# Patient Record
Sex: MALE | Race: BLACK OR AFRICAN AMERICAN | Employment: OTHER | URBAN - METROPOLITAN AREA
[De-identification: names, ages, dates, MRNs, and addresses within clinical notes are randomized per-mention and may not be internally consistent; named-entity substitution may affect disease eponyms.]

---

## 2017-02-13 ENCOUNTER — HOSPITAL ENCOUNTER (OUTPATIENT)
Age: 57
Setting detail: SPECIMEN
Discharge: HOME | End: 2017-02-13
Payer: MEDICAID

## 2017-02-13 LAB
ABSOLUTE EOS #: 0.1 K/UL (ref 0–0.4)
ABSOLUTE LYMPH #: 1.9 K/UL (ref 1–4.8)
ABSOLUTE MONO #: 0.4 K/UL (ref 0.1–1.2)
ALBUMIN SERPL-MCNC: 4.4 G/DL (ref 3.5–5.2)
ALBUMIN/GLOBULIN RATIO: 2 (ref 1–2.5)
ALP BLD-CCNC: 114 U/L (ref 40–129)
ALT SERPL-CCNC: 16 U/L (ref 5–41)
ANION GAP SERPL CALCULATED.3IONS-SCNC: 16 MMOL/L (ref 9–17)
AST SERPL-CCNC: 18 U/L
BASOPHILS # BLD: 1 % (ref 0–2)
BASOPHILS ABSOLUTE: 0 K/UL (ref 0–0.2)
BILIRUB SERPL-MCNC: 0.25 MG/DL (ref 0.3–1.2)
BILIRUBIN DIRECT: 0.09 MG/DL
BILIRUBIN, INDIRECT: 0.16 MG/DL (ref 0–1)
BUN BLDV-MCNC: 15 MG/DL (ref 6–20)
BUN/CREAT BLD: ABNORMAL (ref 9–20)
CALCIUM SERPL-MCNC: 9.4 MG/DL (ref 8.6–10.4)
CHLORIDE BLD-SCNC: 101 MMOL/L (ref 98–107)
CHOLESTEROL/HDL RATIO: 2.5
CHOLESTEROL: 179 MG/DL
CO2: 24 MMOL/L (ref 20–31)
CREAT SERPL-MCNC: 1.16 MG/DL (ref 0.7–1.2)
DIFFERENTIAL TYPE: ABNORMAL
EOSINOPHILS RELATIVE PERCENT: 3 % (ref 1–4)
GFR AFRICAN AMERICAN: >60 ML/MIN
GFR NON-AFRICAN AMERICAN: >60 ML/MIN
GFR SERPL CREATININE-BSD FRML MDRD: ABNORMAL ML/MIN/{1.73_M2}
GFR SERPL CREATININE-BSD FRML MDRD: ABNORMAL ML/MIN/{1.73_M2}
GLOBULIN: ABNORMAL G/DL (ref 1.5–3.8)
GLUCOSE BLD-MCNC: 137 MG/DL (ref 70–99)
HCT VFR BLD CALC: 38.6 % (ref 41–53)
HDLC SERPL-MCNC: 73 MG/DL
HEMOGLOBIN: 12.6 G/DL (ref 13.5–17.5)
LDL CHOLESTEROL: 93 MG/DL (ref 0–130)
LYMPHOCYTES # BLD: 37 % (ref 24–44)
MCH RBC QN AUTO: 28.6 PG (ref 26–34)
MCHC RBC AUTO-ENTMCNC: 32.8 G/DL (ref 31–37)
MCV RBC AUTO: 87.3 FL (ref 80–100)
MONOCYTES # BLD: 7 % (ref 2–11)
PDW BLD-RTO: 14.1 % (ref 12.5–15.4)
PLATELET # BLD: 268 K/UL (ref 140–450)
PLATELET ESTIMATE: ABNORMAL
PMV BLD AUTO: 8.5 FL (ref 6–12)
POTASSIUM SERPL-SCNC: 4.1 MMOL/L (ref 3.7–5.3)
PROSTATE SPECIFIC ANTIGEN: 0.48 UG/L
RBC # BLD: 4.42 M/UL (ref 4.5–5.9)
RBC # BLD: ABNORMAL 10*6/UL
SEG NEUTROPHILS: 52 % (ref 36–66)
SEGMENTED NEUTROPHILS ABSOLUTE COUNT: 2.6 K/UL (ref 1.8–7.7)
SODIUM BLD-SCNC: 141 MMOL/L (ref 135–144)
THYROXINE, FREE: 0.91 NG/DL (ref 0.93–1.7)
TOTAL PROTEIN: 6.6 G/DL (ref 6.4–8.3)
TRIGL SERPL-MCNC: 67 MG/DL
TSH SERPL DL<=0.05 MIU/L-ACNC: 0.81 MIU/L (ref 0.3–5)
VITAMIN D 25-HYDROXY: 17.9 NG/ML (ref 30–100)
VLDLC SERPL CALC-MCNC: NORMAL MG/DL (ref 1–30)
WBC # BLD: 5 K/UL (ref 3.5–11)
WBC # BLD: ABNORMAL 10*3/UL

## 2017-02-14 LAB
CREATININE URINE: 466.6 MG/DL (ref 39–259)
ESTIMATED AVERAGE GLUCOSE: 94 MG/DL
HBA1C MFR BLD: 4.9 % (ref 4–6)
MICROALBUMIN/CREAT 24H UR: 23 MG/L
MICROALBUMIN/CREAT UR-RTO: 5 MCG/MG CREAT

## 2017-02-15 LAB
CULTURE: NORMAL
CULTURE: NORMAL
Lab: NORMAL
SPECIMEN DESCRIPTION: NORMAL
STATUS: NORMAL

## 2017-02-16 LAB
HAV IGM SER IA-ACNC: NONREACTIVE
HEPATITIS B CORE IGM ANTIBODY: NONREACTIVE
HEPATITIS B SURFACE ANTIGEN: NONREACTIVE
HEPATITIS C ANTIBODY: NONREACTIVE

## 2017-06-14 ENCOUNTER — HOSPITAL ENCOUNTER (OUTPATIENT)
Age: 57
Discharge: HOME | End: 2017-06-14
Payer: MEDICAID

## 2017-06-14 ENCOUNTER — HOSPITAL ENCOUNTER (OUTPATIENT)
Dept: GENERAL RADIOLOGY | Age: 57
Discharge: HOME | End: 2017-06-14
Payer: MEDICAID

## 2017-06-14 DIAGNOSIS — R05.9 COUGH: ICD-10-CM

## 2017-06-14 LAB
FERRITIN: 149 UG/L (ref 30–400)
TRANSFERRIN: 228 MG/DL (ref 200–360)

## 2017-06-14 PROCEDURE — 82728 ASSAY OF FERRITIN: CPT

## 2017-06-14 PROCEDURE — 71020 XR CHEST STANDARD TWO VW: CPT

## 2017-06-14 PROCEDURE — 84466 ASSAY OF TRANSFERRIN: CPT

## 2017-06-14 PROCEDURE — 36415 COLL VENOUS BLD VENIPUNCTURE: CPT

## 2017-08-24 ENCOUNTER — HOSPITAL ENCOUNTER (EMERGENCY)
Age: 57
Discharge: PSYCHIATRIC OR SUBSTANCE USE FACILITY | End: 2017-08-25
Attending: EMERGENCY MEDICINE
Payer: MEDICAID

## 2017-08-24 DIAGNOSIS — F20.3 UNDIFFERENTIATED SCHIZOPHRENIA (HCC): Primary | ICD-10-CM

## 2017-08-24 PROCEDURE — 99285 EMERGENCY DEPT VISIT HI MDM: CPT

## 2017-08-25 ENCOUNTER — HOSPITAL ENCOUNTER (INPATIENT)
Age: 57
LOS: 11 days | Discharge: HOME | DRG: 750 | End: 2017-09-05
Attending: PSYCHIATRY & NEUROLOGY | Admitting: PSYCHIATRY & NEUROLOGY
Payer: MEDICAID

## 2017-08-25 VITALS
OXYGEN SATURATION: 99 % | RESPIRATION RATE: 17 BRPM | DIASTOLIC BLOOD PRESSURE: 79 MMHG | TEMPERATURE: 98.4 F | HEART RATE: 81 BPM | SYSTOLIC BLOOD PRESSURE: 124 MMHG

## 2017-08-25 PROBLEM — F25.0 SCHIZOAFFECTIVE DISORDER, BIPOLAR TYPE (HCC): Status: ACTIVE | Noted: 2017-08-25

## 2017-08-25 LAB
GLUCOSE BLD-MCNC: 112 MG/DL (ref 75–110)
GLUCOSE BLD-MCNC: 90 MG/DL (ref 75–110)

## 2017-08-25 PROCEDURE — 1240000000 HC EMOTIONAL WELLNESS R&B

## 2017-08-25 PROCEDURE — 82947 ASSAY GLUCOSE BLOOD QUANT: CPT

## 2017-08-25 PROCEDURE — 6370000000 HC RX 637 (ALT 250 FOR IP): Performed by: PSYCHIATRY & NEUROLOGY

## 2017-08-25 RX ORDER — TRAZODONE HYDROCHLORIDE 100 MG/1
100 TABLET ORAL NIGHTLY
Status: DISCONTINUED | OUTPATIENT
Start: 2017-08-25 | End: 2017-08-29

## 2017-08-25 RX ORDER — ACETAMINOPHEN 325 MG/1
650 TABLET ORAL EVERY 4 HOURS PRN
Status: DISCONTINUED | OUTPATIENT
Start: 2017-08-25 | End: 2017-09-05 | Stop reason: HOSPADM

## 2017-08-25 RX ORDER — BENZTROPINE MESYLATE 1 MG/ML
2 INJECTION INTRAMUSCULAR; INTRAVENOUS DAILY PRN
Status: DISCONTINUED | OUTPATIENT
Start: 2017-08-25 | End: 2017-09-05 | Stop reason: HOSPADM

## 2017-08-25 RX ORDER — QUETIAPINE FUMARATE 200 MG/1
200 TABLET, FILM COATED ORAL EVERY MORNING
Status: DISCONTINUED | OUTPATIENT
Start: 2017-08-25 | End: 2017-09-05 | Stop reason: HOSPADM

## 2017-08-25 RX ORDER — HYDROXYZINE HYDROCHLORIDE 25 MG/1
25 TABLET, FILM COATED ORAL 3 TIMES DAILY PRN
Status: DISCONTINUED | OUTPATIENT
Start: 2017-08-25 | End: 2017-09-05 | Stop reason: HOSPADM

## 2017-08-25 RX ORDER — QUETIAPINE FUMARATE 200 MG/1
400 TABLET, FILM COATED ORAL NIGHTLY
Status: DISCONTINUED | OUTPATIENT
Start: 2017-08-25 | End: 2017-08-29

## 2017-08-25 RX ADMIN — QUETIAPINE FUMARATE 400 MG: 200 TABLET ORAL at 21:44

## 2017-08-25 RX ADMIN — METFORMIN HYDROCHLORIDE 1000 MG: 1000 TABLET, FILM COATED ORAL at 17:28

## 2017-08-25 RX ADMIN — QUETIAPINE FUMARATE 200 MG: 200 TABLET ORAL at 12:01

## 2017-08-25 RX ADMIN — TRAZODONE HYDROCHLORIDE 100 MG: 100 TABLET ORAL at 21:44

## 2017-08-25 RX ADMIN — ACETAMINOPHEN 650 MG: 325 TABLET ORAL at 21:44

## 2017-08-25 RX ADMIN — METFORMIN HYDROCHLORIDE 1000 MG: 1000 TABLET, FILM COATED ORAL at 12:01

## 2017-08-25 ASSESSMENT — ENCOUNTER SYMPTOMS
RHINORRHEA: 0
SHORTNESS OF BREATH: 0
COUGH: 0
BACK PAIN: 0
VOMITING: 0
ABDOMINAL PAIN: 0
EYE REDNESS: 0
EYE ITCHING: 0
CONSTIPATION: 0
NAUSEA: 0
DIARRHEA: 0

## 2017-08-25 ASSESSMENT — SLEEP AND FATIGUE QUESTIONNAIRES
RESTFUL SLEEP: NO
DIFFICULTY STAYING ASLEEP: YES
DO YOU USE A SLEEP AID: NO
AVERAGE NUMBER OF SLEEP HOURS: 4
SLEEP PATTERN: RESTLESSNESS;INSOMNIA;DISTURBED/INTERRUPTED SLEEP
DIFFICULTY FALLING ASLEEP: YES
DO YOU HAVE DIFFICULTY SLEEPING: YES
DIFFICULTY ARISING: NO

## 2017-08-25 ASSESSMENT — PATIENT HEALTH QUESTIONNAIRE - PHQ9: SUM OF ALL RESPONSES TO PHQ QUESTIONS 1-9: 25

## 2017-08-25 ASSESSMENT — LIFESTYLE VARIABLES: HISTORY_ALCOHOL_USE: YES

## 2017-08-25 ASSESSMENT — PAIN SCALES - GENERAL
PAINLEVEL_OUTOF10: 5
PAINLEVEL_OUTOF10: 3

## 2017-08-26 LAB — GLUCOSE BLD-MCNC: 79 MG/DL (ref 75–110)

## 2017-08-26 PROCEDURE — 6370000000 HC RX 637 (ALT 250 FOR IP): Performed by: PSYCHIATRY & NEUROLOGY

## 2017-08-26 PROCEDURE — 82947 ASSAY GLUCOSE BLOOD QUANT: CPT

## 2017-08-26 PROCEDURE — 1240000000 HC EMOTIONAL WELLNESS R&B

## 2017-08-26 RX ADMIN — ACETAMINOPHEN 650 MG: 325 TABLET ORAL at 21:03

## 2017-08-26 RX ADMIN — METFORMIN HYDROCHLORIDE 1000 MG: 1000 TABLET, FILM COATED ORAL at 08:51

## 2017-08-26 RX ADMIN — HYDROXYZINE HYDROCHLORIDE 25 MG: 25 TABLET, FILM COATED ORAL at 21:03

## 2017-08-26 RX ADMIN — METFORMIN HYDROCHLORIDE 1000 MG: 1000 TABLET, FILM COATED ORAL at 17:30

## 2017-08-26 RX ADMIN — QUETIAPINE FUMARATE 200 MG: 200 TABLET ORAL at 08:50

## 2017-08-26 RX ADMIN — TRAZODONE HYDROCHLORIDE 100 MG: 100 TABLET ORAL at 21:02

## 2017-08-26 RX ADMIN — QUETIAPINE FUMARATE 400 MG: 200 TABLET ORAL at 21:02

## 2017-08-26 ASSESSMENT — PAIN SCALES - GENERAL
PAINLEVEL_OUTOF10: 5
PAINLEVEL_OUTOF10: 0

## 2017-08-26 ASSESSMENT — LIFESTYLE VARIABLES: HISTORY_ALCOHOL_USE: NO

## 2017-08-27 PROCEDURE — 6370000000 HC RX 637 (ALT 250 FOR IP): Performed by: PSYCHIATRY & NEUROLOGY

## 2017-08-27 PROCEDURE — 1240000000 HC EMOTIONAL WELLNESS R&B

## 2017-08-27 RX ADMIN — QUETIAPINE FUMARATE 400 MG: 200 TABLET ORAL at 21:54

## 2017-08-27 RX ADMIN — QUETIAPINE FUMARATE 200 MG: 200 TABLET ORAL at 08:43

## 2017-08-27 RX ADMIN — METFORMIN HYDROCHLORIDE 1000 MG: 1000 TABLET, FILM COATED ORAL at 17:06

## 2017-08-27 RX ADMIN — HYDROXYZINE HYDROCHLORIDE 25 MG: 25 TABLET, FILM COATED ORAL at 08:43

## 2017-08-27 RX ADMIN — ACETAMINOPHEN 650 MG: 325 TABLET ORAL at 21:54

## 2017-08-27 RX ADMIN — HYDROXYZINE HYDROCHLORIDE 25 MG: 25 TABLET, FILM COATED ORAL at 21:54

## 2017-08-27 RX ADMIN — METFORMIN HYDROCHLORIDE 1000 MG: 1000 TABLET, FILM COATED ORAL at 08:43

## 2017-08-27 RX ADMIN — TRAZODONE HYDROCHLORIDE 100 MG: 100 TABLET ORAL at 21:54

## 2017-08-27 ASSESSMENT — PAIN SCALES - GENERAL
PAINLEVEL_OUTOF10: 0
PAINLEVEL_OUTOF10: 5

## 2017-08-28 LAB
GLUCOSE BLD-MCNC: 87 MG/DL (ref 75–110)
GLUCOSE BLD-MCNC: 96 MG/DL (ref 75–110)

## 2017-08-28 PROCEDURE — 82947 ASSAY GLUCOSE BLOOD QUANT: CPT

## 2017-08-28 PROCEDURE — 6370000000 HC RX 637 (ALT 250 FOR IP): Performed by: PSYCHIATRY & NEUROLOGY

## 2017-08-28 PROCEDURE — 1240000000 HC EMOTIONAL WELLNESS R&B

## 2017-08-28 RX ADMIN — TRAZODONE HYDROCHLORIDE 100 MG: 100 TABLET ORAL at 21:18

## 2017-08-28 RX ADMIN — QUETIAPINE FUMARATE 400 MG: 200 TABLET ORAL at 21:18

## 2017-08-28 RX ADMIN — METFORMIN HYDROCHLORIDE 1000 MG: 1000 TABLET, FILM COATED ORAL at 17:02

## 2017-08-28 RX ADMIN — QUETIAPINE FUMARATE 200 MG: 200 TABLET ORAL at 09:15

## 2017-08-28 RX ADMIN — HYDROXYZINE HYDROCHLORIDE 25 MG: 25 TABLET, FILM COATED ORAL at 21:18

## 2017-08-28 RX ADMIN — METFORMIN HYDROCHLORIDE 1000 MG: 1000 TABLET, FILM COATED ORAL at 09:15

## 2017-08-28 ASSESSMENT — PAIN DESCRIPTION - LOCATION: LOCATION: TOE (COMMENT WHICH ONE)

## 2017-08-28 ASSESSMENT — PAIN SCALES - GENERAL: PAINLEVEL_OUTOF10: 10

## 2017-08-29 PROCEDURE — 6370000000 HC RX 637 (ALT 250 FOR IP): Performed by: PSYCHIATRY & NEUROLOGY

## 2017-08-29 PROCEDURE — 1240000000 HC EMOTIONAL WELLNESS R&B

## 2017-08-29 RX ORDER — QUETIAPINE FUMARATE 100 MG/1
100 TABLET, FILM COATED ORAL NIGHTLY
Status: DISCONTINUED | OUTPATIENT
Start: 2017-08-29 | End: 2017-08-29

## 2017-08-29 RX ORDER — TRAZODONE HYDROCHLORIDE 100 MG/1
100 TABLET ORAL NIGHTLY
Status: DISCONTINUED | OUTPATIENT
Start: 2017-08-29 | End: 2017-08-31

## 2017-08-29 RX ADMIN — METFORMIN HYDROCHLORIDE 1000 MG: 1000 TABLET, FILM COATED ORAL at 09:33

## 2017-08-29 RX ADMIN — HYDROXYZINE HYDROCHLORIDE 25 MG: 25 TABLET, FILM COATED ORAL at 20:57

## 2017-08-29 RX ADMIN — TRAZODONE HYDROCHLORIDE 100 MG: 100 TABLET ORAL at 20:57

## 2017-08-29 RX ADMIN — QUETIAPINE FUMARATE 200 MG: 200 TABLET ORAL at 09:32

## 2017-08-29 RX ADMIN — ACETAMINOPHEN 650 MG: 325 TABLET ORAL at 09:32

## 2017-08-29 RX ADMIN — QUETIAPINE FUMARATE 500 MG: 200 TABLET ORAL at 20:57

## 2017-08-29 RX ADMIN — ACETAMINOPHEN 650 MG: 325 TABLET ORAL at 20:57

## 2017-08-29 RX ADMIN — HYDROXYZINE HYDROCHLORIDE 25 MG: 25 TABLET, FILM COATED ORAL at 09:33

## 2017-08-29 ASSESSMENT — PAIN SCALES - GENERAL
PAINLEVEL_OUTOF10: 7
PAINLEVEL_OUTOF10: 6
PAINLEVEL_OUTOF10: 2
PAINLEVEL_OUTOF10: 5

## 2017-08-30 PROCEDURE — 1240000000 HC EMOTIONAL WELLNESS R&B

## 2017-08-30 PROCEDURE — 6370000000 HC RX 637 (ALT 250 FOR IP): Performed by: PSYCHIATRY & NEUROLOGY

## 2017-08-30 RX ADMIN — METFORMIN HYDROCHLORIDE 1000 MG: 1000 TABLET, FILM COATED ORAL at 17:07

## 2017-08-30 RX ADMIN — QUETIAPINE FUMARATE 200 MG: 200 TABLET ORAL at 09:32

## 2017-08-30 RX ADMIN — QUETIAPINE FUMARATE 500 MG: 200 TABLET ORAL at 21:35

## 2017-08-30 RX ADMIN — HYDROXYZINE HYDROCHLORIDE 25 MG: 25 TABLET, FILM COATED ORAL at 21:35

## 2017-08-30 RX ADMIN — TRAZODONE HYDROCHLORIDE 100 MG: 100 TABLET ORAL at 21:35

## 2017-08-30 RX ADMIN — METFORMIN HYDROCHLORIDE 1000 MG: 1000 TABLET, FILM COATED ORAL at 09:32

## 2017-08-30 ASSESSMENT — PAIN SCALES - GENERAL: PAINLEVEL_OUTOF10: 5

## 2017-08-30 ASSESSMENT — PAIN DESCRIPTION - LOCATION: LOCATION: FOOT

## 2017-08-31 PROCEDURE — 6370000000 HC RX 637 (ALT 250 FOR IP): Performed by: PSYCHIATRY & NEUROLOGY

## 2017-08-31 PROCEDURE — 1240000000 HC EMOTIONAL WELLNESS R&B

## 2017-08-31 RX ORDER — QUETIAPINE FUMARATE 300 MG/1
600 TABLET, FILM COATED ORAL NIGHTLY
Status: DISCONTINUED | OUTPATIENT
Start: 2017-08-31 | End: 2017-09-05 | Stop reason: HOSPADM

## 2017-08-31 RX ADMIN — QUETIAPINE FUMARATE 200 MG: 200 TABLET ORAL at 09:33

## 2017-08-31 RX ADMIN — METFORMIN HYDROCHLORIDE 1000 MG: 1000 TABLET, FILM COATED ORAL at 16:56

## 2017-08-31 RX ADMIN — QUETIAPINE FUMARATE 600 MG: 300 TABLET, FILM COATED ORAL at 21:26

## 2017-08-31 RX ADMIN — METFORMIN HYDROCHLORIDE 1000 MG: 1000 TABLET, FILM COATED ORAL at 09:32

## 2017-08-31 ASSESSMENT — PAIN SCALES - GENERAL: PAINLEVEL_OUTOF10: 5

## 2017-09-01 PROCEDURE — 6370000000 HC RX 637 (ALT 250 FOR IP): Performed by: PSYCHIATRY & NEUROLOGY

## 2017-09-01 PROCEDURE — 1240000000 HC EMOTIONAL WELLNESS R&B

## 2017-09-01 RX ADMIN — QUETIAPINE FUMARATE 600 MG: 300 TABLET, FILM COATED ORAL at 21:07

## 2017-09-01 RX ADMIN — QUETIAPINE FUMARATE 200 MG: 200 TABLET ORAL at 08:25

## 2017-09-01 RX ADMIN — METFORMIN HYDROCHLORIDE 1000 MG: 1000 TABLET, FILM COATED ORAL at 08:25

## 2017-09-01 RX ADMIN — METFORMIN HYDROCHLORIDE 1000 MG: 1000 TABLET, FILM COATED ORAL at 17:02

## 2017-09-02 PROCEDURE — 6370000000 HC RX 637 (ALT 250 FOR IP): Performed by: PSYCHIATRY & NEUROLOGY

## 2017-09-02 PROCEDURE — 1240000000 HC EMOTIONAL WELLNESS R&B

## 2017-09-02 RX ADMIN — QUETIAPINE FUMARATE 600 MG: 300 TABLET, FILM COATED ORAL at 21:13

## 2017-09-02 RX ADMIN — HYDROXYZINE HYDROCHLORIDE 25 MG: 25 TABLET, FILM COATED ORAL at 21:13

## 2017-09-02 RX ADMIN — METFORMIN HYDROCHLORIDE 1000 MG: 1000 TABLET, FILM COATED ORAL at 18:05

## 2017-09-03 PROCEDURE — 6370000000 HC RX 637 (ALT 250 FOR IP): Performed by: PSYCHIATRY & NEUROLOGY

## 2017-09-03 PROCEDURE — 1240000000 HC EMOTIONAL WELLNESS R&B

## 2017-09-03 RX ORDER — TRAZODONE HYDROCHLORIDE 100 MG/1
100 TABLET ORAL NIGHTLY PRN
Status: DISCONTINUED | OUTPATIENT
Start: 2017-09-03 | End: 2017-09-05 | Stop reason: HOSPADM

## 2017-09-03 RX ADMIN — METFORMIN HYDROCHLORIDE 1000 MG: 1000 TABLET, FILM COATED ORAL at 17:07

## 2017-09-03 RX ADMIN — QUETIAPINE FUMARATE 200 MG: 200 TABLET ORAL at 08:51

## 2017-09-03 RX ADMIN — TRAZODONE HYDROCHLORIDE 100 MG: 100 TABLET ORAL at 22:14

## 2017-09-03 RX ADMIN — METFORMIN HYDROCHLORIDE 1000 MG: 1000 TABLET, FILM COATED ORAL at 08:51

## 2017-09-03 RX ADMIN — QUETIAPINE FUMARATE 600 MG: 300 TABLET, FILM COATED ORAL at 22:14

## 2017-09-04 PROCEDURE — 1240000000 HC EMOTIONAL WELLNESS R&B

## 2017-09-04 PROCEDURE — 6370000000 HC RX 637 (ALT 250 FOR IP): Performed by: PSYCHIATRY & NEUROLOGY

## 2017-09-04 RX ADMIN — TRAZODONE HYDROCHLORIDE 100 MG: 100 TABLET ORAL at 21:19

## 2017-09-04 RX ADMIN — QUETIAPINE FUMARATE 200 MG: 200 TABLET ORAL at 10:10

## 2017-09-04 RX ADMIN — METFORMIN HYDROCHLORIDE 1000 MG: 1000 TABLET, FILM COATED ORAL at 16:09

## 2017-09-04 RX ADMIN — QUETIAPINE FUMARATE 600 MG: 300 TABLET, FILM COATED ORAL at 21:19

## 2017-09-04 RX ADMIN — HYDROXYZINE HYDROCHLORIDE 25 MG: 25 TABLET, FILM COATED ORAL at 10:10

## 2017-09-04 RX ADMIN — METFORMIN HYDROCHLORIDE 1000 MG: 1000 TABLET, FILM COATED ORAL at 10:10

## 2017-09-04 RX ADMIN — HYDROXYZINE HYDROCHLORIDE 25 MG: 25 TABLET, FILM COATED ORAL at 21:19

## 2017-09-05 VITALS
DIASTOLIC BLOOD PRESSURE: 51 MMHG | HEART RATE: 91 BPM | RESPIRATION RATE: 14 BRPM | TEMPERATURE: 98.5 F | BODY MASS INDEX: 20.39 KG/M2 | WEIGHT: 164 LBS | OXYGEN SATURATION: 81 % | HEIGHT: 75 IN | SYSTOLIC BLOOD PRESSURE: 93 MMHG

## 2017-09-05 PROCEDURE — 6370000000 HC RX 637 (ALT 250 FOR IP): Performed by: PSYCHIATRY & NEUROLOGY

## 2017-09-05 PROCEDURE — 5130000000 HC BRIDGE APPOINTMENT: Performed by: PSYCHIATRY & NEUROLOGY

## 2017-09-05 RX ORDER — TRAZODONE HYDROCHLORIDE 100 MG/1
100 TABLET ORAL NIGHTLY PRN
Qty: 30 TABLET | Refills: 0 | Status: ON HOLD | OUTPATIENT
Start: 2017-09-05 | End: 2017-10-02

## 2017-09-05 RX ORDER — QUETIAPINE FUMARATE 300 MG/1
600 TABLET, FILM COATED ORAL NIGHTLY
Qty: 60 TABLET | Refills: 0 | Status: ON HOLD | OUTPATIENT
Start: 2017-09-05 | End: 2017-10-02

## 2017-09-05 RX ORDER — QUETIAPINE FUMARATE 200 MG/1
200 TABLET, FILM COATED ORAL EVERY MORNING
Qty: 60 TABLET | Refills: 0 | Status: ON HOLD | OUTPATIENT
Start: 2017-09-05 | End: 2017-10-02

## 2017-09-05 RX ADMIN — METFORMIN HYDROCHLORIDE 1000 MG: 1000 TABLET, FILM COATED ORAL at 08:02

## 2017-09-05 RX ADMIN — QUETIAPINE FUMARATE 200 MG: 200 TABLET ORAL at 08:02

## 2017-09-08 ENCOUNTER — HOSPITAL ENCOUNTER (EMERGENCY)
Age: 57
Discharge: HOME | End: 2017-09-08
Attending: EMERGENCY MEDICINE
Payer: MEDICAID

## 2017-09-08 ENCOUNTER — APPOINTMENT (OUTPATIENT)
Dept: GENERAL RADIOLOGY | Age: 57
End: 2017-09-08
Payer: MEDICAID

## 2017-09-08 VITALS
OXYGEN SATURATION: 100 % | SYSTOLIC BLOOD PRESSURE: 119 MMHG | WEIGHT: 164 LBS | RESPIRATION RATE: 20 BRPM | BODY MASS INDEX: 20.5 KG/M2 | TEMPERATURE: 98.1 F | DIASTOLIC BLOOD PRESSURE: 75 MMHG | HEART RATE: 100 BPM

## 2017-09-08 DIAGNOSIS — K59.00 CONSTIPATION, UNSPECIFIED CONSTIPATION TYPE: Primary | ICD-10-CM

## 2017-09-08 DIAGNOSIS — J18.9 COMMUNITY ACQUIRED PNEUMONIA: ICD-10-CM

## 2017-09-08 LAB
ABSOLUTE EOS #: 0.07 K/UL (ref 0–0.4)
ABSOLUTE LYMPH #: 1.84 K/UL (ref 1–4.8)
ABSOLUTE MONO #: 0.54 K/UL (ref 0.1–0.8)
ALBUMIN SERPL-MCNC: 3.8 G/DL (ref 3.5–5.2)
ALBUMIN/GLOBULIN RATIO: 1.3 (ref 1–2.5)
ALP BLD-CCNC: 101 U/L (ref 40–129)
ALT SERPL-CCNC: 23 U/L (ref 5–41)
ANION GAP SERPL CALCULATED.3IONS-SCNC: 10 MMOL/L (ref 9–17)
AST SERPL-CCNC: 26 U/L
BASOPHILS # BLD: 0 %
BASOPHILS ABSOLUTE: 0 K/UL (ref 0–0.2)
BILIRUB SERPL-MCNC: 0.28 MG/DL (ref 0.3–1.2)
BUN BLDV-MCNC: 13 MG/DL (ref 6–20)
BUN/CREAT BLD: ABNORMAL (ref 9–20)
CALCIUM SERPL-MCNC: 9.3 MG/DL (ref 8.6–10.4)
CHLORIDE BLD-SCNC: 106 MMOL/L (ref 98–107)
CO2: 26 MMOL/L (ref 20–31)
CREAT SERPL-MCNC: 0.75 MG/DL (ref 0.7–1.2)
DIFFERENTIAL TYPE: ABNORMAL
EOSINOPHILS RELATIVE PERCENT: 1 %
GFR AFRICAN AMERICAN: >60 ML/MIN
GFR NON-AFRICAN AMERICAN: >60 ML/MIN
GFR SERPL CREATININE-BSD FRML MDRD: ABNORMAL ML/MIN/{1.73_M2}
GFR SERPL CREATININE-BSD FRML MDRD: ABNORMAL ML/MIN/{1.73_M2}
GLUCOSE BLD-MCNC: 127 MG/DL (ref 70–99)
HCT VFR BLD CALC: 31.2 % (ref 41–53)
HEMOGLOBIN: 10.4 G/DL (ref 13.5–17.5)
LACTIC ACID, WHOLE BLOOD: 1.9 MMOL/L (ref 0.7–2.1)
LIPASE: 30 U/L (ref 13–60)
LYMPHOCYTES # BLD: 27 %
MCH RBC QN AUTO: 28.6 PG (ref 26–34)
MCHC RBC AUTO-ENTMCNC: 33.2 G/DL (ref 31–37)
MCV RBC AUTO: 86.2 FL (ref 80–100)
MONOCYTES # BLD: 8 %
MORPHOLOGY: NORMAL
PDW BLD-RTO: 15.3 % (ref 12.5–15.4)
PLATELET # BLD: 276 K/UL (ref 140–450)
PLATELET ESTIMATE: ABNORMAL
PMV BLD AUTO: 8.3 FL (ref 6–12)
POTASSIUM SERPL-SCNC: 4.5 MMOL/L (ref 3.7–5.3)
RBC # BLD: 3.62 M/UL (ref 4.5–5.9)
RBC # BLD: ABNORMAL 10*6/UL
SEG NEUTROPHILS: 64 %
SEGMENTED NEUTROPHILS ABSOLUTE COUNT: 4.35 K/UL (ref 1.8–7.7)
SODIUM BLD-SCNC: 142 MMOL/L (ref 135–144)
TOTAL PROTEIN: 6.7 G/DL (ref 6.4–8.3)
WBC # BLD: 6.8 K/UL (ref 3.5–11)
WBC # BLD: ABNORMAL 10*3/UL

## 2017-09-08 PROCEDURE — 83605 ASSAY OF LACTIC ACID: CPT

## 2017-09-08 PROCEDURE — 74022 RADEX COMPL AQT ABD SERIES: CPT

## 2017-09-08 PROCEDURE — 99285 EMERGENCY DEPT VISIT HI MDM: CPT

## 2017-09-08 PROCEDURE — 83690 ASSAY OF LIPASE: CPT

## 2017-09-08 PROCEDURE — 80053 COMPREHEN METABOLIC PANEL: CPT

## 2017-09-08 PROCEDURE — 6370000000 HC RX 637 (ALT 250 FOR IP): Performed by: EMERGENCY MEDICINE

## 2017-09-08 PROCEDURE — 85025 COMPLETE CBC W/AUTO DIFF WBC: CPT

## 2017-09-08 RX ORDER — ACETAMINOPHEN 325 MG/1
650 TABLET ORAL EVERY 6 HOURS PRN
Qty: 30 TABLET | Refills: 0 | Status: ON HOLD | OUTPATIENT
Start: 2017-09-08 | End: 2017-10-02 | Stop reason: HOSPADM

## 2017-09-08 RX ORDER — MAGNESIUM HYDROXIDE/ALUMINUM HYDROXICE/SIMETHICONE 120; 1200; 1200 MG/30ML; MG/30ML; MG/30ML
30 SUSPENSION ORAL
Status: COMPLETED | OUTPATIENT
Start: 2017-09-08 | End: 2017-09-08

## 2017-09-08 RX ORDER — POLYETHYLENE GLYCOL 3350 17 G/17G
17 POWDER, FOR SOLUTION ORAL DAILY
Qty: 578 G | Refills: 0 | Status: ON HOLD | OUTPATIENT
Start: 2017-09-08 | End: 2017-10-02 | Stop reason: HOSPADM

## 2017-09-08 RX ORDER — AZITHROMYCIN 250 MG/1
TABLET, FILM COATED ORAL
Qty: 1 PACKET | Refills: 0 | Status: SHIPPED | OUTPATIENT
Start: 2017-09-08 | End: 2017-09-18

## 2017-09-08 RX ORDER — ACETAMINOPHEN 325 MG/1
650 TABLET ORAL ONCE
Status: DISCONTINUED | OUTPATIENT
Start: 2017-09-08 | End: 2017-09-08 | Stop reason: HOSPADM

## 2017-09-08 RX ORDER — POLYETHYLENE GLYCOL 3350 17 G/17G
17 POWDER, FOR SOLUTION ORAL ONCE
Status: DISCONTINUED | OUTPATIENT
Start: 2017-09-08 | End: 2017-09-08 | Stop reason: HOSPADM

## 2017-09-08 RX ADMIN — ALUMINUM HYDROXIDE, MAGNESIUM HYDROXIDE, AND SIMETHICONE 30 ML: 200; 200; 20 SUSPENSION ORAL at 15:17

## 2017-09-08 ASSESSMENT — PAIN DESCRIPTION - ORIENTATION: ORIENTATION: RIGHT;UPPER

## 2017-09-08 ASSESSMENT — PAIN DESCRIPTION - PAIN TYPE: TYPE: ACUTE PAIN

## 2017-09-08 ASSESSMENT — PAIN SCALES - GENERAL: PAINLEVEL_OUTOF10: 10

## 2017-09-08 ASSESSMENT — PAIN DESCRIPTION - LOCATION: LOCATION: ABDOMEN

## 2017-09-09 ASSESSMENT — ENCOUNTER SYMPTOMS
VOMITING: 0
SHORTNESS OF BREATH: 0
NAUSEA: 0
COUGH: 0
CONSTIPATION: 0
CHEST TIGHTNESS: 0
BLOOD IN STOOL: 0
DIARRHEA: 0
BACK PAIN: 0
ABDOMINAL PAIN: 1

## 2017-09-22 ENCOUNTER — HOSPITAL ENCOUNTER (INPATIENT)
Age: 57
LOS: 10 days | Discharge: HOME | DRG: 885 | End: 2017-10-02
Attending: PSYCHIATRY & NEUROLOGY | Admitting: PSYCHIATRY & NEUROLOGY
Payer: MEDICAID

## 2017-09-22 PROCEDURE — 6370000000 HC RX 637 (ALT 250 FOR IP): Performed by: PSYCHIATRY & NEUROLOGY

## 2017-09-22 PROCEDURE — 1240000000 HC EMOTIONAL WELLNESS R&B

## 2017-09-22 RX ORDER — TRAZODONE HYDROCHLORIDE 100 MG/1
100 TABLET ORAL NIGHTLY PRN
Status: DISCONTINUED | OUTPATIENT
Start: 2017-09-22 | End: 2017-10-02 | Stop reason: HOSPADM

## 2017-09-22 RX ORDER — ACETAMINOPHEN 325 MG/1
325 TABLET ORAL EVERY 8 HOURS PRN
Status: DISCONTINUED | OUTPATIENT
Start: 2017-09-22 | End: 2017-10-02 | Stop reason: HOSPADM

## 2017-09-22 RX ORDER — LORAZEPAM 2 MG/ML
2 INJECTION INTRAMUSCULAR 2 TIMES DAILY PRN
Status: DISCONTINUED | OUTPATIENT
Start: 2017-09-22 | End: 2017-10-02 | Stop reason: HOSPADM

## 2017-09-22 RX ORDER — QUETIAPINE FUMARATE 300 MG/1
600 TABLET, FILM COATED ORAL NIGHTLY
Status: DISCONTINUED | OUTPATIENT
Start: 2017-09-22 | End: 2017-10-02 | Stop reason: HOSPADM

## 2017-09-22 RX ORDER — QUETIAPINE FUMARATE 200 MG/1
200 TABLET, FILM COATED ORAL EVERY MORNING
Status: DISCONTINUED | OUTPATIENT
Start: 2017-09-23 | End: 2017-09-22

## 2017-09-22 RX ORDER — HYDROXYZINE HYDROCHLORIDE 25 MG/1
25 TABLET, FILM COATED ORAL 3 TIMES DAILY PRN
Status: DISCONTINUED | OUTPATIENT
Start: 2017-09-22 | End: 2017-10-02 | Stop reason: HOSPADM

## 2017-09-22 RX ORDER — LORAZEPAM 2 MG/ML
2 INJECTION INTRAMUSCULAR EVERY 6 HOURS PRN
Status: DISCONTINUED | OUTPATIENT
Start: 2017-09-22 | End: 2017-09-22

## 2017-09-22 RX ORDER — TRAZODONE HYDROCHLORIDE 100 MG/1
100 TABLET ORAL NIGHTLY PRN
Status: DISCONTINUED | OUTPATIENT
Start: 2017-09-23 | End: 2017-09-22

## 2017-09-22 RX ORDER — SERTRALINE HYDROCHLORIDE 100 MG/1
100 TABLET, FILM COATED ORAL DAILY
Status: DISCONTINUED | OUTPATIENT
Start: 2017-09-23 | End: 2017-09-29

## 2017-09-22 RX ORDER — ZIPRASIDONE MESYLATE 20 MG/ML
20 INJECTION, POWDER, LYOPHILIZED, FOR SOLUTION INTRAMUSCULAR 2 TIMES DAILY PRN
Status: DISCONTINUED | OUTPATIENT
Start: 2017-09-22 | End: 2017-10-02 | Stop reason: HOSPADM

## 2017-09-22 RX ADMIN — QUETIAPINE FUMARATE 600 MG: 300 TABLET, FILM COATED ORAL at 23:00

## 2017-09-22 RX ADMIN — TRAZODONE HYDROCHLORIDE 100 MG: 100 TABLET ORAL at 23:00

## 2017-09-22 ASSESSMENT — SLEEP AND FATIGUE QUESTIONNAIRES
DIFFICULTY STAYING ASLEEP: YES
DO YOU USE A SLEEP AID: YES
DIFFICULTY FALLING ASLEEP: YES
AVERAGE NUMBER OF SLEEP HOURS: 6
RESTFUL SLEEP: NO
DIFFICULTY ARISING: NO
SLEEP PATTERN: DIFFICULTY FALLING ASLEEP;DIFFICULTY ARISING;RESTLESSNESS
DO YOU HAVE DIFFICULTY SLEEPING: YES

## 2017-09-22 ASSESSMENT — PATIENT HEALTH QUESTIONNAIRE - PHQ9: SUM OF ALL RESPONSES TO PHQ QUESTIONS 1-9: 20

## 2017-09-22 ASSESSMENT — LIFESTYLE VARIABLES: HISTORY_ALCOHOL_USE: NO

## 2017-09-22 NOTE — IP AVS SNAPSHOT
Your primary diagnosis was:  Schizoaffective Disorder, Depressive Type (Hcc)      Visit Information     Date & Time Provider Department Dept. Phone    9/22/2017 Antoinette Gibson MD [facility] JOSE GUADALUPE [phone]       Follow-up Appointments    Below is a list of your follow-up and future appointments. This may not be a complete list as you may have made appointments directly with providers that we are not aware of or your providers may have made some for you. Please call your providers to confirm appointments. It is important to keep your appointments. Please bring your current insurance card, photo ID, co-pay, and all medication bottles to your appointment. If self-pay, payment is expected at the time of service. Follow-up Information     Follow up with Jacinta Valnecia MD .    Specialty:  Internal Medicine    Contact information:    [address] [facility]  [phone]          Follow up with [facility]. Go on 10/11/2017. Why:  Appointment at 3:00pm fpr MED CONTINUATION    Contact information:    [address] [place], [place] [address]Vicki [facility]. [address]  P: [phone]  F: [phone] [facility] Once You Return Home    This section includes instructions you will need to follow once you leave the hospital.  Your care team will discuss these with you, so you and those caring for you know how to best care for your health needs at home. This section may also include educational information about certain health topics that may be of help to you. Discharge Instructions       Information:  Medications:   Medication summary provided   I understand that I should take only the medications on my list.     -why and when I need to take each medicine.     -which side effects to watch for.     -that I should carry my medication information at all times in case of     Emergency situations. I will take all of my medicines to follow up appointments. nicotine polacrilex 2 MG gum   Commonly known as:  NICORETTE       polyethylene glycol powder   Commonly known as:  GLYCOLAX            Where to Get Your Medications      You can get these medications from any pharmacy     Bring a paper prescription for each of these medications     metFORMIN 1000 MG tablet    QUEtiapine 200 MG tablet    QUEtiapine 300 MG tablet    sertraline 50 MG tablet    traZODone 100 MG tablet               Allergies as of 10/2/2017        Reactions    Navane [Thiothixene]       Immunizations as of 10/2/2017     No immunizations on file. MyChart Signup     [place] allows you to send messages to your doctor, view your test results, renew your prescriptions, schedule appointments, view visit notes, and more. How Do I Sign Up? 1. In your Internet browser, go to [email or web address]  2. Click on the Sign Up Now link in the Sign In box. You will see the New Member Sign Up page. 3. Enter your [place] Access Code exactly as it appears below. You will not need to use this code after youve completed the sign-up process. If you do not sign up before the expiration date, you must request a new code. [place] Access Code: [email or web address]  Expires: 11/4/2017 11:17 AM    4. Enter your Social Security Number ([id number]) and Date of Birth (mm/dd/yyyy) as indicated and click Submit. You will be taken to the next sign-up page. 5. Create a [place] ID. This will be your [place] login ID and cannot be changed, so think of one that is secure and easy to remember. 6. Create a [place] password. You can change your password at any time. 7. Enter your Password Reset Question and Answer. This can be used at a later time if you forget your password. 8. Enter your e-mail address. You will receive e-mail notification when new information is available in [address] 9. Click Sign Up. You can now view your medical record.      Additional Information If you have questions, please contact the physician practice where you receive care. Remember, [employer] is NOT to be used for urgent needs. For medical emergencies, dial 911. For questions regarding your [employer] account call [phone]. If you have a clinical question, please call your doctor's office. View your information online  ? Review your current list of  medications, immunization, and allergies. ? Review your future test results online . ? Review your discharge instructions provided by your caregivers at discharge    Certain functionality such as prescription refills, scheduling appointments or sending messages to your provider are not activated if your provider does not use [employer] in his/her office    For questions regarding your [employer] account call [phone]. If you have a clinical question, please call your doctor's office.

## 2017-09-23 PROBLEM — F25.1 SCHIZOAFFECTIVE DISORDER, DEPRESSIVE TYPE (HCC): Status: ACTIVE | Noted: 2017-09-23

## 2017-09-23 PROBLEM — F25.1 SCHIZOAFFECTIVE DISORDER, DEPRESSIVE TYPE (HCC): Chronic | Status: ACTIVE | Noted: 2017-09-23

## 2017-09-23 LAB
ANION GAP SERPL CALCULATED.3IONS-SCNC: 9 MMOL/L (ref 9–17)
BUN BLDV-MCNC: 11 MG/DL (ref 6–20)
BUN/CREAT BLD: ABNORMAL (ref 9–20)
CALCIUM SERPL-MCNC: 9 MG/DL (ref 8.6–10.4)
CHLORIDE BLD-SCNC: 112 MMOL/L (ref 98–107)
CO2: 26 MMOL/L (ref 20–31)
CREAT SERPL-MCNC: 1 MG/DL (ref 0.7–1.2)
GFR AFRICAN AMERICAN: >60 ML/MIN
GFR NON-AFRICAN AMERICAN: >60 ML/MIN
GFR SERPL CREATININE-BSD FRML MDRD: ABNORMAL ML/MIN/{1.73_M2}
GFR SERPL CREATININE-BSD FRML MDRD: ABNORMAL ML/MIN/{1.73_M2}
GLUCOSE BLD-MCNC: 160 MG/DL (ref 70–99)
GLUCOSE BLD-MCNC: 88 MG/DL (ref 75–110)
POTASSIUM SERPL-SCNC: 3.8 MMOL/L (ref 3.7–5.3)
SODIUM BLD-SCNC: 147 MMOL/L (ref 135–144)

## 2017-09-23 PROCEDURE — 6370000000 HC RX 637 (ALT 250 FOR IP): Performed by: PSYCHIATRY & NEUROLOGY

## 2017-09-23 PROCEDURE — 1240000000 HC EMOTIONAL WELLNESS R&B

## 2017-09-23 PROCEDURE — 80048 BASIC METABOLIC PNL TOTAL CA: CPT

## 2017-09-23 PROCEDURE — 36415 COLL VENOUS BLD VENIPUNCTURE: CPT

## 2017-09-23 PROCEDURE — 82947 ASSAY GLUCOSE BLOOD QUANT: CPT

## 2017-09-23 PROCEDURE — 93005 ELECTROCARDIOGRAM TRACING: CPT

## 2017-09-23 RX ORDER — NICOTINE 21 MG/24HR
1 PATCH, TRANSDERMAL 24 HOURS TRANSDERMAL DAILY
Status: DISCONTINUED | OUTPATIENT
Start: 2017-09-23 | End: 2017-10-02 | Stop reason: HOSPADM

## 2017-09-23 RX ADMIN — TRAZODONE HYDROCHLORIDE 100 MG: 100 TABLET ORAL at 21:26

## 2017-09-23 RX ADMIN — QUETIAPINE FUMARATE 600 MG: 300 TABLET, FILM COATED ORAL at 21:26

## 2017-09-23 RX ADMIN — SERTRALINE 100 MG: 100 TABLET, FILM COATED ORAL at 08:24

## 2017-09-23 ASSESSMENT — LIFESTYLE VARIABLES: HISTORY_ALCOHOL_USE: NO

## 2017-09-23 ASSESSMENT — SLEEP AND FATIGUE QUESTIONNAIRES
DIFFICULTY ARISING: YES
AVERAGE NUMBER OF SLEEP HOURS: 5
DO YOU USE A SLEEP AID: YES
DO YOU HAVE DIFFICULTY SLEEPING: YES
DIFFICULTY FALLING ASLEEP: YES
DIFFICULTY STAYING ASLEEP: YES
SLEEP PATTERN: DIFFICULTY FALLING ASLEEP;DIFFICULTY ARISING;DISTURBED/INTERRUPTED SLEEP;RESTLESSNESS
RESTFUL SLEEP: NO

## 2017-09-23 ASSESSMENT — PATIENT HEALTH QUESTIONNAIRE - PHQ9: SUM OF ALL RESPONSES TO PHQ QUESTIONS 1-9: 16

## 2017-09-23 NOTE — PLAN OF CARE
Problem: Altered Mood, Psychotic Behavior  Goal: LTG-Able to verbalize decrease in frequency and intensity of hallucinations  Outcome: Ongoing  Pt did not participate in Goal Setting and Community Meeting at 0900 despite staff encouragement.

## 2017-09-23 NOTE — BH NOTE
[address] [facility]                [address] [facility], [address]                            PSYCHIATRIC EVALUATION    PATIENT NAME: Tate Bruno                      :             1960  MED REC NO:   [id number]                               ROOM:            0101  ACCOUNT NO:   [de-identified]                            ADMISSION DATE:  2017  PROVIDER:     Neo Galarza    HISTORY OF PRESENT ILLNESS AND REASON FOR CURRENT ADMISSION:  The  patient is a 62-year-old male who is feeling depressed and sad, having  suicidal thoughts, experiencing auditory hallucinations telling him to  kill himself and die. With this, he is admitted to [address] [place] [address] Rd:  History of schizoaffective disorder. Denies any current use of drugs or alcohol. MEDICAL AND SURGICAL HISTORY:  He has a history of type 2 diabetes  mellitus. ALLERGIES:  He is allergic to NAVANE. PERSONAL, FAMILY, AND SOCIAL HISTORY:  The patient is currently living  by himself. He does not have any support system. He denies any  current drug and alcohol use. He gets social security money. He goes  to [facility]. He denies any physical, sexual,  or emotional abuse in him. He denies any homicidal thoughts or plans. MENTAL STATUS EXAM:  The patient is cooperative. He has adequate eye  contact. He has adequate rapport. His speech is within normal  limits. His mood is subjectively sad. Objectively, thought process  within normal limits. Thought contents predominantly of depression,  auditory hallucinations, suicidal thoughts, plans to walk into the  traffic and die. He denies any homicidal thoughts or plans. He is of  average intelligence. He is oriented to time, place, and person. His  memory to recent, remote, and immediate events are within normal  limits. He has adequate attention and concentration.   His insight and  judgment are impaired. His abstraction is concrete. DIAGNOSES:  1.  Schizoaffective disorder. 2.  GERD. PLAN OF TREATMENT:  Admit him. Start him on medications. Estimated  length of stay 10 days.         Brad Andrade    D: 09/23/2017 9:14:49       T: 09/23/2017 12:13:23     [email or web address]  Job#: [id number]     Doc#: [id number]

## 2017-09-23 NOTE — CARE COORDINATION
BHI Biopsychosocial Assessment     Current Level of Psychosocial Functioning      Independent   Dependent  X  Minimal Assist         Psychosocial High Risk Factors (check all that apply)     Unable to obtain meds   Chronic illness/pain    Substance abuse X  Lack of Family Support X  Financial stress X  Isolation X  Inadequate Community Resources  Suicide attempt(s)   Not taking medications X  Victim of crime   Developmental Delay  Unable to manage personal needs    Age 72 or older   Homeless  No transportation   Readmission within 30 days X  Unemployment  Traumatic Event     Psychiatric Advanced Directives: none      Family to Involve in Treatment:  none     Sexual Orientation:  PHOEBE     Patient Strengths: SSI income, medicaid, own apartment, linked with [facility]     Patient Barriers: lack of insight, lack of coping skills, non-compliant with medications, substance use/ abuse, lack of family support.      CMHC/mental health history: Linked with [facility] for mental health treatment     Plan of Care   medication management, group/individual therapies, family meetings, psycho -education, treatment team meetings to assist with stabilization, referral to community resources.      Initial Discharge Plan:  Link back to [facility] and taxi back to own apartment on [address] [facility] Summary : The patient is a 61year old male  male admitted to the [facility] for SI and reporting that he is experiencing auditory hallucinations of voices telling him to kill himself. Patient has hx of crack cocaine use and not being compliant with [facility] and medications and recently was discharged from the [facility] on 9/5/17.

## 2017-09-23 NOTE — BH NOTE
Patient was admitted through the Rescue with complaints of increasing depression and suicidal ideation. Detailed note dictated    Mental Status Examination:    Appearance: grooming:alert, cooperative, severe distress  Motor Activity:psychomotor activity: slow  Speech:rate/volume:delayed, increased latency of response, normal pitch and normal volume  Attitude: cooperative with interview  Affect: labile  Mood: dysphoric  Thought Processes: linear goal directed  Thought Content: ideas of helplessness and hopelessness  Suicidal Ideation: He has suicidal thoughts and wants to walk into the traffic and die.   Homicidal Ideation: patient denies  Perceptions: Hearing voices telling him to kill himself and die  Insight/Judgment: impaired  Cognition: Alert, oriented to person, place, time, and situation; immediate recall 3/3 and delayed recall 3/3; concentration mildly impaired; it is evident during interview that patient's fund of knowledge is average; no language deficit noted  Axis I Schizoaffective Disorder depressed  Axis II None  Axis III T2D  Axis IV Severe  Axis V 30  Tx plan: Safe therapeutic milieu  Medications as listed   Current Facility-Administered Medications   Medication Dose Route Frequency Provider Last Rate Last Dose    nicotine (NICODERM CQ) 21 MG/24HR 1 patch  1 patch Transdermal Daily Saeid Valentino MD   1 patch at 09/23/17 0824    acetaminophen (TYLENOL) tablet 325 mg  325 mg Oral Q8H PRN Saeid Valentino MD        hydrOXYzine (ATARAX) tablet 25 mg  25 mg Oral TID PRN Saeid Valentino MD        magnesium hydroxide (MILK OF MAGNESIA) 400 MG/5ML suspension 30 mL  30 mL Oral Daily PRN Saeid Valentino MD        metFORMIN (GLUCOPHAGE) tablet 1,000 mg  1,000 mg Oral BID  Kellen Galarza MD        QUEtiapine (SEROQUEL) tablet 600 mg  600 mg Oral Nightly Kellen Galarza MD   600 mg at 09/22/17 2300    sertraline (ZOLOFT) tablet 100 mg  100 mg Oral Daily Mukesh Galarza MD   100 mg at 09/23/17 0824    traZODone (DESYREL) tablet 100 mg  100 mg Oral Nightly PRN Abdoulaye Galarza MD   100 mg at 09/22/17 2300    LORazepam (ATIVAN) injection 2 mg  2 mg Intramuscular BID PRN Sarkis Tay MD        And    ziprasidone (GEODON) injection 20 mg  20 mg Intramuscular BID PRN Sarkis Tay MD         [unfilled]    H&P  Labs  Encourage to attend groups  Supportive therapy  ELOS: 5-7 days      Electronically signed by Sarkis Tay MD on 9/23/2017 at 9:11 AM

## 2017-09-23 NOTE — PLAN OF CARE
Problem: Risk of Harm  Goal: LTG-Absence of harm  Outcome: Ongoing  [address]  Initial Interdisciplinary Treatment Plan NOTE     Original treatment plan Date & Time: 9/23/17 0738     Admission Type:  Admission Type: Voluntary     Reason for admission:   Reason for Admission:  (Voluntary from Rescue w voices telling him to hurt himself)     Estimated Length of Stay:  5-7days  Estimated Discharge Date:  9/30/17 to be determined by physician     PATIENT STRENGTHS:  Patient Strengths:Strengths: Communication, Connection to output provider, No significant Physical Illness  Patient Strengths and Limitations:Limitations: General negative or hopeless attitude about future/recovery, Multiple barriers to leisure interests  Addictive Behavior: Addictive Behavior  In the past 3 months, have you felt or has someone told you that you have a problem with:  : None  Do you have a history of Chemical Use?: No  Do you have a history of Alcohol Use?: No  Do you have a history of Street Drug Abuse?: Yes  Histroy of Prescripton Drug Abuse?: No  Medical Problems:  Past Medical History:   Diagnosis Date    [facility]      Depression      GERD (gastroesophageal reflux disease)      Hallucinations      Headache      Hepatitis      Schizophrenia, schizo-affective ([facility]      Substance abuse      Type II or unspecified type diabetes mellitus without mention of complication, not stated as uncontrolled      Urinary incontinence       Status EXAM:Status and Exam  Normal: No  Facial Expression: Worried, Sad, Flat, Avoids Gaze  Affect: Blunt  Level of Consciousness: Alert  Mood:Normal: No  Mood: Depressed, Anxious  Motor Activity:Normal: No  Motor Activity: Decreased  Interview Behavior: Cooperative  Preception: [place] to Person, [place] to Time, [place] to Place, [place] to Situation  Attention:Normal: No  Attention: Unable to Concentrate  Thought Processes: Blocking, Circumstantial  Thought Content:Normal:

## 2017-09-23 NOTE — PLAN OF CARE
Problem: Altered Mood, Psychotic Behavior  Goal: LTG-Able to verbalize decrease in frequency and intensity of hallucinations  Outcome: Ongoing  Pt did not participate in Therapeutic Cognitive Skills Activity at 1330 despite staff encouragement.

## 2017-09-23 NOTE — BH NOTE
`[facility] [place]  Admission Note     Admission Type:   Admission Type: Voluntary    Reason for admission:  Reason for Admission:  (Voluntary from Rescue w voices telling him to hurt himself) pt uses crack cocaine he states as often as he can    PATIENT STRENGTHS:  Strengths: Communication, Connection to output provider, No significant Physical Illness    Patient Strengths and Limitations:  Limitations: General negative or hopeless attitude about future/recovery, Multiple barriers to leisure interests    Addictive Behavior:   Addictive Behavior  In the past 3 months, have you felt or has someone told you that you have a problem with:  : None  Do you have a history of Chemical Use?: No  Do you have a history of Alcohol Use?: No  Do you have a history of Street Drug Abuse?: Yes  Histroy of Prescripton Drug Abuse?: No    Medical Problems:   Past Medical History:   Diagnosis Date    B[facility] disorder ([facility]     Depression     GERD (gastroesophageal reflux disease)     Hallucinations     Headache     Hepatitis     Sch[facility]ffect[facility]     Substance abuse     Type II or unspecified type diabetes mellitus without mention of complication, not stated as uncontrolled     Urinary incontinence        Status EXAM:  Status and Exam  Normal: No  Facial Expression: Worried, Sad, Flat, Avoids Gaze  Affect: Blunt  Level of Consciousness: Alert  Mood:Normal: No  Mood: Depressed, Anxious  Motor Activity:Normal: No  Motor Activity: Decreased  Interview Behavior: Cooperative  Preception: [place] to Person, [place] to Time, [place] to Place, [place] to Situation  Attention:Normal: No  Attention: Unable to Concentrate  Thought Processes: Blocking, Circumstantial  Thought Content:Normal: No  Thought Content: Poverty of Content, Preoccupations  Hallucinations:  Auditory (Comment), Command(Comment)  Delusions: No  Memory:Normal: No  Memory: Poor Recent  Insight and Judgment: No  Insight and Judgment: Poor Judgment, Poor Insight  Present Suicidal Ideation: No  Present Homicidal Ideation: No    Tobacco Screening:  Practical Counseling, on admission, corby X, if applicable and completed (first 3 are required if patient doesn't refuse):            ( )  Recognizing danger situations (included triggers and roadblocks)                    ( )  Coping skills (new ways to manage stress, exercise, relaxation techniques, changing routine, distraction)                                                           ( )  Basic information about quitting (benefits of quitting, techniques in how to quit, available resources  ( ) Referral for counseling faxed to [facility]                                           ( x) Patient refused counseling  ( ) Patient has not smoked in the last 30 days      Pt admitted with followings belongings:  Dentures: None  Vision - Corrective Lenses: None  Hearing Aid: None  Jewelry: Ring, Necklace (4 rings , see belonging sheet)  Body Piercings Removed: No  Clothing: Footwear, Pants, Shirt, Undergarments (Comment) (belt, blue shoes 2 pair socks, black jeans 2 shirts shorts w/ strings, hat)  Were All Patient Medications Collected?: Yes (bottle trazadone and bottle seroquel)  Other Valuables: Jarvis Pierce sent home withnone. Valuables placed in safe in security envelope, number: [id number]. Patient's home medications were cont. Patient oriented to surroundings and program expectations and copy of patient rights given. Received admission packet:  .   Consents reviewed, signed   Patient verbalize understanding:  Patient education on precautions:                 Ash Thakur RN

## 2017-09-24 LAB — GLUCOSE BLD-MCNC: 85 MG/DL (ref 75–110)

## 2017-09-24 PROCEDURE — 1240000000 HC EMOTIONAL WELLNESS R&B

## 2017-09-24 PROCEDURE — 82947 ASSAY GLUCOSE BLOOD QUANT: CPT

## 2017-09-24 PROCEDURE — 6370000000 HC RX 637 (ALT 250 FOR IP): Performed by: PSYCHIATRY & NEUROLOGY

## 2017-09-24 RX ADMIN — QUETIAPINE FUMARATE 600 MG: 300 TABLET, FILM COATED ORAL at 21:15

## 2017-09-24 RX ADMIN — SERTRALINE 100 MG: 100 TABLET, FILM COATED ORAL at 08:29

## 2017-09-24 RX ADMIN — TRAZODONE HYDROCHLORIDE 100 MG: 100 TABLET ORAL at 21:15

## 2017-09-24 RX ADMIN — METFORMIN HYDROCHLORIDE 1000 MG: 500 TABLET, FILM COATED ORAL at 08:29

## 2017-09-24 NOTE — PLAN OF CARE
Problem: Risk of Harm  Goal: LTG-Absence of harm  Outcome: Ongoing  [address] [place]  Day 3 Interdisciplinary Treatment Plan NOTE     Review Date & Time: 9/24/17 0937     Admission Type:   Admission Type: Voluntary     Reason for admission:  Reason for Admission:  (Voluntary from Rescue w voices telling him to hurt himself)  Estimated Length of Stay:  5-7 days  Estimated Discharge Date Update:  9/29/17 to be determined by physician     PATIENT STRENGTHS:  Patient Strengths:Strengths: Communication, Connection to output provider, No significant Physical Illness  Patient Strengths and Limitations:Limitations: External locus of control  Addictive Behavior:Addictive Behavior  In the past 3 months, have you felt or has someone told you that you have a problem with:  : None  Do you have a history of Chemical Use?: No  Do you have a history of Alcohol Use?: No  Do you have a history of Street Drug Abuse?: Yes  Histroy of Prescripton Drug Abuse?: No  Medical Problems:  Past Medical History:   Diagnosis Date    Bipolar disorder ([facility]      Depression      GERD (gastroesophageal reflux disease)      Hallucinations      Headache      Hepatitis      Schizophrenia, schizo-affective ([facility]      Substance abuse      Type II or unspecified type diabetes mellitus without mention of complication, not stated as uncontrolled      Urinary incontinence          Risk:  Fall RiskTotal: 55  Dusty Scale Dusty Scale Score: 22  BVC Total: 0  Change in scores:  No Changes to plan of Care:  No     Status EXAM:   Status and Exam  Normal: No  Facial Expression: Avoids Gaze, Flat  Affect: Constricted  Level of Consciousness: Alert  Mood:Normal: No  Mood: Depressed, Anxious  Motor Activity:Normal: No  Motor Activity: Decreased  Interview Behavior: Evasive  Preception: [place] to Person, [place] to Time, [place] to Place, [place] to Situation  Attention:Normal: No  Attention: Unable to Concentrate  Thought Processes: Blocking  Thought Content:Normal: No  Thought Content: Poverty of Content  Hallucinations: Auditory (Comment)  Delusions: No  Memory:Normal: No  Memory: Poor Recent  Insight and Judgment: No  Insight and Judgment: Poor Judgment, Poor Insight, Unmotivated  Present Suicidal Ideation: No  Present Homicidal Ideation: No     Daily Assessment Last Entry:   Daily Sleep (WDL): Within Defined Limits         Patient Currently in Pain: No  Daily Nutrition (WDL): Within Defined Limits  Barriers to Nutrition: None  Level of Assistance: Independent/Self     Patient Monitoring:  Frequency of Checks: 4 times per hour, close     Psychiatric Symptoms:   Depression Symptoms  Depression Symptoms: Loss of interest, Impaired concentration  Anxiety Symptoms  Anxiety Symptoms: Generalized  [place] Symptoms  [place] Symptoms: No problems reported or observed. Psychosis Symptoms  Delusion Type: No problems reported or observed. Suicide Risk CSSR-S:  1) Within the past month, have you wished you were dead or wished you could go to sleep and not wake up? : YES  2) Within the past month, have you actually had any thoughts of killing yourself? : YES  3) Within the past month, have you been thinking about how you might kill yourself? : YES  5) Within the past month, have you started to work out or worked out the details of how to kill yourself?  Do you intend to carry out this plan? : NO  6)  Have you ever done anything, started to do anything, or prepared to do anything to end your life?: NO  Change in Result:  no Change in Plan of care:  no        EDUCATION:   Learner Progress Toward Treatment Goals:   Reviewed results and recommendations of this team, Reviewed group plan and strategies, Reviewed signs, symptoms and risk of self harm and violent behavior, Reviewed goals and plan of care     Method:  small group, individual verbal education     Outcome:   Verbalized by patient but needs reinforcement to obtain goals     PATIENT GOALS:  Short

## 2017-09-24 NOTE — PLAN OF CARE
Problem: Altered Mood, Psychotic Behavior  Intervention: Self-harm assessment-behavioral health  Pt has fleeting SI and voices that continue to tell him to harm himself. Contracts for safety on uinit and agrees to seek out staff if feeling become overwhelming. Has issues with using crack cocaine and would like to get inpatient treatment with [facility].

## 2017-09-24 NOTE — PROGRESS NOTES
Nutrition Assessment    Type and Reason for Visit: Positive Nutrition Screen (difficulty chewing or swallowing, unplanned weight loss)    Nutrition Recommendations: Continue General diet and start Ensure High Protein BID. Malnutrition Assessment:  · Malnutrition Status: Mild Malnutrition  · Context: Acute illness or injury  · Findings of the 6 clinical characteristics of malnutrition (Minimum of 2 out of 6 clinical characteristics is required to make the diagnosis of moderate or severe Protein Calorie Malnutrition based on AND/ASPEN Guidelines):  1. Energy Intake-51% to 75%, greater than or equal to 1 month    2. Weight Loss-2% loss or greater, in 1 month  3. Fat Loss-Unable to assess,    4. Muscle Loss-Unable to assess,    5. Fluid Accumulation-No significant fluid accumulation, Extremities  6.  Strength-Not measured    Nutrition Diagnosis:   · Problem: Inadequate oral intake  · Etiology: related to Psychological cause/life stress (substance abuse)     Signs and symptoms:  as evidenced by      Nutrition Assessment:  · Subjective Assessment: Patient is eating better since admission. Weight records indicate a weight loss of six pounds in the last four weeks. · Current Nutrition Therapies:  · Oral Diet Orders: General   · Oral Diet intake: %  · Anthropometric Measures:  · Ht: 6' 3\" (190.5 cm)   · Current Body Wt: 158 lb (71.7 kg)  · Usual Body Wt: 164 lb (74.4 kg)  · % Weight Change: 3.8%,  4 weeks  · Ideal Body Wt: 196 lb (88.9 kg), % Ideal Body 81%  · BMI Classification: BMI 18.5 - 24.9 Normal Weight  · Comparative Standards (Estimated Nutrition Needs):  · Estimated Daily Total Kcal: 3531-9062  · Estimated Daily Protein (g): 72-86    Estimated Intake vs Estimated Needs: Intake Improving    Nutrition Risk Level:  Moderate    Nutrition Interventions:   Continue current diet, Start ONS  Continued Inpatient Monitoring    Nutrition Evaluation:   · Evaluation: Goals set   · Goals: PO intakes greater than 75% at meals    · Monitoring: Meal Intake, Supplement Intake, Weight, Pertinent Labs, Diet Tolerance    See Adult Nutrition Doc Flowsheet for more detail.      Walton Delia CHAVIS R.D, L.D,  Clinical Dietitian  Pager # [phone]

## 2017-09-24 NOTE — PLAN OF CARE
Problem: Risk of Harm  Goal: STG-Absence of Self Harm  Outcome: Ongoing  Pt denies thoughts of self harm and is agreeable to seeking out should thoughts of self harm arise. Safe environment maintained. Q15 minute checks for safety cont per unit policy. Will cont to monitor for safety and provides support and reassurance as needed.

## 2017-09-24 NOTE — PLAN OF CARE
Problem: Altered Mood, Psychotic Behavior  Goal: STG-Able to demonstrate trust by eating, participating in treatment and following staffs directions  Outcome: Ongoing  Pt did not participate in Therapeutic Leisure Skills Group at 1330 despite staff encouragement.

## 2017-09-25 LAB — GLUCOSE BLD-MCNC: 144 MG/DL (ref 75–110)

## 2017-09-25 PROCEDURE — 82947 ASSAY GLUCOSE BLOOD QUANT: CPT

## 2017-09-25 PROCEDURE — 6370000000 HC RX 637 (ALT 250 FOR IP): Performed by: PSYCHIATRY & NEUROLOGY

## 2017-09-25 PROCEDURE — 1240000000 HC EMOTIONAL WELLNESS R&B

## 2017-09-25 RX ADMIN — TRAZODONE HYDROCHLORIDE 100 MG: 100 TABLET ORAL at 22:22

## 2017-09-25 RX ADMIN — QUETIAPINE FUMARATE 600 MG: 300 TABLET, FILM COATED ORAL at 22:22

## 2017-09-25 RX ADMIN — METFORMIN HYDROCHLORIDE 1000 MG: 500 TABLET, FILM COATED ORAL at 17:30

## 2017-09-25 ASSESSMENT — PAIN DESCRIPTION - LOCATION: LOCATION: FOOT

## 2017-09-25 ASSESSMENT — PAIN SCALES - GENERAL: PAINLEVEL_OUTOF10: 10

## 2017-09-25 NOTE — PLAN OF CARE
Problem: Risk of Harm  Goal: LTG-Absence of harm  Outcome: Ongoing  Pt denies SI/HI at this time. Pt hears voices but does not elaborate. Evasive during 1:1 times. Aloof of peer. Isolative to room most of evening. Came out for needs. Pt is med compliant. Pt eating appropriately. Pt sleeping appropriately. Encouraged ADLs. q15 minute safety checks maintained.

## 2017-09-25 NOTE — PLAN OF CARE
Problem: Altered Mood, Psychotic Behavior  Goal: STG-Able to demonstrate trust by eating, participating in treatment and following staffs directions  Outcome: Ongoing  Pt did not participate in Therapeutic Leisure Skills Group at 1000 despite staff encouragement.

## 2017-09-25 NOTE — PLAN OF CARE
Problem: Altered Mood, Psychotic Behavior  Goal: LTG-Able to verbalize decrease in frequency and intensity of hallucinations  Outcome: Ongoing  Pt denies SI/HI at this time. +AH; denies VH. Patient encouraged to seek staff when needed. q15 minute safety checks maintained.

## 2017-09-25 NOTE — PLAN OF CARE
Problem: Risk of Harm  Goal: STG-Absence of Self Harm  Outcome: Ongoing  Pt did not participate in Morals + Ethics / Personal Opinions / Therapeutic Activity group at 1430 despite staff encouragement.

## 2017-09-26 LAB — GLUCOSE BLD-MCNC: 82 MG/DL (ref 75–110)

## 2017-09-26 PROCEDURE — 82947 ASSAY GLUCOSE BLOOD QUANT: CPT

## 2017-09-26 PROCEDURE — 1240000000 HC EMOTIONAL WELLNESS R&B

## 2017-09-26 PROCEDURE — 6370000000 HC RX 637 (ALT 250 FOR IP): Performed by: PSYCHIATRY & NEUROLOGY

## 2017-09-26 RX ADMIN — METFORMIN HYDROCHLORIDE 1000 MG: 500 TABLET, FILM COATED ORAL at 17:39

## 2017-09-26 RX ADMIN — QUETIAPINE FUMARATE 600 MG: 300 TABLET, FILM COATED ORAL at 21:22

## 2017-09-26 RX ADMIN — TRAZODONE HYDROCHLORIDE 100 MG: 100 TABLET ORAL at 21:22

## 2017-09-26 NOTE — H&P
HISTORY and [address]       NAME:  Cristina Eaton  MRN: [id number]   YOB: 1960   Date: 9/26/2017   Age: 61 y.o. Gender: male     COMPLAINT AND PRESENT HISTORY:    Suicidal    According to psych eval,  The patient is a 77-year-old male who is feeling depressed and sad, having  suicidal thoughts, experiencing auditory hallucinations telling him to kill himself and die. With this, he is admitted to [facility].    Upon H&P, Pt did not talk at all. He allowed a limited exam. According to above notes he was feeling suicidal and having auditory hallucinations.     DIAGNOSTIC RESULTS       PAST MEDICAL HISTORY     Past Medical History:   Diagnosis Date    Bipolar disorder ([address]     Depression     GERD (gastroesophageal reflux disease)     Hallucinations     Headache     Hepatitis     Schizophrenia, schizo-affective (HCC)     Substance abuse     Type II or unspecified type diabetes mellitus without mention of complication, not stated as uncontrolled     Urinary incontinence        Pt denies any history of hypertension, stroke, heart disease, COPD, Asthma,  HLD, Cancer, Seizures,Thyroid disease, Kidney Disease,  TB.    SURGICAL HISTORY       Past Surgical History:   Procedure Laterality Date    DENTAL SURGERY      all teeth pulled       FAMILY HISTORY       Family History   Problem Relation Age of Onset    Diabetes Mother     Heart Disease Mother        SOCIAL HISTORY       Social History     Social History    Marital status: Single     Spouse name: N/A    Number of children: N/A    Years of education: N/A     Occupational History     N/A     Social History Main Topics    Smoking status: Current Every Day Smoker     Packs/day: 1.00     Types: Cigarettes    Smokeless tobacco: Never Used    Alcohol use Yes      Comment: occassional drinker    Drug use: Yes     Special: Cocaine    Sexual activity: Not Asked     Other Topics Concern    None     Social History Narrative           REVIEW OF SYSTEMS      Allergies   Allergen Reactions    Navane [Thiothixene]        No current facility-administered medications on file prior to encounter. Current Outpatient Prescriptions on File Prior to Encounter   Medication Sig Dispense Refill    traZODone (DESYREL) 100 MG tablet Take 1 tablet by mouth nightly as needed for Sleep 30 tablet 0    QUEtiapine (SEROQUEL) 300 MG tablet Take 2 tablets by mouth nightly 60 tablet 0    metFORMIN (GLUCOPHAGE) 1000 MG tablet Take 1 tablet by mouth 2 times daily (with meals) 60 tablet 0    acetaminophen (TYLENOL) 325 MG tablet Take 2 tablets by mouth every 6 hours as needed for Pain 30 tablet 0    polyethylene glycol (GLYCOLAX) powder Take 17 g by mouth daily 578 g 0    QUEtiapine (SEROQUEL) 200 MG tablet Take 1 tablet by mouth every morning 60 tablet 0    nicotine polacrilex (NICORETTE) 2 MG gum Take 1 each by mouth as needed for Smoking cessation 110 each 0                      Unable to do ROS, pt is selectively nonverbal    GENERAL PHYSICAL EXAM:     Vitals: /65  Pulse 55  Temp 98.2 °F (36.8 °C) (Oral)   Resp 14  Ht 6' 3\" (1.905 m)  Wt 158 lb (71.7 kg) Comment: 158  SpO2 96%  BMI 19.75 kg/m2 Body mass index is 19.75 kg/(m^2). Pt was examined with a nurse present in the room. GENERAL APPEARANCE:  Sarah Trujillo is 61 y.o.,  male, not obese, nourished, conscious, alert. Does not appear to be distress or pain at this time. SKIN:  Warm, dry, no cyanosis or jaundice. HEAD:  Normocephalic, atraumatic, no swelling or tenderness. EYES:  Unable to view. Pt kept eyes close , refused to open    EARS:  No discharge    NOSE:  No rhinorrhea, epistaxis or septal deformity. THROAT:  Unable to view    NECK:  Unable to examine     CHEST:  Symmetrical and equal on expansion. HEART:  Regular rate and rhythm. S1 > S2, No audible murmurs or gallops.      LUNGS:  Equal on expansion, normal breath

## 2017-09-26 NOTE — PLAN OF CARE
Problem: Altered Mood, Psychotic Behavior  Goal: STG-Able to demonstrate trust by eating, participating in treatment and following staffs directions  Outcome: Ongoing  Pt did not participate in Therapeutic Cognitive Skills Group at 1330 despite staff encouragement.

## 2017-09-26 NOTE — BH NOTE
The patient has restricted mood and affect. Patient has delusions of persecution and delusions of reference. Patient is experiencing auditory hallucinations that are giving him running commentary. The patient has decreased psychomotor activity. The patient has poor eye contact. The patient has poor at rapport. The patient has poor insight. His judgment is impaired. He has suicidal thoughts and plans to overdose and die. Plan: To continue current management.

## 2017-09-26 NOTE — BH NOTE
Pt did not participate in smoking cessation at 1600 due to resting in room despite encouragement from staff to attend.

## 2017-09-26 NOTE — PLAN OF CARE
Problem: Risk of Harm  Goal: LTG-Absence of harm  Outcome: Ongoing  Pt declined to attend psychotherapy at 11:00am despite encouragement from staff.

## 2017-09-27 LAB — GLUCOSE BLD-MCNC: 101 MG/DL (ref 75–110)

## 2017-09-27 PROCEDURE — 82947 ASSAY GLUCOSE BLOOD QUANT: CPT

## 2017-09-27 PROCEDURE — 1240000000 HC EMOTIONAL WELLNESS R&B

## 2017-09-27 PROCEDURE — 6370000000 HC RX 637 (ALT 250 FOR IP): Performed by: PSYCHIATRY & NEUROLOGY

## 2017-09-27 RX ADMIN — HYDROXYZINE HYDROCHLORIDE 25 MG: 25 TABLET, FILM COATED ORAL at 22:07

## 2017-09-27 RX ADMIN — QUETIAPINE FUMARATE 600 MG: 300 TABLET, FILM COATED ORAL at 22:07

## 2017-09-27 RX ADMIN — METFORMIN HYDROCHLORIDE 1000 MG: 500 TABLET, FILM COATED ORAL at 17:24

## 2017-09-27 RX ADMIN — SERTRALINE 100 MG: 100 TABLET, FILM COATED ORAL at 08:37

## 2017-09-27 RX ADMIN — METFORMIN HYDROCHLORIDE 1000 MG: 500 TABLET, FILM COATED ORAL at 08:37

## 2017-09-27 RX ADMIN — TRAZODONE HYDROCHLORIDE 100 MG: 100 TABLET ORAL at 22:07

## 2017-09-27 NOTE — PLAN OF CARE
Problem: Altered Mood, Psychotic Behavior  Goal: STG-Able to demonstrate trust by eating, participating in treatment and following staffs directions  Outcome: Ongoing  Pt did not attend 0845 community meeting/ goals group despite staff invitation to attend.

## 2017-09-27 NOTE — PLAN OF CARE
Problem: Altered Mood, Psychotic Behavior  Goal: LTG-Able to verbalize decrease in frequency and intensity of hallucinations  Outcome: Ongoing  Pt did not participate in Humor / Leisure Skills and Awareness / Social Skills group at 1000 despite staff encouragement.

## 2017-09-27 NOTE — BH NOTE
The patient complains of lack of energy and motivation. He has restricted mood and affect. He has poor eye contact. School judgment. He has poor insight. He complains of auditory hallucinations telling him about different things. Some of the voices are also telling him to kill himself and die. He has anhedonia and apathy and affective blunting. He has a volition and poor concentration and attention. Plan: To continue current management. Supportive therapy was provided.

## 2017-09-27 NOTE — PLAN OF CARE
Problem: Altered Mood, Psychotic Behavior  Goal: LTG-Able to verbalize decrease in frequency and intensity of hallucinations  Outcome: Ongoing  Patient is isolating to room and self out for short periods at door. Patient denies SI but remains firm on wanting to hurt some jennifer but not expressing whom. Patient is free from self harm this shift. Patient did come out for evening group.

## 2017-09-27 NOTE — PROGRESS NOTES
Pharmacy Med Education Group Note    Date: 9/27/2017  Start Time: 4:20  End Time: 5:00    Number Participants in Group:  15    Goal:  Patient will demonstrate an understanding of the medications intended purpose and possible adverse effects  Topic: [place] for Pharmacy Med Ed Group    Discipline Responsible:     OT  AT  [facility].  RT     X Other       Participation Level:     None  Minimal      X Active Listener    X Interactive    Monopolizing         Participation Quality:    X Appropriate  Inappropriate     X       Attentive        Intrusive          Sharing        Resistant          Supportive        Lethargic       Affective:     X Congruent  Incongruent  Blunted  Flat    Constricted  Anxious  Elated  Angry    Labile  Depressed  Other         Cognitive:    X Alert  Oriented PPTP     Concentration   X G  F  P   Attention Span   X G  F  P   Short-Term Memory   X G  F  P   Long-Term Memory  G  F  P   ProblemSolving/  Decision Making  G  F  P   Ability to Process  Information   X G  F  P      Contributing Factors             Delusional             Hallucinating             Flight of Ideas             Other:       Modes of Intervention:    X Education   X Support  Exploration    Clarifying  Problem Solving  Confrontation    Socialization  Limit Setting  Reality Testing    Activity  Movement  Media    Other:            Response to Learning:    X Able to verbalize current knowledge/experience    Able to verbalize/acknowledge new learning    Able to retain information    Capable of insight    Able to change behavior    Progressing to goal    Other:        Comments:

## 2017-09-28 PROCEDURE — 1240000000 HC EMOTIONAL WELLNESS R&B

## 2017-09-28 PROCEDURE — 6370000000 HC RX 637 (ALT 250 FOR IP): Performed by: PSYCHIATRY & NEUROLOGY

## 2017-09-28 RX ADMIN — HYDROXYZINE HYDROCHLORIDE 25 MG: 25 TABLET, FILM COATED ORAL at 21:01

## 2017-09-28 RX ADMIN — SERTRALINE 100 MG: 100 TABLET, FILM COATED ORAL at 08:25

## 2017-09-28 RX ADMIN — QUETIAPINE FUMARATE 600 MG: 300 TABLET, FILM COATED ORAL at 21:01

## 2017-09-28 RX ADMIN — METFORMIN HYDROCHLORIDE 1000 MG: 500 TABLET, FILM COATED ORAL at 08:25

## 2017-09-28 RX ADMIN — TRAZODONE HYDROCHLORIDE 100 MG: 100 TABLET ORAL at 21:01

## 2017-09-28 NOTE — BH NOTE
Patient is feeling overwhelmed and sad. He said that a lot of things in his life bother him. He has suicidal thoughts and plans to walk into the traffic and die. He complains of agitation and anxiety. He feels depressed and sad. He complains of lack of energy and motivation. He said that it is difficult for him to focus and concentrate. He feels restless. He feels useless, worthless and hopeless. He is oriented to time p[lace and person. His memory to recent remote and immediate events are within normal limits. He has suicidal thoughts and plans to overdose and die. Plan: to continue current management.

## 2017-09-28 NOTE — BH NOTE
Wellness Group Note    Date: 9/28/17  Start Time: 1600  End Time: 1630    Number Participants in Group:  17    Goal:  Patient will demonstrate increased interpersonal interaction   Topic:     Discipline Responsible:   OT  AT   X Nsg.  RT  Other       Participation Level:     None X Minimal    Active Listener  Interactive    Monopolizing         Participation Quality:  X Appropriate  Inappropriate   X       Attentive        Intrusive   X       Sharing        Resistant   X       Supportive        Lethargic       Affective:   X Congruent  Incongruent  Blunted  Flat    Constricted  Anxious  Elated  Angry    Labile  Depressed  Other         Cognitive:  X Alert  Oriented PPTP     Concentration X G  F  P   Attention Span X G  F  P   Short-Term Memory  G  F  P   Long-Term Memory  G  F  P   ProblemSolving/  Decision Making  G  F  P   Ability to Process  Information  G X F  P      Contributing Factors             Delusional             Hallucinating             Flight of Ideas             Other:       Modes of Intervention:  X Education X Support  Exploration    Clarifying  Problem Solving  Confrontation   X Socialization  Limit Setting  Reality Testing   X Activity  Movement  Media    Other:            Response to Learning:  X Able to verbalize current knowledge/experience   X Able to verbalize/acknowledge new learning    Able to retain information    Capable of insight    Able to change behavior    Progressing to goal    Other:

## 2017-09-28 NOTE — PLAN OF CARE
Problem: Risk of Harm  Goal: LTG-Absence of harm  Outcome: Ongoing  No self harm this shift. Goal: STG-Absence of Self Harm  Outcome: Ongoing  No self harm this shift. Problem: Altered Mood, Psychotic Behavior  Goal: STG-Able to demonstrate trust by eating, participating in treatment and following staffs directions  Outcome: Ongoing  Pt takes snack with no problem. Goal: LTG-Able to verbalize decrease in frequency and intensity of hallucinations  Outcome: Ongoing  Pt relates he has AH telling him to \"harm someone\" relates that he will not act upon this and that he will seek out staff PRN.

## 2017-09-28 NOTE — PLAN OF CARE
Problem: Altered Mood, Psychotic Behavior  Goal: STG-Able to demonstrate trust by eating, participating in treatment and following staffs directions  Outcome: Ongoing  Pt did not participate in Goal Setting/Community Meeting/Stretching at [address] [place] [address] despite staff encouragement.

## 2017-09-28 NOTE — PLAN OF CARE
Date of Service: 09/15/2017    CHIEF COMPLAINT:  Left-sided arm, neck and shoulder pain.    HISTORY OF PRESENT ILLNESS:  This is a pleasant 59-year-old gentleman who returns for followup today.  Basically he had an EMG performed by Dr. Ramsey in the meantime and had physical therapy.  The EMG showed some motor unit waveform changes in the left upper extremity of the C5-C6 muscle groups with increased duration left deltoid, the left biceps and increased amplitude.  No active motor units were noted with the left upper extremity on 08/08/2017.  Apparently he had in 2016, I cannot find the actual nerve study, but he had motor and sensory studies that did not show carpal tunnel syndrome.  The patient has had MRI of the cervical spine.  He has done physical therapy.  He basically has pain in the left side of his neck, aching in nature, hurts worse when he turns ipsilateral, left side of his neck, occupying just below the occiput to the left upper trapezius, cervical paraspinal muscle junction area on the left side, aching in nature, hurts worse when looking to the left side more so than any other neck motions.  He also has pain also that also goes down the left shoulder, left whole arm, forearm, dorsal forearm and arm and that pain in the arm and forearm can be quite intense at times, to the point where he wants to drop something it is so intense.  He does have some numbness in the left dorsal forearm and primarily left hand from time to time, nothing substantial, in terms of the numbness.  No weakness.  No fevers, no chills.  He had an MRI of her cervical spine.  He has had physical therapy.  He has seen the neurosurgeon, Dr. Ponce to discuss the option of cervical epidural injections versus surgical options.  The patient also has some similar symptoms of numbness in the right hand as well, but most of his pain is now in the left arm.  He denies any fevers, chills, any loss of bowel or bladder control.  He was recently  Problem: Altered Mood, Psychotic Behavior  Goal: LTG-Able to verbalize decrease in frequency and intensity of hallucinations  Outcome: Ongoing  Pt did not participate in Positive Thinking / Team Work / Therapeutic Activity group at [facility] [phone] despite staff encouragement. started Aleve, as well and has not responded.  He has been on Adderall currently for medications.  He is on Wellbutrin, Biotin, Gabapentin recently has been restarted which he is taking at 100 mg per week, then 2 capsules twice a day for a week, 100 mg, Bactroban, Zofran, Tizanidine half to 1 tablet every 8 hours as needed as well as Trazodone.    ALLERGIES:  Aspirin.    PHYSICAL EXAMINATION:  VITAL SIGNS:  Blood pressure is 126/80, temperature 97.2 degrees Fahrenheit.  Weight 78 kilograms, height 5 feet 11 inches.  VAS pain score 3-4/10.  GENERAL:  No apparent distress, alert and oriented x3, affect normal, respiration nonlabored.  He has normal muscle bulk and tone in the upper extremities.  CARDIOVASCULAR:  No edema noted in the upper extremities.  DERMATOLOGIC:  No erythema, ecchymosis, nodules, rashes or lesions, neck and shoulder region.  MUSCULOSKELETAL:  On inspection, no atrophy or fasciculations on the upper extremities including the thenar and hypothenar eminence in all major muscle groups.  PALPATION:  Nontender over cervical spinous processes.  Cervical facets are exquisitely tender but especially the mid to lower facet joints around the right I would say 3-4, 4-5, 5-6 facet joints, closer to 4-5, 5-6 facet joints on the left side than anywhere else.  On the right it is not as tender.  Upper trapezius, cervical paraspinal muscles on the left side only mildly tender compared to the right.  Glenohumeral joint, biceps tendon, mildly tender on the left side, nontender on the right.  Cervical spine range of motion:  Axial rotation neck to left side recreates ipsilateral left-sided neck pain.  Axial rotation neck to right side recreates left-sided neck pain.  Passive lateral bending neck to the left side recreates left neck pain.  Axial rotation of the neck to right side does not really recreate a lot of left-sided neck pain.  Passive lateral bending right side is not that painful.  Shoulder abduction,  forward flexion is mildly painful left shoulder.  Dotson is mildly positive on the left side recreating left upper shoulder pain.  Spurlings test refers pain down the left arm and forearm and recreates his pain precisely that he experiences down the arm and forearm.  MANUAL MUSCLE TESTING:  Demonstrates 5/5 strength that is bilaterally symmetrical in upper extremities, of note this includes APB and also includes the FPL, FDM, ADM muscle groups and FDIM, intrinsic hand muscle groups were all tested today showing normal strength, that should be emphasized.  In addition, the upper extremities all showed normal strength.  Sensation is intact to light and sharp touch in upper extremities except the dorsal left hand has some decreased sensation to sharp touch, otherwise intact to light and sharp touch in the upper extremities.  Reflexes +2 and symmetrical over the biceps, triceps and brachioradialis.  Agee's is negative on the left and right side.  GAIT:  Tandem gait, no ataxia.  Romberg no loss of balance.    RADIOGRAPHS:  MRI cervical spine was personally reviewed by myself, which basically shows he has a disk osteophyte complex at the C5-C6 and C6-C7 levels with neural foraminal stenosis on the left and right side, right and left side is noted at both levels and there is some central canal narrowing, mild in nature with reduced CSF reserve at the C6-C7 level and slightly reduced CSF reserve at the C5-C6 segment, however, the C7-T1 signal has no evidence of any reduced CSF reserve whatsoever.  Radiologist report was also reviewed as well, that identified the central canal at 8 mm at C6-C7 level and 9 mm at C5-C6 with disk osteophyte complexes noted, neural foraminal stenosis at those levels.  That was from 06/29/2017.    IMPRESSION:  1.  Left cervical radiculopathy.  2.  Left cervical facet syndrome on the left side, most likely C5-C6 and C4-C5, possibly C3-C4 and he also has left shoulder impingement syndrome, but  that is only a mild degree of his pain, in addition to myofascial pain syndrome.  These entities are all combining to cause his pain, the most dominant force in his pain I think in the left side of his neck is facet syndrome and I think he has a cervical radiculopathy that is causing the majority of his arm pain.  Those are the 2 entities that I think are the most dominant the source of his pain at this time.    PLAN:  Risks, benefits and treatment options discussed including medial branch blocks and RFA procedure as well cervical epidural steroid injection.  In addition, other procedures, injections, therapy, time and medications, surgery and therapy were all discussed.  Risks include but not limited in regards to cervical epidural injection, infection, bleeding, nerve injury, rare risk of paralysis.  Steroid associated side effects, temporary or permanent, increase in pain.  The patient elects to proceed with cervical epidural steroid injection.  The ideal procedure would be C7-T1 midline intralaminar procedure, would be ideal at that segment.  If his neck pain does not respond to that medial branch block, then preparation for an RFA procedure, I think would be most appropriate at that time.  Follow up on injection date and go from there.      Dictated By: Bud Cunningham MD  Signing Provider: MD TANIA Hayes/SOLE ([id number])  DD: 09/15/2017 09:24:47 TD: 09/15/2017 11:05:54    Copy Sent To:     cc: Rolando Ponce MD

## 2017-09-29 PROCEDURE — 1240000000 HC EMOTIONAL WELLNESS R&B

## 2017-09-29 PROCEDURE — 6370000000 HC RX 637 (ALT 250 FOR IP): Performed by: PSYCHIATRY & NEUROLOGY

## 2017-09-29 RX ADMIN — METFORMIN HYDROCHLORIDE 1000 MG: 500 TABLET, FILM COATED ORAL at 17:10

## 2017-09-29 RX ADMIN — QUETIAPINE FUMARATE 600 MG: 300 TABLET, FILM COATED ORAL at 21:00

## 2017-09-29 RX ADMIN — TRAZODONE HYDROCHLORIDE 100 MG: 100 TABLET ORAL at 20:59

## 2017-09-29 NOTE — PLAN OF CARE
Problem: Risk of Harm  Goal: LTG-Absence of harm  Outcome: Ongoing  No self harm this shift. Goal: STG-Absence of Self Harm  Outcome: Ongoing  No self harm this shift. Problem: Altered Mood, Psychotic Behavior  Goal: STG-Able to demonstrate trust by eating, participating in treatment and following staffs directions  Outcome: Ongoing  Pt takes snack well. Goal: LTG-Able to verbalize decrease in frequency and intensity of hallucinations  Outcome: Ongoing  Pt relates AH are \"mumbling\" and \"getting better\".

## 2017-09-29 NOTE — BH NOTE
Patient out for breakfast refused morning bs check and morning meds. Multiple attempts made to encourage patient to take his meds with no success.

## 2017-09-29 NOTE — PLAN OF CARE
Problem: Altered Mood, Psychotic Behavior  Goal: LTG-Able to verbalize decrease in frequency and intensity of hallucinations  Outcome: Ongoing  Pt did not participate in Pt did not participate in Goal Setting / [employer] / [employer] group at [employer] despite staff encouragement.

## 2017-09-29 NOTE — PLAN OF CARE
Problem: Altered Mood, Psychotic Behavior  Goal: STG-Able to demonstrate trust by eating, participating in treatment and following staffs directions  Outcome: Ongoing  Psychoeducation Group Note     Date: 9/29/17              Start Time: 1330                    End Time: 7828     Number Participants in Group:  8     Goal:  Patient will demonstrate increased interpersonal interaction.    Topic:  Cognitive Skills Therapeutic Activity     Discipline Responsible:    OT   AT   [facility]. X RT   Other         Participation Level:                  None   Minimal   X Active Listener X Interactive     Monopolizing             Participation Quality:  X Appropriate   Inappropriate   X       Attentive         Intrusive   X       Sharing         Resistant   X       Supportive         Lethargic         Affective:         X Congruent   Incongruent   Blunted   Flat     Constricted   Anxious   Elated   Angry     Labile   Depressed   Other   Bright         Cognitive:  X Alert X Oriented PPTP       Concentration X G   F   P   Attention Span X G   F   P   Short-Term Memory X G   F   P   Long-Term Memory X G   F   P   ProblemSolving/  Decision Making X G   F   P   Ability to Process  Information X G   F   P         Contributing Factors              Delusional              Hallucinating              Flight of Ideas              Other:         Modes of Intervention:  X Education X Support X Exploration   X Clarifying X Problem Solving   Confrontation   X Socialization X Limit Setting   Reality Testing   X Activity   Movement   Media     Other:                  Response to Learning:  X Able to verbalize current knowledge/experience   X Able to verbalize/acknowledge new learning   X Able to retain information   X Capable of insight     Able to change behavior   X Progressing to goal     Other:          Comments:

## 2017-09-29 NOTE — PLAN OF CARE
Problem: Risk of Harm  Goal: LTG-Absence of harm  Outcome: Ongoing  Patient isolative to room. Denies SI/HI at this time. Refuses to go to groups. Morning meds and BS checks refused. Pt relays that he has auditory hallucinations. 15 minute safety checks continue per staff. Goal: STG-Absence of Self Harm  Outcome: Ongoing    Problem: Altered Mood, Psychotic Behavior  Goal: STG-Able to demonstrate trust by eating, participating in treatment and following staffs directions  Outcome: Ongoing  Isolative to room.   Goal: LTG-Able to verbalize decrease in frequency and intensity of hallucinations  Outcome: Ongoing    Problem: Nutrition  Goal: Optimal nutrition therapy  Outcome: Ongoing

## 2017-09-29 NOTE — PLAN OF CARE
Problem: Altered Mood, Psychotic Behavior  Goal: STG-Able to demonstrate trust by eating, participating in treatment and following staffs directions  Outcome: Ongoing  Pt did not participate in Social Skills / Leisure Skills and Awareness / Task Orientation group at [id number] [phone] despite staff encouragement.

## 2017-09-30 PROCEDURE — 1240000000 HC EMOTIONAL WELLNESS R&B

## 2017-09-30 PROCEDURE — 6370000000 HC RX 637 (ALT 250 FOR IP): Performed by: PSYCHIATRY & NEUROLOGY

## 2017-09-30 RX ADMIN — METFORMIN HYDROCHLORIDE 1000 MG: 500 TABLET, FILM COATED ORAL at 17:19

## 2017-09-30 RX ADMIN — SERTRALINE 150 MG: 100 TABLET, FILM COATED ORAL at 08:29

## 2017-09-30 RX ADMIN — METFORMIN HYDROCHLORIDE 1000 MG: 500 TABLET, FILM COATED ORAL at 08:29

## 2017-09-30 NOTE — PLAN OF CARE
Problem: Altered Mood, Psychotic Behavior  Goal: LTG-Able to verbalize decrease in frequency and intensity of hallucinations  Outcome: Ongoing  Pt did not participate in Creative Expression Therapeutic Activity at 1330 despite staff encouragement.

## 2017-09-30 NOTE — PLAN OF CARE
Problem: Risk of Harm  Goal: LTG-Absence of harm  Outcome: Ongoing  Psycho Education Group Note     Date: 9/30/17              Start Time: 10:00am              End Time: 10:45am     Number Participants in Group:  8     Goal:  Patient will demonstrate increased interpersonal interaction   Topic: [place] Psycho Education Group     Discipline Responsible:    OT   AT X SW   Nsg.   RT   Other         Participation Level:                  None   Minimal   x Active Listener x Interactive     Monopolizing             Participation Quality:  x Appropriate   Inappropriate   x       Attentive         Intrusive   x       Sharing         Resistant   x       Supportive         Lethargic         Affective:         x Congruent   Incongruent   Blunted   Flat     Constricted   Anxious   Elated   Angry     Labile   Depressed   Other             Cognitive:  x Alert x Oriented PPTP       Concentration   G x F   P   Attention Span   G x F   P   Short-Term Memory   G x F   P   Long-Term Memory   G x F   P   ProblemSolving/  Decision Making   G x F   P   Ability to Process  Information   G x F   P         Contributing Factors              Delusional              Hallucinating              Flight of Ideas              Other:         Modes of Intervention:  x Education x Support   Exploration     Clarifying x Problem Solving   Confrontation   x Socialization x Limit Setting   Reality Testing     Activity   Movement   Media     Other:                  Response to Learning:  x Able to verbalize current knowledge/experience   x Able to verbalize/acknowledge new learning     Able to retain information   x Capable of insight     Able to change behavior     Progressing to goal     Other:          Comments:

## 2017-10-01 PROCEDURE — 6370000000 HC RX 637 (ALT 250 FOR IP): Performed by: PSYCHIATRY & NEUROLOGY

## 2017-10-01 PROCEDURE — 1240000000 HC EMOTIONAL WELLNESS R&B

## 2017-10-01 RX ORDER — LOPERAMIDE HYDROCHLORIDE 2 MG/1
2 CAPSULE ORAL 4 TIMES DAILY PRN
Status: DISCONTINUED | OUTPATIENT
Start: 2017-10-01 | End: 2017-10-02 | Stop reason: HOSPADM

## 2017-10-01 RX ADMIN — LOPERAMIDE HYDROCHLORIDE 2 MG: 2 CAPSULE ORAL at 14:21

## 2017-10-01 RX ADMIN — TRAZODONE HYDROCHLORIDE 100 MG: 100 TABLET ORAL at 21:48

## 2017-10-01 RX ADMIN — METFORMIN HYDROCHLORIDE 1000 MG: 500 TABLET, FILM COATED ORAL at 17:35

## 2017-10-01 RX ADMIN — METFORMIN HYDROCHLORIDE 1000 MG: 500 TABLET, FILM COATED ORAL at 08:51

## 2017-10-01 RX ADMIN — SERTRALINE 150 MG: 100 TABLET, FILM COATED ORAL at 08:51

## 2017-10-01 RX ADMIN — QUETIAPINE FUMARATE 600 MG: 300 TABLET, FILM COATED ORAL at 21:46

## 2017-10-01 NOTE — PLAN OF CARE
Problem: Risk of Harm  Goal: LTG-Absence of harm  Outcome: Ongoing  [id number] [facility] Interdisciplinary Treatment Plan Note      Review Date & Time: 10/1/2017 1104     Admission Type:   Admission Type: Voluntary     Reason for admission:  Reason for Admission:  (Voluntary from [facility] w voices telling him to hurt himself)     Estimated Length of Stay :  5-7 days  Estimated Discharge Date Update: to be determined by physician     PATIENT STRENGTHS:  Patient Strengths:Strengths: Communication, Connection to output provider, No significant Physical Illness  Patient Strengths and Limitations:Limitations: External locus of control  Addictive Behavior:Addictive Behavior  In the past 3 months, have you felt or has someone told you that you have a problem with:  : None  Do you have a history of Chemical Use?: No  Do you have a history of Alcohol Use?: No  Do you have a history of Street Drug Abuse?: Yes  Histroy of Prescripton Drug Abuse?: No  Medical Problems:   Past Medical History:   Diagnosis Date    Bipolar disorder ([facility]      Depression      GERD (gastroesophageal reflux disease)      Hallucinations      Headache      Hepatitis      Schizophrenia, schizo-affective ([facility]      Substance abuse      Tobacco abuse      Type II or unspecified type diabetes mellitus without mention of complication, not stated as uncontrolled      Urinary incontinence          Risk:  Fall RiskTotal: 67  Dusty Scale Dusty Scale Score: 22  BVC Total: 0     Change in scores no.  Changes to plan of Care no     Status EXAM:   Status and Exam  Normal: No  Facial Expression: Avoids Gaze, Flat  Affect: Blunt, Constricted  Level of Consciousness: Lethargic  Mood:Normal: No  Mood: Depressed, Helpless, Sad, Anhedonia, Empty  Motor Activity:Normal: No  Motor Activity: Decreased  Interview Behavior: Evasive, Cooperative  Preception: [place] to Person, [place] to Place, [place] to Time, [place] to Situation  Attention:Normal:

## 2017-10-01 NOTE — BH NOTE
Date: 10/1/17 Start Time: 1445  End Time: 1515    Number Participants in Group:  18    Goal:  Patient will demonstrate increased interpersonal interaction   Topic: Safety Drill    Discipline Responsible:   OT  AT   x Nsg.  RT  Other       Participation Level:     None  Minimal    Active Listener  Interactive    Monopolizing         Participation Quality:  x Appropriate  Inappropriate          Attentive        Intrusive          Sharing        Resistant          Supportive        Lethargic       Affective:   x Congruent  Incongruent  Blunted  Flat    Constricted  Anxious  Elated  Angry    Labile  Depressed  Other         Cognitive:  x Alert x Oriented PPTP     Concentration x G  F  P   Attention Span x G  F  P   Short-Term Memory  G  F  P   Long-Term Memory  G  F  P   ProblemSolving/  Decision Making  G  F  P   Ability to Process  Information  G  F  P      Contributing Factors             Delusional             Hallucinating             Flight of Ideas             Other:       Modes of Intervention:  x Education x Support x Exploration    Clarifying x Problem Solving  Confrontation    Socialization  Limit Setting  Reality Testing    Activity  Movement  Media    Other:            Response to Learning:  x Able to verbalize current knowledge/experience    Able to verbalize/acknowledge new learning    Able to retain information    Capable of insight    Able to change behavior    Progressing to goal    Other:        Comments:

## 2017-10-01 NOTE — PLAN OF CARE
Problem: Altered Mood, Psychotic Behavior  Goal: LTG-Able to verbalize decrease in frequency and intensity of hallucinations  Outcome: Ongoing  Pt denies any hallucinations today, but remains preoccupied with much thought blocking.

## 2017-10-01 NOTE — PLAN OF CARE
Problem: Altered Mood, Psychotic Behavior  Goal: STG-Able to demonstrate trust by eating, participating in treatment and following staffs directions  Outcome: Ongoing  Pt needs much encouragement and prompting to eat and drink, but has improved is intake today. Takes a shower after loose stool. Otherwise, remains isolative. Takes meds as ordered.

## 2017-10-02 VITALS
HEART RATE: 60 BPM | OXYGEN SATURATION: 97 % | WEIGHT: 158 LBS | RESPIRATION RATE: 14 BRPM | DIASTOLIC BLOOD PRESSURE: 60 MMHG | SYSTOLIC BLOOD PRESSURE: 100 MMHG | HEIGHT: 75 IN | BODY MASS INDEX: 19.65 KG/M2 | TEMPERATURE: 98.5 F

## 2017-10-02 PROCEDURE — 5130000000 HC BRIDGE APPOINTMENT

## 2017-10-02 PROCEDURE — 6370000000 HC RX 637 (ALT 250 FOR IP): Performed by: PSYCHIATRY & NEUROLOGY

## 2017-10-02 RX ORDER — QUETIAPINE FUMARATE 200 MG/1
200 TABLET, FILM COATED ORAL EVERY MORNING
Qty: 30 TABLET | Refills: 0 | Status: ON HOLD | OUTPATIENT
Start: 2017-10-02 | End: 2018-02-12 | Stop reason: ALTCHOICE

## 2017-10-02 RX ORDER — TRAZODONE HYDROCHLORIDE 100 MG/1
100 TABLET ORAL NIGHTLY PRN
Qty: 30 TABLET | Refills: 0 | Status: ON HOLD | OUTPATIENT
Start: 2017-10-02 | End: 2018-02-15

## 2017-10-02 RX ORDER — QUETIAPINE FUMARATE 300 MG/1
600 TABLET, FILM COATED ORAL NIGHTLY
Qty: 60 TABLET | Refills: 0 | Status: ON HOLD | OUTPATIENT
Start: 2017-10-02 | End: 2018-02-12 | Stop reason: ALTCHOICE

## 2017-10-02 RX ADMIN — METFORMIN HYDROCHLORIDE 1000 MG: 500 TABLET, FILM COATED ORAL at 08:40

## 2017-10-02 RX ADMIN — SERTRALINE 150 MG: 100 TABLET, FILM COATED ORAL at 08:40

## 2017-10-02 NOTE — PROGRESS NOTES
[facility]  Discharge Note    Pt discharged with followings belongings:   Dentures: None  Vision - Corrective Lenses: None  Hearing Aid: None  Jewelry: Ring, Necklace (4 rings , see belonging sheet)  Body Piercings Removed: No  Clothing: Footwear, Pants, Shirt, Undergarments (Comment) (belt, blue shoes 2 pair socks, black jeans 2 shirts shorts w/ strings, hat)  Were All Patient Medications Collected?: Yes (bottle trazadone and bottle seroquel)  Other Valuables: Leyda Goodrich sent home with pt. Valuables returned to patient. Patient left department with Departure Mode: By self, In cab via Mobility at Departure: Ambulatory, discharged to Discharged to: Private Residence. Patient education on aftercare instructions: yes  Information faxed to [facility] by writer Patient verbalize understanding of AVS:  yes.     Status EXAM upon discharge:  Status and Exam  Normal: Yes  Facial Expression: Flat  Affect: Appropriate  Level of Consciousness: Alert  Mood:Normal: Yes  Mood: Depressed, Anhedonia, Helpless  Motor Activity:Normal: Yes  Motor Activity: Decreased  Interview Behavior: Cooperative  Preception: [place] to Person, [place] to Time, [place] to Place, [place] to Situation  Attention:Normal: Yes  Attention: Distractible  Thought Processes: Blocking  Thought Content:Normal: Yes  Thought Content: Poverty of Content  Hallucinations: None  Delusions: No  Memory:Normal: No  Memory: Confabulation, Poor Recent  Insight and Judgment: No  Insight and Judgment: Poor Insight  Present Suicidal Ideation: No  Present Homicidal Ideation: No    Jason Hunt RN

## 2017-10-02 NOTE — PLAN OF CARE
Problem: Altered Mood, Psychotic Behavior  Goal: STG-Able to demonstrate trust by eating, participating in treatment and following staffs directions  Outcome: Ongoing  Pt did not participate in Goal Setting and [employer] at [employer] despite staff encouragement.

## 2017-10-02 NOTE — DISCHARGE SUMMARY
[address], [address]                              DISCHARGE SUMMARY    PATIENT NAME: Wendy Tobar                      :             1960  MED REC NO:   [id number]                               ROOM:            0104  ACCOUNT NO:   [de-identified]                            ADMISSION DATE:  2017  PROVIDER:     Isael Naik                    DISCHARGE DATE:    HISTORY OF PRESENTING ILLNESS AND REASON FOR CURRENT ADMISSION:  The  patient is a 11-year-old male who is feeling depressed and sad, having  suicidal thoughts, feel that his life is waste and he should die. He  is experiencing auditory hallucination telling him to kill himself. PAST PSYCHIATRIC HISTORY:  History of schizoaffective disorder and  cocaine use disorder. MEDICAL AND SURGICAL HISTORY:  He has a history of hepatitis,  currently stable. He also has a history of diabetes mellitus type 2. ALLERGIES:  He is allergic to NAVANE. COURSE DURING THE HOSPITAL STAY:  After getting admitted, he was  started on metformin 1000 mg p.o. b.i.d., Seroquel 600 mg p.o. daily,  Zoloft 150 mg daily. With this, he stabilized and he is being  discharged home. MENTAL STATUS EXAM:  At the time of discharge, the patient is  cooperative. He has adequate psychomotor activity. He has adequate  rapport. His speech is within normal limits. His mood is  subjectively okay, objectively appears to be euthymic. He has  appropriate affect. Thought process and contents are within normal  limits. He denies any hallucinations or delusion. He denies any  suicidal or homicidal thoughts or plans. He is of average  intelligence. He is oriented to time, place, and person. His memory  to recent, remote, and immediate events are within normal limits. He  has adequate attention and concentration. His insight and judgement  are fair.   His abstraction is concrete. DIAGNOSES:  Schizoaffective disorder, type 2 diabetes mellitus, and  cocaine use disorder. TREATMENT AND PLAN:  The patient is discharged. He will follow with  [facility] and his PCP.         Jameel Smyth    D: 10/02/2017 6:50:34       T: 10/02/2017 7:57:09     [email or web address]  Job#: [id number]     Doc#: [id number]

## 2017-10-02 NOTE — DISCHARGE SUMMARY
Psychiatry: Discharge Note  Patient is stable. He  denies any hallucinations or delusions. He denies any suicidal or homicidal thoughts or plans. He agreed to follow up with [facility]. He verbalizes a plan to f/u with outpatient care and keep himself safe  Discharge diagnosis:  Axis I: Schizoaffective disorder  Axis II: None  Axis III :T2D  Axis IV : Lack of support  Axis V 45  Discharge Medications:   Current Discharge Medication List           Details   sertraline (ZOLOFT) 50 MG tablet Take 3 tablets by mouth daily  Qty: 90 tablet, Refills: 0              Details   traZODone (DESYREL) 100 MG tablet Take 1 tablet by mouth nightly as needed for Sleep  Qty: 30 tablet, Refills: 0      metFORMIN (GLUCOPHAGE) 1000 MG tablet Take 1 tablet by mouth 2 times daily (with meals)  Qty: 60 tablet, Refills: 0      !! QUEtiapine (SEROQUEL) 200 MG tablet Take 1 tablet by mouth every morning  Qty: 30 tablet, Refills: 0      !! QUEtiapine (SEROQUEL) 300 MG tablet Take 2 tablets by mouth nightly  Qty: 60 tablet, Refills: 0       !! - Potential duplicate medications found. Please discuss with provider.         Electronically signed by Caroline Darby MD on 10/2/2017 at 9:02 AM

## 2017-10-03 LAB
EKG ATRIAL RATE: 74 BPM
EKG P AXIS: 73 DEGREES
EKG P-R INTERVAL: 156 MS
EKG Q-T INTERVAL: 396 MS
EKG QRS DURATION: 86 MS
EKG QTC CALCULATION (BAZETT): 439 MS
EKG R AXIS: 85 DEGREES
EKG T AXIS: 82 DEGREES
EKG VENTRICULAR RATE: 74 BPM

## 2017-11-18 ENCOUNTER — HOSPITAL ENCOUNTER (EMERGENCY)
Age: 57
Discharge: HOME | End: 2017-11-18
Attending: EMERGENCY MEDICINE
Payer: MEDICAID

## 2017-11-18 VITALS
OXYGEN SATURATION: 100 % | BODY MASS INDEX: 20 KG/M2 | SYSTOLIC BLOOD PRESSURE: 141 MMHG | DIASTOLIC BLOOD PRESSURE: 79 MMHG | WEIGHT: 160 LBS | RESPIRATION RATE: 20 BRPM | TEMPERATURE: 96.6 F | HEART RATE: 78 BPM

## 2017-11-18 DIAGNOSIS — S60.419A ABRASION OF FINGER, INITIAL ENCOUNTER: Primary | ICD-10-CM

## 2017-11-18 PROCEDURE — 6360000002 HC RX W HCPCS: Performed by: EMERGENCY MEDICINE

## 2017-11-18 PROCEDURE — 90471 IMMUNIZATION ADMIN: CPT | Performed by: EMERGENCY MEDICINE

## 2017-11-18 PROCEDURE — 99284 EMERGENCY DEPT VISIT MOD MDM: CPT

## 2017-11-18 PROCEDURE — 90715 TDAP VACCINE 7 YRS/> IM: CPT | Performed by: EMERGENCY MEDICINE

## 2017-11-18 RX ORDER — BACITRACIN ZINC AND POLYMYXIN B SULFATE 500; 1000 [USP'U]/G; [USP'U]/G
OINTMENT TOPICAL
Qty: 28.35 G | Refills: 0 | Status: SHIPPED | OUTPATIENT
Start: 2017-11-18 | End: 2017-11-25

## 2017-11-18 RX ADMIN — TETANUS TOXOID, REDUCED DIPHTHERIA TOXOID AND ACELLULAR PERTUSSIS VACCINE, ADSORBED 0.5 ML: 5; 2.5; 8; 8; 2.5 SUSPENSION INTRAMUSCULAR at 22:29

## 2017-11-19 ASSESSMENT — ENCOUNTER SYMPTOMS: COLOR CHANGE: 1

## 2017-11-19 NOTE — ED PROVIDER NOTES
[facility]     Emergency Department     Faculty Attestation    I performed a history and physical examination of the patient and discussed management with the resident. I have reviewed and agree with the residents findings including all diagnostic interpretations, and treatment plans as written. Any areas of disagreement are noted on the chart. I was personally present for the key portions of any procedures. I have documented in the chart those procedures where I was not present during the key portions. I have reviewed the emergency nurses triage note. I agree with the chief complaint, past medical history, past surgical history, allergies, medications, social and family history as documented unless otherwise noted below. Documentation of the HPI, Physical Exam and Medical Decision Making performed by susanibnav is based on my personal performance of the HPI, PE and MDM. For Physician Assistant/ Nurse Practitioner cases/documentation I have personally evaluated this patient and have completed at least one if not all key elements of the E/M (history, physical exam, and MDM). Additional findings are as noted. Primary Care Physician: Robyn Diallo MD    History: This is a 61 y.o. male who presents to the Emergency Department with complaint of Ring removal. The patient presented emergency department requesting to have a Ring removed from the hand. Been on for an extended period of time. Physical:   weight is 160 lb (72.6 kg). His oral temperature is 96.6 °F (35.9 °C). His blood pressure is 141/79 (abnormal) and his pulse is 78. His respiration is 20 and oxygen saturation is 100%. Right hand shows a ring on the finger that appears to be cutting into the skin.     Impression: Ring removal      Plan: Ring removal      Pre-hypertension/Hypertension: The patient has been informed that they may have pre-hypertension or Hypertension based on a blood pressure reading in the emergency department. I recommend that the patient call the primary care provider listed on their discharge instructions or a physician of their choice this week to arrange follow up for further evaluation of possible pre-hypertension or Hypertension. Chuy Perkins MD, [facility]  Attending Emergency Medicine Physician         Charline Mccloud MD  11/18/17 0495

## 2017-11-19 NOTE — ED NOTES
INITIAL ASSESSMENT:  Arrives ambulatory. Awake/Alert, Oriented x3. GCS=15    Handgrasp, push/pull equal.    INITIAL PRESENTATION:    Immediate distal refill, on right little finger. But has two rings that have become stuck, and his finger is swollen, and it appears the skin under them is beginning to erode.       Codie Tejada RN  11/18/17 3730

## 2017-11-19 NOTE — ED PROVIDER NOTES
[facility]  ED  Emergency Department Encounter  Emergency Medicine Resident     Pt Name: Parviz Alvarado  MRN: [id number]  [place] 1960  Date of evaluation: 11/18/17  PCP:  Ramon Hampton MD    [address] [place] [address]       Chief Complaint   Patient presents with    Ring Removal       HISTORY OF PRESENT ILLNESS  (Location/Symptom, Timing/Onset, Context/Setting, Quality, Duration, Modifying Factors, Severity.)      Parviz Alvarado is a 61 y.o. male who presents with Pain to the right pinky finger. He states that he has had some swelling. He has 2 rings on his finger that he would like to have removed. He's been wearing the rings for 3 months. He noticed swelling week ago. Patient denies any trauma. Denies any numbness, tingling, weakness. No history of hypertension. Denies any other complaints or concerns today    PAST MEDICAL / SURGICAL / SOCIAL / FAMILY HISTORY      has a past medical history of Bipolar disorder ([address] Depression; GERD (gastroesophageal reflux disease); Hallucinations; Headache(784.0); Hepatitis; Schizophrenia, schizo-affective ([address] Substance abuse; Tobacco abuse; Type II or unspecified type diabetes mellitus without mention of complication, not stated as uncontrolled; and Urinary incontinence. has a past surgical history that includes Dental surgery.     Social History     Social History    Marital status: Single     Spouse name: N/A    Number of children: N/A    Years of education: N/A     Occupational History     N/A     Social History Main Topics    Smoking status: Current Every Day Smoker     Packs/day: 1.00     Types: Cigarettes    Smokeless tobacco: Never Used    Alcohol use Yes      Comment: occassional drinker    Drug use:      Types: Cocaine    Sexual activity: Not on file     Other Topics Concern    Not on file     Social History Narrative    No narrative on file       Family History   Problem Relation Age of Onset    Diabetes Mother    Courtney Ashley Heart Disease Mother        Allergies:  Navane [thiothixene]    Home Medications:  Prior to Admission medications    Medication Sig Start Date End Date Taking? Authorizing Provider   bacitracin-polymyxin b (POLYSPORIN) [id number] UNIT/GM ointment Apply topically 2 times daily to right pinky finger for 7-10 days. Please keep it covered 11/18/17 11/25/17 Yes Lalita M Gabrielade, DO   sertraline (ZOLOFT) 50 MG tablet Take 3 tablets by mouth daily 10/2/17   Trixie Scheuermann, MD   traZODone (DESYREL) 100 MG tablet Take 1 tablet by mouth nightly as needed for Sleep 10/2/17   Trixie Scheuermann, MD   metFORMIN (GLUCOPHAGE) 1000 MG tablet Take 1 tablet by mouth 2 times daily (with meals) 10/2/17   Trixie Scheuermann, MD   QUEtiapine (SEROQUEL) 200 MG tablet Take 1 tablet by mouth every morning 10/2/17   Trixie Scheuermann, MD   QUEtiapine (SEROQUEL) 300 MG tablet Take 2 tablets by mouth nightly 10/2/17   Trixie Scheuermann, MD       REVIEW OF SYSTEMS    (2-9 systems for level 4, 10 or more for level 5)      Review of Systems   Musculoskeletal: Positive for joint swelling. Skin: Positive for color change and wound. Neurological: Negative for weakness and numbness. PHYSICAL EXAM   (up to 7 for level 4, 8 or more for level 5)      INITIAL VITALS:   BP (!) 141/79   Pulse 78   Temp 96.6 °F (35.9 °C) (Oral)   Resp 20   Wt 160 lb (72.6 kg)   SpO2 100%   BMI 20.00 kg/m²     Physical Exam   Constitutional: Vital signs are normal. He appears well-developed and well-nourished. He is active and cooperative. Non-toxic appearance. He does not have a sickly appearance. He does not appear ill. No distress. HENT:   Head: Normocephalic and atraumatic. Not macrocephalic and not microcephalic. Head is without raccoon's eyes. Neck: No tracheal deviation present. Pulmonary/Chest: No tachypnea and no bradypnea. Musculoskeletal:   No tenderness to the right hand. Normal radial and ulnar pulses.   Normal sensation to all dermatomes. Normal finger flexion, extension and mild swelling of the right pinky finger. There are 2 rings proximal to the PIP with swelling of the digit proximal to where the rings are located. No  Erythema   Neurological: He is alert. Skin: Skin is warm. No rash noted. He is not diaphoretic. No pallor. DIFFERENTIAL  DIAGNOSIS     PLAN (LABS / IMAGING / EKG):  No orders of the defined types were placed in this encounter. MEDICATIONS ORDERED:  Orders Placed This Encounter   Medications    Tetanus-Diphth-Acell Pertussis (BOOSTRIX) injection 0.5 mL    bacitracin-polymyxin b (POLYSPORIN) [id number] UNIT/GM ointment     Sig: Apply topically 2 times daily to right pinky finger for 7-10 days. Please keep it covered     Dispense:  28.35 g     Refill:  0           DIAGNOSTIC RESULTS / EMERGENCY DEPARTMENT COURSE / MDM     LABS:  No results found for this visit on 11/18/17. IMPRESSION: pt here to have his rings removed. On exam, patient does have 2 rings that are very tight around his right pinky finger with mild swelling distal to where the rings are located. Patient told that the rings will need to be cut off, and patient is agreeable to this. no evidence of cellulitis or flexor tenosynovitis. Since there appears to be a circumferential abrasion from the rings, we will update tetanus. Plan to irrigate and apply bacitracin. Since Patient denies any injuries and is neurovascularly intact, Does not need any x-rays today. Also asymptomatic from his hypertension    EMERGENCY DEPARTMENT COURSE:  Rings were removed uneventfully. Patient does have a moderate circumferential abrasion where the rings were located that does not reach the bone. abrasion irrigated and bacitracin applied here. He is told to continue bacitracin for the next 7-10 days and avoid any further rings until it heels properly and swelling resolves.  Pt agreeable    PROCEDURES:  None    CONSULTS:  None    CRITICAL CARE:  None    FINAL IMPRESSION      1. Abrasion of finger, initial encounter          DISPOSITION / PLAN     DISPOSITION Decision to Discharge    Home    PATIENT REFERRED TO:  [facility]  [address] [address]  [phone]  Go to   If symptoms worsen      DISCHARGE MEDICATIONS:  Discharge Medication List as of 11/18/2017 10:34 PM      START taking these medications    Details   bacitracin-polymyxin b (POLYSPORIN) [id number] UNIT/GM ointment Apply topically 2 times daily to right pinky finger for 7-10 days.  Please keep it covered, Disp-28.35 g, R-0, Print             [facility] Rebecca Dorsey DO  Emergency Medicine Resident    (Please note that portions of this note were completed with a voice recognition program.  Efforts were made to edit the dictations but occasionally words are mis-transcribed.)       [address] Jern[address] DO  Resident  11/19/17 1958

## 2018-02-08 ENCOUNTER — HOSPITAL ENCOUNTER (OUTPATIENT)
Age: 58
Setting detail: OBSERVATION
Discharge: PSYCHIATRIC OR SUBSTANCE USE FACILITY | End: 2018-02-12
Attending: EMERGENCY MEDICINE | Admitting: EMERGENCY MEDICINE
Payer: MEDICAID

## 2018-02-08 DIAGNOSIS — R45.851 SUICIDAL IDEATION: Primary | ICD-10-CM

## 2018-02-08 DIAGNOSIS — K61.2 ABSCESS OF ANAL OR RECTAL REGION: ICD-10-CM

## 2018-02-08 LAB
ABSOLUTE EOS #: 0.11 K/UL (ref 0–0.44)
ABSOLUTE IMMATURE GRANULOCYTE: <0.03 K/UL (ref 0–0.3)
ABSOLUTE LYMPH #: 1.88 K/UL (ref 1.1–3.7)
ABSOLUTE MONO #: 0.42 K/UL (ref 0.1–1.2)
BASOPHILS # BLD: 0 % (ref 0–2)
BASOPHILS ABSOLUTE: 0.03 K/UL (ref 0–0.2)
DIFFERENTIAL TYPE: ABNORMAL
EOSINOPHILS RELATIVE PERCENT: 2 % (ref 1–4)
HCT VFR BLD CALC: 37.5 % (ref 40.7–50.3)
HEMOGLOBIN: 11.5 G/DL (ref 13–17)
IMMATURE GRANULOCYTES: 0 %
LYMPHOCYTES # BLD: 25 % (ref 24–43)
MCH RBC QN AUTO: 27.3 PG (ref 25.2–33.5)
MCHC RBC AUTO-ENTMCNC: 30.7 G/DL (ref 28.4–34.8)
MCV RBC AUTO: 89.1 FL (ref 82.6–102.9)
MONOCYTES # BLD: 6 % (ref 3–12)
NRBC AUTOMATED: 0 PER 100 WBC
PDW BLD-RTO: 13.3 % (ref 11.8–14.4)
PLATELET # BLD: 268 K/UL (ref 138–453)
PLATELET ESTIMATE: ABNORMAL
PMV BLD AUTO: 9.3 FL (ref 8.1–13.5)
RBC # BLD: 4.21 M/UL (ref 4.21–5.77)
RBC # BLD: ABNORMAL 10*6/UL
SEG NEUTROPHILS: 67 % (ref 36–65)
SEGMENTED NEUTROPHILS ABSOLUTE COUNT: 4.98 K/UL (ref 1.5–8.1)
WBC # BLD: 7.4 K/UL (ref 3.5–11.3)
WBC # BLD: ABNORMAL 10*3/UL

## 2018-02-08 PROCEDURE — G0480 DRUG TEST DEF 1-7 CLASSES: HCPCS

## 2018-02-08 PROCEDURE — 85025 COMPLETE CBC W/AUTO DIFF WBC: CPT

## 2018-02-08 PROCEDURE — 80307 DRUG TEST PRSMV CHEM ANLYZR: CPT

## 2018-02-08 PROCEDURE — 10061 I&D ABSCESS COMP/MULTIPLE: CPT

## 2018-02-08 PROCEDURE — 80076 HEPATIC FUNCTION PANEL: CPT

## 2018-02-08 PROCEDURE — 80048 BASIC METABOLIC PNL TOTAL CA: CPT

## 2018-02-08 PROCEDURE — 99285 EMERGENCY DEPT VISIT HI MDM: CPT

## 2018-02-08 ASSESSMENT — PAIN DESCRIPTION - PAIN TYPE: TYPE: ACUTE PAIN

## 2018-02-08 ASSESSMENT — ENCOUNTER SYMPTOMS
TROUBLE SWALLOWING: 0
COUGH: 0
NAUSEA: 0
WHEEZING: 0
PHOTOPHOBIA: 0
SINUS PRESSURE: 0
COLOR CHANGE: 0
CONSTIPATION: 0
VOICE CHANGE: 0
ABDOMINAL PAIN: 0
RECTAL PAIN: 1
DIARRHEA: 0
CHEST TIGHTNESS: 0
VOMITING: 0
CHOKING: 0
ABDOMINAL DISTENTION: 0
BLOOD IN STOOL: 0
SINUS PAIN: 0
SORE THROAT: 0
SHORTNESS OF BREATH: 0

## 2018-02-08 ASSESSMENT — PAIN DESCRIPTION - ORIENTATION: ORIENTATION: RIGHT;LEFT

## 2018-02-08 ASSESSMENT — PAIN DESCRIPTION - LOCATION: LOCATION: BUTTOCKS

## 2018-02-08 ASSESSMENT — PAIN SCALES - GENERAL: PAINLEVEL_OUTOF10: 8

## 2018-02-08 ASSESSMENT — PAIN DESCRIPTION - DESCRIPTORS: DESCRIPTORS: ACHING

## 2018-02-09 ENCOUNTER — APPOINTMENT (OUTPATIENT)
Dept: GENERAL RADIOLOGY | Age: 58
End: 2018-02-09
Payer: MEDICAID

## 2018-02-09 ENCOUNTER — APPOINTMENT (OUTPATIENT)
Dept: CT IMAGING | Age: 58
End: 2018-02-09
Payer: MEDICAID

## 2018-02-09 PROBLEM — K61.0 PERIANAL ABSCESS: Status: ACTIVE | Noted: 2018-02-09

## 2018-02-09 PROBLEM — K61.1 PERI-RECTAL ABSCESS: Status: ACTIVE | Noted: 2018-02-09

## 2018-02-09 LAB
-: ABNORMAL
ACETAMINOPHEN LEVEL: <10 UG/ML (ref 10–30)
ALBUMIN SERPL-MCNC: 3.2 G/DL (ref 3.5–5.2)
ALBUMIN SERPL-MCNC: 4 G/DL (ref 3.5–5.2)
ALBUMIN/GLOBULIN RATIO: 1.2 (ref 1–2.5)
ALBUMIN/GLOBULIN RATIO: 1.3 (ref 1–2.5)
ALP BLD-CCNC: 111 U/L (ref 40–129)
ALP BLD-CCNC: 96 U/L (ref 40–129)
ALT SERPL-CCNC: 6 U/L (ref 5–41)
ALT SERPL-CCNC: 8 U/L (ref 5–41)
AMORPHOUS: ABNORMAL
AMPHETAMINE SCREEN URINE: NEGATIVE
ANION GAP SERPL CALCULATED.3IONS-SCNC: 10 MMOL/L (ref 9–17)
ANION GAP SERPL CALCULATED.3IONS-SCNC: 9 MMOL/L (ref 9–17)
AST SERPL-CCNC: 13 U/L
AST SERPL-CCNC: 15 U/L
BACTERIA: ABNORMAL
BARBITURATE SCREEN URINE: NEGATIVE
BENZODIAZEPINE SCREEN, URINE: POSITIVE
BILIRUB SERPL-MCNC: 0.27 MG/DL (ref 0.3–1.2)
BILIRUB SERPL-MCNC: 0.3 MG/DL (ref 0.3–1.2)
BILIRUBIN DIRECT: 0.08 MG/DL
BILIRUBIN URINE: NEGATIVE
BILIRUBIN, INDIRECT: 0.19 MG/DL (ref 0–1)
BUN BLDV-MCNC: 11 MG/DL (ref 6–20)
BUN BLDV-MCNC: 13 MG/DL (ref 6–20)
BUN/CREAT BLD: ABNORMAL (ref 9–20)
BUN/CREAT BLD: ABNORMAL (ref 9–20)
BUPRENORPHINE URINE: ABNORMAL
CALCIUM SERPL-MCNC: 8.5 MG/DL (ref 8.6–10.4)
CALCIUM SERPL-MCNC: 9.1 MG/DL (ref 8.6–10.4)
CANNABINOID SCREEN URINE: NEGATIVE
CASTS UA: ABNORMAL /LPF (ref 0–8)
CHLORIDE BLD-SCNC: 102 MMOL/L (ref 98–107)
CHLORIDE BLD-SCNC: 105 MMOL/L (ref 98–107)
CO2: 26 MMOL/L (ref 20–31)
CO2: 29 MMOL/L (ref 20–31)
COCAINE METABOLITE, URINE: POSITIVE
COLOR: YELLOW
CREAT SERPL-MCNC: 0.76 MG/DL (ref 0.7–1.2)
CREAT SERPL-MCNC: 0.88 MG/DL (ref 0.7–1.2)
CRYSTALS, UA: ABNORMAL /HPF
EPITHELIAL CELLS UA: ABNORMAL /HPF (ref 0–5)
ETHANOL PERCENT: <0.01 %
ETHANOL: <10 MG/DL
GFR AFRICAN AMERICAN: >60 ML/MIN
GFR AFRICAN AMERICAN: >60 ML/MIN
GFR NON-AFRICAN AMERICAN: >60 ML/MIN
GFR NON-AFRICAN AMERICAN: >60 ML/MIN
GFR SERPL CREATININE-BSD FRML MDRD: ABNORMAL ML/MIN/{1.73_M2}
GLOBULIN: ABNORMAL G/DL (ref 1.5–3.8)
GLUCOSE BLD-MCNC: 102 MG/DL (ref 70–99)
GLUCOSE BLD-MCNC: 116 MG/DL (ref 75–110)
GLUCOSE BLD-MCNC: 141 MG/DL (ref 70–99)
GLUCOSE URINE: NEGATIVE
HCT VFR BLD CALC: 33.7 % (ref 40.7–50.3)
HEMOGLOBIN: 10.6 G/DL (ref 13–17)
INR BLD: 1
KETONES, URINE: NEGATIVE
LACTIC ACID, WHOLE BLOOD: 1.4 MMOL/L (ref 0.7–2.1)
LEUKOCYTE ESTERASE, URINE: NEGATIVE
MCH RBC QN AUTO: 28 PG (ref 25.2–33.5)
MCHC RBC AUTO-ENTMCNC: 31.5 G/DL (ref 28.4–34.8)
MCV RBC AUTO: 88.9 FL (ref 82.6–102.9)
MDMA URINE: ABNORMAL
METHADONE SCREEN, URINE: NEGATIVE
METHAMPHETAMINE, URINE: ABNORMAL
MUCUS: ABNORMAL
NITRITE, URINE: NEGATIVE
NRBC AUTOMATED: 0 PER 100 WBC
OPIATES, URINE: NEGATIVE
OTHER OBSERVATIONS UA: ABNORMAL
OXYCODONE SCREEN URINE: NEGATIVE
PARTIAL THROMBOPLASTIN TIME: 23.1 SEC (ref 21.3–31.3)
PDW BLD-RTO: 13.2 % (ref 11.8–14.4)
PH UA: 6 (ref 5–8)
PHENCYCLIDINE, URINE: NEGATIVE
PLATELET # BLD: 236 K/UL (ref 138–453)
PMV BLD AUTO: 9.5 FL (ref 8.1–13.5)
POTASSIUM SERPL-SCNC: 4 MMOL/L (ref 3.7–5.3)
POTASSIUM SERPL-SCNC: 4.6 MMOL/L (ref 3.7–5.3)
PROPOXYPHENE, URINE: ABNORMAL
PROTEIN UA: NEGATIVE
PROTHROMBIN TIME: 10.9 SEC (ref 9.4–12.6)
RBC # BLD: 3.79 M/UL (ref 4.21–5.77)
RBC UA: ABNORMAL /HPF (ref 0–4)
RENAL EPITHELIAL, UA: ABNORMAL /HPF
SALICYLATE LEVEL: 1 MG/DL (ref 3–10)
SODIUM BLD-SCNC: 140 MMOL/L (ref 135–144)
SODIUM BLD-SCNC: 141 MMOL/L (ref 135–144)
SPECIFIC GRAVITY UA: 1.04 (ref 1–1.03)
TEST INFORMATION: ABNORMAL
TOTAL PROTEIN: 5.8 G/DL (ref 6.4–8.3)
TOTAL PROTEIN: 7.1 G/DL (ref 6.4–8.3)
TOXIC TRICYCLIC SC,BLOOD: NEGATIVE
TRICHOMONAS: ABNORMAL
TRICYCLIC ANTIDEPRESSANTS, UR: ABNORMAL
TURBIDITY: CLEAR
URINE HGB: NEGATIVE
UROBILINOGEN, URINE: NORMAL
WBC # BLD: 4.6 K/UL (ref 3.5–11.3)
WBC UA: ABNORMAL /HPF (ref 0–5)
YEAST: ABNORMAL

## 2018-02-09 PROCEDURE — 87040 BLOOD CULTURE FOR BACTERIA: CPT

## 2018-02-09 PROCEDURE — 2500000003 HC RX 250 WO HCPCS: Performed by: STUDENT IN AN ORGANIZED HEALTH CARE EDUCATION/TRAINING PROGRAM

## 2018-02-09 PROCEDURE — 96368 THER/DIAG CONCURRENT INF: CPT

## 2018-02-09 PROCEDURE — 6360000002 HC RX W HCPCS: Performed by: STUDENT IN AN ORGANIZED HEALTH CARE EDUCATION/TRAINING PROGRAM

## 2018-02-09 PROCEDURE — 81001 URINALYSIS AUTO W/SCOPE: CPT

## 2018-02-09 PROCEDURE — G0378 HOSPITAL OBSERVATION PER HR: HCPCS

## 2018-02-09 PROCEDURE — 96366 THER/PROPH/DIAG IV INF ADDON: CPT

## 2018-02-09 PROCEDURE — 85610 PROTHROMBIN TIME: CPT

## 2018-02-09 PROCEDURE — 74177 CT ABD & PELVIS W/CONTRAST: CPT

## 2018-02-09 PROCEDURE — 82947 ASSAY GLUCOSE BLOOD QUANT: CPT

## 2018-02-09 PROCEDURE — 96376 TX/PRO/DX INJ SAME DRUG ADON: CPT

## 2018-02-09 PROCEDURE — 71046 X-RAY EXAM CHEST 2 VIEWS: CPT

## 2018-02-09 PROCEDURE — 96375 TX/PRO/DX INJ NEW DRUG ADDON: CPT

## 2018-02-09 PROCEDURE — 85730 THROMBOPLASTIN TIME PARTIAL: CPT

## 2018-02-09 PROCEDURE — 6370000000 HC RX 637 (ALT 250 FOR IP): Performed by: EMERGENCY MEDICINE

## 2018-02-09 PROCEDURE — 80053 COMPREHEN METABOLIC PANEL: CPT

## 2018-02-09 PROCEDURE — 80307 DRUG TEST PRSMV CHEM ANLYZR: CPT

## 2018-02-09 PROCEDURE — 6360000004 HC RX CONTRAST MEDICATION: Performed by: STUDENT IN AN ORGANIZED HEALTH CARE EDUCATION/TRAINING PROGRAM

## 2018-02-09 PROCEDURE — 96365 THER/PROPH/DIAG IV INF INIT: CPT

## 2018-02-09 PROCEDURE — 83605 ASSAY OF LACTIC ACID: CPT

## 2018-02-09 PROCEDURE — 2580000003 HC RX 258: Performed by: EMERGENCY MEDICINE

## 2018-02-09 PROCEDURE — 6360000002 HC RX W HCPCS: Performed by: EMERGENCY MEDICINE

## 2018-02-09 PROCEDURE — 87086 URINE CULTURE/COLONY COUNT: CPT

## 2018-02-09 PROCEDURE — 6370000000 HC RX 637 (ALT 250 FOR IP): Performed by: STUDENT IN AN ORGANIZED HEALTH CARE EDUCATION/TRAINING PROGRAM

## 2018-02-09 PROCEDURE — 85027 COMPLETE CBC AUTOMATED: CPT

## 2018-02-09 RX ORDER — CIPROFLOXACIN 2 MG/ML
400 INJECTION, SOLUTION INTRAVENOUS ONCE
Status: COMPLETED | OUTPATIENT
Start: 2018-02-09 | End: 2018-02-09

## 2018-02-09 RX ORDER — LIDOCAINE HYDROCHLORIDE 10 MG/ML
INJECTION, SOLUTION INFILTRATION; PERINEURAL
Status: DISPENSED
Start: 2018-02-09 | End: 2018-02-09

## 2018-02-09 RX ORDER — TRAZODONE HYDROCHLORIDE 100 MG/1
100 TABLET ORAL NIGHTLY PRN
Status: DISCONTINUED | OUTPATIENT
Start: 2018-02-09 | End: 2018-02-12 | Stop reason: HOSPADM

## 2018-02-09 RX ORDER — 0.9 % SODIUM CHLORIDE 0.9 %
1000 INTRAVENOUS SOLUTION INTRAVENOUS ONCE
Status: COMPLETED | OUTPATIENT
Start: 2018-02-09 | End: 2018-02-09

## 2018-02-09 RX ORDER — OXYCODONE HYDROCHLORIDE AND ACETAMINOPHEN 5; 325 MG/1; MG/1
1 TABLET ORAL ONCE
Status: COMPLETED | OUTPATIENT
Start: 2018-02-09 | End: 2018-02-09

## 2018-02-09 RX ORDER — QUETIAPINE FUMARATE 300 MG/1
600 TABLET, FILM COATED ORAL NIGHTLY
Status: DISCONTINUED | OUTPATIENT
Start: 2018-02-09 | End: 2018-02-10

## 2018-02-09 RX ORDER — SODIUM CHLORIDE 0.9 % (FLUSH) 0.9 %
10 SYRINGE (ML) INJECTION EVERY 12 HOURS SCHEDULED
Status: DISCONTINUED | OUTPATIENT
Start: 2018-02-09 | End: 2018-02-12 | Stop reason: HOSPADM

## 2018-02-09 RX ORDER — FENTANYL CITRATE 50 UG/ML
100 INJECTION, SOLUTION INTRAMUSCULAR; INTRAVENOUS ONCE
Status: COMPLETED | OUTPATIENT
Start: 2018-02-09 | End: 2018-02-09

## 2018-02-09 RX ORDER — QUETIAPINE FUMARATE 200 MG/1
200 TABLET, FILM COATED ORAL EVERY MORNING
Status: DISCONTINUED | OUTPATIENT
Start: 2018-02-10 | End: 2018-02-10

## 2018-02-09 RX ORDER — ACETAMINOPHEN 325 MG/1
650 TABLET ORAL ONCE
Status: COMPLETED | OUTPATIENT
Start: 2018-02-09 | End: 2018-02-09

## 2018-02-09 RX ORDER — MIDAZOLAM HYDROCHLORIDE 1 MG/ML
2 INJECTION INTRAMUSCULAR; INTRAVENOUS ONCE
Status: COMPLETED | OUTPATIENT
Start: 2018-02-09 | End: 2018-02-09

## 2018-02-09 RX ORDER — ACETAMINOPHEN 325 MG/1
650 TABLET ORAL EVERY 4 HOURS PRN
Status: DISCONTINUED | OUTPATIENT
Start: 2018-02-09 | End: 2018-02-12 | Stop reason: HOSPADM

## 2018-02-09 RX ORDER — SODIUM CHLORIDE 0.9 % (FLUSH) 0.9 %
10 SYRINGE (ML) INJECTION PRN
Status: DISCONTINUED | OUTPATIENT
Start: 2018-02-09 | End: 2018-02-12 | Stop reason: HOSPADM

## 2018-02-09 RX ORDER — LIDOCAINE HYDROCHLORIDE 10 MG/ML
20 INJECTION, SOLUTION INFILTRATION; PERINEURAL ONCE
Status: COMPLETED | OUTPATIENT
Start: 2018-02-09 | End: 2018-02-09

## 2018-02-09 RX ADMIN — OXYCODONE HYDROCHLORIDE AND ACETAMINOPHEN 1 TABLET: 5; 325 TABLET ORAL at 22:35

## 2018-02-09 RX ADMIN — QUETIAPINE FUMARATE 600 MG: 300 TABLET ORAL at 22:53

## 2018-02-09 RX ADMIN — METRONIDAZOLE 500 MG: 500 INJECTION, SOLUTION INTRAVENOUS at 03:30

## 2018-02-09 RX ADMIN — CIPROFLOXACIN 400 MG: 2 INJECTION, SOLUTION INTRAVENOUS at 21:20

## 2018-02-09 RX ADMIN — LIDOCAINE HYDROCHLORIDE 20 ML: 10 INJECTION, SOLUTION INFILTRATION; PERINEURAL at 04:36

## 2018-02-09 RX ADMIN — ACETAMINOPHEN 650 MG: 325 TABLET ORAL at 16:44

## 2018-02-09 RX ADMIN — FENTANYL CITRATE 100 MCG: 50 INJECTION INTRAMUSCULAR; INTRAVENOUS at 04:16

## 2018-02-09 RX ADMIN — FENTANYL CITRATE 100 MCG: 50 INJECTION, SOLUTION INTRAMUSCULAR; INTRAVENOUS at 05:30

## 2018-02-09 RX ADMIN — Medication 10 ML: at 22:54

## 2018-02-09 RX ADMIN — MIDAZOLAM HYDROCHLORIDE 2 MG: 1 INJECTION, SOLUTION INTRAMUSCULAR; INTRAVENOUS at 04:16

## 2018-02-09 RX ADMIN — TRAZODONE HYDROCHLORIDE 100 MG: 100 TABLET ORAL at 22:53

## 2018-02-09 RX ADMIN — IOPAMIDOL 75 ML: 755 INJECTION, SOLUTION INTRAVENOUS at 02:10

## 2018-02-09 RX ADMIN — SODIUM CHLORIDE 1000 ML: 9 INJECTION, SOLUTION INTRAVENOUS at 19:44

## 2018-02-09 RX ADMIN — CIPROFLOXACIN 400 MG: 2 INJECTION, SOLUTION INTRAVENOUS at 03:47

## 2018-02-09 ASSESSMENT — PAIN SCALES - GENERAL
PAINLEVEL_OUTOF10: 6
PAINLEVEL_OUTOF10: 10
PAINLEVEL_OUTOF10: 10
PAINLEVEL_OUTOF10: 8
PAINLEVEL_OUTOF10: 8
PAINLEVEL_OUTOF10: 7
PAINLEVEL_OUTOF10: 3

## 2018-02-09 NOTE — ED PROVIDER NOTES
more for level 5)      /80   Pulse 108   Temp 96.8 °F (36 °C)   Resp 16   SpO2 98%     Physical Exam   Constitutional: He is oriented to person, place, and time. He appears well-developed and well-nourished. No distress. HENT:   Head: Normocephalic and atraumatic. Right Ear: External ear normal.   Left Ear: External ear normal.   Eyes: EOM are normal. Pupils are equal, round, and reactive to light. Neck: Normal range of motion. No JVD present. No tracheal deviation present. Cardiovascular: Normal rate, normal heart sounds and intact distal pulses. Exam reveals no gallop and no friction rub. No murmur heard. Pulmonary/Chest: Effort normal and breath sounds normal. No respiratory distress. He has no wheezes. Abdominal: Soft. He exhibits no distension. There is no tenderness. Musculoskeletal: Normal range of motion. He exhibits no deformity. Neurological: He is alert and oriented to person, place, and time. Skin: Skin is warm and dry. He is not diaphoretic.      Plan     DIAGNOSTIC ORDERS:  Orders Placed This Encounter   Procedures    Suicide precautions     MEDICATION ORDERS:  Orders Placed This Encounter   Medications    iopamidol (ISOVUE-370) 76 % injection 75 mL    ciprofloxacin (CIPRO) IVPB 400 mg    metronidazole (FLAGYL) 500 mg in NaCl 100 mL IVPB premix    lidocaine 1 % injection 20 mL    fentaNYL (SUBLIMAZE) injection 100 mcg    midazolam (VERSED) injection 2 mg    lidocaine 1 % injection     DEVON HIGUERA: cabinet override    fentaNYL (SUBLIMAZE) injection 100 mcg    acetaminophen (TYLENOL) tablet 650 mg    DISCONTD: metFORMIN (GLUCOPHAGE) tablet 1,000 mg    DISCONTD: QUEtiapine (SEROQUEL) tablet 200 mg    DISCONTD: QUEtiapine (SEROQUEL) tablet 600 mg    DISCONTD: sertraline (ZOLOFT) tablet 150 mg    DISCONTD: traZODone (DESYREL) tablet 100 mg    DISCONTD: sodium chloride flush 0.9 % injection 10 mL    DISCONTD: sodium chloride flush 0.9 % injection 10 mL    1. 88 1.10 - 3.70 k/uL    Absolute Mono # 0.42 0.10 - 1.20 k/uL    Absolute Eos # 0.11 0.00 - 0.44 k/uL    Basophils # 0.03 0.00 - 0.20 k/uL    Absolute Immature Granulocyte <0.03 0.00 - 0.30 k/uL    WBC Morphology NOT REPORTED     RBC Morphology NOT REPORTED     Platelet Estimate NOT REPORTED    Basic Metabolic Panel   Result Value Ref Range    Glucose 102 (H) 70 - 99 mg/dL    BUN 11 6 - 20 mg/dL    CREATININE 0.88 0.70 - 1.20 mg/dL    Bun/Cre Ratio NOT REPORTED 9 - 20    Calcium 9.1 8.6 - 10.4 mg/dL    Sodium 141 135 - 144 mmol/L    Potassium 4.0 3.7 - 5.3 mmol/L    Chloride 102 98 - 107 mmol/L    CO2 29 20 - 31 mmol/L    Anion Gap 10 9 - 17 mmol/L    GFR Non-African American >60 >60 mL/min    GFR African American >60 >60 mL/min    GFR Comment          GFR Staging NOT REPORTED    TOX SCR, BLD, ED   Result Value Ref Range    Ethanol <10 <10 mg/dL    Ethanol percent <9.539 %    Salicylate Lvl 1 (L) 3 - 10 mg/dL    Acetaminophen Level <10 (L) 10 - 30 ug/mL    Toxic Tricyclic Sc,Blood NEGATIVE NEG       RADIOLOGY:  Ct Abdomen Pelvis W Iv Contrast Additional Contrast? Radiologist Recommendation    Result Date: 2/9/2018  EXAMINATION: CT OF THE ABDOMEN AND PELVIS WITH CONTRAST 2/9/2018 2:24 am TECHNIQUE: CT of the abdomen and pelvis was performed with the administration of intravenous contrast. Multiplanar reformatted images are provided for review. Dose modulation, iterative reconstruction, and/or weight based adjustment of the mA/kV was utilized to reduce the radiation dose to as low as reasonably achievable. COMPARISON: CT abdomen and pelvis dated August 7, 2014 HISTORY: ORDERING SYSTEM PROVIDED HISTORY: concern for anal/rectal mass vs abscess TECHNOLOGIST PROVIDED HISTORY: Additional Contrast?->Radiologist Recommendation FINDINGS: Lower Chest: Minimal subsegmental atelectatic changes in the dependent portion of the lung bases. Otherwise the visualized lungs demonstrate no acute abnormality.   Limited views of the heart and mediastinum demonstrate no acute abnormality. Organs: Ill-defined hypoattenuation segment 4 of the liver likely represents focal fat infiltration. The liver otherwise demonstrates no acute abnormality. No intrahepatic bile duct dilatation. No suspicious focal liver lesions. Contracted gallbladder without definite acute abnormality by CT. Bilateral adrenal glands unremarkable. The spleen demonstrates no acute abnormality. Pancreas demonstrates no acute abnormality. The bilateral renal cysts are slightly larger, measuring up to 3.7 cm on the right and 3.0 cm on the left. Bilateral kidneys otherwise enhance uniformly and symmetrically without hydronephrosis. No definite nephrolithiasis. GI/Bowel: No definite acute gastric abnormality. No acute small bowel abnormality. No evidence of small bowel obstruction. No evidence of acute appendicitis. Nonspecific anal rectal wall thickening. There is left perineal inflammation with a small 1.8 x 1.4 cm fluid collection at the posterior and left aspect of the perianal region, concerning for abscess. Underlying mass cannot be excluded on this examination. Moderate stool burden seen throughout the colon. There is otherwise no other acute colonic abnormality. Pelvis: Prostatomegaly. Nonspecific distended bladder. Peritoneum/Retroperitoneum: No significant free fluid. No significant lymphadenopathy. Moderate atherosclerotic disease of the aorta and iliac vessels. No free air. Probable 8 mm lymph node posterior to the left adrenal gland slightly more prominent from the prior study but likely reactive. Small nonspecific naomi hepatis lymph nodes. Bones/Soft Tissues: Moderate degenerative changes of the visualized lower thoracic spine and lumbar spine. 1. Nonspecific anal rectal wall thickening with an adjacent small 1.8 x 1.4 cm fluid collection in the perineal region suggestive of abscess. Underlying mass cannot be excluded.  2. Mild hepatic steatosis. 3. Prostatomegaly. Mild nonspecific bladder wall thickening. Medical decision making  (MDM) / ED Course     Suicide percussions were maintained. On physical exam patient complained of the pain around the anus. Physical exam revealed a area of induration with some fluctuance. The superficial tissue did not appear typical and as result a CT scan of the abdomen and pelvis was obtained with special focus on the anal rectal area. CT scan read suggested an abscessed with question of a potential mass. Gen. surgery was consultation and performed a bedside I&D which the patient reportedly tolerated well. Social work evaluated the patient and has determined that he requires inpatient hospitalization. Unfortunately the patient's insurance is not taken by [address] [place] [address] [email or web address] and placement inquiring is ongoing. IMPRESSION:   Suicidal ideation  Abscess of the rectal anal area    CONSULTS:  IP CONSULT TO GENERAL SURGERY    Procedures     Abscess I&D per general surgery    Final impression      1. Suicidal ideation    2.  Abscess of anal or rectal region         Disposition with plan     Disposition: IP Psychiatric placement is pending - pt signed out to Dr. Franca Franco MD  Emergency Medicine Resident    (Please note that portions of this note were completed with a voice recognition program.  Efforts were made to edit the dictations but occasionally words are mis-transcribed.)

## 2018-02-09 NOTE — ED NOTES
Report received from [facility]. Pt resting in bed, eyes closed. NAD at this time. Even non labored RR.         Tashia Wright RN  02/09/18 1090

## 2018-02-09 NOTE — ED NOTES
Kanwal received call from [place] from [facility], attempt is being made to get patient into [facility]'s. SW awaiting confirmation.

## 2018-02-09 NOTE — ED TRIAGE NOTES
Other  Facial Hair:  [x] Yes  [] No  If yes, please describe:bead  Identifying Marks (i.e. Visible tattoos, scars, etc... ):     NURSING CARE PLAN    Nursing Diagnosis: Risk of Self Directed Harm  [] Actual  [x] Potential  Date Started: 2/8/18      Etiological Factors: (related to)  [x] Expressed or implied suicidal ideation/behavior  [] Depression  [] Suicide attempt      [] Low self-esteem  [] Hallucinations      [] Feeling of Hopelessness  [] Substance abuse or withdrawal    [] Dysfunctional family  [] Major traumatic event, eg., divorce, etc   [] Excessive stress/anxiety    2/8/18    Expected Outcomes    Patient will:   [x] Patient will remain safe for the duration of their stay   [x] Patient's environment will be safe, eg. Free of potential suicide weapons   [] Verbalize Recovery from suicidal episode and improvement in self-worth   [x] Discuss feeling that precipitated suicide attempt/thoughts/behavior   [] Will describe available resources for crisis prevention and management   [] Will verbalize positive coping skills     Nursing Intervention   [x] Assessment and Observations hourly   [x] Suicide Precautions implemented with patient, should be 1:1 observation   [x] Document observation [id number] and RN assessment hourly   [] Consult physician for:    [] Psychiatric consult    [] Pharmacological therapy    [] Other:    [x] Patient search completed by security   [x] Initiated appropriate safety protocols by removing from the patient's environment anything that could be used to inflict self injury, eg. Order safe tray, snap gown, etc   [x] Maintain open, warm, caring, non-judgmental attitude/manner towards patient   [] Discuss advantages and disadvantages of existing coping methods/skills   [x] Assist and educate patient with identifying present strengths and coping skills   [x] Keep patient informed regarding plan of care and provide clear concise explanations.   Provide the patient/family education information as

## 2018-02-09 NOTE — ED NOTES
YOGESH phone Burl Schaumann in pt psych bed phone @ [phone] spoke with Samuel Mayen   Reviewed case, she requested  fax to [phone]  Notes from YOGESH, RN and MD also labs and HP   Copies of the above faxed to Jazz Mendez to wait on review of notes and [facility]  02/09/18 4445

## 2018-02-09 NOTE — ED NOTES
Received pink slip from Dr. Dawit Hackett for admission to [address]      Yesi Lin RN  02/09/18 1025

## 2018-02-09 NOTE — ED PROVIDER NOTES
and/or weight based adjustment of the mA/kV was utilized to reduce the radiation dose to as low as reasonably achievable. COMPARISON: CT abdomen and pelvis dated August 7, 2014 HISTORY: ORDERING SYSTEM PROVIDED HISTORY: concern for anal/rectal mass vs abscess TECHNOLOGIST PROVIDED HISTORY: Additional Contrast?->Radiologist Recommendation FINDINGS: Lower Chest: Minimal subsegmental atelectatic changes in the dependent portion of the lung bases. Otherwise the visualized lungs demonstrate no acute abnormality. Limited views of the heart and mediastinum demonstrate no acute abnormality. Organs: Ill-defined hypoattenuation segment 4 of the liver likely represents focal fat infiltration. The liver otherwise demonstrates no acute abnormality. No intrahepatic bile duct dilatation. No suspicious focal liver lesions. Contracted gallbladder without definite acute abnormality by CT. Bilateral adrenal glands unremarkable. The spleen demonstrates no acute abnormality. Pancreas demonstrates no acute abnormality. The bilateral renal cysts are slightly larger, measuring up to 3.7 cm on the right and 3.0 cm on the left. Bilateral kidneys otherwise enhance uniformly and symmetrically without hydronephrosis. No definite nephrolithiasis. GI/Bowel: No definite acute gastric abnormality. No acute small bowel abnormality. No evidence of small bowel obstruction. No evidence of acute appendicitis. Nonspecific anal rectal wall thickening. There is left perineal inflammation with a small 1.8 x 1.4 cm fluid collection at the posterior and left aspect of the perianal region, concerning for abscess. Underlying mass cannot be excluded on this examination. Moderate stool burden seen throughout the colon. There is otherwise no other acute colonic abnormality. Pelvis: Prostatomegaly. Nonspecific distended bladder. Peritoneum/Retroperitoneum: No significant free fluid. No significant lymphadenopathy.   Moderate atherosclerotic Resident  [facility]          Vivi Morelos MD  Resident  02/10/18 6837

## 2018-02-09 NOTE — ED PROVIDER NOTES
no rebound and no guarding. Genitourinary:   Genitourinary Comments: Penis and testicles appear to be normal at this time as far as genitalia is concerned however perianal area appears to have a 2 cm x 2 cm mass that appears not to be fluctuant at this time just posterior to the anus. Does appear to encroach subtly on the anus however. Psychiatric:   Positive suicidal ideation without a plan     Assessment/Plan   We'll obtain baseline laboratories given his diabetes as well as baseline toxicologic screens given his suicidal ideation. Also CBC and CT of the abdomen/pelvis to include the perirectal area given physical exam findings concerning for possible early abscess versus mass. Critical Care  None        (Please note that portions of this note were completed with a voice recognition program. Efforts were made to edit the dictations but occasionally words are mis-transcribed.  Whenever words are used in this note in any gender, they shall be construed as though they were used in the gender appropriate to the circumstances; and whenever words are used in this note in the singular or plural form, they shall be construed as though they were used in the form appropriate to the circumstances.)    Trellis Angelucci, MD Augustina Reams  Attending Emergency Medicine Physician             Cornel Franco MD  02/08/18 [address] [facility] [address], MD  02/09/18 0105

## 2018-02-09 NOTE — ED NOTES
Called lab, they are going to add on the Liver Profile to BMP from last night.      Robert Jessica RN  02/09/18 8748

## 2018-02-10 LAB
CULTURE: NO GROWTH
CULTURE: NORMAL
Lab: NORMAL
SPECIMEN DESCRIPTION: NORMAL
STATUS: NORMAL

## 2018-02-10 PROCEDURE — 6370000000 HC RX 637 (ALT 250 FOR IP): Performed by: EMERGENCY MEDICINE

## 2018-02-10 PROCEDURE — 2580000003 HC RX 258: Performed by: EMERGENCY MEDICINE

## 2018-02-10 PROCEDURE — 2500000003 HC RX 250 WO HCPCS: Performed by: EMERGENCY MEDICINE

## 2018-02-10 PROCEDURE — 6370000000 HC RX 637 (ALT 250 FOR IP): Performed by: PSYCHIATRY & NEUROLOGY

## 2018-02-10 PROCEDURE — 96375 TX/PRO/DX INJ NEW DRUG ADDON: CPT

## 2018-02-10 PROCEDURE — G0378 HOSPITAL OBSERVATION PER HR: HCPCS

## 2018-02-10 PROCEDURE — 6360000002 HC RX W HCPCS: Performed by: STUDENT IN AN ORGANIZED HEALTH CARE EDUCATION/TRAINING PROGRAM

## 2018-02-10 PROCEDURE — 96367 TX/PROPH/DG ADDL SEQ IV INF: CPT

## 2018-02-10 RX ORDER — FENTANYL CITRATE 50 UG/ML
50 INJECTION, SOLUTION INTRAMUSCULAR; INTRAVENOUS ONCE
Status: COMPLETED | OUTPATIENT
Start: 2018-02-10 | End: 2018-02-10

## 2018-02-10 RX ORDER — QUETIAPINE FUMARATE 200 MG/1
400 TABLET, FILM COATED ORAL NIGHTLY
Status: DISCONTINUED | OUTPATIENT
Start: 2018-02-10 | End: 2018-02-12 | Stop reason: HOSPADM

## 2018-02-10 RX ORDER — MIDAZOLAM HYDROCHLORIDE 1 MG/ML
2 INJECTION INTRAMUSCULAR; INTRAVENOUS ONCE
Status: COMPLETED | OUTPATIENT
Start: 2018-02-10 | End: 2018-02-10

## 2018-02-10 RX ADMIN — Medication 10 ML: at 09:40

## 2018-02-10 RX ADMIN — FENTANYL CITRATE 50 MCG: 50 INJECTION INTRAMUSCULAR; INTRAVENOUS at 04:30

## 2018-02-10 RX ADMIN — QUETIAPINE FUMARATE 400 MG: 200 TABLET ORAL at 21:27

## 2018-02-10 RX ADMIN — Medication 10 ML: at 21:27

## 2018-02-10 RX ADMIN — MIDAZOLAM HYDROCHLORIDE 2 MG: 1 INJECTION, SOLUTION INTRAMUSCULAR; INTRAVENOUS at 06:08

## 2018-02-10 RX ADMIN — METRONIDAZOLE 500 MG: 500 INJECTION, SOLUTION INTRAVENOUS at 01:06

## 2018-02-10 RX ADMIN — ACETAMINOPHEN 650 MG: 325 TABLET ORAL at 15:09

## 2018-02-10 RX ADMIN — SERTRALINE 150 MG: 50 TABLET, FILM COATED ORAL at 09:34

## 2018-02-10 ASSESSMENT — PAIN DESCRIPTION - ONSET: ONSET: ON-GOING

## 2018-02-10 ASSESSMENT — PAIN DESCRIPTION - PAIN TYPE: TYPE: ACUTE PAIN

## 2018-02-10 ASSESSMENT — PAIN SCALES - GENERAL
PAINLEVEL_OUTOF10: 7
PAINLEVEL_OUTOF10: 0

## 2018-02-10 ASSESSMENT — PAIN DESCRIPTION - PROGRESSION: CLINICAL_PROGRESSION: NOT CHANGED

## 2018-02-10 ASSESSMENT — PAIN DESCRIPTION - DESCRIPTORS: DESCRIPTORS: ACHING;SORE

## 2018-02-10 ASSESSMENT — PAIN DESCRIPTION - LOCATION: LOCATION: RECTUM

## 2018-02-10 ASSESSMENT — PAIN DESCRIPTION - FREQUENCY: FREQUENCY: INTERMITTENT

## 2018-02-10 NOTE — FLOWSHEET NOTE
Patient resting in bed,appears to sleep at frequent intervals without distress. Remains on suicide Precautions. Patient reports he still feels like hurting himself \"I Would take myself out\".

## 2018-02-10 NOTE — CARE COORDINATION
Case Management Initial Discharge Plan  Farzaneh Magallon,         Readmission Risk              Readmission Risk:        15.75       Age 72 or Greater:  0    Admitted from SNF or Requires Paid or Family Care:  2    Currently has CHF,COPD,ARF,CRI,or is on dialysis:  0    Takes more than 5 Prescription Medications:  0    Takes Digoxin,Insulin,Anticoagulants,Narcotics or ASA/Plavix:  [address] in Past 12 Months:  10    On Disability:  0    Patient Considers own Health:  3.75            Met with:patient to discuss discharge plans. Information verified: address, contacts, phone number, , insurance Yes  PCP: Ciera Ware MD  Date of last visit: \"over a year ago\", is under the care of [facility] for Psych medications and treatment    Insurance Provider: Val Carpenter    Discharge Planning  Current Residence:  Private Residence  Living Arrangements:  Alone   Home has 1 stories/0 stairs to climb  Support Systems:     Current Services PTA:    Supplier: NONE  Patient able to perform ADL's:Independent  DME used to aid ambulation prior to admission: none/during admission:TBD    Potential Assistance Needed:  [facility] Care    Pharmacy: [facility] on [address] Medications:     Does patient want to participate in local refill/ meds to beds program?       Patient agreeable to home care: No  [place] of choice provided:  n/a      Type of Home Care Services:     Patient expects to be discharged to:       Prior SNF/Rehab Placement and Facility: No  Agreeable to SNF/Rehab: No  [place] of choice provided: n/a   Evaluation: n/a    Expected Discharge date: Follow Up Appointment: Best Day/ Time:      Transportation provider: [address] [place] [address]  Transportation arrangements needed for discharge: Yes    Discharge Plan: Patient is under suicide precautions with telesitter and bedside guard.  Awaiting medical clearance

## 2018-02-10 NOTE — ED NOTES
Spoke with Dr. Kathy Horton. Notified her that pt's temp has not changed, and that pt is now diaphoretic. Pt continues to only reports pain to rectal area where abscess was I and D'd. Dr. Kathy Horton states that she will go and reassess patient.      Alvarado Herrera RN  02/09/18 [id number]

## 2018-02-10 NOTE — H&P
[facility]  CDU / OBSERVATION eNCOUnter  Resident Note     Pt Name: Jarett Thomas  MRN: [id number]  [place] 1960  Date of evaluation: 2/10/18  Patient's PCP is :  Huam Mccall MD    CHIEF COMPLAINT       Chief Complaint   Patient presents with    Buttocks Pain    Rectal Pain         HISTORY OF PRESENT ILLNESS    Jarett Thomas is a 61 y.o. male who presents perianal abscess and suicidal ideation, the patient was seen by general surgery resident team, initially recommendation for discharge and continued wound care with plans to discharge the patient to follow hospital however while patient was waiting in the ER for transfer was reported that the patient had a low-grade temp. Labs repeated no signs of sepsis was seen surgery was reconsulted and patient was admitted to observation unit for further surgical consult and IV antibiotics. Patient will most likely be discharged his afternoon with possible course of oral antibiotics. Patient will be discharged to inpatient psych unit as patient continues to have suicidal ideation. Patient will also be evaluated by wound care.      Location/Symptom: perianal abscess and suicidal ideation  Timing/Onset: unclear specific timeline of  Provocation: Unclear  Quality: Some sharp pain  Radiation: None  Severity: Moderate  Timing/Duration: Unclear how long this has been going on    Modifying Factors:  Patient's poor hygiene and unwillingness to comply with wound care most likely exacerbated symptoms    REVIEW OF SYSTEMS       General ROS - No fevers, No malaise   Ophthalmic ROS - No discharge, No changes in vision  ENT ROS -  No sore throat, No rhinorrhea,   Respiratory ROS - no shortness of breath, no cough, no  wheezing  Cardiovascular ROS - No chest pain, no dyspnea on exertion  Gastrointestinal ROS - No abdominal pain, no nausea or vomiting, no change in bowel habits, no black or bloody stools  Genito-Urinary ROS - No dysuria, trouble fibrotic appearing changes right lung base. Extensive anterior longitudinal ligament ossification and degenerative changes thoracic spine, suggesting DISH. No acute cardiopulmonary disease. Ct Abdomen Pelvis W Iv Contrast Additional Contrast? Radiologist Recommendation    Result Date: 2/10/2018  EXAMINATION: CT OF THE ABDOMEN AND PELVIS WITH CONTRAST 2/9/2018 2:24 am TECHNIQUE: CT of the abdomen and pelvis was performed with the administration of intravenous contrast. Multiplanar reformatted images are provided for review. Dose modulation, iterative reconstruction, and/or weight based adjustment of the mA/kV was utilized to reduce the radiation dose to as low as reasonably achievable. COMPARISON: CT abdomen and pelvis dated August 7, 2014 HISTORY: ORDERING SYSTEM PROVIDED HISTORY: concern for anal/rectal mass vs abscess TECHNOLOGIST PROVIDED HISTORY: Additional Contrast?->Radiologist Recommendation FINDINGS: Lower Chest: Minimal subsegmental atelectatic changes in the dependent portion of the lung bases. Otherwise the visualized lungs demonstrate no acute abnormality. Limited views of the heart and mediastinum demonstrate no acute abnormality. Organs: Ill-defined hypoattenuation segment 4 of the liver likely represents focal fat infiltration. The liver otherwise demonstrates no acute abnormality. No intrahepatic bile duct dilatation. No suspicious focal liver lesions. Contracted gallbladder without definite acute abnormality by CT. Bilateral adrenal glands unremarkable. The spleen demonstrates no acute abnormality. Pancreas demonstrates no acute abnormality. The bilateral renal cysts are slightly larger, measuring up to 3.7 cm on the right and 3.0 cm on the left. Bilateral kidneys otherwise enhance uniformly and symmetrically without hydronephrosis. No definite nephrolithiasis. GI/Bowel: No definite acute gastric abnormality. No acute small bowel abnormality.   No evidence of small bowel obstruction. No evidence of acute appendicitis. Nonspecific anal rectal wall thickening. There is left perineal inflammation with a small 1.8 x 1.4 cm fluid collection at the posterior and left aspect of the perianal region, concerning for abscess. Underlying mass cannot be excluded on this examination. Moderate stool burden seen throughout the colon. There is otherwise no other acute colonic abnormality. Pelvis: Prostatomegaly. Nonspecific distended bladder. Peritoneum/Retroperitoneum: No significant free fluid. No significant lymphadenopathy. Moderate atherosclerotic disease of the aorta and iliac vessels. No free air. Probable 8 mm lymph node posterior to the left adrenal gland slightly more prominent from the prior study but likely reactive. Small nonspecific naomi hepatis lymph nodes. Bones/Soft Tissues: Moderate degenerative changes of the visualized lower thoracic spine and lumbar spine. 1. Nonspecific anorectal wall thickening with an adjacent small 1.8 x 1.4 cm fluid collection in the perineal region suggestive of abscess. Underlying mass cannot be excluded. 2. Mild hepatic steatosis. 3. Prostatomegaly. Mild nonspecific bladder wall thickening. LABS:  I have reviewed and interpreted all available lab results.   Labs Reviewed   CBC WITH AUTO DIFFERENTIAL - Abnormal; Notable for the following:        Result Value    Hemoglobin 11.5 (*)     Hematocrit 37.5 (*)     Seg Neutrophils 67 (*)     All other components within normal limits   BASIC METABOLIC PANEL - Abnormal; Notable for the following:     Glucose 102 (*)     All other components within normal limits   TOX SCR, BLD, ED - Abnormal; Notable for the following:     Salicylate Lvl 1 (*)     Acetaminophen Level <10 (*)     All other components within normal limits   URINE DRUG SCREEN - Abnormal; Notable for the following:     Benzodiazepine Screen, Urine POSITIVE (*)     Cocaine Metabolite, Urine POSITIVE (*)     All other components

## 2018-02-11 LAB
BUN BLDV-MCNC: 13 MG/DL (ref 6–20)
CREAT SERPL-MCNC: 0.81 MG/DL (ref 0.7–1.2)
GFR AFRICAN AMERICAN: >60 ML/MIN
GFR NON-AFRICAN AMERICAN: >60 ML/MIN
GFR SERPL CREATININE-BSD FRML MDRD: NORMAL ML/MIN/{1.73_M2}
GFR SERPL CREATININE-BSD FRML MDRD: NORMAL ML/MIN/{1.73_M2}

## 2018-02-11 PROCEDURE — 84520 ASSAY OF UREA NITROGEN: CPT

## 2018-02-11 PROCEDURE — 6360000002 HC RX W HCPCS: Performed by: STUDENT IN AN ORGANIZED HEALTH CARE EDUCATION/TRAINING PROGRAM

## 2018-02-11 PROCEDURE — 6370000000 HC RX 637 (ALT 250 FOR IP): Performed by: EMERGENCY MEDICINE

## 2018-02-11 PROCEDURE — 96376 TX/PRO/DX INJ SAME DRUG ADON: CPT

## 2018-02-11 PROCEDURE — 2580000003 HC RX 258: Performed by: EMERGENCY MEDICINE

## 2018-02-11 PROCEDURE — 6370000000 HC RX 637 (ALT 250 FOR IP): Performed by: PSYCHIATRY & NEUROLOGY

## 2018-02-11 PROCEDURE — G0378 HOSPITAL OBSERVATION PER HR: HCPCS

## 2018-02-11 PROCEDURE — 36415 COLL VENOUS BLD VENIPUNCTURE: CPT

## 2018-02-11 PROCEDURE — 82565 ASSAY OF CREATININE: CPT

## 2018-02-11 RX ORDER — FENTANYL CITRATE 50 UG/ML
50 INJECTION, SOLUTION INTRAMUSCULAR; INTRAVENOUS DAILY
Status: DISCONTINUED | OUTPATIENT
Start: 2018-02-12 | End: 2018-02-12 | Stop reason: HOSPADM

## 2018-02-11 RX ORDER — FENTANYL CITRATE 50 UG/ML
50 INJECTION, SOLUTION INTRAMUSCULAR; INTRAVENOUS ONCE
Status: COMPLETED | OUTPATIENT
Start: 2018-02-11 | End: 2018-02-11

## 2018-02-11 RX ORDER — MIDAZOLAM HYDROCHLORIDE 1 MG/ML
2 INJECTION INTRAMUSCULAR; INTRAVENOUS ONCE
Status: DISCONTINUED | OUTPATIENT
Start: 2018-02-11 | End: 2018-02-12 | Stop reason: HOSPADM

## 2018-02-11 RX ADMIN — QUETIAPINE FUMARATE 400 MG: 200 TABLET ORAL at 21:27

## 2018-02-11 RX ADMIN — Medication 10 ML: at 21:27

## 2018-02-11 RX ADMIN — SERTRALINE 150 MG: 50 TABLET, FILM COATED ORAL at 10:12

## 2018-02-11 RX ADMIN — FENTANYL CITRATE 50 MCG: 50 INJECTION INTRAMUSCULAR; INTRAVENOUS at 12:29

## 2018-02-11 RX ADMIN — Medication 10 ML: at 12:31

## 2018-02-11 RX ADMIN — Medication 10 ML: at 10:17

## 2018-02-11 RX ADMIN — TRAZODONE HYDROCHLORIDE 100 MG: 100 TABLET ORAL at 21:27

## 2018-02-11 ASSESSMENT — PAIN DESCRIPTION - DESCRIPTORS: DESCRIPTORS: DISCOMFORT;OTHER (COMMENT)

## 2018-02-11 ASSESSMENT — PAIN SCALES - GENERAL
PAINLEVEL_OUTOF10: 10
PAINLEVEL_OUTOF10: 0

## 2018-02-11 ASSESSMENT — PAIN DESCRIPTION - LOCATION: LOCATION: RECTUM

## 2018-02-11 ASSESSMENT — PAIN DESCRIPTION - PAIN TYPE: TYPE: ACUTE PAIN

## 2018-02-11 ASSESSMENT — PAIN DESCRIPTION - FREQUENCY: FREQUENCY: INTERMITTENT

## 2018-02-11 ASSESSMENT — PAIN DESCRIPTION - ONSET: ONSET: SUDDEN

## 2018-02-11 NOTE — PLAN OF CARE
Problem: Suicide risk  Goal: Provide patient with safe environment  Provide patient with safe environment   Outcome: Met This Shift  Pt calm and cooperative overnight. Following commands, expressing needs appropriately. Guard remains at bedside. Will continue to monitor.

## 2018-02-12 ENCOUNTER — HOSPITAL ENCOUNTER (INPATIENT)
Age: 58
LOS: 4 days | Discharge: HOME | DRG: 750 | End: 2018-02-16
Attending: PSYCHIATRY & NEUROLOGY | Admitting: PSYCHIATRY & NEUROLOGY
Payer: MEDICAID

## 2018-02-12 VITALS
WEIGHT: 162.3 LBS | OXYGEN SATURATION: 99 % | RESPIRATION RATE: 18 BRPM | HEIGHT: 75 IN | DIASTOLIC BLOOD PRESSURE: 58 MMHG | TEMPERATURE: 98.2 F | SYSTOLIC BLOOD PRESSURE: 101 MMHG | BODY MASS INDEX: 20.18 KG/M2 | HEART RATE: 61 BPM

## 2018-02-12 PROBLEM — F25.9 SCHIZOAFFECTIVE DISORDER (HCC): Status: ACTIVE | Noted: 2018-02-12

## 2018-02-12 PROCEDURE — 6360000002 HC RX W HCPCS: Performed by: EMERGENCY MEDICINE

## 2018-02-12 PROCEDURE — 6370000000 HC RX 637 (ALT 250 FOR IP): Performed by: EMERGENCY MEDICINE

## 2018-02-12 PROCEDURE — 1240000000 HC EMOTIONAL WELLNESS R&B

## 2018-02-12 PROCEDURE — 96376 TX/PRO/DX INJ SAME DRUG ADON: CPT

## 2018-02-12 PROCEDURE — G0378 HOSPITAL OBSERVATION PER HR: HCPCS

## 2018-02-12 PROCEDURE — 6370000000 HC RX 637 (ALT 250 FOR IP): Performed by: PSYCHIATRY & NEUROLOGY

## 2018-02-12 RX ORDER — BENZTROPINE MESYLATE 1 MG/ML
2 INJECTION INTRAMUSCULAR; INTRAVENOUS DAILY PRN
Status: DISCONTINUED | OUTPATIENT
Start: 2018-02-12 | End: 2018-02-16 | Stop reason: HOSPADM

## 2018-02-12 RX ORDER — LORAZEPAM 1 MG/1
1 TABLET ORAL 2 TIMES DAILY PRN
Status: DISCONTINUED | OUTPATIENT
Start: 2018-02-12 | End: 2018-02-14

## 2018-02-12 RX ORDER — HALOPERIDOL 5 MG/ML
5 INJECTION INTRAMUSCULAR 3 TIMES DAILY PRN
Status: ACTIVE | OUTPATIENT
Start: 2018-02-12 | End: 2018-02-15

## 2018-02-12 RX ORDER — MAGNESIUM HYDROXIDE/ALUMINUM HYDROXICE/SIMETHICONE 120; 1200; 1200 MG/30ML; MG/30ML; MG/30ML
30 SUSPENSION ORAL 2 TIMES DAILY PRN
Status: DISCONTINUED | OUTPATIENT
Start: 2018-02-12 | End: 2018-02-16 | Stop reason: HOSPADM

## 2018-02-12 RX ORDER — QUETIAPINE FUMARATE 300 MG/1
300 TABLET, FILM COATED ORAL NIGHTLY
Status: DISCONTINUED | OUTPATIENT
Start: 2018-02-12 | End: 2018-02-16 | Stop reason: HOSPADM

## 2018-02-12 RX ORDER — TRAZODONE HYDROCHLORIDE 50 MG/1
50 TABLET ORAL NIGHTLY PRN
Status: DISCONTINUED | OUTPATIENT
Start: 2018-02-12 | End: 2018-02-14

## 2018-02-12 RX ORDER — QUETIAPINE FUMARATE 100 MG/1
100 TABLET, FILM COATED ORAL DAILY
Status: ON HOLD | COMMUNITY
End: 2018-02-15 | Stop reason: HOSPADM

## 2018-02-12 RX ORDER — SERTRALINE HYDROCHLORIDE 100 MG/1
100 TABLET, FILM COATED ORAL DAILY
Status: DISCONTINUED | OUTPATIENT
Start: 2018-02-12 | End: 2018-02-16 | Stop reason: HOSPADM

## 2018-02-12 RX ORDER — ACETAMINOPHEN 325 MG/1
650 TABLET ORAL EVERY 6 HOURS PRN
Status: DISCONTINUED | OUTPATIENT
Start: 2018-02-12 | End: 2018-02-16 | Stop reason: HOSPADM

## 2018-02-12 RX ORDER — QUETIAPINE FUMARATE 200 MG/1
400 TABLET, FILM COATED ORAL NIGHTLY
Status: ON HOLD | COMMUNITY
End: 2018-02-15 | Stop reason: HOSPADM

## 2018-02-12 RX ADMIN — SERTRALINE 150 MG: 50 TABLET, FILM COATED ORAL at 10:52

## 2018-02-12 RX ADMIN — TRAZODONE HYDROCHLORIDE 50 MG: 50 TABLET ORAL at 22:13

## 2018-02-12 RX ADMIN — FENTANYL CITRATE 50 MCG: 50 INJECTION INTRAMUSCULAR; INTRAVENOUS at 10:51

## 2018-02-12 RX ADMIN — QUETIAPINE FUMARATE 300 MG: 300 TABLET ORAL at 22:13

## 2018-02-12 ASSESSMENT — SLEEP AND FATIGUE QUESTIONNAIRES
RESTFUL SLEEP: NO
DIFFICULTY ARISING: NO
DO YOU HAVE DIFFICULTY SLEEPING: YES
AVERAGE NUMBER OF SLEEP HOURS: 5
DO YOU USE A SLEEP AID: YES
DIFFICULTY STAYING ASLEEP: YES
DIFFICULTY FALLING ASLEEP: YES
SLEEP PATTERN: DIFFICULTY FALLING ASLEEP;DISTURBED/INTERRUPTED SLEEP

## 2018-02-12 ASSESSMENT — PAIN SCALES - GENERAL
PAINLEVEL_OUTOF10: 0
PAINLEVEL_OUTOF10: 8

## 2018-02-12 ASSESSMENT — PATIENT HEALTH QUESTIONNAIRE - PHQ9: SUM OF ALL RESPONSES TO PHQ QUESTIONS 1-9: 11

## 2018-02-12 ASSESSMENT — LIFESTYLE VARIABLES: HISTORY_ALCOHOL_USE: NO

## 2018-02-12 NOTE — PROGRESS NOTES
OBS/CDU   RESIDENT NOTE      Patients PCP is:  Esteban Mckeon MD        SUBJECTIVE      No complaints at this time, patient not answering my questions. Awaiting placement. No acute events overnight. Has been able to tolerate a full diet without nausea or vomiting. The patient is urinating on his own and is passing flatus. Denies fever, chills, nausea, vomiting, chest pain, shortness of breath, abdominal pain, focal weakness, numbness, tingling, urinary/bowel symptoms, vision changes, visual hallucinations, or headache. PHYSICAL EXAM      General: NAD, AO X 3  Heent: EMOI, PERRL  Neck: SUPPLE, NO JVD  Cardiovascular: RRR, S1S2  Pulmonary: CTAB, NO SOB  Abdomen: SOFT, NTTP, ND, +BS  Extremities: +2/4 PULSES DISTAL, NO SWELLING  Neuro:  NO NUMBNESS OR TINGLING  Psych:  MENTATION AT BASELINE, NO SUICIDAL IDEATION. PERTINENT TEST /EXAMS      I have reviewed all available laboratory results. MEDICATIONS CURRENT       No current facility-administered medications for this encounter. nicotine polacrilex (NICORETTE) gum 2 mg Q1H PRN   QUEtiapine (SEROQUEL) tablet 300 mg Nightly   sertraline (ZOLOFT) tablet 100 mg Daily   acetaminophen (TYLENOL) tablet 650 mg Q6H PRN   LORazepam (ATIVAN) tablet 1 mg BID PRN   haloperidol lactate (HALDOL) injection 5 mg TID PRN   traZODone (DESYREL) tablet 50 mg Nightly PRN   benztropine mesylate (COGENTIN) injection 2 mg Daily PRN   magnesium hydroxide (MILK OF MAGNESIA) 400 MG/5ML suspension 10 mL Nightly PRN   aluminum & magnesium hydroxide-simethicone (MAALOX) 200-200-20 MG/5ML suspension 30 mL BID PRN       All medication charted and reviewed. CONSULTS      IP CONSULT TO GENERAL SURGERY  IP CONSULT TO GENERAL SURGERY    ASSESSMENT      jK Bansal is a 61 y.o. male who presents with      1. Acute on chronic suicidal ideation with no plan  2.  Acute on chronic perianal abscess no signs of sepsis laboratory studies were unremarkable, no fevers
PROGRESS NOTE          PATIENT NAME: Ac Jama 89 RECORD NO. [id number]  DATE: 2/10/2018  SURGEON: Dr. Eddie Salvador: Piter Godinez MD    HD: # 0    ASSESSMENT    Patient Active Problem List   Diagnosis    Hematuria    Suicidal ideation    Schizoaffective disorder, bipolar type ([address]    Schizoaffective disorder, depressive type ([address]    Perianal abscess    Carla-rectal abscess       MEDICAL DECISION MAKING AND PLAN    1. Daily perianal abscess repacking until f/u with [facility]   2. Wound management consult  3. No further abx needed, not likely septic from abscess  4. Will follow from distance, please contact with further concern or questions   5. Awaiting behavioral health placement when medically cleared. SUBJECTIVE    Kaylin Mimi is s/p I+D of perianal abscess. Admitted to observation overnight for temperatures 38.1 x2. Pt seen in ED due to suicidal ideation. Laboratory workup unremarkable, blood cultures pending. Received ciproflagyl overnight. Afebrile, still having occasional chills. No new complaints this morning. OBJECTIVE  VITALS: Temp: Temp: 98.2 °F (36.8 °C)Temp  Av.6 °F (37.6 °C)  Min: 98.2 °F (36.8 °C)  Max: 100.6 °F (43.7 °C) BP Systolic (87XPB), RNL:300 , Min:112 , EJ   Diastolic (97AOG), BYE:17, Min:65, Max:76   Pulse Pulse  Av  Min: 75  Max: 84 Resp Resp  Av.4  Min: 14  Max: 18 Pulse ox SpO2  Av.8 %  Min: 96 %  Max: 99 %  GENERAL: alert, no distress  NEURO: positive findings: none  HEENT: No positive findings  : wound dressing intact and dry. Not evaluated, recently repacked by Dr. Attila Salgado. Per report, no further purulent material draining from ID site.    LUNGS: clear to ausculation, without wheezes, rales or rhonci  HEART: normal rate and regular rhythm  ABDOMEN: soft, non-tender, non-distended, bowel sounds present in all 4 quadrants and no guarding or peritoneal signs present  EXTERMITY: no
Patient's belongings are locked up in the infusion room, locker #5.
imaging     DISPO: pending consults and clinical improvement       --  Analy Parnell  Emergency Medicine Resident Physician     This dictation was generated by voice recognition computer software. Although all attempts are made to edit the dictation for accuracy, there may be errors in the transcription that are not intended.

## 2018-02-12 NOTE — BH NOTE
`[facility]  Admission Note     Admission Type:   Admission Type: Involuntary    Reason for admission:  Reason for Admission: Pt feeling suicidal, auditory hallucinations, mood swings and racing thoughts. PATIENT STRENGTHS:  Strengths: Connection to output provider, No significant Physical Illness    Patient Strengths and Limitations:  Limitations: Inappropriate/potentially harmful leisure interests, Difficulty problem solving/relies on others to help solve problems    Addictive Behavior:   Addictive Behavior  In the past 3 months, have you felt or has someone told you that you have a problem with:  : None  Do you have a history of Chemical Use?: No  Do you have a history of Alcohol Use?: No  Do you have a history of Street Drug Abuse?: Yes  Histroy of Prescripton Drug Abuse?: No    Medical Problems:   Past Medical History:   Diagnosis Date    Bipolar disorder ([facility]     Depression     GERD (gastroesophageal reflux disease)     Hallucinations     Headache(784.0)     Hepatitis     Schizophrenia, schizo-affective ([facility]     Substance abuse     Tobacco abuse     Type II or unspecified type diabetes mellitus without mention of complication, not stated as uncontrolled     Urinary incontinence        Status EXAM:  Status and Exam  Normal: No  Facial Expression: Flat  Affect: Blunt  Level of Consciousness: Alert  Mood:Normal: No  Mood: Depressed, Anxious, Helpless, Sad  Motor Activity:Normal: No  Motor Activity: Decreased  Interview Behavior: Cooperative  Preception: [place] to Person, [place] to Time, [place] to Place, [place] to Situation  Attention:Normal: No  Attention: Distractible  Thought Processes: Blocking  Thought Content:Normal: No  Thought Content: Preoccupations  Hallucinations:  Auditory (Comment)  Delusions: No  Memory:Normal: No  Memory: Poor Recent  Insight and Judgment: No  Insight and Judgment: Poor Judgment, Poor Insight  Present Suicidal Ideation: Yes  Present Homicidal

## 2018-02-12 NOTE — DISCHARGE SUMMARY
admitted to observation unit for further surgical consult and IV antibiotics. Patient will most likely be discharged his afternoon with possible course of oral antibiotics. Patient will be discharged to inpatient psych unit as patient continues to have suicidal ideation. Patient will also be evaluated by wound care. Patient deemed stable for placement at [facility] psychiatry unit. Labs and imaging were followed daily. At time of discharge, Wang Hutchinson was tolerating a PO intake well, passing flatus, urinating adequately, ambulating and had adequate analgesia on oral pain medications. He is medically stable to be discharged. Clinical course has improved. I feel the patient can be safely discharged to home with outpatient follow up. Instructions have been given for the patient to return to the ED for any worsening of the symptoms, including but not limited to increased pain, shortness of breath, weakness, or any deterioration of their current condition. Disposition: Other facility    Condition: Good      Patient stable and ready for discharge home. I have discussed plan of care with patient and they are in understanding. They were instructed to read discharge paperwork. All of their questions and concerns were addressed. Patient states that they understand the plan and agree with the plan .     Time Spent: 10        Vinie Fabry, MD  Emergency Medicine Resident Physician

## 2018-02-12 NOTE — CARE COORDINATION
BHI Biopsychosocial Assessment    Current Level of Psychosocial Functioning     Independent   Dependent  X  Minimal Assist     Comments:  Writer attempts to meet with twice and PT refuses assessment. Below please note information from ED assessment. Writer will continue to meet with PT regarding discharge planning. Psychosocial High Risk Factors (check all that apply)    Unable to obtain meds   Chronic illness/pain    Substance abuse X  Lack of Family Support   Financial stress   Isolation X  Inadequate Community Resources   Suicide attempt(s) X  Not taking medications   Victim of crime   Developmental Delay  Unable to manage personal needs  X  Age 72 or older   Homeless  No transportation   Readmission within 30 days  Unemployment  Traumatic Event    Psychiatric Advanced Directives:  None reported    Family to Involve in Treatment: No    Sexual Orientation:  PHOEBE    Patient Strengths: SSI; C Medicaid; safe housing; some contact with mother    Patient Barriers: HX of crack cocaine use; lacks insight; poor judgment    Opiate Education Provided:  Not provided    CMHC/mental health history: [facility] on [facility], Dr. Nilo Parsons and  is [facility] Pyel    Plan of Care   medication management, group/individual therapies, psycho -education, treatment team meetings to assist with stabilization    Initial Discharge Plan:  PT able to return to house shared with roommates at [address] [place] [address], [address] [place] [address], [address] [place] [address]; Follow-up appointments to be confirmed at [facility]. PT states not interested with inpatient treatment for long history of crack cocaine abuse    Clinical Summary:  Patient is a 62-year-old male with long history of psychiatric disease including schizophrenia and bipolar disease who is suicidal at this time without a plan but also complaining of the last 4 days of pain around the rectal area a state he did \"slipped off a couch\" and then this began hurting.   Patient denies any nausea/vomiting/abdominal

## 2018-02-13 PROCEDURE — 6370000000 HC RX 637 (ALT 250 FOR IP): Performed by: PSYCHIATRY & NEUROLOGY

## 2018-02-13 PROCEDURE — 99253 IP/OBS CNSLTJ NEW/EST LOW 45: CPT | Performed by: INTERNAL MEDICINE

## 2018-02-13 PROCEDURE — 1240000000 HC EMOTIONAL WELLNESS R&B

## 2018-02-13 RX ADMIN — TRAZODONE HYDROCHLORIDE 50 MG: 50 TABLET ORAL at 21:02

## 2018-02-13 RX ADMIN — SERTRALINE HYDROCHLORIDE 100 MG: 100 TABLET ORAL at 08:45

## 2018-02-13 RX ADMIN — QUETIAPINE FUMARATE 300 MG: 300 TABLET ORAL at 21:02

## 2018-02-13 RX ADMIN — ACETAMINOPHEN 650 MG: 325 TABLET, FILM COATED ORAL at 21:01

## 2018-02-13 ASSESSMENT — PAIN SCALES - GENERAL
PAINLEVEL_OUTOF10: 3
PAINLEVEL_OUTOF10: 0

## 2018-02-13 NOTE — BH NOTE
Pt did not participate in Cognitive Skills Group at 1430 due to pt was resting in room et declined to attend group when encouraged.

## 2018-02-13 NOTE — CONSULTS
[facility]                                                     Consult Note                                                                   Date:   2/13/2018  Patient name:  Arsenio Ly  Date of admission:  2/12/2018  1:07 PM  MRN:   [id number]  YOB: 1960     Chief Complaint:      Patient seen at the request of Malka Trejo MD   For medical management of               MEDICAL COMORBIDITY AND RELATED MEDICATION MANAGEMENT . Reason for Consult:  Lesion pain buttock area . HISTORY OF PRESENT ILLNESS:   The patient is a 61 y.o.  male . Today ;     patient obtunded . Refused exam           .                                                                                                              Admitting history ; The patient is a 61 y.o. male who presents with Schizoaffective disorder ([facility] [F25.9]  Schizoaffective disorder ([address] [F25.9]     PAST MEDICAL HISTORY    has a past medical history of Bipolar disorder ([facility] Depression; GERD (gastroesophageal reflux disease); Hallucinations; Headache(784.0); Hepatitis; Schizophrenia, schizo-affective ([address] Substance abuse; Tobacco abuse; Type II or unspecified type diabetes mellitus without mention of complication, not stated as uncontrolled; and Urinary incontinence. ALLERGIES   Navane [thiothixene]     REVIEW OF SYSTEMS     Review of Systems   Unable to perform ROS: Mental acuity     Positive findings ----  All other negative . PHYSICAL EXAM     /67   Pulse 68   Temp 98.7 °F (37.1 °C) (Oral)   Resp 15   Ht 6' 3\" (1.905 m)   Wt 163 lb (73.9 kg)   BMI 20.37 kg/m²   Body mass index is 20.37 kg/m². Physical Exam   Vitals reviewed. refused exam . Obtunded  Cognition .      DIAGNOSTICS       URINE ANALYSIS: No results found for: LABURIN     CBC:  Lab Results   Component Value Date    WBC 4.6 02/09/2018

## 2018-02-14 PROCEDURE — 1240000000 HC EMOTIONAL WELLNESS R&B

## 2018-02-14 PROCEDURE — 6370000000 HC RX 637 (ALT 250 FOR IP): Performed by: INTERNAL MEDICINE

## 2018-02-14 PROCEDURE — 6370000000 HC RX 637 (ALT 250 FOR IP): Performed by: PSYCHIATRY & NEUROLOGY

## 2018-02-14 RX ORDER — CLINDAMYCIN HYDROCHLORIDE 150 MG/1
300 CAPSULE ORAL EVERY 8 HOURS SCHEDULED
Status: DISCONTINUED | OUTPATIENT
Start: 2018-02-14 | End: 2018-02-16 | Stop reason: HOSPADM

## 2018-02-14 RX ORDER — BUPROPION HYDROCHLORIDE 75 MG/1
75 TABLET ORAL 2 TIMES DAILY
Status: DISCONTINUED | OUTPATIENT
Start: 2018-02-14 | End: 2018-02-16 | Stop reason: HOSPADM

## 2018-02-14 RX ADMIN — CLINDAMYCIN HYDROCHLORIDE 300 MG: 150 CAPSULE ORAL at 14:34

## 2018-02-14 RX ADMIN — SERTRALINE HYDROCHLORIDE 100 MG: 100 TABLET ORAL at 08:43

## 2018-02-14 RX ADMIN — QUETIAPINE FUMARATE 300 MG: 300 TABLET ORAL at 20:46

## 2018-02-14 RX ADMIN — CLINDAMYCIN HYDROCHLORIDE 300 MG: 150 CAPSULE ORAL at 20:46

## 2018-02-14 RX ADMIN — BUPROPION HYDROCHLORIDE 75 MG: 75 TABLET, FILM COATED ORAL at 20:46

## 2018-02-14 ASSESSMENT — PAIN DESCRIPTION - LOCATION: LOCATION: BUTTOCKS

## 2018-02-14 ASSESSMENT — PAIN SCALES - GENERAL: PAINLEVEL_OUTOF10: 8

## 2018-02-14 NOTE — PROGRESS NOTES
Psychiatry Progress  Note     CHIEF  COMPLAINT     [facility] ([facility]        SUBJECT SWETHA AND OBJECT SWETHA:    Wang Hutchinson is presenting with persistant depressed moods, auditory hallucinations and limited participation in groups   Moderate to severe mood swings. with auditory hallucinations and thoughts are circumstantial. The symptoms still   present are marked in severity    . ASSESSMENT AND PLAN    Symptoms of illness and medications  discussed with pt and support provided. Progress of treatment discussed with staff     Encouraged to participate in activies and groups. His   Schizoaffective disorder ([facility]  is gradually improving.        clindamycin  300 mg Oral 3 times per day    QUEtiapine  300 mg Oral Nightly    sertraline  100 mg Oral Daily       nicotine polacrilex, acetaminophen, haloperidol lactate, benztropine mesylate, magnesium hydroxide, aluminum & magnesium hydroxide-simethicone       Jacqueline Williamson MD  Psychiatry

## 2018-02-15 LAB
CULTURE: NORMAL
Lab: NORMAL
Lab: NORMAL
SPECIMEN DESCRIPTION: NORMAL
SPECIMEN DESCRIPTION: NORMAL
STATUS: NORMAL
STATUS: NORMAL

## 2018-02-15 PROCEDURE — 6370000000 HC RX 637 (ALT 250 FOR IP): Performed by: INTERNAL MEDICINE

## 2018-02-15 PROCEDURE — 1240000000 HC EMOTIONAL WELLNESS R&B

## 2018-02-15 PROCEDURE — 6370000000 HC RX 637 (ALT 250 FOR IP): Performed by: PSYCHIATRY & NEUROLOGY

## 2018-02-15 RX ORDER — TRAZODONE HYDROCHLORIDE 100 MG/1
100 TABLET ORAL NIGHTLY PRN
Qty: 15 TABLET | Refills: 0 | Status: ON HOLD | OUTPATIENT
Start: 2018-02-15 | End: 2018-05-21 | Stop reason: HOSPADM

## 2018-02-15 RX ORDER — SERTRALINE HYDROCHLORIDE 100 MG/1
100 TABLET, FILM COATED ORAL DAILY
Qty: 30 TABLET | Refills: 0 | Status: ON HOLD | OUTPATIENT
Start: 2018-02-16 | End: 2018-05-21 | Stop reason: HOSPADM

## 2018-02-15 RX ORDER — QUETIAPINE FUMARATE 300 MG/1
300 TABLET, FILM COATED ORAL NIGHTLY
Qty: 14 TABLET | Refills: 0 | Status: ON HOLD | OUTPATIENT
Start: 2018-02-15 | End: 2018-05-21 | Stop reason: HOSPADM

## 2018-02-15 RX ORDER — CLINDAMYCIN HYDROCHLORIDE 300 MG/1
300 CAPSULE ORAL EVERY 8 HOURS SCHEDULED
Qty: 15 CAPSULE | Refills: 0 | Status: SHIPPED | OUTPATIENT
Start: 2018-02-15 | End: 2018-02-20

## 2018-02-15 RX ORDER — BUPROPION HYDROCHLORIDE 75 MG/1
75 TABLET ORAL 2 TIMES DAILY
Qty: 28 TABLET | Refills: 3 | Status: ON HOLD | OUTPATIENT
Start: 2018-02-15 | End: 2018-05-21 | Stop reason: HOSPADM

## 2018-02-15 RX ADMIN — CLINDAMYCIN HYDROCHLORIDE 300 MG: 150 CAPSULE ORAL at 06:50

## 2018-02-15 RX ADMIN — BUPROPION HYDROCHLORIDE 75 MG: 75 TABLET, FILM COATED ORAL at 08:44

## 2018-02-15 RX ADMIN — SERTRALINE HYDROCHLORIDE 100 MG: 100 TABLET ORAL at 08:44

## 2018-02-16 VITALS
BODY MASS INDEX: 20.27 KG/M2 | SYSTOLIC BLOOD PRESSURE: 91 MMHG | DIASTOLIC BLOOD PRESSURE: 52 MMHG | HEART RATE: 67 BPM | HEIGHT: 75 IN | RESPIRATION RATE: 14 BRPM | WEIGHT: 163 LBS | TEMPERATURE: 97.9 F

## 2018-02-16 PROCEDURE — 6370000000 HC RX 637 (ALT 250 FOR IP): Performed by: PSYCHIATRY & NEUROLOGY

## 2018-02-16 PROCEDURE — 5130000000 HC BRIDGE APPOINTMENT

## 2018-02-16 PROCEDURE — 99231 SBSQ HOSP IP/OBS SF/LOW 25: CPT | Performed by: INTERNAL MEDICINE

## 2018-02-16 RX ADMIN — BUPROPION HYDROCHLORIDE 75 MG: 75 TABLET, FILM COATED ORAL at 08:41

## 2018-02-16 RX ADMIN — SERTRALINE HYDROCHLORIDE 100 MG: 100 TABLET ORAL at 08:41

## 2018-02-16 NOTE — BH NOTE
Patient given tobacco quitline number [phone] at this time. With nurse observation patient called number for information and follow up.  Continue to reinforce the dangers of long term tobacco use and why tobacco cessation is important to patient.

## 2018-02-16 NOTE — PLAN OF CARE
Problem: Depressive Behavior With or Without Suicide Precautions:  Goal: Ability to disclose and discuss suicidal ideas will improve  Ability to disclose and discuss suicidal ideas will improve   Outcome: Ongoing  Pt denies SI
Problem: Depressive Behavior With or Without Suicide Precautions:  Goal: Able to verbalize and/or display a decrease in depressive symptoms  Able to verbalize and/or display a decrease in depressive symptoms   Outcome: Ongoing  Patient is alert and oriented, states he still feels depressed at times, states he hears voices but tries to tell them to stop, isolative to room for long periods of time, encouraged to seek out staff with any questions or concerns. Problem: Pain:  Goal: Pain level will decrease  Pain level will decrease   Outcome: Ongoing  Patient denies pain.
Problem: Depressive Behavior With or Without Suicide Precautions:  Goal: Able to verbalize and/or display a decrease in depressive symptoms  Able to verbalize and/or display a decrease in depressive symptoms   Outcome: Ongoing  Pt reports an improvement in mood. Pt is out and more active in the day area and is compliant with medications. Pt has flat affect and is cooperative on unit and attends select unit programming. Safety maintained. Goal: Ability to disclose and discuss suicidal ideas will improve  Ability to disclose and discuss suicidal ideas will improve   Outcome: Ongoing  Pt denies thoughts of self harm and is agreeable to seeking out assistance from staff should thoughts of self harm arise. Safe environment maintained. Q15 minute checks for safety continued per unit policy. Will continue to monitor for safety and provide support and reassurance as needed.
Decreased  Interview Behavior: Cooperative, Uncooperative/Withdrawn  Preception: [place] to Time, [place] to Place, [place] to Person, [place] to Situation  Attention:Normal: No  Attention: Distractible  Thought Processes: Blocking  Thought Content:Normal: No  Thought Content: Preoccupations  Hallucinations: None  Delusions: No  Memory:Normal:  (PHOEBE)  Memory:  (PHOEBE)  Insight and Judgment: No  Insight and Judgment: Poor Judgment, Poor Insight, Unmotivated, Unrealistic  Present Suicidal Ideation: No  Present Homicidal Ideation: No    EDUCATION:   Learner Progress Toward Treatment Goals: reviewed group plans and strategies for care    Method:group therapy, medication compliance, individualized assessments and care planning    Outcome: needs reinforcement    PATIENT GOALS: to be discussed with patient within 72 hours    PLAN/TREATMENT RECOMMENDATIONS:     continue group therapy , medications compliance, goal setting, individualized assessments and care, continue to monitor pt on unit      SHORT-TERM GOALS:   Time frame for Short-Term Goals: 5-7 days    LONG-TERM GOALS:  Time frame for Long-Term Goals: 6 months  Members Present in Team Meeting: See Signature Sheet    MARJ Lin
for Short-Term Goals: 5-7 days    LONG-TERM GOALS UPDATE:   Time frame for Long-Term Goals: 6 months  Members Present in Team Meeting: See Signature Sheet    [facility], [id number] MSELIO ,Jose F Matamoros RN  Goal: Able to verbalize and/or display a decrease in depressive symptoms  Able to verbalize and/or display a decrease in depressive symptoms   Outcome: Ongoing    Goal: Ability to disclose and discuss suicidal ideas will improve  Ability to disclose and discuss suicidal ideas will improve   Outcome: Ongoing      Problem: Pain:  Goal: Pain level will decrease  Pain level will decrease   Outcome: Ongoing    Goal: Control of acute pain  Control of acute pain   Outcome: Ongoing    Goal: Control of chronic pain  Control of chronic pain   Outcome: Ongoing

## 2018-02-16 NOTE — BH NOTE
[facility]  Discharge Note    Pt discharged with followings belongings:   Dentures: None  Vision - Corrective Lenses: None  Hearing Aid: None  Jewelry: Necklace (1 necklace)  Body Piercings Removed: N/A  Clothing: Footwear, Jacket / coat, Pants, Shirt, Undergarments (Comment)  Were All Patient Medications Collected?: Not Applicable  Other Valuables: Money (Comment), Herlene Amas (0.30)   Valuables sent home with patient. Valuables retrieved from safe, and returned to patient. Patient left department with Departure Mode: By self via Mobility at Departure: Ambulatory, discharged to Discharged to: Other (Comment). Patient education on aftercare instructions: yes  Information faxed to [facility] by Inder Tom RN Patient verbalize understanding of AVS:  yes. Status EXAM upon discharge:  Status and Exam  Normal: No  Facial Expression: Flat  Affect: Blunt  Level of Consciousness: Alert  Mood:Normal: No  Mood: Empty  Motor Activity:Normal: No  Motor Activity: Decreased  Interview Behavior: Cooperative  Preception: [place] to Person, [place] to Time, [place] to Place, [place] to Situation  Attention:Normal: Yes  Attention: Distractible  Thought Processes: Circumstantial  Thought Content:Normal: No  Thought Content: Poverty of Content  Hallucinations:  Auditory (Comment)  Delusions: No  Memory:Normal: Yes  Memory:  (WDL)  Insight and Judgment: No  Insight and Judgment: Poor Judgment, Poor Insight, Unmotivated  Present Suicidal Ideation: No  Present Homicidal Ideation: No    Amandeep Enriquez RN

## 2018-02-16 NOTE — BH NOTE
Pt did not participate in Skills group at 1000am due to pt was resting in room et declined to attend group when encouraged.

## 2018-05-17 ENCOUNTER — HOSPITAL ENCOUNTER (INPATIENT)
Age: 58
LOS: 4 days | Discharge: HOME | DRG: 750 | End: 2018-05-22
Attending: EMERGENCY MEDICINE | Admitting: PSYCHIATRY & NEUROLOGY
Payer: MEDICAID

## 2018-05-17 DIAGNOSIS — R45.851 SUICIDAL IDEATION: ICD-10-CM

## 2018-05-17 DIAGNOSIS — F20.9 SCHIZOPHRENIA, UNSPECIFIED TYPE (HCC): Primary | ICD-10-CM

## 2018-05-17 LAB
ABSOLUTE EOS #: 0.24 K/UL (ref 0–0.4)
ABSOLUTE IMMATURE GRANULOCYTE: ABNORMAL K/UL (ref 0–0.3)
ABSOLUTE LYMPH #: 2.3 K/UL (ref 1–4.8)
ABSOLUTE MONO #: 0.14 K/UL (ref 0.1–1.3)
ALBUMIN SERPL-MCNC: 3.7 G/DL (ref 3.5–5.2)
ALBUMIN/GLOBULIN RATIO: ABNORMAL (ref 1–2.5)
ALP BLD-CCNC: 130 U/L (ref 40–129)
ALT SERPL-CCNC: 11 U/L (ref 5–41)
AMPHETAMINE SCREEN URINE: NEGATIVE
ANION GAP SERPL CALCULATED.3IONS-SCNC: 8 MMOL/L (ref 9–17)
AST SERPL-CCNC: 19 U/L
BARBITURATE SCREEN URINE: NEGATIVE
BASOPHILS # BLD: 0 % (ref 0–2)
BASOPHILS ABSOLUTE: 0 K/UL (ref 0–0.2)
BENZODIAZEPINE SCREEN, URINE: NEGATIVE
BILIRUB SERPL-MCNC: 0.25 MG/DL (ref 0.3–1.2)
BILIRUBIN URINE: NEGATIVE
BUN BLDV-MCNC: 11 MG/DL (ref 6–20)
BUN/CREAT BLD: ABNORMAL (ref 9–20)
BUPRENORPHINE URINE: ABNORMAL
CALCIUM SERPL-MCNC: 8.8 MG/DL (ref 8.6–10.4)
CANNABINOID SCREEN URINE: NEGATIVE
CHLORIDE BLD-SCNC: 109 MMOL/L (ref 98–107)
CO2: 26 MMOL/L (ref 20–31)
COCAINE METABOLITE, URINE: POSITIVE
COLOR: YELLOW
COMMENT UA: NORMAL
CREAT SERPL-MCNC: 0.89 MG/DL (ref 0.7–1.2)
DIFFERENTIAL TYPE: ABNORMAL
EOSINOPHILS RELATIVE PERCENT: 5 % (ref 0–4)
ETHANOL PERCENT: <0.01 %
ETHANOL: <10 MG/DL
GFR AFRICAN AMERICAN: >60 ML/MIN
GFR NON-AFRICAN AMERICAN: >60 ML/MIN
GFR SERPL CREATININE-BSD FRML MDRD: ABNORMAL ML/MIN/{1.73_M2}
GFR SERPL CREATININE-BSD FRML MDRD: ABNORMAL ML/MIN/{1.73_M2}
GLUCOSE BLD-MCNC: 112 MG/DL (ref 70–99)
GLUCOSE URINE: NEGATIVE
HCT VFR BLD CALC: 34.4 % (ref 41–53)
HEMOGLOBIN: 11.2 G/DL (ref 13.5–17.5)
IMMATURE GRANULOCYTES: ABNORMAL %
KETONES, URINE: NEGATIVE
LEUKOCYTE ESTERASE, URINE: NEGATIVE
LYMPHOCYTES # BLD: 49 % (ref 24–44)
MCH RBC QN AUTO: 28.6 PG (ref 26–34)
MCHC RBC AUTO-ENTMCNC: 32.6 G/DL (ref 31–37)
MCV RBC AUTO: 87.9 FL (ref 80–100)
MDMA URINE: ABNORMAL
METHADONE SCREEN, URINE: NEGATIVE
METHAMPHETAMINE, URINE: ABNORMAL
MONOCYTES # BLD: 3 % (ref 1–7)
MORPHOLOGY: NORMAL
NITRITE, URINE: NEGATIVE
NRBC AUTOMATED: ABNORMAL PER 100 WBC
OPIATES, URINE: NEGATIVE
OXYCODONE SCREEN URINE: NEGATIVE
PDW BLD-RTO: 15 % (ref 11.5–14.9)
PH UA: 5.5 (ref 5–8)
PHENCYCLIDINE, URINE: NEGATIVE
PLATELET # BLD: 245 K/UL (ref 150–450)
PLATELET ESTIMATE: ABNORMAL
PMV BLD AUTO: 7.9 FL (ref 6–12)
POTASSIUM SERPL-SCNC: 4.2 MMOL/L (ref 3.7–5.3)
PROPOXYPHENE, URINE: ABNORMAL
PROTEIN UA: NEGATIVE
RBC # BLD: 3.92 M/UL (ref 4.5–5.9)
RBC # BLD: ABNORMAL 10*6/UL
SEG NEUTROPHILS: 43 % (ref 36–66)
SEGMENTED NEUTROPHILS ABSOLUTE COUNT: 2.02 K/UL (ref 1.3–9.1)
SODIUM BLD-SCNC: 143 MMOL/L (ref 135–144)
SPECIFIC GRAVITY UA: 1.02 (ref 1–1.03)
TEST INFORMATION: ABNORMAL
TOTAL PROTEIN: 6.1 G/DL (ref 6.4–8.3)
TRICYCLIC ANTIDEPRESSANTS, UR: ABNORMAL
TURBIDITY: CLEAR
URINE HGB: NEGATIVE
UROBILINOGEN, URINE: NORMAL
WBC # BLD: 4.7 K/UL (ref 3.5–11)
WBC # BLD: ABNORMAL 10*3/UL

## 2018-05-17 PROCEDURE — 36415 COLL VENOUS BLD VENIPUNCTURE: CPT

## 2018-05-17 PROCEDURE — 85025 COMPLETE CBC W/AUTO DIFF WBC: CPT

## 2018-05-17 PROCEDURE — 99285 EMERGENCY DEPT VISIT HI MDM: CPT

## 2018-05-17 PROCEDURE — 80053 COMPREHEN METABOLIC PANEL: CPT

## 2018-05-17 PROCEDURE — 81003 URINALYSIS AUTO W/O SCOPE: CPT

## 2018-05-17 PROCEDURE — 80307 DRUG TEST PRSMV CHEM ANLYZR: CPT

## 2018-05-17 PROCEDURE — 6370000000 HC RX 637 (ALT 250 FOR IP): Performed by: EMERGENCY MEDICINE

## 2018-05-17 PROCEDURE — G0480 DRUG TEST DEF 1-7 CLASSES: HCPCS

## 2018-05-17 RX ORDER — QUETIAPINE FUMARATE 300 MG/1
300 TABLET, FILM COATED ORAL ONCE
Status: COMPLETED | OUTPATIENT
Start: 2018-05-17 | End: 2018-05-17

## 2018-05-17 RX ORDER — TRAZODONE HYDROCHLORIDE 50 MG/1
100 TABLET ORAL ONCE
Status: COMPLETED | OUTPATIENT
Start: 2018-05-17 | End: 2018-05-17

## 2018-05-17 RX ADMIN — QUETIAPINE FUMARATE 300 MG: 300 TABLET ORAL at 21:50

## 2018-05-17 RX ADMIN — TRAZODONE HYDROCHLORIDE 100 MG: 50 TABLET ORAL at 21:50

## 2018-05-17 ASSESSMENT — ENCOUNTER SYMPTOMS
VOMITING: 0
EYE DISCHARGE: 0
DIARRHEA: 0
EYE PAIN: 0
BACK PAIN: 0
COUGH: 0
ABDOMINAL PAIN: 0
EYE REDNESS: 0
SHORTNESS OF BREATH: 0
RHINORRHEA: 0

## 2018-05-18 PROCEDURE — 1240000000 HC EMOTIONAL WELLNESS R&B

## 2018-05-18 PROCEDURE — 6370000000 HC RX 637 (ALT 250 FOR IP): Performed by: PSYCHIATRY & NEUROLOGY

## 2018-05-18 PROCEDURE — 6370000000 HC RX 637 (ALT 250 FOR IP): Performed by: NURSE PRACTITIONER

## 2018-05-18 PROCEDURE — 6360000002 HC RX W HCPCS: Performed by: PSYCHIATRY & NEUROLOGY

## 2018-05-18 RX ORDER — MAGNESIUM HYDROXIDE/ALUMINUM HYDROXICE/SIMETHICONE 120; 1200; 1200 MG/30ML; MG/30ML; MG/30ML
30 SUSPENSION ORAL EVERY 6 HOURS PRN
Status: DISCONTINUED | OUTPATIENT
Start: 2018-05-18 | End: 2018-05-22 | Stop reason: HOSPADM

## 2018-05-18 RX ORDER — NICOTINE 21 MG/24HR
1 PATCH, TRANSDERMAL 24 HOURS TRANSDERMAL DAILY
Status: DISCONTINUED | OUTPATIENT
Start: 2018-05-18 | End: 2018-05-18

## 2018-05-18 RX ORDER — BENZTROPINE MESYLATE 1 MG/ML
2 INJECTION INTRAMUSCULAR; INTRAVENOUS 2 TIMES DAILY PRN
Status: DISCONTINUED | OUTPATIENT
Start: 2018-05-18 | End: 2018-05-22 | Stop reason: HOSPADM

## 2018-05-18 RX ORDER — TRAZODONE HYDROCHLORIDE 50 MG/1
50 TABLET ORAL NIGHTLY PRN
Status: DISCONTINUED | OUTPATIENT
Start: 2018-05-18 | End: 2018-05-18

## 2018-05-18 RX ORDER — TRAZODONE HYDROCHLORIDE 100 MG/1
100 TABLET ORAL NIGHTLY PRN
Status: DISCONTINUED | OUTPATIENT
Start: 2018-05-18 | End: 2018-05-22 | Stop reason: HOSPADM

## 2018-05-18 RX ORDER — ACETAMINOPHEN 325 MG/1
650 TABLET ORAL EVERY 4 HOURS PRN
Status: DISCONTINUED | OUTPATIENT
Start: 2018-05-18 | End: 2018-05-22 | Stop reason: HOSPADM

## 2018-05-18 RX ORDER — SERTRALINE HYDROCHLORIDE 100 MG/1
100 TABLET, FILM COATED ORAL DAILY
Status: DISCONTINUED | OUTPATIENT
Start: 2018-05-18 | End: 2018-05-22 | Stop reason: HOSPADM

## 2018-05-18 RX ORDER — QUETIAPINE FUMARATE 300 MG/1
300 TABLET, FILM COATED ORAL NIGHTLY
Status: DISCONTINUED | OUTPATIENT
Start: 2018-05-18 | End: 2018-05-22 | Stop reason: HOSPADM

## 2018-05-18 RX ORDER — HYDROXYZINE HYDROCHLORIDE 25 MG/1
50 TABLET, FILM COATED ORAL 3 TIMES DAILY PRN
Status: DISCONTINUED | OUTPATIENT
Start: 2018-05-18 | End: 2018-05-22 | Stop reason: HOSPADM

## 2018-05-18 RX ADMIN — QUETIAPINE FUMARATE 300 MG: 300 TABLET ORAL at 20:38

## 2018-05-18 RX ADMIN — PALIPERIDONE PALMITATE 234 MG: 234 INJECTION INTRAMUSCULAR at 18:18

## 2018-05-18 RX ADMIN — TRAZODONE HYDROCHLORIDE 100 MG: 100 TABLET ORAL at 20:38

## 2018-05-18 RX ADMIN — SERTRALINE HYDROCHLORIDE 100 MG: 100 TABLET ORAL at 15:44

## 2018-05-18 RX ADMIN — HYDROXYZINE HYDROCHLORIDE 50 MG: 25 TABLET, FILM COATED ORAL at 15:44

## 2018-05-18 ASSESSMENT — SLEEP AND FATIGUE QUESTIONNAIRES
DIFFICULTY ARISING: YES
RESTFUL SLEEP: NO
SLEEP PATTERN: RESTLESSNESS;DISTURBED/INTERRUPTED SLEEP;INSOMNIA
DIFFICULTY STAYING ASLEEP: YES
DO YOU USE A SLEEP AID: YES
DO YOU HAVE DIFFICULTY SLEEPING: YES
DIFFICULTY FALLING ASLEEP: YES
AVERAGE NUMBER OF SLEEP HOURS: 4

## 2018-05-18 ASSESSMENT — PAIN SCALES - GENERAL: PAINLEVEL_OUTOF10: 0

## 2018-05-18 ASSESSMENT — LIFESTYLE VARIABLES: HISTORY_ALCOHOL_USE: NO

## 2018-05-18 ASSESSMENT — PATIENT HEALTH QUESTIONNAIRE - PHQ9: SUM OF ALL RESPONSES TO PHQ QUESTIONS 1-9: 27

## 2018-05-19 PROCEDURE — 6370000000 HC RX 637 (ALT 250 FOR IP): Performed by: NURSE PRACTITIONER

## 2018-05-19 PROCEDURE — 90792 PSYCH DIAG EVAL W/MED SRVCS: CPT | Performed by: NURSE PRACTITIONER

## 2018-05-19 PROCEDURE — 1240000000 HC EMOTIONAL WELLNESS R&B

## 2018-05-19 PROCEDURE — 6370000000 HC RX 637 (ALT 250 FOR IP): Performed by: PSYCHIATRY & NEUROLOGY

## 2018-05-19 RX ADMIN — QUETIAPINE FUMARATE 300 MG: 300 TABLET ORAL at 20:50

## 2018-05-19 RX ADMIN — TRAZODONE HYDROCHLORIDE 100 MG: 100 TABLET ORAL at 20:50

## 2018-05-19 RX ADMIN — SERTRALINE HYDROCHLORIDE 100 MG: 100 TABLET ORAL at 12:36

## 2018-05-19 ASSESSMENT — SLEEP AND FATIGUE QUESTIONNAIRES
DIFFICULTY STAYING ASLEEP: YES
DIFFICULTY FALLING ASLEEP: YES
DIFFICULTY ARISING: NO
DO YOU HAVE DIFFICULTY SLEEPING: YES
SLEEP PATTERN: DIFFICULTY FALLING ASLEEP;RESTLESSNESS;DISTURBED/INTERRUPTED SLEEP
AVERAGE NUMBER OF SLEEP HOURS: 8
DO YOU USE A SLEEP AID: YES
RESTFUL SLEEP: NO

## 2018-05-19 ASSESSMENT — LIFESTYLE VARIABLES: HISTORY_ALCOHOL_USE: YES

## 2018-05-20 PROCEDURE — 6370000000 HC RX 637 (ALT 250 FOR IP): Performed by: NURSE PRACTITIONER

## 2018-05-20 PROCEDURE — 1240000000 HC EMOTIONAL WELLNESS R&B

## 2018-05-20 PROCEDURE — 6370000000 HC RX 637 (ALT 250 FOR IP): Performed by: PSYCHIATRY & NEUROLOGY

## 2018-05-20 RX ADMIN — SERTRALINE HYDROCHLORIDE 100 MG: 100 TABLET ORAL at 14:46

## 2018-05-20 RX ADMIN — QUETIAPINE FUMARATE 300 MG: 300 TABLET ORAL at 21:17

## 2018-05-20 RX ADMIN — TRAZODONE HYDROCHLORIDE 100 MG: 100 TABLET ORAL at 21:17

## 2018-05-21 PROCEDURE — 6370000000 HC RX 637 (ALT 250 FOR IP): Performed by: NURSE PRACTITIONER

## 2018-05-21 PROCEDURE — 1240000000 HC EMOTIONAL WELLNESS R&B

## 2018-05-21 PROCEDURE — 6370000000 HC RX 637 (ALT 250 FOR IP): Performed by: PSYCHIATRY & NEUROLOGY

## 2018-05-21 PROCEDURE — 99239 HOSP IP/OBS DSCHRG MGMT >30: CPT | Performed by: REGISTERED NURSE

## 2018-05-21 RX ORDER — SERTRALINE HYDROCHLORIDE 100 MG/1
100 TABLET, FILM COATED ORAL DAILY
Qty: 14 TABLET | Refills: 0 | Status: SHIPPED | OUTPATIENT
Start: 2018-05-22 | End: 2020-01-14

## 2018-05-21 RX ORDER — TRAZODONE HYDROCHLORIDE 100 MG/1
100 TABLET ORAL NIGHTLY PRN
Qty: 14 TABLET | Refills: 0 | Status: SHIPPED | OUTPATIENT
Start: 2018-05-21 | End: 2020-01-14

## 2018-05-21 RX ORDER — QUETIAPINE FUMARATE 300 MG/1
300 TABLET, FILM COATED ORAL NIGHTLY
Qty: 14 TABLET | Refills: 0 | Status: ON HOLD | OUTPATIENT
Start: 2018-05-21 | End: 2020-02-12 | Stop reason: HOSPADM

## 2018-05-21 RX ADMIN — TRAZODONE HYDROCHLORIDE 100 MG: 100 TABLET ORAL at 21:46

## 2018-05-21 RX ADMIN — SERTRALINE HYDROCHLORIDE 100 MG: 100 TABLET ORAL at 09:57

## 2018-05-21 RX ADMIN — QUETIAPINE FUMARATE 300 MG: 300 TABLET ORAL at 21:46

## 2018-05-22 VITALS
TEMPERATURE: 98.1 F | HEART RATE: 93 BPM | OXYGEN SATURATION: 99 % | HEIGHT: 75 IN | WEIGHT: 160.4 LBS | RESPIRATION RATE: 14 BRPM | SYSTOLIC BLOOD PRESSURE: 113 MMHG | BODY MASS INDEX: 19.94 KG/M2 | DIASTOLIC BLOOD PRESSURE: 61 MMHG

## 2018-05-22 PROCEDURE — 5130000000 HC BRIDGE APPOINTMENT

## 2018-07-16 ENCOUNTER — HOSPITAL ENCOUNTER (EMERGENCY)
Age: 58
Discharge: PSYCHIATRIC OR SUBSTANCE USE FACILITY | End: 2018-07-17
Attending: EMERGENCY MEDICINE
Payer: MEDICAID

## 2018-07-16 DIAGNOSIS — R45.851 SUICIDAL IDEATION: Primary | ICD-10-CM

## 2018-07-16 LAB
-: NORMAL
ABSOLUTE EOS #: 0.06 K/UL (ref 0–0.44)
ABSOLUTE IMMATURE GRANULOCYTE: <0.03 K/UL (ref 0–0.3)
ABSOLUTE LYMPH #: 1.73 K/UL (ref 1.1–3.7)
ABSOLUTE MONO #: 0.27 K/UL (ref 0.1–1.2)
ACETAMINOPHEN LEVEL: <5 UG/ML (ref 10–30)
ALBUMIN SERPL-MCNC: 3.9 G/DL (ref 3.5–5.2)
ALBUMIN/GLOBULIN RATIO: 1.3 (ref 1–2.5)
ALP BLD-CCNC: 102 U/L (ref 40–129)
ALT SERPL-CCNC: 10 U/L (ref 5–41)
AMORPHOUS: NORMAL
AMPHETAMINE SCREEN URINE: NEGATIVE
ANION GAP SERPL CALCULATED.3IONS-SCNC: 11 MMOL/L (ref 9–17)
AST SERPL-CCNC: 21 U/L
BACTERIA: NORMAL
BARBITURATE SCREEN URINE: NEGATIVE
BASOPHILS # BLD: 1 % (ref 0–2)
BASOPHILS ABSOLUTE: 0.03 K/UL (ref 0–0.2)
BENZODIAZEPINE SCREEN, URINE: NEGATIVE
BILIRUB SERPL-MCNC: 0.45 MG/DL (ref 0.3–1.2)
BILIRUBIN URINE: NEGATIVE
BUN BLDV-MCNC: 11 MG/DL (ref 6–20)
BUN/CREAT BLD: ABNORMAL (ref 9–20)
BUPRENORPHINE URINE: ABNORMAL
CALCIUM SERPL-MCNC: 8.8 MG/DL (ref 8.6–10.4)
CANNABINOID SCREEN URINE: NEGATIVE
CASTS UA: NORMAL /LPF (ref 0–8)
CHLORIDE BLD-SCNC: 107 MMOL/L (ref 98–107)
CO2: 25 MMOL/L (ref 20–31)
COCAINE METABOLITE, URINE: POSITIVE
COLOR: YELLOW
CREAT SERPL-MCNC: 1.02 MG/DL (ref 0.7–1.2)
CRYSTALS, UA: NORMAL /HPF
DIFFERENTIAL TYPE: ABNORMAL
EOSINOPHILS RELATIVE PERCENT: 2 % (ref 1–4)
EPITHELIAL CELLS UA: NORMAL /HPF (ref 0–5)
ETHANOL PERCENT: <0.01 %
ETHANOL: <10 MG/DL
GFR AFRICAN AMERICAN: >60 ML/MIN
GFR NON-AFRICAN AMERICAN: >60 ML/MIN
GFR SERPL CREATININE-BSD FRML MDRD: ABNORMAL ML/MIN/{1.73_M2}
GFR SERPL CREATININE-BSD FRML MDRD: ABNORMAL ML/MIN/{1.73_M2}
GLUCOSE BLD-MCNC: 116 MG/DL (ref 70–99)
GLUCOSE URINE: NEGATIVE
HCT VFR BLD CALC: 38.3 % (ref 40.7–50.3)
HEMOGLOBIN: 12.1 G/DL (ref 13–17)
IMMATURE GRANULOCYTES: 0 %
KETONES, URINE: NEGATIVE
LEUKOCYTE ESTERASE, URINE: NEGATIVE
LYMPHOCYTES # BLD: 52 % (ref 24–43)
MCH RBC QN AUTO: 28.1 PG (ref 25.2–33.5)
MCHC RBC AUTO-ENTMCNC: 31.6 G/DL (ref 28.4–34.8)
MCV RBC AUTO: 88.9 FL (ref 82.6–102.9)
MDMA URINE: ABNORMAL
METHADONE SCREEN, URINE: NEGATIVE
METHAMPHETAMINE, URINE: ABNORMAL
MONOCYTES # BLD: 8 % (ref 3–12)
MUCUS: NORMAL
NITRITE, URINE: NEGATIVE
NRBC AUTOMATED: 0 PER 100 WBC
OPIATES, URINE: NEGATIVE
OTHER OBSERVATIONS UA: NORMAL
OXYCODONE SCREEN URINE: NEGATIVE
PDW BLD-RTO: 13.5 % (ref 11.8–14.4)
PH UA: 5.5 (ref 5–8)
PHENCYCLIDINE, URINE: NEGATIVE
PLATELET # BLD: 244 K/UL (ref 138–453)
PLATELET ESTIMATE: ABNORMAL
PMV BLD AUTO: 9.5 FL (ref 8.1–13.5)
POTASSIUM SERPL-SCNC: 3.8 MMOL/L (ref 3.7–5.3)
PROPOXYPHENE, URINE: ABNORMAL
PROTEIN UA: NEGATIVE
RBC # BLD: 4.31 M/UL (ref 4.21–5.77)
RBC # BLD: ABNORMAL 10*6/UL
RBC UA: NORMAL /HPF (ref 0–4)
RENAL EPITHELIAL, UA: NORMAL /HPF
SALICYLATE LEVEL: <1 MG/DL (ref 3–10)
SEG NEUTROPHILS: 37 % (ref 36–65)
SEGMENTED NEUTROPHILS ABSOLUTE COUNT: 1.24 K/UL (ref 1.5–8.1)
SODIUM BLD-SCNC: 143 MMOL/L (ref 135–144)
SPECIFIC GRAVITY UA: 1.02 (ref 1–1.03)
TEST INFORMATION: ABNORMAL
TOTAL PROTEIN: 7 G/DL (ref 6.4–8.3)
TOXIC TRICYCLIC SC,BLOOD: NEGATIVE
TRICHOMONAS: NORMAL
TRICYCLIC ANTIDEPRESSANTS, UR: ABNORMAL
TURBIDITY: CLEAR
URINE HGB: NEGATIVE
UROBILINOGEN, URINE: NORMAL
WBC # BLD: 3.3 K/UL (ref 3.5–11.3)
WBC # BLD: ABNORMAL 10*3/UL
WBC UA: NORMAL /HPF (ref 0–5)
YEAST: NORMAL

## 2018-07-16 PROCEDURE — 80307 DRUG TEST PRSMV CHEM ANLYZR: CPT

## 2018-07-16 PROCEDURE — 85025 COMPLETE CBC W/AUTO DIFF WBC: CPT

## 2018-07-16 PROCEDURE — 99285 EMERGENCY DEPT VISIT HI MDM: CPT

## 2018-07-16 PROCEDURE — G0480 DRUG TEST DEF 1-7 CLASSES: HCPCS

## 2018-07-16 PROCEDURE — 81001 URINALYSIS AUTO W/SCOPE: CPT

## 2018-07-16 PROCEDURE — 80053 COMPREHEN METABOLIC PANEL: CPT

## 2018-07-16 ASSESSMENT — ENCOUNTER SYMPTOMS
ABDOMINAL PAIN: 0
BACK PAIN: 1
VOMITING: 0
SHORTNESS OF BREATH: 0
SORE THROAT: 0

## 2018-07-16 NOTE — ED NOTES
and belongings search:     Persons present during search:   Results of search and disposition:       Searchers Name: Security      These items or items similar should be removed from the room:   [x] Chairs   [x] Telephone   [x] Trash cans and liners   [x] Plastic utensils (order Patient Safety tray)   [x] Empty or remove Sharps containers   [x] All personal clothing/belongings removed   [x] All unnecessary lead wires, electrical cords, draw cords, etc.   [x] Flowers and plants   [x] Double check for lighters, matches, razors, any glass items etc that can be used as weapons. Person completing Checklist: [facility] INFORMATION     Y  N     [x] [] Has the patient been informed that they are on a watch and what that means? [x] [] Can the patient get out of Bed without nursing assistance? [x] [] Can the patient use the restroom without nursing assistance? [] [x] Can the patient walk the halls to [place] their legs? \"   [] [x] Does the patient have metal utensils? [x] [] Have the patient's belongings been placed out of control of the patient? [x] [] Have the patient and his/her belongings been checked for contraband? [x] [] Is the patient under any visitor restrictions? If Yes, explain:Suicidal    [] [x] Is the patient under an alias? [facility] Name:   Authorized visitors (no more than two are to be on the list)   Name/Relationship:   Name/Relationship:    Name of Staff member that you  Received this information from?: Mela Mena RN     General Description:    Gerda Thompson BH31/[id number] male 60 y.o. Admission weight: 173 lb (78.5 kg) Height: 6' 3\" (190.5 cm)  Race: []  [x] Black  []   []   [] [place] [] Other  Facial Hair:  [] Yes  [] No  If yes, please describe: Identifying Marks (i.e. Visible tattoos, scars, etc... ):     NURSING CARE PLAN    Nursing Diagnosis: Risk of Self Directed Harm  [] Actual  [] Potential  Date Started: 7/16/18      Etiological Factors: (related to)  [x] Expressed or implied suicidal ideation/behavior  [x] Depression  [] Suicide attempt      [x] Low self-esteem  [x] Hallucinations      [x] Feeling of Hopelessness  [] Substance abuse or withdrawal    [] Dysfunctional family  [] Major traumatic event, eg., divorce, etc   [] Excessive stress/anxiety    7/16/18    Expected Outcomes    Patient will:   [x] Patient will remain safe for the duration of their stay   [x] Patient's environment will be safe, eg. Free of potential suicide weapons   [] Verbalize Recovery from suicidal episode and improvement in self-worth   [x] Discuss feeling that precipitated suicide attempt/thoughts/behavior   [] Will describe available resources for crisis prevention and management   [] Will verbalize positive coping skills     Nursing Intervention   [x] Assessment and Observations hourly   [x] Suicide Precautions implemented with patient, should be 1:1 observation   [x] Document observation [id number] and RN assessment hourly   [] Consult physician for:    [] Psychiatric consult    [] Pharmacological therapy    [] Other:    [x] Patient search completed by security   [x] Initiated appropriate safety protocols by removing from the patient's environment anything that could be used to inflict self injury, eg. Order safe tray, snap gown, etc   [x] Maintain open, warm, caring, non-judgmental attitude/manner towards patient   [] Discuss advantages and disadvantages of existing coping methods/skills   [x] Assist and educate patient with identifying present strengths and coping skills   [x] Keep patient informed regarding plan of care and provide clear concise explanations. Provide the patient/family education information as well as telephone numbers and other information about crisis centers, hot lines, and counselors.     Discharge Planning:   [] Referral  [] Groups [] Health agencies  [] Other:          Jorge Rodriguez RN  07/16/18 4268

## 2018-07-16 NOTE — ED NOTES
Pt resting in bed with eyes closed with even non labored breaths. Pt shows no signs of distress. Will continue to monitor pt.         Sam Aguirre RN  07/16/18 [id number]

## 2018-07-16 NOTE — ED NOTES
Pt sleeping in stretcher, pt arouse and writer informed patient that we need a urine for placement.       Bob Banda RN  07/16/18 2326

## 2018-07-16 NOTE — ED PROVIDER NOTES
[address]     Emergency Department     Faculty Attestation    I performed a history and physical examination of the patient and discussed management with the resident. I have reviewed and agree with the residents findings including all diagnostic interpretations, and treatment plans as written. Any areas of disagreement are noted on the chart. I was personally present for the key portions of any procedures. I have documented in the chart those procedures where I was not present during the key portions. I have reviewed the emergency nurses triage note. I agree with the chief complaint, past medical history, past surgical history, allergies, medications, social and family history as documented unless otherwise noted below. Documentation of the HPI, Physical Exam and Medical Decision Making performed by scribnav is based on my personal performance of the HPI, PE and MDM. For Physician Assistant/ Nurse Practitioner cases/documentation I have personally evaluated this patient and have completed at least one if not all key elements of the E/M (history, physical exam, and MDM). Additional findings are as noted. 59 yo M pt hearing voices telling him to \"shoot self\" no injury no vomit no fever no pain, pe: gcs 15, steady even gait, eomi, neck supple no pronator drift, pt cooperative, no pressured speech,     --to behavioral      Pre-hypertension/Hypertension: The patient has been informed that they may have pre-hypertension or Hypertension based on a blood pressure reading in the emergency department. I recommend that the patient call the primary care provider listed on their discharge instructions or a physician of their choice this week to arrange follow up for further evaluation of possible pre-hypertension or Hypertension.       EKG Interpretation    Interpreted by me      CRITICAL CARE: There was a high probability of clinically significant/life threatening

## 2018-07-16 NOTE — ED PROVIDER NOTES
[facility] ED  Emergency Department  Emergency Medicine Resident Sign-out     Care of Jade Bernard was assumed from Dr. Frida Moreno and is being seen for Suicidal (pt.does not have a plan, says he has been hearing voices )  . The patient's initial evaluation and plan have been discussed with the prior provider who initially evaluated the patient. EMERGENCY DEPARTMENT COURSE / MEDICAL DECISION MAKING:       MEDICATIONS GIVEN:  No orders of the defined types were placed in this encounter. LABS / RADIOLOGY:     Labs Reviewed   CBC WITH AUTO DIFFERENTIAL - Abnormal; Notable for the following:        Result Value    WBC 3.3 (*)     Hemoglobin 12.1 (*)     Hematocrit 38.3 (*)     Lymphocytes 52 (*)     Segs Absolute 1.24 (*)     All other components within normal limits   COMPREHENSIVE METABOLIC PANEL - Abnormal; Notable for the following:     Glucose 116 (*)     All other components within normal limits   URINE DRUG SCREEN - Abnormal; Notable for the following:     Cocaine Metabolite, Urine POSITIVE (*)     All other components within normal limits   TOX SCR, BLD, ED - Abnormal; Notable for the following:     Salicylate Lvl <1 (*)     Acetaminophen Level <5 (*)     All other components within normal limits   URINALYSIS WITH MICROSCOPIC       No results found. RECENT VITALS:     Temp: 95.4 °F (35.2 °C),  Pulse: 100, Resp: 16, BP: 122/74, SpO2: 98 %    This patient is a 60 y.o. Male with SI hearing voices to shoot himself. Has never acted on it. Medically cleared for transfer. Awaiting social work reocmmendations. Patient accepted to [facility]. Awaiting transfer    OUTSTANDING TASKS / RECOMMENDATIONS:    1. Transfer [facility]     FINAL IMPRESSION:     1. Suicidal ideation        DISPOSITION:         DISPOSITION:  []  Discharge   [x]  Transfer - [facility]   []  Admission -     []  Against Medical Advice   []  Eloped   FOLLOW-UP: No follow-up provider specified.    DISCHARGE MEDICATIONS: New Prescriptions

## 2018-07-16 NOTE — ED PROVIDER NOTES
status: Current Every Day Smoker     Packs/day: 1.00     Types: Cigarettes    Smokeless tobacco: Never Used    Alcohol use Yes      Comment: occassional drinker    Drug use: Yes     Types: Cocaine    Sexual activity: Not on file     Other Topics Concern    Not on file     Social History Narrative    No narrative on file       Family History   Problem Relation Age of Onset    Diabetes Mother     Heart Disease Mother        Allergies:  Navane [thiothixene]    Home Medications:  Prior to Admission medications    Medication Sig Start Date End Date Taking? Authorizing Provider   sertraline (ZOLOFT) 100 MG tablet Take 1 tablet by mouth daily 5/22/18   MARY Pope - ELOY   traZODone (DESYREL) 100 MG tablet Take 1 tablet by mouth nightly as needed for Sleep 5/21/18   MRAY Pope - CNS   QUEtiapine (SEROQUEL) 300 MG tablet Take 1 tablet by mouth nightly 5/21/18   Claudia GARCIA Lanjenny Prima APRN - CNS   paliperidone palmitate (INVEGA SUSTENNA) 234 MG/1.5ML SUSP IM injection Inject 234 mg into the muscle every 30 days     Historical Provider, MD       REVIEW OF SYSTEMS    (2-9 systems for level 4, 10 or more for level 5)      Review of Systems   Constitutional: Negative for chills and fever. HENT: Negative for sore throat. Eyes: Negative for visual disturbance. Respiratory: Negative for shortness of breath. Cardiovascular: Negative for chest pain. Gastrointestinal: Negative for abdominal pain and vomiting. Genitourinary: Negative for dysuria. Musculoskeletal: Positive for back pain. Skin: Negative for rash. Neurological: Negative for headaches. Psychiatric/Behavioral: Negative for confusion. PHYSICAL EXAM   (up to 7 for level 4, 8 or more for level 5)      INITIAL VITALS:   /74   Pulse 100   Temp 95.4 °F (35.2 °C) (Oral)   Resp 16   Ht 6' 3\" (1.905 m)   Wt 173 lb (78.5 kg)   SpO2 98%   BMI 21.62 kg/m²     Physical Exam   Constitutional: He is oriented to person, place, and time.  He MCH 28.1 25.2 - 33.5 pg    MCHC 31.6 28.4 - 34.8 g/dL    RDW 13.5 11.8 - 14.4 %    Platelets 764 821 - 216 k/uL    MPV 9.5 8.1 - 13.5 fL    NRBC Automated 0.0 0.0 per 100 WBC    Differential Type NOT REPORTED     Seg Neutrophils 37 36 - 65 %    Lymphocytes 52 (H) 24 - 43 %    Monocytes 8 3 - 12 %    Eosinophils % 2 1 - 4 %    Basophils 1 0 - 2 %    Immature Granulocytes 0 0 %    Segs Absolute 1.24 (L) 1.50 - 8.10 k/uL    Absolute Lymph # 1.73 1.10 - 3.70 k/uL    Absolute Mono # 0.27 0.10 - 1.20 k/uL    Absolute Eos # 0.06 0.00 - 0.44 k/uL    Basophils # 0.03 0.00 - 0.20 k/uL    Absolute Immature Granulocyte <0.03 0.00 - 0.30 k/uL    WBC Morphology NOT REPORTED     RBC Morphology NOT REPORTED     Platelet Estimate NOT REPORTED    Comprehensive Metabolic Panel   Result Value Ref Range    Glucose 116 (H) 70 - 99 mg/dL    BUN 11 6 - 20 mg/dL    CREATININE 1.02 0.70 - 1.20 mg/dL    Bun/Cre Ratio NOT REPORTED 9 - 20    Calcium 8.8 8.6 - 10.4 mg/dL    Sodium 143 135 - 144 mmol/L    Potassium 3.8 3.7 - 5.3 mmol/L    Chloride 107 98 - 107 mmol/L    CO2 25 20 - 31 mmol/L    Anion Gap 11 9 - 17 mmol/L    Alkaline Phosphatase 102 40 - 129 U/L    ALT 10 5 - 41 U/L    AST 21 <40 U/L    Total Bilirubin 0.45 0.3 - 1.2 mg/dL    Total Protein 7.0 6.4 - 8.3 g/dL    Alb 3.9 3.5 - 5.2 g/dL    Albumin/Globulin Ratio 1.3 1.0 - 2.5    GFR Non-African American >60 >60 mL/min    GFR African American >60 >60 mL/min    GFR Comment          GFR Staging NOT REPORTED    Urine Drug Screen   Result Value Ref Range    Amphetamine Screen, Ur NEGATIVE NEG    Barbiturate Screen, Ur NEGATIVE NEG    Benzodiazepine Screen, Urine NEGATIVE NEG    Cocaine Metabolite, Urine POSITIVE (A) NEG    Methadone Screen, Urine NEGATIVE NEG    Opiates, Urine NEGATIVE NEG    Phencyclidine, Urine NEGATIVE NEG    Propoxyphene, Urine NOT REPORTED NEG    Cannabinoid Scrn, Ur NEGATIVE NEG    Oxycodone Screen, Ur NEGATIVE NEG    Methamphetamine, Urine NOT REPORTED NEG

## 2018-07-17 VITALS
WEIGHT: 173 LBS | SYSTOLIC BLOOD PRESSURE: 122 MMHG | OXYGEN SATURATION: 98 % | DIASTOLIC BLOOD PRESSURE: 74 MMHG | BODY MASS INDEX: 21.51 KG/M2 | HEIGHT: 75 IN | RESPIRATION RATE: 16 BRPM | HEART RATE: 100 BPM | TEMPERATURE: 95.4 F

## 2018-07-17 NOTE — ED NOTES
SW placed a call to assess center to discuss, pending transportation of [place].   [place] has been accepted into the   [facility] for psych'  [address] [place] [address]     Access to discuss with [facility] due to length on time in ED after he has been accepted into in pt psych,   Luis Moe reported they need insurance to authorize transport      CHANEL Ansari  07/17/18 2898

## 2018-07-17 NOTE — ED NOTES
YOGESH placed a call to access, was transferred to [address] [place] [address] Joce Nascimento is waiting on authorization from insurance to transport to in pt psych      Manav Michel, CHANEL  07/16/18 6907

## 2018-07-17 NOTE — ED NOTES
Pt resting in bed with eyes closed, resp even and non labored. Pt waiting on admit bed at [facility].       Maggy Aparicio RN  07/17/18 8372

## 2018-07-17 NOTE — ED NOTES
Report called to RN at [facility] for Psych in [place], [address] [facility], [address] [facility]  07/17/18 1012

## 2018-07-17 NOTE — ED NOTES
Pt sitting up in chair watching TV. Pt denies any needs at this time. Pt waiting on transfer to [facility] unit.       Ken Kussmaul, RN  07/16/18 1737

## 2018-07-17 NOTE — ED PROVIDER NOTES
[facility]  Faculty Handoff       Handoff taken on the following patient    Pt Name: Leeanne Goldman  PCP:  Leopold Sandy, MD      [address]       Chief Complaint   Patient presents with    Suicidal     pt.does not have a plan, says he has been hearing voices          CURRENT MEDICATIONS     Previous Medications  Previous Medications    PALIPERIDONE PALMITATE (INVEGA SUSTENNA) 234 MG/1.5ML SUSP IM INJECTION    Inject 234 mg into the muscle every 30 days     QUETIAPINE (SEROQUEL) 300 MG TABLET    Take 1 tablet by mouth nightly    SERTRALINE (ZOLOFT) 100 MG TABLET    Take 1 tablet by mouth daily    TRAZODONE (DESYREL) 100 MG TABLET    Take 1 tablet by mouth nightly as needed for Sleep       Encounter Medications  No orders of the defined types were placed in this encounter. ALLERGIES     is allergic to navane [thiothixene].       RECENT VITALS:   Temp: 95.4 °F (35.2 °C),  Pulse: 100, Resp: 16, BP: 122/74      LABS:  Labs Reviewed   CBC WITH AUTO DIFFERENTIAL - Abnormal; Notable for the following:        Result Value    WBC 3.3 (*)     Hemoglobin 12.1 (*)     Hematocrit 38.3 (*)     Lymphocytes 52 (*)     Segs Absolute 1.24 (*)     All other components within normal limits   COMPREHENSIVE METABOLIC PANEL - Abnormal; Notable for the following:     Glucose 116 (*)     All other components within normal limits   URINE DRUG SCREEN - Abnormal; Notable for the following:     Cocaine Metabolite, Urine POSITIVE (*)     All other components within normal limits   TOX SCR, BLD, ED - Abnormal; Notable for the following:     Salicylate Lvl <1 (*)     Acetaminophen Level <5 (*)     All other components within normal limits   URINALYSIS WITH MICROSCOPIC           PLAN/ TASKS OUTSTANDING     Handoff patient  Verbal report taken  No outstanding tasks    Awaiiting disposition        (Please note that portions of this note were completed with a voice recognition program.

## 2018-07-17 NOTE — ED PROVIDER NOTES
[facility]  Emergency Department  Emergency Medicine Resident Sign-out     Care of Dharmesh Grace was assumed from Dr. João Fernandez and is being seen for Suicidal (pt.does not have a plan, says he has been hearing voices )  . The patient's initial evaluation and plan have been discussed with the prior provider who initially evaluated the patient. EMERGENCY DEPARTMENT COURSE / MEDICAL DECISION MAKING:       MEDICATIONS GIVEN:  No orders of the defined types were placed in this encounter. LABS / RADIOLOGY:     Labs Reviewed   CBC WITH AUTO DIFFERENTIAL - Abnormal; Notable for the following:        Result Value    WBC 3.3 (*)     Hemoglobin 12.1 (*)     Hematocrit 38.3 (*)     Lymphocytes 52 (*)     Segs Absolute 1.24 (*)     All other components within normal limits   COMPREHENSIVE METABOLIC PANEL - Abnormal; Notable for the following:     Glucose 116 (*)     All other components within normal limits   URINE DRUG SCREEN - Abnormal; Notable for the following:     Cocaine Metabolite, Urine POSITIVE (*)     All other components within normal limits   TOX SCR, BLD, ED - Abnormal; Notable for the following:     Salicylate Lvl <1 (*)     Acetaminophen Level <5 (*)     All other components within normal limits   URINALYSIS WITH MICROSCOPIC       No results found. RECENT VITALS:     Temp: 95.4 °F (35.2 °C),  Pulse: 100, Resp: 16, BP: 122/74, SpO2: 98 %    This patient is a 60 y.o. Male with Suicidal ideations and auditory hallucinations. He is awaiting transfer to [facility]. OUTSTANDING TASKS / RECOMMENDATIONS:    1. Awaiting transfer to [facility]     FINAL IMPRESSION:     1. Suicidal ideation        DISPOSITION:         DISPOSITION:  []  Discharge   [x]  Transfer - To [facility]    []  Admission -     []  Against Medical Advice   []  Eloped   FOLLOW-UP: No follow-up provider specified.    DISCHARGE MEDICATIONS: Discharge Medication List as of 7/17/2018 10:28 AM              Feliberto Espinoza MD  Emergency

## 2018-07-17 NOTE — PROGRESS NOTES
SW received a call back that patient is set up with Gr[facility] Gomez for transfer to [place] at 10/10:30am.  Packet ready and forms completed. RN to call in report. Patient updated. Gian Cerna.  Demetrio Alvarez

## 2018-11-21 ENCOUNTER — HOSPITAL ENCOUNTER (EMERGENCY)
Age: 58
Discharge: PSYCHIATRIC OR SUBSTANCE USE FACILITY | End: 2018-11-22
Attending: EMERGENCY MEDICINE
Payer: MEDICAID

## 2018-11-21 VITALS
DIASTOLIC BLOOD PRESSURE: 55 MMHG | TEMPERATURE: 96.8 F | BODY MASS INDEX: 23.43 KG/M2 | SYSTOLIC BLOOD PRESSURE: 102 MMHG | WEIGHT: 173 LBS | HEIGHT: 72 IN | HEART RATE: 62 BPM | RESPIRATION RATE: 14 BRPM | OXYGEN SATURATION: 99 %

## 2018-11-21 DIAGNOSIS — R45.851 SUICIDAL IDEATION: Primary | ICD-10-CM

## 2018-11-21 LAB
ABSOLUTE EOS #: 0.18 K/UL (ref 0–0.4)
ABSOLUTE IMMATURE GRANULOCYTE: 0 K/UL (ref 0–0.3)
ABSOLUTE LYMPH #: 2.19 K/UL (ref 1–4.8)
ABSOLUTE MONO #: 0.11 K/UL (ref 0.1–0.8)
ACETAMINOPHEN LEVEL: <5 UG/ML (ref 10–30)
AMPHETAMINE SCREEN URINE: NEGATIVE
ANION GAP SERPL CALCULATED.3IONS-SCNC: 6 MMOL/L (ref 9–17)
BARBITURATE SCREEN URINE: NEGATIVE
BASOPHILS # BLD: 1 % (ref 0–2)
BASOPHILS ABSOLUTE: 0.04 K/UL (ref 0–0.2)
BENZODIAZEPINE SCREEN, URINE: NEGATIVE
BILIRUBIN URINE: NEGATIVE
BUN BLDV-MCNC: 12 MG/DL (ref 6–20)
BUN/CREAT BLD: ABNORMAL (ref 9–20)
BUPRENORPHINE URINE: ABNORMAL
CALCIUM SERPL-MCNC: 8.2 MG/DL (ref 8.6–10.4)
CANNABINOID SCREEN URINE: NEGATIVE
CHLORIDE BLD-SCNC: 112 MMOL/L (ref 98–107)
CO2: 28 MMOL/L (ref 20–31)
COCAINE METABOLITE, URINE: POSITIVE
COLOR: YELLOW
COMMENT UA: ABNORMAL
CREAT SERPL-MCNC: 0.98 MG/DL (ref 0.7–1.2)
DIFFERENTIAL TYPE: ABNORMAL
EOSINOPHILS RELATIVE PERCENT: 5 % (ref 1–4)
ETHANOL PERCENT: 0.02 %
ETHANOL: 24 MG/DL
GFR AFRICAN AMERICAN: >60 ML/MIN
GFR NON-AFRICAN AMERICAN: >60 ML/MIN
GFR SERPL CREATININE-BSD FRML MDRD: ABNORMAL ML/MIN/{1.73_M2}
GFR SERPL CREATININE-BSD FRML MDRD: ABNORMAL ML/MIN/{1.73_M2}
GLUCOSE BLD-MCNC: 98 MG/DL (ref 70–99)
GLUCOSE URINE: NEGATIVE
HCT VFR BLD CALC: 35.6 % (ref 40.7–50.3)
HEMOGLOBIN: 11.1 G/DL (ref 13–17)
IMMATURE GRANULOCYTES: 0 %
KETONES, URINE: ABNORMAL
LEUKOCYTE ESTERASE, URINE: NEGATIVE
LYMPHOCYTES # BLD: 61 % (ref 24–44)
MCH RBC QN AUTO: 28.6 PG (ref 25.2–33.5)
MCHC RBC AUTO-ENTMCNC: 31.2 G/DL (ref 28.4–34.8)
MCV RBC AUTO: 91.8 FL (ref 82.6–102.9)
MDMA URINE: ABNORMAL
METHADONE SCREEN, URINE: NEGATIVE
METHAMPHETAMINE, URINE: ABNORMAL
MONOCYTES # BLD: 3 % (ref 1–7)
MORPHOLOGY: NORMAL
NITRITE, URINE: NEGATIVE
NRBC AUTOMATED: 0 PER 100 WBC
OPIATES, URINE: NEGATIVE
OXYCODONE SCREEN URINE: NEGATIVE
PDW BLD-RTO: 12.7 % (ref 11.8–14.4)
PH UA: 6 (ref 5–8)
PHENCYCLIDINE, URINE: NEGATIVE
PLATELET # BLD: 217 K/UL (ref 138–453)
PLATELET ESTIMATE: ABNORMAL
PMV BLD AUTO: 9.4 FL (ref 8.1–13.5)
POTASSIUM SERPL-SCNC: 4.6 MMOL/L (ref 3.7–5.3)
PROPOXYPHENE, URINE: ABNORMAL
PROTEIN UA: NEGATIVE
RBC # BLD: 3.88 M/UL (ref 4.21–5.77)
RBC # BLD: ABNORMAL 10*6/UL
SALICYLATE LEVEL: <1 MG/DL (ref 3–10)
SEG NEUTROPHILS: 30 % (ref 36–66)
SEGMENTED NEUTROPHILS ABSOLUTE COUNT: 1.08 K/UL (ref 1.8–7.7)
SODIUM BLD-SCNC: 146 MMOL/L (ref 135–144)
SPECIFIC GRAVITY UA: 1.03 (ref 1–1.03)
TEST INFORMATION: ABNORMAL
TOXIC TRICYCLIC SC,BLOOD: NEGATIVE
TRICYCLIC ANTIDEPRESSANTS, UR: ABNORMAL
TURBIDITY: CLEAR
URINE HGB: NEGATIVE
UROBILINOGEN, URINE: NORMAL
WBC # BLD: 3.6 K/UL (ref 3.5–11.3)
WBC # BLD: ABNORMAL 10*3/UL

## 2018-11-21 PROCEDURE — 81003 URINALYSIS AUTO W/O SCOPE: CPT

## 2018-11-21 PROCEDURE — 80307 DRUG TEST PRSMV CHEM ANLYZR: CPT

## 2018-11-21 PROCEDURE — 99285 EMERGENCY DEPT VISIT HI MDM: CPT

## 2018-11-21 PROCEDURE — 85025 COMPLETE CBC W/AUTO DIFF WBC: CPT

## 2018-11-21 PROCEDURE — G0480 DRUG TEST DEF 1-7 CLASSES: HCPCS

## 2018-11-21 PROCEDURE — 80048 BASIC METABOLIC PNL TOTAL CA: CPT

## 2018-11-21 ASSESSMENT — ENCOUNTER SYMPTOMS
CHEST TIGHTNESS: 0
ABDOMINAL PAIN: 0
SHORTNESS OF BREATH: 0

## 2018-11-21 NOTE — ED PROVIDER NOTES
[address] [facility]  ED  Emergency Department Encounter  EmergencyMedicine Resident     Pt Tremaine Grover  MRN: [id number]  [place] 1960  Date of evaluation: 11/21/18  PCP:  Giovany Guerrero MD    CHIEF COMPLAINT       Chief Complaint   Patient presents with    Suicidal     x2 days        HISTORY OF PRESENT ILLNESS  (Location/Symptom, Timing/Onset, Context/Setting, Quality, Duration, Modifying Factors, Severity.)      Gloria Goodrich is a 60 y.o. male who presents with Suicidal ideation with plan to stab himself. Patient states he does have a knife available at home. States she's been off his medications including Seroquel and trazodone. He also note admits to using crack cocaine yesterday. Denies any complaints of a green chest pain or shortness of breath. Denies recent fevers, chills, nausea or vomiting. Patient does have a history of schizophrenia, schizoaffective disorder. As a recent alcohol or other drug use besides crack cocaine. PAST MEDICAL / SURGICAL / SOCIAL / FAMILY HISTORY      has a past medical history of Bipolar disorder ([address] Depression; GERD (gastroesophageal reflux disease); Hallucinations; Headache(784.0); Hepatitis; Schizophrenia, schizo-affective ([address] Substance abuse ([facility] [address] Tobacco abuse; Type II or unspecified type diabetes mellitus without mention of complication, not stated as uncontrolled; and Urinary incontinence. has a past surgical history that includes Dental surgery and Abscess Drainage (N/A, 02/11/2018).     Social History     Social History    Marital status: Single     Spouse name: N/A    Number of children: 0    Years of education: 10     Occupational History     N/A     Social History Main Topics    Smoking status: Current Every Day Smoker     Packs/day: 1.00     Types: Cigarettes    Smokeless tobacco: Never Used    Alcohol use Yes      Comment: occassional drinker    Drug use: Yes     Types: Cocaine    Sexual activity: Not on file Other Topics Concern    Not on file     Social History Narrative    No narrative on file       Family History   Problem Relation Age of Onset    Diabetes Mother     Heart Disease Mother        Allergies:  Navane [thiothixene]    Home Medications:  Prior to Admission medications    Medication Sig Start Date End Date Taking? Authorizing Provider   traZODone (DESYREL) 100 MG tablet Take 1 tablet by mouth nightly as needed for Sleep 5/21/18  Yes MARY Mosley   QUEtiapine (SEROQUEL) 300 MG tablet Take 1 tablet by mouth nightly 5/21/18  Yes MARY Mosley   paliperidone palmitate (INVEGA SUSTENNA) 234 MG/1.5ML SUSP IM injection Inject 234 mg into the muscle every 30 days    Yes Historical Provider, MD   sertraline (ZOLOFT) 100 MG tablet Take 1 tablet by mouth daily 5/22/18   MARY Evans       REVIEW OF SYSTEMS    (2-9 systems for level 4, 10 or more for level 5)      Review of Systems   Constitutional: Negative for activity change, appetite change and chills. HENT: Negative for congestion. Respiratory: Negative for chest tightness and shortness of breath. Cardiovascular: Negative for chest pain. Gastrointestinal: Negative for abdominal pain. Skin: Negative for pallor. Neurological: Negative for dizziness, syncope and headaches. Psychiatric/Behavioral: Positive for suicidal ideas. Negative for agitation and self-injury. PHYSICAL EXAM   (up to 7 for level 4, 8 or more for level 5)      INITIAL VITALS:   BP 99/68   Pulse 100   Temp 96.8 °F (36 °C) (Oral)   Resp 14   Ht 6' (1.829 m)   Wt 173 lb (78.5 kg)   SpO2 100%   BMI 23.46 kg/m²     Physical Exam   Constitutional: He is oriented to person, place, and time. He appears well-developed and well-nourished. HENT:   Head: Normocephalic and atraumatic. Eyes: Pupils are equal, round, and reactive to light. Cardiovascular: Normal rate.     Pulmonary/Chest: Effort normal and breath sounds normal. No respiratory distress. Abdominal: Soft. Musculoskeletal: Normal range of motion. Neurological: He is alert and oriented to person, place, and time. No cranial nerve deficit. Skin: Skin is warm and dry. Capillary refill takes less than 2 seconds. He is not diaphoretic. Psychiatric: His affect is blunt. He is slowed. He expresses suicidal ideation. He expresses suicidal plans. He expresses no homicidal plans. Nursing note and vitals reviewed. DIFFERENTIAL  DIAGNOSIS     PLAN (LABS / IMAGING / EKG):  Orders Placed This Encounter   Procedures    Suicide precautions       MEDICATIONS ORDERED:  No orders of the defined types were placed in this encounter. DDX: SI    DIAGNOSTIC RESULTS / EMERGENCY DEPARTMENT COURSE / MDM     LABS:  No results found for this visit on 11/21/18. IMPRESSION: Suicidal ideation with plan to stab himself. Recent drug use. No complaints today. Patient is Medically clear and stable for transfer. We'll have  evaluate for transfer to [facility]. RADIOLOGY:  None    EKG  None    All EKG's are interpreted by the Emergency Department Physician who either signs or Co-signs this chart in the absence of a cardiologist.    EMERGENCY DEPARTMENT COURSE:    PROCEDURES:  None    CONSULTS:  None    CRITICAL CARE:  None    FINAL IMPRESSION      1. Suicidal ideation          DISPOSITION / PLAN     DISPOSITION Decision To Transfer 11/21/2018 03:24:41 PM      PATIENT REFERRED TO:  No follow-up provider specified. DISCHARGE MEDICATIONS:  New Prescriptions    No medications on file       Stoney Rivas DO  Emergency Medicine Resident    (Please note that portions of thisnote were completed with a voice recognition program.  Efforts were made to edit the dictations but occasionally words are mis-transcribed. )        Stoney Rivas DO  Resident  11/22/18 8255

## 2018-11-22 NOTE — ED NOTES
Pt sleeping in bed, no needs at this time. Will continue to monitor.       Russ Hernandez RN  11/21/18 2034
Routine consult cancelled due to patient being admitted to [facility].        FRANKLIN Stevens, LOREW
Routine consult scheduled for 11/22/18 at 9:15am.       Chris Session, MSW, LSW
[x] Flowers and plants   [x] Double check for lighters, matches, razors, any glass items etc that can be used as weapons. Person completing Checklist: Demi Yanez       GENERAL INFORMATION     Y  N     [x] [] Has the patient been informed that they are on a watch and what that means? [x] [] Can the patient get out of Bed without nursing assistance? [x] [] Can the patient use the restroom without nursing assistance? [x] [] Can the patient walk the halls to [place] their legs? \"   [] [x] Does the patient have metal utensils? [x] [] Have the patient's belongings been placed out of control of the patient? [x] [] Have the patient and his/her belongings been checked for contraband? [x] [] Is the patient under any visitor restrictions? If Yes, explain:   [] [x] Is the patient under an alias? Alias Name:   Authorized visitors (no more than two are to be on the list)   Name/Relationship:   Name/Relationship:    Name of Staff member that you  Received this information from?: security     General Description:    Yvan Hernandez BH31/BH31C male 60 y.o. Admission weight: 173 lb (78.5 kg) Height: 6' (182.9 cm)  Race: []  [x] Black  []   []   [] [place] [] Other  Facial Hair:  [] Yes  [x] No  If yes, please describe:   Identifying Marks (i.e. Visible tattoos, scars, etc...):      Demi Yanez RN  11/21/18 4022 Daniella Salcedo RN  11/21/18 9688

## 2018-12-25 ENCOUNTER — HOSPITAL ENCOUNTER (EMERGENCY)
Age: 58
Discharge: HOME | End: 2018-12-25
Attending: EMERGENCY MEDICINE
Payer: MEDICAID

## 2018-12-25 ENCOUNTER — APPOINTMENT (OUTPATIENT)
Dept: GENERAL RADIOLOGY | Age: 58
End: 2018-12-25
Payer: MEDICAID

## 2018-12-25 VITALS
WEIGHT: 170 LBS | HEART RATE: 90 BPM | DIASTOLIC BLOOD PRESSURE: 65 MMHG | OXYGEN SATURATION: 96 % | TEMPERATURE: 97 F | RESPIRATION RATE: 16 BRPM | BODY MASS INDEX: 23.06 KG/M2 | SYSTOLIC BLOOD PRESSURE: 110 MMHG

## 2018-12-25 DIAGNOSIS — R07.81 RIB PAIN ON RIGHT SIDE: Primary | ICD-10-CM

## 2018-12-25 PROCEDURE — 6370000000 HC RX 637 (ALT 250 FOR IP): Performed by: EMERGENCY MEDICINE

## 2018-12-25 PROCEDURE — 6360000002 HC RX W HCPCS: Performed by: EMERGENCY MEDICINE

## 2018-12-25 PROCEDURE — 96372 THER/PROPH/DIAG INJ SC/IM: CPT

## 2018-12-25 PROCEDURE — 71046 X-RAY EXAM CHEST 2 VIEWS: CPT

## 2018-12-25 PROCEDURE — 99284 EMERGENCY DEPT VISIT MOD MDM: CPT

## 2018-12-25 RX ORDER — IBUPROFEN 800 MG/1
800 TABLET ORAL EVERY 8 HOURS PRN
Qty: 30 TABLET | Refills: 0 | Status: SHIPPED | OUTPATIENT
Start: 2018-12-25 | End: 2020-01-14

## 2018-12-25 RX ORDER — KETOROLAC TROMETHAMINE 30 MG/ML
30 INJECTION, SOLUTION INTRAMUSCULAR; INTRAVENOUS ONCE
Status: COMPLETED | OUTPATIENT
Start: 2018-12-25 | End: 2018-12-25

## 2018-12-25 RX ORDER — LIDOCAINE 50 MG/G
1 PATCH TOPICAL DAILY
Qty: 7 PATCH | Refills: 0 | Status: SHIPPED | OUTPATIENT
Start: 2018-12-25 | End: 2020-01-14

## 2018-12-25 RX ORDER — LIDOCAINE 4 G/G
1 PATCH TOPICAL DAILY
Status: DISCONTINUED | OUTPATIENT
Start: 2018-12-25 | End: 2018-12-25 | Stop reason: HOSPADM

## 2018-12-25 RX ADMIN — KETOROLAC TROMETHAMINE 30 MG: 30 INJECTION, SOLUTION INTRAMUSCULAR at 09:18

## 2018-12-25 ASSESSMENT — PAIN SCALES - GENERAL
PAINLEVEL_OUTOF10: 10
PAINLEVEL_OUTOF10: 6
PAINLEVEL_OUTOF10: 10

## 2018-12-25 ASSESSMENT — PAIN DESCRIPTION - LOCATION: LOCATION: FLANK

## 2018-12-25 ASSESSMENT — PAIN DESCRIPTION - FREQUENCY: FREQUENCY: CONTINUOUS

## 2018-12-25 ASSESSMENT — PAIN DESCRIPTION - ONSET: ONSET: GRADUAL

## 2018-12-25 ASSESSMENT — PAIN DESCRIPTION - PAIN TYPE: TYPE: ACUTE PAIN

## 2018-12-25 ASSESSMENT — PAIN DESCRIPTION - ORIENTATION: ORIENTATION: RIGHT

## 2018-12-25 ASSESSMENT — PAIN DESCRIPTION - PROGRESSION: CLINICAL_PROGRESSION: GRADUALLY WORSENING

## 2018-12-25 ASSESSMENT — PAIN DESCRIPTION - DESCRIPTORS: DESCRIPTORS: ACHING;SHARP

## 2018-12-25 NOTE — ED PROVIDER NOTES
100 MG tablet Take 1 tablet by mouth nightly as needed for Sleep Yes MARY Mosley   QUEtiapine (SEROQUEL) 300 MG tablet Take 1 tablet by mouth nightly Yes MARY Mosley - CNS   paliperidone palmitate (INVEGA SUSTENNA) 234 MG/1.5ML SUSP IM injection Inject 234 mg into the muscle every 30 days   Historical Provider, MD     Please note that medications prescribed at discharge will auto-populate into this medication list when note is refreshed. Please look at prescription date andprescriber to clarify. Family History:family history includes Diabetes in his mother; Heart Disease in his mother. Social History: He reports that he has been smoking Cigarettes. He has been smoking about 1.00 pack per day. He has never used smokeless tobacco. He reports that he drinks alcohol. He reports that he uses drugs, including Cocaine. He has no sexual activity history on file. REVIEW OF SYSTEMS    (2-9 systems for level 4, 10 or more for level 5)      Review of Systems   Constitutional: Negative for chills and fever. HENT: Negative for congestion, rhinorrhea, sinus pain and sinus pressure. Respiratory: Negative for cough, shortness of breath and wheezing. Cardiovascular: Negative for palpitations and leg swelling. Gastrointestinal: Negative for abdominal pain, constipation, diarrhea, nausea and vomiting. Genitourinary: Negative for difficulty urinating, dysuria and hematuria. Musculoskeletal: Positive for myalgias. Negative for arthralgias and back pain. Positive for right sided rib pain    Skin: Negative for color change, pallor and rash. Neurological: Negative for dizziness, weakness, light-headedness and headaches. Hematological: Negative for adenopathy. Does not bruise/bleed easily.        PHYSICAL EXAM   (up to 7 for level 4, 8 or more for level 5)      Initial Vitals   ED Triage Vitals [12/25/18 0837]   BP Temp Temp Source Pulse Resp SpO2 Height Weight   110/65 97 °F (36.1 °C) Oral 90 16 96 % -- 170 lb (77.1 kg)       Physical Exam   Constitutional: He is oriented to person, place, and time. He appears well-developed and well-nourished. No distress. HENT:   Head: Normocephalic and atraumatic. Eyes: Pupils are equal, round, and reactive to light. EOM are normal.   Neck: Neck supple. Cardiovascular: Normal rate, regular rhythm and normal heart sounds. Exam reveals no friction rub. No murmur heard. Pulmonary/Chest: Effort normal. No respiratory distress. He has no wheezes. He has no rales. He exhibits tenderness (over the right lateral lower 3-4 ribs with no associated deformity, external trauma, and no crepitus). Abdominal: Soft. He exhibits no distension. There is no tenderness. There is no guarding. No CVA tenderness   Musculoskeletal: Normal range of motion. He exhibits no edema or deformity. Pain with rotation of the chest wall   Neurological: He is alert and oriented to person, place, and time. No sensory deficit. He exhibits normal muscle tone. Skin: Skin is warm and dry. Capillary refill takes less than 2 seconds. He is not diaphoretic. Nursing note and vitals reviewed. DIFFERENTIAL DIAGNOSIS/IMPRESSION     DDX: muscle strain/spasm, rib fracture     Impression: 60 y.o. male who presents with right sided chest wall pain. Patient denies any trauma or shortness of breath. Tenderness over the lower ribs on the right. Signs of external trauma. Good breath sounds throughout. No clinical signs of pneumothorax. No signs of trauma. There is no abdominal pain or CVA tenderness. No urinary symptoms. Believe that this is musculoskeletal.  Will give lidoderm patch and toradol. CXR to look for rib fractures. Plan for d/c after cxr. DIAGNOSTIC RESULTS     EKG: All EKG's are interpreted by the Emergency Department Physician who either signs or Co-signs this chart in the absence of a cardiologist.    Not clinically indicated at this time.       LABS: Tamara Martinez reviewed by me and abnormal results are displayed above     Labs Reviewed - No data to display    RADIOLOGY: All plain film, CT, MRI, and formal ultrasound images (except ED bedside ultrasound) are read by the radiologist, see reports below, unless otherwise noted in ED Course, MDM or here. Xr Chest Standard (2 Vw)    Result Date: 12/25/2018  EXAMINATION: TWO VIEWS OF THE CHEST 12/25/2018 10:07 am COMPARISON: None. HISTORY: ORDERING SYSTEM PROVIDED HISTORY: Right rib pain FINDINGS: The lungs are without acute focal process. No effusion or pneumothorax. The cardiomediastinal silhouette is normal.  Multilevel hypertrophic bony spurring of the mid to lower thoracic spine. Stable negative chest.        BEDSIDE ULTRASOUND:  Not clinically indicated at this time. ED COURSE      ED Medication Orders     Start Ordered     Status Ordering Provider    12/25/18 0915 12/25/18 0913  ketorolac (TORADOL) injection 30 mg  ONCE      Last MAR action:  Given - by Jaspal Cables on 12/25/18 at 0918 Claudell Sheehbrianne DENIES          EMERGENCYDEPARTMENT COURSE:    CXR negative for any acute rib fractures. Will dispo with motrin and lidoderm. Instructed to call pcp for follow-up. ED return precautions given. PROCEDURES:  None     CONSULTS:  None    CRITICAL CARE:  0 min, please also see attending note    FINAL IMPRESSION      1.  Rib pain on right side         DISPOSITION / PLAN     DISPOSITION Decision To Discharge 12/25/2018 10:24:23 AM      PATIENT REFERRED TO:  Awilda Vu MD  [address] [facility] [facility]  [phone]    Schedule an appointment as soon as possible for a visit in 1 week      [facility] ED  [address] [address]  [phone]  Go to   As needed, If symptoms worsen      DISCHARGE MEDICATIONS:  Discharge Medication List as of 12/25/2018 10:30 AM      START taking these medications    Details   ibuprofen (ADVIL;MOTRIN) 800 MG tablet Take 1 tablet by mouth every 8 hours as needed for Pain, Disp-30 tablet, R-0Print      lidocaine (LIDODERM) 5 % Place 1 patch onto the skin daily 12 hours on, 12 hours off., Disp-7 patch, R-0Print             Ginny Salgado MD  Emergency Medicine Resident    (Please note that portions of this note were completed with a voice recognition program.  Efforts were made to edit the dictations but occasionallywords are mis-transcribed.)        Ginny Salgado MD  Resident  12/26/18 4114

## 2018-12-26 ASSESSMENT — ENCOUNTER SYMPTOMS
CONSTIPATION: 0
SHORTNESS OF BREATH: 0
COLOR CHANGE: 0
NAUSEA: 0
BACK PAIN: 0
SINUS PRESSURE: 0
DIARRHEA: 0
VOMITING: 0
SINUS PAIN: 0
COUGH: 0
ABDOMINAL PAIN: 0
WHEEZING: 0
RHINORRHEA: 0

## 2019-03-19 ENCOUNTER — HOSPITAL ENCOUNTER (EMERGENCY)
Age: 59
Discharge: PSYCHIATRIC OR SUBSTANCE USE FACILITY | End: 2019-03-21
Attending: EMERGENCY MEDICINE
Payer: MEDICAID

## 2019-03-19 DIAGNOSIS — R45.851 SUICIDAL IDEATION: Primary | ICD-10-CM

## 2019-03-19 LAB
ABSOLUTE EOS #: 0.05 K/UL (ref 0–0.44)
ABSOLUTE IMMATURE GRANULOCYTE: <0.03 K/UL (ref 0–0.3)
ABSOLUTE LYMPH #: 1.7 K/UL (ref 1.1–3.7)
ABSOLUTE MONO #: 0.29 K/UL (ref 0.1–1.2)
ACETAMINOPHEN LEVEL: <5 UG/ML (ref 10–30)
ALBUMIN SERPL-MCNC: 3.6 G/DL (ref 3.5–5.2)
ALBUMIN/GLOBULIN RATIO: 1.6 (ref 1–2.5)
ALP BLD-CCNC: 84 U/L (ref 40–129)
ALT SERPL-CCNC: 8 U/L (ref 5–41)
AMPHETAMINE SCREEN URINE: NEGATIVE
ANION GAP SERPL CALCULATED.3IONS-SCNC: 9 MMOL/L (ref 9–17)
AST SERPL-CCNC: 14 U/L
BARBITURATE SCREEN URINE: NEGATIVE
BASOPHILS # BLD: 1 % (ref 0–2)
BASOPHILS ABSOLUTE: 0.03 K/UL (ref 0–0.2)
BENZODIAZEPINE SCREEN, URINE: NEGATIVE
BILIRUB SERPL-MCNC: 0.43 MG/DL (ref 0.3–1.2)
BILIRUBIN URINE: NEGATIVE
BUN BLDV-MCNC: 12 MG/DL (ref 6–20)
BUN/CREAT BLD: ABNORMAL (ref 9–20)
BUPRENORPHINE URINE: ABNORMAL
CALCIUM SERPL-MCNC: 8.4 MG/DL (ref 8.6–10.4)
CANNABINOID SCREEN URINE: POSITIVE
CHLORIDE BLD-SCNC: 108 MMOL/L (ref 98–107)
CO2: 25 MMOL/L (ref 20–31)
COCAINE METABOLITE, URINE: POSITIVE
COLOR: YELLOW
COMMENT UA: ABNORMAL
CREAT SERPL-MCNC: 0.94 MG/DL (ref 0.7–1.2)
DIFFERENTIAL TYPE: ABNORMAL
EOSINOPHILS RELATIVE PERCENT: 1 % (ref 1–4)
ETHANOL PERCENT: <0.01 %
ETHANOL: <10 MG/DL
GFR AFRICAN AMERICAN: >60 ML/MIN
GFR NON-AFRICAN AMERICAN: >60 ML/MIN
GFR SERPL CREATININE-BSD FRML MDRD: ABNORMAL ML/MIN/{1.73_M2}
GFR SERPL CREATININE-BSD FRML MDRD: ABNORMAL ML/MIN/{1.73_M2}
GLUCOSE BLD-MCNC: 111 MG/DL (ref 70–99)
GLUCOSE URINE: NEGATIVE
HCT VFR BLD CALC: 33.8 % (ref 40.7–50.3)
HEMOGLOBIN: 10.6 G/DL (ref 13–17)
IMMATURE GRANULOCYTES: 0 %
KETONES, URINE: ABNORMAL
LEUKOCYTE ESTERASE, URINE: NEGATIVE
LYMPHOCYTES # BLD: 42 % (ref 24–43)
MCH RBC QN AUTO: 28.5 PG (ref 25.2–33.5)
MCHC RBC AUTO-ENTMCNC: 31.4 G/DL (ref 28.4–34.8)
MCV RBC AUTO: 90.9 FL (ref 82.6–102.9)
MDMA URINE: ABNORMAL
METHADONE SCREEN, URINE: NEGATIVE
METHAMPHETAMINE, URINE: ABNORMAL
MONOCYTES # BLD: 7 % (ref 3–12)
NITRITE, URINE: NEGATIVE
NRBC AUTOMATED: 0 PER 100 WBC
OPIATES, URINE: NEGATIVE
OXYCODONE SCREEN URINE: NEGATIVE
PDW BLD-RTO: 13.8 % (ref 11.8–14.4)
PH UA: 6 (ref 5–8)
PHENCYCLIDINE, URINE: NEGATIVE
PLATELET # BLD: 238 K/UL (ref 138–453)
PLATELET ESTIMATE: ABNORMAL
PMV BLD AUTO: 9.7 FL (ref 8.1–13.5)
POTASSIUM SERPL-SCNC: 4 MMOL/L (ref 3.7–5.3)
PROPOXYPHENE, URINE: ABNORMAL
PROTEIN UA: NEGATIVE
RBC # BLD: 3.72 M/UL (ref 4.21–5.77)
RBC # BLD: ABNORMAL 10*6/UL
SALICYLATE LEVEL: <1 MG/DL (ref 3–10)
SEG NEUTROPHILS: 49 % (ref 36–65)
SEGMENTED NEUTROPHILS ABSOLUTE COUNT: 1.98 K/UL (ref 1.5–8.1)
SODIUM BLD-SCNC: 142 MMOL/L (ref 135–144)
SPECIFIC GRAVITY UA: 1.02 (ref 1–1.03)
TEST INFORMATION: ABNORMAL
TOTAL PROTEIN: 5.8 G/DL (ref 6.4–8.3)
TOXIC TRICYCLIC SC,BLOOD: NEGATIVE
TRICYCLIC ANTIDEPRESSANTS, UR: ABNORMAL
TURBIDITY: CLEAR
URINE HGB: NEGATIVE
UROBILINOGEN, URINE: NORMAL
WBC # BLD: 4.1 K/UL (ref 3.5–11.3)
WBC # BLD: ABNORMAL 10*3/UL

## 2019-03-19 PROCEDURE — 81003 URINALYSIS AUTO W/O SCOPE: CPT

## 2019-03-19 PROCEDURE — 80307 DRUG TEST PRSMV CHEM ANLYZR: CPT

## 2019-03-19 PROCEDURE — G0480 DRUG TEST DEF 1-7 CLASSES: HCPCS

## 2019-03-19 PROCEDURE — 85025 COMPLETE CBC W/AUTO DIFF WBC: CPT

## 2019-03-19 PROCEDURE — 80053 COMPREHEN METABOLIC PANEL: CPT

## 2019-03-19 PROCEDURE — 93005 ELECTROCARDIOGRAM TRACING: CPT

## 2019-03-19 PROCEDURE — 99285 EMERGENCY DEPT VISIT HI MDM: CPT

## 2019-03-19 PROCEDURE — 82550 ASSAY OF CK (CPK): CPT

## 2019-03-19 ASSESSMENT — ENCOUNTER SYMPTOMS
COUGH: 1
ABDOMINAL PAIN: 0
SHORTNESS OF BREATH: 0
BACK PAIN: 0
SORE THROAT: 0

## 2019-03-20 VITALS
HEART RATE: 57 BPM | TEMPERATURE: 97.5 F | SYSTOLIC BLOOD PRESSURE: 128 MMHG | OXYGEN SATURATION: 99 % | RESPIRATION RATE: 16 BRPM | DIASTOLIC BLOOD PRESSURE: 71 MMHG

## 2019-03-20 LAB — TOTAL CK: 123 U/L (ref 39–308)

## 2019-03-20 PROCEDURE — 90792 PSYCH DIAG EVAL W/MED SRVCS: CPT | Performed by: NURSE PRACTITIONER

## 2019-03-21 LAB
EKG ATRIAL RATE: 67 BPM
EKG P AXIS: 75 DEGREES
EKG P-R INTERVAL: 152 MS
EKG Q-T INTERVAL: 420 MS
EKG QRS DURATION: 86 MS
EKG QTC CALCULATION (BAZETT): 443 MS
EKG R AXIS: 87 DEGREES
EKG T AXIS: 83 DEGREES
EKG VENTRICULAR RATE: 67 BPM

## 2019-03-22 LAB — TSH SERPL DL<=0.05 MIU/L-ACNC: 1.9 UIU/ML (ref 0.44–3.86)

## 2019-07-29 ENCOUNTER — HOSPITAL ENCOUNTER (EMERGENCY)
Age: 59
Discharge: HOME | End: 2019-07-30
Attending: EMERGENCY MEDICINE
Payer: MEDICAID

## 2019-07-29 VITALS
DIASTOLIC BLOOD PRESSURE: 61 MMHG | OXYGEN SATURATION: 100 % | SYSTOLIC BLOOD PRESSURE: 92 MMHG | RESPIRATION RATE: 18 BRPM | HEART RATE: 102 BPM | TEMPERATURE: 98.4 F

## 2019-07-29 DIAGNOSIS — R44.0 CONTINUOUS AUDITORY HALLUCINATIONS: ICD-10-CM

## 2019-07-29 DIAGNOSIS — R45.851 SUICIDAL IDEATION: Primary | ICD-10-CM

## 2019-07-29 PROCEDURE — 99284 EMERGENCY DEPT VISIT MOD MDM: CPT

## 2019-07-30 NOTE — ED NOTES
Pt to the ED with complaints of hallucinations and SI. Pt states that the hallucinations are telling him that he is going to get killed today. He is paranoid and states that because of the voices he wants to kill himself. He has a plan to shoot himself but doesn't have a gun at home. He states that a friend has a gun that he has access to. Pt uses crack cocaine and last used yesterday.  He appears in no distress at this time     Clifford Torre RN  07/29/19 4970

## 2019-07-30 NOTE — ED PROVIDER NOTES
no evidence of toxidrome). The patient reports suicidal ideation and presents a risk to himself. The patient  will require extensive psychiatric intervention, most probably as an inpatient. Social work has been consulted to complete a more comprehensive psychiatric evaluation and to begin the psychiatry transfer process. The [facility] is being [facility]. He has been discharged with [facility] police and taken immediately to [facility]. Procedures     None    Final impression      1. Suicidal ideation    2.  Continuous auditory hallucinations         Disposition with plan     Disposition: Transfer to Daniel Villareal MD  Emergency Medicine Resident    (Please note that portions of this note were completed with a voice recognition program.  Efforts were made to edit the dictations but occasionally words are mis-transcribed.)         Shaun Moon MD  Resident  07/30/19 7952

## 2019-07-30 NOTE — ED NOTES
YOGESH met with Marcos Izaguirre who presented in ED with C/O SI and hearing voices  He reports he has been hearing voices with the voices telling him to kill himself by shooting himself   States he does not have assess to a weapon or gun   He is linked to out pt [address] and states he has been compliant with med's and tx.    He has a hx of substance abuse, and has smoked crack today   He states he has secure housing and a safe place to go  He is [facility] to go to [facility]   SW to discuss with ED staff       CHANEL Truong  07/29/19 3057

## 2019-07-30 NOTE — ED NOTES
[] Sosa    [] [facility]    [x]  [facility] ASSESSMENT      Y  N     [x] [] In the past two weeks have you had thoughts of hurting yourself in any way? [x] [] In the past two weeks have you had thoughts that you would be better off dead? [] [x] Have you made a suicide attempt in the past two months? [x] [] Do you have a plan for hurting yourself or suicide? [x] [] Presence of hallucinations/voices related to hurting himself or herself or someone else. SUICIDE/SECURITY WATCH PRECAUTION CHECKLIST     Orders    [x]  Suicide/Security Watch Precautions initiated as checked below:   7/29/19 9:53 PM BH31/BH31C    [x] Notified physician:  No att. providers found  7/29/19 9:53 PM    [x] Orders obtained as appropriate:     [x] 1:1 Observer     [] Psych Consult     [] Psych Consult    Name:  Date:  Time:    [x] 1:1 Observer, Notified by:  Kate England Nurse Supervisor    [x] Remove all personal clothes from room and place in snap/paper gown/pants. Slipper only    [x] Remove all personal belongings from room and secured away from patient. Documentation    [x] Initiate Suicide/Security Watch Precaution Flow Sheet    [x] Initiate individualized Care Plan/Problem    [x] Document why precautions initiated on flow sheet (Initiate Nursing Care Plan/Problem)    [x] 1:1 Observer in place; instructions provided. Suicide precautions require observer be within arms length. [x] Nurse-Observer Communication Hand-off initiated by RN, reviewed with Observer. Subsequently used as Hand Off between Observers. [x] Initiate every 15 minute observations per observer as delegated by the RN.     [x] Initiate RN assessment and documentation    Environmental Scan  Search Criteria and Process: OPTIONAL, see Search Policy    [x] Reason for search:    [x] Nursing in presence of second person to search patient    [x] Patient notified of reason for body assessment and

## 2020-01-14 ENCOUNTER — HOSPITAL ENCOUNTER (INPATIENT)
Age: 60
LOS: 10 days | Discharge: HOME | DRG: 750 | End: 2020-01-24
Attending: EMERGENCY MEDICINE | Admitting: PSYCHIATRY & NEUROLOGY
Payer: MEDICAID

## 2020-01-14 LAB
ABSOLUTE EOS #: 0 K/UL (ref 0–0.4)
ABSOLUTE IMMATURE GRANULOCYTE: ABNORMAL K/UL (ref 0–0.3)
ABSOLUTE LYMPH #: 2.1 K/UL (ref 1–4.8)
ABSOLUTE MONO #: 0.5 K/UL (ref 0.1–1.3)
ACETAMINOPHEN LEVEL: <5 UG/ML (ref 10–30)
ALBUMIN SERPL-MCNC: 4.2 G/DL (ref 3.5–5.2)
ALBUMIN/GLOBULIN RATIO: NORMAL (ref 1–2.5)
ALP BLD-CCNC: 87 U/L (ref 40–129)
ALT SERPL-CCNC: 14 U/L (ref 5–41)
ANION GAP SERPL CALCULATED.3IONS-SCNC: 13 MMOL/L (ref 9–17)
AST SERPL-CCNC: 21 U/L
BASOPHILS # BLD: 1 % (ref 0–2)
BASOPHILS ABSOLUTE: 0 K/UL (ref 0–0.2)
BILIRUB SERPL-MCNC: 0.48 MG/DL (ref 0.3–1.2)
BUN BLDV-MCNC: 21 MG/DL (ref 8–23)
BUN/CREAT BLD: NORMAL (ref 9–20)
CALCIUM SERPL-MCNC: 9.3 MG/DL (ref 8.6–10.4)
CHLORIDE BLD-SCNC: 102 MMOL/L (ref 98–107)
CO2: 25 MMOL/L (ref 20–31)
CREAT SERPL-MCNC: 0.91 MG/DL (ref 0.7–1.2)
DIFFERENTIAL TYPE: ABNORMAL
EOSINOPHILS RELATIVE PERCENT: 1 % (ref 0–4)
ETHANOL PERCENT: <0.01 %
ETHANOL: <10 MG/DL
GFR AFRICAN AMERICAN: >60 ML/MIN
GFR NON-AFRICAN AMERICAN: >60 ML/MIN
GFR SERPL CREATININE-BSD FRML MDRD: NORMAL ML/MIN/{1.73_M2}
GFR SERPL CREATININE-BSD FRML MDRD: NORMAL ML/MIN/{1.73_M2}
GLUCOSE BLD-MCNC: 77 MG/DL (ref 70–99)
HCT VFR BLD CALC: 37.3 % (ref 41–53)
HEMOGLOBIN: 12 G/DL (ref 13.5–17.5)
IMMATURE GRANULOCYTES: ABNORMAL %
LYMPHOCYTES # BLD: 34 % (ref 24–44)
MCH RBC QN AUTO: 28.8 PG (ref 26–34)
MCHC RBC AUTO-ENTMCNC: 32.2 G/DL (ref 31–37)
MCV RBC AUTO: 89.6 FL (ref 80–100)
MONOCYTES # BLD: 8 % (ref 1–7)
NRBC AUTOMATED: ABNORMAL PER 100 WBC
PDW BLD-RTO: 15.1 % (ref 11.5–14.9)
PLATELET # BLD: 269 K/UL (ref 150–450)
PLATELET ESTIMATE: ABNORMAL
PMV BLD AUTO: 7.3 FL (ref 6–12)
POTASSIUM SERPL-SCNC: 4.2 MMOL/L (ref 3.7–5.3)
RBC # BLD: 4.16 M/UL (ref 4.5–5.9)
RBC # BLD: ABNORMAL 10*6/UL
SALICYLATE LEVEL: 1 MG/DL (ref 3–10)
SEG NEUTROPHILS: 56 % (ref 36–66)
SEGMENTED NEUTROPHILS ABSOLUTE COUNT: 3.5 K/UL (ref 1.3–9.1)
SODIUM BLD-SCNC: 140 MMOL/L (ref 135–144)
TOTAL PROTEIN: 7.3 G/DL (ref 6.4–8.3)
WBC # BLD: 6.1 K/UL (ref 3.5–11)
WBC # BLD: ABNORMAL 10*3/UL

## 2020-01-14 PROCEDURE — G0480 DRUG TEST DEF 1-7 CLASSES: HCPCS

## 2020-01-14 PROCEDURE — 6370000000 HC RX 637 (ALT 250 FOR IP): Performed by: PSYCHIATRY & NEUROLOGY

## 2020-01-14 PROCEDURE — 80307 DRUG TEST PRSMV CHEM ANLYZR: CPT

## 2020-01-14 PROCEDURE — 85025 COMPLETE CBC W/AUTO DIFF WBC: CPT

## 2020-01-14 PROCEDURE — 99285 EMERGENCY DEPT VISIT HI MDM: CPT

## 2020-01-14 PROCEDURE — 1240000000 HC EMOTIONAL WELLNESS R&B

## 2020-01-14 PROCEDURE — 80053 COMPREHEN METABOLIC PANEL: CPT

## 2020-01-14 PROCEDURE — 36415 COLL VENOUS BLD VENIPUNCTURE: CPT

## 2020-01-14 RX ORDER — QUETIAPINE FUMARATE 300 MG/1
300 TABLET, FILM COATED ORAL NIGHTLY
Status: DISCONTINUED | OUTPATIENT
Start: 2020-01-14 | End: 2020-01-24 | Stop reason: HOSPADM

## 2020-01-14 RX ORDER — BENZTROPINE MESYLATE 1 MG/ML
2 INJECTION INTRAMUSCULAR; INTRAVENOUS DAILY PRN
Status: DISCONTINUED | OUTPATIENT
Start: 2020-01-14 | End: 2020-01-24 | Stop reason: HOSPADM

## 2020-01-14 RX ORDER — TRAZODONE HYDROCHLORIDE 50 MG/1
50 TABLET ORAL NIGHTLY PRN
Status: DISCONTINUED | OUTPATIENT
Start: 2020-01-14 | End: 2020-01-24 | Stop reason: HOSPADM

## 2020-01-14 RX ORDER — MAGNESIUM HYDROXIDE/ALUMINUM HYDROXICE/SIMETHICONE 120; 1200; 1200 MG/30ML; MG/30ML; MG/30ML
30 SUSPENSION ORAL 3 TIMES DAILY PRN
Status: DISCONTINUED | OUTPATIENT
Start: 2020-01-14 | End: 2020-01-24 | Stop reason: HOSPADM

## 2020-01-14 RX ORDER — OXCARBAZEPINE 300 MG/1
300 TABLET, FILM COATED ORAL 2 TIMES DAILY
Status: DISCONTINUED | OUTPATIENT
Start: 2020-01-14 | End: 2020-01-24 | Stop reason: HOSPADM

## 2020-01-14 RX ORDER — HYDROXYZINE HYDROCHLORIDE 25 MG/1
25 TABLET, FILM COATED ORAL 3 TIMES DAILY PRN
Status: DISCONTINUED | OUTPATIENT
Start: 2020-01-14 | End: 2020-01-24 | Stop reason: HOSPADM

## 2020-01-14 RX ORDER — OXCARBAZEPINE 300 MG/1
300 TABLET, FILM COATED ORAL 2 TIMES DAILY
Status: ON HOLD | COMMUNITY
End: 2020-04-28

## 2020-01-14 RX ORDER — ACETAMINOPHEN 325 MG/1
650 TABLET ORAL EVERY 4 HOURS PRN
Status: DISCONTINUED | OUTPATIENT
Start: 2020-01-14 | End: 2020-01-24 | Stop reason: HOSPADM

## 2020-01-14 RX ORDER — CITALOPRAM 10 MG/1
30 TABLET ORAL DAILY
Status: ON HOLD | COMMUNITY
End: 2020-02-12 | Stop reason: HOSPADM

## 2020-01-14 RX ADMIN — QUETIAPINE FUMARATE 300 MG: 300 TABLET ORAL at 22:14

## 2020-01-14 RX ADMIN — OXCARBAZEPINE 300 MG: 300 TABLET, FILM COATED ORAL at 22:14

## 2020-01-14 RX ADMIN — TRAZODONE HYDROCHLORIDE 50 MG: 50 TABLET ORAL at 22:14

## 2020-01-14 RX ADMIN — LURASIDONE HYDROCHLORIDE 160 MG: 80 TABLET, FILM COATED ORAL at 22:14

## 2020-01-14 ASSESSMENT — PAIN DESCRIPTION - FREQUENCY: FREQUENCY: CONTINUOUS

## 2020-01-14 ASSESSMENT — SLEEP AND FATIGUE QUESTIONNAIRES
DO YOU USE A SLEEP AID: NO
AVERAGE NUMBER OF SLEEP HOURS: 3
DO YOU HAVE DIFFICULTY SLEEPING: NO

## 2020-01-14 ASSESSMENT — PAIN DESCRIPTION - ORIENTATION
ORIENTATION: LEFT
ORIENTATION: LEFT

## 2020-01-14 ASSESSMENT — PAIN DESCRIPTION - DESCRIPTORS
DESCRIPTORS: ACHING
DESCRIPTORS: ACHING

## 2020-01-14 ASSESSMENT — PAIN DESCRIPTION - LOCATION
LOCATION: LEG
LOCATION: FOOT

## 2020-01-14 ASSESSMENT — PAIN SCALES - GENERAL
PAINLEVEL_OUTOF10: 10
PAINLEVEL_OUTOF10: 3

## 2020-01-14 ASSESSMENT — PAIN DESCRIPTION - PAIN TYPE
TYPE: ACUTE PAIN
TYPE: ACUTE PAIN

## 2020-01-14 ASSESSMENT — LIFESTYLE VARIABLES: HISTORY_ALCOHOL_USE: YES

## 2020-01-14 ASSESSMENT — PATIENT HEALTH QUESTIONNAIRE - PHQ9: SUM OF ALL RESPONSES TO PHQ QUESTIONS 1-9: 18

## 2020-01-14 NOTE — ED PROVIDER NOTES
depression, suicidal thoughts, specific suicidal plan. previous suicide attempt. The patient does not appear intoxicated. The patient is showing no signs of any acute active toxidrome. The patient denies any overdose attempt or  medical complaints at this time. We'll screen for any acetaminophen or salicylate ingestion, we'll check baseline renal function, liver function, a drug screen, cbc and reassess. Hospital Course:   Laboratory studies unremarkable - pt medically clear for psychiatric evaluation. DIAGNOSTIC RESULTS     LABS: All lab results were reviewed by myself, and all abnormals are listed below.   Labs Reviewed   CBC WITH AUTO DIFFERENTIAL - Abnormal; Notable for the following components:       Result Value    RBC 4.16 (*)     Hemoglobin 12.0 (*)     Hematocrit 37.3 (*)     RDW 15.1 (*)     Monocytes 8 (*)     All other components within normal limits   ACETAMINOPHEN LEVEL - Abnormal; Notable for the following components:    Acetaminophen Level <5 (*)     All other components within normal limits   SALICYLATE LEVEL - Abnormal; Notable for the following components:    Salicylate Lvl 1 (*)     All other components within normal limits   COMPREHENSIVE METABOLIC PANEL   ETHANOL   URINE DRUG SCREEN   DRUG SCREEN TRICYCLIC URINE   URINE DRUG SCREEN       EMERGENCY DEPARTMENT COURSE:   Vitals:    Vitals:    01/14/20 1213 01/14/20 1557 01/15/20 1920   BP: 123/70 114/76 (!) 107/51   Pulse: 74 58 78   Resp: 14 14 14   Temp: 97.5 °F (36.4 °C) 97.6 °F (36.4 °C) 97.8 °F (36.6 °C)   TempSrc: Oral Oral Oral   SpO2: 100% 96% 99%   Weight: 170 lb (77.1 kg) 152 lb (68.9 kg)    Height: 6' 3\" (1.905 m) 6' 3\" (1.905 m)        The patient was given the following medications while in the emergency department:  Orders Placed This Encounter   Medications    citalopram (CELEXA) tablet 30 mg    DISCONTD: lurasidone (LATUDA) tablet 160 mg    QUEtiapine (SEROQUEL) tablet 300 mg    OXcarbazepine (TRILEPTAL) tablet 300 mg    acetaminophen (TYLENOL) tablet 650 mg    aluminum & magnesium hydroxide-simethicone (MAALOX) 200-200-20 MG/5ML suspension 30 mL    benztropine mesylate (COGENTIN) injection 2 mg    traZODone (DESYREL) tablet 50 mg    magnesium hydroxide (MILK OF MAGNESIA) 400 MG/5ML suspension 30 mL    hydrOXYzine (ATARAX) tablet 25 mg    lurasidone (LATUDA) tablet 160 mg     -------------------------  CRITICAL CARE:   CONSULTS: IP CONSULT TO HISTORY AND PHYSICAL  PROCEDURES: Procedures     FINAL IMPRESSION      1.  Depression with suicidal ideation          DISPOSITION/PLAN   DISPOSITION Admitted 01/14/2020 02:50:55 PM      PATIENT REFERRED TO:  Sue [address]  [phone]            DISCHARGE MEDICATIONS:  Current Discharge Medication List            Daren Wilkes MD  Attending Emergency Physician                      Daren Wilkes MD  01/15/20 2332

## 2020-01-14 NOTE — BH NOTE
`[facility]  Admission Note     Admission Type:   Admission Type: Involuntary    Reason for admission:  Reason for Admission: positive feelings of self harm,  with plan to yes shhot self with friends Gun, or OD on \"crack cocaine\". recently homeless,  not taking medications per patient.   patient reports feelings of depression  for \"the past few days\"    PATIENT STRENGTHS:  Strengths: Connection to output provider- [facility]    Patient Strengths and Limitations:  Limitations: Tendency to isolate self, General negative or hopeless attitude about future/recovery, Inappropriate/potentially harmful leisure interests, Difficult relationships / poor social skills    Addictive Behavior:   Addictive Behavior  In the past 3 months, have you felt or has someone told you that you have a problem with:  : None  Do you have a history of Chemical Use?: Yes  Do you have a history of Alcohol Use?: Yes  Do you have a history of Street Drug Abuse?: Yes  Histroy of Prescripton Drug Abuse?: No    Medical Problems:   Past Medical History:   Diagnosis Date    [facility]     Depression     GERD (gastroesophageal reflux disease)     Hallucinations     Headache(784.0)     Hepatitis     [facility], [facility]     Substance abuse ([facility]     Tobacco abuse     Type II or unspecified type diabetes mellitus without mention of complication, not stated as uncontrolled     Urinary incontinence        Status EXAM:  Status and Exam  Normal: No  Facial Expression: Sad, Avoids Gaze, Expressionless, Flat  Affect: Appropriate  Level of Consciousness: Alert  Mood:Normal: No  Mood: Depressed, Anxious, Empty, Sad  Motor Activity:Normal: No  Motor Activity: Decreased  Interview Behavior: Cooperative  Preception: [place] to Person, [place] to Time, [place] to Place, [place] to Situation  Attention:Normal: No  Thought Processes: Blocking  Thought Content:Normal: No  Thought Content: Poverty of Content, Preoccupations  Hallucinations: Auditory (Comment)(command to hurt self)  Delusions: No  Memory:Normal: No  Memory: Confabulation  Insight and Judgment: No  Insight and Judgment: Poor Judgment, Poor Insight  Present Suicidal Ideation: Yes(patient \"contracts while inpatient\")  Present Homicidal Ideation: No    Tobacco Screening:  Practical Counseling, on admission, corby X, if applicable and completed (first 3 are required if patient doesn't refuse):            ( )  Recognizing danger situations (included triggers and roadblocks)                    ( )  Coping skills (new ways to manage stress, exercise, relaxation techniques, changing routine, distraction)                                                           ( )  Basic information about quitting (benefits of quitting, techniques in how to quit, available resources  ( ) Referral for counseling faxed to [facility]                         (x ) Patient refused counseling  ( ) Patient has not smoked in the last 30 days    Metabolic Screening:    Lab Results   Component Value Date    LABA1C 4.8 03/22/2019       Lab Results   Component Value Date    CHOL 179 02/13/2017    CHOL 127 05/09/2015    CHOL 168 08/20/2014    CHOL 158 12/31/2013    CHOL 178 08/15/2013    CHOL 166 09/17/2012    CHOL 210 (H) 02/03/2012     Lab Results   Component Value Date    TRIG 67 02/13/2017    TRIG 37 05/09/2015    TRIG 72 08/20/2014    TRIG 52 12/31/2013    TRIG 70 08/15/2013    TRIG 117 09/17/2012    TRIG 94 02/03/2012     Lab Results   Component Value Date    HDL 73 02/13/2017    HDL 57 05/09/2015    HDL 50 08/20/2014    HDL 43 12/31/2013    HDL 42 08/15/2013    HDL 38 (L) 09/17/2012    HDL 55 02/03/2012     No components found for: LDLCAL  No results found for: LABVLDL      Body mass index is 19 kg/m².     BP Readings from Last 2 Encounters:   01/14/20 114/76   07/29/19 92/61           Pt admitted with followings belongings:  Dentures: None  Vision - Corrective Lenses:

## 2020-01-14 NOTE — BH NOTE
Physician aware of suicidal ideation and patient josette for safety. Patient suicide precautions discontinued due to patient willing to seek out staff if feelings of self harm were to continue. Every 15 minute checks and random spontaneous checks/roundings to continue for patient safety. Patient currently in [facility],  [address] [facility] with peers.

## 2020-01-14 NOTE — BH NOTE
Patient had a broken crack pipe in his belongings, security was contacted by staff to come on the unit and pick the item up. Patient was informed.

## 2020-01-15 PROCEDURE — 6370000000 HC RX 637 (ALT 250 FOR IP): Performed by: PSYCHIATRY & NEUROLOGY

## 2020-01-15 PROCEDURE — 1240000000 HC EMOTIONAL WELLNESS R&B

## 2020-01-15 RX ADMIN — LURASIDONE HYDROCHLORIDE 160 MG: 80 TABLET, FILM COATED ORAL at 17:40

## 2020-01-15 ASSESSMENT — LIFESTYLE VARIABLES: HISTORY_ALCOHOL_USE: NO

## 2020-01-15 ASSESSMENT — ENCOUNTER SYMPTOMS
SHORTNESS OF BREATH: 0
BACK PAIN: 0

## 2020-01-15 NOTE — GROUP NOTE
Group Therapy Note    Date: 1/15/2020    Group Start Time: 1430  Group End Time: 2046  Group Topic: Recreational    [facility]    [employer], CTRS    Pt did not attend RT skills group d/t resting in room despite staff invitation to attend. 1:1 talk time offered as alternative to group session, pt declined.

## 2020-01-15 NOTE — H&P
HISTORY and [facility]       NAME:  Bimal Pagan  MRN: [id number]   YOB: 1960   Date: 1/15/2020   Age: 60 y.o. Gender: male     COMPLAINT AND PRESENT HISTORY:      Bimal Pagan is 60 y.o.,  male, admitted because of Schizoaffective Disorder. Patient admitted on a pink slip by [facility] for suicidal ideation, plan to shoot himself. During H&P patient is lethargic, laying in bed, does not wish to speak with examiner. Much of history taken from chart review. On initial presentation to ED, patient was experiencing auditory hallucinations. Patient does not respond to writer when writer questioned patient regarding hallucinations, suicidal or homicidal ideation. PHOEBE insight due to limited interaction with patient. Pt has been sleeping majority of shift, did not get up initially for breakfast per staff. PHOEBE concentration or memory. Per chart notes, pt has been renting a room for 500 per month. Reportedly, pt was using alcohol, crack and heroin daily. Per ED notes, he has not been on his medications. See psychiatric admission note for further psychiatric history.      DIAGNOSTIC RESULTS   Labs:  CBC:   Recent Labs     01/14/20  1335   WBC 6.1   HGB 12.0*        BMP:    Recent Labs     01/14/20  1335      K 4.2      CO2 25   BUN 21   CREATININE 0.91   GLUCOSE 77     Hepatic:   Recent Labs     01/14/20  1335   AST 21   ALT 14   BILITOT 0.48   ALKPHOS 80        PAST MEDICAL HISTORY     Past Medical History:   Diagnosis Date    [facility] ([facility]     Depression     GERD (gastroesophageal reflux disease)     Hallucinations     Headache(784.0)     Hepatitis     Schizophrenia, schizo-affective (HCC)     Substance abuse ([facility]     Tobacco abuse     Type II or unspecified type diabetes mellitus without mention of complication, not stated as uncontrolled     Urinary incontinence        SURGICAL HISTORY       Past Surgical History: Procedure Laterality Date    ABSCESS DRAINAGE N/A 02/11/2018    Carla anal abcess    DENTAL SURGERY      all teeth pulled       FAMILY HISTORY       Family History   Problem Relation Age of Onset    Diabetes Mother     Heart Disease Mother        SOCIAL HISTORY       Social History     Socioeconomic History    Marital status: Single     Spouse name: None    Number of children: 0    Years of education: 8    Highest education level: None   Occupational History     Employer: N/A   Social Needs    Financial resource strain: None    Food insecurity:     Worry: None     Inability: None    Transportation needs:     Medical: None     Non-medical: None   Tobacco Use    Smoking status: Current Every Day Smoker     Packs/day: 1.00     Types: Cigarettes    Smokeless tobacco: Never Used   Substance and Sexual Activity    Alcohol use: Yes     Comment: occassional drinker    Drug use: Yes     Types: Cocaine    Sexual activity: None   Lifestyle    Physical activity:     Days per week: None     Minutes per session: None    Stress: None   Relationships    Social connections:     Talks on phone: None     Gets together: None     Attends Sabianism service: None     Active member of club or organization: None     Attends meetings of clubs or organizations: None     Relationship status: None    Intimate partner violence:     Fear of current or ex partner: None     Emotionally abused: None     Physically abused: None     Forced sexual activity: None   Other Topics Concern    None   Social History Narrative    None           REVIEW OF SYSTEMS      Allergies   Allergen Reactions    Navane [Thiothixene]        No current facility-administered medications on file prior to encounter.       Current Outpatient Medications on File Prior to Encounter   Medication Sig Dispense Refill    citalopram (CELEXA) 10 MG tablet Take 30 mg by mouth daily      lurasidone (LATUDA) 80 MG TABS tablet Take 160 mg by mouth nightly      OXcarbazepine (TRILEPTAL) 300 MG tablet Take 300 mg by mouth 2 times daily      QUEtiapine (SEROQUEL) 300 MG tablet Take 1 tablet by mouth nightly 14 tablet 0        Review of Systems   Unable to perform ROS: Psychiatric disorder   Pt is lethargic, allows limited physical exam. Does not respond much to examiner during H&P. GENERAL PHYSICAL EXAM:     Vitals: /76   Pulse 58   Temp 97.6 °F (36.4 °C) (Oral)   Resp 14   Ht 6' 3\" (1.905 m)   Wt 152 lb (68.9 kg)   SpO2 96%   BMI 19.00 kg/m²  Body mass index is 19 kg/m². Pt was examined with a nurse present in the room. GENERAL APPEARANCE:  Bimal Pagan is 60 y.o.,  male, thin, nourished, conscious, alert. Does not appear to be distress or pain at this time. SKIN:  Warm, dry. HEAD:  Normocephalic. EYES:  PHOEBE pt not cooperative. EARS:  No discharge, no marked hearing loss. NOSE:  No external deformity. THROAT:  PHOEBE pt not cooperative. NECK:  Trachea central.  CHEST:  Symmetrical and equal on expansion. HEART:  Bradycardic rate, regular rhythm. S1 > S2, No audible murmurs or gallops. LUNGS:  Equal on expansion, normal breath sounds. ABDOMEN:  PHOEBE pt not cooperative. LYMPHATICS:   PHOEBE pt not cooperative. LOCOMOTOR, BACK AND SPINE:  PHOEBE pt not cooperative. EXTREMITIES:  Symmetrical, no pedal edema. Moves all extremities. NEUROLOGIC:  The patient is conscious, alert, oriented to self, drowsy. Gait and balance not observed. PHOEBE fully, pt not cooperative. PROVISIONAL DIAGNOSES:      Active Problems:    Schizoaffective disorder ([facility]  Resolved Problems:    * No resolved hospital problems.  *      Castro Lombardi, APRN - CNP on 1/15/2020 at 6:47 AM

## 2020-01-15 NOTE — CARE COORDINATION
BHI Biopsychosocial Assessment    Current Level of Psychosocial Functioning     Independent: xx  Dependent:    Minimal Assist:         Psychosocial High Risk Factors (check all that apply)    Unable to obtain meds:    Chronic illness/pain:     Substance abuse: xx  Lack of Family Support:    Financial stress:  xx  Isolation: xx  Inadequate Community Resources:    Suicide attempt(s):   Not taking medications:     Victim of crime:    Developmental Delay:    Unable to manage personal needs:    Age 72 or older:    Homeless:    No transportation:    Readmission within 30 days:    Unemployment:    Traumatic Event:        Psychiatric Advanced Directives: None reported    Family to Involve in Treatment: None reported    Sexual Orientation: [facility]    Patient Strengths: Patient is linked with [facility]    Patient Barriers: Substance use    Opiate Education: Patient denies opiate use    CMHC/mental health history: Patient is linked with [facility]    Plan of Care   medication management, group/individual therapies, family meetings, psycho -education, treatment team meetings to assist with stabilization    Initial Discharge Plan: Patient to stabilize with medication, return to [facility] for outpatient treatment      Clinical Summary:      Pt is a 60year old  male who presented to the ED with reported suicidal ideation including thoughts to go to his friends house and get a gun to shoot himself. Patient is not willing to participate in assessment. ED notes indicate patient is linked with [facility], rents a room but reports this is not a good environment and has caused relapse in crack use. Social work will continue to engage patient to coordinate needed services and resources.

## 2020-01-15 NOTE — BH NOTE
CHIEF COMPLAINT    Chief Complaint   Patient presents with   • Sore Throat       HPI    Patient with 4-5 day history of sore throat. He notes he is able to swallow but it hurts. Mild occasional cough. No documented fevers chills or ear pain. His daughter has \"the flu\". He denies any vomiting diarrhea or rashes or significant headache chest pain shortness of breath.    Allergies  ALLERGIES:   Allergen Reactions   • Sulfa Antibiotics RASH       Current Medications    No current facility-administered medications for this encounter.      Current Outpatient Medications   Medication Sig Dispense Refill   • Cariprazine HCl (VRAYLAR) 1.5 MG capsule Take 1.5 mg by mouth daily.     • lamoTRIgine (LAMICTAL) 100 MG tablet Take 100 mg by mouth daily.     • lisdexamfetamine (VYVANSE) 30 MG capsule Take 30 mg by mouth every morning.     • acetaminophen (TYLENOL) 500 MG tablet Take 500 mg by mouth every 6 hours as needed for Pain.     • ibuprofen (MOTRIN) 800 MG tablet Take 800 mg by mouth every 6 hours as needed for Pain.           Past Medical History    History reviewed. No pertinent past medical history.    Surgical History    Past Surgical History:   Procedure Laterality Date   • Shoulder arthroscopy Left    • Shoulder arthroscopy w/ bankhart procedure Right 08/29/2018   • Shoulder surgery     • Wrist surgery Left 2014    nail injury       Social History    Social History     Tobacco Use   • Smoking status: Former Smoker     Packs/day: 0.50     Years: 5.00     Pack years: 2.50     Types: Cigarettes   • Smokeless tobacco: Former User     Types: Chew   Substance Use Topics   • Alcohol use: Yes     Alcohol/week: 0.0 - 3.0 oz   • Drug use: No       Family History    Family History   Problem Relation Age of Onset   • Arthritis Mother    • Asthma Mother    • Depression Mother    • Kidney disease Mother    • Depression Father    • Asthma Sister    • Arthritis Maternal Grandfather    • Asthma Maternal Grandfather    • Depression  patient refused to attend  wellness group at 1600 after encouragement from staff.   1:1 talk time provided as alternative to group session Maternal Grandfather    • Diabetes Maternal Grandfather    • Heart disease Maternal Grandfather    • Hypertension Maternal Grandfather    • High cholesterol Maternal Grandfather    • Kidney disease Maternal Grandfather        REVIEW OF SYSTEMS    ALL 13 SYSTEMS REVIEWED AND NEGATIVE OR NONCONTRIBUTORY UNLESS OTHERWISE NOTED IN HPI      PHYSICAL EXAM     ED Triage Vitals [05/06/19 1935]   ED Triage Vitals Group      Temp 98.8 °F (37.1 °C)      Pulse 76      Resp 16      /78      SpO2 98 %      EtCO2 mmHg       Height 6' 1\" (1.854 m)      Weight 209 lb 14.1 oz (95.2 kg)      Weight Scale Used ED Stated       Gen:   AAOx3 in NAD  Head:  Normocephalic, without abnormal findings  HEENT: Posterior pharyngeal erythema with mild swelling limited and q. day. No abscess.  Normal TMs.   PERRL, no other facial abnormalities noted   Resp: No respiratory distress and CTAB  Ext/Skin:  No clubbing, cyanosis, or significant edema.  Equal UE/LE pulses. No joint swelling or erythema.  Well perfused, no significant rashes. No jaundice  Neuro: No focal Neuro deficits with equal UE/LE strength and sensation.  Lymph:No significant Lymphadenopathy  Psych:Alert and interactive with normal affect and interaction.        Procedures    MDM    Labs  Results for orders placed or performed during the hospital encounter of 05/06/19   STREP GROUP A SCREEN RAPID WITH REFLEX TO CULTURE   Result Value    RAPID STREP GROUP A NEGATIVE       Radiology  No orders to display         Medications - No data to display      Diagnosis:  ED Diagnosis        Final diagnosis    Viral pharyngitis                     Summary of your Discharge Medications      You have not been prescribed any medications.           Follow Up:  [facility]  [facility] [facility] [id number] [phone]    As needed     Patient was instructed to return to the ED immediately if symptoms worsen or any new unusual symptoms arise.     Thomas Lott,  MD     Recheck on patient. Discussed with patient ED findings and plan for discharge. Patient was given ED warnings, discharge instructions, and follow up information to go home with. Patient understands and agrees with plan for discharge. Any questions have been answered.      Closure:  The patient understands that this is a provisional diagnosis. Provisional diagnosis can and do change. The diagnosis that you are discharged with today is based on the symptoms with which you presented today. If any new symptoms occur or worsen, you should seek immediate attention for re-evaluation.  Any symptoms that persist or fail to completely resolve require further evaluation by your other healthcare provider(s).           Thomas Lott MD  05/06/19 1958

## 2020-01-15 NOTE — GROUP NOTE
Group Therapy Note    Date: 1/15/2020    Group Start Time: 0845  Group End Time: 0900  Group Topic: Community Meeting    JESUS Mccray, CTRS    Pt did not attend [employer] d/t resting in room despite staff invitation to attend. 1:1 talk time offered as alternative to group session, pt declined.

## 2020-01-15 NOTE — PLAN OF CARE
[facility]  Initial Interdisciplinary Treatment Plan NO      Original treatment plan Date & Time: 1/15/20 0930    Admission Type:  Admission Type: Involuntary    Reason for admission:   Reason for Admission: positive feelings of self harm,  with plan to yes shhot self with friends Gun, or OD on \"crack cocaine\". recently homeless,  not taking medications per patient.   patient reports feelings of depression  for \"the past few days\"    Estimated Length of Stay:  5-7days  Estimated Discharge Date: to be determined by physician    PATIENT STRENGTHS:  Patient Strengths:Strengths: Connection to output provider  Patient Strengths and Limitations:Limitations: Tendency to isolate self, General negative or hopeless attitude about future/recovery, Inappropriate/potentially harmful leisure interests, Difficult relationships / poor social skills  Addictive Behavior: Addictive Behavior  In the past 3 months, have you felt or has someone told you that you have a problem with:  : None  Do you have a history of Chemical Use?: Yes  Do you have a history of Alcohol Use?: Yes  Do you have a history of Street Drug Abuse?: Yes  Histroy of Prescripton Drug Abuse?: No  Medical Problems:  Past Medical History:   Diagnosis Date    Bipolar disorder ([facility]     Depression     GERD (gastroesophageal reflux disease)     Hallucinations     Headache(784.0)     Hepatitis     Schizophrenia, schizo-affective ([facility]     Substance abuse ([facility]     Tobacco abuse     Type II or unspecified type diabetes mellitus without mention of complication, not stated as uncontrolled     Urinary incontinence      Status EXAM:Status and Exam  Normal: No  Facial Expression: Flat, Expressionless  Affect: Blunt  Level of Consciousness: Alert  Mood:Normal: No  Mood: Depressed, Anxious  Motor Activity:Normal: No  Motor Activity: Decreased  Interview Behavior: Cooperative  Preception: [place] to Person, [place] to Time, [place] to Place, [place] to Situation  Attention:Normal: Yes  Thought Processes: Blocking  Thought Content:Normal: No  Thought Content: Preoccupations  Hallucinations:  Auditory (Comment), Command(Comment)(To hurt self)  Delusions: No  Memory:Normal: No  Memory: Poor Recent, Poor Remote  Insight and Judgment: No  Insight and Judgment: Poor Judgment, Poor Insight, Unmotivated  Present Suicidal Ideation: Yes  Present Homicidal Ideation: No    EDUCATION:   Learner Progress Toward Treatment Goals: reviewed group plans and strategies for care    Method:group therapy, medication compliance, individualized assessments and care planning    Outcome: needs reinforcement    PATIENT GOALS: to be discussed with patient within 72 hours    PLAN/TREATMENT RECOMMENDATIONS:     continue group therapy , medications compliance, goal setting, individualized assessments and care, continue to monitor pt on unit      SHORT-TERM GOALS:   Time frame for Short-Term Goals: 5-7 days    LONG-TERM GOALS:  Time frame for Long-Term Goals: 6 months  Members Present in Team Meeting: See Signature Sheet    [place], [place]

## 2020-01-15 NOTE — BH NOTE
Refusals: patient isolative to room and bed. Lying with blankets over his head. Refuses to acknowledge nurse. Refused vitals, physical assessment, and morning medications despite encouragement from staff. Will continue to encourage compliance with treatment.  Stewart Boyer RN

## 2020-01-15 NOTE — BH NOTE
PSYCHIATRIC EVALUATION    No contraindications for seclusion  No contraindications for restraint      CHIEF COMPLAINT:    Schizoaffective disorder ([facility]  Rena Billings is a 60 y.o. male who presents with Schizoaffective disorder [facility]  The patient has been transferred there from the emergency department where he presented there with suicidal ideation with a plan to shoot himself. It was reported that he went to his friend's house to get a gun to shoot himself. The patient has been medically cleared and transferred to the psychiatric unit for further evaluation and management. HISTORY OF PRESENT ILLNESS:     Patient presented as a 50-year-old -American man who has a long history of schizoaffective disorder and cocaine dependence. The patient has a long history of noncompliance with his medication. The patient is at length with [facility]. The patient was very uncooperative during the evaluation and he did not answer most of the questions. However, he exhibited depressed mood, flat affect and very guarded. He covered his face with the blanket when the writer approached him and refused to answer further questions. We will reapproach the patient again for further evaluation.     PAST PSYCHIATRIC HISTORY:     Schizoaffective disorder and cocaine dependence  He is a client of [facility]    MEDICAL HISTORY:     Past Medical History:   Diagnosis Date    Bipolar disorder ([address]     Depression     GERD (gastroesophageal reflux disease)     Hallucinations     Headache(784.0)     Hepatitis     Schizophrenia, schizo-affective ([address]     Substance abuse ([facility]     Tobacco abuse     Type II or unspecified type diabetes mellitus without mention of complication, not stated as uncontrolled     Urinary incontinence       has a past medical history of Bipolar disorder ([address] Depression, GERD (gastroesophageal reflux disease), Hallucinations, Headache(784.0),

## 2020-01-15 NOTE — GROUP NOTE
Group Therapy Note    Date: 1/15/2020    Group Start Time: 1100  Group End Time: 8367  Group Topic: Recovery    [facility]    FRANKLIN Heath LSW        Group Therapy Note    Attendees: 7/10    patient refused to attend Recovery group at 1100 after encouragement from staff.           Signature:  FRANKLIN Heath LSW

## 2020-01-15 NOTE — GROUP NOTE
Group Therapy Note    Date: 1/15/2020    Group Start Time: 1330  Group End Time: 1400  Group Topic: Cognitive Skills    JESUS Gordono, CTRS    Pt did not attend RT skills group d/t resting in room despite staff invitation to attend. 1:1 talk time offered as alternative to group session, pt declined.

## 2020-01-16 PROCEDURE — 1240000000 HC EMOTIONAL WELLNESS R&B

## 2020-01-16 PROCEDURE — 6370000000 HC RX 637 (ALT 250 FOR IP): Performed by: PSYCHIATRY & NEUROLOGY

## 2020-01-16 RX ADMIN — OXCARBAZEPINE 300 MG: 300 TABLET, FILM COATED ORAL at 20:43

## 2020-01-16 RX ADMIN — TRAZODONE HYDROCHLORIDE 50 MG: 50 TABLET ORAL at 20:43

## 2020-01-16 RX ADMIN — CITALOPRAM HYDROBROMIDE 30 MG: 20 TABLET ORAL at 09:55

## 2020-01-16 RX ADMIN — OXCARBAZEPINE 300 MG: 300 TABLET, FILM COATED ORAL at 09:55

## 2020-01-16 RX ADMIN — HYDROXYZINE HYDROCHLORIDE 25 MG: 25 TABLET, FILM COATED ORAL at 20:43

## 2020-01-16 RX ADMIN — LURASIDONE HYDROCHLORIDE 160 MG: 80 TABLET, FILM COATED ORAL at 17:36

## 2020-01-16 RX ADMIN — QUETIAPINE FUMARATE 300 MG: 300 TABLET ORAL at 20:43

## 2020-01-16 NOTE — BH NOTE
Group Therapy Note     Date: 1/16/2020  Group Start Time: 1600    Group Topic: AA dual recovery    [facility]     Patient refused to attend AA dual recovery group at this time after encouragement from staff. 1:1 talk time offered but refused.

## 2020-01-16 NOTE — PLAN OF CARE
Problem: Depressive Behavior With or Without Suicide Precautions:  Goal: Able to verbalize and/or display a decrease in depressive symptoms  Description  Able to verbalize and/or display a decrease in depressive symptoms  1/15/2020 2054 by Nate Amin  Outcome: Ongoing  Note:   Patient admits to suicidal to get a friend gun and kill self due to housing situation. Patient reported feeling safe while here. Patient denies homicidal ideations at this time. Patient also admits to auditory hallucinations. Patient reported having depression and anxiety. Patient  calm, cooperative at this time. Patient has been out in dayroom watching tv with peers. 15 minutes safety checks maintained. Problem: Depressive Behavior With or Without Suicide Precautions:  Goal: Ability to disclose and discuss suicidal ideas will improve  Description  Ability to disclose and discuss suicidal ideas will improve  1/15/2020 2054 by Nate Amin  Outcome: Ongoing  Note:   Patient admits to suicidal to get a friend gun and kill self due to housing situation. Patient reported feeling safe while here. Patient denies homicidal ideations at this time. Patient also admits to auditory hallucinations. Patient reported having depression and anxiety. Patient  calm, cooperative at this time. Patient has been out in dayroom watching tv with peers. 15 minutes safety checks maintained.

## 2020-01-16 NOTE — PLAN OF CARE
Problem: Depressive Behavior With or Without Suicide Precautions:  Goal: Able to verbalize and/or display a decrease in depressive symptoms  Description  Able to verbalize and/or display a decrease in depressive symptoms  Outcome: Ongoing       Patient is denying any suicidal ideations and homicidal ideations. Patient is excessively paranoid and expresses worry about his living and financial situations. Patient was advised to speak to a . Patient otherwise denies any depression, admits to anxiety. Patient is provided with support and encouragement to attend groups and seek out staff if negative thoughts of harm arise. Q15 safety checks continue.

## 2020-01-16 NOTE — GROUP NOTE
Group Therapy Note    Date: 1/16/2020    Group Start Time: 1000  Group End Time: 9025  Group Topic: Group Therapy    JESUS Alas          Group Topic: Client will demonstrate increased interpersonal interaction and a clear understanding on the importance of discharge and safety planning. Group members will be able to complete their safety plan as well as determine a safe plan at time of discharge. JESUS Thurston. MSW, LSW      Notes:  Patient refused to attend psychotherapy group after encouragement from staff. 1:1 talk time offered but refused.     Discipline Responsible: Licensed Social Worker

## 2020-01-16 NOTE — GROUP NOTE
Group Therapy Note    Date: 1/16/2020    Group Start Time: 0900  Group End Time: 1144  Group Topic: Community Meeting    JESUS Beebesie Boston, GADIELS    Pt did not attend 0900 community meeting / goal setting group d/t resting in room despite staff invitation to attend. 1:1 talk time offered as alternative to group session, pt declined.             Signature:  Reema Vivar

## 2020-01-16 NOTE — PROGRESS NOTES
PROGRESS NOTE  Chart has been reviewed and the patient was interviewed at the bedside. The patient exhibited severe symptoms of depression, guarded and experiencing auditory hallucinations. The patient reported that his symptoms started getting worse when he became homeless and started using IV cocaine. The patient was tearful during the evaluation. He reported that he was not taking his medications. The patient denied side effects of medications. The safety plan has been discussed with the patient. We will continue monitoring for symptoms of depression, psychosis and to adjust his medications as needed. MENTAL STATUS EXAMINATION:    BP (!) 91/39   Pulse 54   Temp 98 °F (36.7 °C) (Oral)   Resp 16   Ht 6' 3\" (1.905 m)   Wt 152 lb (68.9 kg)   SpO2 99%   BMI 19.00 kg/m²     MOOD-is dysphoric   Affect-is depressed  Patient feels helpless hopeless and worthless  Psychomotor activity : decreased. APPEARANCE:  Personal hygiene is poor and patient is neglecting his appearance. Patient is somewhat unkempt  PSYCHOSIS: Ported auditory denied visual hallucinations. Denies paranoid delusions. ORIENTATION:  Oriented to time place and person. Recent and remote memory is grossly intact. Intelligence appears to be average  CONCENTRATION:  poor. SPEECH : goal directed , but slow . DIAGNOSIS:    Principal Problem:    Schizoaffective disorder ([facility] [address]  Resolved Problems:    * No resolved hospital problems.  *      LAB:    Recent Results (from the past 72 hour(s))   CBC with DIFF    Collection Time: 01/14/20  1:35 PM   Result Value Ref Range    WBC 6.1 3.5 - 11.0 k/uL    RBC 4.16 (L) 4.5 - 5.9 m/uL    Hemoglobin 12.0 (L) 13.5 - 17.5 g/dL    Hematocrit 37.3 (L) 41 - 53 %    MCV 89.6 80 - 100 fL    MCH 28.8 26 - mg Oral Daily    QUEtiapine  300 mg Oral Nightly    OXcarbazepine  300 mg Oral BID     acetaminophen, aluminum & magnesium hydroxide-simethicone, benztropine mesylate, traZODone, magnesium hydroxide, hydrOXYzine    Chart was reviewed the patient has been reviewed  In the same medications as prescribed above  Patient was discussed with the  and the treatment team.   Provided Supportive and insight-oriented psychotherapy psychotherapy  Recommended involvement in Unit milieu  Provided empathic listening, validation and support  Patient acknowledged and mutually decided to continue with current treatment plan  Discharge planning was discussed with the patient. Katina Doan MD        This note was created with the assistance of a speech-recognition program.  Although the intention is to generate a document that actually reflects the content of the visit, no guarantees can be provided that every mistake has been identified and corrected by editing.

## 2020-01-17 PROCEDURE — 1240000000 HC EMOTIONAL WELLNESS R&B

## 2020-01-17 PROCEDURE — 6370000000 HC RX 637 (ALT 250 FOR IP): Performed by: PSYCHIATRY & NEUROLOGY

## 2020-01-17 RX ADMIN — OXCARBAZEPINE 300 MG: 300 TABLET, FILM COATED ORAL at 22:03

## 2020-01-17 RX ADMIN — QUETIAPINE FUMARATE 300 MG: 300 TABLET ORAL at 22:02

## 2020-01-17 RX ADMIN — LURASIDONE HYDROCHLORIDE 160 MG: 80 TABLET, FILM COATED ORAL at 16:29

## 2020-01-17 NOTE — PLAN OF CARE
Problem: Depressive Behavior With or Without Suicide Precautions:  Goal: Able to verbalize and/or display a decrease in depressive symptoms  Description  Able to verbalize and/or display a decrease in depressive symptoms  Outcome: Ongoing  Note:   Patient still has suicidal thoughts and homicidal ideation towards his roommates. Doesn't experience any visual hallucinations but hears voices telling him to hurt his roommates. States he is still very depressed and anxious. Spends the day in his room laying in bed. Doesn't socialize with peers or attend groups. Support and encouragement to socialize was given. Q 15 minute safety checks.

## 2020-01-17 NOTE — GROUP NOTE
Group Therapy Note    Date: 1/17/2020    Group Start Time: 0900  Group End Time: 4541  Group Topic: Community Meeting    JESUS Wilson, GADIELS    Pt did not attend 0900 goal setting group d/t resting in room despite staff invitation to attend. 1:1 talk time offered as alternative to group session, pt declined.                 Signature:  Can Stevens

## 2020-01-17 NOTE — GROUP NOTE
Group Therapy Note    Date: 1/17/2020    Group Start Time: 1600  Group End Time: 1630  Group Topic: Relaxation    [facility]    Elba Calero RN        Group Therapy Note    Attendees: 3/16    patient refused to attend Wellness group at 1600 after encouragement from staff.  1:1 talk time offered but refused            Signature:  Elba Calero RN

## 2020-01-17 NOTE — GROUP NOTE
Group Therapy Note    Date: 1/17/2020    Group Start Time: 1430  Group End Time: 1129  Group Topic: Cognitive Skills    [facility]    Alton Veliz    Patient refused to attend leisure/ cognitive skills group at 1430 after encouragement from staff. 1:1 talk time provided as alternative to group session.       Signature:  Alton Veliz

## 2020-01-18 PROCEDURE — 1240000000 HC EMOTIONAL WELLNESS R&B

## 2020-01-18 PROCEDURE — 6370000000 HC RX 637 (ALT 250 FOR IP): Performed by: PSYCHIATRY & NEUROLOGY

## 2020-01-18 RX ORDER — BUPROPION HYDROCHLORIDE 100 MG/1
100 TABLET, EXTENDED RELEASE ORAL 2 TIMES DAILY
Status: DISCONTINUED | OUTPATIENT
Start: 2020-01-18 | End: 2020-01-20

## 2020-01-18 RX ADMIN — BUPROPION HYDROCHLORIDE 100 MG: 100 TABLET, FILM COATED, EXTENDED RELEASE ORAL at 21:23

## 2020-01-18 RX ADMIN — LURASIDONE HYDROCHLORIDE 160 MG: 80 TABLET, FILM COATED ORAL at 17:59

## 2020-01-18 RX ADMIN — OXCARBAZEPINE 300 MG: 300 TABLET, FILM COATED ORAL at 09:08

## 2020-01-18 RX ADMIN — OXCARBAZEPINE 300 MG: 300 TABLET, FILM COATED ORAL at 21:22

## 2020-01-18 RX ADMIN — CITALOPRAM HYDROBROMIDE 30 MG: 20 TABLET ORAL at 09:08

## 2020-01-18 RX ADMIN — TRAZODONE HYDROCHLORIDE 50 MG: 50 TABLET ORAL at 21:22

## 2020-01-18 RX ADMIN — QUETIAPINE FUMARATE 300 MG: 300 TABLET ORAL at 21:22

## 2020-01-18 ASSESSMENT — PAIN SCALES - GENERAL: PAINLEVEL_OUTOF10: 10

## 2020-01-18 NOTE — GROUP NOTE
Group Therapy Note    Date: 1/18/2020    Group Start Time: 1605  Group End Time: 1353  Group Topic: Cognitive Skills    [facility]    Dhaval Steinberg RN        Group Therapy Note    Attendees: 9         Patient's Goal:  Refused to state a goal    Notes:      Status After Intervention:  Unchanged    Participation Level: Minimal    Participation Quality: Resistant      Speech:  hesitant      Thought Process/Content: Logical      Affective Functioning: Flat      Mood: depressed      Level of consciousness:  Alert and Oriented x4      Response to Learning: Resistant      Endings: None Reported    Modes of Intervention: Education and Support      Discipline Responsible: Registered Nurse      Signature:  Dhaval Steinberg RN

## 2020-01-18 NOTE — GROUP NOTE
Group Therapy Note    Date: 1/18/2020    Group Start Time: 1000  Group End Time: 1508  Group Topic: Psychoeducation    JESUS Rojo         Group Topic: Client will demonstrate increased interpersonal interaction and a clear understanding on the importance of discharge and safety planning. Group members will be able to complete their safety plan as well as determine a safe plan at time of discharge. JESUS Goldberg. MSW, LSW      Notes:  Patient refused to attend psychotherapy group after encouragement from staff. 1:1 talk time offered but refused.     Discipline Responsible: Licensed Social Worker

## 2020-01-18 NOTE — PLAN OF CARE
Problem: Depressive Behavior With or Without Suicide Precautions:  Goal: Able to verbalize and/or display a decrease in depressive symptoms  Description  Able to verbalize and/or display a decrease in depressive symptoms  1/18/2020 1105 by Nadege Torres RN  Outcome: Ongoing     Problem: Altered Mood, Deterioration in Function:  Goal: Ability to perform activities of daily living will improve  Description  Ability to perform activities of daily living will improve  Outcome: Ongoing    Pt reports having suicidal and homicidal thoughts. Pt reports having a plan to borrow his friends gun and intent on acting on it. Pt also reports having intent on harming his ex roommates. Pt reports feeling depressed and anxious. Pt reports having auditory hallucinations. Pt reports having adequate appetite and fair sleep. Pt is cooperative with staff and compliant with medical treatment. Pt is isolative to room for most of day. Pt contracts for safety. Will continue to monitor for changes in condition.

## 2020-01-18 NOTE — PROGRESS NOTES
PROGRESS NOTE  Chart has been reviewed and the patient was interviewed at the [facility] while he was eating an ice cream and wearing a hospital gown. The patient was tearful reported that the voices are telling him to shoot his head and he will go to [facility]. The patient is still exhibiting command hallucinations. He is also reported symptoms of depression and anxiety. The patient was compliant with his medications. There was no behavioral problem reported by the nursing staff. The safety plan has been discussed with the patient. The patient educated about improving his coping skills and able to resist the negative thoughts and the voices. He sounds understanding the concept. We will continue monitoring for symptoms of depression, psychosis and to adjust his medications as needed. MENTAL STATUS EXAMINATION:    BP (!) 107/55   Pulse 74   Temp 98.7 °F (37.1 °C) (Oral)   Resp 16   Ht 6' 3\" (1.905 m)   Wt 152 lb (68.9 kg)   SpO2 99%   BMI 19.00 kg/m²     MOOD-is tearful and dysphoric   Affect-is depressed  Patient feels helpless hopeless and worthless  Psychomotor activity : decreased. APPEARANCE:  Personal hygiene is poor and patient is neglecting his appearance. Patient is somewhat unkempt  PSYCHOSIS: Reported command auditory denied visual hallucinations. Denies paranoid delusions. ORIENTATION:  Oriented to time place and person. Recent and remote memory is grossly intact. Intelligence appears to be average  CONCENTRATION:  poor. SPEECH : goal directed , but slow . DIAGNOSIS:    Principal Problem:    Schizoaffective disorder ([facility] [address]  Resolved Problems:    * No resolved hospital problems.  *      LAB:    No results found for this or any previous visit (from the past 72

## 2020-01-18 NOTE — PLAN OF CARE
Problem: Depressive Behavior With or Without Suicide Precautions:  Goal: Able to verbalize and/or display a decrease in depressive symptoms  Description  Able to verbalize and/or display a decrease in depressive symptoms  1/17/2020 2210 by Higinio Brandt LPN  Outcome: Ongoing     Problem: Depressive Behavior With or Without Suicide Precautions:  Goal: Ability to disclose and discuss suicidal ideas will improve  Description  Ability to disclose and discuss suicidal ideas will improve  Outcome: Ongoing   Patient verbalized having suicidal ideas at this time. Patient verbalized having mild depressive symptoms at this time. Patient has been in room at this time. Patient is free of self harm at this time. Patient agrees to seek out staff if thoughts to harm self become overwhelming. Staff will provide support and reassurance as needed. Safety checks maintained every 15 minutes.

## 2020-01-18 NOTE — GROUP NOTE
Group Therapy Note    Date: 1/18/2020    Group Start Time: 6643  Group End Time: 0154  Group Topic: Cognitive Skills    JESUS Ely, CTRS        Group Therapy Note    Attendees: 5/16         Pt did not participate in Cognitive Skills Group at [phone] when encouraged by RT due to resting in room. Pt was offered talk time as an alternative to group but declined.          Discipline Responsible: Psychoeducational Specialist        Signature:  Jared Romeo

## 2020-01-19 PROCEDURE — 6370000000 HC RX 637 (ALT 250 FOR IP): Performed by: PSYCHIATRY & NEUROLOGY

## 2020-01-19 PROCEDURE — 1240000000 HC EMOTIONAL WELLNESS R&B

## 2020-01-19 RX ADMIN — BUPROPION HYDROCHLORIDE 100 MG: 100 TABLET, FILM COATED, EXTENDED RELEASE ORAL at 20:59

## 2020-01-19 RX ADMIN — QUETIAPINE FUMARATE 300 MG: 300 TABLET ORAL at 20:57

## 2020-01-19 RX ADMIN — LURASIDONE HYDROCHLORIDE 160 MG: 80 TABLET, FILM COATED ORAL at 18:38

## 2020-01-19 RX ADMIN — HYDROXYZINE HYDROCHLORIDE 25 MG: 25 TABLET, FILM COATED ORAL at 20:58

## 2020-01-19 RX ADMIN — OXCARBAZEPINE 300 MG: 300 TABLET, FILM COATED ORAL at 20:57

## 2020-01-19 RX ADMIN — TRAZODONE HYDROCHLORIDE 50 MG: 50 TABLET ORAL at 20:58

## 2020-01-19 ASSESSMENT — PAIN DESCRIPTION - LOCATION: LOCATION: LEG

## 2020-01-19 ASSESSMENT — PAIN DESCRIPTION - ORIENTATION: ORIENTATION: LEFT

## 2020-01-19 ASSESSMENT — PAIN SCALES - GENERAL: PAINLEVEL_OUTOF10: 7

## 2020-01-19 NOTE — GROUP NOTE
Group Therapy Note    Date: 1/19/2020    Group Start Time: 1330  Group End Time: 1430  Group Topic: Cognitive Skills    JESUS Sarkar, CTRS        Group Therapy Note    Attendees: 6/18       Pt did not participate in Cognitive Skills Group at 1330 when encouraged by RT due to resting in room. Pt was offered talk time as an alternative to group but declined.         Discipline Responsible: Psychoeducational Specialist        Signature:  Yasmani Ocampo

## 2020-01-20 PROCEDURE — 6370000000 HC RX 637 (ALT 250 FOR IP): Performed by: PSYCHIATRY & NEUROLOGY

## 2020-01-20 PROCEDURE — 1240000000 HC EMOTIONAL WELLNESS R&B

## 2020-01-20 RX ORDER — BUPROPION HYDROCHLORIDE 150 MG/1
150 TABLET, EXTENDED RELEASE ORAL 2 TIMES DAILY
Status: DISCONTINUED | OUTPATIENT
Start: 2020-01-20 | End: 2020-01-24 | Stop reason: HOSPADM

## 2020-01-20 RX ADMIN — LURASIDONE HYDROCHLORIDE 160 MG: 80 TABLET, FILM COATED ORAL at 17:43

## 2020-01-20 RX ADMIN — OXCARBAZEPINE 300 MG: 300 TABLET, FILM COATED ORAL at 21:12

## 2020-01-20 RX ADMIN — CITALOPRAM HYDROBROMIDE 30 MG: 20 TABLET ORAL at 09:36

## 2020-01-20 RX ADMIN — HYDROXYZINE HYDROCHLORIDE 25 MG: 25 TABLET, FILM COATED ORAL at 17:43

## 2020-01-20 RX ADMIN — BUPROPION HYDROCHLORIDE 100 MG: 100 TABLET, FILM COATED, EXTENDED RELEASE ORAL at 09:36

## 2020-01-20 RX ADMIN — OXCARBAZEPINE 300 MG: 300 TABLET, FILM COATED ORAL at 09:36

## 2020-01-20 RX ADMIN — TRAZODONE HYDROCHLORIDE 50 MG: 50 TABLET ORAL at 21:12

## 2020-01-20 RX ADMIN — QUETIAPINE FUMARATE 300 MG: 300 TABLET ORAL at 21:12

## 2020-01-20 RX ADMIN — BUPROPION HYDROCHLORIDE 150 MG: 150 TABLET, EXTENDED RELEASE ORAL at 21:12

## 2020-01-20 NOTE — GROUP NOTE
Group Therapy Note    Date: 1/20/2020    Group Start Time: 2030  Group End Time: 2100  Group Topic: Wrap-Up    [facility]    Rebekah Sutton        Group Therapy Note    Attendees: 13/18         Patient's Goal:  Wrap up Group     Notes:  Short and long term goals    Status After Intervention:  Improved    Participation Level:  Active Listener and Interactive    Participation Quality: Appropriate, Attentive and Sharing      Speech:  normal      Thought Process/Content: Logical      Affective Functioning: Congruent      Mood: anxious, depressed and fearful      Level of consciousness:  Alert, Oriented x4 and Attentive      Response to Learning: Able to verbalize current knowledge/experience, Able to verbalize/acknowledge new learning, Able to retain information and Progressing to goal      Endings: Suicidality    Modes of Intervention: Education, Support, Socialization and Problem-solving      Discipline Responsible: [facility] [place] [address], [employer]      Signature:  Rebekah Sutton

## 2020-01-20 NOTE — GROUP NOTE
Group Therapy Note    Date: 1/20/2020    Group Start Time: 1100  Group End Time: 7820  Group Topic: Cognitive Skills    JESUS Andrade, GADIELS    Pt did not attend 1100 skills group d/t resting in room despite staff invitation to attend. 1:1 talk time offered as alternative to group session, pt declined.               Signature:  Charisse Currie

## 2020-01-20 NOTE — GROUP NOTE
Group Therapy Note    Date: 1/20/2020    Group Start Time: 2130  Group End Time: 2200  Group Topic: Relaxation    [facility]    Atul Bautista        Group Therapy Note    Attendees: 13/18         Patient's Goal:  Relaxation group     Notes:  Life stressors and positive coping     Status After Intervention:  Improved    Participation Level:  Active Listener and Interactive    Participation Quality: Appropriate, Attentive and Sharing      Speech:  normal      Thought Process/Content: Logical      Affective Functioning: Congruent      Mood: within normal limits      Level of consciousness:  Alert and Oriented x4      Response to Learning: Able to verbalize current knowledge/experience, Able to verbalize/acknowledge new learning and Progressing to goal      Endings: None Reported    Modes of Intervention: Education and Support      Discipline Responsible: [address], [place] [employer]      Signature:  Atul Bautista

## 2020-01-20 NOTE — GROUP NOTE
Group Therapy Note    Date: 1/20/2020    Group Start Time: 1430  Group End Time: 4244  Group Topic: Psychoeducation    JESUS Morales    Patient refused to attend coping skills/ mental health awareness group at 1430 after encouragement from staff. 1:1 talk time provided as alternative to group session.      Signature:  Lucy Morales

## 2020-01-20 NOTE — PLAN OF CARE
Problem: Depressive Behavior With or Without Suicide Precautions:  Goal: Ability to disclose and discuss suicidal ideas will improve  Description  Ability to disclose and discuss suicidal ideas will improve  Outcome: Ongoing  Note:   Patient is alert and oriented x4. Patient communicates well with staff. Patient admits to suicidal ideations with auditory command voices telling him to kill himself. Patient contracts for safety. Patient is isolative to room but comes out for meals with encouragement. Patient states he still hears the voices. Patient was encouraged to attend groups and to shower due to poor hygiene. Patient was calm and cooperative with staff and medication complaint. Staff will continue to do 15 minute checks per facility protocol. Problem: Altered Mood, Deterioration in Function:  Goal: Maintenance of adequate nutrition will improve  Description  Maintenance of adequate nutrition will improve  Outcome: Ongoing  Note:   Patient is alert and oriented x4. Patient admits to suicidal ideations and auditory command voices telling him to kill himself. Patient contracts for safety and has no plan. Patient admits to anxiety at times. Patient is calm and cooperative but isolative to room. Patient comes out of room for meals with encouragement. Patient appears weak and moves slowly when walking. Staff will continue to do 15 minute safety checks per facility protocol.

## 2020-01-20 NOTE — PROGRESS NOTES
PROGRESS NOTE  The chart has been reviewed and the patient was interviewed at the bedside. The patient continues to report severe symptoms of depression and was tearful during the evaluation. The patient spends most of the time in his room and did not engage in his treatment plan or attending therapy groups in the unit. The patient is still reporting overwhelming thoughts of harming himself and voices commands in nature and asking him to harm himself. The safety plan has been discussed with the patient and advised to approach to the nurses station once having overwhelming suicidal ideations and he understand the concept. The patient has been compliant with his medications. He denies side effects of medications. We will continue monitoring for symptoms of depression, psychosis and to adjust his medications as needed. MENTAL STATUS EXAMINATION:    BP (!) 96/56   Pulse 63   Temp 98.1 °F (36.7 °C) (Oral)   Resp 14   Ht 6' 3\" (1.905 m)   Wt 152 lb (68.9 kg)   SpO2 99%   BMI 19.00 kg/m²     MOOD-is dysphoric   Affect-is depressed  Patient feels helpless hopeless and worthless  Psychomotor activity : decreased. APPEARANCE:  Personal hygiene is poor and patient is neglecting his appearance. Patient is somewhat unkempt  PSYCHOSIS: He reported both auditory and visual hallucinations. Denies paranoid delusions. ORIENTATION:  Oriented to time place and person. Recent and remote memory is grossly intact. Intelligence appears to be average  CONCENTRATION:  poor. SPEECH : goal directed , but slow . DIAGNOSIS:    Principal Problem:    Schizoaffective disorder ([address]  Resolved Problems:    * No resolved hospital problems.  *      LAB:    No results found for this or any previous visit (from the past 72 hour(s)). TREATMENT PLAN:     buPROPion  150 mg Oral BID    lurasidone  160 mg Oral Dinner    citalopram  30 mg Oral Daily    QUEtiapine  300 mg Oral Nightly    OXcarbazepine  300 mg Oral BID     acetaminophen, aluminum & magnesium hydroxide-simethicone, benztropine mesylate, traZODone, magnesium hydroxide, hydrOXYzine    Chart was reviewed the patient has been interviewed  Sinew same medications as prescribed above  Patient was discussed with the  and the treatment team.   Provided Supportive and insight-oriented psychotherapy psychotherapy  Recommended involvement in Unit milieu  Provided empathic listening, validation and support  Patient acknowledged and mutually decided to continue with current treatment plan  Discharge planning was discussed with the patient. Abran Nuñez MD        This note was created with the assistance of a speech-recognition program.  Although the intention is to generate a document that actually reflects the content of the visit, no guarantees can be provided that every mistake has been identified and corrected by editing.

## 2020-01-20 NOTE — GROUP NOTE
Group Therapy Note    Date: 1/20/2020    Group Start Time: 0900  Group End Time: 0920  Group Topic: Community Meeting    JESUS Kelly    Patient refused to attend community meeting/ goals group at 0900 after encouragement from staff. 1:1 talk time provided by staff.      Signature:  Laina Kelly

## 2020-01-20 NOTE — PLAN OF CARE
Problem: Depressive Behavior With or Without Suicide Precautions:  Goal: Able to verbalize and/or display a decrease in depressive symptoms  Description  Able to verbalize and/or display a decrease in depressive symptoms  1/19/2020 2144 by Roger Vickers RN  Outcome: Ongoing  Note:   Patient states suicidal ideations with no plan, contracts, states auditory hallucinations commanding to harm himself. Denies any thoughts to harm others, has been mostly isolative to his room. Did attend night group, took night medication. States he will come to staff if suicidal ideations increase or worsen. States he is eating well and sleeping well.

## 2020-01-21 PROCEDURE — 1240000000 HC EMOTIONAL WELLNESS R&B

## 2020-01-21 PROCEDURE — 6370000000 HC RX 637 (ALT 250 FOR IP): Performed by: PSYCHIATRY & NEUROLOGY

## 2020-01-21 RX ADMIN — LURASIDONE HYDROCHLORIDE 160 MG: 80 TABLET, FILM COATED ORAL at 17:55

## 2020-01-21 RX ADMIN — TRAZODONE HYDROCHLORIDE 50 MG: 50 TABLET ORAL at 21:01

## 2020-01-21 RX ADMIN — BUPROPION HYDROCHLORIDE 150 MG: 150 TABLET, EXTENDED RELEASE ORAL at 21:01

## 2020-01-21 RX ADMIN — QUETIAPINE FUMARATE 300 MG: 300 TABLET ORAL at 21:01

## 2020-01-21 RX ADMIN — HYDROXYZINE HYDROCHLORIDE 25 MG: 25 TABLET, FILM COATED ORAL at 21:01

## 2020-01-21 RX ADMIN — OXCARBAZEPINE 300 MG: 300 TABLET, FILM COATED ORAL at 21:01

## 2020-01-21 RX ADMIN — BUPROPION HYDROCHLORIDE 150 MG: 150 TABLET, EXTENDED RELEASE ORAL at 09:48

## 2020-01-21 RX ADMIN — CITALOPRAM HYDROBROMIDE 30 MG: 20 TABLET ORAL at 09:48

## 2020-01-21 RX ADMIN — OXCARBAZEPINE 300 MG: 300 TABLET, FILM COATED ORAL at 09:48

## 2020-01-21 NOTE — GROUP NOTE
Group Therapy Note    Date: 1/21/2020    Group Start Time: 1430  Group End Time: 3800  Group Topic: Recovery    [facility]    FRANKLIN Park LSW        Group Therapy Note    Attendees: 5/17    patient refused to attend Recovery group at 1430 after encouragement from staff.          Signature:  FRANKLIN Park LSW

## 2020-01-21 NOTE — GROUP NOTE
Group Therapy Note    Date: 1/21/2020    Group Start Time: 1330  Group End Time: 0528  Group Topic: Cognitive Skills    JESUS Hernandez, GADIELS    Patient did not attend Cognitive Skills Group after encouragement from staff. 1:1 talk time offered but patient refused.      Signature:  Miri Lozoya

## 2020-01-21 NOTE — GROUP NOTE
Group Therapy Note    Date: 1/21/2020    Group Start Time: 1100  Group End Time: 1130  Group Topic: Cognitive Skills    JESUS Mcdermott, CTRS    Pt did not attend 1100 skills group d/t resting in room despite staff invitation to attend. 1:1 talk time offered as alternative to group session, pt declined.             Signature:  Adele Dutta

## 2020-01-21 NOTE — PLAN OF CARE
Problem: Depressive Behavior With or Without Suicide Precautions:  Goal: Able to verbalize and/or display a decrease in depressive symptoms  Description  Able to verbalize and/or display a decrease in depressive symptoms  Outcome: Ongoing  Note:   Patient states suicidal ideations with no plan, contracts for safety. Denies any thoughts to harm others, states auditory hallucinations that are always there commanding him to harm himself. States he is eating well but hasn't been sleeping well. Watched tv most of the night.

## 2020-01-21 NOTE — GROUP NOTE
Group Therapy Note    Date: 1/20/2020    Group Start Time: 2045  Group End Time: 2100  Group Topic: Relaxation    [facility]    David Winters LPN        Group Therapy Note    Attendees: 9/18         Patient's Goal:  Progressing towards goal    Notes:  participated    Status After Intervention:  Improved    Participation Level:  Active Listener and Interactive    Participation Quality: Appropriate, Attentive and Supportive      Speech:  normal      Thought Process/Content: Logical      Affective Functioning: Flat      Mood: depressed      Level of consciousness:  Alert and Oriented x4      Response to Learning: Able to verbalize current knowledge/experience and Able to verbalize/acknowledge new learning      Endings: None Reported    Modes of Intervention: Support and Socialization      Discipline Responsible: Licensed Practical Nurse      Signature:  David Winters LPN

## 2020-01-22 PROCEDURE — 6370000000 HC RX 637 (ALT 250 FOR IP): Performed by: PSYCHIATRY & NEUROLOGY

## 2020-01-22 PROCEDURE — 1240000000 HC EMOTIONAL WELLNESS R&B

## 2020-01-22 RX ADMIN — TRAZODONE HYDROCHLORIDE 50 MG: 50 TABLET ORAL at 21:10

## 2020-01-22 RX ADMIN — BUPROPION HYDROCHLORIDE 150 MG: 150 TABLET, EXTENDED RELEASE ORAL at 11:43

## 2020-01-22 RX ADMIN — OXCARBAZEPINE 300 MG: 300 TABLET, FILM COATED ORAL at 21:10

## 2020-01-22 RX ADMIN — OXCARBAZEPINE 300 MG: 300 TABLET, FILM COATED ORAL at 11:42

## 2020-01-22 RX ADMIN — ACETAMINOPHEN 650 MG: 325 TABLET, FILM COATED ORAL at 00:29

## 2020-01-22 RX ADMIN — BUPROPION HYDROCHLORIDE 150 MG: 150 TABLET, EXTENDED RELEASE ORAL at 21:10

## 2020-01-22 RX ADMIN — LURASIDONE HYDROCHLORIDE 160 MG: 80 TABLET, FILM COATED ORAL at 17:01

## 2020-01-22 RX ADMIN — CITALOPRAM HYDROBROMIDE 30 MG: 20 TABLET ORAL at 11:42

## 2020-01-22 RX ADMIN — QUETIAPINE FUMARATE 300 MG: 300 TABLET ORAL at 21:10

## 2020-01-22 ASSESSMENT — PAIN DESCRIPTION - PAIN TYPE: TYPE: CHRONIC PAIN

## 2020-01-22 ASSESSMENT — PAIN - FUNCTIONAL ASSESSMENT: PAIN_FUNCTIONAL_ASSESSMENT: 0-10

## 2020-01-22 ASSESSMENT — PAIN SCALES - GENERAL: PAINLEVEL_OUTOF10: 5

## 2020-01-22 NOTE — GROUP NOTE
Group Therapy Note    Date: 1/22/2020    Group Start Time: 1100  Group End Time: 5440  Group Topic: Cognitive Skills    JESUS Quiñones, CTRS      Pt did not attend 1100 skills group d/t resting in room despite staff invitation to attend. 1:1 talk time offered as alternative to group session, pt declined.               Signature:  Master Kurtz

## 2020-01-22 NOTE — PROGRESS NOTES
Pharmacy Med Education Group Note    Date: 1/22/20  Start Time: 1430  End Time: 6446    Number Participants in Group:  11    Goal:  Patient will demonstrate an understanding of the medications intended purpose and possible adverse effects  Topic: [place] for Pharmacy Med Ed Group    Discipline Responsible:     OT  AT  [facility].  RT     X Other       Participation Level:     None  Minimal      X Active Listener    X Interactive    Monopolizing         Participation Quality:    X Appropriate  Inappropriate     X       Attentive        Intrusive          Sharing        Resistant          Supportive        Lethargic       Affective:     X Congruent  Incongruent  Blunted  Flat    Constricted  Anxious  Elated  Angry    Labile  Depressed  Other         Cognitive:    X Alert  Oriented PPTP     Concentration   X G  F  P   Attention Span   X G  F  P   Short-Term Memory   X G  F  P   Long-Term Memory  G  F  P   ProblemSolving/  Decision Making  G  F  P   Ability to Process  Information   X G  F  P      Contributing Factors             Delusional             Hallucinating             Flight of Ideas             Other:       Modes of Intervention:    X Education   X Support  Exploration    Clarifying  Problem Solving  Confrontation    Socialization  Limit Setting  Reality Testing    Activity  Movement  Media    Other:            Response to Learning:    X Able to verbalize current knowledge/experience    Able to verbalize/acknowledge new learning    Able to retain information    Capable of insight    Able to change behavior    Progressing to goal    Other:        Comments:     Deysi Jarrett PharmD, BCPS  1/22/2020 3:18 PM

## 2020-01-22 NOTE — PLAN OF CARE
Time, Dille to Place, Dille to Situation  Attention:Normal: No  Attention: Distractible  Thought Processes: Flt.of Ideas  Thought Content:Normal: No  Thought Content: Preoccupations  Hallucinations: Auditory (Comment)  Delusions: No  Memory:Normal: No  Memory: Poor Recent, Poor Remote  Insight and Judgment: No  Insight and Judgment: Poor Judgment, Poor Insight, Unmotivated  Present Suicidal Ideation: No  Present Homicidal Ideation: No    Daily Assessment Last Entry:   Daily Sleep (WDL): Within Defined Limits         Patient Currently in Pain: Denies  Daily Nutrition (WDL): Within Defined Limits    Patient Monitoring:  Frequency of Checks: 4 times per hour, close    Psychiatric Symptoms:   Depression Symptoms  Depression Symptoms: Isolative, Loss of interest  Anxiety Symptoms  Anxiety Symptoms: Generalized  [place] Symptoms  [place] Symptoms: No problems reported or observed. Psychosis Symptoms  Delusion Type: No problems reported or observed. Suicide Risk CSSR-S:  1) Within the past month, have you wished you were dead or wished you could go to sleep and not wake up? : Yes  2) Have you actually had any thoughts of killing yourself? : Yes  3) Have you been thinking about how you might kill yourself? : Yes  5) Have you started to work out or worked out the details of how to kill yourself?  Do you intend to carry out this plan? : Yes  6) Have you ever done anything, started to do anything, or prepared to do anything to end your life?: Yes  Change in Result no  Change in Plan of care no    EDUCATION:   Learner Progress Toward Treatment Goals: Reviewed results and recommendations of this team    Method: Group    Outcome: No evidence of Learning    PATIENT GOALS: pt refuses to attend tx planning to develop goals / attend tx planning     PLAN/TREATMENT RECOMMENDATIONS UPDATE:   COPING SKILLS CONTINUE WITH GROUP THERAPIES POSITIVE INTERACTIONS, GOAL SETTING    SHORT-TERM GOALS UPDATE:  Time frame for Short-Term Goals:

## 2020-01-22 NOTE — GROUP NOTE
Group Therapy Note    Date: 1/22/2020    Group Start Time: 0900  Group End Time: 2796  Group Topic: Community Meeting    [address] [facility]    Tanner Porras, [address]        Group Therapy Note    Attendees: 9/18         Pt did not participate in Community Meeting/Goals Group at 900am when encouraged by RT due to resting in room. Pt was offered talk time as an alternative to group but declined.         Discipline Responsible: Psychoeducational Specialist        Signature:  Tonia Hall

## 2020-01-22 NOTE — PROGRESS NOTES
PROGRESS NOTE  The chart has been reviewed and the patient was interviewed at the bedside. The patient continues to be depressed, seclusive to bed, isolating himself with poor interaction with peers and staff. The patient did not attend any of the therapy group. He still reporting having auditory hallucinations command in nature telling him to harm himself. The patient was able to guarantee for safety at the hospital.  However, he is not feeling safe to be discharged yet. The patient denied side effects of his medications. There was no behavioral problem reported by the nursing staff and the patient did not need emergency medications. We will continue monitoring for symptoms of psychosis, depression and to adjust his medications as needed. MENTAL STATUS EXAMINATION:    BP (!) 90/58   Pulse 73   Temp 98.3 °F (36.8 °C) (Oral)   Resp 14   Ht 6' 3\" (1.905 m)   Wt 152 lb (68.9 kg)   SpO2 99%   BMI 19.00 kg/m²     MOOD-is dysphoric   Affect-is depressed  Patient feels helpless hopeless and worthless  Psychomotor activity : decreased. APPEARANCE:  Personal hygiene is poor and patient is neglecting his appearance. Patient is somewhat unkempt  PSYCHOSIS: He reported auditory but denied visual hallucinations. He exhibited paranoid delusions. ORIENTATION:  Oriented to time place and person. Recent and remote memory is grossly intact. Intelligence appears to be average  CONCENTRATION:  poor. SPEECH : goal directed , but slow . DIAGNOSIS:    Principal Problem:    Schizoaffective disorder ([facility] [address]  Resolved Problems:    * No resolved hospital problems. *      LAB:    No results found for this or any previous visit (from the past 72 hour(s)).         TREATMENT PLAN:     buPROPion  150 mg Oral BID   

## 2020-01-23 PROCEDURE — 6370000000 HC RX 637 (ALT 250 FOR IP): Performed by: PSYCHIATRY & NEUROLOGY

## 2020-01-23 PROCEDURE — 1240000000 HC EMOTIONAL WELLNESS R&B

## 2020-01-23 RX ADMIN — BUPROPION HYDROCHLORIDE 150 MG: 150 TABLET, EXTENDED RELEASE ORAL at 12:02

## 2020-01-23 RX ADMIN — OXCARBAZEPINE 300 MG: 300 TABLET, FILM COATED ORAL at 12:02

## 2020-01-23 RX ADMIN — TRAZODONE HYDROCHLORIDE 50 MG: 50 TABLET ORAL at 22:03

## 2020-01-23 RX ADMIN — QUETIAPINE FUMARATE 300 MG: 300 TABLET ORAL at 22:03

## 2020-01-23 RX ADMIN — ACETAMINOPHEN 650 MG: 325 TABLET, FILM COATED ORAL at 20:09

## 2020-01-23 RX ADMIN — CITALOPRAM HYDROBROMIDE 30 MG: 20 TABLET ORAL at 12:01

## 2020-01-23 RX ADMIN — BUPROPION HYDROCHLORIDE 150 MG: 150 TABLET, EXTENDED RELEASE ORAL at 22:03

## 2020-01-23 RX ADMIN — OXCARBAZEPINE 300 MG: 300 TABLET, FILM COATED ORAL at 22:03

## 2020-01-23 ASSESSMENT — PAIN - FUNCTIONAL ASSESSMENT
PAIN_FUNCTIONAL_ASSESSMENT: 0-10
PAIN_FUNCTIONAL_ASSESSMENT: 0-10

## 2020-01-23 ASSESSMENT — PAIN SCALES - GENERAL
PAINLEVEL_OUTOF10: 8
PAINLEVEL_OUTOF10: 8

## 2020-01-23 NOTE — PLAN OF CARE
Problem: Pain:  Goal: Pain level will decrease  Description  Pain level will decrease  1/23/2020 0116 by Ernesto Nicolas RN  Outcome: Ongoing  Note:   Patient denies experiencing pain at this time. Problem: Depressive Behavior With or Without Suicide Precautions:  Goal: Ability to disclose and discuss suicidal ideas will improve  Description  Ability to disclose and discuss suicidal ideas will improve  1/23/2020 0116 by Ernesto Nicolas RN  Outcome: Ongoing  Note:   Patient denies experiencing suicidal and homicidal ideations at this time. Patient says he is experiencing auditory hallucinations commanding him to harm himself. Patient says he is trying to ignore these voices. Patient says he is experiencing feelings of depression and is denying feelings of anxiety. Patient is isolative to his room for long periods. Patient will come out to day area for short periods and watch television but does not interact with others. Patient said is going to be discharged soon but he does not know where he is going. Patient says he is hoping he does not have to go to the [facility]. Sleep and appetite are good. Patient is encouraged to shower by staff but declines. No self-harm has been noted. Safety checks remain in place every 15 minutes and as needed.

## 2020-01-23 NOTE — PROGRESS NOTES
PROGRESS NOTE  The chart has been reviewed and the patient was interviewed at the bedside. The patient continues to be seclusive to self and poor interaction with others. He is isolated and not engaging in his treatment plan. He is still reporting symptoms of depression. He did not attend therapy groups in the unit. Patient still reporting auditory hallucinations but denied visual hallucinations. He reported the voices telling him to harm himself. However, he stated that he was able to resist these thoughts. He stated the voices are getting less and the intensity and frequency. There was no behavioral problem reported by the nursing staff. The patient was able to guarantee for safety at the hospital.  The patient is still symptomatic and not ready for discharge. He denies side effects of his medications. We will continue monitoring for symptoms of psychosis, depression and to adjust his medications as needed. MENTAL STATUS EXAMINATION:    BP (!) 114/58   Pulse 74   Temp 98.1 °F (36.7 °C) (Oral)   Resp 14   Ht 6' 3\" (1.905 m)   Wt 152 lb (68.9 kg)   SpO2 99%   BMI 19.00 kg/m²     MOOD-is dysphoric   Affect-is depressed  Patient feels helpless hopeless and worthless  Psychomotor activity : decreased. APPEARANCE:  Personal hygiene is poor and patient is neglecting his appearance. Patient is somewhat unkempt  PSYCHOSIS: He reported auditory but denied visual hallucinations. He exhibited paranoid delusions. ORIENTATION:  Oriented to time place and person. Recent and remote memory is grossly intact. Intelligence appears to be average  CONCENTRATION:  poor. SPEECH : goal directed , but slow .   DIAGNOSIS:    Principal Problem:    Schizoaffective disorder ([facility] [address]  Resolved Problems:    * No

## 2020-01-23 NOTE — GROUP NOTE
Group Therapy Note    Date: 1/23/2020    Group Start Time: 1330  Group End Time: 6418  Group Topic: Cognitive Skills    CZ DAVID Capellan, CTRS    Pt did not attend 1330 relapse prevention  group d/t resting in room despite staff invitation to attend. 1:1 talk time offered as alternative to group session, pt declined.               Signature:  Yayo Corrigan

## 2020-01-23 NOTE — GROUP NOTE
Group Therapy Note    Date: 1/23/2020    Group Start Time: 1430  Group End Time: 4028  Group Topic: Cognitive Skills    JESUS Robles, CTRS        Group Therapy Note    Attendees: 9/16         Pt did not participate in Cognitive Skills Group at 1430 when encouraged by RT due to resting in room. Pt was offered talk time as an alternative to group but declined.          Discipline Responsible: Psychoeducational Specialist        Signature:  Irene Singh

## 2020-01-24 VITALS
OXYGEN SATURATION: 99 % | TEMPERATURE: 98.2 F | SYSTOLIC BLOOD PRESSURE: 82 MMHG | HEART RATE: 74 BPM | RESPIRATION RATE: 14 BRPM | DIASTOLIC BLOOD PRESSURE: 48 MMHG | BODY MASS INDEX: 18.9 KG/M2 | HEIGHT: 75 IN | WEIGHT: 152 LBS

## 2020-01-24 PROCEDURE — 6370000000 HC RX 637 (ALT 250 FOR IP): Performed by: PSYCHIATRY & NEUROLOGY

## 2020-01-24 RX ORDER — BUPROPION HYDROCHLORIDE 150 MG/1
150 TABLET, EXTENDED RELEASE ORAL 2 TIMES DAILY
Qty: 60 TABLET | Refills: 3 | Status: ON HOLD | OUTPATIENT
Start: 2020-01-24 | End: 2020-02-12 | Stop reason: HOSPADM

## 2020-01-24 RX ADMIN — CITALOPRAM HYDROBROMIDE 30 MG: 20 TABLET ORAL at 09:44

## 2020-01-24 RX ADMIN — BUPROPION HYDROCHLORIDE 150 MG: 150 TABLET, EXTENDED RELEASE ORAL at 09:44

## 2020-01-24 RX ADMIN — OXCARBAZEPINE 300 MG: 300 TABLET, FILM COATED ORAL at 09:44

## 2020-01-24 NOTE — GROUP NOTE
Group Therapy Note    Date: 1/24/2020    Group Start Time: 1430  Group End Time: 1684  Group Topic: Cognitive Skills    [facility]    Mayte Sena CTRS        Group Therapy Note    Attendees: 10/16         Patient's Goal:  To increase social interaction and to practice problem solving with rapid fire questions to improve thought process     Notes: Pt participated partially in group task . Pt was able to practice problem solving with rapid fire questions to improve thought process. Pt was supportive of peers and persistent in trying to push through thought blocking,at times, to answer questions        Status After Intervention:  Improved     Participation Level:  Active Listener and Interactive     Participation Quality: Appropriate, Attentive, Sharing and Supportive        Speech:  normal        Thought Process/Content: Logical  Linear        Affective Functioning: Congruent, bright, social, danced to music on radio        Mood: euthymic        Level of consciousness:  Alert, Oriented x4 and Attentive        Response to Learning: Able to verbalize current knowledge/experience, Able to verbalize/acknowledge new learning, Able to retain information and Progressing to goal        Endings: None Reported     Modes of Intervention: Education, Support, Socialization, Exploration, Clarifying and Problem-solving        Discipline Responsible: Psychoeducational Specialist        Signature:  GADIEL IversonS

## 2020-01-24 NOTE — PLAN OF CARE
Problem: Pain:  Goal: Pain level will decrease  Description  Pain level will decrease  Outcome: Ongoing  Note:   Patient has complaints of pain in his left hip. Patient requested tylenol for the pain. Problem: Depressive Behavior With or Without Suicide Precautions:  Goal: Able to verbalize and/or display a decrease in depressive symptoms  Description  Able to verbalize and/or display a decrease in depressive symptoms  Outcome: Ongoing  Note:   Patient denies feelings of depression and anxiety at this time. Patient says he is worried and scared of being discharged on Friday. Patient said he is scared to leave because he is still experiencing auditory hallucinations. When asked what he is hearing patient said he is trying to ignore the voices and didn't go into further detail. Patient said the girl he is going to stay with always has two men in her house and the girl and men use dope. Patient said these people are accusing him of stealing drugs and money. Patient said he does not want to use drugs again so he does not what to go to this his. Patient said he plans on going to  for help to find housing when he is discharged. Patient denies experiencing suicidal and homicidal ideations at this time. He is cooperative with medications and assessments.

## 2020-01-24 NOTE — BH NOTE
patient refused to attend wrap up and relaxation group at 2030 after encouragement from staff.   1:1 talk time provided as alternative to group session

## 2020-01-24 NOTE — GROUP NOTE
Group Therapy Note    Date: 1/24/2020    Group Start Time: 1000  Group End Time: 5670  Group Topic: Group Therapy    JESUS Montero    Group Topic: Client will demonstrate increased interpersonal interaction and a clear understanding on the importance of discharge and safety planning. Group members will be able to complete their safety plan as well as determine a safe plan at time of discharge. Notes:  Patient refused to attend psychotherapy group after encouragement from staff. 1:1 talk time offered but refused.     Discipline Responsible: Licensed Social Worker

## 2020-01-25 NOTE — BH NOTE
49 08/20/2014    HDL 43 12/31/2013    HDL 42 08/15/2013    HDL 38 (L) 09/17/2012    HDL 55 02/03/2012     No components found for: LDLCAL  No results found for: Josselyn Andrews RN

## 2020-01-27 ENCOUNTER — HOSPITAL ENCOUNTER (EMERGENCY)
Age: 60
Discharge: HOME | End: 2020-01-27
Attending: EMERGENCY MEDICINE
Payer: MEDICAID

## 2020-01-27 ENCOUNTER — APPOINTMENT (OUTPATIENT)
Dept: GENERAL RADIOLOGY | Age: 60
End: 2020-01-27
Payer: MEDICAID

## 2020-01-27 VITALS
RESPIRATION RATE: 18 BRPM | SYSTOLIC BLOOD PRESSURE: 132 MMHG | TEMPERATURE: 97.2 F | OXYGEN SATURATION: 98 % | HEART RATE: 76 BPM | DIASTOLIC BLOOD PRESSURE: 77 MMHG

## 2020-01-27 PROCEDURE — 73502 X-RAY EXAM HIP UNI 2-3 VIEWS: CPT

## 2020-01-27 PROCEDURE — 6370000000 HC RX 637 (ALT 250 FOR IP): Performed by: PHYSICIAN ASSISTANT

## 2020-01-27 PROCEDURE — 99284 EMERGENCY DEPT VISIT MOD MDM: CPT

## 2020-01-27 RX ORDER — IBUPROFEN 800 MG/1
800 TABLET ORAL ONCE
Status: COMPLETED | OUTPATIENT
Start: 2020-01-27 | End: 2020-01-27

## 2020-01-27 RX ORDER — ACETAMINOPHEN 325 MG/1
650 TABLET ORAL EVERY 6 HOURS PRN
Qty: 30 TABLET | Refills: 0 | Status: SHIPPED | OUTPATIENT
Start: 2020-01-27 | End: 2020-03-22

## 2020-01-27 RX ORDER — IBUPROFEN 800 MG/1
800 TABLET ORAL EVERY 8 HOURS PRN
Qty: 20 TABLET | Refills: 0 | Status: ON HOLD | OUTPATIENT
Start: 2020-01-27 | End: 2020-02-12 | Stop reason: HOSPADM

## 2020-01-27 RX ADMIN — IBUPROFEN 800 MG: 800 TABLET, FILM COATED ORAL at 14:11

## 2020-01-27 ASSESSMENT — ENCOUNTER SYMPTOMS
COUGH: 0
EYE PAIN: 0
SHORTNESS OF BREATH: 0
EYE DISCHARGE: 0
VOMITING: 0
EYE ITCHING: 0
WHEEZING: 0
NAUSEA: 0
BACK PAIN: 0
SORE THROAT: 0
RHINORRHEA: 0

## 2020-01-27 ASSESSMENT — PAIN DESCRIPTION - PAIN TYPE: TYPE: ACUTE PAIN

## 2020-01-27 ASSESSMENT — PAIN DESCRIPTION - FREQUENCY: FREQUENCY: CONTINUOUS

## 2020-01-27 ASSESSMENT — PAIN DESCRIPTION - LOCATION: LOCATION: HIP

## 2020-01-27 ASSESSMENT — PAIN DESCRIPTION - PROGRESSION: CLINICAL_PROGRESSION: NOT CHANGED

## 2020-01-27 ASSESSMENT — PAIN DESCRIPTION - DESCRIPTORS: DESCRIPTORS: ACHING;CONSTANT

## 2020-01-27 ASSESSMENT — PAIN DESCRIPTION - ORIENTATION: ORIENTATION: LEFT

## 2020-01-27 ASSESSMENT — PAIN SCALES - GENERAL: PAINLEVEL_OUTOF10: 10

## 2020-01-27 NOTE — ED NOTES
Pt reports to the ED via triage with c/o leg pain. Pt states x1 month he has had Lt hp pain that goes down to his leg when he walks. Pt states it is also when he sits but not as bad. Pt is A&Ox4, R even and non labored.      Bethanie Graham RN  01/27/20 3321

## 2020-01-27 NOTE — ED PROVIDER NOTES
[facility]  ED  Emergency Department Encounter  Advanced Practice Provider     Pt Name: Joel Kaye  MRN: [id number]  Shingflu 1960  Date of evaluation: 1/27/20  PCP:  Hortencia Jarrell MD    [address] [place] [address]       Chief Complaint   Patient presents with    Leg Pain     Pt staes Lt leg pain that startes at his hip and goes down his leg       HISTORY OF PRESENT ILLNESS  (Location/Symptom, Timing/Onset,Context/Setting, Quality, Duration, Modifying Factors, Severity.)      Joel Kaye is a 60 y.o. male who presents with pain. Patient states that the pain started about 1 month ago. He denies any fall or injury. He does report that the pain is worse when he ambulates more. He states that when he was inpatient at [facility] in the psychiatric unit he was taking Tylenol but states that it was not helping. He denies any back pain. No illegal drug use including heroin or cocaine. He has never had pain like this in the past.  He denies any fevers. PAST MEDICAL /SURGICAL / SOCIAL / FAMILY HISTORY      has a past medical history of Bipolar disorder ([address] Depression, GERD (gastroesophageal reflux disease), Hallucinations, Headache(784.0), Hepatitis, Schizophrenia, schizo-affective ([address] Substance abuse ([address] Tobacco abuse, Type II or unspecified type diabetes mellitus without mention of complication, not stated as uncontrolled, and Urinary incontinence. has a past surgical history that includes Dental surgery and Abscess Drainage (N/A, 02/11/2018).     Social History     Socioeconomic History    Marital status: Single     Spouse name: Not on file    Number of children: 0    Years of education: 8    Highest education level: Not on file   Occupational History     Employer: N/A   Social Needs    Financial resource strain: Not on file    Food insecurity:     Worry: Not on file     Inability: Not on file    Transportation needs:     Medical: Not on file     Non-medical: Not on file   Tobacco Use    Smoking status: Current Every Day Smoker     Packs/day: 1.00     Types: Cigarettes    Smokeless tobacco: Never Used   Substance and Sexual Activity    Alcohol use: Yes     Comment: occassional drinker    Drug use: Yes     Types: Cocaine    Sexual activity: Not on file   Lifestyle    Physical activity:     Days per week: Not on file     Minutes per session: Not on file    Stress: Not on file   Relationships    Social connections:     Talks on phone: Not on file     Gets together: Not on file     Attends Orthodoxy service: Not on file     Active member of club or organization: Not on file     Attends meetings of clubs or organizations: Not on file     Relationship status: Not on file    Intimate partner violence:     Fear of current or ex partner: Not on file     Emotionally abused: Not on file     Physically abused: Not on file     Forced sexual activity: Not on file   Other Topics Concern    Not on file   Social History Narrative    Not on file       Family History   Problem Relation Age of Onset    Diabetes Mother     Heart Disease Mother        Allergies:  Navane [thiothixene]    Home Medications:  Prior to Admission medications    Medication Sig Start Date End Date Taking?  Authorizing Provider   ibuprofen (ADVIL;MOTRIN) 800 MG tablet Take 1 tablet by mouth every 8 hours as needed for Pain 1/27/20  Yes Daria Sánchez PA-C   acetaminophen (TYLENOL) 325 MG tablet Take 2 tablets by mouth every 6 hours as needed for Pain 1/27/20  Yes Daria Sánchez PA-C   buPROPion [facility] SR) 150 MG extended release tablet Take 1 tablet by mouth 2 times daily 1/24/20   Meri Villela MD   lurasidone (LATUDA) 80 MG TABS tablet Take 2 tablets by mouth Daily with supper 1/24/20   Meri Villela MD   citalopram (CELEXA) 10 MG tablet Take 30 mg by mouth daily    Historical Provider, MD   OXcarbazepine (TRILEPTAL) 300 MG tablet Take 300 mg by mouth 2 times daily    Historical Provider, MD QUEtiapine (SEROQUEL) 300 MG tablet Take 1 tablet by mouth nightly 5/21/18   MARY Gallegos       patient's medication list has been reviewed as entered by the nursing staff. REVIEW OF SYSTEMS    (2-9 systems for level 4, 10 or more for level 5)      Review of Systems   Constitutional: Negative for chills and fever. HENT: Negative for ear pain, rhinorrhea and sore throat. Eyes: Negative for pain, discharge and itching. Respiratory: Negative for cough, shortness of breath and wheezing. Cardiovascular: Negative for chest pain and palpitations. Gastrointestinal: Negative for nausea and vomiting. Genitourinary: Negative for difficulty urinating, dysuria and hematuria. Musculoskeletal: Positive for arthralgias and myalgias. Negative for back pain. Skin: Negative for rash and wound. Neurological: Negative for dizziness and headaches. Psychiatric/Behavioral: Negative for dysphoric mood. PHYSICAL EXAM  (up to 7 for level 4, 8 or more for level 5)      INITIAL VITALS:  oral temperature is 97.2 °F (36.2 °C). His blood pressure is 132/77 and his pulse is 76. His respiration is 18 and oxygen saturation is 98%. Physical Exam  Constitutional:       Appearance: He is well-developed. He is not diaphoretic. HENT:      Head: Normocephalic and atraumatic. Right Ear: External ear normal.      Left Ear: External ear normal.   Eyes:      General: No scleral icterus. Right eye: No discharge. Left eye: No discharge. Neck:      Musculoskeletal: Normal range of motion. Trachea: No tracheal deviation. Cardiovascular:      Rate and Rhythm: Normal rate and regular rhythm. Heart sounds: Normal heart sounds. No murmur. No gallop. Pulmonary:      Effort: Pulmonary effort is normal. No respiratory distress. Breath sounds: Normal breath sounds. No stridor. Abdominal:      Tenderness: There is no abdominal tenderness. There is no guarding or rebound. Musculoskeletal: Normal range of motion. Comments: Does have mild pain over the greater trochanter however there is no area of swelling, erythema. Patient has a negative straight leg raise however does appear in quite a bit of pain when I flex him passively at the hip. He can move his knee without difficulty. He does have additional pain with external rotation of the hip. He ambulates in a short shuffling gait. There is no crepitance noted. Skin:     General: Skin is warm and dry. Coloration: Skin is not pale. Findings: No rash (on exposed surfaces). Neurological:      Mental Status: He is alert and oriented to person, place, and time. Coordination: Coordination normal.   Psychiatric:         Behavior: Behavior normal.           PLAN (LABS / IMAGING / EKG):  Orders Placed This Encounter   Procedures    XR HIP 2-3 VW W PELVIS LEFT       MEDICATIONS ORDERED:  Orders Placed This Encounter   Medications    ibuprofen (ADVIL;MOTRIN) tablet 800 mg    ibuprofen (ADVIL;MOTRIN) 800 MG tablet     Sig: Take 1 tablet by mouth every 8 hours as needed for Pain     Dispense:  20 tablet     Refill:  0    acetaminophen (TYLENOL) 325 MG tablet     Sig: Take 2 tablets by mouth every 6 hours as needed for Pain     Dispense:  30 tablet     Refill:  0       Controlled Substances Monitoring:      DIAGNOSTIC RESULTS / EMERGENCY DEPARTMENT COURSE / MDM     Study shows some mild arthritic changes with no avascular necrotic type changes. He does have pain over the greater trochanter but symptoms do not appear consistent with a bursitis. Will have patient follow-up with orthopedics. He has been applying BenGay to the area.     RADIOLOGY:   I directly visualized (with the attending physician) the following  imagesand reviewed the radiologist interpretations:  Xr Hip 2-3 Vw W Pelvis Left    Result Date: 1/27/2020  EXAMINATION: ONE XRAY VIEW OF THE PELVIS AND TWO XRAY VIEWS LEFT HIP 1/27/2020 2:18 pm COMPARISON: None. HISTORY: ORDERING SYSTEM PROVIDED HISTORY: pain to the left hip, no injury TECHNOLOGIST PROVIDED HISTORY: pain to the left hip, no injury Reason for Exam: left hip pain [id number]. no injury FINDINGS: No fracture or malalignment identified. Mild joint space narrowing in the hips. No discrete soft tissue abnormality identified. No acute osseous abnormality. Mild joint space narrowing in the hips. XR HIP 2-3 VW W PELVIS LEFT   Final Result   No acute osseous abnormality. Mild joint space narrowing in the hips. LABS:  No results found for this visit on 01/27/20. CONSULTS:  None    PROCEDURES:  None    FINAL IMPRESSION      1.  Left hip pain          DISPOSITION / PLAN     DISPOSITION Decision To Discharge    PATIENT REFERRED TO:  Sridhar Narvaez DO  [address] [place] [address]  [phone]    Schedule an appointment as soon as possible for a visit   THIS IS THE ORTHOPEDIC PHYSICIAN      DISCHARGE MEDICATIONS:  Discharge Medication List as of 1/27/2020  2:46 PM      START taking these medications    Details   ibuprofen (ADVIL;MOTRIN) 800 MG tablet Take 1 tablet by mouth every 8 hours as needed for Pain, Disp-20 tablet, R-0Print      acetaminophen (TYLENOL) 325 MG tablet Take 2 tablets by mouth every 6 hours as needed for Pain, Disp-30 tablet, R-0Print             Apolinar Perry PA-C   Emergency Medicine Physician Assistant    (Please note that portions of this note were completed with a voice recognition program.  Efforts were made to edit thedictations but occasionally words are mis-transcribed.)        Apolinar Perry PA-C  01/27/20 8224

## 2020-01-28 NOTE — DISCHARGE SUMMARY
DISCHARGE      SUMMARY                                                               DEMOGRAPHICS          Jorge Bear is a 60 y.o.   male     DATE OF ADMISSION: 1/14/2020  DATE OF DISCHARGE: 01/27/20  DISCHARGE DIAGNOSES:   Principal Problem:    Schizoaffective disorder ([address]  Resolved Problems:    * No resolved hospital problems. *  Jorge Bear is a 60 y.o. male who presents with Schizoaffective disorder [facility]  The patient has been transferred there from the emergency department where he presented there with suicidal ideation with a plan to shoot himself. It was reported that he went to his friend's house to get a gun to shoot himself. The patient has been medically cleared and transferred to the psychiatric unit for further evaluation and management. HOSPITAL COURSE    Patient presented as a 59-year-old -American man who has a long history of schizoaffective disorder and cocaine dependence. The patient has a long history of noncompliance with his medication. The patient is at length with [facility]. The patient was very uncooperative during the evaluation and he did not answer most of the questions. However, he exhibited depressed mood, flat affect and very guarded. He covered his face with the blanket when the writer approached him and refused to answer further questions. We will reapproach the patient again for further evaluation. For detailed history, mental status examination at time of admission, diagnoses and treatment plan, please see the dictated psychiatric evaluation at the time of admission  After admission, patient was seen in individual supportive psychotherapy, was encouraged to participate in unit milieu. Case was also discussed with the staff. The patient exhibited severe symptoms of depression, guarded and experiencing auditory hallucinations.   The patient reported times daily  Notes to patient:  depression        CONTINUE taking these medications    citalopram 10 MG tablet  Commonly known as:  CELEXA  Notes to patient:  depression     lurasidone 80 MG Tabs tablet  Commonly known as:  Latuda  Take 2 tablets by mouth Daily with supper  Notes to patient:  depression     OXcarbazepine 300 MG tablet  Commonly known as:  TRILEPTAL  Notes to patient:  Mood stabilizer     QUEtiapine 300 MG tablet  Commonly known as:  SEROQUEL  Take 1 tablet by mouth nightly  Notes to patient:  1/24           Where to Get Your Medications      These medications were sent to [address] [place] [address]    Phone:  [phone]   · buPROPion 150 MG extended release tablet  · lurasidone 80 MG Tabs tablet           Meri Villela MD

## 2020-01-29 ENCOUNTER — HOSPITAL ENCOUNTER (EMERGENCY)
Age: 60
Discharge: ANOTHER HOSPITAL | End: 2020-01-29
Attending: EMERGENCY MEDICINE
Payer: MEDICAID

## 2020-01-29 ENCOUNTER — HOSPITAL ENCOUNTER (INPATIENT)
Age: 60
LOS: 14 days | Discharge: HOME | DRG: 750 | End: 2020-02-12
Attending: PSYCHIATRY & NEUROLOGY | Admitting: PSYCHIATRY & NEUROLOGY
Payer: MEDICAID

## 2020-01-29 ENCOUNTER — APPOINTMENT (OUTPATIENT)
Dept: GENERAL RADIOLOGY | Age: 60
End: 2020-01-29
Payer: MEDICAID

## 2020-01-29 VITALS
WEIGHT: 153 LBS | HEIGHT: 75 IN | HEART RATE: 80 BPM | BODY MASS INDEX: 19.02 KG/M2 | RESPIRATION RATE: 18 BRPM | TEMPERATURE: 98.1 F | DIASTOLIC BLOOD PRESSURE: 79 MMHG | OXYGEN SATURATION: 100 % | SYSTOLIC BLOOD PRESSURE: 132 MMHG

## 2020-01-29 PROCEDURE — 1240000000 HC EMOTIONAL WELLNESS R&B

## 2020-01-29 PROCEDURE — 6370000000 HC RX 637 (ALT 250 FOR IP): Performed by: PSYCHIATRY & NEUROLOGY

## 2020-01-29 PROCEDURE — 73502 X-RAY EXAM HIP UNI 2-3 VIEWS: CPT

## 2020-01-29 PROCEDURE — 99285 EMERGENCY DEPT VISIT HI MDM: CPT

## 2020-01-29 RX ORDER — TRAZODONE HYDROCHLORIDE 50 MG/1
50 TABLET ORAL NIGHTLY PRN
Status: DISCONTINUED | OUTPATIENT
Start: 2020-01-29 | End: 2020-02-12 | Stop reason: HOSPADM

## 2020-01-29 RX ORDER — OXCARBAZEPINE 300 MG/1
300 TABLET, FILM COATED ORAL 2 TIMES DAILY
Status: DISCONTINUED | OUTPATIENT
Start: 2020-01-29 | End: 2020-02-12 | Stop reason: HOSPADM

## 2020-01-29 RX ORDER — BUPROPION HYDROCHLORIDE 150 MG/1
150 TABLET, EXTENDED RELEASE ORAL 2 TIMES DAILY
Status: DISCONTINUED | OUTPATIENT
Start: 2020-01-29 | End: 2020-02-03

## 2020-01-29 RX ORDER — QUETIAPINE FUMARATE 300 MG/1
300 TABLET, FILM COATED ORAL 2 TIMES DAILY
Status: DISCONTINUED | OUTPATIENT
Start: 2020-01-29 | End: 2020-01-29

## 2020-01-29 RX ORDER — ACETAMINOPHEN 325 MG/1
650 TABLET ORAL EVERY 4 HOURS PRN
Status: DISCONTINUED | OUTPATIENT
Start: 2020-01-29 | End: 2020-02-12 | Stop reason: HOSPADM

## 2020-01-29 RX ORDER — MAGNESIUM HYDROXIDE/ALUMINUM HYDROXICE/SIMETHICONE 120; 1200; 1200 MG/30ML; MG/30ML; MG/30ML
30 SUSPENSION ORAL 3 TIMES DAILY PRN
Status: DISCONTINUED | OUTPATIENT
Start: 2020-01-29 | End: 2020-02-12 | Stop reason: HOSPADM

## 2020-01-29 RX ORDER — HYDROXYZINE HYDROCHLORIDE 25 MG/1
25 TABLET, FILM COATED ORAL 3 TIMES DAILY PRN
Status: DISCONTINUED | OUTPATIENT
Start: 2020-01-29 | End: 2020-02-12 | Stop reason: HOSPADM

## 2020-01-29 RX ORDER — IBUPROFEN 800 MG/1
800 TABLET ORAL 3 TIMES DAILY PRN
Status: DISCONTINUED | OUTPATIENT
Start: 2020-01-29 | End: 2020-02-12 | Stop reason: HOSPADM

## 2020-01-29 RX ORDER — BENZTROPINE MESYLATE 1 MG/ML
2 INJECTION INTRAMUSCULAR; INTRAVENOUS DAILY PRN
Status: DISCONTINUED | OUTPATIENT
Start: 2020-01-29 | End: 2020-02-12 | Stop reason: HOSPADM

## 2020-01-29 RX ORDER — QUETIAPINE FUMARATE 300 MG/1
300 TABLET, FILM COATED ORAL NIGHTLY
Status: DISCONTINUED | OUTPATIENT
Start: 2020-01-29 | End: 2020-02-07

## 2020-01-29 RX ADMIN — BUPROPION HYDROCHLORIDE 150 MG: 150 TABLET, EXTENDED RELEASE ORAL at 22:03

## 2020-01-29 RX ADMIN — QUETIAPINE FUMARATE 300 MG: 300 TABLET ORAL at 22:03

## 2020-01-29 RX ADMIN — HYDROXYZINE HYDROCHLORIDE 25 MG: 25 TABLET, FILM COATED ORAL at 22:03

## 2020-01-29 RX ADMIN — TRAZODONE HYDROCHLORIDE 50 MG: 50 TABLET ORAL at 22:03

## 2020-01-29 RX ADMIN — OXCARBAZEPINE 300 MG: 300 TABLET, FILM COATED ORAL at 22:03

## 2020-01-29 RX ADMIN — OXCARBAZEPINE 300 MG: 300 TABLET, FILM COATED ORAL at 13:26

## 2020-01-29 RX ADMIN — ACETAMINOPHEN 650 MG: 325 TABLET, FILM COATED ORAL at 13:25

## 2020-01-29 RX ADMIN — BUPROPION HYDROCHLORIDE 150 MG: 150 TABLET, EXTENDED RELEASE ORAL at 13:26

## 2020-01-29 RX ADMIN — LURASIDONE HYDROCHLORIDE 160 MG: 80 TABLET, FILM COATED ORAL at 22:03

## 2020-01-29 RX ADMIN — CITALOPRAM HYDROBROMIDE 30 MG: 20 TABLET ORAL at 13:25

## 2020-01-29 ASSESSMENT — PAIN DESCRIPTION - ORIENTATION
ORIENTATION: LEFT
ORIENTATION: LEFT

## 2020-01-29 ASSESSMENT — SLEEP AND FATIGUE QUESTIONNAIRES
AVERAGE NUMBER OF SLEEP HOURS: 3
SLEEP PATTERN: DISTURBED/INTERRUPTED SLEEP
DO YOU HAVE DIFFICULTY SLEEPING: YES
DIFFICULTY STAYING ASLEEP: YES
DIFFICULTY FALLING ASLEEP: YES
DIFFICULTY ARISING: NO
RESTFUL SLEEP: NO
DO YOU USE A SLEEP AID: YES

## 2020-01-29 ASSESSMENT — PAIN - FUNCTIONAL ASSESSMENT: PAIN_FUNCTIONAL_ASSESSMENT: ACTIVITIES ARE NOT PREVENTED

## 2020-01-29 ASSESSMENT — PATIENT HEALTH QUESTIONNAIRE - PHQ9: SUM OF ALL RESPONSES TO PHQ QUESTIONS 1-9: 18

## 2020-01-29 ASSESSMENT — PAIN DESCRIPTION - FREQUENCY: FREQUENCY: CONTINUOUS

## 2020-01-29 ASSESSMENT — ENCOUNTER SYMPTOMS
CHEST TIGHTNESS: 0
ABDOMINAL PAIN: 0

## 2020-01-29 ASSESSMENT — PAIN SCALES - GENERAL
PAINLEVEL_OUTOF10: 10
PAINLEVEL_OUTOF10: 10
PAINLEVEL_OUTOF10: 9

## 2020-01-29 ASSESSMENT — PAIN DESCRIPTION - PAIN TYPE
TYPE: CHRONIC PAIN
TYPE: CHRONIC PAIN

## 2020-01-29 ASSESSMENT — PAIN DESCRIPTION - PROGRESSION: CLINICAL_PROGRESSION: NOT CHANGED

## 2020-01-29 ASSESSMENT — PAIN DESCRIPTION - LOCATION
LOCATION: LEG
LOCATION: LEG

## 2020-01-29 ASSESSMENT — PAIN DESCRIPTION - DESCRIPTORS: DESCRIPTORS: ACHING;CONSTANT

## 2020-01-29 ASSESSMENT — LIFESTYLE VARIABLES: HISTORY_ALCOHOL_USE: NO

## 2020-01-29 NOTE — ED NOTES
Pt to ED with complaints of left leg pain x1 month  Pt denies any previous injury or trauma  Pt states that he walked several miles to get to ED  Pt has limited ROM and full sensation    Pt states that he is suicidal.   States that he is hearing voices telling him to shoot himself  Pt states that he has attempted suicide 8 times in the past  Pt states that he has access to a friends gun  Pt states that he is a paranoid schizophrenic and is compliant with his medications   Pt states that he smoked crack cocaine at 1700 yesterday     Andrea Li RN  01/29/20 3132

## 2020-01-29 NOTE — BH NOTE
`[facility]  Admission Note     Admission Type:   Admission Type: Voluntary    Reason for admission:  Reason for Admission: command auditory hallucinations to shoot self. not compliant with medications    PATIENT STRENGTHS:  Strengths: Connection to output provider    Patient Strengths and Limitations:  Limitations: Difficulty problem solving/relies on others to help solve problems    Addictive Behavior:   Addictive Behavior  In the past 3 months, have you felt or has someone told you that you have a problem with:  : None  Do you have a history of Chemical Use?: No  Do you have a history of Alcohol Use?: No  Do you have a history of Street Drug Abuse?: Yes  Histroy of Prescripton Drug Abuse?: No    Medical Problems:   Past Medical History:   Diagnosis Date    B[facility] disorder ([facility]     Depression     GERD (gastroesophageal reflux disease)     Hallucinations     Headache(784.0)     Hepatitis     Schizophrenia, schizo-affective ([facility]     [facility]     Tobacco abuse     Type II or unspecified type diabetes mellitus without mention of complication, not stated as uncontrolled     Urinary incontinence        Status EXAM:  Status and Exam  Normal: No  Facial Expression: Flat, Sad  Affect: Appropriate  Level of Consciousness: Alert  Mood:Normal: No  Mood: Depressed, Helpless  Motor Activity:Normal: No  Motor Activity: Tics, Repetitive Acts  Interview Behavior: Cooperative  Preception: [place] to Person, [place] to Time, [place] to Place, [place] to Situation  Attention:Normal: No  Attention: Distractible  Thought Processes: Circumstantial  Thought Content:Normal: No  Thought Content: Preoccupations  Hallucinations:  Auditory (Comment), Command(Comment)  Delusions: No  Memory:Normal: No  Memory: Poor Recent  Insight and Judgment: No  Insight and Judgment: Poor Judgment  Present Suicidal Ideation: No  Present Homicidal Ideation: No    Tobacco Screening:  Practical Counseling, on admission, corby X, if applicable and completed (first 3 are required if patient doesn't refuse):            ( )  Recognizing danger situations (included triggers and roadblocks)                    ( )  Coping skills (new ways to manage stress, exercise, relaxation techniques, changing routine, distraction)                                                           ( )  Basic information about quitting (benefits of quitting, techniques in how to quit, available resources  ( ) Referral for counseling faxed to [facility]                                           ( ) Patient refused counseling  ( ) Patient has not smoked in the last 30 days    Metabolic Screening:    Lab Results   Component Value Date    LABA1C 4.8 03/22/2019       No results for input(s): CHOL, TRIG, HDL, LDLCALC, LABVLDL in the last 72 hours. Body mass index is 19.37 kg/m². BP Readings from Last 2 Encounters:   01/29/20 134/70   01/29/20 132/79           Pt admitted with followings belongings:  Dentures: None  Vision - Corrective Lenses: None  Hearing Aid: None  Jewelry: None  Body Piercings Removed: N/A     Valuables placed in safe in security envelope, number:  [id number]. Patient's home medications were verified. Patient oriented to surroundings and program expectations and copy of patient rights given. Received admission packet:  yes. Consents reviewed, signed yes. Refused nothing. Patient verbalize understanding:  yes.     Patient education on precautions: yes                   Karen Dominique RN

## 2020-01-29 NOTE — ED PROVIDER NOTES
[facility]     Emergency Department     Faculty Attestation    I performed a history and physical examination of the patient and discussed management with the resident. I have reviewed and agree with the residents findings including all diagnostic interpretations, and treatment plans as written. Any areas of disagreement are noted on the chart. I was personally present for the key portions of any procedures. I have documented in the chart those procedures where I was not present during the key portions. I have reviewed the emergency nurses triage note. I agree with the chief complaint, past medical history, past surgical history, allergies, medications, social and family history as documented unless otherwise noted below. Documentation of the HPI, Physical Exam and Medical Decision Making performed by [email or web address] is based on my personal performance of the HPI, PE and MDM. For Physician Assistant/ Nurse Practitioner cases/documentation I have personally evaluated this patient and have completed at least one if not all key elements of the E/M (history, physical exam, and MDM). Additional findings are as noted. 59 yo M L hip pain, pt states he slipped injurying L hip, no head neck chest or abdomen pain, no vomit,   Pt complain of suicidal ideation, no plan , no homicidal ideation,   pe gcs 15, vss cooperative, no cervical tenderness, crepitus or deformity, abdomen non tender, no distension no distension, L hip tender to palpation, no deformity, skin intact,   No acute psychosis,     Hip xr >> djd, will transfer to [facility],     Pre-hypertension/Hypertension: The patient has been informed that they may have pre-hypertension or Hypertension based on a blood pressure reading in the emergency department.  I recommend that the patient call the primary care provider listed on their discharge instructions or a physician of their choice this week to arrange follow up for

## 2020-01-29 NOTE — ED NOTES
Pt assigned to [facility] Unit, bed 113. [facility]MT contacted for transportation; ETA 0900. Pt demonstrated verbal understanding of the admission process and signed all necessary admission paperwork.        Lily M[facility]illaP[facility]  01/29/20 1724

## 2020-01-29 NOTE — ED NOTES
[] Sosa    [] [facility]    [x]  [facility] ASSESSMENT      Y  N     [x] [] In the past two weeks have you had thoughts of hurting yourself in any way? [x] [] In the past two weeks have you had thoughts that you would be better off dead? [] [x] Have you made a suicide attempt in the past two months? [x] [] Do you have a plan for hurting yourself or suicide? [x] [] Presence of hallucinations/voices related to hurting himself or herself or someone else. SUICIDE/SECURITY WATCH PRECAUTION CHECKLIST     Orders    [x]  Suicide/Security Watch Precautions initiated as checked below:   1/29/20 5:50 AM BH31/BH31A    [x] Notified physician:  Aiden Alfaro DO  1/29/20 5:50 AM    [x] Orders obtained as appropriate:     [x] 1:1 Observer     [] Psych Consult     [] Psych Consult    Name:  Date:  Time:    [x] 1:1 Observer, Notified by:  Judy Moore Nurse Supervisor    [x] Remove all personal clothes from room and place in snap/paper gown/pants. Slipper only    [x] Remove all personal belongings from room and secured away from patient. Documentation    [x] Initiate Suicide/Security Watch Precaution Flow Sheet    [x] Initiate individualized Care Plan/Problem    [x] Document why precautions initiated on flow sheet (Initiate Nursing Care Plan/Problem)    [x] 1:1 Observer in place; instructions provided. Suicide precautions require observer be within arms length. [x] Nurse-Observer Communication Hand-off initiated by RN, reviewed with Observer. Subsequently used as Hand Off between Observers. [x] Initiate every 15 minute observations per observer as delegated by the RN.     [x] Initiate RN assessment and documentation    Environmental Scan  Search Criteria and Process: OPTIONAL, see Search Policy    [] Reason for search:    [] Nursing in presence of second person to search patient    [] Patient notified of reason for body assessment and belongings ideation/behavior  [] Depression  [x] Suicide attempt      [] Low self-esteem  [x] Hallucinations      [] Feeling of Hopelessness  [x] Substance abuse or withdrawal    [] Dysfunctional family  [] Major traumatic event, eg., divorce, etc   [] Excessive stress/anxiety    1/29/20    Expected Outcomes    Patient will:   [x] Patient will remain safe for the duration of their stay   [x] Patient's environment will be safe, eg. Free of potential suicide weapons   [] Verbalize Recovery from suicidal episode and improvement in self-worth   [x] Discuss feeling that precipitated suicide attempt/thoughts/behavior   [] Will describe available resources for crisis prevention and management   [] Will verbalize positive coping skills     Nursing Intervention   [x] Assessment and Observations hourly   [x] Suicide Precautions implemented with patient, should be 1:1 observation   [x] Document observation [id number] and RN assessment hourly   [] Consult physician for:    [] Psychiatric consult    [] Pharmacological therapy    [] Other:    [x] Patient search completed by security   [x] Initiated appropriate safety protocols by removing from the patient's environment anything that could be used to inflict self injury, eg. Order safe tray, snap gown, etc   [x] Maintain open, warm, caring, non-judgmental attitude/manner towards patient   [] Discuss advantages and disadvantages of existing coping methods/skills   [x] Assist and educate patient with identifying present strengths and coping skills   [x] Keep patient informed regarding plan of care and provide clear concise explanations. Provide the patient/family education information as well as telephone numbers and other information about crisis centers, hot lines, and counselors.     Discharge Planning:   [] Referral  [] Groups [] Health agencies  [] Other:          Ilana Mendez RN  01/29/20 4683

## 2020-01-29 NOTE — BH NOTE
organizations: Not on file     Relationship status: Not on file    Intimate partner violence:     Fear of current or ex partner: Not on file     Emotionally abused: Not on file     Physically abused: Not on file     Forced sexual activity: Not on file   Other Topics Concern    Not on file   Social History Narrative    Not on file     Social History     Socioeconomic History    Marital status: Single     Spouse name: Not on file    Number of children: 0    Years of education: 8    Highest education level: Not on file   Occupational History     Employer: N/A   Social Needs    Financial resource strain: Not on file    Food insecurity:     Worry: Not on file     Inability: Not on file    Transportation needs:     Medical: Not on file     Non-medical: Not on file   Tobacco Use    Smoking status: Current Every Day Smoker     Packs/day: 1.00     Types: Cigarettes    Smokeless tobacco: Never Used   Substance and Sexual Activity    Alcohol use: Yes     Comment: occassional drinker    Drug use: Yes     Types: Cocaine    Sexual activity: Not on file   Lifestyle    Physical activity:     Days per week: Not on file     Minutes per session: Not on file    Stress: Not on file   Relationships    Social connections:     Talks on phone: Not on file     Gets together: Not on file     Attends Nondenominational service: Not on file     Active member of club or organization: Not on file     Attends meetings of clubs or organizations: Not on file     Relationship status: Not on file    Intimate partner violence:     Fear of current or ex partner: Not on file     Emotionally abused: Not on file     Physically abused: Not on file     Forced sexual activity: Not on file   Other Topics Concern    Not on file   Social History Narrative    Not on file       /70   Pulse 89   Temp 97.6 °F (36.4 °C) (Oral)   Resp 14   Ht 6' 3\" (1.905 m)   Wt 155 lb (70.3 kg)   BMI 19.37 kg/m²   MENTAL STATUS EXAMINATION:     MOOD-is dysphoric   Affect-is depressed  Patient feels helpless hopeless and worthless  Psychomotor activity : decreased. APPEARANCE:  Personal hygiene is poor and patient is neglecting his appearance. Patient is somewhat unkempt  PSYCHOSIS:  Denies auditory or visual hallucinations. Denies paranoid delusions. ORIENTATION:  Oriented to time place and person. Recent and remote memory is grossly intact. Intelligence appears to be average  CONCENTRATION:  poor. SPEECH : goal directed , but slow . Length of stay : 5-7 days      Strengths:  The patient signed voluntarily and agreed to take medications  The patient has [facility] mental health service        Weaknesses:  Patient has strong history of noncompliance with his medication  Patient has history of cocaine dependence  The patient is homeless        Diagnosis: Principal Problem:    Schizoaffective disorder, depressive type ([facility] [address]  Active Problems:    Cocaine abuse ([facility] [address]    Schizoaffective disorder ([facility] [address]  Resolved Problems:    * No resolved hospital problems.  *         Treatment plan:      buPROPion  150 mg Oral BID    citalopram  30 mg Oral Daily    OXcarbazepine  300 mg Oral BID    lurasidone  160 mg Oral Dinner    QUEtiapine  300 mg Oral Nightly     acetaminophen, aluminum & magnesium hydroxide-simethicone, benztropine mesylate, magnesium hydroxide, traZODone, nicotine polacrilex, hydrOXYzine, ibuprofen    Chart was reviewed patient has been reviewed briefly  Continue same medications as prescribed above Latuda 160 mg p.o. daily, Celexa 30 mg p.o. daily, Seroquel 300 mg at the bedtime and Trileptal 300 mg p.o. twice daily to be titrated as tolerated  Patient was discussed with the  and the treatment team.   Provided Supportive and insight-oriented psychotherapy  Provided empathic listening, validation and support  Provided support & education of purpose, risks vs. benefits and side effects of treatment with psychotropics  Provided support and education about risk of not receiving treatment  Patient acknowledged and mutually decided to continue with current treatment plan  Provided education about stress management, exercise and healthy diet  Provided support and encouragement to contact office sooner if needed - verbally acknowledged  Provided support and encouragement to continue working with other practitioners  Provided support and encouragement to consider (continue) working with counselor,   Recommended follow up with [facility] mental [facility] or private practice psychiatry upon discharge  Jeff Lund MD  Board Certified Psychiatrist  1/29/2020 12:39 PM

## 2020-01-29 NOTE — CARE COORDINATION
BHI Biopsychosocial Assessment    Current Level of Psychosocial Functioning     Independent XX  Dependent    Minimal Assist     Comments:    Psychosocial High Risk Factors (check all that apply)    Unable to obtain meds   Chronic illness/pain    Substance abuse XX (per ED Note)  Lack of Family Support   Financial stress   Isolation   Inadequate Community Resources  Suicide attempt(s) XX (per ED Note)  Not taking medications   Victim of crime   Developmental Delay  Unable to manage personal needs    Age 71 or older   Homeless  No transportation   Readmission within 30 days  Unemployment  Traumatic Event    Comments:   Psychiatric Advanced Directives: PHOEBE    Family to Involve in Treatment: PHOEBE    Sexual Orientation:  PHOEBE    Patient Strengths: PHOEBE    Patient Barriers: PHOEBE      Opiate Education Provided:  PHOEBE - education to be provided if identified by pt. CMHC/mental health history: [facility] (per ED Note)    Plan of Care   medication management, group/individual therapies, family meetings, psycho -education, treatment team meetings to assist with stabilization    Initial Discharge Plan:  Explore as symptoms decrease and patient is able to engage. Clinical Summary: On this date, writer attempted to meet with pt. Pt was observed in bed, pt states he is agreeable to speaking with social work at this time; however, pt unable to stay awake or answer questions. Pt remained in bed with eyes closed. SW will continue engagement attempts as pt is able to participate with treatment providers. Pt is a 61 yo male who presented to the [facility] via [place] [address] ED with suicidal ideation. ED reports crack cocaine use on 1/28/20, hearing voices commanding him to shoot himself, and past history of schizophrenia. Pt reports to ED that he has access to a friends gun. Per ED report, pt states he is medication compliant and is linked with [facility]. ED reports past history of 8 suicide attempts and poly substance abuse.  Pt reports

## 2020-01-29 NOTE — ED NOTES
Pt resting in bed with NAD noted. Awaiting transport to [facility]. Pt has even and unlabored respirations noted.       Heydi Reyes RN  01/29/20 0244

## 2020-01-29 NOTE — PLAN OF CARE
Judgment: No  Insight and Judgment: Poor Judgment  Present Suicidal Ideation: No  Present Homicidal Ideation: No    EDUCATION:   Learner Progress Toward Treatment Goals:  Reviewed group plans and strategies for care    Method:  Group therapy, medication compliance, individualized assessments and care planning    Outcome:  Needs reinforcement    PATIENT GOALS:  Pt did not identify, to be discussed with patient within 72 hours.     PLAN/TREATMENT RECOMMENDATIONS:   Group therapy, medications compliance, goal setting, individualized assessments and care, continue to monitor pt on unit      SHORT-TERM GOALS:   Time frame for Short-Term Goals:  5-7 days    LONG-TERM GOALS:  Time frame for Long-Term Goals:  6 months    Members Present in Team Meeting:     Vandana Cabrera

## 2020-01-29 NOTE — GROUP NOTE
Group Therapy Note    Date: 1/29/2020    Group Start Time: 1330  Group End Time: 4535  Group Topic: Cognitive Skills    JESUS SHI    Job Sleek, CTRS    Patient did not attend Cognitive Skills Group after encouragement from staff. 1:1 talk time offered but patient refused.      Signature:  Sue Rod

## 2020-01-29 NOTE — ED NOTES
YOGESH met with Vikram Piña who presented in ED with C/O hearing voices with voices telling him to shot himself   He reports a past hx of SI attempt   He is linked to [facility] and reports he has been compliant with med's   He has a hx of poly sub abuse with most recent use of crack.    He reports he is homeless and stays with his friend   He reports he is linked to [facility] with next apt on 1/29/2020 @ 245  He is agreeable to sign into the [facility] voluntary      Bernadette Burks  01/29/20 0360

## 2020-01-29 NOTE — BH NOTE
Spoke with Dr. Lorenzo Fu regarding suicide precautions for this patient. At this time, patient does not need 1:1 monitoring so suicide precautions were discontinued per Dr. Lorenzo Fu.

## 2020-01-29 NOTE — ED NOTES
YOGESH faxed voluntary to [facility] also faxed medical form to CHANEL Ann  01/29/20 0703

## 2020-01-29 NOTE — ED PROVIDER NOTES
[facility] night[facility] 5/21/18   Christy Pyle, APRN - CNS       REVIEW OF SYSTEMS    (2-9 systems for level 4, 10 or more for level 5)      Review of Systems   Constitutional: Negative for unexpected weight change. HENT: Negative for congestion. Eyes: Negative for visual disturbance. Respiratory: Negative for chest tightness. Cardiovascular: Negative for chest pain. Gastrointestinal: Negative for abdominal pain. Endocrine: Negative for polyuria. Genitourinary: Negative for dysuria. Musculoskeletal: Positive for arthralgias and gait problem. Negative for myalgias. Allergic/Immunologic: Negative for immunocompromised state. Neurological: Negative for dizziness and weakness. Hematological: Does not bruise/bleed easily. Psychiatric/Behavioral: Positive for hallucinations and suicidal ideas. Negative for agitation. PHYSICAL EXAM   (up to 7 for level 4, 8 or more for level 5)      INITIAL VITALS:   /79   Pulse 80   Temp 98.1 °F (36.7 °C) (Oral)   Resp 18   Ht 6' 3\" (1.905 m)   Wt 153 lb (69.4 kg)   SpO2 100%   BMI 19.12 kg/m²     Physical Exam  Constitutional:       Appearance: He is normal weight. HENT:      Head: Normocephalic and atraumatic. Nose: Nose normal.      Mouth/Throat:      Mouth: Mucous membranes are moist.      Pharynx: Oropharynx is clear. Eyes:      Extraocular Movements: Extraocular movements intact. Conjunctiva/sclera: Conjunctivae normal.      Pupils: Pupils are equal, round, and reactive to light. Neck:      Musculoskeletal: Normal range of motion and neck supple. No muscular tenderness. Cardiovascular:      Rate and Rhythm: Normal rate and regular rhythm. Pulses: Normal pulses. Heart sounds: Normal heart sounds. No murmur. Pulmonary:      Effort: Pulmonary effort is normal.      Breath sounds: Normal breath sounds. No wheezing. Abdominal:      General: Abdomen is flat. Bowel sounds are normal.      Palpations: Abdomen is soft. Tenderness: There is no abdominal tenderness. Musculoskeletal: Normal range of motion. General: Tenderness present. No swelling. Right lower leg: No edema. Left lower leg: No edema. Skin:     General: Skin is warm and dry. Capillary Refill: Capillary refill takes less than 2 seconds. Findings: No bruising. Neurological:      General: No focal deficit present. Mental Status: He is alert and oriented to person, place, and time. Psychiatric:      Comments: Suicidal thoughts without a specific plan. Hears voices. DIFFERENTIAL  DIAGNOSIS     PLAN (LABS / IMAGING / EKG):  Orders Placed This Encounter   Procedures    XR HIP LEFT (2-3 VIEWS)    Suicide precautions       MEDICATIONS ORDERED:  No orders of the defined types were placed in this encounter. DIAGNOSTIC RESULTS / EMERGENCY DEPARTMENT COURSE / MDM     LABS:  No results found for this visit on 01/29/20. RADIOLOGY:  None    EKG  None    All EKG's are interpreted by the Emergency Department Physician who either signs or Co-signs this chart in the absence of a cardiologist.    EMERGENCY DEPARTMENT COURSE:  Patient is calm and compliant with the medical interview and physical exam.  He is hemodynamically stable. X-ray results are unremarkable  Patient spoke to social work, we will arrange for [facility] inpatient admission due to suicidality, reported to  that he has access to firearms. PROCEDURES:  None    CONSULTS:  None    CRITICAL CARE:  None    FINAL IMPRESSION      1. Suicidal ideation    2.  Left leg pain          DISPOSITION / PLAN     DISPOSITION Decision To Transfer 01/29/2020 07:02:17 AM      PATIENT REFERRED TO:  Cynthia Yepez MD  [address] [facility],[address] [facility]  [phone]    Schedule an appointment as soon as possible for a visit   As needed      DISCHARGE MEDICATIONS:  New Prescriptions    No medications on file       Annie Jurado MD  Emergency Medicine

## 2020-01-30 PROCEDURE — 1240000000 HC EMOTIONAL WELLNESS R&B

## 2020-01-30 PROCEDURE — 6370000000 HC RX 637 (ALT 250 FOR IP): Performed by: PSYCHIATRY & NEUROLOGY

## 2020-01-30 RX ADMIN — OXCARBAZEPINE 300 MG: 300 TABLET, FILM COATED ORAL at 09:16

## 2020-01-30 RX ADMIN — QUETIAPINE FUMARATE 300 MG: 300 TABLET ORAL at 21:59

## 2020-01-30 RX ADMIN — CITALOPRAM HYDROBROMIDE 30 MG: 20 TABLET ORAL at 09:16

## 2020-01-30 RX ADMIN — BUPROPION HYDROCHLORIDE 150 MG: 150 TABLET, EXTENDED RELEASE ORAL at 09:16

## 2020-01-30 RX ADMIN — HYDROXYZINE HYDROCHLORIDE 25 MG: 25 TABLET, FILM COATED ORAL at 21:59

## 2020-01-30 RX ADMIN — LURASIDONE HYDROCHLORIDE 160 MG: 80 TABLET, FILM COATED ORAL at 17:54

## 2020-01-30 RX ADMIN — OXCARBAZEPINE 300 MG: 300 TABLET, FILM COATED ORAL at 21:59

## 2020-01-30 RX ADMIN — BUPROPION HYDROCHLORIDE 150 MG: 150 TABLET, EXTENDED RELEASE ORAL at 21:59

## 2020-01-30 RX ADMIN — TRAZODONE HYDROCHLORIDE 50 MG: 50 TABLET ORAL at 21:59

## 2020-01-30 NOTE — H&P
H&P unable to be completed. Pt did acknowledge that writer was in the room but was unwilling to answer any questions or take the blanket off of his head. Pt was resting comfortably in bed. Breathing unlabored and even.   Yamile Media, PA on 1/30/2020 at 1:12 PM

## 2020-01-30 NOTE — PROGRESS NOTES
[facility])  Active Problems:    Cocaine abuse ([facility] [address]    Schizoaffective disorder ([facility] [address]  Resolved Problems:    * No resolved hospital problems. *      LAB:    No results found for this or any previous visit (from the past 72 hour(s)). TREATMENT PLAN:     buPROPion  150 mg Oral BID    citalopram  30 mg Oral Daily    OXcarbazepine  300 mg Oral BID    lurasidone  160 mg Oral Dinner    QUEtiapine  300 mg Oral Nightly     acetaminophen, aluminum & magnesium hydroxide-simethicone, benztropine mesylate, magnesium hydroxide, traZODone, nicotine polacrilex, hydrOXYzine, ibuprofen    Chart was reviewed patient has been interviewed  Sinew same medications as prescribed above  Patient was discussed with the  and the treatment team.   Provided Supportive and insight-oriented psychotherapy psychotherapy  Recommended involvement in Unit milieu  Provided empathic listening, validation and support  Patient acknowledged and mutually decided to continue with current treatment plan  Discharge planning was discussed with the patient. Marta Roberts MD        This note was created with the assistance of a speech-recognition program.  Although the intention is to generate a document that actually reflects the content of the visit, no guarantees can be provided that every mistake has been identified and corrected by editing.

## 2020-01-30 NOTE — PLAN OF CARE
Problem: Altered Mood, Depressive Behavior:  Goal: Able to verbalize and/or display a decrease in depressive symptoms  Description  Able to verbalize and/or display a decrease in depressive symptoms  1/29/2020 2219 by Irasema Pandya RN  Outcome: Ongoing   Pt is seclusive to his room aloof of staff and peers but is pleasant and cooperative when approached. He eats well at snack and does accept all medication. Problem: Altered Mood, Depressive Behavior:  Goal: Ability to disclose and discuss suicidal ideas will improve  Description  Ability to disclose and discuss suicidal ideas will improve  1/29/2020 2219 by Irasema Pandya RN  Outcome: Ongoing   Pt denies thoughts of harming themself and verbally agrees to remain safe while on the unit.  No self harming behaviors are noted this shift    Problem: Pain:  Goal: Pain level will decrease  Description  Pain level will decrease  1/29/2020 2219 by Irasema Pandya RN  Outcome: Ongoing   Pt is satisfied with pain management

## 2020-01-30 NOTE — GROUP NOTE
Group Therapy Note    Date: 1/30/2020    Group Start Time: 1330  Group End Time: 8927  Group Topic: Psychoeducation    JESUS Beatty, CTRS    Patient refused to attend Recreational Therapy Group at 1330 after encouragement from staff. 1:1 talk time offered.     Signature:  Yue Smith

## 2020-01-30 NOTE — GROUP NOTE
Group Therapy Note    Date: 1/30/2020    Group Start Time: 1044  Group End Time: 7687  Group Topic: [facility], CTRS    Patient did not attend [employer] after encouragement from staff. 1:1 talk time offered but patient refused.      Signature:  Kennon Claude

## 2020-01-31 PROCEDURE — 1240000000 HC EMOTIONAL WELLNESS R&B

## 2020-01-31 PROCEDURE — 6370000000 HC RX 637 (ALT 250 FOR IP): Performed by: PSYCHIATRY & NEUROLOGY

## 2020-01-31 RX ADMIN — BUPROPION HYDROCHLORIDE 150 MG: 150 TABLET, EXTENDED RELEASE ORAL at 22:21

## 2020-01-31 RX ADMIN — OXCARBAZEPINE 300 MG: 300 TABLET, FILM COATED ORAL at 09:11

## 2020-01-31 RX ADMIN — HYDROXYZINE HYDROCHLORIDE 25 MG: 25 TABLET, FILM COATED ORAL at 22:21

## 2020-01-31 RX ADMIN — QUETIAPINE FUMARATE 300 MG: 300 TABLET ORAL at 22:22

## 2020-01-31 RX ADMIN — TRAZODONE HYDROCHLORIDE 50 MG: 50 TABLET ORAL at 22:22

## 2020-01-31 RX ADMIN — LURASIDONE HYDROCHLORIDE 160 MG: 80 TABLET, FILM COATED ORAL at 17:57

## 2020-01-31 RX ADMIN — CITALOPRAM HYDROBROMIDE 30 MG: 20 TABLET ORAL at 09:11

## 2020-01-31 RX ADMIN — BUPROPION HYDROCHLORIDE 150 MG: 150 TABLET, EXTENDED RELEASE ORAL at 09:12

## 2020-01-31 RX ADMIN — OXCARBAZEPINE 300 MG: 300 TABLET, FILM COATED ORAL at 22:21

## 2020-01-31 NOTE — GROUP NOTE
Group Therapy Note    Date: 1/31/2020    Group Start Time: 0900  Group End Time: 0920  Group Topic: Community Meeting    [address] [place] [address] A    Pilar Rashid    Patient declined to attend community meeting group at 0900 despite encouragement from staff, 1:1 session was offered as alternative.      Signature:  Pilar Rashid

## 2020-01-31 NOTE — PLAN OF CARE
Problem: Altered Mood, Depressive Behavior:  Goal: Able to verbalize and/or display a decrease in depressive symptoms  Description  Able to verbalize and/or display a decrease in depressive symptoms  1/31/2020 1103 by Danielito Wynne RN  Outcome: Ongoing     Problem: Altered Mood, Depressive Behavior:  Goal: Ability to disclose and discuss suicidal ideas will improve  Description  Ability to disclose and discuss suicidal ideas will improve  1/31/2020 1103 by Danielito Wynne RN  Outcome: Ongoing    Pt reports having depression and anxiety. Pt reports having suicidal thoughts with a plan to use a gun. Pt reports having access to a gun. Pt contracts for safety and denies having homicidal thoughts. Pt reports having hallucinations with the voices telling him he's going to die and he should kill himself. Pt has no intent on acting on those voices. Pt reports having positive support outside of the unit with his family. Pt reports poor sleep and adequate diet. Pt is isolative to room for most of day. Pt is compliant with medical treatment. Will continue to monitor for changes in condition.

## 2020-01-31 NOTE — GROUP NOTE
Group Therapy Note    Date: 1/31/2020    Group Start Time: 1430  Group End Time: 4191  Group Topic: Cognitive Skills    JESUS Hector, CTRS    Patient did not attend Cognitive Skills Group after encouragement from staff. 1:1 talk time offered but patient refused.      Signature:  Jose Manuel Persaud

## 2020-02-01 ENCOUNTER — APPOINTMENT (OUTPATIENT)
Dept: GENERAL RADIOLOGY | Age: 60
DRG: 750 | End: 2020-02-01
Attending: PSYCHIATRY & NEUROLOGY
Payer: MEDICAID

## 2020-02-01 ENCOUNTER — APPOINTMENT (OUTPATIENT)
Dept: CT IMAGING | Age: 60
DRG: 750 | End: 2020-02-01
Attending: PSYCHIATRY & NEUROLOGY
Payer: MEDICAID

## 2020-02-01 PROCEDURE — 72131 CT LUMBAR SPINE W/O DYE: CPT

## 2020-02-01 PROCEDURE — 6370000000 HC RX 637 (ALT 250 FOR IP): Performed by: PSYCHIATRY & NEUROLOGY

## 2020-02-01 PROCEDURE — 1240000000 HC EMOTIONAL WELLNESS R&B

## 2020-02-01 PROCEDURE — 73521 X-RAY EXAM HIPS BI 2 VIEWS: CPT

## 2020-02-01 RX ORDER — CITALOPRAM 40 MG/1
40 TABLET ORAL DAILY
Status: DISCONTINUED | OUTPATIENT
Start: 2020-02-02 | End: 2020-02-12 | Stop reason: HOSPADM

## 2020-02-01 RX ADMIN — OXCARBAZEPINE 300 MG: 300 TABLET, FILM COATED ORAL at 08:20

## 2020-02-01 RX ADMIN — IBUPROFEN 800 MG: 800 TABLET, FILM COATED ORAL at 17:26

## 2020-02-01 RX ADMIN — TRAZODONE HYDROCHLORIDE 50 MG: 50 TABLET ORAL at 20:44

## 2020-02-01 RX ADMIN — LURASIDONE HYDROCHLORIDE 160 MG: 80 TABLET, FILM COATED ORAL at 17:26

## 2020-02-01 RX ADMIN — CITALOPRAM HYDROBROMIDE 30 MG: 20 TABLET ORAL at 08:20

## 2020-02-01 RX ADMIN — BUPROPION HYDROCHLORIDE 150 MG: 150 TABLET, EXTENDED RELEASE ORAL at 08:20

## 2020-02-01 RX ADMIN — BUPROPION HYDROCHLORIDE 150 MG: 150 TABLET, EXTENDED RELEASE ORAL at 20:44

## 2020-02-01 RX ADMIN — ACETAMINOPHEN 650 MG: 325 TABLET, FILM COATED ORAL at 13:46

## 2020-02-01 RX ADMIN — ACETAMINOPHEN 650 MG: 325 TABLET, FILM COATED ORAL at 08:20

## 2020-02-01 RX ADMIN — OXCARBAZEPINE 300 MG: 300 TABLET, FILM COATED ORAL at 20:44

## 2020-02-01 RX ADMIN — QUETIAPINE FUMARATE 300 MG: 300 TABLET ORAL at 20:44

## 2020-02-01 RX ADMIN — IBUPROFEN 800 MG: 800 TABLET, FILM COATED ORAL at 08:20

## 2020-02-01 RX ADMIN — HYDROXYZINE HYDROCHLORIDE 25 MG: 25 TABLET, FILM COATED ORAL at 20:44

## 2020-02-01 ASSESSMENT — PAIN DESCRIPTION - PROGRESSION
CLINICAL_PROGRESSION: NOT CHANGED
CLINICAL_PROGRESSION: NOT CHANGED

## 2020-02-01 ASSESSMENT — PAIN DESCRIPTION - PAIN TYPE
TYPE: CHRONIC PAIN
TYPE: CHRONIC PAIN

## 2020-02-01 ASSESSMENT — PAIN DESCRIPTION - ORIENTATION
ORIENTATION: LEFT
ORIENTATION: LEFT

## 2020-02-01 ASSESSMENT — PAIN SCALES - GENERAL
PAINLEVEL_OUTOF10: 10

## 2020-02-01 ASSESSMENT — PAIN DESCRIPTION - LOCATION
LOCATION: ANKLE;HIP;LEG
LOCATION: ANKLE;HIP;LEG

## 2020-02-01 NOTE — BH NOTE
OPEN REC:    Patient did not participate in open rec group despite encouragement from staff. Patient wanted to rest in bed.

## 2020-02-01 NOTE — PROGRESS NOTES
PROGRESS NOTE  The chart has been reviewed and the patient was interviewed at the bedside. The case has been discussed with the  and the nursing staff. The patient is still complaining of left hip pain radiated to whole leg up to the ankle joint. The patient reported that the patient is so severe. Internal medicine consult has been ordered. The patient still reporting auditory hallucinations with commands in nature asking him to harm himself. He denied visual hallucinations. He is still exhibiting depressed mood, isolating himself and flat affect. There was no behavioral problem reported by the nursing staff. The patient spent most of the time in his room and not socializing with other peers. There was no side effects of medication reported by the patient. We will continue monitoring for symptoms of psychosis, depression and suicidal behavior. We will adjust his medications as needed. MENTAL STATUS EXAMINATION:    /62   Pulse 72   Temp 97.8 °F (36.6 °C) (Oral)   Resp 14   Ht 6' 3\" (1.905 m)   Wt 155 lb (70.3 kg)   BMI 19.37 kg/m²     MOOD-is dysphoric   Affect-is depressed  Patient feels helpless hopeless and worthless  Psychomotor activity : decreased. APPEARANCE:  Personal hygiene is poor and patient is neglecting his appearance. Patient is somewhat unkempt  PSYCHOSIS:  Denies auditory or visual hallucinations. Denies paranoid delusions. ORIENTATION:  Oriented to time place and person. Recent and remote memory is grossly intact. Intelligence appears to be average  CONCENTRATION:  poor. SPEECH : goal directed , but slow .     DIAGNOSIS:    Principal Problem:    Schizoaffective disorder, depressive type ([facility] [address]  Active Problems:    Cocaine abuse ([facility] [address]    Schizoaffective disorder [facility])  Resolved Problems:    * No resolved hospital problems. *      LAB:    No results found for this or any previous visit (from the past 72 hour(s)). TREATMENT PLAN:     buPROPion  150 mg Oral BID    citalopram  30 mg Oral Daily    OXcarbazepine  300 mg Oral BID    lurasidone  160 mg Oral Dinner    QUEtiapine  300 mg Oral Nightly     acetaminophen, aluminum & magnesium hydroxide-simethicone, benztropine mesylate, magnesium hydroxide, traZODone, nicotine polacrilex, hydrOXYzine, ibuprofen    Chart was reviewed and the patient has been interviewed  Increase Celexa 40 mg p.o. daily and continue the rest of the medication as prescribed above  Patient was discussed with the  and the treatment team.   Provided Supportive and insight-oriented psychotherapy psychotherapy  Recommended involvement in Unit milieu  Provided empathic listening, validation and support  Patient acknowledged and mutually decided to continue with current treatment plan  Discharge planning was discussed with the patient. Omer Vee MD        This note was created with the assistance of a speech-recognition program.  Although the intention is to generate a document that actually reflects the content of the visit, no guarantees can be provided that every mistake has been identified and corrected by editing.

## 2020-02-01 NOTE — GROUP NOTE
Group Therapy Note  Date: 2/1/2020  Group Topic: Psychoeducation-Therapeutic Never Have I Ever & Discussion  Refusals:   Unit: [facility]  Notes: Melissa Curiel refused to attend group despite staff invitation and encouragement. 1:1 talk time offered in place of group.   Discipline Responsible: Psychoeducational Specialist  Signature: Audra Pineda

## 2020-02-01 NOTE — PLAN OF CARE
Problem: Altered Mood, Depressive Behavior:  Goal: Able to verbalize and/or display a decrease in depressive symptoms  Description  Able to verbalize and/or display a decrease in depressive symptoms  1/31/2020 2244 by Chante Martinez RN  Outcome: Ongoing     Problem: Altered Mood, Depressive Behavior:  Goal: Ability to disclose and discuss suicidal ideas will improve  Description  Ability to disclose and discuss suicidal ideas will improve  1/31/2020 2244 by Chante Martinez RN  Outcome: Ongoing     Problem: Suicide risk  Goal: Provide patient with safe environment  Description  Provide patient with safe environment  Outcome: Ongoing   Patient endorses suicidal ideations but denies having a plan or intent. Patient verbalizes he will remain safe on unit. Patient encouraged to notify staff if symptoms worsen or patient can no longer be safe. Patient endorses auditory hallucinations telling him he's worthless and should die. Patient reports increased depression. Patient is isolative to room throughout shift. Patient is compliant with his medications. Patient remains safe on unit. Patient safety maintained q15 minute checks.

## 2020-02-01 NOTE — BH NOTE
patient refused to attend wellness group at 1600 after encouragement from staff.  1:1 talk time offered as alternative to group session

## 2020-02-01 NOTE — PROGRESS NOTES
PROGRESS NOTE  The chart has been reviewed and the patient was interviewed at the bedside. The patient is still reporting psychosis \"I have been hearing voices telling me to harm myself on the others\". The writer discussed with the patient safety plan and advised to approach the nursing staff once the thoughts and the voices are overwhelming. The patient sounds understanding the concept. The patient met with Juli Mensah the  of the act team from [facility]. The patient is still reporting symptoms of depression, anhedonia, isolating himself and poor motivation. The patient denied visual hallucinations. He has been compliant with his medications. The patient was not attending any of the therapy groups and spending most of the time in his room. We will continue monitoring for symptoms of depression, psychosis and to adjust his medications as needed. MENTAL STATUS EXAMINATION:    /66   Pulse 77   Temp 97.7 °F (36.5 °C) (Oral)   Resp 14   Ht 6' 3\" (1.905 m)   Wt 155 lb (70.3 kg)   BMI 19.37 kg/m²     MOOD-is dysphoric   Affect-is depressed  Patient feels helpless hopeless and worthless  Psychomotor activity : decreased. APPEARANCE:  Personal hygiene is poor and patient is neglecting his appearance. Patient is somewhat unkempt  PSYCHOSIS:  Denies auditory or visual hallucinations. Denies paranoid delusions. ORIENTATION:  Oriented to time place and person. Recent and remote memory is grossly intact. Intelligence appears to be average  CONCENTRATION:  poor. SPEECH : goal directed , but slow .     DIAGNOSIS:    Principal Problem:    Schizoaffective disorder, depressive type ([address]  Active Problems:    Cocaine abuse ([address]    Schizoaffective disorder ([address]  Resolved Problems:    * No resolved hospital problems. *      LAB:    No results found for this or any previous visit (from the past 72 hour(s)). TREATMENT PLAN:     buPROPion  150 mg Oral BID    citalopram  30 mg Oral Daily    OXcarbazepine  300 mg Oral BID    lurasidone  160 mg Oral Dinner    QUEtiapine  300 mg Oral Nightly     acetaminophen, aluminum & magnesium hydroxide-simethicone, benztropine mesylate, magnesium hydroxide, traZODone, nicotine polacrilex, hydrOXYzine, ibuprofen    Chart was reviewed patient has been interviewed  Sinew same medications as prescribed above  Patient was discussed with the  and the treatment team.   Provided Supportive and insight-oriented psychotherapy psychotherapy  Recommended involvement in Unit milieu  Provided empathic listening, validation and support  Patient acknowledged and mutually decided to continue with current treatment plan  Discharge planning was discussed with the patient. Chang Cardoza MD        This note was created with the assistance of a speech-recognition program.  Although the intention is to generate a document that actually reflects the content of the visit, no guarantees can be provided that every mistake has been identified and corrected by editing.

## 2020-02-01 NOTE — BH NOTE
PRN note:    Patient rates left ankle pain a 10/10. Patient offered tylenol and motrin. Will continue to monitor.

## 2020-02-01 NOTE — BH NOTE
Spoke with Dr. Nery Macedo regarding internal med consult. Dr. Nery Macedo stated he will see patient tomorrow.

## 2020-02-02 ENCOUNTER — APPOINTMENT (OUTPATIENT)
Dept: GENERAL RADIOLOGY | Age: 60
DRG: 750 | End: 2020-02-02
Attending: PSYCHIATRY & NEUROLOGY
Payer: MEDICAID

## 2020-02-02 LAB
HEPATITIS C ANTIBODY: NONREACTIVE
HIV AG/AB: NONREACTIVE
SEDIMENTATION RATE, ERYTHROCYTE: 10 MM (ref 0–15)

## 2020-02-02 PROCEDURE — 36415 COLL VENOUS BLD VENIPUNCTURE: CPT

## 2020-02-02 PROCEDURE — 1240000000 HC EMOTIONAL WELLNESS R&B

## 2020-02-02 PROCEDURE — 87389 HIV-1 AG W/HIV-1&-2 AB AG IA: CPT

## 2020-02-02 PROCEDURE — 73610 X-RAY EXAM OF ANKLE: CPT

## 2020-02-02 PROCEDURE — 6370000000 HC RX 637 (ALT 250 FOR IP): Performed by: INTERNAL MEDICINE

## 2020-02-02 PROCEDURE — 6370000000 HC RX 637 (ALT 250 FOR IP): Performed by: PSYCHIATRY & NEUROLOGY

## 2020-02-02 PROCEDURE — 85651 RBC SED RATE NONAUTOMATED: CPT

## 2020-02-02 PROCEDURE — 86803 HEPATITIS C AB TEST: CPT

## 2020-02-02 PROCEDURE — 99222 1ST HOSP IP/OBS MODERATE 55: CPT | Performed by: INTERNAL MEDICINE

## 2020-02-02 RX ORDER — PREDNISONE 20 MG/1
20 TABLET ORAL 2 TIMES DAILY
Status: COMPLETED | OUTPATIENT
Start: 2020-02-02 | End: 2020-02-06

## 2020-02-02 RX ADMIN — TRAZODONE HYDROCHLORIDE 50 MG: 50 TABLET ORAL at 20:56

## 2020-02-02 RX ADMIN — IBUPROFEN 800 MG: 800 TABLET, FILM COATED ORAL at 21:25

## 2020-02-02 RX ADMIN — PREDNISONE 20 MG: 20 TABLET ORAL at 20:55

## 2020-02-02 RX ADMIN — QUETIAPINE FUMARATE 300 MG: 300 TABLET ORAL at 20:56

## 2020-02-02 RX ADMIN — BUPROPION HYDROCHLORIDE 150 MG: 150 TABLET, EXTENDED RELEASE ORAL at 20:55

## 2020-02-02 RX ADMIN — BUPROPION HYDROCHLORIDE 150 MG: 150 TABLET, EXTENDED RELEASE ORAL at 08:02

## 2020-02-02 RX ADMIN — ACETAMINOPHEN 650 MG: 325 TABLET, FILM COATED ORAL at 08:01

## 2020-02-02 RX ADMIN — OXCARBAZEPINE 300 MG: 300 TABLET, FILM COATED ORAL at 20:55

## 2020-02-02 RX ADMIN — HYDROXYZINE HYDROCHLORIDE 25 MG: 25 TABLET, FILM COATED ORAL at 20:56

## 2020-02-02 RX ADMIN — PREDNISONE 20 MG: 20 TABLET ORAL at 15:57

## 2020-02-02 RX ADMIN — OXCARBAZEPINE 300 MG: 300 TABLET, FILM COATED ORAL at 08:02

## 2020-02-02 RX ADMIN — CITALOPRAM HYDROBROMIDE 40 MG: 40 TABLET ORAL at 08:02

## 2020-02-02 RX ADMIN — LURASIDONE HYDROCHLORIDE 160 MG: 80 TABLET, FILM COATED ORAL at 17:40

## 2020-02-02 ASSESSMENT — PAIN SCALES - GENERAL
PAINLEVEL_OUTOF10: 3
PAINLEVEL_OUTOF10: 7
PAINLEVEL_OUTOF10: 3
PAINLEVEL_OUTOF10: 1

## 2020-02-02 ASSESSMENT — PAIN DESCRIPTION - LOCATION: LOCATION: HIP

## 2020-02-02 ASSESSMENT — PAIN DESCRIPTION - PAIN TYPE: TYPE: ACUTE PAIN

## 2020-02-02 NOTE — PLAN OF CARE
Problem: Altered Mood, Depressive Behavior:  Goal: Able to verbalize and/or display a decrease in depressive symptoms  Description  Able to verbalize and/or display a decrease in depressive symptoms  2/2/2020 1002 by Geri Dc LPN  Outcome: Ongoing     Problem: Suicide risk  Goal: Provide patient with safe environment  Description  Provide patient with safe environment  Outcome: Ongoing   Patient states he is homicidal towards the \"dope man\". Patient contracts for safety. Patient states he has no intent on acting on these homicidal ideations. Patient admits to depression due to \"dope man\" saying he owes him forty dollars but pt stated he doesn't. Patient admits to auditory hallucinations, hearing \"I'm going to kill your ass\" repeatedly. Patient encouraged to perform ADLs and grooming. Patient reports eating and sleep well. Patient isolative to room, out for meals only. Patient encouraged to attend unit programming and socialize with peers on milieu. Patient cooperative and pleasant upon approach. Patient safety maintained and 15 minute checks continued.

## 2020-02-02 NOTE — GROUP NOTE
Group Therapy Note    Date: 2/2/2020    Group Start Time: 2030  Group End Time: 2104  Group Topic: Wrap-Up/ relaxation     JESUS Posadas RN        Group Therapy Note    Attendees: 9             Signature:  Deborah Urbina RN

## 2020-02-02 NOTE — PLAN OF CARE
Problem: Altered Mood, Depressive Behavior:  Goal: Able to verbalize and/or display a decrease in depressive symptoms  Description  Able to verbalize and/or display a decrease in depressive symptoms  2/2/2020 0004 by John Doe RN  Outcome: Ongoing   Pt denies depressive symptoms at this time. Problem: Altered Mood, Depressive Behavior:  Goal: Ability to disclose and discuss suicidal ideas will improve  Description  Ability to disclose and discuss suicidal ideas will improve  2/2/2020 0004 by John Doe RN  Outcome: Ongoing   Pt expresses that he has fleeting thoughts of suicide, denies current thoughts. Pt denies plan. Problem: Pain:  Goal: Pain level will decrease  Description  Pain level will decrease  2/2/2020 0004 by John Doe RN  Outcome: Ongoing   Pt denies pain at this time.

## 2020-02-02 NOTE — CONSULTS
[facility]    CONSULTATION / HISTORY AND PHYSICAL EXAMINATION            Date:   2/2/2020  Patient name:  Ash Garcia  Date of admission:  1/29/2020  9:48 AM  MRN:   [id number]  Account:  [de-identified]  YOB: 1960  PCP:    Karen Oviedo MD  Room:   [facility]  Code Status:    Full Code    Physician Requesting Consult: Eryn Sorenson MD    Reason for Consult: Left hip and left ankle pain    Chief Complaint:     No chief complaint on file. Left hip and left ankle pain    History Obtained From:     Patient medical record nursing staff    History of Present Illness:     78-year-old -American gentleman who is a poor historian complains of a few week history of left hip and left ankle pain complains of for limping pain is worse with walking better with rest and pain meds denies any swelling any redness no falls or new injuries no history of gout    Past Medical History:     Past Medical History:   Diagnosis Date    Bipolar disorder [facility]     Depression     GERD (gastroesophageal reflux disease)     Hallucinations     Headache(784.0)     Hepatitis     Schizophrenia, schizo-affective ([facility]     Substance abuse ([facility]     Tobacco abuse     Type II or unspecified type diabetes mellitus without mention of complication, not stated as uncontrolled     Urinary incontinence         Past Surgical History:     Past Surgical History:   Procedure Laterality Date    ABSCESS DRAINAGE N/A 02/11/2018    Carla anal abcess    DENTAL SURGERY      all teeth pulled        Medications Prior to Admission:     Prior to Admission medications    Medication Sig Start Date End Date Taking?  Authorizing Provider   ibuprofen (ADVIL;MOTRIN) 800 MG tablet Take 1 tablet by mouth every 8 hours as needed for Pain 1/27/20   Jaimee Mullen PA-C   acetaminophen (TYLENOL) 325 MG tablet Take 2 tablets by mouth every 6 hours as needed for Pain 1/27/20   Jaimee Mullen PA-C   buPROPion (WELLBUTRIN SR) 150 MG extended release tablet Take 1 tablet by mouth 2 times daily 1/24/20   Stevan Mishra MD   lurasidone (LATUDA) 80 MG TABS tablet Take 2 tablets by mouth Daily with supper 1/24/20   Stevan Mishra MD   citalopram (CELEXA) 10 MG tablet Take 30 mg by mouth daily    Historical Provider, MD   OXcarbazepine (TRILEPTAL) 300 MG tablet Take 300 mg by mouth 2 times daily    Historical Provider, MD   QUEtiapine (SEROQUEL) 300 MG tablet Take 1 tablet by mouth nightly 5/21/18   MARY Castellanos - CNS        Allergies:     Navane [thiothixene]    Social History:     Tobacco:    reports that he has been smoking cigarettes. He has been smoking about 1.00 pack per day. He has never used smokeless tobacco.  Alcohol:      reports current alcohol use. Drug Use:  reports current drug use. Drug: Cocaine. Family History:     Family History   Problem Relation Age of Onset    Diabetes Mother     Heart Disease Mother        Review of Systems:     Positive and Negative as described in HPI. CONSTITUTIONAL:  negative for fevers, chills, sweats, fatigue, weight loss  HEENT:  negative for vision, hearing changes, runny nose, throat pain  RESPIRATORY:  negative for shortness of breath, cough, congestion, wheezing. CARDIOVASCULAR:  negative for chest pain, palpitations.   GASTROINTESTINAL:  negative for nausea, vomiting, diarrhea, constipation, change in bowel habits, abdominal pain   GENITOURINARY:  negative for difficulty of urination, burning with urination, frequency   INTEGUMENT:  negative for rash, skin lesions, easy bruising   HEMATOLOGIC/LYMPHATIC:  negative for swelling/edema   ALLERGIC/IMMUNOLOGIC:  negative for urticaria , itching  ENDOCRINE:  negative increase in drinking, increase in urination, hot or cold intolerance  MUSCULOSKELETAL:  negative joint pains, muscle aches, swelling of joints  Hip pain and left ankle pain denies any swelling any redness  NEUROLOGICAL:  negative for headaches, disorder ([facility] [F25.9] 02/12/2018    Schizoaffective disorder, depressive type ([facility] [F25.1] 09/23/2017       Plan:     1. Left hip pain x-ray reviewed mild degenerative changes tenderness in the left greater trochanter possible trochanteric bursitis will do a short course of prednisone  2. Moderate to severe tenderness of the left ankle no swelling no redness will do an x-ray possible tendinitis will check uric acid levels for gout  Moderate to severe protein calorie malnutrition BMI is 19.4 rule  out HIV check for hep C and sed rate    Consultations:   IP CONSULT TO HISTORY AND PHYSICAL  IP CONSULT TO INTERNAL MEDICINE      Arnulfo Woodwrad MD  2/2/2020  1:51 PM    Copy sent to Dr. Diane Betts MD    Please note that this chart was generated using voice recognition Dragon dictation software. Although every effort was made to ensure the accuracy of this automated transcription, some errors in transcription may have occurred.

## 2020-02-03 PROCEDURE — 6370000000 HC RX 637 (ALT 250 FOR IP): Performed by: INTERNAL MEDICINE

## 2020-02-03 PROCEDURE — 6370000000 HC RX 637 (ALT 250 FOR IP): Performed by: PSYCHIATRY & NEUROLOGY

## 2020-02-03 PROCEDURE — 1240000000 HC EMOTIONAL WELLNESS R&B

## 2020-02-03 PROCEDURE — 99231 SBSQ HOSP IP/OBS SF/LOW 25: CPT | Performed by: INTERNAL MEDICINE

## 2020-02-03 RX ORDER — BUPROPION HYDROCHLORIDE 100 MG/1
200 TABLET, EXTENDED RELEASE ORAL 2 TIMES DAILY
Status: DISCONTINUED | OUTPATIENT
Start: 2020-02-03 | End: 2020-02-12 | Stop reason: HOSPADM

## 2020-02-03 RX ADMIN — TRAZODONE HYDROCHLORIDE 50 MG: 50 TABLET ORAL at 22:55

## 2020-02-03 RX ADMIN — PREDNISONE 20 MG: 20 TABLET ORAL at 08:32

## 2020-02-03 RX ADMIN — OXCARBAZEPINE 300 MG: 300 TABLET, FILM COATED ORAL at 22:55

## 2020-02-03 RX ADMIN — BUPROPION HYDROCHLORIDE 150 MG: 150 TABLET, EXTENDED RELEASE ORAL at 08:32

## 2020-02-03 RX ADMIN — CITALOPRAM HYDROBROMIDE 40 MG: 40 TABLET ORAL at 08:32

## 2020-02-03 RX ADMIN — BUPROPION HYDROCHLORIDE 200 MG: 100 TABLET, FILM COATED, EXTENDED RELEASE ORAL at 22:55

## 2020-02-03 RX ADMIN — QUETIAPINE FUMARATE 300 MG: 300 TABLET ORAL at 22:55

## 2020-02-03 RX ADMIN — HYDROXYZINE HYDROCHLORIDE 25 MG: 25 TABLET, FILM COATED ORAL at 22:55

## 2020-02-03 RX ADMIN — IBUPROFEN 800 MG: 800 TABLET, FILM COATED ORAL at 12:32

## 2020-02-03 RX ADMIN — LURASIDONE HYDROCHLORIDE 160 MG: 80 TABLET, FILM COATED ORAL at 17:52

## 2020-02-03 RX ADMIN — OXCARBAZEPINE 300 MG: 300 TABLET, FILM COATED ORAL at 08:32

## 2020-02-03 RX ADMIN — PREDNISONE 20 MG: 20 TABLET ORAL at 22:55

## 2020-02-03 ASSESSMENT — PAIN SCALES - GENERAL
PAINLEVEL_OUTOF10: 7
PAINLEVEL_OUTOF10: 8
PAINLEVEL_OUTOF10: 4

## 2020-02-03 ASSESSMENT — PAIN DESCRIPTION - LOCATION: LOCATION: LEG

## 2020-02-03 NOTE — PLAN OF CARE
Problem: Altered Mood, Depressive Behavior:  Goal: Able to verbalize and/or display a decrease in depressive symptoms  Description  Able to verbalize and/or display a decrease in depressive symptoms  2/3/2020 0152 by Jitendra Freire  Note:   Patient denies suicidal ideation. Patient states that he hears voices telling him to kill himself. Patient does not have a plan. Patient presents with flat and withdrawn affect. Patient verbalized the ability to seek staff if there are changes in his mental state. Isolative to room but did join peers for snack and group. States that his sleep and meals are adequate. Will continue to monitor. Problem: Suicide risk  Goal: Provide patient with safe environment  Description  Provide patient with safe environment  2/3/2020 0152 by Jitendra Freire  Note:   Patient remained free of harm or injury per 15 minute safety rounds. Environment kept safe.

## 2020-02-03 NOTE — PROGRESS NOTES
PROGRESS NOTE  The chart has been reviewed and the patient was interviewed at the bedside. The patient is still exhibiting symptoms of severe depression and was tearful during the evaluation. He reported symptoms of anhedonia, hopeless, helpless and worthless. The patient spends most of the time in his room and has poor interaction with other peers. The patient is still reporting hearing voices commands in nature and asked him to harm self. He reported he was able to resist the thoughts. The safety plan has been discussed with the patient and advised to contact the nursing staff if the voices are overwhelming. The patient sounds understanding the concept. The patient denied side effects of his medications. The patient agreed to adjust his medications. We will continue monitoring for symptoms of psychosis, depression and to adjust his medications as needed. MENTAL STATUS EXAMINATION:    /65   Pulse 71   Temp 97.4 °F (36.3 °C) (Oral)   Resp 14   Ht 6' 3\" (1.905 m)   Wt 155 lb (70.3 kg)   BMI 19.37 kg/m²     MOOD-is dysphoric   Affect-is depressed  Patient feels helpless hopeless and worthless  Psychomotor activity : decreased. APPEARANCE:  Personal hygiene is poor and patient is neglecting his appearance. Patient is somewhat unkempt  PSYCHOSIS:   He reported but denied auditory but denied and the patient has been interviewed visual hallucinations. Denies paranoid delusions. ORIENTATION:  Oriented to time place and person. Recent and remote memory is grossly intact. Intelligence appears to be average  CONCENTRATION:  poor. SPEECH : goal directed , but slow .    DIAGNOSIS:    Principal Problem:    Schizoaffective disorder, depressive type ([facility]  Active Problems:    Cocaine abuse ([facility] Schizoaffective disorder ([facility]  Resolved Problems:    * No resolved hospital problems. *      LAB:    Recent Results (from the past 72 hour(s))   HIV Screen    Collection Time: 02/02/20  2:10 PM   Result Value Ref Range    HIV Ag/Ab NONREACTIVE NONREACTIVE   Hep C Ab    Collection Time: 02/02/20  2:10 PM   Result Value Ref Range    Hepatitis C Ab NONREACTIVE NONREACTIVE   SEDIMENTATION RATE    Collection Time: 02/02/20  2:10 PM   Result Value Ref Range    Sed Rate 10 0 - 15 mm           TREATMENT PLAN:     predniSONE  20 mg Oral BID    citalopram  40 mg Oral Daily    buPROPion  150 mg Oral BID    OXcarbazepine  300 mg Oral BID    lurasidone  160 mg Oral Dinner    QUEtiapine  300 mg Oral Nightly     acetaminophen, aluminum & magnesium hydroxide-simethicone, benztropine mesylate, magnesium hydroxide, traZODone, nicotine polacrilex, hydrOXYzine, ibuprofen    Chart was reviewed and the patient has been interviewed he agreed to increase Wellbutrin 200 mg p.o. twice daily  For symptoms of depression  Patient was discussed with the  and the treatment team.   Provided Supportive and insight-oriented psychotherapy psychotherapy  Recommended involvement in Unit milieu  Provided empathic listening, validation and support  Patient acknowledged and mutually decided to continue with current treatment plan  Discharge planning was discussed with the patient. Omer Vee MD        This note was created with the assistance of a speech-recognition program.  Although the intention is to generate a document that actually reflects the content of the visit, no guarantees can be provided that every mistake has been identified and corrected by editing.

## 2020-02-03 NOTE — GROUP NOTE
Group Therapy Note    Date: 2/3/2020    Group Start Time: 1430  Group End Time: 0914  Group Topic: Recovery    JESUS SHI    FRANKLIN Case, CHANEL        Group Therapy Note    Attendees: 8/17    Patient's Goal:  Increase understanding of addiction and the recovery process. Notes:  Pt is making progress AEB participating in group discussion about setting recovery goals and how to stay on track. Status After Intervention:  Improved    Participation Level:  Active Listener and Interactive    Participation Quality: Appropriate, Attentive and Supportive      Speech:  normal      Thought Process/Content: Logical      Affective Functioning: Congruent      Mood: euthymic      Level of consciousness:  Alert, Oriented x4 and Attentive      Response to Learning: Progressing to goal      Endings: None Reported    Modes of Intervention: Education, Support, Socialization, Exploration, Clarifying and Problem-solving      Discipline Responsible: /Counselor      Signature:  FRANKLIN Case, CHANEL, [address] [place] [id number]

## 2020-02-03 NOTE — GROUP NOTE
Group Therapy Note    Date: 2/3/2020    Group Start Time: 1100  Group End Time: 3467  Group Topic: Recreational    STCZ DAVID Dai, CTRS    Patient did not attend Recreation Therapy Group after encouragement from staff. 1:1 talk time offered but patient refused.      Signature:  Vandana Cabrera

## 2020-02-03 NOTE — GROUP NOTE
Group Therapy Note    Date: 2/3/2020    Group Start Time: 1400 due to code violet at 1330  Group End Time: 1420  Group Topic: Cognitive Skills    JESUS [facility] [place] [facility] [place]    Patient did not attend Cognitive Skills Group after encouragement from staff. 1:1 talk time offered but patient refused.      Signature:  Víctor Fry

## 2020-02-04 PROCEDURE — 6370000000 HC RX 637 (ALT 250 FOR IP): Performed by: INTERNAL MEDICINE

## 2020-02-04 PROCEDURE — 1240000000 HC EMOTIONAL WELLNESS R&B

## 2020-02-04 PROCEDURE — 6370000000 HC RX 637 (ALT 250 FOR IP): Performed by: PSYCHIATRY & NEUROLOGY

## 2020-02-04 PROCEDURE — 99232 SBSQ HOSP IP/OBS MODERATE 35: CPT | Performed by: INTERNAL MEDICINE

## 2020-02-04 RX ORDER — LIDOCAINE 4 G/G
1 PATCH TOPICAL DAILY
Status: DISCONTINUED | OUTPATIENT
Start: 2020-02-04 | End: 2020-02-12 | Stop reason: HOSPADM

## 2020-02-04 RX ADMIN — LURASIDONE HYDROCHLORIDE 160 MG: 80 TABLET, FILM COATED ORAL at 17:22

## 2020-02-04 RX ADMIN — HYDROXYZINE HYDROCHLORIDE 25 MG: 25 TABLET, FILM COATED ORAL at 22:29

## 2020-02-04 RX ADMIN — PREDNISONE 20 MG: 20 TABLET ORAL at 08:30

## 2020-02-04 RX ADMIN — OXCARBAZEPINE 300 MG: 300 TABLET, FILM COATED ORAL at 22:29

## 2020-02-04 RX ADMIN — BUPROPION HYDROCHLORIDE 200 MG: 100 TABLET, FILM COATED, EXTENDED RELEASE ORAL at 22:28

## 2020-02-04 RX ADMIN — OXCARBAZEPINE 300 MG: 300 TABLET, FILM COATED ORAL at 08:30

## 2020-02-04 RX ADMIN — PREDNISONE 20 MG: 20 TABLET ORAL at 22:29

## 2020-02-04 RX ADMIN — TRAZODONE HYDROCHLORIDE 50 MG: 50 TABLET ORAL at 22:29

## 2020-02-04 RX ADMIN — CITALOPRAM HYDROBROMIDE 40 MG: 40 TABLET ORAL at 08:30

## 2020-02-04 RX ADMIN — BUPROPION HYDROCHLORIDE 200 MG: 100 TABLET, FILM COATED, EXTENDED RELEASE ORAL at 08:30

## 2020-02-04 RX ADMIN — QUETIAPINE FUMARATE 300 MG: 300 TABLET ORAL at 22:29

## 2020-02-04 RX ADMIN — HYDROXYZINE HYDROCHLORIDE 25 MG: 25 TABLET, FILM COATED ORAL at 11:33

## 2020-02-04 RX ADMIN — ACETAMINOPHEN 650 MG: 325 TABLET, FILM COATED ORAL at 08:30

## 2020-02-04 ASSESSMENT — PAIN SCALES - GENERAL
PAINLEVEL_OUTOF10: 8

## 2020-02-04 ASSESSMENT — PAIN DESCRIPTION - ORIENTATION: ORIENTATION: LEFT

## 2020-02-04 ASSESSMENT — PAIN DESCRIPTION - LOCATION: LOCATION: BACK

## 2020-02-04 NOTE — GROUP NOTE
Group Therapy Note    Date: 2/4/2020    Group Start Time: 4098  Group End Time: 0915  Group Topic: [address], CTRS    Patient did not attend Cognitive Skills Group after encouragement from staff. 1:1 talk time offered but patient refused.      Signature:  Kishan Hanna

## 2020-02-04 NOTE — PROGRESS NOTES
dizziness, lightheadedness, numbness, pain, tingling extremities      Physical Exam:     /63   Pulse 86   Temp 98.2 °F (36.8 °C) (Oral)   Resp 14   Ht 6' 3\" (1.905 m)   Wt 155 lb (70.3 kg)   BMI 19.37 kg/m²   Temp (24hrs), Av.8 °F (36.6 °C), Min:97.4 °F (36.3 °C), Max:98.2 °F (36.8 °C)    No results for input(s): POCGLU in the last 72 hours. No intake or output data in the 24 hours ending 20    General Appearance:  alert, well appearing, and in no acute distress  Mental status: oriented to person, place, and time with normal affect  Head:  normocephalic, atraumatic. Eye: no icterus, redness, pupils equal and reactive, extraocular eye movements intact, conjunctiva clear  Ear: normal external ear, no discharge, hearing intact  Nose:  no drainage noted  Mouth: mucous membranes moist  Neck: supple, no carotid bruits, thyroid not palpable  Lungs: Bilateral equal air entry, clear to ausculation, no wheezing, rales or rhonchi, normal effort  Cardiovascular: normal rate, regular rhythm, no murmur, gallop, rub. Abdomen: Soft, nontender, nondistended, normal bowel sounds, no hepatomegaly or splenomegaly  Neurologic: There are no new focal motor or sensory deficits, normal muscle tone and bulk, no abnormal sensation, normal speech, cranial nerves II through XII grossly intact  Skin: No gross lesions, rashes, bruising or bleeding on exposed skin area  Extremities:  peripheral pulses palpable, no pedal edema or calf pain with palpation  Underneath the left greater trochanter and the left ankle is tender but no swelling and no redness    Investigations:      Laboratory Testing:  No results found for this or any previous visit (from the past 24 hour(s)).     Imaging/Diagonstics:      Assessment :      Primary Problem  Schizoaffective disorder, depressive type [facility])    Active Hospital Problems    Diagnosis Date Noted    Cocaine abuse ([facility] [F14.10] 2014     Priority: Medium    Schizoaffective

## 2020-02-04 NOTE — PROGRESS NOTES
OLU   buPROPion (WELLBUTRIN SR) 150 MG extended release tablet Take 1 tablet by mouth 2 times daily 1/24/20   Slime Zamarripa, MD   lurasidone (LATUDA) 80 MG TABS tablet Take 2 tablets by mouth Daily with supper 1/24/20   Slime Zamarripa MD   citalopram (CELEXA) 10 MG tablet Take 30 mg by mouth daily    Historical Provider, MD   OXcarbazepine (TRILEPTAL) 300 MG tablet Take 300 mg by mouth 2 times daily    Historical Provider, MD   QUEtiapine (SEROQUEL) 300 MG tablet Take 1 tablet by mouth nightly 5/21/18   MARY Iglesias - CNS        Allergies:     Navane [thiothixene]    Social History:     Tobacco:    reports that he has been smoking cigarettes. He has been smoking about 1.00 pack per day. He has never used smokeless tobacco.  Alcohol:      reports current alcohol use. Drug Use:  reports current drug use. Drug: Cocaine. Family History:     Family History   Problem Relation Age of Onset    Diabetes Mother     Heart Disease Mother        Review of Systems:     Positive and Negative as described in HPI. CONSTITUTIONAL:  negative for fevers, chills, sweats, fatigue, weight loss  HEENT:  negative for vision, hearing changes, runny nose, throat pain  RESPIRATORY:  negative for shortness of breath, cough, congestion, wheezing. CARDIOVASCULAR:  negative for chest pain, palpitations.   GASTROINTESTINAL:  negative for nausea, vomiting, diarrhea, constipation, change in bowel habits, abdominal pain   GENITOURINARY:  negative for difficulty of urination, burning with urination, frequency   INTEGUMENT:  negative for rash, skin lesions, easy bruising   HEMATOLOGIC/LYMPHATIC:  negative for swelling/edema   ALLERGIC/IMMUNOLOGIC:  negative for urticaria , itching  ENDOCRINE:  negative increase in drinking, increase in urination, hot or cold intolerance  MUSCULOSKELETAL:  negative joint pains, muscle aches, swelling of joints  Hip pain and left ankle pain denies any swelling any redness  NEUROLOGICAL:  negative for headaches, dizziness, lightheadedness, numbness, pain, tingling extremities      Physical Exam:     BP (!) 107/55   Pulse 73   Temp 97.8 °F (36.6 °C) (Oral)   Resp 14   Ht 6' 3\" (1.905 m)   Wt 155 lb (70.3 kg)   BMI 19.37 kg/m²   Temp (24hrs), Av °F (36.7 °C), Min:97.8 °F (36.6 °C), Max:98.2 °F (36.8 °C)    No results for input(s): POCGLU in the last 72 hours. No intake or output data in the 24 hours ending 20 1457    General Appearance:  alert, well appearing, and in no acute distress  Mental status: oriented to person, place, and time with normal affect  Head:  normocephalic, atraumatic. Eye: no icterus, redness, pupils equal and reactive, extraocular eye movements intact, conjunctiva clear  Ear: normal external ear, no discharge, hearing intact  Nose:  no drainage noted  Mouth: mucous membranes moist  Neck: supple, no carotid bruits, thyroid not palpable  Lungs: Bilateral equal air entry, clear to ausculation, no wheezing, rales or rhonchi, normal effort  Cardiovascular: normal rate, regular rhythm, no murmur, gallop, rub. Abdomen: Soft, nontender, nondistended, normal bowel sounds, no hepatomegaly or splenomegaly  Neurologic: There are no new focal motor or sensory deficits, normal muscle tone and bulk, no abnormal sensation, normal speech, cranial nerves II through XII grossly intact  Skin: No gross lesions, rashes, bruising or bleeding on exposed skin area  Extremities:  peripheral pulses palpable, no pedal edema or calf pain with palpation  Underneath the left greater trochanter and the left ankle is tender but no swelling and no redness    Investigations:      Laboratory Testing:  No results found for this or any previous visit (from the past 24 hour(s)).     Imaging/Diagonstics:      Assessment :      Primary Problem  Schizoaffective disorder, depressive type [facility])    Active Hospital Problems    Diagnosis Date Noted    Cocaine abuse ([facility] [address] [F14.10] 2014     Priority: Medium    Schizoaffective disorder ([facility] [F25.9] 02/12/2018    Schizoaffective disorder, depressive type ([facility] [F25.1] 09/23/2017       Plan:     1. Left hip pain x-ray reviewed mild degenerative changes tenderness in the left greater trochanter possible trochanteric bursitis will do a short course of prednisone  2. Moderate to severe tenderness of the left ankle no swelling no redness will do an x-ray possible tendinitis will check uric acid levels for gout  Moderate to severe protein calorie malnutrition BMI is 19.4 rule  out HIV check for hep C and sed rate    2/3  Patient pain in left hip has not much improved  Started on steroids recently  May need to do MRI of hip as outpatient if symptoms do not improve   Consult physical therapy. 2/4  Patient pain is slight better in left Hip   Did not work with PT   Lidocaine patch at local site   Will monitor     Consultations:   Sha Moreno MD  2/4/2020  2:57 PM    Copy sent to Dr. Huma Huber MD    Please note that this chart was generated using voice recognition Dragon dictation software. Although every effort was made to ensure the accuracy of this automated transcription, some errors in transcription may have occurred.

## 2020-02-05 PROCEDURE — 97116 GAIT TRAINING THERAPY: CPT

## 2020-02-05 PROCEDURE — 99231 SBSQ HOSP IP/OBS SF/LOW 25: CPT | Performed by: INTERNAL MEDICINE

## 2020-02-05 PROCEDURE — 97162 PT EVAL MOD COMPLEX 30 MIN: CPT

## 2020-02-05 PROCEDURE — 6370000000 HC RX 637 (ALT 250 FOR IP): Performed by: INTERNAL MEDICINE

## 2020-02-05 PROCEDURE — 1240000000 HC EMOTIONAL WELLNESS R&B

## 2020-02-05 PROCEDURE — 6370000000 HC RX 637 (ALT 250 FOR IP): Performed by: PSYCHIATRY & NEUROLOGY

## 2020-02-05 RX ADMIN — PREDNISONE 20 MG: 20 TABLET ORAL at 08:53

## 2020-02-05 RX ADMIN — BUPROPION HYDROCHLORIDE 200 MG: 100 TABLET, FILM COATED, EXTENDED RELEASE ORAL at 08:53

## 2020-02-05 RX ADMIN — OXCARBAZEPINE 300 MG: 300 TABLET, FILM COATED ORAL at 08:53

## 2020-02-05 RX ADMIN — OXCARBAZEPINE 300 MG: 300 TABLET, FILM COATED ORAL at 21:21

## 2020-02-05 RX ADMIN — LURASIDONE HYDROCHLORIDE 160 MG: 80 TABLET, FILM COATED ORAL at 18:14

## 2020-02-05 RX ADMIN — IBUPROFEN 800 MG: 800 TABLET, FILM COATED ORAL at 08:55

## 2020-02-05 RX ADMIN — CITALOPRAM HYDROBROMIDE 40 MG: 40 TABLET ORAL at 08:53

## 2020-02-05 RX ADMIN — QUETIAPINE FUMARATE 300 MG: 300 TABLET ORAL at 21:21

## 2020-02-05 RX ADMIN — BUPROPION HYDROCHLORIDE 200 MG: 100 TABLET, FILM COATED, EXTENDED RELEASE ORAL at 21:20

## 2020-02-05 RX ADMIN — PREDNISONE 20 MG: 20 TABLET ORAL at 21:21

## 2020-02-05 RX ADMIN — TRAZODONE HYDROCHLORIDE 50 MG: 50 TABLET ORAL at 21:21

## 2020-02-05 ASSESSMENT — PAIN SCALES - GENERAL
PAINLEVEL_OUTOF10: 8
PAINLEVEL_OUTOF10: 6
PAINLEVEL_OUTOF10: 8

## 2020-02-05 ASSESSMENT — PAIN DESCRIPTION - PAIN TYPE
TYPE: CHRONIC PAIN
TYPE: CHRONIC PAIN

## 2020-02-05 ASSESSMENT — PAIN DESCRIPTION - ONSET: ONSET: ON-GOING

## 2020-02-05 ASSESSMENT — PAIN DESCRIPTION - ORIENTATION
ORIENTATION: LEFT
ORIENTATION: LEFT

## 2020-02-05 ASSESSMENT — PAIN DESCRIPTION - FREQUENCY: FREQUENCY: INTERMITTENT

## 2020-02-05 ASSESSMENT — PAIN DESCRIPTION - DESCRIPTORS: DESCRIPTORS: SHARP

## 2020-02-05 ASSESSMENT — PAIN DESCRIPTION - LOCATION
LOCATION: LEG
LOCATION: LEG

## 2020-02-05 ASSESSMENT — PAIN DESCRIPTION - PROGRESSION: CLINICAL_PROGRESSION: RAPIDLY WORSENING

## 2020-02-05 NOTE — PROGRESS NOTES
Physical Therapy    Facility/Department: [facility]  Initial Assessment    NAME: Wing Garza  : 1960  MRN: [id number]    Date of Service: 2020    Discharge Recommendations:  Continue to assess pending progress   PT Equipment Recommendations  Equipment Needed: Yes(TBD ? w walker)  Mobility Devices: Fred Dynes: Rolling    Assessment   Body structures, Functions, Activity limitations: Decreased functional mobility ; Increased pain;Decreased balance  Assessment: continue per POC to maxmize potential for safe D/C  Treatment Diagnosis: limited mobility   Specific instructions for Next Treatment: 2020 gait w/ w walker [de-identified]' w/ supervision, gait w/o AD 30' w/ supervision w/ antalgic gait pattern; instruct in HEP for left LE strengthening  Prognosis: Good  Decision Making: Medium Complexity  History: admitted to [facility] due to schizoaffective disorder  Exam: ROM, MMT, balance and mobility assessments  Clinical Presentation: gait w/ w walker [de-identified]' w/ supervision, gait w/o AD 30' w/ supervision w/ antalgic gait pattern; instruct in HEP for left LE strengthening  PT Education: Goals;PT Role;Plan of Care;Gait Training  Barriers to Learning: none  REQUIRES PT FOLLOW UP: Yes  Activity Tolerance  Activity Tolerance: Patient limited by pain       Patient Diagnosis(es): There were no encounter diagnoses. has a past medical history of Bipolar disorder ([address] Depression, GERD (gastroesophageal reflux disease), Hallucinations, Headache(784.0), Hepatitis, Schizophrenia, schizo-affective ([address] Substance abuse ([address] Tobacco abuse, Type II or unspecified type diabetes mellitus without mention of complication, not stated as uncontrolled, and Urinary incontinence. has a past surgical history that includes Dental surgery and Abscess Drainage (N/A, 2018).     Restrictions  Restrictions/Precautions  Restrictions/Precautions: Fall Risk(hx polysubstance abuse, schizophrenia, bipolar, hepatitis)  Required Braces or Orthoses?: No  Vision/Hearing  Vision: Within Functional Limits  Hearing: Within functional limits     Subjective  General  Patient assessed for rehabilitation services?: Yes  Additional Pertinent Hx: hx polysubstance abuse, schizophrenia, bipolar, hepatitis  Response To Previous Treatment: Not applicable  Family / Caregiver Present: No  Referring Practitioner: Dr. Dallas Swanson  Referral Date : 02/04/20  Diagnosis: schizoaffective disorder  Follows Commands: Within Functional Limits  Other (Comment): OK per nursing staff to proceed w/ PT evaluation. CT  lumbar on 2-1-2020 shows: 1. No acute osseous abnormality of the lumbar spine. 2. Right L4 and bilateral L5 neural foraminal narrowing secondary to endplate spurring. No significant bony spinal canal stenosis. 3. osteopenia. General Comment  Comments: X-ray of left hip and pelvis 1- shows:  No acute osseous abnormality. Mild joint space narrowing in the hips. Per chart, pt told ER doctor that his pain has been present for 1 month and denies any injury or fall. Left ankle x-ray 2-2-2020 shows: No acute osseous abnormality of the left ankle. Subjective  Subjective: pt reports to this therapist  that he fell while walking in a store approximately 1 week prior to admission. Pt reports that he fell forward landing on his stomach. Pt unsure why he fell and does not remember how his legs were positioned after the fall but denies a twisting injury. Pt reports that he was taken to the ER at [facility] where x-rays were negative. He ststes he was told he has arthritis. Pt states that he has been hearing voices.   Pain Screening  Patient Currently in Pain: Yes  Pain Assessment  Pain Assessment: 0-10  Pain Level: 8  Patient's Stated Pain Goal: No pain  Pain Type: Chronic pain  Pain Location: Leg  Pain Orientation: Left  Pain Radiating Towards: points to left greater trochanter area and reports that the pain radiates into the lateral thigh, knee,  calf and ankle, also pin points Briana; Shuffles;Decreased step length;Decreased step height; Increased JOSE MANUEL  Distance: 30' w/o AD w/ antalgic gait pattern  Comments: pt shuffles his feet and externally rotates left LE due to 8/10 pain  Ambulation 2  Surface - 2: level tile  Device 2: Rolling Walker  Assistance 2: Supervision  Distance: [de-identified]' w/ w walker  Comments: pt able to ambulate w/ better pattern/ alignment as he states that the w walker allows him to  support himself when weight bearing on left LE  Stairs/Curb  Stairs?: No     Balance  Sitting - Static: Good  Sitting - Dynamic: Good  Standing - Static: Good;-(w/o AD)  Standing - Dynamic: Fair;+(w/o AD)        Plan   Plan  Times per week: 2 X/ week / 2 weeks  Times per day: (2 X/ week / 2 weeks)  Specific instructions for Next Treatment: 2-5-2020 gait w/ w walker [de-identified]' w/ supervision, gait w/o AD 30' w/ supervision w/ antalgic gait pattern; instruct in [facility] for left LE strengthening  Current Treatment Recommendations: Strengthening, Gait Training, Patient/Caregiver Education & Training, Functional Mobility Training, Balance Training, Home Exercise Program, Transfer Training, Safety Education & Training  Safety Devices  Type of devices: Left in bed, Nurse notified(nurse approved w walker being left in patient's room for use)    G-Code       OutComes Score                                                  AM-PAC Score     AM-PAC Inpatient Mobility without Stair Climbing Raw Score : 13 (02/05/20 1402)  AM-PAC Inpatient without Stair Climbing T-Scale Score : 38.96 (02/05/20 1402)  Mobility Inpatient CMS 0-100% Score: 58.44 (02/05/20 1402)  Mobility Inpatient without Stair CMS G-Code Modifier : CK (02/05/20 1402)       Goals  Short term goals  Time Frame for Short term goals: 2X/ week / 2 weeks  Short term goal 1: pt to demonstrate improved gait pattern using w walker  Short term goal 2: pt to demonstrate MODIFIED independent gait using w walker 200'   Short term goal 3: pt to demonstrate good ambulatory balance using w walker  Short term goal 4: pt to demonstrate good technique for LE strengthening exercises  Patient Goals   Patient goals : walk better, \"Normal\"       Therapy Time   Individual Concurrent Group Co-treatment   Time In 1402         Time Out 8490         Minutes 35         Timed Code Treatment Minutes: 70 [facility], PT

## 2020-02-05 NOTE — CARE COORDINATION
1:1    Pt approached writer and stated he was experiencing increased AH at this time. Pt became tearful, states the voices are commanding him to \"get a gun and shoot myself\". 1:1 talk time provided. Writer and pt identified social supports and coping skills to decrease AH. He states distraction and sleep help him manage symptoms/avoid symptoms. He states he continues to feel stress d/t homelessness. Writer and pt contacted  [facility]  Jonh Burdick [phone]) for coordination, left voicemail. Pt states he will continue to attend 10 am group with writer. He was encouraged to attend additional groups provided but declined, states \"I am not feeling well, so I am going back to bed\".

## 2020-02-05 NOTE — PLAN OF CARE
Problem: Altered Mood, Depressive Behavior:  Goal: Able to verbalize and/or display a decrease in depressive symptoms  Description  Able to verbalize and/or display a decrease in depressive symptoms  2/4/2020 2216 by vE Barth RN  Outcome: Ongoing     Problem: Altered Mood, Depressive Behavior:  Goal: Ability to disclose and discuss suicidal ideas will improve  Description  Ability to disclose and discuss suicidal ideas will improve  2/4/2020 2216 by Ev Barth RN  Outcome: Ongoing  Note:   Pt denies thoughts of self harm and is agreeable to seeking out should thoughts of self harm arise. Safe environment maintained. Q15 minute checks for safety cont per unit policy. Will cont to monitor for safety and provides support and reassurance as needed. Pt stayed in his room all evening. Polite and appropriate.

## 2020-02-05 NOTE — PROGRESS NOTES
past 72 hour(s))   HIV Screen    Collection Time: 02/02/20  2:10 PM   Result Value Ref Range    HIV Ag/Ab NONREACTIVE NONREACTIVE   Hep C Ab    Collection Time: 02/02/20  2:10 PM   Result Value Ref Range    Hepatitis C Ab NONREACTIVE NONREACTIVE   SEDIMENTATION RATE    Collection Time: 02/02/20  2:10 PM   Result Value Ref Range    Sed Rate 10 0 - 15 mm           TREATMENT PLAN:     lidocaine  1 patch Transdermal Daily    buPROPion  200 mg Oral BID    predniSONE  20 mg Oral BID    citalopram  40 mg Oral Daily    OXcarbazepine  300 mg Oral BID    lurasidone  160 mg Oral Dinner    QUEtiapine  300 mg Oral Nightly     acetaminophen, aluminum & magnesium hydroxide-simethicone, benztropine mesylate, magnesium hydroxide, traZODone, nicotine polacrilex, hydrOXYzine, ibuprofen    Chart was reviewed and the patient has been interviewed at the bedside  Continue same medications as prescribed above  Patient was discussed with the  and the treatment team.   Provided Supportive and insight-oriented psychotherapy psychotherapy  Recommended involvement in Unit milieu  Provided empathic listening, validation and support  Patient acknowledged and mutually decided to continue with current treatment plan  Discharge planning was discussed with the patient. Hao Haney MD        This note was created with the assistance of a speech-recognition program.  Although the intention is to generate a document that actually reflects the content of the visit, no guarantees can be provided that every mistake has been identified and corrected by editing.

## 2020-02-05 NOTE — PLAN OF CARE
Situation  Attention:Normal: No  Attention: Distractible, Unable to Concentrate  Thought Processes: Flt.of Ideas  Thought Content:Normal: No  Thought Content: Preoccupations  Hallucinations: Auditory (Comment)  Delusions: No  Memory:Normal: No  Memory: Poor Recent  Insight and Judgment: No  Insight and Judgment: Poor Judgment, Poor Insight  Present Suicidal Ideation: No  Present Homicidal Ideation: No    Daily Assessment Last Entry:   Daily Sleep (WDL): Within Defined Limits         Patient Currently in Pain: Denies  Daily Nutrition (WDL): Within Defined Limits  Appetite Change: Normal for patient  Barriers to Nutrition: Other (Comment)(Patient has no teeth and no dentures)  Level of Assistance: Independent/Self    Patient Monitoring:  Frequency of Checks: 4 times per hour, close    Psychiatric Symptoms:   Depression Symptoms  Depression Symptoms: Isolative, Impaired concentration  Anxiety Symptoms  Anxiety Symptoms: Generalized  Teetee Symptoms  [place]etee Symptoms: Poor judgment     Psychosis Symptoms  Delusion Type: No problems reported or observed. Suicide Risk CSSR-S:  1) Within the past month, have you wished you were dead or wished you could go to sleep and not wake up? : Yes  2) Have you actually had any thoughts of killing yourself? : Yes  3) Have you been thinking about how you might kill yourself? : Yes  5) Have you started to work out or worked out the details of how to kill yourself? Do you intend to carry out this plan? : Yes  6) Have you ever done anything, started to do anything, or prepared to do anything to end your life?: Yes  Change in result:  no  Change in plan of care:  no    EDUCATION:   Learner Progress Toward Treatment Goals:   Reviewed results and recommendations of this team, reviewed group plan and strategies, reviewed signs, symptoms and risk of self harm and violent behavior, reviewed goals and plan of care. Method:  individual education, small group, verbal education.     Outcome: Pt verbalized understanding, but needs reinforcement to obtain goals. PATIENT GOALS:  Short term:  Stabilize medications, find temporary housing  Long term:  Find permanent housing, stability     PLAN/TREATMENT RECOMMENDATIONS UPDATE:   Continue with group therapies, education of coping skills   Continue to monitor patient on unit. Medications provided/medication compliance by patient. Continue for plans to obtain long term goals after discharge.     SHORT-TERM GOALS UPDATE:  Time frame for Short-Term Goals:  8-14days     LONG-TERM GOALS UPDATE:  Time frame for Long-Term Goals:  6 months    Members Present in Team Meeting:     Lissett Steele

## 2020-02-05 NOTE — GROUP NOTE
Group Therapy Note    Date: 2/5/2020    Group Start Time: 1100  Group End Time: 4949  Group Topic: Recreational    STCZ DAVID Gay, CTRS    Patient did not attend Recreation Therapy Group. Pt was meeting with  during group.       Signature:  Jim Chávez

## 2020-02-06 LAB
AMPHETAMINE SCREEN URINE: NEGATIVE
BARBITURATE SCREEN URINE: NEGATIVE
BENZODIAZEPINE SCREEN, URINE: NEGATIVE
BUPRENORPHINE URINE: NORMAL
CANNABINOID SCREEN URINE: NEGATIVE
COCAINE METABOLITE, URINE: NEGATIVE
MDMA URINE: NORMAL
METHADONE SCREEN, URINE: NEGATIVE
METHAMPHETAMINE, URINE: NORMAL
OPIATES, URINE: NEGATIVE
OXYCODONE SCREEN URINE: NEGATIVE
PHENCYCLIDINE, URINE: NEGATIVE
PROPOXYPHENE, URINE: NORMAL
TEST INFORMATION: NORMAL
TRICYCLIC ANTIDEPRESSANTS, UR: NORMAL

## 2020-02-06 PROCEDURE — 1240000000 HC EMOTIONAL WELLNESS R&B

## 2020-02-06 PROCEDURE — 97116 GAIT TRAINING THERAPY: CPT

## 2020-02-06 PROCEDURE — 6370000000 HC RX 637 (ALT 250 FOR IP): Performed by: PSYCHIATRY & NEUROLOGY

## 2020-02-06 PROCEDURE — 6370000000 HC RX 637 (ALT 250 FOR IP): Performed by: INTERNAL MEDICINE

## 2020-02-06 PROCEDURE — 80307 DRUG TEST PRSMV CHEM ANLYZR: CPT

## 2020-02-06 RX ADMIN — ALUMINUM HYDROXIDE, MAGNESIUM HYDROXIDE, AND SIMETHICONE 30 ML: 200; 200; 20 SUSPENSION ORAL at 19:23

## 2020-02-06 RX ADMIN — CITALOPRAM HYDROBROMIDE 40 MG: 40 TABLET ORAL at 08:31

## 2020-02-06 RX ADMIN — PREDNISONE 20 MG: 20 TABLET ORAL at 08:31

## 2020-02-06 RX ADMIN — OXCARBAZEPINE 300 MG: 300 TABLET, FILM COATED ORAL at 20:25

## 2020-02-06 RX ADMIN — BUPROPION HYDROCHLORIDE 200 MG: 100 TABLET, FILM COATED, EXTENDED RELEASE ORAL at 08:31

## 2020-02-06 RX ADMIN — OXCARBAZEPINE 300 MG: 300 TABLET, FILM COATED ORAL at 08:31

## 2020-02-06 RX ADMIN — HYDROXYZINE HYDROCHLORIDE 25 MG: 25 TABLET, FILM COATED ORAL at 20:25

## 2020-02-06 RX ADMIN — PREDNISONE 20 MG: 20 TABLET ORAL at 20:26

## 2020-02-06 RX ADMIN — LURASIDONE HYDROCHLORIDE 160 MG: 80 TABLET, FILM COATED ORAL at 17:50

## 2020-02-06 RX ADMIN — TRAZODONE HYDROCHLORIDE 50 MG: 50 TABLET ORAL at 20:25

## 2020-02-06 RX ADMIN — BUPROPION HYDROCHLORIDE 200 MG: 100 TABLET, FILM COATED, EXTENDED RELEASE ORAL at 20:26

## 2020-02-06 RX ADMIN — QUETIAPINE FUMARATE 300 MG: 300 TABLET ORAL at 20:25

## 2020-02-06 ASSESSMENT — PAIN DESCRIPTION - PAIN TYPE: TYPE: CHRONIC PAIN

## 2020-02-06 ASSESSMENT — PAIN DESCRIPTION - ORIENTATION: ORIENTATION: LEFT

## 2020-02-06 ASSESSMENT — PAIN DESCRIPTION - LOCATION: LOCATION: HIP;LEG

## 2020-02-06 ASSESSMENT — PAIN SCALES - GENERAL: PAINLEVEL_OUTOF10: 6

## 2020-02-06 NOTE — CARE COORDINATION
1:1    Pt observed in bed on this date, cooperative and anxious AEB statements. Pt informed writer \"the voices are not good today, they are so mean\". Pt endorsed continued command AH but denied plan or intent, states he stays in bed to block out voices. Writer discussed current PT and physical health needs, pt reports PT is helping while here but he does not want ECF placement. Writer discussed AOD use with pt. He states he has resources to contact as he does plan to stop or decrease use, but again denied placement. Pt states, \"I want to get my own place before I go somewhere for the drugs\". Pt thanks  for talk time and support, states he will attend future group with writer. ZUÑIGA to continue coordination with CM for [facility] placement and housing supports.

## 2020-02-06 NOTE — PROGRESS NOTES
PROGRESS NOTE  Chart has been reviewed and the patient was interviewed at the bedside. The patient reported no changes in his mental status. He still reporting suicidal ideation \"the voices are telling me to kill myself\". The patient was able to guarantee for safety at the hospital.  Safety plan has been discussed with the patient and advised to approach to the treatment team once the thoughts are bad and is unable to control them. The patient denied side effects of medications. The patient reported somewhat improvement in his hip pain since he started using the walker. The patient still isolating himself with poor interaction with other peers. Continue monitoring for symptoms of depression, psychosis and to adjust his medications as needed. MENTAL STATUS EXAMINATION:    /66   Pulse 79   Temp 98 °F (36.7 °C) (Oral)   Resp 14   Ht 6' 3\" (1.905 m)   Wt 155 lb (70.3 kg)   BMI 19.37 kg/m²     MOOD-is dysphoric   Affect-is depressed  Patient feels helpless hopeless and worthless  Psychomotor activity : decreased. APPEARANCE:  Personal hygiene is poor and patient is neglecting his appearance. Patient is somewhat unkempt  PSYCHOSIS: He reported auditory denied visual hallucinations. Denies paranoid delusions. ORIENTATION:  Oriented to time place and person. Recent and remote memory is grossly intact. Intelligence appears to be average  CONCENTRATION:  poor. SPEECH : goal directed , but slow . DIAGNOSIS:    Principal Problem:    Schizoaffective disorder, depressive type ([address]  Active Problems:    Cocaine abuse ([facility] [facility]    Schizoaffective disorder ([address]  Resolved Problems:    * No resolved hospital problems.  *      LAB:    No results found for this or any previous visit (from the past 72

## 2020-02-06 NOTE — PLAN OF CARE
Problem: Altered Mood, Depressive Behavior:  Goal: Able to verbalize and/or display a decrease in depressive symptoms  Description  Able to verbalize and/or display a decrease in depressive symptoms  2/5/2020 2331 by Monica Caldwell  Note:   Patient denies suicidal thoughts. Patient states he is hearing voices that are telling him to kill himself. He does not have a plan and verbalizes not following through. Patient was calm and cooperative during 1:1 talk time and states that he has improved since admission. Patient agrees to seek staff if any changes in thought occur. Stated appetite and sleep are adequate. Patient reports left sided leg pain and is satisfied with current interventions.

## 2020-02-06 NOTE — PROGRESS NOTES
[facility]    Physical Therapy Progress Note    Date: 20  Patient Name: Rena iBllings       Room: 0113/0113-01  MRN: [id number]   Account: [de-identified]   : 1960  (60 y.o.)   Gender: male     Discharge Recommendations   Continue to assess pending progress  Equipment Needed: Yes  Mobility Devices: Jayden Meline: Rolling          Restrictions/Precautions: Fall Risk(hx polysubstance abuse, schizophrenia, bipolar, hep)   Past Medical History:  has a past medical history of Bipolar disorder ([address] Depression, GERD (gastroesophageal reflux disease), Hallucinations, Headache(784.0), Hepatitis, Schizophrenia, schizo-affective ([address] Substance abuse ([facility] Tobacco abuse, Type II or unspecified type diabetes mellitus without mention of complication, not stated as uncontrolled, and Urinary incontinence. Past Surgical History:   has a past surgical history that includes Dental surgery and Abscess Drainage (N/A, 2018). Additional Pertinent Hx: hx polysubstance abuse, schizophrenia, bipolar, hepatitis    Overall Orientation Status: Within Normal Limits(oriented x4)  Restrictions/Precautions  Restrictions/Precautions: Fall Risk(hx polysubstance abuse, schizophrenia, bipolar, hep)  Required Braces or Orthoses?: No    Subjective: Pt reports hip and leg is feeling better now that pt has been given a pain patch  Comments: Pt is asleep in bed upon arrival. Pt is pleasant and agreeable to PT. Vital Signs  Patient Currently in Pain: Yes  Pain Assessment: 0-10  Pain Level: 6  Pain Type: Chronic pain  Pain Location: Hip;Leg  Pain Orientation: Left  Non-Pharmaceutical Pain Intervention(s): Ambulation/Increased Activity; Distraction;Repositioned  Response to Pain Intervention: Patient Satisfied                Bed Mobility:   Bed Mobility  Rolling: Independent  Supine to Sit: Independent  Sit to Supine: Independent  Scooting: Independent  Bed mobility  Scooting: Independent    Transfers:  Sit to

## 2020-02-06 NOTE — GROUP NOTE
Group Therapy Note    Date: 2/6/2020    Group Start Time: 1330  Group End Time: 5080  Group Topic: Cognitive Skills    JESUS Juarez, CTRS    Patient was not able to attend Cognitive Skills Group due to meeting with physical therapy.      Signature:  Jeannine Reyna

## 2020-02-06 NOTE — GROUP NOTE
Group Therapy Note    Date: 2/6/2020    Group Start Time: 5763  Group End Time: 0836  Group Topic: [address]        Group Therapy Note    Attendees: 9/23         Patient's Goal:  To set daily goals     Status After Intervention:  Improved    Participation Level:  Active Listener and Interactive    Participation Quality: Appropriate and Attentive      Speech:  normal      Thought Process/Content: Logical      Affective Functioning: Congruent      Mood: euphoric      Level of consciousness:  Alert and Oriented x4      Response to Learning: Able to verbalize current knowledge/experience and Able to verbalize/acknowledge new learning      Endings: None Reported    Modes of Intervention: Socialization and Exploration      Discipline Responsible: Psychoeducational Specialist      Signature:  Akash Bhandari

## 2020-02-06 NOTE — GROUP NOTE
Group Therapy Note    Date: 2/6/2020    Group Start Time: 4291  Group End Time: 3527  Group Topic: Healthy Living/Wellness    JESUS Farrar RN        Group Therapy Note    Attendees: 12/19         Patient's Goal:  To recognize coping skills    Notes:      Status After Intervention:  Improved    Participation Level:  Active Listener and Interactive    Participation Quality: Appropriate, Attentive and Sharing      Speech:  normal      Thought Process/Content: Logical  Linear      Affective Functioning: Congruent      Mood: euthymic      Level of consciousness:  Alert and Oriented x4      Response to Learning: Able to verbalize current knowledge/experience and Capable of insight      Endings: None Reported    Modes of Intervention: Socialization and Exploration      Discipline Responsible: Registered Nurse      Signature:  Stanislav Farrar RN

## 2020-02-06 NOTE — PROGRESS NOTES
[facility]    CONSULTATION / HISTORY AND PHYSICAL EXAMINATION            Date:   2/5/2020  Patient name:  Nunu Colon  Date of admission:  1/29/2020  9:48 AM  MRN:   [id number]  Account:  [de-identified]  YOB: 1960  PCP:    Huma Huber MD  Room:   [facility]0113-01  Code Status:    Full Code    Physician Requesting Consult: Hao Haney MD    Reason for Consult: Left hip and left ankle pain    Chief Complaint:     No chief complaint on file. Left hip and left ankle pain    History Obtained From:     Patient medical record nursing staff    History of Present Illness:     50-year-old -American gentleman who is a poor historian complains of a few week history of left hip and left ankle pain complains of for limping pain is worse with walking better with rest and pain meds denies any swelling any redness no falls or new injuries no history of gout    Past Medical History:     Past Medical History:   Diagnosis Date    [facility] disorder [facility]     Depression     GERD (gastroesophageal reflux disease)     Hallucinations     Headache(784.0)     Hepatitis     Schizophrenia, schizo-affective ([facility]     Substance abuse ([facility]     Tobacco abuse     Type II or unspecified type diabetes mellitus without mention of complication, not stated as uncontrolled     Urinary incontinence         Past Surgical History:     Past Surgical History:   Procedure Laterality Date    ABSCESS DRAINAGE N/A 02/11/2018    Carla anal abcess    DENTAL SURGERY      all teeth pulled        Medications Prior to Admission:     Prior to Admission medications    Medication Sig Start Date End Date Taking?  Authorizing Provider   ibuprofen (ADVIL;MOTRIN) 800 MG tablet Take 1 tablet by mouth every 8 hours as needed for Pain 1/27/20   Bobby Rudd PA-C   acetaminophen (TYLENOL) 325 MG tablet Take 2 tablets by mouth every 6 hours as needed for Pain 1/27/20   Bobby Rudd

## 2020-02-06 NOTE — GROUP NOTE
Group Therapy Note    Date: 2/6/2020    Group Start Time: 1000  Group End Time: 9700  Group Topic: Psychotherapy    JESUS SHI    FRANKLIN Sexton LSW        Group Therapy Note    Attendees: 6/12       Patient's Goal: Interpersonal relationships and communication    Notes:  Mental Health vs. Mental Illness discussion; stigma; coping skills; identified cues of distress; participated in group discussions, attentive    Status After Intervention:  Improved    Participation Level: Interactive    Participation Quality: Appropriate, Attentive, Sharing and Supportive      Speech:  normal      Thought Process/Content: Logical      Affective Functioning: Congruent      Mood: euthymic      Level of consciousness:  Alert and Oriented x4      Response to Learning: Able to verbalize current knowledge/experience      Endings: None reported     Modes of Intervention: Support      Discipline Responsible: /Counselor      Signature:   FRANKLIN Sexton LSW

## 2020-02-06 NOTE — FLOWSHEET NOTE
*Patient participated in the [facility] and stayed after to talk about some of his issues       02/06/20 1537   Encounter Summary   Services provided to: Patient   Referral/Consult From: Jannette   Continue Visiting   (2/6/20)   Complexity of Encounter Moderate   Length of Encounter 30 minutes   Spiritual Assessment Completed Yes   Spiritual/Confucianist   Type Spiritual support   Assessment Calm; Approachable   Intervention Active listening   Outcome Receptive

## 2020-02-06 NOTE — GROUP NOTE
Group Therapy Note    Date: 2/6/2020    Group Start Time: 1100  Group End Time: 7675  Group Topic: Healthy Living/Wellness    [address], CTRS        Group Therapy Note    Attendees: 5/12         Patient's Goal:   Develop communication skills, coping skills      Status After Intervention:  Improved    Participation Level:  Active Listener and Interactive    Participation Quality: Appropriate and Attentive      Speech:  normal      Thought Process/Content: Logical      Affective Functioning: Congruent      Mood: euphoric      Level of consciousness:  Alert, Oriented x4 and Attentive      Response to Learning: Able to verbalize current knowledge/experience and Able to verbalize/acknowledge new learning      Endings: None Reported    Modes of Intervention: Education, Support and Socialization      Discipline Responsible: Psychoeducational Specialist      Signature:  Danny Morales

## 2020-02-07 PROCEDURE — 6370000000 HC RX 637 (ALT 250 FOR IP): Performed by: PSYCHIATRY & NEUROLOGY

## 2020-02-07 PROCEDURE — 6370000000 HC RX 637 (ALT 250 FOR IP): Performed by: INTERNAL MEDICINE

## 2020-02-07 PROCEDURE — 1240000000 HC EMOTIONAL WELLNESS R&B

## 2020-02-07 RX ORDER — QUETIAPINE FUMARATE 200 MG/1
400 TABLET, FILM COATED ORAL NIGHTLY
Status: DISCONTINUED | OUTPATIENT
Start: 2020-02-07 | End: 2020-02-12 | Stop reason: HOSPADM

## 2020-02-07 RX ADMIN — OXCARBAZEPINE 300 MG: 300 TABLET, FILM COATED ORAL at 22:23

## 2020-02-07 RX ADMIN — BUPROPION HYDROCHLORIDE 200 MG: 100 TABLET, FILM COATED, EXTENDED RELEASE ORAL at 22:23

## 2020-02-07 RX ADMIN — BUPROPION HYDROCHLORIDE 200 MG: 100 TABLET, FILM COATED, EXTENDED RELEASE ORAL at 09:03

## 2020-02-07 RX ADMIN — QUETIAPINE FUMARATE 400 MG: 200 TABLET ORAL at 22:23

## 2020-02-07 RX ADMIN — TRAZODONE HYDROCHLORIDE 50 MG: 50 TABLET ORAL at 22:24

## 2020-02-07 RX ADMIN — CITALOPRAM HYDROBROMIDE 40 MG: 40 TABLET ORAL at 09:03

## 2020-02-07 RX ADMIN — LURASIDONE HYDROCHLORIDE 160 MG: 80 TABLET, FILM COATED ORAL at 18:14

## 2020-02-07 RX ADMIN — OXCARBAZEPINE 300 MG: 300 TABLET, FILM COATED ORAL at 09:03

## 2020-02-07 NOTE — GROUP NOTE
Group Therapy Note  Date: 2/7/2020  Group Start Time: 1430  Group End Time: 6450  Group Topic: Cognitive Skills/ Relaxation Group-Name That Tune  Attendees: 9/17  Unit: [facility]  Goal: To improve cognitive functioning and increase relaxation by the end group through participating in the group activity. Notes: Radha Colon participated in the group activity.    Status After Intervention: Improved  Participation Level: Interactive, Active Listener   Participation Quality: Appropriate, Attentive, Sharing, Supportive  Speech: Normal  Thought Process/Content: Logical  Affective Functioning: Congruent  Mood: Euthymic  Level of consciousness: Alert, Oriented x4 and Attentive  Response to Learning: Able to verbalize current knowledge/experience, Able to verbalize/acknowledge new learning and Able to retain information  Endings: None Reported  Modes of Intervention: Education, Support, Socialization, Exploration, and Activity  Discipline Responsible: Psychoeducational Specialist  Signature: Dion Carrillo [place]

## 2020-02-07 NOTE — GROUP NOTE
Group Therapy Note    Date: 2/7/2020    Group Start Time: 1000  Group End Time: 3018  Group Topic: Psychotherapy    V[address] Chelsie Armendariz, MSW, LSW        Group Therapy Note    Attendees: 5/11    Pt declined to attend psychotherapy at 1000 am despite encouragement. Pt offered 1:1 and refused.

## 2020-02-08 PROCEDURE — 6370000000 HC RX 637 (ALT 250 FOR IP): Performed by: INTERNAL MEDICINE

## 2020-02-08 PROCEDURE — 6370000000 HC RX 637 (ALT 250 FOR IP): Performed by: PSYCHIATRY & NEUROLOGY

## 2020-02-08 PROCEDURE — 1240000000 HC EMOTIONAL WELLNESS R&B

## 2020-02-08 RX ADMIN — ACETAMINOPHEN 650 MG: 325 TABLET, FILM COATED ORAL at 08:39

## 2020-02-08 RX ADMIN — TRAZODONE HYDROCHLORIDE 50 MG: 50 TABLET ORAL at 21:22

## 2020-02-08 RX ADMIN — LURASIDONE HYDROCHLORIDE 160 MG: 80 TABLET, FILM COATED ORAL at 18:03

## 2020-02-08 RX ADMIN — BUPROPION HYDROCHLORIDE 200 MG: 100 TABLET, FILM COATED, EXTENDED RELEASE ORAL at 08:39

## 2020-02-08 RX ADMIN — OXCARBAZEPINE 300 MG: 300 TABLET, FILM COATED ORAL at 08:39

## 2020-02-08 RX ADMIN — CITALOPRAM HYDROBROMIDE 40 MG: 40 TABLET ORAL at 08:39

## 2020-02-08 RX ADMIN — OXCARBAZEPINE 300 MG: 300 TABLET, FILM COATED ORAL at 21:21

## 2020-02-08 RX ADMIN — QUETIAPINE FUMARATE 400 MG: 200 TABLET ORAL at 21:22

## 2020-02-08 RX ADMIN — BUPROPION HYDROCHLORIDE 200 MG: 100 TABLET, FILM COATED, EXTENDED RELEASE ORAL at 21:21

## 2020-02-08 RX ADMIN — MAGNESIUM HYDROXIDE 30 ML: 400 SUSPENSION ORAL at 02:20

## 2020-02-08 ASSESSMENT — PAIN SCALES - GENERAL
PAINLEVEL_OUTOF10: 3
PAINLEVEL_OUTOF10: 0

## 2020-02-08 NOTE — PLAN OF CARE
Pt is cooperative during assessment. Pt denies depression, anxiety, and suicidal and/or homicidal ideation. Pt endorses auditory hallucinations of singing, stating that they kept him awake the previous night and that they are really bothering him. Pt is isolative to room, refusing to come out for group. Pt is med compliant with the nurse. Pt remains safe on unit. Every 15 min checks for safety continue.

## 2020-02-08 NOTE — PLAN OF CARE
Problem: Altered Mood, Depressive Behavior:  Goal: Able to verbalize and/or display a decrease in depressive symptoms  Description  Able to verbalize and/or display a decrease in depressive symptoms  2/8/2020 1036 by Yoav Gaytan LPN  Outcome: Ongoing   Patient admits to depression due to auditory hallucinations. Patient states voices are telling him to \"kill yourself\". Patient admits to suicidal ideations, contracts to safety. Patient isolative to room, out for meals. Patient encouraged to attend unit programming. Patient cooperative and pleasant upon approach. Patient safety maintained and 15 minute checks continued. Problem: Pain:  Goal: Pain level will decrease  Description  Pain level will decrease  Outcome: Ongoing   Patient utilizing PRN pain medication when in pain.

## 2020-02-08 NOTE — PROGRESS NOTES
resolved hospital problems. *      LAB:    Recent Results (from the past 72 hour(s))   Drug Scr, Abuse, Ur    Collection Time: 02/06/20  2:53 PM   Result Value Ref Range    Amphetamine Screen, Ur NEGATIVE NEGATIVE    Barbiturate Screen, Ur NEGATIVE NEGATIVE    Benzodiazepine Screen, Urine NEGATIVE NEGATIVE    Cocaine Metabolite, Urine NEGATIVE NEGATIVE    Methadone Screen, Urine NEGATIVE NEGATIVE    Opiates, Urine NEGATIVE NEGATIVE    Phencyclidine, Urine NEGATIVE NEGATIVE    Propoxyphene, Urine NOT REPORTED NEGATIVE    Cannabinoid Scrn, Ur NEGATIVE NEGATIVE    Oxycodone Screen, Ur NEGATIVE NEGATIVE    Methamphetamine, Urine NOT REPORTED NEGATIVE    Tricyclic Antidepressants, Urine NOT REPORTED NEGATIVE    MDMA, Urine NOT REPORTED NEGATIVE    Buprenorphine Urine NOT REPORTED NEGATIVE    Test Information       Assay provides medical screening only. The absence of expected drug(s) and/or metabolite(s) may indicate diluted or adulterated urine, limitations of testing or timing of collection. TREATMENT PLAN:     lidocaine  1 patch Transdermal Daily    buPROPion  200 mg Oral BID    citalopram  40 mg Oral Daily    OXcarbazepine  300 mg Oral BID    lurasidone  160 mg Oral Dinner    QUEtiapine  300 mg Oral Nightly     acetaminophen, aluminum & magnesium hydroxide-simethicone, benztropine mesylate, magnesium hydroxide, traZODone, nicotine polacrilex, hydrOXYzine, ibuprofen    Chart was reviewed and the patient has been interviewed  He agreed to increase Seroquel to 400 mg p.o. at bedtime for sleep and psychosis.   Continue the rest of his medications as prescribed above  Patient was discussed with the  and the treatment team.   Provided Supportive and insight-oriented psychotherapy psychotherapy  Recommended involvement in Unit milieu  Provided empathic listening, validation and support  Patient acknowledged and mutually decided to continue with current treatment plan  Discharge planning was

## 2020-02-08 NOTE — PROGRESS NOTES
Barbiturate Screen, Ur NEGATIVE NEGATIVE    Benzodiazepine Screen, Urine NEGATIVE NEGATIVE    Cocaine Metabolite, Urine NEGATIVE NEGATIVE    Methadone Screen, Urine NEGATIVE NEGATIVE    Opiates, Urine NEGATIVE NEGATIVE    Phencyclidine, Urine NEGATIVE NEGATIVE    Propoxyphene, Urine NOT REPORTED NEGATIVE    Cannabinoid Scrn, Ur NEGATIVE NEGATIVE    Oxycodone Screen, Ur NEGATIVE NEGATIVE    Methamphetamine, Urine NOT REPORTED NEGATIVE    Tricyclic Antidepressants, Urine NOT REPORTED NEGATIVE    MDMA, Urine NOT REPORTED NEGATIVE    Buprenorphine Urine NOT REPORTED NEGATIVE    Test Information       Assay provides medical screening only. The absence of expected drug(s) and/or metabolite(s) may indicate diluted or adulterated urine, limitations of testing or timing of collection. TREATMENT PLAN:     QUEtiapine  400 mg Oral Nightly    lidocaine  1 patch Transdermal Daily    buPROPion  200 mg Oral BID    citalopram  40 mg Oral Daily    OXcarbazepine  300 mg Oral BID    lurasidone  160 mg Oral Dinner     acetaminophen, aluminum & magnesium hydroxide-simethicone, benztropine mesylate, magnesium hydroxide, traZODone, nicotine polacrilex, hydrOXYzine, ibuprofen    Chart was reviewed patient has been interviewed  The same medications as prescribed above  Patient was discussed with the  and the treatment team.   Provided Supportive and insight-oriented psychotherapy psychotherapy  Recommended involvement in Unit milieu  Provided empathic listening, validation and support  Patient acknowledged and mutually decided to continue with current treatment plan  Discharge planning was discussed with the patient.      Stephy Roque MD        This note was created with the assistance of a speech-recognition program.  Although the intention is to generate a document that actually reflects the content of the visit, no guarantees can be provided that every mistake has been identified and corrected by editing.

## 2020-02-08 NOTE — GROUP NOTE
Group Therapy Note    Date: 2/8/2020    Group Start Time: 1130  Group End Time: 200  Group Topic: Healthy Living/Wellness    JESUS Miranda RN        Group Therapy Note    Attendees: 7/15     Patient refused to attend wellness group at 1130 after encouragement from staff. 1:1 talk time offereed as alternative to group session but patient declined.      Signature:  Osvaldo Miranda RN

## 2020-02-08 NOTE — GROUP NOTE
Group Therapy Note    Date: 2/8/2020    Group Start Time: 1330  Group End Time: 3132  Group Topic: Cognitive Skills    JESUS Powell, [facility]    Patient did not attend Cognitive Skills Group after encouragement from staff. 1:1 talk time offered but patient refused.      Signature:  Venkat Yeboah

## 2020-02-09 PROCEDURE — 6370000000 HC RX 637 (ALT 250 FOR IP): Performed by: INTERNAL MEDICINE

## 2020-02-09 PROCEDURE — 6370000000 HC RX 637 (ALT 250 FOR IP): Performed by: PSYCHIATRY & NEUROLOGY

## 2020-02-09 PROCEDURE — 1240000000 HC EMOTIONAL WELLNESS R&B

## 2020-02-09 RX ADMIN — TRAZODONE HYDROCHLORIDE 50 MG: 50 TABLET ORAL at 21:37

## 2020-02-09 RX ADMIN — LURASIDONE HYDROCHLORIDE 160 MG: 80 TABLET, FILM COATED ORAL at 18:45

## 2020-02-09 RX ADMIN — BUPROPION HYDROCHLORIDE 200 MG: 100 TABLET, FILM COATED, EXTENDED RELEASE ORAL at 08:53

## 2020-02-09 RX ADMIN — QUETIAPINE FUMARATE 400 MG: 200 TABLET ORAL at 21:37

## 2020-02-09 RX ADMIN — BUPROPION HYDROCHLORIDE 200 MG: 100 TABLET, FILM COATED, EXTENDED RELEASE ORAL at 21:37

## 2020-02-09 RX ADMIN — CITALOPRAM HYDROBROMIDE 40 MG: 40 TABLET ORAL at 08:52

## 2020-02-09 RX ADMIN — OXCARBAZEPINE 300 MG: 300 TABLET, FILM COATED ORAL at 21:37

## 2020-02-09 RX ADMIN — OXCARBAZEPINE 300 MG: 300 TABLET, FILM COATED ORAL at 08:52

## 2020-02-09 ASSESSMENT — PAIN DESCRIPTION - LOCATION: LOCATION: HIP;LEG;KNEE

## 2020-02-09 ASSESSMENT — PAIN DESCRIPTION - ORIENTATION: ORIENTATION: LEFT

## 2020-02-09 ASSESSMENT — PAIN DESCRIPTION - PAIN TYPE: TYPE: CHRONIC PAIN

## 2020-02-09 ASSESSMENT — PAIN SCALES - GENERAL: PAINLEVEL_OUTOF10: 5

## 2020-02-10 PROCEDURE — 6370000000 HC RX 637 (ALT 250 FOR IP): Performed by: PSYCHIATRY & NEUROLOGY

## 2020-02-10 PROCEDURE — 6370000000 HC RX 637 (ALT 250 FOR IP): Performed by: INTERNAL MEDICINE

## 2020-02-10 PROCEDURE — 1240000000 HC EMOTIONAL WELLNESS R&B

## 2020-02-10 RX ADMIN — BUPROPION HYDROCHLORIDE 200 MG: 100 TABLET, FILM COATED, EXTENDED RELEASE ORAL at 09:33

## 2020-02-10 RX ADMIN — QUETIAPINE FUMARATE 400 MG: 200 TABLET ORAL at 22:45

## 2020-02-10 RX ADMIN — OXCARBAZEPINE 300 MG: 300 TABLET, FILM COATED ORAL at 22:45

## 2020-02-10 RX ADMIN — CITALOPRAM HYDROBROMIDE 40 MG: 40 TABLET ORAL at 09:34

## 2020-02-10 RX ADMIN — OXCARBAZEPINE 300 MG: 300 TABLET, FILM COATED ORAL at 09:33

## 2020-02-10 RX ADMIN — BUPROPION HYDROCHLORIDE 200 MG: 100 TABLET, FILM COATED, EXTENDED RELEASE ORAL at 22:45

## 2020-02-10 RX ADMIN — LURASIDONE HYDROCHLORIDE 160 MG: 80 TABLET, FILM COATED ORAL at 17:40

## 2020-02-10 RX ADMIN — MAGNESIUM HYDROXIDE 30 ML: 400 SUSPENSION ORAL at 12:58

## 2020-02-10 RX ADMIN — TRAZODONE HYDROCHLORIDE 50 MG: 50 TABLET ORAL at 22:45

## 2020-02-10 RX ADMIN — ALUMINUM HYDROXIDE, MAGNESIUM HYDROXIDE, AND SIMETHICONE 30 ML: 200; 200; 20 SUSPENSION ORAL at 12:59

## 2020-02-10 ASSESSMENT — PAIN DESCRIPTION - PAIN TYPE: TYPE: CHRONIC PAIN

## 2020-02-10 ASSESSMENT — PAIN DESCRIPTION - LOCATION: LOCATION: LEG

## 2020-02-10 ASSESSMENT — PAIN SCALES - GENERAL: PAINLEVEL_OUTOF10: 6

## 2020-02-10 ASSESSMENT — PAIN DESCRIPTION - ORIENTATION: ORIENTATION: RIGHT;LEFT

## 2020-02-10 NOTE — CARE COORDINATION
AOD Treatment & D/C planning:      Patient presents alert, oriented x4, and cooperative. Patient reports he is not interested in pursing inpatient treatment or recovery housing at this time. Patient reports, \"I am working with Paris Upton to get into a group home\". This writer offered patient support and encouragement to continue outpatient dual treatment. Unit SW confirmed patient's [facility] , Gisele Arizmendi, is in the process of submitting patient's RSS application for group home placement.

## 2020-02-10 NOTE — GROUP NOTE
Group Therapy Note    Date: 2/10/2020    Group Start Time: 1630  Group End Time: 0578  Group Topic: Group Documentation    JESUS SHI    Deloras Severe        Group Therapy Note    Attendees: 9/20         Patient's Goal:  Attend and participate in wellness group. Notes:  7 steps to developing greater emotional awareness and helping you effectively manage your feelings. Status After Intervention:  Improved    Participation Level:  Active Listener and Interactive    Participation Quality: Appropriate, Attentive, Sharing and Supportive      Speech:  normal      Thought Process/Content: Logical      Affective Functioning: Congruent      Mood: anxious      Level of consciousness:  Alert, Oriented x4 and Attentive      Response to Learning: Able to verbalize current knowledge/experience, Able to verbalize/acknowledge new learning, Able to retain information and Capable of insight      Endings: None Reported    Modes of Intervention: Education, Support, Socialization, Exploration and Problem-solving      Discipline Responsible: [address], [employer]      Signature:  Deloras Severe

## 2020-02-10 NOTE — PROGRESS NOTES
PROGRESS NOTE  The patient has been reviewed and the patient was interviewed at the bedside. The patient is still exhibiting symptoms of depression, auditory hallucinations with commands in nature asking him to kill himself. The patient was tearful during the evaluation. He was unable to guarantee for safety outside the hospital.  Safety plan has been discussed with the patient. He agreed to approach the nursing staff once the thoughts are overwhelming and cannot control them. He denied side effects of his medication. The patient still isolating himself and did not attend any of the therapy groups. The patient has been encouraged to attend therapy groups. He sounds understanding the concept. Continue monitoring for symptoms of depression, psychosis and to adjust his medications as needed. MENTAL STATUS EXAMINATION:    /73   Pulse 63   Temp 97.5 °F (36.4 °C) (Oral)   Resp 14   Ht 6' 3\" (1.905 m)   Wt 155 lb (70.3 kg)   BMI 19.37 kg/m²     MOOD-is dysphoric   Affect-is depressed  Patient feels helpless hopeless and worthless  Psychomotor activity : decreased. APPEARANCE:  Personal hygiene is poor and patient is neglecting his appearance. Patient is somewhat unkempt  PSYCHOSIS:  He reported auditory but denied visual hallucinations. Denies paranoid delusions. ORIENTATION:  Oriented to time place and person. Recent and remote memory is grossly intact. Intelligence appears to be average  CONCENTRATION:  poor. SPEECH : goal directed , but slow . DIAGNOSIS:    Principal Problem:    Schizoaffective disorder, depressive type ([address]  Active Problems:    Cocaine abuse ([address]    Schizoaffective disorder ([address]  Resolved Problems:    * No resolved hospital problems.  *      LAB:    No results

## 2020-02-10 NOTE — GROUP NOTE
Group Therapy Note    Date: 2/10/2020    Group Start Time: 5515  Group End Time: 0915  Group Topic: Community Meeting    JESUS GABBYNATHALIE JOSE GUADALUPE    CARMEN Khan    Group Therapy Note    Attendees: 13    Patient's Goal:  Identify a daily goal, review daily schedule and expectations, increase social interaction. Notes:  Pt developed daily goal to talk to the doctor. Status After Intervention:  Improved    Participation Level:  Active Listener and Interactive    Participation Quality: Appropriate, Attentive, Sharing and Supportive    Speech:  normal    Thought Process/Content: Logical    Affective Functioning: Congruent    Mood: euthymic    Level of consciousness:  Alert, Oriented x4 and Attentive    Response to Learning: Able to verbalize current knowledge/experience, Able to verbalize/acknowledge new learning, Able to retain information, Capable of insight and Progressing to goal    Endings: None Reported    Modes of Intervention: Education, Socialization, Exploration, Clarifying, Problem-solving, Activity, Limit-setting and Reality-testing    Discipline Responsible: Psychoeducational Specialist    Signature:  CARMEN Arnold

## 2020-02-10 NOTE — GROUP NOTE
Group Therapy Note    Date: 2/10/2020    Group Start Time: 1000  Group End Time: 9221  Group Topic: Psychotherapy    CHANEL Francisco        Group Therapy Note    Attendees: 6/11         Patient's Goal:  To focus on the here and now with mental health stability. Verbalize acceptance of situations they cannot control. Notes: Pt stated he is still having voices to harm himself but they are not as loud. Status After Intervention:  Unchanged    Participation Level:  Active Listener and Interactive    Participation Quality: Appropriate, Attentive, Sharing and Supportive      Speech:  normal      Thought Process/Content: Linear      Affective Functioning: Congruent      Mood: euthymic      Level of consciousness:  Alert, Oriented x4 and Attentive      Response to Learning: Progressing to goal      Endings: None Reported    Modes of Intervention: Education, Support, Socialization and Exploration      Discipline Responsible: /Counselor      Signature:  CHANEL Arriaza

## 2020-02-10 NOTE — GROUP NOTE
Group Therapy Note    Date: 2/10/2020    Group Start Time: 1330  Group End Time: 1430  Group Topic: Cognitive Skills    [facility]    Thania Desir, [facility]    Group Therapy Note    Attendees: 9    Patient's Goal:  Participate in activity that involves the use of cognitive skills, risk/reward decision making, ability to focus/concentrate on task, increase interpersonal interaction. Notes:  Pt attended group and participated in cognitive skills activity. Pt was able to evaluate risk/reward and make appropriate decisions related to activity. Pt was able to focus/concentrate on task with minimal prompting. Pt interacted with staff/peers and was supportive of peers in group. Status After Intervention:  Improved    Participation Level:  Active Listener and Interactive    Participation Quality: Appropriate, Attentive, Sharing and Supportive    Speech:  normal    Thought Process/Content: Logical    Affective Functioning: Congruent    Mood: elevated and euthymic    Level of consciousness:  Alert, Oriented x4 and Attentive    Response to Learning: Able to verbalize current knowledge/experience, Able to verbalize/acknowledge new learning, Able to retain information, Capable of insight and Progressing to goal    Endings: None Reported    Modes of Intervention: Education, Socialization, Exploration, Problem-solving, Activity, Limit-setting and Reality-testing    Discipline Responsible: Psychoeducational Specialist    Signature:  Thania Desir, [address]

## 2020-02-11 PROCEDURE — 6370000000 HC RX 637 (ALT 250 FOR IP): Performed by: INTERNAL MEDICINE

## 2020-02-11 PROCEDURE — 1240000000 HC EMOTIONAL WELLNESS R&B

## 2020-02-11 PROCEDURE — 6370000000 HC RX 637 (ALT 250 FOR IP): Performed by: PSYCHIATRY & NEUROLOGY

## 2020-02-11 RX ADMIN — BUPROPION HYDROCHLORIDE 200 MG: 100 TABLET, FILM COATED, EXTENDED RELEASE ORAL at 23:27

## 2020-02-11 RX ADMIN — QUETIAPINE FUMARATE 400 MG: 200 TABLET ORAL at 23:27

## 2020-02-11 RX ADMIN — OXCARBAZEPINE 300 MG: 300 TABLET, FILM COATED ORAL at 23:27

## 2020-02-11 RX ADMIN — MAGNESIUM HYDROXIDE 30 ML: 400 SUSPENSION ORAL at 23:29

## 2020-02-11 RX ADMIN — MAGNESIUM HYDROXIDE 30 ML: 400 SUSPENSION ORAL at 09:45

## 2020-02-11 RX ADMIN — BUPROPION HYDROCHLORIDE 200 MG: 100 TABLET, FILM COATED, EXTENDED RELEASE ORAL at 08:32

## 2020-02-11 RX ADMIN — CITALOPRAM HYDROBROMIDE 40 MG: 40 TABLET ORAL at 08:32

## 2020-02-11 RX ADMIN — OXCARBAZEPINE 300 MG: 300 TABLET, FILM COATED ORAL at 08:32

## 2020-02-11 RX ADMIN — LURASIDONE HYDROCHLORIDE 160 MG: 80 TABLET, FILM COATED ORAL at 18:21

## 2020-02-11 RX ADMIN — TRAZODONE HYDROCHLORIDE 50 MG: 50 TABLET ORAL at 23:26

## 2020-02-11 RX ADMIN — HYDROXYZINE HYDROCHLORIDE 25 MG: 25 TABLET, FILM COATED ORAL at 23:26

## 2020-02-11 ASSESSMENT — PAIN DESCRIPTION - LOCATION
LOCATION: LEG
LOCATION: HIP

## 2020-02-11 ASSESSMENT — PAIN SCALES - GENERAL
PAINLEVEL_OUTOF10: 7
PAINLEVEL_OUTOF10: 5

## 2020-02-11 ASSESSMENT — PAIN DESCRIPTION - ORIENTATION: ORIENTATION: LEFT

## 2020-02-11 ASSESSMENT — PAIN DESCRIPTION - PAIN TYPE: TYPE: ACUTE PAIN

## 2020-02-11 NOTE — GROUP NOTE
Group Therapy Note    Date: 2/11/2020    Group Start Time: 1600  Group End Time: 2384  Group Topic: Cognitive Skills    JESUS Gonzalez        Group Therapy Note    Attendees: 9/23           Patient's Goal:  To attend and participate    Notes:  Cognitive Distortions    Status After Intervention:  Improved    Participation Level:  Active Listener and Interactive    Participation Quality: Appropriate, Attentive, Sharing and Supportive      Speech:  normal      Thought Process/Content: Logical      Affective Functioning: Congruent      Mood: anxious      Level of consciousness:  Alert, Oriented x4 and Attentive      Response to Learning: Able to verbalize current knowledge/experience and Able to verbalize/acknowledge new learning      Endings: None Reported    Modes of Intervention: Education, Support, Socialization, Exploration and Problem-solving      Discipline Responsible: Maged Durán

## 2020-02-11 NOTE — GROUP NOTE
Group Therapy Note    Date: 2/11/2020    Group Start Time: 1000  Group End Time: 1050  Group Topic: Psychotherapy    [facility]    [address], LSW        Group Therapy Note    Attendees: 4/11      Pt denied 1:1. Despite staff encouragement, pt denied 10:00am psychotherapy group.             Signature:  [address], LSW

## 2020-02-11 NOTE — BH NOTE
patient refused to attend wrap up/ relaxation group at 2030 after encouragement from staff.   1:1 talk time provided as alternative to group session

## 2020-02-11 NOTE — PLAN OF CARE
Problem: Altered Mood, Depressive Behavior:  Goal: Ability to disclose and discuss suicidal ideas will improve  Description  Ability to disclose and discuss suicidal ideas will improve  2/11/2020 1034 by Awilda Ponce LPN  Outcome: Ongoing   Patient denies thoughts of self harm. Patient remains free from self harm. Support and encouragement provided.

## 2020-02-11 NOTE — GROUP NOTE
Group Therapy Note    Date: 2/11/2020    Group Start Time: 7759  Group End Time: 0044  Group Topic: Community Meeting    [address] [place] [address]    Patient refused to attend Community Meeting Group at 9112 after encouragement from staff. 1:1 talk time offered.     Signature:  Bakari Ayers

## 2020-02-11 NOTE — PLAN OF CARE
Problem: Altered Mood, Depressive Behavior:  Goal: Ability to disclose and discuss suicidal ideas will improve  Description  Ability to disclose and discuss suicidal ideas will improve  Outcome: Ongoing     Problem: Altered Mood, Psychotic Behavior:  Goal: Able to verbalize decrease in frequency and intensity of hallucinations  Description  Able to verbalize decrease in frequency and intensity of hallucinations  Outcome: Ongoing  Note:   Pt denies thought of self harm at this time. Pt states that the thoughts come and go due to the voices he hears. Pt states that his command hallucinations have not improved. Pt states that the TV being on helps with them sometimes. Pt stayed in room most of evening. Pt comes to nurse for needs. Took medications without issue.

## 2020-02-12 VITALS
BODY MASS INDEX: 19.27 KG/M2 | TEMPERATURE: 97.2 F | WEIGHT: 155 LBS | DIASTOLIC BLOOD PRESSURE: 58 MMHG | HEART RATE: 68 BPM | SYSTOLIC BLOOD PRESSURE: 91 MMHG | RESPIRATION RATE: 14 BRPM | HEIGHT: 75 IN

## 2020-02-12 PROBLEM — F25.9 SCHIZOAFFECTIVE DISORDER (HCC): Status: RESOLVED | Noted: 2018-02-12 | Resolved: 2020-02-12

## 2020-02-12 PROCEDURE — 5130000000 HC BRIDGE APPOINTMENT

## 2020-02-12 PROCEDURE — 6370000000 HC RX 637 (ALT 250 FOR IP): Performed by: PSYCHIATRY & NEUROLOGY

## 2020-02-12 PROCEDURE — 6370000000 HC RX 637 (ALT 250 FOR IP): Performed by: INTERNAL MEDICINE

## 2020-02-12 RX ORDER — CITALOPRAM 40 MG/1
40 TABLET ORAL DAILY
Qty: 30 TABLET | Refills: 3 | Status: ON HOLD | OUTPATIENT
Start: 2020-02-13 | End: 2020-04-28

## 2020-02-12 RX ORDER — IBUPROFEN 800 MG/1
800 TABLET ORAL 3 TIMES DAILY PRN
Qty: 120 TABLET | Refills: 3 | Status: SHIPPED | OUTPATIENT
Start: 2020-02-12 | End: 2020-03-22

## 2020-02-12 RX ORDER — TRAZODONE HYDROCHLORIDE 50 MG/1
50 TABLET ORAL NIGHTLY PRN
Qty: 30 TABLET | Refills: 0 | Status: ON HOLD | OUTPATIENT
Start: 2020-02-12 | End: 2020-06-10 | Stop reason: HOSPADM

## 2020-02-12 RX ORDER — QUETIAPINE FUMARATE 400 MG/1
400 TABLET, FILM COATED ORAL NIGHTLY
Qty: 60 TABLET | Refills: 3 | Status: ON HOLD | OUTPATIENT
Start: 2020-02-12 | End: 2020-05-02 | Stop reason: HOSPADM

## 2020-02-12 RX ORDER — BUPROPION HYDROCHLORIDE 200 MG/1
200 TABLET, EXTENDED RELEASE ORAL 2 TIMES DAILY
Qty: 60 TABLET | Refills: 3 | Status: ON HOLD | OUTPATIENT
Start: 2020-02-12 | End: 2020-04-28

## 2020-02-12 RX ORDER — HYDROXYZINE HYDROCHLORIDE 25 MG/1
25 TABLET, FILM COATED ORAL 3 TIMES DAILY PRN
Qty: 30 TABLET | Refills: 0 | Status: SHIPPED | OUTPATIENT
Start: 2020-02-12 | End: 2020-02-22

## 2020-02-12 RX ORDER — LIDOCAINE 4 G/G
1 PATCH TOPICAL DAILY
Qty: 10 PATCH | Refills: 0 | Status: SHIPPED | OUTPATIENT
Start: 2020-02-13 | End: 2020-02-23

## 2020-02-12 RX ADMIN — OXCARBAZEPINE 300 MG: 300 TABLET, FILM COATED ORAL at 08:46

## 2020-02-12 RX ADMIN — BUPROPION HYDROCHLORIDE 200 MG: 100 TABLET, FILM COATED, EXTENDED RELEASE ORAL at 08:46

## 2020-02-12 RX ADMIN — LURASIDONE HYDROCHLORIDE 160 MG: 80 TABLET, FILM COATED ORAL at 17:33

## 2020-02-12 RX ADMIN — CITALOPRAM HYDROBROMIDE 40 MG: 40 TABLET ORAL at 08:47

## 2020-02-12 RX ADMIN — MAGNESIUM HYDROXIDE 30 ML: 400 SUSPENSION ORAL at 08:46

## 2020-02-12 NOTE — GROUP NOTE
Group Therapy Note    Date: 2/12/2020    Group Start Time: 1440  Group End Time: 1603  Group Topic: Psychoeducation    JESUS DAVID JOSE GUADALUPE Otto        Group Therapy Note    Attendees: 9/20         Patient's Goal:  To increase interpersonal interaction     Notes:      Status After Intervention:  Improved    Participation Level:  Active Listener and Interactive    Participation Quality: Appropriate, Attentive and Sharing      Speech:  normal      Thought Process/Content: Logical      Affective Functioning: Congruent      Mood: euthymic      Level of consciousness:  Alert, Oriented x4 and Attentive      Response to Learning: Able to verbalize current knowledge/experience, Able to verbalize/acknowledge new learning, Able to retain information and Progressing to goal      Endings: None Reported    Modes of Intervention: Education, Support, Socialization, Exploration, Clarifying, Problem-solving and Activity      Discipline Responsible: Psychoeducational Specialist      Signature:  Antonieta Otto

## 2020-02-12 NOTE — PLAN OF CARE
[id number] [facility] Interdisciplinary Treatment Plan Note     Review Date & Time: 2/12/2020 0946    Admission Type:   Admission Type: Voluntary    Reason for admission:  Reason for Admission: command auditory hallucinations to shoot self. not compliant with medications    Estimated Length of Stay :  5-7 days  Estimated Discharge Date Update: to be determined by physician    PATIENT STRENGTHS:  Patient Strengths:Strengths: (PHOEBE)  Patient Strengths and Limitations:Limitations: Inappropriate/potentially harmful leisure interests, Difficulty problem solving/relies on others to help solve problems, Multiple barriers to leisure interests, Tendency to isolate self, External locus of control  Addictive Behavior:Addictive Behavior  In the past 3 months, have you felt or has someone told you that you have a problem with:  : (PHOEBE)  Do you have a history of Chemical Use?: (PHOEBE)  Do you have a history of Alcohol Use?: (PHOEBE)  Do you have a history of Street Drug Abuse?: (PHOEBE)  Histroy of Prescripton Drug Abuse?: (PHOEBE)  Medical Problems:   Past Medical History:   Diagnosis Date    Bipolar disorder ([facility] [address]     Depression     GERD (gastroesophageal reflux disease)     Hallucinations     Headache(784.0)     Hepatitis     Schizophrenia, schizo-affective ([facility] [address]     Sub[facility]     Tobacco abuse     Type II or unspecified type diabetes mellitus without mention of complication, not stated as uncontrolled     Urinary incontinence        Risk:  Fall RiskTotal: 64  Dusty Scale Dusty Scale Score: 22  BVC Total: 0    Change in scores no.  Changes to plan of Care no    Status EXAM:   Status and Exam  Normal: No  Facial Expression: Flat  Affect: Blunt  Level of Consciousness: Alert  Mood:Normal: Yes  Mood: Helpless  Motor Activity:Normal: Yes  Motor Activity: Decreased  Interview Behavior: Cooperative  Preception: Counce to Person, Counce to Time, Counce to Place, Counce to Situation  Attention:Normal: No  Attention:

## 2020-02-12 NOTE — PROGRESS NOTES
PROGRESS NOTE  The chart has been reviewed and the patient was interviewed at the bedside. The patient reported somewhat improvement in his symptoms. The case has been discussed with the nursing staff and the . The treatment team reported that the patient was agitated earlier and reported that the voices are so loud and telling him to harm himself. Safety plan has been discussed with the patient. The patient denies side effects of his medications. The patient denied visual hallucinations. The patient did not attend therapy groups this morning. We will continue monitoring for symptoms of psychosis, depression and suicidal behavior. We will adjust his medications as needed. The discharge plan as patient is homeless. MENTAL STATUS EXAMINATION:    BP 94/62   Pulse 81   Temp 97.6 °F (36.4 °C) (Oral)   Resp 14   Ht 6' 3\" (1.905 m)   Wt 155 lb (70.3 kg)   BMI 19.37 kg/m²     MOOD-is dysphoric   Affect-is depressed  Patient feels helpless hopeless and worthless  Psychomotor activity : decreased. APPEARANCE:  Personal hygiene is poor and patient is neglecting his appearance. Patient is somewhat unkempt  PSYCHOSIS: He reported auditory but denied visual hallucinations. Denies paranoid delusions. ORIENTATION:  Oriented to time place and person. Recent and remote memory is grossly intact. Intelligence appears to be average  CONCENTRATION:  poor. SPEECH : goal directed , but slow . DIAGNOSIS:    Principal Problem:    Schizoaffective disorder, depressive type ([address]  Active Problems:    Cocaine abuse ([address]    Schizoaffective disorder ([address]  Resolved Problems:    * No resolved hospital problems.  *      LAB:    No results found for this or any previous visit (from the past 72 hour(s)). TREATMENT PLAN:     QUEtiapine  400 mg Oral Nightly    lidocaine  1 patch Transdermal Daily    buPROPion  200 mg Oral BID    citalopram  40 mg Oral Daily    OXcarbazepine  300 mg Oral BID    lurasidone  160 mg Oral Dinner     magnesium hydroxide, acetaminophen, aluminum & magnesium hydroxide-simethicone, benztropine mesylate, traZODone, nicotine polacrilex, hydrOXYzine, ibuprofen    Chart was reviewed and the patient has been interviewed  Continue the above medications as prescribed  Patient was discussed with the  and the treatment team.   Provided Supportive and insight-oriented psychotherapy psychotherapy  Recommended involvement in Unit milieu  Provided empathic listening, validation and support  Patient acknowledged and mutually decided to continue with current treatment plan  Discharge planning was discussed with the patient. Demetris Wheeler MD        This note was created with the assistance of a speech-recognition program.  Although the intention is to generate a document that actually reflects the content of the visit, no guarantees can be provided that every mistake has been identified and corrected by editing.

## 2020-02-12 NOTE — PROGRESS NOTES
CLINICAL PHARMACY NOTE: MEDS TO [address] Select Patient?: No  Total # of Prescriptions Filled: 7   The following medications were delivered to the patient:  · Latuda  · Ibuprofen  · Citalopram  · Trazodoen  · Hydroxyzine  · Quetiapine  · Bupropion  ·   Total # of Interventions Completed: 0  Time Spent (min): 30    Additional Documentation:

## 2020-02-12 NOTE — PLAN OF CARE
Problem: Altered Mood, Depressive Behavior:  Goal: Ability to disclose and discuss suicidal ideas will improve  Description  Ability to disclose and discuss suicidal ideas will improve  2/12/2020 0016 by Alvarado Yoon LPN  Outcome: Ongoing     Problem: Altered Mood, Depressive Behavior:  Goal: Able to verbalize and/or display a decrease in depressive symptoms  Description  Able to verbalize and/or display a decrease in depressive symptoms  Outcome: Ongoing   Denies suicidal thoughts and remains free from harm at this time. Reports continued depression is out to day room and social with selsect peers.

## 2020-02-12 NOTE — BH NOTE
Patient given tobacco quitline number 98194251225 at this time, refusing to call at this time, states \" I just dont want to quit now\"- patient given information as to the dangers of long term tobacco use. Continue to reinforce the importance of tobacco cessation.

## 2020-02-12 NOTE — PROGRESS NOTES
Pharmacy Med Education Group Note    Date: 2/12/20  Start Time: 36  End Time: 0666    Number Participants in Group:  9    Goal:  Patient will demonstrate an understanding of the medications intended purpose and possible adverse effects  Topic: [place] for Pharmacy Med Ed Group    Discipline Responsible:     OT  AT  [facility].  RT     X Other       Participation Level:     None  Minimal      X Active Listener    X Interactive    Monopolizing         Participation Quality:    X Appropriate  Inappropriate     X       Attentive        Intrusive          Sharing        Resistant          Supportive        Lethargic       Affective:     X Congruent  Incongruent  Blunted  Flat    Constricted  Anxious  Elated  Angry    Labile  Depressed  Other         Cognitive:    X Alert  Oriented PPTP     Concentration   X G  F  P   Attention Span   X G  F  P   Short-Term Memory   X G  F  P   Long-Term Memory  G  F  P   ProblemSolving/  Decision Making  G  F  P   Ability to Process  Information   X G  F  P      Contributing Factors             Delusional             Hallucinating             Flight of Ideas             Other:       Modes of Intervention:    X Education   X Support  Exploration    Clarifying  Problem Solving  Confrontation    Socialization  Limit Setting  Reality Testing    Activity  Movement  Media    Other:            Response to Learning:    X Able to verbalize current knowledge/experience    Able to verbalize/acknowledge new learning    Able to retain information    Capable of insight    Able to change behavior    Progressing to goal    Other:        Comments:     Jenelle Rodriguez PharmD, BCPS  2/12/2020 5:17 PM

## 2020-02-15 NOTE — DISCHARGE SUMMARY
DISCHARGE      SUMMARY                                                               DEMOGRAPHICS          Patience Fuentes is a 60 y.o.   male     DATE OF ADMISSION: 1/29/2020  DATE OF DISCHARGE: 02/15/20  DISCHARGE DIAGNOSES:   Principal Problem:    Schizoaffective disorder, depressive type ([facility] [address]  Active Problems:    Cocaine abuse ([facility] [address]  Resolved Problems:    Schizoaffective disorder ([facility] [address]  Patience Fuentes is a 60 y.o. male who presents with Schizoaffective disorder ([facility] [address]  The patient has been transferred there from the emergency department where he presented there with suicidal ideation with a plan to shoot himself. It was reported that he went to his friend's house to get a gun to shoot himself. The patient has been medically cleared and transferred to the psychiatric unit for further evaluation and management. HOSPITAL COURSE    Patient presented as a 71-year-old -American man who has a long history of schizoaffective disorder and cocaine dependence. The patient has a long history of noncompliance with his medication. The patient is at length with [facility] behavioral health care. The patient was very uncooperative during the evaluation and he did not answer most of the questions. However, he exhibited depressed mood, flat affect and very guarded. He covered his face with the blanket when the writer approached him and refused to answer further questions. We will reapproach the patient again for further evaluation. For detailed history, mental status examination at time of admission, diagnoses and treatment plan, please see the dictated psychiatric evaluation at the time of admission  After admission, patient was seen in individual supportive psychotherapy, was encouraged to participate in unit milieu. Case was also discussed with the staff.     Chart has been reviewed and the patient was interviewed at the [facility] nursing staff. The patient was able to guarantee for safety at the hospital.  The patient is still symptomatic and not ready for discharge. He denies side effects of his medications. We will continue monitoring for symptoms of psychosis, depression and to adjust his medications as needed. The patient is still exhibiting symptoms of depression, auditory hallucinations with commands in nature asking him to kill himself.  The patient was tearful during the evaluation. [facility] was unable to guarantee for safety outside the hospital. [facility] plan has been discussed with the patient. [facility] agreed to approach the nursing staff once the thoughts are overwhelming and cannot control them. [facility] denied side effects of his medication.  The patient still isolating himself and did not attend any of the therapy groups.  The patient has been encouraged to attend therapy groups.  He sounds understanding the concept.  Continue monitoring for symptoms of depression, psychosis and to adjust his medications as needed. During the later part of hospitalization mood and affect started improving. Patient was feeling more hopeful. No side effects from medication reported. CONDITION ON DISCHARGE    Blood pressure (!) 91/58, pulse 68, temperature 97.2 °F (36.2 °C), temperature source Oral, resp. rate 14, height 6' 3\" (1.905 m), weight 155 lb (70.3 kg). At theTime discharge:  Patient  had mild anxiety. Overall mood and affect has improved. Patient denied any Suicidal or homicidal thoughts  Patient denied Hallucinations or delusions. Patient was oriented to time place and person    No side effects from medication reported    EXAM:    BP (!) 91/58   Pulse 68   Temp 97.2 °F (36.2 °C) (Oral)   Resp 14   Ht 6' 3\" (1.905 m)   Wt 155 lb (70.3 kg)   BMI 19.37 kg/m²   Patient did not have any physical complaint at the time of discharge    No results found for this or any previous visit (from the past 72 hour(s)).       DISCHARGE INSTRUCTIONS:  Disposition: Discharge to :  HOME/SELF CARE  Condition on discharge:  GOOD  Regular diet  Activities as tolerated      DISCHARGE MEDS:     Medication List      START taking these medications    hydrOXYzine 25 MG tablet  Commonly known as:  ATARAX  Take 1 tablet by mouth 3 times daily as needed for Itching  Notes to patient:  itching     lidocaine 4 % external patch  Place 1 patch onto the skin daily for 10 days  Notes to patient:  pain     traZODone 50 MG tablet  Commonly known as:  DESYREL  Take 1 tablet by mouth nightly as needed for Sleep  Notes to patient:  Sleep aid        CHANGE how you take these medications    buPROPion 200 MG extended release tablet  Commonly known as:  WELLBUTRIN SR  Take 1 tablet by mouth 2 times daily  What changed:    · medication strength  · how much to take  Notes to patient:  Mood stabilizer     citalopram 40 MG tablet  Commonly known as:  CELEXA  Take 1 tablet by mouth daily  What changed:    · medication strength  · how much to take  Notes to patient:  Mood stabilizer      ibuprofen 800 MG tablet  Commonly known as:  ADVIL;MOTRIN  Take 1 tablet by mouth 3 times daily as needed for Pain  What changed:  when to take this  Notes to patient:  Pain     QUEtiapine 400 MG tablet  Commonly known as:  SEROQUEL  Take 1 tablet by mouth nightly  What changed:    · medication strength  · how much to take  Notes to patient:  Mood stabilizer        CONTINUE taking these medications    acetaminophen 325 MG tablet  Commonly known as:  Tylenol  Take 2 tablets by mouth every 6 hours as needed for Pain  Notes to patient:  Pain      lurasidone 80 MG Tabs tablet  Commonly known as:  LATUDA  Take 2 tablets by mouth Daily with supper  Notes to patient:  Mood stabilizer      OXcarbazepine 300 MG tablet  Commonly known as:  TRILEPTAL  Notes to patient:  Anticonvulsant            Where to Get Your Medications      These medications were sent to [id number] [facility], [address] [facility] [address] [facility]  [address] [facility] [address]    Phone:  [phone]   · buPROPion 200 MG extended release tablet  · citalopram 40 MG tablet  · hydrOXYzine 25 MG tablet  · ibuprofen 800 MG tablet  · lidocaine 4 % external patch  · lurasidone 80 MG Tabs tablet  · QUEtiapine 400 MG tablet  · traZODone 50 MG tablet           Meri Villela MD

## 2020-03-20 ENCOUNTER — APPOINTMENT (OUTPATIENT)
Dept: GENERAL RADIOLOGY | Age: 60
End: 2020-03-20
Payer: MEDICAID

## 2020-03-20 ENCOUNTER — HOSPITAL ENCOUNTER (EMERGENCY)
Age: 60
Discharge: HOME | End: 2020-03-20
Attending: EMERGENCY MEDICINE
Payer: MEDICAID

## 2020-03-20 VITALS
HEART RATE: 90 BPM | TEMPERATURE: 98.4 F | BODY MASS INDEX: 19.89 KG/M2 | DIASTOLIC BLOOD PRESSURE: 58 MMHG | WEIGHT: 160 LBS | SYSTOLIC BLOOD PRESSURE: 103 MMHG | HEIGHT: 75 IN | OXYGEN SATURATION: 96 % | RESPIRATION RATE: 20 BRPM

## 2020-03-20 LAB
ABSOLUTE EOS #: 0.06 K/UL (ref 0–0.44)
ABSOLUTE IMMATURE GRANULOCYTE: 0.03 K/UL (ref 0–0.3)
ABSOLUTE LYMPH #: 1.77 K/UL (ref 1.1–3.7)
ABSOLUTE MONO #: 0.49 K/UL (ref 0.1–1.2)
ALBUMIN SERPL-MCNC: 3.5 G/DL (ref 3.5–5.2)
ALBUMIN/GLOBULIN RATIO: 1.3 (ref 1–2.5)
ALP BLD-CCNC: 87 U/L (ref 40–129)
ALT SERPL-CCNC: 19 U/L (ref 5–41)
ANION GAP SERPL CALCULATED.3IONS-SCNC: 11 MMOL/L (ref 9–17)
AST SERPL-CCNC: 21 U/L
BASOPHILS # BLD: 0 % (ref 0–2)
BASOPHILS ABSOLUTE: <0.03 K/UL (ref 0–0.2)
BILIRUB SERPL-MCNC: 0.38 MG/DL (ref 0.3–1.2)
BUN BLDV-MCNC: 14 MG/DL (ref 8–23)
BUN/CREAT BLD: ABNORMAL (ref 9–20)
CALCIUM SERPL-MCNC: 8.8 MG/DL (ref 8.6–10.4)
CHLORIDE BLD-SCNC: 105 MMOL/L (ref 98–107)
CO2: 22 MMOL/L (ref 20–31)
CREAT SERPL-MCNC: 0.77 MG/DL (ref 0.7–1.2)
DIFFERENTIAL TYPE: ABNORMAL
DIRECT EXAM: NORMAL
EOSINOPHILS RELATIVE PERCENT: 1 % (ref 1–4)
GFR AFRICAN AMERICAN: >60 ML/MIN
GFR NON-AFRICAN AMERICAN: >60 ML/MIN
GFR SERPL CREATININE-BSD FRML MDRD: ABNORMAL ML/MIN/{1.73_M2}
GFR SERPL CREATININE-BSD FRML MDRD: ABNORMAL ML/MIN/{1.73_M2}
GLUCOSE BLD-MCNC: 166 MG/DL (ref 70–99)
HCT VFR BLD CALC: 37.8 % (ref 40.7–50.3)
HEMOGLOBIN: 11.7 G/DL (ref 13–17)
IMMATURE GRANULOCYTES: 1 %
LYMPHOCYTES # BLD: 29 % (ref 24–43)
Lab: NORMAL
MCH RBC QN AUTO: 29.5 PG (ref 25.2–33.5)
MCHC RBC AUTO-ENTMCNC: 31 G/DL (ref 28.4–34.8)
MCV RBC AUTO: 95.2 FL (ref 82.6–102.9)
MONOCYTES # BLD: 8 % (ref 3–12)
NRBC AUTOMATED: 0 PER 100 WBC
PDW BLD-RTO: 13.9 % (ref 11.8–14.4)
PLATELET # BLD: 247 K/UL (ref 138–453)
PLATELET ESTIMATE: ABNORMAL
PMV BLD AUTO: 10.1 FL (ref 8.1–13.5)
POTASSIUM SERPL-SCNC: 3.7 MMOL/L (ref 3.7–5.3)
RBC # BLD: 3.97 M/UL (ref 4.21–5.77)
RBC # BLD: ABNORMAL 10*6/UL
SEG NEUTROPHILS: 61 % (ref 36–65)
SEGMENTED NEUTROPHILS ABSOLUTE COUNT: 3.7 K/UL (ref 1.5–8.1)
SODIUM BLD-SCNC: 138 MMOL/L (ref 135–144)
SPECIMEN DESCRIPTION: NORMAL
TOTAL PROTEIN: 6.3 G/DL (ref 6.4–8.3)
WBC # BLD: 6.1 K/UL (ref 3.5–11.3)
WBC # BLD: ABNORMAL 10*3/UL

## 2020-03-20 PROCEDURE — 6370000000 HC RX 637 (ALT 250 FOR IP): Performed by: STUDENT IN AN ORGANIZED HEALTH CARE EDUCATION/TRAINING PROGRAM

## 2020-03-20 PROCEDURE — 6360000002 HC RX W HCPCS: Performed by: STUDENT IN AN ORGANIZED HEALTH CARE EDUCATION/TRAINING PROGRAM

## 2020-03-20 PROCEDURE — 85025 COMPLETE CBC W/AUTO DIFF WBC: CPT

## 2020-03-20 PROCEDURE — 93005 ELECTROCARDIOGRAM TRACING: CPT | Performed by: STUDENT IN AN ORGANIZED HEALTH CARE EDUCATION/TRAINING PROGRAM

## 2020-03-20 PROCEDURE — 71045 X-RAY EXAM CHEST 1 VIEW: CPT

## 2020-03-20 PROCEDURE — 87804 INFLUENZA ASSAY W/OPTIC: CPT

## 2020-03-20 PROCEDURE — 99284 EMERGENCY DEPT VISIT MOD MDM: CPT

## 2020-03-20 PROCEDURE — 80053 COMPREHEN METABOLIC PANEL: CPT

## 2020-03-20 PROCEDURE — 2580000003 HC RX 258: Performed by: STUDENT IN AN ORGANIZED HEALTH CARE EDUCATION/TRAINING PROGRAM

## 2020-03-20 PROCEDURE — 96374 THER/PROPH/DIAG INJ IV PUSH: CPT

## 2020-03-20 RX ORDER — ACETAMINOPHEN 500 MG
1000 TABLET ORAL ONCE
Status: COMPLETED | OUTPATIENT
Start: 2020-03-20 | End: 2020-03-20

## 2020-03-20 RX ORDER — ONDANSETRON 4 MG/1
4 TABLET, ORALLY DISINTEGRATING ORAL EVERY 8 HOURS PRN
Qty: 8 TABLET | Refills: 0 | Status: ON HOLD | OUTPATIENT
Start: 2020-03-20 | End: 2020-04-28

## 2020-03-20 RX ORDER — DICYCLOMINE HYDROCHLORIDE 10 MG/1
10 CAPSULE ORAL EVERY 6 HOURS PRN
Qty: 10 CAPSULE | Refills: 0 | Status: ON HOLD | OUTPATIENT
Start: 2020-03-20 | End: 2020-04-28

## 2020-03-20 RX ORDER — ONDANSETRON 2 MG/ML
4 INJECTION INTRAMUSCULAR; INTRAVENOUS ONCE
Status: COMPLETED | OUTPATIENT
Start: 2020-03-20 | End: 2020-03-20

## 2020-03-20 RX ORDER — ACETAMINOPHEN 500 MG
1000 TABLET ORAL EVERY 6 HOURS PRN
Qty: 30 TABLET | Refills: 0 | Status: SHIPPED | OUTPATIENT
Start: 2020-03-20 | End: 2020-03-22

## 2020-03-20 RX ORDER — 0.9 % SODIUM CHLORIDE 0.9 %
1000 INTRAVENOUS SOLUTION INTRAVENOUS ONCE
Status: COMPLETED | OUTPATIENT
Start: 2020-03-20 | End: 2020-03-20

## 2020-03-20 RX ORDER — BENZONATATE 100 MG/1
100 CAPSULE ORAL 3 TIMES DAILY PRN
Qty: 15 CAPSULE | Refills: 0 | Status: SHIPPED | OUTPATIENT
Start: 2020-03-20 | End: 2020-03-27

## 2020-03-20 RX ORDER — DICYCLOMINE HYDROCHLORIDE 10 MG/1
10 CAPSULE ORAL ONCE
Status: COMPLETED | OUTPATIENT
Start: 2020-03-20 | End: 2020-03-20

## 2020-03-20 RX ADMIN — SODIUM CHLORIDE 1000 ML: 9 INJECTION, SOLUTION INTRAVENOUS at 17:43

## 2020-03-20 RX ADMIN — ONDANSETRON 4 MG: 2 INJECTION INTRAMUSCULAR; INTRAVENOUS at 17:43

## 2020-03-20 RX ADMIN — ACETAMINOPHEN 1000 MG: 500 TABLET ORAL at 17:42

## 2020-03-20 RX ADMIN — DICYCLOMINE HYDROCHLORIDE 10 MG: 10 CAPSULE ORAL at 17:43

## 2020-03-20 ASSESSMENT — PAIN DESCRIPTION - ONSET: ONSET: ON-GOING

## 2020-03-20 ASSESSMENT — ENCOUNTER SYMPTOMS
SHORTNESS OF BREATH: 1
SORE THROAT: 0
ABDOMINAL PAIN: 1
COUGH: 1
DIARRHEA: 1
NAUSEA: 1
VOMITING: 1

## 2020-03-20 ASSESSMENT — PAIN DESCRIPTION - FREQUENCY: FREQUENCY: CONTINUOUS

## 2020-03-20 ASSESSMENT — PAIN DESCRIPTION - LOCATION: LOCATION: ABDOMEN

## 2020-03-20 ASSESSMENT — PAIN DESCRIPTION - DESCRIPTORS: DESCRIPTORS: ACHING;CRAMPING

## 2020-03-20 ASSESSMENT — PAIN DESCRIPTION - PROGRESSION: CLINICAL_PROGRESSION: GRADUALLY WORSENING

## 2020-03-20 ASSESSMENT — PAIN DESCRIPTION - ORIENTATION: ORIENTATION: LOWER;MID

## 2020-03-20 ASSESSMENT — PAIN SCALES - GENERAL
PAINLEVEL_OUTOF10: 10
PAINLEVEL_OUTOF10: 10

## 2020-03-20 ASSESSMENT — PAIN DESCRIPTION - PAIN TYPE: TYPE: ACUTE PAIN

## 2020-03-20 NOTE — ED PROVIDER NOTES
stated as uncontrolled, and Urinary incontinence. has a past surgical history that includes Dental surgery and Abscess Drainage (N/A, 02/11/2018). Social History     Socioeconomic History    Marital status: Single     Spouse name: Not on file    Number of children: 0    Years of education: 8    Highest education level: Not on file   Occupational History     Employer: N/A   Social Needs    Financial resource strain: Not on file    Food insecurity     Worry: Not on file     Inability: Not on file   [employer] needs     Medical: Not on file     Non-medical: Not on file   Tobacco Use    Smoking status: Current Every Day Smoker     Packs/day: 1.00     Types: Cigarettes    Smokeless tobacco: Never Used   Substance and Sexual Activity    Alcohol use: Yes     Comment: occassional drinker    Drug use: Yes     Types: Cocaine    Sexual activity: Not on file   Lifestyle    Physical activity     Days per week: Not on file     Minutes per session: Not on file    Stress: Not on file   Relationships    Social connections     Talks on phone: Not on file     Gets together: Not on file     Attends Protestant service: Not on file     Active member of club or organization: Not on file     Attends meetings of clubs or organizations: Not on file     Relationship status: Not on file    Intimate partner violence     Fear of current or ex partner: Not on file     Emotionally abused: Not on file     Physically abused: Not on file     Forced sexual activity: Not on file   Other Topics Concern    Not on file   Social History Narrative    Not on file     Family History   Problem Relation Age of Onset    Diabetes Mother     Heart Disease Mother      Portions of the past medical history, past surgical history, social history, and family history were discussed and reviewed with thepatient/family and is included in HPI if pertinent.     ALLERGIES / IMMUNIZATIONS / HOME MEDICATIONS     Allergies: Brodie [thiothixene]    IMMUNIZATIONS    Immunization History   Administered Date(s) Administered    Tdap (Boostrix, Adacel) 11/18/2017     Home Medications:  Prior to Admission medications    Medication Sig Start Date End Date Taking? Authorizing Provider   acetaminophen (TYLENOL) 500 MG tablet Take 2 tablets by mouth every 6 hours as needed for Pain 3/20/20  Yes Josef Gupta DO   dicyclomine (BENTYL) 10 MG capsule Take 1 capsule by mouth every 6 hours as needed (cramps) 3/20/20  Yes Josef Gupta DO   ondansetron (ZOFRAN ODT) 4 MG disintegrating tablet Take 1 tablet by mouth every 8 hours as needed for Nausea 3/20/20  Yes Josef Gupta DO   benzonatate (TESSALON PERLES) 100 MG capsule Take 1 capsule by mouth 3 times daily as needed for Cough 3/20/20 3/27/20 Yes Josef Gupta DO   ibuprofen (ADVIL;MOTRIN) 800 MG tablet Take 1 tablet by mouth 3 times daily as needed for Pain 2/12/20 3/13/20  Jeff Lund MD   buPROPion (WELLBUTRIN SR) 200 MG extended release tablet Take 1 tablet by mouth 2 times daily 2/12/20   Jeff Lund MD   citalopram (CELEXA) 40 MG tablet Take 1 tablet by mouth daily 2/13/20   Jeff Lund MD   traZODone (DESYREL) 50 MG tablet Take 1 tablet by mouth nightly as needed for Sleep 2/12/20 2/22/20  Jeff Lund MD   lurasidone (LATUDA) 80 MG TABS tablet Take 2 tablets by mouth Daily with supper 2/12/20   Jeff Lund MD   QUEtiapine (SEROQUEL) 400 MG tablet Take 1 tablet by mouth nightly 2/12/20   Jeff Lund MD   acetaminophen (TYLENOL) 325 MG tablet Take 2 tablets by mouth every 6 hours as needed for Pain 1/27/20   Julio Forbes PA-C   OXcarbazepine (TRILEPTAL) 300 MG tablet Take 300 mg by mouth 2 times daily    Historical Provider, MD       PHYSICAL EXAM   (up to 7 for level 4, 8 or more for level 5)      INITIAL VITALS:    height is 6' 3\" (1.905 m) and weight is 160 lb (72.6 kg). His oral temperature is 98.4 °F (36.9 °C). His blood pressure is 103/58 (abnormal) and his pulse is 90. His respiration is 20 and oxygen saturation is 96%. Physical Exam  Vitals signs reviewed. Constitutional:       General: He is not in acute distress. Appearance: Normal appearance. He is well-developed. HENT:      Head: Normocephalic and atraumatic. Nose: No congestion. Mouth/Throat:      Mouth: Mucous membranes are moist.      Pharynx: Oropharynx is clear. No oropharyngeal exudate or posterior oropharyngeal erythema. Eyes:      Pupils: Pupils are equal, round, and reactive to light. Neck:      Musculoskeletal: Normal range of motion and neck supple. Cardiovascular:      Rate and Rhythm: Normal rate and regular rhythm. Heart sounds: Normal heart sounds. No murmur. No friction rub. No gallop. Pulmonary:      Effort: Pulmonary effort is normal.      Breath sounds: Normal breath sounds. Abdominal:      General: There is no distension. Palpations: Abdomen is soft. Tenderness: There is no abdominal tenderness. There is no guarding or rebound. Comments: Hyperactive bowel sounds   Musculoskeletal: Normal range of motion. General: No deformity. Skin:     General: Skin is warm and dry. Findings: No rash. Neurological:      Mental Status: He is alert and oriented to person, place, and time. Psychiatric:      Comments: Flat affect       Vitals:    Vitals:    03/20/20 1715   BP: (!) 103/58   Pulse: 90   Resp: 20   Temp: 98.4 °F (36.9 °C)   TempSrc: Oral   SpO2: 96%   Weight: 160 lb (72.6 kg)   Height: 6' 3\" (1.905 m)     DIFFERENTIAL  DIAGNOSIS     PLAN (LABS / Sageinga AlcazarAguilar / EKG):  Orders Placed This Encounter   Procedures    RAPID INFLUENZA A/B ANTIGENS    XR CHEST PORTABLE    CBC WITH AUTO DIFFERENTIAL    Comprehensive Metabolic Panel    EKG 12 Lead     Plan of care is reviewed and discussed with the family/ patient when able. Family/ Patient consents to treatment and plan if able to do so.     MEDICATIONS ORDERED:  Orders Placed This Encounter Medications    0.9 % sodium chloride bolus    acetaminophen (TYLENOL) tablet 1,000 mg    ondansetron (ZOFRAN) injection 4 mg    dicyclomine (BENTYL) capsule 10 mg    acetaminophen (TYLENOL) 500 MG tablet     Sig: Take 2 tablets by mouth every 6 hours as needed for Pain     Dispense:  30 tablet     Refill:  0    dicyclomine (BENTYL) 10 MG capsule     Sig: Take 1 capsule by mouth every 6 hours as needed (cramps)     Dispense:  10 capsule     Refill:  0    ondansetron (ZOFRAN ODT) 4 MG disintegrating tablet     Sig: Take 1 tablet by mouth every 8 hours as needed for Nausea     Dispense:  8 tablet     Refill:  0    benzonatate (TESSALON PERLES) 100 MG capsule     Sig: Take 1 capsule by mouth 3 times daily as needed for Cough     Dispense:  15 capsule     Refill:  0     DIAGNOSTIC RESULTS     LABS:  Results for orders placed or performed during the hospital encounter of 03/20/20   RAPID INFLUENZA A/B ANTIGENS   Result Value Ref Range    Specimen Description . NASOPHARYNGEAL SWAB     Special Requests NOT REPORTED     Direct Exam       NEGATIVE for Influenza A + B antigens. PCR testing to confirm this result is available upon request.  Specimen will be saved in the laboratory for 7 days. Please call [phone] if PCR testing is indicated.    CBC WITH AUTO DIFFERENTIAL   Result Value Ref Range    WBC 6.1 3.5 - 11.3 k/uL    RBC 3.97 (L) 4.21 - 5.77 m/uL    Hemoglobin 11.7 (L) 13.0 - 17.0 g/dL    Hematocrit 37.8 (L) 40.7 - 50.3 %    MCV 95.2 82.6 - 102.9 fL    MCH 29.5 25.2 - 33.5 pg    MCHC 31.0 28.4 - 34.8 g/dL    RDW 13.9 11.8 - 14.4 %    Platelets 477 973 - 739 k/uL    MPV 10.1 8.1 - 13.5 fL    NRBC Automated 0.0 0.0 per 100 WBC    Differential Type NOT REPORTED     Seg Neutrophils 61 36 - 65 %    Lymphocytes 29 24 - 43 %    Monocytes 8 3 - 12 %    Eosinophils % 1 1 - 4 %    Basophils 0 0 - 2 %    Immature Granulocytes 1 (H) 0 %    Segs Absolute 3.70 1.50 - 8.10 k/uL    Absolute Lymph # 1.77 1.10 - 3.70 k/uL    Absolute Mono # 0.49 0.10 - 1.20 k/uL    Absolute Eos # 0.06 0.00 - 0.44 k/uL    Basophils Absolute <0.03 0.00 - 0.20 k/uL    Absolute Immature Granulocyte 0.03 0.00 - 0.30 k/uL    WBC Morphology NOT REPORTED     RBC Morphology NOT REPORTED     Platelet Estimate NOT REPORTED    Comprehensive Metabolic Panel   Result Value Ref Range    Glucose 166 (H) 70 - 99 mg/dL    BUN 14 8 - 23 mg/dL    CREATININE 0.77 0.70 - 1.20 mg/dL    Bun/Cre Ratio NOT REPORTED 9 - 20    Calcium 8.8 8.6 - 10.4 mg/dL    Sodium 138 135 - 144 mmol/L    Potassium 3.7 3.7 - 5.3 mmol/L    Chloride 105 98 - 107 mmol/L    CO2 22 20 - 31 mmol/L    Anion Gap 11 9 - 17 mmol/L    Alkaline Phosphatase 87 40 - 129 U/L    ALT 19 5 - 41 U/L    AST 21 <40 U/L    Total Bilirubin 0.38 0.3 - 1.2 mg/dL    Total Protein 6.3 (L) 6.4 - 8.3 g/dL    Alb 3.5 3.5 - 5.2 g/dL    Albumin/Globulin Ratio 1.3 1.0 - 2.5    GFR Non-African American >60 >60 mL/min    GFR African American >60 >60 mL/min    GFR Comment          GFR Staging NOT REPORTED    EKG 12 Lead   Result Value Ref Range    Ventricular Rate 87 BPM    Atrial Rate 87 BPM    P-R Interval 146 ms    QRS Duration 84 ms    Q-T Interval 384 ms    QTc Calculation (Bazett) 462 ms    P Axis 76 degrees    R Axis 86 degrees    T Axis 78 degrees     Labs Reviewed   CBC WITH AUTO DIFFERENTIAL - Abnormal; Notable for the following components:       Result Value    RBC 3.97 (*)     Hemoglobin 11.7 (*)     Hematocrit 37.8 (*)     Immature Granulocytes 1 (*)     All other components within normal limits   COMPREHENSIVE METABOLIC PANEL - Abnormal; Notable for the following components:    Glucose 166 (*)     Total Protein 6.3 (*)     All other components within normal limits   RAPID INFLUENZA A/B ANTIGENS     RADIOLOGY:  Xr Chest Portable    Result Date: 3/20/2020  EXAMINATION: ONE XRAY VIEW OF THE CHEST 3/20/2020 6:01 pm COMPARISON: 12/25/2018.  HISTORY: ORDERING SYSTEM PROVIDED HISTORY: cough,

## 2020-03-20 NOTE — ED PROVIDER NOTES
[facility] [address] [facility]     Emergency Department     Faculty Attestation    I performed a history and physical examination of the patient and discussed management with the resident. I reviewed the residents note and agree with the documented findings and plan of care. Any areas of disagreement are noted on the chart. I was personally present for the key portions of any procedures. I have documented in the chart those procedures where I was not present during the key portions. I have reviewed the emergency nurses triage note. I agree with the chief complaint, past medical history, past surgical history, allergies, medications, social and family history as documented unless otherwise noted below. For Physician Assistant/ Nurse Practitioner cases/documentation I have personally evaluated this patient and have completed at least one if not all key elements of the E/M (history, physical exam, and MDM). Additional findings are as noted. Patient here initially complaining of flulike complaints vomiting diarrhea URI complaints, no fevers. However he since is stated that he does not have that that he is just hungry and homeless and looking for food. Story is been inconsistent between nurse resident and me. Regardless patient is well-appearing on exam sitting up watching TV asking me for food throughout the interview. However given initial complaints, will check a flu, labs, x-ray, reevaluate    EKG interpretation: Sinus from 87. Normal intervals normal axis no acute ST or T changes.   Normal EKG      Critical Care     none    Yamilka White MD, Nick Jameson  Attending Emergency  Physician             Yamilka White MD  03/20/20 1105

## 2020-03-20 NOTE — ED NOTES
Bed: 22  Expected date:   Expected time:   Means of arrival:   Comments:  [facility] [address] [facility], [facility]  03/20/20 0454

## 2020-03-20 NOTE — ED NOTES
Pt. To ER room 22 via stretcher with TFD; pt ambulates with steady gait to cot  Pt. Presents with N/V/D and abdominal pain x2 days along with a productive cough, pt reports he isn't sure what his sputum looks like  Pt. Is a poor historian, states he was at [address] before coming to the hospital today but he doesn't reside there; states he does live with other people and has been around ill people  Pt. Denies fevers, chills, chest pain, shortness of breath and sore throat  Pt. Arrives A/Ox4, RR even and unlabored, NAD  Pt.  Placed on full cardiac monitor  IV access established, labs drawn, EKG obtained  Call light given  Awaiting further orders  Will continue to monitor and reassess     Antoine Still RN  03/20/20 6288

## 2020-03-22 ENCOUNTER — HOSPITAL ENCOUNTER (EMERGENCY)
Age: 60
Discharge: HOME | End: 2020-03-22
Attending: EMERGENCY MEDICINE
Payer: MEDICAID

## 2020-03-22 VITALS
HEIGHT: 75 IN | TEMPERATURE: 98.8 F | HEART RATE: 86 BPM | WEIGHT: 160 LBS | RESPIRATION RATE: 19 BRPM | OXYGEN SATURATION: 99 % | BODY MASS INDEX: 19.89 KG/M2

## 2020-03-22 LAB
ABSOLUTE EOS #: 0 K/UL (ref 0–0.4)
ABSOLUTE IMMATURE GRANULOCYTE: 0 K/UL (ref 0–0.3)
ABSOLUTE LYMPH #: 2.63 K/UL (ref 1–4.8)
ABSOLUTE MONO #: 0.05 K/UL (ref 0.1–0.8)
ALBUMIN SERPL-MCNC: 3.6 G/DL (ref 3.5–5.2)
ALBUMIN/GLOBULIN RATIO: 1.4 (ref 1–2.5)
ALP BLD-CCNC: 79 U/L (ref 40–129)
ALT SERPL-CCNC: 14 U/L (ref 5–41)
AMPHETAMINE SCREEN URINE: NEGATIVE
ANION GAP SERPL CALCULATED.3IONS-SCNC: 11 MMOL/L (ref 9–17)
AST SERPL-CCNC: 16 U/L
BARBITURATE SCREEN URINE: NEGATIVE
BASOPHILS # BLD: 1 % (ref 0–2)
BASOPHILS ABSOLUTE: 0.05 K/UL (ref 0–0.2)
BENZODIAZEPINE SCREEN, URINE: NEGATIVE
BILIRUB SERPL-MCNC: 0.19 MG/DL (ref 0.3–1.2)
BILIRUBIN URINE: NEGATIVE
BUN BLDV-MCNC: 12 MG/DL (ref 8–23)
BUN/CREAT BLD: ABNORMAL (ref 9–20)
BUPRENORPHINE URINE: ABNORMAL
CALCIUM SERPL-MCNC: 8.5 MG/DL (ref 8.6–10.4)
CANNABINOID SCREEN URINE: NEGATIVE
CHLORIDE BLD-SCNC: 103 MMOL/L (ref 98–107)
CO2: 25 MMOL/L (ref 20–31)
COCAINE METABOLITE, URINE: POSITIVE
COLOR: ABNORMAL
COMMENT UA: ABNORMAL
CREAT SERPL-MCNC: 1.04 MG/DL (ref 0.7–1.2)
DIFFERENTIAL TYPE: ABNORMAL
EKG ATRIAL RATE: 87 BPM
EKG P AXIS: 76 DEGREES
EKG P-R INTERVAL: 146 MS
EKG Q-T INTERVAL: 384 MS
EKG QRS DURATION: 84 MS
EKG QTC CALCULATION (BAZETT): 462 MS
EKG R AXIS: 86 DEGREES
EKG T AXIS: 78 DEGREES
EKG VENTRICULAR RATE: 87 BPM
EOSINOPHILS RELATIVE PERCENT: 0 % (ref 1–4)
GFR AFRICAN AMERICAN: >60 ML/MIN
GFR NON-AFRICAN AMERICAN: >60 ML/MIN
GFR SERPL CREATININE-BSD FRML MDRD: ABNORMAL ML/MIN/{1.73_M2}
GFR SERPL CREATININE-BSD FRML MDRD: ABNORMAL ML/MIN/{1.73_M2}
GLUCOSE BLD-MCNC: 119 MG/DL (ref 70–99)
GLUCOSE URINE: NEGATIVE
HCT VFR BLD CALC: 32.5 % (ref 40.7–50.3)
HEMOGLOBIN: 10 G/DL (ref 13–17)
IMMATURE GRANULOCYTES: 0 %
KETONES, URINE: ABNORMAL
LEUKOCYTE ESTERASE, URINE: NEGATIVE
LIPASE: 19 U/L (ref 13–60)
LYMPHOCYTES # BLD: 56 % (ref 24–44)
MCH RBC QN AUTO: 28.8 PG (ref 25.2–33.5)
MCHC RBC AUTO-ENTMCNC: 30.8 G/DL (ref 28.4–34.8)
MCV RBC AUTO: 93.7 FL (ref 82.6–102.9)
MDMA URINE: ABNORMAL
METHADONE SCREEN, URINE: NEGATIVE
METHAMPHETAMINE, URINE: ABNORMAL
MONOCYTES # BLD: 1 % (ref 1–7)
MORPHOLOGY: NORMAL
NITRITE, URINE: NEGATIVE
NRBC AUTOMATED: 0 PER 100 WBC
OPIATES, URINE: NEGATIVE
OXYCODONE SCREEN URINE: NEGATIVE
PDW BLD-RTO: 14 % (ref 11.8–14.4)
PH UA: 5 (ref 5–8)
PHENCYCLIDINE, URINE: NEGATIVE
PLATELET # BLD: 239 K/UL (ref 138–453)
PLATELET ESTIMATE: ABNORMAL
PMV BLD AUTO: 9 FL (ref 8.1–13.5)
POTASSIUM SERPL-SCNC: 3.6 MMOL/L (ref 3.7–5.3)
PROPOXYPHENE, URINE: ABNORMAL
PROTEIN UA: NEGATIVE
RBC # BLD: 3.47 M/UL (ref 4.21–5.77)
RBC # BLD: ABNORMAL 10*6/UL
SEG NEUTROPHILS: 42 % (ref 36–66)
SEGMENTED NEUTROPHILS ABSOLUTE COUNT: 1.97 K/UL (ref 1.8–7.7)
SODIUM BLD-SCNC: 139 MMOL/L (ref 135–144)
SPECIFIC GRAVITY UA: 1.03 (ref 1–1.03)
TEST INFORMATION: ABNORMAL
TOTAL PROTEIN: 6.2 G/DL (ref 6.4–8.3)
TRICYCLIC ANTIDEPRESSANTS, UR: ABNORMAL
TROPONIN INTERP: NORMAL
TROPONIN INTERP: NORMAL
TROPONIN T: NORMAL NG/ML
TROPONIN T: NORMAL NG/ML
TROPONIN, HIGH SENSITIVITY: 8 NG/L (ref 0–22)
TROPONIN, HIGH SENSITIVITY: 8 NG/L (ref 0–22)
TURBIDITY: CLEAR
URINE HGB: NEGATIVE
UROBILINOGEN, URINE: NORMAL
WBC # BLD: 4.7 K/UL (ref 3.5–11.3)
WBC # BLD: ABNORMAL 10*3/UL

## 2020-03-22 PROCEDURE — 83690 ASSAY OF LIPASE: CPT

## 2020-03-22 PROCEDURE — 99284 EMERGENCY DEPT VISIT MOD MDM: CPT

## 2020-03-22 PROCEDURE — 80053 COMPREHEN METABOLIC PANEL: CPT

## 2020-03-22 PROCEDURE — 2580000003 HC RX 258: Performed by: STUDENT IN AN ORGANIZED HEALTH CARE EDUCATION/TRAINING PROGRAM

## 2020-03-22 PROCEDURE — 6370000000 HC RX 637 (ALT 250 FOR IP): Performed by: STUDENT IN AN ORGANIZED HEALTH CARE EDUCATION/TRAINING PROGRAM

## 2020-03-22 PROCEDURE — 96375 TX/PRO/DX INJ NEW DRUG ADDON: CPT

## 2020-03-22 PROCEDURE — 6360000002 HC RX W HCPCS: Performed by: STUDENT IN AN ORGANIZED HEALTH CARE EDUCATION/TRAINING PROGRAM

## 2020-03-22 PROCEDURE — 84484 ASSAY OF TROPONIN QUANT: CPT

## 2020-03-22 PROCEDURE — 85025 COMPLETE CBC W/AUTO DIFF WBC: CPT

## 2020-03-22 PROCEDURE — 81003 URINALYSIS AUTO W/O SCOPE: CPT

## 2020-03-22 PROCEDURE — 93005 ELECTROCARDIOGRAM TRACING: CPT | Performed by: STUDENT IN AN ORGANIZED HEALTH CARE EDUCATION/TRAINING PROGRAM

## 2020-03-22 PROCEDURE — 96374 THER/PROPH/DIAG INJ IV PUSH: CPT

## 2020-03-22 PROCEDURE — 80307 DRUG TEST PRSMV CHEM ANLYZR: CPT

## 2020-03-22 RX ORDER — ONDANSETRON 2 MG/ML
4 INJECTION INTRAMUSCULAR; INTRAVENOUS ONCE
Status: COMPLETED | OUTPATIENT
Start: 2020-03-22 | End: 2020-03-22

## 2020-03-22 RX ORDER — ACETAMINOPHEN 325 MG/1
325 TABLET ORAL EVERY 8 HOURS PRN
Qty: 30 TABLET | Refills: 0 | Status: ON HOLD | OUTPATIENT
Start: 2020-03-22 | End: 2020-04-28

## 2020-03-22 RX ORDER — IBUPROFEN 400 MG/1
400 TABLET ORAL EVERY 8 HOURS PRN
Qty: 30 TABLET | Refills: 0 | Status: ON HOLD | OUTPATIENT
Start: 2020-03-22 | End: 2020-04-28

## 2020-03-22 RX ORDER — KETOROLAC TROMETHAMINE 15 MG/ML
15 INJECTION, SOLUTION INTRAMUSCULAR; INTRAVENOUS ONCE
Status: COMPLETED | OUTPATIENT
Start: 2020-03-22 | End: 2020-03-22

## 2020-03-22 RX ORDER — POTASSIUM CHLORIDE 20 MEQ/1
40 TABLET, EXTENDED RELEASE ORAL ONCE
Status: COMPLETED | OUTPATIENT
Start: 2020-03-22 | End: 2020-03-22

## 2020-03-22 RX ORDER — 0.9 % SODIUM CHLORIDE 0.9 %
1000 INTRAVENOUS SOLUTION INTRAVENOUS ONCE
Status: COMPLETED | OUTPATIENT
Start: 2020-03-22 | End: 2020-03-22

## 2020-03-22 RX ADMIN — POTASSIUM CHLORIDE 40 MEQ: 1500 TABLET, EXTENDED RELEASE ORAL at 04:13

## 2020-03-22 RX ADMIN — KETOROLAC TROMETHAMINE 15 MG: 15 INJECTION, SOLUTION INTRAMUSCULAR; INTRAVENOUS at 02:15

## 2020-03-22 RX ADMIN — ONDANSETRON 4 MG: 2 INJECTION INTRAMUSCULAR; INTRAVENOUS at 02:16

## 2020-03-22 RX ADMIN — SODIUM CHLORIDE 1000 ML: 9 INJECTION, SOLUTION INTRAVENOUS at 02:15

## 2020-03-22 ASSESSMENT — ENCOUNTER SYMPTOMS
CONSTIPATION: 0
VOMITING: 1
SORE THROAT: 0
NAUSEA: 1
BACK PAIN: 0
SHORTNESS OF BREATH: 1
BLOOD IN STOOL: 0
RHINORRHEA: 0
DIARRHEA: 1
COUGH: 0
EYE REDNESS: 0
ABDOMINAL PAIN: 1
EYE ITCHING: 0

## 2020-03-22 ASSESSMENT — PAIN DESCRIPTION - PROGRESSION: CLINICAL_PROGRESSION: GRADUALLY WORSENING

## 2020-03-22 ASSESSMENT — PAIN DESCRIPTION - DESCRIPTORS: DESCRIPTORS: SHARP

## 2020-03-22 ASSESSMENT — PAIN SCALES - GENERAL
PAINLEVEL_OUTOF10: 9
PAINLEVEL_OUTOF10: 7

## 2020-03-22 ASSESSMENT — PAIN DESCRIPTION - ONSET: ONSET: ON-GOING

## 2020-03-22 ASSESSMENT — PAIN DESCRIPTION - LOCATION: LOCATION: ABDOMEN

## 2020-03-22 ASSESSMENT — PAIN - FUNCTIONAL ASSESSMENT: PAIN_FUNCTIONAL_ASSESSMENT: PREVENTS OR INTERFERES SOME ACTIVE ACTIVITIES AND ADLS

## 2020-03-22 NOTE — ED PROVIDER NOTES
(N/A, 02/11/2018). Social History     Socioeconomic History    Marital status: Single     Spouse name: Not on file    Number of children: 0    Years of education: 8    Highest education level: Not on file   Occupational History     Employer: N/A   Social Needs    Financial resource strain: Not on file    Food insecurity     Worry: Not on file     Inability: Not on file   [employer] needs     Medical: Not on file     Non-medical: Not on file   Tobacco Use    Smoking status: Current Every Day Smoker     Packs/day: 1.00     Types: Cigarettes    Smokeless tobacco: Never Used   Substance and Sexual Activity    Alcohol use: Yes     Comment: occassional drinker    Drug use: Yes     Types: Cocaine    Sexual activity: Not on file   Lifestyle    Physical activity     Days per week: Not on file     Minutes per session: Not on file    Stress: Not on file   Relationships    Social connections     Talks on phone: Not on file     Gets together: Not on file     Attends Zoroastrianism service: Not on file     Active member of club or organization: Not on file     Attends meetings of clubs or organizations: Not on file     Relationship status: Not on file    Intimate partner violence     Fear of current or ex partner: Not on file     Emotionally abused: Not on file     Physically abused: Not on file     Forced sexual activity: Not on file   Other Topics Concern    Not on file   Social History Narrative    Not on file       Family History   Problem Relation Age of Onset    Diabetes Mother     Heart Disease Mother        Allergies:  Navane [thiothixene]    Home Medications:  Prior to Admission medications    Medication Sig Start Date End Date Taking?  Authorizing Provider   ibuprofen (IBU) 400 MG tablet Take 1 tablet by mouth every 8 hours as needed for Pain 3/22/20  Yes Isaias Lanier,    acetaminophen (TYLENOL) 325 MG tablet Take 1 tablet by mouth every 8 hours as needed for Pain 3/22/20  Yes Jw Moseley Savannah Jess, DO   dicyclomine (BENTYL) 10 MG capsule Take 1 capsule by mouth every 6 hours as needed (cramps) 3/20/20   Josef Gupta, DO   ondansetron (ZOFRAN ODT) 4 MG disintegrating tablet Take 1 tablet by mouth every 8 hours as needed for Nausea 3/20/20   Josef Gupta, DO   benzonatate (TESSALON PERLES) 100 MG capsule Take 1 capsule by mouth 3 times daily as needed for Cough 3/20/20 3/27/20  Josef Gupta, DO   buPROPion (WELLBUTRIN SR) 200 MG extended release tablet Take 1 tablet by mouth 2 times daily 2/12/20   Rowena Brooks MD   citalopram (CELEXA) 40 MG tablet Take 1 tablet by mouth daily 2/13/20   Rowena Brooks MD   traZODone (DESYREL) 50 MG tablet Take 1 tablet by mouth nightly as needed for Sleep 2/12/20 2/22/20  Rowena Brooks MD   lurasidone (LATUDA) 80 MG TABS tablet Take 2 tablets by mouth Daily with supper 2/12/20   Rowena Brooks MD   QUEtiapine (SEROQUEL) 400 MG tablet Take 1 tablet by mouth nightly 2/12/20   Rowena Brooks MD   OXcarbazepine (TRILEPTAL) 300 MG tablet Take 300 mg by mouth 2 times daily    Historical Provider, MD       REVIEW OF SYSTEMS    (2-9 systems for level 4, 10 or more for level 5)      Review of Systems   Constitutional: Positive for chills. Negative for fever. HENT: Negative for rhinorrhea and sore throat. Eyes: Negative for redness and itching. Respiratory: Positive for shortness of breath (secondary to pain). Negative for cough. Cardiovascular: Negative for chest pain and leg swelling. Gastrointestinal: Positive for abdominal pain, diarrhea, nausea and vomiting. Negative for blood in stool and constipation. Genitourinary: Negative for dysuria, frequency and hematuria. Musculoskeletal: Negative for back pain and neck pain. Skin: Negative for rash and wound. Allergic/Immunologic: Negative for environmental allergies and food allergies. Neurological: Negative for weakness, numbness and headaches.        PHYSICAL EXAM   (up to 7 for level 4, 8 or more for level 5)      INITIAL VITALS:   Pulse 86   Temp 98.8 °F (37.1 °C)   Resp 19   Ht 6' 3\" (1.905 m)   Wt 160 lb (72.6 kg)   SpO2 99%   BMI 20.00 kg/m²     Physical Exam  Vitals signs and nursing note reviewed. Constitutional:       General: He is not in acute distress. Appearance: Normal appearance. He is not ill-appearing, toxic-appearing or diaphoretic. HENT:      Head: Normocephalic and atraumatic. Eyes:      General: No scleral icterus. Conjunctiva/sclera: Conjunctivae normal.   Neck:      Musculoskeletal: Neck supple. Cardiovascular:      Rate and Rhythm: Normal rate and regular rhythm. Pulmonary:      Effort: Pulmonary effort is normal. No respiratory distress. Breath sounds: Normal breath sounds. No stridor. No wheezing, rhonchi or rales. Abdominal:      General: There is no distension. Palpations: Abdomen is soft. There is no mass. Tenderness: There is abdominal tenderness. There is no right CVA tenderness, left CVA tenderness, guarding or rebound. Musculoskeletal:      Right lower leg: No edema. Left lower leg: No edema. Skin:     General: Skin is warm and dry. Findings: No rash. Neurological:      General: No focal deficit present. Mental Status: He is alert and oriented to person, place, and time.    Psychiatric:         Mood and Affect: Mood normal.         Behavior: Behavior normal.         DIAGNOSTICS     PLAN (LABS / IMAGING / EKG):  Orders Placed This Encounter   Procedures    CBC WITH AUTO DIFFERENTIAL    Comprehensive Metabolic Panel    LIPASE    Troponin    URINALYSIS    Urine Drug Screen    Nursing Communication    EKG 12 Lead       MEDICATIONS ORDERED:  Orders Placed This Encounter   Medications    0.9 % sodium chloride bolus    ketorolac (TORADOL) injection 15 mg    ondansetron (ZOFRAN) injection 4 mg    potassium chloride (KLOR-CON M) extended release tablet 40 mEq    ibuprofen (IBU) 400 MG tablet     Sig: Take 1 tablet by mouth every 8 hours as needed for Pain     Dispense:  30 tablet     Refill:  0    acetaminophen (TYLENOL) 325 MG tablet     Sig: Take 1 tablet by mouth every 8 hours as needed for Pain     Dispense:  30 tablet     Refill:  0       DIAGNOSTIC RESULTS / EMERGENCYDEPARTMENT COURSE / MDM     LABS:  Results for orders placed or performed during the hospital encounter of 03/22/20   CBC WITH AUTO DIFFERENTIAL   Result Value Ref Range    WBC 4.7 3.5 - 11.3 k/uL    RBC 3.47 (L) 4.21 - 5.77 m/uL    Hemoglobin 10.0 (L) 13.0 - 17.0 g/dL    Hematocrit 32.5 (L) 40.7 - 50.3 %    MCV 93.7 82.6 - 102.9 fL    MCH 28.8 25.2 - 33.5 pg    MCHC 30.8 28.4 - 34.8 g/dL    RDW 14.0 11.8 - 14.4 %    Platelets 316 050 - 682 k/uL    MPV 9.0 8.1 - 13.5 fL    NRBC Automated 0.0 0.0 per 100 WBC    Differential Type NOT REPORTED     WBC Morphology NOT REPORTED     RBC Morphology NOT REPORTED     Platelet Estimate NOT REPORTED     Immature Granulocytes 0 0 %    Seg Neutrophils 42 36 - 66 %    Lymphocytes 56 (H) 24 - 44 %    Monocytes 1 1 - 7 %    Eosinophils % 0 (L) 1 - 4 %    Basophils 1 0 - 2 %    Absolute Immature Granulocyte 0.00 0.00 - 0.30 k/uL    Segs Absolute 1.97 1.8 - 7.7 k/uL    Absolute Lymph # 2.63 1.0 - 4.8 k/uL    Absolute Mono # 0.05 (L) 0.1 - 0.8 k/uL    Absolute Eos # 0.00 0.0 - 0.4 k/uL    Basophils Absolute 0.05 0.0 - 0.2 k/uL    Morphology Normal    Comprehensive Metabolic Panel   Result Value Ref Range    Glucose 119 (H) 70 - 99 mg/dL    BUN 12 8 - 23 mg/dL    CREATININE 1.04 0.70 - 1.20 mg/dL    Bun/Cre Ratio NOT REPORTED 9 - 20    Calcium 8.5 (L) 8.6 - 10.4 mg/dL    Sodium 139 135 - 144 mmol/L    Potassium 3.6 (L) 3.7 - 5.3 mmol/L    Chloride 103 98 - 107 mmol/L    CO2 25 20 - 31 mmol/L    Anion Gap 11 9 - 17 mmol/L    Alkaline Phosphatase 79 40 - 129 U/L    ALT 14 5 - 41 U/L    AST 16 <40 U/L    Total Bilirubin 0.19 (L) 0.3 - 1.2 mg/dL    Total Protein 6.2 (L) 6.4 - 8.3 g/dL    Alb 3.6 3.5 - 5.2 g/dL    Albumin/Globulin of testing or timing of collection. RADIOLOGY:  No orders to display      EKG  Rhythm: normal sinus   Rate: normal  Axis: normal  Ectopy: none  Conduction: normal  ST Segments: no acute change  T Waves: no acute change  Q Waves: none    Clinical Impression: When compared to EKG dated 3/20/2020, no acute changes and non-specific EKG    Normal Interval Reference:  P-wave <110 ms  -200 ms  QRS <100 ms  QT <420 ms  QTc 330-470 ms    All EKG's are interpreted by the Emergency Department Physician who either signs or Co-signsthis chart in the absence of a cardiologist.    EMERGENCY DEPARTMENT COURSE:         Patient evaluated by attending physician and myself. Patient presenting acute onset of diffuse abdominal pain that began several days ago. On exam he is well-appearing no acute distress. Vitals unremarkable. Heart regular rate and rhythm, lungs are clear to auscultation bilateral.  Abdomen soft with very minimal tenderness palpation. No rebound or guarding noted. No CVA tenderness to percussion. Differential diagnosis includes pancreatitis, cholecystitis, colitis, gastritis, appendicitis, pyelonephritis, cystitis, functional abdominal pain, secondary gain, among others. Plan is for abdominal labs, symptomatic treatment, reassess. Patient's blood work is unremarkable. Upon reassessment, patient's abdomen remains benign and is very minimally tender. Patient does not wince when I press on his stomach although does state it is tender. Given overall benign abdominal examination is wall is benign blood work, think patient safe for discharge home with follow-up with his doctor. Strict return precautions given. Patient instructed to follow with his primary care provider as soon as possible for follow up. Patient and/or family expressed understanding and agreement with this plan. PROCEDURES:  NOne    CONSULTS:  None    FINAL IMPRESSION      1.  Nausea vomiting and diarrhea          DISPOSITION / PLAN     DISPOSITION Decision To Discharge 03/22/2020 04:03:21 AM      PATIENT REFERRED TO:  Verner Figures, MD  [address] [facility] [facility]  [phone]    Schedule an appointment as soon as possible for a visit   Follow up of this visit    [facility] ED  [address] [facility]  [phone]  Go to   If symptoms worsen    [address] [id number]  Schedule an appointment as soon as possible for a visit   Follow up of this visit      DISCHARGE MEDICATIONS:  New Prescriptions    ACETAMINOPHEN (TYLENOL) 325 MG TABLET    Take 1 tablet by mouth every 8 hours as needed for Pain    IBUPROFEN (IBU) 400 MG TABLET    Take 1 tablet by mouth every 8 hours as needed for Pain       Isaias Lanier DO  Emergency Medicine Resident  [facility]    (Please note that portions of this note were completed with a voice recognition program.  Efforts were made to edit thedictations but occasionally words are mis-transcribed.)      Isaias Lanier DO  Resident  03/22/20 5976

## 2020-03-22 NOTE — ED PROVIDER NOTES
[address] [facility]     Emergency Department     Faculty Attestation        I performed a history and physical examination of the patient and discussed management with the resident. I reviewed the residents note and agree with the documented findings and plan of care. Any areas of disagreement are noted on the chart. I was personally present for the key portions of any procedures. I have documented in the chart those procedures where I was not present during the key portions. I have reviewed the emergency nurses triage note. I agree with the chief complaint, past medical history, past surgical history, allergies, medications, social and family history as documented unless otherwise noted below. For mid-level providers such as nurse practitioners as well as physicians assistants:    I have personally seen and evaluated the patient. I find the patient's history and physical exam are consistent with NP/PA documentation. I agree with the care provided, treatment rendered, disposition, & follow-up plan. Additional findings are as noted.     Vital Signs: Pulse 86   Temp 98.8 °F (37.1 °C)   Resp 19   Ht 6' 3\" (1.905 m)   Wt 160 lb (72.6 kg)   SpO2 99%   BMI 20.00 kg/m²   PCP:  Joanne Navas MD    Pertinent Comments:           Critical Care  None          Holly Gonzalez MD  Attending Emergency Medicine Physician              Chacha Aguirre MD  03/22/20 6478

## 2020-03-23 LAB
EKG ATRIAL RATE: 82 BPM
EKG P AXIS: 73 DEGREES
EKG P-R INTERVAL: 152 MS
EKG Q-T INTERVAL: 398 MS
EKG QRS DURATION: 100 MS
EKG QTC CALCULATION (BAZETT): 464 MS
EKG R AXIS: 80 DEGREES
EKG T AXIS: 76 DEGREES
EKG VENTRICULAR RATE: 82 BPM

## 2020-03-23 PROCEDURE — 93010 ELECTROCARDIOGRAM REPORT: CPT | Performed by: INTERNAL MEDICINE

## 2020-04-05 ENCOUNTER — HOSPITAL ENCOUNTER (EMERGENCY)
Age: 60
Discharge: PSYCHIATRIC OR SUBSTANCE USE FACILITY | End: 2020-04-05
Attending: EMERGENCY MEDICINE
Payer: MEDICAID

## 2020-04-05 VITALS
WEIGHT: 160 LBS | BODY MASS INDEX: 19.89 KG/M2 | TEMPERATURE: 96.6 F | DIASTOLIC BLOOD PRESSURE: 67 MMHG | OXYGEN SATURATION: 100 % | SYSTOLIC BLOOD PRESSURE: 124 MMHG | HEART RATE: 96 BPM | RESPIRATION RATE: 18 BRPM | HEIGHT: 75 IN

## 2020-04-05 PROCEDURE — 99285 EMERGENCY DEPT VISIT HI MDM: CPT

## 2020-04-05 ASSESSMENT — PAIN DESCRIPTION - LOCATION: LOCATION: LEG

## 2020-04-05 ASSESSMENT — PAIN DESCRIPTION - ORIENTATION: ORIENTATION: LEFT

## 2020-04-05 ASSESSMENT — PAIN SCALES - GENERAL: PAINLEVEL_OUTOF10: 7

## 2020-04-05 NOTE — ED NOTES
Pt presented to ED with complaints of SI with plan to jump I front of car. Pt states auditory hallucinations telling him to commit suicide. Pt denies HI. Pt denies etoh. Pt states smoking crack earlier today. Pt ambulated to [facility] with steady gait.  Pt called in as suicide watch     Earnestine Quintanilla RN  04/05/20 5306

## 2020-04-05 NOTE — ED NOTES
Dr. Mariely Logan, [facility], social work at bedside.       Omaira Reed RN  04/05/20 7774

## 2020-04-05 NOTE — ED NOTES
Patient's resting on cot, refused to respond to writer. Writer attempted to raise head of bed. Writer turned on lights. Patient still refused to interact. Writer informed patient if he does not cooperate with interview, patient can be transferred to [facility]. Patient did not respond. Writer informed Resident who is in agreement with plan. Writer to contact [facility] for .       FRANKLIN Alfonso, [place]  04/05/20 0360

## 2020-04-05 NOTE — ED PROVIDER NOTES
status: Current Every Day Smoker     Packs/day: 1.00     Types: Cigarettes    Smokeless tobacco: Never Used   Substance and Sexual Activity    Alcohol use: Yes     Comment: occassional drinker    Drug use: Yes     Types: Cocaine    Sexual activity: Not on file   Lifestyle    Physical activity     Days per week: Not on file     Minutes per session: Not on file    Stress: Not on file   Relationships    Social connections     Talks on phone: Not on file     Gets together: Not on file     Attends Protestant service: Not on file     Active member of club or organization: Not on file     Attends meetings of clubs or organizations: Not on file     Relationship status: Not on file    Intimate partner violence     Fear of current or ex partner: Not on file     Emotionally abused: Not on file     Physically abused: Not on file     Forced sexual activity: Not on file   Other Topics Concern    Not on file   Social History Narrative    Not on file       Family History   Problem Relation Age of Onset    Diabetes Mother     Heart Disease Mother        Allergies:    Navane [thiothixene]    Home Medications:  Prior to Admission medications    Medication Sig Start Date End Date Taking?  Authorizing Provider   ibuprofen (IBU) 400 MG tablet Take 1 tablet by mouth every 8 hours as needed for Pain 3/22/20   Bartholome Winter, DO   acetaminophen (TYLENOL) 325 MG tablet Take 1 tablet by mouth every 8 hours as needed for Pain 3/22/20   Bartholome Winter, DO   dicyclomine (BENTYL) 10 MG capsule Take 1 capsule by mouth every 6 hours as needed (cramps) 3/20/20   Josef Gupta, DO   ondansetron (ZOFRAN ODT) 4 MG disintegrating tablet Take 1 tablet by mouth every 8 hours as needed for Nausea 3/20/20   Josef Gupta, DO   buPROPion (WELLBUTRIN SR) 200 MG extended release tablet Take 1 tablet by mouth 2 times daily 2/12/20   Manish Xiao MD   citalopram (CELEXA) 40 MG tablet Take 1 tablet by mouth daily 2/13/20   Manish Xiao MD

## 2020-04-28 ENCOUNTER — HOSPITAL ENCOUNTER (EMERGENCY)
Age: 60
Discharge: PSYCHIATRIC OR SUBSTANCE USE FACILITY | End: 2020-04-28
Attending: EMERGENCY MEDICINE
Payer: MEDICAID

## 2020-04-28 ENCOUNTER — HOSPITAL ENCOUNTER (INPATIENT)
Age: 60
LOS: 4 days | Discharge: HOME | DRG: 750 | End: 2020-05-02
Attending: PSYCHIATRY & NEUROLOGY | Admitting: PSYCHIATRY & NEUROLOGY
Payer: MEDICAID

## 2020-04-28 VITALS
SYSTOLIC BLOOD PRESSURE: 106 MMHG | DIASTOLIC BLOOD PRESSURE: 65 MMHG | OXYGEN SATURATION: 97 % | RESPIRATION RATE: 14 BRPM | TEMPERATURE: 97.6 F | BODY MASS INDEX: 21.14 KG/M2 | HEART RATE: 98 BPM | WEIGHT: 170 LBS | HEIGHT: 75 IN

## 2020-04-28 PROBLEM — R45.851 DEPRESSION WITH SUICIDAL IDEATION: Status: ACTIVE | Noted: 2020-04-28

## 2020-04-28 PROBLEM — F32.A DEPRESSION WITH SUICIDAL IDEATION: Status: ACTIVE | Noted: 2020-04-28

## 2020-04-28 LAB
SARS-COV-2, PCR: NORMAL
SARS-COV-2, RAPID: NOT DETECTED
SARS-COV-2: NORMAL
SOURCE: NORMAL

## 2020-04-28 PROCEDURE — 1240000000 HC EMOTIONAL WELLNESS R&B

## 2020-04-28 PROCEDURE — 99285 EMERGENCY DEPT VISIT HI MDM: CPT

## 2020-04-28 PROCEDURE — 6370000000 HC RX 637 (ALT 250 FOR IP): Performed by: PSYCHIATRY & NEUROLOGY

## 2020-04-28 PROCEDURE — U0002 COVID-19 LAB TEST NON-CDC: HCPCS

## 2020-04-28 RX ORDER — CITALOPRAM 40 MG/1
40 TABLET ORAL DAILY
Status: DISCONTINUED | OUTPATIENT
Start: 2020-04-28 | End: 2020-04-28

## 2020-04-28 RX ORDER — ESCITALOPRAM OXALATE 10 MG/1
10 TABLET ORAL NIGHTLY
Status: DISCONTINUED | OUTPATIENT
Start: 2020-04-28 | End: 2020-04-30

## 2020-04-28 RX ORDER — ACETAMINOPHEN 325 MG/1
650 TABLET ORAL EVERY 4 HOURS PRN
Status: DISCONTINUED | OUTPATIENT
Start: 2020-04-28 | End: 2020-05-02 | Stop reason: HOSPADM

## 2020-04-28 RX ORDER — ESCITALOPRAM OXALATE 10 MG/1
10 TABLET ORAL DAILY
Status: DISCONTINUED | OUTPATIENT
Start: 2020-04-28 | End: 2020-04-28

## 2020-04-28 RX ORDER — ESCITALOPRAM OXALATE 10 MG/1
10 TABLET ORAL NIGHTLY
Status: ON HOLD | COMMUNITY
End: 2020-05-02 | Stop reason: HOSPADM

## 2020-04-28 RX ORDER — HYDROXYZINE HYDROCHLORIDE 25 MG/1
25 TABLET, FILM COATED ORAL 3 TIMES DAILY PRN
Status: DISCONTINUED | OUTPATIENT
Start: 2020-04-28 | End: 2020-05-02 | Stop reason: HOSPADM

## 2020-04-28 RX ORDER — BENZTROPINE MESYLATE 1 MG/ML
2 INJECTION INTRAMUSCULAR; INTRAVENOUS DAILY PRN
Status: DISCONTINUED | OUTPATIENT
Start: 2020-04-28 | End: 2020-05-02 | Stop reason: HOSPADM

## 2020-04-28 RX ORDER — BUPROPION HYDROCHLORIDE 100 MG/1
200 TABLET, EXTENDED RELEASE ORAL 2 TIMES DAILY
Status: DISCONTINUED | OUTPATIENT
Start: 2020-04-28 | End: 2020-04-28

## 2020-04-28 RX ORDER — TRAZODONE HYDROCHLORIDE 50 MG/1
50 TABLET ORAL NIGHTLY PRN
Status: DISCONTINUED | OUTPATIENT
Start: 2020-04-28 | End: 2020-05-02 | Stop reason: HOSPADM

## 2020-04-28 RX ORDER — OXCARBAZEPINE 300 MG/1
300 TABLET, FILM COATED ORAL 2 TIMES DAILY
Status: DISCONTINUED | OUTPATIENT
Start: 2020-04-28 | End: 2020-05-02 | Stop reason: HOSPADM

## 2020-04-28 RX ORDER — QUETIAPINE FUMARATE 200 MG/1
400 TABLET, FILM COATED ORAL NIGHTLY
Status: DISCONTINUED | OUTPATIENT
Start: 2020-04-28 | End: 2020-04-28

## 2020-04-28 RX ORDER — MAGNESIUM HYDROXIDE/ALUMINUM HYDROXICE/SIMETHICONE 120; 1200; 1200 MG/30ML; MG/30ML; MG/30ML
30 SUSPENSION ORAL 3 TIMES DAILY PRN
Status: DISCONTINUED | OUTPATIENT
Start: 2020-04-28 | End: 2020-05-02 | Stop reason: HOSPADM

## 2020-04-28 RX ORDER — QUETIAPINE FUMARATE 200 MG/1
200 TABLET, FILM COATED ORAL NIGHTLY
Status: DISCONTINUED | OUTPATIENT
Start: 2020-04-28 | End: 2020-04-30

## 2020-04-28 RX ADMIN — QUETIAPINE FUMARATE 200 MG: 200 TABLET ORAL at 20:58

## 2020-04-28 RX ADMIN — OXCARBAZEPINE 300 MG: 300 TABLET, FILM COATED ORAL at 20:58

## 2020-04-28 RX ADMIN — TRAZODONE HYDROCHLORIDE 50 MG: 50 TABLET ORAL at 20:58

## 2020-04-28 RX ADMIN — ESCITALOPRAM OXALATE 10 MG: 10 TABLET ORAL at 20:58

## 2020-04-28 RX ADMIN — LURASIDONE HYDROCHLORIDE 40 MG: 40 TABLET, FILM COATED ORAL at 16:53

## 2020-04-28 RX ADMIN — OXCARBAZEPINE 300 MG: 300 TABLET, FILM COATED ORAL at 13:49

## 2020-04-28 ASSESSMENT — ENCOUNTER SYMPTOMS
VOMITING: 0
DIARRHEA: 0
SHORTNESS OF BREATH: 0
SINUS PRESSURE: 0
VOMITING: 0
NAUSEA: 0
COUGH: 0
BACK PAIN: 0
WHEEZING: 0
NAUSEA: 0
TROUBLE SWALLOWING: 0
CONSTIPATION: 0
ABDOMINAL PAIN: 0
COUGH: 0

## 2020-04-28 ASSESSMENT — SLEEP AND FATIGUE QUESTIONNAIRES
DO YOU USE A SLEEP AID: NO
DO YOU HAVE DIFFICULTY SLEEPING: NO

## 2020-04-28 ASSESSMENT — PATIENT HEALTH QUESTIONNAIRE - PHQ9: SUM OF ALL RESPONSES TO PHQ QUESTIONS 1-9: 22

## 2020-04-28 ASSESSMENT — LIFESTYLE VARIABLES
HISTORY_ALCOHOL_USE: NO
HISTORY_ALCOHOL_USE: NO

## 2020-04-28 NOTE — GROUP NOTE
Patient did not develop daily goal after encouragement from staff. 1:1 talk time offered but patient refused.      Signature:  Sunny Lee

## 2020-04-28 NOTE — ED NOTES
[] Sosa    [] [address]    [x]  [facility] ASSESSMENT      Y  N     [x] [] In the past two weeks have you had thoughts of hurting yourself in any way? [x] [] In the past two weeks have you had thoughts that you would be better off dead? [x] [] Have you made a suicide attempt in the past two months? [x] [] Do you have a plan for hurting yourself or suicide? [x] [] Presence of hallucinations/voices related to hurting himself or herself or someone else. SUICIDE/SECURITY WATCH PRECAUTION CHECKLIST     Orders    [x]  Suicide/Security Watch Precautions initiated as checked below:   4/28/20 1:41 AM EDT BH31/BH31C    [x] Notified physician:  Violet Kathleen DO  4/28/20 1:41 AM EDT    [x] Orders obtained as appropriate:     [x] 1:1 Observer     [] Psych Consult     [] Psych Consult    Name:  Date:  Time:    [x] 1:1 Observer, Notified by:  Chapo Vasquez Nurse Supervisor    [x] Remove all personal clothes from room and place in snap/paper gown/pants. Slipper only    [x] Remove all personal belongings from room and secured away from patient. Documentation    [x] Initiate Suicide/Security Watch Precaution Flow Sheet    [x] Initiate individualized Care Plan/Problem    [x] Document why precautions initiated on flow sheet (Initiate Nursing Care Plan/Problem)    [x] 1:1 Observer in place; instructions provided. Suicide precautions require observer be within arms length. [x] Nurse-Observer Communication Hand-off initiated by RN, reviewed with Observer. Subsequently used as Hand Off between Observers. [x] Initiate every 15 minute observations per observer as delegated by the RN.     [x] Initiate RN assessment and documentation    Environmental Scan  Search Criteria and Process: OPTIONAL, see Search Policy    [x] Reason for search: Suicidal    [x] Nursing in presence of second person to search patient    [x] Patient notified of reason for body assessment and belongings search:     Persons present during search: [place] [facility] of search and disposition: Clothes and personal items secured       Searchers Name: [place] [employer]    These items or items similar should be removed from the room:   [] Chairs   [] Telephone   [x] Trash cans and liners   [] Plastic utensils (order Patient Safety tray)   [x] Empty or remove Sharps containers   [x] All personal clothing/belongings removed   [x] All unnecessary lead wires, electrical cords, draw cords, etc.   [x] Flowers and plants   [x] Double check for lighters, matches, razors, any glass items etc that can be used as weapons. Person completing Checklist: Judi Michael       GENERAL INFORMATION     Y  N     [x] [] Has the patient been informed that they are on a watch and what that means? [x] [] Can the patient get out of Bed without nursing assistance? [x] [] Can the patient use the restroom without nursing assistance? [] [x] Can the patient walk the halls to [place] their legs? \"   [] [x] Does the patient have metal utensils? [x] [] Have the patient's belongings been placed out of control of the patient? [x] [] Have the patient and his/her belongings been checked for contraband? [x] [] Is the patient under any visitor restrictions? If Yes, explain:   [] [x] Is the patient under an alias? [facility] Name:   Authorized visitors (no more than two are to be on the list)   Name/Relationship:   Name/Relationship:    Name of Staff member that you  Received this information from?: [facility] B RN    General Description:    Brennan Berg BH31/BH31C male 63 y.o. Admission weight: 170 lb (77.1 kg) Height: 6' 3\" (190.5 cm)  Race: []  [] Black  []   []   [] [place] [] Other  Facial Hair:  [x] Yes  [] No  If yes, please describe: mustache and beard  Identifying Marks (i.e. Visible tattoos, scars, etc... ):     NURSING CARE PLAN    Nursing Diagnosis: Risk of Self Directed Harm  [] Actual  [] GRADY  04/28/20 0148

## 2020-04-28 NOTE — ED PROVIDER NOTES
[address]  ED  Emergency Department Encounter  EmergencyMedicine Resident     Pt Vandana Rodney  MRN: [id number]  [place] 1960  Date of evaluation: 4/28/20  PCP:  Hermelinda Hansen MD    CHIEF COMPLAINT       No chief complaint on file. Suicidal ideation    HISTORY OF PRESENT ILLNESS  (Location/Symptom, Timing/Onset, Context/Setting, Quality, Duration, Modifying Factors, Severity.)      Deanna Mercer is a 63 y.o. male who had mental health disorder, chart lists Wellbutrin Celexa and trazodone as home meds patient presenting complaining of suicidal ideation with plan. Patient states he has a plan to stab himself. Patient reports prior history of attempt. Patient reports he has been off medications for 2 weeks and has been unable to follow-up with [facility]. No other real specific medical complaints on arrival.    PAST MEDICAL / SURGICAL / SOCIAL / FAMILY HISTORY      has a past medical history of Bipolar disorder ([facility] [address] Depression, GERD (gastroesophageal reflux disease), Hallucinations, Headache(784.0), Hepatitis, Schizophrenia, schizo-affective ([address] Substance abuse ([facility] [address] Tobacco abuse, Type II or unspecified type diabetes mellitus without mention of complication, not stated as uncontrolled, and Urinary incontinence. has a past surgical history that includes Dental surgery and Abscess Drainage (N/A, 02/11/2018).     Social History     Socioeconomic History    Marital status: Single     Spouse name: Not on file    Number of children: 0    Years of education: 8    Highest education level: Not on file   Occupational History     Employer: N/A   Social Needs    Financial resource strain: Not on file    Food insecurity     Worry: Not on file     Inability: Not on file   [employer] needs     Medical: Not on file     Non-medical: Not on file   Tobacco Use    Smoking status: Current Every Day Smoker     Packs/day: 1.00     Types: Cigarettes    Smokeless tobacco: Never Used   Substance and Sexual Activity    Alcohol use: Yes     Comment: occassional drinker    Drug use: Yes     Types: Cocaine    Sexual activity: Not on file   Lifestyle    Physical activity     Days per week: Not on file     Minutes per session: Not on file    Stress: Not on file   Relationships    Social connections     Talks on phone: Not on file     Gets together: Not on file     Attends Confucianism service: Not on file     Active member of club or organization: Not on file     Attends meetings of clubs or organizations: Not on file     Relationship status: Not on file    Intimate partner violence     Fear of current or ex partner: Not on file     Emotionally abused: Not on file     Physically abused: Not on file     Forced sexual activity: Not on file   Other Topics Concern    Not on file   Social History Narrative    Not on file       Family History   Problem Relation Age of Onset    Diabetes Mother     Heart Disease Mother        Allergies:  Navane [thiothixene]    Home Medications:  Prior to Admission medications    Medication Sig Start Date End Date Taking?  Authorizing Provider   ibuprofen (IBU) 400 MG tablet Take 1 tablet by mouth every 8 hours as needed for Pain 3/22/20   Juanice Casa, DO   acetaminophen (TYLENOL) 325 MG tablet Take 1 tablet by mouth every 8 hours as needed for Pain 3/22/20   Jefferson Casa, DO   dicyclomine (BENTYL) 10 MG capsule Take 1 capsule by mouth every 6 hours as needed (cramps) 3/20/20   Josef Gupta, DO   ondansetron (ZOFRAN ODT) 4 MG disintegrating tablet Take 1 tablet by mouth every 8 hours as needed for Nausea 3/20/20   Josef Gupta, DO   buPROPion (WELLBUTRIN SR) 200 MG extended release tablet Take 1 tablet by mouth 2 times daily 2/12/20   Deon Gusman MD   citalopram (CELEXA) 40 MG tablet Take 1 tablet by mouth daily 2/13/20   Deon Gusman MD   traZODone (DESYREL) 50 MG tablet Take 1 tablet by mouth nightly as needed for Sleep 2/12/20 2/22/20  Valentina

## 2020-04-28 NOTE — H&P
Ear: Hearing and external ear normal.   Nose: Nose normal.   Mouth/Throat: Uvula is midline and mucous membranes are normal. Abnormal dentition (edentulous). Posterior oropharyngeal erythema present. No posterior oropharyngeal edema. Eyes: Pupils are equal, round, and reactive to light. EOM and lids are normal. Right conjunctiva is injected. Left conjunctiva is injected. Scleral redness bilaterally. Neck: Trachea normal.   Cardiovascular: Normal rate, regular rhythm and normal heart sounds. No extrasystoles are present. Exam reveals no gallop. No murmur heard. Pulmonary/Chest: Effort normal. He has decreased breath sounds in the right lower field and the left lower field. He has no wheezes. He has no rhonchi. He has no rales. Abdominal: Soft. Normal appearance and bowel sounds are normal. There is no rigidity and no guarding. Musculoskeletal:      Comments: Unable to fully assess ROM has pt stating he is \"tired. \"   Lymphadenopathy:   Unable to fully assess. Neurological: He is oriented to person, place, and time. He appears lethargic. No cranial nerve deficit or sensory deficit. Generally pt cranial nerves not grossly abnormal. Unable to perform full cranial nerve exam, due to patient not cooperative.  strength equal bilaterally. Skin: Skin is warm, dry and intact. Psychiatric: His speech is normal. Judgment normal. He is slowed and withdrawn. Cognition and memory are normal. He exhibits a depressed mood. He expresses suicidal ideation. PROVISIONAL DIAGNOSES:      Principal Problem:    Depression with suicidal ideation  Active Problems:    Schizoaffective disorder, depressive type ([facility]  Resolved Problems:    * No resolved hospital problems.  MARY Albarran CNP on 4/28/2020 at 12:53 PM

## 2020-04-28 NOTE — CARE COORDINATION
1: 1 COMPLETED INSTEAD OF GROUP    Pt sleeping and requested SW come back later. SW will attempt to complete 1:1 along with psychosocial assessment at a later date/time.

## 2020-04-28 NOTE — GROUP NOTE
Group Therapy Note    Date: 4/28/2020    Group Start Time: 1330  Group End Time: 8606  Group Topic: Recreational    [address] [facility], [facility]    Patient refused to attend Recreational Therapy Group at 1330 after encouragement from staff. 1:1 talk time offered.     Signature:  Eben Lambert

## 2020-04-29 PROCEDURE — 6370000000 HC RX 637 (ALT 250 FOR IP): Performed by: PSYCHIATRY & NEUROLOGY

## 2020-04-29 PROCEDURE — 1240000000 HC EMOTIONAL WELLNESS R&B

## 2020-04-29 RX ADMIN — LURASIDONE HYDROCHLORIDE 40 MG: 40 TABLET, FILM COATED ORAL at 17:52

## 2020-04-29 RX ADMIN — OXCARBAZEPINE 300 MG: 300 TABLET, FILM COATED ORAL at 09:17

## 2020-04-29 RX ADMIN — TRAZODONE HYDROCHLORIDE 50 MG: 50 TABLET ORAL at 21:14

## 2020-04-29 RX ADMIN — OXCARBAZEPINE 300 MG: 300 TABLET, FILM COATED ORAL at 21:14

## 2020-04-29 RX ADMIN — QUETIAPINE FUMARATE 200 MG: 200 TABLET ORAL at 21:14

## 2020-04-29 RX ADMIN — ESCITALOPRAM OXALATE 10 MG: 10 TABLET ORAL at 21:14

## 2020-04-29 NOTE — PLAN OF CARE
Problem: Depressive Behavior With or Without Suicide Precautions:  Goal: Able to verbalize acceptance of life and situations over which he or she has no control  Description: Able to verbalize acceptance of life and situations over which he or she has no control  4/29/2020 1424 by Vamsi Diaz  Outcome: Ongoing   Patient has been isolative to room. Patient came out for breakfast but has stated he is feeling unwell. Patient is tearful upon approach  Problem: Depressive Behavior With or Without Suicide Precautions:  Goal: Ability to disclose and discuss suicidal ideas will improve  Description: Ability to disclose and discuss suicidal ideas will improve  4/29/2020 1424 by Vamsi Diaz  Outcome: Ongoing   Patient is safety checked every fifteen minutes. Patient states \"voices are tell him to kill himself. \" Patient states he has thoughts but no plan. Problem: Pain:  Goal: Pain level will decrease  Description: Pain level will decrease  4/29/2020 1424 by Vamsi Diaz  Outcome: Ongoing  Patient has been isolative to bed, complains of stomach pain.

## 2020-04-29 NOTE — GROUP NOTE
Group Therapy Note    Date: 4/29/2020    Group Start Time: 6977  Group End Time: 1510  Group Topic: Healthy Living/Wellness    [address]    Patient did not attend Healthy Living/Wellness Group after encouragement from staff. 1:1 talk time offered but patient refused.      Signature:  Prerna Pro

## 2020-04-29 NOTE — GROUP NOTE
Group Therapy Note    Date: 4/29/2020    Group Start Time: 7564  Group End Time: 1430  Group Topic: Psychoeducation    [facility]    Patient refused to attend Recreational Therapy Group at [phone] after encouragement from staff. 1:1 talk time offered.     Signature:  Julian Ambrose

## 2020-04-29 NOTE — PLAN OF CARE
Problem: Depressive Behavior With or Without Suicide Precautions:  Goal: Able to verbalize acceptance of life and situations over which he or she has no control  Description: Able to verbalize acceptance of life and situations over which he or she has no control  Note: PT is short with staff during one to one time. PT does not discuss discharge plans states he is unsure at this time. Problem: Depressive Behavior With or Without Suicide Precautions:  Goal: Ability to disclose and discuss suicidal ideas will improve  Description: Ability to disclose and discuss suicidal ideas will improve  Note: Pt admits to having thoughts of self harm. Agreeable to seeking out staff should feelings increase. Able to identify positive coping skills and plan for safety. Will cont to monitor q15 minutes for safety and provide support and reassurance as needed. PT rates depression and anxiety a 7 out of 10. PT reports poor sleep. PT states he is hearing auditory hallucinations telling him to harm self. PT remains flat and guarded and isolative to room most of the evening.

## 2020-04-29 NOTE — GROUP NOTE
Group Therapy Note    Date: 4/29/2020    Group Start Time: 1630  Group End Time: 0930  Group Topic: Community Meeting    [facility]    Alphonse Chu, [address]    Patient refused to attend Recreational Therapy Group at 3802 after encouragement from staff. 1:1 talk time offered.     Signature:  Pam Randle

## 2020-04-30 PROCEDURE — 6370000000 HC RX 637 (ALT 250 FOR IP): Performed by: PSYCHIATRY & NEUROLOGY

## 2020-04-30 PROCEDURE — 80307 DRUG TEST PRSMV CHEM ANLYZR: CPT

## 2020-04-30 PROCEDURE — 1240000000 HC EMOTIONAL WELLNESS R&B

## 2020-04-30 RX ORDER — ESCITALOPRAM OXALATE 10 MG/1
15 TABLET ORAL NIGHTLY
Status: DISCONTINUED | OUTPATIENT
Start: 2020-04-30 | End: 2020-05-02 | Stop reason: HOSPADM

## 2020-04-30 RX ORDER — QUETIAPINE FUMARATE 300 MG/1
300 TABLET, FILM COATED ORAL NIGHTLY
Status: DISCONTINUED | OUTPATIENT
Start: 2020-04-30 | End: 2020-05-02 | Stop reason: HOSPADM

## 2020-04-30 RX ADMIN — ESCITALOPRAM OXALATE 15 MG: 10 TABLET ORAL at 21:31

## 2020-04-30 RX ADMIN — OXCARBAZEPINE 300 MG: 300 TABLET, FILM COATED ORAL at 21:31

## 2020-04-30 RX ADMIN — OXCARBAZEPINE 300 MG: 300 TABLET, FILM COATED ORAL at 08:22

## 2020-04-30 RX ADMIN — LURASIDONE HYDROCHLORIDE 60 MG: 60 TABLET, FILM COATED ORAL at 17:33

## 2020-04-30 RX ADMIN — TRAZODONE HYDROCHLORIDE 50 MG: 50 TABLET ORAL at 21:31

## 2020-04-30 RX ADMIN — QUETIAPINE FUMARATE 300 MG: 300 TABLET ORAL at 21:31

## 2020-04-30 NOTE — GROUP NOTE
Group Therapy Note    Date: 4/30/2020    Group Start Time: 1100  Group End Time: 1200  Group Topic: Recreational    [facility] Whitt, [facility]    Patient did not attend Recreation Therapy Group after encouragement from staff. 1:1 talk time offered but patient refused.      Signature:  Rosaline Wayne

## 2020-04-30 NOTE — PROGRESS NOTES
PROGRESS NOTE  The chart has been reviewed and the patient was interviewed at the bedside. The patient was laying on his bed when the writer approached him. He was guarded, uncooperative, depressed and anxious. The patient is socially withdrawn. He still reporting hearing voices telling him to harm self. Safety plan has been discussed with the patient and he has been advised to approach the nurses station once the voices are overwhelming. He sounds understanding the concept. The patient exhibited poor hygiene and was not taking care of his ADLs. The patient spent most of the time in his room and out only for meals. The patient did not attend therapy groups in the unit. He denied side effects of medications. We will continue monitoring for symptoms of depression, psychosis, suicidal behavior and to adjust his medications as needed. MENTAL STATUS EXAMINATION:    BP (!) 85/52   Pulse 89   Temp 97.2 °F (36.2 °C) (Oral)   Resp 14   Ht 6' 3\" (1.905 m)   Wt 170 lb (77.1 kg)   SpO2 97%   BMI 21.25 kg/m²     MOOD-is anxious, irritable and dysphoric   Affect-is depressed  Patient feels helpless hopeless and worthless  Psychomotor activity : decreased with jerky movements of the right arm. APPEARANCE:  Personal hygiene is poor and patient is neglecting his appearance. Patient is somewhat unkempt  PSYCHOSIS: He reported auditory command in nature telling him to harm himself but denied visual hallucinations. He exhibited paranoid delusions. ORIENTATION:  Oriented to time place and person. Recent and remote memory is grossly intact. Intelligence appears to be average  CONCENTRATION:  poor.     SPEECH : goal directed , but slow .   DIAGNOSIS:    Principal Problem:    Depression with suicidal

## 2020-04-30 NOTE — PLAN OF CARE
Problem: Depressive Behavior With or Without Suicide Precautions:  Goal: Ability to disclose and discuss suicidal ideas will improve  Description: Ability to disclose and discuss suicidal ideas will improve  4/29/2020 2319 by Lizbeth Narvaez RN  Outcome: Ongoing  Note: Patient reports fleeting suicidal ideation with no plan. Patient contracts for safety and agrees to seek out staff if conditions change. 15 minute safety checks. Patient states he is hearing auditory hallucinations of voices telling him to harm himself. Patient reports depression and anxiety rated 6/10. Patient reports fair sleep and adequate nutrition. Patient is satisfied with current medication interventions.

## 2020-04-30 NOTE — PROGRESS NOTES
delusions. ORIENTATION:  Oriented to time place and person.  Recent and remote memory is grossly intact. Intelligence appears to be average  CONCENTRATION:  poor.    SPEECH : goal directed , but slow .     DIAGNOSIS:    Principal Problem:    Depression with suicidal ideation  Active Problems:    Schizoaffective disorder, depressive type ([address]  Resolved Problems:    * No resolved hospital problems. *      LAB:    Recent Results (from the past 67 hour(s))   COVID-19    Collection Time: 04/28/20  1:53 AM   Result Value Ref Range    SARS-CoV-2          SARS-CoV-2, Rapid Not Detected Not Detected    Source . NASOPHARYNGEAL SWAB     SARS-CoV-2, PCR                 TREATMENT PLAN:     escitalopram  15 mg Oral Nightly    lurasidone  60 mg Oral Dinner    QUEtiapine  300 mg Oral Nightly    OXcarbazepine  300 mg Oral BID     acetaminophen, aluminum & magnesium hydroxide-simethicone, benztropine mesylate, hydrOXYzine, magnesium hydroxide, nicotine polacrilex, traZODone    Chart was reviewed the patient has been interviewed  In the medications as prescribed above and increase Latuda 60 mg p.o. with dinner and increase Seroquel 300 mg p.o. at bedtime for psychosis and sleep. Patient was discussed with the  and the treatment team.   Provided Supportive and insight-oriented psychotherapy psychotherapy  Recommended involvement in Unit milieu  Provided empathic listening, validation and support  Patient acknowledged and mutually decided to continue with current treatment plan  Discharge planning was discussed with the patient. Aysha Moreno MD        This note was created with the assistance of a speech-recognition program.  Although the intention is to generate a document that actually reflects the content of the visit, no guarantees can be provided that every mistake has been identified and corrected by editing.

## 2020-05-01 PROCEDURE — 1240000000 HC EMOTIONAL WELLNESS R&B

## 2020-05-01 PROCEDURE — 80307 DRUG TEST PRSMV CHEM ANLYZR: CPT

## 2020-05-01 PROCEDURE — 6370000000 HC RX 637 (ALT 250 FOR IP): Performed by: PSYCHIATRY & NEUROLOGY

## 2020-05-01 RX ADMIN — OXCARBAZEPINE 300 MG: 300 TABLET, FILM COATED ORAL at 08:32

## 2020-05-01 RX ADMIN — QUETIAPINE FUMARATE 300 MG: 300 TABLET ORAL at 20:58

## 2020-05-01 RX ADMIN — LURASIDONE HYDROCHLORIDE 60 MG: 60 TABLET, FILM COATED ORAL at 17:06

## 2020-05-01 RX ADMIN — TRAZODONE HYDROCHLORIDE 50 MG: 50 TABLET ORAL at 20:58

## 2020-05-01 RX ADMIN — ESCITALOPRAM OXALATE 15 MG: 10 TABLET ORAL at 20:58

## 2020-05-01 RX ADMIN — OXCARBAZEPINE 300 MG: 300 TABLET, FILM COATED ORAL at 20:57

## 2020-05-01 NOTE — GROUP NOTE
Group Therapy Note    Date: 5/1/2020    Group Start Time: 0900  Group End Time: 0915  Group Topic: Community Meeting    [address] [place] [address] A    Yojana Crabtree      Patient declined to attend community meeting/goal setting group at 0900 despite encouragement from staff. 1:1 talk time offered by staff as alternative to group session.           Signature:  Yojana Crabtree

## 2020-05-02 VITALS
WEIGHT: 170 LBS | RESPIRATION RATE: 14 BRPM | TEMPERATURE: 98.2 F | HEIGHT: 75 IN | DIASTOLIC BLOOD PRESSURE: 62 MMHG | OXYGEN SATURATION: 97 % | BODY MASS INDEX: 21.14 KG/M2 | SYSTOLIC BLOOD PRESSURE: 102 MMHG | HEART RATE: 71 BPM

## 2020-05-02 PROBLEM — F32.A DEPRESSION WITH SUICIDAL IDEATION: Status: RESOLVED | Noted: 2020-04-28 | Resolved: 2020-05-02

## 2020-05-02 PROBLEM — R45.851 DEPRESSION WITH SUICIDAL IDEATION: Status: RESOLVED | Noted: 2020-04-28 | Resolved: 2020-05-02

## 2020-05-02 PROCEDURE — 6370000000 HC RX 637 (ALT 250 FOR IP): Performed by: PSYCHIATRY & NEUROLOGY

## 2020-05-02 RX ORDER — HYDROXYZINE HYDROCHLORIDE 25 MG/1
25 TABLET, FILM COATED ORAL 3 TIMES DAILY PRN
Qty: 30 TABLET | Refills: 0 | Status: SHIPPED | OUTPATIENT
Start: 2020-05-02 | End: 2020-05-12

## 2020-05-02 RX ORDER — OXCARBAZEPINE 300 MG/1
300 TABLET, FILM COATED ORAL 2 TIMES DAILY
Qty: 60 TABLET | Refills: 3 | Status: ON HOLD | OUTPATIENT
Start: 2020-05-02 | End: 2020-08-14 | Stop reason: SDUPTHER

## 2020-05-02 RX ORDER — ESCITALOPRAM OXALATE 5 MG/1
15 TABLET ORAL NIGHTLY
Qty: 30 TABLET | Refills: 3 | Status: ON HOLD | OUTPATIENT
Start: 2020-05-02 | End: 2020-06-10 | Stop reason: HOSPADM

## 2020-05-02 RX ORDER — QUETIAPINE FUMARATE 300 MG/1
300 TABLET, FILM COATED ORAL NIGHTLY
Qty: 60 TABLET | Refills: 3 | Status: ON HOLD | OUTPATIENT
Start: 2020-05-02 | End: 2020-06-10 | Stop reason: HOSPADM

## 2020-05-02 RX ADMIN — OXCARBAZEPINE 300 MG: 300 TABLET, FILM COATED ORAL at 09:10

## 2020-05-02 NOTE — PROGRESS NOTES
[facility]  Discharge Note    Pt discharged with followings belongings:   Dentures: None(no teeth)  Vision - Corrective Lenses: None  Hearing Aid: None  Jewelry: None  Body Piercings Removed: N/A  Clothing: Footwear, Jacket / coat, Pants, Shirt  Were All Patient Medications Collected?: Not Applicable  Other Valuables: ($1.00)   Valuables sent home with pt. Valuables returned to patient. Patient education on aftercare instructions: yes  Information faxed to [facility] by writer Patient verbalize understanding of AVS:  yes.     Status EXAM upon discharge:  Status and Exam  Normal: Yes  Facial Expression: Brightened  Affect: Appropriate  Level of Consciousness: Alert  Mood:Normal: Yes  Mood: Anxious  Motor Activity:Normal: Yes  Motor Activity: Decreased  Interview Behavior: Cooperative  Preception: [place] to Person, [place] to Time, [place] to Place, [place] to Situation  Attention:Normal: Yes  Attention: Distractible  Thought Processes: Circumstantial  Thought Content:Normal: Yes  Thought Content: Preoccupations  Hallucinations: None  Delusions: No  Memory:Normal: Yes  Memory: Poor Recent, Poor Remote  Insight and Judgment: Yes  Insight and Judgment: Poor Insight, Poor Judgment  Present Suicidal Ideation: No  Present Homicidal Ideation: No      Metabolic Screening:    Lab Results   Component Value Date    LABA1C 4.8 03/22/2019       Lab Results   Component Value Date    CHOL 179 02/13/2017    CHOL 127 05/09/2015    CHOL 168 08/20/2014    CHOL 158 12/31/2013    CHOL 178 08/15/2013    CHOL 166 09/17/2012    CHOL 210 (H) 02/03/2012     Lab Results   Component Value Date    TRIG 67 02/13/2017    TRIG 37 05/09/2015    TRIG 72 08/20/2014    TRIG 52 12/31/2013    TRIG 70 08/15/2013    TRIG 117 09/17/2012    TRIG 94 02/03/2012     Lab Results   Component Value Date    HDL 73 02/13/2017    HDL 57 05/09/2015    HDL 50 08/20/2014    HDL 43 12/31/2013    HDL 42 08/15/2013    HDL 38 (L) 09/17/2012    HDL 55 02/03/2012 No components found for: LDLCAL  No results found for: LABVLDL     Pt discharged to home by cab. Pt meds escribed to own pharmacy. Pt verbalizes all discharge instructions.        Rajesh Hirsch RN

## 2020-05-02 NOTE — BH NOTE
1:1 interview done via Telehealth by Dr Darby Farris. Discharge instructions reviewed.
Patient was seen by Dr. Ifeanyi Corbett via telehealth.
Reviewed T charting
Verified patient's medications with [facility] team nurse Sheldon Acuña
Yes.    Patient education on precautions: Yes    Patient admits to depression, anxiety, and feeling helpless and hopeless. Patient had plans to throw himself into traffic or to cut himself. Patient admits to suicidal thoughts upon admission but contracts for safety while on unit. Patient denies hallucinations and homicidal ideation.                     Suly Zavala RN
depressive type [facility])  Resolved Problems:    * No resolved hospital problems.  *         Treatment plan:      OXcarbazepine  300 mg Oral BID    lurasidone  40 mg Oral Dinner    QUEtiapine  200 mg Oral Nightly    escitalopram  10 mg Oral Nightly     acetaminophen, aluminum & magnesium hydroxide-simethicone, benztropine mesylate, hydrOXYzine, magnesium hydroxide, nicotine polacrilex, traZODone    Chart was reviewed and the patient has been interviewed  Continue the medications as prescribed above to be titrated as tolerated  Patient was discussed with the  and the treatment team.   Provided Supportive and insight-oriented psychotherapy  Provided empathic listening, validation and support  Provided support & education of purpose, risks vs. benefits and side effects of treatment with psychotropics  Provided support and education about risk of not receiving treatment  Patient acknowledged and mutually decided to continue with current treatment plan  Provided education about stress management, exercise and healthy diet  Provided support and encouragement to contact office sooner if needed - verbally acknowledged  Provided support and encouragement to continue working with other practitioners  Provided support and encouragement to consider (continue) working with counselor,   Recommended follow up with [facility] mental [facility] center or private practice psychiatry upon discharge    Maricruz Roberts MD  Board Certified Psychiatrist  4/28/2020 11:34 PM

## 2020-05-04 NOTE — DISCHARGE SUMMARY
walk in front of traffic. Safety plan has been discussed with the patient. The patient reported hearing voices telling him to harm himself. The patient agreed to resume his medication as described below.     For detailed history, mental status examination at time of admission, diagnoses and treatment plan, please see the dictated psychiatric evaluation at the time of admission  After admission, patient was seen in individual supportive psychotherapy, was encouraged to participate in unit milieu. Case was also discussed with the staff. The patient was laying on his bed when the writer approached him. He was guarded, uncooperative, depressed and anxious. The patient is socially withdrawn. He still reporting hearing voices telling him to harm self. Safety plan has been discussed with the patient and he has been advised to approach the nurses station once the voices are overwhelming. He sounds understanding the concept. The patient exhibited poor hygiene and was not taking care of his ADLs. The patient spent most of the time in his room and out only for meals. The patient did not attend therapy groups in the unit. He denied side effects of medications. We will continue monitoring for symptoms of depression, psychosis, suicidal behavior and to adjust his medications as needed.    [facility] was guarded, depressed and anxious.  The patient exhibited poor hygiene and was not taking care of his ADLs.  The patient spent most of the time in his room and out only for meals.  The patient did not attend therapy groups in the unit. [facility] denied side effects of medications.    He denied current pain issues.   He still reported feeling unstable and not ready to be discharged \"I cannot trust myself and I am still hearing the voices telling me to harm myself\". He still reporting hearing voices telling him to harm self.  Safety plan has been discussed with the patient and he has been advised to approach the nurses station once GOOD  Regular diet  Activities as tolerated      DISCHARGE MEDS:     Medication List      START taking these medications    hydrOXYzine 25 MG tablet  Commonly known as:  ATARAX  Take 1 tablet by mouth 3 times daily as needed for Anxiety  Notes to patient:  anxiety        CHANGE how you take these medications    escitalopram 5 MG tablet  Commonly known as:  LEXAPRO  Take 3 tablets by mouth nightly  What changed:    · medication strength  · how much to take  Notes to patient:  depression     lurasidone 60 MG Tabs tablet  Commonly known as:  LATUDA  Take 1 tablet by mouth Daily with supper  What changed:    · medication strength  · how much to take  · Another medication with the same name was removed. Continue taking this medication, and follow the directions you see here.   Notes to patient:  Clear thoughts     QUEtiapine 300 MG tablet  Commonly known as:  SEROQUEL  Take 1 tablet by mouth nightly  What changed:    · medication strength  · how much to take  Notes to patient:  Clear thoughts        CONTINUE taking these medications    OXcarbazepine 300 MG tablet  Commonly known as:  TRILEPTAL  Take 1 tablet by mouth 2 times daily  Notes to patient:  Mood stabilizer     traZODone 50 MG tablet  Commonly known as:  DESYREL  Take 1 tablet by mouth nightly as needed for Sleep  Notes to patient:  Sleep aid        STOP taking these medications    acetaminophen 325 MG tablet  Commonly known as:  Tylenol     buPROPion 200 MG extended release tablet  Commonly known as:  WELLBUTRIN SR     citalopram 40 MG tablet  Commonly known as:  CELEXA     dicyclomine 10 MG capsule  Commonly known as:  Bentyl     ibuprofen 400 MG tablet  Commonly known as:  IBU     ondansetron 4 MG disintegrating tablet  Commonly known as:  Zofran ODT           Where to Get Your Medications      These medications were sent to [address] [place] [address] [phone] - F [phone]  [place] [address] [address]    Phone:

## 2020-05-22 ENCOUNTER — HOSPITAL ENCOUNTER (OUTPATIENT)
Age: 60
Setting detail: SPECIMEN
Discharge: HOME | End: 2020-05-22
Payer: MEDICAID

## 2020-05-22 LAB
ABSOLUTE EOS #: 0.09 K/UL (ref 0–0.44)
ABSOLUTE IMMATURE GRANULOCYTE: <0.03 K/UL (ref 0–0.3)
ABSOLUTE LYMPH #: 2.92 K/UL (ref 1.1–3.7)
ABSOLUTE MONO #: 0.29 K/UL (ref 0.1–1.2)
ALBUMIN SERPL-MCNC: 3.9 G/DL (ref 3.5–5.2)
ALBUMIN/GLOBULIN RATIO: 1.5 (ref 1–2.5)
ALP BLD-CCNC: 93 U/L (ref 40–129)
ALT SERPL-CCNC: 13 U/L (ref 5–41)
ANION GAP SERPL CALCULATED.3IONS-SCNC: 10 MMOL/L (ref 9–17)
AST SERPL-CCNC: 14 U/L
BASOPHILS # BLD: 1 % (ref 0–2)
BASOPHILS ABSOLUTE: 0.03 K/UL (ref 0–0.2)
BILIRUB SERPL-MCNC: 0.3 MG/DL (ref 0.3–1.2)
BUN BLDV-MCNC: 13 MG/DL (ref 8–23)
BUN/CREAT BLD: ABNORMAL (ref 9–20)
CALCIUM SERPL-MCNC: 9.1 MG/DL (ref 8.6–10.4)
CHLORIDE BLD-SCNC: 105 MMOL/L (ref 98–107)
CO2: 27 MMOL/L (ref 20–31)
CREAT SERPL-MCNC: 1 MG/DL (ref 0.7–1.2)
DIFFERENTIAL TYPE: ABNORMAL
EOSINOPHILS RELATIVE PERCENT: 2 % (ref 1–4)
ESTIMATED AVERAGE GLUCOSE: 103 MG/DL
GFR AFRICAN AMERICAN: >60 ML/MIN
GFR NON-AFRICAN AMERICAN: >60 ML/MIN
GFR SERPL CREATININE-BSD FRML MDRD: ABNORMAL ML/MIN/{1.73_M2}
GFR SERPL CREATININE-BSD FRML MDRD: ABNORMAL ML/MIN/{1.73_M2}
GLUCOSE BLD-MCNC: 102 MG/DL (ref 70–99)
HBA1C MFR BLD: 5.2 % (ref 4–6)
HCT VFR BLD CALC: 43.2 % (ref 40.7–50.3)
HEMOGLOBIN: 13.4 G/DL (ref 13–17)
IMMATURE GRANULOCYTES: 0 %
LYMPHOCYTES # BLD: 64 % (ref 24–43)
MCH RBC QN AUTO: 28.8 PG (ref 25.2–33.5)
MCHC RBC AUTO-ENTMCNC: 31 G/DL (ref 28.4–34.8)
MCV RBC AUTO: 92.9 FL (ref 82.6–102.9)
MONOCYTES # BLD: 6 % (ref 3–12)
NRBC AUTOMATED: 0 PER 100 WBC
PDW BLD-RTO: 13.2 % (ref 11.8–14.4)
PLATELET # BLD: 285 K/UL (ref 138–453)
PLATELET ESTIMATE: ABNORMAL
PMV BLD AUTO: 9.6 FL (ref 8.1–13.5)
POTASSIUM SERPL-SCNC: 3.9 MMOL/L (ref 3.7–5.3)
RBC # BLD: 4.65 M/UL (ref 4.21–5.77)
RBC # BLD: ABNORMAL 10*6/UL
SEG NEUTROPHILS: 27 % (ref 36–65)
SEGMENTED NEUTROPHILS ABSOLUTE COUNT: 1.24 K/UL (ref 1.5–8.1)
SODIUM BLD-SCNC: 142 MMOL/L (ref 135–144)
TOTAL PROTEIN: 6.5 G/DL (ref 6.4–8.3)
WBC # BLD: 4.6 K/UL (ref 3.5–11.3)
WBC # BLD: ABNORMAL 10*3/UL

## 2020-05-24 ENCOUNTER — APPOINTMENT (OUTPATIENT)
Dept: GENERAL RADIOLOGY | Age: 60
End: 2020-05-24
Payer: MEDICAID

## 2020-05-24 ENCOUNTER — HOSPITAL ENCOUNTER (EMERGENCY)
Age: 60
Discharge: HOME | End: 2020-05-24
Attending: EMERGENCY MEDICINE
Payer: MEDICAID

## 2020-05-24 VITALS
SYSTOLIC BLOOD PRESSURE: 130 MMHG | OXYGEN SATURATION: 98 % | DIASTOLIC BLOOD PRESSURE: 83 MMHG | WEIGHT: 170 LBS | TEMPERATURE: 97.6 F | HEIGHT: 75 IN | HEART RATE: 84 BPM | RESPIRATION RATE: 15 BRPM | BODY MASS INDEX: 21.14 KG/M2

## 2020-05-24 LAB
ABSOLUTE EOS #: 0.05 K/UL (ref 0–0.44)
ABSOLUTE IMMATURE GRANULOCYTE: 0.04 K/UL (ref 0–0.3)
ABSOLUTE LYMPH #: 3.64 K/UL (ref 1.1–3.7)
ABSOLUTE MONO #: 0.56 K/UL (ref 0.1–1.2)
ALBUMIN SERPL-MCNC: 4.1 G/DL (ref 3.5–5.2)
ALBUMIN/GLOBULIN RATIO: 1.6 (ref 1–2.5)
ALP BLD-CCNC: 93 U/L (ref 40–129)
ALT SERPL-CCNC: 16 U/L (ref 5–41)
ANION GAP SERPL CALCULATED.3IONS-SCNC: 20 MMOL/L (ref 9–17)
AST SERPL-CCNC: 27 U/L
BASOPHILS # BLD: 1 % (ref 0–2)
BASOPHILS ABSOLUTE: 0.04 K/UL (ref 0–0.2)
BILIRUB SERPL-MCNC: 0.29 MG/DL (ref 0.3–1.2)
BUN BLDV-MCNC: 10 MG/DL (ref 8–23)
BUN/CREAT BLD: ABNORMAL (ref 9–20)
CALCIUM SERPL-MCNC: 8.7 MG/DL (ref 8.6–10.4)
CHLORIDE BLD-SCNC: 99 MMOL/L (ref 98–107)
CO2: 19 MMOL/L (ref 20–31)
CREAT SERPL-MCNC: 0.93 MG/DL (ref 0.7–1.2)
DIFFERENTIAL TYPE: ABNORMAL
EOSINOPHILS RELATIVE PERCENT: 1 % (ref 1–4)
GFR AFRICAN AMERICAN: >60 ML/MIN
GFR NON-AFRICAN AMERICAN: >60 ML/MIN
GFR SERPL CREATININE-BSD FRML MDRD: ABNORMAL ML/MIN/{1.73_M2}
GFR SERPL CREATININE-BSD FRML MDRD: ABNORMAL ML/MIN/{1.73_M2}
GLUCOSE BLD-MCNC: 169 MG/DL (ref 70–99)
HCT VFR BLD CALC: 37.6 % (ref 40.7–50.3)
HEMOGLOBIN: 11.9 G/DL (ref 13–17)
IMMATURE GRANULOCYTES: 1 %
LIPASE: 15 U/L (ref 13–60)
LYMPHOCYTES # BLD: 47 % (ref 24–43)
MCH RBC QN AUTO: 28.7 PG (ref 25.2–33.5)
MCHC RBC AUTO-ENTMCNC: 31.6 G/DL (ref 28.4–34.8)
MCV RBC AUTO: 90.6 FL (ref 82.6–102.9)
MONOCYTES # BLD: 7 % (ref 3–12)
NRBC AUTOMATED: 0 PER 100 WBC
PDW BLD-RTO: 13.3 % (ref 11.8–14.4)
PLATELET # BLD: 185 K/UL (ref 138–453)
PLATELET ESTIMATE: ABNORMAL
PMV BLD AUTO: 12.2 FL (ref 8.1–13.5)
POTASSIUM SERPL-SCNC: 3.7 MMOL/L (ref 3.7–5.3)
RBC # BLD: 4.15 M/UL (ref 4.21–5.77)
RBC # BLD: ABNORMAL 10*6/UL
SEG NEUTROPHILS: 44 % (ref 36–65)
SEGMENTED NEUTROPHILS ABSOLUTE COUNT: 3.43 K/UL (ref 1.5–8.1)
SODIUM BLD-SCNC: 138 MMOL/L (ref 135–144)
TOTAL PROTEIN: 6.6 G/DL (ref 6.4–8.3)
TROPONIN INTERP: NORMAL
TROPONIN INTERP: NORMAL
TROPONIN T: NORMAL NG/ML
TROPONIN T: NORMAL NG/ML
TROPONIN, HIGH SENSITIVITY: 6 NG/L (ref 0–22)
TROPONIN, HIGH SENSITIVITY: 8 NG/L (ref 0–22)
WBC # BLD: 7.8 K/UL (ref 3.5–11.3)
WBC # BLD: ABNORMAL 10*3/UL

## 2020-05-24 PROCEDURE — 85025 COMPLETE CBC W/AUTO DIFF WBC: CPT

## 2020-05-24 PROCEDURE — 84484 ASSAY OF TROPONIN QUANT: CPT

## 2020-05-24 PROCEDURE — 6360000002 HC RX W HCPCS: Performed by: EMERGENCY MEDICINE

## 2020-05-24 PROCEDURE — 6370000000 HC RX 637 (ALT 250 FOR IP): Performed by: EMERGENCY MEDICINE

## 2020-05-24 PROCEDURE — 80053 COMPREHEN METABOLIC PANEL: CPT

## 2020-05-24 PROCEDURE — 96361 HYDRATE IV INFUSION ADD-ON: CPT

## 2020-05-24 PROCEDURE — 96374 THER/PROPH/DIAG INJ IV PUSH: CPT

## 2020-05-24 PROCEDURE — 99284 EMERGENCY DEPT VISIT MOD MDM: CPT

## 2020-05-24 PROCEDURE — 83690 ASSAY OF LIPASE: CPT

## 2020-05-24 PROCEDURE — 71045 X-RAY EXAM CHEST 1 VIEW: CPT

## 2020-05-24 PROCEDURE — 93005 ELECTROCARDIOGRAM TRACING: CPT | Performed by: EMERGENCY MEDICINE

## 2020-05-24 PROCEDURE — 2580000003 HC RX 258: Performed by: EMERGENCY MEDICINE

## 2020-05-24 RX ORDER — 0.9 % SODIUM CHLORIDE 0.9 %
500 INTRAVENOUS SOLUTION INTRAVENOUS ONCE
Status: COMPLETED | OUTPATIENT
Start: 2020-05-24 | End: 2020-05-24

## 2020-05-24 RX ORDER — ONDANSETRON 2 MG/ML
4 INJECTION INTRAMUSCULAR; INTRAVENOUS ONCE
Status: COMPLETED | OUTPATIENT
Start: 2020-05-24 | End: 2020-05-24

## 2020-05-24 RX ORDER — FOLIC ACID 1 MG/1
1 TABLET ORAL DAILY
Status: DISCONTINUED | OUTPATIENT
Start: 2020-05-24 | End: 2020-05-24 | Stop reason: HOSPADM

## 2020-05-24 RX ORDER — THIAMINE MONONITRATE (VIT B1) 100 MG
100 TABLET ORAL ONCE
Status: COMPLETED | OUTPATIENT
Start: 2020-05-24 | End: 2020-05-24

## 2020-05-24 RX ORDER — DICYCLOMINE HYDROCHLORIDE 10 MG/1
10 CAPSULE ORAL EVERY 6 HOURS PRN
Qty: 20 CAPSULE | Refills: 0 | Status: SHIPPED | OUTPATIENT
Start: 2020-05-24 | End: 2020-08-02

## 2020-05-24 RX ADMIN — Medication 100 MG: at 05:46

## 2020-05-24 RX ADMIN — SODIUM CHLORIDE 500 ML: 9 INJECTION, SOLUTION INTRAVENOUS at 02:56

## 2020-05-24 RX ADMIN — FOLIC ACID 1 MG: 1 TABLET ORAL at 05:46

## 2020-05-24 RX ADMIN — ONDANSETRON 4 MG: 2 INJECTION INTRAMUSCULAR; INTRAVENOUS at 02:56

## 2020-05-24 ASSESSMENT — PAIN DESCRIPTION - DESCRIPTORS: DESCRIPTORS: DISCOMFORT

## 2020-05-24 ASSESSMENT — PAIN DESCRIPTION - PAIN TYPE: TYPE: ACUTE PAIN

## 2020-05-24 ASSESSMENT — ENCOUNTER SYMPTOMS
ABDOMINAL PAIN: 1
COLOR CHANGE: 0
SHORTNESS OF BREATH: 0
NAUSEA: 1
TROUBLE SWALLOWING: 0
PHOTOPHOBIA: 0
VOMITING: 1

## 2020-05-24 ASSESSMENT — PAIN SCALES - GENERAL: PAINLEVEL_OUTOF10: 8

## 2020-05-24 ASSESSMENT — PAIN DESCRIPTION - LOCATION: LOCATION: ABDOMEN

## 2020-05-24 NOTE — ED NOTES
Pt presents to ED via [facility] w/ c/o emesis. Per report for EMS, pt intoxicated, was drinking on scene. Pt denies ETOH use today. Pt also c/o abdominal pain, chest pain. Pt states he has missed a few days of some psych medications. Upon arrival, pt placed on monitor, EKG, IV line and labs. Will continue to assess.      Josselin Weston RN  05/24/20 [id number]

## 2020-05-24 NOTE — ED PROVIDER NOTES
Types: Cigarettes    Smokeless tobacco: Never Used   Substance and Sexual Activity    Alcohol use: Yes    Drug use: Yes     Types: Cocaine     Comment: last used 5/20/20    Sexual activity: Not on file   Lifestyle    Physical activity     Days per week: Not on file     Minutes per session: Not on file    Stress: Not on file   Relationships    Social connections     Talks on phone: Not on file     Gets together: Not on file     Attends Orthodoxy service: Not on file     Active member of club or organization: Not on file     Attends meetings of clubs or organizations: Not on file     Relationship status: Not on file    Intimate partner violence     Fear of current or ex partner: Not on file     Emotionally abused: Not on file     Physically abused: Not on file     Forced sexual activity: Not on file   Other Topics Concern    Not on file   Social History Narrative    Not on file       Family History   Problem Relation Age of Onset    Diabetes Mother     Heart Disease Mother        Allergies:  Navane [thiothixene]    Home Medications:  Prior to Admission medications    Medication Sig Start Date End Date Taking?  Authorizing Provider   dicyclomine (BENTYL) 10 MG capsule Take 1 capsule by mouth every 6 hours as needed (cramps) 5/24/20  Yes Leigh Escoto MD   OXcarbazepine (TRILEPTAL) 300 MG tablet Take 1 tablet by mouth 2 times daily 5/2/20  Yes Mahnaz Oneal MD   escitalopram (LEXAPRO) 5 MG tablet Take 3 tablets by mouth nightly 5/2/20  Yes Mahnaz Oneal MD   QUEtiapine (SEROQUEL) 300 MG tablet Take 1 tablet by mouth nightly 5/2/20  Yes Mahnaz Oneal MD   lurasidone (LATUDA) 60 MG TABS tablet Take 1 tablet by mouth Daily with supper 5/2/20 6/1/20  Mahnaz Oneal MD   traZODone (DESYREL) 50 MG tablet Take 1 tablet by mouth nightly as needed for Sleep 2/12/20 4/28/20  Mahnaz Oneal MD       REVIEW OF SYSTEMS    (2-9 systems for level 4, 10 or more for level 5)      Review of Systems   Constitutional: Negative for chills, fatigue and fever. HENT: Negative for trouble swallowing. Eyes: Negative for photophobia and visual disturbance. Respiratory: Negative for shortness of breath. Cardiovascular: Negative for chest pain. Gastrointestinal: Positive for abdominal pain, nausea and vomiting. Skin: Negative for color change, rash and wound. Neurological: Negative for headaches. PHYSICAL EXAM   (up to 7 for level 4, 8 or more for level 5)      INITIAL VITALS:   /83   Pulse 84   Temp 97.6 °F (36.4 °C) (Oral)   Resp 15   Ht 6' 3\" (1.905 m)   Wt 170 lb (77.1 kg)   SpO2 98%   BMI 21.25 kg/m²     Physical Exam  Constitutional:       Appearance: He is not toxic-appearing. Comments: Actively vomiting during initial examination. Unkempt appearing. HENT:      Head: Normocephalic and atraumatic. Right Ear: External ear normal.      Left Ear: External ear normal.      Nose: Nose normal.      Mouth/Throat:      Mouth: Mucous membranes are moist.   Eyes:      Extraocular Movements: Extraocular movements intact. Conjunctiva/sclera: Conjunctivae normal.      Pupils: Pupils are equal, round, and reactive to light. Neck:      Musculoskeletal: Normal range of motion and neck supple. Cardiovascular:      Rate and Rhythm: Normal rate and regular rhythm. Pulses: Normal pulses. Heart sounds: Normal heart sounds. No murmur. No friction rub. Pulmonary:      Effort: Pulmonary effort is normal. No respiratory distress. Breath sounds: Normal breath sounds. Abdominal:      General: Abdomen is flat. Bowel sounds are normal. There is no distension. Palpations: Abdomen is soft. Tenderness: There is generalized abdominal tenderness. There is no guarding or rebound. Musculoskeletal: Normal range of motion. General: No swelling or tenderness. Skin:     General: Skin is warm and dry. Neurological:      Mental Status: He is oriented to person, place, and time. Sensory: No sensory deficit. Motor: No weakness. Comments: Awake, alert, answering questions appropriately. Moving all 4 extremities. Pupils equal and reactive bilaterally. Extraocular motion intact. Sensation of face, upper, lower extremities intact.           DIFFERENTIAL  DIAGNOSIS     PLAN (LABS / IMAGING / EKG):  Orders Placed This Encounter   Procedures    XR CHEST PORTABLE    Comprehensive Metabolic Panel    CBC Auto Differential    Lipase    Troponin    Troponin    EKG 12 Lead       MEDICATIONS ORDERED:  Orders Placed This Encounter   Medications    ondansetron (ZOFRAN) injection 4 mg    0.9 % sodium chloride bolus    vitamin B-1 (THIAMINE) tablet 651 mg    folic acid (FOLVITE) tablet 1 mg    dicyclomine (BENTYL) 10 MG capsule     Sig: Take 1 capsule by mouth every 6 hours as needed (cramps)     Dispense:  20 capsule     Refill:  0       DDX: alcohol intoxication, alcohol related nausea & vomiting, acute gastritis, GERD, PUD, dehydration, electrolyte abnormality, pancreatitis, alcoholic liver disease,     DIAGNOSTIC RESULTS / EMERGENCY DEPARTMENT COURSE / MDM     LABS:  Results for orders placed or performed during the hospital encounter of 05/24/20   Comprehensive Metabolic Panel   Result Value Ref Range    Glucose 169 (H) 70 - 99 mg/dL    BUN 10 8 - 23 mg/dL    CREATININE 0.93 0.70 - 1.20 mg/dL    Bun/Cre Ratio NOT REPORTED 9 - 20    Calcium 8.7 8.6 - 10.4 mg/dL    Sodium 138 135 - 144 mmol/L    Potassium 3.7 3.7 - 5.3 mmol/L    Chloride 99 98 - 107 mmol/L    CO2 19 (L) 20 - 31 mmol/L    Anion Gap 20 (H) 9 - 17 mmol/L    Alkaline Phosphatase 93 40 - 129 U/L    ALT 16 5 - 41 U/L    AST 27 <40 U/L    Total Bilirubin 0.29 (L) 0.3 - 1.2 mg/dL    Total Protein 6.6 6.4 - 8.3 g/dL    Alb 4.1 3.5 - 5.2 g/dL    Albumin/Globulin Ratio 1.6 1.0 - 2.5    GFR Non-African American >60 >60 mL/min    GFR African American >60 >60 mL/min    GFR Comment          GFR Staging NOT REPORTED    CBC Auto

## 2020-05-26 LAB
EKG ATRIAL RATE: 85 BPM
EKG P AXIS: 67 DEGREES
EKG P-R INTERVAL: 174 MS
EKG Q-T INTERVAL: 376 MS
EKG QRS DURATION: 90 MS
EKG QTC CALCULATION (BAZETT): 447 MS
EKG R AXIS: 79 DEGREES
EKG T AXIS: 72 DEGREES
EKG VENTRICULAR RATE: 85 BPM

## 2020-05-26 PROCEDURE — 93010 ELECTROCARDIOGRAM REPORT: CPT | Performed by: INTERNAL MEDICINE

## 2020-06-05 ENCOUNTER — HOSPITAL ENCOUNTER (INPATIENT)
Age: 60
LOS: 5 days | Discharge: HOME | DRG: 750 | End: 2020-06-10
Attending: EMERGENCY MEDICINE | Admitting: PSYCHIATRY & NEUROLOGY
Payer: MEDICAID

## 2020-06-05 PROBLEM — F25.9 SCHIZOAFFECTIVE DISORDER (HCC): Status: ACTIVE | Noted: 2020-06-05

## 2020-06-05 LAB
SARS-COV-2, PCR: NORMAL
SARS-COV-2, RAPID: NOT DETECTED
SARS-COV-2: NORMAL
SOURCE: NORMAL

## 2020-06-05 PROCEDURE — 99285 EMERGENCY DEPT VISIT HI MDM: CPT

## 2020-06-05 PROCEDURE — 1240000000 HC EMOTIONAL WELLNESS R&B

## 2020-06-05 PROCEDURE — U0002 COVID-19 LAB TEST NON-CDC: HCPCS

## 2020-06-05 RX ORDER — HYDROXYZINE HYDROCHLORIDE 25 MG/1
25 TABLET, FILM COATED ORAL 3 TIMES DAILY PRN
Status: DISCONTINUED | OUTPATIENT
Start: 2020-06-05 | End: 2020-06-10 | Stop reason: HOSPADM

## 2020-06-05 RX ORDER — TRAZODONE HYDROCHLORIDE 50 MG/1
50 TABLET ORAL NIGHTLY PRN
Status: DISCONTINUED | OUTPATIENT
Start: 2020-06-05 | End: 2020-06-10 | Stop reason: HOSPADM

## 2020-06-05 RX ORDER — MAGNESIUM HYDROXIDE/ALUMINUM HYDROXICE/SIMETHICONE 120; 1200; 1200 MG/30ML; MG/30ML; MG/30ML
30 SUSPENSION ORAL 3 TIMES DAILY PRN
Status: DISCONTINUED | OUTPATIENT
Start: 2020-06-05 | End: 2020-06-10 | Stop reason: HOSPADM

## 2020-06-05 RX ORDER — BENZTROPINE MESYLATE 1 MG/ML
2 INJECTION INTRAMUSCULAR; INTRAVENOUS DAILY PRN
Status: DISCONTINUED | OUTPATIENT
Start: 2020-06-05 | End: 2020-06-10 | Stop reason: HOSPADM

## 2020-06-05 RX ORDER — ACETAMINOPHEN 325 MG/1
650 TABLET ORAL EVERY 4 HOURS PRN
Status: DISCONTINUED | OUTPATIENT
Start: 2020-06-05 | End: 2020-06-10 | Stop reason: HOSPADM

## 2020-06-05 ASSESSMENT — ENCOUNTER SYMPTOMS
COUGH: 1
EYE DISCHARGE: 0
SHORTNESS OF BREATH: 0
FACIAL SWELLING: 0
CONSTIPATION: 0
ABDOMINAL PAIN: 0
TROUBLE SWALLOWING: 0
EYE PAIN: 0
SORE THROAT: 0
WHEEZING: 0
SINUS PRESSURE: 0
BACK PAIN: 0
NAUSEA: 0
RHINORRHEA: 0
DIARRHEA: 0
VOMITING: 0
COLOR CHANGE: 0
CHEST TIGHTNESS: 0
EYE REDNESS: 0
BLOOD IN STOOL: 0

## 2020-06-05 ASSESSMENT — PAIN DESCRIPTION - DESCRIPTORS: DESCRIPTORS: ACHING

## 2020-06-05 ASSESSMENT — LIFESTYLE VARIABLES: HISTORY_ALCOHOL_USE: YES

## 2020-06-05 ASSESSMENT — SLEEP AND FATIGUE QUESTIONNAIRES
RESTFUL SLEEP: NO
DIFFICULTY STAYING ASLEEP: YES
DIFFICULTY ARISING: NO
AVERAGE NUMBER OF SLEEP HOURS: 3
SLEEP PATTERN: DIFFICULTY FALLING ASLEEP;RESTLESSNESS
DO YOU HAVE DIFFICULTY SLEEPING: YES
DO YOU USE A SLEEP AID: NO
DIFFICULTY FALLING ASLEEP: YES

## 2020-06-05 ASSESSMENT — PAIN DESCRIPTION - PAIN TYPE
TYPE: ACUTE PAIN
TYPE: ACUTE PAIN

## 2020-06-05 ASSESSMENT — PAIN SCALES - GENERAL
PAINLEVEL_OUTOF10: 7
PAINLEVEL_OUTOF10: 0

## 2020-06-05 ASSESSMENT — PATIENT HEALTH QUESTIONNAIRE - PHQ9: SUM OF ALL RESPONSES TO PHQ QUESTIONS 1-9: 18

## 2020-06-05 ASSESSMENT — PAIN DESCRIPTION - LOCATION: LOCATION: ABDOMEN

## 2020-06-05 NOTE — PROGRESS NOTES
Medication History completed:    No changes to medication list at this encounter. Medications confirmed with AVS from 5/2/20.      Thank you,  Jaye Rendon, PharmD, BCPS  [phone]

## 2020-06-05 NOTE — ED PROVIDER NOTES
[address]  EMERGENCY DEPARTMENT ENCOUNTER      Pt Name: Job Self  MRN: [id number]  [place] 1960  Date of evaluation: 6/5/20      CHIEF COMPLAINT       Chief Complaint   Patient presents with    Suicidal         HISTORY OF PRESENT ILLNESS    Job Self is a 63 y.o. male who presents complaining of Psychiatric Evaluation. Patient was brought in by police. Patient evidently called because he is hearing voices and having suicidal ideation. Patient states he has been taking his medications. Patient states it started last night. Please states that he answered the door with a knife in his hand and approached them but not in a threatening way. Patient dropped the knife immediately and has been cooperative. Patient states that he has had a cough and vomiting but he answers yes to most of my questions so unclear if he is actually been sick. REVIEW OF SYSTEMS       Review of Systems   Constitutional: Negative for activity change, appetite change, chills, diaphoresis and fever. HENT: Negative for congestion, ear pain, facial swelling, nosebleeds, rhinorrhea, sinus pressure, sore throat and trouble swallowing. Eyes: Negative for pain, discharge and redness. Respiratory: Positive for cough. Negative for chest tightness, shortness of breath and wheezing. Cardiovascular: Negative for chest pain, palpitations and leg swelling. Gastrointestinal: Negative for abdominal pain, blood in stool, constipation, diarrhea, nausea and vomiting. Genitourinary: Negative for difficulty urinating, dysuria, flank pain, frequency, genital sores and hematuria. Musculoskeletal: Negative for arthralgias, back pain, gait problem, joint swelling, myalgias and neck pain. Skin: Negative for color change, pallor, rash and wound. Neurological: Negative for dizziness, tremors, seizures, syncope, speech difficulty, weakness, numbness and headaches.    Psychiatric/Behavioral: Positive for dysphoric mood, hallucinations and suicidal ideas. Negative for confusion, decreased concentration, self-injury and sleep disturbance. PAST MEDICAL HISTORY     Past Medical History:   Diagnosis Date    Bipolar disorder ([facility]     Depression     GERD (gastroesophageal reflux disease)     Hallucinations     Headache(784.0)     Hepatitis     Schizophrenia, schizo-affective (HCC)     Substance abuse ([facility]     Tobacco abuse     Type II or unspecified type diabetes mellitus without mention of complication, not stated as uncontrolled     Urinary incontinence        SURGICAL HISTORY       Past Surgical History:   Procedure Laterality Date    ABSCESS DRAINAGE N/A 02/11/2018    Carla anal abcess    DENTAL SURGERY      all teeth pulled       CURRENT MEDICATIONS       Previous Medications    DICYCLOMINE (BENTYL) 10 MG CAPSULE    Take 1 capsule by mouth every 6 hours as needed (cramps)    ESCITALOPRAM (LEXAPRO) 5 MG TABLET    Take 3 tablets by mouth nightly    LURASIDONE (LATUDA) 60 MG TABS TABLET    Take 1 tablet by mouth Daily with supper    OXCARBAZEPINE (TRILEPTAL) 300 MG TABLET    Take 1 tablet by mouth 2 times daily    QUETIAPINE (SEROQUEL) 300 MG TABLET    Take 1 tablet by mouth nightly    TRAZODONE (DESYREL) 50 MG TABLET    Take 1 tablet by mouth nightly as needed for Sleep       ALLERGIES     is allergic to navane [thiothixene]. SOCIAL HISTORY      reports that he has been smoking cigarettes. He has been smoking about 0.50 packs per day. He has never used smokeless tobacco. He reports current alcohol use. He reports current drug use. Drug: Cocaine. PHYSICAL EXAM     INITIAL VITALS: BP 94/67   Pulse 88   Temp 98.8 °F (37.1 °C) (Oral)   Resp 16   Ht 6' 3\" (1.905 m)   Wt 170 lb (77.1 kg)   SpO2 98%   BMI 21.25 kg/m²      Physical Exam  Vitals signs and nursing note reviewed. Constitutional:       General: He is not in acute distress. Appearance: He is well-developed. He is not diaphoretic.    HENT: Head: Normocephalic and atraumatic. Eyes:      General: No scleral icterus. Right eye: No discharge. Left eye: No discharge. Conjunctiva/sclera: Conjunctivae normal.      Pupils: Pupils are equal, round, and reactive to light. Cardiovascular:      Rate and Rhythm: Normal rate and regular rhythm. Heart sounds: Normal heart sounds. No murmur. No friction rub. No gallop. Pulmonary:      Effort: Pulmonary effort is normal. No respiratory distress. Breath sounds: Normal breath sounds. No wheezing or rales. Chest:      Chest wall: No tenderness. Abdominal:      General: Bowel sounds are normal. There is no distension. Palpations: Abdomen is soft. There is no mass. Tenderness: There is no abdominal tenderness. There is no guarding or rebound. Musculoskeletal: Normal range of motion. General: No tenderness. Skin:     General: Skin is warm and dry. Coloration: Skin is not pale. Findings: No erythema or rash. Neurological:      Mental Status: He is alert and oriented to person, place, and time. Cranial Nerves: No cranial nerve deficit. Sensory: No sensory deficit. Motor: No abnormal muscle tone. Coordination: Coordination normal.      Deep Tendon Reflexes: Reflexes normal.   Psychiatric:         Mood and Affect: Mood is depressed. Speech: Speech is delayed. Behavior: Behavior is slowed and withdrawn. Thought Content: Thought content includes suicidal ideation. Thought content includes suicidal plan. DIAGNOSTIC RESULTS     LABS: All lab results were reviewed by myself, and all abnormals are listed below. Labs Reviewed   COVID-19         MEDICAL DECISION MAKING:     Patient is medically cleared for admission.       EMERGENCY DEPARTMENT COURSE:   Vitals:    Vitals:    06/05/20 1551   BP: 94/67   Pulse: 88   Resp: 16   Temp: 98.8 °F (37.1 °C)   TempSrc: Oral   SpO2: 98%   Weight: 170 lb (77.1 kg)

## 2020-06-06 PROCEDURE — 6370000000 HC RX 637 (ALT 250 FOR IP): Performed by: PSYCHIATRY & NEUROLOGY

## 2020-06-06 PROCEDURE — 1240000000 HC EMOTIONAL WELLNESS R&B

## 2020-06-06 RX ORDER — ESCITALOPRAM OXALATE 10 MG/1
15 TABLET ORAL NIGHTLY
Status: DISCONTINUED | OUTPATIENT
Start: 2020-06-06 | End: 2020-06-08

## 2020-06-06 RX ORDER — QUETIAPINE FUMARATE 300 MG/1
300 TABLET, FILM COATED ORAL NIGHTLY
Status: DISCONTINUED | OUTPATIENT
Start: 2020-06-06 | End: 2020-06-08

## 2020-06-06 RX ORDER — DICYCLOMINE HYDROCHLORIDE 10 MG/1
10 CAPSULE ORAL EVERY 6 HOURS PRN
Status: DISCONTINUED | OUTPATIENT
Start: 2020-06-06 | End: 2020-06-10 | Stop reason: HOSPADM

## 2020-06-06 RX ORDER — OXCARBAZEPINE 300 MG/1
300 TABLET, FILM COATED ORAL 2 TIMES DAILY
Status: DISCONTINUED | OUTPATIENT
Start: 2020-06-06 | End: 2020-06-10 | Stop reason: HOSPADM

## 2020-06-06 RX ADMIN — HYDROXYZINE HYDROCHLORIDE 25 MG: 25 TABLET, FILM COATED ORAL at 19:32

## 2020-06-06 RX ADMIN — QUETIAPINE FUMARATE 300 MG: 300 TABLET ORAL at 21:47

## 2020-06-06 RX ADMIN — ESCITALOPRAM OXALATE 15 MG: 10 TABLET ORAL at 21:46

## 2020-06-06 RX ADMIN — OXCARBAZEPINE 300 MG: 300 TABLET, FILM COATED ORAL at 21:47

## 2020-06-06 RX ADMIN — TRAZODONE HYDROCHLORIDE 50 MG: 50 TABLET ORAL at 21:47

## 2020-06-06 ASSESSMENT — SLEEP AND FATIGUE QUESTIONNAIRES
DIFFICULTY STAYING ASLEEP: YES
DO YOU HAVE DIFFICULTY SLEEPING: YES
RESTFUL SLEEP: NO
AVERAGE NUMBER OF SLEEP HOURS: 3
DIFFICULTY FALLING ASLEEP: YES
DIFFICULTY ARISING: NO
DO YOU USE A SLEEP AID: NO
SLEEP PATTERN: INSOMNIA

## 2020-06-06 ASSESSMENT — PATIENT HEALTH QUESTIONNAIRE - PHQ9: SUM OF ALL RESPONSES TO PHQ QUESTIONS 1-9: 18

## 2020-06-06 ASSESSMENT — LIFESTYLE VARIABLES: HISTORY_ALCOHOL_USE: NO

## 2020-06-06 NOTE — GROUP NOTE
Group Therapy Note    Date: 6/6/2020    Group Start Time: 1000  Group End Time: 3872  Group Topic: Psychoeducation    [address], LSW    patient refused to attend psychoeducation group at 10a after encouragement from staff.   1:1 talk time provided as alternative to group session

## 2020-06-06 NOTE — H&P
HISTORY and [id number]       NAME:  Felisha Dawson  MRN: [id number]   YOB: 1960   Date: 6/6/2020   Age: 63 y.o. Gender: male     COMPLAINT AND PRESENT HISTORY:      Felisha Dawson is 63 y.o.,  male, admitted because of Schizoaffective Disorder. Patient has auditory hallucinations, patient hears command voices to kill self, but not others. According to ED notes, patient was brought in by the police patient had been having auditory hallucinations with suicidal ideations. Reports that patient answer his door with a knife in his hand. Reports that patient has lost 3 brothers over 3 months time. Upon speaking with patient he states that he has been depressed over the last 2 months. Patient states he has been compliant with his meds on the most part for the last 2 days he has not taken any medication. Patient states that he does have sleep disturbances in his appetite seems to be where he cannot get enough food. \"The voices are talking more loudly\". Patient states that he continues to drink beer occasionally but continually uses cocaine. Patient endorses poor concentration with racy thoughts. Patient states that living arrangements after discharge will be returned to his own place. Patient denies any somatic complaints. Patient is denying all of his medical history. No significant lab values or procedures. No  chest pain or  shortness of breath. No fever/chills. Please see patient's psychiatric hx for more information.     DIAGNOSTIC RESULTS       PAST MEDICAL HISTORY     Past Medical History:   Diagnosis Date    Bipolar disorder ([address]     Depression     GERD (gastroesophageal reflux disease)     Hallucinations     Headache(784.0)     Hepatitis     Schizophrenia, schizo-affective ([address]     Substance abuse [facility]     Tobacco abuse     Type II or unspecified type diabetes mellitus without mention of complication, not stated as uncontrolled     Urinary incontinence      Pt denies any history of hypertension, stroke, heart disease, COPD, Asthma,  HLD, Cancer, Seizures,Thyroid disease, Kidney Disease,  TB.    SURGICAL HISTORY       Past Surgical History:   Procedure Laterality Date    ABSCESS DRAINAGE N/A 02/11/2018    Carla anal abcess    DENTAL SURGERY      all teeth pulled       FAMILY HISTORY       Family History   Problem Relation Age of Onset    Diabetes Mother     Heart Disease Mother        SOCIAL HISTORY       Social History     Socioeconomic History    Marital status: Single     Spouse name: None    Number of children: 0    Years of education: 8    Highest education level: None   Occupational History     Employer: N/A   Social Needs    Financial resource strain: None    Food insecurity     Worry: None     Inability: None    Transportation needs     Medical: None     Non-medical: None   Tobacco Use    Smoking status: Current Every Day Smoker     Packs/day: 0.50     Types: Cigarettes    Smokeless tobacco: Never Used   Substance and Sexual Activity    Alcohol use: Yes     Comment: reports drinking occasionally    Drug use: Yes     Types: Cocaine     Comment: last used 6/2/2020    Sexual activity: None   Lifestyle    Physical activity     Days per week: None     Minutes per session: None    Stress: None   Relationships    Social connections     Talks on phone: None     Gets together: None     Attends Anabaptist service: None     Active member of club or organization: None     Attends meetings of clubs or organizations: None     Relationship status: None    Intimate partner violence     Fear of current or ex partner: None     Emotionally abused: None     Physically abused: None     Forced sexual activity: None   Other Topics Concern    None   Social History Narrative    None           REVIEW OF SYSTEMS      Allergies   Allergen Reactions    Navane [Thiothixene]        No current facility-administered medications or tenderness. EYES:  Pupils equal, reactive to light, Conjunctiva is clear, EOMs intact tyra. eyelids WNL. EARS:  No discharge, no marked hearing loss. NOSE:  No rhinorrhea, epistaxis or septal deformity. THROAT:  Not congested. No ulceration bleeding or discharge. NECK:  No stiffness, trachea central.  No palpable masses or L.N.      CHEST:  Symmetrical and equal on expansion. HEART:  Regular rate and rhythm. S1 > S2, No audible murmurs or gallops. LUNGS:  Equal on expansion, normal breath sounds. No adventitious sounds. ABDOMEN:  Soft on palpation. No localized tenderness. No guarding or rigidity. No palpable organomegaly. LYMPHATICS:  No palpable cervical Lymphadenopathy. LOCOMOTOR, BACK AND SPINE:  No tenderness or deformities. EXTREMITIES:  Symmetrical, no pretibial edema. Mahoganys sign negative. No discoloration or ulcerations. NEUROLOGIC:  The patient is conscious, alert, oriented,Cranial nerve II-XII intact, taste and smell were not examined. No apparent focal sensory or motor deficits. Muscle strength equal Tyra. No facial droop, tongue protrudes centrally, no slurring of the speech. PROVISIONAL DIAGNOSES:      Active Problems:    Schizoaffective disorder ([facility] [address]  Resolved Problems:    * No resolved hospital problems.  *      ALISSA GRECO, MARY - CNP on 6/6/2020 at 1:05 PM

## 2020-06-07 PROCEDURE — 1240000000 HC EMOTIONAL WELLNESS R&B

## 2020-06-07 PROCEDURE — 6370000000 HC RX 637 (ALT 250 FOR IP): Performed by: PSYCHIATRY & NEUROLOGY

## 2020-06-07 RX ADMIN — TRAZODONE HYDROCHLORIDE 50 MG: 50 TABLET ORAL at 22:12

## 2020-06-07 RX ADMIN — OXCARBAZEPINE 300 MG: 300 TABLET, FILM COATED ORAL at 08:26

## 2020-06-07 RX ADMIN — HYDROXYZINE HYDROCHLORIDE 25 MG: 25 TABLET, FILM COATED ORAL at 22:12

## 2020-06-07 RX ADMIN — QUETIAPINE FUMARATE 300 MG: 300 TABLET ORAL at 22:12

## 2020-06-07 RX ADMIN — OXCARBAZEPINE 300 MG: 300 TABLET, FILM COATED ORAL at 22:12

## 2020-06-07 RX ADMIN — ESCITALOPRAM OXALATE 15 MG: 10 TABLET ORAL at 22:12

## 2020-06-08 PROBLEM — R10.30 LOWER ABDOMINAL PAIN: Status: ACTIVE | Noted: 2020-06-08

## 2020-06-08 PROCEDURE — 1240000000 HC EMOTIONAL WELLNESS R&B

## 2020-06-08 PROCEDURE — 99221 1ST HOSP IP/OBS SF/LOW 40: CPT | Performed by: INTERNAL MEDICINE

## 2020-06-08 PROCEDURE — 6370000000 HC RX 637 (ALT 250 FOR IP): Performed by: PSYCHIATRY & NEUROLOGY

## 2020-06-08 RX ORDER — QUETIAPINE FUMARATE 200 MG/1
400 TABLET, FILM COATED ORAL NIGHTLY
Status: DISCONTINUED | OUTPATIENT
Start: 2020-06-08 | End: 2020-06-10 | Stop reason: HOSPADM

## 2020-06-08 RX ORDER — ESCITALOPRAM OXALATE 20 MG/1
20 TABLET ORAL NIGHTLY
Status: DISCONTINUED | OUTPATIENT
Start: 2020-06-08 | End: 2020-06-10 | Stop reason: HOSPADM

## 2020-06-08 RX ADMIN — TRAZODONE HYDROCHLORIDE 50 MG: 50 TABLET ORAL at 21:30

## 2020-06-08 RX ADMIN — ESCITALOPRAM OXALATE 20 MG: 20 TABLET ORAL at 21:30

## 2020-06-08 RX ADMIN — OXCARBAZEPINE 300 MG: 300 TABLET, FILM COATED ORAL at 08:44

## 2020-06-08 RX ADMIN — QUETIAPINE FUMARATE 400 MG: 200 TABLET ORAL at 21:30

## 2020-06-08 RX ADMIN — OXCARBAZEPINE 300 MG: 300 TABLET, FILM COATED ORAL at 21:30

## 2020-06-08 RX ADMIN — HYDROXYZINE HYDROCHLORIDE 25 MG: 25 TABLET, FILM COATED ORAL at 21:30

## 2020-06-08 ASSESSMENT — PAIN SCALES - GENERAL: PAINLEVEL_OUTOF10: 0

## 2020-06-08 NOTE — GROUP NOTE
Group Therapy Note    Date: 6/8/2020    Group Start Time: 1000  Group End Time: 1100  Group Topic: Psychoeducation    [facility] FRANKLIN Flores, CHANEL    Pt did not attend psychoeducational group at 10am, despite encouragement.      Signature:  FRANKLIN Nickerson LSW

## 2020-06-08 NOTE — GROUP NOTE
Group Therapy Note    Date: 6/8/2020    Group Start Time: 0900  Group End Time: 0915  Group Topic: Community Meeting    [facility]    Patient refused to attend community meeting and goal setting group at 0900 after encouragement from staff. 1:1 talk time offered by staff as alternative to group session.

## 2020-06-08 NOTE — BH NOTE
LPN charting reviewed.
LPN documentation reviewed.
Nurse reviewed all charting done by Brisa Ureña.
Patient didn't participate in the 2:30 pm open REC despite staff invite to attend.
patient has been interviewed  Continue the same medications as prescribed above  Patient was discussed with the  and the treatment team.   Provided Supportive and insight-oriented psychotherapy  Provided empathic listening, validation and support  Provided support & education of purpose, risks vs. benefits and side effects of treatment with psychotropics  Provided support and education about risk of not receiving treatment  Patient acknowledged and mutually decided to continue with current treatment plan  Provided education about stress management, exercise and healthy diet  Provided support and encouragement to contact office sooner if needed - verbally acknowledged  Provided support and encouragement to continue working with other practitioners  Provided support and encouragement to consider (continue) working with counselor,   Recommended follow up with [facility] or private practice psychiatry upon discharge    Daryl Torres MD  Board Certified Psychiatrist  6/7/2020 11:07 PM

## 2020-06-09 ENCOUNTER — APPOINTMENT (OUTPATIENT)
Dept: CT IMAGING | Age: 60
DRG: 750 | End: 2020-06-09
Payer: MEDICAID

## 2020-06-09 LAB
ALBUMIN SERPL-MCNC: 3.5 G/DL (ref 3.5–5.2)
ALBUMIN/GLOBULIN RATIO: ABNORMAL (ref 1–2.5)
ALP BLD-CCNC: 98 U/L (ref 40–129)
ALT SERPL-CCNC: 8 U/L (ref 5–41)
ANION GAP SERPL CALCULATED.3IONS-SCNC: 9 MMOL/L (ref 9–17)
AST SERPL-CCNC: 11 U/L
BILIRUB SERPL-MCNC: 0.25 MG/DL (ref 0.3–1.2)
BUN BLDV-MCNC: 14 MG/DL (ref 8–23)
BUN/CREAT BLD: ABNORMAL (ref 9–20)
CALCIUM SERPL-MCNC: 8.8 MG/DL (ref 8.6–10.4)
CHLORIDE BLD-SCNC: 107 MMOL/L (ref 98–107)
CO2: 28 MMOL/L (ref 20–31)
CREAT SERPL-MCNC: 1.06 MG/DL (ref 0.7–1.2)
GFR AFRICAN AMERICAN: >60 ML/MIN
GFR NON-AFRICAN AMERICAN: >60 ML/MIN
GFR SERPL CREATININE-BSD FRML MDRD: ABNORMAL ML/MIN/{1.73_M2}
GFR SERPL CREATININE-BSD FRML MDRD: ABNORMAL ML/MIN/{1.73_M2}
GLUCOSE BLD-MCNC: 137 MG/DL (ref 70–99)
POTASSIUM SERPL-SCNC: 3.9 MMOL/L (ref 3.7–5.3)
SODIUM BLD-SCNC: 144 MMOL/L (ref 135–144)
TOTAL PROTEIN: 6 G/DL (ref 6.4–8.3)

## 2020-06-09 PROCEDURE — 6370000000 HC RX 637 (ALT 250 FOR IP): Performed by: PSYCHIATRY & NEUROLOGY

## 2020-06-09 PROCEDURE — 74177 CT ABD & PELVIS W/CONTRAST: CPT

## 2020-06-09 PROCEDURE — 36415 COLL VENOUS BLD VENIPUNCTURE: CPT

## 2020-06-09 PROCEDURE — 6360000004 HC RX CONTRAST MEDICATION: Performed by: INTERNAL MEDICINE

## 2020-06-09 PROCEDURE — 80053 COMPREHEN METABOLIC PANEL: CPT

## 2020-06-09 PROCEDURE — 2580000003 HC RX 258: Performed by: INTERNAL MEDICINE

## 2020-06-09 PROCEDURE — 1240000000 HC EMOTIONAL WELLNESS R&B

## 2020-06-09 PROCEDURE — 99231 SBSQ HOSP IP/OBS SF/LOW 25: CPT | Performed by: INTERNAL MEDICINE

## 2020-06-09 RX ORDER — SODIUM CHLORIDE 0.9 % (FLUSH) 0.9 %
10 SYRINGE (ML) INJECTION ONCE
Status: COMPLETED | OUTPATIENT
Start: 2020-06-09 | End: 2020-06-09

## 2020-06-09 RX ORDER — 0.9 % SODIUM CHLORIDE 0.9 %
80 INTRAVENOUS SOLUTION INTRAVENOUS ONCE
Status: COMPLETED | OUTPATIENT
Start: 2020-06-09 | End: 2020-06-09

## 2020-06-09 RX ADMIN — SODIUM CHLORIDE 80 ML: 9 INJECTION, SOLUTION INTRAVENOUS at 16:16

## 2020-06-09 RX ADMIN — ESCITALOPRAM OXALATE 20 MG: 20 TABLET ORAL at 21:04

## 2020-06-09 RX ADMIN — QUETIAPINE FUMARATE 400 MG: 200 TABLET ORAL at 21:04

## 2020-06-09 RX ADMIN — IOHEXOL 50 ML: 240 INJECTION, SOLUTION INTRATHECAL; INTRAVASCULAR; INTRAVENOUS; ORAL at 15:15

## 2020-06-09 RX ADMIN — OXCARBAZEPINE 300 MG: 300 TABLET, FILM COATED ORAL at 21:04

## 2020-06-09 RX ADMIN — TRAZODONE HYDROCHLORIDE 50 MG: 50 TABLET ORAL at 21:04

## 2020-06-09 RX ADMIN — OXCARBAZEPINE 300 MG: 300 TABLET, FILM COATED ORAL at 08:58

## 2020-06-09 RX ADMIN — IOVERSOL 75 ML: 741 INJECTION INTRA-ARTERIAL; INTRAVENOUS at 16:17

## 2020-06-09 RX ADMIN — Medication 10 ML: at 16:17

## 2020-06-09 NOTE — CONSULTS
(TRILEPTAL) 300 MG tablet Take 1 tablet by mouth 2 times daily 5/2/20  Yes Cecelia Muller MD   escitalopram (LEXAPRO) 5 MG tablet Take 3 tablets by mouth nightly 5/2/20  Yes Cecelia Muller MD   lurasidone (LATUDA) 60 MG TABS tablet Take 1 tablet by mouth Daily with supper 5/2/20 6/5/20 Yes Cecelia Muller MD   QUEtiapine (SEROQUEL) 300 MG tablet Take 1 tablet by mouth nightly 5/2/20  Yes Cecelia Muller MD   traZODone (DESYREL) 50 MG tablet Take 1 tablet by mouth nightly as needed for Sleep 2/12/20 6/5/20 Yes Cecelia Muller MD        Allergies:     Navane [thiothixene]    Social History:     Tobacco:    reports that he has been smoking cigarettes. He has been smoking about 0.50 packs per day. He has never used smokeless tobacco.  Alcohol:      reports current alcohol use. Drug Use:  reports current drug use. Drug: Cocaine. Family History:     Family History   Problem Relation Age of Onset    Diabetes Mother     Heart Disease Mother        Review of Systems:     Positive and Negative as described in HPI. CONSTITUTIONAL:  negative for fevers, chills, sweats, fatigue, weight loss  HEENT:  negative for vision, hearing changes, runny nose, throat pain  RESPIRATORY:  negative for shortness of breath, cough, congestion, wheezing. CARDIOVASCULAR:  negative for chest pain, palpitations. GASTROINTESTINAL: Pain in lower abdomen.   GENITOURINARY:  negative for difficulty of urination, burning with urination, frequency   INTEGUMENT:  negative for rash, skin lesions, easy bruising   HEMATOLOGIC/LYMPHATIC:  negative for swelling/edema   ALLERGIC/IMMUNOLOGIC:  negative for urticaria , itching  ENDOCRINE:  negative increase in drinking, increase in urination, hot or cold intolerance  MUSCULOSKELETAL:  negative joint pains, muscle aches, swelling of joints  NEUROLOGICAL:  negative for headaches, dizziness, lightheadedness, numbness, pain, tingling extremities  BEHAVIOR/PSYCH:  Depressed   Physical Exam:     /64   Pulse 64 Temp 98.1 °F (36.7 °C) (Oral)   Resp 14   Ht 6' 3\" (1.905 m)   Wt 170 lb (77.1 kg)   SpO2 98%   BMI 21.25 kg/m²   Temp (24hrs), Av.1 °F (36.7 °C), Min:98.1 °F (36.7 °C), Max:98.1 °F (36.7 °C)    No results for input(s): POCGLU in the last 72 hours. No intake or output data in the 24 hours ending 20    General Appearance:  alert, well appearing, and in no acute distress  Mental status: oriented to person, place, and time with normal affect  Head:  normocephalic, atraumatic. Eye: no icterus, redness, pupils equal and reactive, extraocular eye movements intact, conjunctiva clear  Ear: normal external ear, no discharge, hearing intact  Nose:  no drainage noted  Mouth: mucous membranes moist  Neck: supple, no carotid bruits, thyroid not palpable  Lungs: Bilateral equal air entry, clear to ausculation, no wheezing, rales or rhonchi, normal effort  Cardiovascular: normal rate, regular rhythm, no murmur, gallop, rub. Abdomen: Tenderness lower abdomen, no guarding, rigidity  Neurologic: There are no new focal motor or sensory deficits, normal muscle tone and bulk, no abnormal sensation, normal speech, cranial nerves II through XII grossly intact  Skin: No gross lesions, rashes, bruising or bleeding on exposed skin area  Extremities:  peripheral pulses palpable, no pedal edema or calf pain with palpation  Psych: normal affect    Investigations:      Laboratory Testing:  No results found for this or any previous visit (from the past 24 hour(s)). Imaging/Diagonstics:    Assessment :      Primary Problem  Schizoaffective disorder [facility])    Active Hospital Problems    Diagnosis Date Noted    Cocaine abuse ([facility] [address] [F14.10] 2014     Priority: Medium    Lower abdominal pain [R10.30] 2020    Schizoaffective disorder ([facility] [address] [F25.9] 2020       Plan:     1. Abdominal pain, lower abdomen, ordering CT abdomen, pelvis with oral and IV contrast  2.  Patient had CT abdomen in , concerning for

## 2020-06-09 NOTE — PROGRESS NOTES
[facility] Internal Medicine    CONSULTATION / HISTORY AND PHYSICAL EXAMINATION            Date:   6/9/2020  Patient name:  Antionette Davison  Date of admission:  6/5/2020  3:32 PM  MRN:   [id number]  Account:  [de-identified]  YOB: 1960  PCP:    Nate Ely MD  Room:   [facility]0126-01  Code Status:    Full Code    Physician Requesting Consult: Marga Arellano MD    Reason for Consult:  Abdominal Pain     Chief Complaint:     Chief Complaint   Patient presents with    Suicidal       History Obtained From:     patient, electronic medical record    History of Present Illness:   Patient admitted with schizoaffective disorder, internal medicine consulted for abdominal pain  Patient is a very poor historian, he mentioned that he noticed pain in lower abdomen, pain started almost 1 days back, pain is dull aching in nature, he denied burning since while passing urine, no complaint of blood in the stools, no weight loss. Pain is constant in nature, no aggravating or relieving factors. Past Medical History:     Past Medical History:   Diagnosis Date    B[facility] disorder ([facility] [address]     Depression     GERD (gastroesophageal reflux disease)     Hallucinations     Headache(784.0)     Hepatitis     Schizophrenia, schizo-affective (HCC)     Substance abuse ([facility] [address]     Tobacco abuse     Type II or unspecified type diabetes mellitus without mention of complication, not stated as uncontrolled     Urinary incontinence         Past Surgical History:     Past Surgical History:   Procedure Laterality Date    ABSCESS DRAINAGE N/A 02/11/2018    Carla anal abcess    DENTAL SURGERY      all teeth pulled        Medications Prior to Admission:     Prior to Admission medications    Medication Sig Start Date End Date Taking?  Authorizing Provider   dicyclomine (BENTYL) 10 MG capsule Take 1 capsule by mouth every 6 hours as needed (cramps) 5/24/20  Yes Jeanna Conway MD   OXcarbazepine (TRILEPTAL) 300 MG tablet Take 1 tablet by mouth 2 times daily 5/2/20  Yes Mercedes Corbett MD   escitalopram (LEXAPRO) 5 MG tablet Take 3 tablets by mouth nightly 5/2/20  Yes Mercedes Corbett MD   lurasidone (LATUDA) 60 MG TABS tablet Take 1 tablet by mouth Daily with supper 5/2/20 6/5/20 Yes Mercedes Corbett MD   QUEtiapine (SEROQUEL) 300 MG tablet Take 1 tablet by mouth nightly 5/2/20  Yes Mercedes Corbett MD   traZODone (DESYREL) 50 MG tablet Take 1 tablet by mouth nightly as needed for Sleep 2/12/20 6/5/20 Yes Mercedes Corbett MD        Allergies:     Navane [thiothixene]    Social History:     Tobacco:    reports that he has been smoking cigarettes. He has been smoking about 0.50 packs per day. He has never used smokeless tobacco.  Alcohol:      reports current alcohol use. Drug Use:  reports current drug use. Drug: Cocaine. Family History:     Family History   Problem Relation Age of Onset    Diabetes Mother     Heart Disease Mother        Review of Systems:     Positive and Negative as described in HPI. CONSTITUTIONAL:  negative for fevers, chills, sweats, fatigue, weight loss  HEENT:  negative for vision, hearing changes, runny nose, throat pain  RESPIRATORY:  negative for shortness of breath, cough, congestion, wheezing. CARDIOVASCULAR:  negative for chest pain, palpitations. GASTROINTESTINAL: Pain in lower abdomen.   GENITOURINARY:  negative for difficulty of urination, burning with urination, frequency   INTEGUMENT:  negative for rash, skin lesions, easy bruising   HEMATOLOGIC/LYMPHATIC:  negative for swelling/edema   ALLERGIC/IMMUNOLOGIC:  negative for urticaria , itching  ENDOCRINE:  negative increase in drinking, increase in urination, hot or cold intolerance  MUSCULOSKELETAL:  negative joint pains, muscle aches, swelling of joints  NEUROLOGICAL:  negative for headaches, dizziness, lightheadedness, numbness, pain, tingling extremities  BEHAVIOR/PSYCH:  Depressed   Physical Exam:     BP (!) 109/57   Pulse 57 Bilirubin 0.25 (L) 0.3 - 1.2 mg/dL    Total Protein 6.0 (L) 6.4 - 8.3 g/dL    Alb 3.5 3.5 - 5.2 g/dL    Albumin/Globulin Ratio NOT REPORTED 1.0 - 2.5    GFR Non-African American >60 >60 mL/min    GFR African American >60 >60 mL/min    GFR Comment          GFR Staging NOT REPORTED        Imaging/Diagonstics:    Assessment :      Primary Problem  Schizoaffective disorder [facility])    Active Hospital Problems    Diagnosis Date Noted    Cocaine abuse ([facility] [address] [F14.10] 05/03/2014     Priority: Medium    Lower abdominal pain [R10.30] 06/08/2020    Schizoaffective disorder ([facility] [address] [F25.9] 06/05/2020       Plan:     1. Abdominal pain, lower abdomen, ordering CT abdomen, pelvis with oral and IV contrast  2. 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 Tisha Davidson MD    Please note that this chart was generated using voice recognition Dragon dictation software. Although every effort was made to ensure the accuracy of this automated transcription, some errors in transcription may have occurred.

## 2020-06-09 NOTE — GROUP NOTE
Group Therapy Note    Date: 6/9/2020    Group Start Time: 1330  Group End Time: 4230  Group Topic: Psychoeducation    JESUS Prasad, CTRS    Patient refused to attend creative expression and motivation skills group at 1330 after encouragement from staff. 1:1 talk time offered by staff as alternative to group session.

## 2020-06-10 VITALS
SYSTOLIC BLOOD PRESSURE: 108 MMHG | HEART RATE: 70 BPM | BODY MASS INDEX: 21.14 KG/M2 | DIASTOLIC BLOOD PRESSURE: 65 MMHG | WEIGHT: 170 LBS | OXYGEN SATURATION: 98 % | TEMPERATURE: 97.9 F | HEIGHT: 75 IN | RESPIRATION RATE: 14 BRPM

## 2020-06-10 PROBLEM — R10.30 LOWER ABDOMINAL PAIN: Status: RESOLVED | Noted: 2020-06-08 | Resolved: 2020-06-10

## 2020-06-10 RX ORDER — QUETIAPINE FUMARATE 400 MG/1
400 TABLET, FILM COATED ORAL NIGHTLY
Qty: 60 TABLET | Refills: 3 | Status: ON HOLD | OUTPATIENT
Start: 2020-06-10 | End: 2020-07-12 | Stop reason: HOSPADM

## 2020-06-10 RX ORDER — ESCITALOPRAM OXALATE 20 MG/1
20 TABLET ORAL NIGHTLY
Qty: 30 TABLET | Refills: 3 | Status: ON HOLD | OUTPATIENT
Start: 2020-06-10 | End: 2020-08-14 | Stop reason: SDUPTHER

## 2020-06-10 NOTE — PROGRESS NOTES
Pharmacy Med Education Group Note    Date: 6/10/20 Start Time: 0999  End Time: 0600    Number Participants in Group:  9    Goal:  Patient will demonstrate an understanding of the medications intended purpose and possible adverse effects  Topic: [place] for Pharmacy Med Ed Group    Discipline Responsible:     OT  AT  [facility].  RT     X Other       Participation Level:     None  Minimal      X Active Listener    X Interactive    Monopolizing         Participation Quality:    X Appropriate  Inappropriate     X       Attentive        Intrusive          Sharing        Resistant          Supportive        Lethargic       Affective:     X Congruent  Incongruent  Blunted  Flat    Constricted  Anxious  Elated  Angry    Labile  Depressed  Other         Cognitive:    X Alert  Oriented PPTP     Concentration   X G  F  P   Attention Span   X G  F  P   Short-Term Memory   X G  F  P   Long-Term Memory  G  F  P   ProblemSolving/  Decision Making  G  F  P   Ability to Process  Information   X G  F  P      Contributing Factors             Delusional             Hallucinating             Flight of Ideas             Other:       Modes of Intervention:    X Education   X Support  Exploration    Clarifying  Problem Solving  Confrontation    Socialization  Limit Setting  Reality Testing    Activity  Movement  Media    Other:            Response to Learning:    X Able to verbalize current knowledge/experience    Able to verbalize/acknowledge new learning    Able to retain information    Capable of insight    Able to change behavior    Progressing to goal    Other:        Comments:     Carolyn Mata,PharmD,  6/10/2020, 5:17 PM

## 2020-06-10 NOTE — PLAN OF CARE
[id number] [facility] Intern [place]  Day 3 Interdisciplinary Treatment Plan NOTE    Review Date & Time: 6/7/2020   1352    Patient was not in treatment team    Admission Type:   Admission Type: Voluntary    Reason for admission:  Reason for Admission: patient reports he had thoughts to kill self with a plan to be killed by police. reported he was trying to hurt himself with knife when police arrived and walked out to police with knife,  reports he never wanted to hurt poilice. reported he \"just feels depressed\". reports using cocaine, last use 3 days ago. patient reports he has lost three brothers over the past three years. Estimated Length of Stay Update:  5-7 days   Estimated Discharge Date Update: to be determined by physician     PATIENT STRENGTHS:  Patient Strengths Strengths: Connection to output provider, No significant Physical Illness, Positive Support, Medication Compliance  Patient Strengths and Limitations:Limitations: Tendency to isolate self  Addictive Behavior:Addictive Behavior  In the past 3 months, have you felt or has someone told you that you have a problem with:  : None  Do you have a history of Chemical Use?: No  Do you have a history of Alcohol Use?: No  Do you have a history of Street Drug Abuse?: Yes  Histroy of Prescripton Drug Abuse?: No  Medical Problems:  Past Medical History:   Diagnosis Date    Bipolar disorder ([facility] [address]     Depression     GERD (gastroesophageal reflux disease)     Hallucinations     Headache(784.0)     Hepatitis     Schizophrenia, schizo-affective (HCC)     Substance abuse ([facility] [address]     Tobacco abuse     Type II or unspecified type diabetes mellitus without mention of complication, not stated as uncontrolled     Urinary incontinence        Risk:  Fall RiskTotal: 73  Dusty Scale Dusty Scale Score: 22  BVC Total: 0  Change in scores0.  Changes to plan of Care 0    Status EXAM:   Status and Exam  Normal: No  Facial Expression: Avoids Gaze, Flat  Affect: Appropriate  Level of
Patient still verbalizes depressive symptoms. Patient remains free from self harm.
Patient was able to verbalize a decrease in depressive symptoms. Patient remains free from self harm.
Problem: Altered Mood, Depressive Behavior:  Goal: Able to verbalize and/or display a decrease in depressive symptoms  Description: Able to verbalize and/or display a decrease in depressive symptoms  6/8/2020 0931 by Chio Hendrix RN  Outcome: Ongoing, Denies suicidal, reports depression and anxiety isolates to room      Problem: Tobacco Use:  Goal: Inpatient tobacco use cessation counseling participation  Description: Inpatient tobacco use cessation counseling participation  Outcome: Ongoing, denies issue at this time
Problem: Altered Mood, Depressive Behavior:  Goal: Able to verbalize and/or display a decrease in depressive symptoms  Description: Able to verbalize and/or display a decrease in depressive symptoms  Outcome: Ongoing  Note: Patient admits to depression/anxiety at this time. Seclusive to his room. Problem: Altered Mood, Depressive Behavior:  Goal: Absence of self-harm  Description: Absence of self-harm  6/7/2020 0102 by Luisana Weinberg LPN  Outcome: Ongoing  Note: Patient denies thoughts of self-harm at this time. Problem: Tobacco Use:  Goal: Inpatient tobacco use cessation counseling participation  Description: Inpatient tobacco use cessation counseling participation  Outcome: Ongoing  Note: Patient did not participate in inpatient tobacco use cessation counseling due to patient sleeping at the time.
[place] to Place, [place] to Situation  Attention:Normal: No  Attention: Distractible  Thought Processes: Circumstantial  Thought Content:Normal: No  Thought Content: Preoccupations  Hallucinations: None  Delusions: No  Memory:Normal: Yes  Insight and Judgment: Yes  Present Suicidal Ideation: No  Present Homicidal Ideation: No    EDUCATION:   Learner Progress Toward Treatment Goals: reviewed group plans and strategies for care    Method:group therapy, medication compliance, individualized assessments and care planning    Outcome: needs reinforcement    PATIENT GOALS: to be discussed with patient within 72 hours    PLAN/TREATMENT RECOMMENDATIONS:     continue group therapy , medications compliance, goal setting, individualized assessments and care, continue to monitor pt on unit      SHORT-TERM GOALS:   Time frame for Short-Term Goals: 5-7 days    LONG-TERM GOALS:  Time frame for Long-Term Goals: 6 months  Members Present in Team Meeting: See Signature Sheet    Brandi Adams [place]

## 2020-06-10 NOTE — GROUP NOTE
Group Therapy Note    Date: 6/10/2020    Group Start Time: 1000  Group End Time: 1100  Group Topic: Psychoeducation    [facility]    FRANKLIN Oliveira LSW        Group Therapy Note    Attendees: 10/23      Patient refused to attend psychoeducational group at 10 after encouragement from staff.        Signature:  FRANKLIN Oliveira LSW

## 2020-06-10 NOTE — PROGRESS NOTES
mg/dL    Bun/Cre Ratio NOT REPORTED 9 - 20    Calcium 8.8 8.6 - 10.4 mg/dL    Sodium 144 135 - 144 mmol/L    Potassium 3.9 3.7 - 5.3 mmol/L    Chloride 107 98 - 107 mmol/L    CO2 28 20 - 31 mmol/L    Anion Gap 9 9 - 17 mmol/L    Alkaline Phosphatase 98 40 - 129 U/L    ALT 8 5 - 41 U/L    AST 11 <40 U/L    Total Bilirubin 0.25 (L) 0.3 - 1.2 mg/dL    Total Protein 6.0 (L) 6.4 - 8.3 g/dL    Alb 3.5 3.5 - 5.2 g/dL    Albumin/Globulin Ratio NOT REPORTED 1.0 - 2.5    GFR Non-African American >60 >60 mL/min    GFR African American >60 >60 mL/min    GFR Comment          GFR Staging NOT REPORTED            TREATMENT PLAN:     QUEtiapine  400 mg Oral Nightly    escitalopram  20 mg Oral Nightly    OXcarbazepine  300 mg Oral BID     dicyclomine, acetaminophen, aluminum & magnesium hydroxide-simethicone, benztropine mesylate, hydrOXYzine, magnesium hydroxide, nicotine polacrilex, traZODone    Chart was reviewed and the patient has been interviewed  Continue the same medications as prescribed above  Patient was discussed with the  and the treatment team.   Provided Supportive and insight-oriented psychotherapy psychotherapy  Recommended involvement in Unit milieu  Provided empathic listening, validation and support  Patient acknowledged and mutually decided to continue with current treatment plan  Discharge planning was discussed with the patient. Rebecca Yanez MD        This note was created with the assistance of a speech-recognition program.  Although the intention is to generate a document that actually reflects the content of the visit, no guarantees can be provided that every mistake has been identified and corrected by editing.

## 2020-06-10 NOTE — CARE COORDINATION
Writer contacted Florentin on [place] in order to schedule up mental health follow-up appointment.       Appointment Scheduled for Wednesday 6/17 @4:15PM   Location:   [facility]  [address]

## 2020-06-10 NOTE — GROUP NOTE
Group Therapy Note    Date: 6/10/2020    Group Start Time: 1330  Group End Time: 5195  Group Topic: Recovery    [facility]    FRANKLIN Snyder LSW        Group Therapy Note    Attendees: 15/22          Patient's Goal:  Increase understanding of addiction and the recovery process. Notes:  Pt is making progress AEB participating in group discussion about the 4 dimensions of recovery. Status After Intervention:  Improved     Participation Level:  Active Listener and Interactive     Participation Quality: Appropriate, Attentive and Supportive        Speech:  normal        Thought Process/Content: Logical        Affective Functioning: Congruent        Mood: euthymic        Level of consciousness:  Alert, Oriented x4 and Attentive        Response to Learning: Progressing to goal        Endings: None Reported     Modes of Intervention: Education, Support, Socialization, Exploration, Clarifying and Problem-solving        Discipline Responsible: /Counselor        Signature:  FRANKLIN Snyder LSW, [place]

## 2020-06-19 ENCOUNTER — HOSPITAL ENCOUNTER (EMERGENCY)
Age: 60
Discharge: PSYCHIATRIC OR SUBSTANCE USE FACILITY | End: 2020-06-20
Attending: EMERGENCY MEDICINE
Payer: MEDICAID

## 2020-06-19 VITALS
RESPIRATION RATE: 12 BRPM | BODY MASS INDEX: 22.59 KG/M2 | DIASTOLIC BLOOD PRESSURE: 88 MMHG | OXYGEN SATURATION: 98 % | TEMPERATURE: 98.8 F | WEIGHT: 176 LBS | HEART RATE: 84 BPM | SYSTOLIC BLOOD PRESSURE: 114 MMHG | HEIGHT: 74 IN

## 2020-06-19 PROCEDURE — U0003 INFECTIOUS AGENT DETECTION BY NUCLEIC ACID (DNA OR RNA); SEVERE ACUTE RESPIRATORY SYNDROME CORONAVIRUS 2 (SARS-COV-2) (CORONAVIRUS DISEASE [COVID-19]), AMPLIFIED PROBE TECHNIQUE, MAKING USE OF HIGH THROUGHPUT TECHNOLOGIES AS DESCRIBED BY CMS-2020-01-R: HCPCS

## 2020-06-19 PROCEDURE — 99285 EMERGENCY DEPT VISIT HI MDM: CPT

## 2020-06-19 ASSESSMENT — ENCOUNTER SYMPTOMS
NAUSEA: 0
DIARRHEA: 0
RHINORRHEA: 0
VOMITING: 0
COUGH: 0
BACK PAIN: 0
ABDOMINAL PAIN: 0
CONSTIPATION: 0
SHORTNESS OF BREATH: 0

## 2020-06-19 NOTE — ED NOTES
[] [place]    [] [address]    [x]  One [facility] ASSESSMENT      Y  N     [x] [] In the past two weeks have you had thoughts of hurting yourself in any way? [x] [] In the past two weeks have you had thoughts that you would be better off dead? [x] [] Have you made a suicide attempt in the past two months? [x] [] Do you have a plan for hurting yourself or suicide? [x] [] Presence of hallucinations/voices related to hurting himself or herself or someone else. SUICIDE/SECURITY WATCH PRECAUTION CHECKLIST     Orders    [x]  Suicide/Security Watch Precautions initiated as checked below:   6/19/20 7:22 PM EDT BH31/BH31G    [x] Notified physician:  No att. providers found  6/19/20 7:22 PM EDT    [x] Orders obtained as appropriate:     [x] 1:1 Observer     [] Psych Consult     [] Psych Consult    Name:  Date:  Time:    [x] 1:1 Observer, Notified by:  Ashlee Villegas Nurse Supervisor    [x] Remove all personal clothes from room and place in snap/paper gown/pants. Slipper only    [x] Remove all personal belongings from room and secured away from patient. Documentation    [x] Initiate Suicide/Security Watch Precaution Flow Sheet    [x] Initiate individualized Care Plan/Problem    [x] Document why precautions initiated on flow sheet (Initiate Nursing Care Plan/Problem)    [x] 1:1 Observer in place; instructions provided. Suicide precautions require observer be within arms length. [x] Nurse-Observer Communication Hand-off initiated by RN, reviewed with Observer. Subsequently used as Hand Off between Observers. [x] Initiate every 15 minute observations per observer as delegated by the RN.     [x] Initiate RN assessment and documentation    Environmental Scan  Search Criteria and Process: OPTIONAL, see Search Policy    [] Reason for search:    [x] Nursing in presence of second person to search patient    [] Patient notified of reason for body assessment and belongings search:     Persons present during search:   Results of search and disposition:       Searchers Name: [facility]  These items or items similar should be removed from the room:   [x] Chairs   [x] Telephone   [x] Trash cans and liners   [x] Plastic utensils (order Patient Safety tray)   [x] Empty or remove Sharps containers   [x] All personal clothing/belongings removed   [x] All unnecessary lead wires, electrical cords, draw cords, etc.   [x] Flowers and plants   [x] Double check for lighters, matches, razors, any glass items etc that can be used as weapons. Person completing Checklist: Jayshree Santos       GENERAL INFORMATION     Y  N     [x] [] Has the patient been informed that they are on a watch and what that means? [x] [] Can the patient get out of Bed without nursing assistance? [x] [] Can the patient use the restroom without nursing assistance? [x] [] Can the patient walk the halls to [place] their legs? \"   [x] [] Does the patient have metal utensils? [x] [] Have the patient's belongings been placed out of control of the patient? [x] [] Have the patient and his/her belongings been checked for contraband? [x] [] Is the patient under any visitor restrictions? If Yes, explain:   [] [x] Is the patient under an alias? [facility] Name:   Authorized visitors (no more than two are to be on the list)   Name/Relationship:   Name/Relationship:    Name of Staff member that you  Received this information from?:    General Description:    Valdez Hayward BH31/BH31G male 63 y.o. Admission weight: 176 lb (79.8 kg) Height: 6' 2\" (188 cm)  Race: []  [x] Black  []   []   [] [place] [] Other  Facial Hair:  [] Yes  [] No  If yes, please describe: Identifying Marks (i.e. Visible tattoos, scars, etc... ):     NURSING CARE PLAN    Nursing Diagnosis: Risk of Self Directed Harm  [x] Actual  [] Potential  Date Started: 6/19/20      Etiological Factors: (related to)  [] Expressed or

## 2020-06-19 NOTE — ED PROVIDER NOTES
Smokeless tobacco: Never Used   Substance and Sexual Activity    Alcohol use: Yes     Comment: reports drinking occasionally    Drug use: Yes     Types: Cocaine     Comment: last used 6/2/2020    Sexual activity: Not on file   Lifestyle    Physical activity     Days per week: Not on file     Minutes per session: Not on file    Stress: Not on file   Relationships    Social connections     Talks on phone: Not on file     Gets together: Not on file     Attends Bahai service: Not on file     Active member of club or organization: Not on file     Attends meetings of clubs or organizations: Not on file     Relationship status: Not on file    Intimate partner violence     Fear of current or ex partner: Not on file     Emotionally abused: Not on file     Physically abused: Not on file     Forced sexual activity: Not on file   Other Topics Concern    Not on file   Social History Narrative    Not on file       Family History   Problem Relation Age of Onset    Diabetes Mother     Heart Disease Mother        Allergies:  Navane [thiothixene]    Home Medications:  Prior to Admission medications    Medication Sig Start Date End Date Taking? Authorizing Provider   escitalopram (LEXAPRO) 20 MG tablet Take 1 tablet by mouth nightly 6/10/20   Marga Arellano MD   QUEtiapine (SEROQUEL) 400 MG tablet Take 1 tablet by mouth nightly 6/10/20   Marga Arellano MD   dicyclomine (BENTYL) 10 MG capsule Take 1 capsule by mouth every 6 hours as needed (cramps) 5/24/20   Jeanna Conway MD   OXcarbazepine (TRILEPTAL) 300 MG tablet Take 1 tablet by mouth 2 times daily 5/2/20   Marga Arellano MD       REVIEW OF SYSTEMS    (2-9 systems for level 4, 10 or more for level 5)      Review of Systems   Constitutional: Negative for chills and fever. HENT: Negative for congestion and rhinorrhea. Eyes: Negative for visual disturbance. Respiratory: Negative for cough and shortness of breath. Cardiovascular: Negative for chest pain. Gastrointestinal: Negative for abdominal pain, constipation, diarrhea, nausea and vomiting. Genitourinary: Negative for difficulty urinating. Musculoskeletal: Negative for back pain, neck pain and neck stiffness. Neurological: Negative for seizures and syncope. Psychiatric/Behavioral: Positive for suicidal ideas. PHYSICAL EXAM   (up to 7 for level 4, 8 or more for level 5)      INITIAL VITALS:   /88   Pulse 84   Temp 98.8 °F (37.1 °C) (Oral)   Resp 12   Ht 6' 2\" (1.88 m)   Wt 176 lb (79.8 kg)   SpO2 98%   BMI 22.60 kg/m²     Physical Exam  Vitals signs and nursing note reviewed. Constitutional:       General: He is not in acute distress. Appearance: Normal appearance. HENT:      Head: Normocephalic and atraumatic. Right Ear: External ear normal.      Nose: Nose normal.      Mouth/Throat:      Mouth: Mucous membranes are moist.   Eyes:      General: No scleral icterus. Extraocular Movements: Extraocular movements intact. Conjunctiva/sclera: Conjunctivae normal.   Neck:      Musculoskeletal: Normal range of motion. Cardiovascular:      Rate and Rhythm: Normal rate. Pulses: Normal pulses. Pulmonary:      Effort: Pulmonary effort is normal. No respiratory distress. Breath sounds: Normal breath sounds. No wheezing. Abdominal:      Palpations: Abdomen is soft. Tenderness: There is no abdominal tenderness. Musculoskeletal: Normal range of motion. Skin:     General: Skin is warm. Capillary Refill: Capillary refill takes less than 2 seconds. Neurological:      Mental Status: He is alert and oriented to person, place, and time. Psychiatric:         Attention and Perception: Attention normal.         Mood and Affect: Affect is flat. Speech: Speech is delayed. Behavior: Behavior is cooperative. Thought Content: Thought content includes suicidal ideation. Thought content does not include homicidal ideation.  Thought content includes suicidal plan. DIFFERENTIAL  DIAGNOSIS     PLAN (LABS / IMAGING / EKG):  No orders of the defined types were placed in this encounter. MEDICATIONS ORDERED:  No orders of the defined types were placed in this encounter. DDX: si    DIAGNOSTIC RESULTS / EMERGENCY DEPARTMENT COURSE / MDM   LAB RESULTS:  No results found for this visit on 06/19/20. IMPRESSION: 76-year-old gentleman in no acute distress complaining of suicidal thoughts to staff and self. Patient has made no attempts is a physical examination review of systems are unremarkable. Plan will be to transfer to [facility] as patient is medically stable at this time. Will obtain COVID testing is necessary for admission to behavioral health. RADIOLOGY:  No results found. EKG  none    All EKG's are interpreted by the Emergency Department Physician who either signs or Co-signs this chart in the absence of a cardiologist.    EMERGENCY DEPARTMENT COURSE:  Patient was seen and evaluated physical examination was unremarkable for any injuries patient is medically stable COVID testing was obtained social work was consulted and patient was admitted to the [facility]. PROCEDURES:  none    CONSULTS:  IP CONSULT TO SOCIAL WORK    CRITICAL CARE:  Please see attending note    FINAL IMPRESSION      1. Suicidal ideation          DISPOSITION / PLAN     DISPOSITION  transfer to [facility] in stable condition. PATIENT REFERRED TO:  No follow-up provider specified.     DISCHARGE MEDICATIONS:  New Prescriptions    No medications on file       Aston Davenport DO  Emergency Medicine Resident    (Please note that portions of thisnote were completed with a voice recognition program.  Efforts were made to edit the dictations but occasionally words are mis-transcribed.)        Aston Davenport DO  Resident  06/20/20 2060

## 2020-06-20 ENCOUNTER — HOSPITAL ENCOUNTER (INPATIENT)
Age: 60
LOS: 3 days | Discharge: HOME | DRG: 750 | End: 2020-06-23
Attending: PSYCHIATRY & NEUROLOGY | Admitting: PSYCHIATRY & NEUROLOGY
Payer: MEDICAID

## 2020-06-20 LAB
SARS-COV-2, PCR: NORMAL
SARS-COV-2, RAPID: NORMAL
SARS-COV-2: NOT DETECTED
SOURCE: NORMAL

## 2020-06-20 PROCEDURE — 6370000000 HC RX 637 (ALT 250 FOR IP): Performed by: PSYCHIATRY & NEUROLOGY

## 2020-06-20 PROCEDURE — 1240000000 HC EMOTIONAL WELLNESS R&B

## 2020-06-20 RX ORDER — TRAZODONE HYDROCHLORIDE 50 MG/1
50 TABLET ORAL NIGHTLY PRN
Status: DISCONTINUED | OUTPATIENT
Start: 2020-06-20 | End: 2020-06-23 | Stop reason: HOSPADM

## 2020-06-20 RX ORDER — BENZTROPINE MESYLATE 2 MG/1
2 TABLET ORAL DAILY PRN
Status: DISCONTINUED | OUTPATIENT
Start: 2020-06-20 | End: 2020-06-23 | Stop reason: HOSPADM

## 2020-06-20 RX ORDER — ESCITALOPRAM OXALATE 20 MG/1
20 TABLET ORAL NIGHTLY
Status: DISCONTINUED | OUTPATIENT
Start: 2020-06-20 | End: 2020-06-23 | Stop reason: HOSPADM

## 2020-06-20 RX ORDER — QUETIAPINE FUMARATE 200 MG/1
400 TABLET, FILM COATED ORAL NIGHTLY
Status: DISCONTINUED | OUTPATIENT
Start: 2020-06-20 | End: 2020-06-23 | Stop reason: HOSPADM

## 2020-06-20 RX ORDER — HYDROXYZINE HYDROCHLORIDE 25 MG/1
25 TABLET, FILM COATED ORAL 3 TIMES DAILY PRN
Status: DISCONTINUED | OUTPATIENT
Start: 2020-06-20 | End: 2020-06-23 | Stop reason: HOSPADM

## 2020-06-20 RX ORDER — ACETAMINOPHEN 325 MG/1
650 TABLET ORAL EVERY 4 HOURS PRN
Status: DISCONTINUED | OUTPATIENT
Start: 2020-06-20 | End: 2020-06-23 | Stop reason: HOSPADM

## 2020-06-20 RX ORDER — MAGNESIUM HYDROXIDE/ALUMINUM HYDROXICE/SIMETHICONE 120; 1200; 1200 MG/30ML; MG/30ML; MG/30ML
30 SUSPENSION ORAL 3 TIMES DAILY PRN
Status: DISCONTINUED | OUTPATIENT
Start: 2020-06-20 | End: 2020-06-23 | Stop reason: HOSPADM

## 2020-06-20 RX ORDER — OXCARBAZEPINE 300 MG/1
300 TABLET, FILM COATED ORAL 2 TIMES DAILY
Status: DISCONTINUED | OUTPATIENT
Start: 2020-06-20 | End: 2020-06-23 | Stop reason: HOSPADM

## 2020-06-20 RX ORDER — DICYCLOMINE HYDROCHLORIDE 10 MG/1
10 CAPSULE ORAL EVERY 6 HOURS PRN
Status: DISCONTINUED | OUTPATIENT
Start: 2020-06-20 | End: 2020-06-23 | Stop reason: HOSPADM

## 2020-06-20 RX ADMIN — ESCITALOPRAM OXALATE 20 MG: 20 TABLET ORAL at 21:46

## 2020-06-20 RX ADMIN — OXCARBAZEPINE 300 MG: 300 TABLET, FILM COATED ORAL at 10:49

## 2020-06-20 RX ADMIN — OXCARBAZEPINE 300 MG: 300 TABLET, FILM COATED ORAL at 21:46

## 2020-06-20 RX ADMIN — QUETIAPINE FUMARATE 400 MG: 200 TABLET ORAL at 21:46

## 2020-06-20 ASSESSMENT — SLEEP AND FATIGUE QUESTIONNAIRES
DIFFICULTY FALLING ASLEEP: YES
SLEEP PATTERN: INSOMNIA
AVERAGE NUMBER OF SLEEP HOURS: 3
RESTFUL SLEEP: NO
DIFFICULTY STAYING ASLEEP: YES
DO YOU USE A SLEEP AID: YES
DIFFICULTY ARISING: NO
DO YOU HAVE DIFFICULTY SLEEPING: YES

## 2020-06-20 ASSESSMENT — PAIN SCALES - GENERAL: PAINLEVEL_OUTOF10: 0

## 2020-06-20 ASSESSMENT — PATIENT HEALTH QUESTIONNAIRE - PHQ9: SUM OF ALL RESPONSES TO PHQ QUESTIONS 1-9: 18

## 2020-06-20 ASSESSMENT — LIFESTYLE VARIABLES
HISTORY_ALCOHOL_USE: NO
HISTORY_ALCOHOL_USE: NO

## 2020-06-20 NOTE — BH NOTE
`[facility]  Admission Note     Admission Type:   Admission Type: Voluntary    Reason for admission:  Reason for Admission: Patient is suicidal with thoughts to harm self. Patient is hearing voices to harm self. Patient has history of drug abuse, crack/cocaine use. PATIENT STRENGTHS:  Strengths: Connection to output provider, Medication Compliance    Patient Strengths and Limitations:  Limitations: Hopeless about future, Tendency to isolate self, Unrealistic self-view    Addictive Behavior:   Addictive Behavior  In the past 3 months, have you felt or has someone told you that you have a problem with:  : None  Do you have a history of Chemical Use?: No  Do you have a history of Alcohol Use?: No  Do you have a history of Street Drug Abuse?: Yes  Histroy of Prescripton Drug Abuse?: No    Medical Problems:   Past Medical History:   Diagnosis Date    [facility] disorder [facility] [address]     Depression     GERD (gastroesophageal reflux disease)     Hallucinations     Headache(784.0)     Hepatitis     Schizophrenia, schizo-affective (HCC)     Substance abuse ([facility] [address]     Tobacco abuse     Type II or unspecified type diabetes mellitus without mention of complication, not stated as uncontrolled     Urinary incontinence        Status EXAM:  Status and Exam  Normal: No  Facial Expression: Flat  Affect: Appropriate, Blunt  Level of Consciousness: Alert  Mood:Normal: No  Mood: Depressed, Anxious, Sad, Helpless, Empty, Worthless, low self-esteem  Motor Activity:Normal: No  Motor Activity: Tremors, Tics, Repetitive Acts  Interview Behavior: Cooperative  Preception: [place] to Person, [place] to Time, [place] to Place, [place] to Situation  Attention:Normal: No  Attention: Distractible, Unable to Concentrate  Thought Processes: Blocking  Thought Content:Normal: No  Thought Content: Preoccupations  Hallucinations:  Auditory (Comment)(to harm self )  Delusions: No  Memory:Normal: No  Memory: Poor Recent  Insight and Judgment: No  Insight and Judgment: Poor Judgment, Poor Insight, Unmotivated  Present Suicidal Ideation: Yes  Present Homicidal Ideation: No    Tobacco Screening:  Practical Counseling, on admission, corby X, if applicable and completed (first 3 are required if patient doesn't refuse): ( x)  Recognizing danger situations (included triggers and roadblocks)                    (x )  Coping skills (new ways to manage stress, exercise, relaxation techniques, changing routine, distraction)                                                           (x )  Basic information about quitting (benefits of quitting, techniques in how to quit, available resources  ( ) Referral for counseling faxed to [facility]                                           ( ) Patient refused counseling  ( ) Patient has not smoked in the last 30 days    Metabolic Screening:    Lab Results   Component Value Date    LABA1C 5.2 05/22/2020       Lab Results   Component Value Date    CHOL 179 02/13/2017    CHOL 127 05/09/2015    CHOL 168 08/20/2014    CHOL 158 12/31/2013    CHOL 178 08/15/2013    CHOL 166 09/17/2012    CHOL 210 (H) 02/03/2012     Lab Results   Component Value Date    TRIG 67 02/13/2017    TRIG 37 05/09/2015    TRIG 72 08/20/2014    TRIG 52 12/31/2013    TRIG 70 08/15/2013    TRIG 117 09/17/2012    TRIG 94 02/03/2012     Lab Results   Component Value Date    HDL 73 02/13/2017    HDL 57 05/09/2015    HDL 50 08/20/2014    HDL 43 12/31/2013    HDL 42 08/15/2013    HDL 38 (L) 09/17/2012    HDL 55 02/03/2012     No components found for: LDLCAL  No results found for: LABVLDL      Body mass index is 21.87 kg/m². BP Readings from Last 2 Encounters:   06/20/20 101/64   06/19/20 114/88           Pt admitted with followings belongings:  Dentures: None  Vision - Corrective Lenses: None  Hearing Aid: None  Jewelry: None  Body Piercings Removed: N/A  Clothing:  Footwear, Pants, Shirt, Other (Comment)(hat, blk comb)  Were All

## 2020-06-20 NOTE — ED PROVIDER NOTES
[facility]     Emergency Department     Faculty Attestation    I performed a history and physical examination of the patient and discussed management with the resident. I reviewed the residents note and agree with the documented findings and plan of care. Any areas of disagreement are noted on the chart. I was personally present for the key portions of any procedures. I have documented in the chart those procedures where I was not present during the key portions. I have reviewed the emergency nurses triage note. I agree with the chief complaint, past medical history, past surgical history, allergies, medications, social and family history as documented unless otherwise noted below. For Physician Assistant/ Nurse Practitioner cases/documentation I have personally evaluated this patient and have completed at least one if not all key elements of the E/M (history, physical exam, and MDM). Additional findings are as noted. I have personally seen and evaluated the patient. I find the patient's history and physical exam are consistent with the NP/PA documentation. I agree with the care provided, treatment rendered, disposition and follow-up plan. Suicidal with auditory command hallucinations telling him to stab himself with a knife. Did not actually stab himself today. No medical complaints.     Exam:  General: Laying on the bed, awake, alert and in no acute distress  CV: normal rate and regular rhythm  Lungs: Breathing comfortably on room air with no tachypnea, hypoxia, or increased work of breathing    Plan:  Medically cleared, will collaborate with social work to determine appropriate inpatient psychiatric management        Lissa Vega MD   Attending Emergency  Physician              Lissa Vega MD  06/19/20 2020

## 2020-06-20 NOTE — ED PROVIDER NOTES
Date: 5/24/2020  EXAMINATION: ONE XRAY VIEW OF THE CHEST 5/24/2020 4:28 am COMPARISON: March 20, 2020. HISTORY: ORDERING SYSTEM PROVIDED HISTORY: crack cocaine, acute n/v TECHNOLOGIST PROVIDED HISTORY: crack cocaine, acute n/v Reason for Exam: portable upright/ says just doesn't feel well Acuity: Acute Type of Exam: Initial FINDINGS: Frontal portable view of the chest.  Hyperexpanded lung volume can be seen in the setting of COPD. No focal consolidation. No pleural effusion or pneumothorax. Atherosclerotic thoracic aorta. No cardiomegaly. Multilevel degenerative disc disease. Hyperexpanded lung volume can be seen in the setting of COPD. No focal consolidation. RECENT VITALS:     Temp: 98.8 °F (37.1 °C),  Pulse: 84, Resp: 12, BP: 114/88, SpO2: 98 %    This patient is a 63 y.o. Male with suicide attempt with stabbing himself with a knife. Medically clear. Seen by social work and discussed with psychiatry. Awaiting transfer to [facility]. OUTSTANDING TASKS / RECOMMENDATIONS:    1. Awaiting transfer      Lashay Lucero.  Nae Ford MD, Fred Rashid  Attending Emergency Physician  [facility]       Cj Marcus MD  06/20/20 1203

## 2020-06-20 NOTE — ED NOTES
[facility] bedside to transport pt to [facility]. Security at bedside to unlock belongings.       Guevara Martinez RN  06/20/20 5408

## 2020-06-20 NOTE — ED NOTES
Contacted [facility] - no current COVID rule out beds. Pending test result v. Bed availability in rule out unit.       FRANKLIN Roberts, [place]  06/19/20 2041

## 2020-06-20 NOTE — PROGRESS NOTES
Leisure Assessment unable to be completed on this date due to patient resting in room. Leisure Assessment will be completed on the next earliest date.

## 2020-06-20 NOTE — BH NOTE
Dr Silverio Kaufman notified of best practice advisory suggesting to place patient on suicide precautions.  Provider to discontinue the order as patient does not meet criteria for suicide precautions at this time

## 2020-06-21 PROCEDURE — 6370000000 HC RX 637 (ALT 250 FOR IP): Performed by: PSYCHIATRY & NEUROLOGY

## 2020-06-21 PROCEDURE — 1240000000 HC EMOTIONAL WELLNESS R&B

## 2020-06-21 RX ADMIN — HYDROXYZINE HYDROCHLORIDE 25 MG: 25 TABLET, FILM COATED ORAL at 22:09

## 2020-06-21 RX ADMIN — OXCARBAZEPINE 300 MG: 300 TABLET, FILM COATED ORAL at 22:09

## 2020-06-21 RX ADMIN — TRAZODONE HYDROCHLORIDE 50 MG: 50 TABLET ORAL at 22:09

## 2020-06-21 RX ADMIN — OXCARBAZEPINE 300 MG: 300 TABLET, FILM COATED ORAL at 08:23

## 2020-06-21 RX ADMIN — QUETIAPINE FUMARATE 400 MG: 200 TABLET ORAL at 22:09

## 2020-06-21 RX ADMIN — ESCITALOPRAM OXALATE 20 MG: 20 TABLET ORAL at 22:09

## 2020-06-21 NOTE — GROUP NOTE
Group Therapy Note    Date: 6/21/2020    Group Start Time: 1330  Group End Time: 0456  Group Topic: Recreational    [facility] [place], [address]    Attendees: 7         Patient's Goal:  To demonstrate increased interpersonal skills. Notes:  Patient attended group but did not actively participated in task at hand. Patient sat in group and observed. Status After Intervention:  Improved    Participation Level:  Active Listener and None    Participation Quality: Appropriate and Attentive      Speech:  normal      Thought Process/Content: Logical      Affective Functioning: Congruent      Mood: euthymic      Level of consciousness:  Alert, Oriented x4 and Attentive      Response to Learning: Progressing to goal      Endings: None Reported       Modes of Intervention: Socialization, Exploration, Clarifying, Problem-solving, Activity, Confrontation, Limit-setting and Reality-testing      Discipline Responsible: Psychoeducational Specialist      Signature:  Soren Pizano

## 2020-06-21 NOTE — GROUP NOTE
Group Therapy Note    Date: 6/21/2020    Group Start Time: 2095  Group End Time: 7889  Group Topic: Community Meeting    [facility], [facility] [address]     Patient refused to attend Goal Setting / Community Meeting Group at 4348 after encouragement from staff. 1:1 talk time offered.     Signature:  Tameka Narayaann

## 2020-06-22 PROCEDURE — 6370000000 HC RX 637 (ALT 250 FOR IP): Performed by: PSYCHIATRY & NEUROLOGY

## 2020-06-22 PROCEDURE — 1240000000 HC EMOTIONAL WELLNESS R&B

## 2020-06-22 RX ADMIN — QUETIAPINE FUMARATE 400 MG: 200 TABLET ORAL at 21:48

## 2020-06-22 RX ADMIN — OXCARBAZEPINE 300 MG: 300 TABLET, FILM COATED ORAL at 21:49

## 2020-06-22 RX ADMIN — OXCARBAZEPINE 300 MG: 300 TABLET, FILM COATED ORAL at 08:34

## 2020-06-22 RX ADMIN — ESCITALOPRAM OXALATE 20 MG: 20 TABLET ORAL at 21:48

## 2020-06-22 NOTE — GROUP NOTE
Group Therapy Note    Date: 6/22/2020    Group Start Time: 1100  Group End Time: 1140  Group Topic: Psychotherapy    CHANEL Francisco        Group Therapy Note    Attendees: 6/11    Pt denied 1:1.  Despite staff encouragement, pt denied 11:00am psychotherapy group       Signature:  CHANEL Patel

## 2020-06-22 NOTE — PLAN OF CARE
Problem: Altered Mood, Depressive Behavior:  Goal: Ability to disclose and discuss suicidal ideas will improve  Description: Ability to disclose and discuss suicidal ideas will improve  6/21/2020 0934 by Junior Tinajero RN  Outcome: Ongoing   Patient denies suicidal ideation, homicidal ideation and visual hallucinations. Patient is accepting of nourishment from staff. Patient admits to anxiety and depression. Remains isolative in room and only comes out for needs. Patient is aloof of peers. Despite encouragement from staff patient refused shower. Patient remains behavioral control and med compliant. Q15 minute checks maintained. Problem: Altered Mood, Psychotic Behavior:  Goal: Able to verbalize decrease in frequency and intensity of hallucinations  Description: Able to verbalize decrease in frequency and intensity of hallucinations  6/21/2020 0934 by Junior Tinajero RN  Outcome: Ongoing   Patient admits to auditory hallucinations but does not elaborate when writers asks if patient can tell what the voices are saying.
Problem: Altered Mood, Depressive Behavior:  Goal: Ability to disclose and discuss suicidal ideas will improve  Description: Ability to disclose and discuss suicidal ideas will improve  Outcome: Ongoing   Patient denies suicidal ideations. Patient agrees to seek out staff if symptoms arise. Patient is compliant with his medications. Patient safety maintained q15 minute checks. Problem: Altered Mood, Psychotic Behavior:  Goal: Able to verbalize decrease in frequency and intensity of hallucinations  Description: Able to verbalize decrease in frequency and intensity of hallucinations  Outcome: Ongoing   Patient reports auditory hallucinations. Patient states they have decreased and are not longer screams. Patient verbalizes readiness for discharge.
Problem: Altered Mood, Depressive Behavior:  Goal: Ability to disclose and discuss suicidal ideas will improve  Description: Ability to disclose and discuss suicidal ideas will improve  Outcome: Ongoing  Note: Patient denies any suicidal ideations at this time. Problem: Altered Mood, Psychotic Behavior:  Goal: Able to verbalize decrease in frequency and intensity of hallucinations  Description: Able to verbalize decrease in frequency and intensity of hallucinations  Outcome: Ongoing  Note: Patient has a flat affect, states he continues with hallucinations. Patient cooperative with 1:1, 15 minute patient checks continue to maintain safety.
Content:Normal: No  Thought Content: Preoccupations  Hallucinations:  Auditory (Comment)(to harm self )  Delusions: No  Memory:Normal: No  Memory: Poor Recent  Insight and Judgment: No  Insight and Judgment: Poor Judgment, Poor Insight, Unmotivated  Present Suicidal Ideation: Yes  Present Homicidal Ideation: No    EDUCATION:   Learner Progress Toward Treatment Goals: reviewed group plans and strategies for care    Method:group therapy, medication compliance, individualized assessments and care planning    Outcome: needs reinforcement    PATIENT GOALS: to be discussed with patient within 72 hours    PLAN/TREATMENT RECOMMENDATIONS:     continue group therapy , medications compliance, goal setting, individualized assessments and care, continue to monitor pt on unit      SHORT-TERM GOALS:   Time frame for Short-Term Goals: 5-7 days    LONG-TERM GOALS:  Time frame for Long-Term Goals: 6 months  Members Present in Team Meeting: See Signature Sheet    Domingo Arroyo

## 2020-06-22 NOTE — GROUP NOTE
Group Therapy Note    Date: 6/22/2020    Group Start Time: 1000  Group End Time: 3609  Group Topic: Recreational    [facility]    Patient refused to attend Recreational Therapy Group at 1000 after encouragement from staff. 1:1 talk time offered.     Signature:  Heena Salgado

## 2020-06-22 NOTE — H&P
HISTORY and [facility] [address]       NAME:  Ramon Noonan  MRN: [id number]   YOB: 1960   Date: 6/22/2020   Age: 63 y.o. Gender: male     H&P Update Note    H&P from 06/06/2020 reviewed and updated. Patient examined. Patient was just discharged approp 2 wks ago. Patient came in for attempted suicide on stabbing himself in the chest. Pt reports having auditory hallucinations telling him to harm himself. Patient states that he has been using crack cocaine and lives in group home. INTERVAL HISTORY:     Patient is feeling well today, denies any fever/chills, chest pain, shortness of breath. No interval changes. No interval changes to past medical history, social history, family history. Review of systems as stated above and otherwise negative. PHYSICAL EXAM:     Vitals: BP (!) 98/42   Pulse 64   Temp 98.2 °F (36.8 °C) (Oral)   Resp 14   Ht 6' 3\" (1.905 m)   Wt 175 lb (79.4 kg)   BMI 21.87 kg/m²  Body mass index is 21.87 kg/m². Patient is alert and oriented, in no distress. Hypotensive. Heart rate and rhythm are regular. Lungs clear to auscultation bilaterally. Abdomen is soft, non tender. No pedal edema. No interval changes. I concur with the findings. MARY OSUNA CNP on 6/22/2020 at 2:10 PM    MARY Pulido CNP   Advanced Practice Nurse   Internal Medicine   H&P   Signed   Date of Service:  6/6/2020  8:59 AM          Related encounter: ED to Hosp-Admission (Discharged) from 6/5/2020 in [facility]         Signed             Show:Clear all  [x]Manual[x]Template[]Copied    Added by:  [x]MARY Llanos CNP    []Katie for details        HISTORY and Wanda [facility]761         NAME:  Ramon Noonan  MRN: [id number]   YOB: 1960   Date: 6/6/2020   Age: 63 y.o.   Gender: male      COMPLAINT AND PRESENT HISTORY:       Ramon Noonan is 63 y.o.,  male, admitted because of Schizoaffective Disorder.      Patient has auditory hallucinations, patient hears command voices to kill self, but not others.     According to ED notes, patient was brought in by the police patient had been having auditory hallucinations with suicidal ideations. Reports that patient answer his door with a knife in his hand. Reports that patient has lost 3 brothers over 3 months time. Upon speaking with patient he states that he has been depressed over the last 2 months. Patient states he has been compliant with his meds on the most part for the last 2 days he has not taken any medication. Patient states that he does have sleep disturbances in his appetite seems to be where he cannot get enough food. \"The voices are talking more loudly\". Patient states that he continues to drink beer occasionally but continually uses cocaine. Patient endorses poor concentration with racy thoughts. Patient states that living arrangements after discharge will be returned to his own place.     Patient denies any somatic complaints. Patient is denying all of his medical history. No significant lab values or procedures. No  chest pain or  shortness of breath. No fever/chills.    Please see patient's psychiatric hx for more information.     DIAGNOSTIC RESULTS         PAST MEDICAL HISTORY           Past Medical History:   Diagnosis Date    Bipolar disorder ([facility] [address]      Depression      GERD (gastroesophageal reflux disease)      Hallucinations      Headache(784.0)      Hepatitis      Schizophrenia, schizo-affective (HCC)      Substance abuse (HCC)      Tobacco abuse      Type II or unspecified type diabetes mellitus without mention of complication, not stated as uncontrolled      Urinary incontinence        Pt denies any history of hypertension, stroke, heart disease, COPD, Asthma,  HLD, Cancer, Seizures,Thyroid disease, Kidney Disease,  TB.     SURGICAL HISTORY              Past Surgical History:   Procedure Laterality Date    capsule by mouth every 6 hours as needed (cramps) 20 capsule 0    OXcarbazepine (TRILEPTAL) 300 MG tablet Take 1 tablet by mouth 2 times daily 60 tablet 3    escitalopram (LEXAPRO) 5 MG tablet Take 3 tablets by mouth nightly 30 tablet 3    lurasidone (LATUDA) 60 MG TABS tablet Take 1 tablet by mouth Daily with supper 30 tablet 0    QUEtiapine (SEROQUEL) 300 MG tablet Take 1 tablet by mouth nightly 60 tablet 3    traZODone (DESYREL) 50 MG tablet Take 1 tablet by mouth nightly as needed for Sleep 30 tablet 0                       General health:  Fairly good. No fever or chills. Skin:  No itching, redness or rash.       Head, eyes, ears, nose, throat:  No headache, epistaxis, rhinorrhea hearing loss or sore throat.        Neck:  No pain, stiffness or masses.      Cardiovascular/Respiratory system:  No chest pain, palpitation, shortness of breath, coughing or expectoration.                Gastrointestinal tract: No abdominal pain, nausea, vomiting, dysphagia, diarrhea or constipation.     Genitourinary:  No burning on micturition. No hesitancy, urgency, frequency or discoloration of urine.      Locomotor:  No bone or joint pains. No swelling or deformities.       Neuropsychiatric:  See HPI.      GENERAL PHYSICAL EXAM:      Vitals: /67   Pulse 65   Temp 98.1 °F (36.7 °C) (Oral)   Resp 14   Ht 6' 3\" (1.905 m)   Wt 170 lb (77.1 kg)   SpO2 98%   BMI 21.25 kg/m²  Body mass index is 21.25 kg/m².      Pt was examined with a nurse present in the room. GENERAL APPEARANCE:  Marilynn Hernandez is 63 y.o.,  male, not obese, nourished, conscious, alert. Does not appear to be distress or pain at this time. Patient is edentulous.     SKIN:  Warm, dry, no cyanosis or jaundice.     HEAD:  Normocephalic, atraumatic, no swelling or tenderness.      EYES:  Pupils equal, reactive to light, Conjunctiva is clear, EOMs intact tyra. eyelids WNL.      EARS:  No discharge, no marked hearing

## 2020-06-22 NOTE — BH NOTE
PSYCHIATRIC EVALUATION    No contraindications for seclusion  No contraindications for restraint      CHIEF COMPLAINT:    Schizoaffective disorder ([facility] [address]  Noelle Camejo is a 63 y.o. male who presents with Schizoaffective disorder [facility]  The patient has been transferred to from the emergency department at [facility] where he presented complaining of worsening hallucinations commands in nature telling him to harm himself. The patient has been medically cleared and transferred to the psychiatric unit for safety precaution, further evaluation and management. HISTORY OF PRESENT ILLNESS:   Patient presented as a 77-year-old -American man who has a long history of schizoaffective disorder, crack cocaine, and alcohol dependence.  The patient has a long history of noncompliance with his medication.  The patient is linked with [facility].  The patient had almost 1 admission every month this facility with similar presented. The patient exhibited depressed mood, tearful and a flat affect. The patient was shaking under the bed sheet. The patient reported the voices \"are really loud and I just want them to go away\". He exhibited depressed mood, flat affect and very guarded.  He was socially withdrawn and despondent. He reports worsening symptoms x1 week, stopped medications 2 days ago. Pt has thought blocking and difficulty with concentration.   He denied using street drugs or drinking alcohol lately.  The patient is lived in a group home. Juaquin Parsons was off his medication reportedly over the last 2 weeks.  The patient has none history of noncompliant with his medication and appointment. Juaquin Parsons has strong history of stopping his medication and self-medicating his mental health problem by using crack cocaine.  The patient reported feeling suicidal with a plan to either stab himself or to walk in front of traffic. [place] plan has been discussed with the patient.  The patient reported occasionally    Drug use: Yes     Types: Cocaine     Comment: last used 6/2/2020    Sexual activity: Not on file   Lifestyle    Physical activity     Days per week: Not on file     Minutes per session: Not on file    Stress: Not on file   Relationships    Social connections     Talks on phone: Not on file     Gets together: Not on file     Attends Gnosticism service: Not on file     Active member of club or organization: Not on file     Attends meetings of clubs or organizations: Not on file     Relationship status: Not on file    Intimate partner violence     Fear of current or ex partner: Not on file     Emotionally abused: Not on file     Physically abused: Not on file     Forced sexual activity: Not on file   Other Topics Concern    Not on file   Social History Narrative    Not on file       BP 91/73   Pulse 98   Temp 97 °F (36.1 °C) (Oral)   Resp 14   Ht 6' 3\" (1.905 m)   Wt 175 lb (79.4 kg)   BMI 21.87 kg/m²   MENTAL STATUS EXAMINATION:     MOOD-is dysphoric   Affect-is depressed  Patient feels helpless hopeless and worthless  Psychomotor activity : decreased. APPEARANCE:  Personal hygiene is poor and patient is neglecting his appearance. Patient is somewhat unkempt  PSYCHOSIS: He reported auditory but denied visual hallucinations. He exhibited paranoid delusions. ORIENTATION:  Oriented to time place and person. Recent and remote memory is grossly intact. Intelligence appears to be average  CONCENTRATION:  poor. SPEECH : goal directed , but slow .      Length of stay : 5-7 days        Strengths:  The patient signed voluntarily and agreed to take medications  The patient has community mental health services     Weaknesses:  Patient has history of noncompliant with his medications.   Patient has poor impulse control  Patient has poor coping skills  Patient has poor support system      Diagnosis: Principal Problem:    Schizoaffective disorder ([facility] [address]  Resolved Problems:    * No resolved hospital

## 2020-06-23 VITALS
HEIGHT: 75 IN | WEIGHT: 175 LBS | HEART RATE: 61 BPM | SYSTOLIC BLOOD PRESSURE: 113 MMHG | TEMPERATURE: 97.9 F | DIASTOLIC BLOOD PRESSURE: 61 MMHG | BODY MASS INDEX: 21.76 KG/M2 | RESPIRATION RATE: 14 BRPM

## 2020-06-23 PROCEDURE — 6370000000 HC RX 637 (ALT 250 FOR IP): Performed by: PSYCHIATRY & NEUROLOGY

## 2020-06-23 RX ORDER — BENZTROPINE MESYLATE 2 MG/1
2 TABLET ORAL DAILY PRN
Qty: 60 TABLET | Refills: 3 | Status: ON HOLD | OUTPATIENT
Start: 2020-06-23 | End: 2020-08-14 | Stop reason: SDUPTHER

## 2020-06-23 RX ORDER — HYDROXYZINE HYDROCHLORIDE 25 MG/1
25 TABLET, FILM COATED ORAL 3 TIMES DAILY PRN
Qty: 30 TABLET | Refills: 0 | Status: SHIPPED | OUTPATIENT
Start: 2020-06-23 | End: 2020-07-03

## 2020-06-23 RX ADMIN — OXCARBAZEPINE 300 MG: 300 TABLET, FILM COATED ORAL at 09:15

## 2020-06-23 NOTE — GROUP NOTE
Group Therapy Note    Date: 6/23/2020    Group Start Time: 1330  Group End Time: 4433  Group Topic: Cognitive Skills    JESUS Shahidmomay, [address]        Group Therapy Note    Attendees: 8/21         Patient's Goal:  To increase interpersonal interaction. Notes:  Pt attended and participated in group. Status After Intervention:  Improved    Participation Level:  Active Listener and Interactive    Participation Quality: Appropriate, Attentive and Sharing      Speech:  normal      Thought Process/Content: Logical      Affective Functioning: Congruent      Mood: euthymic      Level of consciousness:  Alert and Attentive      Response to Learning: Able to verbalize current knowledge/experience, Able to verbalize/acknowledge new learning and Progressing to goal      Endings: None Reported    Modes of Intervention: Education, Exploration, Problem-solving, Activity and Reality-testing      Discipline Responsible: Psychoeducational Specialist      Signature:  Kimberli Ng

## 2020-06-23 NOTE — PROGRESS NOTES
PROGRESS NOTE  The chart has been reviewed and the patient was interviewed in the conference room. Pleasant and cooperative during the evaluation. He reported the medications helped to calm down the voices. He denied current thoughts of harming self or others. Safety plan has been discussed with the patient. The patient denies side effects of medications. There was no behavioral problem reported by the nursing staff. The patient is still isolative to self with poor interaction with others and spends most of the time in his room. Discharge plan and the relapse prevention planning discussed with the patient. The patient possibly will be discharged tomorrow or after tomorrow as long as she continues to show progress. MENTAL STATUS EXAMINATION:    BP (!) 108/56   Pulse 62   Temp 97.3 °F (36.3 °C) (Oral)   Resp 14   Ht 6' 3\" (1.905 m)   Wt 175 lb (79.4 kg)   BMI 21.87 kg/m²     MOOD-is dysphoric   Affect-is depressed  Patient feels helpless hopeless and worthless  Psychomotor activity : decreased. APPEARANCE:  Personal hygiene is poor and patient is neglecting his appearance. Patient is somewhat unkempt  PSYCHOSIS:  Denies auditory or visual hallucinations. Denies paranoid delusions. ORIENTATION:  Oriented to time place and person. Recent and remote memory is grossly intact. Intelligence appears to be average  CONCENTRATION:  poor. SPEECH : goal directed , but slow . DIAGNOSIS:    Principal Problem:    Schizoaffective disorder ([facility] [address]  Resolved Problems:    * No resolved hospital problems. *      LAB:    No results found for this or any previous visit (from the past 72 hour(s)).         TREATMENT PLAN:     escitalopram  20 mg Oral Nightly    OXcarbazepine  300 mg Oral BID    QUEtiapine  400 mg Oral Nightly     dicyclomine, acetaminophen, aluminum & magnesium hydroxide-simethicone, benztropine, magnesium hydroxide, traZODone, hydrOXYzine    Chart was reviewed and the patient has been interviewed  Continue the medications as above  Patient was discussed with the  and the treatment team.   Provided Supportive and insight-oriented psychotherapy psychotherapy  Recommended involvement in Unit milieu  Provided empathic listening, validation and support  Patient acknowledged and mutually decided to continue with current treatment plan  Discharge planning was discussed with the patient. Mayra Yeager MD        This note was created with the assistance of a speech-recognition program.  Although the intention is to generate a document that actually reflects the content of the visit, no guarantees can be provided that every mistake has been identified and corrected by editing.

## 2020-06-23 NOTE — GROUP NOTE
Group Therapy Note    Date: 6/23/2020    Group Start Time: 0900  Group End Time: 0915  Group Topic: Community Meeting    JESUS Malcolm, [address]        Group Therapy Note    Attendees: 9/22         Patient's Goal:  To increase interpersonal interaction. Notes:  PT attended and participated in group. Status After Intervention:  Improved    Participation Level:  Active Listener and Interactive    Participation Quality: Appropriate, Attentive and Sharing      Speech:  normal      Thought Process/Content: Logical      Affective Functioning: Congruent      Mood: euthymic      Level of consciousness:  Alert and Attentive      Response to Learning: Able to verbalize current knowledge/experience, Able to retain information and Progressing to goal      Endings: None Reported    Modes of Intervention: Education, Support, Socialization, Exploration, Problem-solving and Reality-testing      Discipline Responsible: Psychoeducational Specialist      Signature:  Michael Cristina

## 2020-06-23 NOTE — BH NOTE
HDL 73 02/13/2017    HDL 57 05/09/2015    HDL 50 08/20/2014    HDL 43 12/31/2013    HDL 42 08/15/2013    HDL 38 (L) 09/17/2012    HDL 55 02/03/2012     No components found for: LDLCAL  No results found for: Alee Hansen, RN

## 2020-06-23 NOTE — BH NOTE
Patient given tobacco quitline number 49049313287 at this time, refusing to call at this time, states \" I just dont want to quit now\"- patient given information as to the dangers of long term tobacco use. Continue to reinforce the importance of tobacco cessation.

## 2020-06-29 NOTE — DISCHARGE SUMMARY
reported    EXAM:    /61   Pulse 61   Temp 97.9 °F (36.6 °C) (Oral)   Resp 14   Ht 6' 3\" (1.905 m)   Wt 175 lb (79.4 kg)   BMI 21.87 kg/m²   Patient did not have any physical complaint at the time of discharge    No results found for this or any previous visit (from the past 72 hour(s)).       DISCHARGE INSTRUCTIONS:  Disposition: Discharge to :  HOME/SELF CARE  Condition on discharge:  GOOD  Regular diet  Activities as tolerated      DISCHARGE MEDS:     Medication List      START taking these medications    benztropine 2 MG tablet  Commonly known as:  COGENTIN  Take 1 tablet by mouth daily as needed (acute dystonia)  Notes to patient:  EPS     hydrOXYzine 25 MG tablet  Commonly known as:  ATARAX  Take 1 tablet by mouth 3 times daily as needed for Anxiety  Notes to patient:  antianxiety        CONTINUE taking these medications    dicyclomine 10 MG capsule  Commonly known as:  Bentyl  Take 1 capsule by mouth every 6 hours as needed (cramps)  Notes to patient:  cramps     escitalopram 20 MG tablet  Commonly known as:  LEXAPRO  Take 1 tablet by mouth nightly  Notes to patient:  antidepressant     OXcarbazepine 300 MG tablet  Commonly known as:  TRILEPTAL  Take 1 tablet by mouth 2 times daily  Notes to patient:  Mood stabilizer     QUEtiapine 400 MG tablet  Commonly known as:  SEROQUEL  Take 1 tablet by mouth nightly  Notes to patient:  Clears thoughts           Where to Get Your Medications      These medications were sent to [address] [place] [address], [address] [facility]. [address]    Phone:  [phone]   · benztropine 2 MG tablet  · hydrOXYzine 25 MG tablet           Daryl Torres MD

## 2020-07-04 ENCOUNTER — HOSPITAL ENCOUNTER (INPATIENT)
Age: 60
LOS: 9 days | Discharge: HOME | DRG: 750 | End: 2020-07-13
Attending: PSYCHIATRY & NEUROLOGY | Admitting: PSYCHIATRY & NEUROLOGY
Payer: MEDICAID

## 2020-07-04 ENCOUNTER — HOSPITAL ENCOUNTER (EMERGENCY)
Age: 60
Discharge: PSYCHIATRIC OR SUBSTANCE USE FACILITY | End: 2020-07-04
Attending: EMERGENCY MEDICINE
Payer: MEDICAID

## 2020-07-04 VITALS
TEMPERATURE: 98.5 F | HEART RATE: 85 BPM | DIASTOLIC BLOOD PRESSURE: 74 MMHG | RESPIRATION RATE: 16 BRPM | OXYGEN SATURATION: 100 % | SYSTOLIC BLOOD PRESSURE: 119 MMHG

## 2020-07-04 LAB
ABSOLUTE EOS #: 0.04 K/UL (ref 0–0.4)
ABSOLUTE EOS #: 0.09 K/UL (ref 0–0.44)
ABSOLUTE IMMATURE GRANULOCYTE: <0.03 K/UL (ref 0–0.3)
ABSOLUTE IMMATURE GRANULOCYTE: ABNORMAL K/UL (ref 0–0.3)
ABSOLUTE LYMPH #: 1.93 K/UL (ref 1–4.8)
ABSOLUTE LYMPH #: 2.08 K/UL (ref 1.1–3.7)
ABSOLUTE MONO #: 0.31 K/UL (ref 0.1–1.2)
ABSOLUTE MONO #: 0.38 K/UL (ref 0.1–1.3)
ACETAMINOPHEN LEVEL: <5 UG/ML (ref 10–30)
ALBUMIN SERPL-MCNC: 3.6 G/DL (ref 3.5–5.2)
ALBUMIN SERPL-MCNC: 4.3 G/DL (ref 3.5–5.2)
ALBUMIN/GLOBULIN RATIO: 1.7 (ref 1–2.5)
ALBUMIN/GLOBULIN RATIO: ABNORMAL (ref 1–2.5)
ALP BLD-CCNC: 102 U/L (ref 40–129)
ALP BLD-CCNC: 111 U/L (ref 40–129)
ALT SERPL-CCNC: 8 U/L (ref 5–41)
ALT SERPL-CCNC: 9 U/L (ref 5–41)
ANION GAP SERPL CALCULATED.3IONS-SCNC: 13 MMOL/L (ref 9–17)
ANION GAP SERPL CALCULATED.3IONS-SCNC: 7 MMOL/L (ref 9–17)
AST SERPL-CCNC: 12 U/L
AST SERPL-CCNC: 18 U/L
BASOPHILS # BLD: 1 % (ref 0–2)
BASOPHILS # BLD: 2 % (ref 0–2)
BASOPHILS ABSOLUTE: 0.04 K/UL (ref 0–0.2)
BASOPHILS ABSOLUTE: 0.08 K/UL (ref 0–0.2)
BILIRUB SERPL-MCNC: 0.2 MG/DL (ref 0.3–1.2)
BILIRUB SERPL-MCNC: 0.44 MG/DL (ref 0.3–1.2)
BUN BLDV-MCNC: 19 MG/DL (ref 8–23)
BUN BLDV-MCNC: 19 MG/DL (ref 8–23)
BUN/CREAT BLD: ABNORMAL (ref 9–20)
BUN/CREAT BLD: ABNORMAL (ref 9–20)
CALCIUM SERPL-MCNC: 8.4 MG/DL (ref 8.6–10.4)
CALCIUM SERPL-MCNC: 9 MG/DL (ref 8.6–10.4)
CHLORIDE BLD-SCNC: 104 MMOL/L (ref 98–107)
CHLORIDE BLD-SCNC: 108 MMOL/L (ref 98–107)
CO2: 23 MMOL/L (ref 20–31)
CO2: 25 MMOL/L (ref 20–31)
CREAT SERPL-MCNC: 1.08 MG/DL (ref 0.7–1.2)
CREAT SERPL-MCNC: 1.19 MG/DL (ref 0.7–1.2)
DIFFERENTIAL TYPE: ABNORMAL
DIFFERENTIAL TYPE: ABNORMAL
EOSINOPHILS RELATIVE PERCENT: 1 % (ref 0–4)
EOSINOPHILS RELATIVE PERCENT: 2 % (ref 1–4)
ETHANOL PERCENT: <0.01 %
ETHANOL: <10 MG/DL
GFR AFRICAN AMERICAN: >60 ML/MIN
GFR AFRICAN AMERICAN: >60 ML/MIN
GFR NON-AFRICAN AMERICAN: >60 ML/MIN
GFR NON-AFRICAN AMERICAN: >60 ML/MIN
GFR SERPL CREATININE-BSD FRML MDRD: ABNORMAL ML/MIN/{1.73_M2}
GLUCOSE BLD-MCNC: 135 MG/DL (ref 70–99)
GLUCOSE BLD-MCNC: 137 MG/DL (ref 70–99)
HCT VFR BLD CALC: 38.5 % (ref 41–53)
HCT VFR BLD CALC: 42.7 % (ref 40.7–50.3)
HEMOGLOBIN: 12.3 G/DL (ref 13.5–17.5)
HEMOGLOBIN: 13.3 G/DL (ref 13–17)
IMMATURE GRANULOCYTES: 0 %
IMMATURE GRANULOCYTES: ABNORMAL %
LIPASE: 23 U/L (ref 13–60)
LYMPHOCYTES # BLD: 45 % (ref 24–43)
LYMPHOCYTES # BLD: 51 % (ref 24–44)
MCH RBC QN AUTO: 28.7 PG (ref 26–34)
MCH RBC QN AUTO: 29.1 PG (ref 25.2–33.5)
MCHC RBC AUTO-ENTMCNC: 31.1 G/DL (ref 28.4–34.8)
MCHC RBC AUTO-ENTMCNC: 31.8 G/DL (ref 31–37)
MCV RBC AUTO: 90.1 FL (ref 80–100)
MCV RBC AUTO: 93.4 FL (ref 82.6–102.9)
MONOCYTES # BLD: 10 % (ref 1–7)
MONOCYTES # BLD: 7 % (ref 3–12)
MORPHOLOGY: ABNORMAL
NRBC AUTOMATED: 0 PER 100 WBC
NRBC AUTOMATED: ABNORMAL PER 100 WBC
PDW BLD-RTO: 14.2 % (ref 11.8–14.4)
PDW BLD-RTO: 15.2 % (ref 11.5–14.9)
PLATELET # BLD: 257 K/UL (ref 150–450)
PLATELET # BLD: 300 K/UL (ref 138–453)
PLATELET ESTIMATE: ABNORMAL
PLATELET ESTIMATE: ABNORMAL
PMV BLD AUTO: 7.6 FL (ref 6–12)
PMV BLD AUTO: 9.7 FL (ref 8.1–13.5)
POTASSIUM SERPL-SCNC: 3.9 MMOL/L (ref 3.7–5.3)
POTASSIUM SERPL-SCNC: 4.2 MMOL/L (ref 3.7–5.3)
RBC # BLD: 4.27 M/UL (ref 4.5–5.9)
RBC # BLD: 4.57 M/UL (ref 4.21–5.77)
RBC # BLD: ABNORMAL 10*6/UL
RBC # BLD: ABNORMAL 10*6/UL
SALICYLATE LEVEL: <1 MG/DL (ref 3–10)
SEG NEUTROPHILS: 36 % (ref 36–66)
SEG NEUTROPHILS: 45 % (ref 36–65)
SEGMENTED NEUTROPHILS ABSOLUTE COUNT: 1.37 K/UL (ref 1.3–9.1)
SEGMENTED NEUTROPHILS ABSOLUTE COUNT: 2.11 K/UL (ref 1.5–8.1)
SODIUM BLD-SCNC: 140 MMOL/L (ref 135–144)
SODIUM BLD-SCNC: 140 MMOL/L (ref 135–144)
TOTAL PROTEIN: 6 G/DL (ref 6.4–8.3)
TOTAL PROTEIN: 6.8 G/DL (ref 6.4–8.3)
TOXIC TRICYCLIC SC,BLOOD: NEGATIVE
WBC # BLD: 3.8 K/UL (ref 3.5–11)
WBC # BLD: 4.6 K/UL (ref 3.5–11.3)
WBC # BLD: ABNORMAL 10*3/UL
WBC # BLD: ABNORMAL 10*3/UL

## 2020-07-04 PROCEDURE — 6370000000 HC RX 637 (ALT 250 FOR IP): Performed by: STUDENT IN AN ORGANIZED HEALTH CARE EDUCATION/TRAINING PROGRAM

## 2020-07-04 PROCEDURE — 80053 COMPREHEN METABOLIC PANEL: CPT

## 2020-07-04 PROCEDURE — 85025 COMPLETE CBC W/AUTO DIFF WBC: CPT

## 2020-07-04 PROCEDURE — 6360000002 HC RX W HCPCS: Performed by: EMERGENCY MEDICINE

## 2020-07-04 PROCEDURE — 36415 COLL VENOUS BLD VENIPUNCTURE: CPT

## 2020-07-04 PROCEDURE — 2580000003 HC RX 258: Performed by: STUDENT IN AN ORGANIZED HEALTH CARE EDUCATION/TRAINING PROGRAM

## 2020-07-04 PROCEDURE — 80307 DRUG TEST PRSMV CHEM ANLYZR: CPT

## 2020-07-04 PROCEDURE — 83690 ASSAY OF LIPASE: CPT

## 2020-07-04 PROCEDURE — 1240000000 HC EMOTIONAL WELLNESS R&B

## 2020-07-04 PROCEDURE — G0480 DRUG TEST DEF 1-7 CLASSES: HCPCS

## 2020-07-04 PROCEDURE — 99285 EMERGENCY DEPT VISIT HI MDM: CPT

## 2020-07-04 RX ORDER — HALOPERIDOL 5 MG/ML
5 INJECTION INTRAMUSCULAR ONCE
Status: DISCONTINUED | OUTPATIENT
Start: 2020-07-04 | End: 2020-07-04

## 2020-07-04 RX ORDER — BENZTROPINE MESYLATE 1 MG/ML
2 INJECTION INTRAMUSCULAR; INTRAVENOUS DAILY PRN
Status: DISCONTINUED | OUTPATIENT
Start: 2020-07-04 | End: 2020-07-13 | Stop reason: HOSPADM

## 2020-07-04 RX ORDER — HYDROXYZINE HYDROCHLORIDE 25 MG/1
25 TABLET, FILM COATED ORAL 3 TIMES DAILY PRN
Status: DISCONTINUED | OUTPATIENT
Start: 2020-07-04 | End: 2020-07-13 | Stop reason: HOSPADM

## 2020-07-04 RX ORDER — 0.9 % SODIUM CHLORIDE 0.9 %
1000 INTRAVENOUS SOLUTION INTRAVENOUS ONCE
Status: COMPLETED | OUTPATIENT
Start: 2020-07-04 | End: 2020-07-04

## 2020-07-04 RX ORDER — ACETAMINOPHEN 325 MG/1
650 TABLET ORAL EVERY 4 HOURS PRN
Status: DISCONTINUED | OUTPATIENT
Start: 2020-07-04 | End: 2020-07-13 | Stop reason: HOSPADM

## 2020-07-04 RX ORDER — MAGNESIUM HYDROXIDE/ALUMINUM HYDROXICE/SIMETHICONE 120; 1200; 1200 MG/30ML; MG/30ML; MG/30ML
30 SUSPENSION ORAL 3 TIMES DAILY PRN
Status: DISCONTINUED | OUTPATIENT
Start: 2020-07-04 | End: 2020-07-13 | Stop reason: HOSPADM

## 2020-07-04 RX ORDER — TRAZODONE HYDROCHLORIDE 50 MG/1
50 TABLET ORAL NIGHTLY PRN
Status: DISCONTINUED | OUTPATIENT
Start: 2020-07-04 | End: 2020-07-08

## 2020-07-04 RX ORDER — HALOPERIDOL 5 MG
5 TABLET ORAL EVERY 6 HOURS PRN
Status: DISCONTINUED | OUTPATIENT
Start: 2020-07-04 | End: 2020-07-04 | Stop reason: HOSPADM

## 2020-07-04 RX ADMIN — SODIUM CHLORIDE 1000 ML: 9 INJECTION, SOLUTION INTRAVENOUS at 08:40

## 2020-07-04 RX ADMIN — HALOPERIDOL 5 MG: 5 TABLET ORAL at 09:22

## 2020-07-04 ASSESSMENT — PATIENT HEALTH QUESTIONNAIRE - PHQ9: SUM OF ALL RESPONSES TO PHQ QUESTIONS 1-9: 8

## 2020-07-04 ASSESSMENT — ENCOUNTER SYMPTOMS
COUGH: 0
WHEEZING: 0
PHOTOPHOBIA: 0
CONSTIPATION: 0
NAUSEA: 0
SHORTNESS OF BREATH: 0
DIARRHEA: 0
BACK PAIN: 0
VOMITING: 0
ABDOMINAL PAIN: 1
SINUS PAIN: 0
SINUS PRESSURE: 0

## 2020-07-04 ASSESSMENT — SLEEP AND FATIGUE QUESTIONNAIRES
DO YOU USE A SLEEP AID: YES
SLEEP PATTERN: INSOMNIA;DIFFICULTY FALLING ASLEEP
DIFFICULTY STAYING ASLEEP: YES
DIFFICULTY FALLING ASLEEP: YES
AVERAGE NUMBER OF SLEEP HOURS: 3
RESTFUL SLEEP: NO
DO YOU HAVE DIFFICULTY SLEEPING: YES
DIFFICULTY ARISING: NO

## 2020-07-04 ASSESSMENT — PAIN SCALES - GENERAL: PAINLEVEL_OUTOF10: 0

## 2020-07-04 ASSESSMENT — LIFESTYLE VARIABLES: HISTORY_ALCOHOL_USE: NO

## 2020-07-04 NOTE — CARE COORDINATION
[address] include 234 beds at the [facility] transportation.  scheduled for 9:30pm through [facility] and [address] [place] [address] ambulance.

## 2020-07-04 NOTE — ED PROVIDER NOTES
Faculty Sign-Out Attestation  Handoff taken on the following patient from prior Attending Physician: Maged Sapp    I was available and discussed any additional care issues that arose and coordinated the management plans with the resident(s) caring for the patient during my duty period. Any areas of disagreement with residents documentation of care or procedures are noted on the chart. I was personally present for the key portions of any/all procedures during my duty period. I have documented in the chart those procedures where I was not present during the key portions.     none    Rigoberto Benitez MD  Attending Physician       Rigoberto Benitez MD  07/04/20 [id number]

## 2020-07-04 NOTE — ED NOTES
Pt denies complaints, safe guard present in room. Suicide precautions in place.      Bill Alcantara RN  07/04/20 7876

## 2020-07-04 NOTE — ED PROVIDER NOTES
Multiple hypodense lesions in both kidneys measuring up to 4.4 cm on the right and 3.0 cm on the left with fluid attenuation compatible with cysts. GI/Bowel: No bowel obstruction. Normal appendix. Pelvis: Urinary bladder is nondistended with circumferential wall thickening. Unremarkable prostate. Peritoneum/Retroperitoneum: No free air, fluid, or lymphadenopathy. Bones/Soft Tissues: No acute osseous abnormality. No CT evidence of acute intra-abdominal process. Circumferential bladder wall thickening likely related to nondistention. RECENT VITALS:     Temp: 98.5 °F (36.9 °C),  Pulse: 85, Resp: 16, BP: 119/74, SpO2: 100 %      This patient is a 63 y.o. Male with suicidal ideation with plan to stab himself. Medically cleared for discharge. Waiting transfer. ED Course as of Jul 04 1807   Sat Jul 04, 2020   0946 CMP and CBC unremarkable. Lipase negative. Spoke with lab and they will add on ED tox at this time. [ZT]      ED Course User Index  [ZT] Yoly Jimenez DO       OUTSTANDING TASKS / RECOMMENDATIONS:    1. Awaiting transfer     FINAL IMPRESSION:     1. Suicidal ideation        DISPOSITION:         DISPOSITION:  []  Discharge   [x]  Transfer -    []  Admission -     []  Against Medical Advice   []  Eloped   FOLLOW-UP: No follow-up provider specified.    DISCHARGE MEDICATIONS: New Prescriptions    No medications on file          Ranjit Nagel DO  Emergency Medicine Resident  [facility]       Ranjit Nagel, [address] [place] [address]  Resident  07/04/20 3516

## 2020-07-04 NOTE — CARE COORDINATION
Provisional Diagnosis:    Major Depressive Disorder with Psychosis    Psychosocial and Contextual Factors:     SI with plan, auditory hallucinations. C-SSRS Summary:    Patient:x  Family:  Agency:    Present Suicidal Behavior:    Verbal:x    Attempt:    Past Suicidal Behavior:    Verbal:x    Attempt:    Self-Injurious/Self-Mutilation:      Protective Factors:    Support from family      Risk Factors: Auditory hallucinations, non compliant with medications    Clinical Summary:    Pt is a 63year old male presenting to [facility]. Shock's ER with plans to harm himself by stabbing himself with a knife. Per  at bedside, pt asked him if he had a knife when he came in. Pt reports SI started last night and auditory hallucinations telling him to harm self started last night. He reports he is not compliant with medications, unsure what he is taking. He follows up with [facility] and last saw them last week. He reports random cocaine use last use 2 weeks ago. He has homicidal ideations at times that come and go but denies them currently. He has support through mother. He cannot contract for safety. He is agreeable to admission. He was last admitted from 6/19-6/23 at [facility]. Level of Care Disposition:    On call psych consulted.

## 2020-07-04 NOTE — ED PROVIDER NOTES
Hallucinations, Headache(784.0), Hepatitis, Schizophrenia, schizo-affective ([facility] [address] Substance abuse ([facility] [address] Tobacco abuse, Type II or unspecified type diabetes mellitus without mention of complication, not stated as uncontrolled, and Urinary incontinence. has a past surgical history that includes Dental surgery and Abscess Drainage (N/A, 02/11/2018).     Social History     Socioeconomic History    Marital status: Single     Spouse name: Not on file    Number of children: 0    Years of education: 8    Highest education level: Not on file   Occupational History     Employer: N/A   Social Needs    Financial resource strain: Not on file    Food insecurity     Worry: Not on file     Inability: Not on file   [employer] needs     Medical: Not on file     Non-medical: Not on file   Tobacco Use    Smoking status: Current Every Day Smoker     Packs/day: 0.50     Types: Cigarettes    Smokeless tobacco: Never Used   Substance and Sexual Activity    Alcohol use: Yes     Comment: reports drinking occasionally    Drug use: Yes     Types: Cocaine     Comment: last used 6/2/2020    Sexual activity: Not on file   Lifestyle    Physical activity     Days per week: Not on file     Minutes per session: Not on file    Stress: Not on file   Relationships    Social connections     Talks on phone: Not on file     Gets together: Not on file     Attends Buddhist service: Not on file     Active member of club or organization: Not on file     Attends meetings of clubs or organizations: Not on file     Relationship status: Not on file    Intimate partner violence     Fear of current or ex partner: Not on file     Emotionally abused: Not on file     Physically abused: Not on file     Forced sexual activity: Not on file   Other Topics Concern    Not on file   Social History Narrative    Not on file       Family History   Problem Relation Age of Onset    Diabetes Mother     Heart Disease Mother        Allergies:  Navane Conjunctivae normal.   Neck:      Musculoskeletal: Normal range of motion. Trachea: No tracheal deviation. Cardiovascular:      Rate and Rhythm: Normal rate and regular rhythm. Heart sounds: Normal heart sounds. Pulmonary:      Effort: Pulmonary effort is normal. No respiratory distress. Breath sounds: Normal breath sounds. No wheezing or rales. Abdominal:      General: Bowel sounds are normal. There is no distension. Palpations: Abdomen is soft. Tenderness: There is no abdominal tenderness. Musculoskeletal:         General: No deformity. Skin:     General: Skin is warm and dry. Neurological:      Mental Status: He is alert and oriented to person, place, and time. Psychiatric:         Attention and Perception: He is inattentive. He perceives auditory hallucinations. Mood and Affect: Mood is anxious. Speech: Speech is rapid and pressured. Thought Content: Thought content includes suicidal ideation. Thought content includes suicidal plan.          DIFFERENTIAL  DIAGNOSIS     PLAN (LABS / IMAGING / EKG):  Orders Placed This Encounter   Procedures    Urinalysis, reflex to microscopic    CBC Auto Differential    Comprehensive Metabolic Panel w/ Reflex to MG    Lipase    TOX SCR, BLD, ED    Inpatient consult to Social Work       MEDICATIONS ORDERED:  Orders Placed This Encounter   Medications    0.9 % sodium chloride bolus    DISCONTD: haloperidol lactate (HALDOL) injection 5 mg    haloperidol (HALDOL) tablet 5 mg         DIAGNOSTIC RESULTS / EMERGENCY DEPARTMENT COURSE / MDM     Results for orders placed or performed during the hospital encounter of 07/04/20   CBC Auto Differential   Result Value Ref Range    WBC 4.6 3.5 - 11.3 k/uL    RBC 4.57 4.21 - 5.77 m/uL    Hemoglobin 13.3 13.0 - 17.0 g/dL    Hematocrit 42.7 40.7 - 50.3 %    MCV 93.4 82.6 - 102.9 fL    MCH 29.1 25.2 - 33.5 pg    MCHC 31.1 28.4 - 34.8 g/dL    RDW 14.2 11.8 - 14.4 %    Platelets 300 138 - 453 k/uL    MPV 9.7 8.1 - 13.5 fL    NRBC Automated 0.0 0.0 per 100 WBC    Differential Type NOT REPORTED     Seg Neutrophils 45 36 - 65 %    Lymphocytes 45 (H) 24 - 43 %    Monocytes 7 3 - 12 %    Eosinophils % 2 1 - 4 %    Basophils 1 0 - 2 %    Immature Granulocytes 0 0 %    Segs Absolute 2.11 1.50 - 8.10 k/uL    Absolute Lymph # 2.08 1.10 - 3.70 k/uL    Absolute Mono # 0.31 0.10 - 1.20 k/uL    Absolute Eos # 0.09 0.00 - 0.44 k/uL    Basophils Absolute 0.04 0.00 - 0.20 k/uL    Absolute Immature Granulocyte <0.03 0.00 - 0.30 k/uL    WBC Morphology NOT REPORTED     RBC Morphology NOT REPORTED     Platelet Estimate NOT REPORTED    Comprehensive Metabolic Panel w/ Reflex to MG   Result Value Ref Range    Glucose 135 (H) 70 - 99 mg/dL    BUN 19 8 - 23 mg/dL    CREATININE 1.08 0.70 - 1.20 mg/dL    Bun/Cre Ratio NOT REPORTED 9 - 20    Calcium 9.0 8.6 - 10.4 mg/dL    Sodium 140 135 - 144 mmol/L    Potassium 3.9 3.7 - 5.3 mmol/L    Chloride 104 98 - 107 mmol/L    CO2 23 20 - 31 mmol/L    Anion Gap 13 9 - 17 mmol/L    Alkaline Phosphatase 111 40 - 129 U/L    ALT 9 5 - 41 U/L    AST 18 <40 U/L    Total Bilirubin 0.44 0.3 - 1.2 mg/dL    Total Protein 6.8 6.4 - 8.3 g/dL    Alb 4.3 3.5 - 5.2 g/dL    Albumin/Globulin Ratio 1.7 1.0 - 2.5    GFR Non-African American >60 >60 mL/min    GFR African American >60 >60 mL/min    GFR Comment          GFR Staging NOT REPORTED    Lipase   Result Value Ref Range    Lipase 23 13 - 60 U/L   TOX SCR, BLD, ED   Result Value Ref Range    Acetaminophen Level <5 (L) 10 - 30 ug/mL    Ethanol <10 <10 mg/dL    Ethanol percent <5.379 <0.050 %    Salicylate Lvl <1 (L) 3 - 10 mg/dL    Toxic Tricyclic Sc,Blood NEGATIVE NEGATIVE         RADIOLOGY:  No orders to display        IMPRESSION/MDM/EMERGENCY DEPARTMENT COURSE:  Patient came to emergency department, HPI and physical exam were conducted. All nursing notes were reviewed.       42-year-old gentleman with a past medical history that includes schizophrenia presented emergency department for suicidal ideations. Been present for the past 1 to 2 days. Worsening depression. Patient notes that he may be out of some of his medications. Experiencing auditory hallucinations telling him to stab himself. Denies any drug or alcohol use. Also endorses some abdominal pain and increased urinary frequency. Vitals within normal limits. Patient sitting in bed comfortably no acute distress. Heart regular rate and without murmur. Lungs clear to auscultate bilateral without wheezes rales rhonchi. Abdomen soft, nontender nondistended. There is no rebound or guarding. Endorses auditory hallucinations during exam.  Speech is slightly pressured. Anxious, inattentive. Concern for uncontrolled schizophrenia, suicidal ideations. Did have some increased urinary frequency but low sucpician for UTI at this time. Will check urine if pt can provide sample. Differential still includes gastritis, gastroenteritis, pancreatitis, hepatitis. Will also obtain abdominal labs including CMP, lipase, CBC. ED Course as of Jul 04 1700   Sat Jul 04, 2020   0946 CMP and CBC unremarkable. Lipase negative. Spoke with lab and they will add on ED tox at this time. [ZT]      ED Course User Index  [ZT] Kait Carbajal DO       Pt is medically cleared at this time. Signed out to Dr. Octaviano Andino. CONSULTS:  IP CONSULT TO SOCIAL WORK    FINAL IMPRESSION      1. Suicidal ideation          DISPOSITION / PLAN     DISPOSITION Decision To Transfer 07/04/2020 02:50:01 PM      PATIENT REFERRED TO:  No follow-up provider specified.     DISCHARGE MEDICATIONS:  New Prescriptions    No medications on file       Kait Carbajal DO  Emergency Medicine Resident    (Please note that portions of thisnote were completed with a voice recognition program.  Efforts were made to edit the dictations but occasionally words are mis-transcribed.)       Kait Carbajal DO  Resident  07/04/20

## 2020-07-04 NOTE — CARE COORDINATION
Pt accepted to [facility] under Dr. Calos Gaspar. Updated pt. Faxed SBAR and voluntary admission form.      Pt accepted to [facility] room 213 after 7pm.

## 2020-07-04 NOTE — ED NOTES
Pt asleep on stretcher. NAD noted. RR even and nonlabored. Safeguard at bedside. Awaiting BHI placement. Will continue to monitor.      Pili Grier RN  07/04/20 [id number]

## 2020-07-04 NOTE — ED NOTES
Pt resting on stretcher with eyes closed. Even and unlabored respirations noted. Safety guard present in room, suicide precautions maintained.      Joaquina Garzon RN  07/04/20 2111

## 2020-07-04 NOTE — ED PROVIDER NOTES
[facility]     Emergency Department     Faculty Attestation    I performed a history and physical examination of the patient and discussed management with the resident. I reviewed the residents note and agree with the documented findings including all diagnostic interpretations and plan of care. Any areas of disagreement are noted on the chart. I was personally present for the key portions of any procedures. I have documented in the chart those procedures where I was not present during the key portions. I have reviewed the emergency nurses triage note. I agree with the chief complaint, past medical history, past surgical history, allergies, medications, social and family history as documented unless otherwise noted below. Documentation of the HPI, Physical Exam and Medical Decision Making performed by scribes is based on my personal performance of the HPI, PE and MDM. For Physician Assistant/ Nurse Practitioner cases/documentation I have personally evaluated this patient and have completed at least one if not all key elements of the E/M (history, physical exam, and MDM). Additional findings are as noted. This patient was evaluated in the Emergency Department for symptoms described in the history of present illness. He/she was evaluated in the context of the global COVID-19 pandemic, which necessitated consideration that the patient might be at risk for infection with the SARS-CoV-2 virus that causes COVID-19. Institutional protocols and algorithms that pertain to the evaluation of patients at risk for COVID-19 are in a state of rapid change based on information released by regulatory bodies including the CDC and federal and state organizations. These policies and algorithms were followed during the patient's care in the ED. Primary Care Physician: Samantha Mckeon MD    History:  This is a 63 y.o. male who presents to the Emergency Department with complaint of suicidal ideation. Patient with plans to stab himself. Did not do anything to harm himself prior to arrival.  He did specifically ask me for a knife so that he could stab himself. Of note he is also been complaining of belly pain since yesterday. No fevers no vomiting no diarrhea no pain burning with urination. Physical:     oral temperature is 98.5 °F (36.9 °C). His blood pressure is 113/78 and his pulse is 75. His respiration is 15 and oxygen saturation is 100%. 63 y.o. male no acute distress, cardiac exam regular rate and rhythm no murmurs rubs gallops, pulmonary clear bilaterally. Mildly anxious. Depressed affect. Answers questions appropriately.     Impression: Suicidal ideation    Plan: Abdominal labs, symptomatic treatment, Haldol, will need psychiatric evaluation after medically evaluated      Laly Mayo MD, [facility]  Attending Emergency Physician        Arnoldo Smith MD  07/04/20 4482

## 2020-07-04 NOTE — ED NOTES
Pt resting on stretcher watching tv, denies complaints. Safety guard present.      Jeremi Perry RN  07/04/20 [id number]

## 2020-07-04 NOTE — ED NOTES
Pt asleep on stretcher. NAD noted. RR even and nonlabored. Safeguard at bedside. Awaiting BHI placement. Will continue to monitor.        Brea Gonsales RN  07/04/20 0429

## 2020-07-04 NOTE — ED NOTES
[] Sosa    [] [address]    [x]  [facility] ASSESSMENT      Y  N     [x] [] In the past two weeks have you had thoughts of hurting yourself in any way? [x] [] In the past two weeks have you had thoughts that you would be better off dead? [x] [] Have you made a suicide attempt in the past two months? [x] [] Do you have a plan for hurting yourself or suicide? [x] [] Presence of hallucinations/voices related to hurting himself or herself or someone else. SUICIDE/SECURITY WATCH PRECAUTION CHECKLIST     Orders    [x]  Suicide/Security Watch Precautions initiated as checked below:   7/4/20 10:47 AM EDT 29/29    [x] Notified physician:  Leandro Ponce MD  7/4/20 10:47 AM EDT    [x] Orders obtained as appropriate:     [x] 1:1 Observer     [] Psych Consult     [] Psych Consult    Name:  Date:  Time:    [x] 1:1 Observer, Notified by:  Valdez Nelson Nurse Supervisor    [x] Remove all personal clothes from room and place in snap/paper gown/pants. Slipper only    [x] Remove all personal belongings from room and secured away from patient. Documentation    [x] Initiate Suicide/Security Watch Precaution Flow Sheet    [x] Initiate individualized Care Plan/Problem    [x] Document why precautions initiated on flow sheet (Initiate Nursing Care Plan/Problem)    [x] 1:1 Observer in place; instructions provided. Suicide precautions require observer be within arms length. [x] Nurse-Observer Communication Hand-off initiated by RN, reviewed with Observer. Subsequently used as Hand Off between Observers. [x] Initiate every 15 minute observations per observer as delegated by the RN.     [x] Initiate RN assessment and documentation    Environmental Scan  Search Criteria and Process: OPTIONAL, see Search Policy    [] Reason for search:    [] Nursing in presence of second person to search patient    [] Patient notified of reason for body assessment and belongings search:     Persons present during search:   Results of search and disposition:       Searchers Name: security    These items or items similar should be removed from the room:   [x] Chairs   [x] Telephone   [] Trash cans and liners   [x] Plastic utensils (order Patient Safety tray)   [] Empty or remove Sharps containers   [x] All personal clothing/belongings removed   [x] All unnecessary lead wires, electrical cords, draw cords, etc.   [x] Flowers and plants   [x] Double check for lighters, matches, razors, any glass items etc that can be used as weapons. Person completing Checklist: Kita Lyn       GENERAL INFORMATION     Y  N     [x] [] Has the patient been informed that they are on a watch and what that means? [x] [] Can the patient get out of Bed without nursing assistance? [x] [] Can the patient use the restroom without nursing assistance? [x] [] Can the patient walk the halls to [place] their legs? \"   [] [x] Does the patient have metal utensils? [x] [] Have the patient's belongings been placed out of control of the patient? [x] [] Have the patient and his/her belongings been checked for contraband? [x] [] Is the patient under any visitor restrictions? If Yes, explain: Suicidal   [] [x] Is the patient under an alias? [facility] Name:   Authorized visitors (no more than two are to be on the list)   Name/Relationship:   Name/Relationship:    Name of Staff member that you  Received this information from?: self and security    General Description:    Conchita Maoica 31/28 male 63 y.o. Admission weight:      Race: []  [x] Black  []   []   [] [place] [] Other  Facial Hair:  [x] Yes  [] No  If yes, please describe:  stubble  Identifying Marks (i.e. Visible tattoos, scars, etc... ):     NURSING CARE PLAN    Nursing Diagnosis: Risk of Self Directed Harm  [x] Actual  [] Potential  Date Started: 7/4/20      Etiological Factors: (related to)  [x] Expressed or implied suicidal ideation/behavior  [x] Depression  [x] Suicide attempt      [x] Low self-esteem  [x] Hallucinations      [x] Feeling of Hopelessness  [] Substance abuse or withdrawal    [] Dysfunctional family  [] Major traumatic event, eg., divorce, etc   [x] Excessive stress/anxiety    7/4/20    Expected Outcomes    Patient will:   [x] Patient will remain safe for the duration of their stay   [x] Patient's environment will be safe, eg. Free of potential suicide weapons   [] Verbalize Recovery from suicidal episode and improvement in self-worth   [x] Discuss feeling that precipitated suicide attempt/thoughts/behavior   [] Will describe available resources for crisis prevention and management   [] Will verbalize positive coping skills     Nursing Intervention   [x] Assessment and Observations hourly   [x] Suicide Precautions implemented with patient, should be 1:1 observation   [x] Document observation [id number] and RN assessment hourly   [] Consult physician for:    [] Psychiatric consult    [] Pharmacological therapy    [] Other:    [x] Patient search completed by security   [x] Initiated appropriate safety protocols by removing from the patient's environment anything that could be used to inflict self injury, eg. Order safe tray, snap gown, etc   [x] Maintain open, warm, caring, non-judgmental attitude/manner towards patient   [] Discuss advantages and disadvantages of existing coping methods/skills   [x] Assist and educate patient with identifying present strengths and coping skills   [x] Keep patient informed regarding plan of care and provide clear concise explanations. Provide the patient/family education information as well as telephone numbers and other information about crisis centers, hot lines, and counselors.     Discharge Planning:   [] Referral  [] Groups [] Health agencies  [] Other:          Cristina Kincaid RN  07/04/20 5131

## 2020-07-04 NOTE — ED NOTES
Pt given ice water on request. Call light in reach. Safety guard present, suicide precautions continued.      Mg Piper RN  07/04/20 5458

## 2020-07-05 LAB
SARS-COV-2, PCR: NORMAL
SARS-COV-2, RAPID: NORMAL
SARS-COV-2: NOT DETECTED
SOURCE: NORMAL

## 2020-07-05 PROCEDURE — U0003 INFECTIOUS AGENT DETECTION BY NUCLEIC ACID (DNA OR RNA); SEVERE ACUTE RESPIRATORY SYNDROME CORONAVIRUS 2 (SARS-COV-2) (CORONAVIRUS DISEASE [COVID-19]), AMPLIFIED PROBE TECHNIQUE, MAKING USE OF HIGH THROUGHPUT TECHNOLOGIES AS DESCRIBED BY CMS-2020-01-R: HCPCS

## 2020-07-05 PROCEDURE — 6370000000 HC RX 637 (ALT 250 FOR IP): Performed by: PSYCHIATRY & NEUROLOGY

## 2020-07-05 PROCEDURE — 99222 1ST HOSP IP/OBS MODERATE 55: CPT | Performed by: PSYCHIATRY & NEUROLOGY

## 2020-07-05 PROCEDURE — 1240000000 HC EMOTIONAL WELLNESS R&B

## 2020-07-05 RX ORDER — QUETIAPINE FUMARATE 200 MG/1
400 TABLET, FILM COATED ORAL NIGHTLY
Status: DISCONTINUED | OUTPATIENT
Start: 2020-07-05 | End: 2020-07-09

## 2020-07-05 RX ORDER — DICYCLOMINE HYDROCHLORIDE 10 MG/1
10 CAPSULE ORAL EVERY 6 HOURS PRN
Status: DISCONTINUED | OUTPATIENT
Start: 2020-07-05 | End: 2020-07-13 | Stop reason: HOSPADM

## 2020-07-05 RX ORDER — ESCITALOPRAM OXALATE 20 MG/1
20 TABLET ORAL NIGHTLY
Status: DISCONTINUED | OUTPATIENT
Start: 2020-07-05 | End: 2020-07-13 | Stop reason: HOSPADM

## 2020-07-05 RX ORDER — OXCARBAZEPINE 300 MG/1
300 TABLET, FILM COATED ORAL 2 TIMES DAILY
Status: DISCONTINUED | OUTPATIENT
Start: 2020-07-05 | End: 2020-07-13 | Stop reason: HOSPADM

## 2020-07-05 RX ADMIN — HYDROXYZINE HYDROCHLORIDE 25 MG: 25 TABLET, FILM COATED ORAL at 21:53

## 2020-07-05 RX ADMIN — ESCITALOPRAM OXALATE 20 MG: 20 TABLET ORAL at 21:53

## 2020-07-05 RX ADMIN — OXCARBAZEPINE 300 MG: 300 TABLET, FILM COATED ORAL at 10:41

## 2020-07-05 RX ADMIN — TRAZODONE HYDROCHLORIDE 50 MG: 50 TABLET ORAL at 21:53

## 2020-07-05 RX ADMIN — QUETIAPINE FUMARATE 400 MG: 200 TABLET ORAL at 21:53

## 2020-07-05 RX ADMIN — OXCARBAZEPINE 300 MG: 300 TABLET, FILM COATED ORAL at 21:53

## 2020-07-05 ASSESSMENT — SLEEP AND FATIGUE QUESTIONNAIRES
RESTFUL SLEEP: NO
DO YOU USE A SLEEP AID: YES
SLEEP PATTERN: DIFFICULTY FALLING ASLEEP;INSOMNIA;DISTURBED/INTERRUPTED SLEEP
DO YOU HAVE DIFFICULTY SLEEPING: YES
DIFFICULTY FALLING ASLEEP: YES
AVERAGE NUMBER OF SLEEP HOURS: 3
DIFFICULTY STAYING ASLEEP: YES
DIFFICULTY ARISING: NO

## 2020-07-05 ASSESSMENT — PATIENT HEALTH QUESTIONNAIRE - PHQ9: SUM OF ALL RESPONSES TO PHQ QUESTIONS 1-9: 11

## 2020-07-05 ASSESSMENT — LIFESTYLE VARIABLES: HISTORY_ALCOHOL_USE: NO

## 2020-07-05 NOTE — BH NOTE

## 2020-07-05 NOTE — BH NOTE
PSYCHIATRIC EVALUATION    No contraindications for seclusion  No contraindications for restraint      CHIEF COMPLAINT:    <principal problem not specified>  Britta Smith is a 63 y.o. male who presents with <principal problem not specified>    The patient was admitted to psychiatry after presenting to ED with suicidal ideation. He reported command hallucinations over the last 3 days. The patient had a plan to stab himself. He asked the ER physician for a knife so he could kill himself. The patient denies using any recreational substances recently          HISTORY OF PRESENT ILLNESS:   Patient presented as a 69-year-old -American man who has a long history of schizoaffective disorder, crack cocaine, and alcohol dependence.  I have noted that the patient was admitted 2 weeks ago to [facility] with a similar presentation with a similar presentation. The patient was seen using telemetry psych. He is slightly drowsy and continue to lie in bed. Patient cites stressors from being around people who sell dope as the main reason that he is feeling depressed and suicidal.  He also reports auditory hallucinations telling him to kill himself.   He reports he has been compliant with his medications.     PAST PSYCHIATRIC HISTORY:      Schizoaffective disorder, bipolar type, cocaine dependence and alcohol dependence  He is a client of [facility] and part of the act team.  The patient has been admitted on multiple occasions suicidal ideation and auditory hallucination     MEDICAL HISTORY:     Past Medical History:   Diagnosis Date    Bipolar disorder ([address]     Depression     GERD (gastroesophageal reflux disease)     Hallucinations     Headache(784.0)     Hepatitis     Schizophrenia, schizo-affective ([address]     Substance abuse ([address]     Tobacco abuse     Type II or unspecified type diabetes mellitus without mention of complication, not stated as uncontrolled     Urinary incontinence       has a past medical history of Bipolar disorder ([facility] Depression, GERD (gastroesophageal reflux disease), Hallucinations, Headache(784.0), Hepatitis, Schizophrenia, schizo-affective ([facility] Substance abuse ([facility] Tobacco abuse, Type II or unspecified type diabetes mellitus without mention of complication, not stated as uncontrolled, and Urinary incontinence.   Past Surgical History:   Procedure Laterality Date    ABSCESS DRAINAGE N/A 02/11/2018    Carla anal abcess    DENTAL SURGERY      all teeth pulled     Recent Results (from the past 72 hour(s))   CBC Auto Differential    Collection Time: 07/04/20  8:47 AM   Result Value Ref Range    WBC 4.6 3.5 - 11.3 k/uL    RBC 4.57 4.21 - 5.77 m/uL    Hemoglobin 13.3 13.0 - 17.0 g/dL    Hematocrit 42.7 40.7 - 50.3 %    MCV 93.4 82.6 - 102.9 fL    MCH 29.1 25.2 - 33.5 pg    MCHC 31.1 28.4 - 34.8 g/dL    RDW 14.2 11.8 - 14.4 %    Platelets 307 806 - 685 k/uL    MPV 9.7 8.1 - 13.5 fL    NRBC Automated 0.0 0.0 per 100 WBC    Differential Type NOT REPORTED     Seg Neutrophils 45 36 - 65 %    Lymphocytes 45 (H) 24 - 43 %    Monocytes 7 3 - 12 %    Eosinophils % 2 1 - 4 %    Basophils 1 0 - 2 %    Immature Granulocytes 0 0 %    Segs Absolute 2.11 1.50 - 8.10 k/uL    Absolute Lymph # 2.08 1.10 - 3.70 k/uL    Absolute Mono # 0.31 0.10 - 1.20 k/uL    Absolute Eos # 0.09 0.00 - 0.44 k/uL    Basophils Absolute 0.04 0.00 - 0.20 k/uL    Absolute Immature Granulocyte <0.03 0.00 - 0.30 k/uL    WBC Morphology NOT REPORTED     RBC Morphology NOT REPORTED     Platelet Estimate NOT REPORTED    Comprehensive Metabolic Panel w/ Reflex to MG    Collection Time: 07/04/20  8:47 AM   Result Value Ref Range    Glucose 135 (H) 70 - 99 mg/dL    BUN 19 8 - 23 mg/dL    CREATININE 1.08 0.70 - 1.20 mg/dL    Bun/Cre Ratio NOT REPORTED 9 - 20    Calcium 9.0 8.6 - 10.4 mg/dL    Sodium 140 135 - 144 mmol/L    Potassium 3.9 3.7 - 5.3 mmol/L    Chloride 104 98 - 107 mmol/L    CO2 23 20 - per 100 WBC    Differential Type NOT REPORTED     Immature Granulocytes NOT REPORTED 0 %    Absolute Immature Granulocyte NOT REPORTED 0.00 - 0.30 k/uL    WBC Morphology NOT REPORTED     RBC Morphology NOT REPORTED     Platelet Estimate NOT REPORTED     Seg Neutrophils 36 36 - 66 %    Lymphocytes 51 (H) 24 - 44 %    Monocytes 10 (H) 1 - 7 %    Eosinophils % 1 0 - 4 %    Basophils 2 0 - 2 %    Segs Absolute 1.37 1.3 - 9.1 k/uL    Absolute Lymph # 1.93 1.0 - 4.8 k/uL    Absolute Mono # 0.38 0.1 - 1.3 k/uL    Absolute Eos # 0.04 0.0 - 0.4 k/uL    Basophils Absolute 0.08 0.0 - 0.2 k/uL    Morphology ANISOCYTOSIS PRESENT          FAMILY HISTORY:     Family History   Problem Relation Age of Onset    Diabetes Mother     Heart Disease Mother        SOCIAL HISTORY:     Social History     Socioeconomic History    Marital status: Single     Spouse name: None    Number of children: 0    Years of education: 8    Highest education level: None   Occupational History     Employer: N/A   Social Needs    Financial resource strain: None    Food insecurity     Worry: None     Inability: None    Transportation needs     Medical: None     Non-medical: None   Tobacco Use    Smoking status: Current Every Day Smoker     Packs/day: 0.50     Types: Cigarettes    Smokeless tobacco: Never Used   Substance and Sexual Activity    Alcohol use: Yes     Comment: reports drinking occasionally    Drug use: Yes     Types: Cocaine     Comment: last used 6/2/2020    Sexual activity: None   Lifestyle    Physical activity     Days per week: None     Minutes per session: None    Stress: None   Relationships    Social connections     Talks on phone: None     Gets together: None     Attends Yazdanism service: None     Active member of club or organization: None     Attends meetings of clubs or organizations: None     Relationship status: None    Intimate partner violence     Fear of current or ex partner: None     Emotionally abused: None Physically abused: None     Forced sexual activity: None   Other Topics Concern    None   Social History Narrative    None     Social History     Socioeconomic History    Marital status: Single     Spouse name: Not on file    Number of children: 0    Years of education: 8    Highest education level: Not on file   Occupational History     Employer: N/A   Social Needs    Financial resource strain: Not on file    Food insecurity     Worry: Not on file     Inability: Not on file   [employer] needs     Medical: Not on file     Non-medical: Not on file   Tobacco Use    Smoking status: Current Every Day Smoker     Packs/day: 0.50     Types: Cigarettes    Smokeless tobacco: Never Used   Substance and Sexual Activity    Alcohol use: Yes     Comment: reports drinking occasionally    Drug use: Yes     Types: Cocaine     Comment: last used 6/2/2020    Sexual activity: Not on file   Lifestyle    Physical activity     Days per week: Not on file     Minutes per session: Not on file    Stress: Not on file   Relationships    Social connections     Talks on phone: Not on file     Gets together: Not on file     Attends Nondenominational service: Not on file     Active member of club or organization: Not on file     Attends meetings of clubs or organizations: Not on file     Relationship status: Not on file    Intimate partner violence     Fear of current or ex partner: Not on file     Emotionally abused: Not on file     Physically abused: Not on file     Forced sexual activity: Not on file   Other Topics Concern    Not on file   Social History Narrative    Not on file       /76   Pulse 64   Temp 97.8 °F (36.6 °C) (Oral)   Resp 14   Ht 6' 3\" (1.905 m)   Wt 170 lb (77.1 kg)   SpO2 99%   BMI 21.25 kg/m²   MENTAL STATUS EXAMINATION:     MOOD depressed   Affect-is blunted  Patient feels helpless hopeless and worthless  Psychomotor activity : decreased.   APPEARANCE:  Unkempt with poor grooming and poor MD  Board Certified Psychiatrist  7/5/2020 9:59 AM

## 2020-07-05 NOTE — ED NOTES
Pt asleep on stretcher. NAD noted. RR even and nonlabored. [facility] at bedside. Awaiting [facility]HI placement. Will continue to monitor.        Bola Cuellar RN  07/04/20 204

## 2020-07-05 NOTE — PROGRESS NOTES
Patient given tobacco quitline number 90415871577 at this time, refusing to call at this time, states \" I just dont want to quit now\"- patient given information as to the dangers of long term tobacco use. Continue to reinforce the importance of tobacco cessation.

## 2020-07-05 NOTE — CARE COORDINATION
Clinician met with patient for 1:1 and discussed he is open with [facility] and he feels his medications do not work and he did admit to smoking crack cocaine this month sometime but cannot recall exactly when.

## 2020-07-05 NOTE — PLAN OF CARE
Problem: Tobacco Use:  Goal: Inpatient tobacco use cessation counseling participation  7/5/2020 0920 by Mitchell Correa RN  Outcome: Ongoing   Smoking cessation patch/gum given to help with nicotine cravings   Problem: Altered Mood, Psychotic Behavior:  Goal: Able to verbalize decrease in frequency and intensity of hallucinations  7/5/2020 0920 by Mitchell Correa RN  Outcome: Ongoing   Patient reports voices are \"mumbles\", patient was unwilling to discuss content. Patient remains isolative, evasive and aloof of peers. Tending to personal hygiene encouraged, hygiene bucket given to patient. Patient does admit to thoughts of self harm, reports he feels safe inpatient and is willing to seek out staff if unable to keep self safe. Pt. Remains on q15 min checks and frequent spontaneous checks throughout shift. Pt. Safety maintained.
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
## 2020-07-05 NOTE — PROGRESS NOTES
Patient refuses Covid testing stating that last time he was here \"they hurt my nose with that\". Education provided but patient continues to refuse.
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
## 2020-07-05 NOTE — PLAN OF CARE
[facility]  Initial Interdisciplinary Treatment Plan NO      Original treatment plan Date & Time: 7/5/2020                739AM    Admission Type:  Admission Type: Involuntary    Reason for admission:   Reason for Admission: SI no plan    Estimated Length of Stay:  5-7days  Estimated Discharge Date: to be determined by physician    PATIENT STRENGTHS:  Patient Strengths:Strengths: Positive Support, No significant Physical Illness  Patient Strengths and Limitations:Limitations: Tendency to isolate self, Lacks leisure interests, Difficulty problem solving/relies on others to help solve problems, Hopeless about future, Multiple barriers to leisure interests, Inappropriate/potentially harmful leisure interests (depression substance abuse anxiety poor coping skills)  Addictive Behavior: Addictive Behavior  In the past 3 months, have you felt or has someone told you that you have a problem with:  : None  Do you have a history of Chemical Use?: No  Do you have a history of Alcohol Use?: No  Do you have a history of Street Drug Abuse?: Yes  Histroy of Prescripton Drug Abuse?: No  Medical Problems:No past medical history on file.   Status EXAM:Status and Exam  Normal: No  Facial Expression: Elevated  Affect: Inappropriate  Level of Consciousness: Alert  Mood:Normal: No  Mood: Depressed, Anxious  Motor Activity:Normal: No  Motor Activity: Decreased  Interview Behavior: Cooperative  Preception: [place] to Person, [place] to Time, [place] to Place, [place] to Situation  Attention:Normal: Yes  Attention: Distractible  Thought Processes: Circumstantial  Thought Content:Normal: Yes  Thought Content: Preoccupations  Hallucinations: None  Delusions: No  Memory:Normal: Yes  Memory: Poor Recent, Confabulation  Insight and Judgment: No  Insight and Judgment: Unmotivated  Present Suicidal Ideation: No  Present Homicidal Ideation: No    EDUCATION:   Learner Progress Toward Treatment Goals: reviewed group plans and strategies

## 2020-07-05 NOTE — BH NOTE
`[facility]  Admission Note     Admission Type:   Admission Type: Voluntary    Reason for admission:  Reason for Admission: Pt having suicidal ideation plan to stab self. Pt hearing voices telling him to commit suicide. PATIENT STRENGTHS:  Strengths: Communication, No significant Physical Illness, Connection to output provider    Patient Strengths and Limitations:  Limitations: Difficulty problem solving/relies on others to help solve problems, Inappropriate/potentially harmful leisure interests, General negative or hopeless attitude about future/recovery    Addictive Behavior:        Medical Problems:   Past Medical History:   Diagnosis Date    B[facility] disorder ([facility]     Depression     GERD (gastroesophageal reflux disease)     Hallucinations     Headache(784.0)     Hepatitis     Schizophrenia, schizo-affective ([facility]     Substance abuse ([facility]     Tobacco abuse     Type II or unspecified type diabetes mellitus without mention of complication, not stated as uncontrolled     Urinary incontinence        Status EXAM:  Status and Exam  Normal: No  Facial Expression: Flat, Sad, Worried  Affect: Blunt  Level of Consciousness: Alert  Mood:Normal: No  Mood: Depressed, Anxious, Helpless, Sad, Worthless, low self-esteem  Motor Activity:Normal: No  Motor Activity: Decreased  Interview Behavior: Cooperative  Preception: [place] to Person, [place] to Time, [place] to Place, [place] to Situation  Attention:Normal: No  Attention: Distractible  Thought Processes: Blocking, Tangential  Thought Content:Normal: No  Thought Content: Poverty of Content  Hallucinations:  Auditory (Comment), Command(Comment)(Voices telling him to commit suicide.)  Delusions: No  Memory:Normal: No  Memory: Poor Recent, Poor Remote  Insight and Judgment: No  Insight and Judgment: Poor Judgment, Poor Insight  Present Suicidal Ideation: Yes(Plan to stab self, but contracts for safety while on unit.)  Present Homicidal Ideation: No    Tobacco Screening:  Practical Counseling, on admission, corby X, if applicable and completed (first 3 are required if patient doesn't refuse):            ( )  Recognizing danger situations (included triggers and roadblocks)                    ( )  Coping skills (new ways to manage stress, exercise, relaxation techniques, changing routine, distraction)                                                           ( )  Basic information about quitting (benefits of quitting, techniques in how to quit, available resources  ( ) Referral for counseling faxed to [facility]        (x) Patient refused counseling  ( ) Patient has not smoked in the last 30 days    Metabolic Screening:    Lab Results   Component Value Date    LABA1C 5.2 05/22/2020       Lab Results   Component Value Date    CHOL 179 02/13/2017    CHOL 127 05/09/2015    CHOL 168 08/20/2014    CHOL 158 12/31/2013    CHOL 178 08/15/2013    CHOL 166 09/17/2012    CHOL 210 (H) 02/03/2012     Lab Results   Component Value Date    TRIG 67 02/13/2017    TRIG 37 05/09/2015    TRIG 72 08/20/2014    TRIG 52 12/31/2013    TRIG 70 08/15/2013    TRIG 117 09/17/2012    TRIG 94 02/03/2012     Lab Results   Component Value Date    HDL 73 02/13/2017    HDL 57 05/09/2015    HDL 50 08/20/2014    HDL 43 12/31/2013    HDL 42 08/15/2013    HDL 38 (L) 09/17/2012    HDL 55 02/03/2012     No components found for: LDLCAL  No results found for: LABVLDL      Body mass index is 21.25 kg/m². BP Readings from Last 2 Encounters:   07/04/20 110/76   07/04/20 119/74           Pt admitted with followings belongings:  Dentures: None  Vision - Corrective Lenses: None  Hearing Aid: None  Jewelry: None  Body Piercings Removed: N/A  Clothing: Footwear, Sweater, Other (Comment), Shirt(hat,belt)  Were All Patient Medications Collected?: Not Applicable  Other Valuables: Angela Terry (Comment)(ID, Insurance cards.  misc papers/cards)     Valuables placed in safe in security envelope, number:  \[id number]\. Patient oriented to surroundings and program expectations and copy of patient rights given. Received admission packet: Yes. Consents reviewed, signed: Yes. Patient verbalize understanding of need for current admission. Patient education on precautions of [address] and BHI. Pt admitted to [facility] Room [facility] per provider order. Pt changed into hospital attire, nourishment provided. Pt scanned with metal detector. Pt presents to ED with complains of having suicidal ideation with plan to stab self. Pt also reports hearing voices telling him to commit suicide. Pt reports drug abuse, last use of crack cocaine 3 weeks ago. Denies any alcohol abuse. Pt states he stopped taking meds 2 days ago. Pt is linked with [facility] and states he is compliant with appointments. Provider paged for orders. Will monitor pt for safety and behavior.                        Brigid Schwab, RN

## 2020-07-06 PROCEDURE — 6370000000 HC RX 637 (ALT 250 FOR IP): Performed by: PSYCHIATRY & NEUROLOGY

## 2020-07-06 PROCEDURE — 1240000000 HC EMOTIONAL WELLNESS R&B

## 2020-07-06 RX ADMIN — OXCARBAZEPINE 300 MG: 300 TABLET, FILM COATED ORAL at 21:41

## 2020-07-06 RX ADMIN — OXCARBAZEPINE 300 MG: 300 TABLET, FILM COATED ORAL at 08:31

## 2020-07-06 RX ADMIN — HYDROXYZINE HYDROCHLORIDE 25 MG: 25 TABLET, FILM COATED ORAL at 21:42

## 2020-07-06 RX ADMIN — TRAZODONE HYDROCHLORIDE 50 MG: 50 TABLET ORAL at 21:42

## 2020-07-06 RX ADMIN — ESCITALOPRAM OXALATE 20 MG: 20 TABLET ORAL at 21:42

## 2020-07-06 RX ADMIN — QUETIAPINE FUMARATE 400 MG: 200 TABLET ORAL at 21:42

## 2020-07-06 NOTE — GROUP NOTE
Group Therapy Note    Date: 7/6/2020    Group Start Time: 1430  Group End Time: 1520  Group Topic: Cognitive Skills    JESUS Malcolm, [address]        Group Therapy Note    Attendees: 6/19         Pt did not attend RT skills group d/t resting in room despite staff invitation to attend. 1:1 talk time offered as alternative to group session, pt declined.

## 2020-07-06 NOTE — PLAN OF CARE
Problem: Altered Mood, Psychotic Behavior:  Goal: Able to verbalize decrease in frequency and intensity of hallucinations  Description: Able to verbalize decrease in frequency and intensity of hallucinations  7/6/2020 0855 by Filomena Mixon  Outcome: Ongoing     Problem: Altered Mood, Psychotic Behavior:  Goal: Absence of self-harm  Description: Absence of self-harm  7/6/2020 0855 by Filomena Mixon  Outcome: Ongoing   Pt continues to admit to suicidal ideation with no plan pt admits to command auditory hallucinations of voices telling him to hurt self. Pt contracts safety. Isolative aloof out for needs mostly. Reports normal sleep and appetite spontaneous safety checks to be maintained by staff.

## 2020-07-06 NOTE — GROUP NOTE
Group Therapy Note    Date: 7/6/2020    Group Start Time: 1100  Group End Time: 1140  Group Topic: Psychotherapy    CHANEL Francisco        Group Therapy Note    Attendees: 4/12      Pt denied 1:1. Despite staff encouragement, pt denied 11:00am psychotherapy group.               Signature:  Claiborne Dandy, LSW

## 2020-07-06 NOTE — PLAN OF CARE
Problem: Tobacco Use:  Goal: Inpatient tobacco use cessation counseling participation  Description: Inpatient tobacco use cessation counseling participation  Outcome: Ongoing  Note: Patient given information on the dangers of long term tobacco use. Will continue to reinforce the dangers of tobacco use. Problem: Altered Mood, Psychotic Behavior:  Goal: Able to verbalize decrease in frequency and intensity of hallucinations  Description: Able to verbalize decrease in frequency and intensity of hallucinations  Outcome: Ongoing  Note: Pt reports he continues to hear voices telling him to commit suicide. Problem: Altered Mood, Psychotic Behavior:  Goal: Absence of self-harm  Description: Absence of self-harm  Outcome: Ongoing  Note:  Pt reports high depression and anxiety, also admits to having suicidal ideation with no plan but contracts for safety while on unit. Pt remains free of self-harm. Safe environment maintained. Every 15 minute checks for safety continued per unit policy. Will continue to monitor for safety and provide support and reassurance as needed.

## 2020-07-06 NOTE — H&P
HISTORY and [facility]       NAME:  Kasandra Stevens  MRN: [id number]   YOB: 1960   Date: 7/6/2020   Age: 63 y.o. Gender: male     H&P Update Note    H&P from 06/06/2020 reviewed and updated. Patient examined. Patient reports having suicidal ideations with plans of stabbing self, pt also admits to hearing voices. He states that he has been off of his medications since his last discharge a month ago. INTERVAL HISTORY:     Patient admitted due to Schizophrenia. No interval changes to past medical history, social history, family history. Review of systems as stated above and otherwise negative. GENERAL PHYSICAL EXAM:     Vitals: /61   Pulse 70   Temp 97.4 °F (36.3 °C) (Oral)   Resp 16   Ht 6' 3\" (1.905 m)   Wt 170 lb (77.1 kg)   SpO2 99%   BMI 21.25 kg/m²  Body mass index is 21.25 kg/m². GENERAL APPEARANCE:  Patient is alert and oriented. Does not appear to be distress or pain at this time. SKIN:  Normal temperature, turgor and texture. No cyanosis or jaundice. HEAD:  Normocephalic, atraumatic. EYES:  Pupils equal, reactive to light and accomodation. Conjunctiva clear. EOMs intact bilaterally. THROAT:  Mucous membranes moist. No tonsillar erythema or exudates. NECK:  No stiffness, trachea central.  No palpable masses. HEART:  Normotensive. Regular rate and rhythm. No murmurs. LUNGS:  Equal on expansion. Clear to auscultation bilaterally with no adventitious sounds. ABDOMEN:  Soft on palpation. No localized tenderness, guarding or rigidity. No palpable organomegaly. LYMPHATICS:  No cervical lymphadenopathy. EXTREMITIES:  Symmetrical with no pretibial/pedal edema. No discoloration or ulcerations. No warmth, tenderness, erythema noted in lower legs bilaterally. NEUROLOGIC:  The patient is conscious, alert, oriented. No apparent focal sensory deficits.  No motor deficits, muscle strength equal bilaterally. No facial droop, tongue protrudes centrally, no slurring of the speech. Cranial nerve exam reveals no deficits. No interval changes. I concur with the findings. MARY OSUNA CNP on 7/6/2020 at 3:46 PM    MARY Stevens CNP   Advanced Practice Nurse   Internal Medicine   H&P   Signed   Date of Service:  6/6/2020  8:59 AM          Related encounter: ED to Hosp-Admission (Discharged) from 6/5/2020 in [address] [place] [address]         Signed             Show:Clear all  [x]Manual[x]Template[]Copied    Added by:  [x]MARY Pacheco CNP    []Katie for details        HISTORY and [facility]         NAME:  Tyson Landry  MRN: [id number]   YOB: 1960   Date: 6/6/2020   Age: 63 y.o. Gender: male      COMPLAINT AND PRESENT HISTORY:       Tyson Landry is 63 y.o.,  male, admitted because of Schizoaffective Disorder.      Patient has auditory hallucinations, patient hears command voices to kill self, but not others.     According to ED notes, patient was brought in by the police patient had been having auditory hallucinations with suicidal ideations. Reports that patient answer his door with a knife in his hand. Reports that patient has lost 3 brothers over 3 months time. Upon speaking with patient he states that he has been depressed over the last 2 months. Patient states he has been compliant with his meds on the most part for the last 2 days he has not taken any medication. Patient states that he does have sleep disturbances in his appetite seems to be where he cannot get enough food. \"The voices are talking more loudly\". Patient states that he continues to drink beer occasionally but continually uses cocaine. Patient endorses poor concentration with racy thoughts.    Patient states that living arrangements after discharge will be returned to his own place.     Patient denies any somatic complaints. Patient is denying all of his medical history. No significant lab values or procedures. No  chest pain or  shortness of breath. No fever/chills. Please see patient's psychiatric hx for more information.     DIAGNOSTIC RESULTS         PAST MEDICAL HISTORY           Past Medical History:   Diagnosis Date    Bipolar disorder ([address]      Depression      GERD (gastroesophageal reflux disease)      Hallucinations      Headache(784.0)      Hepatitis      Schizophrenia, schizo-affective (HCC)      Substance abuse (HCC)      Tobacco abuse      Type II or unspecified type diabetes mellitus without mention of complication, not stated as uncontrolled      Urinary incontinence        Pt denies any history of hypertension, stroke, heart disease, COPD, Asthma,  HLD, Cancer, Seizures,Thyroid disease, Kidney Disease,  TB.     SURGICAL HISTORY              Past Surgical History:   Procedure Laterality Date    ABSCESS DRAINAGE N/A 02/11/2018     Carla anal abcess    DENTAL SURGERY         all teeth pulled         FAMILY HISTORY        Family History   Problem Relation Age of Onset    Diabetes Mother      Heart Disease Mother           SOCIAL HISTORY        Social History            Socioeconomic History    Marital status: Single       Spouse name: None    Number of children: 0    Years of education: 8    Highest education level: None   Occupational History       Employer: N/A   Social Needs    Financial resource strain: None    Food insecurity       Worry: None       Inability: None    Transportation needs       Medical: None       Non-medical: None   Tobacco Use    Smoking status: Current Every Day Smoker       Packs/day: 0.50       Types: Cigarettes    Smokeless tobacco: Never Used   Substance and Sexual Activity    Alcohol use: Yes       Comment: reports drinking occasionally    Drug use:  Yes       Types: Cocaine       Comment: last used 6/2/2020    Sexual activity: None   Lifestyle    Physical activity       Days per week: None       Minutes per session: None    Stress: None   Relationships    Social connections       Talks on phone: None       Gets together: None       Attends Jainism service: None       Active member of club or organization: None       Attends meetings of clubs or organizations: None       Relationship status: None    Intimate partner violence       Fear of current or ex partner: None       Emotionally abused: None       Physically abused: None       Forced sexual activity: None   Other Topics Concern    None   Social History Narrative    None            REVIEW OF SYSTEMS            Allergies   Allergen Reactions    Navane [Thiothixene]           No current facility-administered medications on file prior to encounter.              Current Outpatient Medications on File Prior to Encounter   Medication Sig Dispense Refill    dicyclomine (BENTYL) 10 MG capsule Take 1 capsule by mouth every 6 hours as needed (cramps) 20 capsule 0    OXcarbazepine (TRILEPTAL) 300 MG tablet Take 1 tablet by mouth 2 times daily 60 tablet 3    escitalopram (LEXAPRO) 5 MG tablet Take 3 tablets by mouth nightly 30 tablet 3    lurasidone (LATUDA) 60 MG TABS tablet Take 1 tablet by mouth Daily with supper 30 tablet 0    QUEtiapine (SEROQUEL) 300 MG tablet Take 1 tablet by mouth nightly 60 tablet 3    traZODone (DESYREL) 50 MG tablet Take 1 tablet by mouth nightly as needed for Sleep 30 tablet 0                       General health:  Fairly good. No fever or chills.                  Skin:  No itching, redness or rash.       Head, eyes, ears, nose, throat:  No headache, epistaxis, rhinorrhea hearing loss or sore throat.        Neck:  No pain, stiffness or masses.      Cardiovascular/Respiratory system:  No chest pain, palpitation, shortness of breath, coughing or expectoration.                Gastrointestinal tract: No abdominal pain, nausea, vomiting, dysphagia, diarrhea or constipation.     Genitourinary:  No burning on micturition. No hesitancy, urgency, frequency or discoloration of urine.      Locomotor:  No bone or joint pains. No swelling or deformities.       Neuropsychiatric:  See HPI.      GENERAL PHYSICAL EXAM:      Vitals: /67   Pulse 65   Temp 98.1 °F (36.7 °C) (Oral)   Resp 14   Ht 6' 3\" (1.905 m)   Wt 170 lb (77.1 kg)   SpO2 98%   BMI 21.25 kg/m²  Body mass index is 21.25 kg/m².      Pt was examined with a nurse present in the room. GENERAL APPEARANCE:  Rohan Fairchild is 63 y.o.,  male, not obese, nourished, conscious, alert. Does not appear to be distress or pain at this time. Patient is edentulous.     SKIN:  Warm, dry, no cyanosis or jaundice.     HEAD:  Normocephalic, atraumatic, no swelling or tenderness.      EYES:  Pupils equal, reactive to light, Conjunctiva is clear, EOMs intact abhishek. eyelids WNL.    EARS:  No discharge, no marked hearing loss.     NOSE:  No rhinorrhea, epistaxis or septal deformity.     THROAT:  Not congested. No ulceration bleeding or discharge.      NECK:  No stiffness, trachea central.  No palpable masses or L. N.       CHEST:  Symmetrical and equal on expansion.       HEART:  Regular rate and rhythm. S1 > S2, No audible murmurs or gallops.      LUNGS:  Equal on expansion, normal breath sounds. No adventitious sounds.       ABDOMEN:  Soft on palpation. No localized tenderness. No guarding or rigidity. No palpable organomegaly.     LYMPHATICS:  No palpable cervical Lymphadenopathy.      LOCOMOTOR, BACK AND SPINE:  No tenderness or deformities.      EXTREMITIES:  Symmetrical, no pretibial edema. Mahoganys sign negative. No discoloration or ulcerations.     NEUROLOGIC:  The patient is conscious, alert, oriented,Cranial nerve II-XII intact, taste and smell were not examined. No apparent focal sensory or motor deficits. Muscle strength equal Abhishek.  No facial droop, tongue protrudes centrally, no slurring of the speech. PROVISIONAL DIAGNOSES:       Active Problems:    Schizoaffective disorder ([facility]  Resolved Problems:    * No resolved hospital problems. *        MARY OSUNA - CNP on 6/6/2020 at 1:05 PM                  Cosigned by:  Lis Blanco MD at 6/7/2020 11:18 PM

## 2020-07-06 NOTE — BH NOTE
Group Therapy Note     Date: 7/6/2020     Group Start Time: 1600  Group End Time:  36  Group Topic: Discharge planning/coping     [facility]             Group Therapy Note     Attendees: 7/19        Pt did not attend 1600  d/t resting in room despite staff invitation to attend. 1:1 talk time offered as alternative to group session, pt declined.

## 2020-07-06 NOTE — GROUP NOTE
Group Therapy Note    Date: 7/6/2020    Group Start Time: 1330  Group End Time: 7971  Group Topic: Recreational    [address] [facility], [facility]    Patient refused to attend Recreational Therapy Group at 1330 after encouragement from staff. 1:1 talk time offered.     Signature:  Padma Muller

## 2020-07-06 NOTE — PLAN OF CARE
Place, [place] to Situation  Attention:Normal: No  Attention: Distractible  Thought Processes: Blocking  Thought Content:Normal: No  Thought Content: Poverty of Content, Preoccupations  Hallucinations: Auditory (Comment), Command(Comment)  Delusions: No  Memory:Normal: No  Memory: Poor Recent  Insight and Judgment: No  Insight and Judgment: Poor Judgment, Poor Insight  Present Suicidal Ideation: Yes(no plan contracts safety.)  Present Homicidal Ideation: No    Daily Assessment Last Entry:   Daily Sleep (WDL): Within Defined Limits         Patient Currently in Pain: Denies  Daily Nutrition (WDL): Within Defined Limits    Patient Monitoring:  Frequency of Checks: 4 times per hour, close    Psychiatric Symptoms:   Depression Symptoms  Depression Symptoms: Loss of interest, Isolative  Anxiety Symptoms  Anxiety Symptoms: Social phobias  Teetee Symptoms  Teetee Symptoms: No problems reported or observed. Psychosis Symptoms  Delusion Type: No problems reported or observed. Suicide Risk CSSR-S:  1) Within the past month, have you wished you were dead or wished you could go to sleep and not wake up? : Yes  2) Have you actually had any thoughts of killing yourself? : Yes  3) Have you been thinking about how you might kill yourself? : Yes  5) Have you started to work out or worked out the details of how to kill yourself? Do you intend to carry out this plan? : No  6) Have you ever done anything, started to do anything, or prepared to do anything to end your life?: Yes  Change in Result: No Change in Plan of care: No      EDUCATION:   EDUCATION:   Learner Progress Toward Treatment Goals: Reviewed results and recommendations of this team, Reviewed group plan and strategies, Reviewed signs, symptoms and risk of self harm and violent behavior, Reviewed goals and plan of care    Method:small group, individual verbal education    Outcome: Patient refused to attend treatment team meeting.  Patient needs reinforcement to obtain goals.    PATIENT GOALS:  Short term: Patient refused to attend treatment team meeting. Long term: Patient refused to attend treatment team meeting.      PLAN/TREATMENT RECOMMENDATIONS UPDATE: continue with group therapies, increased socialization, continue planning for after discharge goals, continue with medication compliance    SHORT-TERM GOALS UPDATE:   Time frame for Short-Term Goals: 5-7 days    LONG-TERM GOALS UPDATE:   Time frame for Long-Term Goals: 6 months  Members Present in Team Meeting: See Signature Sheet    Lenita Epley, [address]

## 2020-07-06 NOTE — GROUP NOTE
Group Therapy Note    Date: 7/6/2020    Group Start Time: 1000  Group End Time: 9103  Group Topic: Recreational    [address] [facility], [facility]    Patient refused to attend Recreational Therapy Group at 1000 after encouragement from staff. 1:1 talk time offered.     Signature:  Per Webb

## 2020-07-06 NOTE — GROUP NOTE
Group Therapy Note    Date: 7/6/2020    Group Start Time: 0900  Group End Time: 0915  Group Topic: Community Meeting    [facility]    [place], [place]        Group Therapy Note    Attendees: 10/24         Pt did not attend [employer] d/t resting in room despite staff invitation to attend. 1:1 talk time offered as alternative to group session, pt declined.

## 2020-07-06 NOTE — PROGRESS NOTES
Transfer Note  Patient transferred to [facility] Unit, room 102-2 from [facility]. Belongings sent with patient. Report called to receiving unit. Patient oriented to unit. Patient verbalizes understanding of transfer.

## 2020-07-07 PROCEDURE — 6370000000 HC RX 637 (ALT 250 FOR IP): Performed by: PSYCHIATRY & NEUROLOGY

## 2020-07-07 PROCEDURE — 1240000000 HC EMOTIONAL WELLNESS R&B

## 2020-07-07 RX ADMIN — HYDROXYZINE HYDROCHLORIDE 25 MG: 25 TABLET, FILM COATED ORAL at 21:12

## 2020-07-07 RX ADMIN — OXCARBAZEPINE 300 MG: 300 TABLET, FILM COATED ORAL at 09:06

## 2020-07-07 RX ADMIN — ESCITALOPRAM OXALATE 20 MG: 20 TABLET ORAL at 21:12

## 2020-07-07 RX ADMIN — OXCARBAZEPINE 300 MG: 300 TABLET, FILM COATED ORAL at 21:12

## 2020-07-07 RX ADMIN — TRAZODONE HYDROCHLORIDE 50 MG: 50 TABLET ORAL at 21:12

## 2020-07-07 RX ADMIN — QUETIAPINE FUMARATE 400 MG: 200 TABLET ORAL at 21:12

## 2020-07-07 ASSESSMENT — PAIN SCALES - GENERAL: PAINLEVEL_OUTOF10: 0

## 2020-07-07 NOTE — GROUP NOTE
Group Therapy Note    Date: 7/7/2020    Group Start Time: 1330  Group End Time: 2878  Group Topic: Cognitive Skills    [facility]amoros, [facility]    Patient refused to attend Recreational Therapy Group at 1330 after encouragement from staff. 1:1 talk time offered.     Signature:  Zarina Bagley

## 2020-07-07 NOTE — GROUP NOTE
Group Therapy Note    Date: 7/6/2020    Group Start Time: 2030  Group End Time: 2050  Group Topic: Wrap-Up    JES[facility] Branham    Wrap Up and Goal Reflection    Group Therapy Note    Attendees: 9/21         Patient's Goal:  Wrap Up and Goal Reflection    Notes:  Pt was supportive and an active listener during group. Pt did not share that he attended goal setting group but did tell the group he lives by the following quote, Roberto Chiu is My Rachel\". Status After Intervention:  Improved    Participation Level: Active Listener and Interactive    Participation Quality: Appropriate, Attentive and Supportive      Speech:  normal      Thought Process/Content: Logical  Linear      Affective Functioning: Flat      Mood: appropriate      Level of consciousness:  Alert and Oriented x4      Response to Learning: Able to verbalize current knowledge/experience and Resistant      Endings: None Reported    Modes of Intervention: Support, Socialization and Exploration      Discipline Responsible: Behavorial Health Tech      Signature:   Reyes Hyatt

## 2020-07-07 NOTE — GROUP NOTE
Group Therapy Note    Date: 7/7/2020    Group Start Time: 1600  Group End Time: 0195  Group Topic: Psychoeducation    [facility] DAVID JOSE GUADALUPE Blanca        Group Therapy Note    Attendees: 10/20         Patient's Goal:  To attend and participate in group sessions. Notes:  Childhood Trauma Effects    Status After Intervention:  Unchanged    Participation Level:  Active Listener and Interactive    Participation Quality: Appropriate, Attentive and Sharing      Speech:  normal      Thought Process/Content: Logical  Linear      Affective Functioning: Congruent      Mood: euthymic      Level of consciousness:  Oriented x4      Response to Learning: Able to verbalize current knowledge/experience and Able to verbalize/acknowledge new learning      Endings: None Reported    Modes of Intervention: Education, Support and Socialization      Discipline Responsible: Behavorial Health Tech      Signature:  Cherry Blanca

## 2020-07-07 NOTE — GROUP NOTE
Group Therapy Note    Date: 7/7/2020    Group Start Time: 1100  Group End Time: 1140  Group Topic: Psychotherapy    CHANEL Almendarez        Group Therapy Note    Attendees: 5/10      Pt denied 1:1.  Despite staff encouragement, pt denied 11:00am psychotherapy group         Signature:  CHANEL Mahan

## 2020-07-07 NOTE — GROUP NOTE
Group Therapy Note    Date: 7/7/2020    Group Start Time: 0900  Group End Time: 0915  Group Topic: Community Meeting    [address] [place] [address] A    Darrell Winslow    Patient declined to attend community meeting/goal setting group at 0900 despite encouragement from staff. 1:1 talk time offered by staff as alternative to group session.       Signature:  Darrell Winslow

## 2020-07-07 NOTE — PLAN OF CARE
Problem: Altered Mood, Psychotic Behavior:  Goal: Able to verbalize decrease in frequency and intensity of hallucinations  Description: Able to verbalize decrease in frequency and intensity of hallucinations  Outcome: Ongoing  Note: Patient reports fleeting suicidal ideation but contracts for safety, patient does not have a plan. Patient reports some anxiety and depression. Patient is flat on approach and remained isolative to room for long intervals, out for needs only. Patient is compliant with medication interventions and reports fair sleep and appetite. Problem: Altered Mood, Psychotic Behavior:  Goal: Absence of self-harm  Description: Absence of self-harm  Outcome: Ongoing  Note: 15 minute safety checks, patient remained free of self harm.

## 2020-07-07 NOTE — GROUP NOTE
Group Therapy Note    Date: 7/7/2020    Group Start Time: 1000  Group End Time: 1560  Group Topic: Cognitive Skills    [facility] A    Marcos Jackson    Patient declined to attend recreational therapy group at 1000 despite encouragement from staff. 1:1 talk time offered by staff as alternative to group session.       Signature:  Marcos Jackson

## 2020-07-07 NOTE — PLAN OF CARE
Problem: Altered Mood, Psychotic Behavior:  Goal: Able to verbalize decrease in frequency and intensity of hallucinations  Description: Able to verbalize decrease in frequency and intensity of hallucinations  Patient states intensity of suicidal ideas and hallucinations. Out for needs only. Problem: Altered Mood, Psychotic Behavior:  Goal: Absence of self-harm  Description: Absence of self-harm  Patient remained absent of self-harm. 15-min safety checks continued.

## 2020-07-07 NOTE — PLAN OF CARE
Problem: Altered Mood, Psychotic Behavior:  Goal: Absence of self-harm  Description: Absence of self-harm  7/7/2020 0958 by Dewayne Estevez LPN  Outcome: Ongoing   Patient denies thoughts of self harm, remains free from self harm. Support and encouragement provided.

## 2020-07-08 PROCEDURE — 1240000000 HC EMOTIONAL WELLNESS R&B

## 2020-07-08 PROCEDURE — 6370000000 HC RX 637 (ALT 250 FOR IP): Performed by: PSYCHIATRY & NEUROLOGY

## 2020-07-08 RX ORDER — TRAZODONE HYDROCHLORIDE 50 MG/1
50 TABLET ORAL NIGHTLY
Status: DISCONTINUED | OUTPATIENT
Start: 2020-07-08 | End: 2020-07-13 | Stop reason: HOSPADM

## 2020-07-08 RX ADMIN — HYDROXYZINE HYDROCHLORIDE 25 MG: 25 TABLET, FILM COATED ORAL at 20:45

## 2020-07-08 RX ADMIN — TRAZODONE HYDROCHLORIDE 50 MG: 50 TABLET ORAL at 20:41

## 2020-07-08 RX ADMIN — QUETIAPINE FUMARATE 400 MG: 200 TABLET ORAL at 20:41

## 2020-07-08 RX ADMIN — ESCITALOPRAM OXALATE 20 MG: 20 TABLET ORAL at 20:41

## 2020-07-08 RX ADMIN — OXCARBAZEPINE 300 MG: 300 TABLET, FILM COATED ORAL at 08:57

## 2020-07-08 RX ADMIN — OXCARBAZEPINE 300 MG: 300 TABLET, FILM COATED ORAL at 20:41

## 2020-07-08 NOTE — PROGRESS NOTES
PROGRESS NOTE  The chart has been reviewed and the patient was interviewed at the bedside. The patient is still exhibiting symptoms of depression, feeling hopeless, helpless and suicidal.  He denied current specific plan to harm himself. The patient is still isolative to self with poor interaction with others. He denies side effects of the medications. The patient did not attend therapy groups in the unit. There was no behavioral problem reported by the nursing staff. We will continue monitoring for symptoms of depression, suicidal behavior and to adjust his medications as needed. MENTAL STATUS EXAMINATION:    /74   Pulse 77   Temp 97.9 °F (36.6 °C) (Oral)   Resp 14   Ht 6' 3\" (1.905 m)   Wt 170 lb (77.1 kg)   SpO2 99%   BMI 21.25 kg/m²     MOOD-is dysphoric   Affect-is depressed  Patient feels helpless hopeless and worthless  Psychomotor activity : decreased. APPEARANCE:  Personal hygiene is poor and patient is neglecting his appearance. Patient is somewhat unkempt  PSYCHOSIS: He reported auditory but denied visual hallucinations. Denies paranoid delusions. ORIENTATION:  Oriented to time place and person. Recent and remote memory is grossly intact. Intelligence appears to be average  CONCENTRATION:  poor. SPEECH : goal directed , but slow . DIAGNOSIS:    Active Problems:    Schizophrenia ([address]  Resolved Problems:    * No resolved hospital problems. *      LAB:    Recent Results (from the past 67 hour(s))   COVID-19    Collection Time: 07/05/20  8:03 AM   Result Value Ref Range    SARS-CoV-2 Not Detected Not Detected    SARS-CoV-2, Rapid          Source . NASOPHARYNGEAL SWAB     SARS-CoV-2, PCR                 TREATMENT PLAN:     escitalopram  20 mg Oral Nightly    OXcarbazepine 300 mg Oral BID    QUEtiapine  400 mg Oral Nightly     dicyclomine, acetaminophen, aluminum & magnesium hydroxide-simethicone, benztropine mesylate, magnesium hydroxide, traZODone, nicotine polacrilex, hydrOXYzine    Chart was reviewed and the patient has been reviewed  Continue the same medications as prescribed above  Patient was discussed with the  and the treatment team.   Provided Supportive and insight-oriented psychotherapy psychotherapy  Recommended involvement in Unit milieu  Provided empathic listening, validation and support  Patient acknowledged and mutually decided to continue with current treatment plan  Discharge planning was discussed with the patient. Severo Hesselbach, MD        This note was created with the assistance of a speech-recognition program.  Although the intention is to generate a document that actually reflects the content of the visit, no guarantees can be provided that every mistake has been identified and corrected by editing.

## 2020-07-08 NOTE — GROUP NOTE
Group Therapy Note    Date: 7/8/2020    Group Start Time: 1330  Group End Time: 0501  Group Topic: Recreational    [address] [facility], [facility]    Patient refused to attend Recreational Therapy Group at 1330 after encouragement from staff. 1:1 talk time offered.     Signature:  Terry Bellamy

## 2020-07-08 NOTE — GROUP NOTE
Group Therapy Note    Date: 7/8/2020    Group Start Time: 1430  Group End Time: 2110  Group Topic: Recreational    JESUS SHI    [place], [place]        Group Therapy Note    Attendees: 8/20         Pt did not attend RT skills group d/t resting in room despite staff invitation to attend. 1:1 talk time offered as alternative to group session, pt declined.

## 2020-07-08 NOTE — GROUP NOTE
Group Therapy Note    Date: 7/8/2020    Group Start Time: 6051  Group End Time: 0935  Group Topic: [address], [place]    Patient refused to attend Goal Setting / Community Meeting Group at 5696 after encouragement from staff. 1:1 talk time offered.     Signature:  Megan Terrell

## 2020-07-08 NOTE — PLAN OF CARE
Problem: Altered Mood, Psychotic Behavior:  Goal: Absence of self-harm  Description: Absence of self-harm  Outcome: Ongoing  Note: Patient remains free from self harm. Reports thoughts of self harm, contracts for safety. Denies hallucinations. Out for meals.   Medication compliant and behavior controlled

## 2020-07-08 NOTE — GROUP NOTE
Group Therapy Note    Date: 7/8/2020    Group Start Time: 1100  Group End Time: 1140  Group Topic: Psychotherapy    [facility]    [address], [place]        Group Therapy Note    Attendees: 5/11      Pt denied 1:1. Despite staff encouragement, pt denied 11:00am psychotherapy group.           Signature:  [address], [place]

## 2020-07-09 PROCEDURE — 6370000000 HC RX 637 (ALT 250 FOR IP): Performed by: PSYCHIATRY & NEUROLOGY

## 2020-07-09 PROCEDURE — 1240000000 HC EMOTIONAL WELLNESS R&B

## 2020-07-09 RX ORDER — BUPROPION HYDROCHLORIDE 100 MG/1
100 TABLET, EXTENDED RELEASE ORAL 2 TIMES DAILY
Status: DISCONTINUED | OUTPATIENT
Start: 2020-07-09 | End: 2020-07-13 | Stop reason: HOSPADM

## 2020-07-09 RX ADMIN — QUETIAPINE FUMARATE 500 MG: 300 TABLET ORAL at 21:33

## 2020-07-09 RX ADMIN — OXCARBAZEPINE 300 MG: 300 TABLET, FILM COATED ORAL at 21:33

## 2020-07-09 RX ADMIN — BUPROPION HYDROCHLORIDE 100 MG: 100 TABLET, EXTENDED RELEASE ORAL at 21:34

## 2020-07-09 RX ADMIN — TRAZODONE HYDROCHLORIDE 50 MG: 50 TABLET ORAL at 21:33

## 2020-07-09 RX ADMIN — ESCITALOPRAM OXALATE 20 MG: 20 TABLET ORAL at 21:33

## 2020-07-09 RX ADMIN — BUPROPION HYDROCHLORIDE 100 MG: 100 TABLET, EXTENDED RELEASE ORAL at 15:04

## 2020-07-09 RX ADMIN — HYDROXYZINE HYDROCHLORIDE 25 MG: 25 TABLET, FILM COATED ORAL at 21:34

## 2020-07-09 RX ADMIN — OXCARBAZEPINE 300 MG: 300 TABLET, FILM COATED ORAL at 08:53

## 2020-07-09 NOTE — PLAN OF CARE
Problem: Altered Mood, Psychotic Behavior:  Goal: Able to verbalize decrease in frequency and intensity of hallucinations  Description: Able to verbalize decrease in frequency and intensity of hallucinations  7/9/2020 1504 by Saul Santoyo  Outcome: Ongoing    Patient relates openly that he was struggling at home with the people he lives with, and he had stopped his medications for a few days. He has been having a lot of hallucinations, hearing a voice that tells him to kill himself. He is isolative for long intervals, comes out for meals and an occassional group. Controlled and cooperative. Problem: Altered Mood, Psychotic Behavior:  Goal: Absence of self-harm  Description: Absence of self-harm  7/9/2020 1504 by Saul Santoyo  Outcome: Ongoing    Patient admits to some suicidal thoughts due to voices telling him to harm himself. No self harming behaviors noted. Took medications as ordered. ADL's are poor. Appetite is good.

## 2020-07-09 NOTE — GROUP NOTE
Group Therapy Note    Date: 7/9/2020    Group Start Time: 1600  Group End Time: 0896  Group Topic: Group Documentation    JESUS SHI    Corbin Brown        Group Therapy Note    Attendees: 10/24         Patient's Goal: Attend and participate in group    Notes:  Positive discussion questions    Status After Intervention:  Improved    Participation Level:  Active Listener and Interactive    Participation Quality: Appropriate, Attentive, Sharing and Supportive      Speech:  normal      Thought Process/Content: Logical      Affective Functioning: Congruent      Mood: anxious      Level of consciousness:  Alert, Oriented x4 and Attentive      Response to Learning: Able to verbalize current knowledge/experience, Able to verbalize/acknowledge new learning, Able to retain information and Capable of insight      Endings: None Reported    Modes of Intervention: Education, Support, Socialization, Exploration and Problem-solving      Discipline Responsible: [facility] [address], [employer]      Signature:  Corbin Brown

## 2020-07-09 NOTE — GROUP NOTE
Group Therapy Note    Date: 7/9/2020    Group Start Time: 1330  Group End Time: 0184  Group Topic: Music Therapy    JESUS SHI    Villa Mills        Group Therapy Note    Attendees: 11/24     Patient's Goal:  To increase cognitive stimulation and self-esteem through song analysis and rephrasing into positive affirmations. Notes:  Pt attended and participated in group. Status After Intervention:  Improved    Participation Level:  Active Listener and Interactive    Participation Quality: Appropriate, Attentive and Sharing      Speech:  normal      Thought Process/Content: Logical  Linear      Affective Functioning: Congruent      Mood: euthymic      Level of consciousness:  Alert and Attentive      Response to Learning: Able to verbalize current knowledge/experience, Capable of insight and Progressing to goal      Endings: None Reported    Modes of Intervention: Education, Exploration, Activity and Reality-testing      Discipline Responsible: Psychoeducational Specialist      Signature:  Villa Mills

## 2020-07-09 NOTE — GROUP NOTE
Group Therapy Note    Date: 7/9/2020    Group Start Time: 1000  Group End Time: 7885  Group Topic: Recreational    [address] [facility]    Patient refused to attend Recreational Therapy Group at 1000 after encouragement from staff. 1:1 talk time offered.     Signature:  Amber Bullard

## 2020-07-09 NOTE — GROUP NOTE
Group Therapy Note    Date: 7/9/2020    Group Start Time: 1100  Group End Time: 1140  Group Topic: Psychotherapy    CHANEL Francisco        Group Therapy Note    Attendees: 7/24    Pt denied 1:1. Despite staff encouragement, pt denied 11:00am psychotherapy group.               Signature:  CHANEL Howe

## 2020-07-09 NOTE — PLAN OF CARE
Problem: Altered Mood, Psychotic Behavior:  Goal: Able to verbalize decrease in frequency and intensity of hallucinations  Description: Able to verbalize decrease in frequency and intensity of hallucinations  7/9/2020 0345 by Cleo Sharp LPN  Outcome: Ongoing     Problem: Altered Mood, Psychotic Behavior:  Goal: Absence of self-harm  Description: Absence of self-harm  7/9/2020 0345 by Cleo Sharp LPN  Outcome: Ongoing   Patient currently admits to suicidal thoughts with no plans, patient contracts for safety. Patient states his hallucinations are decreasing. Patient is isolative to room and out for needs only. Will continue to monitor for safety every 15 minutes and provide support as needed.

## 2020-07-09 NOTE — PLAN OF CARE
Problem: Altered Mood, Psychotic Behavior:  Goal: Able to verbalize decrease in frequency and intensity of hallucinations  Description: Able to verbalize decrease in frequency and intensity of hallucinations  Outcome: Ongoing     Problem: Altered Mood, Psychotic Behavior:  Goal: Absence of self-harm  Description: Absence of self-harm  Outcome: Ongoing   Patient admits

## 2020-07-09 NOTE — GROUP NOTE
Group Therapy Note    Date: 7/9/2020    Group Start Time: 2329  Group End Time: 0930  Group Topic: Community Meeting    [address] [place] [address]    Patient refused to attend Goal Setting / [employer] at 6238 after encouragement from staff. 1:1 talk time offered.     Signature:  Zarina Bagley

## 2020-07-10 PROCEDURE — 6370000000 HC RX 637 (ALT 250 FOR IP): Performed by: PSYCHIATRY & NEUROLOGY

## 2020-07-10 PROCEDURE — 1240000000 HC EMOTIONAL WELLNESS R&B

## 2020-07-10 RX ADMIN — OXCARBAZEPINE 300 MG: 300 TABLET, FILM COATED ORAL at 21:15

## 2020-07-10 RX ADMIN — BUPROPION HYDROCHLORIDE 100 MG: 100 TABLET, EXTENDED RELEASE ORAL at 21:14

## 2020-07-10 RX ADMIN — OXCARBAZEPINE 300 MG: 300 TABLET, FILM COATED ORAL at 08:17

## 2020-07-10 RX ADMIN — ESCITALOPRAM OXALATE 20 MG: 20 TABLET ORAL at 21:15

## 2020-07-10 RX ADMIN — TRAZODONE HYDROCHLORIDE 50 MG: 50 TABLET ORAL at 21:15

## 2020-07-10 RX ADMIN — QUETIAPINE FUMARATE 500 MG: 300 TABLET ORAL at 21:14

## 2020-07-10 RX ADMIN — BUPROPION HYDROCHLORIDE 100 MG: 100 TABLET, EXTENDED RELEASE ORAL at 08:17

## 2020-07-10 NOTE — GROUP NOTE
Group Therapy Note    Date: 7/10/2020    Group Start Time: 1430  Group End Time: 9710  Group Topic: Relaxation    JESUS DAVID SHI    [place], [address]        Group Therapy Note    Attendees: 7/21         Patient's Goal:  To increase interpersonal interaction     Notes:  PT attended and participated in group. Status After Intervention:  Improved    Participation Level:  Active Listener and Interactive    Participation Quality: Appropriate, Attentive and Sharing      Speech:  normal      Thought Process/Content: Logical      Affective Functioning: Congruent      Mood: euthymic      Level of consciousness:  Alert and Attentive      Response to Learning: Progressing to goal      Endings: None Reported    Modes of Intervention: Education, Exploration, Movement, Media and Reality-testing      Discipline Responsible: Psychoeducational Specialist      Signature:  Alen Duenas

## 2020-07-10 NOTE — PLAN OF CARE
Problem: Altered Mood, Psychotic Behavior:  Goal: Able to verbalize decrease in frequency and intensity of hallucinations  Description: Able to verbalize decrease in frequency and intensity of hallucinations  7/10/2020 0933 by Abimael Mathias  Outcome: Ongoing  Patient was accepting of 1:1 talk time. Relates to feelings of depression and anxiety, states he's suicidal but has no plan. Contracts to safety and to seek out staff if things change. Isolative to room. Denies any auditory or visual hallucinations. Medication compliant. Q15 minute safety checks maintained. Problem: Altered Mood, Psychotic Behavior:  Goal: Absence of self-harm  Description: Absence of self-harm  7/10/2020 0933 by Abimael Mathias  Outcome: Ongoing  No self harming behaviors observed or noted.

## 2020-07-10 NOTE — GROUP NOTE
Group Therapy Note    Date: 7/10/2020    Group Start Time: 0900  Group End Time: 0915  Group Topic: Community Meeting    JESUS Malcolm, [address]        Group Therapy Note    Attendees: 10/24         Pt did not attend [employer] d/t resting in room despite staff invitation to attend. 1:1 talk time offered as alternative to group session, pt declined.

## 2020-07-10 NOTE — GROUP NOTE
Group Therapy Note    Date: 7/10/2020    Group Start Time: 1000  Group End Time: 0315  Group Topic: Music Therapy    JESUS Dill        Group Therapy Note    Pt did not attend music therapy leisure group d/t resting in room despite staff invitation to attend. 1:1 talk time offered as alternative to group session, pt declined.

## 2020-07-10 NOTE — GROUP NOTE
Group Therapy Note    Date: 7/9/2020    Group Start Time: 2025  Group End Time: 2038  Group Topic: Wrap-Up    JESUS Wills        Group Therapy Note    Attendees:13/23           Status After Intervention:  Improved    Participation Level:  Active Listener    Participation Quality: Appropriate      Speech:  normal      Thought Process/Content: Logical      Affective Functioning: Congruent      Mood: elevated      Level of consciousness:  Alert      Response to Learning: Able to verbalize current knowledge/experience      Endings: None Reported    Modes of Intervention: Education      Discipline Responsible: Behavorial Health Tech      Signature:  Nils López [place]

## 2020-07-10 NOTE — GROUP NOTE
Group Therapy Note    Date: 7/10/2020    Group Start Time: 2614  Group End Time: 9706  Group Topic: Psychoeducation    [facility]    Salma Joya        Group Therapy Note    Attendees: 7/21         Patient's Goal:  To increase interpersonal interaction and engage with Psychoeducational topics as brought up by patients and peers. Notes:  Pt attended and participated in group. Near end of group pt began to rant about a neighbor where he lives and that the [employer]" showed up telling him he owed him money. Pt was easily redirectable. Status After Intervention:  Improved    Participation Level:  Active Listener and Interactive    Participation Quality: Appropriate, Attentive and Sharing      Speech:  pressured      Thought Process/Content: Logical  Linear      Affective Functioning: Congruent      Mood: euthymic      Level of consciousness:  Alert and Attentive      Response to Learning: Able to verbalize current knowledge/experience and Progressing to goal      Endings: None Reported    Modes of Intervention: Support, Socialization, Exploration, Activity and Reality-testing      Discipline Responsible: Psychoeducational Specialist      Signature:  Salma Joya

## 2020-07-10 NOTE — PROGRESS NOTES
PROGRESS NOTE  The chart has been reviewed and the patient was interviewed at the bedside. The patient is still having auditory hallucination commands in nature telling him to harm himself. He denied having specific plan. Safety plan has been discussed with the patient and advised to approach to the nurses station once the thoughts are overwhelming or out of control. He sounds understanding the concept patient denies side effects of medications. The patient is still exhibiting depressed mood, flat affect and socially withdrawn. He agreed to increase Seroquel 500 mg at the bedtime and start Wellbutrin  mg p.o. twice daily. We will continue monitoring for symptoms of depression, psychosis and suicidal behavior and to adjust his medications as needed. MENTAL STATUS EXAMINATION:    /63   Pulse 77   Temp 98.6 °F (37 °C) (Oral)   Resp 14   Ht 6' 3\" (1.905 m)   Wt 170 lb (77.1 kg)   SpO2 99%   BMI 21.25 kg/m²     MOOD-is dysphoric   Affect-is depressed  Patient feels helpless hopeless and worthless  Psychomotor activity : decreased. APPEARANCE:  Personal hygiene is poor and patient is neglecting his appearance. Patient is somewhat unkempt  PSYCHOSIS: He reported auditory but denied visual hallucinations. Denies paranoid delusions. ORIENTATION:  Oriented to time place and person. Recent and remote memory is grossly intact. Intelligence appears to be average  CONCENTRATION:  poor. SPEECH : goal directed , but slow . DIAGNOSIS:    Active Problems:    Schizophrenia ([address]  Resolved Problems:    * No resolved hospital problems. *      LAB:    No results found for this or any previous visit (from the past 72 hour(s)).         TREATMENT PLAN:     QUEtiapine  500 mg Oral Nightly   

## 2020-07-10 NOTE — GROUP NOTE
Group Therapy Note    Date: 7/10/2020    Group Start Time: 1600  Group End Time: 1319  Group Topic: Psychoeducation    JESUS DAVID SHI    Cris Antonio        Group Therapy Note    Attendees: 9/19         Patient's Goal:  To attend and participate in group. Notes:  Emotions education    Status After Intervention:  Unchanged    Participation Level:  Active Listener and Interactive    Participation Quality: Appropriate, Attentive and Sharing      Speech:  normal      Thought Process/Content: Logical  Linear      Affective Functioning: Congruent      Mood: euthymic      Level of consciousness:  Oriented x4      Response to Learning: Able to verbalize current knowledge/experience and Able to verbalize/acknowledge new learning      Endings: None Reported    Modes of Intervention: Education      Discipline Responsible: [address], [facility] [employer]      Signature:  Cris Antonio

## 2020-07-11 PROCEDURE — 1240000000 HC EMOTIONAL WELLNESS R&B

## 2020-07-11 PROCEDURE — 6370000000 HC RX 637 (ALT 250 FOR IP): Performed by: PSYCHIATRY & NEUROLOGY

## 2020-07-11 RX ADMIN — BUPROPION HYDROCHLORIDE 100 MG: 100 TABLET, EXTENDED RELEASE ORAL at 08:38

## 2020-07-11 RX ADMIN — OXCARBAZEPINE 300 MG: 300 TABLET, FILM COATED ORAL at 22:25

## 2020-07-11 RX ADMIN — TRAZODONE HYDROCHLORIDE 50 MG: 50 TABLET ORAL at 22:25

## 2020-07-11 RX ADMIN — OXCARBAZEPINE 300 MG: 300 TABLET, FILM COATED ORAL at 08:38

## 2020-07-11 RX ADMIN — QUETIAPINE FUMARATE 500 MG: 300 TABLET ORAL at 22:25

## 2020-07-11 RX ADMIN — ESCITALOPRAM OXALATE 20 MG: 20 TABLET ORAL at 22:25

## 2020-07-11 RX ADMIN — BUPROPION HYDROCHLORIDE 100 MG: 100 TABLET, EXTENDED RELEASE ORAL at 22:28

## 2020-07-11 ASSESSMENT — PAIN SCALES - GENERAL: PAINLEVEL_OUTOF10: 0

## 2020-07-11 NOTE — BH NOTE
Despite encouragement from staff patient refused group. One to one talk time offered. Patient refused. Will continue to monitor.

## 2020-07-11 NOTE — GROUP NOTE
Group Therapy Note    Date: 7/11/2020    Group Start Time: 1000  Group End Time: 3956  Group Topic: Psychoeducation    Via Chelsie Armendariz, MSW, LSW        Group Therapy Note    Attendees: 9/20    Pt declined to attend psycho education at 1000 am despite encouragement. Pt offered 1:1 and refused.

## 2020-07-11 NOTE — PROGRESS NOTES
PROGRESS NOTE  The chart has been reviewed and the patient was interviewed in the conference room. The patient was a cooperative during the evaluation. He reported the medication helped to improve his mood and sleep. He denied suicidal or homicidal thoughts. He reported the voices are getting better and denied command hallucinations. Safety plan has been discussed with the patient. The patient asked the writer to be discharged tomorrow. The discharge plan and relapse prevention plan discussed with the patient. We will continue monitoring for psychosis, suicidal behavior and to proceed with the discharge plan. MENTAL STATUS EXAMINATION:    BP 97/70   Pulse 82   Temp 97.5 °F (36.4 °C) (Oral)   Resp 16   Ht 6' 3\" (1.905 m)   Wt 170 lb (77.1 kg)   SpO2 98%   BMI 21.25 kg/m²     MOOD-is dysphoric   Affect-is depressed  Patient feels helpless hopeless and worthless  Psychomotor activity : decreased. APPEARANCE:  Personal hygiene is poor and patient is neglecting his appearance. Patient is somewhat unkempt  PSYCHOSIS:  Denies auditory or visual hallucinations. Denies paranoid delusions. ORIENTATION:  Oriented to time place and person. Recent and remote memory is grossly intact. Intelligence appears to be average  CONCENTRATION:  poor. SPEECH : goal directed , but slow . DIAGNOSIS:    Active Problems:    Schizophrenia ([facility] [address]  Resolved Problems:    * No resolved hospital problems. *      LAB:    No results found for this or any previous visit (from the past 72 hour(s)).         TREATMENT PLAN:     QUEtiapine  500 mg Oral Nightly    buPROPion  100 mg Oral BID    traZODone  50 mg Oral Nightly    escitalopram  20 mg Oral Nightly    OXcarbazepine  300 mg Oral BID     dicyclomine, acetaminophen, aluminum & magnesium hydroxide-simethicone, benztropine mesylate, magnesium hydroxide, nicotine polacrilex, hydrOXYzine    Chart was reviewed and the patient has been interviewed  Continue the same medications as prescribed above  Patient was discussed with the  and the treatment team.   Provided Supportive and insight-oriented psychotherapy psychotherapy  Recommended involvement in Unit milieu  Provided empathic listening, validation and support  Patient acknowledged and mutually decided to continue with current treatment plan  Discharge planning was discussed with the patient. Mckenna Long MD        This note was created with the assistance of a speech-recognition program.  Although the intention is to generate a document that actually reflects the content of the visit, no guarantees can be provided that every mistake has been identified and corrected by editing.

## 2020-07-11 NOTE — GROUP NOTE
Group Therapy Note    Date: 7/11/2020    Group Start Time: 1330  Group End Time: 8428  Group Topic: Cognitive Skills    [facility]    Kieran Day    patient refused to attend cognitive skills group at 1330 after encouragement from staff.   1:1 talk time provided as alternative to group session     Signature:  Fatuma Reyna

## 2020-07-11 NOTE — PLAN OF CARE
Problem: Altered Mood, Psychotic Behavior:  Goal: Able to verbalize decrease in frequency and intensity of hallucinations  Description: Able to verbalize decrease in frequency and intensity of hallucinations  7/10/2020 2234 by Junior Campbell RN  Outcome: Ongoing  Note: Patient reports suicidal ideation but contracts for safety, patient does not have a plan. Patient reports hearing voices but is unable to describe. Patient is isolative to room for long periods of time. Patient is complaint with medication interventions and reports adequate sleep and appetite. Problem: Altered Mood, Psychotic Behavior:  Goal: Absence of self-harm  Description: Absence of self-harm  7/10/2020 2234 by Junior Campbell RN  Outcome: Ongoing  Note: 15 minute safety checks, patient remained free of self harm.

## 2020-07-11 NOTE — CARE COORDINATION
DISCHARGE PLACEMENT/PLAN    Pt presents to writers office and states he wants to attend AOD treatment but wants to return home first to gather items. Writer contacted Dr. Steven Lares and confirmed Monday discharge. Medications will be sent to [address] [place] [address]. Writer spoke with Coltno Cabrera [facility] , who states he will pickup pt @ 10 am on Monday, transport pt to home and gather belongings, and transport pt to [facility]. Writer provided Ranjana Shaikh with [facility] address, phone numbers and discussed pharmacy and med needs with Ranjana Shaikh. Ranjana Shaikh confirms discharge plan at this time. Pt is thankful for placement and coordination. Writer updated handoff and discharge event.

## 2020-07-12 PROCEDURE — 6370000000 HC RX 637 (ALT 250 FOR IP): Performed by: PSYCHIATRY & NEUROLOGY

## 2020-07-12 PROCEDURE — 1240000000 HC EMOTIONAL WELLNESS R&B

## 2020-07-12 RX ORDER — QUETIAPINE FUMARATE 100 MG/1
500 TABLET, FILM COATED ORAL NIGHTLY
Qty: 60 TABLET | Refills: 3 | Status: ON HOLD | OUTPATIENT
Start: 2020-07-12 | End: 2020-08-14 | Stop reason: HOSPADM

## 2020-07-12 RX ORDER — BUPROPION HYDROCHLORIDE 100 MG/1
100 TABLET, EXTENDED RELEASE ORAL 2 TIMES DAILY
Qty: 60 TABLET | Refills: 3 | Status: ON HOLD | OUTPATIENT
Start: 2020-07-12 | End: 2020-08-14 | Stop reason: SDUPTHER

## 2020-07-12 RX ADMIN — BUPROPION HYDROCHLORIDE 100 MG: 100 TABLET, EXTENDED RELEASE ORAL at 21:33

## 2020-07-12 RX ADMIN — QUETIAPINE FUMARATE 500 MG: 300 TABLET ORAL at 21:35

## 2020-07-12 RX ADMIN — BUPROPION HYDROCHLORIDE 100 MG: 100 TABLET, EXTENDED RELEASE ORAL at 08:42

## 2020-07-12 RX ADMIN — OXCARBAZEPINE 300 MG: 300 TABLET, FILM COATED ORAL at 08:42

## 2020-07-12 RX ADMIN — ESCITALOPRAM OXALATE 20 MG: 20 TABLET ORAL at 21:40

## 2020-07-12 RX ADMIN — OXCARBAZEPINE 300 MG: 300 TABLET, FILM COATED ORAL at 21:33

## 2020-07-12 RX ADMIN — HYDROXYZINE HYDROCHLORIDE 25 MG: 25 TABLET, FILM COATED ORAL at 21:35

## 2020-07-12 RX ADMIN — TRAZODONE HYDROCHLORIDE 50 MG: 50 TABLET ORAL at 21:35

## 2020-07-12 NOTE — PLAN OF CARE
Problem: Altered Mood, Psychotic Behavior:  Goal: Able to verbalize decrease in frequency and intensity of hallucinations  Description: Able to verbalize decrease in frequency and intensity of hallucinations  7/12/2020 1354 by More Torrez  Outcome: Ongoing    Patient denies any hallucinations. Thoughts are clear. Some short term memory loss. Controlled and cooperative. Isolative for long intervals. Aloof from peers, social with staff. Takes medications as ordered. Problem: Altered Mood, Psychotic Behavior:  Goal: Absence of self-harm  Description: Absence of self-harm  7/12/2020 1354 by More Torrez  Outcome: Ongoing    Patient denies any suicidal thoughts. Is planning on d/c tomorrow, and checks in on the details with staff and the  a few times. Preoccupied with the check coming to his home.

## 2020-07-12 NOTE — PLAN OF CARE
[facility] Interdisciplinary Treatment Plan Note     Review Date & Time: 7/12/2020 1017    Admission Type:   Admission Type: Voluntary    Reason for admission:  Reason for Admission: Pt having suicidal ideation plan to stab self. Pt hearing voices telling him to commit suicide.     Estimated Length of Stay:  8-14 days  Estimated Discharge Date Update:   to be determined by physician    PATIENT STRENGTHS:  Patient Strengths:Strengths: Communication, No significant Physical Illness, Connection to output provider  Patient Strengths and Limitations:Limitations: Tendency to isolate self, External locus of control, Inappropriate/potentially harmful leisure interests, Difficulty problem solving/relies on others to help solve problems  Addictive Behavior:Addictive Behavior  In the past 3 months, have you felt or has someone told you that you have a problem with:  : None  Do you have a history of Chemical Use?: No  Do you have a history of Alcohol Use?: No  Do you have a history of Street Drug Abuse?: Yes  Histroy of Prescripton Drug Abuse?: No  Medical Problems:   Past Medical History:   Diagnosis Date    Bipolar disorder ([facility] [address]     Depression     GERD (gastroesophageal reflux disease)     Hallucinations     Headache(784.0)     Hepatitis     Schizophrenia, schizo-affective (HCC)     Substance abuse ([facility] [address]     Tobacco abuse     Type II or unspecified type diabetes mellitus without mention of complication, not stated as uncontrolled     Urinary incontinence        Risk:  Fall RiskTotal: 55  Dusty Scale Dusty Scale Score: 22  BVC Total: 0    Change in scores:  No. Changes to plan of Care:  No    Status EXAM:   Status and Exam  Normal: No  Facial Expression: Avoids Gaze, Flat  Affect: Appropriate  Level of Consciousness: Alert  Mood:Normal: No  Mood: Depressed, Anxious  Motor Activity:Normal: No  Motor Activity: Decreased  Interview Behavior: Cooperative  Preception: [place] to Person, [place] to Place, Remain stable    PLAN/TREATMENT RECOMMENDATIONS UPDATE:   Continue with group therapies, education of coping skills   Continue to monitor patient on unit. Medications provided/medication compliance by patient. Continue for plans to obtain long term goals after discharge.     SHORT-TERM GOALS UPDATE:  Time frame for Short-Term Goals:  8-14days     LONG-TERM GOALS UPDATE:  Time frame for Long-Term Goals:  6 months    Members Present in Team Meeting:     Pamella Fothergill

## 2020-07-12 NOTE — BH NOTE
Pt refused 1430 open recreation time due to sleeping in room and choosing not to attend despite encouragement from staff.

## 2020-07-12 NOTE — BH NOTE
patient refused to attend wellness group at 1000 after encouragement from staff. 1:1 talk time provided as alternative to group session.

## 2020-07-12 NOTE — GROUP NOTE
Group Therapy Note    Date: 7/12/2020    Group Start Time: 0900  Group End Time: 0920  Group Topic: Community Meeting    [facility]    Kieran Day CTRS    patient refused to attend community meeting group at 0900 after encouragement from staff.   1:1 talk time provided as alternative to group session       Signature:  Isaias Hargrove

## 2020-07-12 NOTE — PLAN OF CARE
Problem: Altered Mood, Psychotic Behavior:  Goal: Able to verbalize decrease in frequency and intensity of hallucinations  Description: Able to verbalize decrease in frequency and intensity of hallucinations  7/12/2020 0201 by uDstin Quesada RN  Outcome: Ongoing  Note: Patient denies suicidal and homicidal ideation. Patient reports hearing voices but is unable to describe. Patient is isolative to room for long periods of time. Patient is complaint with medication interventions and reports adequate sleep and appetite. Problem: Altered Mood, Psychotic Behavior:  Goal: Absence of self-harm  Description: Absence of self-harm  7/12/2020 0201 by Dustin Quesada RN  Outcome: Ongoing  Note: 15 minute safety checks, patient remained free of self harm.

## 2020-07-13 VITALS
DIASTOLIC BLOOD PRESSURE: 79 MMHG | BODY MASS INDEX: 21.14 KG/M2 | HEART RATE: 76 BPM | SYSTOLIC BLOOD PRESSURE: 139 MMHG | TEMPERATURE: 98 F | OXYGEN SATURATION: 98 % | RESPIRATION RATE: 15 BRPM | HEIGHT: 75 IN | WEIGHT: 170 LBS

## 2020-07-13 PROCEDURE — 6370000000 HC RX 637 (ALT 250 FOR IP): Performed by: PSYCHIATRY & NEUROLOGY

## 2020-07-13 RX ADMIN — BUPROPION HYDROCHLORIDE 100 MG: 100 TABLET, EXTENDED RELEASE ORAL at 08:43

## 2020-07-13 RX ADMIN — OXCARBAZEPINE 300 MG: 300 TABLET, FILM COATED ORAL at 08:43

## 2020-07-13 NOTE — PROGRESS NOTES
PROGRESS NOTE  The chart has been reviewed and the patient was interviewed in the conference room. The patient was pleasant and cooperative during the evaluation. The patient reported motivated to stay sober and clean and not using crack cocaine. He is interested to do residential after discharge from here. The patient will be discharged to [facility] in Monday the morning to be picked up by his . Safety plan has been discussed with the patient. The patient denied current thoughts of harming self or others. He is satisfied with the medication and denies psychotic symptoms. MENTAL STATUS EXAMINATION:    /61   Pulse 77   Temp 98 °F (36.7 °C)   Resp 14   Ht 6' 3\" (1.905 m)   Wt 170 lb (77.1 kg)   SpO2 98%   BMI 21.25 kg/m²     MOOD-is dysphoric   Affect-is depressed  Patient feels helpless hopeless and worthless  Psychomotor activity : decreased. APPEARANCE:  Personal hygiene is poor and patient is neglecting his appearance. Patient is somewhat unkempt  PSYCHOSIS:  Denies auditory or visual hallucinations. Denies paranoid delusions. ORIENTATION:  Oriented to time place and person. Recent and remote memory is grossly intact. Intelligence appears to be average  CONCENTRATION:  poor. SPEECH : goal directed , but slow . DIAGNOSIS:    Principal Problem:    Schizophrenia ([facility] [address]  Resolved Problems:    * No resolved hospital problems. *      LAB:    No results found for this or any previous visit (from the past 72 hour(s)).         TREATMENT PLAN:     QUEtiapine  500 mg Oral Nightly    buPROPion  100 mg Oral BID    traZODone  50 mg Oral Nightly    escitalopram  20 mg Oral Nightly    OXcarbazepine  300 mg Oral BID     dicyclomine, acetaminophen, aluminum & magnesium hydroxide-simethicone, benztropine mesylate, magnesium hydroxide, nicotine polacrilex, hydrOXYzine    Chart was reviewed and the patient has been interviewed  Continue the medications as prescribed above  Patient was discussed with the  and the treatment team.   Provided Supportive and insight-oriented psychotherapy psychotherapy  Recommended involvement in Unit milieu  Provided empathic listening, validation and support  Patient acknowledged and mutually decided to continue with current treatment plan  Discharge planning was discussed with the patient. Danii Dodson MD        This note was created with the assistance of a speech-recognition program.  Although the intention is to generate a document that actually reflects the content of the visit, no guarantees can be provided that every mistake has been identified and corrected by editing.

## 2020-07-13 NOTE — BH NOTE
Patient given tobacco quitline number 58416612701 at this time, refusing to call at this time, states \" I just dont want to quit now\"- patient given information as to the dangers of long term tobacco use. Continue to reinforce the importance of tobacco cessation.

## 2020-07-13 NOTE — BH NOTE
[facility]  Discharge Note    Pt discharged with followings belongings:   Dentures: None  Vision - Corrective Lenses: None  Hearing Aid: None  Jewelry: None  Body Piercings Removed: N/A  Clothing: Footwear, Sweater, Other (Comment), Shirt(hat,belt)  Were All Patient Medications Collected?: Not Applicable  Other Valuables: Zackray Culp, Angela (Comment)(ID, Insurance cards. misc papers/cards)   Valuables sent home with patient. Valuables retrieved from safe, Security envelope number:  [id number] and returned to patient. Patient education on aftercare instructions: Yes  [place] [facility] [place] Patient verbalize understanding of AVS:  yes. Status EXAM upon discharge:  Status and Exam  Normal: Yes  Facial Expression: Brightened  Affect: Appropriate  Level of Consciousness: Alert  Mood:Normal: Yes  Mood: Anxious  Motor Activity:Normal: Yes  Motor Activity: Decreased  Interview Behavior: Cooperative  Preception: [place] to Person, Leigh Rebel to Time, [place] to Place, [place] to Situation  Attention:Normal: No  Attention: Distractible  Thought Processes: Circumstantial  Thought Content:Normal: No  Thought Content: Poverty of Content  Hallucinations:  Auditory (Comment)  Delusions: No  Memory:Normal: Yes  Memory: Poor Recent  Insight and Judgment: Yes  Insight and Judgment: Poor Judgment  Present Suicidal Ideation: No  Present Homicidal Ideation: No      Metabolic Screening:    Lab Results   Component Value Date    LABA1C 5.2 05/22/2020       Lab Results   Component Value Date    CHOL 179 02/13/2017    CHOL 127 05/09/2015    CHOL 168 08/20/2014    CHOL 158 12/31/2013    CHOL 178 08/15/2013    CHOL 166 09/17/2012    CHOL 210 (H) 02/03/2012     Lab Results   Component Value Date    TRIG 67 02/13/2017    TRIG 37 05/09/2015    TRIG 72 08/20/2014    TRIG 52 12/31/2013    TRIG 70 08/15/2013    TRIG 117 09/17/2012    TRIG 94 02/03/2012     Lab Results   Component Value Date    HDL 73 02/13/2017    HDL 57 05/09/2015    HDL 50 08/20/2014    HDL 43 12/31/2013    HDL 42 08/15/2013    HDL 38 (L) 09/17/2012    HDL 55 02/03/2012     No components found for: LDLCAL  No results found for: LABVLDL     Patient discharged to home by  to get belongings and was then going to be transported to [facility] by .        Dania Brown RN

## 2020-07-15 NOTE — DISCHARGE SUMMARY
DISCHARGE      SUMMARY                                                               DEMOGRAPHICS          Jassi Ervin is a 63 y.o.   male     DATE OF ADMISSION: 7/4/2020  DATE OF DISCHARGE: 07/15/20  DISCHARGE DIAGNOSES:   Principal Problem:    Schizophrenia ([address]  Resolved Problems:    * No resolved hospital problems. *  The patient was admitted to psychiatry after presenting to ED with suicidal ideation. He reported command hallucinations over the last 3 days. The patient had a plan to stab himself. He asked the ER physician for a knife so he could kill himself. The patient denies using any recreational substances recently    [address] [place] [address]    Patient presented as a 59-year-old -American man who has a long history of schizoaffective disorder, crack cocaine, and alcohol dependence.  I have noted that the patient was admitted 2 weeks ago to [facility] with a similar presentation with a similar presentation.      The patient was seen using telemetry psych. He is slightly drowsy and continue to lie in bed.     Patient cites stressors from being around people who sell dope as the main reason that he is feeling depressed and suicidal.  He also reports auditory hallucinations telling him to kill himself. He reports he has been compliant with his medications. For detailed history, mental status examination at time of admission, diagnoses and treatment plan, please see the dictated psychiatric evaluation at the time of admission  After admission, patient was seen in individual supportive psychotherapy, was encouraged to participate in unit milieu. Case was also discussed with the staff. The patient is still exhibiting symptoms of depression, feeling hopeless, helpless and suicidal.  He denied current specific plan to harm himself. The patient is still isolative to self with poor interaction with others. He denies side effects of the medications. The patient did not attend therapy groups in the unit. There was no behavioral problem reported by the nursing staff. We will continue monitoring for symptoms of depression, suicidal behavior and to adjust his medications as needed. The patient was a cooperative during the evaluation. He reported the medication helped to improve his mood and sleep. He denied suicidal or homicidal thoughts. He reported the voices are getting better and denied command hallucinations. Safety plan has been discussed with the patient. The patient asked the writer to be discharged tomorrow. The discharge plan and relapse prevention plan discussed with the patient. We will continue monitoring for psychosis, suicidal behavior and to proceed with the discharge plan. The patient was pleasant and cooperative during the evaluation. The patient reported motivated to stay sober and clean and not using crack cocaine. He is interested to do residential after discharge from here. The patient will be discharged to [facility] in Monday the morning to be picked up by his . Safety plan has been discussed with the patient. The patient denied current thoughts of harming self or others. He is satisfied with the medication and denies psychotic symptoms. During the later part of hospitalization mood and affect started improving. Patient was feeling more hopeful. No side effects from medication reported. CONDITION ON DISCHARGE    Blood pressure 139/79, pulse 76, temperature 98 °F (36.7 °C), resp. rate 15, height 6' 3\" (1.905 m), weight 170 lb (77.1 kg), SpO2 98 %. At theTime discharge:  Patient  had mild anxiety. Overall mood and affect has improved. Patient denied any Suicidal or homicidal thoughts  Patient denied Hallucinations or delusions.   Patient was oriented to time place and person    No side effects from medication reported    EXAM:    BP 139/79   Pulse 76   Temp 98 °F (36.7 °C)   Resp 15   Ht 6' 3\" (1.905 m)   Wt 170 lb (77.1 kg)   SpO2 98%   BMI 21.25 kg/m²   Patient did not have any physical complaint at the time of discharge    No results found for this or any previous visit (from the past 72 hour(s)). DISCHARGE INSTRUCTIONS:  Disposition: Discharge to :  HOME/SELF CARE  Condition on discharge:  GOOD  Regular diet  Activities as tolerated      DISCHARGE MEDS:     Medication List      START taking these medications    buPROPion 100 MG extended release tablet  Commonly known as:  WELLBUTRIN SR  Take 1 tablet by mouth 2 times daily  Notes to patient:  Depression        CHANGE how you take these medications    QUEtiapine 100 MG tablet  Commonly known as:  SEROQUEL  Take 5 tablets by mouth nightly  What changed:    · medication strength  · how much to take  Notes to patient:  Mood Stabilizer        CONTINUE taking these medications    benztropine 2 MG tablet  Commonly known as:  COGENTIN  Take 1 tablet by mouth daily as needed (acute dystonia)  Notes to patient:   To prevent side effects     dicyclomine 10 MG capsule  Commonly known as:  Bentyl  Take 1 capsule by mouth every 6 hours as needed (cramps)  Notes to patient:  Stomach cramping     escitalopram 20 MG tablet  Commonly known as:  LEXAPRO  Take 1 tablet by mouth nightly  Notes to patient:  Depression     OXcarbazepine 300 MG tablet  Commonly known as:  TRILEPTAL  Take 1 tablet by mouth 2 times daily  Notes to patient:  Mood Stabilizer/Seizure           Where to Get Your Medications      These medications were sent to [address] [place] [address], [address], [address] [place] [address]    Phone:  [phone]   · buPROPion 100 MG extended release tablet  · QUEtiapine 100 MG tablet           Sari Sue MD

## 2020-08-02 ENCOUNTER — HOSPITAL ENCOUNTER (EMERGENCY)
Age: 60
Discharge: HOME | End: 2020-08-02
Attending: EMERGENCY MEDICINE
Payer: MEDICAID

## 2020-08-02 VITALS
HEART RATE: 72 BPM | DIASTOLIC BLOOD PRESSURE: 75 MMHG | SYSTOLIC BLOOD PRESSURE: 119 MMHG | OXYGEN SATURATION: 95 % | HEIGHT: 74 IN | TEMPERATURE: 97.2 F | BODY MASS INDEX: 19.25 KG/M2 | WEIGHT: 150 LBS | RESPIRATION RATE: 16 BRPM

## 2020-08-02 LAB
ABSOLUTE EOS #: 0.1 K/UL (ref 0–0.44)
ABSOLUTE IMMATURE GRANULOCYTE: <0.03 K/UL (ref 0–0.3)
ABSOLUTE LYMPH #: 2.73 K/UL (ref 1.1–3.7)
ABSOLUTE MONO #: 0.37 K/UL (ref 0.1–1.2)
ALBUMIN SERPL-MCNC: 3.8 G/DL (ref 3.5–5.2)
ALBUMIN/GLOBULIN RATIO: 1.7 (ref 1–2.5)
ALP BLD-CCNC: 123 U/L (ref 40–129)
ALT SERPL-CCNC: 12 U/L (ref 5–41)
ANION GAP SERPL CALCULATED.3IONS-SCNC: 12 MMOL/L (ref 9–17)
AST SERPL-CCNC: 22 U/L
BASOPHILS # BLD: 1 % (ref 0–2)
BASOPHILS ABSOLUTE: 0.03 K/UL (ref 0–0.2)
BILIRUB SERPL-MCNC: 0.21 MG/DL (ref 0.3–1.2)
BILIRUBIN URINE: NEGATIVE
BUN BLDV-MCNC: 10 MG/DL (ref 8–23)
BUN/CREAT BLD: ABNORMAL (ref 9–20)
CALCIUM SERPL-MCNC: 8.4 MG/DL (ref 8.6–10.4)
CHLORIDE BLD-SCNC: 106 MMOL/L (ref 98–107)
CO2: 22 MMOL/L (ref 20–31)
COLOR: YELLOW
COMMENT UA: NORMAL
CREAT SERPL-MCNC: 0.77 MG/DL (ref 0.7–1.2)
DIFFERENTIAL TYPE: ABNORMAL
EOSINOPHILS RELATIVE PERCENT: 2 % (ref 1–4)
GFR AFRICAN AMERICAN: >60 ML/MIN
GFR NON-AFRICAN AMERICAN: >60 ML/MIN
GFR SERPL CREATININE-BSD FRML MDRD: ABNORMAL ML/MIN/{1.73_M2}
GFR SERPL CREATININE-BSD FRML MDRD: ABNORMAL ML/MIN/{1.73_M2}
GLUCOSE BLD-MCNC: 115 MG/DL
GLUCOSE BLD-MCNC: 115 MG/DL (ref 75–110)
GLUCOSE BLD-MCNC: 128 MG/DL (ref 70–99)
GLUCOSE URINE: NEGATIVE
HCT VFR BLD CALC: 33.1 % (ref 40.7–50.3)
HEMOGLOBIN: 10.8 G/DL (ref 13–17)
IMMATURE GRANULOCYTES: 0 %
KETONES, URINE: NEGATIVE
LACTIC ACID, WHOLE BLOOD: 1.7 MMOL/L (ref 0.7–2.1)
LACTIC ACID: NORMAL MMOL/L
LEUKOCYTE ESTERASE, URINE: NEGATIVE
LIPASE: 28 U/L (ref 13–60)
LYMPHOCYTES # BLD: 48 % (ref 24–43)
MAGNESIUM: 2.1 MG/DL (ref 1.6–2.6)
MCH RBC QN AUTO: 29.9 PG (ref 25.2–33.5)
MCHC RBC AUTO-ENTMCNC: 32.6 G/DL (ref 28.4–34.8)
MCV RBC AUTO: 91.7 FL (ref 82.6–102.9)
MONOCYTES # BLD: 7 % (ref 3–12)
NITRITE, URINE: NEGATIVE
NRBC AUTOMATED: 0 PER 100 WBC
PDW BLD-RTO: 14.2 % (ref 11.8–14.4)
PH UA: 5 (ref 5–8)
PLATELET # BLD: 281 K/UL (ref 138–453)
PLATELET ESTIMATE: ABNORMAL
PMV BLD AUTO: 9.2 FL (ref 8.1–13.5)
POTASSIUM SERPL-SCNC: 3.4 MMOL/L (ref 3.7–5.3)
PROTEIN UA: NEGATIVE
RBC # BLD: 3.61 M/UL (ref 4.21–5.77)
RBC # BLD: ABNORMAL 10*6/UL
SEG NEUTROPHILS: 42 % (ref 36–65)
SEGMENTED NEUTROPHILS ABSOLUTE COUNT: 2.34 K/UL (ref 1.5–8.1)
SODIUM BLD-SCNC: 140 MMOL/L (ref 135–144)
SPECIFIC GRAVITY UA: 1.01 (ref 1–1.03)
TOTAL PROTEIN: 6 G/DL (ref 6.4–8.3)
TURBIDITY: CLEAR
URINE HGB: NEGATIVE
UROBILINOGEN, URINE: NORMAL
WBC # BLD: 5.6 K/UL (ref 3.5–11.3)
WBC # BLD: ABNORMAL 10*3/UL

## 2020-08-02 PROCEDURE — 83690 ASSAY OF LIPASE: CPT

## 2020-08-02 PROCEDURE — 2580000003 HC RX 258: Performed by: STUDENT IN AN ORGANIZED HEALTH CARE EDUCATION/TRAINING PROGRAM

## 2020-08-02 PROCEDURE — 83605 ASSAY OF LACTIC ACID: CPT

## 2020-08-02 PROCEDURE — 82947 ASSAY GLUCOSE BLOOD QUANT: CPT

## 2020-08-02 PROCEDURE — 85025 COMPLETE CBC W/AUTO DIFF WBC: CPT

## 2020-08-02 PROCEDURE — 80053 COMPREHEN METABOLIC PANEL: CPT

## 2020-08-02 PROCEDURE — 81003 URINALYSIS AUTO W/O SCOPE: CPT

## 2020-08-02 PROCEDURE — 83735 ASSAY OF MAGNESIUM: CPT

## 2020-08-02 PROCEDURE — 99285 EMERGENCY DEPT VISIT HI MDM: CPT

## 2020-08-02 RX ORDER — ONDANSETRON 2 MG/ML
4 INJECTION INTRAMUSCULAR; INTRAVENOUS ONCE
Status: DISCONTINUED | OUTPATIENT
Start: 2020-08-02 | End: 2020-08-02 | Stop reason: HOSPADM

## 2020-08-02 RX ORDER — MAGNESIUM HYDROXIDE/ALUMINUM HYDROXICE/SIMETHICONE 120; 1200; 1200 MG/30ML; MG/30ML; MG/30ML
30 SUSPENSION ORAL ONCE
Status: DISCONTINUED | OUTPATIENT
Start: 2020-08-02 | End: 2020-08-02 | Stop reason: HOSPADM

## 2020-08-02 RX ORDER — DICYCLOMINE HYDROCHLORIDE 10 MG/1
10 CAPSULE ORAL EVERY 6 HOURS PRN
Qty: 21 CAPSULE | Refills: 0 | Status: SHIPPED | OUTPATIENT
Start: 2020-08-02 | End: 2020-08-10

## 2020-08-02 RX ORDER — DICYCLOMINE HYDROCHLORIDE 10 MG/1
10 CAPSULE ORAL ONCE
Status: DISCONTINUED | OUTPATIENT
Start: 2020-08-02 | End: 2020-08-02 | Stop reason: HOSPADM

## 2020-08-02 RX ORDER — ALUMINA, MAGNESIA, AND SIMETHICONE 2400; 2400; 240 MG/30ML; MG/30ML; MG/30ML
20 SUSPENSION ORAL EVERY 8 HOURS PRN
Qty: 1 BOTTLE | Refills: 0 | Status: SHIPPED | OUTPATIENT
Start: 2020-08-02 | End: 2020-08-07

## 2020-08-02 RX ORDER — 0.9 % SODIUM CHLORIDE 0.9 %
1000 INTRAVENOUS SOLUTION INTRAVENOUS ONCE
Status: COMPLETED | OUTPATIENT
Start: 2020-08-02 | End: 2020-08-02

## 2020-08-02 RX ADMIN — SODIUM CHLORIDE 1000 ML: 9 INJECTION, SOLUTION INTRAVENOUS at 07:46

## 2020-08-02 ASSESSMENT — ENCOUNTER SYMPTOMS
DIARRHEA: 0
SHORTNESS OF BREATH: 0
VOMITING: 0
CONSTIPATION: 0
COUGH: 0
SORE THROAT: 0
NAUSEA: 0
ABDOMINAL PAIN: 1

## 2020-08-02 NOTE — ED NOTES
[facility] will be discharged to [facility], pt is medically cleared, awaiting rescue to transport pt. Meal tray ordered.       Areli Antonio RN  08/02/20 1113

## 2020-08-02 NOTE — ED NOTES
contacted [facility] for . Spoke with Devaughn Pena and she is going to run it by her supervisor and call back.        Phill Bowles, MSW, LSW     Jennifer Samuel  08/02/20 8942

## 2020-08-02 NOTE — ED NOTES
Called in as safety watch d/t suicidal ideation. Security at bedside to change pt. And secure belongings.        Som Groves RN  08/02/20 2064

## 2020-08-02 NOTE — ED NOTES
Reviewed case with on call psychiatrist who agrees with plan to send [facility] to [facility] for evaluation of suicidal thoughts.       Timoteo [place], [place]  08/02/20 2962

## 2020-08-02 NOTE — ED NOTES
[] Sosa    [] [address]    [x]  [facility] ASSESSMENT      Y  N     [x] [] In the past two weeks have you had thoughts of hurting yourself in any way? [x] [] In the past two weeks have you had thoughts that you would be better off dead? [x] [] Have you made a suicide attempt in the past two months? [x] [] Do you have a plan for hurting yourself or suicide? [] [x] Presence of hallucinations/voices related to hurting himself or herself or someone else. SUICIDE/SECURITY WATCH PRECAUTION CHECKLIST     Orders    [x]  Suicide/Security Watch Precautions initiated as checked below:   8/2/20 7:58 AM EDT BH31/BH31B    [x] Notified physician:  Katt Blair MD  8/2/20 7:58 AM EDT    [x] Orders obtained as appropriate:     [x] 1:1 Observer     [] Psych Consult     [] Psych Consult    Name:  Date:  Time:    [x] 1:1 Observer, Notified by:  Anika Edmondson Nurse Supervisor    [x] Remove all personal clothes from room and place in snap/paper gown/pants. Slipper only    [x] Remove all personal belongings from room and secured away from patient. Documentation    [x] Initiate Suicide/Security Watch Precaution Flow Sheet    [x] Initiate individualized Care Plan/Problem    [x] Document why precautions initiated on flow sheet (Initiate Nursing Care Plan/Problem)    [x] 1:1 Observer in place; instructions provided. Suicide precautions require observer be within arms length. [x] Nurse-Observer Communication Hand-off initiated by RN, reviewed with Observer. Subsequently used as Hand Off between Observers. [x] Initiate every 15 minute observations per observer as delegated by the RN.     [x] Initiate RN assessment and documentation    Environmental Scan  Search Criteria and Process: OPTIONAL, see Search Policy    [] Reason for search:    [] Nursing in presence of second person to search patient    [] Patient notified of reason for body assessment and belongings search:     Persons present during search:   Results of search and disposition:       Searchers Name: security    These items or items similar should be removed from the room:   [] Chairs   [] Telephone   [] Trash cans and liners   [] Plastic utensils (order Patient Safety tray)   [] Empty or remove Sharps containers   [] All personal clothing/belongings removed   [] All unnecessary lead wires, electrical cords, draw cords, etc.   [] Flowers and plants   [] Double check for lighters, matches, razors, any glass items etc that can be used as weapons. Person completing Checklist: Jayshree Anaya       GENERAL INFORMATION     Y  N     [x] [] Has the patient been informed that they are on a watch and what that means? [x] [] Can the patient get out of Bed without nursing assistance? [x] [] Can the patient use the restroom without nursing assistance? [x] [] Can the patient walk the halls to [place] their legs? \"   [] [x] Does the patient have metal utensils? [x] [] Have the patient's belongings been placed out of control of the patient? [x] [] Have the patient and his/her belongings been checked for contraband? [] [x] Is the patient under any visitor restrictions? If Yes, explain:   [] [x] Is the patient under an alias? Alias Name:   Authorized visitors (no more than two are to be on the list)   Name/Relationship:   Name/Relationship:    Name of Staff member that you  Received this information from?: security    General Description:    Cleatus Nails BH31/BH31B male 63 y.o. Admission weight: 150 lb (68 kg) Height: 6' 2\" (188 cm)  Race: []  [x] Black  []   []   [] [place] [] Other  Facial Hair:  [x] Yes  [] No  If yes, please describe: Identifying Marks (i.e. Visible tattoos, scars, etc... ):     NURSING CARE PLAN    Nursing Diagnosis: Risk of Self Directed Harm  [] Actual  [x] Potential  Date Started: 8/2/20      Etiological Factors: (related to)  [x] Expressed or implied suicidal ideation/behavior  [] Depression  [] Suicide attempt      [] Low self-esteem  [] Hallucinations      [] Feeling of Hopelessness  [x] Substance abuse or withdrawal    [] Dysfunctional family  [] Major traumatic event, eg., divorce, etc   [] Excessive stress/anxiety    8/2/20    Expected Outcomes    Patient will:   [x] Patient will remain safe for the duration of their stay   [x] Patient's environment will be safe, eg. Free of potential suicide weapons   [] Verbalize Recovery from suicidal episode and improvement in self-worth   [x] Discuss feeling that precipitated suicide attempt/thoughts/behavior   [] Will describe available resources for crisis prevention and management   [] Will verbalize positive coping skills     Nursing Intervention   [x] Assessment and Observations hourly   [x] Suicide Precautions implemented with patient, should be 1:1 observation   [x] Document observation [id number] and RN assessment hourly   [] Consult physician for:    [] Psychiatric consult    [] Pharmacological therapy    [] Other:    [x] Patient search completed by security   [x] Initiated appropriate safety protocols by removing from the patient's environment anything that could be used to inflict self injury, eg. Order safe tray, snap gown, etc   [x] Maintain open, warm, caring, non-judgmental attitude/manner towards patient   [] Discuss advantages and disadvantages of existing coping methods/skills   [x] Assist and educate patient with identifying present strengths and coping skills   [x] Keep patient informed regarding plan of care and provide clear concise explanations. Provide the patient/family education information as well as telephone numbers and other information about crisis centers, hot lines, and counselors.     Discharge Planning:   [] Referral  [] Groups [] Health agencies  [] Other:          Destinee Minor RN  08/02/20 0800

## 2020-08-02 NOTE — ED NOTES
Urine sample collected from pt,  labeled and sent to lab for testing.       Kwadwo Lanier RN  08/02/20 9559

## 2020-08-02 NOTE — ED NOTES
Patient asleep on stretcher, RR even and unlabored. NAD at this time.      Serjio Chery RN  08/02/20 1014

## 2020-08-02 NOTE — ED PROVIDER NOTES
[facility] ED  Emergency Department Encounter  EmergencyMedicine Resident     Pt Trent Danielson  MRN: [id number]  Shingflu 1960  Date of evaluation: 8/2/20  PCP:  Zack Martins MD    CHIEF COMPLAINT       Chief Complaint   Patient presents with    Suicidal    Abdominal Pain       HISTORY OF PRESENT ILLNESS  (Location/Symptom, Timing/Onset, Context/Setting, Quality, Duration, Modifying Factors, Severity.)      Rg Chowdhury is a 63 y.o. male who presents with suicidal ideation and chest pain for the past 2 days. Patient states he wants to kill himself by stabbing himself. He has a history of suicidal ideation, and was recently sent to [facility] approximately 7 days ago for suicidal ideation. He is unsure if the changes medication there, and he does not remember if he has been taking his medications. He denies any drug use but admits to drinking 2 beers per day. Additionally patient complains of abdominal pain for the past 2 days. Abdominal pain is located in the epigastric region and left upper quadrant. No associated nausea, vomiting, diarrhea, constipation, fevers or chills. PAST MEDICAL / SURGICAL / SOCIAL / FAMILY HISTORY      has a past medical history of Bipolar disorder ([address] Depression, GERD (gastroesophageal reflux disease), Hallucinations, Headache(784.0), Hepatitis, Schizophrenia, schizo-affective ([address] Substance abuse ([address] Tobacco abuse, Type II or unspecified type diabetes mellitus without mention of complication, not stated as uncontrolled, and Urinary incontinence. has a past surgical history that includes Dental surgery and Abscess Drainage (N/A, 02/11/2018).     Social History     Socioeconomic History    Marital status: Single     Spouse name: Not on file    Number of children: 0    Years of education: 8    Highest education level: Not on file   Occupational History     Employer: N/A   Social Needs    Financial resource strain: Not on file    Food insecurity     Worry: Not on file     Inability: Not on file    Transportation needs     Medical: Not on file     Non-medical: Not on file   Tobacco Use    Smoking status: Current Every Day Smoker     Packs/day: 0.50     Types: Cigarettes    Smokeless tobacco: Never Used   Substance and Sexual Activity    Alcohol use: Yes     Comment: reports drinking occasionally    Drug use: Yes     Types: Cocaine     Comment: last used 6/2/2020    Sexual activity: Not on file   Lifestyle    Physical activity     Days per week: Not on file     Minutes per session: Not on file    Stress: Not on file   Relationships    Social connections     Talks on phone: Not on file     Gets together: Not on file     Attends Congregation service: Not on file     Active member of club or organization: Not on file     Attends meetings of clubs or organizations: Not on file     Relationship status: Not on file    Intimate partner violence     Fear of current or ex partner: Not on file     Emotionally abused: Not on file     Physically abused: Not on file     Forced sexual activity: Not on file   Other Topics Concern    Not on file   Social History Narrative    Not on file       Family History   Problem Relation Age of Onset    Diabetes Mother     Heart Disease Mother        Allergies:  Navane [thiothixene]    Home Medications:  Prior to Admission medications    Medication Sig Start Date End Date Taking?  Authorizing Provider   dicyclomine (BENTYL) 10 MG capsule Take 1 capsule by mouth every 6 hours as needed (cramps) 8/2/20 8/9/20 Yes Mela Ragsdale DO   aluminum & magnesium hydroxide-simethicone (MYLANTA) 400-400-40 MG/5ML SUSP Take 20 mLs by mouth every 8 hours as needed (abdominal pain,) 8/2/20 8/7/20 Yes Mela Ragsdale DO   buPROPion [facility] SR) 100 MG extended release tablet Take 1 tablet by mouth 2 times daily 7/12/20   Lis Blanco MD   QUEtiapine (SEROQUEL) 100 MG tablet Take 5 tablets by mouth nightly 7/12/20   Jorge Workman MD   benztropine (COGENTIN) 2 MG tablet Take 1 tablet by mouth daily as needed (acute dystonia) 6/23/20   Jorge Workman MD   escitalopram (LEXAPRO) 20 MG tablet Take 1 tablet by mouth nightly 6/10/20   Jorge Workman MD   OXcarbazepine (TRILEPTAL) 300 MG tablet Take 1 tablet by mouth 2 times daily 5/2/20   Jorge Workman MD       REVIEW OF SYSTEMS    (2-9 systems for level 4, 10 or more for level 5)      Review of Systems   Constitutional: Negative for chills and fever. HENT: Negative for congestion and sore throat. Respiratory: Negative for cough and shortness of breath. Cardiovascular: Negative for chest pain. Gastrointestinal: Positive for abdominal pain. Negative for constipation, diarrhea, nausea and vomiting. Genitourinary: Negative for dysuria and frequency. Skin: Negative for rash. Neurological: Negative for weakness and headaches. Psychiatric/Behavioral: Positive for suicidal ideas. Negative for hallucinations. PHYSICAL EXAM   (up to 7 for level 4, 8 or more for level 5)      INITIAL VITALS:   /75   Pulse 72   Temp 97.2 °F (36.2 °C) (Oral)   Resp 16   Ht 6' 2\" (1.88 m)   Wt 150 lb (68 kg)   SpO2 95%   BMI 19.26 kg/m²      Vitals:    08/02/20 0743 08/02/20 0745   BP: 119/75    Pulse: 72 72   Resp: 16    Temp: 97.2 °F (36.2 °C)    TempSrc: Oral    SpO2: 95%    Weight: 150 lb (68 kg)    Height: 6' 2\" (1.88 m)         Physical Exam  Constitutional:       General: He is not in acute distress. Appearance: He is well-developed. He is not ill-appearing. HENT:      Head: Normocephalic and atraumatic. Mouth/Throat:      Mouth: Mucous membranes are dry. Eyes:      Extraocular Movements: Extraocular movements intact. Pupils: Pupils are equal, round, and reactive to light. Neck:      Musculoskeletal: Normal range of motion and neck supple. Cardiovascular:      Rate and Rhythm: Normal rate and regular rhythm.    Pulmonary: Effort: Pulmonary effort is normal.      Breath sounds: Normal breath sounds. Abdominal:      General: Bowel sounds are normal. There is no distension. Palpations: Abdomen is soft. Tenderness: There is abdominal tenderness (Generalized. Seems to be more tender over left upper quadrant. ). Musculoskeletal: Normal range of motion. Right lower leg: No edema. Left lower leg: No edema. Skin:     General: Skin is warm and dry. Capillary Refill: Capillary refill takes less than 2 seconds. Findings: No rash. Neurological:      General: No focal deficit present. Mental Status: He is alert and oriented to person, place, and time. Psychiatric:      Comments: Speaks in short sentences, flat affect, somewhat confused.        DIFFERENTIAL  DIAGNOSIS     PLAN (LABS / IMAGING / EKG):  Orders Placed This Encounter   Procedures    CBC Auto Differential    Comprehensive Metabolic Panel w/ Reflex to MG    Lipase    Lactic Acid, Plasma    Urinalysis Reflex to Culture    Magnesium    Inpatient consult to Social Work    POCT Glucose    POC Glucose Fingerstick    Insert peripheral IV    Suicide precautions       MEDICATIONS ORDERED:  Orders Placed This Encounter   Medications    0.9 % sodium chloride bolus    dicyclomine (BENTYL) capsule 10 mg    aluminum & magnesium hydroxide-simethicone (MAALOX) 200-200-20 MG/5ML suspension 30 mL    ondansetron (ZOFRAN) injection 4 mg    dicyclomine (BENTYL) 10 MG capsule     Sig: Take 1 capsule by mouth every 6 hours as needed (cramps)     Dispense:  21 capsule     Refill:  0    aluminum & magnesium hydroxide-simethicone (MYLANTA) 400-400-40 MG/5ML SUSP     Sig: Take 20 mLs by mouth every 8 hours as needed (abdominal pain,)     Dispense:  1 Bottle     Refill:  0       DIAGNOSTIC RESULTS / EMERGENCY DEPARTMENT COURSE / MDM   LAB RESULTS:  Results for orders placed or performed during the hospital encounter of 08/02/20   CBC Auto Differential Result Value Ref Range    WBC 5.6 3.5 - 11.3 k/uL    RBC 3.61 (L) 4.21 - 5.77 m/uL    Hemoglobin 10.8 (L) 13.0 - 17.0 g/dL    Hematocrit 33.1 (L) 40.7 - 50.3 %    MCV 91.7 82.6 - 102.9 fL    MCH 29.9 25.2 - 33.5 pg    MCHC 32.6 28.4 - 34.8 g/dL    RDW 14.2 11.8 - 14.4 %    Platelets 128 022 - 152 k/uL    MPV 9.2 8.1 - 13.5 fL    NRBC Automated 0.0 0.0 per 100 WBC    Differential Type NOT REPORTED     Seg Neutrophils 42 36 - 65 %    Lymphocytes 48 (H) 24 - 43 %    Monocytes 7 3 - 12 %    Eosinophils % 2 1 - 4 %    Basophils 1 0 - 2 %    Immature Granulocytes 0 0 %    Segs Absolute 2.34 1.50 - 8.10 k/uL    Absolute Lymph # 2.73 1.10 - 3.70 k/uL    Absolute Mono # 0.37 0.10 - 1.20 k/uL    Absolute Eos # 0.10 0.00 - 0.44 k/uL    Basophils Absolute 0.03 0.00 - 0.20 k/uL    Absolute Immature Granulocyte <0.03 0.00 - 0.30 k/uL    WBC Morphology NOT REPORTED     RBC Morphology NOT REPORTED     Platelet Estimate NOT REPORTED    Comprehensive Metabolic Panel w/ Reflex to MG   Result Value Ref Range    Glucose 128 (H) 70 - 99 mg/dL    BUN 10 8 - 23 mg/dL    CREATININE 0.77 0.70 - 1.20 mg/dL    Bun/Cre Ratio NOT REPORTED 9 - 20    Calcium 8.4 (L) 8.6 - 10.4 mg/dL    Sodium 140 135 - 144 mmol/L    Potassium 3.4 (L) 3.7 - 5.3 mmol/L    Chloride 106 98 - 107 mmol/L    CO2 22 20 - 31 mmol/L    Anion Gap 12 9 - 17 mmol/L    Alkaline Phosphatase 123 40 - 129 U/L    ALT 12 5 - 41 U/L    AST 22 <40 U/L    Total Bilirubin 0.21 (L) 0.3 - 1.2 mg/dL    Total Protein 6.0 (L) 6.4 - 8.3 g/dL    Alb 3.8 3.5 - 5.2 g/dL    Albumin/Globulin Ratio 1.7 1.0 - 2.5    GFR Non-African American >60 >60 mL/min    GFR African American >60 >60 mL/min    GFR Comment          GFR Staging NOT REPORTED    Lipase   Result Value Ref Range    Lipase 28 13 - 60 U/L   Lactic Acid, Plasma   Result Value Ref Range    Lactic Acid NOT REPORTED mmol/L    Lactic Acid, Whole Blood 1.7 0.7 - 2.1 mmol/L   Urinalysis Reflex to Culture    Specimen: Urine, clean catch Result Value Ref Range    Color, UA YELLOW YELLOW    Turbidity UA CLEAR CLEAR    Glucose, Ur NEGATIVE NEGATIVE    Bilirubin Urine NEGATIVE NEGATIVE    Ketones, Urine NEGATIVE NEGATIVE    Specific Gravity, UA 1.006 1.005 - 1.030    Urine Hgb NEGATIVE NEGATIVE    pH, UA 5.0 5.0 - 8.0    Protein, UA NEGATIVE NEGATIVE    Urobilinogen, Urine Normal Normal    Nitrite, Urine NEGATIVE NEGATIVE    Leukocyte Esterase, Urine NEGATIVE NEGATIVE    Urinalysis Comments       Microscopic exam not performed based on chemical results unless requested in original order. Magnesium   Result Value Ref Range    Magnesium 2.1 1.6 - 2.6 mg/dL   POCT Glucose   Result Value Ref Range    Glucose 115 mg/dL   POC Glucose Fingerstick   Result Value Ref Range    POC Glucose 115 (H) 75 - 110 mg/dL       IMPRESSION: SI, schizophrenia, non-specific abdominal pain    RADIOLOGY:    EKG    All EKG's are interpreted by the Emergency Department Physician who either signs or Co-signs this chart in the absence of a cardiologist.    [facility]:    Patient presents with suicidal ideation and mild abdominal pain. Vital signs are WNL. abdomen is soft, diffusely tender, non-peritoneal, patient is exhibiting voluntary guarding however Will obtain point-of-care glucose, abdominal labs for medical clearance. Will have  reevaluate for suicidal ideation, patient is agreeable to voluntary inpatient psychiatric care. Lab work is in with normal limits. Patient reassessed feeling better after Bentyl and Maalox. Will transfer patient to rescue crisis. ED Course as of Aug 02 1149   Sun Aug 02, 2020   0744 POC glucose 115    [CS]   0909 Labs significant for mild hypocalcemia, mild hypokalemia. Lipase, LFTs, bilirubin not elevated.     [CS]      ED Course User Index  [CS] Marco A Morris DO         PROCEDURES:    CONSULTS:  IP CONSULT TO SOCIAL WORK    CRITICAL CARE:  Please see attending note    FINAL IMPRESSION

## 2020-08-02 NOTE — ED NOTES
Pt to ed with c/o suicidal ideations with plan to shoot himself. Pt states he is not currently taking any home medications. Pt is also a diabetic and states he does not take medications or check his bs at home. Pt smells of alcohol, states his last drink was 2 days ago. Pt is calm, cooperative and appropriate, pt denies any needs or wants at this time. Safe guard at bedside.       Noris Mccabe RN  08/02/20 [id number]

## 2020-08-02 NOTE — ED PROVIDER NOTES
[facility]  ED     Emergency Department     Faculty Attestation    I performed a history and physical examination of the patient and discussed management with the resident. I reviewed the residents note and agree with the documented findings and plan of care. Any areas of disagreement are noted on the chart. I was personally present for the key portions of any procedures. I have documented in the chart those procedures where I was not present during the key portions. I have reviewed the emergency nurses triage note. I agree with the chief complaint, past medical history, past surgical history, allergies, medications, social and family history as documented unless otherwise noted below. For Physician Assistant/ Nurse Practitioner cases/documentation I have personally evaluated this patient and have completed at least one if not all key elements of the E/M (history, physical exam, and MDM). Additional findings are as noted. Patient presents with suicidal ideation. He also complains of abdominal pain that he has had for the past 2 days. On my exam, patient was sleeping comfortably in the bed. He did arouse to name but was not talkative throughout my exam.  He denies drug or alcohol use. Lungs are clear to auscultation bilaterally and heart sounds are normal.  Abdomen is soft with mild diffuse tenderness. No rebound or guarding is present. Will check labs and have social work speak with patient.       Francheska Booth MD  Attending Emergency  Physician              Anuj Anne MD  08/02/20 [id number]

## 2020-08-10 ENCOUNTER — HOSPITAL ENCOUNTER (INPATIENT)
Age: 60
LOS: 4 days | Discharge: HOME | DRG: 750 | End: 2020-08-14
Attending: EMERGENCY MEDICINE | Admitting: PSYCHIATRY & NEUROLOGY
Payer: MEDICAID

## 2020-08-10 ENCOUNTER — APPOINTMENT (OUTPATIENT)
Dept: GENERAL RADIOLOGY | Age: 60
DRG: 750 | End: 2020-08-10
Payer: MEDICAID

## 2020-08-10 LAB
SARS-COV-2, PCR: NORMAL
SARS-COV-2, RAPID: NOT DETECTED
SARS-COV-2: NORMAL
SOURCE: NORMAL

## 2020-08-10 PROCEDURE — U0002 COVID-19 LAB TEST NON-CDC: HCPCS

## 2020-08-10 PROCEDURE — 71045 X-RAY EXAM CHEST 1 VIEW: CPT

## 2020-08-10 PROCEDURE — 1240000000 HC EMOTIONAL WELLNESS R&B

## 2020-08-10 PROCEDURE — 99285 EMERGENCY DEPT VISIT HI MDM: CPT

## 2020-08-10 RX ORDER — NICOTINE 21 MG/24HR
1 PATCH, TRANSDERMAL 24 HOURS TRANSDERMAL DAILY
Status: DISCONTINUED | OUTPATIENT
Start: 2020-08-11 | End: 2020-08-10

## 2020-08-10 RX ORDER — ACETAMINOPHEN 325 MG/1
650 TABLET ORAL EVERY 4 HOURS PRN
Status: DISCONTINUED | OUTPATIENT
Start: 2020-08-10 | End: 2020-08-14 | Stop reason: HOSPADM

## 2020-08-10 RX ORDER — TRAZODONE HYDROCHLORIDE 50 MG/1
50 TABLET ORAL NIGHTLY PRN
Status: DISCONTINUED | OUTPATIENT
Start: 2020-08-10 | End: 2020-08-14 | Stop reason: HOSPADM

## 2020-08-10 RX ORDER — MAGNESIUM HYDROXIDE/ALUMINUM HYDROXICE/SIMETHICONE 120; 1200; 1200 MG/30ML; MG/30ML; MG/30ML
30 SUSPENSION ORAL EVERY 6 HOURS PRN
Status: DISCONTINUED | OUTPATIENT
Start: 2020-08-10 | End: 2020-08-14 | Stop reason: HOSPADM

## 2020-08-10 RX ORDER — BENZTROPINE MESYLATE 1 MG/ML
2 INJECTION INTRAMUSCULAR; INTRAVENOUS 2 TIMES DAILY PRN
Status: DISCONTINUED | OUTPATIENT
Start: 2020-08-10 | End: 2020-08-14 | Stop reason: HOSPADM

## 2020-08-10 ASSESSMENT — PAIN DESCRIPTION - PAIN TYPE: TYPE: CHRONIC PAIN

## 2020-08-10 ASSESSMENT — SLEEP AND FATIGUE QUESTIONNAIRES
DIFFICULTY STAYING ASLEEP: YES
AVERAGE NUMBER OF SLEEP HOURS: 5
DO YOU HAVE DIFFICULTY SLEEPING: YES
DO YOU USE A SLEEP AID: YES
DIFFICULTY ARISING: NO
DIFFICULTY FALLING ASLEEP: YES
RESTFUL SLEEP: NO
SLEEP PATTERN: DIFFICULTY FALLING ASLEEP;DISTURBED/INTERRUPTED SLEEP;INSOMNIA

## 2020-08-10 ASSESSMENT — PAIN SCALES - GENERAL
PAINLEVEL_OUTOF10: 5
PAINLEVEL_OUTOF10: 0

## 2020-08-10 ASSESSMENT — LIFESTYLE VARIABLES: HISTORY_ALCOHOL_USE: NO

## 2020-08-10 NOTE — ED PROVIDER NOTES
[address] ED  eMERGENCY dEPARTMENT eNCOUnter      Pt Name: Vanessa Martinez  MRN: [id number]  YOB: 1960  Date of evaluation: 8/10/20  PCP: Marce Hicks MD    CHIEF COMPLAINT:   Chief Complaint   Patient presents with   [address] Problem     HISTORY OF PRESENT ILLNESS    Vanessa Martinez is a 63 y.o. male who presents with a chief complaint of suicidal ideations. Patient states last night he was hearing voices telling him to kill himself. He states he was going to stab himself with a kitchen knife. He did not actually do anything to hurt himself. He states he stopped because he wants to live. He still hearing command hallucinations at this time. Denies any drug or alcohol use. No recent fevers or chills but states he has been coughing lately. Has been taking all medications as prescribed. Symptoms are acute. Symptoms are moderate. Nothing make symptoms better or worse. Patient has no other complaints at this time. REVIEW OF SYSTEMS       Constitutional: Denies recent fever, chills. Neck: No neck pain. Respiratory: Denies recent shortness of breath. Positive for cough. Cardiac:  Denies recent chest pain. GI: Denies any recent abdominal pain nausea or vomiting. : Denies dysuria. Musculoskeletal: Denies focal weakness. Neurologic: Denies headache or focal weakness. Skin:  Denies any rash. Psychiatric: Positive for auditory hallucinations and suicidal ideations    Negative in 10 essential Systems except as mentioned above and in the HPI. PAST MEDICAL HISTORY   PMH:  has a past medical history of Bipolar disorder ([address] Depression, GERD (gastroesophageal reflux disease), Hallucinations, Headache(784.0), Hepatitis, Schizophrenia, schizo-affective ([address] Substance abuse ([facility] Tobacco abuse, Type II or unspecified type diabetes mellitus without mention of complication, not stated as uncontrolled, and Urinary incontinence.   Surgical History:  has a past surgical history that includes Dental surgery and Abscess Drainage (N/A, 02/11/2018). Social History:  reports that he has been smoking cigarettes. He has been smoking about 0.50 packs per day. He has never used smokeless tobacco. He reports current alcohol use. He reports current drug use. Drug: Cocaine. Family History: Noncontributory at this time  Psychiatric History: See PMH  Allergies:is allergic to navane [thiothixene]. PHYSICAL EXAM     INITIAL VITALS: BP: 107/68  Pulse: 78  Resp: 16  Temp: 98.6 °F (37 °C) SpO2: 97 %     Constitutional:  Well developed, no acute distress   Eyes:  Pupils equal and readily reactive to light  HENT:  Atraumatic, external ears normal, nose normal, oropharynx moist. Neck- supple    Respiratory:  Clear to auscultation bilaterally with good air exchange  Cardiovascular:  RRR with normal S1 and S2  GI:  Soft, nondistended and nontender   Musculoskeletal:  No edema, no tenderness, no deformities  Integument:  No rash  Neurologic:  Alert & oriented x 4, no focal deficits noted   Psychiatric: Depressed mood      EMERGENCY DEPARTMENT COURSE     70-year-old male presents with suicidal ideations hearing voices telling him to stab himself. He is afebrile, nontoxic, normal vital signs. No acute distress. He is complained of a cough but has no other infectious symptoms. I am going to get a chest x-ray. He does smoke cigarettes. If this looks okay he will be medically cleared for psychiatric evaluation and likely admission. FINAL IMPRESSION     1. Suicidal ideations          DISPOSITION:  DISPOSITION Decision To Admit 08/10/2020 05:30:11 PM        PATIENT REFERRED TO:  No follow-up provider specified. DISCHARGE MEDICATIONS:  New Prescriptions    No medications on file       (Please note that portions of this note were completed with a voice recognition program. Efforts were made to edit the dictations but occasionally words are mis-transcribed.  Whenever words are used in this note in any gender, they shall be construed as though they were used in the gender appropriate to the circumstances; and whenever words are used in this note in the singular or plural form, they shall be construed as though they were used in the form appropriate to the circumstances.)    Jose Aguirre DO  Attending Emergency Medicine Physician        Jose Aguirre DO  08/10/20 0592

## 2020-08-10 NOTE — PROGRESS NOTES
Medication History completed:    New medications: none    Medications discontinued: dicyclomine    Changes to dosing: none    Stated allergies: As listed    Other pertinent information: Medications confirmed with AVS from 7/13/20. Patient states he last used crack 3 days ago.     Thank you,  Marcela Malik, PharmD, BCPS  [phone]

## 2020-08-10 NOTE — ED NOTES
Provisional Diagnosis:   Schizophrenia    Psychosocial and Contextual Factors:     Patient has social issues. Patient has a history of substance abuse issues. Patient lives in a group home. C-SSRS Summary:      Patient: X  Family:   Agency:         Present Suicidal Behavior:  X    Verbal:  Patient reports suicidal ideations with a plan to cut himself with a steak knife. Attempt: Patient denies. Past Suicidal Behavior: X    Verbal: Patient reports a history of suicidal ideations. Attempt: Patient reports previous attempts. Substance Abuse: Patient has history of crack use. Denies current use. Self-Injurious/Self-Mutilation: patient denies. Trauma Identified:  Patient denies. Protective Factors:    Patient has housing. Patient has insurance. Patient is linked with mental health agency. Patient is medication compliant. Risk Factors:    Patient has command hallucinations. Patient has poor insight and coping skills. Patient has previous inpatient psychiatric admissions. Clinical Summary:    Patient is a 63year old  [place] male that is brought to the ED via [facility] for suicidal ideations. Patient reports a group home  called the police after he told them he wanted to cut himself with a steak knife. Patient reports command hallucinations telling him to cut himself with a steak knife. Patient reports auditory hallucinations are normal for him but they have increased. Patient has a history of schizophrenia. Patient is linked with [facility]. Patient reports being compliant with psych medications. Patient currently lives in a group home. Patient has a history of crack use but states he has not used recently. Patient is voluntary. Level of Care Disposition:    Writer consulted with Chuck Valencia CNP, [facility]. Patient to be admitted to the [facility] for safety and stabilization.

## 2020-08-10 NOTE — ED NOTES
Pt comes to this ER with c/o S/I with thoughts to cut himself with a steak knife. Pt states he is also hearing voices that tell him to cut himself. Pt states he did not cut himself because, \"I didn't want to die. \"  Pt denies any actual S/A. Pt arrives A+O x 4, GCS = 15, PMS x 4 intact, eupneic, and PWD. Lung sounds clear t/o bilat, and the pt speaks in complete sentences. Pt is calm et cooperative.      Jody Pena RN  08/10/20 [id number]

## 2020-08-11 LAB
ABSOLUTE EOS #: 0.04 K/UL (ref 0–0.4)
ABSOLUTE IMMATURE GRANULOCYTE: ABNORMAL K/UL (ref 0–0.3)
ABSOLUTE LYMPH #: 2.12 K/UL (ref 1–4.8)
ABSOLUTE MONO #: 0.28 K/UL (ref 0.1–1.3)
ALBUMIN SERPL-MCNC: 3.9 G/DL (ref 3.5–5.2)
ALBUMIN/GLOBULIN RATIO: ABNORMAL (ref 1–2.5)
ALP BLD-CCNC: 107 U/L (ref 40–129)
ALT SERPL-CCNC: <5 U/L (ref 5–41)
AMPHETAMINE SCREEN URINE: NEGATIVE
ANION GAP SERPL CALCULATED.3IONS-SCNC: 8 MMOL/L (ref 9–17)
AST SERPL-CCNC: 14 U/L
BARBITURATE SCREEN URINE: NEGATIVE
BASOPHILS # BLD: 0 % (ref 0–2)
BASOPHILS ABSOLUTE: 0 K/UL (ref 0–0.2)
BENZODIAZEPINE SCREEN, URINE: NEGATIVE
BILIRUB SERPL-MCNC: 0.58 MG/DL (ref 0.3–1.2)
BUN BLDV-MCNC: 18 MG/DL (ref 8–23)
BUN/CREAT BLD: ABNORMAL (ref 9–20)
BUPRENORPHINE URINE: ABNORMAL
CALCIUM SERPL-MCNC: 9 MG/DL (ref 8.6–10.4)
CANNABINOID SCREEN URINE: NEGATIVE
CHLORIDE BLD-SCNC: 107 MMOL/L (ref 98–107)
CHOLESTEROL/HDL RATIO: 2.3
CHOLESTEROL: 167 MG/DL
CO2: 25 MMOL/L (ref 20–31)
COCAINE METABOLITE, URINE: POSITIVE
CREAT SERPL-MCNC: 1.07 MG/DL (ref 0.7–1.2)
DIFFERENTIAL TYPE: ABNORMAL
EOSINOPHILS RELATIVE PERCENT: 1 % (ref 0–4)
ESTIMATED AVERAGE GLUCOSE: 94 MG/DL
GFR AFRICAN AMERICAN: >60 ML/MIN
GFR NON-AFRICAN AMERICAN: >60 ML/MIN
GFR SERPL CREATININE-BSD FRML MDRD: ABNORMAL ML/MIN/{1.73_M2}
GFR SERPL CREATININE-BSD FRML MDRD: ABNORMAL ML/MIN/{1.73_M2}
GLUCOSE BLD-MCNC: 108 MG/DL (ref 70–99)
HBA1C MFR BLD: 4.9 % (ref 4–6)
HCT VFR BLD CALC: 39.2 % (ref 41–53)
HDLC SERPL-MCNC: 74 MG/DL
HEMOGLOBIN: 12.8 G/DL (ref 13.5–17.5)
IMMATURE GRANULOCYTES: ABNORMAL %
LDL CHOLESTEROL: 84 MG/DL (ref 0–130)
LYMPHOCYTES # BLD: 53 % (ref 24–44)
MCH RBC QN AUTO: 29.6 PG (ref 26–34)
MCHC RBC AUTO-ENTMCNC: 32.7 G/DL (ref 31–37)
MCV RBC AUTO: 90.4 FL (ref 80–100)
MDMA URINE: ABNORMAL
METHADONE SCREEN, URINE: NEGATIVE
METHAMPHETAMINE, URINE: ABNORMAL
MONOCYTES # BLD: 7 % (ref 1–7)
MORPHOLOGY: ABNORMAL
NRBC AUTOMATED: ABNORMAL PER 100 WBC
OPIATES, URINE: NEGATIVE
OXYCODONE SCREEN URINE: NEGATIVE
PDW BLD-RTO: 15.1 % (ref 11.5–14.9)
PHENCYCLIDINE, URINE: NEGATIVE
PLATELET # BLD: 290 K/UL (ref 150–450)
PLATELET ESTIMATE: ABNORMAL
PMV BLD AUTO: 6.9 FL (ref 6–12)
POTASSIUM SERPL-SCNC: 4 MMOL/L (ref 3.7–5.3)
PROPOXYPHENE, URINE: ABNORMAL
RBC # BLD: 4.34 M/UL (ref 4.5–5.9)
RBC # BLD: ABNORMAL 10*6/UL
SEG NEUTROPHILS: 39 % (ref 36–66)
SEGMENTED NEUTROPHILS ABSOLUTE COUNT: 1.56 K/UL (ref 1.3–9.1)
SODIUM BLD-SCNC: 140 MMOL/L (ref 135–144)
TEST INFORMATION: ABNORMAL
THYROXINE, FREE: 0.97 NG/DL (ref 0.93–1.7)
TOTAL PROTEIN: 6.2 G/DL (ref 6.4–8.3)
TRICYCLIC ANTIDEPRESSANTS, UR: ABNORMAL
TRIGL SERPL-MCNC: 43 MG/DL
TSH SERPL DL<=0.05 MIU/L-ACNC: 2.04 MIU/L (ref 0.3–5)
VLDLC SERPL CALC-MCNC: NORMAL MG/DL (ref 1–30)
WBC # BLD: 4 K/UL (ref 3.5–11)
WBC # BLD: ABNORMAL 10*3/UL

## 2020-08-11 PROCEDURE — 1240000000 HC EMOTIONAL WELLNESS R&B

## 2020-08-11 PROCEDURE — 83036 HEMOGLOBIN GLYCOSYLATED A1C: CPT

## 2020-08-11 PROCEDURE — 85025 COMPLETE CBC W/AUTO DIFF WBC: CPT

## 2020-08-11 PROCEDURE — 90792 PSYCH DIAG EVAL W/MED SRVCS: CPT | Performed by: PSYCHIATRY & NEUROLOGY

## 2020-08-11 PROCEDURE — 84443 ASSAY THYROID STIM HORMONE: CPT

## 2020-08-11 PROCEDURE — 80307 DRUG TEST PRSMV CHEM ANLYZR: CPT

## 2020-08-11 PROCEDURE — 84439 ASSAY OF FREE THYROXINE: CPT

## 2020-08-11 PROCEDURE — 6370000000 HC RX 637 (ALT 250 FOR IP): Performed by: PSYCHIATRY & NEUROLOGY

## 2020-08-11 PROCEDURE — 80053 COMPREHEN METABOLIC PANEL: CPT

## 2020-08-11 PROCEDURE — 36415 COLL VENOUS BLD VENIPUNCTURE: CPT

## 2020-08-11 PROCEDURE — 80061 LIPID PANEL: CPT

## 2020-08-11 PROCEDURE — 6370000000 HC RX 637 (ALT 250 FOR IP): Performed by: NURSE PRACTITIONER

## 2020-08-11 RX ORDER — OXCARBAZEPINE 300 MG/1
300 TABLET, FILM COATED ORAL 2 TIMES DAILY
Status: DISCONTINUED | OUTPATIENT
Start: 2020-08-11 | End: 2020-08-14 | Stop reason: HOSPADM

## 2020-08-11 RX ORDER — BENZTROPINE MESYLATE 2 MG/1
2 TABLET ORAL DAILY PRN
Status: DISCONTINUED | OUTPATIENT
Start: 2020-08-11 | End: 2020-08-14 | Stop reason: HOSPADM

## 2020-08-11 RX ORDER — ESCITALOPRAM OXALATE 20 MG/1
20 TABLET ORAL NIGHTLY
Status: DISCONTINUED | OUTPATIENT
Start: 2020-08-11 | End: 2020-08-14 | Stop reason: HOSPADM

## 2020-08-11 RX ORDER — BUPROPION HYDROCHLORIDE 100 MG/1
100 TABLET, EXTENDED RELEASE ORAL 2 TIMES DAILY
Status: DISCONTINUED | OUTPATIENT
Start: 2020-08-11 | End: 2020-08-14 | Stop reason: HOSPADM

## 2020-08-11 RX ORDER — DICYCLOMINE HYDROCHLORIDE 10 MG/1
10 CAPSULE ORAL EVERY 6 HOURS PRN
Status: DISCONTINUED | OUTPATIENT
Start: 2020-08-11 | End: 2020-08-14 | Stop reason: HOSPADM

## 2020-08-11 RX ADMIN — TRAZODONE HYDROCHLORIDE 50 MG: 50 TABLET ORAL at 21:59

## 2020-08-11 RX ADMIN — QUETIAPINE FUMARATE 500 MG: 300 TABLET ORAL at 21:59

## 2020-08-11 RX ADMIN — ESCITALOPRAM OXALATE 20 MG: 20 TABLET ORAL at 21:59

## 2020-08-11 RX ADMIN — BUPROPION HYDROCHLORIDE 100 MG: 100 TABLET, FILM COATED, EXTENDED RELEASE ORAL at 21:59

## 2020-08-11 RX ADMIN — OXCARBAZEPINE 300 MG: 300 TABLET, FILM COATED ORAL at 12:02

## 2020-08-11 RX ADMIN — BUPROPION HYDROCHLORIDE 100 MG: 100 TABLET, FILM COATED, EXTENDED RELEASE ORAL at 12:02

## 2020-08-11 RX ADMIN — OXCARBAZEPINE 300 MG: 300 TABLET, FILM COATED ORAL at 21:59

## 2020-08-11 ASSESSMENT — LIFESTYLE VARIABLES: HISTORY_ALCOHOL_USE: NO

## 2020-08-11 NOTE — PLAN OF CARE
[facility]  Initial Interdisciplinary Treatment Plan NO      Original treatment plan Date & Time: 8/11/2020 0835    Admission Type:  Admission Type: Voluntary    Reason for admission:   Reason for Admission: command voices telling him to harm himself, suicidal with a plan to stab self with a knife    Estimated Length of Stay:  5-7days  Estimated Discharge Date: to be determined by physician    PATIENT STRENGTHS:  Patient Strengths:Strengths: Communication, No significant Physical Illness  Patient Strengths and Limitations:Limitations: Difficulty problem solving/relies on others to help solve problems, Inappropriate/potentially harmful leisure interests  Addictive Behavior: Addictive Behavior  In the past 3 months, have you felt or has someone told you that you have a problem with:  : None  Do you have a history of Chemical Use?: No  Do you have a history of Alcohol Use?: No  Do you have a history of Street Drug Abuse?: Yes  Histroy of Prescripton Drug Abuse?: No  Medical Problems:  Past Medical History:   Diagnosis Date    Bipolar disorder ([facility]     Depression     GERD (gastroesophageal reflux disease)     Hallucinations     Headache(784.0)     Hepatitis     Schizophrenia, schizo-affective ([facility]     Substance abuse ([facility]     Tobacco abuse     Type II or unspecified type diabetes mellitus without mention of complication, not stated as uncontrolled     Urinary incontinence      Status EXAM:Status and Exam  Normal: No  Facial Expression: Flat  Affect: Appropriate  Level of Consciousness: Alert  Mood:Normal: No  Mood: Depressed, Anxious  Motor Activity:Normal: Yes  Interview Behavior: Cooperative  Preception: Nora to Person, Nora to Time, Nora to Place, Nora to Situation  Attention:Normal: No  Attention: Distractible  Thought Processes: Circumstantial  Thought Content:Normal: No  Thought Content: Preoccupations  Hallucinations:  Auditory (Comment)  Delusions: No  Memory:Normal: No  Memory: Poor

## 2020-08-11 NOTE — CARE COORDINATION
BHI Biopsychosocial Assessment    Current Level of Psychosocial Functioning     Independent: xx  Dependent:   Minimal Assist:     Comments:  Pt reported he has his own income via [employer] and own housing. Psychosocial High Risk Factors (check all that apply)    Unable to obtain meds:   Chronic illness/pain:    Substance abuse:   Lack of Family Support: xx  Financial stress:   Isolation: xx  Inadequate Community Resources: xx  Suicide attempt(s):   Not taking medications: xx   Victim of crime:   Developmental Delay:   Unable to manage personal needs:   Age 72 or older:   Homeless:   No transportation:   Readmission within 30 days: xx  Unemployment: xx  Traumatic Event:     Comments:     Psychiatric Advanced Directives: None reported    Family to Involve in Treatment: None reported    Sexual Orientation: PHOEBE    Patient Strengths: communication, housing, SSI, [facility] insurance     Patient Barriers: poor coping skills and poor insight    Opiate Education: denied use    CMHC/mental health history: Linked to [facility], pt stated he has been off medications for the past 3 days. Plan of Care   medication management, group/individual therapies, family meetings, psycho -education, treatment team meetings to assist with stabilization    Initial Discharge Plan: Pt stated he will return to his apartment at: [address] via [facility]. Pt stated he wants to continue medication management via [facility]. Clinical Summary:      Pt is a 58-year-old [facility] [place] male who presented to the ED with [facility] commanding pt to kill himself. During this assessment pt presented Ox4, cooperative, friendly, flat and lethargic. Pt was sleeping in room, woke up and was cooperative with assessment process. Pt stated prior to being admitted to the [facility] he was having voices commanding to kill himself and still is having them currently. Pt denied HI. Pt denied VH. Pt denied hx of suicide attempts. Pt reported he has stable housing and lives by himself. Pt stated he has SSI. Pt denied AOD use. Pt denied legal hx. Pt denied abuse hx.

## 2020-08-11 NOTE — GROUP NOTE
Group Therapy Note    Date: 8/11/2020    Group Start Time: 1000  Group End Time: 5999  Group Topic: Psychotherapy    CHANEL Francisco        Group Therapy Note    Attendees: 7/12      Pt denied 1:1. Despite staff encouragement, pt denied 10:00am psychotherapy group. 1:1 completed during SW assessment.      Signature:  CHANEL Ibarra

## 2020-08-11 NOTE — BH NOTE
1:1 interview was done via Telehealth by Dr Tanna Camacho. Pt states he keeps hearing voices telling him to kill himself. States his appetite is good, he is having thoughts to harm himself. Discussed starting his medications.

## 2020-08-11 NOTE — BH NOTE
`[facility]  Admission Note     Admission Type:   Admission Type: Voluntary    Reason for admission:  Reason for Admission: command voices telling him to harm himself, suicidal with a plan to stab self with a knife    PATIENT STRENGTHS:  Strengths: Communication, No significant Physical Illness    Patient Strengths and Limitations:  Limitations: Difficulty problem solving/relies on others to help solve problems, Inappropriate/potentially harmful leisure interests    Addictive Behavior:   Addictive Behavior  In the past 3 months, have you felt or has someone told you that you have a problem with:  : None  Do you have a history of Chemical Use?: No  Do you have a history of Alcohol Use?: No  Do you have a history of Street Drug Abuse?: Yes  Histroy of Prescripton Drug Abuse?: No    Medical Problems:   Past Medical History:   Diagnosis Date    [facility]     Depression     GERD (gastroesophageal reflux disease)     Hallucinations     Headache(784.0)     Hepatitis     Schizophrenia, schizo-affective ([facility]     Substance abuse ([facility]     Tobacco abuse     Type II or unspecified type diabetes mellitus without mention of complication, not stated as uncontrolled     Urinary incontinence        Status EXAM:  Status and Exam  Normal: No  Facial Expression: Flat  Affect: Appropriate  Level of Consciousness: Alert  Mood:Normal: No  Mood: Depressed, Anxious  Motor Activity:Normal: Yes  Interview Behavior: Cooperative  Preception: Chapin to Person, Chapin to Time, Chapin to Place, Chapin to Situation  Attention:Normal: No  Attention: Distractible  Thought Processes: Circumstantial  Thought Content:Normal: No  Thought Content: Preoccupations  Hallucinations:  Auditory (Comment)  Delusions: No  Memory:Normal: No  Memory: Poor Recent  Insight and Judgment: No  Insight and Judgment: Poor Judgment, Poor Insight  Present Suicidal Ideation: No  Present Homicidal Ideation: No    Tobacco Screening:  Practical Counseling, on admission, corby X, if applicable and completed (first 3 are required if patient doesn't refuse):            ( )  Recognizing danger situations (included triggers and roadblocks)                    ( )  Coping skills (new ways to manage stress, exercise, relaxation techniques, changing routine, distraction)                                                           ( )  Basic information about quitting (benefits of quitting, techniques in how to quit, available resources  ( ) Referral for counseling faxed to [facility]                                           (x ) Patient refused counseling  ( ) Patient has not smoked in the last 30 days    Metabolic Screening:    Lab Results   Component Value Date    LABA1C 5.2 05/22/2020       Lab Results   Component Value Date    CHOL 179 02/13/2017    CHOL 127 05/09/2015    CHOL 168 08/20/2014    CHOL 158 12/31/2013    CHOL 178 08/15/2013    CHOL 166 09/17/2012    CHOL 210 (H) 02/03/2012     Lab Results   Component Value Date    TRIG 67 02/13/2017    TRIG 37 05/09/2015    TRIG 72 08/20/2014    TRIG 52 12/31/2013    TRIG 70 08/15/2013    TRIG 117 09/17/2012    TRIG 94 02/03/2012     Lab Results   Component Value Date    HDL 73 02/13/2017    HDL 57 05/09/2015    HDL 50 08/20/2014    HDL 43 12/31/2013    HDL 42 08/15/2013    HDL 38 (L) 09/17/2012    HDL 55 02/03/2012     No components found for: LDLCAL  No results found for: LABVLDL      Body mass index is 21.27 kg/m². BP Readings from Last 2 Encounters:   08/10/20 112/70   08/02/20 119/75           Pt admitted with followings belongings:  Dentures: None  Vision - Corrective Lenses: None  Hearing Aid: None  Jewelry: None  Body Piercings Removed: N/A  Clothing: Footwear, Pants, Shirt  Were All Patient Medications Collected?: Not Applicable  Other Valuables: Genoveva Tom placed in safe in security envelope, number:  [id number].  Patient's home medications were
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
## 2020-08-11 NOTE — PLAN OF CARE
Problem: Altered Mood, Depressive Behavior:  Goal: Ability to disclose and discuss suicidal ideas will improve  Description: Ability to disclose and discuss suicidal ideas will improve  8/11/2020 1013 by Brittani Lindsey LPN  Outcome: Ongoing   Patient denies thoughts of self harm, remains free from self harm.  Support and encouragement provided

## 2020-08-11 NOTE — ED NOTES
Paperwork and pt's belongings provided to [facility] staff. Pt escorted to [facility] via two [facility] staff members. Pt cooperative exiting [facility].

## 2020-08-11 NOTE — BH NOTE
Patient given tobacco quitline number 83088094982 at this time, refusing to call at this time, states \" I just dont want to quit now\"- patient given information as to the dangers of long term tobacco use. Continue to reinforce the importance of tobacco cessation.

## 2020-08-11 NOTE — GROUP NOTE
Group Therapy Note    Date: 8/11/2020    Group Start Time: 1100  Group End Time: 1140  Group Topic: Psychoeducation    JESUS Malcolm, [address]        Group Therapy Note    Attendees: 7/12         Pt did not attend RT skills group d/t resting in room despite staff invitation to attend. 1:1 talk time offered as alternative to group session, pt declined.

## 2020-08-11 NOTE — H&P
of clubs or organizations: None     Relationship status: None    Intimate partner violence     Fear of current or ex partner: None     Emotionally abused: None     Physically abused: None     Forced sexual activity: None   Other Topics Concern    None   Social History Narrative    None           REVIEW OF SYSTEMS      Allergies   Allergen Reactions    Navane [Thiothixene]        No current facility-administered medications on file prior to encounter. Current Outpatient Medications on File Prior to Encounter   Medication Sig Dispense Refill    buPROPion (WELLBUTRIN SR) 100 MG extended release tablet Take 1 tablet by mouth 2 times daily 60 tablet 3    QUEtiapine (SEROQUEL) 100 MG tablet Take 5 tablets by mouth nightly 60 tablet 3    benztropine (COGENTIN) 2 MG tablet Take 1 tablet by mouth daily as needed (acute dystonia) 60 tablet 3    escitalopram (LEXAPRO) 20 MG tablet Take 1 tablet by mouth nightly 30 tablet 3    OXcarbazepine (TRILEPTAL) 300 MG tablet Take 1 tablet by mouth 2 times daily 60 tablet 3                      General health:  Fairly good. No fever or chills. Skin:  No itching, redness or rash. Head, eyes, ears, nose, throat:  No headache, epistaxis, rhinorrhea hearing loss or sore throat. Neck:  No pain, stiffness or masses. Cardiovascular/Respiratory system:  No chest pain, palpitation, shortness of breath, coughing or expectoration. Gastrointestinal tract: No abdominal pain, nausea, vomiting, dysphagia, diarrhea or constipation. Genitourinary:  No burning on micturition. No hesitancy, urgency, frequency or discoloration of urine. Locomotor:  No bone or joint pains. No swelling or deformities. Neuropsychiatric:  See HPI.      GENERAL PHYSICAL EXAM:     Vitals: /67   Pulse 80   Temp 98.2 °F (36.8 °C) (Oral)   Resp 14   Ht 6' 3\" (1.905 m)   Wt 170 lb 3.2 oz (77.2 kg)   SpO2 97%   BMI 21.27 kg/m²  Body mass index is 21.27 kg/m². Pt was examined with a nurse present in the room. GENERAL APPEARANCE:  Jaylin Chaudhari is 63 y.o.,  male, not obese, nourished, conscious, alert. Does not appear to be distress or pain at this time. SKIN:  Warm, dry, no cyanosis or jaundice. HEAD:  Normocephalic, atraumatic, no swelling or tenderness. EYES:  Pupils equal, reactive to light, Conjunctiva is clear, EOMs intact tyra. eyelids WNL. EARS:  No discharge, no marked hearing loss. NOSE:  No rhinorrhea, epistaxis or septal deformity. THROAT:  Not congested. No ulceration bleeding or discharge. NECK:  No stiffness, trachea central.  No palpable masses or L.N.      CHEST:  Symmetrical and equal on expansion. HEART:  Regular rate and rhythm. S1 > S2, No audible murmurs or gallops. LUNGS:  Equal on expansion, normal breath sounds. No adventitious sounds. ABDOMEN:   Soft on palpation. No localized tenderness. No guarding or rigidity. No palpable organomegaly. LYMPHATICS:  No palpable cervical Lymphadenopathy. LOCOMOTOR, BACK AND SPINE:  No tenderness or deformities. EXTREMITIES:  Symmetrical, no pretibial edema. Mahoganys sign negative. No discoloration or ulcerations. NEUROLOGIC:  The patient is conscious, alert, oriented,Cranial nerve II-XII intact, taste and smell were not examined. No apparent focal sensory or motor deficits. Muscle strength equal Tyra. No facial droop, tongue protrudes centrally, no slurring of the speech. PROVISIONAL DIAGNOSES:      Principal Problem:    Schizophrenia ([facility] [address]  Active Problems:    Suicidal ideations  Resolved Problems:    * No resolved hospital problems.  *      MARY OSUNA CNP on 8/11/2020 at 3:18 PM

## 2020-08-11 NOTE — BH NOTE
Pt refused 1600 wellness group due to resting in room and choosing not to attend. Pt offered 1:1 talk time however declined.

## 2020-08-11 NOTE — GROUP NOTE
Group Therapy Note    Date: 8/11/2020    Group Start Time: 1330  Group End Time: 5777  Group Topic: Psychoeducation    JESUS uDval Shows, CTRS    Patient refused to attend Psychoeducation Group at 1330 after encouragement from staff. 1:1 talk time offered.     Signature:  Yudi Espinosa

## 2020-08-11 NOTE — GROUP NOTE
Group Therapy Note    Date: 8/11/2020    Group Start Time: 0900  Group End Time: 0920  Group Topic: Community Meeting    [facility]    [place], [place]        Group Therapy Note    Attendees: 11/20         Pt did not attend [employer] d/t resting in room despite staff invitation to attend. 1:1 talk time offered as alternative to group session, pt declined.

## 2020-08-12 PROCEDURE — 99232 SBSQ HOSP IP/OBS MODERATE 35: CPT | Performed by: PSYCHIATRY & NEUROLOGY

## 2020-08-12 PROCEDURE — 6370000000 HC RX 637 (ALT 250 FOR IP): Performed by: NURSE PRACTITIONER

## 2020-08-12 PROCEDURE — 6370000000 HC RX 637 (ALT 250 FOR IP): Performed by: PSYCHIATRY & NEUROLOGY

## 2020-08-12 PROCEDURE — 1240000000 HC EMOTIONAL WELLNESS R&B

## 2020-08-12 RX ORDER — HYDROXYZINE HYDROCHLORIDE 25 MG/1
25 TABLET, FILM COATED ORAL 3 TIMES DAILY PRN
Status: DISCONTINUED | OUTPATIENT
Start: 2020-08-12 | End: 2020-08-14 | Stop reason: HOSPADM

## 2020-08-12 RX ADMIN — QUETIAPINE FUMARATE 500 MG: 300 TABLET ORAL at 23:08

## 2020-08-12 RX ADMIN — BUPROPION HYDROCHLORIDE 100 MG: 100 TABLET, FILM COATED, EXTENDED RELEASE ORAL at 08:44

## 2020-08-12 RX ADMIN — OXCARBAZEPINE 300 MG: 300 TABLET, FILM COATED ORAL at 08:44

## 2020-08-12 RX ADMIN — HYDROXYZINE HYDROCHLORIDE 25 MG: 25 TABLET, FILM COATED ORAL at 23:09

## 2020-08-12 RX ADMIN — BUPROPION HYDROCHLORIDE 100 MG: 100 TABLET, FILM COATED, EXTENDED RELEASE ORAL at 23:09

## 2020-08-12 RX ADMIN — TRAZODONE HYDROCHLORIDE 50 MG: 50 TABLET ORAL at 23:08

## 2020-08-12 RX ADMIN — HYDROXYZINE HYDROCHLORIDE 25 MG: 25 TABLET, FILM COATED ORAL at 18:11

## 2020-08-12 RX ADMIN — ESCITALOPRAM OXALATE 20 MG: 20 TABLET ORAL at 23:09

## 2020-08-12 RX ADMIN — OXCARBAZEPINE 300 MG: 300 TABLET, FILM COATED ORAL at 23:09

## 2020-08-12 NOTE — PLAN OF CARE
[facility]  Day 3 Interdisciplinary Treatment Plan NOTE    Review Date & Time: 8/12/2020 0931    Admission Type:   Admission Type: Voluntary    Reason for admission:  Reason for Admission: command voices telling him to harm himself, suicidal with a plan to stab self with a knife  Estimated Length of Stay: 5-7 days  Estimated Discharge Date Update: to be determined by physician    PATIENT STRENGTHS:  Patient Strengths Strengths: Communication, Connection to output provider  Patient Strengths and Limitations:Limitations: Difficulty problem solving/relies on others to help solve problems  Addictive Behavior:Addictive Behavior  In the past 3 months, have you felt or has someone told you that you have a problem with:  : None  Do you have a history of Chemical Use?: No  Do you have a history of Alcohol Use?: No  Do you have a history of Street Drug Abuse?: No  Histroy of Prescripton Drug Abuse?: No  Medical Problems:  Past Medical History:   Diagnosis Date    Bipolar disorder ([facility] [address]     Depression     GERD (gastroesophageal reflux disease)     Hallucinations     Headache(784.0)     Hepatitis     Schizophrenia, schizo-affective (HCC)     Sub[facility] ([facility]     Tobacco abuse     Type II or unspecified type diabetes mellitus without mention of complication, not stated as uncontrolled     Urinary incontinence        Risk:  Fall RiskTotal: 55  Dusty Scale Dusty Scale Score: 22  BVC Total: 0  Change in scores no Changes to plan of Care no    Status EXAM:   Status and Exam  Normal: No  Facial Expression: Flat  Affect: Appropriate  Level of Consciousness: Alert  Mood:Normal: No  Mood: Depressed, Anxious  Motor Activity:Normal: No  Motor Activity: Decreased  Interview Behavior: Cooperative  Preception: [place] to Person, [place] to Time, [place] to Place, [place] to Situation  Attention:Normal: No  Attention: Distractible  Thought Processes: Circumstantial  Thought Content:Normal: No  Thought Content: Preoccupations  Hallucinations: Auditory (Comment)  Delusions: No  Memory:Normal: No  Memory: Poor Remote  Insight and Judgment: No  Insight and Judgment: Poor Judgment, Poor Insight  Present Suicidal Ideation: Yes  Present Homicidal Ideation: No    Daily Assessment Last Entry:   Daily Sleep (WDL): Exceptions to WDL         Patient Currently in Pain: Denies  Daily Nutrition (WDL): Within Defined Limits    Patient Monitoring:  Frequency of Checks: 4 times per hour, close    Psychiatric Symptoms:   Depression Symptoms  Depression Symptoms: Isolative, Feelings of helplessness, Feelings of hopelessess  Anxiety Symptoms  Anxiety Symptoms: Generalized  [place] Symptoms  [place] Symptoms: No problems reported or observed. Psychosis Symptoms  Delusion Type: No problems reported or observed. Suicide Risk CSSR-S:  1) Within the past month, have you wished you were dead or wished you could go to sleep and not wake up? : Yes  2) Have you actually had any thoughts of killing yourself? : Yes  3) Have you been thinking about how you might kill yourself? : Yes  5) Have you started to work out or worked out the details of how to kill yourself? Do you intend to carry out this plan? : No  6) Have you ever done anything, started to do anything, or prepared to do anything to end your life?: No  Change in Result no Change in Plan of care no      EDUCATION:   EDUCATION:   Learner Progress Toward Treatment Goals: Reviewed results and recommendations of this team, Reviewed group plan and strategies, Reviewed signs, symptoms and risk of self harm and violent behavior, Reviewed goals and plan of care    Method:small group, individual verbal education    Outcome:verbalized by patient, but needs reinforcement to obtain goals    PATIENT GOALS:  Short term: To decrease the voices   Long term:Sobriety from Crack     PLAN/TREATMENT RECOMMENDATIONS UPDATE: continue with group therapies, increased socialization, continue planning for after discharge goals, continue with medication compliance    SHORT-TERM GOALS UPDATE:   Time frame for Short-Term Goals: 5-7 days    LONG-TERM GOALS UPDATE:   Time frame for Long-Term Goals: 6 months  Members Present in Team Meeting: See Signature Sheet    [place], [address]

## 2020-08-12 NOTE — BH NOTE
Department of Psychiatry  Attending Progress Note  Chief Complaint: Schizophrenia [facility])     SUBJECTIVE:    Patient is feeling depressed and sad. He is experiencing auditory hallucinations telling him to kill himself and kill his mother. He is also feels he is fearful. He feels that people are trying to harm him and hurt him. Different voices are telling him different things. He is scared. He has auditory hallucinations and delusions of persecution. He has suicidal thoughts. He feels he has no energy or motivation. He has not been eating well. He is not taking care of his personal hygiene. OBJECTIVE    Physical  BP (!) 107/57   Pulse 68   Temp 97.9 °F (36.6 °C) (Oral)   Resp 14   Ht 6' 3\" (1.905 m)   Wt 170 lb 3.2 oz (77.2 kg)   SpO2 97%   BMI 21.27 kg/m²      Mental Status Evaluation:    Patient is talking to himself. He becomes fearful when approached. He avoids eye contact. He has poor rapport. He has increased psychomotor activity. He has delusions of persecution and delusions of reference. He has been experiencing auditory hallucinations that are giving him a running commentary. Some of the voices are telling him to kill himself and his mother. He is not taking care of his personal hygiene and needs prompting to eat food. He is aware of her surroundings. His speech is minimal with monosyllabic answers. He has suicidal thoughts and plans to overdose and die  He has homicidal thoughts and no plan  He has no insight. His judgement is impaired. BP (!) 107/57   Pulse 68   Temp 97.9 °F (36.6 °C) (Oral)   Resp 14   Ht 6' 3\" (1.905 m)   Wt 170 lb 3.2 oz (77.2 kg)   SpO2 97%   BMI 21.27 kg/m²      Recent Results (from the past 72 hour(s))   COVID-19    Collection Time: 08/10/20  8:29 PM    Specimen: Other   Result Value Ref Range    SARS-CoV-2          SARS-CoV-2, Rapid Not Detected Not Detected    Source . NASOPHARYNGEAL SWAB     SARS-CoV-2, PCR         Comprehensive Metabolic Panel w/ Reflex to MG    Collection Time: 08/11/20  7:20 AM   Result Value Ref Range    Glucose 108 (H) 70 - 99 mg/dL    BUN 18 8 - 23 mg/dL    CREATININE 1.07 0.70 - 1.20 mg/dL    Bun/Cre Ratio NOT REPORTED 9 - 20    Calcium 9.0 8.6 - 10.4 mg/dL    Sodium 140 135 - 144 mmol/L    Potassium 4.0 3.7 - 5.3 mmol/L    Chloride 107 98 - 107 mmol/L    CO2 25 20 - 31 mmol/L    Anion Gap 8 (L) 9 - 17 mmol/L    Alkaline Phosphatase 107 40 - 129 U/L    ALT <5 (L) 5 - 41 U/L    AST 14 <40 U/L    Total Bilirubin 0.58 0.3 - 1.2 mg/dL    Total Protein 6.2 (L) 6.4 - 8.3 g/dL    Alb 3.9 3.5 - 5.2 g/dL    Albumin/Globulin Ratio NOT REPORTED 1.0 - 2.5    GFR Non-African American >60 >60 mL/min    GFR African American >60 >60 mL/min    GFR Comment          GFR Staging NOT REPORTED    Hemoglobin A1c    Collection Time: 08/11/20  7:20 AM   Result Value Ref Range    Hemoglobin A1C 4.9 4.0 - 6.0 %    Estimated Avg Glucose 94 mg/dL   TSH without Reflex    Collection Time: 08/11/20  7:20 AM   Result Value Ref Range    TSH 2.04 0.30 - 5.00 mIU/L   T4, free    Collection Time: 08/11/20  7:20 AM   Result Value Ref Range    Thyroxine, Free 0.97 0.93 - 1.70 ng/dL   Lipid panel - fasting    Collection Time: 08/11/20  7:20 AM   Result Value Ref Range    Cholesterol 167 <200 mg/dL    HDL 74 >40 mg/dL    LDL Cholesterol 84 0 - 130 mg/dL    Chol/HDL Ratio 2.3 <5    Triglycerides 43 <150 mg/dL    VLDL NOT REPORTED 1 - 30 mg/dL   CBC auto differential    Collection Time: 08/11/20  7:20 AM   Result Value Ref Range    WBC 4.0 3.5 - 11.0 k/uL    RBC 4.34 (L) 4.5 - 5.9 m/uL    Hemoglobin 12.8 (L) 13.5 - 17.5 g/dL    Hematocrit 39.2 (L) 41 - 53 %    MCV 90.4 80 - 100 fL    MCH 29.6 26 - 34 pg    MCHC 32.7 31 - 37 g/dL    RDW 15.1 (H) 11.5 - 14.9 %    Platelets 687 192 - 438 k/uL    MPV 6.9 6.0 - 12.0 fL    NRBC Automated NOT REPORTED per 100 WBC    Differential Type NOT REPORTED     Immature Granulocytes NOT REPORTED 0 %    Absolute Immature Granulocyte NOT REPORTED 0.00 - 0.30 k/uL    WBC Morphology NOT REPORTED     RBC Morphology NOT REPORTED     Platelet Estimate NOT REPORTED     Seg Neutrophils 39 36 - 66 %    Lymphocytes 53 (H) 24 - 44 %    Monocytes 7 1 - 7 %    Eosinophils % 1 0 - 4 %    Basophils 0 0 - 2 %    Segs Absolute 1.56 1.3 - 9.1 k/uL    Absolute Lymph # 2.12 1.0 - 4.8 k/uL    Absolute Mono # 0.28 0.1 - 1.3 k/uL    Absolute Eos # 0.04 0.0 - 0.4 k/uL    Basophils Absolute 0.00 0.0 - 0.2 k/uL    Morphology ANISOCYTOSIS PRESENT    DRUG SCREEN MULTI URINE    Collection Time: 08/11/20  6:23 PM   Result Value Ref Range    Amphetamine Screen, Ur NEGATIVE NEGATIVE    Barbiturate Screen, Ur NEGATIVE NEGATIVE    Benzodiazepine Screen, Urine NEGATIVE NEGATIVE    Cocaine Metabolite, Urine POSITIVE (A) NEGATIVE    Methadone Screen, Urine NEGATIVE NEGATIVE    Opiates, Urine NEGATIVE NEGATIVE    Phencyclidine, Urine NEGATIVE NEGATIVE    Propoxyphene, Urine NOT REPORTED NEGATIVE    Cannabinoid Scrn, Ur NEGATIVE NEGATIVE    Oxycodone Screen, Ur NEGATIVE NEGATIVE    Methamphetamine, Urine NOT REPORTED NEGATIVE    Tricyclic Antidepressants, Urine NOT REPORTED NEGATIVE    MDMA, Urine NOT REPORTED NEGATIVE    Buprenorphine Urine NOT REPORTED NEGATIVE    Test Information       Assay provides medical screening only. The absence of expected drug(s) and/or metabolite(s) may indicate diluted or adulterated urine, limitations of testing or timing of collection.      Plan: to continue current management    Medications  Current Facility-Administered Medications   Medication Dose Route Frequency Provider Last Rate Last Dose    benztropine (COGENTIN) tablet 2 mg  2 mg Oral Daily PRN Heron Johnson MD        buPROPion [facility]) extended release tablet 100 mg  100 mg Oral BID Pierre PURVIS MD   100 mg at 08/12/20 0844    dicyclomine (BENTYL) capsule 10 mg  10 mg Oral Q6H PRN Heron Johnson MD        escitalopram (LEXAPRO) tablet 20 mg  20 mg Oral Nightly Buck Chaudhary MD   20 mg at 08/11/20 2159    OXcarbazepine (TRILEPTAL) tablet 300 mg  300 mg Oral BID Roma April MD HYUN   300 mg at 08/12/20 0844    QUEtiapine (SEROQUEL) tablet 500 mg  500 mg Oral Nightly Roma PURVIS MD   500 mg at 08/11/20 2159    acetaminophen (TYLENOL) tablet 650 mg  650 mg Oral Q4H PRN  Sands, APRN - CNP        traZODone (DESYREL) tablet 50 mg  50 mg Oral Nightly PRN Aguirre Sands, APRN - CNP   50 mg at 08/11/20 2159    benztropine mesylate (COGENTIN) injection 2 mg  2 mg Intramuscular BID PRN Bishop Hicks, APRN - CNP        magnesium hydroxide (MILK OF MAGNESIA) 400 MG/5ML suspension 30 mL  30 mL Oral Daily PRN Bishop Hicks, APRN - CNP        aluminum & magnesium hydroxide-simethicone (MAALOX) 200-200-20 MG/5ML suspension 30 mL  30 mL Oral Q6H PRN Bishop Davisons, APRN - CNP        nicotine polacrilex (NICORETTE) gum 2 mg  2 mg Oral Q1H PRN Alcira Hernandez MD             buPROPion  100 mg Oral BID    escitalopram  20 mg Oral Nightly    OXcarbazepine  300 mg Oral BID    QUEtiapine  500 mg Oral Nightly       ASSESSMENT  Schizophrenia ([facility] [address]     Patient's Response to Treatment: negative    PLAN  Dragon voice recognition software used in portions of this document. To continue current management.

## 2020-08-12 NOTE — GROUP NOTE
Group Therapy Note    Date: 8/12/2020    Group Start Time: 1000  Group End Time: 0319  Group Topic: Psychotherapy    CHANEL Francisco        Group Therapy Note    Attendees: 6/12      Pt denied 1:1. Despite staff encouragement, pt denied 10:00am psychotherapy group.             Signature:  CHANEL Ugalde

## 2020-08-12 NOTE — PLAN OF CARE
Problem: Altered Mood, Depressive Behavior:  Goal: Able to verbalize and/or display a decrease in depressive symptoms  Description: Able to verbalize and/or display a decrease in depressive symptoms  8/12/2020 1213 by Lsia Campbell  Outcome: Ongoing  Pt. Isolates in bed. Pt. Is pleasant when approached. Pt relates he is depressed and has fleeting suicidal thoughts but no plan. Pt relates he hears voices telling him bad things. Problem: Altered Mood, Depressive Behavior:  Goal: Ability to disclose and discuss suicidal ideas will improve  Description: Ability to disclose and discuss suicidal ideas will improve  8/12/2020 1213 by Lisa Campbell  Outcome: Ongoing  Pt. Denies any intent to harm himself but is depressed with fleeting suicidal thoughts. Pt. Does take medication. Out for meals and returns to bed. Problem: Suicide risk  Goal: Provide patient with safe environment  Description: Provide patient with safe environment  8/12/2020 1213 by Lisa Campbell  Outcome: Ongoing  Pt. Does say he is having fleeting suicidal thoughts and says he will not harm himself. He agrees to approach staff if feelings worse. Pt. Remains safe on the unit. Q 15 minute checks for safety maintained.

## 2020-08-12 NOTE — GROUP NOTE
Group Therapy Note    Date: 8/12/2020    Group Start Time: 1100  Group End Time: 0430  Group Topic: Psychoeducation    [facility]    [place], [place]        Group Therapy Note    Attendees: 6/11         Pt did not attend RT skills group d/t resting in room despite staff invitation to attend. 1:1 talk time offered as alternative to group session, pt declined.

## 2020-08-12 NOTE — GROUP NOTE
Group Therapy Note    Date: 8/12/2020    Group Start Time: 1330  Group End Time: 6009  Group Topic: Cognitive Skills    JESUS Denny, CTRS    Patient refused to attend Recreational Therapy Group at 1330 after encouragement from staff. 1:1 talk time offered.     Signature:  Per Webb

## 2020-08-13 PROCEDURE — 6370000000 HC RX 637 (ALT 250 FOR IP): Performed by: NURSE PRACTITIONER

## 2020-08-13 PROCEDURE — 6370000000 HC RX 637 (ALT 250 FOR IP): Performed by: PSYCHIATRY & NEUROLOGY

## 2020-08-13 PROCEDURE — 1240000000 HC EMOTIONAL WELLNESS R&B

## 2020-08-13 PROCEDURE — 99232 SBSQ HOSP IP/OBS MODERATE 35: CPT | Performed by: NURSE PRACTITIONER

## 2020-08-13 RX ADMIN — QUETIAPINE FUMARATE 500 MG: 300 TABLET ORAL at 22:19

## 2020-08-13 RX ADMIN — ESCITALOPRAM OXALATE 20 MG: 20 TABLET ORAL at 22:19

## 2020-08-13 RX ADMIN — BUPROPION HYDROCHLORIDE 100 MG: 100 TABLET, FILM COATED, EXTENDED RELEASE ORAL at 08:19

## 2020-08-13 RX ADMIN — OXCARBAZEPINE 300 MG: 300 TABLET, FILM COATED ORAL at 22:19

## 2020-08-13 RX ADMIN — OXCARBAZEPINE 300 MG: 300 TABLET, FILM COATED ORAL at 08:18

## 2020-08-13 RX ADMIN — BUPROPION HYDROCHLORIDE 100 MG: 100 TABLET, FILM COATED, EXTENDED RELEASE ORAL at 22:19

## 2020-08-13 RX ADMIN — TRAZODONE HYDROCHLORIDE 50 MG: 50 TABLET ORAL at 22:19

## 2020-08-13 RX ADMIN — HYDROXYZINE HYDROCHLORIDE 25 MG: 25 TABLET, FILM COATED ORAL at 22:19

## 2020-08-13 NOTE — GROUP NOTE
Group Therapy Note    Date: 8/13/2020    Group Start Time: 1330  Group End Time: 5736  Group Topic: Recreational    [address] [facility]    Patient refused to attend Recreational Therapy Group at 1330 after encouragement from staff. 1:1 talk time offered.     Signature:  Freedom Correa

## 2020-08-13 NOTE — BH NOTE
patient refused to attend wellness group at 1600 after encouragement from staff.  1:1 talk time provided as alternative to group session

## 2020-08-13 NOTE — PLAN OF CARE
Problem: Altered Mood, Depressive Behavior:  Goal: Able to verbalize and/or display a decrease in depressive symptoms  Description: Able to verbalize and/or display a decrease in depressive symptoms  Outcome: Ongoing   Patient reports depression. Patient reports auditory hallucinations that are \"really bad'. Patient is isolative to room for long intervals. Patient comes out for needs and snack. Patient is compliant with his medications. Problem: Altered Mood, Depressive Behavior:  Goal: Ability to disclose and discuss suicidal ideas will improve  Description: Ability to disclose and discuss suicidal ideas will improve  Outcome: Ongoing   Patient endorses suicidal ideations. Patient denies having a plan. Patient verbalizes he will remain safe on the unit. Patient agrees to seek out staff if he can no longer maintain safety. Problem: Suicide risk  Goal: Provide patient with safe environment  Description: Provide patient with safe environment  Outcome: Ongoing   Patient remains safe on unit. Patient safety maintained q15 minute checks.

## 2020-08-13 NOTE — GROUP NOTE
Group Therapy Note    Date: 8/13/2020    Group Start Time: 0900  Group End Time: 0920  Group Topic: Community Meeting    [facility] [address]    Patient refused to attend Goal Setting / Community Meeting Group at 0900 after encouragement from staff. 1:1 talk time offered.     Signature:  Kalani Hunter

## 2020-08-13 NOTE — GROUP NOTE
Group Therapy Note    Date: 8/13/2020    Group Start Time: 1000  Group End Time: 5325  Group Topic: Psychotherapy    [facility]    [address], [place]        Group Therapy Note    Attendees: 3/8             Pt denied 1:1. Despite staff encouragement, pt denied 10:00am psychotherapy group.    Signature:  [address], [place]

## 2020-08-13 NOTE — GROUP NOTE
Group Therapy Note    Date: 8/13/2020    Group Start Time: 1100  Group End Time: 1130  Group Topic: Psychoeducation    [facility]    [place], [place]        Group Therapy Note    Attendees: 2/8         Pt did not attend RT skills group d/t resting in room despite staff invitation to attend. 1:1 talk time offered as alternative to group session, pt declined.

## 2020-08-13 NOTE — VIRTUAL HEALTH
Department of Psychiatry  Attending Progress Note  Chief Complaint: Schizophrenia [facility])     SUBJECTIVE:  Dominga Fuentes is seen via tele-health today with staff present in room. He endorses auditory hallucinations that are bothersome. He denies VH. He states he is depressed. He does not attend groups and is isolative to his room. He states his sleep is OK and his appetite is good. He denies SI or HI. He denies side effects to medications. Patient feels helpless and hopeless at times about his current situation and cannot contract for safety outside of the hospital setting. Support and reassurance provided. Charting and medication reviewed. There is no safe alternative at this time than continued hospitalization. OBJECTIVE    Physical  /61   Pulse 71   Temp 97.9 °F (36.6 °C) (Oral)   Resp 14   Ht 6' 3\" (1.905 m)   Wt 170 lb 3.2 oz (77.2 kg)   SpO2 97%   BMI 21.27 kg/m²      Mental Status Evaluation:  Orientation: alertness: alert   Mood:. depressed   Affect:  flat   Appearance:  disheveled   Activity:  Within normal limits   Gait/Posture: Within normal limits   Speech:  soft   Thought Process:  blocked   Thought Content:  hallucinations   Sensorium:  Person, place, times, situation   Cognition:  intact   Memory: intact   Insight:  poor   Judgment: poor   Suicidal Ideations: denies   Homicidal Ideations: denies   Medication Side Effects: absent    Attention Span Age appropriate       Labs  Recent Results (from the past 67 hour(s))   COVID-19    Collection Time: 08/10/20  8:29 PM    Specimen: Other   Result Value Ref Range    SARS-CoV-2          SARS-CoV-2, Rapid Not Detected Not Detected    Source . NASOPHARYNGEAL SWAB     SARS-CoV-2, PCR         Comprehensive Metabolic Panel w/ Reflex to MG    Collection Time: 08/11/20  7:20 AM   Result Value Ref Range    Glucose 108 (H) 70 - 99 mg/dL    BUN 18 8 - 23 mg/dL    CREATININE 1.07 0.70 - 1.20 mg/dL    Bun/Cre Ratio NOT REPORTED 9 - 20    Calcium 9.0 8.6 - 10.4 mg/dL    Sodium 140 135 - 144 mmol/L    Potassium 4.0 3.7 - 5.3 mmol/L    Chloride 107 98 - 107 mmol/L    CO2 25 20 - 31 mmol/L    Anion Gap 8 (L) 9 - 17 mmol/L    Alkaline Phosphatase 107 40 - 129 U/L    ALT <5 (L) 5 - 41 U/L    AST 14 <40 U/L    Total Bilirubin 0.58 0.3 - 1.2 mg/dL    Total Protein 6.2 (L) 6.4 - 8.3 g/dL    Alb 3.9 3.5 - 5.2 g/dL    Albumin/Globulin Ratio NOT REPORTED 1.0 - 2.5    GFR Non-African American >60 >60 mL/min    GFR African American >60 >60 mL/min    GFR Comment          GFR Staging NOT REPORTED    Hemoglobin A1c    Collection Time: 08/11/20  7:20 AM   Result Value Ref Range    Hemoglobin A1C 4.9 4.0 - 6.0 %    Estimated Avg Glucose 94 mg/dL   TSH without Reflex    Collection Time: 08/11/20  7:20 AM   Result Value Ref Range    TSH 2.04 0.30 - 5.00 mIU/L   T4, free    Collection Time: 08/11/20  7:20 AM   Result Value Ref Range    Thyroxine, Free 0.97 0.93 - 1.70 ng/dL   Lipid panel - fasting    Collection Time: 08/11/20  7:20 AM   Result Value Ref Range    Cholesterol 167 <200 mg/dL    HDL 74 >40 mg/dL    LDL Cholesterol 84 0 - 130 mg/dL    Chol/HDL Ratio 2.3 <5    Triglycerides 43 <150 mg/dL    VLDL NOT REPORTED 1 - 30 mg/dL   CBC auto differential    Collection Time: 08/11/20  7:20 AM   Result Value Ref Range    WBC 4.0 3.5 - 11.0 k/uL    RBC 4.34 (L) 4.5 - 5.9 m/uL    Hemoglobin 12.8 (L) 13.5 - 17.5 g/dL    Hematocrit 39.2 (L) 41 - 53 %    MCV 90.4 80 - 100 fL    MCH 29.6 26 - 34 pg    MCHC 32.7 31 - 37 g/dL    RDW 15.1 (H) 11.5 - 14.9 %    Platelets 159 048 - 161 k/uL    MPV 6.9 6.0 - 12.0 fL    NRBC Automated NOT REPORTED per 100 WBC    Differential Type NOT REPORTED     Immature Granulocytes NOT REPORTED 0 %    Absolute Immature Granulocyte NOT REPORTED 0.00 - 0.30 k/uL    WBC Morphology NOT REPORTED     RBC Morphology NOT REPORTED     Platelet Estimate NOT REPORTED     Seg Neutrophils 39 36 - 66 %    Lymphocytes 53 (H) 24 - 44 %    Monocytes 7 1 - 7 %    Eosinophils % 1 0 - 4 %    Basophils 0 0 - 2 %    Segs Absolute 1.56 1.3 - 9.1 k/uL    Absolute Lymph # 2.12 1.0 - 4.8 k/uL    Absolute Mono # 0.28 0.1 - 1.3 k/uL    Absolute Eos # 0.04 0.0 - 0.4 k/uL    Basophils Absolute 0.00 0.0 - 0.2 k/uL    Morphology ANISOCYTOSIS PRESENT    DRUG SCREEN MULTI URINE    Collection Time: 08/11/20  6:23 PM   Result Value Ref Range    Amphetamine Screen, Ur NEGATIVE NEGATIVE    Barbiturate Screen, Ur NEGATIVE NEGATIVE    Benzodiazepine Screen, Urine NEGATIVE NEGATIVE    Cocaine Metabolite, Urine POSITIVE (A) NEGATIVE    Methadone Screen, Urine NEGATIVE NEGATIVE    Opiates, Urine NEGATIVE NEGATIVE    Phencyclidine, Urine NEGATIVE NEGATIVE    Propoxyphene, Urine NOT REPORTED NEGATIVE    Cannabinoid Scrn, Ur NEGATIVE NEGATIVE    Oxycodone Screen, Ur NEGATIVE NEGATIVE    Methamphetamine, Urine NOT REPORTED NEGATIVE    Tricyclic Antidepressants, Urine NOT REPORTED NEGATIVE    MDMA, Urine NOT REPORTED NEGATIVE    Buprenorphine Urine NOT REPORTED NEGATIVE    Test Information       Assay provides medical screening only. The absence of expected drug(s) and/or metabolite(s) may indicate diluted or adulterated urine, limitations of testing or timing of collection.        Medications  Current Facility-Administered Medications   Medication Dose Route Frequency Provider Last Rate Last Dose    hydrOXYzine (ATARAX) tablet 25 mg  25 mg Oral TID PRN Michelle PURVIS MD   25 mg at 08/12/20 2309    benztropine (COGENTIN) tablet 2 mg  2 mg Oral Daily PRN Guille Ehcols MD        buPROPion [facility]) extended release tablet 100 mg  100 mg Oral BID Craig Padma Geovanni PURVIS MD   100 mg at 08/13/20 0819    dicyclomine (BENTYL) capsule 10 mg  10 mg Oral Q6H PRN Guille Echols MD        escitalopram (LEXAPRO) tablet 20 mg  20 mg Oral Nightly Mukesh Geovanni PURVIS MD   20 mg at 08/12/20 2309    OXcarbazepine (TRILEPTAL) tablet 300 mg  300 mg Oral BID Michelle PURVIS MD   300 mg at 08/13/20 0818    QUEtiapine (SEROQUEL) tablet 500 mg  500 mg Oral Nightly Mukesh PURVIS MD   500 mg at 08/12/20 2308    acetaminophen (TYLENOL) tablet 650 mg  650 mg Oral Q4H PRN Sanjuana Naas, APRN - CNP        traZODone (DESYREL) tablet 50 mg  50 mg Oral Nightly PRN [place] Naas, APRN - CNP   50 mg at 08/12/20 2308    benztropine mesylate (COGENTIN) injection 2 mg  2 mg Intramuscular BID PRN Sanjuana Naas, APRN - CNP        magnesium hydroxide (MILK OF MAGNESIA) 400 MG/5ML suspension 30 mL  30 mL Oral Daily PRN Sanjuana Naas, APRN - CNP        aluminum & magnesium hydroxide-simethicone (MAALOX) 200-200-20 MG/5ML suspension 30 mL  30 mL Oral Q6H PRN Sanjuana Naas, APRN - CNP        nicotine polacrilex (NICORETTE) gum 2 mg  2 mg Oral Q1H PRN Nicolas Miguel MD             buPROPion  100 mg Oral BID    escitalopram  20 mg Oral Nightly    OXcarbazepine  300 mg Oral BID    QUEtiapine  500 mg Oral Nightly       ASSESSMENT  Schizophrenia ([facility] [address]     Patient's Response to Treatment: slow    PLAN  1. Continue inpatient hospitalization  2. Medication management. 3. Participation in groups and therapeutic milieu  4. Social work for discharge planning      Electronically signed by MARY Durham CNP on 8/13/2020 at 4:45 PM.    Dragon voice recognition software used in portions of this document. Susan Pinzon is a 63 y.o. male being evaluated by a Virtual Visit (video visit) encounter to address concerns as mentioned above. A caregiver was present when appropriate. Due to this being a TeleHealth encounter (During [facility] public health emergency), evaluation of the following organ systems was limited: Vitals/Constitutional/EENT/Resp/CV/GI//MS/Neuro/Skin/Heme-Lymph-Imm.   Pursuant to the emergency declaration under the [facility] waiver authority and the [employer] Act, this Virtual Visit was conducted with patient's (and/or legal guardian's) consent, to reduce the risk of exposure to COVID-19 and provide necessary medical care. Total time spent on this encounter: Not billed by time    Services were provided through a video synchronous discussion virtually to substitute for in-person encounter. --MARY Alvarez CNP on 8/13/2020 at 4:45 PM    An electronic signature was used to authenticate this note. Patient Location:  [address] [place] [address]    Provider Location ([facility]   [address] [facility]  This virtual visit was conducted via interactive/real-time audio/video.

## 2020-08-13 NOTE — PLAN OF CARE
Problem: Altered Mood, Depressive Behavior:  Goal: Able to verbalize and/or display a decrease in depressive symptoms  Description: Able to verbalize and/or display a decrease in depressive symptoms  8/13/2020 1106 by Floyd Rios RN  Outcome: Ongoing  Patient is calm, controlled, and medication compliant. Patient admits to fleeting suicidal ideations but contracts for safety. Patient reports feelings of depression, anxiety and auditory disturbances that he cannot elaborate on. Patient is polite, is isolative to self/room and is eating and sleeping adequately. Patient has safety checks Q15min and at irregular intervals; patient safety is maintained. Problem  Goal: Ability to disclose and discuss suicidal ideas will improve  Description: Ability to disclose and discuss suicidal ideas will improve  8/13/2020 1106 by Floyd Rios RN  Outcome: Ongoing   Patient is calm, controlled, and medication compliant. Patient admits to fleeting suicidal ideations but contracts for safety. Patient reports feelings of depression, anxiety and auditory disturbances that he cannot elaborate on. . Patient is polite, is isolative to self/room and is eating and sleeping adequately. Patient has safety checks Q15min and at irregular intervals; patient safety is maintained. Problem: Tobacco Use:  Goal: Inpatient tobacco use cessation counseling participation  Description: Inpatient tobacco use cessation counseling participation  Outcome: Ongoing   Smoking education provided  Problem: Suicide risk  Goal: Provide patient with safe environment  Description: Provide patient with safe environment  8/13/2020 1106 by Floyd Rios RN  Outcome: Ongoing   Patient is calm, controlled, and medication compliant. Patient admits to fleeting suicidal ideations but contracts for safety. Patient reports feelings of depression, anxiety and auditory disturbances that he cannot elaborate on.  Patient is polite, is isolative to self/room and is eating and sleeping adequately. Patient has safety checks Q15min and at irregular intervals; patient safety is maintained.

## 2020-08-14 VITALS
SYSTOLIC BLOOD PRESSURE: 109 MMHG | WEIGHT: 170.2 LBS | BODY MASS INDEX: 21.16 KG/M2 | RESPIRATION RATE: 14 BRPM | TEMPERATURE: 98.1 F | DIASTOLIC BLOOD PRESSURE: 60 MMHG | OXYGEN SATURATION: 97 % | HEART RATE: 67 BPM | HEIGHT: 75 IN

## 2020-08-14 PROCEDURE — 99239 HOSP IP/OBS DSCHRG MGMT >30: CPT | Performed by: PSYCHIATRY & NEUROLOGY

## 2020-08-14 PROCEDURE — 6370000000 HC RX 637 (ALT 250 FOR IP): Performed by: PSYCHIATRY & NEUROLOGY

## 2020-08-14 RX ORDER — BENZTROPINE MESYLATE 2 MG/1
2 TABLET ORAL DAILY PRN
Qty: 60 TABLET | Refills: 0 | Status: ON HOLD | OUTPATIENT
Start: 2020-08-14 | End: 2021-03-10 | Stop reason: DRUGHIGH

## 2020-08-14 RX ORDER — BUPROPION HYDROCHLORIDE 100 MG/1
100 TABLET, EXTENDED RELEASE ORAL 2 TIMES DAILY
Qty: 60 TABLET | Refills: 0 | Status: ON HOLD | OUTPATIENT
Start: 2020-08-14 | End: 2020-12-11 | Stop reason: HOSPADM

## 2020-08-14 RX ORDER — TRAZODONE HYDROCHLORIDE 50 MG/1
50 TABLET ORAL NIGHTLY PRN
Qty: 30 TABLET | Refills: 0 | Status: ON HOLD | OUTPATIENT
Start: 2020-08-14 | End: 2021-03-14 | Stop reason: HOSPADM

## 2020-08-14 RX ORDER — QUETIAPINE FUMARATE 100 MG/1
500 TABLET, FILM COATED ORAL NIGHTLY
Qty: 30 TABLET | Refills: 0 | Status: ON HOLD | OUTPATIENT
Start: 2020-08-14 | End: 2020-12-08 | Stop reason: DRUGHIGH

## 2020-08-14 RX ORDER — HYDROXYZINE HYDROCHLORIDE 25 MG/1
25 TABLET, FILM COATED ORAL 3 TIMES DAILY PRN
Qty: 90 TABLET | Refills: 0 | Status: SHIPPED | OUTPATIENT
Start: 2020-08-14 | End: 2020-08-24

## 2020-08-14 RX ORDER — ESCITALOPRAM OXALATE 20 MG/1
20 TABLET ORAL NIGHTLY
Qty: 30 TABLET | Refills: 0 | Status: ON HOLD | OUTPATIENT
Start: 2020-08-14 | End: 2020-12-11 | Stop reason: HOSPADM

## 2020-08-14 RX ORDER — OXCARBAZEPINE 300 MG/1
300 TABLET, FILM COATED ORAL 2 TIMES DAILY
Qty: 60 TABLET | Refills: 0 | Status: ON HOLD | OUTPATIENT
Start: 2020-08-14 | End: 2020-12-08

## 2020-08-14 RX ADMIN — BUPROPION HYDROCHLORIDE 100 MG: 100 TABLET, FILM COATED, EXTENDED RELEASE ORAL at 08:46

## 2020-08-14 RX ADMIN — OXCARBAZEPINE 300 MG: 300 TABLET, FILM COATED ORAL at 08:46

## 2020-08-14 NOTE — BH NOTE
RN attempted to call [address] [facility] to arrange discharge cab only to be put on hold for 20minutes and then disconnected. Venessa Otero ok'd to arrange cab thru [employer].

## 2020-08-14 NOTE — BH NOTE
[facility]  Discharge Note    Pt discharged with followings belongings:   Dentures: None  Vision - Corrective Lenses: None  Hearing Aid: None  Jewelry: None  Body Piercings Removed: N/A  Clothing: Footwear, Pants, Shirt  Were All Patient Medications Collected?: Not Applicable  Other Valuables: Kathryn Brown sent home with patient. Valuables retrieved from safe, Security envelope number:  [id number] and returned to patient. Patient education on aftercare instructions: yes  Information faxed to [facility] by [place] Patient verbalize understanding of AVS:  yes. Status EXAM upon discharge:  Status and Exam  Normal: No  Facial Expression: Flat  Affect: Blunt  Level of Consciousness: Alert  Mood:Normal: No  Mood: Depressed, Anxious  Motor Activity:Normal: No  Motor Activity: Decreased  Interview Behavior: Cooperative  Preception: [place] to Person, Champ Amy to Time, [place] to Place, [place] to Situation  Attention:Normal: Yes  Attention: Distractible  Thought Processes: Circumstantial  Thought Content:Normal: No  Thought Content: Preoccupations  Hallucinations:  Auditory (Comment)  Delusions: No  Memory:Normal: Yes  Memory: Poor Recent  Insight and Judgment: No  Insight and Judgment: Poor Judgment, Poor Insight, Unmotivated  Present Suicidal Ideation: No  Present Homicidal Ideation: No      Metabolic Screening:    Lab Results   Component Value Date    LABA1C 4.9 08/11/2020       Lab Results   Component Value Date    CHOL 167 08/11/2020    CHOL 179 02/13/2017    CHOL 127 05/09/2015    CHOL 168 08/20/2014    CHOL 158 12/31/2013    CHOL 178 08/15/2013    CHOL 166 09/17/2012    CHOL 210 (H) 02/03/2012     Lab Results   Component Value Date    TRIG 43 08/11/2020    TRIG 67 02/13/2017    TRIG 37 05/09/2015    TRIG 72 08/20/2014    TRIG 52 12/31/2013    TRIG 70 08/15/2013    TRIG 117 09/17/2012    TRIG 94 02/03/2012     Lab Results   Component Value Date    HDL 74 08/11/2020    HDL 73 02/13/2017    HDL 57 05/09/2015    HDL 50 08/20/2014    HDL 43 12/31/2013    HDL 42 08/15/2013    HDL 38 (L) 09/17/2012    HDL 55 02/03/2012     No components found for: LDLCAL  No results found for: LABVLDL  [employer] cabbed per Sherrell Hopping to home.    Jolly Rodriguez RN

## 2020-08-14 NOTE — GROUP NOTE
Group Therapy Note    Date: 8/14/2020    Group Start Time: 1000  Group End Time: 9936  Group Topic: Psychotherapy    [facility]    [address], LSW        Group Therapy Note    Attendees: 4/8        Pt denied 1:1. Despite staff encouragement, pt denied 10:00am psychotherapy group.               Signature:  [address], LSW

## 2020-08-14 NOTE — BH NOTE
Patient given tobacco quitline number 57475432302 at this time, refusing to call at this time, states \" I just dont want to quit now\"- patient given information as to the dangers of long term tobacco use. Continue to reinforce the importance of tobacco cessation.

## 2020-08-14 NOTE — GROUP NOTE
Group Therapy Note    Date: 8/14/2020    Group Start Time: 1330  Group End Time: 8560  Group Topic: Psychoeducation    JESUS HERNANDEZ JOSE GUADALUPE    Nolon Fought      Patient declined to attend social skills group at 1330 despite encouragement from staff. 1:1 talk time offered by staff as alternative to group session.

## 2020-08-14 NOTE — GROUP NOTE
Group Therapy Note    Date: 8/14/2020    Group Start Time: 0900  Group End Time: 0920  Group Topic: Community Meeting    [address] [place] [address] A    Lynne Sullivan      Patient declined to attend community meeting/goal setting group at 0900 despite encouragement from staff. 1:1 talk time offered by staff as alternative to group session.         Signature:  Lynne Sullivan

## 2020-08-14 NOTE — PLAN OF CARE
Problem: Altered Mood, Depressive Behavior:  Goal: Able to verbalize and/or display a decrease in depressive symptoms  Description: Able to verbalize and/or display a decrease in depressive symptoms  8/14/2020 1008 by David Flynn RN  Outcome: Ongoing   Patient is calm, controlled, and medication compliant. Patient denies suicidal ideations and reports feelings of depression, anxiety with auditory disturbances that he cannot elaborate on. Patient is polite, is isolative to self/room and is eating and sleeping adequately. Patient has safety checks Q15min and at irregular intervals; patient safety is maintained. Problem: Altered Mood, Depressive Behavior:  Goal: Ability to disclose and discuss suicidal ideas will improve  Description: Ability to disclose and discuss suicidal ideas will improve  8/14/2020 1008 by David Flynn RN  Outcome: Ongoing   Patient is calm, controlled, and medication compliant. Patient denies suicidal ideations and reports feelings of depression, anxiety with auditory disturbances that he cannot elaborate on. Patient is polite, is isolative to self/room and is eating and sleeping adequately. Patient has safety checks Q15min and at irregular intervals; patient safety is maintained. Problem: Tobacco Use:  Goal: Inpatient tobacco use cessation counseling participation  Description: Inpatient tobacco use cessation counseling participation  Outcome: Ongoing   Smoking education  provided  Problem: Suicide risk  Goal: Provide patient with safe environment  Description: Provide patient with safe environment  Outcome: Ongoing   Patient is calm, controlled, and medication compliant. Patient denies suicidal ideations and reports feelings of depression, anxiety with auditory disturbances that he cannot elaborate on. Patient is polite, is isolative to self/room and is eating and sleeping adequately.  Patient has safety checks Q15min and at irregular intervals; patient safety is maintained.

## 2020-08-14 NOTE — GROUP NOTE
Group Therapy Note    Date: 8/14/2020    Group Start Time: 1100  Group End Time: 5503  Group Topic: Recreational    JESUS Mora    Patient declined to attend recreational therapy group at 1100 despite encouragement from staff. 1:1 talk time offered by staff as alternative to group session.       Signature:  Conor Bailey

## 2020-08-14 NOTE — DISCHARGE SUMMARY
hydrOXYzine (ATARAX) 25 MG tablet Take 1 tablet by mouth 3 times daily as needed for Anxiety  Qty: 90 tablet, Refills: 0      traZODone (DESYREL) 50 MG tablet Take 1 tablet by mouth nightly as needed for Sleep  Qty: 30 tablet, Refills: 0         CONTINUE these medications which have CHANGED    Details   OXcarbazepine (TRILEPTAL) 300 MG tablet Take 1 tablet by mouth 2 times daily  Qty: 60 tablet, Refills: 0      buPROPion (WELLBUTRIN SR) 100 MG extended release tablet Take 1 tablet by mouth 2 times daily  Qty: 60 tablet, Refills: 0      escitalopram (LEXAPRO) 20 MG tablet Take 1 tablet by mouth nightly  Qty: 30 tablet, Refills: 0      benztropine (COGENTIN) 2 MG tablet Take 1 tablet by mouth daily as needed (acute dystonia)  Qty: 60 tablet, Refills: 0      QUEtiapine (SEROQUEL) 100 MG tablet Take 5 tablets by mouth nightly  Qty: 30 tablet, Refills: 0         STOP taking these medications       dicyclomine (BENTYL) 10 MG capsule Comments:   Reason for Stopping:                  Core Measures statement:   Not applicable    Discharge Exam:  Level of consciousness:  Within normal limits  Appearance: Street clothes, seated, with good grooming  Behavior/Motor: No abnormalities noted  Attitude toward examiner:  Cooperative, attentive, good eye contact  Speech:  spontaneous, normal rate, normal volume and well articulated  Mood:  euthymic  Affect:  mood congruent  Thought processes:  linear, goal directed and coherent  Thought content:  Homocidal ideation denies  Suicidal Ideation:  denies suicidal ideation  Delusions:  no evidence of delusions  Perceptual Disturbance:  denies any perceptual disturbance  Cognition:  In tact  Memory: age appropriate  Insight & Judgement: fair  Medication side effects:  denies     Disposition: home    Patient Instructions: Activity: activity as tolerated    Follow-up as scheduled with [facility] and his primary care physician.      Time Spent: 35 minutes    Engagement: Patient displayed a good level of engagement with the treatments offered during this admission. Discharge planning, findings, and recommendations were discussed with the patient. Signed:  Dakota Galarza   8/14/2020  1:50 PM  Dragon voice recognition software used in portions of this document.

## 2020-08-14 NOTE — PLAN OF CARE
Problem: Altered Mood, Depressive Behavior:  Goal: Able to verbalize and/or display a decrease in depressive symptoms  Description: Able to verbalize and/or display a decrease in depressive symptoms  Outcome: Ongoing     Problem: Altered Mood, Depressive Behavior:  Goal: Ability to disclose and discuss suicidal ideas will improve  Description: Ability to disclose and discuss suicidal ideas will improve  Note: Pt denies thoughts of self harm and is agreeable to seeking out should thoughts of self harm arise. Safe environment maintained. Q15 minute checks for safety cont per unit policy. Will cont to monitor for safety and provides support and reassurance as needed.

## 2020-10-23 ENCOUNTER — APPOINTMENT (OUTPATIENT)
Dept: CT IMAGING | Age: 60
End: 2020-10-23
Payer: MEDICAID

## 2020-10-23 ENCOUNTER — APPOINTMENT (OUTPATIENT)
Dept: GENERAL RADIOLOGY | Age: 60
End: 2020-10-23
Payer: MEDICAID

## 2020-10-23 ENCOUNTER — HOSPITAL ENCOUNTER (EMERGENCY)
Age: 60
Discharge: HOME | End: 2020-10-23
Attending: EMERGENCY MEDICINE
Payer: MEDICAID

## 2020-10-23 VITALS
OXYGEN SATURATION: 95 % | WEIGHT: 170 LBS | HEART RATE: 111 BPM | TEMPERATURE: 99.1 F | RESPIRATION RATE: 18 BRPM | SYSTOLIC BLOOD PRESSURE: 119 MMHG | DIASTOLIC BLOOD PRESSURE: 84 MMHG | BODY MASS INDEX: 21.25 KG/M2

## 2020-10-23 LAB
ABSOLUTE EOS #: 0.17 K/UL (ref 0–0.44)
ABSOLUTE IMMATURE GRANULOCYTE: <0.03 K/UL (ref 0–0.3)
ABSOLUTE LYMPH #: 2.07 K/UL (ref 1.1–3.7)
ABSOLUTE MONO #: 0.6 K/UL (ref 0.1–1.2)
ACETAMINOPHEN LEVEL: <5 UG/ML (ref 10–30)
ALBUMIN SERPL-MCNC: 4.5 G/DL (ref 3.5–5.2)
ALBUMIN/GLOBULIN RATIO: 1.6 (ref 1–2.5)
ALP BLD-CCNC: 136 U/L (ref 40–129)
ALT SERPL-CCNC: 12 U/L (ref 5–41)
AMPHETAMINE SCREEN URINE: NEGATIVE
ANION GAP SERPL CALCULATED.3IONS-SCNC: 15 MMOL/L (ref 9–17)
AST SERPL-CCNC: 20 U/L
BARBITURATE SCREEN URINE: NEGATIVE
BASOPHILS # BLD: 0 % (ref 0–2)
BASOPHILS ABSOLUTE: <0.03 K/UL (ref 0–0.2)
BENZODIAZEPINE SCREEN, URINE: NEGATIVE
BILIRUB SERPL-MCNC: 0.59 MG/DL (ref 0.3–1.2)
BILIRUBIN URINE: NEGATIVE
BUN BLDV-MCNC: 17 MG/DL (ref 8–23)
BUN/CREAT BLD: ABNORMAL (ref 9–20)
BUPRENORPHINE URINE: ABNORMAL
CALCIUM SERPL-MCNC: 9.3 MG/DL (ref 8.6–10.4)
CANNABINOID SCREEN URINE: NEGATIVE
CHLORIDE BLD-SCNC: 96 MMOL/L (ref 98–107)
CO2: 19 MMOL/L (ref 20–31)
COCAINE METABOLITE, URINE: POSITIVE
COLOR: YELLOW
COMMENT UA: ABNORMAL
CREAT SERPL-MCNC: 1.85 MG/DL (ref 0.7–1.2)
DIFFERENTIAL TYPE: ABNORMAL
EOSINOPHILS RELATIVE PERCENT: 3 % (ref 1–4)
ETHANOL PERCENT: <0.01 %
ETHANOL: <10 MG/DL
GFR AFRICAN AMERICAN: 45 ML/MIN
GFR NON-AFRICAN AMERICAN: 37 ML/MIN
GFR SERPL CREATININE-BSD FRML MDRD: ABNORMAL ML/MIN/{1.73_M2}
GFR SERPL CREATININE-BSD FRML MDRD: ABNORMAL ML/MIN/{1.73_M2}
GLUCOSE BLD-MCNC: 124 MG/DL (ref 70–99)
GLUCOSE URINE: NEGATIVE
HCT VFR BLD CALC: 34.4 % (ref 40.7–50.3)
HEMOGLOBIN: 11.1 G/DL (ref 13–17)
IMMATURE GRANULOCYTES: 0 %
KETONES, URINE: NEGATIVE
LEUKOCYTE ESTERASE, URINE: NEGATIVE
LIPASE: 17 U/L (ref 13–60)
LYMPHOCYTES # BLD: 33 % (ref 24–43)
MCH RBC QN AUTO: 28.4 PG (ref 25.2–33.5)
MCHC RBC AUTO-ENTMCNC: 32.3 G/DL (ref 28.4–34.8)
MCV RBC AUTO: 88 FL (ref 82.6–102.9)
MDMA URINE: ABNORMAL
METHADONE SCREEN, URINE: NEGATIVE
METHAMPHETAMINE, URINE: ABNORMAL
MONOCYTES # BLD: 9 % (ref 3–12)
NITRITE, URINE: NEGATIVE
NRBC AUTOMATED: 0 PER 100 WBC
OPIATES, URINE: NEGATIVE
OXYCODONE SCREEN URINE: NEGATIVE
PDW BLD-RTO: 13 % (ref 11.8–14.4)
PH UA: 7 (ref 5–8)
PHENCYCLIDINE, URINE: NEGATIVE
PLATELET # BLD: 294 K/UL (ref 138–453)
PLATELET ESTIMATE: ABNORMAL
PMV BLD AUTO: 9.6 FL (ref 8.1–13.5)
POTASSIUM SERPL-SCNC: 4.5 MMOL/L (ref 3.7–5.3)
PROPOXYPHENE, URINE: ABNORMAL
PROTEIN UA: NEGATIVE
RBC # BLD: 3.91 M/UL (ref 4.21–5.77)
RBC # BLD: ABNORMAL 10*6/UL
SALICYLATE LEVEL: 1 MG/DL (ref 3–10)
SEG NEUTROPHILS: 55 % (ref 36–65)
SEGMENTED NEUTROPHILS ABSOLUTE COUNT: 3.47 K/UL (ref 1.5–8.1)
SODIUM BLD-SCNC: 130 MMOL/L (ref 135–144)
SPECIFIC GRAVITY UA: 1 (ref 1–1.03)
TEST INFORMATION: ABNORMAL
TOTAL PROTEIN: 7.3 G/DL (ref 6.4–8.3)
TOXIC TRICYCLIC SC,BLOOD: NEGATIVE
TRICYCLIC ANTIDEPRESSANTS, UR: ABNORMAL
TROPONIN INTERP: NORMAL
TROPONIN INTERP: NORMAL
TROPONIN T: NORMAL NG/ML
TROPONIN T: NORMAL NG/ML
TROPONIN, HIGH SENSITIVITY: 10 NG/L (ref 0–22)
TROPONIN, HIGH SENSITIVITY: 8 NG/L (ref 0–22)
TSH SERPL DL<=0.05 MIU/L-ACNC: 1.5 MIU/L (ref 0.3–5)
TURBIDITY: CLEAR
URINE HGB: NEGATIVE
UROBILINOGEN, URINE: NORMAL
WBC # BLD: 6.4 K/UL (ref 3.5–11.3)
WBC # BLD: ABNORMAL 10*3/UL

## 2020-10-23 PROCEDURE — G0480 DRUG TEST DEF 1-7 CLASSES: HCPCS

## 2020-10-23 PROCEDURE — 2580000003 HC RX 258: Performed by: STUDENT IN AN ORGANIZED HEALTH CARE EDUCATION/TRAINING PROGRAM

## 2020-10-23 PROCEDURE — 93005 ELECTROCARDIOGRAM TRACING: CPT | Performed by: STUDENT IN AN ORGANIZED HEALTH CARE EDUCATION/TRAINING PROGRAM

## 2020-10-23 PROCEDURE — 83690 ASSAY OF LIPASE: CPT

## 2020-10-23 PROCEDURE — 87086 URINE CULTURE/COLONY COUNT: CPT

## 2020-10-23 PROCEDURE — 80307 DRUG TEST PRSMV CHEM ANLYZR: CPT

## 2020-10-23 PROCEDURE — 84443 ASSAY THYROID STIM HORMONE: CPT

## 2020-10-23 PROCEDURE — 84484 ASSAY OF TROPONIN QUANT: CPT

## 2020-10-23 PROCEDURE — 70450 CT HEAD/BRAIN W/O DYE: CPT

## 2020-10-23 PROCEDURE — 80053 COMPREHEN METABOLIC PANEL: CPT

## 2020-10-23 PROCEDURE — 71045 X-RAY EXAM CHEST 1 VIEW: CPT

## 2020-10-23 PROCEDURE — 73080 X-RAY EXAM OF ELBOW: CPT

## 2020-10-23 PROCEDURE — 85025 COMPLETE CBC W/AUTO DIFF WBC: CPT

## 2020-10-23 PROCEDURE — 99285 EMERGENCY DEPT VISIT HI MDM: CPT

## 2020-10-23 PROCEDURE — 81003 URINALYSIS AUTO W/O SCOPE: CPT

## 2020-10-23 RX ORDER — 0.9 % SODIUM CHLORIDE 0.9 %
1000 INTRAVENOUS SOLUTION INTRAVENOUS ONCE
Status: COMPLETED | OUTPATIENT
Start: 2020-10-23 | End: 2020-10-23

## 2020-10-23 RX ADMIN — SODIUM CHLORIDE 1000 ML: 9 INJECTION, SOLUTION INTRAVENOUS at 17:00

## 2020-10-23 ASSESSMENT — PAIN DESCRIPTION - PAIN TYPE: TYPE: ACUTE PAIN

## 2020-10-23 ASSESSMENT — PAIN DESCRIPTION - ORIENTATION: ORIENTATION: LEFT

## 2020-10-23 ASSESSMENT — PAIN SCALES - GENERAL: PAINLEVEL_OUTOF10: 4

## 2020-10-23 ASSESSMENT — PAIN DESCRIPTION - LOCATION: LOCATION: ELBOW

## 2020-10-23 ASSESSMENT — PAIN DESCRIPTION - DESCRIPTORS: DESCRIPTORS: ACHING

## 2020-10-23 NOTE — ED NOTES
Pt resting on cot with eyes closed. RR even and non-labored. Awaiting urine sample.      Mis Dumas RN  10/23/20 4090

## 2020-10-23 NOTE — ED PROVIDER NOTES
[facility] [address] [facility]     Emergency Department     Faculty Attestation    I performed a history and physical examination of the patient and discussed management with the resident. I reviewed the residents note and agree with the documented findings and plan of care. Any areas of disagreement are noted on the chart. I was personally present for the key portions of any procedures. I have documented in the chart those procedures where I was not present during the key portions. I have reviewed the emergency nurses triage note. I agree with the chief complaint, past medical history, past surgical history, allergies, medications, social and family history as documented unless otherwise noted below. For Physician Assistant/ Nurse Practitioner cases/documentation I have personally evaluated this patient and have completed at least one if not all key elements of the E/M (history, physical exam, and MDM). Additional findings are as noted. Patient presents stating that he has been feeling dizzy and off balance. He says this caused him to fall earlier today. He says that he did hit his head but denies any loss of consciousness. He does complain of elbow pain from the fall. He denies fever, chills, cough, chest pain, shortness of breath, abdominal pain, nausea or vomiting. On exam, I did see him come out of the bathroom and he did have an ataxic gait. He is alert and oriented and answering questions appropriately. Lungs clear to auscultation bilaterally and heart sounds are normal.  Abdomen is soft and nontender. Strength and sensation is intact to all extremities. Finger-to-nose testing is normal.  Will get EKG, CT scan of the head, labs and reassess.     EKG Interpretation    Interpreted by emergency department physician    Rhythm: sinus tachycardia  Rate: tachycardia  Axis: normal  Ectopy: none  Conduction: normal  ST Segments: normal  T Waves: normal  Q Waves: none    Clinical Impression: sinus tachycardia    Lalitha Longo MD  Attending Emergency  Physician              Alem Espinosa MD  10/23/20 [address] [facility] Mariana Betts MD  10/23/20 7855

## 2020-10-23 NOTE — ED PROVIDER NOTES
[facility]  ED  Emergency Department Encounter  EmergencyMedicine Resident     Pt Fred Yin  MRN: [id number]  Shingflu 1960  Date of evaluation: 10/23/20  PCP:  Derek Vidal MD    [address] [place] [address]       Chief Complaint   Patient presents with    Arm Injury     Left elbow pain s/p fall; full ROM, PMS intact    Suicidal    Hallucinations       HISTORY OF PRESENT ILLNESS  (Location/Symptom, Timing/Onset, Context/Setting, Quality, Duration, Modifying Factors, Severity.)      Yasmeen Joya is a 63 y.o. male who presents with left arm pain. Patient presents by TPD as patient was unsteady on his feet, fell in his left elbow and his left lateral chest wall. Patient denies hitting his head, denies loss of consciousness, is not on any anticoagulation medication. Patient reports feeling unsteady on his feet, with lightheadedness/dizziness. Patient also reports having mild abdominal pain. Patient denies fevers, chills, cough, congestion, rhinorrhea, sore throat, chest pain, shortness of breath, nausea, vomiting, dysuria, hematuria, diarrhea, constipation, hematochezia, melena, lower extremity swelling or pain, rash. Patient has significant psychiatric history, is frequently at [facility] for inpatient psychiatric care. However, patient denies any suicidal ideation or homicidal ideation to me. Patient does report some depression, but this is typical for him. Patient does have tardive dyskinesia for the medications that he is on, taking medications as prescribed.     PAST MEDICAL / SURGICAL / SOCIAL / FAMILY HISTORY      has a past medical history of Bipolar disorder ([address] Depression, GERD (gastroesophageal reflux disease), Hallucinations, Headache(784.0), Hepatitis, Schizophrenia, schizo-affective ([address] Substance abuse ([facility] Tobacco abuse, Type II or unspecified type diabetes mellitus without mention of complication, not stated as uncontrolled, and Urinary incontinence. has a past surgical history that includes Dental surgery and Abscess Drainage (N/A, 02/11/2018). Social History     Socioeconomic History    Marital status: Single     Spouse name: Not on file    Number of children: 0    Years of education: 8    Highest education level: Not on file   Occupational History     Employer: N/A   Social Needs    Financial resource strain: Not on file    Food insecurity     Worry: Not on file     Inability: Not on file   [employer] needs     Medical: Not on file     Non-medical: Not on file   Tobacco Use    Smoking status: Current Every Day Smoker     Packs/day: 0.50     Types: Cigarettes    Smokeless tobacco: Never Used   Substance and Sexual Activity    Alcohol use: Yes     Comment: reports drinking occasionally    Drug use: Yes     Types: Cocaine     Comment: drug abuse includes cocaine,     Sexual activity: Not on file   Lifestyle    Physical activity     Days per week: Not on file     Minutes per session: Not on file    Stress: Not on file   Relationships    Social connections     Talks on phone: Not on file     Gets together: Not on file     Attends Hindu service: Not on file     Active member of club or organization: Not on file     Attends meetings of clubs or organizations: Not on file     Relationship status: Not on file    Intimate partner violence     Fear of current or ex partner: Not on file     Emotionally abused: Not on file     Physically abused: Not on file     Forced sexual activity: Not on file   Other Topics Concern    Not on file   Social History Narrative    Not on file       Family History   Problem Relation Age of Onset    Diabetes Mother     Heart Disease Mother        Allergies:  Navane [thiothixene]    Home Medications:  Prior to Admission medications    Medication Sig Start Date End Date Taking?  Authorizing Provider   OXcarbazepine (TRILEPTAL) 300 MG tablet Take 1 tablet by mouth 2 times daily 8/14/20 CHEST PORTABLE    CT Head WO Contrast    CBC Auto Differential    Comprehensive Metabolic Panel w/ Reflex to MG    Lipase    Troponin    TOX SCR, BLD, ED    Urinalysis, reflex to microscopic    DRUG SCREEN MULTI URINE    TSH with Reflex    Troponin    EKG 12 Lead       MEDICATIONS ORDERED:  Orders Placed This Encounter   Medications    0.9 % sodium chloride bolus       DDX: Electrolyte abnormality, anemia, toxin use, drug use, medication side effect, ACS, thyroid dysfunction, hyper/hypoglycemia, dehydration, vasovagal, neurogenic, intracranial abnormality    DIAGNOSTIC RESULTS / EMERGENCY DEPARTMENT COURSE / MDM   LAB RESULTS:  Results for orders placed or performed during the hospital encounter of 10/23/20   CBC Auto Differential   Result Value Ref Range    WBC 6.4 3.5 - 11.3 k/uL    RBC 3.91 (L) 4.21 - 5.77 m/uL    Hemoglobin 11.1 (L) 13.0 - 17.0 g/dL    Hematocrit 34.4 (L) 40.7 - 50.3 %    MCV 88.0 82.6 - 102.9 fL    MCH 28.4 25.2 - 33.5 pg    MCHC 32.3 28.4 - 34.8 g/dL    RDW 13.0 11.8 - 14.4 %    Platelets 108 924 - 488 k/uL    MPV 9.6 8.1 - 13.5 fL    NRBC Automated 0.0 0.0 per 100 WBC    Differential Type NOT REPORTED     Seg Neutrophils 55 36 - 65 %    Lymphocytes 33 24 - 43 %    Monocytes 9 3 - 12 %    Eosinophils % 3 1 - 4 %    Basophils 0 0 - 2 %    Immature Granulocytes 0 0 %    Segs Absolute 3.47 1.50 - 8.10 k/uL    Absolute Lymph # 2.07 1.10 - 3.70 k/uL    Absolute Mono # 0.60 0.10 - 1.20 k/uL    Absolute Eos # 0.17 0.00 - 0.44 k/uL    Basophils Absolute <0.03 0.00 - 0.20 k/uL    Absolute Immature Granulocyte <0.03 0.00 - 0.30 k/uL    WBC Morphology NOT REPORTED     RBC Morphology NOT REPORTED     Platelet Estimate NOT REPORTED    Comprehensive Metabolic Panel w/ Reflex to MG   Result Value Ref Range    Glucose 124 (H) 70 - 99 mg/dL    BUN 17 8 - 23 mg/dL    CREATININE 1.85 (H) 0.70 - 1.20 mg/dL    Bun/Cre Ratio NOT REPORTED 9 - 20    Calcium 9.3 8.6 - 10.4 mg/dL    Sodium 130 (L) 135 - 144 mmol/L    Potassium 4.5 3.7 - 5.3 mmol/L    Chloride 96 (L) 98 - 107 mmol/L    CO2 19 (L) 20 - 31 mmol/L    Anion Gap 15 9 - 17 mmol/L    Alkaline Phosphatase 136 (H) 40 - 129 U/L    ALT 12 5 - 41 U/L    AST 20 <40 U/L    Total Bilirubin 0.59 0.3 - 1.2 mg/dL    Total Protein 7.3 6.4 - 8.3 g/dL    Alb 4.5 3.5 - 5.2 g/dL    Albumin/Globulin Ratio 1.6 1.0 - 2.5    GFR Non- 37 (L) >60 mL/min    GFR  45 (L) >60 mL/min    GFR Comment          GFR Staging NOT REPORTED    Lipase   Result Value Ref Range    Lipase 17 13 - 60 U/L   Troponin   Result Value Ref Range    Troponin, High Sensitivity 10 0 - 22 ng/L    Troponin T NOT REPORTED <0.03 ng/mL    Troponin Interp NOT REPORTED    TOX SCR, BLD, ED   Result Value Ref Range    Acetaminophen Level <5 (L) 10 - 30 ug/mL    Ethanol <10 <10 mg/dL    Ethanol percent <0.079 <1.616 %    Salicylate Lvl 1 (L) 3 - 10 mg/dL    Toxic Tricyclic Sc,Blood NEGATIVE NEGATIVE   Urinalysis, reflex to microscopic   Result Value Ref Range    Color, UA YELLOW YELLOW    Turbidity UA CLEAR CLEAR    Glucose, Ur NEGATIVE NEGATIVE    Bilirubin Urine NEGATIVE NEGATIVE    Ketones, Urine NEGATIVE NEGATIVE    Specific Gravity, UA 1.003 (L) 1.005 - 1.030    Urine Hgb NEGATIVE NEGATIVE    pH, UA 7.0 5.0 - 8.0    Protein, UA NEGATIVE NEGATIVE    Urobilinogen, Urine Normal Normal    Nitrite, Urine NEGATIVE NEGATIVE    Leukocyte Esterase, Urine NEGATIVE NEGATIVE    Urinalysis Comments       Microscopic exam not performed based on chemical results unless requested in original order.    DRUG SCREEN MULTI URINE   Result Value Ref Range    Amphetamine Screen, Ur NEGATIVE NEGATIVE    Barbiturate Screen, Ur NEGATIVE NEGATIVE    Benzodiazepine Screen, Urine NEGATIVE NEGATIVE    Cocaine Metabolite, Urine POSITIVE (A) NEGATIVE    Methadone Screen, Urine NEGATIVE NEGATIVE    Opiates, Urine NEGATIVE NEGATIVE    Phencyclidine, Urine NEGATIVE NEGATIVE    Propoxyphene, Urine NOT REPORTED NEGATIVE Cannabinoid Scrn, Ur NEGATIVE NEGATIVE    Oxycodone Screen, Ur NEGATIVE NEGATIVE    Methamphetamine, Urine NOT REPORTED NEGATIVE    Tricyclic Antidepressants, Urine NOT REPORTED NEGATIVE    MDMA, Urine NOT REPORTED NEGATIVE    Buprenorphine Urine NOT REPORTED NEGATIVE    Test Information       Assay provides medical screening only. The absence of expected drug(s) and/or metabolite(s) may indicate diluted or adulterated urine, limitations of testing or timing of collection. TSH with Reflex   Result Value Ref Range    TSH 1.50 0.30 - 5.00 mIU/L   Troponin   Result Value Ref Range    Troponin, High Sensitivity 8 0 - 22 ng/L    Troponin T NOT REPORTED <0.03 ng/mL    Troponin Interp NOT REPORTED        IMPRESSION: 58-year-old male with significant psychiatric history, no suicidal or homicidal ideation at this time, presenting with lightheadedness/dizziness. patient is poor historian, concern for more serious etiology. Will scan his head to look for intracranial abnormality. Will obtain cardiac work-up, chest x-ray, EKG, labs. Will evaluate for electrolyte abnormality, will evaluate for endocrine abnormality, will evaluate for UTI, will evaluate for drug or toxic ingestion. RADIOLOGY:  Xr Elbow Left (min 3 Views)    Result Date: 10/23/2020  EXAMINATION: THREE XRAY VIEWS OF THE LEFT ELBOW 10/23/2020 5:54 pm COMPARISON: None. HISTORY: ORDERING SYSTEM PROVIDED HISTORY: fall TECHNOLOGIST PROVIDED HISTORY: fall Acuity: Acute Type of Exam: Initial FINDINGS: No acute fracture or dislocation. Joint spaces are preserved. No joint effusion or focal soft tissue abnormality. No acute osseous abnormality of the left elbow.      Ct Head Wo Contrast    Result Date: 10/23/2020  EXAMINATION: CT OF THE HEAD WITHOUT CONTRAST  10/23/2020 5:19 pm TECHNIQUE: CT of the head was performed without the administration of intravenous contrast. Dose modulation, iterative reconstruction, and/or weight based adjustment of the mA/kV was utilized to reduce the radiation dose to as low as reasonably achievable. COMPARISON: None. HISTORY: ORDERING SYSTEM PROVIDED HISTORY: fall TECHNOLOGIST PROVIDED HISTORY: fall Reason for Exam: Fall Acuity: Acute Type of Exam: Initial Mechanism of Injury:  Per patient dizzy and off balance. Patient fell hitting posterior aspect of head. FINDINGS: BRAIN/VENTRICLES: Generalized involutional change. There is no acute intracranial hemorrhage, mass effect or midline shift. No abnormal extra-axial fluid collection. The gray-white differentiation is maintained without evidence of an acute infarct. There is no evidence of hydrocephalus. Chronic encephalomalacia identified both inferior frontal lobes greatest on the left and involving the left anterior temporal lobe perhaps related to prior trauma or injury. ORBITS: The visualized portion of the orbits demonstrate no acute abnormality. SINUSES: Mild ethmoid sinus mucosal thickening SOFT TISSUES/SKULL:  No acute abnormality of the visualized skull or soft tissues. No acute intracranial abnormality. Chronic involutional change with evidence of chronic encephalomalacia both inferior frontal lobes and left anterior temporal lobe likely related to prior trauma or injury. Please correlate exam findings and history. Xr Chest Portable    Result Date: 10/23/2020  EXAMINATION: ONE XRAY VIEW OF THE CHEST 10/23/2020 5:54 pm COMPARISON: 08/10/2020 HISTORY: ORDERING SYSTEM PROVIDED HISTORY: Fall TECHNOLOGIST PROVIDED HISTORY: Fall Reason for Exam: upr Acuity: Acute Type of Exam: Initial FINDINGS: The lungs are without acute focal process. There is no effusion or pneumothorax. The cardiomediastinal silhouette is without acute process. The osseous structures are without acute process. No acute process.        EKG  EKG Interpretation    Interpreted by me    Rhythm: normal sinus   Rate: normal  Axis: normal  Ectopy: none  Conduction: normal  ST Segments: no acute change  T Waves: no acute change  Q Waves: none    Clinical Impression: no acute changes and normal EKG    All EKG's are interpreted by the Emergency Department Physician who either signs or Co-signs this chart in the absence of a cardiologist.    EMERGENCY DEPARTMENT COURSE:  Patient came to emergency department, HPI and physical exam were conducted. All nursing notes were reviewed. Imaging was negative for acute abnormalities. Labs found no acute abnormalities. Patient denied suicidal ideation to me, discussed with social work, will discharge patient to rescue for further assessment and management of his depression. Patient did have improvement in symptoms with eating and IV fluids. Patient remained stable emerge department with improving vital signs. Gave strict return precautions to the emergency department and discharge patient to rescue. PROCEDURES:      CONSULTS:  None    CRITICAL CARE:  Please see attending note    FINAL IMPRESSION      1. Fall, initial encounter    2.  Current mild episode of major depressive disorder, unspecified whether recurrent [facility])          DISPOSITION / PLAN     DISPOSITION Decision To Discharge 10/23/2020 08:41:56 PM      PATIENT REFERRED TO:  Reynold Vasquez MD  [address] [facility],[address] [place] [address]  [phone]    Schedule an appointment as soon as possible for a visit today  For reassessment    [facility]  [address] [facility] [address]  [phone]  Go to   As needed, If symptoms worsen      DISCHARGE MEDICATIONS:  Discharge Medication List as of 10/23/2020  8:43 PM          Brent Ragsdale MD  Emergency Medicine Resident    (Please note that portions of thisnote were completed with a voice recognition program.  Efforts were made to edit the dictations but occasionally words are mis-transcribed.)        Brent Ragsdale MD  Resident  10/23/20 2715

## 2020-10-23 NOTE — ED NOTES
Pt resting on cot in NAD. Pt RR even and non-labored. Awaiting urine sample.      Evelia Dunham RN  10/23/20 1946

## 2020-10-23 NOTE — ED NOTES
Pt to ED via EMS d/t elbow pain and auditory hallucinations with suicidal ideation. Pt states he had a mechanical fall earlier today and hit his left elbow. Pt states pain is about a 5/10 at this time. Pt with full ROM and PMS intact. Pt states pain also states he is hearing voices at this time telling him to kill himself. Pt states the voices just started today. Pt denies a plan at this time. Vitals obtained. Pt called in as watch to security.           Ismael Solis RN  10/23/20 0030

## 2020-10-23 NOTE — ED PROVIDER NOTES
[address] HANDOFF       Handoff taken on the following patient from prior Attending Physician:  Pt Name: Miguel Book  PCP:  Carli Bryson MD    Attestation  I was available and discussed any additional care issues that arose and coordinated the management plans with the resident(s) caring for the patient during my duty period. Any areas of disagreement with resident's documentation of care or procedures are noted on the chart. I was personally present for the key portions of any/all procedures during my duty period. I have documented in the chart those procedures where I was not present during the key portions. CHIEF COMPLAINT       Chief Complaint   Patient presents with    Arm Injury     Left elbow pain s/p fall; full ROM, PMS intact    Suicidal    Hallucinations         CURRENT MEDICATIONS     Previous Medications  Previous Medications    BENZTROPINE (COGENTIN) 2 MG TABLET    Take 1 tablet by mouth daily as needed (acute dystonia)    BUPROPION (WELLBUTRIN SR) 100 MG EXTENDED RELEASE TABLET    Take 1 tablet by mouth 2 times daily    ESCITALOPRAM (LEXAPRO) 20 MG TABLET    Take 1 tablet by mouth nightly    OXCARBAZEPINE (TRILEPTAL) 300 MG TABLET    Take 1 tablet by mouth 2 times daily    QUETIAPINE (SEROQUEL) 100 MG TABLET    Take 5 tablets by mouth nightly    TRAZODONE (DESYREL) 50 MG TABLET    Take 1 tablet by mouth nightly as needed for Sleep       Encounter Medications  Orders Placed This Encounter   Medications    0.9 % sodium chloride bolus       ALLERGIES     is allergic to navane [thiothixene]. RECENT VITALS:   Temp: 99.1 °F (37.3 °C),  Pulse: 111, Resp: 18, BP: 119/84    RADIOLOGY:   XR ELBOW LEFT (MIN 3 VIEWS)   Final Result   No acute osseous abnormality of the left elbow. XR CHEST PORTABLE   Final Result   No acute process. CT Head WO Contrast   Final Result   No acute intracranial abnormality.       Chronic involutional change with evidence of chronic encephalomalacia both   inferior frontal lobes and left anterior temporal lobe likely related to   prior trauma or injury. Please correlate exam findings and history. LABS:  Labs Reviewed   CBC WITH AUTO DIFFERENTIAL - Abnormal; Notable for the following components:       Result Value    RBC 3.91 (*)     Hemoglobin 11.1 (*)     Hematocrit 34.4 (*)     All other components within normal limits   COMPREHENSIVE METABOLIC PANEL W/ REFLEX TO MG FOR LOW K - Abnormal; Notable for the following components:    Glucose 124 (*)     CREATININE 1.85 (*)     Sodium 130 (*)     Chloride 96 (*)     CO2 19 (*)     Alkaline Phosphatase 136 (*)     GFR Non- 37 (*)     GFR  45 (*)     All other components within normal limits   TOX SCR, BLD, ED - Abnormal; Notable for the following components:    Acetaminophen Level <5 (*)     Salicylate Lvl 1 (*)     All other components within normal limits   CULTURE, URINE   LIPASE   TROPONIN   TSH WITH REFLEX   TROPONIN   URINALYSIS   URINE DRUG SCREEN           PLAN/ TASKS OUTSTANDING     This patient is a 63 y.o. Male. Mr. Adolfo Thurman has a history of tardive dyskinesia, also was dizzy and weak with some ataxia. Questionable suicidal ideations but getting a metabolic work-up and head CT. Does have an [place] with a creatinine of almost 2 from a baseline of 1. Needs reassessment of his mental health status and admission versus transfer to psych facility.     (Please note that portions of this note were completed with a voice recognition program.  Efforts were made to edit the dictations but occasionally words are mis-transcribed.)    Borjas MD  Attending Emergency Physician       Randy Bahena MD  10/23/20 2390

## 2020-10-23 NOTE — ED NOTES
Patient's wife and son voice understanding of discharge instructions and RX. Unable to have them e-sign due to computer issues. [] Sosa    [] [facility]    [x]  [facility] ASSESSMENT      Y  N     [x] [] In the past two weeks have you had thoughts of hurting yourself in any way? [x] [] In the past two weeks have you had thoughts that you would be better off dead? [] [x] Have you made a suicide attempt in the past two months? [] [x] Do you have a plan for hurting yourself or suicide? [x] [] Presence of hallucinations/voices related to hurting himself or herself or someone else. SUICIDE/SECURITY WATCH PRECAUTION CHECKLIST     Orders    [x]  Suicide/Security Watch Precautions initiated as checked below:   10/23/20 4:16 PM EDT BH31/BH31C    [x] Notified physician:  Tony Bradley MD  10/23/20 4:16 PM EDT    [x] Orders obtained as appropriate:     [x] 1:1 Observer     [] Psych Consult     [] Psych Consult    Name:  Date:  Time:    [x] 1:1 Observer, Notified by:  Mariel Motley Nurse Supervisor    [x] Remove all personal clothes from room and place in snap/paper gown/pants. Slipper only    [x] Remove all personal belongings from room and secured away from patient. Documentation    [x] Initiate Suicide/Security Watch Precaution Flow Sheet    [x] Initiate individualized Care Plan/Problem    [x] Document why precautions initiated on flow sheet (Initiate Nursing Care Plan/Problem)    [x] 1:1 Observer in place; instructions provided. Suicide precautions require observer be within arms length. [x] Nurse-Observer Communication Hand-off initiated by RN, reviewed with Observer. Subsequently used as Hand Off between Observers. [x] Initiate every 15 minute observations per observer as delegated by the RN.     [x] Initiate RN assessment and documentation    Environmental Scan  Search Criteria and Process: OPTIONAL, see Search Policy    [] Reason for search:    [] Nursing in presence of second person to search patient    [] Patient notified of reason for body assessment and belongings search:     Persons present during search:   Results of search and disposition:       Searchers Name: security     These items or items similar should be removed from the room:   [] Chairs   [] Telephone   [] Trash cans and liners   [] Plastic utensils (order Patient Safety tray)   [] Empty or remove Sharps containers   [] All personal clothing/belongings removed   [] All unnecessary lead wires, electrical cords, draw cords, etc.   [] Flowers and plants   [] Double check for lighters, matches, razors, any glass items etc that can be used as weapons. Person completing Checklist: Brendan Pulido       GENERAL INFORMATION     Y  N     [x] [] Has the patient been informed that they are on a watch and what that means? [x] [] Can the patient get out of Bed without nursing assistance? [x] [] Can the patient use the restroom without nursing assistance? [x] [] Can the patient walk the halls to [place] their legs? \"   [] [x] Does the patient have metal utensils? [x] [] Have the patient's belongings been placed out of control of the patient? [x] [] Have the patient and his/her belongings been checked for contraband? [] [x] Is the patient under any visitor restrictions? If Yes, explain:   [] [x] Is the patient under an alias? [facility] Name:   Authorized visitors (no more than two are to be on the list)   Name/Relationship:   Name/Relationship:    Name of Staff member that you  Received this information from?: Security    General Description:    Suzi Frank BH31/BH31C male 63 y.o. Admission weight: 170 lb (77.1 kg)    Race: []  [x] Black  []   []   [] [place] [] Other  Facial Hair:  [] Yes  [x] No  If yes, please describe: Identifying Marks (i.e. Visible tattoos, scars, etc... ):     NURSING CARE PLAN    Nursing Diagnosis: Risk of Self Directed Harm  [] Actual  [] Potential  Date Started: 10/23/20      Etiological Factors: (related to)  [] Expressed or implied suicidal ideation/behavior  [] Depression  [] Suicide attempt      [] Low self-esteem  [] Hallucinations      [] Feeling of Hopelessness  [] Substance abuse or withdrawal    [] Dysfunctional family  [] Major traumatic event, eg., divorce, etc   [] Excessive stress/anxiety    10/23/20    Expected Outcomes    Patient will:   [x] Patient will remain safe for the duration of their stay   [x] Patient's environment will be safe, eg. Free of potential suicide weapons   [] Verbalize Recovery from suicidal episode and improvement in self-worth   [x] Discuss feeling that precipitated suicide attempt/thoughts/behavior   [] Will describe available resources for crisis prevention and management   [] Will verbalize positive coping skills     Nursing Intervention   [x] Assessment and Observations hourly   [x] Suicide Precautions implemented with patient, should be 1:1 observation   [x] Document observation [id number] and RN assessment hourly   [] Consult physician for:    [] Psychiatric consult    [] Pharmacological therapy    [] Other:    [x] Patient search completed by security   [x] Initiated appropriate safety protocols by removing from the patient's environment anything that could be used to inflict self injury, eg. Order safe tray, snap gown, etc   [x] Maintain open, warm, caring, non-judgmental attitude/manner towards patient   [] Discuss advantages and disadvantages of existing coping methods/skills   [x] Assist and educate patient with identifying present strengths and coping skills   [x] Keep patient informed regarding plan of care and provide clear concise explanations. Provide the patient/family education information as well as telephone numbers and other information about crisis centers, hot lines, and counselors.     Discharge Planning:   [] Referral  [] Groups [] Health agencies  [] Other:            Luc Chau RN  10/23/20 [id number]

## 2020-10-24 LAB
CULTURE: NO GROWTH
Lab: NORMAL
SPECIMEN DESCRIPTION: NORMAL

## 2020-10-24 NOTE — ED NOTES
The patient is a 63year old male that came to the ER today for SOB. SW consulted due to review of notes from today that the patient mentioned that he is suicidal. SW met with the patient. The patient states that he is here due to SOB. Patient denied any suicidal thoughts or plans. The patient states that he follows up at [facility] and compliant with his medications. The patient states that if he would feel suicidal in the future that he will come back to the ER call 911 or go to [facility]. Patient also states that he can schedule a sooner appointment at [facility] if he needs to. Patient appears to be a low risk for self harm as evidence by denying suicidal thoughts and able to contract for safety.

## 2020-10-26 LAB
EKG ATRIAL RATE: 103 BPM
EKG P AXIS: 79 DEGREES
EKG P-R INTERVAL: 156 MS
EKG Q-T INTERVAL: 356 MS
EKG QRS DURATION: 86 MS
EKG QTC CALCULATION (BAZETT): 466 MS
EKG R AXIS: 83 DEGREES
EKG T AXIS: 72 DEGREES
EKG VENTRICULAR RATE: 103 BPM

## 2020-10-26 PROCEDURE — 93010 ELECTROCARDIOGRAM REPORT: CPT | Performed by: INTERNAL MEDICINE

## 2020-11-20 ENCOUNTER — HOSPITAL ENCOUNTER (EMERGENCY)
Age: 60
Discharge: HOME | End: 2020-11-20
Attending: EMERGENCY MEDICINE
Payer: MEDICAID

## 2020-11-20 ENCOUNTER — APPOINTMENT (OUTPATIENT)
Dept: GENERAL RADIOLOGY | Age: 60
End: 2020-11-20
Payer: MEDICAID

## 2020-11-20 VITALS
OXYGEN SATURATION: 98 % | RESPIRATION RATE: 20 BRPM | DIASTOLIC BLOOD PRESSURE: 60 MMHG | HEART RATE: 86 BPM | TEMPERATURE: 98.6 F | SYSTOLIC BLOOD PRESSURE: 97 MMHG

## 2020-11-20 PROCEDURE — 73560 X-RAY EXAM OF KNEE 1 OR 2: CPT

## 2020-11-20 PROCEDURE — 99284 EMERGENCY DEPT VISIT MOD MDM: CPT

## 2020-11-20 PROCEDURE — 6370000000 HC RX 637 (ALT 250 FOR IP): Performed by: STUDENT IN AN ORGANIZED HEALTH CARE EDUCATION/TRAINING PROGRAM

## 2020-11-20 RX ORDER — ACETAMINOPHEN 500 MG
500 TABLET ORAL 3 TIMES DAILY PRN
Qty: 90 TABLET | Refills: 0 | Status: SHIPPED | OUTPATIENT
Start: 2020-11-20 | End: 2021-02-10 | Stop reason: SDUPTHER

## 2020-11-20 RX ORDER — ACETAMINOPHEN 500 MG
1000 TABLET ORAL ONCE
Status: COMPLETED | OUTPATIENT
Start: 2020-11-20 | End: 2020-11-20

## 2020-11-20 RX ADMIN — ACETAMINOPHEN 1000 MG: 500 TABLET ORAL at 13:15

## 2020-11-20 ASSESSMENT — PAIN SCALES - GENERAL
PAINLEVEL_OUTOF10: 7
PAINLEVEL_OUTOF10: 7

## 2020-11-20 ASSESSMENT — ENCOUNTER SYMPTOMS
COUGH: 0
CONSTIPATION: 0
VOMITING: 0
SORE THROAT: 0
SHORTNESS OF BREATH: 0
CHEST TIGHTNESS: 0
DIARRHEA: 0
ABDOMINAL PAIN: 0
NAUSEA: 0

## 2020-11-20 ASSESSMENT — PAIN DESCRIPTION - PAIN TYPE: TYPE: CHRONIC PAIN

## 2020-11-20 ASSESSMENT — PAIN DESCRIPTION - LOCATION: LOCATION: KNEE

## 2020-11-20 ASSESSMENT — PAIN DESCRIPTION - ORIENTATION: ORIENTATION: LEFT

## 2020-11-20 NOTE — ED PROVIDER NOTES
resource strain: Not on file    Food insecurity     Worry: Not on file     Inability: Not on file    Transportation needs     Medical: Not on file     Non-medical: Not on file   Tobacco Use    Smoking status: Current Every Day Smoker     Packs/day: 0.50     Types: Cigarettes    Smokeless tobacco: Never Used   Substance and Sexual Activity    Alcohol use: Yes     Comment: reports drinking occasionally    Drug use: Yes     Types: Cocaine     Comment: drug abuse includes cocaine,     Sexual activity: Not on file   Lifestyle    Physical activity     Days per week: Not on file     Minutes per session: Not on file    Stress: Not on file   Relationships    Social connections     Talks on phone: Not on file     Gets together: Not on file     Attends Buddhism service: Not on file     Active member of club or organization: Not on file     Attends meetings of clubs or organizations: Not on file     Relationship status: Not on file    Intimate partner violence     Fear of current or ex partner: Not on file     Emotionally abused: Not on file     Physically abused: Not on file     Forced sexual activity: Not on file   Other Topics Concern    Not on file   Social History Narrative    Not on file       Family History   Problem Relation Age of Onset    Diabetes Mother     Heart Disease Mother        Allergies:  Navane [thiothixene]  Medications:  Prior to Admission medications    Medication Sig Start Date End Date Taking?  Authorizing Provider   acetaminophen (TYLENOL) 500 MG tablet Take 1 tablet by mouth 3 times daily as needed for Pain 11/20/20 12/20/20 Yes Nito Chun MD   OXcarbazepine (TRILEPTAL) 300 MG tablet Take 1 tablet by mouth 2 times daily 8/14/20   Rashaad Evans MD   buPROPion [facility] SR) 100 MG extended release tablet Take 1 tablet by mouth 2 times daily 8/14/20   Rashaad Evans MD   escitalopram (LEXAPRO) 20 MG tablet Take 1 tablet by mouth nightly 8/14/20   Rashaad Evans MD traZODone (DESYREL) 50 MG tablet Take 1 tablet by mouth nightly as needed for Sleep 8/14/20   Nataly Gonzalez MD   benztropine (COGENTIN) 2 MG tablet Take 1 tablet by mouth daily as needed (acute dystonia) 8/14/20   Nataly Gonzalez MD   QUEtiapine (SEROQUEL) 100 MG tablet Take 5 tablets by mouth nightly 8/14/20   Nataly Gonzalez MD       REVIEW OF SYSTEMS    (2-9 systems for level 4, 10 or more for level 5)      Review of Systems   Constitutional: Negative for chills, fatigue, fever and unexpected weight change. HENT: Negative for congestion, mouth sores and sore throat. Eyes: Negative for visual disturbance. Respiratory: Negative for cough, chest tightness and shortness of breath. Cardiovascular: Negative for chest pain and leg swelling. Gastrointestinal: Negative for abdominal pain, constipation, diarrhea, nausea and vomiting. Genitourinary: Negative for difficulty urinating. Musculoskeletal: Negative for joint swelling. Left knee pain   Skin: Negative for rash. Neurological: Negative for dizziness, weakness and headaches. PHYSICAL EXAM   (up to 7for level 4, 8 or more for level 5)      INITIAL VITALS:   BP 97/60   Pulse 86   Temp 98.6 °F (37 °C)   Resp 20   SpO2 98%     Physical Exam  Vitals signs and nursing note reviewed. Constitutional:       Appearance: He is well-developed. Cardiovascular:      Rate and Rhythm: Normal rate and regular rhythm. Heart sounds: Normal heart sounds. No murmur. No friction rub. No gallop. Pulmonary:      Effort: Pulmonary effort is normal. No respiratory distress. Breath sounds: Normal breath sounds. No wheezing or rales. Chest:      Chest wall: No tenderness. Abdominal:      General: Bowel sounds are normal. There is no distension. Palpations: Abdomen is soft. There is no mass. Tenderness: There is no abdominal tenderness. There is no guarding.    Musculoskeletal:      Comments:   Left knee Exam:    Inspection-Swelling: none, Ecchymosis: no  Palpation-Tenderness:yes  Pain with patellar grind: no  ROM-within normal range  Strength- WNL  Sensation-normal to light touch    Special Tests-  Varus Laxity: negative   Valgus Laxity:  negative   Anterior Drawer: negative   Posterior Drawer: negative  Lachman's: negative    Single Leg Squat: Negative  Deep Squat: Not Tested  Gait: antalgic    PSYCH:  Good fund of knowledge and displays understanding of exam.   Skin:     Capillary Refill: Capillary refill takes less than 2 seconds. Neurological:      Mental Status: He is alert and oriented to person, place, and time. DIFFERENTIAL  DIAGNOSIS     PLAN (LABS / IMAGING / EKG):  Orders Placed This Encounter   Procedures    XR KNEE LEFT (1-2 VIEWS)       MEDICATIONSORDERED:  Orders Placed This Encounter   Medications    acetaminophen (TYLENOL) tablet 1,000 mg    acetaminophen (TYLENOL) 500 MG tablet     Sig: Take 1 tablet by mouth 3 times daily as needed for Pain     Dispense:  90 tablet     Refill:  0       DDX: Left knee sprain, left knee contusion, left knee fracture    DIAGNOSTIC RESULTS / EMERGENCY DEPARTMENT COURSE / MDM     LABS:  No results found for this visit on 11/20/20. RADIOLOGY:  None    EKG  None    All EKG's are interpreted by the Emergency Department Physician who either signs or Co-signsthis chart in the absence of a cardiologist.    EMERGENCY DEPARTMENT COURSE:  Patient was seen and examined at bedside. Patient able to explain current symptoms without any acute distress. Patient compliant with physical exam being performed and able to stand on affected leg alone with slight instability. Left knee x-ray unremarkable for any acute process. Patient instructed to ice knee when he gets home, rest and modified activity. Patient understands agrees. Patient stable for discharge. PROCEDURES:  None  :  None    CRITICAL CARE:  None    FINAL IMPRESSION      1.  Sprain of left knee, unspecified ligament, initial encounter          DISPOSITION / PLAN     DISPOSITION        PATIENT REFERRED TO:  Linda Lara MD  [address] [facility]  [phone]    In 1 week        DISCHARGE MEDICATIONS:  New Prescriptions    ACETAMINOPHEN (TYLENOL) 500 MG TABLET    Take 1 tablet by mouth 3 times daily as needed for Pain       Phyllis Portillo MD  [facility] 0118 medicine resident       Phyllis Portillo MD  Resident  11/20/20 [id number]

## 2020-12-08 ENCOUNTER — HOSPITAL ENCOUNTER (INPATIENT)
Age: 60
LOS: 3 days | Discharge: HOME | DRG: 750 | End: 2020-12-11
Attending: EMERGENCY MEDICINE | Admitting: PSYCHIATRY & NEUROLOGY
Payer: MEDICAID

## 2020-12-08 ENCOUNTER — APPOINTMENT (OUTPATIENT)
Dept: GENERAL RADIOLOGY | Age: 60
DRG: 750 | End: 2020-12-08
Payer: MEDICAID

## 2020-12-08 PROBLEM — F32.3 MAJOR DEPRESSION WITH PSYCHOTIC FEATURES (HCC): Status: ACTIVE | Noted: 2020-12-08

## 2020-12-08 LAB
ABSOLUTE EOS #: 0.26 K/UL (ref 0–0.4)
ABSOLUTE IMMATURE GRANULOCYTE: ABNORMAL K/UL (ref 0–0.3)
ABSOLUTE LYMPH #: 1.5 K/UL (ref 1–4.8)
ABSOLUTE MONO #: 0.26 K/UL (ref 0.1–1.3)
ACETAMINOPHEN LEVEL: <5 UG/ML (ref 10–30)
ALBUMIN SERPL-MCNC: 3.9 G/DL (ref 3.5–5.2)
ALBUMIN/GLOBULIN RATIO: ABNORMAL (ref 1–2.5)
ALP BLD-CCNC: 129 U/L (ref 40–129)
ALT SERPL-CCNC: 11 U/L (ref 5–41)
AMPHETAMINE SCREEN URINE: NEGATIVE
ANION GAP SERPL CALCULATED.3IONS-SCNC: 8 MMOL/L (ref 9–17)
AST SERPL-CCNC: 15 U/L
BARBITURATE SCREEN URINE: NEGATIVE
BASOPHILS # BLD: 1 % (ref 0–2)
BASOPHILS ABSOLUTE: 0.03 K/UL (ref 0–0.2)
BENZODIAZEPINE SCREEN, URINE: NEGATIVE
BILIRUB SERPL-MCNC: 0.27 MG/DL (ref 0.3–1.2)
BUN BLDV-MCNC: 20 MG/DL (ref 8–23)
BUN/CREAT BLD: ABNORMAL (ref 9–20)
BUPRENORPHINE URINE: ABNORMAL
CALCIUM SERPL-MCNC: 9.2 MG/DL (ref 8.6–10.4)
CANNABINOID SCREEN URINE: NEGATIVE
CHLORIDE BLD-SCNC: 107 MMOL/L (ref 98–107)
CO2: 25 MMOL/L (ref 20–31)
COCAINE METABOLITE, URINE: POSITIVE
CREAT SERPL-MCNC: 1.31 MG/DL (ref 0.7–1.2)
DIFFERENTIAL TYPE: ABNORMAL
EOSINOPHILS RELATIVE PERCENT: 8 % (ref 0–4)
ETHANOL PERCENT: <0.01 %
ETHANOL: <10 MG/DL
GFR AFRICAN AMERICAN: >60 ML/MIN
GFR NON-AFRICAN AMERICAN: 56 ML/MIN
GFR SERPL CREATININE-BSD FRML MDRD: ABNORMAL ML/MIN/{1.73_M2}
GFR SERPL CREATININE-BSD FRML MDRD: ABNORMAL ML/MIN/{1.73_M2}
GLUCOSE BLD-MCNC: 93 MG/DL (ref 70–99)
HCT VFR BLD CALC: 33.1 % (ref 41–53)
HEMOGLOBIN: 10.8 G/DL (ref 13.5–17.5)
IMMATURE GRANULOCYTES: ABNORMAL %
LYMPHOCYTES # BLD: 47 % (ref 24–44)
MCH RBC QN AUTO: 28.5 PG (ref 26–34)
MCHC RBC AUTO-ENTMCNC: 32.6 G/DL (ref 31–37)
MCV RBC AUTO: 87.4 FL (ref 80–100)
MDMA URINE: ABNORMAL
METHADONE SCREEN, URINE: NEGATIVE
METHAMPHETAMINE, URINE: ABNORMAL
MONOCYTES # BLD: 8 % (ref 1–7)
MORPHOLOGY: ABNORMAL
NRBC AUTOMATED: ABNORMAL PER 100 WBC
OPIATES, URINE: NEGATIVE
OXYCODONE SCREEN URINE: NEGATIVE
PDW BLD-RTO: 15 % (ref 11.5–14.9)
PHENCYCLIDINE, URINE: NEGATIVE
PLATELET # BLD: 259 K/UL (ref 150–450)
PLATELET ESTIMATE: ABNORMAL
PMV BLD AUTO: 7 FL (ref 6–12)
POTASSIUM SERPL-SCNC: 4.6 MMOL/L (ref 3.7–5.3)
PROPOXYPHENE, URINE: ABNORMAL
RBC # BLD: 3.79 M/UL (ref 4.5–5.9)
RBC # BLD: ABNORMAL 10*6/UL
SALICYLATE LEVEL: <1 MG/DL (ref 3–10)
SARS-COV-2, RAPID: NOT DETECTED
SARS-COV-2: NORMAL
SARS-COV-2: NORMAL
SEG NEUTROPHILS: 36 % (ref 36–66)
SEGMENTED NEUTROPHILS ABSOLUTE COUNT: 1.15 K/UL (ref 1.3–9.1)
SODIUM BLD-SCNC: 140 MMOL/L (ref 135–144)
SOURCE: NORMAL
TEST INFORMATION: ABNORMAL
TOTAL PROTEIN: 6.4 G/DL (ref 6.4–8.3)
TRICYCLIC ANTIDEPRESSANTS, UR: ABNORMAL
WBC # BLD: 3.2 K/UL (ref 3.5–11)
WBC # BLD: ABNORMAL 10*3/UL

## 2020-12-08 PROCEDURE — 80053 COMPREHEN METABOLIC PANEL: CPT

## 2020-12-08 PROCEDURE — 6370000000 HC RX 637 (ALT 250 FOR IP): Performed by: PSYCHIATRY & NEUROLOGY

## 2020-12-08 PROCEDURE — G0480 DRUG TEST DEF 1-7 CLASSES: HCPCS

## 2020-12-08 PROCEDURE — 80307 DRUG TEST PRSMV CHEM ANLYZR: CPT

## 2020-12-08 PROCEDURE — 99285 EMERGENCY DEPT VISIT HI MDM: CPT

## 2020-12-08 PROCEDURE — 36415 COLL VENOUS BLD VENIPUNCTURE: CPT

## 2020-12-08 PROCEDURE — U0002 COVID-19 LAB TEST NON-CDC: HCPCS

## 2020-12-08 PROCEDURE — 85025 COMPLETE CBC W/AUTO DIFF WBC: CPT

## 2020-12-08 PROCEDURE — 71045 X-RAY EXAM CHEST 1 VIEW: CPT

## 2020-12-08 PROCEDURE — 1240000000 HC EMOTIONAL WELLNESS R&B

## 2020-12-08 PROCEDURE — 73562 X-RAY EXAM OF KNEE 3: CPT

## 2020-12-08 RX ORDER — BENZTROPINE MESYLATE 1 MG/ML
2 INJECTION INTRAMUSCULAR; INTRAVENOUS 2 TIMES DAILY PRN
Status: DISCONTINUED | OUTPATIENT
Start: 2020-12-08 | End: 2020-12-11 | Stop reason: HOSPADM

## 2020-12-08 RX ORDER — HYDROXYZINE HYDROCHLORIDE 25 MG/1
25 TABLET, FILM COATED ORAL 3 TIMES DAILY PRN
Status: ON HOLD | COMMUNITY
End: 2021-03-14 | Stop reason: HOSPADM

## 2020-12-08 RX ORDER — BENZTROPINE MESYLATE 2 MG/1
2 TABLET ORAL DAILY PRN
Status: DISCONTINUED | OUTPATIENT
Start: 2020-12-08 | End: 2020-12-11 | Stop reason: HOSPADM

## 2020-12-08 RX ORDER — LORAZEPAM 1 MG/1
2 TABLET ORAL EVERY 4 HOURS PRN
Status: DISCONTINUED | OUTPATIENT
Start: 2020-12-08 | End: 2020-12-08

## 2020-12-08 RX ORDER — TRAZODONE HYDROCHLORIDE 50 MG/1
50 TABLET ORAL NIGHTLY PRN
Status: DISCONTINUED | OUTPATIENT
Start: 2020-12-08 | End: 2020-12-11 | Stop reason: HOSPADM

## 2020-12-08 RX ORDER — HYDROXYZINE 50 MG/1
50 TABLET, FILM COATED ORAL 3 TIMES DAILY PRN
Status: DISCONTINUED | OUTPATIENT
Start: 2020-12-08 | End: 2020-12-11 | Stop reason: HOSPADM

## 2020-12-08 RX ORDER — HALOPERIDOL 5 MG/ML
5 INJECTION INTRAMUSCULAR EVERY 4 HOURS PRN
Status: DISCONTINUED | OUTPATIENT
Start: 2020-12-08 | End: 2020-12-08

## 2020-12-08 RX ORDER — ACETAMINOPHEN 325 MG/1
650 TABLET ORAL EVERY 4 HOURS PRN
Status: DISCONTINUED | OUTPATIENT
Start: 2020-12-08 | End: 2020-12-11 | Stop reason: HOSPADM

## 2020-12-08 RX ORDER — OLANZAPINE 10 MG/1
5 TABLET, ORALLY DISINTEGRATING ORAL NIGHTLY
Status: DISCONTINUED | OUTPATIENT
Start: 2020-12-08 | End: 2020-12-10

## 2020-12-08 RX ORDER — MAGNESIUM HYDROXIDE/ALUMINUM HYDROXICE/SIMETHICONE 120; 1200; 1200 MG/30ML; MG/30ML; MG/30ML
30 SUSPENSION ORAL EVERY 6 HOURS PRN
Status: DISCONTINUED | OUTPATIENT
Start: 2020-12-08 | End: 2020-12-11 | Stop reason: HOSPADM

## 2020-12-08 RX ORDER — POLYETHYLENE GLYCOL 3350 17 G/17G
17 POWDER, FOR SOLUTION ORAL DAILY PRN
Status: DISCONTINUED | OUTPATIENT
Start: 2020-12-08 | End: 2020-12-11 | Stop reason: HOSPADM

## 2020-12-08 RX ORDER — DIPHENHYDRAMINE HYDROCHLORIDE 50 MG/ML
50 INJECTION INTRAMUSCULAR; INTRAVENOUS EVERY 4 HOURS PRN
Status: DISCONTINUED | OUTPATIENT
Start: 2020-12-08 | End: 2020-12-08

## 2020-12-08 RX ORDER — HALOPERIDOL 10 MG/1
5 TABLET ORAL EVERY 4 HOURS PRN
Status: DISCONTINUED | OUTPATIENT
Start: 2020-12-08 | End: 2020-12-08

## 2020-12-08 RX ORDER — NICOTINE 21 MG/24HR
1 PATCH, TRANSDERMAL 24 HOURS TRANSDERMAL DAILY
Status: DISCONTINUED | OUTPATIENT
Start: 2020-12-08 | End: 2020-12-11 | Stop reason: HOSPADM

## 2020-12-08 RX ORDER — QUETIAPINE FUMARATE 300 MG/1
600 TABLET, FILM COATED ORAL NIGHTLY
Status: ON HOLD | COMMUNITY
End: 2020-12-11 | Stop reason: HOSPADM

## 2020-12-08 RX ORDER — LORAZEPAM 2 MG/ML
2 INJECTION INTRAMUSCULAR EVERY 4 HOURS PRN
Status: DISCONTINUED | OUTPATIENT
Start: 2020-12-08 | End: 2020-12-08

## 2020-12-08 RX ADMIN — ACETAMINOPHEN 650 MG: 325 TABLET, FILM COATED ORAL at 21:25

## 2020-12-08 RX ADMIN — OLANZAPINE 5 MG: 10 TABLET, ORALLY DISINTEGRATING ORAL at 21:25

## 2020-12-08 ASSESSMENT — SLEEP AND FATIGUE QUESTIONNAIRES
RESTFUL SLEEP: NO
DO YOU USE A SLEEP AID: YES
DO YOU HAVE DIFFICULTY SLEEPING: YES
DIFFICULTY FALLING ASLEEP: YES
DIFFICULTY STAYING ASLEEP: YES
DIFFICULTY ARISING: NO
SLEEP PATTERN: DIFFICULTY FALLING ASLEEP;RESTLESSNESS

## 2020-12-08 ASSESSMENT — PATIENT HEALTH QUESTIONNAIRE - PHQ9: SUM OF ALL RESPONSES TO PHQ QUESTIONS 1-9: 9

## 2020-12-08 ASSESSMENT — PAIN SCALES - GENERAL
PAINLEVEL_OUTOF10: 1
PAINLEVEL_OUTOF10: 2
PAINLEVEL_OUTOF10: 7
PAINLEVEL_OUTOF10: 0

## 2020-12-08 ASSESSMENT — PAIN DESCRIPTION - ORIENTATION
ORIENTATION: LEFT
ORIENTATION: LEFT

## 2020-12-08 ASSESSMENT — PAIN DESCRIPTION - PAIN TYPE: TYPE: OTHER (COMMENT)

## 2020-12-08 ASSESSMENT — PAIN DESCRIPTION - LOCATION
LOCATION: KNEE
LOCATION: KNEE

## 2020-12-08 ASSESSMENT — ENCOUNTER SYMPTOMS
ABDOMINAL PAIN: 0
COUGH: 1

## 2020-12-08 ASSESSMENT — LIFESTYLE VARIABLES: HISTORY_ALCOHOL_USE: NO

## 2020-12-08 NOTE — BH NOTE
Physician aware of suicidal ideation and patient josette for safety. Patient suicide precautions discontinued due to patient willing to seek out staff if feelings of self harm were to continue. Every 15 minute checks and random spontaneous checks/roundings to continue for patient safety. Patient currently in room, sleeping.

## 2020-12-08 NOTE — PLAN OF CARE
Problem: Suicide risk  Goal: Provide patient with safe environment  Description: Provide patient with safe environment  Outcome: Ongoing     Problem: Altered Mood, Psychotic Behavior:  Goal: Able to verbalize decrease in frequency and intensity of hallucinations  Description: Able to verbalize decrease in frequency and intensity of hallucinations  Outcome: Ongoing  Goal: Absence of self-harm  Description: Absence of self-harm  Outcome: Ongoing     Problem: Depressive Behavior With or Without Suicide Precautions:  Goal: Able to verbalize and/or display a decrease in depressive symptoms  Description: Able to verbalize and/or display a decrease in depressive symptoms  Outcome: Ongoing
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
## 2020-12-08 NOTE — PROGRESS NOTES
Medication History completed:    New medications: Invega Sustenna, hydroxyzine    Medications discontinued: oxcarbazepine    Changes to dosing:   Quetiapine changed to 600 mg (two 300 mg tablets) nightly    Stated allergies: As listed    Other pertinent information: Medications confirmed with [address] [place] [address]. The patient's last fill of [employer] was 11/10/20.      Thank you,  Macy Thacker, PharmD, BCPS  [phone]

## 2020-12-08 NOTE — BH NOTE
cigarette)     Valuables sent home with N/A. Valuables placed in safe in security envelope, number:  [id number]. Patient's home medications were verified. Patient oriented to surroundings and program expectations and copy of patient rights given. Received admission packet:  Yes. Consents reviewed, signed Yes. Refused N/A. Patient verbalize understanding:  Yes. Patient education on precautions: Yes    Patient admitted to room 219 bed 01Unit at 3:30pm.   Patient changed into hospital attire, scanned with metal detector. Food and beverages provided to patient. Patient currently denies thoughts of self harm.                          Hannah Mills RN

## 2020-12-08 NOTE — BH NOTE
Patient given quit line phone number [phone] at this time, refusing to call at this time, states \" I just don't want to quit now\"-  dangers of longterm tobacco use discussed. staff will continue to reinforce importance of smoking cessation.

## 2020-12-09 PROCEDURE — 6370000000 HC RX 637 (ALT 250 FOR IP): Performed by: PSYCHIATRY & NEUROLOGY

## 2020-12-09 PROCEDURE — 97162 PT EVAL MOD COMPLEX 30 MIN: CPT

## 2020-12-09 PROCEDURE — 99223 1ST HOSP IP/OBS HIGH 75: CPT | Performed by: PSYCHIATRY & NEUROLOGY

## 2020-12-09 PROCEDURE — 1240000000 HC EMOTIONAL WELLNESS R&B

## 2020-12-09 RX ADMIN — HYDROXYZINE HYDROCHLORIDE 50 MG: 50 TABLET, FILM COATED ORAL at 21:46

## 2020-12-09 RX ADMIN — OLANZAPINE 5 MG: 10 TABLET, ORALLY DISINTEGRATING ORAL at 21:46

## 2020-12-09 RX ADMIN — ACETAMINOPHEN 650 MG: 325 TABLET, FILM COATED ORAL at 13:41

## 2020-12-09 RX ADMIN — TRAZODONE HYDROCHLORIDE 50 MG: 50 TABLET ORAL at 21:46

## 2020-12-09 ASSESSMENT — PAIN DESCRIPTION - FREQUENCY: FREQUENCY: CONTINUOUS

## 2020-12-09 ASSESSMENT — PAIN - FUNCTIONAL ASSESSMENT: PAIN_FUNCTIONAL_ASSESSMENT: PREVENTS OR INTERFERES SOME ACTIVE ACTIVITIES AND ADLS

## 2020-12-09 ASSESSMENT — PAIN DESCRIPTION - ORIENTATION: ORIENTATION: LEFT;OUTER

## 2020-12-09 ASSESSMENT — LIFESTYLE VARIABLES: HISTORY_ALCOHOL_USE: NO

## 2020-12-09 ASSESSMENT — SLEEP AND FATIGUE QUESTIONNAIRES
AVERAGE NUMBER OF SLEEP HOURS: 9
DO YOU HAVE DIFFICULTY SLEEPING: NO
DO YOU USE A SLEEP AID: NO

## 2020-12-09 ASSESSMENT — PAIN DESCRIPTION - PROGRESSION: CLINICAL_PROGRESSION: GRADUALLY WORSENING

## 2020-12-09 ASSESSMENT — PAIN SCALES - GENERAL: PAINLEVEL_OUTOF10: 2

## 2020-12-09 ASSESSMENT — PAIN DESCRIPTION - ONSET: ONSET: ON-GOING

## 2020-12-09 ASSESSMENT — PAIN DESCRIPTION - DESCRIPTORS: DESCRIPTORS: ACHING;SORE;SHARP

## 2020-12-09 ASSESSMENT — PAIN DESCRIPTION - LOCATION: LOCATION: KNEE
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
## 2020-12-09 NOTE — GROUP NOTE
Group Therapy Note    Date: 12/9/2020    Group Start Time: 1100  Group End Time: 36  Group Topic: Psychotherapy    [facility]    Charo Sams        Group Therapy Note       Patient refused to attend psychotherapy group after encouragement from staff. 1:1 talk time offered but refused. Signature:   Charo Sams

## 2020-12-09 NOTE — BH NOTE
BHI Biopsychosocial Assessment     Current Level of Psychosocial Functioning      Independent:   Dependent: X  Minimal Assist:      Comments:  Client has a principle diagnosis of Major Depressive Disorder with psychotic features, which is the is the condition established to be chiefly responsible for the admission of the client on this date.   Client documentation provides prior diagnoses of Schizoaffective Disorder, depressive type; Stimulant-Use Disorder, crack cocaine type, chronic      Psychosocial High-Risk Factors (check all that apply)     Unable to obtain meds:   Chronic illness/pain:    Substance abuse: X  Lack of Family Support:   Financial stress:   Isolation: X  Inadequate Community Resources:   Suicide attempt(s): X  Not taking medications:   Victim of crime:  Developmental Delay:   Unable to manage personal needs: X  Age 72 or older:   Homeless:   No transportation:   Readmission within 30 days:   Unemployment:   Traumatic Event: X       Psychiatric Advanced Directives:  Nothing reported     Family to Involve in Treatment:  Client lists sister Radha Rodriguez at [phone] and mother at [phone] as emergency contacts     Sexual Orientation:  Client reports as single     Patient Strengths:   Client has housing; SSI income; some family support; [facility] [employer]; linked to [facility]; states compliant on medications     Patient Barriers:  Client has multiple inpatient psychiatric admissions; history of smoking crack cocaine and alcohol abuse; poor insight, judgment and use of proper coping skills; poor money management     Opiate/AOD Referral and/or Education:  Tox screen positive for cocaine; client denies use for 3-weeks     CMHC/mental health history:  [employer]: Medication management, group/individual therapies, family meetings, psychoeducation, treatment team meetings to assist with stabilization     Safety Plan:  Writer initiates safety plan at time of assessment and will be reviewed again in a group setting    Initial Discharge Plan:  Stabilize mood and medications; explore social support systems within community to increase socialization; provide 24/7 local and national hotline numbers for additional support; safety plan to be completed; disclose/discuss suicidal ideas will improve, decrease depressive symptoms, absence of self-harm;     Clinical Summary:  Client was assessed on this date, found laying in bed resting but A&Ox4. Client continues to fall asleep and throughout most of the assessment provides minimal responses to writer and minimal engagement. Client does not want to participate in most of assessment and the majority of the notes below are from client admission to [facility] ED. Sarah Mora is a 59-year old [place] American male who was brought to [facility] ED by his [facility]  for a psychiatric evaluation. Client reported to his  he was having an increase in auditory/command hallucinations as well as suicidal ideations to walk into traffic. Client reported he has not smoked crack cocaine for 3-weeks but tox screen is positive at time of admission in ED. Client reports he is compliant with his medications and is \"unsure why my voices are getting louder\". Client reported in ED no homicidal ideations, paranoia, decrease in anxiety. Client reports he only gets $37 per month on food stamps and \"runs out of food at the end of the first week every month. \"  SW going to contact [facility] as well as provide client with food pantries and other options at time of discharge.
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
## 2020-12-09 NOTE — PROGRESS NOTES
Received return call from [address] with Luis Hutchins. Patient last received Invega sustenna 234 mg yesterday 12/08. Patient receives injection every 4 weeks.

## 2020-12-09 NOTE — PLAN OF CARE
Problem: Altered Mood, Psychotic Behavior:  Goal: Absence of self-harm  Description: Absence of self-harm  12/9/2020 1505 by Kary Rodríguez LPN  Outcome: Ongoing   Pt denies thoughts of self harm and is agreeable to seeking out should thoughts of self harm arise. Safe environment maintained. Pt is med complaint safety maintained. Pt is isolative to self. Q15 minute checks for safety cont per unit policy. Will cont to monitor for safety and provides support and reassurance as needed.

## 2020-12-09 NOTE — PLAN OF CARE
[facility]  Initial Interdisciplinary Treatment Plan NO      Original treatment plan Date & Time: 12/9/2020                   736AM    Admission Type:  Admission Type: Involuntary    Reason for admission:   Reason for Admission: SI no plan    Estimated Length of Stay:  5-7days  Estimated Discharge Date: to be determined by physician    PATIENT STRENGTHS:  Patient Strengths:Strengths: Positive Support, No significant Physical Illness  Patient Strengths and Limitations:Limitations: Tendency to isolate self, Lacks leisure interests, Difficulty problem solving/relies on others to help solve problems, Hopeless about future, Multiple barriers to leisure interests, Inappropriate/potentially harmful leisure interests (depression substance abuse anxiety poor coping skills)  Addictive Behavior: Addictive Behavior  In the past 3 months, have you felt or has someone told you that you have a problem with:  : None  Do you have a history of Chemical Use?: No  Do you have a history of Alcohol Use?: No  Do you have a history of Street Drug Abuse?: Yes  Histroy of Prescripton Drug Abuse?: No  Medical Problems:No past medical history on file.   Status EXAM:Status and Exam  Normal: No  Facial Expression: Elevated  Affect: Inappropriate  Level of Consciousness: Alert  Mood:Normal: No  Mood: Depressed, Anxious  Motor Activity:Normal: No  Motor Activity: Decreased  Interview Behavior: Cooperative  Preception: [place] to Person, [place] to Time, [place] to Place, [place] to Situation  Attention:Normal: Yes  Attention: Distractible  Thought Processes: Circumstantial  Thought Content:Normal: Yes  Thought Content: Preoccupations  Hallucinations: None  Delusions: No  Memory:Normal: Yes  Memory: Poor Recent, Confabulation  Insight and Judgment: No  Insight and Judgment: Unmotivated  Present Suicidal Ideation: No  Present Homicidal Ideation: No    EDUCATION:   Learner Progress Toward Treatment Goals: reviewed group plans and strategies for care    Method:group therapy, medication compliance, individualized assessments and care planning    Outcome: needs reinforcement    PATIENT GOALS: to be discussed with patient within 72 hours    PLAN/TREATMENT RECOMMENDATIONS:     continue group therapy , medications compliance, goal setting, individualized assessments and care, continue to monitor pt on unit      SHORT-TERM GOALS:   Time frame for Short-Term Goals: 5-7 days    LONG-TERM GOALS:  Time frame for Long-Term Goals: 6 months  Members Present in Team Meeting: See Signature Sheet    Gisella Mcdonough

## 2020-12-09 NOTE — GROUP NOTE
Group Therapy Note    Date: 12/9/2020    Group Start Time: 0900  Group End Time: 0935  Group Topic: Community Meeting    JESUS BHNATHALIE Abeling, [address]        Group Therapy Note    Attendees: 4/14         Pt did not participate in Community Meeting/Goals Group at 900am when encouraged by RT due to resting in room. Pt was offered talk time as an alternative to group but declined.       Discipline Responsible: Psychoeducational Specialist      Signature:  Laurie Chi

## 2020-12-09 NOTE — GROUP NOTE
Group Therapy Note    Date: 12/9/2020    Group Start Time: 6581  Group End Time: 8602  Group Topic: Relaxation    [facility]    Brandin Kyle RN    Patients participated in organized activities together in the milieu.     Group Therapy Note    Attendees: 6         Status After Intervention:  Unchanged    Participation Level: Interactive    Participation Quality: Appropriate and Attentive      Speech:  normal      Thought Process/Content: Logical      Affective Functioning: Congruent      Mood: calm      Level of consciousness:  Alert and Oriented x4      Response to Learning: Able to verbalize current knowledge/experience      Endings: None Reported    Modes of Intervention: Activity      Discipline Responsible: Registered Nurse      Signature:  Brandin Kyle RN

## 2020-12-09 NOTE — PLAN OF CARE
Problem: Suicide risk  Goal: Provide patient with safe environment  Description: Provide patient with safe environment  12/8/2020 2009 by Carlene Pleitez LPN  Outcome: Ongoing     Problem: Depressive Behavior With or Without Suicide Precautions:  Goal: Able to verbalize and/or display a decrease in depressive symptoms  Description: Able to verbalize and/or display a decrease in depressive symptoms  12/8/2020 2009 by Carlene Pleitez LPN  Outcome: Ongoing     Problem: Altered Mood, Psychotic Behavior:  Goal: Able to verbalize decrease in frequency and intensity of hallucinations  Description: Able to verbalize decrease in frequency and intensity of hallucinations  12/8/2020 2009 by Carlene Pleitez LPN  Outcome: Ongoing     Problem: Altered Mood, Psychotic Behavior:  Goal: Absence of self-harm  Description: Absence of self-harm  12/8/2020 2009 by Carlene Pleitez LPN  Outcome: Ongoing

## 2020-12-10 PROCEDURE — 99232 SBSQ HOSP IP/OBS MODERATE 35: CPT | Performed by: PSYCHIATRY & NEUROLOGY

## 2020-12-10 PROCEDURE — 1240000000 HC EMOTIONAL WELLNESS R&B

## 2020-12-10 PROCEDURE — 6370000000 HC RX 637 (ALT 250 FOR IP): Performed by: PSYCHIATRY & NEUROLOGY

## 2020-12-10 PROCEDURE — 6370000000 HC RX 637 (ALT 250 FOR IP): Performed by: NURSE PRACTITIONER

## 2020-12-10 RX ORDER — OLANZAPINE 10 MG/1
10 TABLET, ORALLY DISINTEGRATING ORAL NIGHTLY
Status: DISCONTINUED | OUTPATIENT
Start: 2020-12-10 | End: 2020-12-11 | Stop reason: HOSPADM

## 2020-12-10 RX ADMIN — OLANZAPINE 10 MG: 10 TABLET, ORALLY DISINTEGRATING ORAL at 21:22

## 2020-12-10 RX ADMIN — TRAZODONE HYDROCHLORIDE 50 MG: 50 TABLET ORAL at 21:22

## 2020-12-10 RX ADMIN — HYDROXYZINE HYDROCHLORIDE 50 MG: 50 TABLET, FILM COATED ORAL at 21:22
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
## 2020-12-10 NOTE — GROUP NOTE
Group Therapy Note    Date: 12/10/2020    Group Start Time: 1100  Group End Time: 1130  Group Topic: Psychotherapy    [facility]    Dina Macedo        Group Therapy Note    Attendees:3/12         Patient's Goal: PT will demonstrate increased interpersonal interaction and a clear understanding on multiple types of coping skills relating to the here-and-now therapeutic practice. Notes:  Patient is making progress, AEB participating in group discussion, actively listening, and supporting other group members. PT participates in group and encourages others to participate     Status After Intervention:  Improved    Participation Level: Active Listener and Interactive    Participation Quality: Appropriate, Attentive and Sharing      Speech:  normal      Thought Process/Content: Logical      Affective Functioning: Flat      Mood: depressed      Level of consciousness:  Alert, Oriented x4 and Attentive      Response to Learning: Able to verbalize current knowledge/experience and Progressing to goal      Endings: None Reported    Modes of Intervention: Support, Socialization, Exploration, Clarifying and Problem-solving      Discipline Responsible: /Counselor      Signature:   Dina Macedo

## 2020-12-10 NOTE — PLAN OF CARE
Content: Preoccupations  Hallucinations: None  Delusions: No  Memory:Normal: No  Memory: Poor Recent  Insight and Judgment: No  Insight and Judgment: Poor Judgment, Poor Insight  Present Suicidal Ideation: No  Present Homicidal Ideation: No    Daily Assessment Last Entry:   Daily Sleep (WDL): Within Defined Limits         Patient Currently in Pain: Denies  Daily Nutrition (WDL): Within Defined Limits    Patient Monitoring:  Frequency of Checks: 4 times per hour, close    Psychiatric Symptoms:   Depression Symptoms  Depression Symptoms: Isolative  Anxiety Symptoms  Anxiety Symptoms: Generalized  Teetee Symptoms  Teetee Symptoms: No problems reported or observed. Psychosis Symptoms  Delusion Type: No problems reported or observed. Suicide Risk CSSR-S:  1) Within the past month, have you wished you were dead or wished you could go to sleep and not wake up? : Yes  2) Have you actually had any thoughts of killing yourself? : Yes  3) Have you been thinking about how you might kill yourself? : Yes  5) Have you started to work out or worked out the details of how to kill yourself?  Do you intend to carry out this plan? : Yes  6) Have you ever done anything, started to do anything, or prepared to do anything to end your life?: Yes  Change in Result No Change in Plan of care No      EDUCATION:   EDUCATION:   Learner Progress Toward Treatment Goals: Reviewed results and recommendations of this team, Reviewed group plan and strategies, Reviewed signs, symptoms and risk of self harm and violent behavior, Reviewed goals and plan of care    Method:small group, individual verbal education    Outcome:verbalized by patient, but needs reinforcement to obtain goals    PATIENT GOALS:  Short term: Discharge planning  Long term: Take medications     PLAN/TREATMENT RECOMMENDATIONS UPDATE: continue with group therapies, increased socialization, continue planning for after discharge goals, continue with medication compliance    SHORT-TERM GOALS UPDATE:   Time frame for Short-Term Goals: 5-7 days    LONG-TERM GOALS UPDATE:   Time frame for Long-Term Goals: 6 months  Members Present in Team Meeting: See Signature Sheet    Shiva Toscano

## 2020-12-10 NOTE — GROUP NOTE
Group Therapy Note    Date: 12/10/2020    Group Start Time: 0900  Group End Time: 0920  Group Topic: Community Meeting    JESUS Currie    Patient refused to attend community meeting/ goals group at 0900 after encouragement from staff. 1:1 talk time provided by staff.       Signature:  Josselin Currie

## 2020-12-10 NOTE — GROUP NOTE
Group Therapy Note    Date: 12/10/2020    Group Start Time: 1000  Group End Time: 6744  Group Topic: Cognitive Skills    [facility] JACQUELINE Martinez, CTRS    Pt did not attend 1000 cognitive skills group d/t resting in room despite staff invitation to attend. 1:1 talk time offered as alternative to group session, pt declined.           Signature:  Wayne Kim

## 2020-12-10 NOTE — PLAN OF CARE
Problem: Suicide risk  Goal: Provide patient with safe environment  Description: Provide patient with safe environment  12/9/2020 1957 by Steven Marquez RN  Outcome: Ongoing  Note: Patient denies suicidal ideation and verbally contracts for safety. Patient remains safe during Q15 min and random safety checks. Patient remains in hospital appropriate clothing at all times and free from harmful objects. Patient is accepting of talk time with writer. Problem: Depressive Behavior With or Without Suicide Precautions:  Goal: Able to verbalize and/or display a decrease in depressive symptoms  Description: Able to verbalize and/or display a decrease in depressive symptoms  12/9/2020 1957 by Steven Marquez RN  Outcome: Ongoing  Note: Denies any thoughts to harm others, denies any hallucinations. States he is eating so-so and has been sleeping well. Has been doing a few laps in the unit and has a steady gait. States he is ready for discharge but has not brought this up to the doctor yet.

## 2020-12-10 NOTE — GROUP NOTE
Group Therapy Note    Date: 12/10/2020    Group Start Time: 1400  Group End Time: 1500  Group Topic: Recreational    [facility] G[facility] JACQUELINE Machuca    Patient refused to attend coping skills/ creative expression group at 1400 after encouragement from staff. 1:1 talk time provided by staff.         Signature:  Tanja Machuca

## 2020-12-10 NOTE — PLAN OF CARE
Problem: Depressive Behavior With or Without Suicide Precautions:  Goal: Able to verbalize and/or display a decrease in depressive symptoms  Description: Able to verbalize and/or display a decrease in depressive symptoms  12/10/2020 0938 by Nery Graf RN  Outcome: Ongoing  Note: Patient reports being ready for discharge. Denies any thoughts of self harm or hallucinations. Behavior controlled. Out for meals only.

## 2020-12-11 VITALS
RESPIRATION RATE: 14 BRPM | DIASTOLIC BLOOD PRESSURE: 66 MMHG | BODY MASS INDEX: 20.89 KG/M2 | OXYGEN SATURATION: 100 % | HEIGHT: 75 IN | SYSTOLIC BLOOD PRESSURE: 114 MMHG | HEART RATE: 65 BPM | TEMPERATURE: 97.8 F | WEIGHT: 168 LBS

## 2020-12-11 PROCEDURE — 99239 HOSP IP/OBS DSCHRG MGMT >30: CPT | Performed by: PSYCHIATRY & NEUROLOGY

## 2020-12-11 RX ORDER — OLANZAPINE 10 MG/1
10 TABLET, ORALLY DISINTEGRATING ORAL NIGHTLY
Qty: 30 TABLET | Refills: 3 | Status: ON HOLD | OUTPATIENT
Start: 2020-12-11 | End: 2021-03-10 | Stop reason: ALTCHOICE

## 2020-12-11 NOTE — PLAN OF CARE
Problem: Suicide risk  Goal: Provide patient with safe environment  Description: Provide patient with safe environment  Outcome: Ongoing  Note: Patient denies suicidal ideation and verbally contracts for safety. Patient remains safe during Q15 min and random safety checks. Patient remains in hospital appropriate clothing at all times and free from harmful objects. Patient is accepting of talk time with writer. States he spoke to the doctor today and will be discharged tomorrow, states he is ready for discharge. Has been out and social with select peers. States he eating okay and sleeping so-so still.

## 2020-12-11 NOTE — GROUP NOTE
Group Therapy Note    Date: 12/11/2020    Group Start Time: 1330  Group End Time: 1400  Group Topic: Psychoeducation    [facility]    Xin Concepcion             Patient's Goal:  To increase interpersonal interaction     Notes:      Status After Intervention:  Improved    Participation Level: Minimal    Participation Quality: Appropriate and Resistant      Speech:  normal      Thought Process/Content: Logical      Affective Functioning: Congruent      Mood: euthymic      Level of consciousness:  Alert and Attentive      Response to Learning: Progressing to goal      Endings: None Reported    Modes of Intervention: Education, Support, Socialization, Exploration, Clarifying, Problem-solving and Activity      Discipline Responsible: Psychoeducational Specialist      Signature:  Xin Concepcion

## 2020-12-11 NOTE — GROUP NOTE
Group Therapy Note    Date: 12/11/2020    Group Start Time: 0900  Group End Time: 0920  Group Topic: Community Meeting    JESUS Sage    Patient refused to attend community meeting/ goals group at 0900 after encouragement from staff. 1:1 talk time provided by staff.       Signature:  Jonathan Sage

## 2020-12-11 NOTE — BH NOTE
[facility]  Discharge Note    Pt discharged with followings belongings: All Valuables sent home with patient. Security envelope number: patient   Patient went home by cab  Medications sent to patient pharmacy  Denied suicidal thoughts or thoughts of harming others at time of D/C. Discharged to home    Patient education on aftercare instructions, states understanding and signed discharge AVS, copy given to patient.          Status EXAM upon discharge:  Status and Exam  Normal: No  Facial Expression: Flat  Affect: Appropriate  Level of Consciousness: Alert  Mood:Normal: No  Mood: Depressed, Anxious  Motor Activity:Normal: Yes  Motor Activity: Decreased  Interview Behavior: Cooperative  Preception: [place] to Person, Ines Spar to Time, [place] to Place, [place] to Situation  Attention:Normal: No  Attention: Distractible  Thought Processes: Circumstantial  Thought Content:Normal: No  Thought Content: Preoccupations  Hallucinations: None  Delusions: No  Memory:Normal: Yes  Memory: Poor Recent  Insight and Judgment: No  Insight and Judgment: Poor Judgment, Poor Insight  Present Suicidal Ideation: No  Present Homicidal Ideation: No    Tammie Larios RN

## 2020-12-11 NOTE — DISCHARGE SUMMARY
Worsening  [] No change      Diagnosis:  Active Problems:    Cocaine abuse ([facility])    Schizoaffective disorder, bipolar type ([facility] [address]  Resolved Problems:    * No resolved hospital problems. *      LABS:    Recent Labs     12/08/20  1249   WBC 3.2*   HGB 10.8*        Recent Labs     12/08/20  1249      K 4.6      CO2 25   BUN 20   CREATININE 1.31*   GLUCOSE 93     Recent Labs     12/08/20  1249   BILITOT 0.27*   ALKPHOS 129   AST 15   ALT 11     Lab Results   Component Value Date    BARBSCNU NEGATIVE 12/08/2020    LABBENZ NEGATIVE 12/08/2020    LABBENZ NEGATIVE 03/24/2012    LABMETH NEGATIVE 12/08/2020    PPXUR NOT REPORTED 12/08/2020     Lab Results   Component Value Date    TSH 1.50 10/23/2020     No results found for: LITHIUM  No results found for: VALPROATE, CBMZ    RISK ASSESSMENT AT DISCHARGE: Low risk for suicide and homicide. Treatment Plan:  Reviewed current Medications with the patient. Education provided on the complaince with treatment. Risks, benefits, side effects, drug-to-drug interactions and alternatives to treatment were discussed. Encourage patient to attend outpatient follow up appointment and therapy. Patient was advised to call the outpatient provider, visit the nearest ED or call 911 if symptoms are not manageable.              Medication List      START taking these medications    OLANZapine zydis 10 MG disintegrating tablet  Commonly known as:  ZYPREXA  Take 1 tablet by mouth nightly        CONTINUE taking these medications    acetaminophen 500 MG tablet  Commonly known as:  TYLENOL  Take 1 tablet by mouth 3 times daily as needed for Pain     benztropine 2 MG tablet  Commonly known as:  COGENTIN  Take 1 tablet by mouth daily as needed (acute dystonia)     hydrOXYzine 25 MG tablet  Commonly known as:  ATARAX     traZODone 50 MG tablet  Commonly known as:  DESYREL  Take 1 tablet by mouth nightly as needed for Sleep        STOP taking these medications    buPROPion 100

## 2020-12-11 NOTE — BH NOTE
Writer spoke with [facility] transportation and they stated they would be here in 30mins to  patient.

## 2020-12-11 NOTE — GROUP NOTE
Group Therapy Note    Date: 12/11/2020    Group Start Time: 1440  Group End Time: 1783  Group Topic: Cognitive Skills    JESUS Dalton, CARMEN        Group Therapy Note    Attendees: 6/15         Pt did not participate in Cognitive Skills Group at 1440 when encouraged by RT due to discharge.     Discipline Responsible: Psychoeducational Specialist      Signature:  Gisella Mcdonough

## 2020-12-11 NOTE — SUICIDE SAFETY PLAN
SAFETY PLAN    A suicide Safety Plan is a document that supports someone when they are having thoughts of suicide. Warning Signs that indicate a suicidal crisis may be developing: What (situations, thoughts, feelings, body sensations, behaviors, etc.) do you experience that lets you know you are beginning to think about suicide? 1. Go off medications  2. Mood is depressed and start to feel sad, hopeless, helpless, guilty, decline in self-esteem, excess worry, no interest in doing any pleasurable activities, unable to concentrate  3. Begin to cry over the smallest of things  4. Not eating or sleeping as normal  5. Relationship issues start happening  6. I become angry and start a fight  7. When I dont listen or respond to people in a good, positive way  8. Increase drug use      Internal Coping Strategies:  What things can I do (relaxation techniques, hobbies, physical activities, etc.) to take my mind off my problems without contacting another person? 1. Go to hospital discharge appointments and follow-up with community mental health counseling  2. Talk with other people  3. Learn to identify and control your emotions by new ways  4. Think before you speak or act; walk away from the situation  5. Join a support group in person or on Social Media  6. Take a time-out  7. Take deep breaths; use relaxation techniques  8. Get some exercise; go for a walk  9. Read; listen to music; watch a funny movie      People and social settings that provide distraction: Who can I call or where can I go to distract me? People: Friends and family members  Place: [employer], Over 12 barros within [address] [place] [address], 120 miles of trails, Each park offers a variety of free activities such as [employer] and [place] up with the [facility].   Please check program schedule on the website or contact one of the barros directly. Place: [facility]. [address] [place] [address]  [address] [facility]., [place], [address] [place] [address] [phone]: Volunteer Work - Food for [address] [facility], [address] [facility], [address] [facility], [address] [phone]    People whom I can ask for help: Who can I call when I need help - for example, friends, family, clergy, someone else? 1. Friends, family    Professionals or [facility] agencies I can contact during a crisis: Who can I call for help - for example, my doctor, my psychiatrist, my psychologist, a mental health provider, a suicide hotline? 1. [employer] Team  2. Suicide Prevention Lifeline: 8-335-570-TALK (4540)  3. Rescue Crisis: Emergency Services Address: [address] [place], [facility],  [address], Phone: [phone]  4. [address] [facility] Emergency Services - for example, [address], [facility] suicide hotline,   1. [place] [address], 1-311.225.8183  2. Mental Health & Recovery Services Board [facility], Crisis line: 751.669.3362  3. [employer] (Crisis Intervention Team - CIT), 439.205.5675 or 9-1-1  4. [address] [place] [address], 6-415.914.9593  5. National Association of Mental Illness, 1-619-310-932-900-3654  6. [facility] Substance Abuse National Helpline, 7-039-946-HELP (9890)  7. Crisis Text Line, Text 4HOPE to 507911 to connect with a crisis counselor  8. [facility], 0-871.986.7669  9. [facility] ([place], [address]), 5-200.327.6460    Making the environment safe: How can I make my environment (house/apartment/living space) safer? For example, can I remove guns, medications, and other items? 1. Remove unsafe objects  2. Keep Medications in safe and secure location  3.  Plan daily goals to help remember to stay on specific medications

## 2020-12-11 NOTE — PROGRESS NOTES
Physical Therapy  DATE: 2020    NAME: Nikita Colvin  MRN: [id number]   : 1960    Patient not seen this date for Physical Therapy due to:  [] Blood transfusion in progress  [] Cancel by RN  [] Hemodialysis  []  Refusal by Patient   [] Spine Precautions   [] Strict Bedrest  [] Surgery  [] Testing      [x] Other; treatment deferred, called I unit and was told patient is being discharged and nursing is calling a cab for patient. [] PT being discontinued at this time. Patient independent. No further needs. [] PT being discontinued at this time as the patient has been transferred to hospice care. No further needs.     Electronically signed by Alfredo Silveira PTA on 20 at 2:44 PM EST

## 2020-12-11 NOTE — PLAN OF CARE
Problem: Suicide risk  Goal: Provide patient with safe environment  Description: Provide patient with safe environment  12/11/2020 0813 by Gladys Mejia RN  Outcome: Ongoing  Note: Q15 minute rounding for patient safety. Denies any thoughts of self harm or hallucinations. Out for meals only. Reports being ready for discharge.

## 2020-12-23 ENCOUNTER — HOSPITAL ENCOUNTER (EMERGENCY)
Age: 60
Discharge: HOME | End: 2020-12-23
Attending: EMERGENCY MEDICINE
Payer: MEDICAID

## 2020-12-23 VITALS
RESPIRATION RATE: 18 BRPM | BODY MASS INDEX: 20.89 KG/M2 | WEIGHT: 168 LBS | SYSTOLIC BLOOD PRESSURE: 111 MMHG | HEIGHT: 75 IN | HEART RATE: 100 BPM | TEMPERATURE: 98.1 F | DIASTOLIC BLOOD PRESSURE: 70 MMHG | OXYGEN SATURATION: 97 %

## 2020-12-23 PROCEDURE — 99283 EMERGENCY DEPT VISIT LOW MDM: CPT

## 2020-12-23 ASSESSMENT — ENCOUNTER SYMPTOMS
BACK PAIN: 0
SHORTNESS OF BREATH: 0
COLOR CHANGE: 0
COUGH: 0
NAUSEA: 0
VOMITING: 0
ABDOMINAL PAIN: 0
DIARRHEA: 0

## 2020-12-23 NOTE — ED PROVIDER NOTES
evaluation, discharged in their care.         Halley Fletcher MD   Attending Emergency  Physician    (Please note that portions of this note were completed with a voice recognition program. Efforts were made to edit the dictations but occasionally words are mis-transcribed.)              Halley Fletcher MD  12/23/20 0219

## 2020-12-23 NOTE — ED PROVIDER NOTES
[facility]  ED  Emergency Department Encounter  Emergency Medicine Resident     Pt Name: Naina Ayala  MRN: [id number]  Shingflu 1960  Date of evaluation: 12/23/20  PCP:  No primary care provider on file. CHIEF COMPLAINT       Chief Complaint   Patient presents with    Hallucinations     pt. reports hearing voices to hurt himself, pt. states he grabbed kitchen knife because he was scared       HISTORY OFPRESENT ILLNESS  (Location/Symptom, Timing/Onset, Context/Setting, Quality, Duration, Modifying Factors,Severity.)      Naina Ayala is a 63 y.o. male who presents with Nations. Patient states today he has been hearing voices telling him to kill himself. Patient states the voices were so intense today he grabbed a knife with the intention of harming himself. He does state a rescue home and one of the workers there called 911 and stopped him from hurting himself. Patient does have a history of suicide attempts hospitalization for psychiatric illness in the past.  Patient denies homicidal ideation. Patient states he does not hear the voices when other people are talking to him, however when he is alone he hears these voices. He states he is taking all of his psychiatric medicines as prescribed, and he refilled them yesterday. PAST MEDICAL / SURGICAL / SOCIAL / FAMILY HISTORY      has a past medical history of Bipolar disorder ([address] Depression, GERD (gastroesophageal reflux disease), Hallucinations, Headache(784.0), Hepatitis, Schizophrenia, schizo-affective ([address] Substance abuse ([address] Tobacco abuse, Type II or unspecified type diabetes mellitus without mention of complication, not stated as uncontrolled, and Urinary incontinence. has a past surgical history that includes Dental surgery and Abscess Drainage (N/A, 02/11/2018).     Social History     Socioeconomic History    Marital status: Single     Spouse name: Not on file    Number of children: 0    Years of education: 10    Highest education level: Not on file   Occupational History     Employer: N/A   Social Needs    Financial resource strain: Not on file    Food insecurity     Worry: Not on file     Inability: Not on file    Transportation needs     Medical: Not on file     Non-medical: Not on file   Tobacco Use    Smoking status: Current Every Day Smoker     Packs/day: 0.50     Types: Cigarettes    Smokeless tobacco: Never Used   Substance and Sexual Activity    Alcohol use: Yes     Comment: reports drinking occasionally    Drug use: Yes     Types: Cocaine     Comment: drug abuse includes cocaine,     Sexual activity: Not on file   Lifestyle    Physical activity     Days per week: Not on file     Minutes per session: Not on file    Stress: Not on file   Relationships    Social connections     Talks on phone: Not on file     Gets together: Not on file     Attends Jewish service: Not on file     Active member of club or organization: Not on file     Attends meetings of clubs or organizations: Not on file     Relationship status: Not on file    Intimate partner violence     Fear of current or ex partner: Not on file     Emotionally abused: Not on file     Physically abused: Not on file     Forced sexual activity: Not on file   Other Topics Concern    Not on file   Social History Narrative    Not on file       Family History   Problem Relation Age of Onset    Diabetes Mother     Heart Disease Mother        Allergies:  Navane [thiothixene]    Home Medications:  Prior to Admission medications    Medication Sig Start Date End Date Taking?  Authorizing Provider   OLANZapine zydis (ZYPREXA) 10 MG disintegrating tablet Take 1 tablet by mouth nightly 12/11/20   Amy Muller MD   hydrOXYzine (ATARAX) 25 MG tablet Take 25 mg by mouth 3 times daily as needed for Anxiety    Historical Provider, MD   acetaminophen (TYLENOL) 500 MG tablet Take 1 tablet by mouth 3 times daily as needed for Pain 11/20/20 12/20/20  Miguel A Meneses Suzy Underwood MD   traZODone (DESYREL) 50 MG tablet Take 1 tablet by mouth nightly as needed for Sleep 8/14/20   Yvette Velasco MD   benztropine (COGENTIN) 2 MG tablet Take 1 tablet by mouth daily as needed (acute dystonia) 8/14/20   Yvette Velasco MD       REVIEW OF SYSTEMS    (2-9 systems for level 4, 10 or more for level 5)      Review of Systems   Constitutional: Negative for chills and fever. HENT: Negative for congestion. Eyes: Negative for visual disturbance. Respiratory: Negative for cough and shortness of breath. Cardiovascular: Negative for chest pain. Gastrointestinal: Negative for abdominal pain, diarrhea, nausea and vomiting. Genitourinary: Negative for dysuria. Musculoskeletal: Negative for back pain and gait problem. Skin: Negative for color change. Neurological: Negative for dizziness, syncope and headaches. Psychiatric/Behavioral: Positive for hallucinations and suicidal ideas. Negative for confusion. PHYSICAL EXAM   (up to 7 for level 4, 8 or more for level 5)     INITIAL VITALS:    height is 6' 3\" (1.905 m) and weight is 168 lb (76.2 kg). His skin temperature is 98.1 °F (36.7 °C). His blood pressure is 111/70 and his pulse is 100. His respiration is 18 and oxygen saturation is 97%. Physical Exam  Constitutional:       General: He is not in acute distress. Appearance: Normal appearance. He is not ill-appearing or toxic-appearing. HENT:      Head: Normocephalic and atraumatic. Cardiovascular:      Rate and Rhythm: Normal rate and regular rhythm. Heart sounds: No murmur. Pulmonary:      Effort: Pulmonary effort is normal. No respiratory distress. Breath sounds: No wheezing. Abdominal:      General: Abdomen is flat. Palpations: Abdomen is soft. Tenderness: There is no abdominal tenderness. Skin:     General: Skin is warm and dry. Capillary Refill: Capillary refill takes less than 2 seconds.    Neurological:      General: No focal deficit present. Mental Status: He is alert and oriented to person, place, and time. Motor: No weakness. Gait: Gait normal.   Psychiatric:         Mood and Affect: Mood normal.         Behavior: Behavior normal.         Thought Content: Thought content normal.         DIFFERENTIAL  DIAGNOSIS     PLAN (LABS / IMAGING / EKG):  No orders of the defined types were placed in this encounter. MEDICATIONS ORDERED:  No orders of the defined types were placed in this encounter. DDX: Schizophrenia, major depression with psychotic features, exacerbation of chronic psychiatric disease    Initial MDM/Plan: 63 y.o. male who presents with hallucinations. Patient states voices are telling him to kill himself, he grabbed a knife with intention of ring cells today. He does stay at group home, one of the workers there called 911 instructed from hurting himself. Patient seen and examined. Vitals are normal.  Patient does admit to hearing voices and suicidal ideations. Denies homicidal ideations. Patient is requesting psychiatric hospitalization and treatment. Patient has no fever, chills, shortness of breath, cough, chest pain, abdominal pain, nausea, vomiting, extremity pain or weakness necessitating further evaluation. Spoke with social work who stated the patient would not meet criteria for [facility] F[facility] and was a candidate for rescue. Patient is medically cleared and will be transferred to rescue for further psychiatric evaluation and treatment. DIAGNOSTIC RESULTS / EMERGENCY DEPARTMENT COURSE / MDM     LABS:  Labs Reviewed - No data to display      RADIOLOGY:  No results found. EMERGENCY DEPARTMENT COURSE:     76-year-old male presents emergency department with complaint of hallucinations. He states today he has been hearing voices instructing him to kill himself.   Patient did grab a knife with the intention of harming himself but was stopped by a worker at the group home he stays at.  Patient does have history of psychiatric hospitalization. He states he does have a history of suicide attempts in the past.  Patient has no medical concerns at this time necessitating further evaluation by imaging or lab work. Patient is medically cleared for transfer to psychiatric facility. Spoke with social work who stated patient would not meet criteria for [facility] hospitalization, however he would be accepted at rescue. Patient was accepted by rescue, he is awaiting transportation over to rescue. He is otherwise stable for discharge. He is medically cleared for transfer for psychiatric evaluation and treatment. PROCEDURES:  None    CONSULTS:  None    CRITICAL CARE:  Please see attending note    FINAL IMPRESSION      1. Auditory hallucinations          DISPOSITION / PLAN     DISPOSITION Decision To Discharge 12/23/2020 03:57:49 PM        PATIENTREFERRED TO:  No follow-up provider specified.     DISCHARGE MEDICATIONS:  New Prescriptions    No medications on file       Robin Garza DO  EmergencyMedicine Resident    (Please note that portions of this note were completed with a voice recognition program.  Efforts were made to edit the dictations but occasionally words are mis-transcribed.)        Robin Garza DO  Resident  12/23/20 [id number]

## 2020-12-23 NOTE — ED TRIAGE NOTES
Pt. Arrives to ED via private auto for c/o auditory hallucinations telling him to harm himself. Pt. Reports he has had similar episodes in the past but does not have a plan. Pt. States he grabbed a kitchen knife when he heard the voices because he was scared. Pt. Reports living in a group home with 4 other people. Pt. Reports his psychiatric needs are managed by [facility] and has recently been admitted to the [facility] for similar problems. Pt. Denies homicidal ideation. Pt. Changed out by security.

## 2021-02-09 ENCOUNTER — HOSPITAL ENCOUNTER (EMERGENCY)
Age: 61
Discharge: HOME | End: 2021-02-09
Attending: EMERGENCY MEDICINE
Payer: MEDICAID

## 2021-02-09 VITALS
HEART RATE: 97 BPM | OXYGEN SATURATION: 98 % | TEMPERATURE: 97.3 F | DIASTOLIC BLOOD PRESSURE: 65 MMHG | RESPIRATION RATE: 18 BRPM | SYSTOLIC BLOOD PRESSURE: 129 MMHG

## 2021-02-09 DIAGNOSIS — J06.9 VIRAL URI: Primary | ICD-10-CM

## 2021-02-09 PROCEDURE — 6370000000 HC RX 637 (ALT 250 FOR IP): Performed by: STUDENT IN AN ORGANIZED HEALTH CARE EDUCATION/TRAINING PROGRAM

## 2021-02-09 PROCEDURE — 99282 EMERGENCY DEPT VISIT SF MDM: CPT

## 2021-02-09 RX ORDER — BENZOCAINE AND MENTHOL, UNSPECIFIED FORM 15; 2.3 MG/1; MG/1
1 LOZENGE ORAL
Qty: 20 LOZENGE | Refills: 0 | Status: ON HOLD | OUTPATIENT
Start: 2021-02-09 | End: 2021-03-10

## 2021-02-09 RX ADMIN — BENZOCAINE AND MENTHOL 1 LOZENGE: 15; 3.6 LOZENGE ORAL at 17:49

## 2021-02-09 NOTE — ED NOTES
Pt to Ed, c/o sore throat x 2 days, states he hears voices, denies SI and HI. NAD noted. Pt speaking  Clearly and in full sentences. rr even and unlabored.       Melisa Gallardo RN  02/09/21 5005

## 2021-02-09 NOTE — ED NOTES
Dr Avery Romero at bedside, pt refuses that he's suicidal, only c/o sore throat.       Melisa Gallardo RN  02/09/21 [id number]

## 2021-02-09 NOTE — ED PROVIDER NOTES
[facility]     Emergency Department     Faculty Note/ Attestation      Pt Name: Maged Farris                                       MRN: [id number]  Shingflu 1960  Date of evaluation: 2/9/2021    Patients PCP:    No primary care provider on file. Attestation  I performed a history and physical examination of the patient and discussed management with the resident. I reviewed the residents note and agree with the documented findings and plan of care. Any areas of disagreement are noted on the chart. I was personally present for the key portions of any procedures. I have documented in the chart those procedures where I was not present during the key portions. I have reviewed the emergency nurses triage note. I agree with the chief complaint, past medical history, past surgical history, allergies, medications, social and family history as documented unless otherwise noted below. For Physician Assistant/ Nurse Practitioner cases/documentation I have personally evaluated this patient and have completed at least one if not all key elements of the E/M (history, physical exam, and MDM). Additional findings are as noted.     Initial Screens:             Vitals:    Vitals:    02/09/21 1730   BP: 129/65   Pulse: 97   Resp: 18   Temp: 97.3 °F (36.3 °C)   TempSrc: Infrared   SpO2: 98%       CHIEF COMPLAINT       Chief Complaint   Patient presents with    Pharyngitis     \"since last night\"    Suicidal     \"the voices telling me to commit sucide\"        Patient is adamantly denying suicidal ideation to myself the resident and other staff patient states that he never said this he just wants to get checked out for sore throat nasal congestion and cough    DIAGNOSTIC RESULTS     RADIOLOGY:   No orders to display       LABS:  Labs Reviewed - No data to display    EMERGENCY DEPARTMENT COURSE:     -------------------------  BP: 129/65, Temp: 97.3 °F (36.3 °C), Pulse: 97, Resp: 18  Physical Exam

## 2021-02-10 ENCOUNTER — HOSPITAL ENCOUNTER (EMERGENCY)
Age: 61
Discharge: HOME | End: 2021-02-10
Attending: EMERGENCY MEDICINE
Payer: MEDICAID

## 2021-02-10 VITALS
RESPIRATION RATE: 16 BRPM | SYSTOLIC BLOOD PRESSURE: 106 MMHG | TEMPERATURE: 97.3 F | HEART RATE: 98 BPM | BODY MASS INDEX: 25.67 KG/M2 | OXYGEN SATURATION: 96 % | WEIGHT: 200 LBS | DIASTOLIC BLOOD PRESSURE: 78 MMHG | HEIGHT: 74 IN

## 2021-02-10 DIAGNOSIS — J02.9 VIRAL PHARYNGITIS: Primary | ICD-10-CM

## 2021-02-10 LAB
DIRECT EXAM: NORMAL
Lab: NORMAL
SPECIMEN DESCRIPTION: NORMAL

## 2021-02-10 PROCEDURE — 99285 EMERGENCY DEPT VISIT HI MDM: CPT

## 2021-02-10 PROCEDURE — 87880 STREP A ASSAY W/OPTIC: CPT

## 2021-02-10 PROCEDURE — 6370000000 HC RX 637 (ALT 250 FOR IP): Performed by: STUDENT IN AN ORGANIZED HEALTH CARE EDUCATION/TRAINING PROGRAM

## 2021-02-10 RX ORDER — ACETAMINOPHEN 500 MG
500 TABLET ORAL 3 TIMES DAILY PRN
Qty: 30 TABLET | Refills: 0 | Status: SHIPPED | OUTPATIENT
Start: 2021-02-10 | End: 2021-02-10 | Stop reason: SDUPTHER

## 2021-02-10 RX ORDER — IBUPROFEN 400 MG/1
400 TABLET ORAL EVERY 6 HOURS PRN
Qty: 30 TABLET | Refills: 0 | Status: ON HOLD | OUTPATIENT
Start: 2021-02-10 | End: 2021-03-10

## 2021-02-10 RX ORDER — DOCUSATE SODIUM 100 MG/1
100 CAPSULE, LIQUID FILLED ORAL 2 TIMES DAILY
Qty: 30 CAPSULE | Refills: 0 | Status: ON HOLD | OUTPATIENT
Start: 2021-02-10 | End: 2021-03-10

## 2021-02-10 RX ORDER — IBUPROFEN 400 MG/1
400 TABLET ORAL ONCE
Status: COMPLETED | OUTPATIENT
Start: 2021-02-10 | End: 2021-02-10

## 2021-02-10 RX ORDER — ACETAMINOPHEN 500 MG
1000 TABLET ORAL ONCE
Status: COMPLETED | OUTPATIENT
Start: 2021-02-10 | End: 2021-02-10

## 2021-02-10 RX ORDER — IBUPROFEN 400 MG/1
400 TABLET ORAL EVERY 6 HOURS PRN
Qty: 30 TABLET | Refills: 0 | Status: SHIPPED | OUTPATIENT
Start: 2021-02-10 | End: 2021-02-10 | Stop reason: SDUPTHER

## 2021-02-10 RX ORDER — ACETAMINOPHEN 500 MG
500 TABLET ORAL 3 TIMES DAILY PRN
Qty: 30 TABLET | Refills: 0 | Status: ON HOLD | OUTPATIENT
Start: 2021-02-10 | End: 2021-03-10

## 2021-02-10 RX ADMIN — ACETAMINOPHEN 1000 MG: 500 TABLET ORAL at 16:28

## 2021-02-10 RX ADMIN — IBUPROFEN 400 MG: 400 TABLET, FILM COATED ORAL at 16:28

## 2021-02-10 ASSESSMENT — ENCOUNTER SYMPTOMS
SHORTNESS OF BREATH: 0
COLOR CHANGE: 0
SHORTNESS OF BREATH: 0
VOMITING: 0
TROUBLE SWALLOWING: 1
SORE THROAT: 1
WHEEZING: 0
VOMITING: 0
BACK PAIN: 0
NAUSEA: 0
NAUSEA: 0
COUGH: 0
SORE THROAT: 1
ABDOMINAL PAIN: 0

## 2021-02-10 ASSESSMENT — PAIN DESCRIPTION - LOCATION: LOCATION: THROAT

## 2021-02-10 ASSESSMENT — PAIN DESCRIPTION - DESCRIPTORS: DESCRIPTORS: SORE

## 2021-02-10 ASSESSMENT — PAIN DESCRIPTION - FREQUENCY: FREQUENCY: CONTINUOUS

## 2021-02-10 ASSESSMENT — PAIN DESCRIPTION - PAIN TYPE: TYPE: ACUTE PAIN

## 2021-02-10 NOTE — ED NOTES
Writer met with patient at bedside regarding concerns of suicidal ideations. Patient reports that he is here for his sore throat, \"it is hard to talk and swallow. \"  Patient states that he hears voices and they tell him to harm himself, \"but I don't want to. \"  Patient denies intention to harm self, \"it's just want they say, but I ignore them. \"  Patient reports that he is linked with [facility] and is compliant with treatment and medications. Patient states that the nurse at [facility] sets up his medications for him in a med box. He was able to provide names of several of his medications to this writer. He reports having stable housing and feels safe at home. He reports that his only concern is for his throat currently. Patient states that if he has thoughts to harm himself he will call 911 or come to any ER.      TAVO Salazar  02/10/21 1589

## 2021-02-10 NOTE — ED TRIAGE NOTES
Pt was seen yesterday and released, pt sts he is here today for sore throat and SI. Pt is alert ox3 and cooperative.

## 2021-02-10 NOTE — ED PROVIDER NOTES
[facility]  ED  Emergency Department Encounter  EmergencyMedicine Resident     Pt Kaila Hernandez  MRN: [id number]  Shingflu 1960  Date of evaluation: 2/10/21  PCP:  No primary care provider on file. CHIEF COMPLAINT       Chief Complaint   Patient presents with    Pharyngitis     \"since last night\"    Suicidal     \"the voices telling me to commit sucide\"        HISTORY OF PRESENT ILLNESS  (Location/Symptom, Timing/Onset, Context/Setting, Quality, Duration, Modifying Factors, Severity.)      Karen Lake is a 63 y.o. male who presents with sore throat ongoing since last night. Patient was triaged as suicidal ideations however describes me that he is not suicidal.  Patient is adamant that he has no auditory hallucinations, suicidal or homicidal ideations this time. Patient does states that he has had some depression over the past week which is been worsening due to the death of his mother however is adamant that he is not feeling suicidal or homicidal at this time. He does report a sore throat over the past 1 to 2 days states that he has some pain with swallowing and is worse with waking up in the morning. Denies any recent fevers, chills, headache, shortness of breath, cough, chest pain. PAST MEDICAL / SURGICAL / SOCIAL / FAMILY HISTORY      has a past medical history of Bipolar disorder ([address] Depression, GERD (gastroesophageal reflux disease), Hallucinations, Headache(784.0), Hepatitis, Schizophrenia, schizo-affective ([address] Substance abuse ([address] Tobacco abuse, Type II or unspecified type diabetes mellitus without mention of complication, not stated as uncontrolled, and Urinary incontinence. has a past surgical history that includes Dental surgery and Abscess Drainage (N/A, 02/11/2018).       Social History     Socioeconomic History    Marital status: Single     Spouse name: Not on file    Number of children: 0    Years of education: 10    Highest education level: Not on file   Occupational History     Employer: N/A   Social Needs    Financial resource strain: Not on file    Food insecurity     Worry: Not on file     Inability: Not on file    Transportation needs     Medical: Not on file     Non-medical: Not on file   Tobacco Use    Smoking status: Current Every Day Smoker     Packs/day: 0.50     Types: Cigarettes    Smokeless tobacco: Never Used   Substance and Sexual Activity    Alcohol use: Yes     Comment: reports drinking occasionally    Drug use: Yes     Types: Cocaine     Comment: drug abuse includes cocaine,     Sexual activity: Not on file   Lifestyle    Physical activity     Days per week: Not on file     Minutes per session: Not on file    Stress: Not on file   Relationships    Social connections     Talks on phone: Not on file     Gets together: Not on file     Attends Taoism service: Not on file     Active member of club or organization: Not on file     Attends meetings of clubs or organizations: Not on file     Relationship status: Not on file    Intimate partner violence     Fear of current or ex partner: Not on file     Emotionally abused: Not on file     Physically abused: Not on file     Forced sexual activity: Not on file   Other Topics Concern    Not on file   Social History Narrative    Not on file       Family History   Problem Relation Age of Onset    Diabetes Mother     Heart Disease Mother        Allergies:  Navane [thiothixene]    Home Medications:  Prior to Admission medications    Medication Sig Start Date End Date Taking?  Authorizing Provider   Benzocaine-Menthol (CEPACOL) 15-2.3 MG LOZG Take 1 lozenge by mouth every 2 hours as needed (Sore throat) 2/9/21  Yes Helen Mendoza DO   OLANZapine zydis (ZYPREXA) 10 MG disintegrating tablet Take 1 tablet by mouth nightly 12/11/20   Cande Fry MD   hydrOXYzine (ATARAX) 25 MG tablet Take 25 mg by mouth 3 times daily as needed for Anxiety    Historical Provider, MD   acetaminophen (TYLENOL) 500 MG tablet Take 1 tablet by mouth 3 times daily as needed for Pain 11/20/20 12/20/20  Trav Rosales MD   traZODone (DESYREL) 50 MG tablet Take 1 tablet by mouth nightly as needed for Sleep 8/14/20   Ronak Rain MD   benztropine (COGENTIN) 2 MG tablet Take 1 tablet by mouth daily as needed (acute dystonia) 8/14/20   Ronak Rain MD       REVIEW OF SYSTEMS    (2-9 systems for level 4, 10 or more for level 5)      Review of Systems   Constitutional: Negative for fatigue and fever. HENT: Positive for sore throat and trouble swallowing. Negative for nosebleeds. Respiratory: Negative for shortness of breath and wheezing. Cardiovascular: Negative for chest pain and palpitations. Gastrointestinal: Negative for nausea and vomiting. Genitourinary: Negative for dysuria and flank pain. Musculoskeletal: Negative for back pain, neck pain and neck stiffness. Skin: Negative for pallor and rash. Neurological: Negative for syncope and headaches. Psychiatric/Behavioral: Negative for confusion and suicidal ideas. The patient is not nervous/anxious. PHYSICAL EXAM   (up to 7 for level 4, 8 or more for level 5)      INITIAL VITALS:   /65   Pulse 97   Temp 97.3 °F (36.3 °C) (Infrared)   Resp 18   SpO2 98%     Physical Exam  Constitutional:       General: He is not in acute distress. Appearance: He is not toxic-appearing. HENT:      Head: Normocephalic and atraumatic. Right Ear: Tympanic membrane normal. No swelling or tenderness. Left Ear: Tympanic membrane normal.      Nose: No congestion or rhinorrhea. Mouth/Throat:      Mouth: Mucous membranes are moist.      Pharynx: Oropharynx is clear. Posterior oropharyngeal erythema present. No oropharyngeal exudate. Tonsils: 0 on the right. 0 on the left. Eyes:      Conjunctiva/sclera: Conjunctivae normal.      Pupils: Pupils are equal, round, and reactive to light.    Cardiovascular:      Rate and Rhythm: Normal rate. Heart sounds: No murmur. No friction rub. No gallop. Pulmonary:      Breath sounds: No wheezing or rhonchi. Abdominal:      Tenderness: There is no abdominal tenderness. There is no rebound. Skin:     Coloration: Skin is not pale. Findings: No rash. Neurological:      Mental Status: He is alert. Psychiatric:         Attention and Perception: He does not perceive auditory or visual hallucinations. Thought Content: Thought content does not include homicidal or suicidal ideation. Thought content does not include homicidal or suicidal plan. DIFFERENTIAL  DIAGNOSIS     PLAN (LABS / IMAGING / EKG):  No orders of the defined types were placed in this encounter. MEDICATIONS ORDERED:  Orders Placed This Encounter   Medications    benzocaine-menthol (CEPACOL SORE THROAT) lozenge 1 lozenge    Benzocaine-Menthol (CEPACOL) 15-2.3 MG LOZG     Sig: Take 1 lozenge by mouth every 2 hours as needed (Sore throat)     Dispense:  20 lozenge     Refill:  0       DDX: Viral pharyngitis, sleep apnea, viral URI, strep pharyngitis    DIAGNOSTIC RESULTS / EMERGENCY DEPARTMENT COURSE / MDM   LAB RESULTS:  No results found for this visit on 02/09/21. IMPRESSION/ ED Course: 48-year-old male with history of schizoaffective disorder presenting with sore throat. No evidence of tonsillar exudate or erythema on exam.  Patient is adamant he is not suicidal or homicidal and not currently having hallucinations. Patient is alert, oriented, does not appear clinically intoxicated and appears to have decision-making capacity. He was treated emergency department with Cepacol lozenge for throat given outpatient prescription for the same. He is instructed to return emergency department should he develop any suicidal homicidal ideations or hallucinations.   He verbalized understanding of and agreement with discharge plan      PROCEDURES:  None    CONSULTS:  None    CRITICAL CARE:  Please see attending note    FINAL IMPRESSION      1.  Viral URI          DISPOSITION / PLAN     DISPOSITION Decision To Discharge 02/09/2021 06:13:10 PM      PATIENT REFERRED TO:  [facility]  [address] [facility] [address] [phone]  Go to   As needed, If symptoms worsen      DISCHARGE MEDICATIONS:  Discharge Medication List as of 2/9/2021  6:42 PM      START taking these medications    Details   Benzocaine-Menthol (CEPACOL) 15-2.3 MG LOZG Take 1 lozenge by mouth every 2 hours as needed (Sore throat), Disp-20 lozenge, [id number]             Jose Martin Irving DO  Emergency Medicine Resident    (Please note that portions of thisnote were completed with a voice recognition program.  Efforts were made to edit the dictations but occasionally words are mis-transcribed.)        Jose Martin Irving DO  Resident  02/10/21 [id number]

## 2021-02-10 NOTE — ED PROVIDER NOTES
[facility]  ED  Emergency Department Encounter  Emergency Medicine Resident     Pt Name: Leighton Najjar  MRN: [id number]  Arms[place]flu 1960  Date of evaluation: 2/10/21  PCP:  No primary care provider on file. CHIEF COMPLAINT       Chief Complaint   Patient presents with    Suicidal     was here yesterday fir the same concern    Pharyngitis     \"since last night\"       HISTORY OFPRESENT ILLNESS  (Location/Symptom, Timing/Onset, Context/Setting, Quality, Duration, Modifying Factors,Severity.)      Leighton Najjar is a 63 y.o. male who presents with chief complaint of sore throat. Patient states he has been having chills over the previous 2 days. Pain is located right lateral neck, with some radiation into his anterior neck. Patient states pain is aggravated with eating and drinking and as such he has been eating and drinking less over the previous 2 days. Patient was seen yesterday at [facility] emergency department for similar complaint and discharged home with Cepacol lozenges. He states he has been taking his lozenges with little relief of his pain. Patient also reports hearing voices, he states he always hears voices and the voices are not talking to him anymore than usual.  He states the voices are telling him to kill himself, however when asked if he has a plan or desires to kill himself he states no. When asked what would he do if he was discharged and went home today, he stated \"I would probably eat some food and then go to bed \". Patient denies homicidal or suicidal ideations at this time.     PAST MEDICAL / SURGICAL / SOCIAL / FAMILY HISTORY      has a past medical history of Bipolar disorder ([address] Depression, GERD (gastroesophageal reflux disease), Hallucinations, Headache(784.0), Hepatitis, Schizophrenia, schizo-affective ([address] Substance abuse ([address] Tobacco abuse, Type II or unspecified type diabetes mellitus without mention of complication, not stated as uncontrolled, and Urinary incontinence. has a past surgical history that includes Dental surgery and Abscess Drainage (N/A, 02/11/2018). Social History     Socioeconomic History    Marital status: Single     Spouse name: Not on file    Number of children: 0    Years of education: 8    Highest education level: Not on file   Occupational History     Employer: N/A   Social Needs    Financial resource strain: Not on file    Food insecurity     Worry: Not on file     Inability: Not on file   [employer] needs     Medical: Not on file     Non-medical: Not on file   Tobacco Use    Smoking status: Current Every Day Smoker     Packs/day: 0.50     Types: Cigarettes    Smokeless tobacco: Never Used   Substance and Sexual Activity    Alcohol use: Yes     Comment: reports drinking occasionally    Drug use: Yes     Types: Cocaine     Comment: drug abuse includes cocaine,     Sexual activity: Not on file   Lifestyle    Physical activity     Days per week: Not on file     Minutes per session: Not on file    Stress: Not on file   Relationships    Social connections     Talks on phone: Not on file     Gets together: Not on file     Attends Rastafarian service: Not on file     Active member of club or organization: Not on file     Attends meetings of clubs or organizations: Not on file     Relationship status: Not on file    Intimate partner violence     Fear of current or ex partner: Not on file     Emotionally abused: Not on file     Physically abused: Not on file     Forced sexual activity: Not on file   Other Topics Concern    Not on file   Social History Narrative    Not on file       Family History   Problem Relation Age of Onset    Diabetes Mother     Heart Disease Mother        Allergies:  Navane [thiothixene]    Home Medications:  Prior to Admission medications    Medication Sig Start Date End Date Taking?  Authorizing Provider   acetaminophen (TYLENOL) 500 MG tablet Take 1 tablet by mouth 3 times daily as needed for Pain 2/10/21 2/20/21 Yes Micah Santos, DO   ibuprofen (IBU) 400 MG tablet Take 1 tablet by mouth every 6 hours as needed for Pain 2/10/21  Yes Micah Santos, DO   docusate sodium (COLACE) 100 MG capsule Take 1 capsule by mouth 2 times daily 2/10/21  Yes Micah Santos, DO   Benzocaine-Menthol (CEPACOL) 15-2.3 MG LOZG Take 1 lozenge by mouth every 2 hours as needed (Sore throat) 2/9/21   [place], DO   OLANZapine zydis (ZYPREXA) 10 MG disintegrating tablet Take 1 tablet by mouth nightly 12/11/20   Jai Brooks MD   hydrOXYzine (ATARAX) 25 MG tablet Take 25 mg by mouth 3 times daily as needed for Anxiety    Historical Provider, MD   traZODone (DESYREL) 50 MG tablet Take 1 tablet by mouth nightly as needed for Sleep 8/14/20   Dami Cruz MD   benztropine (COGENTIN) 2 MG tablet Take 1 tablet by mouth daily as needed (acute dystonia) 8/14/20   Dami Cruz MD       REVIEW OF SYSTEMS    (2-9 systems for level 4, 10 or more for level 5)      Review of Systems   Constitutional: Negative for chills and fever. HENT: Positive for sore throat. Respiratory: Negative for cough and shortness of breath. Cardiovascular: Negative for chest pain. Gastrointestinal: Negative for abdominal pain, nausea and vomiting. Musculoskeletal: Negative for myalgias. Skin: Negative for color change and rash. Neurological: Negative for headaches. Psychiatric/Behavioral: Positive for hallucinations. Negative for self-injury and suicidal ideas. PHYSICAL EXAM   (up to 7 for level 4, 8 or more for level 5)     INITIAL VITALS:    height is 6' 2\" (1.88 m) and weight is 200 lb (90.7 kg). His infrared temperature is 97.3 °F (36.3 °C). His blood pressure is 106/78 and his pulse is 98. His respiration is 16 and oxygen saturation is 96%. Physical Exam  Constitutional:       General: He is not in acute distress. Appearance: He is well-developed. He is not ill-appearing or toxic-appearing. HENT:      Mouth/Throat:      Mouth: Mucous membranes are moist. No oral lesions. Pharynx: Uvula midline. Posterior oropharyngeal erythema present. No pharyngeal swelling, oropharyngeal exudate or uvula swelling. Tonsils: Tonsillar exudate present. No tonsillar abscesses. 0 on the right. 0 on the left. Neck:      Comments: No palpable cervical adenopathy, however patient does admit to tenderness upon palpation of right sternocleidomastoid muscle. Cardiovascular:      Rate and Rhythm: Normal rate and regular rhythm. Heart sounds: Normal heart sounds. No murmur. Pulmonary:      Effort: Pulmonary effort is normal. No respiratory distress. Breath sounds: Normal breath sounds. No wheezing. Abdominal:      Palpations: Abdomen is soft. Tenderness: There is no abdominal tenderness. Skin:     General: Skin is warm and dry. Capillary Refill: Capillary refill takes less than 2 seconds. Findings: No rash. Neurological:      General: No focal deficit present. Mental Status: He is alert and oriented to person, place, and time.    Psychiatric:         Mood and Affect: Mood normal.         Behavior: Behavior normal.         DIFFERENTIAL  DIAGNOSIS     PLAN (LABS / IMAGING / EKG):  Orders Placed This Encounter   Procedures    Strep Screen Group A Throat    Inpatient consult to Social Work       MEDICATIONS ORDERED:  Orders Placed This Encounter   Medications    acetaminophen (TYLENOL) tablet 1,000 mg    ibuprofen (ADVIL;MOTRIN) tablet 400 mg    DISCONTD: ibuprofen (IBU) 400 MG tablet     Sig: Take 1 tablet by mouth every 6 hours as needed for Pain     Dispense:  30 tablet     Refill:  0    DISCONTD: acetaminophen (TYLENOL) 500 MG tablet     Sig: Take 1 tablet by mouth 3 times daily as needed for Pain     Dispense:  30 tablet     Refill:  0    acetaminophen (TYLENOL) 500 MG tablet     Sig: Take 1 tablet by mouth 3 times daily as needed for Pain     Dispense:  30 tablet Refill:  0    ibuprofen (IBU) 400 MG tablet     Sig: Take 1 tablet by mouth every 6 hours as needed for Pain     Dispense:  30 tablet     Refill:  0    docusate sodium (COLACE) 100 MG capsule     Sig: Take 1 capsule by mouth 2 times daily     Dispense:  30 capsule     Refill:  0       DDX: Strep pharyngitis, viral pharyngitis viral URI    Initial MDM/Plan: 63 y.o. male who presents with complaint of sore throat of 2 days duration. Patient states he has been eating and drinking less as a result of the pain. Patient denies trouble with oral secretions or respiratory difficulty. Patient was seen yesterday at [facility] emergency department and given Cepacol lozenges and discharged home. Patient also admits to hearing voices, however states the voices are not speaking more than usual and he denies suicidal or homicidal ideations at this time. Patient seen and examined. He is well-appearing, vital signs are normal.  He is in no respiratory distress, speaking in full sentences. Oropharynx visualized. There is erythema the posterior oropharynx, no uvular swelling, no peritonsillar abscess. Her is what appears to be black spots present bilateral tonsils. Lungs clear to auscultation bilaterally. Patient having no difficulty tolerating oral secretions. Given patient was here yesterday for similar complaint and has returned will perform rapid strep testing today. Although The patient denies suicidal ideations will have social work see the patient. DIAGNOSTIC RESULTS / EMERGENCY DEPARTMENT COURSE / MDM     LABS:  Labs Reviewed   STREP SCREEN GROUP A THROAT         RADIOLOGY:  No results found. EMERGENCY DEPARTMENT COURSE:  ED Course as of Feb 10 1828   Wed Feb 10, 2021   1721 Test resulted and was negative. Patient reports throat pain is improved with Tylenol Motrin. Patient given water and abby crackers which he tolerated well with no difficulty.   Given patient symptoms are improving, he is displaying no respiratory difficulty, and is able to tolerate p.o. food and drink at this time he is appropriate for discharge home. [DS]   5688 Patient given prescription for Tylenol Motrin, return precautions for new worsening symptoms such as inability to tolerate food, increasing pain, fevers not responsive to Tylenol Motrin, inability to tolerate oral secretions, and respiratory difficulty. Upon discussing discharge instruction with patient, patient states he has been unable to have a bowel movement in 3 days and has been straining. Patient deferred rectal exam.  Patient's abdomen remains soft, nontender. Patient given prescription for Colace, and instructed to return if unable to have a bowel movement in the next few days. Patient verbalized understanding and was discharged home in good condition. [DS]      ED Course User Index  [DS] Vic Holliday DO         PROCEDURES:  None    CONSULTS:  IP CONSULT TO SOCIAL WORK    CRITICAL CARE:  Please see attending note    FINAL IMPRESSION      1. Viral pharyngitis          DISPOSITION / PLAN     DISPOSITION Decision To Discharge 02/10/2021 05:23:00 PM        PATIENTREFERRED TO:  No follow-up provider specified.     DISCHARGE MEDICATIONS:  Discharge Medication List as of 2/10/2021  5:26 PM      START taking these medications    Details   ibuprofen (IBU) 400 MG tablet Take 1 tablet by mouth every 6 hours as needed for Pain, Disp-30 tablet, R-0Print             Vic Holliday DO  EmergencyMedicine Resident    (Please note that portions of this note were completed with a voice recognition program.  Efforts were made to edit the dictations but occasionally words are mis-transcribed.)        Vic Holliday DO  Resident  02/10/21 9804

## 2021-02-10 NOTE — ED PROVIDER NOTES
[facility] ED  eMERGENCY dEPARTMENT eNCOUnter   Attending Attestation     Pt Name: Jose Lira  MRN: [id number]  Shingflu 1960  Date of evaluation: 2/10/21       Jose Lira is a 63 y.o. male who presents with Suicidal (was here yesterday fir the same concern) and Pharyngitis (\"since last night\")      History: Patient presents with sore throat since last night. Patient says that he has had suicidal ideation with voices telling him to hurt himself. Patient says this is chronic and an every day occurrence. Patient has no new suicidal thoughts or ideations. Patient does not want to harm self at this time despite the voices telling him to do so. Patient said he is having difficulty swallowing. Exam: Heart rate and rhythm are regular. Lungs are clear to auscultation bilaterally. Abdomen is soft, nontender. Patient is well-appearing. Throat is clear. Oropharynx shows no swelling, erythema, exudate. We will do p.o. challenge, if patient is unable to swallow solids consider admission for GI to see considering he does not have appropriate follow-up and I have concern that he will be lost to follow-up. I performed a history and physical examination of the patient and discussed management with the resident. I reviewed the residents note and agree with the documented findings and plan of care. Any areas of disagreement are noted on the chart. I was personally present for the key portions of any procedures. I have documented in the chart those procedures where I was not present during the key portions. I have personally reviewed all images and agree with the resident's interpretation. I have reviewed the emergency nurses triage note. I agree with the chief complaint, past medical history, past surgical history, allergies, medications, social and family history as documented unless otherwise noted below.  Documentation of the HPI, Physical Exam and Medical Decision Making performed by medical students or scribes is based on my personal performance of the HPI, PE and MDM. For Phys Assistant/ Nurse Practitioner cases/documentation I have had a face to face evaluation of this patient and have completed at least one if not all key elements of the E/M (history, physical exam, and MDM). Additional findings are as noted. For APC cases I have personally evaluated and examined the patient in conjunction with the APC and agree with the treatment plan and disposition of the patient as recorded by the APC.     Stella Daniels MD  Attending Emergency  Physician       Chauncy Bumpers, MD  02/10/21 8856

## 2021-02-10 NOTE — ED NOTES
contacted [facility] to arrange transport home for patient; trip # [id number].     FRANKLIN Collins, CHANEL Bird Vijay  02/09/21 6639

## 2021-02-12 ENCOUNTER — HOSPITAL ENCOUNTER (EMERGENCY)
Age: 61
Discharge: HOME | End: 2021-02-12
Attending: EMERGENCY MEDICINE
Payer: MEDICAID

## 2021-02-12 VITALS
TEMPERATURE: 97.7 F | SYSTOLIC BLOOD PRESSURE: 127 MMHG | BODY MASS INDEX: 22.13 KG/M2 | RESPIRATION RATE: 16 BRPM | WEIGHT: 178 LBS | HEIGHT: 75 IN | OXYGEN SATURATION: 95 % | HEART RATE: 70 BPM | DIASTOLIC BLOOD PRESSURE: 77 MMHG
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
## 2021-02-12 PROCEDURE — 93005 ELECTROCARDIOGRAM TRACING: CPT | Performed by: STUDENT IN AN ORGANIZED HEALTH CARE EDUCATION/TRAINING PROGRAM

## 2021-02-12 PROCEDURE — 99285 EMERGENCY DEPT VISIT HI MDM: CPT

## 2021-02-12 PROCEDURE — 2580000003 HC RX 258: Performed by: STUDENT IN AN ORGANIZED HEALTH CARE EDUCATION/TRAINING PROGRAM

## 2021-02-12 RX ORDER — ACETAMINOPHEN 500 MG
1000 TABLET ORAL ONCE
Status: DISCONTINUED | OUTPATIENT
Start: 2021-02-12 | End: 2021-02-12 | Stop reason: HOSPADM

## 2021-02-12 RX ORDER — SODIUM CHLORIDE, SODIUM LACTATE, POTASSIUM CHLORIDE, CALCIUM CHLORIDE 600; 310; 30; 20 MG/100ML; MG/100ML; MG/100ML; MG/100ML
1000 INJECTION, SOLUTION INTRAVENOUS ONCE
Status: COMPLETED | OUTPATIENT
Start: 2021-02-12 | End: 2021-02-12

## 2021-02-12 RX ADMIN — SODIUM CHLORIDE, POTASSIUM CHLORIDE, SODIUM LACTATE AND CALCIUM CHLORIDE 1000 ML: 600; 310; 30; 20 INJECTION, SOLUTION INTRAVENOUS at 12:11

## 2021-02-12 ASSESSMENT — ENCOUNTER SYMPTOMS
COUGH: 0
SHORTNESS OF BREATH: 0
CHEST TIGHTNESS: 0
DIARRHEA: 0
WHEEZING: 0
VOMITING: 0
BACK PAIN: 0
NAUSEA: 0
ABDOMINAL PAIN: 0
CONSTIPATION: 0

## 2021-02-12 ASSESSMENT — PAIN DESCRIPTION - DESCRIPTORS: DESCRIPTORS: THROBBING

## 2021-02-12 ASSESSMENT — PAIN SCALES - GENERAL: PAINLEVEL_OUTOF10: 5

## 2021-02-12 ASSESSMENT — PAIN DESCRIPTION - PAIN TYPE: TYPE: ACUTE PAIN

## 2021-02-12 ASSESSMENT — PAIN DESCRIPTION - ORIENTATION: ORIENTATION: POSTERIOR

## 2021-02-12 ASSESSMENT — PAIN DESCRIPTION - LOCATION: LOCATION: HEAD

## 2021-02-12 NOTE — ED NOTES
contacted [facility] to arrange transport home for patient; reservation # [id number].       FRANKLIN Cast, LOREW     Leyda Esteban  02/12/21 1979

## 2021-02-12 NOTE — ED PROVIDER NOTES
[facility]  ED  Emergency Department Encounter  Emergency Medicine Resident     Pt Name: Lissett Rojo  MRN: [id number]  [place]flu 1960  Date of evaluation: 2/12/21  PCP:  No primary care provider on file. CHIEF COMPLAINT       Chief Complaint   Patient presents with    Fall     fell went to sit down on chair , missed chair fell backwards hit head       HISTORY OFPRESENT ILLNESS  (Location/Symptom, Timing/Onset, Context/Setting, Quality, Duration, Modifying Factors,Severity.)      Lissett Rojo is a 63 y.o. male who presents with fall. Patient was attempting get to get into a chair, when he missed the chair. Patient initially says he was lightheaded before this, however denies that now. Patient did not lose consciousness. He did hit the left side of his head on the chair. Not on anticoagulation. States the headache is mild, 5 out of 10. Has not taken anything for his pain. Patient was brought in by EMS, and they said initially he was hypotensive in the 11N systolic. This improved to 110 after starting IV fluids. PAST MEDICAL / SURGICAL / SOCIAL / FAMILY HISTORY      has a past medical history of Bipolar disorder ([address] Depression, GERD (gastroesophageal reflux disease), Hallucinations, Headache(784.0), Hepatitis, Schizophrenia, schizo-affective ([address] Substance abuse ([address] Tobacco abuse, Type II or unspecified type diabetes mellitus without mention of complication, not stated as uncontrolled, and Urinary incontinence. has a past surgical history that includes Dental surgery and Abscess Drainage (N/A, 02/11/2018).      Social History     Socioeconomic History    Marital status: Single     Spouse name: Not on file    Number of children: 0    Years of education: 8    Highest education level: Not on file   Occupational History     Employer: N/A   Social Needs    Financial resource strain: Not on file    Food insecurity     Worry: Not on file     Inability: Not on file    Transportation needs     Medical: Not on file     Non-medical: Not on file   Tobacco Use    Smoking status: Current Every Day Smoker     Packs/day: 0.50     Types: Cigarettes    Smokeless tobacco: Never Used   Substance and Sexual Activity    Alcohol use: Yes     Comment: reports drinking occasionally    Drug use: Yes     Types: Cocaine     Comment: drug abuse includes cocaine,     Sexual activity: Not on file   Lifestyle    Physical activity     Days per week: Not on file     Minutes per session: Not on file    Stress: Not on file   Relationships    Social connections     Talks on phone: Not on file     Gets together: Not on file     Attends [employer] service: Not on file     Active member of club or organization: Not on file     Attends meetings of clubs or organizations: Not on file     Relationship status: Not on file    Intimate partner violence     Fear of current or ex partner: Not on file     Emotionally abused: Not on file     Physically abused: Not on file     Forced sexual activity: Not on file   Other Topics Concern    Not on file   Social History Narrative    Not on file       Family History   Problem Relation Age of Onset    Diabetes Mother     Heart Disease Mother         Allergies:  Navane [thiothixene]    Home Medications:  Prior to Admission medications    Medication Sig Start Date End Date Taking?  Authorizing Provider   acetaminophen (TYLENOL) 500 MG tablet Take 1 tablet by mouth 3 times daily as needed for Pain 2/10/21 2/20/21  Cami Neely DO   ibuprofen (IBU) 400 MG tablet Take 1 tablet by mouth every 6 hours as needed for Pain 2/10/21   Cami Neely DO   docusate sodium (COLACE) 100 MG capsule Take 1 capsule by mouth 2 times daily 2/10/21   Cami Neely DO   Benzocaine-Menthol (CEPACOL) 15-2.3 MG LOZG Take 1 lozenge by mouth every 2 hours as needed (Sore throat) 2/9/21   Julianne Myers DO   OLANZapine zydis (ZYPREXA) 10 MG disintegrating tablet Take 1 tablet by mouth nightly 12/11/20   Cande Fry MD   hydrOXYzine (ATARAX) 25 MG tablet Take 25 mg by mouth 3 times daily as needed for Anxiety    Historical Provider, MD   traZODone (DESYREL) 50 MG tablet Take 1 tablet by mouth nightly as needed for Sleep 8/14/20   Jimmy Wilkinson MD   benztropine (COGENTIN) 2 MG tablet Take 1 tablet by mouth daily as needed (acute dystonia) 8/14/20   Jimmy Wilkinson MD       REVIEW OFSYSTEMS    (2-9 systems for level 4, 10 or more for level 5)      Review of Systems   Constitutional: Negative for chills, diaphoresis, fatigue and fever. Respiratory: Negative for cough, chest tightness, shortness of breath and wheezing. Cardiovascular: Negative for chest pain, palpitations and leg swelling. Gastrointestinal: Negative for abdominal pain, constipation, diarrhea, nausea and vomiting. Musculoskeletal: Negative for arthralgias, back pain, neck pain and neck stiffness. Neurological: Positive for headaches. Negative for dizziness, weakness, light-headedness and numbness. PHYSICAL EXAM   (up to 7 for level 4, 8 or more forlevel 5)      INITIAL VITALS:   ED Triage Vitals [02/12/21 1153]   BP Temp Temp Source Pulse Resp SpO2 Height Weight   127/77 97.7 °F (36.5 °C) Oral 70 16 95 % 6' 3\" (1.905 m) 178 lb (80.7 kg)       Physical Exam  Constitutional:       General: He is not in acute distress. Appearance: He is well-developed. He is not diaphoretic. HENT:      Head: Normocephalic and atraumatic. Right Ear: There is impacted cerumen. Ears:      Comments: No hemotympanum on the left side, which is the side that hit the chair. Right ear impacted by cerumen. Mouth/Throat:      Mouth: Mucous membranes are moist.      Pharynx: Oropharynx is clear. Eyes:      Conjunctiva/sclera: Conjunctivae normal.      Pupils: Pupils are equal, round, and reactive to light. Neck:      Musculoskeletal: Normal range of motion and neck supple. Vascular: No JVD. Trachea: No tracheal deviation. Cardiovascular:      Rate and Rhythm: Normal rate and regular rhythm. Heart sounds: Normal heart sounds. No murmur. No friction rub. Pulmonary:      Effort: Pulmonary effort is normal. No respiratory distress. Breath sounds: Normal breath sounds. No wheezing or rales. Chest:      Chest wall: No tenderness. Abdominal:      General: Bowel sounds are normal. There is no distension. Palpations: Abdomen is soft. Tenderness: There is no abdominal tenderness. There is no guarding. Skin:     General: Skin is warm and dry. Capillary Refill: Capillary refill takes less than 2 seconds. Coloration: Skin is not pale. Findings: No erythema or rash. Neurological:      Mental Status: He is alert and oriented to person, place, and time. Cranial Nerves: No cranial nerve deficit. Psychiatric:         Mood and Affect: Mood normal.         Behavior: Behavior normal.         DIFFERENTIAL  DIAGNOSIS     PLAN (LABS / IMAGING / EKG):  Orders Placed This Encounter   Procedures    EKG 12 Lead       MEDICATIONS ORDERED:  Orders Placed This Encounter   Medications    lactated ringers infusion 1,000 mL    DISCONTD: acetaminophen (TYLENOL) tablet 1,000 mg       DDX: mechanical fall    Initial MDM/Plan: 63 y.o. male who presents with fall. No LOC, not anticoagulation. Pain is mild. EKG within normal limits. Will give Tylenol, IV fluids, likely discharge. DIAGNOSTIC RESULTS / EMERGENCYDEPARTMENT COURSE / MDM     LABS:  Labs Reviewed - No data to display      RADIOLOGY:  No results found.       EKG  EKG Interpretation    Interpreted by emergency department physician    Rhythm: normal sinus   Rate: normal  Axis: normal  Ectopy: none  Conduction: normal  ST Segments: normal  T Waves: normal  Q Waves: none    Clinical Impression: no acute changes    Lilly Bhatt    All EKG's are interpreted by the Emergency Department Physicianwho either signs or

## 2021-02-12 NOTE — ED NOTES
Bed: 28  Expected date:   Expected time:   Means of arrival:   Comments:  [address], RN  02/12/21 3648

## 2021-02-13 LAB
EKG ATRIAL RATE: 71 BPM
EKG P AXIS: 73 DEGREES
EKG P-R INTERVAL: 174 MS
EKG Q-T INTERVAL: 422 MS
EKG QRS DURATION: 88 MS
EKG QTC CALCULATION (BAZETT): 458 MS
EKG R AXIS: 80 DEGREES
EKG T AXIS: 80 DEGREES
EKG VENTRICULAR RATE: 71 BPM

## 2021-02-26 ENCOUNTER — HOSPITAL ENCOUNTER (EMERGENCY)
Age: 61
Discharge: HOME | End: 2021-02-26
Attending: EMERGENCY MEDICINE
Payer: MEDICAID

## 2021-02-26 VITALS
RESPIRATION RATE: 18 BRPM | HEART RATE: 97 BPM | SYSTOLIC BLOOD PRESSURE: 102 MMHG | TEMPERATURE: 97.9 F | DIASTOLIC BLOOD PRESSURE: 58 MMHG | OXYGEN SATURATION: 99 %

## 2021-02-26 DIAGNOSIS — F20.9 SCHIZOPHRENIA, UNSPECIFIED TYPE (HCC): Primary | ICD-10-CM

## 2021-02-26 PROCEDURE — 99283 EMERGENCY DEPT VISIT LOW MDM: CPT

## 2021-02-26 ASSESSMENT — ENCOUNTER SYMPTOMS
BACK PAIN: 0
NAUSEA: 0
EYE REDNESS: 0
CHEST TIGHTNESS: 0
ABDOMINAL PAIN: 0
CONSTIPATION: 0
EYE DISCHARGE: 0
SINUS PRESSURE: 0
BLOOD IN STOOL: 0
WHEEZING: 0
VOMITING: 0
COUGH: 0
EYE PAIN: 0
COLOR CHANGE: 0
DIARRHEA: 0
SHORTNESS OF BREATH: 0
TROUBLE SWALLOWING: 0
FACIAL SWELLING: 0
SORE THROAT: 0
RHINORRHEA: 0

## 2021-02-26 NOTE — ED PROVIDER NOTES
[address]  EMERGENCY DEPARTMENT ENCOUNTER      Pt Name: Dannie Suazo  MRN: [id number]  [place] 1960  Date of evaluation: 2/26/21      CHIEF COMPLAINT       Chief Complaint   Patient presents with   [address] Problem    Suicidal         HISTORY OF PRESENT ILLNESS    Dannie Suazo is a 63 y.o. male who presents complaining of Psychiatric Evaluation. Patient is here stating that he is having suicidal ideation because the voices are telling him that if he does not kill himself then he will get killed by them. Patient states that he takes all of his medications and follows with the [facility]. Patient states he does not have any drug or alcohol issues. Patient has been seen a few times for hallucinations last admission to the hospital was in December. Patient reportedly was just diagnosed with pneumonia a couple days ago at [facility].  Patient was not having any suicidal ideation at that time. REVIEW OF SYSTEMS       Review of Systems   Constitutional: Negative for activity change, appetite change, chills, diaphoresis and fever. HENT: Negative for congestion, ear pain, facial swelling, nosebleeds, rhinorrhea, sinus pressure, sore throat and trouble swallowing. Eyes: Negative for pain, discharge and redness. Respiratory: Negative for cough, chest tightness, shortness of breath and wheezing. Cardiovascular: Negative for chest pain, palpitations and leg swelling. Gastrointestinal: Negative for abdominal pain, blood in stool, constipation, diarrhea, nausea and vomiting. Genitourinary: Negative for difficulty urinating, dysuria, flank pain, frequency, genital sores and hematuria. Musculoskeletal: Negative for arthralgias, back pain, gait problem, joint swelling, myalgias and neck pain. Skin: Negative for color change, pallor, rash and wound. Neurological: Negative for dizziness, tremors, seizures, syncope, speech difficulty, weakness, numbness and headaches. Psychiatric/Behavioral: Positive for dysphoric mood, hallucinations and suicidal ideas. Negative for confusion, decreased concentration, self-injury and sleep disturbance. PAST MEDICAL HISTORY     Past Medical History:   Diagnosis Date    [facility]     Depression     GERD (gastroesophageal reflux disease)     Hallucinations     Headache(784.0)     Hepatitis     Schizophrenia, schizo[facility]affective ([facility]     Substance abuse ([facility]     Tobacco abuse     Type II or unspecified type diabetes mellitus without mention of complication, not stated as uncontrolled     Urinary incontinence        SURGICAL HISTORY       Past Surgical History:   Procedure Laterality Date    ABSCESS DRAINAGE N/A 02/11/2018    Carla anal abcess    DENTAL SURGERY      all teeth pulled       CURRENT MEDICATIONS       Current Discharge Medication List      CONTINUE these medications which have NOT CHANGED    Details   acetaminophen (TYLENOL) 500 MG tablet Take 1 tablet by mouth 3 times daily as needed for Pain  Qty: 30 tablet, Refills: 0      ibuprofen (IBU) 400 MG tablet Take 1 tablet by mouth every 6 hours as needed for Pain  Qty: 30 tablet, Refills: 0      docusate sodium (COLACE) 100 MG capsule Take 1 capsule by mouth 2 times daily  Qty: 30 capsule, Refills: 0      Benzocaine-Menthol (CEPACOL) 15-2.3 MG LOZG Take 1 lozenge by mouth every 2 hours as needed (Sore throat)  Qty: 20 lozenge, Refills: 0      OLANZapine zydis (ZYPREXA) 10 MG disintegrating tablet Take 1 tablet by mouth nightly  Qty: 30 tablet, Refills: 3      hydrOXYzine (ATARAX) 25 MG tablet Take 25 mg by mouth 3 times daily as needed for Anxiety      traZODone (DESYREL) 50 MG tablet Take 1 tablet by mouth nightly as needed for Sleep  Qty: 30 tablet, Refills: 0      benztropine (COGENTIN) 2 MG tablet Take 1 tablet by mouth daily as needed (acute dystonia)  Qty: 60 tablet, Refills: 0             ALLERGIES     is allergic to navane [thiothixene].     SOCIAL HISTORY      reports that he has been smoking cigarettes. He has been smoking about 0.50 packs per day. He has never used smokeless tobacco. He reports current alcohol use. He reports current drug use. Drug: Cocaine. PHYSICAL EXAM     INITIAL VITALS: BP (!) 102/58   Pulse 97   Temp 97.9 °F (36.6 °C) (Oral)   Resp 18   SpO2 99%      Physical Exam  Constitutional:       General: He is not in acute distress. Appearance: He is well-developed. He is not diaphoretic. HENT:      Head: Normocephalic and atraumatic. Eyes:      General: No scleral icterus. Right eye: No discharge. Left eye: No discharge. Conjunctiva/sclera: Conjunctivae normal.      Pupils: Pupils are equal, round, and reactive to light. Cardiovascular:      Rate and Rhythm: Normal rate and regular rhythm. Heart sounds: Normal heart sounds. No murmur. No friction rub. No gallop. Pulmonary:      Effort: Pulmonary effort is normal. No respiratory distress. Breath sounds: Normal breath sounds. No wheezing or rales. Neurological:      Mental Status: He is alert and oriented to person, place, and time. Psychiatric:         Attention and Perception: He perceives auditory hallucinations. Mood and Affect: Mood is depressed. Speech: Speech is delayed. Behavior: Behavior is slowed and withdrawn. Thought Content: Thought content includes suicidal ideation. DIAGNOSTIC RESULTS     LABS: All lab results were reviewed by myself, and all abnormals are listed below. Labs Reviewed   COVID-19, RAPID         MEDICAL DECISION MAKING:     Patient just recently had a full work-up minus a Covid swab. We will get the Covid swab and then discussed the case with psychiatry.       EMERGENCY DEPARTMENT COURSE:   Vitals:    Vitals:    02/26/21 1740   BP: (!) 102/58   Pulse: 97   Resp: 18   Temp: 97.9 °F (36.6 °C)   TempSrc: Oral   SpO2: 99%       The patient was given the following medications while in the emergency department:  No orders of the defined types were placed in this encounter. -------------------------  7:19 PM EST  Patient at this point time states he is no longer hearing the voices he would like to go home he has no desire to die and he knows that he would never do anything to hurt himself. At this point time I feel comfortable letting the patient be discharged home and he will follow up with his psychiatrist.      FINAL IMPRESSION      1.  Schizophrenia, unspecified type [facility])          DISPOSITION/PLAN   DISPOSITION Decision To Discharge 02/26/2021 07:17:51 PM      PATIENT REFERREDTO:  [facility] ED  [facility] [phone]  In 1 week        DISCHARGEMEDICATIONS:  Current Discharge Medication List          (Please note that portions of this note were completed with a voice recognition program.  Efforts were made to edit thedictations but occasionally words are mis-transcribed.)    Ryan Moon MD  Attending Emergency Physician                      Ryan Moon MD  02/26/21 3955

## 2021-02-27 NOTE — ED NOTES
SW arranged pt transportation through [facility] and [facility]. SW encouraged pt to return to the hospital or call 911 if pt's symptoms worsen or pt is having thoughts of wanting to harm self/others. Pt verbalized pt's understanding. SW provided pt with pt's belongings and discharge paperwork. Pt cooperatively exited the [facility].

## 2021-02-27 NOTE — ED NOTES
Pt up at desk stating pt \"can I go home now? I feel better. I am not hearing the voices anymore. \"  Pt denies SI/HI/AH. Pt stating \" I want to live. I would never hurt myself or anyone else. \" Pt reports pt has an appt at [facility] this upcoming Monday. Pt is adamant pt feels safe to return home and pans to follow up with [facility] on Monday. SW consulted with ED . ED Dr and SW agree pt is safe to be discharged home. Pt is not a risk to self or others at this time. Pt denies SI/HI. Pt agreeable to be discharged home and follow up outpatient services.

## 2021-03-09 ENCOUNTER — HOSPITAL ENCOUNTER (EMERGENCY)
Age: 61
Discharge: PSYCHIATRIC OR SUBSTANCE USE FACILITY | End: 2021-03-10
Attending: EMERGENCY MEDICINE
Payer: MEDICAID

## 2021-03-09 VITALS
DIASTOLIC BLOOD PRESSURE: 75 MMHG | TEMPERATURE: 98.6 F | SYSTOLIC BLOOD PRESSURE: 103 MMHG | HEART RATE: 91 BPM | RESPIRATION RATE: 16 BRPM | OXYGEN SATURATION: 99 %

## 2021-03-09 DIAGNOSIS — R45.851 SUICIDAL THOUGHTS: Primary | ICD-10-CM

## 2021-03-09 PROCEDURE — 80307 DRUG TEST PRSMV CHEM ANLYZR: CPT

## 2021-03-09 PROCEDURE — 99285 EMERGENCY DEPT VISIT HI MDM: CPT

## 2021-03-10 ENCOUNTER — HOSPITAL ENCOUNTER (INPATIENT)
Age: 61
LOS: 5 days | Discharge: HOME | DRG: 750 | End: 2021-03-15
Attending: PSYCHIATRY & NEUROLOGY | Admitting: PSYCHIATRY & NEUROLOGY
Payer: MEDICAID

## 2021-03-10 PROBLEM — F23 ACUTE PSYCHOSIS (HCC): Status: ACTIVE | Noted: 2021-03-10

## 2021-03-10 LAB
ABSOLUTE EOS #: 0.14 K/UL (ref 0–0.44)
ABSOLUTE IMMATURE GRANULOCYTE: <0.03 K/UL (ref 0–0.3)
ABSOLUTE LYMPH #: 2.27 K/UL (ref 1.1–3.7)
ABSOLUTE MONO #: 0.33 K/UL (ref 0.1–1.2)
ACETAMINOPHEN LEVEL: <5 UG/ML (ref 10–30)
ALBUMIN SERPL-MCNC: 3.9 G/DL (ref 3.5–5.2)
ALBUMIN/GLOBULIN RATIO: 1.6 (ref 1–2.5)
ALP BLD-CCNC: 107 U/L (ref 40–129)
ALT SERPL-CCNC: 8 U/L (ref 5–41)
AMPHETAMINE SCREEN URINE: NEGATIVE
ANION GAP SERPL CALCULATED.3IONS-SCNC: 10 MMOL/L (ref 9–17)
AST SERPL-CCNC: 15 U/L
BARBITURATE SCREEN URINE: NEGATIVE
BASOPHILS # BLD: 1 % (ref 0–2)
BASOPHILS ABSOLUTE: <0.03 K/UL (ref 0–0.2)
BENZODIAZEPINE SCREEN, URINE: NEGATIVE
BILIRUB SERPL-MCNC: 0.28 MG/DL (ref 0.3–1.2)
BUN BLDV-MCNC: 24 MG/DL (ref 8–23)
BUN/CREAT BLD: ABNORMAL (ref 9–20)
BUPRENORPHINE URINE: ABNORMAL
CALCIUM SERPL-MCNC: 8.7 MG/DL (ref 8.6–10.4)
CANNABINOID SCREEN URINE: NEGATIVE
CHLORIDE BLD-SCNC: 105 MMOL/L (ref 98–107)
CO2: 25 MMOL/L (ref 20–31)
COCAINE METABOLITE, URINE: POSITIVE
CREAT SERPL-MCNC: 1.06 MG/DL (ref 0.7–1.2)
DIFFERENTIAL TYPE: ABNORMAL
EOSINOPHILS RELATIVE PERCENT: 3 % (ref 1–4)
ETHANOL PERCENT: <0.01 %
ETHANOL: <10 MG/DL
GFR AFRICAN AMERICAN: >60 ML/MIN
GFR NON-AFRICAN AMERICAN: >60 ML/MIN
GFR SERPL CREATININE-BSD FRML MDRD: ABNORMAL ML/MIN/{1.73_M2}
GFR SERPL CREATININE-BSD FRML MDRD: ABNORMAL ML/MIN/{1.73_M2}
GLUCOSE BLD-MCNC: 125 MG/DL (ref 70–99)
HCT VFR BLD CALC: 36.7 % (ref 40.7–50.3)
HEMOGLOBIN: 11 G/DL (ref 13–17)
IMMATURE GRANULOCYTES: 0 %
LYMPHOCYTES # BLD: 53 % (ref 24–43)
MCH RBC QN AUTO: 27.2 PG (ref 25.2–33.5)
MCHC RBC AUTO-ENTMCNC: 30 G/DL (ref 28.4–34.8)
MCV RBC AUTO: 90.6 FL (ref 82.6–102.9)
MDMA URINE: ABNORMAL
METHADONE SCREEN, URINE: NEGATIVE
METHAMPHETAMINE, URINE: ABNORMAL
MONOCYTES # BLD: 8 % (ref 3–12)
NRBC AUTOMATED: 0 PER 100 WBC
OPIATES, URINE: NEGATIVE
OXYCODONE SCREEN URINE: NEGATIVE
PDW BLD-RTO: 14.2 % (ref 11.8–14.4)
PHENCYCLIDINE, URINE: NEGATIVE
PLATELET # BLD: 280 K/UL (ref 138–453)
PLATELET ESTIMATE: ABNORMAL
PMV BLD AUTO: 9.5 FL (ref 8.1–13.5)
POTASSIUM SERPL-SCNC: 4.2 MMOL/L (ref 3.7–5.3)
PROPOXYPHENE, URINE: ABNORMAL
RBC # BLD: 4.05 M/UL (ref 4.21–5.77)
RBC # BLD: ABNORMAL 10*6/UL
SALICYLATE LEVEL: <1 MG/DL (ref 3–10)
SARS-COV-2, RAPID: NOT DETECTED
SEG NEUTROPHILS: 35 % (ref 36–65)
SEGMENTED NEUTROPHILS ABSOLUTE COUNT: 1.48 K/UL (ref 1.5–8.1)
SODIUM BLD-SCNC: 140 MMOL/L (ref 135–144)
SPECIMEN DESCRIPTION: NORMAL
TEST INFORMATION: ABNORMAL
TOTAL PROTEIN: 6.4 G/DL (ref 6.4–8.3)
TOXIC TRICYCLIC SC,BLOOD: NEGATIVE
TRICYCLIC ANTIDEPRESSANTS, UR: ABNORMAL
WBC # BLD: 4.3 K/UL (ref 3.5–11.3)
WBC # BLD: ABNORMAL 10*3/UL

## 2021-03-10 PROCEDURE — 85025 COMPLETE CBC W/AUTO DIFF WBC: CPT

## 2021-03-10 PROCEDURE — U0002 COVID-19 LAB TEST NON-CDC: HCPCS

## 2021-03-10 PROCEDURE — 6370000000 HC RX 637 (ALT 250 FOR IP): Performed by: PSYCHIATRY & NEUROLOGY

## 2021-03-10 PROCEDURE — 99223 1ST HOSP IP/OBS HIGH 75: CPT | Performed by: PSYCHIATRY & NEUROLOGY

## 2021-03-10 PROCEDURE — 6370000000 HC RX 637 (ALT 250 FOR IP): Performed by: NURSE PRACTITIONER

## 2021-03-10 PROCEDURE — 80307 DRUG TEST PRSMV CHEM ANLYZR: CPT

## 2021-03-10 PROCEDURE — 80179 DRUG ASSAY SALICYLATE: CPT

## 2021-03-10 PROCEDURE — G0480 DRUG TEST DEF 1-7 CLASSES: HCPCS

## 2021-03-10 PROCEDURE — 80143 DRUG ASSAY ACETAMINOPHEN: CPT

## 2021-03-10 PROCEDURE — 1240000000 HC EMOTIONAL WELLNESS R&B

## 2021-03-10 PROCEDURE — 80053 COMPREHEN METABOLIC PANEL: CPT

## 2021-03-10 RX ORDER — ESCITALOPRAM OXALATE 20 MG/1
20 TABLET ORAL DAILY
Status: DISCONTINUED | OUTPATIENT
Start: 2021-03-10 | End: 2021-03-15 | Stop reason: HOSPADM

## 2021-03-10 RX ORDER — BENZTROPINE MESYLATE 1 MG/1
1 TABLET ORAL DAILY PRN
Status: ON HOLD | COMMUNITY
End: 2021-05-11 | Stop reason: HOSPADM

## 2021-03-10 RX ORDER — MAGNESIUM HYDROXIDE/ALUMINUM HYDROXICE/SIMETHICONE 120; 1200; 1200 MG/30ML; MG/30ML; MG/30ML
30 SUSPENSION ORAL EVERY 6 HOURS PRN
Status: DISCONTINUED | OUTPATIENT
Start: 2021-03-10 | End: 2021-03-15 | Stop reason: HOSPADM

## 2021-03-10 RX ORDER — HALOPERIDOL 5 MG
5 TABLET ORAL EVERY 4 HOURS PRN
Status: DISCONTINUED | OUTPATIENT
Start: 2021-03-10 | End: 2021-03-15 | Stop reason: HOSPADM

## 2021-03-10 RX ORDER — ACETAMINOPHEN 325 MG/1
650 TABLET ORAL EVERY 4 HOURS PRN
Status: DISCONTINUED | OUTPATIENT
Start: 2021-03-10 | End: 2021-03-15 | Stop reason: HOSPADM

## 2021-03-10 RX ORDER — QUETIAPINE FUMARATE 300 MG/1
300 TABLET, FILM COATED ORAL 2 TIMES DAILY
Status: ON HOLD | COMMUNITY
End: 2021-03-14 | Stop reason: HOSPADM

## 2021-03-10 RX ORDER — HYDROXYZINE 50 MG/1
50 TABLET, FILM COATED ORAL 3 TIMES DAILY PRN
Status: DISCONTINUED | OUTPATIENT
Start: 2021-03-10 | End: 2021-03-15 | Stop reason: HOSPADM

## 2021-03-10 RX ORDER — HALOPERIDOL 5 MG/ML
5 INJECTION INTRAMUSCULAR EVERY 4 HOURS PRN
Status: DISCONTINUED | OUTPATIENT
Start: 2021-03-10 | End: 2021-03-15 | Stop reason: HOSPADM

## 2021-03-10 RX ORDER — PALIPERIDONE 3 MG/1
3 TABLET, EXTENDED RELEASE ORAL DAILY
Status: DISCONTINUED | OUTPATIENT
Start: 2021-03-10 | End: 2021-03-11

## 2021-03-10 RX ORDER — TRAZODONE HYDROCHLORIDE 50 MG/1
50 TABLET ORAL NIGHTLY PRN
Status: DISCONTINUED | OUTPATIENT
Start: 2021-03-10 | End: 2021-03-12

## 2021-03-10 RX ORDER — ESCITALOPRAM OXALATE 20 MG/1
20 TABLET ORAL DAILY
Status: ON HOLD | COMMUNITY
End: 2021-05-11 | Stop reason: SDUPTHER

## 2021-03-10 RX ORDER — IBUPROFEN 400 MG/1
400 TABLET ORAL EVERY 6 HOURS PRN
Status: DISCONTINUED | OUTPATIENT
Start: 2021-03-10 | End: 2021-03-15 | Stop reason: HOSPADM

## 2021-03-10 RX ORDER — QUETIAPINE FUMARATE 300 MG/1
300 TABLET, FILM COATED ORAL 2 TIMES DAILY
Status: DISCONTINUED | OUTPATIENT
Start: 2021-03-10 | End: 2021-03-10

## 2021-03-10 RX ORDER — POLYETHYLENE GLYCOL 3350 17 G/17G
17 POWDER, FOR SOLUTION ORAL DAILY PRN
Status: DISCONTINUED | OUTPATIENT
Start: 2021-03-10 | End: 2021-03-15 | Stop reason: HOSPADM

## 2021-03-10 RX ORDER — BENZTROPINE MESYLATE 1 MG/1
1 TABLET ORAL DAILY PRN
Status: DISCONTINUED | OUTPATIENT
Start: 2021-03-10 | End: 2021-03-15 | Stop reason: HOSPADM

## 2021-03-10 RX ORDER — DIPHENHYDRAMINE HYDROCHLORIDE 50 MG/ML
50 INJECTION INTRAMUSCULAR; INTRAVENOUS EVERY 4 HOURS PRN
Status: DISCONTINUED | OUTPATIENT
Start: 2021-03-10 | End: 2021-03-15 | Stop reason: HOSPADM

## 2021-03-10 RX ORDER — BUPROPION HYDROCHLORIDE 100 MG/1
100 TABLET ORAL 2 TIMES DAILY
Status: DISCONTINUED | OUTPATIENT
Start: 2021-03-10 | End: 2021-03-15 | Stop reason: HOSPADM

## 2021-03-10 RX ORDER — LORAZEPAM 1 MG/1
2 TABLET ORAL EVERY 4 HOURS PRN
Status: DISCONTINUED | OUTPATIENT
Start: 2021-03-10 | End: 2021-03-15 | Stop reason: HOSPADM

## 2021-03-10 RX ORDER — LORAZEPAM 2 MG/ML
2 INJECTION INTRAMUSCULAR EVERY 4 HOURS PRN
Status: DISCONTINUED | OUTPATIENT
Start: 2021-03-10 | End: 2021-03-15 | Stop reason: HOSPADM

## 2021-03-10 RX ORDER — BUPROPION HYDROCHLORIDE 100 MG/1
100 TABLET ORAL 2 TIMES DAILY
Status: ON HOLD | COMMUNITY
End: 2021-03-14 | Stop reason: HOSPADM

## 2021-03-10 RX ADMIN — PALIPERIDONE 3 MG: 3 TABLET, EXTENDED RELEASE ORAL at 15:02

## 2021-03-10 RX ADMIN — ESCITALOPRAM OXALATE 20 MG: 20 TABLET, FILM COATED ORAL at 11:08

## 2021-03-10 RX ADMIN — QUETIAPINE FUMARATE 300 MG: 300 TABLET ORAL at 11:08

## 2021-03-10 RX ADMIN — BUPROPION HYDROCHLORIDE 100 MG: 100 TABLET, FILM COATED ORAL at 11:08

## 2021-03-10 RX ADMIN — TRAZODONE HYDROCHLORIDE 50 MG: 50 TABLET ORAL at 21:33

## 2021-03-10 RX ADMIN — HYDROXYZINE HYDROCHLORIDE 50 MG: 50 TABLET, FILM COATED ORAL at 21:33

## 2021-03-10 ASSESSMENT — ENCOUNTER SYMPTOMS
VOMITING: 0
RHINORRHEA: 0
CHEST TIGHTNESS: 0
SORE THROAT: 0
COUGH: 0
TROUBLE SWALLOWING: 0
CONSTIPATION: 0
ABDOMINAL PAIN: 0
BACK PAIN: 0
WHEEZING: 0
SHORTNESS OF BREATH: 0
ABDOMINAL DISTENTION: 0
DIARRHEA: 0
NAUSEA: 0

## 2021-03-10 ASSESSMENT — PAIN DESCRIPTION - ORIENTATION: ORIENTATION: RIGHT

## 2021-03-10 ASSESSMENT — SLEEP AND FATIGUE QUESTIONNAIRES
DO YOU USE A SLEEP AID: NO
DIFFICULTY ARISING: NO
DIFFICULTY STAYING ASLEEP: NO
DO YOU HAVE DIFFICULTY SLEEPING: YES
SLEEP PATTERN: RESTLESSNESS

## 2021-03-10 ASSESSMENT — PAIN DESCRIPTION - DESCRIPTORS: DESCRIPTORS: ACHING

## 2021-03-10 ASSESSMENT — PAIN DESCRIPTION - PAIN TYPE: TYPE: CHRONIC PAIN

## 2021-03-10 ASSESSMENT — PAIN SCALES - GENERAL: PAINLEVEL_OUTOF10: 3

## 2021-03-10 ASSESSMENT — PAIN DESCRIPTION - ONSET: ONSET: ON-GOING

## 2021-03-10 ASSESSMENT — LIFESTYLE VARIABLES: HISTORY_ALCOHOL_USE: NO

## 2021-03-10 NOTE — ED NOTES
Pt given more warm blankets and a cup of water and encouraged to drink in order to obtain urine sample     Annemarie Howell RN  03/10/21 1934

## 2021-03-10 NOTE — ED NOTES
Pt resting on cot. No signs of distress noted. Respirations unlabored. Sitter at bedside. Pt denies needs at this time.  Will continue to monitor     Sanjay Carvajal RN  03/10/21 7808

## 2021-03-10 NOTE — ED PROVIDER NOTES
Faculty Sign-Out Attestation  Handoff taken on the following patient from prior Attending Physician: Tyler Agrawal    I was available and discussed any additional care issues that arose and coordinated the management plans with the resident(s) caring for the patient during my duty period. Any areas of disagreement with residents documentation of care or procedures are noted on the chart. I was personally present for the key portions of any/all procedures during my duty period. I have documented in the chart those procedures where I was not present during the key portions.     Suicidal with plan, screening lab pending, >> rachel Tomas DO  Attending Physician     Torin Tomas DO  03/10/21 3242

## 2021-03-10 NOTE — ED NOTES
Attempted to open case with [facility] to initiate transfer. RN unwilling to open case until \"medical clearance\" clearly documented in pt's chart by physician. Will attempt again after speaking with current physicians.      Bernadette Anderson  03/10/21 5410

## 2021-03-10 NOTE — ED NOTES
Provisional Diagnosis:    History of Schizoaffective Disorder    Psychosocial and Contextual Factors:     Stable housing, limited community support, substance abuse    C-SSRS Summary:    Patient: X  Family:  Agency: [employer]    Present Suicidal Behavior:    Verbal: Yes    Attempt: No    Past Suicidal Behavior:    Verbal: Yes    Attempt: No    Self-Injurious/Self-Mutilation: Pt denies      Protective Factors:    Linked with outpatient care    Risk Factors:    Substance abuse    Clinical Summary:    Pt is a 63year old male presenting to the ED for auditory command hallucinations to harm himself. Pt reports that he has been taking his medications and received his injectable on 2021 at [facility], but it isn't helping clear him up the way it usually does. Pt does report some relief with his medications. Pt has a history of cocaine abuse. Pt denies any alcohol or other drug use today. Pt lives in a boarding home and is responsible for his own medications. Pt states that his mother recently  and his auditory hallucinations have also been saying bad things about his mother that he does not like. Pt did have a knife on him when entering the ED, but had not cut himself with it, only had been contemplating using it to kill himself due to the hallucinations.      Level of Care Disposition:    Pending medical clearance and decision of on call psychiatrist       Yesenia Dickey  [phone]

## 2021-03-10 NOTE — BH NOTE
[facility]  Admission Note     Admission Type:   Admission Type: Voluntary    Reason for admission:  Reason for Admission: command hallucinations to harm self    PATIENT STRENGTHS:  Strengths: Communication, Connection to output provider    Patient Strengths and Limitations:  Limitations: General negative or hopeless attitude about future/recovery, Hopeless about future    Addictive Behavior:   Addictive Behavior  In the past 3 months, have you felt or has someone told you that you have a problem with:  : None  Do you have a history of Chemical Use?: No  Do you have a history of Alcohol Use?: No  Do you have a history of Street Drug Abuse?: No  Histroy of Prescripton Drug Abuse?: No    Medical Problems:   Past Medical History:   Diagnosis Date    [facility] disorder ([facility]     Depression     GERD (gastroesophageal reflux disease)     Hallucinations     Headache(784.0)     Hepatitis     [facility], [facility]     Substance abuse ([facility]     Tobacco abuse     Type II or unspecified type diabetes mellitus without mention of complication, not stated as uncontrolled     Urinary incontinence        Status EXAM:  Status and Exam  Normal: No  Facial Expression: Sad  Affect: Appropriate  Level of Consciousness: Alert  Mood:Normal: No  Mood: Depressed, Anxious  Motor Activity:Normal: Yes  Interview Behavior: Cooperative  Preception: [place] to Person, [place] to Time, [place] to Place, [place] to Situation  Attention:Normal: No  Attention: Distractible  Thought Processes: Circumstantial  Thought Content:Normal: No  Thought Content: Preoccupations  Hallucinations:  Auditory (Comment)  Delusions: No  Memory:Normal: No  Memory: Poor Remote  Insight and Judgment: No  Insight and Judgment: Poor Judgment, Poor Insight  Present Suicidal Ideation: No  Present Homicidal Ideation: No    Tobacco Screening:  Practical Counseling, on admission, corby X, if applicable and completed (first 3 are required if patient doesn't refuse):            ( )  Recognizing danger situations (included triggers and roadblocks)                    ( )  Coping skills (new ways to manage stress, exercise, relaxation techniques, changing routine, distraction)                                                           ( )  Basic information about quitting (benefits of quitting, techniques in how to quit, available resources  ( ) Referral for counseling faxed to [facility]                                           ( x) Patient refused counseling  ( ) Patient has not smoked in the last 30 days    Metabolic Screening:    Lab Results   Component Value Date    LABA1C 4.9 08/11/2020       Lab Results   Component Value Date    CHOL 167 08/11/2020    CHOL 179 02/13/2017    CHOL 127 05/09/2015    CHOL 168 08/20/2014    CHOL 158 12/31/2013    CHOL 178 08/15/2013    CHOL 166 09/17/2012    CHOL 210 (H) 02/03/2012     Lab Results   Component Value Date    TRIG 43 08/11/2020    TRIG 67 02/13/2017    TRIG 37 05/09/2015    TRIG 72 08/20/2014    TRIG 52 12/31/2013    TRIG 70 08/15/2013    TRIG 117 09/17/2012    TRIG 94 02/03/2012     Lab Results   Component Value Date    HDL 74 08/11/2020    HDL 73 02/13/2017    HDL 57 05/09/2015    HDL 50 08/20/2014    HDL 43 12/31/2013    HDL 42 08/15/2013    HDL 38 (L) 09/17/2012    HDL 55 02/03/2012     No components found for: LDLCAL  No results found for: LABVLDL      Body mass index is 22.5 kg/m². BP Readings from Last 2 Encounters:   03/10/21 105/66   03/09/21 103/75           Pt admitted with followings belongings:  Dentures: None  Vision - Corrective Lenses: None  Hearing Aid: None  Jewelry: None  Body Piercings Removed: No  Clothing: Jacket / coat, Pants, Shirt, Socks  Were All Patient Medications Collected?: No  Other Valuables: Wallet, Other (Comment)(2 keys, [place] ID, 0.25 cents)      Valuables placed in safe in security envelope, number:  \[id number]. Patient's home medications need verified. Patient oriented to surroundings and program expectations and copy of patient rights given. Received admission packet:  yes. Consents reviewed, signed yes. Refused no. Patient verbalize understanding:  yes. Patient education on precautions: yes    Patient admitted from [facility] ED for command hallucinations to harm self. Patient had a knife on him when he went to the hospital and stating he was planning to use it to kill himself. Patient reports he has been compliant with his medications. Patient denies drugs and alcohol. Patient changed into hospital clothing and wanded for safety.                  Lena Archer RN

## 2021-03-10 NOTE — BH NOTE
Patient given tobacco quitline number 71198229100 at this time, refusing to call at this time, states \" I just dont want to quit now\"- patient given information as to the dangers of long term tobacco use. Continue to reinforce the importance of tobacco cessation.

## 2021-03-10 NOTE — ED PROVIDER NOTES
[address] ED     Emergency Department     Faculty Attestation        I performed a history and physical examination of the patient and discussed management with the resident. I reviewed the residents note and agree with the documented findings and plan of care. Any areas of disagreement are noted on the chart. I was personally present for the key portions of any procedures. I have documented in the chart those procedures where I was not present during the key portions. I have reviewed the emergency nurses triage note. I agree with the chief complaint, past medical history, past surgical history, allergies, medications, social and family history as documented unless otherwise noted below. For mid-level providers such as nurse practitioners as well as physicians assistants:    I have personally seen and evaluated the patient. I find the patient's history and physical exam are consistent with NP/PA documentation. I agree with the care provided, treatment rendered, disposition, & follow-up plan. Additional findings are as noted. Vital Signs: /75   Pulse 91   Temp 98.6 °F (37 °C) (Oral)   Resp 16   SpO2 99%   PCP:  No primary care provider on file. Pertinent Comments:     Presents with suicidal ideation. He denies any homicidal ideation. He has a history of schizophrenia he states he has been intermittently compliant with his medication. He denies any drug or alcohol use. He has no medical complaints at this time.       Critical Care  None          Katie Conde MD  Attending Emergency Medicine Physician              Sarah Damian MD  03/09/21 9134

## 2021-03-10 NOTE — PLAN OF CARE
Problem: Altered Mood, Depressive Behavior:  Goal: Able to verbalize acceptance of life and situations over which he or she has no control  Description: Able to verbalize acceptance of life and situations over which he or she has no control  3/10/2021 1110 by Theodore Slaughter  Outcome: Ongoing  Pt. Has been in bed all morning. Pt just came in early this morning. Pt. Awakens easily on approach. Says he is depressed. Has been hearing voices telling him to hurt himself. Pt does deny thoughts of self harm at this time. Pt. Remains safe on the unit. Q 15 minute checks for safety maintained. Problem: Altered Mood, Depressive Behavior:  Goal: Able to verbalize and/or display a decrease in depressive symptoms  Description: Able to verbalize and/or display a decrease in depressive symptoms  3/10/2021 1110 by Theodore Slaughter  Outcome: Ongoing  Pt is flat on approach. Isolates in his room. Problem: Altered Mood, Depressive Behavior:  Goal: Ability to disclose and discuss suicidal ideas will improve  Description: Ability to disclose and discuss suicidal ideas will improve  3/10/2021 1110 by Theodore Slaughter  Outcome: Ongoing   Pt denies current suicidal thoughts. Pt. Remains safe on the unit. Q 15 minute checks for safety maintained. Problem: Pain:  Goal: Pain level will decrease  Description: Pain level will decrease  Outcome: Ongoing  Pt relates to generalized pain. Will continue to monitor.

## 2021-03-10 NOTE — ED NOTES
Reviewed pt case with on call psychiatrist who finds that pt meets criteria for inpatient psychiatric admission. Pt to be admitted to the [facility] under the care of Dr. Jailene Rojo with a diagnosis of Acute Psychosis.         Justin [facility]  03/10/21 0412

## 2021-03-10 NOTE — ED NOTES
Pt reminded of needing a urine sample. Pt stated he did not have to use bathroom at this time.  Pt's urine cup at bedside     Ronald Goetz RN  03/10/21 7268

## 2021-03-10 NOTE — GROUP NOTE
Group Therapy Note    Date: 3/10/2021    Group Start Time: 1330  Group End Time: 1878  Group Topic: Music Therapy    JESUS Trent        Group Therapy Note  Pt did not attend music therapy group d/t resting in room despite staff invitation to attend. 1:1 talk time offered as alternative to group session, pt declined.

## 2021-03-10 NOTE — PROGRESS NOTES
Behavioral Services  Medicare Certification Upon Admission    I certify that this patient's inpatient psychiatric hospital admission is medically necessary for:    [x] (1) Treatment which could reasonably be expected to improve this patient's condition,       [x] (2) Or for diagnostic study;     AND     [x](2) The inpatient psychiatric services are provided while the individual is under the care of a physician and are included in the individualized plan of care. Estimated length of stay/service: 3 to 5 days    Plan for post-hospital care:   Follow-up with [facility] for regular counseling and medication management    Electronically signed by MARY Ingram CNP on 3/10/2021 at 12:49 PM

## 2021-03-10 NOTE — ED NOTES
Pt presents to ED with complaints of SI. Pt denies HI. Pt denies plan for SI. Pt arrived with knife on him and he stated \"I had a knife on me because the voices were telling me someone was trying to kill me. \" Pt's weapon was confiscated by [employer] police. Pt denies hallucinations. Pt states hearing voices for \"years. \" Pt alert and oriented x4, respirations even and unlabored, and in no signs of distress. Pt talking in clear and complete sentences and acting appropriate for age. Pt's VSS. Pt changed out by [employer] police on arrival. Pt denies pain. Pt denies chest pain and SOB. Guard at bedside. Will continue to monitor.      Sandra Givens RN  03/09/21 3450

## 2021-03-10 NOTE — ED PROVIDER NOTES
Transportation needs     Medical: Not on file     Non-medical: Not on file   Tobacco Use    Smoking status: Current Every Day Smoker     Packs/day: 0.50     Types: Cigarettes    Smokeless tobacco: Never Used   Substance and Sexual Activity    Alcohol use: Yes     Comment: reports drinking occasionally    Drug use: Yes     Types: Cocaine     Comment: drug abuse includes cocaine,     Sexual activity: Not on file   Lifestyle    Physical activity     Days per week: Not on file     Minutes per session: Not on file    Stress: Not on file   Relationships    Social connections     Talks on phone: Not on file     Gets together: Not on file     Attends Gnosticism service: Not on file     Active member of club or organization: Not on file     Attends meetings of clubs or organizations: Not on file     Relationship status: Not on file    Intimate partner violence     Fear of current or ex partner: Not on file     Emotionally abused: Not on file     Physically abused: Not on file     Forced sexual activity: Not on file   Other Topics Concern    Not on file   Social History Narrative    Not on file       Family History   Problem Relation Age of Onset    Diabetes Mother     Heart Disease Mother         Allergies:  Navane [thiothixene]    Home Medications:  Prior to Admission medications    Medication Sig Start Date End Date Taking?  Authorizing Provider   acetaminophen (TYLENOL) 500 MG tablet Take 1 tablet by mouth 3 times daily as needed for Pain 2/10/21 2/20/21  Lyndall Bread, DO   ibuprofen (IBU) 400 MG tablet Take 1 tablet by mouth every 6 hours as needed for Pain 2/10/21   Lyndall Bread, DO   docusate sodium (COLACE) 100 MG capsule Take 1 capsule by mouth 2 times daily 2/10/21   Lyndall Bread, DO   Benzocaine-Menthol (CEPACOL) 15-2.3 MG LOZG Take 1 lozenge by mouth every 2 hours as needed (Sore throat) 2/9/21   Ignacio Cinnamon, DO   OLANZapine zydis (ZYPREXA) 10 MG disintegrating tablet Take 1 tablet by mouth nightly 12/11/20   Eugenia Mcghee MD   hydrOXYzine (ATARAX) 25 MG tablet Take 25 mg by mouth 3 times daily as needed for Anxiety    Historical Provider, MD   traZODone (DESYREL) 50 MG tablet Take 1 tablet by mouth nightly as needed for Sleep 8/14/20   Doron Barros MD   benztropine (COGENTIN) 2 MG tablet Take 1 tablet by mouth daily as needed (acute dystonia) 8/14/20   Doron Barros MD       REVIEW OFSYSTEMS    (2-9 systems for level 4, 10 or more for level 5)      Review of Systems   Constitutional: Negative for chills, diaphoresis, fatigue and fever. HENT: Negative for rhinorrhea, sore throat, tinnitus and trouble swallowing. Eyes: Negative for visual disturbance. Respiratory: Negative for cough, chest tightness, shortness of breath and wheezing. Cardiovascular: Negative for chest pain and leg swelling. Gastrointestinal: Negative for abdominal distention, abdominal pain, constipation, diarrhea, nausea and vomiting. Endocrine: Negative for polyuria. Genitourinary: Negative for dysuria, flank pain and frequency. Musculoskeletal: Negative for arthralgias, back pain, joint swelling and myalgias. Neurological: Negative for dizziness, tremors, seizures, weakness, light-headedness, numbness and headaches. Psychiatric/Behavioral: Positive for hallucinations and suicidal ideas. PHYSICAL EXAM   (up to 7 for level 4, 8 or more forlevel 5)      INITIAL VITALS:   ED Triage Vitals   BP Temp Temp src Pulse Resp SpO2 Height Weight   -- -- -- -- -- -- -- --       Physical Exam  Constitutional:       General: He is not in acute distress. Appearance: He is well-developed. HENT:      Head: Normocephalic and atraumatic. Right Ear: External ear normal.      Left Ear: External ear normal.      Mouth/Throat:      Comments: Poor dentition  Eyes:      General: No scleral icterus. Right eye: No discharge. Left eye: No discharge.       Conjunctiva/sclera: Conjunctivae normal. Pupils: Pupils are equal, round, and reactive to light. Neck:      Musculoskeletal: Normal range of motion. Vascular: No JVD. Trachea: No tracheal deviation. Cardiovascular:      Rate and Rhythm: Regular rhythm. Heart sounds: Normal heart sounds. Pulmonary:      Effort: Pulmonary effort is normal. No respiratory distress. Breath sounds: Normal breath sounds. No stridor. No wheezing. Abdominal:      General: Bowel sounds are normal. There is no distension. Palpations: Abdomen is soft. Tenderness: There is no abdominal tenderness. There is no guarding or rebound. Musculoskeletal: Normal range of motion. General: No tenderness or deformity. Skin:     General: Skin is warm. Coloration: Skin is not pale. Neurological:      Mental Status: He is alert and oriented to person, place, and time. Cranial Nerves: No cranial nerve deficit. DIFFERENTIAL  DIAGNOSIS     PLAN (LABS / IMAGING / EKG):  Orders Placed This Encounter   Procedures    CBC WITH AUTO DIFFERENTIAL    COMPREHENSIVE METABOLIC PANEL    Urine Drug Screen    TOX SCR, BLD, ED    COVID-19    Inpatient consult to Social Work       MEDICATIONS ORDERED:  No orders of the defined types were placed in this encounter. DDX: Suicidal ideation, decompensated schizophrenia, schizophrenia    Initial MDM/Plan/ED COURSE:    63 y.o. male who presents with concerns for active suicidal ideation secondary to auditory hallucinations. Patient has notable plan with plan to commit suicide by shooting himself with a gun, patient notably upon presentation to the emergency department had a knife in pocket with him which was removed by security staff. Social work consulted, stated that they will need labs for admission to behavioral health institution, labs ordered at this time, MOLLY ending as well as COVID-19 pending at this time, patient CARE handed off to Dr. Bahman Price.       DIAGNOSTIC RESULTS / EMERGENCYDEPARTMENT COURSE / MDM     LABS:  Labs Reviewed   CBC WITH AUTO DIFFERENTIAL - Abnormal; Notable for the following components:       Result Value    RBC 4.05 (*)     Hemoglobin 11.0 (*)     Hematocrit 36.7 (*)     Seg Neutrophils 35 (*)     Lymphocytes 53 (*)     Segs Absolute 1.48 (*)     All other components within normal limits   COMPREHENSIVE METABOLIC PANEL - Abnormal; Notable for the following components:    Glucose 125 (*)     BUN 24 (*)     Total Bilirubin 0.28 (*)     All other components within normal limits   TOX SCR, BLD, ED - Abnormal; Notable for the following components:    Acetaminophen Level <5 (*)     Salicylate Lvl <1 (*)     All other components within normal limits   URINE DRUG SCREEN   COVID-19           No results found. PROCEDURES:  None    CONSULTS:  IP CONSULT TO SOCIAL WORK    CRITICAL CARE:  Please see attending note    FINAL IMPRESSION      1. Suicidal thoughts          DISPOSITION / PLAN     DISPOSITION Decision To Transfer 03/10/2021 12:09:27 AM      PATIENT REFERRED TO:  No follow-up provider specified.     DISCHARGE MEDICATIONS:  New Prescriptions    No medications on file       Jerica Smith MD  Emergency Medicine Resident    (Please note that portions of this note were completed with a voice recognition program.Efforts were made to edit the dictations but occasionally words are mis-transcribed.)        Jerica Smith MD  Resident  03/10/21 9270

## 2021-03-10 NOTE — PLAN OF CARE
[facility]  Initial Interdisciplinary Treatment Plan NO      Original treatment plan Date & Time: 3/10/21 5227    Admission Type:  Admission Type: Voluntary    Reason for admission:   Reason for Admission: command hallucinations to harm self    Estimated Length of Stay:  5-7days  Estimated Discharge Date: to be determined by physician    PATIENT STRENGTHS:  Patient Strengths:Strengths: Communication, Connection to output provider  Patient Strengths and Limitations:Limitations: General negative or hopeless attitude about future/recovery, Hopeless about future  Addictive Behavior: Addictive Behavior  In the past 3 months, have you felt or has someone told you that you have a problem with:  : None  Do you have a history of Chemical Use?: No  Do you have a history of Alcohol Use?: No  Do you have a history of Street Drug Abuse?: No  Histroy of Prescripton Drug Abuse?: No  Medical Problems:  Past Medical History:   Diagnosis Date    Bipolar disorder ([facility] [place] [address]     Depression     GERD (gastroesophageal reflux disease)     Hallucinations     Headache(784.0)     Hepatitis     Schizophrenia, schizo-affective (HCC)     Substance abuse ([facility] [place] [address]     Tobacco abuse     Type II or unspecified type diabetes mellitus without mention of complication, not stated as uncontrolled     Urinary incontinence      Status EXAM:Status and Exam  Normal: No  Facial Expression: Sad  Affect: Appropriate  Level of Consciousness: Alert  Mood:Normal: No  Mood: Depressed, Anxious  Motor Activity:Normal: Yes  Interview Behavior: Cooperative  Preception: [place] to Person, [place] to Time, [place] to Place, [place] to Situation  Attention:Normal: No  Attention: Distractible  Thought Processes: Circumstantial  Thought Content:Normal: No  Thought Content: Preoccupations  Hallucinations:  Auditory (Comment)  Delusions: No  Memory:Normal: No  Memory: Poor Remote  Insight and Judgment: No  Insight and Judgment: Poor Judgment, Poor Insight  Present Suicidal Ideation: No  Present Homicidal Ideation: No    EDUCATION:   Learner Progress Toward Treatment Goals: reviewed group plans and strategies for care    Method:group therapy, medication compliance, individualized assessments and care planning    Outcome: needs reinforcement    PATIENT GOALS: to be discussed with patient within 72 hours    PLAN/TREATMENT RECOMMENDATIONS:     continue group therapy , medications compliance, goal setting, individualized assessments and care, continue to monitor pt on unit      SHORT-TERM GOALS:   Time frame for Short-Term Goals: 5-7 days    LONG-TERM GOALS:  Time frame for Long-Term Goals: 6 months  Members Present in Team Meeting: See [address] [place] [address]

## 2021-03-10 NOTE — ED NOTES
RT at bedside for covid swab.  Pt reminded of needing urine sample     Tanna Santiago RN  03/10/21 9892

## 2021-03-10 NOTE — H&P
full HPI could not be conducted with patient participation due to current mental status. Patient was discharged from [facility] December/11/2020. Unit staff, EMR, along with recent ED notes was reviewed for information. PSYCHIATRIC HISTORY:  yes   Currently follows with [facility]  3 lifetime suicide attempts  Multiple psychiatric hospital admissions    Past psychiatric medications includes:   Seroquel, Wellbutrin, Lexapro, Atarax, trazodone    Adverse reactions from psychotropic medications: no    Lifetime Psychiatric Review of Systems       Depression: denies     [place] or Hypomania: endorses     Panic Attacks: denies      Phobias: denies     Obsessions and Compulsions: denies     Body or Vocal Tics:  denies     Hallucinations: Auditory, command     Delusions: denies    Past Medical History:        Diagnosis Date    [facility] [address]     Depression     GERD (gastroesophageal reflux disease)     Hallucinations     Headache(784.0)     Hepatitis     Schizophrenia, schizo-affective (HCC)     Substance abuse ([facility] [address]     Tobacco abuse     Type II or unspecified type diabetes mellitus without mention of complication, not stated as uncontrolled     Urinary incontinence        Past Surgical History:        Procedure Laterality Date    ABSCESS DRAINAGE N/A 02/11/2018    Carla anal abcess    DENTAL SURGERY      all teeth pulled       Allergies:  Navane [thiothixene]    Social History:     Caitlyn [address] 43[address], [place]. LEVEL OF EDUCATION: 9th grade  MARITAL STATUS: single. CHILDREN: 0  OCCUPATION: on SSI  RESIDENCE: Currently lives in a group home. PATIENT ASSETS: stable income, stable housing.      DRUG USE HISTORY  Social History     Tobacco Use   Smoking Status Current Every Day Smoker    Packs/day: 0.50    Types: Cigarettes   Smokeless Tobacco Never Used     Social History     Substance and Sexual Activity   Alcohol Use Yes    Comment: reports drinking occasionally     Social History Substance and Sexual Activity   Drug Use Yes    Types: Cocaine    Comment: drug abuse includes cocaine,      3/9/21 UDS positive for cocaine    LEGAL HISTORY:   HISTORY OF INCARCERATION: yes - states he has \"had some run ins\" with legal issues, and has been to penitentiary before. Family History:       Problem Relation Age of Onset    Diabetes Mother     Heart Disease Mother        Psychiatric Family History  Denies any pertinent psychiatric or substance use history in family    PHYSICAL EXAM:  Vitals:  /66   Pulse 66   Temp 98.6 °F (37 °C) (Oral)   Resp 14   Ht 6' 3\" (1.905 m)   Wt 180 lb (81.6 kg)   SpO2 99%   BMI 22.50 kg/m²     Review of Systems (from ED note)  Constitutional: Negative for chills and weight loss. HENT: Negative for ear pain and nosebleeds. Eyes: Negative for blurred vision and photophobia. Respiratory: Negative for cough, shortness of breath and wheezing. Cardiovascular: Negative for chest pain and palpitations. Gastrointestinal: Negative for abdominal pain, diarrhea and vomiting. Genitourinary: Negative for dysuria and urgency. Musculoskeletal: Negative for falls and joint pain. Skin: Negative for itching and rash. Neurological: Negative for tremors, seizures and weakness. Endo/Heme/Allergies: Does not bruise/bleed easily. Physical Exam: (Refused; taken from ED note)   Constitutional:  Appears well-developed and well-nourished, no acute distress  HENT:   Head: Normocephalic and atraumatic, poor dentition  Eyes: Conjunctivae are normal. Right eye exhibits no discharge. Left eye exhibits no discharge. No scleral icterus. Neck: Normal range of motion. Neck supple. Pulmonary/Chest:  No respiratory distress or accessory muscle use, no wheezing. Abdominal: Soft. Exhibits no distension. Musculoskeletal: Normal range of motion. Exhibits no edema. Neurological: cranial nerves II-XII grossly in tact, normal gait and station  Skin: Skin is warm and dry. Patient is not diaphoretic. No erythema. Mental Status Examination:    Level of consciousness:   Somnolent  Appearance:  Hospital attire, lying in bed completely covered by blanket, PHOEBE grooming and hygiene  Behavior/Motor: no abnormalities noted  Attitude toward examiner:   Uncooperative, dismissive  Speech: PHOEBE, patient refused to speak with writer  Mood:  constricted  Affect:  Blunted  Thought processes:   PHOEBE  Thought content: PHOEBE, was experiencing command hallucinations in ED yesterday  Cognition:  Oriented to self, location, time, situation in ED 3/9/21  Concentration PHOEBE   Memory: PHOEBE  Insight &Judgment: Impaired    DSM-5 Diagnosis  Schizoaffective disorder unspecified type. Cocaine use disorder    Psychosocial and Contextual factors:  Financial  Occupational  Relationship  Legal   Living situation   Educational     Past Medical History:   Diagnosis Date    Bipolar disorder ([facility] [address]     Depression     GERD (gastroesophageal reflux disease)     Hallucinations     Headache(784.0)     Hepatitis     Schizophrenia, schizo-affective ([facility] [address]     Substance abuse ([facility] [address]     Tobacco abuse     Type II or unspecified type diabetes mellitus without mention of complication, not stated as uncontrolled     Urinary incontinence      TREATMENT PLAN:  Patient received Invega Sustenna 234 mg IM on 3/8/2021  Home medications restarted    Risk Management:  close watch per standard protocol    Psychotherapy:  participation in milieu and group and individual sessions with Attending Physician,  and Physician Assistant/CNP    Estimated length of stay:  2-14 days    GENERAL PATIENT/FAMILY EDUCATION  Patient will understand basic signs and symptoms, Patient will understand benefits/risks and potential side effects from proposed meds and Patient will understand their role in recovery. Family is  active in patient's care.    Patient assets that may be helpful during treatment include: Intent to participate and engage in treatment, sufficient fund of knowledge and intellect to understand and utilize treatments. Goals:    Remission of suicidal ideation  Remission of command auditory hallucinations  Stability of symptoms x2 to 3 days prior to discharge  Encouraged patient to engage in groups, milieu, and individual therapies offered as part of programing. Behavioral Services  Medicare Certification     Admission Day 1  I certify that this patient's inpatient psychiatric hospital admission is medically necessary for:    x (1) treatment which could reasonably be expected to improve this patient's condition, or    x (2) diagnostic study or its equivalent. Time Spent: 45 minutes      Physicians Signature:  Electronically signed by MARY Cole CNP on 3/10/21 at 12:30 PM EST                                         Psychiatry Attending Attestation     I independently saw and evaluated the patient. I reviewed the nurse practitioner's documentation above. Any additional comments or changes to the nurse practitioners documentation are stated below otherwise agree with assessment. Patient is a 18-year-old single -American male with history of schizoaffective disorder and cocaine abuse admitted for command auditory hallucinations asking him to kill self. Patient recently reports using cocaine. Reports he has been feeling down sad and low for more days and on for last several weeks now. Endorses anhedonia. Reports he got a long-acting injectable of his medication last Tuesday and [facility] [place] however continues to have worsening auditory hallucinations that have been bothering him. Reports he has been having recurring voices asking him to kill self using a gun. Denies any access to a gun. Plan to hold off on his Wellbutrin as it can worsen some of the hallucinations. We will continue Lexapro and Seroquel. We will obtain records from [facility] [place].     Electronically signed by Rommel Reina MD on 3/10/21 at 1:51 PM EST

## 2021-03-10 NOTE — GROUP NOTE
Group Therapy Note    Date: 3/10/2021    Group Start Time: 1430  Group End Time: 1510  Group Topic: Cognitive Skills    [facility]    [place], [place]        Group Therapy Note    Attendees: 4/17       Pt did not attend RT skills group d/t resting in room despite staff invitation to attend. 1:1 talk time offered as alternative to group session, pt declined.

## 2021-03-10 NOTE — GROUP NOTE
Group Therapy Note    Date: 3/10/2021    Group Start Time: 1100  Group End Time: 3457  Group Topic: Psychoeducation    [facility]    [place], [place]        Group Therapy Note    Attendees: 5/11         Pt did not attend RT skills group d/t resting in room despite staff invitation to attend. 1:1 talk time offered as alternative to group session, pt declined.

## 2021-03-10 NOTE — ED NOTES
[] Sosa    [] [address]    [x]  One [facility] ASSESSMENT      Y  N     [x] [] In the past two weeks have you had thoughts of hurting yourself in any way? [x] [] In the past two weeks have you had thoughts that you would be better off dead? [] [x] Have you made a suicide attempt in the past two months? [] [x] Do you have a plan for hurting yourself or suicide? [x] [] Presence of hallucinations/voices related to hurting himself or herself or someone else. SUICIDE/SECURITY WATCH PRECAUTION CHECKLIST     Orders    [x]  Suicide/Security Watch Precautions initiated as checked below:   3/9/21 10:40 PM EST BH31/BH31C    [x] Notified physician:  Sarah Damian MD  3/9/21 10:40 PM EST    [x] Orders obtained as appropriate:     [x] 1:1 Observer     [] Psych Consult     [] Psych Consult    Name:  Date:  Time:    [x] 1:1 Observer, Notified by:  Tamica Mcgregor Nurse Supervisor    [x] Remove all personal clothes from room and place in snap/paper gown/pants. Slipper only    [x] Remove all personal belongings from room and secured away from patient. Documentation    [x] Initiate Suicide/Security Watch Precaution Flow Sheet    [x] Initiate individualized Care Plan/Problem    [x] Document why precautions initiated on flow sheet (Initiate Nursing Care Plan/Problem)    [x] 1:1 Observer in place; instructions provided. Suicide precautions require observer be within arms length. [x] Nurse-Observer Communication Hand-off initiated by RN, reviewed with Observer. Subsequently used as Hand Off between Observers. [x] Initiate every 15 minute observations per observer as delegated by the RN.     [x] Initiate RN assessment and documentation    Environmental Scan  Search Criteria and Process: OPTIONAL, see Search Policy    [x] Reason for search: Safer area    [x] Nursing in presence of second person to search patient    [x] Patient notified of reason for body assessment and belongings search:     Persons present during search:   Results of search and disposition:       Searchers Name: [employer]     These items or items similar should be removed from the room:   [x] Chairs   [x] Telephone   [x] Trash cans and liners   [x] Plastic utensils (order Patient Safety tray)   [x] Empty or remove Sharps containers   [x] All personal clothing/belongings removed   [x] All unnecessary lead wires, electrical cords, draw cords, etc.   [x] Flowers and plants   [x] Double check for lighters, matches, razors, any glass items etc that can be used as weapons. Person completing Checklist: David Schilling       GENERAL INFORMATION     Y  N     [x] [] Has the patient been informed that they are on a watch and what that means? [x] [] Can the patient get out of Bed without nursing assistance? [x] [] Can the patient use the restroom without nursing assistance? [x] [] Can the patient walk the halls to [place] their legs? \"   [] [x] Does the patient have metal utensils? [x] [] Have the patient's belongings been placed out of control of the patient? [x] [] Have the patient and his/her belongings been checked for contraband? [x] [] Is the patient under any visitor restrictions? If Yes, explain: OJ   [] [x] Is the patient under an alias? [facility] Name:   Authorized visitors (no more than two are to be on the list)   Name/Relationship:   Name/Relationship:    Name of Staff member that you  Received this information from?: Writer    General Description:    Eugenia Santos BH31/BH31C male 63 y.o. Admission weight:      Race: []  [x] Black  []   []   [] [place] [] Other  Facial Hair:  [] Yes  [] No  If yes, please describe: Identifying Marks (i.e. Visible tattoos, scars, etc... ):     NURSING CARE PLAN    Nursing Diagnosis: Risk of Self Directed Harm  [] Actual  [x] Potential  Date Started: 3/9/21      Etiological Factors: (related to)  [x] Expressed or implied suicidal ideation/behavior  [x] Depression  [] Suicide attempt      [x] Low self-esteem  [] Hallucinations      [] Feeling of Hopelessness  [] Substance abuse or withdrawal    [] Dysfunctional family  [] Major traumatic event, eg., divorce, etc   [] Excessive stress/anxiety    3/9/21    Expected Outcomes    Patient will:   [x] Patient will remain safe for the duration of their stay   [x] Patient's environment will be safe, eg. Free of potential suicide weapons   [] Verbalize Recovery from suicidal episode and improvement in self-worth   [x] Discuss feeling that precipitated suicide attempt/thoughts/behavior   [] Will describe available resources for crisis prevention and management   [] Will verbalize positive coping skills     Nursing Intervention   [x] Assessment and Observations hourly   [x] Suicide Precautions implemented with patient, should be 1:1 observation   [x] Document observation [id number] and RN assessment hourly   [] Consult physician for:    [] Psychiatric consult    [] Pharmacological therapy    [] Other:    [x] Patient search completed by security   [x] Initiated appropriate safety protocols by removing from the patient's environment anything that could be used to inflict self injury, eg. Order safe tray, snap gown, etc   [x] Maintain open, warm, caring, non-judgmental attitude/manner towards patient   [] Discuss advantages and disadvantages of existing coping methods/skills   [x] Assist and educate patient with identifying present strengths and coping skills   [x] Keep patient informed regarding plan of care and provide clear concise explanations. Provide the patient/family education information as well as telephone numbers and other information about crisis centers, hot lines, and counselors.     Discharge Planning:   [] Referral  [] Groups [] Health agencies  [] Other:          Tanna Santiago RN  03/09/21 0630

## 2021-03-10 NOTE — GROUP NOTE
Group Therapy Note    Date: 3/10/2021    Group Start Time: 1000  Group End Time: 5417  Group Topic: Psychotherapy    JESUS Yost, [facility]        Group Therapy Note    Patient declined to attend Psychotherapy group at 1000 am despite encouragement. Patient was offered a 1:1 time to meet after group or alternative activity to do and patient refused.      Signature:  Isabella Yost [facility], [facility]

## 2021-03-10 NOTE — ED PROVIDER NOTES
Wilson Barron  ED  Emergency Department  Emergency Medicine Resident Sign-out     Care of Jazmín Berg was assumed from Dr. Lenard Sprague and is being seen for Suicidal  .  The patient's initial evaluation and plan have been discussed with the prior provider who initially evaluated the patient. EMERGENCY DEPARTMENT COURSE / MEDICAL DECISION MAKING:       MEDICATIONS GIVEN:  No orders of the defined types were placed in this encounter. LABS / RADIOLOGY:     Labs Reviewed   CBC WITH AUTO DIFFERENTIAL - Abnormal; Notable for the following components:       Result Value    RBC 4.05 (*)     Hemoglobin 11.0 (*)     Hematocrit 36.7 (*)     Seg Neutrophils 35 (*)     Lymphocytes 53 (*)     Segs Absolute 1.48 (*)     All other components within normal limits   COMPREHENSIVE METABOLIC PANEL - Abnormal; Notable for the following components:    Glucose 125 (*)     BUN 24 (*)     Total Bilirubin 0.28 (*)     All other components within normal limits   TOX SCR, BLD, ED - Abnormal; Notable for the following components:    Acetaminophen Level <5 (*)     Salicylate Lvl <1 (*)     All other components within normal limits   URINE DRUG SCREEN   COVID-19       No results found. RECENT VITALS:     Temp: 98.6 °F (37 °C),  Pulse: 91, Resp: 16, BP: 103/75, SpO2: 99 %    This patient is a 63 y.o. Male with concerns of suicidal ideation. Patient has a significant history for suicidal ideation, depression, bipolar disorder, schizophrenia, and headaches. Today he denies any has been having increasing auditory hallucinations and having command hallucinations on commit suicide by himself and have a gun. Patient did present to the ER with a knife which had to be taken away from him out of concerns of harming himself. He denies any recent alcohol or other recreational substance abuse and has been taking his medications without any difficulty.   Patient still having thoughts of self-harm but is denying any homicidal ideations. Patient has been medically cleared by initial ER resident. Lab work was only ordered as is required to be transferred to [facility]. ED Course as of Mar 10 0440   Wed Mar 10, 2021   0440 Cocaine Metabolite, Urine(!): POSITIVE [CS]      ED Course User Index  [CS] Brooke Harper DO           OUTSTANDING TASKS / RECOMMENDATIONS:    1. Await for lab work for [facility]. 2. Await possible transfer to the [facility]     FINAL IMPRESSION:     1. Suicidal thoughts        DISPOSITION:         DISPOSITION:  []  Discharge   [x]  Transfer -[facility]   []  Admission -     []  Against Medical Advice   []  Eloped   FOLLOW-UP: No follow-up provider specified.    DISCHARGE MEDICATIONS: New Prescriptions    No medications on file           Brooke Harper DO  Emergency Medicine Resident  [address] [facility]        Brooke Harper, [place]  Resident  03/10/21 [address] [facility],   Resident  03/10/21 8730

## 2021-03-10 NOTE — CARE COORDINATION
.. [facility] Biopsychosocial Assessment    Current Level of Psychosocial Functioning     Independent  X  Dependent    Minimal Assist     Comments:    Psychosocial High Risk Factors (check all that apply)    Unable to obtain meds   Chronic illness/pain    Substance abuse X  Lack of Family Support   Financial stress   Isolation X  Inadequate Community Resources  Suicide attempt(s)  X  Not taking medications   Victim of crime   Developmental Delay  Unable to manage personal needs  X  Age 72 or older   Homeless  No transportation   Readmission within 30 days  Unemployment  Traumatic Event     Comments:   Psychiatric Advanced Directives: n/a    Family to Involve in Treatment: Patient was unable to identify family to involve in treatment today. Sexual Orientation:  n/a    Patient Strengths: Patient is linked with [facility] for mental health treatment, has [address], housing, SSI, some family support    Patient Barriers: audio hallucinations, suicidal ideations as a result of hallucinations, use of cocaine, poor insight and judgement      Opiate Education Provided:  Patient not interested at this time      CMHC/mental health history:  Patient has been a consumer at [facility], prior diagnosis of schizoaffective disorder depressive type and cocaine use disorder    Plan of Care   medication management, group/individual therapies, family meetings, psycho -education, treatment team meetings to assist with stabilization    Initial Discharge Plan:  Return to current place of residence and follow up with [facility]      Clinical Summary:    Bi Biswas is a 62 y/o male who presented to the [facility] ED due to audio hallucinations that were instructing him to kill himself with a gun. Patient stated he was still experiencing these thoughts and suicidal ideations as a result of the hallucinations at the time of this assessment. Patient reports he has experienced these symptoms for a long period of time in his life.   He was unable to provide

## 2021-03-10 NOTE — PROGRESS NOTES
Pharmacy Medication History Note      List of current medications patient is taking is complete. Source of information: Molly Maravilla RN ACT Team), OAS    Changes made to medication list:  Medications removed (include reason, ex. therapy complete or physician discontinued, noncompliance):  Acetaminophen (list clean up), Cepacol (list clean up), Docusate (list clean up), Ibuprofen (list clean up), Olanzapine (alternate therapy)    Medications added/doses adjusted:  Adjusted Benztropine 1 mg daily as needed  Added Escitalopram 20 mg daily  Added Quetiapine 300 mg twice daily  Added Bupropion  mg twice daily  Added Invega sustenna 234 mg every 4 weeks - last given 3/9/21    Other notes (ex. Recent course of antibiotics, Coumadin dosing):  Note, patient's Quetiapine is written as 600 mg nightly but patient prefers to take twice daily as he reports it helps with voices better. Neal Castro RN reports [facility] fills patient's medications but then she assists with making pill boxes for patient. Please let me know if you have any questions about this encounter. Thank you!     Electronically signed by Tita Langston, [facility] on 3/10/2021 at 8:59 AM

## 2021-03-11 PROCEDURE — 99232 SBSQ HOSP IP/OBS MODERATE 35: CPT | Performed by: PSYCHIATRY & NEUROLOGY

## 2021-03-11 PROCEDURE — 6370000000 HC RX 637 (ALT 250 FOR IP): Performed by: NURSE PRACTITIONER

## 2021-03-11 PROCEDURE — 1240000000 HC EMOTIONAL WELLNESS R&B

## 2021-03-11 PROCEDURE — 6370000000 HC RX 637 (ALT 250 FOR IP): Performed by: PSYCHIATRY & NEUROLOGY

## 2021-03-11 RX ORDER — PALIPERIDONE 6 MG/1
6 TABLET, EXTENDED RELEASE ORAL DAILY
Status: DISCONTINUED | OUTPATIENT
Start: 2021-03-12 | End: 2021-03-15 | Stop reason: HOSPADM

## 2021-03-11 RX ADMIN — ESCITALOPRAM OXALATE 20 MG: 20 TABLET, FILM COATED ORAL at 07:59

## 2021-03-11 RX ADMIN — HYDROXYZINE HYDROCHLORIDE 50 MG: 50 TABLET, FILM COATED ORAL at 22:50

## 2021-03-11 RX ADMIN — PALIPERIDONE 3 MG: 3 TABLET, EXTENDED RELEASE ORAL at 07:59

## 2021-03-11 RX ADMIN — TRAZODONE HYDROCHLORIDE 50 MG: 50 TABLET ORAL at 22:50

## 2021-03-11 ASSESSMENT — PAIN SCALES - GENERAL: PAINLEVEL_OUTOF10: 8

## 2021-03-11 NOTE — GROUP NOTE
Group Therapy Note    Date: 3/11/2021    Group Start Time: 1600  Group End Time: 36  Group Topic: Healthy Living/Wellness    JESUS Johnson        Group Therapy Note    Attendees: 11/16       Patient's Goal:  Participate in discussion    Notes:  What would you do questions    Status After Intervention:  Unchanged    Participation Level: Active Listener and Interactive    Participation Quality: Appropriate and Attentive      Speech:  normal      Thought Process/Content: Logical      Affective Functioning: Congruent      Mood: stable      Level of consciousness:  Alert and Attentive      Response to Learning: Able to verbalize current knowledge/experience and Progressing to goal      Endings: None Reported    Modes of Intervention: Socialization      Discipline Responsible: Behavorial Health Tech      Signature:   Rafael Nicholson

## 2021-03-11 NOTE — GROUP NOTE
Group Therapy Note    Date: 3/11/2021    Group Start Time: 1000  Group End Time: 2517  Group Topic: Psychotherapy    JESUS [facility] FRANKLIN Delacruz LSW        Group Therapy Note    patient refused to attend psychotherapy group at 10:00am after encouragement from staff.        Signature:  FRANKLIN Leavitt LSW

## 2021-03-11 NOTE — PROGRESS NOTES
Daily Progress Note  3/11/2021      CHIEF COMPLAINT: Acute psychosis, suicidal ideation    Reviewed patient's current plan of care and vital signs with nursing staff. Sleep:  a few hours last night - patient reports sleeping very poorly  Attending groups: No    SUBJECTIVE:    Unit staff report no overnight behavioral events. Patient has been medication compliant and behaviorally in control. He was seen for follow-up assessment in his assigned room where he was resting. He was more willing to speak with writer today and uncovered his head from the blankets and made good eye contact during conversation. He was calm and cooperative and when asked how he was doing today he replied \"not good\". He stated \"the voices are screaming in my ear\". He stated that the voices are telling him to kill himself but he is trying his best to ignore them. He stated he is not currently suicidal but \"sometimes I am concerned I will go through with it\". He denies homicidal ideation. He also reports difficulty sleeping at night. He stated he feels like he was awake all night and is unable to nap very much during the day. Patient does not feel the voices have improved in volume or frequency. Patient has not been attending groups and prefers to keep to himself in his room. He did get up for breakfast and reports eating 100% of his tray. He denies side effects from medications. Patient has not yet had a desire to attend groups. He was encouraged to shower and attend to ADLs today. Patient agreed to contract for safety and was encouraged to seek staff assistance if the auditory command hallucinations become overwhelming and his thoughts of self-harm recur. He had no other concerns or questions.     Mental Status Exam  Level of consciousness:  Awake and alert  Appearance: Hospital attire, lying in bed, with fair grooming and hygiene   Behavior/Motor: No abnormalities noted  Attitude toward examiner:  Cooperative, attentive, good eye contact  Speech:  spontaneous, normal rate, soft volume and poorly articulated related to poor dentition  Mood: Depressed  Affect: Mood congruent  Thought processes:  linear, goal directed and coherent  Thought content:  denies homicidal ideation  Suicidal Ideation: Endorses intermittent suicidal ideation  Delusions:  no evidence of delusions  Perceptual Disturbance: Endorses command auditory hallucinations  Cognition:  Oriented to person, place, time/date, situation   Memory: age appropriate  Insight & Judgement: improving  Medication side effects:  denies     Data   height is 6' 3\" (1.905 m) and weight is 180 lb (81.6 kg). His temperature is 98.3 °F (36.8 °C). His blood pressure is 93/60 and his pulse is 67. His respiration is 14 and oxygen saturation is 99%.    Labs:   Admission on 03/09/2021, Discharged on 03/10/2021   Component Date Value Ref Range Status    WBC 03/10/2021 4.3  3.5 - 11.3 k/uL Final    RBC 03/10/2021 4.05* 4.21 - 5.77 m/uL Final    Hemoglobin 03/10/2021 11.0* 13.0 - 17.0 g/dL Final    Hematocrit 03/10/2021 36.7* 40.7 - 50.3 % Final    MCV 03/10/2021 90.6  82.6 - 102.9 fL Final    MCH 03/10/2021 27.2  25.2 - 33.5 pg Final    MCHC 03/10/2021 30.0  28.4 - 34.8 g/dL Final    RDW 03/10/2021 14.2  11.8 - 14.4 % Final    Platelets 85/22/3186 280  138 - 453 k/uL Final    MPV 03/10/2021 9.5  8.1 - 13.5 fL Final    NRBC Automated 03/10/2021 0.0  0.0 per 100 WBC Final    Differential Type 03/10/2021 NOT REPORTED   Final    Seg Neutrophils 03/10/2021 35* 36 - 65 % Final    Lymphocytes 03/10/2021 53* 24 - 43 % Final    Monocytes 03/10/2021 8  3 - 12 % Final    Eosinophils % 03/10/2021 3  1 - 4 % Final    Basophils 03/10/2021 1  0 - 2 % Final    Immature Granulocytes 03/10/2021 0  0 % Final    Segs Absolute 03/10/2021 1.48* 1.50 - 8.10 k/uL Final    Absolute Lymph # 03/10/2021 2.27  1.10 - 3.70 k/uL Final    Absolute Mono # 03/10/2021 0.33  0.10 - 1.20 k/uL Final    Absolute Eos # 03/10/2021 0.14  0.00 - 0.44 k/uL Final    Basophils Absolute 03/10/2021 <0.03  0.00 - 0.20 k/uL Final    Absolute Immature Granulocyte 03/10/2021 <0.03  0.00 - 0.30 k/uL Final    WBC Morphology 03/10/2021 NOT REPORTED   Final    RBC Morphology 03/10/2021 NOT REPORTED   Final    Platelet Estimate 08/82/2000 NOT REPORTED   Final    Glucose 03/10/2021 125* 70 - 99 mg/dL Final    BUN 03/10/2021 24* 8 - 23 mg/dL Final    CREATININE 03/10/2021 1.06  0.70 - 1.20 mg/dL Final    Bun/Cre Ratio 03/10/2021 NOT REPORTED  9 - 20 Final    Calcium 03/10/2021 8.7  8.6 - 10.4 mg/dL Final    Sodium 03/10/2021 140  135 - 144 mmol/L Final    Potassium 03/10/2021 4.2  3.7 - 5.3 mmol/L Final    Chloride 03/10/2021 105  98 - 107 mmol/L Final    CO2 03/10/2021 25  20 - 31 mmol/L Final    Anion Gap 03/10/2021 10  9 - 17 mmol/L Final    Alkaline Phosphatase 03/10/2021 107  40 - 129 U/L Final    ALT 03/10/2021 8  5 - 41 U/L Final    AST 03/10/2021 15  <40 U/L Final    Total Bilirubin 03/10/2021 0.28* 0.3 - 1.2 mg/dL Final    Total Protein 03/10/2021 6.4  6.4 - 8.3 g/dL Final    Albumin 03/10/2021 3.9  3.5 - 5.2 g/dL Final    Albumin/Globulin Ratio 03/10/2021 1.6  1.0 - 2.5 Final    GFR Non- 03/10/2021 >60  >60 mL/min Final    GFR  03/10/2021 >60  >60 mL/min Final    GFR Comment 03/10/2021        Final    Comment: Average GFR for 61-76 years old:   80 mL/min/1.73sq m  Chronic Kidney Disease:   <60 mL/min/1.73sq m  Kidney failure:   <15 mL/min/1.73sq m              eGFR calculated using average adult body mass.  Additional eGFR calculator available at:        [employer]            GFR Staging 03/10/2021 NOT REPORTED   Final    Amphetamine Screen, Ur 03/09/2021 NEGATIVE  NEGATIVE Final    Comment:       (Positive cutoff 1000 ng/mL)                  Barbiturate Screen, Ur 03/09/2021 NEGATIVE  NEGATIVE Final    Comment:       (Positive cutoff 200 ng/mL)  Benzodiazepine Screen, Urine 03/09/2021 NEGATIVE  NEGATIVE Final    Comment:       (Positive cutoff 200 ng/mL)                  Cocaine Metabolite, Urine 03/09/2021 POSITIVE* NEGATIVE Final    Comment:       (Positive cutoff 300 ng/mL)                  Methadone Screen, Urine 03/09/2021 NEGATIVE  NEGATIVE Final    Comment:       (Positive cutoff 300 ng/mL)                  Opiates, Urine 03/09/2021 NEGATIVE  NEGATIVE Final    Comment:       (Positive cutoff 300 ng/mL)                  Phencyclidine, Urine 03/09/2021 NEGATIVE  NEGATIVE Final    Comment:       (Positive cutoff 25 ng/mL)                  Propoxyphene, Urine 03/09/2021 NOT REPORTED  NEGATIVE Final    Cannabinoid Scrn, Ur 03/09/2021 NEGATIVE  NEGATIVE Final    Comment:       (Positive cutoff 50 ng/mL)                  Oxycodone Screen, Ur 03/09/2021 NEGATIVE  NEGATIVE Final    Comment:       (Positive cutoff 100 ng/mL)                  Methamphetamine, Urine 03/09/2021 NOT REPORTED  NEGATIVE Final    Tricyclic Antidepressants, Urine 03/09/2021 NOT REPORTED  NEGATIVE Final    MDMA, Urine 03/09/2021 NOT REPORTED  NEGATIVE Final    Buprenorphine Urine 03/09/2021 NOT REPORTED  NEGATIVE Final    Test Information 03/09/2021 Assay provides medical screening only. The absence of expected drug(s) and/or metabolite(s) may indicate diluted or adulterated urine, limitations of testing or timing of collection. Final    Comment: Testing for legal purposes should be confirmed by another method. To request confirmation   of test result, please call the lab within 7 days of sample submission.  Acetaminophen Level 03/10/2021 <5* 10 - 30 ug/mL Final    Ethanol 03/10/2021 <10  <10 mg/dL Final    Ethanol percent 03/10/2021 <0.010  <0.382 % Final    Salicylate Lvl 39/96/5940 <1* 3 - 10 mg/dL Final    Toxic Tricyclic Sc,Blood 88/51/3411 NEGATIVE  NEGATIVE Final    Specimen Description 03/10/2021 . NASOPHARYNGEAL SWAB   Final    SARS-CoV-2, Rapid 03/10/2021 Not Detected  Not Detected Final    Comment:       Rapid NAAT:  The specimen is NEGATIVE for SARS-CoV-2, the novel coronavirus associated with   COVID-19. The ID NOW COVID-19 assay is designed to detect the virus that causes COVID-19 in patients   with signs and symptoms of infection who are suspected of COVID-19. An individual without symptoms of COVID-19 and who is not shedding SARS-CoV-2 virus would   expect to have a negative (not detected) result in this assay. Negative results should be treated as presumptive and, if inconsistent with clinical signs   and symptoms or necessary for patient management,  should be tested with an alternative molecular assay. Negative results do not preclude   SARS-CoV-2 infection and   should not be used as the sole basis for patient management decisions.          Fact sheet for Healthcare Providers: Brandyn  Fact sheet for Patients: Brandyn          Methodology: Isothermal Nucleic Acid Amplification              Medications  Current Facility-Administered Medications: acetaminophen (TYLENOL) tablet 650 mg, 650 mg, Oral, Q4H PRN  aluminum & magnesium hydroxide-simethicone (MAALOX) 200-200-20 MG/5ML suspension 30 mL, 30 mL, Oral, Q6H PRN  hydrOXYzine (ATARAX) tablet 50 mg, 50 mg, Oral, TID PRN  ibuprofen (ADVIL;MOTRIN) tablet 400 mg, 400 mg, Oral, Q6H PRN  nicotine polacrilex (NICORETTE) gum 2 mg, 2 mg, Oral, PRN  polyethylene glycol (GLYCOLAX) packet 17 g, 17 g, Oral, Daily PRN  traZODone (DESYREL) tablet 50 mg, 50 mg, Oral, Nightly PRN  haloperidol lactate (HALDOL) injection 5 mg, 5 mg, Intramuscular, Q4H PRN **AND** LORazepam (ATIVAN) injection 2 mg, 2 mg, Intramuscular, Q4H PRN **AND** diphenhydrAMINE (BENADRYL) injection 50 mg, 50 mg, Intramuscular, Q4H PRN  haloperidol (HALDOL) tablet 5 mg, 5 mg, Oral, Q4H PRN **AND** LORazepam (ATIVAN) tablet 2 mg, 2 mg, Oral, Q4H PRN benztropine (COGENTIN) tablet 1 mg, 1 mg, Oral, Daily PRN  [Held by provider] buPROPion (WELLBUTRIN) tablet 100 mg, 100 mg, Oral, BID  escitalopram (LEXAPRO) tablet 20 mg, 20 mg, Oral, Daily  paliperidone (INVEGA) extended release tablet 3 mg, 3 mg, Oral, Daily    ASSESSMENT  Schizoaffective disorder (HCC)     PLAN  Patients symptoms show no change  Increase Invega to 6 mg by mouth daily  Attempt to develop insight  Psycho-education conducted. Probable discharge is TBD  Follow-up daily while inpatient    Electronically signed by MARY Martini CNP on 3/11/21 at 1:01 PM EST    **This report has been created using voice recognition software. It may contain minor errors which are inherent in voice recognition technology. **                                         Psychiatry Attending Attestation     I independently saw and evaluated the patient. I reviewed the nurse practitioner's documentation above. Any additional comments or changes to the nurse practitioners documentation are stated below otherwise agree with assessment. Patient reports that auditory hallucinations continue to bother him. Continues to report having commanding auditory hallucinations asking him to hurt himself. Discussed with him about increasing Invega to help with the voices. Also discussed with him about increasing trazodone to help with the sleep. He understood and agreed to the plan.      Electronically signed by Mariana Aaron MD on 3/11/21 at 8:33 PM EST

## 2021-03-11 NOTE — PLAN OF CARE
Problem: Altered Mood, Depressive Behavior:  Goal: Able to verbalize and/or display a decrease in depressive symptoms  Description: Able to verbalize and/or display a decrease in depressive symptoms  Note: Pt rates depression an 8 out of 10 and anxiety a 6. PT admits to hearing auditory hallucinations telling him to harm self. PT is flat and guarded during one to one times and isolates to his room. Pt reports poor sleep prior to admission. Problem: Altered Mood, Depressive Behavior:  Goal: Ability to disclose and discuss suicidal ideas will improve  Description: Ability to disclose and discuss suicidal ideas will improve  Note: Pt denies thoughts of self harm and is agreeable to seeking out should thoughts of self harm arise. Safe environment maintained. Q15 minute checks for safety cont per unit policy. Will cont to monitor for safety and provides support and reassurance as needed.

## 2021-03-11 NOTE — PLAN OF CARE
Problem: Altered Mood, Depressive Behavior:  Goal: Able to verbalize and/or display a decrease in depressive symptoms  Description: Able to verbalize and/or display a decrease in depressive symptoms  3/11/2021 0909 by Jeanine Martinez LPN  Outcome: Ongoing  Note: Patient remains disheveled, strong encouragement given for hygiene and coming out of the room. Patient is isolative, sad affect, and evasive with 1:1 interview. Patient came out of room and ate breakfast and took medications with little to no encouragement. Problem: Altered Mood, Depressive Behavior:  Goal: Ability to disclose and discuss suicidal ideas will improve  Description: Ability to disclose and discuss suicidal ideas will improve  3/11/2021 0909 by Jeanine Martinez LPN  Outcome: Ongoing  Note: Patient remains tearful upon approach and during the interview. Patient very evasive with answering questions. Patient admits to continued fleeting suicidal ideation, has not thought of a means to carry out the thoughts. Patient contracts for safety while in the hospital. Patient was encouraged to seek staff support if the suicidal thoughts became to overwhelming.

## 2021-03-11 NOTE — GROUP NOTE
Group Therapy Note    Date: 3/11/2021    Group Start Time: 1100  Group End Time: 4992  Group Topic: Recreational    JESUS Seals    Patient refused to attend leisure/ coping skills group at 1100 after encouragement from staff. 1:1 talk time provided by staff.         Signature:  Sang Seals

## 2021-03-11 NOTE — GROUP NOTE
Group Therapy Note    Date: 3/11/2021    Group Start Time: 1330  Group End Time: 2702  Group Topic: Psychoeducation    JESUS Velazquezra [place], [place]    Attendees: 12         Patient's Goal:  To attend [facility] presentation and learn about their facility + services. To be informed of other local agencies who offer supportive services. To be provided with support service handouts to reference in times of need. Notes:  Patient attended group and actively participated in task at hand. Patient conversed appropriately with peers and Coleman Slaughter. Status After Intervention:  Improved    Participation Level:  Active Listener and Interactive    Participation Quality: Appropriate, Attentive and Sharing      Speech:  normal      Thought Process/Content: Logical      Affective Functioning: Congruent      Mood: euthymic      Level of consciousness:  Alert, Oriented x4 and Attentive      Response to Learning: Able to verbalize current knowledge/experience, Able to verbalize/acknowledge new learning, Able to retain information, Capable of insight and Progressing to goal      Endings: None Reported       Modes of Intervention: Education, Support, Socialization, Exploration, Clarifying, Problem-solving, Confrontation, Limit-setting and Reality-testing      Discipline Responsible: Psychoeducational Specialist      Signature:  Tom Lanier

## 2021-03-11 NOTE — PLAN OF CARE
No  Thought Content: Preoccupations  Hallucinations: Auditory (Comment), Command(Comment)(voices telling him to harm himself)  Delusions: No  Memory:Normal: No  Memory: Poor Remote  Insight and Judgment: No  Insight and Judgment: Poor Judgment, Poor Insight  Present Suicidal Ideation: Yes(pt has fleeting SI with no plan and contracts for safety.)  Present Homicidal Ideation: No    Daily Assessment Last Entry:   Daily Sleep (WDL): Within Defined Limits         Patient Currently in Pain: Denies  Daily Nutrition (WDL): Within Defined Limits  Barriers to Nutrition: None  Level of Assistance: Independent/Self    Patient Monitoring:  Frequency of Checks: 4 times per hour, close    Psychiatric Symptoms:   Depression Symptoms  Depression Symptoms: Isolative, Loss of interest, Feelings of worthlessness, Crying  Anxiety Symptoms  Anxiety Symptoms: Generalized  [place] Symptoms  [place] Symptoms: No problems reported or observed. Psychosis Symptoms  Delusion Type: No problems reported or observed. Suicide Risk CSSR-S:  1) Within the past month, have you wished you were dead or wished you could go to sleep and not wake up? : (alex)  2) Have you actually had any thoughts of killing yourself? : (alex)  3) Have you been thinking about how you might kill yourself? : (alex)  5) Have you started to work out or worked out the details of how to kill yourself? Do you intend to carry out this plan? : (alex)  6) Have you ever done anything, started to do anything, or prepared to do anything to end your life?: (alex)  Change in Result: No Change in Plan of care: No      EDUCATION:   EDUCATION:   Learner Progress Toward Treatment Goals: Reviewed results and recommendations of this team, Reviewed group plan and strategies, Reviewed signs, symptoms and risk of self harm and violent behavior, Reviewed goals and plan of care    Method:small group, individual verbal education    Outcome: Patient refused to attend treatment team meeting.  Patient needs reinforcement to obtain goals. PATIENT GOALS:  Short term: Patient refused to attend treatment team meeting. Long term: Patient refused to attend treatment team meeting.      PLAN/TREATMENT RECOMMENDATIONS UPDATE: continue with group therapies, increased socialization, continue planning for after discharge goals, continue with medication compliance    SHORT-TERM GOALS UPDATE:   Time frame for Short-Term Goals: 5-7 days    LONG-TERM GOALS UPDATE:   Time frame for Long-Term Goals: 6 months  Members Present in Team Meeting: See Signature Sheet    Makayla [place], [place]

## 2021-03-11 NOTE — GROUP NOTE
Group Therapy Note    Date: 3/11/2021    Group Start Time: 0900  Group End Time: 0920  Group Topic: Community Meeting    JESUS Gill    Patient refused to attend community meeting/ goals group at 0900 after encouragement from staff. 1:1 talk time provided by staff.        Signature:  Robyn Gill

## 2021-03-12 PROCEDURE — 1240000000 HC EMOTIONAL WELLNESS R&B

## 2021-03-12 PROCEDURE — 6370000000 HC RX 637 (ALT 250 FOR IP): Performed by: PSYCHIATRY & NEUROLOGY

## 2021-03-12 PROCEDURE — 99232 SBSQ HOSP IP/OBS MODERATE 35: CPT | Performed by: PSYCHIATRY & NEUROLOGY

## 2021-03-12 PROCEDURE — 6370000000 HC RX 637 (ALT 250 FOR IP): Performed by: NURSE PRACTITIONER

## 2021-03-12 RX ORDER — TRAZODONE HYDROCHLORIDE 150 MG/1
150 TABLET ORAL NIGHTLY PRN
Status: DISCONTINUED | OUTPATIENT
Start: 2021-03-12 | End: 2021-03-15 | Stop reason: HOSPADM

## 2021-03-12 RX ADMIN — HYDROXYZINE HYDROCHLORIDE 50 MG: 50 TABLET, FILM COATED ORAL at 05:55

## 2021-03-12 RX ADMIN — ESCITALOPRAM OXALATE 20 MG: 20 TABLET, FILM COATED ORAL at 09:00

## 2021-03-12 RX ADMIN — PALIPERIDONE 6 MG: 6 TABLET, EXTENDED RELEASE ORAL at 09:00

## 2021-03-12 NOTE — GROUP NOTE
Group Therapy Note    Date: 3/12/2021    Group Start Time: 7956  Group End Time: 8210  Group Topic: Group Documentation    JESUS PIERREI A    Renee Valadez        Group Therapy Note    Attendees: 8/17         Patient's Goal:  Attend and participate in wellness group  Notes: [facility] - Identifying Life enhancing areas to combat stress    Status After Intervention:  Improved    Participation Level:  Active Listener and Interactive    Participation Quality: Appropriate, Attentive, Sharing and Supportive      Speech:  normal      Thought Process/Content: Logical      Affective Functioning: Congruent      Mood: anxious      Level of consciousness:  Alert, Oriented x4 and Attentive      Response to Learning: Able to verbalize current knowledge/experience, Able to verbalize/acknowledge new learning, Able to retain information and Capable of insight      Endings: None Reported    Modes of Intervention: Education, Support, Socialization, Exploration and Activity      Discipline Responsible: [address], [email or web address]      Signature:  Renee Valadez

## 2021-03-12 NOTE — PROGRESS NOTES
Daily Progress Note  3/12/2021      CHIEF COMPLAINT: Acute psychosis, suicidal ideation    Reviewed patient's current plan of care and vital signs with nursing staff. Sleep: Staff endorses improvement in sleep, patient reports that he slept poorly  Attending groups: No, isolating to room    SUBJECTIVE:    Staff reports that the patient had a rough morning, they report that he was responding to internal stimuli and yelling in his room. He denied wanting any as needed medication. They helped shot his blinds to decrease the visual hallucinations of him seeing someone on the ceiling. He was interviewed today bedside, initially he had blankets over his head, he did pull them down to interact with this author. He continues to endorse depression and sadness. He reports feeling guilt and explains that after his mother  his sisters came to town. He reports that one of his sisters bought him socks and underwear and he sold them \"for a rock\". He states that he feels guilty about this and fears telling his sister because he does not want to \"break her heart\". He also continues to endorse auditory hallucinations, he reports that he has named the voice \"Morticia\". He reports that the voices are more quiet this afternoon than they were this morning. He denies any side effects to his current medication regimen, he is tolerating the Invega that was started today. We discussed and mutually agreed to make no change to his current medication regimen and see how he does tomorrow since starting the 6 mg of Invega. He is agreeable with this plan of care.       Mental Status Exam  Level of consciousness:  Awake and alert  Appearance: Hospital attire, lying in bed, with fair grooming and hygiene   Behavior/Motor: Pleasant upon approach   attitude toward examiner:  Cooperative, attentive, good eye contact  Speech:  spontaneous, normal rate, soft volume and poorly articulated related to poor dentition  Mood: Depressed  Affect: Mood congruent  Thought processes:  linear, goal directed and coherent  Thought content:  denies homicidal ideation  Suicidal Ideation: Endorses intermittent suicidal ideation  Delusions:  no evidence of delusions  Perceptual Disturbance: Endorses command auditory hallucinations, reports that they are more quiet this afternoon than this morning. He also had visual hallucinations this morning reportedly seeing someone on his ceiling. Cognition:  Oriented to person, place, time/date, situation   Memory: age appropriate  Insight & Judgement: improving  Medication side effects:  denies     Data   height is 6' 3\" (1.905 m) and weight is 180 lb (81.6 kg). His oral temperature is 98.2 °F (36.8 °C). His blood pressure is 104/83 and his pulse is 71. His respiration is 14 and oxygen saturation is 99%. Labs:   No visits with results within 2 Day(s) from this visit.    Latest known visit with results is:   Admission on 03/09/2021, Discharged on 03/10/2021   Component Date Value Ref Range Status    WBC 03/10/2021 4.3  3.5 - 11.3 k/uL Final    RBC 03/10/2021 4.05* 4.21 - 5.77 m/uL Final    Hemoglobin 03/10/2021 11.0* 13.0 - 17.0 g/dL Final    Hematocrit 03/10/2021 36.7* 40.7 - 50.3 % Final    MCV 03/10/2021 90.6  82.6 - 102.9 fL Final    MCH 03/10/2021 27.2  25.2 - 33.5 pg Final    MCHC 03/10/2021 30.0  28.4 - 34.8 g/dL Final    RDW 03/10/2021 14.2  11.8 - 14.4 % Final    Platelets 71/73/0004 280  138 - 453 k/uL Final    MPV 03/10/2021 9.5  8.1 - 13.5 fL Final    NRBC Automated 03/10/2021 0.0  0.0 per 100 WBC Final    Differential Type 03/10/2021 NOT REPORTED   Final    Seg Neutrophils 03/10/2021 35* 36 - 65 % Final    Lymphocytes 03/10/2021 53* 24 - 43 % Final    Monocytes 03/10/2021 8  3 - 12 % Final    Eosinophils % 03/10/2021 3  1 - 4 % Final    Basophils 03/10/2021 1  0 - 2 % Final    Immature Granulocytes 03/10/2021 0  0 % Final    Segs Absolute 03/10/2021 1.48* 1.50 - 8.10 k/uL Final    Absolute Lymph # 03/10/2021 2.27  1.10 - 3.70 k/uL Final    Absolute Mono # 03/10/2021 0.33  0.10 - 1.20 k/uL Final    Absolute Eos # 03/10/2021 0.14  0.00 - 0.44 k/uL Final    Basophils Absolute 03/10/2021 <0.03  0.00 - 0.20 k/uL Final    Absolute Immature Granulocyte 03/10/2021 <0.03  0.00 - 0.30 k/uL Final    WBC Morphology 03/10/2021 NOT REPORTED   Final    RBC Morphology 03/10/2021 NOT REPORTED   Final    Platelet Estimate 45/66/0779 NOT REPORTED   Final    Glucose 03/10/2021 125* 70 - 99 mg/dL Final    BUN 03/10/2021 24* 8 - 23 mg/dL Final    CREATININE 03/10/2021 1.06  0.70 - 1.20 mg/dL Final    Bun/Cre Ratio 03/10/2021 NOT REPORTED  9 - 20 Final    Calcium 03/10/2021 8.7  8.6 - 10.4 mg/dL Final    Sodium 03/10/2021 140  135 - 144 mmol/L Final    Potassium 03/10/2021 4.2  3.7 - 5.3 mmol/L Final    Chloride 03/10/2021 105  98 - 107 mmol/L Final    CO2 03/10/2021 25  20 - 31 mmol/L Final    Anion Gap 03/10/2021 10  9 - 17 mmol/L Final    Alkaline Phosphatase 03/10/2021 107  40 - 129 U/L Final    ALT 03/10/2021 8  5 - 41 U/L Final    AST 03/10/2021 15  <40 U/L Final    Total Bilirubin 03/10/2021 0.28* 0.3 - 1.2 mg/dL Final    Total Protein 03/10/2021 6.4  6.4 - 8.3 g/dL Final    Albumin 03/10/2021 3.9  3.5 - 5.2 g/dL Final    Albumin/Globulin Ratio 03/10/2021 1.6  1.0 - 2.5 Final    GFR Non- 03/10/2021 >60  >60 mL/min Final    GFR  03/10/2021 >60  >60 mL/min Final    GFR Comment 03/10/2021        Final    Comment: Average GFR for 61-76 years old:   80 mL/min/1.73sq m  Chronic Kidney Disease:   <60 mL/min/1.73sq m  Kidney failure:   <15 mL/min/1.73sq m              eGFR calculated using average adult body mass.  Additional eGFR calculator available at:        [employer]            GFR Staging 03/10/2021 NOT REPORTED   Final    Amphetamine Screen, Ur 03/09/2021 NEGATIVE  NEGATIVE Final    Comment:       (Positive cutoff 1000 ng/mL)                  Barbiturate Screen, Ur 03/09/2021 NEGATIVE  NEGATIVE Final    Comment:       (Positive cutoff 200 ng/mL)                  Benzodiazepine Screen, Urine 03/09/2021 NEGATIVE  NEGATIVE Final    Comment:       (Positive cutoff 200 ng/mL)                  Cocaine Metabolite, Urine 03/09/2021 POSITIVE* NEGATIVE Final    Comment:       (Positive cutoff 300 ng/mL)                  Methadone Screen, Urine 03/09/2021 NEGATIVE  NEGATIVE Final    Comment:       (Positive cutoff 300 ng/mL)                  Opiates, Urine 03/09/2021 NEGATIVE  NEGATIVE Final    Comment:       (Positive cutoff 300 ng/mL)                  Phencyclidine, Urine 03/09/2021 NEGATIVE  NEGATIVE Final    Comment:       (Positive cutoff 25 ng/mL)                  Propoxyphene, Urine 03/09/2021 NOT REPORTED  NEGATIVE Final    Cannabinoid Scrn, Ur 03/09/2021 NEGATIVE  NEGATIVE Final    Comment:       (Positive cutoff 50 ng/mL)                  Oxycodone Screen, Ur 03/09/2021 NEGATIVE  NEGATIVE Final    Comment:       (Positive cutoff 100 ng/mL)                  Methamphetamine, Urine 03/09/2021 NOT REPORTED  NEGATIVE Final    Tricyclic Antidepressants, Urine 03/09/2021 NOT REPORTED  NEGATIVE Final    MDMA, Urine 03/09/2021 NOT REPORTED  NEGATIVE Final    Buprenorphine Urine 03/09/2021 NOT REPORTED  NEGATIVE Final    Test Information 03/09/2021 Assay provides medical screening only. The absence of expected drug(s) and/or metabolite(s) may indicate diluted or adulterated urine, limitations of testing or timing of collection. Final    Comment: Testing for legal purposes should be confirmed by another method. To request confirmation   of test result, please call the lab within 7 days of sample submission.       Acetaminophen Level 03/10/2021 <5* 10 - 30 ug/mL Final    Ethanol 03/10/2021 <10  <10 mg/dL Final    Ethanol percent 03/10/2021 <0.010  <6.222 % Final    Salicylate Lvl 41/68/4907 <1* 3 - 10 mg/dL Final    Toxic Tricyclic Sc,Blood 76/87/6393 NEGATIVE  NEGATIVE Final    Specimen Description 03/10/2021 . NASOPHARYNGEAL SWAB   Final    SARS-CoV-2, Rapid 03/10/2021 Not Detected  Not Detected Final    Comment:       Rapid NAAT:  The specimen is NEGATIVE for SARS-CoV-2, the novel coronavirus associated with   COVID-19. The ID NOW COVID-19 assay is designed to detect the virus that causes COVID-19 in patients   with signs and symptoms of infection who are suspected of COVID-19. An individual without symptoms of COVID-19 and who is not shedding SARS-CoV-2 virus would   expect to have a negative (not detected) result in this assay. Negative results should be treated as presumptive and, if inconsistent with clinical signs   and symptoms or necessary for patient management,  should be tested with an alternative molecular assay. Negative results do not preclude   SARS-CoV-2 infection and   should not be used as the sole basis for patient management decisions.          Fact sheet for Healthcare Providers: [employer]  Fact sheet for Patients: [employer]nav          Methodology: Isothermal Nucleic Acid Amplification              Medications  Current Facility-Administered Medications: paliperidone (INVEGA) extended release tablet 6 mg, 6 mg, Oral, Daily  acetaminophen (TYLENOL) tablet 650 mg, 650 mg, Oral, Q4H PRN  aluminum & magnesium hydroxide-simethicone (MAALOX) 200-200-20 MG/5ML suspension 30 mL, 30 mL, Oral, Q6H PRN  hydrOXYzine (ATARAX) tablet 50 mg, 50 mg, Oral, TID PRN  ibuprofen (ADVIL;MOTRIN) tablet 400 mg, 400 mg, Oral, Q6H PRN  nicotine polacrilex (NICORETTE) gum 2 mg, 2 mg, Oral, PRN  polyethylene glycol (GLYCOLAX) packet 17 g, 17 g, Oral, Daily PRN  traZODone (DESYREL) tablet 50 mg, 50 mg, Oral, Nightly PRN  haloperidol lactate (HALDOL) injection 5 mg, 5 mg, Intramuscular, Q4H PRN **AND** LORazepam (ATIVAN) injection 2 mg, 2 mg, Intramuscular, Q4H PRN **AND** diphenhydrAMINE (BENADRYL) injection 50 mg, 50 mg, Intramuscular, Q4H PRN  haloperidol (HALDOL) tablet 5 mg, 5 mg, Oral, Q4H PRN **AND** LORazepam (ATIVAN) tablet 2 mg, 2 mg, Oral, Q4H PRN  benztropine (COGENTIN) tablet 1 mg, 1 mg, Oral, Daily PRN  [Held by provider] buPROPion (WELLBUTRIN) tablet 100 mg, 100 mg, Oral, BID  escitalopram (LEXAPRO) tablet 20 mg, 20 mg, Oral, Daily    ASSESSMENT  Schizoaffective disorder (HCC)     PLAN  Patients symptoms show little change  Continue current medication regimen, monitor for tolerance of recently adjusted medications  Monitor need and frequency of as needed medication  Encourage participation in groups and milieu   attempt to develop insight  Psycho-education conducted. Probable discharge is TBD  Follow-up daily while inpatient    **This report has been created using voice recognition software. It may contain minor errors which are inherent in voice recognition technology. **                                                           Psychiatry Attending Attestation     I independently saw and evaluated the patient. I reviewed the nurse practitioner's documentation above. Any additional comments or changes to the nurse practitioners documentation are stated below otherwise agree with assessment. Patient reports that he has been having constant auditory hallucinations that are keeping him up at night. Reports that usually hallucinations are more during the daytime however this time that been occurring more frequently at night that have been keeping him up. Reports very poor sleep. Reports that these hallucinations are commanding him to hurt himself. Contracts for safety. Discussed with him about increasing Invega and trazodone. He understood and agreed to the plan.      Electronically signed by Milli Velez MD on 3/12/21 at 7:58 PM EST

## 2021-03-12 NOTE — GROUP NOTE
Group Therapy Note    Date: 3/12/2021    Group Start Time: 1330  Group End Time: 8187  Group Topic: Music Therapy    JESUS SHI    [facility]        Group Therapy Note    Attendees: 11/17       Patient's Goal:  Patients shared music and linked the songs to mental health related quotes as provided by the writer on a piece of paper. Goals to increase sense of community, cognitive stimulation, and self-exoression    Notes:  Patient attended and participated in group, having positive interactions with peers and staff. Patient stated he had difficulty looking at paper and reading. Stated R&B was his preferred music genre. When provided with a verbal list of music, requested the song I Believe I Can Fly by Dorina Oviedo. Observed to be singing throughout parts of the music. When asked what he thought the song was about was unable to provide an answer. Status After Intervention:  Improved    Participation Level:  Active Listener and Interactive    Participation Quality: Appropriate, Attentive and Sharing      Speech:  normal      Thought Process/Content: Logical  Linear      Affective Functioning: Congruent      Mood: euthymic      Level of consciousness:  Alert and Attentive      Response to Learning: Progressing to goal      Endings: None Reported    Modes of Intervention: Support, Socialization, Exploration, Activity, Media and Reality-testing      Discipline Responsible: Psychoeducational Specialist      Signature:  bA Waterman

## 2021-03-12 NOTE — PLAN OF CARE
Problem: Altered Mood, Depressive Behavior:  Goal: Able to verbalize acceptance of life and situations over which he or she has no control  Description: Able to verbalize acceptance of life and situations over which he or she has no control  Outcome: Ongoing  Goal: Able to verbalize and/or display a decrease in depressive symptoms  Description: Able to verbalize and/or display a decrease in depressive symptoms  Outcome: Ongoing  Note: Pt admits to depression and anxiety but says they are getting better. PT endorses auditory command hallucinations but states they are getting quieter. Pt is brighter upon approach. Pt isolative to room. Goal: Ability to disclose and discuss suicidal ideas will improve  Description: Ability to disclose and discuss suicidal ideas will improve  Outcome: Ongoing  Note: Pt endorses fleeting suicidal ideation. Pt contracts for safety and agrees to seek out staff if he can no longer keep himself safe. Pt remains safe on unit. Every 15 min checks for safety continue.

## 2021-03-12 NOTE — GROUP NOTE
Group Therapy Note    Date: 3/12/2021    Group Start Time: 1430  Group End Time: 3095  Group Topic: Cognitive Skills    JESUS Aguirre [place], [place]    Attendees: 9         Patient's Goal:  To demonstrate increased interpersonal skills. Notes:  Patient attended group, but did not participate in task at hand. Patient sat in group and observed. Status After Intervention:  Improved    Participation Level:  Active Listener    Participation Quality: Appropriate and Attentive      Speech:  normal      Thought Process/Content: Logical      Affective Functioning: Congruent      Mood: euthymic      Level of consciousness:  Alert, Oriented x4 and Attentive      Response to Learning: Progressing to goal      Endings: None Reported       Modes of Intervention: Socialization, Exploration, Clarifying, Problem-solving, Activity, Limit-setting and Reality-testing      Discipline Responsible: Psychoeducational Specialist      Signature:  Bakari Zaman

## 2021-03-12 NOTE — GROUP NOTE
Group Therapy Note    Date: 3/12/2021    Group Start Time: 1100  Group End Time: 8099  Group Topic: Recreational    [facility]    Patient refused to attend Recreational Therapy Group at 1100 after encouragement from staff. 1:1 talk time offered.     Signature:  Nate Burdick

## 2021-03-13 PROCEDURE — 1240000000 HC EMOTIONAL WELLNESS R&B

## 2021-03-13 PROCEDURE — 99231 SBSQ HOSP IP/OBS SF/LOW 25: CPT | Performed by: NURSE PRACTITIONER

## 2021-03-13 PROCEDURE — 6370000000 HC RX 637 (ALT 250 FOR IP): Performed by: NURSE PRACTITIONER

## 2021-03-13 RX ADMIN — ESCITALOPRAM OXALATE 20 MG: 20 TABLET, FILM COATED ORAL at 09:30

## 2021-03-13 RX ADMIN — PALIPERIDONE 6 MG: 6 TABLET, EXTENDED RELEASE ORAL at 09:30

## 2021-03-13 NOTE — GROUP NOTE
Group Therapy Note    Date: 3/13/2021    Group Start Time: 0900  Group End Time: 0915  Group Topic: Community Meeting    Pineda Walls        Group Therapy Note    Attendees: 8/20         Pt did not attend [employer]  d/t resting in room despite staff invitation to attend. 1:1 talk time offered as alternative to group session, pt declined.

## 2021-03-13 NOTE — GROUP NOTE
Group Therapy Note    Date: 3/13/2021    Group Start Time: 1600  Group End Time: 8803  Group Topic: Healthy Living/Wellness    JESUS SHI    Darrick Tapia        Group Therapy Note    Attendees: 8/18         Patient's Goal:  To be sharing and supportive    Notes:  Patient was supportive and attentive, interaction    Status After Intervention:  Improved    Participation Level:  Active Listener and Interactive    Participation Quality: Appropriate, Attentive and Sharing      Speech:  english      Thought Process/Content: Logical      Affective Functioning: Congruent      Mood: euthymic      Level of consciousness:  Oriented x4      Response to Learning: Able to verbalize current knowledge/experience      Endings: None Reported    Modes of Intervention: Education, Support and Socialization      Discipline Responsible: Behavorial Health Tech      Signature:  Margarita Saleh

## 2021-03-13 NOTE — PROGRESS NOTES
Daily Progress Note  An Carter CNP  3/13/2021      CHIEF COMPLAINT: Acute psychosis, suicidal ideation    Reviewed patient's current plan of care and vital signs with nursing staff. Sleep:  a few hours last night  Attending groups: Yes, a few today    SUBJECTIVE:    Unit staff report no overnight behavioral events. Patient has been medication compliant and behaviorally in control. Patient was seen for follow-up assessment in the TV area. He sat with writer and engaged in conversation. He was friendly and brighter than previous conversations. He stated his biggest concern is trouble sleeping. He said he wakes up frequently throughout the night and this has gone on for 2 nights now. He is denying suicidal and homicidal ideation today. He stated that the voices are quieter and it is easier for him to focus on other things. Unit staff still occasionally see him responding to internal stimuli. He stated that watching TV is a good distraction for him. He reports eating well and spending more time in the milieu. He has been showering and attending to ADLs. He denies any side effects from medications but is asking for Seroquel at night to help him sleep. He stated he uses Seroquel regularly at home. Patient continues to contract for safety. He was reminded to seek assistance from staff if voices thoughts become overwhelming. He had no other concerns or questions.     Mental Status Exam  Level of consciousness:  Awake and alert  Appearance: Hospital attire, seated in chair, with fair grooming and hygiene   Behavior/Motor: No abnormalities noted  Attitude toward examiner:  Cooperative, attentive, good eye contact  Speech:  spontaneous, normal rate, normal volume and well articulated  Mood: Euthymic  Affect: Appropriate  Thought processes:  linear, goal directed and coherent  Thought content:  denies homicidal ideation  Suicidal Ideation: Denies suicidal ideation  Delusions:  no evidence of delusions REPORTED   Final    RBC Morphology 03/10/2021 NOT REPORTED   Final    Platelet Estimate 34/38/0303 NOT REPORTED   Final    Glucose 03/10/2021 125* 70 - 99 mg/dL Final    BUN 03/10/2021 24* 8 - 23 mg/dL Final    CREATININE 03/10/2021 1.06  0.70 - 1.20 mg/dL Final    Bun/Cre Ratio 03/10/2021 NOT REPORTED  9 - 20 Final    Calcium 03/10/2021 8.7  8.6 - 10.4 mg/dL Final    Sodium 03/10/2021 140  135 - 144 mmol/L Final    Potassium 03/10/2021 4.2  3.7 - 5.3 mmol/L Final    Chloride 03/10/2021 105  98 - 107 mmol/L Final    CO2 03/10/2021 25  20 - 31 mmol/L Final    Anion Gap 03/10/2021 10  9 - 17 mmol/L Final    Alkaline Phosphatase 03/10/2021 107  40 - 129 U/L Final    ALT 03/10/2021 8  5 - 41 U/L Final    AST 03/10/2021 15  <40 U/L Final    Total Bilirubin 03/10/2021 0.28* 0.3 - 1.2 mg/dL Final    Total Protein 03/10/2021 6.4  6.4 - 8.3 g/dL Final    Albumin 03/10/2021 3.9  3.5 - 5.2 g/dL Final    Albumin/Globulin Ratio 03/10/2021 1.6  1.0 - 2.5 Final    GFR Non- 03/10/2021 >60  >60 mL/min Final    GFR  03/10/2021 >60  >60 mL/min Final    GFR Comment 03/10/2021        Final    Comment: Average GFR for 61-76 years old:   80 mL/min/1.73sq m  Chronic Kidney Disease:   <60 mL/min/1.73sq m  Kidney failure:   <15 mL/min/1.73sq m              eGFR calculated using average adult body mass.  Additional eGFR calculator available at:        [employer]            GFR Staging 03/10/2021 NOT REPORTED   Final    Amphetamine Screen, Ur 03/09/2021 NEGATIVE  NEGATIVE Final    Comment:       (Positive cutoff 1000 ng/mL)                  Barbiturate Screen, Ur 03/09/2021 NEGATIVE  NEGATIVE Final    Comment:       (Positive cutoff 200 ng/mL)                  Benzodiazepine Screen, Urine 03/09/2021 NEGATIVE  NEGATIVE Final    Comment:       (Positive cutoff 200 ng/mL)                  Cocaine Metabolite, Urine 03/09/2021 POSITIVE* NEGATIVE Final Comment:       (Positive cutoff 300 ng/mL)                  Methadone Screen, Urine 03/09/2021 NEGATIVE  NEGATIVE Final    Comment:       (Positive cutoff 300 ng/mL)                  Opiates, Urine 03/09/2021 NEGATIVE  NEGATIVE Final    Comment:       (Positive cutoff 300 ng/mL)                  Phencyclidine, Urine 03/09/2021 NEGATIVE  NEGATIVE Final    Comment:       (Positive cutoff 25 ng/mL)                  Propoxyphene, Urine 03/09/2021 NOT REPORTED  NEGATIVE Final    Cannabinoid Scrn, Ur 03/09/2021 NEGATIVE  NEGATIVE Final    Comment:       (Positive cutoff 50 ng/mL)                  Oxycodone Screen, Ur 03/09/2021 NEGATIVE  NEGATIVE Final    Comment:       (Positive cutoff 100 ng/mL)                  Methamphetamine, Urine 03/09/2021 NOT REPORTED  NEGATIVE Final    Tricyclic Antidepressants, Urine 03/09/2021 NOT REPORTED  NEGATIVE Final    MDMA, Urine 03/09/2021 NOT REPORTED  NEGATIVE Final    Buprenorphine Urine 03/09/2021 NOT REPORTED  NEGATIVE Final    Test Information 03/09/2021 Assay provides medical screening only. The absence of expected drug(s) and/or metabolite(s) may indicate diluted or adulterated urine, limitations of testing or timing of collection. Final    Comment: Testing for legal purposes should be confirmed by another method. To request confirmation   of test result, please call the lab within 7 days of sample submission.  Acetaminophen Level 03/10/2021 <5* 10 - 30 ug/mL Final    Ethanol 03/10/2021 <10  <10 mg/dL Final    Ethanol percent 03/10/2021 <0.010  <5.090 % Final    Salicylate Lvl 23/22/1830 <1* 3 - 10 mg/dL Final    Toxic Tricyclic Sc,Blood 73/48/2720 NEGATIVE  NEGATIVE Final    Specimen Description 03/10/2021 . NASOPHARYNGEAL SWAB   Final    SARS-CoV-2, Rapid 03/10/2021 Not Detected  Not Detected Final    Comment:       Rapid NAAT:  The specimen is NEGATIVE for SARS-CoV-2, the novel coronavirus associated with   COVID-19.         The ID NOW COVID-19 assay (LEXAPRO) tablet 20 mg, 20 mg, Oral, Daily    ASSESSMENT  Schizoaffective disorder (HCC)     PLAN  Patients symptoms show no change  Continue current medication regimen  Attempt to develop insight  Psycho-education conducted. Supportive Therapy conducted. Probable discharge is TBD  Follow-up daily while inpatient    Electronically signed by MARY Lopez CNP on 3/13/21 at 4:55 PM EST    **This report has been created using voice recognition software. It may contain minor errors which are inherent in voice recognition technology. **

## 2021-03-13 NOTE — GROUP NOTE
Group Therapy Note    Date: 3/13/2021    Group Start Time: 1330  Group End Time: 1390  Group Topic: Cognitive Skills    [facility]    [facility] [address]        Group Therapy Note    Attendees: 9/18         Patient's Goal:  To increase interpersonal interaction. Notes:  Pt attended group but did not participate.      Status After Intervention:  Unchanged    Participation Level: None    Participation Quality: Resistant      Speech:  normal      Thought Process/Content: Logical      Affective Functioning: Congruent      Mood: euthymic      Level of consciousness:  Inattentive      Response to Learning: Progressing to goal      Endings: None Reported    Modes of Intervention: Education, Socialization, Exploration, Problem-solving, Media and Reality-testing      Discipline Responsible: Psychoeducational Specialist      Signature:  Flaquita Freire

## 2021-03-13 NOTE — PLAN OF CARE
Problem: Altered Mood, Depressive Behavior:  Goal: Able to verbalize acceptance of life and situations over which he or she has no control  Description: Able to verbalize acceptance of life and situations over which he or she has no control  Outcome: Ongoing  Pt relates he is feeling better. Denies hallucinations. Denies suicidal thoughts. Denies anxiety. Pt is focused on getting discharged. Pt says he wants to go home and see if he got his stimulus check from the president. Pt is very pleasant and cooperative. Pt. Remains safe on the unit. Q 15 minute checks for safety maintained. Problem: Pain:  Goal: Pain level will decrease  Description: Pain level will decrease  Outcome: Ongoing  Pt relates to back pain and says he always has it. Will continue to monitor.

## 2021-03-13 NOTE — GROUP NOTE
Group Therapy Note    Date: 3/13/2021    Group Start Time: 1000  Group End Time: 1259  Group Topic: Psychoeducation    JESUS Peck, [facility]        Group Therapy Note    Patient declined to attend Psychoeducation group at 1000 am despite encouragement. Patient was offered a 1:1 time to meet after group or alternative activity to do and patient refused.      Signature:  aMrk Peck [facility], [facility], [facility]

## 2021-03-13 NOTE — GROUP NOTE
Group Therapy Note    Date: 3/13/2021    Group Start Time: 1100  Group End Time: 0074  Group Topic: Healthy Living/Wellness    [facility]    Kelly Johnson        Group Therapy Note    Attendees: 13/19       Patient's Goal:  Participate in activity    Notes:  Trivia on mental illnesses    Status After Intervention:  Unchanged    Participation Level: Active Listener and Interactive    Participation Quality: Appropriate and Attentive      Speech:  normal      Thought Process/Content: Logical      Affective Functioning: Congruent      Mood: stable      Level of consciousness:  Alert and Attentive      Response to Learning: Able to verbalize current knowledge/experience and Progressing to goal      Endings: None Reported    Modes of Intervention: Education and Socialization      Discipline Responsible: Behavorial Health Tech      Signature:   Tamica Pollock

## 2021-03-13 NOTE — PLAN OF CARE
Problem: Altered Mood, Depressive Behavior:  Goal: Ability to disclose and discuss suicidal ideas will improve  Description: Ability to disclose and discuss suicidal ideas will improve  3/12/2021 2246 by Wayne Smalls RN  Outcome: Ongoing   Denies suicidal ideation. Problem: Tobacco Use:  Goal: Inpatient tobacco use cessation counseling participation  Description: Inpatient tobacco use cessation counseling participation  Outcome: Ongoing   Declines. Problem: Pain:  Goal: Control of acute pain  Description: Control of acute pain  Outcome: Ongoing   Denies current pain.

## 2021-03-13 NOTE — SUICIDE SAFETY PLAN
SAFETY PLAN    A Suicide Safety Plan is a document that supports someone when they are having thoughts of suicide. 1.    Warning Signs that indicate a suicidal crisis may be developing: What (situations, thoughts, feelings, body sensations, behaviors, etc.) do you experience that lets you know you are beginning to think about suicide? Not taking my medications  Suicidal ideations or thoughts of death / dying  Thoughts to harm others   Depressed mood /sadness / loneliness / low self-esteem /feelings of worthlessness / emptiness  Racing thoughts / taking risks / doing impulsive behaviors or decision making  Not sleeping  Hearing voices / seeing things   Wanting to use alcohol or drugs      2. Internal Coping Strategies:  What things can I do (relaxation techniques, hobbies, physical activities, etc.) to take my mind off my problems without contacting another person? A. Coping skills/ strategies  journal/ listen to music/ go for a walk/ read a book/ watch a funny movie/show / crafts / video game   B. Grounding techniques- eat a sour candy or hot cinnamon candy / focus on colors, sounds, smells, textures on things around you /  drink some herbal tea / eat a piece of dark chocolate / take a hot bath or shower / essential oils for smelling / meditate / color / arts and crafts        3. People and social settings that provide distraction: Who can I call or where can I go to distract me? Parents, siblings, relatives, friends, /, , coworkers, support group, therapist, doctor  park, support group, gym, [employer], etc    People whom I can ask for help: Who can I call when I need help - for example, friends, family, clergy, someone else? My group home staff  My  and [facility] staff [phone]  My sister      3.    Professionals or [address] I can contact during a crisis: Who can I call for help - for example, my doctor, my psychiatrist, my psychologist, a mental health provider, a suicide hotline? [facility] ( 24 hour crisis staff )  [address]  [phone]     COVID-19 Emotional Support Line: [phone]  Call the Recovery Helpline at [facility] [address]: 4-754-104-TALK ([phone])  Text \"4Hope\" to 472432      5. [facility] Emergency Crisis Services Numbers:    [facility] ( 24 hour crisis staff )  [address]  [phone]     COVID-19 Emotional Support Line: [phone]  Call the Recovery Helpline at [facility] [address]: 1-791-905-TALK (1733)  Text \"4Hope\" to [address]     1. BellyBio  Free surjit that teaches a deep breathing technique useful in fighting anxiety and stress. A simple interface uses biofeedback to monitor your breathing. Sounds cascade with the movements of your belly, in rhythms reminiscent of waves on a beach. Charts also let you know how you're doing. A great tool when you need to slow down and breathe. 2. Operation Reach Out  Literally a lifesaving surjit, this free intervention tool helps people who are having suicidal thoughts to reassess their thinking and get help. Recommended by followers of, who report that this surjit has helped in suicidal crises. Developed by the [employer], but useful to all. Barnum a download even if you're not suicidal. You never know if you might need it. 3. eCBT Calm  Provides a set of tools to help you evaluate personal stress and anxiety, challenge distorted thoughts, and learn relaxation skills that have been scientifically validated in research on Cognitive Behavioral Therapy (CBT). Lots of background and useful information along with step-by-step guides. 4. Deep Sleep with Laveta Gutting  Getting enough sleep is one of the foundations of mental health.  A personal favorite I listen to all the time, this straightforward surjit features a warm, gentle voice guiding listeners through a Progressive Muscle Relaxation (PMR) session and into sleep. Features long or short induction options, and an alarm. 5. WhatsMyM3  A three minute depression and anxiety screen. Validated questionnaires assess symptoms of depression, anxiety, bipolar disorder, and PTSD, and combine into a score that indicates whether or not your life is impacted significantly by a mood disorder, recommending a course of action. The surjit keeps a history of test results, to help you track your progress. 6. DBT Diary Card and Skills   Based on Dialectical Behavior Therapy (DBT) developed by psychologist Murtaza Madrid, this surjit is a rich resource of self-help skills, reminders of the therapy principles, and coaching tools for coping. Created by a therapist with years of experience in the practice, this surjit is not intended to replace a professional but helps people reinforce their treatment. 7. Optimism  Track your moods, keep a journal, and chart your recovery progress with this comprehensive tool for depression, bipolar disorder, and anxiety disorders. One of the most popular mood tracking apps available, with plenty of features. Free. 8. iSleepEasy  A calm female voice helps you quell anxieties and take the time to relax and sleep, in an array of guided meditations. Separately controlled voice and music tracks, flexible lengths, and an alarm. Includes a special wee hours rescue track, and tips for falling asleep. Developed by [employer], who offer an great line of relaxation apps. 9. Magic Window  Living Pictures  Not technically a mental health surjit, it makes no miraculous claims about curbing anxiety. However, there is independent research indicating that taking breaks and getting exposure to nature, even in videos, can reduce stress. This surjit offers an assortment of peaceful, ambient nature scenes from beautiful spots around the world.     10. Relax Melodies  A popular free relaxation sound and music surjit. Mix and match nature sounds with new age music; it's sekou to listen to birds in the rain while a piano softly plays. 6.   Making the environment safe: How can I make my environment (house/apartment/living space) safer? Remove weapons, cutting objects, get rid of extra medications, household chemicals. If you begin to have suicidal ideations -Involve your support system to implement your safety plan right away. Call 911 immediately or go to your local Emergency Room if you cannot contract to be safe.

## 2021-03-14 VITALS
DIASTOLIC BLOOD PRESSURE: 72 MMHG | SYSTOLIC BLOOD PRESSURE: 110 MMHG | HEART RATE: 82 BPM | TEMPERATURE: 97.6 F | OXYGEN SATURATION: 99 % | HEIGHT: 75 IN | WEIGHT: 180 LBS | RESPIRATION RATE: 14 BRPM | BODY MASS INDEX: 22.38 KG/M2

## 2021-03-14 PROCEDURE — 6370000000 HC RX 637 (ALT 250 FOR IP): Performed by: PSYCHIATRY & NEUROLOGY

## 2021-03-14 PROCEDURE — 6370000000 HC RX 637 (ALT 250 FOR IP): Performed by: NURSE PRACTITIONER

## 2021-03-14 PROCEDURE — 1240000000 HC EMOTIONAL WELLNESS R&B

## 2021-03-14 PROCEDURE — 99232 SBSQ HOSP IP/OBS MODERATE 35: CPT | Performed by: PSYCHIATRY & NEUROLOGY

## 2021-03-14 RX ORDER — TRAZODONE HYDROCHLORIDE 150 MG/1
150 TABLET ORAL NIGHTLY PRN
Qty: 30 TABLET | Refills: 0 | Status: ON HOLD | OUTPATIENT
Start: 2021-03-14 | End: 2021-05-11 | Stop reason: SDUPTHER

## 2021-03-14 RX ORDER — PALIPERIDONE 6 MG/1
6 TABLET, EXTENDED RELEASE ORAL DAILY
Qty: 30 TABLET | Refills: 0 | Status: ON HOLD | OUTPATIENT
Start: 2021-03-14 | End: 2021-05-11 | Stop reason: SDUPTHER

## 2021-03-14 RX ORDER — HYDROXYZINE 50 MG/1
50 TABLET, FILM COATED ORAL 3 TIMES DAILY PRN
Qty: 45 TABLET | Refills: 0 | Status: SHIPPED | OUTPATIENT
Start: 2021-03-14 | End: 2021-03-27

## 2021-03-14 RX ADMIN — HYDROXYZINE HYDROCHLORIDE 50 MG: 50 TABLET, FILM COATED ORAL at 17:40

## 2021-03-14 RX ADMIN — TRAZODONE HYDROCHLORIDE 150 MG: 150 TABLET ORAL at 21:02

## 2021-03-14 RX ADMIN — HYDROXYZINE HYDROCHLORIDE 50 MG: 50 TABLET, FILM COATED ORAL at 21:02

## 2021-03-14 RX ADMIN — PALIPERIDONE 6 MG: 6 TABLET, EXTENDED RELEASE ORAL at 08:48

## 2021-03-14 RX ADMIN — ESCITALOPRAM OXALATE 20 MG: 20 TABLET, FILM COATED ORAL at 08:48

## 2021-03-14 NOTE — PROGRESS NOTES
Daily Progress Note  3/14/2021      CHIEF COMPLAINT: Acute psychosis, suicidal ideation    Reviewed patient's current plan of care and vital signs with nursing staff. Sleep:  a few hours last night  Attending groups: Yes, a few today    SUBJECTIVE:    Patient notes his auditory hallucinations have significantly improved last night. Reports he was able to sleep better for the first time since he has been here. Denies any active suicidal or homicidal ideation plan or intent. Notes that earlier today hallucinations are commanding in nature at times and asking him to kill self however he is able to ignore these thoughts now. Reports he continues to feel helpless and hopeless at times. Denies any side effect from the medication. Discussed with him about discharging soon if he continues to improve. He understood and agreed to the plan. Mental Status Exam  Level of consciousness:  Awake and alert  Appearance: Hospital attire, seated in chair, with fair grooming and hygiene   Behavior/Motor: No abnormalities noted  Attitude toward examiner:  Cooperative, attentive, good eye contact  Speech:  spontaneous, normal rate, normal volume and well articulated  Mood: Euthymic  Affect: Appropriate  Thought processes:  linear, goal directed and coherent  Thought content:  denies homicidal ideation  Suicidal Ideation: Denies suicidal ideation  Delusions:  no evidence of delusions  Perceptual Disturbance: Endorses auditory  Hallucinations- getting better  Cognition:  Oriented to person, place, situation, patient was unaware of the date  Memory: age appropriate  Insight & Judgement: improving  Medication side effects:  denies       Data   height is 6' 3\" (1.905 m) and weight is 180 lb (81.6 kg). His oral temperature is 98 °F (36.7 °C). His blood pressure is 161/85 (abnormal) and his pulse is 72. His respiration is 14 and oxygen saturation is 99%. Labs:   No visits with results within 2 Day(s) from this visit.    Latest known visit with results is:   Admission on 03/09/2021, Discharged on 03/10/2021   Component Date Value Ref Range Status    WBC 03/10/2021 4.3  3.5 - 11.3 k/uL Final    RBC 03/10/2021 4.05* 4.21 - 5.77 m/uL Final    Hemoglobin 03/10/2021 11.0* 13.0 - 17.0 g/dL Final    Hematocrit 03/10/2021 36.7* 40.7 - 50.3 % Final    MCV 03/10/2021 90.6  82.6 - 102.9 fL Final    MCH 03/10/2021 27.2  25.2 - 33.5 pg Final    MCHC 03/10/2021 30.0  28.4 - 34.8 g/dL Final    RDW 03/10/2021 14.2  11.8 - 14.4 % Final    Platelets 55/85/5627 280  138 - 453 k/uL Final    MPV 03/10/2021 9.5  8.1 - 13.5 fL Final    NRBC Automated 03/10/2021 0.0  0.0 per 100 WBC Final    Differential Type 03/10/2021 NOT REPORTED   Final    Seg Neutrophils 03/10/2021 35* 36 - 65 % Final    Lymphocytes 03/10/2021 53* 24 - 43 % Final    Monocytes 03/10/2021 8  3 - 12 % Final    Eosinophils % 03/10/2021 3  1 - 4 % Final    Basophils 03/10/2021 1  0 - 2 % Final    Immature Granulocytes 03/10/2021 0  0 % Final    Segs Absolute 03/10/2021 1.48* 1.50 - 8.10 k/uL Final    Absolute Lymph # 03/10/2021 2.27  1.10 - 3.70 k/uL Final    Absolute Mono # 03/10/2021 0.33  0.10 - 1.20 k/uL Final    Absolute Eos # 03/10/2021 0.14  0.00 - 0.44 k/uL Final    Basophils Absolute 03/10/2021 <0.03  0.00 - 0.20 k/uL Final    Absolute Immature Granulocyte 03/10/2021 <0.03  0.00 - 0.30 k/uL Final    WBC Morphology 03/10/2021 NOT REPORTED   Final    RBC Morphology 03/10/2021 NOT REPORTED   Final    Platelet Estimate 41/60/6406 NOT REPORTED   Final    Glucose 03/10/2021 125* 70 - 99 mg/dL Final    BUN 03/10/2021 24* 8 - 23 mg/dL Final    CREATININE 03/10/2021 1.06  0.70 - 1.20 mg/dL Final    Bun/Cre Ratio 03/10/2021 NOT REPORTED  9 - 20 Final    Calcium 03/10/2021 8.7  8.6 - 10.4 mg/dL Final    Sodium 03/10/2021 140  135 - 144 mmol/L Final    Potassium 03/10/2021 4.2  3.7 - 5.3 mmol/L Final    Chloride 03/10/2021 105  98 - 107 mmol/L Final    CO2 03/10/2021 25 20 - 31 mmol/L Final    Anion Gap 03/10/2021 10  9 - 17 mmol/L Final    Alkaline Phosphatase 03/10/2021 107  40 - 129 U/L Final    ALT 03/10/2021 8  5 - 41 U/L Final    AST 03/10/2021 15  <40 U/L Final    Total Bilirubin 03/10/2021 0.28* 0.3 - 1.2 mg/dL Final    Total Protein 03/10/2021 6.4  6.4 - 8.3 g/dL Final    Albumin 03/10/2021 3.9  3.5 - 5.2 g/dL Final    Albumin/Globulin Ratio 03/10/2021 1.6  1.0 - 2.5 Final    GFR Non- 03/10/2021 >60  >60 mL/min Final    GFR  03/10/2021 >60  >60 mL/min Final    GFR Comment 03/10/2021        Final    Comment: Average GFR for 61-76 years old:   80 mL/min/1.73sq m  Chronic Kidney Disease:   <60 mL/min/1.73sq m  Kidney failure:   <15 mL/min/1.73sq m              eGFR calculated using average adult body mass.  Additional eGFR calculator available at:        Fippex.br            GFR Staging 03/10/2021 NOT REPORTED   Final    Amphetamine Screen, Ur 03/09/2021 NEGATIVE  NEGATIVE Final    Comment:       (Positive cutoff 1000 ng/mL)                  Barbiturate Screen, Ur 03/09/2021 NEGATIVE  NEGATIVE Final    Comment:       (Positive cutoff 200 ng/mL)                  Benzodiazepine Screen, Urine 03/09/2021 NEGATIVE  NEGATIVE Final    Comment:       (Positive cutoff 200 ng/mL)                  Cocaine Metabolite, Urine 03/09/2021 POSITIVE* NEGATIVE Final    Comment:       (Positive cutoff 300 ng/mL)                  Methadone Screen, Urine 03/09/2021 NEGATIVE  NEGATIVE Final    Comment:       (Positive cutoff 300 ng/mL)                  Opiates, Urine 03/09/2021 NEGATIVE  NEGATIVE Final    Comment:       (Positive cutoff 300 ng/mL)                  Phencyclidine, Urine 03/09/2021 NEGATIVE  NEGATIVE Final    Comment:       (Positive cutoff 25 ng/mL)                  Propoxyphene, Urine 03/09/2021 NOT REPORTED  NEGATIVE Final    Cannabinoid Scrn, Ur 03/09/2021 NEGATIVE  NEGATIVE Final Comment:       (Positive cutoff 50 ng/mL)                  Oxycodone Screen, Ur 03/09/2021 NEGATIVE  NEGATIVE Final    Comment:       (Positive cutoff 100 ng/mL)                  Methamphetamine, Urine 03/09/2021 NOT REPORTED  NEGATIVE Final    Tricyclic Antidepressants, Urine 03/09/2021 NOT REPORTED  NEGATIVE Final    MDMA, Urine 03/09/2021 NOT REPORTED  NEGATIVE Final    Buprenorphine Urine 03/09/2021 NOT REPORTED  NEGATIVE Final    Test Information 03/09/2021 Assay provides medical screening only. The absence of expected drug(s) and/or metabolite(s) may indicate diluted or adulterated urine, limitations of testing or timing of collection. Final    Comment: Testing for legal purposes should be confirmed by another method. To request confirmation   of test result, please call the lab within 7 days of sample submission.  Acetaminophen Level 03/10/2021 <5* 10 - 30 ug/mL Final    Ethanol 03/10/2021 <10  <10 mg/dL Final    Ethanol percent 03/10/2021 <0.010  <6.636 % Final    Salicylate Lvl 97/46/3247 <1* 3 - 10 mg/dL Final    Toxic Tricyclic Sc,Blood 22/95/7333 NEGATIVE  NEGATIVE Final    Specimen Description 03/10/2021 . NASOPHARYNGEAL SWAB   Final    SARS-CoV-2, Rapid 03/10/2021 Not Detected  Not Detected Final    Comment:       Rapid NAAT:  The specimen is NEGATIVE for SARS-CoV-2, the novel coronavirus associated with   COVID-19. The ID NOW COVID-19 assay is designed to detect the virus that causes COVID-19 in patients   with signs and symptoms of infection who are suspected of COVID-19. An individual without symptoms of COVID-19 and who is not shedding SARS-CoV-2 virus would   expect to have a negative (not detected) result in this assay. Negative results should be treated as presumptive and, if inconsistent with clinical signs   and symptoms or necessary for patient management,  should be tested with an alternative molecular assay.  Negative results do not preclude   SARS-CoV-2 infection and   should not be used as the sole basis for patient management decisions. Fact sheet for Healthcare Providers: Donn  Fact sheet for Patients: Donn          Methodology: Isothermal Nucleic Acid Amplification              Medications  Current Facility-Administered Medications: traZODone (DESYREL) tablet 150 mg, 150 mg, Oral, Nightly PRN  paliperidone (INVEGA) extended release tablet 6 mg, 6 mg, Oral, Daily  acetaminophen (TYLENOL) tablet 650 mg, 650 mg, Oral, Q4H PRN  aluminum & magnesium hydroxide-simethicone (MAALOX) 200-200-20 MG/5ML suspension 30 mL, 30 mL, Oral, Q6H PRN  hydrOXYzine (ATARAX) tablet 50 mg, 50 mg, Oral, TID PRN  ibuprofen (ADVIL;MOTRIN) tablet 400 mg, 400 mg, Oral, Q6H PRN  nicotine polacrilex (NICORETTE) gum 2 mg, 2 mg, Oral, PRN  polyethylene glycol (GLYCOLAX) packet 17 g, 17 g, Oral, Daily PRN  haloperidol lactate (HALDOL) injection 5 mg, 5 mg, Intramuscular, Q4H PRN **AND** LORazepam (ATIVAN) injection 2 mg, 2 mg, Intramuscular, Q4H PRN **AND** diphenhydrAMINE (BENADRYL) injection 50 mg, 50 mg, Intramuscular, Q4H PRN  haloperidol (HALDOL) tablet 5 mg, 5 mg, Oral, Q4H PRN **AND** LORazepam (ATIVAN) tablet 2 mg, 2 mg, Oral, Q4H PRN  benztropine (COGENTIN) tablet 1 mg, 1 mg, Oral, Daily PRN  [Held by provider] buPROPion (WELLBUTRIN) tablet 100 mg, 100 mg, Oral, BID  escitalopram (LEXAPRO) tablet 20 mg, 20 mg, Oral, Daily    ASSESSMENT  Schizoaffective disorder (HCC)     PLAN  Patients symptoms improvement today  Continue current medication regimen  Attempt to develop insight  Psycho-education conducted. Supportive Therapy conducted. Probable discharge is tomorrow  Follow-up daily while inpatient    Electronically signed by William Reed MD on 3/14/21 at 1:45 PM EDT    **This report has been created using voice recognition software.  It may contain minor errors which are inherent in voice recognition technology. **

## 2021-03-14 NOTE — GROUP NOTE
Group Therapy Note    Date: 3/14/2021    Group Start Time: 0900  Group End Time: 0915  Group Topic: Community Meeting    JESUS Malcolm, [address]         Group Therapy Note    Attendees: 8/20           Pt did not attend [employer] d/t resting in room despite staff invitation to attend. 1:1 talk time offered as alternative to group session, pt declined.

## 2021-03-14 NOTE — GROUP NOTE
Group Therapy Note    Date: 3/14/2021    Group Start Time: 1000  Group End Time: 3673  Group Topic: Psychoeducation    [facility], [facility]        Group Therapy Note    Patient declined to attend Psychoeducation group at 1000 am despite encouragement. Patient was offered a 1:1 time to meet after group or alternative activity to do and patient refused.      Signature:  Fatemeh Cabrera [facility], [facility], [facility]

## 2021-03-14 NOTE — GROUP NOTE
Group Therapy Note    Date: 3/13/2021    Group Start Time: 2030  Group End Time: 2118  Group Topic: Wrap-Up    JESUS Kay        Group Therapy Note    Attendees: 10         Patient's Goal:  Want to get a hold of my sister, my mom passed away a week ago    Notes:  I need to stop crack, my addiction gets me into trouble.     Status After Intervention:  Unchanged    Participation Level: Minimal    Participation Quality: Attentive      Speech:  normal      Thought Process/Content: Logical      Affective Functioning: Congruent      Mood: depressed      Level of consciousness:  Oriented x4      Response to Learning: Resistant      Endings: None Reported    Modes of Intervention: Problem-solving      Discipline Responsible: Tiller      Signature:  Jazmín Mortensen

## 2021-03-14 NOTE — PLAN OF CARE
Problem: Altered Mood, Depressive Behavior:  Goal: Able to verbalize acceptance of life and situations over which he or she has no control  Description: Able to verbalize acceptance of life and situations over which he or she has no control  Outcome: Ongoing   Patient denied depression. Patient denied SI and hallucinations. Patient is compliant with medications. Patient seclusive to room.  Patient states ready for discharge in AM.

## 2021-03-14 NOTE — GROUP NOTE
Group Therapy Note    Date: 3/14/2021    Group Start Time: 1330  Group End Time: 1430  Group Topic: Recreational    STCONOR SHI    [place], [place]        Group Therapy Note    Attendees: 12/20         Pt did not attend RT skills group d/t resting in room despite staff invitation to attend. 1:1 talk time offered as alternative to group session, pt declined.

## 2021-03-15 PROCEDURE — 6370000000 HC RX 637 (ALT 250 FOR IP): Performed by: NURSE PRACTITIONER

## 2021-03-15 PROCEDURE — 99239 HOSP IP/OBS DSCHRG MGMT >30: CPT | Performed by: PSYCHIATRY & NEUROLOGY

## 2021-03-15 RX ADMIN — PALIPERIDONE 6 MG: 6 TABLET, EXTENDED RELEASE ORAL at 09:27

## 2021-03-15 RX ADMIN — ESCITALOPRAM OXALATE 20 MG: 20 TABLET, FILM COATED ORAL at 09:27

## 2021-03-15 NOTE — DISCHARGE SUMMARY
Provider Discharge Summary     Patient ID:  Deon Tomas  [id number]  11 y.o.  1960    Admit date: 3/10/2021    Discharge date and time: 3/15/2021  3:31 PM     Admitting Physician: Mansi Godfrey MD     Discharge Physician: Mansi Godfrey MD    Admission Diagnoses: Acute psychosis [facility]) [F23]    Discharge Diagnoses:      Schizoaffective disorder [facility])     Patient Active Problem List   Diagnosis Code    Hematuria R31.9    Suicidal ideations R45.851    Cocaine abuse ([address] F14.10    Schizoaffective disorder, bipolar type ([address] F25.0    Schizoaffective disorder, depressive type ([address] F25.1    Perianal abscess K61.0    Carla-rectal abscess K61.1    Schizophrenia ([address] F20.9    Schizoaffective disorder ([address] F25.9    Major depression with psychotic features ([address] F32.3    Acute psychosis ([facility] [address] [facility]        Admission Condition: poor    Discharged Condition: stable    Indication for Admission: threat to self    History of Present Illnes (present tense wording is of findings from admission exam and are not necessarily indicative of current findings):   ED note:  Romeo Lopez a 63 y. o. male with history of bi hallucinations polar disorder, depression, gastroesophageal reflux disease, hallucinations, headache, schizophrenia who presents with concerns for suicidal ideation. Marco A Shane states that he has a history of internal voices, auditory hallucinations, states that lately these auditory hallucinations/voices have been louder and telling him to commit suicide by shooting himself in the head with a gun.  Patient denies any acute alcohol intoxication, denies any illicit drug use, but states that he is compliant with his medications however still has these thoughts of self-harm.     Deon Tomas is a 63 y.o. male with significant past medical history of GERD, hepatitis, substance abuse, diabetes mellitus, and schizoaffective disorder bipolar type who presented to the ED with command hallucinations to shoot himself. He was seen for initial evaluation in his assigned room. Patient arrived on the unit early this morning and has been behaviorally in control.      Mr. Renetta Joseph ignored multiple requests by writer to engage in interview. He would respond to verbal stimuli and then refused to answer questions. He remained wrapped in his blanket with his head covered. Therefore full HPI could not be conducted with patient participation due to current mental status. Patient was discharged from [facility] December/11/2020. Unit staff, EMR, along with recent ED notes was reviewed for information.     Hospital Course:   Upon admission, Wilfrid López was provided a safe secure environment, introduced to unit milieu. Patient participated in groups and individual therapies. Meds were adjusted as noted below. After few days of hospital care, patient began to feel improvement. Depression lifted, thoughts to harm self ceased. Sleep improved, appetite was good. On morning rounds 3/15/2021, Wilfrid López endorses feeling ready for discharge. Patient denies suicidal or homicidal ideations, denies hallucinations or delusions. Denies SE's from meds. It was decided that maximum benefit from hospital care had been achieved and patient can be discharged. Consults:   none    Significant Diagnostic Studies: Routine labs and diagnostics    Treatments: Psychotropic medications, therapy with group, milieu, and 1:1 with nurses, social workers, PALINO/CNP, and Attending physician.       Discharge Medications:  Current Discharge Medication List      START taking these medications    Details   paliperidone (INVEGA) 6 MG extended release tablet Take 1 tablet by mouth daily  Qty: 30 tablet, Refills: 0         CONTINUE these medications which have CHANGED    Details   hydrOXYzine (ATARAX) 50 MG tablet Take 1 tablet by mouth 3 times daily as needed for Anxiety  Qty: 45 tablet, Refills: 0      traZODone (DESYREL) 150 MG tablet Take 1 tablet by mouth nightly as needed for Sleep  Qty: 30 tablet, Refills: 0         CONTINUE these medications which have NOT CHANGED    Details   benztropine (COGENTIN) 1 MG tablet Take 1 mg by mouth daily as needed      escitalopram (LEXAPRO) 20 MG tablet Take 20 mg by mouth daily      paliperidone palmitate ER (INVEGA SUSTENNA) 234 MG/1.5ML [place] IM injection Inject 234 mg into the muscle every 28 days         STOP taking these medications       QUEtiapine (SEROQUEL) 300 MG tablet Comments:   Reason for Stopping:         buPROPion (WELLBUTRIN) 100 MG tablet Comments:   Reason for Stopping:         acetaminophen (TYLENOL) 500 MG tablet Comments:   Reason for Stopping:         ibuprofen (IBU) 400 MG tablet Comments:   Reason for Stopping:         docusate sodium (COLACE) 100 MG capsule Comments:   Reason for Stopping:         Benzocaine-Menthol (CEPACOL) 15-2.3 MG LOZG Comments:   Reason for Stopping:                Core Measures statement:   Not applicable    Discharge Exam:  Level of consciousness:  Within normal limits  Appearance: Street clothes, seated, with good grooming  Behavior/Motor: No abnormalities noted  Attitude toward examiner:  Cooperative, attentive, good eye contact  Speech:  spontaneous, normal rate, normal volume and well articulated  Mood:  euthymic  Affect:  Full range  Thought processes:  linear, goal directed and coherent  Thought content:  denies homicidal ideation  Suicidal Ideation:  denies suicidal ideation  Delusions:  no evidence of delusions  Perceptual Disturbance:  denies any perceptual disturbance  Cognition:  Intact  Memory: age appropriate  Insight & Judgement: fair  Medication side effects: denies     Disposition: home    Patient Instructions: Activity: activity as tolerated  1. Patient instructed to take medications regularly and follow up with outpatient appointments.      Follow-up as scheduled with MICHEL       Signed:    Electronically signed by Genevieve Rocha MD on 3/15/21 at 3:31 PM EDT    Time Spent on discharge is more than 35 minutes in the examination, evaluation, counseling and review of medications and discharge plan.

## 2021-03-15 NOTE — PLAN OF CARE
Problem: Altered Mood, Depressive Behavior:  Goal: Able to verbalize and/or display a decrease in depressive symptoms  Description: Able to verbalize and/or display a decrease in depressive symptoms  Note: PT states he is feeling better. Pt rates depression a 4 and anxiety a 5. PT remains flat and guarded and isolates to his room. PT declined unit programming. PT reports improved sleep. PT remains disheveled. Problem: Altered Mood, Depressive Behavior:  Goal: Ability to disclose and discuss suicidal ideas will improve  Description: Ability to disclose and discuss suicidal ideas will improve  Note: Pt denies thoughts of self harm and is agreeable to seeking out should thoughts of self harm arise. Safe environment maintained. Q15 minute checks for safety cont per unit policy. Will cont to monitor for safety and provides support and reassurance as needed.

## 2021-03-15 NOTE — BH NOTE
[place] ambulatory encounter  CARDIOLOGY OFFICE VISIT     Monster Mcgrath MD     SUBJECTIVE:    Heather Higgins is a 81 year old female who presents in Follow-up (6 month follow up/shortness of breath)  .    This patient has a history of emergent coronary artery bypass grafting. Her ejection fraction was initially depressed but did return to normal.  She has a history of pulmonary fibrosis as well. She has progressive dementia and did not know my name today.   She  Is also confused on the seasons of the year and who the president is.  She has a smile and is able to answer simple questions regarding her health with the help of her  who accompanies her to her visits.        Cardiac review of systems is remarkable for minimal lower extremity edema and for shortness of breath during ordinary activity. She denies orthopnea, PND or persistent dyspnea. She has no chest discomfort. She is not aware of any irregular heartbeat, her  just comments that she remains short of breath when she walks from room to room.  She last had an echocardiogram in November 2017 ordered by her pulmonologist that did show a reduced EF to 40%. This echo was performed in [place].     Review of systems:    All other systems are reviewed and are negative except as documented in the HPI.     OBJECTIVE:  HISTORIES:  I have reviewed the past medical history, family history, social history, medications and allergies listed in the medical record as obtained by my nursing staff and support staff and agree with their documentation.  Allergies, Medications, Medical history, Surgical history, Social history and Family history were reviewed and updated.    ALLERGIES:   Allergen Reactions   • Lisinopril DIZZINESS     Hypotension     • Polytrim [Polymyxin B-Trimethoprim] Other (See Comments)     \"they crystallized in my eyes\"     Current Outpatient Prescriptions   Medication Sig Dispense Refill   • furosemide (LASIX) 40 MG tablet Take 1 tablet every  Patient given tobacco quitline number 17735159520 at this time, refusing to call at this time, states \" I just dont want to quit now\"- patient given information as to the dangers of long term tobacco use. Continue to reinforce the importance of tobacco cessation. other morning alternating with 1/2 tablet every other morning 70 tablet 3   • traZODone (DESYREL) 100 MG tablet TAKE 1 TABLET EVERY NIGHT 90 tablet 1   • omeprazole (PRILOSEC) 40 MG capsule TAKE 1 CAPSULE EVERY DAY 90 capsule 3   • atorvastatin (LIPITOR) 80 MG tablet TAKE 1 TABLET EVERY NIGHT 90 tablet 3   • famotidine (PEPCID) 40 MG tablet TAKE 1 TABLET EVERY NIGHT 90 tablet 3   • clopidogrel (PLAVIX) 75 MG tablet TAKE 1 TABLET EVERY DAY 90 tablet 3   • warfarin (COUMADIN) 5 MG tablet TAKE 1 AND 1/2 TABLETS (7.5MG) ON WEDNESDAY AND SATURDAY AND ONE TABLET ALL OTHER DAYS 104 tablet 3   • citalopram (CELEXA) 20 MG tablet TAKE 1 TABLET EVERY DAY 90 tablet 3   • memantine (NAMENDA) 10 MG tablet TAKE 1 TABLET TWICE DAILY 180 tablet 3   • potassium chloride (K-DUR,KLOR-CON) 20 MEQ CR tablet TAKE 1 TABLET EVERY DAY 90 tablet 3   • nebivolol (BYSTOLIC) 2.5 MG tablet Take 1 tablet by mouth daily. 90 tablet 3   • warfarin (COUMADIN) 1 MG tablet Take 1&1/2 tabs of 1mg Wednesday/Saturday or as directed. Also taking 1 tab of 5mg daily or as directed. 60 tablet 3   • aspirin 81 MG tablet Take 1 tablet by mouth daily.     • nitroGLYcerin (NITROSTAT) 0.4 MG SL tablet Place 1 tablet under the tongue every 5 minutes as needed for Chest pain. 90 tablet 12   • polyethylene glycol (MIRALAX) powder Take 17 g by mouth daily. Mix in 8 oz of liquid.     • CALCIUM CITRATE-VITAMIN D PO Take  by mouth 2 times daily.       No current facility-administered medications for this visit.      Immunization History   Administered Date(s) Administered   • Influenza, high dose seasonal, preservative-free 09/29/2014, 10/20/2016, 10/05/2017   • Influenza, seasonal, injectable, trivalent 10/29/2002, 11/16/2005, 10/11/2007, 10/23/2008, 10/05/2009, 09/17/2010, 09/23/2011, 10/21/2012, 10/29/2012, 09/20/2013, 09/28/2015   • Novel Influenza Z2R1-17, Unspecified Formulation 12/14/2009   • Pneumococcal Conjugate 13 valent 04/23/2015   • Pneumococcal  polysaccharide, adult, 23 valent 02/21/2006, 10/12/2006   • Zoster Shingles 09/17/2010     Past Medical History:   Diagnosis Date   • Acute MI, anterior wall (CMS/[facility]) 03/11/2013    DUE TO RESTENOSIS OF LAD   • Anxiety    • Arthritis    • Blood clot associated with vein wall inflammation    • Cataract    • Constipation    • Coronary artery disease    • Depression    • Esophageal reflux    • Essential (primary) hypertension    • Macular degeneration of both eyes    • Osteoporosis, unspecified 10/28/2011   • Other and unspecified hyperlipidemia    • PAF (paroxysmal atrial fibrillation) (CMS/[facility]) 03/15/2013    X2 AS INPT FOLLOWING CABG   • Pulmonary embolism (CMS/[facility]) recurrent   • S/P CABG x 3 3/11/2013     Past Surgical History:   Procedure Laterality Date   • Appendectomy     • Back surgery      Laminectomy   • Bunionectomy  right foot   • Cabg, artery-vein, two  3/11/13   • Cardiac catherization  2/26/13    Left and Right Coronary Angiograms, PCI of mid and prox   • Carpal tunnel release      right   • Cdl pta w/ stent  03/05/2013    PROX OM1;PROX CIRC;RESTENOSIS OF LAD   • Cdl r and l heart cath  03/11/2013    WITH PLACEMENT OF IABP   • Colonoscopy diagnostic  11/12/2009   • Dexa bone density axial skeleton  10/28/2011   • Echo heart limited  03/21/2013    EF 22%;ISCHEMIC CMP;ANT WALL MI   • Echo heart resting  7/8/2013    EF 58%   • Hernia repair      Hiatal   • Hysterectomy     • Mammo screening bilateral  10/25/2012   • Orif foot fracture  Right ankle   • Ptca      cardiac cath with stent 1999   • Remv cataract extracap insert lens  01/28/2010   • Remv cataract extracap insert lens  01/14/2010   • Rotator cuff repair  left shoulder   • Tonsillectomy and adenoidectomy       Social History     Social History   • Marital status:      Spouse name: N/A   • Number of children: N/A   • Years of education: N/A     Occupational History   • retireed      Social History Main Topics   • Smoking status: Former Smoker      Packs/day: 1.00     Years: 12.00     Types: Cigarettes     Quit date: 2/21/1965   • Smokeless tobacco: Never Used   • Alcohol use 4.2 oz/week     7 Glasses of wine per week      Comment: wine with dinner   • Drug use: No   • Sexual activity: Not on file     Other Topics Concern   • Not on file     Social History Narrative   • No narrative on file     History   Smoking Status   • Former Smoker   • Packs/day: 1.00   • Years: 12.00   • Types: Cigarettes   • Quit date: 2/21/1965   Smokeless Tobacco   • Never Used     History   Alcohol Use   • 4.2 oz/week   • 7 Glasses of wine per week     Comment: wine with dinner     Family History   Problem Relation Age of Onset   • Heart disease Sister    • Heart disease Brother         CAD   • Cancer Brother         lung   • High cholesterol Son    • Glaucoma Sister    • Heart disease Sister    • Heart disease Sister    • Dementia/Alzheimers Sister    • Dementia/Alzheimers Sister      Health Maintenance   Topic Date Due   • DTaP/Tdap/Td Vaccine (1 - Tdap) 10/19/1955   • Medicare Wellness 65+  01/30/2018   • Influenza Vaccine (1) 09/01/2018   • Osteoporosis Screening  Completed   • Pneumococcal Vaccine 65+ Low/Medium Risk  Completed       Patient Active Problem List   Diagnosis   • CAD (coronary artery disease)   • Phlebitis and thrombophlebitis of other deep vessels of lower extremities   • S/P CABG x 3   • Hypoxemia   • expected acute post op blood loss anemia   • Atrial fibrillation (CMS/HCC)   • Acute MI, anterior wall (CMS/HCC)   • Other pulmonary embolism and infarction   • Anxiety state, unspecified   • Depressive disorder, not elsewhere classified   • GERD (gastroesophageal reflux disease)   • HLD (hyperlipidemia)   • HTN (hypertension)   • Exudative age-related macular degeneration both eyes   • Metamorphopsia   • Hyperglycemia   • Anticoagulation management encounter   • Alzheimer's dementia without behavioral disturbance   • IPF (idiopathic pulmonary fibrosis)  (CMS/[facility])   • Atrial fibrillation, unspecified type (CMS/HCC)       Physical Exam:    Vital Signs:    Visit Vitals  /60   Pulse 72   Resp 16   Ht 5' 4\" (1.626 m)   Wt 78.9 kg   SpO2 96%   BMI 29.87 kg/m²     General:   Alert, cooperative, conversive in no acute distress.  Skin:  Warm and dry without rash.    Head:  Normocephalic-atraumatic.   Neck:  Trachea is midline. No adenopathy.  Normal thyroid without mass or tenderness.  Eyes:  Normal conjunctiva and sclera.  Pupils equal, round and reactive to light.  Extraocular movements intact.  Cardiovascular: regular rate and rhythm, grade 2/6 ejection murmur  Respiratory:  Normal respiratory effort with bronchial breath sounds bilateral  Gastrointestinal:  Soft and nontender.  Normal bowel sounds.  No hepatomegaly or splenomegaly.   Extremities:  no edema, redness or tenderness in the calves or thighs  Neuro:   Orientated x 4.  No focal deficits or lateralizing signs.  Psychiatric:   Cooperative.  Appropriate mood & affect.    LAB RESULTS:   All pertinent laboratory results were reviewed.    ASSESSMENT/PLAN:    1. CAD with coronary bypass surgery. Currently free of symptoms  2. Dyspnea multifactorial, minimal elevation of BNP as well as history of pulmonary fibrosis, on diuretics.  Her last EF was reduced on an Echo that was obtained by her pulmonologist at another facility. But appears eu-volemic and compensated.  We will repeat Echo and obtain a repeat BNP as well as CXR.  Plan to return to clinic in 2-3 weeks for follow up.  3. Progressive dementia  4. History of PE and DVT on Coumadin           Instructions provided as documented in the after visit summary.    On 7/11/2018, Esther MONTE NP scribed the services personally performed by Zacarias Alonzo MD   The documentation recorded by the scribe accurately and completely reflects the service(s) I personally performed and the decisions made by me.     Zacarias Alonzo MD

## 2021-03-15 NOTE — GROUP NOTE
Group Therapy Note    Date: 3/15/2021    Group Start Time: 1000  Group End Time: 6099  Group Topic: Psychotherapy    [facility]    FRANKLIN Robles LSW        Group Therapy Note    patient refused to attend psychotherapy group at 10:00am after encouragement from staff.       Signature:  FRANKLIN Robles LSW

## 2021-03-15 NOTE — BH NOTE
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

## 2021-03-15 NOTE — GROUP NOTE
Group Therapy Note    Date: 3/15/2021    Group Start Time: 1100  Group End Time: 3405  Group Topic: Recreational    [facility], [facility]    Patient refused to attend Recreational Therapy Group at 1100 after encouragement from staff. 1:1 talk time offered.     Signature:  Zoe Gill

## 2021-03-15 NOTE — BH NOTE
[facility]  Discharge Note    Pt discharged with followings belongings:   Dentures: None  Vision - Corrective Lenses: None  Hearing Aid: None  Jewelry: None  Body Piercings Removed: No  Clothing: Jacket / coat, Pants, Shirt, Socks  Were All Patient Medications Collected?: No  Other Valuables: Wallet, Other (Comment)(2 keys, [place] ID, 0.25 cents)   Valuables sent home with patient . Valuables retrieved from safe, Security envelope number:  [id number] and returned to patient. Patient education on aftercare instructions: yes  Information faxed to [facility] by RN Patient verbalize understanding of AVS:  yes.     Status EXAM upon discharge:  Status and Exam  Normal: No  Facial Expression: Flat  Affect: Appropriate  Level of Consciousness: Lethargic  Mood:Normal: No  Mood: Anxious  Motor Activity:Normal: No  Motor Activity: Decreased  Interview Behavior: Cooperative, Evasive  Preception: [place] to Person, Julieanne Carito to Time, [place] to Place, [place] to Situation  Attention:Normal: Yes  Attention: Distractible  Thought Processes: Circumstantial  Thought Content:Normal: Yes  Thought Content: Preoccupations  Hallucinations: None  Delusions: No  Memory:Normal: Yes  Memory: Poor Remote  Insight and Judgment: No  Insight and Judgment: Poor Insight  Present Suicidal Ideation: No  Present Homicidal Ideation: No      Metabolic Screening:    Lab Results   Component Value Date    LABA1C 4.9 08/11/2020       Lab Results   Component Value Date    CHOL 167 08/11/2020    CHOL 179 02/13/2017    CHOL 127 05/09/2015    CHOL 168 08/20/2014    CHOL 158 12/31/2013    CHOL 178 08/15/2013    CHOL 166 09/17/2012    CHOL 210 (H) 02/03/2012     Lab Results   Component Value Date    TRIG 43 08/11/2020    TRIG 67 02/13/2017    TRIG 37 05/09/2015    TRIG 72 08/20/2014    TRIG 52 12/31/2013    TRIG 70 08/15/2013    TRIG 117 09/17/2012    TRIG 94 02/03/2012     Lab Results   Component Value Date    HDL 74 08/11/2020    HDL 73 02/13/2017    HDL 57 05/09/2015    HDL 50 08/20/2014    HDL 43 12/31/2013    HDL 42 08/15/2013    HDL 38 (L) 09/17/2012    HDL 55 02/03/2012     No components found for: LDLCAL  No results found for: Anthony Daniels RN    Patient discharged to group home via [facility]

## 2021-03-15 NOTE — GROUP NOTE
Group Therapy Note    Date: 3/15/2021    Group Start Time: 1330  Group End Time: 0384  Group Topic: Recreational    [facility]       Patient's Goal:  To demonstrate increased interpersonal skills. Notes:  Patient attended and participated in open recreation group offered on unit.     Signature:  Prince Cheng

## 2021-03-15 NOTE — GROUP NOTE
Group Therapy Note    Date: 3/15/2021    Group Start Time: 1430  Group End Time: 9314  Group Topic: Cognitive Skills    JESUS Malcolm, [address]        Group Therapy Note    Attendees: 9/15         Pt did not attend RT skills group d/t resting in room despite staff invitation to attend. 1:1 talk time offered as alternative to group session, pt declined.

## 2021-03-15 NOTE — GROUP NOTE
Group Therapy Note    Date: 3/15/2021    Group Start Time: 0900  Group End Time: 0920  Group Topic: Community Meeting    [facility]    [place], [place]        Group Therapy Note    Attendees: 10/20         Pt did not attend [employer] d/t resting in room despite staff invitation to attend. 1:1 talk time offered as alternative to group session, pt declined.

## 2021-03-26 ENCOUNTER — HOSPITAL ENCOUNTER (EMERGENCY)
Age: 61
Discharge: HOME | End: 2021-03-26
Attending: EMERGENCY MEDICINE
Payer: MEDICAID

## 2021-03-26 VITALS
DIASTOLIC BLOOD PRESSURE: 72 MMHG | TEMPERATURE: 97.6 F | HEART RATE: 94 BPM | SYSTOLIC BLOOD PRESSURE: 101 MMHG | RESPIRATION RATE: 18 BRPM | OXYGEN SATURATION: 98 %

## 2021-03-26 DIAGNOSIS — F39 MOOD DISORDER (HCC): Primary | ICD-10-CM

## 2021-03-26 LAB
ABSOLUTE EOS #: 0.15 K/UL (ref 0–0.44)
ABSOLUTE IMMATURE GRANULOCYTE: <0.03 K/UL (ref 0–0.3)
ABSOLUTE LYMPH #: 2.5 K/UL (ref 1.1–3.7)
ABSOLUTE MONO #: 0.36 K/UL (ref 0.1–1.2)
ACETAMINOPHEN LEVEL: <5 UG/ML (ref 10–30)
AMPHETAMINE SCREEN URINE: NEGATIVE
ANION GAP SERPL CALCULATED.3IONS-SCNC: 9 MMOL/L (ref 9–17)
BARBITURATE SCREEN URINE: NEGATIVE
BASOPHILS # BLD: 0 % (ref 0–2)
BASOPHILS ABSOLUTE: <0.03 K/UL (ref 0–0.2)
BENZODIAZEPINE SCREEN, URINE: NEGATIVE
BUN BLDV-MCNC: 15 MG/DL (ref 8–23)
BUN/CREAT BLD: ABNORMAL (ref 9–20)
BUPRENORPHINE URINE: ABNORMAL
CALCIUM SERPL-MCNC: 8.2 MG/DL (ref 8.6–10.4)
CANNABINOID SCREEN URINE: NEGATIVE
CHLORIDE BLD-SCNC: 106 MMOL/L (ref 98–107)
CO2: 24 MMOL/L (ref 20–31)
COCAINE METABOLITE, URINE: POSITIVE
CREAT SERPL-MCNC: 1.06 MG/DL (ref 0.7–1.2)
DIFFERENTIAL TYPE: ABNORMAL
EOSINOPHILS RELATIVE PERCENT: 3 % (ref 1–4)
ETHANOL PERCENT: <0.01 %
ETHANOL: <10 MG/DL
GFR AFRICAN AMERICAN: >60 ML/MIN
GFR NON-AFRICAN AMERICAN: >60 ML/MIN
GFR SERPL CREATININE-BSD FRML MDRD: ABNORMAL ML/MIN/{1.73_M2}
GFR SERPL CREATININE-BSD FRML MDRD: ABNORMAL ML/MIN/{1.73_M2}
GLUCOSE BLD-MCNC: 100 MG/DL (ref 70–99)
HCT VFR BLD CALC: 34.2 % (ref 40.7–50.3)
HEMOGLOBIN: 10.2 G/DL (ref 13–17)
IMMATURE GRANULOCYTES: 0 %
LYMPHOCYTES # BLD: 57 % (ref 24–43)
MCH RBC QN AUTO: 28 PG (ref 25.2–33.5)
MCHC RBC AUTO-ENTMCNC: 29.8 G/DL (ref 28.4–34.8)
MCV RBC AUTO: 94 FL (ref 82.6–102.9)
MDMA URINE: ABNORMAL
METHADONE SCREEN, URINE: NEGATIVE
METHAMPHETAMINE, URINE: ABNORMAL
MONOCYTES # BLD: 8 % (ref 3–12)
NRBC AUTOMATED: 0 PER 100 WBC
OPIATES, URINE: NEGATIVE
OXYCODONE SCREEN URINE: NEGATIVE
PDW BLD-RTO: 14.6 % (ref 11.8–14.4)
PHENCYCLIDINE, URINE: NEGATIVE
PLATELET # BLD: 256 K/UL (ref 138–453)
PLATELET ESTIMATE: ABNORMAL
PMV BLD AUTO: 9.4 FL (ref 8.1–13.5)
POTASSIUM SERPL-SCNC: 4.1 MMOL/L (ref 3.7–5.3)
PROPOXYPHENE, URINE: ABNORMAL
RBC # BLD: 3.64 M/UL (ref 4.21–5.77)
RBC # BLD: ABNORMAL 10*6/UL
SALICYLATE LEVEL: <1 MG/DL (ref 3–10)
SARS-COV-2, RAPID: NOT DETECTED
SEG NEUTROPHILS: 32 % (ref 36–65)
SEGMENTED NEUTROPHILS ABSOLUTE COUNT: 1.44 K/UL (ref 1.5–8.1)
SODIUM BLD-SCNC: 139 MMOL/L (ref 135–144)
SPECIMEN DESCRIPTION: NORMAL
TEST INFORMATION: ABNORMAL
TOXIC TRICYCLIC SC,BLOOD: NEGATIVE
TRICYCLIC ANTIDEPRESSANTS, UR: ABNORMAL
WBC # BLD: 4.5 K/UL (ref 3.5–11.3)
WBC # BLD: ABNORMAL 10*3/UL

## 2021-03-26 PROCEDURE — 99285 EMERGENCY DEPT VISIT HI MDM: CPT

## 2021-03-26 PROCEDURE — 85025 COMPLETE CBC W/AUTO DIFF WBC: CPT

## 2021-03-26 PROCEDURE — G0480 DRUG TEST DEF 1-7 CLASSES: HCPCS

## 2021-03-26 PROCEDURE — 80143 DRUG ASSAY ACETAMINOPHEN: CPT

## 2021-03-26 PROCEDURE — 80179 DRUG ASSAY SALICYLATE: CPT

## 2021-03-26 PROCEDURE — 80048 BASIC METABOLIC PNL TOTAL CA: CPT

## 2021-03-26 PROCEDURE — 87635 SARS-COV-2 COVID-19 AMP PRB: CPT

## 2021-03-26 PROCEDURE — 80307 DRUG TEST PRSMV CHEM ANLYZR: CPT

## 2021-03-26 ASSESSMENT — ENCOUNTER SYMPTOMS
SINUS PAIN: 0
COUGH: 0
VOMITING: 0
DIARRHEA: 0
EYE PAIN: 0
SORE THROAT: 0
ABDOMINAL PAIN: 0
BACK PAIN: 0
SHORTNESS OF BREATH: 0
NAUSEA: 0

## 2021-03-26 NOTE — ED NOTES
RAPID Covid 19 swab taken from right nare, labeled, placed in red dot bag, and handed off to second healthcare worker outside of room for transport to laboratory per hospital policy and procedure. Patient tolerated procedure well.        Marcos Mishra RN  03/26/21 0871

## 2021-03-26 NOTE — ED NOTES
Pt. Resting in day area, RR even and non-labored  Pt.  Updated on POC   Will continue to monitor  1:1 guard remains in place        Clement Butter, [facility]  03/26/21 0086

## 2021-03-26 NOTE — ED NOTES
Pt. Resting in day area, RR even and non-labored  Pt.  Updated on POC   Will continue to monitor  1:1 guard remains in place        Katarzyna Ull[facility]En[facility]  03/26/21 8324

## 2021-03-26 NOTE — ED NOTES
Pt. Resting in day area, RR even and non-labored  Pt.  Updated on POC   Will continue to monitor  1:1 guard remains in place        Sujit Oquendo RN  03/26/21 6103

## 2021-03-26 NOTE — ED NOTES
Pt. Resting in day area, RR even and non-labored  Pt. Updated on POC   Will continue to monitor  1:1 guard remains in place   Pt.  Provided breakfast tray as requested      Ryan Wilkins RN  03/26/21 5037

## 2021-03-26 NOTE — ED PROVIDER NOTES
[facility] ED  Emergency Department  Emergency Medicine Resident Sign-out     Care of Yahaira Espinosa was assumed from Dr. Dulce Kwon and is being seen for Suicidal  .  The patient's initial evaluation and plan have been discussed with the prior provider who initially evaluated the patient. EMERGENCY DEPARTMENT COURSE / MEDICAL DECISION MAKING:       MEDICATIONS GIVEN:  No orders of the defined types were placed in this encounter. LABS / RADIOLOGY:     Labs Reviewed   CBC WITH AUTO DIFFERENTIAL - Abnormal; Notable for the following components:       Result Value    RBC 3.64 (*)     Hemoglobin 10.2 (*)     Hematocrit 34.2 (*)     RDW 14.6 (*)     Seg Neutrophils 32 (*)     Lymphocytes 57 (*)     Segs Absolute 1.44 (*)     All other components within normal limits   BASIC METABOLIC PANEL W/ REFLEX TO MG FOR LOW K - Abnormal; Notable for the following components:    Glucose 100 (*)     Calcium 8.2 (*)     All other components within normal limits   TOX SCR, BLD, ED - Abnormal; Notable for the following components:    Acetaminophen Level <5 (*)     Salicylate Lvl <1 (*)     All other components within normal limits   COVID-19, RAPID   URINE DRUG SCREEN       No results found. RECENT VITALS:     Temp: 97.6 °F (36.4 °C),  Pulse: 94, Resp: 18, BP: 101/72, SpO2: 98 %    This patient is a 63 y.o. Male with suicidal history schizophrenia schizoaffective disorder with command hallucinations to kill himself. Recently discharged from inpatient psych less than 2 weeks ago. Doing blood work-up ED tox urine drug screen for possible placement into inpatient psych. Blood work unremarkable, pending urine drug screen. OUTSTANDING TASKS / RECOMMENDATIONS:    1. Follow-up urine drug screen: Positive for cocaine  2. Speaking with social work, patient stable for discharge follow-up at his appointment with Serena     FINAL IMPRESSION:   No diagnosis found.     DISPOSITION:         DISPOSITION:  [x] Discharge   []  Transfer -    []  Admission -     []  Against Medical Advice   []  Eloped   FOLLOW-UP: No follow-up provider specified.    DISCHARGE MEDICATIONS: New Prescriptions    No medications on file           Mayco Mcgregor MD  Emergency Medicine Resident  [address] [facility]       Mayco Mcgregor MD  Resident  03/26/21 9777

## 2021-03-26 NOTE — ED NOTES
[] [place]    [] [address]    [x]  [facility] ASSESSMENT      Y  N     [x] [] In the past two weeks have you had thoughts of hurting yourself in any way? [] [x] In the past two weeks have you had thoughts that you would be better off dead? [] [x] Have you made a suicide attempt in the past two months? [] [x] Do you have a plan for hurting yourself or suicide? [x] [] Presence of hallucinations/voices related to hurting himself or herself or someone else. SUICIDE/SECURITY WATCH PRECAUTION CHECKLIST     Orders    [x]  Suicide/Security Watch Precautions initiated as checked below:   3/26/21 6:08 AM EDT BH31/BH31B    [x] Notified physician:  Ariana Jacobo DO  3/26/21 6:08 AM EDT    [x] Orders obtained as appropriate:     [x] 1:1 Observer     [] Psych Consult     [] Psych Consult    Name:  Date:  Time:    [x] 1:1 Observer, Notified by:  Maren Rodriguez    Contact Nurse Supervisor    [x] Remove all personal clothes from room and place in snap/paper gown/pants. Slipper only    [x] Remove all personal belongings from room and secured away from patient. Documentation    [x] Initiate Suicide/Security Watch Precaution Flow Sheet    [x] Initiate individualized Care Plan/Problem    [x] Document why precautions initiated on flow sheet (Initiate Nursing Care Plan/Problem)    [x] 1:1 Observer in place; instructions provided. Suicide precautions require observer be within arms length. [x] Nurse-Observer Communication Hand-off initiated by RN, reviewed with Observer. Subsequently used as Hand Off between Observers. [x] Initiate every 15 minute observations per observer as delegated by the RN.     [x] Initiate RN assessment and documentation    Environmental Scan  Search Criteria and Process: OPTIONAL, see Search Policy    [x] Reason for search:    [x] Nursing in presence of second person to search patient    [x] Patient notified of reason for body assessment and belongings search:     Persons present during search:   Results of search and disposition:       Searchers Name: Security    These items or items similar should be removed from the room:   [] Chairs   [] Telephone   [] Trash cans and liners   [x] Plastic utensils (order Patient Safety tray)   [x] Empty or remove Sharps containers   [x] All personal clothing/belongings removed   [x] All unnecessary lead wires, electrical cords, draw cords, etc.   [x] Flowers and plants   [x] Double check for lighters, matches, razors, any glass items etc that can be used as weapons. Person completing Checklist: Portia  MANAV       GENERAL INFORMATION     Y  N     [x] [] Has the patient been informed that they are on a watch and what that means? [x] [] Can the patient get out of Bed without nursing assistance? [x] [] Can the patient use the restroom without nursing assistance? [x] [] Can the patient walk the halls to [place] their legs? \"   [] [x] Does the patient have metal utensils? [x] [] Have the patient's belongings been placed out of control of the patient? [x] [] Have the patient and his/her belongings been checked for contraband? [] [x] Is the patient under any visitor restrictions? If Yes, explain:   [] [x] Is the patient under an alias? [facility] Name:   Authorized visitors (no more than two are to be on the list)   Name/Relationship:   Name/Relationship:    Name of Staff member that you  Received this information from?: Security    General Description:    Juana Jones BH31/BH31B male 63 y.o. Admission weight:      Race: []  [] Black  []   []   [] [place] [] Other  Facial Hair:  [] Yes  [] No  If yes, please describe: Identifying Marks (i.e. Visible tattoos, scars, etc... ):     NURSING CARE PLAN    Nursing Diagnosis: Risk of Self Directed Harm  [] Actual  [] Potential  Date Started: 3/26/21      Etiological Factors: (related to)  [x] Expressed or implied suicidal ideation/behavior  [] Depression  [] Suicide attempt      [] Low self-esteem  [x] Hallucinations      [] Feeling of Hopelessness  [] Substance abuse or withdrawal    [] Dysfunctional family  [] Major traumatic event, eg., divorce, etc   [] Excessive stress/anxiety    3/26/21    Expected Outcomes    Patient will:   [x] Patient will remain safe for the duration of their stay   [x] Patient's environment will be safe, eg. Free of potential suicide weapons   [] Verbalize Recovery from suicidal episode and improvement in self-worth   [x] Discuss feeling that precipitated suicide attempt/thoughts/behavior   [x] Will describe available resources for crisis prevention and management   [x] Will verbalize positive coping skills     Nursing Intervention   [x] Assessment and Observations hourly   [x] Suicide Precautions implemented with patient, should be 1:1 observation   [x] Document observation [id number] and RN assessment hourly   [] Consult physician for:    [] Psychiatric consult    [] Pharmacological therapy    [] Other:    [x] Patient search completed by security   [x] Initiated appropriate safety protocols by removing from the patient's environment anything that could be used to inflict self injury, eg. Order safe tray, snap gown, etc   [x] Maintain open, warm, caring, non-judgmental attitude/manner towards patient   [] Discuss advantages and disadvantages of existing coping methods/skills   [x] Assist and educate patient with identifying present strengths and coping skills   [x] Keep patient informed regarding plan of care and provide clear concise explanations. Provide the patient/family education information as well as telephone numbers and other information about crisis centers, hot lines, and counselors.     Discharge Planning:   [] Referral  [] Groups [] Health agencies  [] Other:          Matheus Pak [facility]  03/26/21 5798

## 2021-03-26 NOTE — ED NOTES
Pt. Urine provided, labeled and sent to lab  Pt. Resting in day area, RR even and non-labored  Pt. Updated on POC   Will continue to monitor  1:1 guard remains in place   Pt.      Syed Henry RN  03/26/21 0339

## 2021-03-26 NOTE — ED PROVIDER NOTES
[facility] [address]  ED  Emergency Department Encounter  EmergencyMedicineResident     This patient was seen during the COVID-19 crisis. There were limited resources and those resources we did have had to be conserved for the sickest of patients. Pt Name: Jerome Beatty  MRN: [id number]  [place] 1960  Date of evaluation: 3/26/21  PCP: No primary care provider on file. CHIEF COMPLAINT       Chief Complaint   Patient presents with    Suicidal       HISTORY OF PRESENT ILLNESS  (Location/Symptom, Timing/Onset, Context/Setting, Quality, Duration, Modifying Factors, Severity.)      Jerome Beatty is a 63 y.o. male who presents for evaluation of suicidal ideations. Patient states he is having auditory hallucinations telling him to kill himself. Patient does not have a plan at this time. Patient has no homicidal ideations either. Patient has a long history of schizoaffective disorder, cocaine abuse, suicidal ideations, schizoaffective disorder bipolar type and major depression with psychotic features. Patient states he is taking his medications he not sure what they are however he was living at [facility] right now and there is someone there to help some of his medications. He has no other complaints at this time has no systemic complaints. PAST MEDICAL / SURGICAL /SOCIAL / FAMILY HISTORY      has a past medical history of Bipolar disorder ([address] Depression, GERD (gastroesophageal reflux disease), Hallucinations, Headache(784.0), Hepatitis, Schizophrenia, schizo-affective ([address] Substance abuse ([address] Tobacco abuse, Type II or unspecified type diabetes mellitus without mention of complication, not stated as uncontrolled, and Urinary incontinence. has a past surgical history that includes Dental surgery and Abscess Drainage (N/A, 02/11/2018).       Social History     Socioeconomic History    Marital status: Single     Spouse name: Not on file    Number of children: 0    Years of education: 10    Highest education level: Not on file   Occupational History     Employer: N/A   Social Needs    Financial resource strain: Not on file    Food insecurity     Worry: Not on file     Inability: Not on file    Transportation needs     Medical: Not on file     Non-medical: Not on file   Tobacco Use    Smoking status: Current Every Day Smoker     Packs/day: 0.50     Types: Cigarettes    Smokeless tobacco: Never Used   Substance and Sexual Activity    Alcohol use: Yes     Comment: reports drinking occasionally    Drug use: Yes     Types: Cocaine     Comment: drug abuse includes cocaine,     Sexual activity: Not on file   Lifestyle    Physical activity     Days per week: Not on file     Minutes per session: Not on file    Stress: Not on file   Relationships    Social connections     Talks on phone: Not on file     Gets together: Not on file     Attends Orthodoxy service: Not on file     Active member of club or organization: Not on file     Attends meetings of clubs or organizations: Not on file     Relationship status: Not on file    Intimate partner violence     Fear of current or ex partner: Not on file     Emotionally abused: Not on file     Physically abused: Not on file     Forced sexual activity: Not on file   Other Topics Concern    Not on file   Social History Narrative    Not on file       Family History   Problem Relation Age of Onset    Diabetes Mother     Heart Disease Mother        Allergies:  Navane [thiothixene]    Home Medications:  Prior to Admission medications    Medication Sig Start Date End Date Taking?  Authorizing Provider   hydrOXYzine (ATARAX) 50 MG tablet Take 1 tablet by mouth 3 times daily as needed for Anxiety 3/14/21 3/29/21  Bernadette Tyler MD   traZODone (DESYREL) 150 MG tablet Take 1 tablet by mouth nightly as needed for Sleep 3/14/21   Bernadette Tyler MD   paliperidone (INVEGA) 6 MG extended release tablet Take 1 tablet by mouth daily 3/14/21   Orbran Nunez MD   benztropine (COGENTIN) 1 MG tablet Take 1 mg by mouth daily as needed    Historical Provider, MD   escitalopram (LEXAPRO) 20 MG tablet Take 20 mg by mouth daily    Historical Provider, MD   paliperidone palmitate ER (INVEGA SUSTENNA) 234 MG/1.5ML [place] IM injection Inject 234 mg into the muscle every 28 days    Historical Provider, MD       REVIEW OF SYSTEMS    (2-9 systems for level 4, 10 or more forlevel 5)      Review of Systems   Constitutional: Negative for activity change, chills and fever. HENT: Negative for congestion, sinus pain and sore throat. Eyes: Negative for pain and visual disturbance. Respiratory: Negative for cough and shortness of breath. Cardiovascular: Negative for chest pain. Gastrointestinal: Negative for abdominal pain, diarrhea, nausea and vomiting. Genitourinary: Negative for difficulty urinating, dysuria and hematuria. Musculoskeletal: Negative for back pain and myalgias. Skin: Negative for rash and wound. Neurological: Negative for dizziness, light-headedness and headaches. Psychiatric/Behavioral: Positive for hallucinations and suicidal ideas. PHYSICAL EXAM   (up to 7 for level 4, 8 or more forlevel 5)      ED TRIAGE VITALS BP: 101/72, Temp: 97.6 °F (36.4 °C), Pulse: 94, Resp: 18, SpO2: 98 %    Vitals:    03/26/21 0320   BP: 101/72   Pulse: 94   Resp: 18   Temp: 97.6 °F (36.4 °C)   TempSrc: Temporal   SpO2: 98%         Physical Exam  Vitals signs and nursing note reviewed. Constitutional:       Appearance: Normal appearance. HENT:      Head: Normocephalic and atraumatic. Nose: Nose normal.      Mouth/Throat:      Mouth: Mucous membranes are moist.   Eyes:      Extraocular Movements: Extraocular movements intact. Pupils: Pupils are equal, round, and reactive to light. Neck:      Musculoskeletal: Normal range of motion. Cardiovascular:      Rate and Rhythm: Normal rate and regular rhythm.       Pulses: Normal pulses. Heart sounds: Normal heart sounds. Pulmonary:      Effort: Pulmonary effort is normal.      Breath sounds: Normal breath sounds. Abdominal:      General: Abdomen is flat. Palpations: Abdomen is soft. Musculoskeletal: Normal range of motion. Skin:     General: Skin is warm and dry. Capillary Refill: Capillary refill takes less than 2 seconds. Neurological:      General: No focal deficit present. Mental Status: He is alert and oriented to person, place, and time. Psychiatric:      Comments: Dysphoric affect, suicidal           DIFFERENTIAL  DIAGNOSIS     PLAN (LABS / Brandyn Blush / EKG):  Orders Placed This Encounter   Procedures    COVID-19, Rapid    CBC Auto Differential    Basic Metabolic Panel w/ Reflex to MG    Urine Drug Screen    TOX SCR, BLD, ED    Inpatient consult to Social Work       MEDICATIONS ORDERED:  ED Medication Orders (From admission, onward)    None          DDX: Suicidal ideations, command illusions, schizoaffective disorder, bipolar disorder    DIAGNOSTIC RESULTS / EMERGENCY DEPARTMENT COURSE / MDM     IMPRESSION & INITIAL PLAN:  Plan for required work-up for transfer to a psychiatric hospital.  We will have social work evaluate him to see if he is a candidate. He is suicidal but this seems that it is his baseline. However the fact the patient is stating that he wants to kill himself right now is alarming. Please see Dr. Pieter Chaney note for final disposition of this patient has the patient's been signed out to him.     LABS:  Results for orders placed or performed during the hospital encounter of 03/26/21   CBC Auto Differential   Result Value Ref Range    WBC 4.5 3.5 - 11.3 k/uL    RBC 3.64 (L) 4.21 - 5.77 m/uL    Hemoglobin 10.2 (L) 13.0 - 17.0 g/dL    Hematocrit 34.2 (L) 40.7 - 50.3 %    MCV 94.0 82.6 - 102.9 fL    MCH 28.0 25.2 - 33.5 pg    MCHC 29.8 28.4 - 34.8 g/dL    RDW 14.6 (H) 11.8 - 14.4 %    Platelets 616 222 - 091 k/uL    MPV 9.4 8.1 - 13.5 fL    NRBC Automated 0.0 0.0 per 100 WBC    Differential Type NOT REPORTED     Seg Neutrophils 32 (L) 36 - 65 %    Lymphocytes 57 (H) 24 - 43 %    Monocytes 8 3 - 12 %    Eosinophils % 3 1 - 4 %    Basophils 0 0 - 2 %    Immature Granulocytes 0 0 %    Segs Absolute 1.44 (L) 1.50 - 8.10 k/uL    Absolute Lymph # 2.50 1.10 - 3.70 k/uL    Absolute Mono # 0.36 0.10 - 1.20 k/uL    Absolute Eos # 0.15 0.00 - 0.44 k/uL    Basophils Absolute <0.03 0.00 - 0.20 k/uL    Absolute Immature Granulocyte <0.03 0.00 - 0.30 k/uL    WBC Morphology NOT REPORTED     RBC Morphology ANISOCYTOSIS PRESENT     Platelet Estimate NOT REPORTED    Basic Metabolic Panel w/ Reflex to MG   Result Value Ref Range    Glucose 100 (H) 70 - 99 mg/dL    BUN 15 8 - 23 mg/dL    CREATININE 1.06 0.70 - 1.20 mg/dL    Bun/Cre Ratio NOT REPORTED 9 - 20    Calcium 8.2 (L) 8.6 - 10.4 mg/dL    Sodium 139 135 - 144 mmol/L    Potassium 4.1 3.7 - 5.3 mmol/L    Chloride 106 98 - 107 mmol/L    CO2 24 20 - 31 mmol/L    Anion Gap 9 9 - 17 mmol/L    GFR Non-African American >60 >60 mL/min    GFR African American >60 >60 mL/min    GFR Comment          GFR Staging NOT REPORTED    TOX SCR, BLD, ED   Result Value Ref Range    Acetaminophen Level <5 (L) 10 - 30 ug/mL    Ethanol <10 <10 mg/dL    Ethanol percent <2.622 <1.240 %    Salicylate Lvl <1 (L) 3 - 10 mg/dL    Toxic Tricyclic Sc,Blood NEGATIVE NEGATIVE       RADIOLOGY:  No orders to display       CONSULTS:  IP CONSULT TO SOCIAL WORK    CRITICAL CARE:  See attending physician note    FINAL IMPRESSION      1. Suicidal ideation          DISPOSITION / PLAN     DISPOSITION Decision To Transfer 03/26/2021 04:13:45 AM      PATIENT REFERRED TO:  No follow-up provider specified.     DISCHARGE MEDICATIONS:  New Prescriptions    No medications on file     Modified Medications    No medications on file        Angelo Jackson MD  Emergency Medicine Resident    (Please note that portions of this note were completed with a voice recognition program.  Efforts were made to edit the dictations but occasionally words are mis-transcribed.)       Josy Winn MD  Resident  03/26/21 9661

## 2021-03-26 NOTE — ED NOTES
Recommendation from psychiatry is to follow up outpatient, spoke with Kaylyn Carter at [facility] on the ACT team who confirms that they do not believe the pt needs to be admitted. Pt has the crisis number which he can call anytime 24/7 and can be discharged back to his boarding house or can go to [facility] if he chooses. Pt has an appointment with Rajesh on Tuesday.      Doreen Malik [place]  03/26/21 1106

## 2021-03-27 ENCOUNTER — HOSPITAL ENCOUNTER (EMERGENCY)
Age: 61
Discharge: HOME | End: 2021-03-27
Attending: EMERGENCY MEDICINE
Payer: MEDICAID

## 2021-03-27 VITALS
HEIGHT: 75 IN | RESPIRATION RATE: 20 BRPM | OXYGEN SATURATION: 98 % | WEIGHT: 180 LBS | BODY MASS INDEX: 22.38 KG/M2 | SYSTOLIC BLOOD PRESSURE: 101 MMHG | DIASTOLIC BLOOD PRESSURE: 63 MMHG | HEART RATE: 83 BPM | TEMPERATURE: 97.1 F

## 2021-03-27 DIAGNOSIS — R21 RASH AND OTHER NONSPECIFIC SKIN ERUPTION: Primary | ICD-10-CM

## 2021-03-27 PROCEDURE — 6370000000 HC RX 637 (ALT 250 FOR IP): Performed by: STUDENT IN AN ORGANIZED HEALTH CARE EDUCATION/TRAINING PROGRAM

## 2021-03-27 PROCEDURE — 99283 EMERGENCY DEPT VISIT LOW MDM: CPT

## 2021-03-27 RX ORDER — PERMETHRIN 50 MG/G
CREAM TOPICAL
Qty: 1 TUBE | Refills: 0 | Status: ON HOLD | OUTPATIENT
Start: 2021-03-27 | End: 2021-05-07 | Stop reason: ALTCHOICE

## 2021-03-27 RX ORDER — HYDROXYZINE HYDROCHLORIDE 25 MG/1
25 TABLET, FILM COATED ORAL EVERY 8 HOURS PRN
Qty: 30 TABLET | Refills: 0 | Status: SHIPPED | OUTPATIENT
Start: 2021-03-27 | End: 2021-04-06

## 2021-03-27 RX ORDER — PERMETHRIN 50 MG/G
CREAM TOPICAL ONCE
Status: COMPLETED | OUTPATIENT
Start: 2021-03-27 | End: 2021-03-27

## 2021-03-27 RX ADMIN — PERMETHRIN: 50 CREAM TOPICAL at 10:15

## 2021-03-27 NOTE — ED PROVIDER NOTES
[facility]     Emergency Department     Faculty Attestation    I performed a history and physical examination of the patient and discussed management with the resident. I reviewed the residents note and agree with the documented findings and plan of care. Any areas of disagreement are noted on the chart. I was personally present for the key portions of any procedures. I have documented in the chart those procedures where I was not present during the key portions. I have reviewed the emergency nurses triage note. I agree with the chief complaint, past medical history, past surgical history, allergies, medications, social and family history as documented unless otherwise noted below. For Physician Assistant/ Nurse Practitioner cases/documentation I have personally evaluated this patient and have completed at least one if not all key elements of the E/M (history, physical exam, and MDM). Additional findings are as noted. I have personally seen and evaluated the patient. I find the patient's history and physical exam are consistent with the NP/PA documentation. I agree with the care provided, treatment rendered, disposition and follow-up plan. Critical Care     Claudetta Congress, M.D.   Attending Emergency  Physician              Merary Duron MD  03/27/21 4242

## 2021-03-27 NOTE — ED PROVIDER NOTES
[facility]  ED  Emergency Department Encounter  Emergency Medicine Resident     Pt Name: Jordana Pastor  MRN: [id number]  [place] 1960  Date of evaluation: 3/27/21  PCP:  No primary care provider on file. Chief Complaint     Chief Complaint   Patient presents with    Rash       HISTORY OF PRESENT ILLNESS     Jordana Pastor is a 63 y.o. male who presented to the emergency department with a 2 day history of a rash. The patient states that the rash causes no discomfort however can occasionally itch. Patient states that the rash is located bilateral upper extremity, and appears red, raised, and scattered with tracts up the arms. Patient does use communal laundry and is looking for a new place to get his clothes washed. The patient is breathing easy with even and unlabored respirations and shows no signs of anaphylaxis or anaphylactoid reactions. REVIEW OF SYSTEMS       Constitutional: Denies recent fever, chills. Eyes: No visual changes. Neck: No midline neck pain  Respiratory: Denies recent shortness of breath. Cardiac:  Denies recent chest pain. GI: denies any recent abdominal pain nausea or vomiting. Denies Blood in the stool or black tarry stools. : Denies dysuria. Musculoskeletal: Denies focal weakness. Neurologic: denies headache or focal weakness. Skin:  Complains of a rash to the bilateral upper extremity, one bump on the left ear     PAST MEDICAL/SURGICAL/FAMILY HISTORY     Past Medical Hx:   Patient has no medical problems   has a past medical history of Bipolar disorder ([address] Depression, GERD (gastroesophageal reflux disease), Hallucinations, Headache(784.0), Hepatitis, Schizophrenia, schizo-affective ([address] Substance abuse ([address] Tobacco abuse, Type II or unspecified type diabetes mellitus without mention of complication, not stated as uncontrolled, and Urinary incontinence.     Past Surgical Hx:  Patient has had no surgeries   has a past surgical history that includes Dental surgery and Abscess Drainage (N/A, 02/11/2018). Social Hx:  Social History     Socioeconomic History    Marital status: Single     Spouse name: Not on file    Number of children: 0    Years of education: 8    Highest education level: Not on file   Occupational History     Employer: N/A   Social Needs    Financial resource strain: Not on file    Food insecurity     Worry: Not on file     Inability: Not on file   [employer] needs     Medical: Not on file     Non-medical: Not on file   Tobacco Use    Smoking status: Current Every Day Smoker     Packs/day: 0.50     Types: Cigarettes    Smokeless tobacco: Never Used   Substance and Sexual Activity    Alcohol use: Yes     Comment: reports drinking occasionally    Drug use: Yes     Types: Cocaine     Comment: drug abuse includes cocaine,     Sexual activity: Not on file   Lifestyle    Physical activity     Days per week: Not on file     Minutes per session: Not on file    Stress: Not on file   Relationships    Social connections     Talks on phone: Not on file     Gets together: Not on file     Attends [employer] service: Not on file     Active member of club or organization: Not on file     Attends meetings of clubs or organizations: Not on file     Relationship status: Not on file    Intimate partner violence     Fear of current or ex partner: Not on file     Emotionally abused: Not on file     Physically abused: Not on file     Forced sexual activity: Not on file   Other Topics Concern    Not on file   Social History Narrative    Not on file       Family Hx:  No relevant family history is reported  Family History   Problem Relation Age of Onset    Diabetes Mother     Heart Disease Mother        Allergies:    No known medication allergies  Navane [thiothixene]    Home Medications: The patient takes no medications  Prior to Admission medications    Medication Sig Start Date End Date Taking?  Authorizing Provider permethrin (ELIMITE) 5 % cream Apply topically as directed 3/27/21  Yes Zane Shields MD   hydrOXYzine (ATARAX) 25 MG tablet Take 1 tablet by mouth every 8 hours as needed for Itching 3/27/21 4/6/21 Yes Zane Shields MD   traZODone (DESYREL) 150 MG tablet Take 1 tablet by mouth nightly as needed for Sleep 3/14/21   Frederic Castro MD   paliperidone (INVEGA) 6 MG extended release tablet Take 1 tablet by mouth daily 3/14/21   Frederic Castro MD   benztropine (COGENTIN) 1 MG tablet Take 1 mg by mouth daily as needed    Historical Provider, MD   escitalopram (LEXAPRO) 20 MG tablet Take 20 mg by mouth daily    Historical Provider, MD   paliperidone palmitate ER (INVEGA SUSTENNA) 234 MG/1.5ML [place] IM injection Inject 234 mg into the muscle every 28 days    Historical Provider, MD       PHYSICAL EXAM       /63   Pulse 83   Temp 97.1 °F (36.2 °C) (Temporal)   Resp 20   Ht 6' 3\" (1.905 m)   Wt 180 lb (81.6 kg)   SpO2 98%   BMI 22.50 kg/m²     CONSTITUTIONAL: Vital signs reviewed, Alert and oriented X 3. HEAD: Atraumatic, Normocephalic. EYES: Eyes are normal to inspection, Pupils equal, round and reactive to light. NECK: Normal ROM, No jugular venous distention, No meningeal signs. RESPIRATORY CHEST: No respiratory distress. ABDOMEN: Abdomen is nontender, No distension. No pulsatile masses palpated. BACK:  No midline bony tenderness to palpation. UPPER EXTREMITY: Inspection normal, No cyanosis. LOWER EXTREMITY: Pulses are 2+ and equal bilaterally with cap refill < 2 seconds. NEURO: GCS is 15.  5/5 strength in bilateral lower extremities with sensation to light touch intact. SKIN: Skin is warm, Skin is dry. Rash is present on the arms  PSYCHIATRIC: Oriented X 3, Normal affect.      RASH: Patient has deep red dermatographism with up both arms bilateral arms     Diagnostic Results     LABS:  Not indicated    RADIOLOGY:  Not indicated    Medical decision making

## 2021-04-04 ENCOUNTER — HOSPITAL ENCOUNTER (EMERGENCY)
Age: 61
Discharge: PSYCHIATRIC OR SUBSTANCE USE FACILITY | End: 2021-04-04
Attending: EMERGENCY MEDICINE
Payer: MEDICAID

## 2021-04-04 VITALS
OXYGEN SATURATION: 99 % | WEIGHT: 165 LBS | HEART RATE: 94 BPM | RESPIRATION RATE: 16 BRPM | BODY MASS INDEX: 20.62 KG/M2 | SYSTOLIC BLOOD PRESSURE: 111 MMHG | TEMPERATURE: 98.9 F | DIASTOLIC BLOOD PRESSURE: 68 MMHG

## 2021-04-04 DIAGNOSIS — R45.851 SUICIDAL IDEATION: Primary | ICD-10-CM

## 2021-04-04 DIAGNOSIS — R10.84 GENERALIZED ABDOMINAL PAIN: ICD-10-CM

## 2021-04-04 LAB
ABSOLUTE EOS #: 0.1 K/UL (ref 0–0.44)
ABSOLUTE IMMATURE GRANULOCYTE: <0.03 K/UL (ref 0–0.3)
ABSOLUTE LYMPH #: 2.26 K/UL (ref 1.1–3.7)
ABSOLUTE MONO #: 0.58 K/UL (ref 0.1–1.2)
ACETAMINOPHEN LEVEL: <5 UG/ML (ref 10–30)
ALBUMIN SERPL-MCNC: 3.9 G/DL (ref 3.5–5.2)
ALBUMIN/GLOBULIN RATIO: 1.9 (ref 1–2.5)
ALP BLD-CCNC: 85 U/L (ref 40–129)
ALT SERPL-CCNC: 9 U/L (ref 5–41)
ANION GAP SERPL CALCULATED.3IONS-SCNC: 11 MMOL/L (ref 9–17)
AST SERPL-CCNC: 22 U/L
BASOPHILS # BLD: 1 % (ref 0–2)
BASOPHILS ABSOLUTE: 0.05 K/UL (ref 0–0.2)
BILIRUB SERPL-MCNC: 0.26 MG/DL (ref 0.3–1.2)
BUN BLDV-MCNC: 13 MG/DL (ref 8–23)
BUN/CREAT BLD: ABNORMAL (ref 9–20)
CALCIUM SERPL-MCNC: 8.4 MG/DL (ref 8.6–10.4)
CHLORIDE BLD-SCNC: 104 MMOL/L (ref 98–107)
CO2: 25 MMOL/L (ref 20–31)
CREAT SERPL-MCNC: 1 MG/DL (ref 0.7–1.2)
DIFFERENTIAL TYPE: ABNORMAL
EOSINOPHILS RELATIVE PERCENT: 2 % (ref 1–4)
ETHANOL PERCENT: 0.03 %
ETHANOL: 32 MG/DL
GFR AFRICAN AMERICAN: >60 ML/MIN
GFR NON-AFRICAN AMERICAN: >60 ML/MIN
GFR SERPL CREATININE-BSD FRML MDRD: ABNORMAL ML/MIN/{1.73_M2}
GFR SERPL CREATININE-BSD FRML MDRD: ABNORMAL ML/MIN/{1.73_M2}
GLUCOSE BLD-MCNC: 91 MG/DL (ref 70–99)
HCT VFR BLD CALC: 32.3 % (ref 40.7–50.3)
HEMOGLOBIN: 9.9 G/DL (ref 13–17)
IMMATURE GRANULOCYTES: 0 %
LIPASE: 24 U/L (ref 13–60)
LYMPHOCYTES # BLD: 37 % (ref 24–43)
MCH RBC QN AUTO: 28.2 PG (ref 25.2–33.5)
MCHC RBC AUTO-ENTMCNC: 30.7 G/DL (ref 28.4–34.8)
MCV RBC AUTO: 92 FL (ref 82.6–102.9)
MONOCYTES # BLD: 9 % (ref 3–12)
NRBC AUTOMATED: 0 PER 100 WBC
PDW BLD-RTO: 14.9 % (ref 11.8–14.4)
PLATELET # BLD: 261 K/UL (ref 138–453)
PLATELET ESTIMATE: ABNORMAL
PMV BLD AUTO: 9.2 FL (ref 8.1–13.5)
POTASSIUM SERPL-SCNC: 3.7 MMOL/L (ref 3.7–5.3)
RBC # BLD: 3.51 M/UL (ref 4.21–5.77)
RBC # BLD: ABNORMAL 10*6/UL
SALICYLATE LEVEL: <1 MG/DL (ref 3–10)
SEG NEUTROPHILS: 51 % (ref 36–65)
SEGMENTED NEUTROPHILS ABSOLUTE COUNT: 3.19 K/UL (ref 1.5–8.1)
SODIUM BLD-SCNC: 140 MMOL/L (ref 135–144)
TOTAL PROTEIN: 6 G/DL (ref 6.4–8.3)
TOXIC TRICYCLIC SC,BLOOD: NEGATIVE
TROPONIN INTERP: NORMAL
TROPONIN T: NORMAL NG/ML
TROPONIN, HIGH SENSITIVITY: 8 NG/L (ref 0–22)
WBC # BLD: 6.2 K/UL (ref 3.5–11.3)
WBC # BLD: ABNORMAL 10*3/UL

## 2021-04-04 PROCEDURE — 80307 DRUG TEST PRSMV CHEM ANLYZR: CPT

## 2021-04-04 PROCEDURE — G0480 DRUG TEST DEF 1-7 CLASSES: HCPCS

## 2021-04-04 PROCEDURE — 80143 DRUG ASSAY ACETAMINOPHEN: CPT

## 2021-04-04 PROCEDURE — 83690 ASSAY OF LIPASE: CPT

## 2021-04-04 PROCEDURE — 2580000003 HC RX 258: Performed by: EMERGENCY MEDICINE

## 2021-04-04 PROCEDURE — 84484 ASSAY OF TROPONIN QUANT: CPT

## 2021-04-04 PROCEDURE — 80179 DRUG ASSAY SALICYLATE: CPT

## 2021-04-04 PROCEDURE — 93005 ELECTROCARDIOGRAM TRACING: CPT | Performed by: EMERGENCY MEDICINE

## 2021-04-04 PROCEDURE — 99285 EMERGENCY DEPT VISIT HI MDM: CPT

## 2021-04-04 PROCEDURE — 85025 COMPLETE CBC W/AUTO DIFF WBC: CPT

## 2021-04-04 PROCEDURE — 80053 COMPREHEN METABOLIC PANEL: CPT

## 2021-04-04 RX ORDER — DOCUSATE SODIUM 100 MG/1
200 CAPSULE, LIQUID FILLED ORAL ONCE
Status: DISCONTINUED | OUTPATIENT
Start: 2021-04-04 | End: 2021-04-04

## 2021-04-04 RX ORDER — SODIUM CHLORIDE, SODIUM LACTATE, POTASSIUM CHLORIDE, CALCIUM CHLORIDE 600; 310; 30; 20 MG/100ML; MG/100ML; MG/100ML; MG/100ML
1000 INJECTION, SOLUTION INTRAVENOUS ONCE
Status: COMPLETED | OUTPATIENT
Start: 2021-04-04 | End: 2021-04-04

## 2021-04-04 RX ADMIN — SODIUM CHLORIDE, POTASSIUM CHLORIDE, SODIUM LACTATE AND CALCIUM CHLORIDE 1000 ML: 600; 310; 30; 20 INJECTION, SOLUTION INTRAVENOUS at 05:47

## 2021-04-04 ASSESSMENT — PAIN DESCRIPTION - LOCATION: LOCATION: ABDOMEN

## 2021-04-04 ASSESSMENT — PAIN DESCRIPTION - FREQUENCY: FREQUENCY: CONTINUOUS

## 2021-04-04 ASSESSMENT — ENCOUNTER SYMPTOMS
ABDOMINAL PAIN: 1
CONSTIPATION: 1
SORE THROAT: 0
NAUSEA: 0
VOMITING: 0
SHORTNESS OF BREATH: 0
DIARRHEA: 0

## 2021-04-04 ASSESSMENT — PAIN SCALES - GENERAL: PAINLEVEL_OUTOF10: 6

## 2021-04-04 ASSESSMENT — PAIN DESCRIPTION - ORIENTATION: ORIENTATION: RIGHT;LOWER

## 2021-04-04 NOTE — ED PROVIDER NOTES
[facility]  ED  Emergency Department Encounter  EmergencyMedicine Resident     Pt Mat Rodrigues  MRN: [id number]  Shingflu 1960  Date of evaluation: 4/4/21  PCP:  No primary care provider on file. CHIEF COMPLAINT       Chief Complaint   Patient presents with    Suicidal    Abdominal Pain       HISTORY OF PRESENT ILLNESS  (Location/Symptom, Timing/Onset, Context/Setting, Quality, Duration, Modifying Factors, Severity.)      Donato Houser is a 63 y.o. male who presents to the emergency department with acute suicidal ideation with plan to stab himself, as well as a 2-day history of right lower quadrant abdominal pain. Patient presents today after using crack cocaine earlier this morning, he presents with a broken crack pipe stating that he broke it earlier today. Denies any injuries from this. States that the right lower quadrant abdominal pain has persisted because he did not eat anything over the past couple of days. He is unsure if anything exacerbates or relieves the pain. He denies fever, chills, vision changes, HEENT symptoms, chest pain, shortness of breath, nausea or vomiting, or problems with urination or bowel movements other than constipation; he says he has not had a bowel movement in at least a couple of days. No new numbness or tingling anywhere. PAST MEDICAL / SURGICAL / SOCIAL / FAMILY HISTORY      has a past medical history of Bipolar disorder ([address] Depression, GERD (gastroesophageal reflux disease), Hallucinations, Headache(784.0), Hepatitis, Schizophrenia, schizo-affective ([address] Substance abuse ([address] Tobacco abuse, Type II or unspecified type diabetes mellitus without mention of complication, not stated as uncontrolled, and Urinary incontinence. has a past surgical history that includes Dental surgery and Abscess Drainage (N/A, 02/11/2018).     Social History     Socioeconomic History    Marital status: Single     Spouse name: Not on file    Number of children: 0    Years of education: 10    Highest education level: Not on file   Occupational History     Employer: N/A   Social Needs    Financial resource strain: Not on file    Food insecurity     Worry: Not on file     Inability: Not on file   [employer] needs     Medical: Not on file     Non-medical: Not on file   Tobacco Use    Smoking status: Current Every Day Smoker     Packs/day: 0.50     Types: Cigarettes    Smokeless tobacco: Never Used   Substance and Sexual Activity    Alcohol use: Yes     Comment: reports drinking occasionally    Drug use: Yes     Types: Cocaine     Comment: drug abuse includes cocaine,     Sexual activity: Not on file   Lifestyle    Physical activity     Days per week: Not on file     Minutes per session: Not on file    Stress: Not on file   Relationships    Social connections     Talks on phone: Not on file     Gets together: Not on file     Attends Taoism service: Not on file     Active member of club or organization: Not on file     Attends meetings of clubs or organizations: Not on file     Relationship status: Not on file    Intimate partner violence     Fear of current or ex partner: Not on file     Emotionally abused: Not on file     Physically abused: Not on file     Forced sexual activity: Not on file   Other Topics Concern    Not on file   Social History Narrative    Not on file       Family History   Problem Relation Age of Onset    Diabetes Mother     Heart Disease Mother        Allergies:  Navane [thiothixene]    Home Medications:  Prior to Admission medications    Medication Sig Start Date End Date Taking?  Authorizing Provider   permethrin (ELIMITE) 5 % cream Apply topically as directed 3/27/21   Cy Hart MD   hydrOXYzine (ATARAX) 25 MG tablet Take 1 tablet by mouth every 8 hours as needed for Itching 3/27/21 4/6/21  Cy Hart MD   traZODone (DESYREL) 150 MG tablet Take 1 tablet by mouth nightly as needed for Sleep 3/14/21 Liu Jackson MD   paliperidone (INVEGA) 6 MG extended release tablet Take 1 tablet by mouth daily 3/14/21   Liu Jackson MD   benztropine (COGENTIN) 1 MG tablet Take 1 mg by mouth daily as needed    Historical Provider, MD   escitalopram (LEXAPRO) 20 MG tablet Take 20 mg by mouth daily    Historical Provider, MD   paliperidone palmitate ER (INVEGA SUSTENNA) 234 MG/1.5ML [place] IM injection Inject 234 mg into the muscle every 28 days    Historical Provider, MD       REVIEW OF SYSTEMS    (2-9 systems for level 4, 10 or more for level 5)      Review of Systems   Constitutional: Negative for chills and fever. HENT: Negative for ear pain, hearing loss and sore throat. Eyes: Negative for visual disturbance. Respiratory: Negative for shortness of breath. Cardiovascular: Negative for chest pain. Gastrointestinal: Positive for abdominal pain and constipation. Negative for diarrhea, nausea and vomiting. Genitourinary: Negative for difficulty urinating and dysuria. Musculoskeletal: Negative for arthralgias and myalgias. Neurological: Negative for weakness and numbness. Psychiatric/Behavioral: Negative for agitation and confusion. PHYSICAL EXAM   (up to 7 for level 4, 8 or more for level 5)      INITIAL VITALS:   /68   Pulse 94   Temp 98.9 °F (37.2 °C) (Tympanic)   Resp 16   Wt 165 lb (74.8 kg)   SpO2 99%   BMI 20.62 kg/m²     Physical Exam  Vitals signs and nursing note reviewed. Constitutional:       General: He is not in acute distress. Appearance: Normal appearance. He is well-developed. He is not ill-appearing or diaphoretic. HENT:      Head: Normocephalic and atraumatic. Right Ear: External ear normal.      Left Ear: External ear normal.      Nose: Nose normal.      Mouth/Throat:      Mouth: Mucous membranes are moist.   Eyes:      Extraocular Movements: Extraocular movements intact.       Conjunctiva/sclera: Conjunctivae normal.   Neck: Musculoskeletal: Normal range of motion and neck supple. Trachea: No tracheal deviation. Cardiovascular:      Rate and Rhythm: Normal rate and regular rhythm. Heart sounds: Normal heart sounds. No murmur. No friction rub. No gallop. Pulmonary:      Effort: Pulmonary effort is normal. No respiratory distress. Breath sounds: Normal breath sounds. No wheezing, rhonchi or rales. Abdominal:      General: Abdomen is flat. There is no distension. Palpations: Abdomen is soft. There is no mass. Tenderness: There is abdominal tenderness. There is no guarding or rebound. Comments: Patient states that all quadrants of the abdomen are painful when palpated but no guarding or mass   Musculoskeletal: Normal range of motion. General: No swelling, deformity or signs of injury. Skin:     General: Skin is warm and dry. Capillary Refill: Capillary refill takes less than 2 seconds. Coloration: Skin is not jaundiced. Findings: No bruising or lesion. Neurological:      General: No focal deficit present. Mental Status: He is alert and oriented to person, place, and time. Mental status is at baseline. Motor: No abnormal muscle tone. DIFFERENTIAL  DIAGNOSIS     PLAN (LABS / IMAGING / EKG):  Orders Placed This Encounter   Procedures    COVID-19, Rapid    CBC Auto Differential    Comprehensive Metabolic Panel w/ Reflex to MG    Lipase    Troponin    Urinalysis Reflex to Culture    Urine Drug Screen    TOX SCR, BLD, ED    EKG 12 Lead       MEDICATIONS ORDERED:  Orders Placed This Encounter   Medications    lactated ringers infusion 1,000 mL    DISCONTD: docusate sodium (COLACE) capsule 200 mg       DDX: Jesse Collier is a 63 y.o. male who presents to the emergency department with abdominal pain and suicidal ideation.  Differential diagnosis includes polysubstance abuse, social concerns, suicidal ideation, psychosis    DIAGNOSTIC RESULTS / EMERGENCY DEPARTMENT COURSE / MDM   LAB RESULTS:  Results for orders placed or performed during the hospital encounter of 04/04/21   CBC Auto Differential   Result Value Ref Range    WBC 6.2 3.5 - 11.3 k/uL    RBC 3.51 (L) 4.21 - 5.77 m/uL    Hemoglobin 9.9 (L) 13.0 - 17.0 g/dL    Hematocrit 32.3 (L) 40.7 - 50.3 %    MCV 92.0 82.6 - 102.9 fL    MCH 28.2 25.2 - 33.5 pg    MCHC 30.7 28.4 - 34.8 g/dL    RDW 14.9 (H) 11.8 - 14.4 %    Platelets 135 640 - 142 k/uL    MPV 9.2 8.1 - 13.5 fL    NRBC Automated 0.0 0.0 per 100 WBC    Differential Type NOT REPORTED     WBC Morphology NOT REPORTED     RBC Morphology ANISOCYTOSIS PRESENT     Platelet Estimate NOT REPORTED     Seg Neutrophils 51 36 - 65 %    Lymphocytes 37 24 - 43 %    Monocytes 9 3 - 12 %    Eosinophils % 2 1 - 4 %    Basophils 1 0 - 2 %    Immature Granulocytes 0 0 %    Segs Absolute 3.19 1.50 - 8.10 k/uL    Absolute Lymph # 2.26 1.10 - 3.70 k/uL    Absolute Mono # 0.58 0.10 - 1.20 k/uL    Absolute Eos # 0.10 0.00 - 0.44 k/uL    Basophils Absolute 0.05 0.00 - 0.20 k/uL    Absolute Immature Granulocyte <0.03 0.00 - 0.30 k/uL   Comprehensive Metabolic Panel w/ Reflex to MG   Result Value Ref Range    Glucose 91 70 - 99 mg/dL    BUN 13 8 - 23 mg/dL    CREATININE 1.00 0.70 - 1.20 mg/dL    Bun/Cre Ratio NOT REPORTED 9 - 20    Calcium 8.4 (L) 8.6 - 10.4 mg/dL    Sodium 140 135 - 144 mmol/L    Potassium 3.7 3.7 - 5.3 mmol/L    Chloride 104 98 - 107 mmol/L    CO2 25 20 - 31 mmol/L    Anion Gap 11 9 - 17 mmol/L    Alkaline Phosphatase 85 40 - 129 U/L    ALT 9 5 - 41 U/L    AST 22 <40 U/L    Total Bilirubin 0.26 (L) 0.3 - 1.2 mg/dL    Total Protein 6.0 (L) 6.4 - 8.3 g/dL    Albumin 3.9 3.5 - 5.2 g/dL    Albumin/Globulin Ratio 1.9 1.0 - 2.5    GFR Non-African American >60 >60 mL/min    GFR African American >60 >60 mL/min    GFR Comment          GFR Staging NOT REPORTED    Lipase   Result Value Ref Range    Lipase 24 13 - 60 U/L   Troponin   Result Value Ref Range    Troponin, High Sensitivity 8 0 - 22 ng/L    Troponin T NOT REPORTED <0.03 ng/mL    Troponin Interp NOT REPORTED    TOX SCR, BLD, ED   Result Value Ref Range    Acetaminophen Level <5 (L) 10 - 30 ug/mL    Ethanol 32 (H) <10 mg/dL    Ethanol percent 0.032 (H) <7.586 %    Salicylate Lvl <1 (L) 3 - 10 mg/dL    Toxic Tricyclic Sc,Blood NEGATIVE NEGATIVE       IMPRESSION: Vika Reddy is a 63 y.o. male who presents to the emergency department with abdominal pain and suicidal ideation. On examination he is afebrile, vital signs unremarkable examination demonstrates an otherwise well-appearing male of stated age with right lower quadrant reported abdominal pain but diffuse abdominal tenderness to palpation without guarding. RADIOLOGY:  No results found. EKG  EKG Interpretation    Interpreted by emergency department physician    Rhythm: normal sinus   Rate: normal  Axis: Right  Ectopy: none  Conduction: normal  ST Segments: no acute change  T Waves: no acute change  Q Waves: none    Clinical Impression: no acute changes and normal EKG    Derek Somers MD    All EKG's are interpreted by the Emergency Department Physician who either signs or co-signs this chart in the absence of a cardiologist.    EMERGENCY DEPARTMENT COURSE:  ED Course as of Apr 04 2034   [place] Apr 04, 2021   0184 Patient evaluated with social work. He was sleeping comfortably in the stretcher. Awakened with verbal stimuli and light touch stimuli. Patient states that he is feeling suicidal at this time and he is hearing voices that are telling him to stab himself. Denies any homicidal ideations. He does not have any visual hallucinations at this time. Patient would like to get treatment. Social work discussed transfer to rescue crisis with him and he is agreeable to this. Social work will be arranging for transport to rescue crisis. [SM]   4465 Patient will be picked up around 1400.     [SM]      ED Course User Index  [SM] Eduard Rothman MD PROCEDURES:  None    CONSULTS:  None    CRITICAL CARE:  Please see attending note. FINAL IMPRESSION      1. Suicidal ideation    2. Generalized abdominal pain          DISPOSITION / PLAN     DISPOSITION        PATIENT REFERRED TO:  [facility]  [address] [address]  [phone]  Go to   If symptoms worsen    Your new doctor (list provided)    Schedule an appointment as soon as possible for a visit in 1 day  For follow-up of this visit      DISCHARGE MEDICATIONS:  Discharge Medication List as of 4/4/2021  7:00 AM          Jose Rivera MD  Emergency Medicine Resident    This patient was evaluated in the Emergency Department for symptoms described in the history of present illness. He/she was evaluated in the context of the global COVID-19 pandemic, which necessitated consideration that the patient might be at risk for infection with the SARS-CoV-2 virus that causes COVID-19. Institutional protocols and algorithms that pertain to the evaluation of patients at risk for COVID-19 are in a state of rapid change based on information released by regulatory bodies including the CDC and federal and state organizations. These policies and algorithms were followed during the patient's care in the ED.     (Please note that portions of thisnote were completed with a voice recognition program.  Efforts were made to edit the dictations but occasionally words are mis-transcribed.)        Jose Rivera MD  Resident  04/04/21 7984       Jose Rivera MD  Resident  04/04/21 9273

## 2021-04-04 NOTE — ED PROVIDER NOTES
[address] [facility]     Emergency Department     Faculty Attestation    I performed a history and physical examination of the patient and discussed management with the resident. I have reviewed and agree with the residents findings including all diagnostic interpretations, and treatment plans as written. Any areas of disagreement are noted on the chart. I was personally present for the key portions of any procedures. I have documented in the chart those procedures where I was not present during the key portions. I have reviewed the emergency nurses triage note. I agree with the chief complaint, past medical history, past surgical history, allergies, medications, social and family history as documented unless otherwise noted below. Documentation of the HPI, Physical Exam and Medical Decision Making performed by [email or web address] is based on my personal performance of the HPI, PE and MDM. For Physician Assistant/ Nurse Practitioner cases/documentation I have personally evaluated this patient and have completed at least one if not all key elements of the E/M (history, physical exam, and MDM). Additional findings are as noted. 62 yo M c/o diffuse abdominal pain, no fever, no cp, no injury,   Pt c/o mild nausea, no vomiting,   Pt c/o suicidal ideation, no homicidal ideation  pe vss cooperative, pleasant, no acute psychosis,   No cervical tenderness, crepitus or deformity, chest non tender, abdomen non tender, no distension, no rigidity, no mass, moving extremities x 4, nv intact,     -> eval started, care turned over to day shift,   > rescue    EKG Interpretation    Interpreted by me  Sinus, heart rate 73, no ischemia, right axis, QT corrected 442 [compared with ekg from feb 2021] stable,     CRITICAL CARE: There was a high probability of clinically significant/life threatening deterioration in this patient's condition which required my urgent intervention.   Total critical care time was 0 minutes. This excludes any time for separately reportable procedures.        [facility], DO  04/04/21 445 [facility], DO  04/04/21 121 [facility], DO  04/04/21 0662 [facility], DO  04/04/21 2414

## 2021-04-04 NOTE — ED NOTES
Writer attempted to meet with patient, patient refused to interact, remained with covers over head. Writer stated intent to return for assessment, patient did not acknowledge.       TAVO Morrison  04/04/21 9541

## 2021-04-04 NOTE — ED PROVIDER NOTES
[facility]  FACULTY HANDOFF       Handoff taken on the following patient from prior Attending Physician: Dr. Lazaro Castro  Pt Name: Nelson Coy  PCP:  No primary care provider on file. Attestation  I was available and discussed any additional care issues that arose and coordinated the management plans with the resident(s) caring for the patient during my duty period. Any areas of disagreement with resident's documentation of care or procedures are noted on the chart. I was personally present for the key portions of any/all procedures during my duty period. I have documented in the chart those procedures where I was not present during the key portions. CHIEF COMPLAINT       Chief Complaint   Patient presents with    Suicidal    Abdominal Pain         CURRENT MEDICATIONS     Previous Medications  Previous Medications    BENZTROPINE (COGENTIN) 1 MG TABLET    Take 1 mg by mouth daily as needed    ESCITALOPRAM (LEXAPRO) 20 MG TABLET    Take 20 mg by mouth daily    HYDROXYZINE (ATARAX) 25 MG TABLET    Take 1 tablet by mouth every 8 hours as needed for Itching    PALIPERIDONE (INVEGA) 6 MG EXTENDED RELEASE TABLET    Take 1 tablet by mouth daily    PALIPERIDONE PALMITATE ER (INVEGA SUSTENNA) 234 MG/1.5ML [place] IM INJECTION    Inject 234 mg into the muscle every 28 days    PERMETHRIN (ELIMITE) 5 % CREAM    Apply topically as directed    TRAZODONE (DESYREL) 150 MG TABLET    Take 1 tablet by mouth nightly as needed for Sleep       Encounter Medications  Orders Placed This Encounter   Medications    lactated ringers infusion 1,000 mL    DISCONTD: docusate sodium (COLACE) capsule 200 mg       ALLERGIES     is allergic to navane [thiothixene].       RECENT VITALS:   Temp: 98.9 °F (37.2 °C),  Pulse: 94, Resp: 16, BP: 111/68    RADIOLOGY:   No orders to display       LABS:  Labs Reviewed   CBC WITH AUTO DIFFERENTIAL - Abnormal; Notable for the following components:       Result Value    RBC 3.51 (*)     Hemoglobin 9.9 (*)     Hematocrit 32.3 (*)     RDW 14.9 (*)     All other components within normal limits   COMPREHENSIVE METABOLIC PANEL W/ REFLEX TO MG FOR LOW K - Abnormal; Notable for the following components:    Calcium 8.4 (*)     Total Bilirubin 0.26 (*)     Total Protein 6.0 (*)     All other components within normal limits   TOX SCR, BLD, ED - Abnormal; Notable for the following components:    Acetaminophen Level <5 (*)     Ethanol 32 (*)     Ethanol percent 4.329 (*)     Salicylate Lvl <1 (*)     All other components within normal limits   COVID-19, RAPID   LIPASE   TROPONIN   URINE RT REFLEX TO CULTURE   URINE DRUG SCREEN           PLAN/ TASKS OUTSTANDING     Plan for [facility] for thoughts of self harm.        (Please note that portions of this note were completed with a voice recognition program.  Efforts were made to edit the dictations but occasionally words are mis-transcribed.)    Jaycee Cuevas MD,   Attending Emergency Physician       Jaycee Cuevas MD  04/04/21 7472

## 2021-04-04 NOTE — ED NOTES
Writer & Resident met with patient. Patient's pleasant & soft-spoken but not readily forthcoming with information. Patient confirmed auditory hallucinations for the past 2 days which instruct him to stab himself. Patient denied visual hallucinations. Patient confirmed chronic crack cocaine use, most recently this morning. [facility] to [facility]. Writer spoke with Gina Lin of [facility] who stated \"someone will bed down\". Writer to inform Resident & RN.      TAVO Bonds  04/04/21 9883

## 2021-04-04 NOTE — ED PROVIDER NOTES
CO2 25 20 - 31 mmol/L    Anion Gap 11 9 - 17 mmol/L    Alkaline Phosphatase 85 40 - 129 U/L    ALT 9 5 - 41 U/L    AST 22 <40 U/L    Total Bilirubin 0.26 (L) 0.3 - 1.2 mg/dL    Total Protein 6.0 (L) 6.4 - 8.3 g/dL    Albumin 3.9 3.5 - 5.2 g/dL    Albumin/Globulin Ratio 1.9 1.0 - 2.5    GFR Non-African American >60 >60 mL/min    GFR African American >60 >60 mL/min    GFR Comment          GFR Staging NOT REPORTED    Lipase   Result Value Ref Range    Lipase 24 13 - 60 U/L   Troponin   Result Value Ref Range    Troponin, High Sensitivity 8 0 - 22 ng/L    Troponin T NOT REPORTED <0.03 ng/mL    Troponin Interp NOT REPORTED    TOX SCR, BLD, ED   Result Value Ref Range    Acetaminophen Level <5 (L) 10 - 30 ug/mL    Ethanol 32 (H) <10 mg/dL    Ethanol percent 0.032 (H) <8.407 %    Salicylate Lvl <1 (L) 3 - 10 mg/dL    Toxic Tricyclic Sc,Blood NEGATIVE NEGATIVE       No orders to display         RECENT VITALS:     Temp: 98.9 °F (37.2 °C),  Pulse: 94, Resp: 16, BP: 111/68, SpO2: 99 %    This patient is a 63 y.o. Male with suicidal ideation and RLQ abd pain. Recent crack use. Has not been eating. No BM in a couple days. Labs unremarkable except for mild anemia that is normal for the patient. Awaiting social work evaluation of the patient to determine if patient can go to outside facility for psychiatric therapy. Patient is medically cleared for transfer. ED Course as of Apr 04 1540   Sun Apr 04, 2021   2970 Patient evaluated with social work. He was sleeping comfortably in the stretcher. Awakened with verbal stimuli and light touch stimuli. Patient states that he is feeling suicidal at this time and he is hearing voices that are telling him to stab himself. Denies any homicidal ideations. He does not have any visual hallucinations at this time. Patient would like to get treatment. Social work discussed transfer to [facility] with him and he is agreeable to this.   Social work will be arranging for transport to rescue [facility]. []   6426 Patient will be picked up around 1400. [SM]      ED Course User Index  [] Sandy Daniels MD         OUTSTANDING TASKS / RECOMMENDATIONS:    1. Social work evaluation for transfer     FINAL IMPRESSION:     1. Suicidal ideation    2. Generalized abdominal pain        DISPOSITION:         DISPOSITION:  []  Discharge   [x]  Transfer - [facility]   []  Admission -     []  Against Medical Advice   []  Eloped   FOLLOW-UP: No follow-up provider specified.    DISCHARGE MEDICATIONS: New Prescriptions    No medications on file           Sandy Daniels MD  Emergency Medicine Resident  [facility]      Sandy Daniels MD  Resident  04/04/21 9440

## 2021-04-04 NOTE — ED NOTES
Felipe Mathur of [facility] stated patient did not sign out AMA, he was discharged successfully by the psychiatrist yesterday.  Felipe Mathur gave ETA just after 2:00pm.      TAVO Alberts  04/04/21 1242

## 2021-04-04 NOTE — ED NOTES
[] Sosa    [] [facility]    [x]  [facility] ASSESSMENT      Y  N     [x] [] In the past two weeks have you had thoughts of hurting yourself in any way? [x] [] In the past two weeks have you had thoughts that you would be better off dead? [x] [] Have you made a suicide attempt in the past two months? [x] [] Do you have a plan for hurting yourself or suicide? [x] [] Presence of hallucinations/voices related to hurting himself or herself or someone else. SUICIDE/SECURITY WATCH PRECAUTION CHECKLIST     Orders    [x]  Suicide/Security Watch Precautions initiated as checked below:   4/4/21 7:11 AM EDT BH31/BH31A    [x] Notified physician:  Gregor Farley DO  4/4/21 7:11 AM EDT    [x] Orders obtained as appropriate:     [x] 1:1 Observer     [] Psych Consult     [] Psych Consult    Name:  Date:  Time:    [x] 1:1 Observer, Notified by:  Elisabet Crocker Nurse Supervisor    [x] Remove all personal clothes from room and place in snap/paper gown/pants. Slipper only    [x] Remove all personal belongings from room and secured away from patient. Documentation    [x] Initiate Suicide/Security Watch Precaution Flow Sheet    [x] Initiate individualized Care Plan/Problem    [x] Document why precautions initiated on flow sheet (Initiate Nursing Care Plan/Problem)    [x] 1:1 Observer in place; instructions provided. Suicide precautions require observer be within arms length. [x] Nurse-Observer Communication Hand-off initiated by RN, reviewed with Observer. Subsequently used as Hand Off between Observers. [x] Initiate every 15 minute observations per observer as delegated by the RN.     [x] Initiate RN assessment and documentation    Environmental Scan  Search Criteria and Process: OPTIONAL, see Search Policy    [x] Reason for search: SI    [x] Nursing in presence of second person to search patient    [x] Patient notified of reason for body assessment and belongings search:     Persons present during search: [employer] of search and disposition: items locked       Searchers Name:  or items similar should be removed from the room:   [x] Chairs   [x] Telephone   [x] Trash cans and liners   [x] Plastic utensils (order Patient Safety tray)   [x] Empty or remove Sharps containers   [x] All personal clothing/belongings removed   [x] All unnecessary lead wires, electrical cords, draw cords, etc.   [x] Flowers and plants   [x] Double check for lighters, matches, razors, any glass items etc that can be used as weapons. Person completing Checklist: Leanne Joyner       GENERAL INFORMATION     Y  N     [x] [] Has the patient been informed that they are on a watch and what that means? [x] [] Can the patient get out of Bed without nursing assistance? [x] [] Can the patient use the restroom without nursing assistance? [] [x] Can the patient walk the halls to [place] their legs? \"   [] [x] Does the patient have metal utensils? [x] [] Have the patient's belongings been placed out of control of the patient? [x] [] Have the patient and his/her belongings been checked for contraband? [x] [] Is the patient under any visitor restrictions? If Yes, explain: SI   [] [x] Is the patient under an alias? [facility] Name:   Authorized visitors (no more than two are to be on the list)   Name/Relationship:   Name/Relationship:    Name of Staff member that you  Received this information from?: Leanne Joyner    General Description:    Nelson Coy BH31/BH31A male 63 y.o. Admission weight: 165 lb (74.8 kg)    Race: []  [x] Black  []   []   [] [place] [] Other  Facial Hair:  [] Yes  [] No  If yes, please describe: Identifying Marks (i.e. Visible tattoos, scars, etc... ):     NURSING CARE PLAN    Nursing Diagnosis: Risk of Self Directed Harm  [] Actual  [x] Potential  Date Started: 4/4/21      Etiological

## 2021-04-05 LAB
EKG ATRIAL RATE: 73 BPM
EKG P AXIS: 63 DEGREES
EKG P-R INTERVAL: 178 MS
EKG Q-T INTERVAL: 402 MS
EKG QRS DURATION: 88 MS
EKG QTC CALCULATION (BAZETT): 442 MS
EKG R AXIS: 90 DEGREES
EKG T AXIS: 65 DEGREES
EKG VENTRICULAR RATE: 73 BPM

## 2021-04-05 PROCEDURE — 93010 ELECTROCARDIOGRAM REPORT: CPT | Performed by: INTERNAL MEDICINE

## 2021-04-11 ENCOUNTER — HOSPITAL ENCOUNTER (EMERGENCY)
Age: 61
Discharge: OTHER INSTITUTION | End: 2021-04-11
Attending: EMERGENCY MEDICINE
Payer: MEDICAID

## 2021-04-11 VITALS
WEIGHT: 170 LBS | OXYGEN SATURATION: 97 % | BODY MASS INDEX: 21.14 KG/M2 | HEART RATE: 78 BPM | TEMPERATURE: 97.2 F | HEIGHT: 75 IN | SYSTOLIC BLOOD PRESSURE: 110 MMHG | DIASTOLIC BLOOD PRESSURE: 77 MMHG | RESPIRATION RATE: 20 BRPM

## 2021-04-11 DIAGNOSIS — R45.851 SUICIDAL IDEATION: Primary | ICD-10-CM

## 2021-04-11 PROCEDURE — 99284 EMERGENCY DEPT VISIT MOD MDM: CPT

## 2021-04-11 NOTE — ED NOTES
spoke with Jena Sheridan in [facility]; she stated patient can come but she is unsure if they are able to pick him up due to their staffing. She reported she would contact  back ASAP.        FRANKLIN Kennedy, LSW     Kamla Guzman  04/11/21 1940

## 2021-04-11 NOTE — ED TRIAGE NOTES
Patient said \"I'm off my meds for 2 days and I want to kill myself. \" When asked about any plan patient said \"I tried to stab myself. \" Patient then said, \"Can I kill myself? Is it against the law to kill myself? \"

## 2021-04-11 NOTE — ED PROVIDER NOTES
[facility]  ED  Emergency Department Encounter  EmergencyMedicine Resident     Pt Maritza Malcolm  MRN: [id number]  Sarah 1960  Date of evaluation: 4/11/21  PCP:  No primary care provider on file. CHIEF COMPLAINT       Chief Complaint   Patient presents with    Suicidal     Says he wants to stab himself       HISTORY OF PRESENT ILLNESS  (Location/Symptom, Timing/Onset, Context/Setting, Quality, Duration, Modifying Factors, Severity.)      Nelson Coy is a 63 y.o. male who presents with suicidal ideation. Patient states that he usually hears voices, however today they told him to get a knife at a kitchen and cut his wrists. Patient has been seen here multiple times in the past for similar complaints    PAST MEDICAL / SURGICAL / SOCIAL / FAMILY HISTORY      has a past medical history of Bipolar disorder ([address] Depression, GERD (gastroesophageal reflux disease), Hallucinations, Headache(784.0), Hepatitis, Schizophrenia, schizo-affective ([address] Substance abuse ([facility] [address] Tobacco abuse, Type II or unspecified type diabetes mellitus without mention of complication, not stated as uncontrolled, and Urinary incontinence. Denies further past medical hx     has a past surgical history that includes Dental surgery and Abscess Drainage (N/A, 02/11/2018).   Denies further past surgical hx    Social History     Socioeconomic History    Marital status: Single     Spouse name: Not on file    Number of children: 0    Years of education: 8    Highest education level: Not on file   Occupational History     Employer: N/A   Social Needs    Financial resource strain: Not on file    Food insecurity     Worry: Not on file     Inability: Not on file   [employer] needs     Medical: Not on file     Non-medical: Not on file   Tobacco Use    Smoking status: Current Every Day Smoker     Packs/day: 0.50     Types: Cigarettes    Smokeless tobacco: Never Used   Substance and Sexual Activity    Alcohol use: Yes     Comment: reports drinking occasionally    Drug use: Yes     Types: Cocaine     Comment: drug abuse includes cocaine,     Sexual activity: Not on file   Lifestyle    Physical activity     Days per week: Not on file     Minutes per session: Not on file    Stress: Not on file   Relationships    Social connections     Talks on phone: Not on file     Gets together: Not on file     Attends Cheondoism service: Not on file     Active member of club or organization: Not on file     Attends meetings of clubs or organizations: Not on file     Relationship status: Not on file    Intimate partner violence     Fear of current or ex partner: Not on file     Emotionally abused: Not on file     Physically abused: Not on file     Forced sexual activity: Not on file   Other Topics Concern    Not on file   Social History Narrative    Not on file       Family History   Problem Relation Age of Onset    Diabetes Mother     Heart Disease Mother        Allergies:  Navane [thiothixene]    Home Medications:  Prior to Admission medications    Medication Sig Start Date End Date Taking?  Authorizing Provider   permethrin (ELIMITE) 5 % cream Apply topically as directed 3/27/21   Erika Powell MD   traZODone (DESYREL) 150 MG tablet Take 1 tablet by mouth nightly as needed for Sleep 3/14/21   Dacia Melchor MD   paliperidone (INVEGA) 6 MG extended release tablet Take 1 tablet by mouth daily 3/14/21   Dacia Melchor MD   benztropine (COGENTIN) 1 MG tablet Take 1 mg by mouth daily as needed    Historical Provider, MD   escitalopram (LEXAPRO) 20 MG tablet Take 20 mg by mouth daily    Historical Provider, MD   paliperidone palmitate ER (INVEGA SUSTENNA) 234 MG/1.5ML [place] IM injection Inject 234 mg into the muscle every 28 days    Historical Provider, MD       REVIEW OF SYSTEMS    (2-9 systems for level 4, 10 or more for level 5)      Review of Systems    Review of Systems   Constitutional: Negative for chills and fever. HENT: Negative for sore throat. Eyes: Negative for pain. Respiratory: Negative for cough. Cardiovascular: Negative for chest pain and palpitations. Gastrointestinal: Negative for abdominal pain, nausea and vomiting. Genitourinary: Negative for dysuria. Musculoskeletal: Negative for gait problem. Skin: Negative for wound. Neurological: Positive for suicidal ideation      PHYSICAL EXAM   (up to 7 for level 4, 8 or more for level 5)      INITIAL VITALS:   /77   Pulse 78   Temp 97.2 °F (36.2 °C) (Skin)   Resp 20   Ht 6' 3\" (1.905 m)   Wt 170 lb (77.1 kg)   SpO2 97%   BMI 21.25 kg/m²     Physical Exam   Gen. Appearance: patient appears well, nondistressed. Head: head atraumatic, normocephalic. Eyes: Extraocular movements intact. No scleral icterus  Mouth: Oropharynx clear and moist.  No oral lesions  Neck: Supple. No lymphadenopathy. Pulmonary: Lungs clear to auscultation bilaterally. No wheezing, rales or rhonchi   Cardiovascular: Regular rate and rhythm, no murmurs   Abdomen: Soft, nontender, no guarding or rebound, normal bowel sounds  Neurology: GCS 15. Oriented to person place and time. moving all extremities   Skin: Warm, dry, well perfused        DIFFERENTIAL  DIAGNOSIS     PLAN (LABS / IMAGING / EKG):  No orders of the defined types were placed in this encounter. MEDICATIONS ORDERED:  No orders of the defined types were placed in this encounter. DIAGNOSTIC RESULTS / EMERGENCY DEPARTMENT COURSE / MDM     LABS:  No results found for this visit on 04/11/21. RADIOLOGY:  None    EKG  None    All EKG's are interpreted by the Emergency Department Physician who either signs or Co-signs this chart in the absence of a cardiologist.    [address] [place] [address] MDM:  63 y.o. male who presents with suicidal ideation with a plan. No chest pain shortness of breath or any other medical complaints.   Cleared for discharge to [facility] or crisis rescue               PROCEDURES:  None    CONSULTS:  None    CRITICAL CARE:  None    FINAL IMPRESSION      1. Suicidal ideation          DISPOSITION / PLAN     DISPOSITION Decision To Transfer 04/11/2021 06:11:28 PM      PATIENT REFERRED TO:  No follow-up provider specified.     DISCHARGE MEDICATIONS:  New Prescriptions    No medications on file       Regina Arias DO  Emergency Medicine Resident    (Please note that portions of thisnote were completed with a voice recognition program.  Efforts were made to edit the dictations but occasionally words are mis-transcribed.)        Regina Arias DO  Resident  04/11/21 4485

## 2021-04-12 NOTE — ED NOTES
[] Sosa    [] [facility]    [x]  [facility] ASSESSMENT      Y  N     [x] [] In the past two weeks have you had thoughts of hurting yourself in any way? [x] [] In the past two weeks have you had thoughts that you would be better off dead? [] [x] Have you made a suicide attempt in the past two months? [x] [] Do you have a plan for hurting yourself or suicide? [] [x] Presence of hallucinations/voices related to hurting himself or herself or someone else. SUICIDE/SECURITY WATCH PRECAUTION CHECKLIST     Orders    [x]  Suicide/Security Watch Precautions initiated as checked below:   4/11/21 8:06 PM EDT BH31/BH31C    [x] Notified physician:  David Miranda MD  4/11/21 8:06 PM EDT    [x] Orders obtained as appropriate:     [x] 1:1 Observer     [] Psych Consult     [] Psych Consult    Name:  Date:  Time:    [x] 1:1 Observer, Notified by:  Scar Dukes Nurse Supervisor    [x] Remove all personal clothes from room and place in snap/paper gown/pants. Slipper only    [x] Remove all personal belongings from room and secured away from patient. Documentation    [x] Initiate Suicide/Security Watch Precaution Flow Sheet    [x] Initiate individualized Care Plan/Problem    [x] Document why precautions initiated on flow sheet (Initiate Nursing Care Plan/Problem)    [x] 1:1 Observer in place; instructions provided. Suicide precautions require observer be within arms length. [x] Nurse-Observer Communication Hand-off initiated by RN, reviewed with Observer. Subsequently used as Hand Off between Observers. [x] Initiate every 15 minute observations per observer as delegated by the RN.     [x] Initiate RN assessment and documentation    Environmental Scan  Search Criteria and Process: OPTIONAL, see Search Policy    [] Reason for search:    [] Nursing in presence of second person to search patient    [] Patient notified of reason for body assessment and belongings search:     Persons present during search:   Results of search and disposition:       Searchers Name: previous shift     These items or items similar should be removed from the room:   [] Chairs   [] Telephone   [] Trash cans and liners   [] Plastic utensils (order Patient Safety tray)   [] Empty or remove Sharps containers   [] All personal clothing/belongings removed   [] All unnecessary lead wires, electrical cords, draw cords, etc.   [] Flowers and plants   [] Double check for lighters, matches, razors, any glass items etc that can be used as weapons. Person completing Checklist: Esau Beck       GENERAL INFORMATION     Y  N     [x] [] Has the patient been informed that they are on a watch and what that means? [x] [] Can the patient get out of Bed without nursing assistance? [x] [] Can the patient use the restroom without nursing assistance? [] [x] Can the patient walk the halls to [place] their legs? \"   [] [x] Does the patient have metal utensils? [x] [] Have the patient's belongings been placed out of control of the patient? [] [x] Have the patient and his/her belongings been checked for contraband? [] [x] Is the patient under any visitor restrictions? If Yes, explain:   [] [] Is the patient under an alias? Alias Name:   Authorized visitors (no more than two are to be on the list)   Name/Relationship:   Name/Relationship:    Name of Staff member that you  Received this information from?: NA    General Description:    José Miguel Davison BH31/BH31C male 63 y.o. Admission weight: 170 lb (77.1 kg) Height: 6' 3\" (190.5 cm)  Race: []  [x] Black  []   []   [] [place] [] Other  Facial Hair:  [] Yes  [] No  If yes, please describe: Identifying Marks (i.e. Visible tattoos, scars, etc... ):     NURSING CARE PLAN    Nursing Diagnosis: Risk of Self Directed Harm  [] Actual  [x] Potential  Date Started: 4/11/21      Etiological Factors: (related to)  [x] Expressed or implied suicidal ideation/behavior  [x] Depression  [] Suicide attempt      [] Low self-esteem  [] Hallucinations      [] Feeling of Hopelessness  [x] Substance abuse or withdrawal    [] Dysfunctional family  [] Major traumatic event, eg., divorce, etc   [] Excessive stress/anxiety    4/11/21    Expected Outcomes    Patient will:   [x] Patient will remain safe for the duration of their stay   [x] Patient's environment will be safe, eg. Free of potential suicide weapons   [] Verbalize Recovery from suicidal episode and improvement in self-worth   [x] Discuss feeling that precipitated suicide attempt/thoughts/behavior   [] Will describe available resources for crisis prevention and management   [] Will verbalize positive coping skills     Nursing Intervention   [x] Assessment and Observations hourly   [x] Suicide Precautions implemented with patient, should be 1:1 observation   [x] Document observation [id number] and RN assessment hourly   [] Consult physician for:    [] Psychiatric consult    [] Pharmacological therapy    [] Other:    [x] Patient search completed by security   [x] Initiated appropriate safety protocols by removing from the patient's environment anything that could be used to inflict self injury, eg. Order safe tray, snap gown, etc   [x] Maintain open, warm, caring, non-judgmental attitude/manner towards patient   [] Discuss advantages and disadvantages of existing coping methods/skills   [x] Assist and educate patient with identifying present strengths and coping skills   [x] Keep patient informed regarding plan of care and provide clear concise explanations. Provide the patient/family education information as well as telephone numbers and other information about crisis centers, hot lines, and counselors.     Discharge Planning:   [] Referral  [] Groups [] Health agencies  [] Other:          Lew Boxer, RN  04/11/21 2007

## 2021-04-12 NOTE — ED PROVIDER NOTES
[address] [facility]     Emergency Department     Faculty Attestation    I performed a history and physical examination of the patient and discussed management with the resident. I reviewed the residents note and agree with the documented findings including all diagnostic interpretations and plan of care. Any areas of disagreement are noted on the chart. I was personally present for the key portions of any procedures. I have documented in the chart those procedures where I was not present during the key portions. I have reviewed the emergency nurses triage note. I agree with the chief complaint, past medical history, past surgical history, allergies, medications, social and family history as documented unless otherwise noted below. Documentation of the HPI, Physical Exam and Medical Decision Making performed by scribnav is based on my personal performance of the HPI, PE and MDM. For Physician Assistant/ Nurse Practitioner cases/documentation I have personally evaluated this patient and have completed at least one if not all key elements of the E/M (history, physical exam, and MDM). Additional findings are as noted. This patient was evaluated in the Emergency Department for symptoms described in the history of present illness. He/she was evaluated in the context of the global COVID-19 pandemic, which necessitated consideration that the patient might be at risk for infection with the SARS-CoV-2 virus that causes COVID-19. Institutional protocols and algorithms that pertain to the evaluation of patients at risk for COVID-19 are in a state of rapid change based on information released by regulatory bodies including the CDC and federal and state organizations. These policies and algorithms were followed during the patient's care in the ED. Primary Care Physician: No primary care provider on file. History:  This is a 63 y.o. male who presents to the Emergency

## 2021-05-04 ENCOUNTER — HOSPITAL ENCOUNTER (EMERGENCY)
Age: 61
Discharge: HOME | End: 2021-05-04
Attending: EMERGENCY MEDICINE
Payer: MEDICAID

## 2021-05-04 VITALS
HEART RATE: 76 BPM | OXYGEN SATURATION: 98 % | TEMPERATURE: 98.3 F | DIASTOLIC BLOOD PRESSURE: 71 MMHG | RESPIRATION RATE: 14 BRPM | SYSTOLIC BLOOD PRESSURE: 117 MMHG

## 2021-05-04 DIAGNOSIS — R44.0 AUDITORY HALLUCINATIONS: Primary | ICD-10-CM

## 2021-05-04 PROCEDURE — 99284 EMERGENCY DEPT VISIT MOD MDM: CPT

## 2021-05-04 NOTE — ED NOTES
Pt presents to ED w/ c/o auditory hallucinations. Pt has hx of hallucinations, states that voices are saying they Andreas Meigs going to kill him like they killed his mom\" who passed 3 weeks ago. Pt is in NAD at this time, A&Ox4, VSS. Pt initially moved to [facility] d/t c/o SI at , however now states it is just the voices, he is not suicidal. Will continue to monitor.      Haroon De La Cruz RN  05/04/21 1561

## 2021-05-04 NOTE — ED NOTES
Patient's his usual pleasant self. Patient stated he doesn't want to kill himself. Patient stated he's just tired of the male voice he has heard since he was 15years old. Patient stated his mother  3 weeks ago & since then, the voice has been telling patient he's going to kill him like he did his mother. Patient stated his room in the group home is filled with roaches. Patient stated he's still with [facility] ACT team who is aware of housing concerns. Patient stated his sisters are coming into town on Thursday to look at a new home. Patient stated he is compliant with his psychiatric medications, last dose yesterday. Patient stated he does not want to go to [facility], instead wants to return home. Patient's agreeable to B/W voucher home, confirmed feeling safe to travel that way.       TAVO Bonds  [phone]

## 2021-05-04 NOTE — ED PROVIDER NOTES
[facility]     Emergency Department     Faculty Attestation    I performed a history and physical examination of the patient and discussed management with the resident. I have reviewed and agree with the residents findings including all diagnostic interpretations, and treatment plans as written. Any areas of disagreement are noted on the chart. I was personally present for the key portions of any procedures. I have documented in the chart those procedures where I was not present during the key portions. I have reviewed the emergency nurses triage note. I agree with the chief complaint, past medical history, past surgical history, allergies, medications, social and family history as documented unless otherwise noted below. Documentation of the HPI, Physical Exam and Medical Decision Making performed by scribnav is based on my personal performance of the HPI, PE and MDM. For Physician Assistant/ Nurse Practitioner cases/documentation I have personally evaluated this patient and have completed at least one if not all key elements of the E/M (history, physical exam, and MDM). Additional findings are as noted. 56 yo M, pt has no plan to harm self, hearing voices, no injury, no vomit, no fever,   pe vss gcs 15, no cervical tenderness, chest non tender, abdomen non tender, no distension, no rigidity, atraumatic &  moving extremities x 4    -no suicidal or homicidal ideation, cleared by social work for [employer]    EKG Interpretation    Interpreted by me      CRITICAL CARE: There was a high probability of clinically significant/life threatening deterioration in this patient's condition which required my urgent intervention. Total critical care time was 0 minutes. This excludes any time for separately reportable procedures.        ADELITA-Cameron 24, DO  05/04/21 [address] [place] [address], DO  05/04/21 [address] [place] [address],   05/04/21 9639

## 2021-05-05 ASSESSMENT — ENCOUNTER SYMPTOMS
EYE REDNESS: 0
SHORTNESS OF BREATH: 0
EYE DISCHARGE: 0
ABDOMINAL PAIN: 0
COLOR CHANGE: 0

## 2021-05-05 NOTE — ED PROVIDER NOTES
[facility]  ED  Emergency Department Encounter  Emergency Medicine Resident     Pt Name: Vika Reddy  MRN: [id number]  Shingflu 1960  Date of evaluation: 5/5/21  PCP:  No primary care provider on file. CHIEF COMPLAINT       Chief Complaint   Patient presents with    Hallucinations       HISTORY [place]  (Location/Symptom, Timing/Onset, Context/Setting, Quality, Duration, Modifying Factors,Severity.)      Vika Reddy is a 57 y.o. male who presents with reported voices. He states he is tired of the voice in his head and he is hurt since he is 15years old. States he is compliant with his psychiatric medication. Denies any alcohol or drug use. Denies any suicidal or homicidal thoughts. Has had multiple visits for hallucinations and suicidal ideations in the past.  As well as multiple admissions to [facility]. PAST MEDICAL / SURGICAL / SOCIAL / FAMILY HISTORY      has a past medical history of Bipolar disorder ([facility] [address] Depression, GERD (gastroesophageal reflux disease), Hallucinations, Headache(784.0), Hepatitis, Schizophrenia, schizo-affective ([address] Substance abuse ([facility] [address] Tobacco abuse, Type II or unspecified type diabetes mellitus without mention of complication, not stated as uncontrolled, and Urinary incontinence. has a past surgical history that includes Dental surgery and Abscess Drainage (N/A, 02/11/2018).     Social History     Socioeconomic History    Marital status: Single     Spouse name: Not on file    Number of children: 0    Years of education: 8    Highest education level: Not on file   Occupational History     Employer: N/A   Social Needs    Financial resource strain: Not on file    Food insecurity     Worry: Not on file     Inability: Not on file   [employer] needs     Medical: Not on file     Non-medical: Not on file   Tobacco Use    Smoking status: Current Every Day Smoker     Packs/day: 0.50     Types: Cigarettes    Smokeless tobacco: Never Used   Substance and Sexual Activity    Alcohol use: Yes     Comment: reports drinking occasionally    Drug use: Yes     Types: Cocaine     Comment: drug abuse includes cocaine,     Sexual activity: Not on file   Lifestyle    Physical activity     Days per week: Not on file     Minutes per session: Not on file    Stress: Not on file   Relationships    Social connections     Talks on phone: Not on file     Gets together: Not on file     Attends Mosque service: Not on file     Active member of club or organization: Not on file     Attends meetings of clubs or organizations: Not on file     Relationship status: Not on file    Intimate partner violence     Fear of current or ex partner: Not on file     Emotionally abused: Not on file     Physically abused: Not on file     Forced sexual activity: Not on file   Other Topics Concern    Not on file   Social History Narrative    Not on file       Family History   Problem Relation Age of Onset    Diabetes Mother     Heart Disease Mother        Allergies:  Navane [thiothixene]    Home Medications:  Prior to Admission medications    Medication Sig Start Date End Date Taking?  Authorizing Provider   permethrin (ELIMITE) 5 % cream Apply topically as directed 3/27/21   Cristobal Clifton MD   traZODone (DESYREL) 150 MG tablet Take 1 tablet by mouth nightly as needed for Sleep 3/14/21   Lorene Gann MD   paliperidone (INVEGA) 6 MG extended release tablet Take 1 tablet by mouth daily 3/14/21   Lorene Gann MD   benztropine (COGENTIN) 1 MG tablet Take 1 mg by mouth daily as needed    Historical Provider, MD   escitalopram (LEXAPRO) 20 MG tablet Take 20 mg by mouth daily    Historical Provider, MD   paliperidone palmitate ER (INVEGA SUSTENNA) 234 MG/1.5ML [place] IM injection Inject 234 mg into the muscle every 28 days    Historical Provider, MD       REVIEW OF SYSTEMS    (2-9 systems for level 4, 10 or more for level 5) Review of Systems   Constitutional: Negative for chills and fever. Eyes: Negative for discharge and redness. Respiratory: Negative for shortness of breath. Cardiovascular: Negative for chest pain. Gastrointestinal: Negative for abdominal pain. Genitourinary: Negative for dysuria. Musculoskeletal: Negative for arthralgias. Skin: Negative for color change and rash. Allergic/Immunologic: Positive for environmental allergies. Neurological: Negative for headaches. Psychiatric/Behavioral: Positive for hallucinations. Negative for agitation, confusion and self-injury. PHYSICAL EXAM   (up to 7 for level 4, 8 or more for level 5)     INITIAL VITALS:    oral temperature is 98.3 °F (36.8 °C). His blood pressure is 117/71 and his pulse is 76. His respiration is 14 and oxygen saturation is 98%. Physical Exam  Vitals signs and nursing note reviewed. Constitutional:       Appearance: He is well-developed. HENT:      Head: Normocephalic and atraumatic. Nose: Nose normal.      Mouth/Throat:      Mouth: Mucous membranes are moist.   Eyes:      General: No scleral icterus. Conjunctiva/sclera: Conjunctivae normal.      Pupils: Pupils are equal, round, and reactive to light. Cardiovascular:      Rate and Rhythm: Normal rate and regular rhythm. Heart sounds: Normal heart sounds. No murmur. No friction rub. No gallop. Pulmonary:      Effort: Pulmonary effort is normal. No respiratory distress. Breath sounds: Normal breath sounds. No wheezing or rales. Musculoskeletal: Normal range of motion. Skin:     General: Skin is warm and dry. Findings: No erythema or rash. Neurological:      Mental Status: He is alert and oriented to person, place, and time.    Psychiatric:      Comments: Flat affect, good eye contact, linear thought process however bizarre, poor judgment and insight, not obviously responding to internal stimuli         DIFFERENTIAL  DIAGNOSIS     PLAN (Ranjit Crawford / IMAGING / EKG):  No orders of the defined types were placed in this encounter. MEDICATIONS ORDERED:  No orders of the defined types were placed in this encounter. DDX: Suicidal ideation versus medication noncompliance versus drug use versus schizoaffective disorder    Initial MDM/Plan: 57 y.o. male who presents with auditory hallucinations. Patient is well-known to the emergency department with multiple visits for similar complaints. Patient does not appear acutely psychotic. Answering questions appropriately and concerned about the conditions in his group home. When asked if he is suicidal he states that he had sex with a woman in the hospital last admission and now he has a rash. Asked again patient denies any suicidal ideation. Will transfer to rescue. Low concern for acute psychosis. DIAGNOSTIC RESULTS / EMERGENCY DEPARTMENT COURSE / MDM     LABS:  Labs Reviewed - No data to display      RADIOLOGY:  No results found. EMERGENCY DEPARTMENT COURSE:  Patient was offered transfer to rescue however patient is declining and states he would prefer to return home. Encourage patient to follow-up with outpatient psychiatrist.    · Based on the low acuity of concerning symptoms and improvement of symptoms, patient will be discharged with follow up and prescription information listed in the Disposition section. · Patient states they will follow-up with primary care physician and/or return to the emergency department should they experience a change or worsening of symptoms. · Patient will be discharged with resources: summary of visit, instructions, follow-up information, prescriptions if necessary and clinics available. · Patient/ family instructed to read discharge paperwork. All of their questions and concerns were addressed. · Suspicion for any acute life-threatening processes is low. Patient voices understanding of plan.       PROCEDURES:  None    CONSULTS:  None    CRITICAL CARE:  Please see attending note    FINAL IMPRESSION      1. Auditory hallucinations          DISPOSITION / PLAN     DISPOSITION Decision To Discharge 05/04/2021 05:55:11 AM        PATIENTREFERRED TO:  No follow-up provider specified.     DISCHARGE MEDICATIONS:  Discharge Medication List as of 5/4/2021  5:59 AM          Carmen Carpenter DO  EmergencyMedicine Resident    (Please note that portions of this note were completed with a voice recognition program.  Efforts were made to edit the dictations but occasionally words are mis-transcribed.)       Carmen Carpenter DO  Resident  05/05/21 0929

## 2021-05-06 ENCOUNTER — HOSPITAL ENCOUNTER (INPATIENT)
Age: 61
LOS: 5 days | Discharge: HOME | DRG: 750 | End: 2021-05-11
Attending: EMERGENCY MEDICINE | Admitting: PSYCHIATRY & NEUROLOGY
Payer: MEDICAID

## 2021-05-06 DIAGNOSIS — R45.851 SUICIDAL IDEATION: Primary | ICD-10-CM

## 2021-05-06 LAB
ABSOLUTE EOS #: 0.2 K/UL (ref 0–0.4)
ABSOLUTE IMMATURE GRANULOCYTE: ABNORMAL K/UL (ref 0–0.3)
ABSOLUTE LYMPH #: 1.8 K/UL (ref 1–4.8)
ABSOLUTE MONO #: 0.4 K/UL (ref 0.1–1.3)
ACETAMINOPHEN LEVEL: <5 UG/ML (ref 10–30)
ALBUMIN SERPL-MCNC: 4.3 G/DL (ref 3.5–5.2)
ALBUMIN/GLOBULIN RATIO: ABNORMAL (ref 1–2.5)
ALP BLD-CCNC: 125 U/L (ref 40–129)
ALT SERPL-CCNC: 10 U/L (ref 5–41)
AMPHETAMINE SCREEN URINE: NEGATIVE
ANION GAP SERPL CALCULATED.3IONS-SCNC: 9 MMOL/L (ref 9–17)
AST SERPL-CCNC: 19 U/L
BARBITURATE SCREEN URINE: NEGATIVE
BASOPHILS # BLD: 0 % (ref 0–2)
BASOPHILS ABSOLUTE: 0 K/UL (ref 0–0.2)
BENZODIAZEPINE SCREEN, URINE: NEGATIVE
BILIRUB SERPL-MCNC: 0.21 MG/DL (ref 0.3–1.2)
BUN BLDV-MCNC: 15 MG/DL (ref 8–23)
BUN/CREAT BLD: ABNORMAL (ref 9–20)
BUPRENORPHINE URINE: ABNORMAL
CALCIUM SERPL-MCNC: 9.2 MG/DL (ref 8.6–10.4)
CANNABINOID SCREEN URINE: NEGATIVE
CHLORIDE BLD-SCNC: 105 MMOL/L (ref 98–107)
CO2: 27 MMOL/L (ref 20–31)
COCAINE METABOLITE, URINE: POSITIVE
CREAT SERPL-MCNC: 1.27 MG/DL (ref 0.7–1.2)
DIFFERENTIAL TYPE: ABNORMAL
EOSINOPHILS RELATIVE PERCENT: 4 % (ref 0–4)
ETHANOL PERCENT: <0.01 %
ETHANOL: <10 MG/DL
GFR AFRICAN AMERICAN: >60 ML/MIN
GFR NON-AFRICAN AMERICAN: 57 ML/MIN
GFR SERPL CREATININE-BSD FRML MDRD: ABNORMAL ML/MIN/{1.73_M2}
GFR SERPL CREATININE-BSD FRML MDRD: ABNORMAL ML/MIN/{1.73_M2}
GLUCOSE BLD-MCNC: 87 MG/DL (ref 70–99)
HCT VFR BLD CALC: 32.9 % (ref 41–53)
HEMOGLOBIN: 11 G/DL (ref 13.5–17.5)
IMMATURE GRANULOCYTES: ABNORMAL %
LYMPHOCYTES # BLD: 40 % (ref 24–44)
MCH RBC QN AUTO: 29.4 PG (ref 26–34)
MCHC RBC AUTO-ENTMCNC: 33.4 G/DL (ref 31–37)
MCV RBC AUTO: 88.2 FL (ref 80–100)
MDMA URINE: ABNORMAL
METHADONE SCREEN, URINE: NEGATIVE
METHAMPHETAMINE, URINE: ABNORMAL
MONOCYTES # BLD: 8 % (ref 1–7)
NRBC AUTOMATED: ABNORMAL PER 100 WBC
OPIATES, URINE: NEGATIVE
OXYCODONE SCREEN URINE: NEGATIVE
PDW BLD-RTO: 15.2 % (ref 11.5–14.9)
PHENCYCLIDINE, URINE: NEGATIVE
PLATELET # BLD: 332 K/UL (ref 150–450)
PLATELET ESTIMATE: ABNORMAL
PMV BLD AUTO: 6.2 FL (ref 6–12)
POTASSIUM SERPL-SCNC: 4.2 MMOL/L (ref 3.7–5.3)
PROPOXYPHENE, URINE: ABNORMAL
RBC # BLD: 3.72 M/UL (ref 4.5–5.9)
RBC # BLD: ABNORMAL 10*6/UL
SALICYLATE LEVEL: <1 MG/DL (ref 3–10)
SARS-COV-2, RAPID: NOT DETECTED
SEG NEUTROPHILS: 48 % (ref 36–66)
SEGMENTED NEUTROPHILS ABSOLUTE COUNT: 2.1 K/UL (ref 1.3–9.1)
SODIUM BLD-SCNC: 141 MMOL/L (ref 135–144)
SPECIMEN DESCRIPTION: NORMAL
TEST INFORMATION: ABNORMAL
TOTAL PROTEIN: 7.2 G/DL (ref 6.4–8.3)
TRICYCLIC ANTIDEPRESSANTS, UR: ABNORMAL
WBC # BLD: 4.4 K/UL (ref 3.5–11)
WBC # BLD: ABNORMAL 10*3/UL

## 2021-05-06 PROCEDURE — 99283 EMERGENCY DEPT VISIT LOW MDM: CPT

## 2021-05-06 PROCEDURE — 87635 SARS-COV-2 COVID-19 AMP PRB: CPT

## 2021-05-06 PROCEDURE — 85025 COMPLETE CBC W/AUTO DIFF WBC: CPT

## 2021-05-06 PROCEDURE — 80143 DRUG ASSAY ACETAMINOPHEN: CPT

## 2021-05-06 PROCEDURE — 80053 COMPREHEN METABOLIC PANEL: CPT

## 2021-05-06 PROCEDURE — 80307 DRUG TEST PRSMV CHEM ANLYZR: CPT

## 2021-05-06 PROCEDURE — 1240000000 HC EMOTIONAL WELLNESS R&B

## 2021-05-06 PROCEDURE — 36415 COLL VENOUS BLD VENIPUNCTURE: CPT

## 2021-05-06 PROCEDURE — 80179 DRUG ASSAY SALICYLATE: CPT

## 2021-05-06 PROCEDURE — G0480 DRUG TEST DEF 1-7 CLASSES: HCPCS

## 2021-05-06 ASSESSMENT — ENCOUNTER SYMPTOMS
EYE PAIN: 0
DIARRHEA: 0
VOMITING: 0
SHORTNESS OF BREATH: 0
BACK PAIN: 0
COUGH: 0
ABDOMINAL PAIN: 0
SORE THROAT: 0
NAUSEA: 0
CONSTIPATION: 0
CHEST TIGHTNESS: 0

## 2021-05-07 PROCEDURE — 1240000000 HC EMOTIONAL WELLNESS R&B

## 2021-05-07 PROCEDURE — 6370000000 HC RX 637 (ALT 250 FOR IP): Performed by: PSYCHIATRY & NEUROLOGY

## 2021-05-07 PROCEDURE — 99223 1ST HOSP IP/OBS HIGH 75: CPT | Performed by: PSYCHIATRY & NEUROLOGY

## 2021-05-07 PROCEDURE — 6370000000 HC RX 637 (ALT 250 FOR IP)

## 2021-05-07 RX ORDER — PALIPERIDONE 6 MG/1
6 TABLET, EXTENDED RELEASE ORAL DAILY
Status: DISCONTINUED | OUTPATIENT
Start: 2021-05-07 | End: 2021-05-11 | Stop reason: HOSPADM

## 2021-05-07 RX ORDER — POLYETHYLENE GLYCOL 3350 17 G/17G
17 POWDER, FOR SOLUTION ORAL DAILY PRN
Status: DISCONTINUED | OUTPATIENT
Start: 2021-05-07 | End: 2021-05-11 | Stop reason: HOSPADM

## 2021-05-07 RX ORDER — BUPROPION HYDROCHLORIDE 100 MG/1
100 TABLET ORAL 2 TIMES DAILY
Status: DISCONTINUED | OUTPATIENT
Start: 2021-05-07 | End: 2021-05-07

## 2021-05-07 RX ORDER — HALOPERIDOL 5 MG/ML
5 INJECTION INTRAMUSCULAR EVERY 4 HOURS PRN
Status: DISCONTINUED | OUTPATIENT
Start: 2021-05-07 | End: 2021-05-11 | Stop reason: HOSPADM

## 2021-05-07 RX ORDER — IBUPROFEN 400 MG/1
400 TABLET ORAL EVERY 6 HOURS PRN
Status: DISCONTINUED | OUTPATIENT
Start: 2021-05-07 | End: 2021-05-11 | Stop reason: HOSPADM

## 2021-05-07 RX ORDER — TRAZODONE HYDROCHLORIDE 50 MG/1
50 TABLET ORAL NIGHTLY PRN
Status: DISCONTINUED | OUTPATIENT
Start: 2021-05-07 | End: 2021-05-07

## 2021-05-07 RX ORDER — LORAZEPAM 1 MG/1
2 TABLET ORAL EVERY 4 HOURS PRN
Status: DISCONTINUED | OUTPATIENT
Start: 2021-05-07 | End: 2021-05-11 | Stop reason: HOSPADM

## 2021-05-07 RX ORDER — LORAZEPAM 2 MG/ML
2 INJECTION INTRAMUSCULAR EVERY 4 HOURS PRN
Status: DISCONTINUED | OUTPATIENT
Start: 2021-05-07 | End: 2021-05-11 | Stop reason: HOSPADM

## 2021-05-07 RX ORDER — ACETAMINOPHEN 325 MG/1
650 TABLET ORAL EVERY 4 HOURS PRN
Status: DISCONTINUED | OUTPATIENT
Start: 2021-05-07 | End: 2021-05-11 | Stop reason: HOSPADM

## 2021-05-07 RX ORDER — DIPHENHYDRAMINE HYDROCHLORIDE 50 MG/ML
50 INJECTION INTRAMUSCULAR; INTRAVENOUS EVERY 4 HOURS PRN
Status: DISCONTINUED | OUTPATIENT
Start: 2021-05-07 | End: 2021-05-11 | Stop reason: HOSPADM

## 2021-05-07 RX ORDER — HYDROXYZINE HYDROCHLORIDE 25 MG/1
25 TABLET, FILM COATED ORAL 3 TIMES DAILY PRN
Status: ON HOLD | COMMUNITY
End: 2021-05-11 | Stop reason: HOSPADM

## 2021-05-07 RX ORDER — TRAZODONE HYDROCHLORIDE 150 MG/1
150 TABLET ORAL NIGHTLY PRN
Status: DISCONTINUED | OUTPATIENT
Start: 2021-05-07 | End: 2021-05-11 | Stop reason: HOSPADM

## 2021-05-07 RX ORDER — MAGNESIUM HYDROXIDE/ALUMINUM HYDROXICE/SIMETHICONE 120; 1200; 1200 MG/30ML; MG/30ML; MG/30ML
30 SUSPENSION ORAL EVERY 6 HOURS PRN
Status: DISCONTINUED | OUTPATIENT
Start: 2021-05-07 | End: 2021-05-11 | Stop reason: HOSPADM

## 2021-05-07 RX ORDER — HALOPERIDOL 5 MG
5 TABLET ORAL EVERY 4 HOURS PRN
Status: DISCONTINUED | OUTPATIENT
Start: 2021-05-07 | End: 2021-05-11 | Stop reason: HOSPADM

## 2021-05-07 RX ORDER — HYDROXYZINE 50 MG/1
50 TABLET, FILM COATED ORAL 3 TIMES DAILY PRN
Status: DISCONTINUED | OUTPATIENT
Start: 2021-05-07 | End: 2021-05-11 | Stop reason: HOSPADM

## 2021-05-07 RX ORDER — BUPROPION HYDROCHLORIDE 100 MG/1
100 TABLET ORAL 2 TIMES DAILY
Status: ON HOLD | COMMUNITY
End: 2021-05-11 | Stop reason: HOSPADM

## 2021-05-07 RX ORDER — ESCITALOPRAM OXALATE 20 MG/1
20 TABLET ORAL DAILY
Status: DISCONTINUED | OUTPATIENT
Start: 2021-05-07 | End: 2021-05-11 | Stop reason: HOSPADM

## 2021-05-07 RX ORDER — BENZTROPINE MESYLATE 1 MG/1
1 TABLET ORAL DAILY PRN
Status: DISCONTINUED | OUTPATIENT
Start: 2021-05-07 | End: 2021-05-11 | Stop reason: HOSPADM

## 2021-05-07 RX ADMIN — TRAZODONE HYDROCHLORIDE 50 MG: 50 TABLET ORAL at 00:33

## 2021-05-07 RX ADMIN — HYDROXYZINE HYDROCHLORIDE 50 MG: 50 TABLET, FILM COATED ORAL at 00:34

## 2021-05-07 RX ADMIN — ESCITALOPRAM OXALATE 20 MG: 20 TABLET ORAL at 12:34

## 2021-05-07 RX ADMIN — BUPROPION HYDROCHLORIDE 100 MG: 100 TABLET, FILM COATED ORAL at 12:34

## 2021-05-07 RX ADMIN — PALIPERIDONE 6 MG: 6 TABLET, EXTENDED RELEASE ORAL at 12:34

## 2021-05-07 ASSESSMENT — PAIN SCALES - GENERAL: PAINLEVEL_OUTOF10: 0

## 2021-05-07 ASSESSMENT — SLEEP AND FATIGUE QUESTIONNAIRES
SLEEP PATTERN: DIFFICULTY FALLING ASLEEP;INSOMNIA
DIFFICULTY FALLING ASLEEP: YES
RESTFUL SLEEP: NO
DIFFICULTY STAYING ASLEEP: YES
DO YOU USE A SLEEP AID: YES
DIFFICULTY ARISING: NO
DO YOU HAVE DIFFICULTY SLEEPING: YES

## 2021-05-07 ASSESSMENT — LIFESTYLE VARIABLES: HISTORY_ALCOHOL_USE: NO

## 2021-05-07 NOTE — CARE COORDINATION
SAFETY PLAN     A Suicide Safety Plan is a document that supports someone when they are having thoughts of suicide.     1. Warning Signs that indicate a suicidal crisis may be developing: What (situations, thoughts, feelings, body sensations, behaviors, etc.) do you experience that lets you know you are beginning to think about suicide?     Not taking my medications  Suicidal ideations or thoughts of death / dying  Thoughts to harm others   Depressed mood /sadness / loneliness / low self-esteem /feelings of worthlessness / emptiness  Racing thoughts / taking risks / doing impulsive behaviors or decision making  Not sleeping  Hearing voices / seeing things   Wanting to use alcohol or drugs (crack cocaine)         2. Internal Coping Strategies:  What things can I do (relaxation techniques, hobbies, physical activities, etc.) to take my mind off my problems without contacting another person?      1. Listen to some R&B music.         A. Coping skills/ strategies  journal/ listen to music/ go for a walk/ read a book/ watch a funny movie/show / crafts / video game   B. Grounding techniques- eat a sour candy or hot cinnamon candy / focus on colors, sounds, smells, textures on things around you /  drink some herbal tea / eat a piece of dark chocolate / take a hot bath or shower / essential oils for smelling / meditate / color / arts and crafts           3. People and social settings that provide distraction: Who can I call or where can I go to distract me?     Parents, siblings, relatives, friends, /, , coworkers, support group, therapist, doctor  park, support group, gym, Muslim, etc     People whom I can ask for help: Who can I call when I need help - for example, friends, family, clergy, someone else?     My boarding house supports  My  and "[employer] staff [phone]  My sister Elvia Odonnell.         4. Deborah Epps  Getting enough sleep is one of the foundations of mental health. A personal favorite I listen to all the time, this straightforward surjit features a warm, gentle voice guiding listeners through a Progressive Muscle Relaxation (PMR) session and into sleep. Features long or short induction options, and an alarm.     5. WhatsMyM3  A three minute depression and anxiety screen. Validated questionnaires assess symptoms of depression, anxiety, bipolar disorder, and PTSD, and combine into a score that indicates whether or not your life is impacted significantly by a mood disorder, recommending a course of action. The surjit keeps a history of test results, to help you track your progress.     6. DBT Diary Card and Skills   Based on Dialectical Behavior Therapy (DBT) developed by psychologist Rosemary Rivas, this surjit is a rich resource of self-help skills, reminders of the therapy principles, and coaching tools for coping. Created by a therapist with years of experience in the practice, this surjit is not intended to replace a professional but helps people reinforce their treatment. 7. Optimism  Track your moods, keep a journal, and chart your recovery progress with this comprehensive tool for depression, bipolar disorder, and anxiety disorders. One of the most popular mood tracking apps available, with plenty of features. Free.     8. iSleepEasy  A calm female voice helps you quell anxieties and take the time to relax and sleep, in an array of guided meditations. Separately controlled voice and music tracks, flexible lengths, and an alarm. Includes a special wee hours rescue track, and tips for falling asleep. Developed by [employer], who offer an great line of relaxation apps.     9. Magic Window  Living Pictures  Not technically a mental health surjit, it makes no miraculous claims about curbing anxiety.  However, there is independent research indicating that taking breaks and getting exposure to nature, even in videos, can reduce stress. This surjit offers an assortment of peaceful, ambient nature scenes from beautiful spots around the world.     10. Relax Melodies  A popular free relaxation sound and music surjit. Mix and match nature sounds with new age music; it's sekou to listen to birds in the rain while a piano softly plays.           6. Making the environment safe:  How can I make my environment (house/apartment/living space) safer?     Remove weapons, cutting objects, get rid of extra medications, household chemicals.        If you begin to have suicidal ideations -Involve your support system to implement your safety plan right away.      Call 911 immediately or go to your local Emergency Room if you cannot contract to be safe.

## 2021-05-07 NOTE — GROUP NOTE
Group Therapy Note    Date: 5/7/2021    Group Start Time: 1430  Group End Time: 1581  Group Topic: Relaxation    [address] [place] [address] Drive    Patient refused to attend relaxation/ coping skills group at 1430 after encouragement from staff. 1:1 talk time provided by staff.      Signature:  Francisco Cotton

## 2021-05-07 NOTE — CARE COORDINATION
[facility] FACT team notified on this date when admitted [phone]. Left message with  [phone]; Gena Stevens.

## 2021-05-07 NOTE — BH NOTE
`[facility] [place]  Admission Note     Admission Type:   Admission Type: Voluntary    Reason for admission:  Reason for Admission: Patient  voluntary from [facility] with suicidal ideation and auditory hallucinations. PATIENT STRENGTHS:  Strengths: Connection to output provider, Communication    Patient Strengths and Limitations:  Limitations: Hopeless about future, Inappropriate/potentially harmful leisure interests, General negative or hopeless attitude about future/recovery    Addictive Behavior:   Addictive Behavior  In the past 3 months, have you felt or has someone told you that you have a problem with:  : None  Do you have a history of Chemical Use?: No  Do you have a history of Alcohol Use?: No  Do you have a history of Street Drug Abuse?: No  Histroy of Prescripton Drug Abuse?: No    Medical Problems:   Past Medical History:   Diagnosis Date    Bipolar disorder ([facility] [address]     Depression     GERD (gastroesophageal reflux disease)     Hallucinations     Headache(784.0)     Hepatitis     Schizophrenia, schizo-affective ([facility] [address]     Substance abuse ([facility] [address]     Tobacco abuse     Type II or unspecified type diabetes mellitus without mention of complication, not stated as uncontrolled     Urinary incontinence        Status EXAM:  Status and Exam  Normal: No  Facial Expression: Flat  Affect: Appropriate  Level of Consciousness: Alert  Mood:Normal: No  Mood: Depressed, Anxious  Motor Activity:Normal: Yes  Interview Behavior: Cooperative  Preception: [place] to Person, [place] to Time, [place] to Place, [place] to Situation  Attention:Normal: No  Attention: Distractible  Thought Processes: Circumstantial  Thought Content:Normal: No  Thought Content: Preoccupations  Hallucinations:  Auditory (Comment)  Delusions: No  Memory:Normal: No  Memory: Poor Remote  Insight and Judgment: No  Insight and Judgment: Poor Judgment, Poor Insight  Present Suicidal Ideation: Yes(contracts for safeety)  Present Homicidal

## 2021-05-07 NOTE — ED NOTES
Provisional Diagnosis: Acute psychosis       Psychosocial and Contextual Factors: Pt has substance abuse issues. Pt lives in a boarding house with poor living conditions. C-SSRS Summary:    Patient: X    Family:     Agency: X (EPIC)    Present Suicidal Behavior:     Verbal: X    Attempt:     Past Suicidal Behavior:     Verbal: X    Attempt: X    Self- Injurious/ Self-Mutilation: Pt denies    Trauma History: Pt's mother passed away 3 months ago. Protective Factors: Pt is linked. Pt's [employer] CM is good support. Pt has insurance. Pt is med compliant. Risk Factors: Pt has poor judgement and coping skills. Substance Abuse: Cocaine    Clinical Summary:  Nitish Sifuentes is a 57year old male who presents to the ED via [facility] on a voluntary status. Pt contacted police stating pt is hearing voices telling pt to kill self. Pt requested to be taken to the hospital for help. Pt is suicidal. Pt reports pt was holding a  knife and was going stab self prior to arrival but was stopped by pt's neighbor. Pt reports pt has been hearing voices that are telling pt to kill himself and the voices have been getting worse. Pt denies HI. Pt has attempted suicide in the past. Pt has been diagnosed with Schizoaffective disorder. Pt is linked with the [facility] ACT team and is med compliant. Pt was last admitted to the [facility] 3/10/21-3/15/21. Pt denies substance abuse, however, pt's UDS is positive for cocaine. Pt reports poor sleep and a poor appetite due to financial reasons. Level of Care Disposition:.YOGESH consulted with Meg Velasquez from psychiatry. Pt accepted for an inpatient admission to the [facility] for safety and stabilization.

## 2021-05-07 NOTE — GROUP NOTE
Group Therapy Note    Date: 5/7/2021    Group Start Time: 0900  Group End Time: 0920  Group Topic: Community Meeting    JESUS Correia    Patient refused to attend community meeting/ goals group at 0900 after encouragement from staff. 1:1 talk time provided by staff.       Signature:  Alberta Correia

## 2021-05-07 NOTE — CARE COORDINATION
BHI Biopsychosocial Assessment     Current Level of Psychosocial Functioning      Independent    Dependent    Minimal Assist X        Psychosocial High Risk Factors (check all that apply)     Unable to obtain meds   Chronic illness/pain    Substance abuse X  Lack of Family Support   Financial stress X   Isolation X  Inadequate Community Resources  Suicide attempt(s)  X  Not taking medications   Victim of crime   Developmental Delay  Unable to manage personal needs  X  Age 71 or older   Homeless  No transportation   Readmission within 30 days  Unemployment X  Traumatic EventX      Comments:   Psychiatric Advanced Directives: denies at this time.       Family to Involve in Treatment: NO ROIs on file; 2 sisters on file for emergency contacts Eric and Caden Clark.       Sexual Orientation:  Heterosexual.       Patient Strengths: Patient is linked with "[employer] for mental health treatment, [facility], housing, SSI, some family support     Patient Barriers: audio hallucinations, suicidal ideations as a result of hallucinations, use of cocaine, poor insight and judgement        Opiate Education Provided:  Pt denies any current opiate use.          CMHC/mental health history:  Patient has been a consumer at [facility], prior diagnosis of schizoaffective disorder depressive type and cocaine use disorder, most recent psychiatric hospitalization to [facility] on 3/10/2021.       Plan of Care   medication management, group/individual therapies, family meetings, psycho -education, treatment team meetings to assist with stabilization     Initial Discharge Plan:  Pt to stabilize mood, decrease hallucinations, return to boarding house and follow-up with [facility].     Clinical Summary:      Pt is a 57year old -American male who presents to the ED via [facility] on a voluntary status. According to ED, pt contacted police stating pt is hearing voices telling pt to kill self.      Per [employer], \"Pt is suicidal. Pt reports pt was holding a  knife and was going stab self prior to arrival but was stopped by pt's neighbor. Pt reports pt has been hearing voices that are telling pt to kill himself and the voices have been getting worse. \"     Per psycho-social assessment; pt reported, \"He told me he was going to kill me, like your damn mother. \" Pt identified the voice as a male voice. Pt reports that he found out around 3 weeks ago that his mother had passed away. Pt states that he has been staying in a boarding house with other tenants. Pt reports, \"My sister Bal Mancuso has been trying to get me out of there. Pt reports, \"I feel uncomfortable because everyone there smokes crack and the dope man lives next door. \" Pt openly admits to using crack cocaine on an occasional basis. Pt states that he last smoked crack cocaine. Per Epic, pt has a historical mental health diagnosis of schizoaffective disorder. Pt is actively linked with [facility].  Pt currently denies any current thoughts of hurting oneself or others. Pt denied endorsing any auditory or visual hallucinations. Pt appears to be alert and oriented. Pt denies any current legal concerns.

## 2021-05-07 NOTE — H&P
Department of Psychiatry  Attending Physician Psychiatric Assessment     Reason for Admission to Psychiatric Unit:  Concerns about patient's safety in the community    CHIEF COMPLAINT:  Acute psychosis    History obtained from: Patient, electronic medical record          PER ED REPORT:  \" Patient reports suicidal ideation with command auditory hallucinations. He states earlier he was thinking about cutting his wrists with a  knife which he had in his hand, his friend convinced him to put the knife down. States he is compliant with medications and denies intentional overdose or self harm. He has no physical complaints. \"            HISTORY OF PRESENT ILLNESS:    Van Robison is a 57 y.o. male who has a past medical history of schizoaffective disorder with substance abuse. Van Robison presented to the ED with command auditory hallucinations to harm self with a  knife. During assessment inpatient, patient endorses command auditory hallucinations to harm himself. At time of assessment, Christine Fraser was laying in bed, minimally engaged with interviewer, patient answered questions with limited information. Christine Fraser endorses that his depression has been worsening over the past month he presents with hopelessness, helplessness, and uncertainty about future. Christine Fraser endorses that his mother passed away approximately 3 weeks ago and he has been dealing with increased grief related to her death. Patient continues to be monitored in the inpatient psychiatric facility at [facility] for safety and stabilization. Patient continues to need, on a daily basis, active treatment furnished directly by or requiring the supervision of inpatient psychiatric personnel.          Appetite: [] Normal/Adequate/Unchanged  [] Increased  [x] Decreased      Weight Change [] Yes  [x] No changes in weight reported [] Increased  [] Decreased    History of Eating Disorder [] Yes [x] No history of eating disorders reported    Sleep: [] Normal/Adequate/Unchanged  [x] Fair  [] Poor      History of seizures: [] Yes [x] No           PSYCHIATRIC HISTORY:  [x] Yes [] No    Currently follows with [facility]  3 lifetime suicide attempts  Multiple psychiatric hospital admissions    Past psychiatric medications includes: Invega, Effexor, Cogentin  Patient also reports previous trials on Seroquel Wellbutrin Lexapro Atarax and trazodone    Adverse reactions from psychotropic medications: [] Yes [x] No         Lifetime Psychiatric Review of Systems         Depression: [x] Endorses [] Denies     Anxiety: [] Endorses [x] Denies     Panic Attacks: [] Endorses [x] Denies     [place] or Hypomania: [] Endorses [x] Denies     Phobias: [] Endorses [x] Denies     Obsessions and Compulsions: [] Endorses [x] Denies     Body or Vocal Tics: [] Endorses [x] Denies     Visual Hallucinations: [x] Endorses [] Denies     Auditory Hallucinations: [x] Endorses [] Denies     Delusions/Paranoia: [x] Endorses [] Denies     PTSD [x] Endorses [] Denies    Past Medical History:        Diagnosis Date    [facility]     Depression     GERD (gastroesophageal reflux disease)     Hallucinations     Headache(784.0)     Hepatitis     Schizophrenia, schizo-affective (HCC)     Substance abuse ([facility]     Tobacco abuse     Type II or unspecified type diabetes mellitus without mention of complication, not stated as uncontrolled     Urinary incontinence        Past Surgical History:        Procedure Laterality Date    ABSCESS DRAINAGE N/A 2018    Carla anal abcess    DENTAL SURGERY      all teeth pulled       Allergies:  Navane [[email or web address]]         Social History:     Born in: [place]  Raised in: [place]  Family: Has 4 sisters 3 brothers   Highest Level of Education: Ninth grade  Occupation: Disabled on SSI  Marital Status: Single  Children: Denies  Residence: Lives in a boarding house unhappy with current situation, has concern over the general operations of the boarding house  Stressors: Current living situation acute grief related to the death of his mother  Patient Assets/Supportive Factors: Has a sister that is supportive of him         DRUG USE HISTORY  Social History     Tobacco Use   Smoking Status Current Every Day Smoker    Packs/day: 0.50    Types: Cigarettes   Smokeless Tobacco Never Used   Tobacco Comment    Patient accepting of nicotine patch     Social History     Substance and Sexual Activity   Alcohol Use Yes    Comment: reports drinking occasionally     Social History     Substance and Sexual Activity   Drug Use Yes    Types: Cocaine    Comment: drug abuse includes cocaine,        TOX - positive for cocaine    Patient denies the use of any other substances or drugs. LEGAL HISTORY:   HISTORY OF INCARCERATION: [x] Yes [] No  HISTORY OF INCARCERATION: yes - states he has \"had some run ins\" with legal issues, and has been to [facility] before.     Family History:       Problem Relation Age of Onset    Diabetes Mother     Heart Disease Mother        Psychiatric Family History  Patient denies psychiatric family history. PHYSICAL EXAM:  Vitals:  /69   Pulse 81   Temp 97.9 °F (36.6 °C) (Oral)   Resp 14   Ht 6' 3\" (1.905 m)   Wt 172 lb (78 kg)   SpO2 99%   BMI 21.50 kg/m²     LABS:  Labs reviewed: [x] Yes  Last EKG in EMR reviewed: [x] Yes          Review of Systems   Constitutional: Negative for chills and weight loss. HENT: Negative for ear pain and nosebleeds. Eyes: Negative for blurred vision and photophobia. Respiratory: Negative for cough, shortness of breath and wheezing. Cardiovascular: Negative for chest pain and palpitations. Gastrointestinal: Negative for abdominal pain, diarrhea and vomiting. Genitourinary: Negative for dysuria and urgency. Musculoskeletal: Negative for falls and joint pain. Skin: positive for rash on right arm, bumps  Neurological: Negative for tremors, seizures and weakness. Endo/Heme/Allergies: Does not bruise/bleed easily. Physical Exam:   Constitutional:  Appears well-developed and well-nourished, no acute distress. HENT:   Head: Normocephalic and atraumatic. Eyes: Conjunctivae are normal. Right eye exhibits no discharge. Left eye exhibits no discharge. No scleral icterus. Neck: Normal range of motion. Neck supple. Pulmonary/Chest:  No respiratory distress or accessory muscle use, no wheezing. Cardiac: Regular rate and rhythm. Abdominal: Soft. Exhibits no distension. Musculoskeletal: Normal range of motion. Exhibits no edema. Neurological: cranial nerves II-XII grossly in tact, normal gait and station. Skin: Skin is warm and dry. Patient is not diaphoretic. No erythema. Mental Status Examination:    Level of consciousness:  Alert and oriented  Appearance:  Appropriate attire, resting in bed, fair grooming   Behavior/Motor: Approachable, no psychomotor abnormalities noted  Attitude toward examiner:  Cooperative  Speech: Normal rate and volume  Mood: Depressed  Affect: Blunted  Thought processes:  Goal directed, linear  Thought content: Active suicidal ideations, with a  current plan or intent, contracts for safety on the unit.                Denies homicidal ideations               Positive hallucinations              Denies delusions              Positive paranoia  Cognition:  Oriented to self, location, time, situation  Concentration: Clinically adequate  Memory: Intact  Insight &Judgment: Poor         DSM-5 Diagnosis    Principal Problem: Schizoaffective disorder, depressive type ([facility]    Cocaine use    Psychosocial and Contextual factors:  Financial [] Endorses [x] Denies  Occupational [] Endorses [x] Denies  Relationship [] Endorses [x] Denies  Legal [] Endorses [x] Denies  Living situation[x] Endorses [] Denies  Educational [] Endorses [x] Denies    Past Medical History:   Diagnosis Date    [facility]     Depression     GERD (gastroesophageal reflux disease)     Hallucinations     Headache(784.0)     Hepatitis     Schizophrenia, schizo-affective (HCC)     Substance abuse (HCC)     Tobacco abuse     Type II or unspecified type diabetes mellitus without mention of complication, not stated as uncontrolled     Urinary incontinence         TREATMENT PLAN    · Continue inpatient psychiatric treatment. · Home medications reviewed. · Problem list updated. · Supportive therapy with medication management. Reviewed risks and benefits as well as potential side effects with patient. · Review medications for efficacy and side effects. · Therapeutic support and empathetic care provided. · Engage in therapeutic activities and groups. · [facility] up at [facility] after symptoms stabilized. · Continue Invega 234 mg GALVAN    CONSULTS [x] Yes [] No  Internal med    Risk Management:  close watch per standard protocol      Psychotherapy:  participation in milieu and group and individual sessions with Attending Physician,  and Physician Assistant/CNP      Estimated length of stay:  2-14 days      GENERAL PATIENT/FAMILY EDUCATION  Patient will understand basic signs and symptoms, patient will understand benefits/risks and potential side effects from proposed medications, and patient will understand their role in recovery. Family is minimally active in patient's care. Patient assets that may be helpful during treatment include: Intent to participate and engage in treatment, sufficient fund of knowledge and intellect to understand and utilize treatments. Goals:    1) Remission of psychosis,   2) Stabilization of symptoms prior to discharge  3) Establish efficacy and tolerability of medications.          Behavioral Services  Medicare Certification     Admission Day 1  I certify that this patient's inpatient psychiatric hospital admission is medically necessary for:    x (1) treatment which could reasonably be expected to improve this patient's condition, or    x (2) diagnostic study or its equivalent. Time Spent: 60 minutes    Radha Yuan is a 57 y.o. male being evaluated face to face    --Vaishali King MD on 5/7/2021 at 3:42 PM    An electronic signature was used to authenticate this note. I independently saw and evaluated the patient. I reviewed the nurse practitioners documentation above. Any additional comments or changes to the nurse practitioners documentation are stated below otherwise agree with assessment. Plan will be as follows:  Time spent in face-to-face with patient, review of records and discussion of treatment plan with midlevel's in excess of 60 minutes. Patient psychotic and depressed. Suicidal.  Not able to contract for safety off the unit. Unclear why he is on Wellbutrin turning dopamine up while also heavy dopamine blockade. Will eliminate Wellbutrin and observe effects. If patient may need norepinephrine and can consider other medications for that pending observation on the unit.   May consider Remeron pending observation  Electronically signed by Vaishali King MD on 5/7/2021 at 3:42 PM

## 2021-05-07 NOTE — BH NOTE
Patient given tobacco quitline number 01994798026 at this time, refusing to call at this time, states \" I just dont want to quit now\"- patient given information as to the dangers of long term tobacco use. Continue to reinforce the importance of tobacco cessation.

## 2021-05-07 NOTE — SUICIDE SAFETY PLAN
Professionals or [address] [place] [address] Blvd I can contact during a crisis: Who can I call for help - for example, my doctor, my psychiatrist, my psychologist, a mental health provider, a suicide hotline?     Rescue Crisis ( 24 hour crisis staff )  [address] [facility]  [phone]      COVID-19 Emotional Support Line: 567.965.2987  Call the Recovery Helpline at [address]: 6-243-854-TALK (0249)  Text \"4Hope\" to 208491        5. [facility] Emergency Crisis Services Numbers:     Rescue Crisis ( 24 hour crisis staff )  [address] [facility]  [phone]      COVID-19 Emotional Support Line: 676.454.7720  Call the Recovery Helpline at 76 177 296  Suicide Prevention Lifeline: 7-863-191-TALK (7861)  Text \"4Hope\" to 803409        [address] [place] [address]     1. BellyBio  Free surjit that teaches a deep breathing technique useful in fighting anxiety and stress. A simple interface uses biofeedback to monitor your breathing. Sounds cascade with the movements of your belly, in rhythms reminiscent of waves on a beach. Charts also let you know how you're doing. A great tool when you need to slow down and breathe.     2. Operation Reach Out  Literally a lifesaving surjit, this free intervention tool helps people who are having suicidal thoughts to reassess their thinking and get help. Recommended by followers of, who report that this surjit has helped in suicidal crises. Developed by the [place], but useful to all. [place] a download even if you're not suicidal. You never know if you might need it.     3. eCBT Calm  Provides a set of tools to help you evaluate personal stress and anxiety, challenge distorted thoughts, and learn relaxation skills that have been scientifically validated in research on Cognitive Behavioral Therapy (CBT). Lots of background and useful information along with step-by-step guides.   4. Deep Sleep with Jaime Carty  Getting enough sleep is one of the foundations of mental health. A personal favorite I listen to all the time, this straightforward surjit features a warm, gentle voice guiding listeners through a Progressive Muscle Relaxation (PMR) session and into sleep. Features long or short induction options, and an alarm.     5. WhatsMyM3  A three minute depression and anxiety screen. Validated questionnaires assess symptoms of depression, anxiety, bipolar disorder, and PTSD, and combine into a score that indicates whether or not your life is impacted significantly by a mood disorder, recommending a course of action. The surjit keeps a history of test results, to help you track your progress.     6. DBT Diary Card and Skills   Based on Dialectical Behavior Therapy (DBT) developed by psychologist Rosemary Art, this surjit is a rich resource of self-help skills, reminders of the therapy principles, and coaching tools for coping. Created by a therapist with years of experience in the practice, this surjit is not intended to replace a professional but helps people reinforce their treatment. 7. Optimism  Track your moods, keep a journal, and chart your recovery progress with this comprehensive tool for depression, bipolar disorder, and anxiety disorders. One of the most popular mood tracking apps available, with plenty of features. Free.     8. iSleepEasy  A calm female voice helps you quell anxieties and take the time to relax and sleep, in an array of guided meditations. Separately controlled voice and music tracks, flexible lengths, and an alarm. Includes a special wee hours rescue track, and tips for falling asleep. Developed by [employer] [place], who offer an great line of relaxation apps.     9. Magic Window  Living Pictures  Not technically a mental health surjit, it makes no miraculous claims about curbing anxiety.  However, there is independent research indicating that taking breaks and getting exposure to nature,

## 2021-05-07 NOTE — GROUP NOTE
Group Therapy Note    Date: 5/7/2021    Group Start Time: 1100  Group End Time: 4126  Group Topic: Recreational    JESUS Lewis    Patient refused to attend leisure/ coping skills group at 1100 after encouragement from staff. 1:1 talk time provided by staff.      Signature:  Shelley Lewis

## 2021-05-07 NOTE — GROUP NOTE
Group Therapy Note    Date: 5/7/2021    Group Start Time: 1000  Group End Time: 7550  Group Topic: Psychotherapy    [facility]    Lavanda Nageotte, MSW, [facility]        Group Therapy Note  Patient declined to attend psychotherapy group at 10 am despite encouragement by staff. 1:1 was offered as an alternative.             Signature:  Lavanda Nageotte, MSW, [facility]

## 2021-05-07 NOTE — PROGRESS NOTES
Spoke with Zoltan Huntley RN at [facility]. Patient's last Invega sustenna 234 mg injection was on 4/14/21. Jo Vazquez reports patient's next injection is due 5/11/21.

## 2021-05-07 NOTE — PLAN OF CARE
[employer]  Initial Interdisciplinary Treatment Plan NO      Original treatment plan Date & Time: 5/7/2021 0857    Admission Type:  Admission Type: Voluntary    Reason for admission:   Reason for Admission: Patient  voluntary from [facility] ED with suicidal ideation and auditory hallucinations. Estimated Length of Stay:  5-7days  Estimated Discharge Date: to be determined by physician    PATIENT STRENGTHS:  Patient Strengths:Strengths: Connection to output provider, Communication  Patient Strengths and Limitations:Limitations: Hopeless about future, Inappropriate/potentially harmful leisure interests, General negative or hopeless attitude about future/recovery  Addictive Behavior: Addictive Behavior  In the past 3 months, have you felt or has someone told you that you have a problem with:  : None  Do you have a history of Chemical Use?: No  Do you have a history of Alcohol Use?: No  Do you have a history of Street Drug Abuse?: No  Histroy of Prescripton Drug Abuse?: No  Medical Problems:  Past Medical History:   Diagnosis Date    Bipolar disorder ([facility]     Depression     GERD (gastroesophageal reflux disease)     Hallucinations     Headache(784.0)     Hepatitis     Schizophrenia, schizo-affective ([facility]     [facility]     Tobacco abuse     Type II or unspecified type diabetes mellitus without mention of complication, not stated as uncontrolled     Urinary incontinence      Status EXAM:Status and Exam  Normal: No  Facial Expression: Flat  Affect: Appropriate  Level of Consciousness: Alert  Mood:Normal: No  Mood: Depressed, Anxious  Motor Activity:Normal: Yes  Interview Behavior: Cooperative  Preception: [place] to Person, [place] to Time, [place] to Place, [place] to Situation  Attention:Normal: No  Attention: Distractible  Thought Processes: Circumstantial  Thought Content:Normal: No  Thought Content: Preoccupations  Hallucinations:  Auditory (Comment)  Delusions: No  Memory:Normal: No Memory: Poor Remote  Insight and Judgment: No  Insight and Judgment: Poor Judgment, Poor Insight  Present Suicidal Ideation: Yes(contracts for safeety)  Present Homicidal Ideation: No    EDUCATION:   Learner Progress Toward Treatment Goals: reviewed group plans and strategies for care    Method:group therapy, medication compliance, individualized assessments and care planning    Outcome: needs reinforcement    PATIENT GOALS: to be discussed with patient within 72 hours    PLAN/TREATMENT RECOMMENDATIONS:     continue group therapy , medications compliance, goal setting, individualized assessments and care, continue to monitor pt on unit      SHORT-TERM GOALS:   Time frame for Short-Term Goals: 5-7 days    LONG-TERM GOALS:  Time frame for Long-Term Goals: 6 months  Members Present in Team Meeting: See Signature Sheet    CARMEN Mcclain

## 2021-05-07 NOTE — PROGRESS NOTES
Pharmacy Medication History Note      List of current medications patient is taking is complete. Source of information: Yuko Price, [place], [place]    Changes made to medication list:  Medications removed (include reason, ex. therapy complete or physician discontinued, noncompliance):  · none    Medications added/doses adjusted:  · Added Hydroxyzine 25 mg three times daily as needed  · Added Bupropion 100 mg twice daily    Other notes (ex. Recent course of antibiotics, Coumadin dosing): · Left several messages with [employer] team to confirm last administration date of Invleidy khan. Awaiting return call. Please let me know if you have any questions about this encounter. Thank you!     Electronically signed by Vimal Márquez, [facility] on 5/7/2021 at 10:24 AM

## 2021-05-07 NOTE — PROGRESS NOTES
Behavioral Services  Medicare Certification Upon Admission    I certify that this patient's inpatient psychiatric hospital admission is medically necessary for:    [x] (1) Treatment which could reasonably be expected to improve this patient's condition,       [x] (2) Or for diagnostic study;     AND     [x](2) The inpatient psychiatric services are provided while the individual is under the care of a physician and are included in the individualized plan of care.     Estimated length of stay/service 4 to 7 days    Plan for post-hospital care Home with outpatient community mental health follow-up    Electronically signed by Tra Estrada MD on 5/7/2021 at 3:36 PM

## 2021-05-07 NOTE — ED PROVIDER NOTES
Skin:     General: Skin is warm and dry. Neurological:      Mental Status: He is oriented to person, place, and time. Cranial Nerves: No cranial nerve deficit. Coordination: Coordination normal.         MEDICAL DECISION MAKING:   Assessment:  Chio Sena is a 57 y.o. male who presents with suicidal ideation. ED Course/MDM:   Patient arrived hemodynamically stable and in no acute distress. Patient's previous imaging and studies reviewed. We will obtain screening labs and admit to [facility] for stabilization. Procedures    DIAGNOSTIC RESULTS   EKG: All EKG's are interpreted by the Emergency Department Physician who either signs or Co-signs this chart inthe absence of a cardiologist.      RADIOLOGY:All plain film, CT, MRI, and formal ultrasound images (except ED bedside ultrasound) are read by the radiologist, see reports below, unless otherwise noted in MDM or here. No orders to display     LABS: All lab results were reviewed by myself, and all abnormals are listed below. Labs Reviewed   COVID-19, RAPID   CBC WITH AUTO DIFFERENTIAL   COMPREHENSIVE METABOLIC PANEL   ETHANOL   URINE DRUG SCREEN   SALICYLATE LEVEL   ACETAMINOPHEN LEVEL     EMERGENCY DEPARTMENT COURSE:   Vitals:    Vitals:    05/06/21 1940   BP: 127/72   Pulse: 76   Resp: 16   Temp: 98.2 °F (36.8 °C)   TempSrc: Oral   SpO2: 99%   Weight: 172 lb (78 kg)       The patient was given the following medications while in the emergency department:  No orders of the defined types were placed in this encounter. CONSULTS:  None    FINAL IMPRESSION      1. Suicidal ideation          DISPOSITION/PLAN   DISPOSITION Decision To Admit 05/06/2021 08:56:53 PM      PATIENT REFERRED TO:  No follow-up provider specified.   DISCHARGE MEDICATIONS:  New Prescriptions    No medications on file     Melissa Astorga MD  AttendingEmergency Physician                        Dee Dee Martinez MD  05/06/21 0657

## 2021-05-07 NOTE — GROUP NOTE
Group Therapy Note    Date: 5/7/2021    Group Start Time: 1330  Group End Time: 9670  Group Topic: Psychoeducation    [facility]    Patient refused to attend mental health advocacy group at 1330 after encouragement from staff. 1:1 talk time offered by staff as alternative to group session.

## 2021-05-07 NOTE — PLAN OF CARE
Problem: Depressive Behavior With or Without Suicide Precautions:  Goal: Ability to disclose and discuss suicidal ideas will improve  Description: Ability to disclose and discuss suicidal ideas will improve  5/7/2021 0945 by Naya Swan  Outcome: Ongoing     Problem: Depressive Behavior With or Without Suicide Precautions:  Goal: Absence of self-harm  Description: Absence of self-harm  5/7/2021 0945 by Naya Swan  Outcome: Ongoing    Patient is isolative for long intervals, comes out late to eat, quiet, preoccupied with own thoughts. When in bed resting, he covers his head and doesn't answer staff. When out of bed, he speaks openly, very pleasant and cooperative. Controlled. Relates that the voices have been bad, telling him negative things, command at times. Does have suicidal thoughts, but no plan or intent and is able to contract for safety. Accepting of staff support and encouragement. Was having a hard time finding his room, so his name was placed outside his door.

## 2021-05-08 LAB
HIV AG/AB: NONREACTIVE
T. PALLIDUM, IGG: NONREACTIVE

## 2021-05-08 PROCEDURE — 6370000000 HC RX 637 (ALT 250 FOR IP)

## 2021-05-08 PROCEDURE — 86780 TREPONEMA PALLIDUM: CPT

## 2021-05-08 PROCEDURE — 87389 HIV-1 AG W/HIV-1&-2 AB AG IA: CPT

## 2021-05-08 PROCEDURE — 36415 COLL VENOUS BLD VENIPUNCTURE: CPT

## 2021-05-08 PROCEDURE — 1240000000 HC EMOTIONAL WELLNESS R&B

## 2021-05-08 PROCEDURE — 6370000000 HC RX 637 (ALT 250 FOR IP): Performed by: PSYCHIATRY & NEUROLOGY

## 2021-05-08 PROCEDURE — 99222 1ST HOSP IP/OBS MODERATE 55: CPT | Performed by: INTERNAL MEDICINE

## 2021-05-08 PROCEDURE — 99232 SBSQ HOSP IP/OBS MODERATE 35: CPT | Performed by: PSYCHIATRY & NEUROLOGY

## 2021-05-08 RX ADMIN — ESCITALOPRAM OXALATE 20 MG: 20 TABLET ORAL at 07:50

## 2021-05-08 RX ADMIN — HYDROXYZINE HYDROCHLORIDE 50 MG: 50 TABLET, FILM COATED ORAL at 21:12

## 2021-05-08 RX ADMIN — TRAZODONE HYDROCHLORIDE 150 MG: 150 TABLET ORAL at 21:12

## 2021-05-08 RX ADMIN — PALIPERIDONE 6 MG: 6 TABLET, EXTENDED RELEASE ORAL at 07:50

## 2021-05-08 NOTE — GROUP NOTE
Group Therapy Note    Date: 5/8/2021    Group Start Time: 8195  Group End Time: 0277  Group Topic: Cognitive Skills    [address], CTRS        Group Therapy Note    Attendees: 9/15             Patient's Goal:  Identify positive coping skills through recreation and leisure activities. Status After Intervention:  Improved    Participation Level:  Active Listener and Interactive    Participation Quality: Appropriate, Attentive, Sharing and Supportive      Speech:  normal      Thought Process/Content: Logical      Affective Functioning: Congruent      Mood: euphoric      Level of consciousness:  Alert      Response to Learning: Able to verbalize current knowledge/experience, Able to verbalize/acknowledge new learning, Able to retain information and Capable of insight      Endings: None Reported    Modes of Intervention: Education, Support, Socialization, Exploration, Clarifying, Problem-solving and Activity      Discipline Responsible: Psychoeducational Specialist      Signature:  Guera Purcell, [address]

## 2021-05-08 NOTE — GROUP NOTE
Group Therapy Note    Date: 5/8/2021    Group Start Time: 1000  Group End Time: 6983  Group Topic: Psychoeducation    [address], LSW    patient refused to attend psychoeducation group at 10a after encouragement from staff.   1:1 talk time provided as alternative to group session

## 2021-05-08 NOTE — PROGRESS NOTES
Daily Progress Note  5/8/2021    Patient Name: Obinna Mosher    CHIEF COMPLAINT:  Acute psychosis    Reviewed patient's current plan of care and vital signs with nursing staff. Vitals:    05/08/21 0800   BP: 118/70   Pulse: 69   Resp: 16   Temp: 98.4 °F (36.9 °C)   SpO2:             SUBJECTIVE:      Appetite: Reports he has been \"eating better\". Sleep: Patient reports he fell asleep around 2 AM and slept throughout the night. He reports difficulty falling asleep. However, he reports his sleep is restorative. Per [facility] Adult Daily Assessment flowsheet, staff documented 11 hours of sleep. Attending groups: [] Yes  [x] Yes, selectively [] No    Patient is seen today for a follow up assessment. Patient has been medication compliant. Patient has required the following as needed medications:        Patient reports his mood as \"feeling okay\". Patient endorses depression and rates his depression as a 5 out of 10 (010 scale with 0 being no depression and 10 being worst). Patient also endorses anxiety and rates his anxiety as a 5 out of 10 (010 scale with 0 being no anxiety and 10 being worst). Patient reports he has been eating better. Patient reports he fell asleep around 2 AM and slept throughout the night. He reports difficulty falling asleep. However, he reports his sleep is restorative. Per [facility] Adult Daily Assessment flowsheet, staff documented 11 hours of sleep. Patient endorses fleeting suicidal ideation with plan to stab himself with a  knife or to shoot himself. Patient contracts for safety on the unit. Patient denies homicidal ideation, intent, or plan. When asked about auditory hallucinations patient reports he hears a voice which \"sounds like a man\". He reports the frequency of the auditory hallucination as \"all the time\" and rates the loudness as a 5 out of 10 (010 scale with 0 being no sound and 10 being loudest).   In regards to auditory hallucinations, patient states Lucille Vasquez said he was gonna kill me\". Patient denies visual hallucinations. Patient denies paranoia. Patient does present with delusions and reports he feels he can read other peoples minds and that they can read his mind. Patient denies medication side effects or any medical concerns at the time of assessment. Patient reported he had not been attending groups and was encouraged by writer to attend groups on the unit. Patient was seen by internal medicine today. Patient continues to be monitored in the inpatient psychiatric facility at [facility] for safety and stabilization. Patient continues to need, on a daily basis, active treatment furnished directly by or requiring the supervision of inpatient psychiatric personnel. Mental Status Exam  Level of consciousness: Alert and oriented  Appearance: Appropriate attire for setting, resting in bed, with fair  grooming and hygiene   Behavior/Motor: Approachable, no psychomotor abnormalities   Attitude toward examiner: Cooperative, attentive, good eye contact  Speech: Normal rate and normal volume  Mood: \"Feeling okay\"  Affect: Blunted  Thought processes: Linear, goal directed   Thought content: Denies homicidal ideation  Suicidal Ideation: Active suicidal ideations, with a  current plan or intent, contracts for safety on the unit. Delusions: Positive for delusions. Perceptual Disturbance: Endorses auditory hallucinations. Denies visual hallucinations. Cognition: Oriented to self, location, time, and situation  Memory: Intact  Insight & Judgement: Poor  Medication side effects: Denies    Data   height is 6' 3\" (1.905 m) and weight is 172 lb (78 kg). His oral temperature is 98.4 °F (36.9 °C). His blood pressure is 118/70 and his pulse is 69. His respiration is 16 and oxygen saturation is 99%.    Labs:   Admission on 05/06/2021   Component Date Value Ref Range Status    WBC 05/06/2021 4.4  3.5 - 11.0 k/uL Final    RBC 05/06/2021 3.72* 4.5 - 5.9 m/uL Final    Hemoglobin 05/06/2021 11.0* 13.5 - 17.5 g/dL Final    Hematocrit 05/06/2021 32.9* 41 - 53 % Final    MCV 05/06/2021 88.2  80 - 100 fL Final    MCH 05/06/2021 29.4  26 - 34 pg Final    MCHC 05/06/2021 33.4  31 - 37 g/dL Final    RDW 05/06/2021 15.2* 11.5 - 14.9 % Final    Platelets 94/01/8639 332  150 - 450 k/uL Final    MPV 05/06/2021 6.2  6.0 - 12.0 fL Final    NRBC Automated 05/06/2021 NOT REPORTED  per 100 WBC Final    Differential Type 05/06/2021 NOT REPORTED   Final    Seg Neutrophils 05/06/2021 48  36 - 66 % Final    Lymphocytes 05/06/2021 40  24 - 44 % Final    Monocytes 05/06/2021 8* 1 - 7 % Final    Eosinophils % 05/06/2021 4  0 - 4 % Final    Basophils 05/06/2021 0  0 - 2 % Final    Immature Granulocytes 05/06/2021 NOT REPORTED  0 % Final    Segs Absolute 05/06/2021 2.10  1.3 - 9.1 k/uL Final    Absolute Lymph # 05/06/2021 1.80  1.0 - 4.8 k/uL Final    Absolute Mono # 05/06/2021 0.40  0.1 - 1.3 k/uL Final    Absolute Eos # 05/06/2021 0.20  0.0 - 0.4 k/uL Final    Basophils Absolute 05/06/2021 0.00  0.0 - 0.2 k/uL Final    Absolute Immature Granulocyte 05/06/2021 NOT REPORTED  0.00 - 0.30 k/uL Final    WBC Morphology 05/06/2021 NOT REPORTED   Final    RBC Morphology 05/06/2021 NOT REPORTED   Final    Platelet Estimate 17/43/7178 NOT REPORTED   Final    Glucose 05/06/2021 87  70 - 99 mg/dL Final    BUN 05/06/2021 15  8 - 23 mg/dL Final    CREATININE 05/06/2021 1.27* 0.70 - 1.20 mg/dL Final    Bun/Cre Ratio 05/06/2021 NOT REPORTED  9 - 20 Final    Calcium 05/06/2021 9.2  8.6 - 10.4 mg/dL Final    Sodium 05/06/2021 141  135 - 144 mmol/L Final    Potassium 05/06/2021 4.2  3.7 - 5.3 mmol/L Final    Chloride 05/06/2021 105  98 - 107 mmol/L Final    CO2 05/06/2021 27  20 - 31 mmol/L Final    Anion Gap 05/06/2021 9  9 - 17 mmol/L Final    Alkaline Phosphatase 05/06/2021 125  40 - 129 U/L Final    ALT 05/06/2021 10  5 - 41 U/L Final    AST 05/06/2021 19  <40 U/L Final    Total Bilirubin 05/06/2021 0.21* 0.3 - 1.2 mg/dL Final    Total Protein 05/06/2021 7.2  6.4 - 8.3 g/dL Final    Albumin 05/06/2021 4.3  3.5 - 5.2 g/dL Final    Albumin/Globulin Ratio 05/06/2021 NOT REPORTED  1.0 - 2.5 Final    GFR Non- 05/06/2021 57* >60 mL/min Final    GFR  05/06/2021 >60  >60 mL/min Final    GFR Comment 05/06/2021        Final    Comment: Average GFR for 61-76 years old:   80 mL/min/1.73sq m  Chronic Kidney Disease:   <60 mL/min/1.73sq m  Kidney failure:   <15 mL/min/1.73sq m              eGFR calculated using average adult body mass.  Additional eGFR calculator available at:        doUdeal.br            GFR Staging 05/06/2021 NOT REPORTED   Final    Ethanol 05/06/2021 <10  <10 mg/dL Final    Ethanol percent 05/06/2021 <0.010  % Final    Amphetamine Screen, Ur 05/06/2021 NEGATIVE  NEGATIVE Final    Comment:       (Positive cutoff 1000 ng/mL)                  Barbiturate Screen, Ur 05/06/2021 NEGATIVE  NEGATIVE Final    Comment:       (Positive cutoff 200 ng/mL)                  Benzodiazepine Screen, Urine 05/06/2021 NEGATIVE  NEGATIVE Final    Comment:       (Positive cutoff 200 ng/mL)                  Cocaine Metabolite, Urine 05/06/2021 POSITIVE* NEGATIVE Final    Comment:       (Positive cutoff 300 ng/mL)                  Methadone Screen, Urine 05/06/2021 NEGATIVE  NEGATIVE Final    Comment:       (Positive cutoff 300 ng/mL)                  Opiates, Urine 05/06/2021 NEGATIVE  NEGATIVE Final    Comment:       (Positive cutoff 300 ng/mL)                  Phencyclidine, Urine 05/06/2021 NEGATIVE  NEGATIVE Final    Comment:       (Positive cutoff 25 ng/mL)                  Propoxyphene, Urine 05/06/2021 NOT REPORTED  NEGATIVE Final    Cannabinoid Scrn, Ur 05/06/2021 NEGATIVE  NEGATIVE Final    Comment:       (Positive cutoff 50 ng/mL)                  Oxycodone Screen, Ur 05/06/2021 NEGATIVE  NEGATIVE Final    Comment: (Positive cutoff 100 ng/mL)                  Methamphetamine, Urine 05/06/2021 NOT REPORTED  NEGATIVE Final    Tricyclic Antidepressants, Urine 05/06/2021 NOT REPORTED  NEGATIVE Final    MDMA, Urine 05/06/2021 NOT REPORTED  NEGATIVE Final    Buprenorphine Urine 05/06/2021 NOT REPORTED  NEGATIVE Final    Test Information 05/06/2021 Assay provides medical screening only. The absence of expected drug(s) and/or metabolite(s) may indicate diluted or adulterated urine, limitations of testing or timing of collection. Final    Comment: Testing for legal purposes should be confirmed by another method. To request confirmation   of test result, please call the lab within 7 days of sample submission.  Salicylate Lvl 55/65/4180 <1* 3 - 10 mg/dL Final    Acetaminophen Level 05/06/2021 <5* 10 - 30 ug/mL Final    Specimen Description 05/06/2021 . NASOPHARYNGEAL SWAB   Final    SARS-CoV-2, Rapid 05/06/2021 Not Detected  Not Detected Final    Comment:       Rapid NAAT:  The specimen is NEGATIVE for SARS-CoV-2, the novel coronavirus associated with   COVID-19. The ID NOW COVID-19 assay is designed to detect the virus that causes COVID-19 in patients   with signs and symptoms of infection who are suspected of COVID-19. An individual without symptoms of COVID-19 and who is not shedding SARS-CoV-2 virus would   expect to have a negative (not detected) result in this assay. Negative results should be treated as presumptive and, if inconsistent with clinical signs   and symptoms or necessary for patient management,  should be tested with an alternative molecular assay. Negative results do not preclude   SARS-CoV-2 infection and   should not be used as the sole basis for patient management decisions.          Fact sheet for Healthcare Providers: Brandyn  Fact sheet for Patients: Iesha.nav          Methodology: Isothermal Nucleic Acid Amplification Labs reviewed: [x] Yes  Last EKG in EMR reviewed: [x] Yes    Medications  Current Facility-Administered Medications: acetaminophen (TYLENOL) tablet 650 mg, 650 mg, Oral, Q4H PRN  aluminum & magnesium hydroxide-simethicone (MAALOX) 200-200-20 MG/5ML suspension 30 mL, 30 mL, Oral, Q6H PRN  hydrOXYzine (ATARAX) tablet 50 mg, 50 mg, Oral, TID PRN  ibuprofen (ADVIL;MOTRIN) tablet 400 mg, 400 mg, Oral, Q6H PRN  polyethylene glycol (GLYCOLAX) packet 17 g, 17 g, Oral, Daily PRN  nicotine polacrilex (NICORETTE) gum 2 mg, 2 mg, Oral, PRN  haloperidol (HALDOL) tablet 5 mg, 5 mg, Oral, Q4H PRN **AND** LORazepam (ATIVAN) tablet 2 mg, 2 mg, Oral, Q4H PRN  haloperidol lactate (HALDOL) injection 5 mg, 5 mg, Intramuscular, Q4H PRN **AND** LORazepam (ATIVAN) injection 2 mg, 2 mg, Intramuscular, Q4H PRN **AND** diphenhydrAMINE (BENADRYL) injection 50 mg, 50 mg, Intramuscular, Q4H PRN  [START ON 5/11/2021] paliperidone palmitate ER (INVEGA SUSTENNA) IM injection 234 mg, 234 mg, Intramuscular, Q28 Days  paliperidone (INVEGA) extended release tablet 6 mg, 6 mg, Oral, Daily  benztropine (COGENTIN) tablet 1 mg, 1 mg, Oral, Daily PRN  escitalopram (LEXAPRO) tablet 20 mg, 20 mg, Oral, Daily  traZODone (DESYREL) tablet 150 mg, 150 mg, Oral, Nightly PRN    ASSESSMENT  Schizoaffective disorder, depressive type ([facility] [address]         PLAN  Patient symptoms are:  [] Well controlled  [x] Improving  [] Worsening  [] No change  Continue current medication regimen. Attempt to develop insight. Psycho-education conducted. Supportive Therapy conducted. Probable discharge is to be determined by MD.   Follow-up daily while inpatient. Electronically signed by MARY Dos Santos CNP on 5/8/21 at 3:48 PM EDT    **This report has been created using voice recognition software. It may contain minor errors which are inherent in voice recognition technology. **

## 2021-05-08 NOTE — GROUP NOTE
Group Therapy Note    Date: 5/8/2021    Group Start Time: 0840  Group End Time: 0915  Group Topic: Community Meeting    [address] [facility], [address] [facility] Therapy Note    Attendees: 5/15    patient refused to attend  community meeting and goal setting group at  [id number] after encouragement from staff. 1:1 talk time provided as alternative to group session.        Signature:  Unknown Dennis Mcknight

## 2021-05-08 NOTE — PLAN OF CARE
[address] [facility]  Day 3 Interdisciplinary Treatment Plan NOTE    Review Date & Time: 5/8/21    Admission Type:   Admission Type: Voluntary    Reason for admission:  Reason for Admission: Patient  voluntary from [facility] with suicidal ideation and auditory hallucinations.   Estimated Length of Stay: 5-7 days  Estimated Discharge Date Update: to be determined by physician    PATIENT STRENGTHS:  Patient Strengths Strengths: Connection to output provider, Positive Support  Patient Strengths and Limitations:Limitations: Tendency to isolate self, Hopeless about future, Difficulty problem solving/relies on others to help solve problems, Inappropriate/potentially harmful leisure interests  Addictive Behavior:Addictive Behavior  In the past 3 months, have you felt or has someone told you that you have a problem with:  : None  Do you have a history of Chemical Use?: No  Do you have a history of Alcohol Use?: No  Do you have a history of Street Drug Abuse?: No  Histroy of Prescripton Drug Abuse?: No  Medical Problems:  Past Medical History:   Diagnosis Date    Bipolar disorder ([facility] [address]     Depression     GERD (gastroesophageal reflux disease)     Hallucinations     Headache(784.0)     Hepatitis     Schizophrenia, schizo-affective (HCC)     [facility]     Tobacco abuse     Type II or unspecified type diabetes mellitus without mention of complication, not stated as uncontrolled     Urinary incontinence        Risk:  Fall RiskTotal: 55  Dusty Scale Dusty Scale Score: 21  BVC Total: 0  Change in scores no Changes to plan of Care no    Status EXAM:   Status and Exam  Normal: No  Facial Expression: Flat  Affect: Blunt  Level of Consciousness: Alert  Mood:Normal: No  Mood: Depressed, Anxious, Sad  Motor Activity:Normal: No  Motor Activity: Decreased  Interview Behavior: Cooperative  Preception: [place] to Person, [place] to Time, [place] to Place, [place] to Situation  Attention:Normal: No  Attention: Distractible, Unable to Concentrate  Thought Processes: Blocking  Thought Content:Normal: No  Thought Content: Preoccupations, Paranoia, Poverty of Content  Hallucinations: Auditory (Comment)  Delusions: No  Memory:Normal: No  Memory: Poor Recent, Poor Remote  Insight and Judgment: No  Insight and Judgment: Poor Judgment, Poor Insight  Present Suicidal Ideation: Yes(no plan)  Present Homicidal Ideation: No    Daily Assessment Last Entry:   Daily Sleep (WDL): Exceptions to WDL         Patient Currently in Pain: Denies  Daily Nutrition (WDL): Within Defined Limits    Patient Monitoring:  Frequency of Checks: 4 times per hour, close    Psychiatric Symptoms:   Depression Symptoms  Depression Symptoms: Impaired concentration, Feelings of hopelessess, Feelings of helplessness, Loss of interest, Isolative, Feelings of worthlessness  Anxiety Symptoms  Anxiety Symptoms: Generalized  [place] Symptoms  [place] Symptoms: No problems reported or observed. Psychosis Symptoms  Delusion Type: Paranoid    Suicide Risk CSSR-S:  1) Within the past month, have you wished you were dead or wished you could go to sleep and not wake up? : Yes  2) Have you actually had any thoughts of killing yourself? : No  3) Have you been thinking about how you might kill yourself? : No  5) Have you started to work out or worked out the details of how to kill yourself?  Do you intend to carry out this plan? : No  6) Have you ever done anything, started to do anything, or prepared to do anything to end your life?: No  Change in Result na  Change in Plan of care na       EDUCATION:   EDUCATION:   Learner Progress Toward Treatment Goals: Reviewed results and recommendations of this team, Reviewed group plan and strategies, Reviewed signs, symptoms and risk of self harm and violent behavior, Reviewed goals and plan of care    Method:small group, individual verbal education    Outcome:verbalized by patient, but needs reinforcement to obtain goals    PATIENT GOALS:  Short term: go to groups, figure out my medicine  Long term:  get stable housing, get on programs for electricity and heat.     PLAN/TREATMENT RECOMMENDATIONS UPDATE: continue with group therapies, increased socialization, continue planning for after discharge goals, continue with medication compliance    SHORT-TERM GOALS UPDATE:   Time frame for Short-Term Goals: 5-7 days    LONG-TERM GOALS UPDATE:   Time frame for Long-Term Goals: 6 months  Members Present in Team Meeting: See Signature [address] [facility], [address]

## 2021-05-08 NOTE — BH NOTE
Group Therapy Note     Date: 5/8/2021     Group Start Time: 1100  Group End Time: 36  Group Topic: Healthy Living/Wellness     JESUS Urrutia RN     Patient refused to attend Healthy Living/Wellness group at 1100 after encouragement from staff.   1:1 talk time provided as alternative to group session

## 2021-05-08 NOTE — PLAN OF CARE
Patient has been seclusive to his room. Patient has thought blocking and admits to auditory hallucinations. Patient admits to feeling depresed. Patient admits to suicidal thoughts with no specific plan. Patient agrees to come talk with staff if having any thoughts to harm himself this shift. 15 min rounds continued for patient safety.    Problem: Tobacco Use:  Goal: Inpatient tobacco use cessation counseling participation  Description: Inpatient tobacco use cessation counseling participation  5/7/2021 2207 by Edgar Madsen RN  Outcome: Ongoing  5/7/2021 0857 by Doroteo Burkitt, CTRS  Outcome: Ongoing     Problem: Depressive Behavior With or Without Suicide Precautions:  Goal: Ability to disclose and discuss suicidal ideas will improve  Description: Ability to disclose and discuss suicidal ideas will improve  5/7/2021 2207 by Edgar Madsen RN  Outcome: Ongoing  5/7/2021 0945 by Attila Willis  Outcome: Ongoing  5/7/2021 0857 by Doroteo Burkitt, CTRS  Outcome: Ongoing  Goal: Able to verbalize support systems  Description: Able to verbalize support systems  5/7/2021 2207 by Edgar Madsen RN  Outcome: Ongoing  5/7/2021 0857 by Doroteo Burkitt, CTRS  Outcome: Ongoing  Goal: Absence of self-harm  Description: Absence of self-harm  5/7/2021 2207 by Edgar Madsen RN  Outcome: Ongoing  5/7/2021 0945 by Attila Willis  Outcome: Ongoing  5/7/2021 0857 by Doroteo Burkitt, CTRS  Outcome: Ongoing  Goal: Patient specific goal  Description: Patient specific goal  5/7/2021 2207 by Edgar Madsen RN  Outcome: Ongoing  5/7/2021 0857 by Doroteo Burkitt, CTRS  Outcome: Ongoing

## 2021-05-08 NOTE — PLAN OF CARE
Problem: Depressive Behavior With or Without Suicide Precautions:  Goal: Ability to disclose and discuss suicidal ideas will improve  Description: Ability to disclose and discuss suicidal ideas will improve  Outcome: Ongoing     Problem: Depressive Behavior With or Without Suicide Precautions:  Goal: Able to verbalize support systems  Description: Able to verbalize support systems  Outcome: Ongoing     Problem: Depressive Behavior With or Without Suicide Precautions:  Goal: Absence of self-harm  Description: Absence of self-harm  Outcome: Ongoing   Pt admits to suicidal ideation with no plan isolative aloof out for needs mostly calm controlled cooperative with treatment

## 2021-05-08 NOTE — BH NOTE
Group Therapy Note     Date: 5/8/2021     Group Start Time: 1600  Group End Time: 8813  Group Topic: Healthy Living/Wellness     JESUS Bella    patient refused to attend Health/Wellness group at 1600 after encouragement from staff.   1:1 talk time provided as alternative to group session

## 2021-05-08 NOTE — CONSULTS
buPROPion (WELLBUTRIN) 100 MG tablet Take 100 mg by mouth 2 times daily   Yes Historical Provider, MD   traZODone (DESYREL) 150 MG tablet Take 1 tablet by mouth nightly as needed for Sleep 3/14/21  Yes Tayler Alvarez MD   paliperidone (INVEGA) 6 MG extended release tablet Take 1 tablet by mouth daily 3/14/21  Yes Tayler Alvarez MD   benztropine (COGENTIN) 1 MG tablet Take 1 mg by mouth daily as needed   Yes Historical Provider, MD   escitalopram (LEXAPRO) 20 MG tablet Take 20 mg by mouth daily   Yes Historical Provider, MD   paliperidone palmitate ER (INVEGA SUSTENNA) 234 MG/1.5ML [place] IM injection Inject 234 mg into the muscle every 28 days   Yes Historical Provider, MD        Allergies:     Navane [[email or web address]]    Social History:     Tobacco:    reports that he has been smoking cigarettes. He has been smoking about 0.50 packs per day. He has never used smokeless tobacco.  Alcohol:      reports current alcohol use. Drug Use:  reports current drug use. Drug: Cocaine. Family History:     Family History   Problem Relation Age of Onset    Diabetes Mother     Heart Disease Mother        Review of Systems:     Positive and Negative as described in HPI. CONSTITUTIONAL:  negative for fevers, chills, sweats, fatigue, weight loss  HEENT:  negative for vision, hearing changes, runny nose, throat pain  RESPIRATORY:  negative for shortness of breath, cough, congestion, wheezing. CARDIOVASCULAR:  negative for chest pain, palpitations.   GASTROINTESTINAL:  negative for nausea, vomiting, diarrhea, constipation, change in bowel habits, abdominal pain   GENITOURINARY:  negative for difficulty of urination, burning with urination, frequency   INTEGUMENT: Diffuse a skin lesions  HEMATOLOGIC/LYMPHATIC:  negative for swelling/edema   ALLERGIC/IMMUNOLOGIC:  negative for urticaria , itching  ENDOCRINE:  negative increase in drinking, increase in urination, hot or cold intolerance  MUSCULOSKELETAL:  negative joint pains, muscle aches, swelling of joints  NEUROLOGICAL:  negative for headaches, dizziness, lightheadedness, numbness, pain, tingling extremities      Physical Exam:     /70   Pulse 69   Temp 98.4 °F (36.9 °C) (Oral)   Resp 16   Ht 6' 3\" (1.905 m)   Wt 172 lb (78 kg)   SpO2 99%   BMI 21.50 kg/m²   Temp (24hrs), Av.3 °F (36.8 °C), Min:98.2 °F (36.8 °C), Max:98.4 °F (36.9 °C)    No results for input(s): POCGLU in the last 72 hours. No intake or output data in the 24 hours ending 21 1427    General Appearance:  alert, well appearing, and in no acute distress  Mental status: oriented to person, place, and time with normal affect  Head:  normocephalic, atraumatic. Eye: no icterus, redness, pupils equal and reactive, extraocular eye movements intact, conjunctiva clear  Ear: normal external ear, no discharge, hearing intact  Nose:  no drainage noted  Mouth: mucous membranes moist  Neck: supple, no carotid bruits, thyroid not palpable  Lungs: Bilateral equal air entry, clear to ausculation, no wheezing, rales or rhonchi, normal effort  Cardiovascular: normal rate, regular rhythm, no murmur, gallop, rub. Abdomen: Soft, nontender, nondistended, normal bowel sounds, no hepatomegaly or splenomegaly  Neurologic: There are no new focal motor or sensory deficits, normal muscle tone and bulk, no abnormal sensation, normal speech, cranial nerves II through XII grossly intact  Skin: Multiple small macular lesions noted at different ages of healing   extremities:  peripheral pulses palpable, no pedal edema or calf pain with palpation      Investigations:      Laboratory Testing:  No results found for this or any previous visit (from the past 24 hour(s)).         Consultations:   IP CONSULT TO INTERNAL MEDICINE  Assessment :      Primary Problem  Schizoaffective disorder, depressive type [facility])    Active Hospital Problems    Diagnosis Date Noted    [facility] [F23[facility] 03/10/2021    Schizoaffective disorder, depressive type ([facility] [F25.1] 09/23/2017       Plan:     Diffuse small macular skin bumps  No involvement of palms or interdigital area  Doubt any signs of STD  Will do work-up for VDRL HIV gonococcal and Chlamydia work-up  If above negative will need a punch biopsy and outpatient dermatology follow-up  Dm controlled a1c 4.9      Jadon Steele MD  5/8/2021  2:27 PM    Copy sent to Dr. Alexander Reese primary care provider on file. Please note that this chart was generated using voice recognition Dragon dictation software. Although every effort was made to ensure the accuracy of this automated transcription, some errors in transcription may have occurred.

## 2021-05-09 PROCEDURE — 1240000000 HC EMOTIONAL WELLNESS R&B

## 2021-05-09 PROCEDURE — 99231 SBSQ HOSP IP/OBS SF/LOW 25: CPT | Performed by: INTERNAL MEDICINE

## 2021-05-09 PROCEDURE — 99232 SBSQ HOSP IP/OBS MODERATE 35: CPT | Performed by: PSYCHIATRY & NEUROLOGY

## 2021-05-09 PROCEDURE — 6370000000 HC RX 637 (ALT 250 FOR IP): Performed by: PSYCHIATRY & NEUROLOGY

## 2021-05-09 PROCEDURE — 6370000000 HC RX 637 (ALT 250 FOR IP)

## 2021-05-09 RX ADMIN — HYDROXYZINE HYDROCHLORIDE 50 MG: 50 TABLET, FILM COATED ORAL at 20:19

## 2021-05-09 RX ADMIN — TRAZODONE HYDROCHLORIDE 150 MG: 150 TABLET ORAL at 20:19

## 2021-05-09 RX ADMIN — PALIPERIDONE 6 MG: 6 TABLET, EXTENDED RELEASE ORAL at 09:04

## 2021-05-09 RX ADMIN — ESCITALOPRAM OXALATE 20 MG: 20 TABLET ORAL at 09:04

## 2021-05-09 NOTE — PROGRESS NOTES
Daily Progress Note  5/9/2021    Patient Name: Rd Mariano    CHIEF COMPLAINT:  Acute psychosis    Reviewed patient's current plan of care and vital signs with nursing staff. Vitals:    05/09/21 0829   BP: (!) 94/55   Pulse: 59   Resp: 14   Temp: 98.7 °F (37.1 °C)   SpO2:      An order to recheck vital signs ordered. We will continue to monitor trends. SUBJECTIVE:      Patient is seen today for a follow up assessment. Patient has been medication compliant. Patient has required the following as needed medications:         Patient reports his mood is \"okay\". Patient endorses depression and rates his depression as a 5 out of 10 (010 scale with 0 being no depression and 10 being the worst). Patient also endorses anxiety and rates his anxiety as a 7 out of 10 (010 scale with 0 being no anxiety and 10 being worst). Patient also reports worry that he was supposed to look for house with his sister on Monday. Patient is discharged focused and verbalizes wishes to hopefully be discharged on Monday. Writer informed patient that attending physician will determine the date of discharge. Appetite:  [x] Normal/Adequate/Unchanged  [] Increased  [] Decreased      Sleep: Patient reports he was \"up all night\" and reports difficulty falling asleep. Per [facility] Adult Daily Assessment flowsheet, staff documented 8 hours of sleep. Suicidal Ideation/Intent/Plan: Reports improvement in  suicidal ideations, without current plan or intent. Homicidal Ideation/Intent/Plan: Denies homicidal ideation, without current plan or intent. Contract For Safety on Unit: Yes, patient contracts for safety on the unit. Auditory Hallucinations: Patient reports he has intermittent auditory hallucinations where he hears 1 voice. He reports he is unsure if it is a male or female voice. He reports the loudness of the voices a 10 out of 10 (010 scale with 0 being no sound and 10 being loudest).   Patient reports he is \"not paying attention\" to these voices. He reports the voices \"want to kill family and mother\". Visual Hallucinations: Patient denies any visual hallucinations. Paranoia/Delusions: Patient endorses paranoia. Medication Side Effects: Patient denies any medication side effects. Medical Concerns: Patient denies any medical concerns at the time of assessment. Group Attendance on Unit: Has not been attending groups on the unit. Patient continues to be monitored in the inpatient psychiatric facility at [facility] for safety and stabilization. Patient continues to need, on a daily basis, active treatment furnished directly by or requiring the supervision of inpatient psychiatric personnel. Mental Status Exam  Level of consciousness: Alert and oriented  Appearance: Appropriate attire for setting, resting in bed, with fair  grooming and hygiene   Behavior/Motor: Approachable, no psychomotor abnormalities   Attitude toward examiner: Cooperative, attentive, good eye contact  Speech: Normal rate and normal volume  Mood: \"Feeling okay\"  Affect: Blunted  Thought processes: Linear, goal directed   Thought content: Denies homicidal ideation  Suicidal Ideation:  Reports improvement in suicidal ideations, without a  current plan or intent, contracts for safety on the unit. Delusions:  No evidence of delusions at the time of assessment. Patient endorses paranoia. Perceptual Disturbance: Endorses auditory hallucinations. Denies visual hallucinations. Cognition: Oriented to self, location, time, and situation  Memory: Intact  Insight & Judgement: Poor    Data   height is 6' 3\" (1.905 m) and weight is 172 lb (78 kg). His oral temperature is 98.7 °F (37.1 °C). His blood pressure is 94/55 (abnormal) and his pulse is 59. His respiration is 14 and oxygen saturation is 99%.    Labs:   Admission on 05/06/2021   Component Date Value Ref Range Status    WBC 05/06/2021 4.4  3.5 - 11.0 k/uL Final    RBC 05/06/2021 3.72* 4.5 - 5.9 m/uL Final    Hemoglobin 05/06/2021 11.0* 13.5 - 17.5 g/dL Final    Hematocrit 05/06/2021 32.9* 41 - 53 % Final    MCV 05/06/2021 88.2  80 - 100 fL Final    MCH 05/06/2021 29.4  26 - 34 pg Final    MCHC 05/06/2021 33.4  31 - 37 g/dL Final    RDW 05/06/2021 15.2* 11.5 - 14.9 % Final    Platelets 47/71/8564 332  150 - 450 k/uL Final    MPV 05/06/2021 6.2  6.0 - 12.0 fL Final    NRBC Automated 05/06/2021 NOT REPORTED  per 100 WBC Final    Differential Type 05/06/2021 NOT REPORTED   Final    Seg Neutrophils 05/06/2021 48  36 - 66 % Final    Lymphocytes 05/06/2021 40  24 - 44 % Final    Monocytes 05/06/2021 8* 1 - 7 % Final    Eosinophils % 05/06/2021 4  0 - 4 % Final    Basophils 05/06/2021 0  0 - 2 % Final    Immature Granulocytes 05/06/2021 NOT REPORTED  0 % Final    Segs Absolute 05/06/2021 2.10  1.3 - 9.1 k/uL Final    Absolute Lymph # 05/06/2021 1.80  1.0 - 4.8 k/uL Final    Absolute Mono # 05/06/2021 0.40  0.1 - 1.3 k/uL Final    Absolute Eos # 05/06/2021 0.20  0.0 - 0.4 k/uL Final    Basophils Absolute 05/06/2021 0.00  0.0 - 0.2 k/uL Final    Absolute Immature Granulocyte 05/06/2021 NOT REPORTED  0.00 - 0.30 k/uL Final    WBC Morphology 05/06/2021 NOT REPORTED   Final    RBC Morphology 05/06/2021 NOT REPORTED   Final    Platelet Estimate 25/20/1091 NOT REPORTED   Final    Glucose 05/06/2021 87  70 - 99 mg/dL Final    BUN 05/06/2021 15  8 - 23 mg/dL Final    CREATININE 05/06/2021 1.27* 0.70 - 1.20 mg/dL Final    Bun/Cre Ratio 05/06/2021 NOT REPORTED  9 - 20 Final    Calcium 05/06/2021 9.2  8.6 - 10.4 mg/dL Final    Sodium 05/06/2021 141  135 - 144 mmol/L Final    Potassium 05/06/2021 4.2  3.7 - 5.3 mmol/L Final    Chloride 05/06/2021 105  98 - 107 mmol/L Final    CO2 05/06/2021 27  20 - 31 mmol/L Final    Anion Gap 05/06/2021 9  9 - 17 mmol/L Final    Alkaline Phosphatase 05/06/2021 125  40 - 129 U/L Final    ALT 05/06/2021 10  5 - 41 U/L Final    AST 05/06/2021 19  <40 U/L Final    Total Bilirubin 05/06/2021 0.21* 0.3 - 1.2 mg/dL Final    Total Protein 05/06/2021 7.2  6.4 - 8.3 g/dL Final    Albumin 05/06/2021 4.3  3.5 - 5.2 g/dL Final    Albumin/Globulin Ratio 05/06/2021 NOT REPORTED  1.0 - 2.5 Final    GFR Non- 05/06/2021 57* >60 mL/min Final    GFR  05/06/2021 >60  >60 mL/min Final    GFR Comment 05/06/2021        Final    Comment: Average GFR for 61-76 years old:   80 mL/min/1.73sq m  Chronic Kidney Disease:   <60 mL/min/1.73sq m  Kidney failure:   <15 mL/min/1.73sq m              eGFR calculated using average adult body mass.  Additional eGFR calculator available at:        365net.br            GFR Staging 05/06/2021 NOT REPORTED   Final    Ethanol 05/06/2021 <10  <10 mg/dL Final    Ethanol percent 05/06/2021 <0.010  % Final    Amphetamine Screen, Ur 05/06/2021 NEGATIVE  NEGATIVE Final    Comment:       (Positive cutoff 1000 ng/mL)                  Barbiturate Screen, Ur 05/06/2021 NEGATIVE  NEGATIVE Final    Comment:       (Positive cutoff 200 ng/mL)                  Benzodiazepine Screen, Urine 05/06/2021 NEGATIVE  NEGATIVE Final    Comment:       (Positive cutoff 200 ng/mL)                  Cocaine Metabolite, Urine 05/06/2021 POSITIVE* NEGATIVE Final    Comment:       (Positive cutoff 300 ng/mL)                  Methadone Screen, Urine 05/06/2021 NEGATIVE  NEGATIVE Final    Comment:       (Positive cutoff 300 ng/mL)                  Opiates, Urine 05/06/2021 NEGATIVE  NEGATIVE Final    Comment:       (Positive cutoff 300 ng/mL)                  Phencyclidine, Urine 05/06/2021 NEGATIVE  NEGATIVE Final    Comment:       (Positive cutoff 25 ng/mL)                  Propoxyphene, Urine 05/06/2021 NOT REPORTED  NEGATIVE Final    Cannabinoid Scrn, Ur 05/06/2021 NEGATIVE  NEGATIVE Final    Comment:       (Positive cutoff 50 ng/mL)                  Oxycodone Screen, Ur 05/06/2021 NEGATIVE NEGATIVE Final    Comment:       (Positive cutoff 100 ng/mL)                  Methamphetamine, Urine 05/06/2021 NOT REPORTED  NEGATIVE Final    Tricyclic Antidepressants, Urine 05/06/2021 NOT REPORTED  NEGATIVE Final    MDMA, Urine 05/06/2021 NOT REPORTED  NEGATIVE Final    Buprenorphine Urine 05/06/2021 NOT REPORTED  NEGATIVE Final    Test Information 05/06/2021 Assay provides medical screening only. The absence of expected drug(s) and/or metabolite(s) may indicate diluted or adulterated urine, limitations of testing or timing of collection. Final    Comment: Testing for legal purposes should be confirmed by another method. To request confirmation   of test result, please call the lab within 7 days of sample submission.  Salicylate Lvl 46/06/4389 <1* 3 - 10 mg/dL Final    Acetaminophen Level 05/06/2021 <5* 10 - 30 ug/mL Final    Specimen Description 05/06/2021 . NASOPHARYNGEAL SWAB   Final    SARS-CoV-2, Rapid 05/06/2021 Not Detected  Not Detected Final    Comment:       Rapid NAAT:  The specimen is NEGATIVE for SARS-CoV-2, the novel coronavirus associated with   COVID-19. The ID NOW COVID-19 assay is designed to detect the virus that causes COVID-19 in patients   with signs and symptoms of infection who are suspected of COVID-19. An individual without symptoms of COVID-19 and who is not shedding SARS-CoV-2 virus would   expect to have a negative (not detected) result in this assay. Negative results should be treated as presumptive and, if inconsistent with clinical signs   and symptoms or necessary for patient management,  should be tested with an alternative molecular assay. Negative results do not preclude   SARS-CoV-2 infection and   should not be used as the sole basis for patient management decisions.          Fact sheet for Healthcare Providers: Brandyn  Fact sheet for Patients: rBandyn          Methodology: Isothermal Nucleic Acid Amplification      HIV Ag/Ab 05/08/2021 NONREACTIVE  NONREACTIVE Final    Comment: No laboratory evidence of HIV infection. If acute HIV infection is suspected, consider   testing for HIV-1 RNA.  T. pallidum, IgG 05/08/2021 NONREACTIVE  NONREACTIVE Final    Comment:       T. pallidum antibodies are not detected. There is no serological evidence of infection with T. pallidum (early primary syphilis   cannot be excluded). Retest in 2-4 weeks if syphilis is clinically suspect.                  Labs reviewed: [x] Yes  Last EKG in EMR reviewed: [x] Yes    Medications  Current Facility-Administered Medications: acetaminophen (TYLENOL) tablet 650 mg, 650 mg, Oral, Q4H PRN  aluminum & magnesium hydroxide-simethicone (MAALOX) 200-200-20 MG/5ML suspension 30 mL, 30 mL, Oral, Q6H PRN  hydrOXYzine (ATARAX) tablet 50 mg, 50 mg, Oral, TID PRN  ibuprofen (ADVIL;MOTRIN) tablet 400 mg, 400 mg, Oral, Q6H PRN  polyethylene glycol (GLYCOLAX) packet 17 g, 17 g, Oral, Daily PRN  nicotine polacrilex (NICORETTE) gum 2 mg, 2 mg, Oral, PRN  haloperidol (HALDOL) tablet 5 mg, 5 mg, Oral, Q4H PRN **AND** LORazepam (ATIVAN) tablet 2 mg, 2 mg, Oral, Q4H PRN  haloperidol lactate (HALDOL) injection 5 mg, 5 mg, Intramuscular, Q4H PRN **AND** LORazepam (ATIVAN) injection 2 mg, 2 mg, Intramuscular, Q4H PRN **AND** diphenhydrAMINE (BENADRYL) injection 50 mg, 50 mg, Intramuscular, Q4H PRN  [START ON 5/11/2021] paliperidone palmitate ER (INVEGA SUSTENNA) IM injection 234 mg, 234 mg, Intramuscular, Q28 Days  paliperidone (INVEGA) extended release tablet 6 mg, 6 mg, Oral, Daily  benztropine (COGENTIN) tablet 1 mg, 1 mg, Oral, Daily PRN  escitalopram (LEXAPRO) tablet 20 mg, 20 mg, Oral, Daily  traZODone (DESYREL) tablet 150 mg, 150 mg, Oral, Nightly PRN    ASSESSMENT  Schizoaffective disorder, depressive type ([facility]         PLAN  Patient symptoms are:  [] Well controlled  [x] Improving  [] Worsening  [] No change  Continue current medication regimen. An order to recheck vital signs ordered. Attempt to develop insight. Psycho-education conducted. Supportive Therapy conducted. Probable discharge is to be determined by MD.   Follow-up daily while inpatient. Electronically signed by MARY Willard CNP on 5/9/21 at 5:53 PM EDT    **This report has been created using voice recognition software. It may contain minor errors which are inherent in voice recognition technology. **

## 2021-05-09 NOTE — GROUP NOTE
Group Therapy Note    Date: 5/9/2021    Group Start Time: 1000  Group End Time: 5514  Group Topic: Psychoeducation    [address], LSW    patient refused to attend psychoeducation group at 10a after encouragement from staff.   1:1 talk time provided as alternative to group session

## 2021-05-09 NOTE — GROUP NOTE
Group Therapy Note    Date: 5/8/2021    Group Start Time: 2003  Group End Time: 2024  Group Topic: Wrap-Up    JESUS Wills        Group Therapy Note    Attendees: 8/17           Status After Intervention:  Improved    Participation Level:  Active Listener    Participation Quality: Appropriate      Speech:  normal      Thought Process/Content: Logical      Affective Functioning: Congruent      Mood: elevated      Level of consciousness:  Alert      Response to Learning: Able to verbalize current knowledge/experience      Endings: None Reported    Modes of Intervention: Support      Discipline Responsible: Behavorial [employer]      Signature:  Nils Lucero

## 2021-05-09 NOTE — GROUP NOTE
Group Therapy Note    Date: 5/9/2021    Group Start Time: 1100  Group End Time: 1130  Group Topic: Group Documentation    [facility] BHNATHALIE Urrutia, GRADY; [address], RN        Group Therapy Note    Attendees: 18/18             Notes:  Patient participated in [facility] without incident

## 2021-05-09 NOTE — BH NOTE
Patient is complaining of anxiety at this time. Stating that they feel restless and is having trouble calming down in order to rest this evening. Medication was given as prescribed for increased anxiety.

## 2021-05-09 NOTE — GROUP NOTE
Group Therapy Note    Date: 5/9/2021    Group Start Time: 1600  Group End Time: 36  Group Topic: Healthy Living/Wellness    [facility]          Group Therapy Note    Attendees: 7/19       Patient did not participate in 1600 after encouragement from staff. 1 on 1 talk time offered as an alternative.

## 2021-05-09 NOTE — PLAN OF CARE
Problem: Depressive Behavior With or Without Suicide Precautions:  Goal: Ability to disclose and discuss suicidal ideas will improve  Description: Ability to disclose and discuss suicidal ideas will improve  5/9/2021 1351 by Ricki Bella  Outcome: Ongoing     Problem: Depressive Behavior With or Without Suicide Precautions:  Goal: Able to verbalize support systems  Description: Able to verbalize support systems  5/9/2021 1345 by Anisha Bella  Outcome: Ongoing     Problem: Depressive Behavior With or Without Suicide Precautions:  Goal: Absence of self-harm  Description: Absence of self-harm  5/9/2021 1345 by Deandra Ureña  Outcome: Ongoing   Pt denies any self harming thoughts denies any hallucinations isolative aloof yet pleasant out for needs only

## 2021-05-09 NOTE — CONSULTS
buPROPion (WELLBUTRIN) 100 MG tablet Take 100 mg by mouth 2 times daily   Yes Historical Provider, MD   traZODone (DESYREL) 150 MG tablet Take 1 tablet by mouth nightly as needed for Sleep 3/14/21  Yes Kavon Mcghee MD   paliperidone (INVEGA) 6 MG extended release tablet Take 1 tablet by mouth daily 3/14/21  Yes Kavon Mcghee MD   benztropine (COGENTIN) 1 MG tablet Take 1 mg by mouth daily as needed   Yes Historical Provider, MD   escitalopram (LEXAPRO) 20 MG tablet Take 20 mg by mouth daily   Yes Historical Provider, MD   paliperidone palmitate ER (INVEGA SUSTENNA) 234 MG/1.5ML [place] IM injection Inject 234 mg into the muscle every 28 days   Yes Historical Provider, MD        Allergies:     Navane [[email or web address]]    Social History:     Tobacco:    reports that he has been smoking cigarettes. He has been smoking about 0.50 packs per day. He has never used smokeless tobacco.  Alcohol:      reports current alcohol use. Drug Use:  reports current drug use. Drug: Cocaine. Family History:     Family History   Problem Relation Age of Onset    Diabetes Mother     Heart Disease Mother        Review of Systems:     Positive and Negative as described in HPI. CONSTITUTIONAL:  negative for fevers, chills, sweats, fatigue, weight loss  HEENT:  negative for vision, hearing changes, runny nose, throat pain  RESPIRATORY:  negative for shortness of breath, cough, congestion, wheezing. CARDIOVASCULAR:  negative for chest pain, palpitations.   GASTROINTESTINAL:  negative for nausea, vomiting, diarrhea, constipation, change in bowel habits, abdominal pain   GENITOURINARY:  negative for difficulty of urination, burning with urination, frequency   INTEGUMENT: Diffuse a skin lesions  HEMATOLOGIC/LYMPHATIC:  negative for swelling/edema   ALLERGIC/IMMUNOLOGIC:  negative for urticaria , itching  ENDOCRINE:  negative increase in drinking, increase in urination, hot or cold intolerance  MUSCULOSKELETAL:  negative joint pains, muscle aches, swelling of joints  NEUROLOGICAL:  negative for headaches, dizziness, lightheadedness, numbness, pain, tingling extremities      Physical Exam:     BP (!) 94/55   Pulse 59   Temp 98.7 °F (37.1 °C) (Oral)   Resp 14   Ht 6' 3\" (1.905 m)   Wt 172 lb (78 kg)   SpO2 99%   BMI 21.50 kg/m²   Temp (24hrs), Av.6 °F (37 °C), Min:98.5 °F (36.9 °C), Max:98.7 °F (37.1 °C)    No results for input(s): POCGLU in the last 72 hours. No intake or output data in the 24 hours ending 21 1318    General Appearance:  alert, well appearing, and in no acute distress  Mental status: oriented to person, place, and time with normal affect  Head:  normocephalic, atraumatic. Eye: no icterus, redness, pupils equal and reactive, extraocular eye movements intact, conjunctiva clear  Ear: normal external ear, no discharge, hearing intact  Nose:  no drainage noted  Mouth: mucous membranes moist  Neck: supple, no carotid bruits, thyroid not palpable  Lungs: Bilateral equal air entry, clear to ausculation, no wheezing, rales or rhonchi, normal effort  Cardiovascular: normal rate, regular rhythm, no murmur, gallop, rub.   Abdomen: Soft, nontender, nondistended, normal bowel sounds, no hepatomegaly or splenomegaly  Neurologic: There are no new focal motor or sensory deficits, normal muscle tone and bulk, no abnormal sensation, normal speech, cranial nerves II through XII grossly intact  Skin: Multiple small macular lesions noted at different ages of healing   extremities:  peripheral pulses palpable, no pedal edema or calf pain with palpation      Investigations:      Laboratory Testing:  Recent Results (from the past 24 hour(s))   HIV Screen    Collection Time: 21  1:24 PM   Result Value Ref Range    HIV Ag/Ab NONREACTIVE NONREACTIVE   T.pallidum Ab Screen    Collection Time: 21  1:24 PM   Result Value Ref Range    T. pallidum, IgG NONREACTIVE NONREACTIVE           Consultations:   IP CONSULT TO INTERNAL MEDICINE  Assessment :      Primary Problem  Schizoaffective disorder, depressive type [facility])    Active Hospital Problems    Diagnosis Date Noted    [facility] ([facility] [F23] 03/10/2021    Schizoaffective disorder, depressive type ([facility] [F25.1] 09/23/2017       Plan:     Diffuse small macular skin bumps  No involvement of palms or interdigital area  Doubt any signs of STD  Will do work-up for VDRL HIV gonococcal and Chlamydia work-up  If above negative will need a punch biopsy and outpatient dermatology follow-up  Dm controlled a1c 4.9  May 9  HIV and VDRL both negative  Outpatient dermatology appointment  We will sign off please call as needed    Melvin Chandra MD  5/9/2021  1:18 PM    Copy sent to Dr. Kasia Torres primary care provider on file. Please note that this chart was generated using voice recognition Dragon dictation software. Although every effort was made to ensure the accuracy of this automated transcription, some errors in transcription may have occurred.

## 2021-05-09 NOTE — GROUP NOTE
Group Therapy Note    Date: 5/9/2021    Group Start Time: 0289  Group End Time: 0567  Group Topic: Cognitive Skills    [address], [place]        Group Therapy Note    Attendees: 9/18        patient refused to attend communication skills group at [phone] after encouragement from staff. 1:1 talk time provided as alternative to group session.            Signature:  Dennis Gray

## 2021-05-10 PROCEDURE — 6370000000 HC RX 637 (ALT 250 FOR IP)

## 2021-05-10 PROCEDURE — 1240000000 HC EMOTIONAL WELLNESS R&B

## 2021-05-10 PROCEDURE — 99232 SBSQ HOSP IP/OBS MODERATE 35: CPT | Performed by: PSYCHIATRY & NEUROLOGY

## 2021-05-10 PROCEDURE — 6370000000 HC RX 637 (ALT 250 FOR IP): Performed by: PSYCHIATRY & NEUROLOGY

## 2021-05-10 RX ADMIN — PALIPERIDONE 6 MG: 6 TABLET, EXTENDED RELEASE ORAL at 08:52

## 2021-05-10 RX ADMIN — ESCITALOPRAM OXALATE 20 MG: 20 TABLET ORAL at 08:52

## 2021-05-10 RX ADMIN — TRAZODONE HYDROCHLORIDE 150 MG: 150 TABLET ORAL at 21:21

## 2021-05-10 RX ADMIN — HYDROXYZINE HYDROCHLORIDE 50 MG: 50 TABLET, FILM COATED ORAL at 21:21

## 2021-05-10 NOTE — PLAN OF CARE
Problem: Depressive Behavior With or Without Suicide Precautions:  Goal: Ability to disclose and discuss suicidal ideas will improve  Description: Ability to disclose and discuss suicidal ideas will improve  5/10/2021 1949 by Dmitriy Stewart LPN  Outcome: Ongoing  Note: Patient denies suicidal ideations at this time. Patient agrees to seek out staff if they begin having suicidal ideations or need to talk. He reports auditory hallucinations of mumbles and denies depression and anxiety. Q15min safety checks continue      Problem: Depressive Behavior With or Without Suicide Precautions:  Goal: Absence of self-harm  Description: Absence of self-harm  5/10/2021 1949 by Dmitriy Stewart LPN  Outcome: Ongoing  Note: Patient denies thoughts of self harm at this time. Patient agrees to seek out staff if they begin having thoughts of harming self or they need to talk.  Q15min safety checks continue

## 2021-05-10 NOTE — GROUP NOTE
Group Therapy Note    Date: 5/10/2021    Group Start Time: 4161  Group End Time: 0109  Group Topic: Psychoeducation    [address] [place] [address] Drive    Patient refused to attend mental health education/ social skills group at [phone] after encouragement from staff.       Signature:  Yenny Lovett

## 2021-05-10 NOTE — GROUP NOTE
Group Therapy Note    Date: 5/10/2021    Group Start Time: 1100  Group End Time: 1563  Group Topic: Psychoeducation    JESUS Arellano, [place]    Patient refused to attend socialization skills group at 1100 after encouragement from staff. 1:1 talk time offered by staff as alternative to group session.

## 2021-05-10 NOTE — GROUP NOTE
Group Therapy Note    Date: 5/9/2021    Group Start Time: 2000  Group End Time: 2018  Group Topic: Wrap-Up    JESUS Wills        Group Therapy Note    Attendees: 11/19             Status After Intervention:  Improved    Participation Level:  Active Listener    Participation Quality: Appropriate      Speech:  normal      Thought Process/Content: Logical      Affective Functioning: Congruent      Mood: elevated      Level of consciousness:  Alert      Response to Learning: Able to verbalize current knowledge/experience      Endings: None Reported    Modes of Intervention: Support and Socialization      Discipline Responsible: [employer]      Signature:  Nils Heaton

## 2021-05-10 NOTE — GROUP NOTE
Group Therapy Note    Date: 5/10/2021    Group Start Time: 1000  Group End Time: 4186  Group Topic: Psychotherapy    [address], LSW    patient refused to attend psychotherapy group at [facility] after encouragement from staff.   1:1 talk time provided as alternative to group session

## 2021-05-10 NOTE — GROUP NOTE
Group Therapy Note    Date: 5/10/2021    Group Start Time: 1600  Group End Time: 6772  Group Topic: Group Documentation    JESUS Loya LPN        Group Therapy Note    Attendees: 10    Patient attended  and participated.         Signature:  Chaim Loya LPN

## 2021-05-10 NOTE — PROGRESS NOTES
Daily Progress Note  5/10/2021  CHIEF COMPLAINT: Acute psychosis    Reviewed patient's current plan of care and vital signs with nursing staff. Sleep: Several hours last night  Attending groups: no today. Attended 1 group previous evening. SUBJECTIVE:    Patient is medication compliant and behaviorally in control. Staff report no overnight events. Patient primarily isolates to self and is out in the day area only for meals and medications. Patient is resting in his room at start of assessment and is agreeable to discussion. He is minimally engaged and aloof. Reports feeling tired, and states he is getting \"a lot of sleep, but it does not feel very good\". Patient continues to endorse auditory hallucinations, but does not describe them to this writer. He rates the volume of auditory hallucinations as 4 out of 10, with 10 being the loudest.  He does report that he is unhappy with his current living situation, but will not go into more detail. Reports continued suicidal ideation, but denies a plan or intent at this time. He is able to contract for safety on unit. Per review of staff documentation, patient did attend group yesterday with good participation. His mood was described as elevated. He does not present as elevated during assessment with writer, but rather blunted. Discussed patient during treatment team, and social work organized discharge plan with his community mental health provider Rajesh ACT team.  He is to be discharged directly to [facility] tomorrow and will receive his long-acting injectable at their facility.     Mental Status Exam  Level of consciousness:  Within normal limits  Appearance: Hospital attire, laying in bed, with good grooming and hygiene   Behavior/Motor: Psychomotor retardation  Attitude toward examiner:  Cooperative, attentive, good eye contact  Speech: Delayed responses, slow rate, low volume and mumbled speech  Mood: Depressed  Affect: Blunted  Thought processes: linear, goal directed and coherent  Thought content:  denies homicidal ideation  Suicidal Ideation: Endorses continued suicidal ideation, able to contract for safety on unit  Delusions: Improving paranoia  Perceptual Disturbance: Continued auditory hallucinations. Cognition:  Oriented to self, location, time, and situation  Memory: age appropriate  Insight & Judgement: improving  Medication side effects:  denies       Data   height is 6' 3\" (1.905 m) and weight is 172 lb (78 kg). His oral temperature is 98.5 °F (36.9 °C). His blood pressure is 91/53 (abnormal) and his pulse is 83. His respiration is 14 and oxygen saturation is 99%.    Labs:   Admission on 05/06/2021   Component Date Value Ref Range Status    WBC 05/06/2021 4.4  3.5 - 11.0 k/uL Final    RBC 05/06/2021 3.72* 4.5 - 5.9 m/uL Final    Hemoglobin 05/06/2021 11.0* 13.5 - 17.5 g/dL Final    Hematocrit 05/06/2021 32.9* 41 - 53 % Final    MCV 05/06/2021 88.2  80 - 100 fL Final    MCH 05/06/2021 29.4  26 - 34 pg Final    MCHC 05/06/2021 33.4  31 - 37 g/dL Final    RDW 05/06/2021 15.2* 11.5 - 14.9 % Final    Platelets 43/97/6883 332  150 - 450 k/uL Final    MPV 05/06/2021 6.2  6.0 - 12.0 fL Final    NRBC Automated 05/06/2021 NOT REPORTED  per 100 WBC Final    Differential Type 05/06/2021 NOT REPORTED   Final    Seg Neutrophils 05/06/2021 48  36 - 66 % Final    Lymphocytes 05/06/2021 40  24 - 44 % Final    Monocytes 05/06/2021 8* 1 - 7 % Final    Eosinophils % 05/06/2021 4  0 - 4 % Final    Basophils 05/06/2021 0  0 - 2 % Final    Immature Granulocytes 05/06/2021 NOT REPORTED  0 % Final    Segs Absolute 05/06/2021 2.10  1.3 - 9.1 k/uL Final    Absolute Lymph # 05/06/2021 1.80  1.0 - 4.8 k/uL Final    Absolute Mono # 05/06/2021 0.40  0.1 - 1.3 k/uL Final    Absolute Eos # 05/06/2021 0.20  0.0 - 0.4 k/uL Final    Basophils Absolute 05/06/2021 0.00  0.0 - 0.2 k/uL Final    Absolute Immature Granulocyte 05/06/2021 NOT REPORTED  0.00 - 0.30 k/uL Final    WBC Morphology 05/06/2021 NOT REPORTED   Final    RBC Morphology 05/06/2021 NOT REPORTED   Final    Platelet Estimate 71/93/3784 NOT REPORTED   Final    Glucose 05/06/2021 87  70 - 99 mg/dL Final    BUN 05/06/2021 15  8 - 23 mg/dL Final    CREATININE 05/06/2021 1.27* 0.70 - 1.20 mg/dL Final    Bun/Cre Ratio 05/06/2021 NOT REPORTED  9 - 20 Final    Calcium 05/06/2021 9.2  8.6 - 10.4 mg/dL Final    Sodium 05/06/2021 141  135 - 144 mmol/L Final    Potassium 05/06/2021 4.2  3.7 - 5.3 mmol/L Final    Chloride 05/06/2021 105  98 - 107 mmol/L Final    CO2 05/06/2021 27  20 - 31 mmol/L Final    Anion Gap 05/06/2021 9  9 - 17 mmol/L Final    Alkaline Phosphatase 05/06/2021 125  40 - 129 U/L Final    ALT 05/06/2021 10  5 - 41 U/L Final    AST 05/06/2021 19  <40 U/L Final    Total Bilirubin 05/06/2021 0.21* 0.3 - 1.2 mg/dL Final    Total Protein 05/06/2021 7.2  6.4 - 8.3 g/dL Final    Albumin 05/06/2021 4.3  3.5 - 5.2 g/dL Final    Albumin/Globulin Ratio 05/06/2021 NOT REPORTED  1.0 - 2.5 Final    GFR Non- 05/06/2021 57* >60 mL/min Final    GFR  05/06/2021 >60  >60 mL/min Final    GFR Comment 05/06/2021        Final    Comment: Average GFR for 61-76 years old:   80 mL/min/1.73sq m  Chronic Kidney Disease:   <60 mL/min/1.73sq m  Kidney failure:   <15 mL/min/1.73sq m              eGFR calculated using average adult body mass.  Additional eGFR calculator available at:        [employer]            GFR Staging 05/06/2021 NOT REPORTED   Final    Ethanol 05/06/2021 <10  <10 mg/dL Final    Ethanol percent 05/06/2021 <0.010  % Final    Amphetamine Screen, Ur 05/06/2021 NEGATIVE  NEGATIVE Final    Comment:       (Positive cutoff 1000 ng/mL)                  Barbiturate Screen, Ur 05/06/2021 NEGATIVE  NEGATIVE Final    Comment:       (Positive cutoff 200 ng/mL)                  Benzodiazepine Screen, Urine 05/06/2021 NEGATIVE NEGATIVE Final    Comment:       (Positive cutoff 200 ng/mL)                  Cocaine Metabolite, Urine 05/06/2021 POSITIVE* NEGATIVE Final    Comment:       (Positive cutoff 300 ng/mL)                  Methadone Screen, Urine 05/06/2021 NEGATIVE  NEGATIVE Final    Comment:       (Positive cutoff 300 ng/mL)                  Opiates, Urine 05/06/2021 NEGATIVE  NEGATIVE Final    Comment:       (Positive cutoff 300 ng/mL)                  Phencyclidine, Urine 05/06/2021 NEGATIVE  NEGATIVE Final    Comment:       (Positive cutoff 25 ng/mL)                  Propoxyphene, Urine 05/06/2021 NOT REPORTED  NEGATIVE Final    Cannabinoid Scrn, Ur 05/06/2021 NEGATIVE  NEGATIVE Final    Comment:       (Positive cutoff 50 ng/mL)                  Oxycodone Screen, Ur 05/06/2021 NEGATIVE  NEGATIVE Final    Comment:       (Positive cutoff 100 ng/mL)                  Methamphetamine, Urine 05/06/2021 NOT REPORTED  NEGATIVE Final    Tricyclic Antidepressants, Urine 05/06/2021 NOT REPORTED  NEGATIVE Final    MDMA, Urine 05/06/2021 NOT REPORTED  NEGATIVE Final    Buprenorphine Urine 05/06/2021 NOT REPORTED  NEGATIVE Final    Test Information 05/06/2021 Assay provides medical screening only. The absence of expected drug(s) and/or metabolite(s) may indicate diluted or adulterated urine, limitations of testing or timing of collection. Final    Comment: Testing for legal purposes should be confirmed by another method. To request confirmation   of test result, please call the lab within 7 days of sample submission.  Salicylate Lvl 88/51/4354 <1* 3 - 10 mg/dL Final    Acetaminophen Level 05/06/2021 <5* 10 - 30 ug/mL Final    Specimen Description 05/06/2021 . NASOPHARYNGEAL SWAB   Final    SARS-CoV-2, Rapid 05/06/2021 Not Detected  Not Detected Final    Comment:       Rapid NAAT:  The specimen is NEGATIVE for SARS-CoV-2, the novel coronavirus associated with   COVID-19.         The ID NOW COVID-19 assay is designed to

## 2021-05-10 NOTE — CARE COORDINATION
Kanwal spoke with Radha from [facility], advised of d/c tomorrow, Radha states pt has appointment for [facility] [place] tomorrow at 1130am at [facility] location. KANWAL relayed this to MD, pt to be d/c directly to [place] tomorrow for nurse/MD appointment for monitoring and GALVAN.

## 2021-05-10 NOTE — PLAN OF CARE
Problem: Tobacco Use:  Goal: Inpatient tobacco use cessation counseling participation  Description: Inpatient tobacco use cessation counseling participation  Outcome: Ongoing     Problem: Depressive Behavior With or Without Suicide Precautions:  Goal: Ability to disclose and discuss suicidal ideas will improve  Description: Ability to disclose and discuss suicidal ideas will improve  Outcome: Ongoing  Note: Patient denies suicidal ideations, thoughts to harm self and others. Patient is independent, needs encouragement for hygiene and participation on unit. Patient is isolative, lacks interest, feels hopeless and helpless. Staff will continue to monitor.    Goal: Able to verbalize support systems  Description: Able to verbalize support systems  Outcome: Ongoing  Goal: Absence of self-harm  Description: Absence of self-harm  Outcome: Ongoing  Goal: Patient specific goal  Description: Patient specific goal  Outcome: Ongoing

## 2021-05-10 NOTE — GROUP NOTE
Group Therapy Note    Date: 5/10/2021    Group Start Time: 0900  Group End Time: 0636  Group Topic: Community Meeting    [facility]    Patient refused to attend community meeting and goal setting group at 0900 after encouragement from staff. 1:1 talk time offered by staff as alternative to group session.

## 2021-05-11 VITALS
RESPIRATION RATE: 14 BRPM | SYSTOLIC BLOOD PRESSURE: 149 MMHG | TEMPERATURE: 98.2 F | DIASTOLIC BLOOD PRESSURE: 80 MMHG | HEART RATE: 88 BPM | BODY MASS INDEX: 21.39 KG/M2 | HEIGHT: 75 IN | WEIGHT: 172 LBS | OXYGEN SATURATION: 99 %

## 2021-05-11 PROCEDURE — 99239 HOSP IP/OBS DSCHRG MGMT >30: CPT | Performed by: PSYCHIATRY & NEUROLOGY

## 2021-05-11 PROCEDURE — 6370000000 HC RX 637 (ALT 250 FOR IP)

## 2021-05-11 RX ORDER — TRAZODONE HYDROCHLORIDE 150 MG/1
150 TABLET ORAL NIGHTLY PRN
Qty: 30 TABLET | Refills: 0 | Status: ON HOLD | OUTPATIENT
Start: 2021-05-11 | End: 2021-07-06 | Stop reason: SDUPTHER

## 2021-05-11 RX ORDER — ESCITALOPRAM OXALATE 20 MG/1
20 TABLET ORAL DAILY
Qty: 30 TABLET | Refills: 0 | Status: ON HOLD | OUTPATIENT
Start: 2021-05-11 | End: 2021-07-06 | Stop reason: SDUPTHER

## 2021-05-11 RX ORDER — PALIPERIDONE 6 MG/1
6 TABLET, EXTENDED RELEASE ORAL DAILY
Qty: 30 TABLET | Refills: 0 | Status: ON HOLD | OUTPATIENT
Start: 2021-05-11 | End: 2021-07-06 | Stop reason: HOSPADM

## 2021-05-11 RX ORDER — HYDROXYZINE 50 MG/1
50 TABLET, FILM COATED ORAL 3 TIMES DAILY PRN
Qty: 30 TABLET | Refills: 0 | Status: SHIPPED | OUTPATIENT
Start: 2021-05-11 | End: 2021-05-21

## 2021-05-11 RX ADMIN — PALIPERIDONE 6 MG: 6 TABLET, EXTENDED RELEASE ORAL at 09:56

## 2021-05-11 RX ADMIN — ESCITALOPRAM OXALATE 20 MG: 20 TABLET ORAL at 09:56

## 2021-05-11 NOTE — DISCHARGE SUMMARY
Provider Discharge Summary     Patient ID:  Radha Yuan  [id number]  8 y.o.  1960    Admit date: 5/6/2021    Discharge date and time: 5/11/2021  5:34 PM     Admitting Physician: Marlene White MD     Discharge Physician: Leena Anaya MD    Admission Diagnoses: Acute psychosis ([facility] [F23]  Schizoaffective disorder, depressive type ([facility] [F25.1]    Discharge Diagnoses:      Schizoaffective disorder, depressive type ([address]     Patient Active Problem List   Diagnosis Code    Hematuria R31.9    Suicidal ideations R45.851    Cocaine abuse ([facility] F14.10    Schizoaffective disorder, bipolar type ([address] F25.0    Schizoaffective disorder, depressive type ([address] F25.1    Perianal abscess K61.0    Carla-rectal abscess K61.1    Schizophrenia ([facility] F20.9    Schizoaffective disorder ([address] F25.9    Major depression with psychotic features ([address] F32.3    Acute psychosis ([facility] [address]        Admission Condition: poor    Discharged Condition: stable    Indication for Admission: threat to self    History of Present Illnes (present tense wording is of findings from admission exam and are not necessarily indicative of current findings):   Radha Yuan is a 57 y.o. male who has a past medical history of schizoaffective disorder with substance abuse.       Radha Yuan presented to the ED with command auditory hallucinations to harm self with a  knife. During assessment inpatient, patient endorses command auditory hallucinations to harm himself. At time of assessment, Effie Bean was laying in bed, minimally engaged with interviewer, patient answered questions with limited information. Effie Comment endorses that his depression has been worsening over the past month he presents with hopelessness, helplessness, and uncertainty about future.   Effie Comment endorses that his mother passed away approximately 3 weeks ago and he has been dealing with increased grief related to her death.      Patient continues to be monitored in the inpatient psychiatric facility at [facility] for safety and stabilization. Patient continues to need, on a daily basis, active treatment furnished directly by or requiring the supervision of inpatient psychiatric personnel. Hospital Course:   Upon admission, Sesar Watts was provided a safe secure environment, introduced to unit milieu. Patient participated in groups and individual therapies. Meds were adjusted as noted below. After few days of hospital care, patient began to feel improvement. Depression lifted, thoughts to harm self ceased. Sleep improved, appetite was good. On morning rounds 5/11/2021, Sesar Watts  endorses feeling ready for discharge. Patient denies suicidal or homicidal ideations, denies hallucinations or delusions. Denies SE's from meds. It was decided that maximum benefit from hospital care had been achieved and patient can be discharged. Consults:   Medicinerecommended outpatient dermatology follow-up    Significant Diagnostic Studies: Routine labs and diagnostics    Treatments: Psychotropic medications, therapy with group, milieu, and 1:1 with nurses, social workers, PA-C/CNP, and Attending physician.       Discharge Medications:  Discharge Medication List as of 5/11/2021 10:06 AM      CONTINUE these medications which have CHANGED    Details   escitalopram (LEXAPRO) 20 MG tablet Take 1 tablet by mouth daily, Disp-30 tablet, R-0Normal      hydrOXYzine (ATARAX) 50 MG tablet Take 1 tablet by mouth 3 times daily as needed for Anxiety, Disp-30 tablet, R-0Normal      paliperidone (INVEGA) 6 MG extended release tablet Take 1 tablet by mouth daily, Disp-30 tablet, R-0Normal      traZODone (DESYREL) 150 MG tablet Take 1 tablet by mouth nightly as needed for Sleep, Disp-30 tablet, R-0Normal         CONTINUE these medications which have NOT CHANGED    Details   paliperidone palmitate ER (INVEGA SUSTENNA) 234 MG/1.5ML [place] IM injection Inject 234 mg into the muscle every 28 daysHistorical Med STOP taking these medications       buPROPion (WELLBUTRIN) 100 MG tablet Comments:   Reason for Stopping:         benztropine (COGENTIN) 1 MG tablet Comments:   Reason for Stopping:                Patient was not discharged on 2 or more antipsychotics     Core Measures statement:   Not applicable    Discharge Exam:  Level of consciousness:  Within normal limits  Appearance: Street clothes, seated, with good grooming  Behavior/Motor: No abnormalities noted  Attitude toward examiner:  Cooperative, attentive, good eye contact  Speech:  spontaneous, normal rate, normal volume and well articulated  Mood:  euthymic  Affect:  Full range  Thought processes:  linear, goal directed and coherent  Thought content:  denies homicidal ideation  Suicidal Ideation:  denies suicidal ideation  Delusions:  no evidence of delusions  Perceptual Disturbance:  denies any perceptual disturbance  Cognition:  Intact  Memory: age appropriate  Insight & Judgement: fair  Medication side effects: denies     Disposition: home    Patient Instructions: Activity: activity as tolerated  1. Patient instructed to take medications regularly and follow up with outpatient appointments. Follow-up as scheduled with outpatient [facility]      Signed:    Electronically signed by Gerri Parnell MD on 5/11/21 at 5:34 PM EDT    Time Spent on discharge is more than 30 minutes in the examination, evaluation, counseling and review of medications and discharge plan.

## 2021-05-11 NOTE — GROUP NOTE
Group Therapy Note    Date: 5/11/2021    Group Start Time: 0900  Group End Time: 0930  Group Topic: Community Meeting    [address], [address] [place] [address] Therapy Note    Attendees:6/17      patient refused to attend goal setting and community meeting group at [facility] [address] after encouragement from staff. 1:1 talk time provided as alternative to group session.        Signature:  Afshan Burks [place]

## 2021-05-11 NOTE — BH NOTE
[facility]  Discharge Note    Pt discharged with followings belongings:   Dentures: None  Vision - Corrective Lenses: None  Hearing Aid: None  Jewelry: None  Body Piercings Removed: N/A  Clothing: Footwear, Pants, Shirt, Socks  Were All Patient Medications Collected?: Not Applicable  Other Valuables: Cell phone, Deon Gardner sent home with patient  Valuables retrieved from safe, Security envelope number:  [id number] and returned to patient. Patient education on aftercare instructions: given  Information faxed to [facility] by staff Patient verbalize understanding of AVS: yes      Patient discharged to facility via cab belongings sent . Status EXAM upon discharge:  Status and Exam  Normal: Yes  Facial Expression: Brightened  Affect: Appropriate  Level of Consciousness: Alert  Mood:Normal: Yes  Mood: Elated  Motor Activity:Normal: Yes  Motor Activity: Decreased  Interview Behavior: Cooperative  Preception: [place] to Person, Alanda Marten to Time, [place] to Place, [place] to Situation  Attention:Normal: Yes  Attention: Distractible  Thought Processes: Circumstantial  Thought Content:Normal: No  Thought Content: Poverty of Content  Hallucinations:  Auditory (Comment)  Delusions: No  Delusions: (paranoid)  Memory:Normal: No  Memory: Poor Recent, Poor Remote  Insight and Judgment: No  Insight and Judgment: Poor Judgment, Poor Insight  Present Suicidal Ideation: No  Present Homicidal Ideation: No      Metabolic Screening:    Lab Results   Component Value Date    LABA1C 4.9 08/11/2020       Lab Results   Component Value Date    CHOL 167 08/11/2020    CHOL 179 02/13/2017    CHOL 127 05/09/2015    CHOL 168 08/20/2014    CHOL 158 12/31/2013    CHOL 178 08/15/2013    CHOL 166 09/17/2012    CHOL 210 (H) 02/03/2012     Lab Results   Component Value Date    TRIG 43 08/11/2020    TRIG 67 02/13/2017    TRIG 37 05/09/2015    TRIG 72 08/20/2014    TRIG 52 12/31/2013    TRIG 70 08/15/2013    TRIG 117 09/17/2012    TRIG 94

## 2021-05-24 ENCOUNTER — HOSPITAL ENCOUNTER (EMERGENCY)
Age: 61
Discharge: HOME | End: 2021-05-24
Attending: EMERGENCY MEDICINE
Payer: MEDICAID

## 2021-05-24 VITALS
OXYGEN SATURATION: 99 % | RESPIRATION RATE: 18 BRPM | DIASTOLIC BLOOD PRESSURE: 66 MMHG | HEART RATE: 77 BPM | SYSTOLIC BLOOD PRESSURE: 113 MMHG | TEMPERATURE: 97.4 F

## 2021-05-24 DIAGNOSIS — R45.851 SUICIDAL IDEATION: Primary | ICD-10-CM

## 2021-05-24 PROCEDURE — 99285 EMERGENCY DEPT VISIT HI MDM: CPT

## 2021-05-24 ASSESSMENT — ENCOUNTER SYMPTOMS
NAUSEA: 0
ABDOMINAL PAIN: 0
SHORTNESS OF BREATH: 0
VOMITING: 0

## 2021-05-24 NOTE — ED PROVIDER NOTES
fever.   Respiratory: Negative for shortness of breath. Cardiovascular: Negative for chest pain. Gastrointestinal: Negative for abdominal pain, nausea and vomiting. Skin: Negative for wound. Neurological: Negative for weakness and numbness. Psychiatric/Behavioral: Positive for hallucinations and suicidal ideas. Negative for agitation, confusion and self-injury. The patient is not nervous/anxious. PHYSICAL EXAM   (up to 7 for level 4, 8 or more forlevel 5)      INITIAL VITALS:   ED Triage Vitals   BP Temp Temp src Pulse Resp SpO2 Height Weight   -- -- -- -- -- -- -- --       Physical Exam  Vitals and nursing note reviewed. Constitutional:       General: He is not in acute distress. Appearance: Normal appearance. He is well-developed. He is not diaphoretic. HENT:      Head: Normocephalic and atraumatic. Cardiovascular:      Rate and Rhythm: Normal rate. Pulmonary:      Effort: Pulmonary effort is normal.   Abdominal:      General: There is no distension. Palpations: Abdomen is soft. Tenderness: There is no abdominal tenderness. There is no guarding. Skin:     General: Skin is warm and dry. Neurological:      Mental Status: He is alert. Mental status is at baseline. Psychiatric:         Attention and Perception: Attention normal.         Mood and Affect: Mood normal.         Speech: Speech normal.         Behavior: Behavior normal. Behavior is cooperative. Thought Content: Thought content includes suicidal ideation. Thought content does not include homicidal ideation. Thought content includes suicidal plan. DIFFERENTIAL  DIAGNOSIS     PLAN (LABS / IMAGING / EKG):  No orders of the defined types were placed in this encounter. MEDICATIONS ORDERED:  No orders of the defined types were placed in this encounter.           Initial MDM/Plan/ED COURSE:    57 y.o. male who presents with complaints of suicidal ideation with a plan to cut his wrist.  On exam

## 2021-05-24 NOTE — ED NOTES
Pt called in as watch to public safety. [] Sosa    [] [facility]    [x]  [facility] ASSESSMENT      Y  N     [x] [] In the past two weeks have you had thoughts of hurting yourself in any way? [x] [] In the past two weeks have you had thoughts that you would be better off dead? [] [x] Have you made a suicide attempt in the past two months? [x] [] Do you have a plan for hurting yourself or suicide? [x] [] Presence of hallucinations/voices related to hurting himself or herself or someone else. SUICIDE/SECURITY WATCH PRECAUTION CHECKLIST     Orders    [x]  Suicide/Security Watch Precautions initiated as checked below:   5/24/21 10:39 AM EDT BH31/BH31A    [x] Notified physician:  Cherise Roberts MD  5/24/21 10:39 AM EDT    [x] Orders obtained as appropriate:     [x] 1:1 Observer     [] Psych Consult     [] Psych Consult    Name:  Date:  Time:    [x] 1:1 Observer, Notified by:  Annmarie Marvin RN    Contact Nurse Supervisor    [x] Remove all personal clothes from room and place in snap/paper gown/pants. Slipper only    [x] Remove all personal belongings from room and secured away from patient. Documentation    [x] Initiate Suicide/Security Watch Precaution Flow Sheet    [x] Initiate individualized Care Plan/Problem    [x] Document why precautions initiated on flow sheet (Initiate Nursing Care Plan/Problem)    [x] 1:1 Observer in place; instructions provided. Suicide precautions require observer be within arms length. [x] Nurse-Observer Communication Hand-off initiated by RN, reviewed with Observer. Subsequently used as Hand Off between Observers. [x] Initiate every 15 minute observations per observer as delegated by the RN.     [x] Initiate RN assessment and documentation    Environmental Scan  Search Criteria and Process: OPTIONAL, see Search Policy    [x] Reason for search: SI    [x] Nursing in presence of second person to search patient [x] Patient notified of reason for body assessment and belongings search:     Persons present during search:   Results of search and disposition:       Searchers Name: security      These items or items similar should be removed from the room:   [] Chairs   [] Telephone   [x] Trash cans and liners   [x] Plastic utensils (order Patient Safety tray)   [x] Empty or remove Sharps containers   [x] All personal clothing/belongings removed   [x] All unnecessary lead wires, electrical cords, draw cords, etc.   [x] Flowers and plants   [x] Double check for lighters, matches, razors, any glass items etc that can be used as weapons. Person completing Checklist: Nico Davidson RN       GENERAL INFORMATION     Y  N     [x] [] Has the patient been informed that they are on a watch and what that means? [x] [] Can the patient get out of Bed without nursing assistance? [x] [] Can the patient use the restroom without nursing assistance? [x] [] Can the patient walk the halls to [place] their legs? \"   [] [x] Does the patient have metal utensils? [x] [] Have the patient's belongings been placed out of control of the patient? [x] [] Have the patient and his/her belongings been checked for contraband? [x] [] Is the patient under any visitor restrictions? If Yes, explain:   [] [x] Is the patient under an alias? Alias Name:   Authorized visitors (no more than two are to be on the list)   Name/Relationship:   Name/Relationship:    Name of Staff member that you  Received this information from?: security     General Description:    Evan Agrawal BH31/BH31A male 57 y.o. Admission weight:      Race: []  [x] Black  []   []   [] [place] [] Other  Facial Hair:  [] Yes  [x] No  If yes, please describe: Identifying Marks (i.e. Visible tattoos, scars, etc... ):     NURSING CARE PLAN    Nursing Diagnosis: Risk of Self Directed Harm  [] Actual  [x] Potential  Date Started: 5/24/21      Etiological Factors: (related to)  [x] Expressed or implied suicidal ideation/behavior  [] Depression  [] Suicide attempt      [] Low self-esteem  [] Hallucinations      [x] Feeling of Hopelessness  [] Substance abuse or withdrawal    [] Dysfunctional family  [] Major traumatic event, eg., divorce, etc   [x] Excessive stress/anxiety    5/24/21    Expected Outcomes    Patient will:   [x] Patient will remain safe for the duration of their stay   [x] Patient's environment will be safe, eg. Free of potential suicide weapons   [] Verbalize Recovery from suicidal episode and improvement in self-worth   [x] Discuss feeling that precipitated suicide attempt/thoughts/behavior   [] Will describe available resources for crisis prevention and management   [] Will verbalize positive coping skills     Nursing Intervention   [x] Assessment and Observations hourly   [x] Suicide Precautions implemented with patient, should be 1:1 observation   [x] Document observation [id number] and RN assessment hourly   [] Consult physician for:    [] Psychiatric consult    [] Pharmacological therapy    [] Other:    [x] Patient search completed by security   [x] Initiated appropriate safety protocols by removing from the patient's environment anything that could be used to inflict self injury, eg. Order safe tray, snap gown, etc   [x] Maintain open, warm, caring, non-judgmental attitude/manner towards patient   [] Discuss advantages and disadvantages of existing coping methods/skills   [x] Assist and educate patient with identifying present strengths and coping skills   [x] Keep patient informed regarding plan of care and provide clear concise explanations. Provide the patient/family education information as well as telephone numbers and other information about crisis centers, hot lines, and counselors.     Discharge Planning:   [] Referral  [] Groups [] Health agencies  [] Other:          Troy Conte RN  05/24/21 0353

## 2021-05-24 NOTE — ED PROVIDER NOTES
[facility]  Emergency Department  Faculty Attestation     I performed a history and physical examination of the patient and discussed management with the resident. I reviewed the residents note and agree with the documented findings and plan of care. Any areas of disagreement are noted on the chart. I was personally present for the key portions of any procedures. I have documented in the chart those procedures where I was not present during the key portions. I have reviewed the emergency nurses triage note. I agree with the chief complaint, past medical history, past surgical history, allergies, medications, social and family history as documented unless otherwise noted below. For Physician Assistant/ Nurse Practitioner cases/documentation I have personally evaluated this patient and have completed at least one if not all key elements of the E/M (history, physical exam, and MDM). Additional findings are as noted. Primary Care Physician:  No primary care provider on file. Screenings:  [unfilled]    CHIEF COMPLAINT       Chief Complaint   Patient presents with    Suicidal       RECENT VITALS:   Temp: 97.4 °F (36.3 °C),  Pulse: 77, Resp: 18, BP: 113/66    LABS:  Labs Reviewed - No data to display    Radiology  No orders to display         Attending Physician Additional  Notes    Patient is hearing voices telling him to harm himself. He has thoughts of cutting his wrist.  He has not had no prior attempts like this before. No drug use or alcohol. No medical complaints. He has food and shelter. He states he is compliant with his medications. He was recently discharged from the hospital for similar issues. On exam he is nontoxic afebrile vital signs are normal.  He alert and oriented cooperative. Normal pupils. Normal motor strength. Normal skin exam.  No apparent toxidrome. Impression is psychosis with thoughts of harm.   Plan is social work consultation for psychiatric consultation. Liudmila Hearn.  Ginny Ordoñez MD, [facility]  Attending Emergency  Physician               Jean-Claude Driver MD  05/24/21 7602

## 2021-05-24 NOTE — ED NOTES
Met with pt at bedside who reports that he has been having increased auditory hallucinations despite medication compliance. Pt received his Invega injection on 5/11 from [facility]. Spoke with Kathleen Garza from [address] [place] [address] Drive Team who states that the pt's sister had reached out to them and stated that the pt was having increased auditory hallucinations as well and she instructed him to go to the ER. Pt is agreeable to an assessment and treatment with [facility] for those auditory hallucinations. [facility] stated that their outreach team is in the field with a Health Officer evaluation and once they return, they can come pick the pt up. No further needs at this time, social work will remain available should further need for intervention arise.        Cecilia Callaway [place]  05/24/21 7260

## 2021-05-24 NOTE — ED NOTES
Pt to room 31 with c/o suicidal thoughts. Pt reports that he is hearing voices that are telling him to harm himself. Pt reports that he would use a kitchen knife to cut himself. Pt denies HI. Pt reports that he is taking medications at home, but isn't sure what he is taking. Pt alert and oriented x4, talking in complete sentences, respirations even and unlabored. Pt acting age appropriate.  White board updated, will continue to plan of care       Mukesh Moe RN  05/24/21 1038

## 2021-05-26 ENCOUNTER — HOSPITAL ENCOUNTER (EMERGENCY)
Age: 61
Discharge: HOME | End: 2021-05-26
Attending: EMERGENCY MEDICINE
Payer: MEDICAID

## 2021-05-26 VITALS
RESPIRATION RATE: 16 BRPM | SYSTOLIC BLOOD PRESSURE: 103 MMHG | TEMPERATURE: 98.2 F | BODY MASS INDEX: 21.39 KG/M2 | HEART RATE: 72 BPM | DIASTOLIC BLOOD PRESSURE: 64 MMHG | HEIGHT: 75 IN | WEIGHT: 172 LBS | OXYGEN SATURATION: 100 %

## 2021-05-26 DIAGNOSIS — R44.0 HEARING VOICES: Primary | ICD-10-CM

## 2021-05-26 DIAGNOSIS — L73.9 FOLLICULITIS: ICD-10-CM

## 2021-05-26 PROCEDURE — 99285 EMERGENCY DEPT VISIT HI MDM: CPT

## 2021-05-26 RX ORDER — SALICYLIC ACID 0.03 G/ML
1 SHAMPOO TOPICAL DAILY
Qty: 118 ML | Refills: 0 | Status: ON HOLD | OUTPATIENT
Start: 2021-05-26 | End: 2021-07-01

## 2021-05-26 ASSESSMENT — ENCOUNTER SYMPTOMS
SHORTNESS OF BREATH: 0
COUGH: 0
BACK PAIN: 0
ABDOMINAL PAIN: 0
DIARRHEA: 0
VOMITING: 0

## 2021-05-26 NOTE — ED TRIAGE NOTES
Mode of arrival (squad #, walk in, police, etc) :walked in       Chief complaint(s):mental health problems      Arrival Note (brief scenario, treatment PTA, etc). :States \" hearing voices tell me to kill myself\" last month off and on States last smoked crack 3 to 4 days ago. C= \"Have you ever felt that you should Cut down on your drinking? \"  No  A= \"Have people Annoyed you by criticizing your drinking? \"  No  G= \"Have you ever felt bad or Guilty about your drinking? \"  No  E= \"Have you ever had a drink as an Eye-opener first thing in the morning to steady your nerves or to help a hangover? \"  No      Deferred []      Reason for deferring: N/A    *If yes to two or more: probable alcohol abuse. *

## 2021-05-26 NOTE — ED PROVIDER NOTES
EMERGENCY DEPARTMENT ENCOUNTER    Pt Name: Lefty Noland  MRN: [id number]  [place] 1960  Date of evaluation: 5/26/21  CHIEF COMPLAINT       Chief Complaint   Patient presents with    Mental Health Problem     HISTORY OF PRESENT ILLNESS   HPI     This is a 69-year-old male with a history of schizoaffective disorder and cocaine abuse who comes in today the patient states that he has been hearing voices for quite a while a month now he reports noncompliance with his medications he was supposed to have an appointment with [facility] today but he said that he forgot to make it. He is concerned because he has a rash on his upper arms and his back it is not pruritic. The patient states that he does crack cocaine the last time he used was 2 days ago. He denies any suicidal or homicidal ideation. The voices do tell him to hurt himself. REVIEW OF SYSTEMS     Review of Systems   Constitutional: Negative for fever. HENT: Negative for congestion. Respiratory: Negative for cough and shortness of breath. Cardiovascular: Negative for chest pain. Gastrointestinal: Negative for abdominal pain, diarrhea and vomiting. Genitourinary: Negative for dysuria. Musculoskeletal: Negative for back pain. Skin: Negative for rash. Neurological: Negative for headaches. Psychiatric/Behavioral: Positive for hallucinations. All other systems reviewed and are negative.     PASTMEDICAL HISTORY     Past Medical History:   Diagnosis Date    Bipolar disorder ([address]     Depression     GERD (gastroesophageal reflux disease)     Hallucinations     Headache(784.0)     Hepatitis     Schizophrenia, schizo-affective (HCC)     Substance abuse (HCC)     Tobacco abuse     Type II or unspecified type diabetes mellitus without mention of complication, not stated as uncontrolled     Urinary incontinence      SURGICAL HISTORY       Past Surgical History:   Procedure Laterality Date    ABSCESS DRAINAGE N/A 02/11/2018    Carla anal abcess    DENTAL SURGERY      all teeth pulled     CURRENT MEDICATIONS       Previous Medications    ESCITALOPRAM (LEXAPRO) 20 MG TABLET    Take 1 tablet by mouth daily    PALIPERIDONE (INVEGA) 6 MG EXTENDED RELEASE TABLET    Take 1 tablet by mouth daily    PALIPERIDONE PALMITATE ER (INVEGA SUSTENNA) 234 MG/1.5ML [place] IM INJECTION    Inject 234 mg into the muscle every 28 days    TRAZODONE (DESYREL) 150 MG TABLET    Take 1 tablet by mouth nightly as needed for Sleep     ALLERGIES     is allergic to navane [thiothixene]. FAMILY HISTORY     He indicated that his mother is alive. He indicated that his father is . He indicated that his brother is . SOCIAL HISTORY       Social History     Tobacco Use    Smoking status: Current Every Day Smoker     Packs/day: 0.50     Types: Cigarettes    Smokeless tobacco: Never Used    Tobacco comment: Patient accepting of nicotine patch   Substance Use Topics    Alcohol use: Yes     Comment: reports drinking occasionally    Drug use: Yes     Types: Cocaine     Comment: drug abuse includes cocaine,      PHYSICAL EXAM     INITIAL VITALS: /64   Pulse 72   Temp 98.2 °F (36.8 °C) (Oral)   Resp 16   Ht 6' 3\" (1.905 m)   Wt 172 lb (78 kg)   SpO2 100%   BMI 21.50 kg/m²    Physical Exam  Vitals and nursing note reviewed. Constitutional:       General: He is not in acute distress. Appearance: He is well-developed. HENT:      Head: Normocephalic and atraumatic. Eyes:      Conjunctiva/sclera: Conjunctivae normal.   Cardiovascular:      Rate and Rhythm: Normal rate and regular rhythm. Heart sounds: No murmur heard. No friction rub. Pulmonary:      Effort: Pulmonary effort is normal. No respiratory distress. Breath sounds: Normal breath sounds. No wheezing or rhonchi. Abdominal:      General: There is no distension. Palpations: Abdomen is soft. Tenderness: There is no abdominal tenderness. There is no guarding or rebound. Musculoskeletal:      Cervical back: Neck supple. Skin:     General: Skin is warm and dry. Capillary Refill: Capillary refill takes less than 2 seconds. Comments: Raised follicular changes of the upper arm and upper back with no surrounding erythema no purulence   Neurological:      Mental Status: He is alert. EMERGENCY DEPARTMENTCOURSE:   Differential diagnosis includes exacerbation of chronic mental illness medication noncompliance polysubstance abuse. The patient was discharged from the [facility] 5/11. He went to [facility]. [facility] picked him up at that time. The patient uses cocaine which is most likely worsening his hallucinations. We did speak to social work who spoke to psychiatry who recommended the [facility]. In terms of the patient's rash concern for folliculitis does not appear to have a secondary bacterial infection recommended S[place] Blue patient will be discharged     Vitals:    Vitals:    05/26/21 0142   BP: 103/64   Pulse: 72   Resp: 16   Temp: 98.2 °F (36.8 °C)   TempSrc: Oral   SpO2: 100%   Weight: 172 lb (78 kg)   Height: 6' 3\" (1.905 m)       The patient was given the following medications while in the emergency department:  Orders Placed This Encounter   Medications    Salicylic Acid (SELSUN BLUE DEEP CLEANSING) 3 % SHAM     Sig: Apply 1 Dose topically daily     Dispense:  118 mL     Refill:  0         FINAL IMPRESSION      1. Hearing voices    2.  Folliculitis         DISPOSITION/PLAN   DISPOSITION Decision To Discharge 05/26/2021 02:22:37 AM      PATIENT REFERRED TO:  [facility] ED  [address] [facility] [address]  [phone]    If symptoms worsen    [facility] [address] [facility]  [phone]  Schedule an appointment as soon as possible for a visit       DISCHARGE MEDICATIONS:  New Prescriptions    SALICYLIC ACID (SELSUN BLUE DEEP CLEANSING) 3 % SHAM    Apply 1 Dose topically daily     Wendelin Jim Corbin MD  Attending Emergency Physician    This charting supersedes any ED resident or staff charting and was written using speech recognition software        Akshat Mayen MD  05/26/21 2494

## 2021-05-27 ENCOUNTER — HOSPITAL ENCOUNTER (EMERGENCY)
Age: 61
Discharge: HOME | End: 2021-05-27
Attending: EMERGENCY MEDICINE
Payer: MEDICAID

## 2021-05-27 VITALS
HEART RATE: 70 BPM | OXYGEN SATURATION: 99 % | RESPIRATION RATE: 15 BRPM | DIASTOLIC BLOOD PRESSURE: 68 MMHG | TEMPERATURE: 97.3 F | SYSTOLIC BLOOD PRESSURE: 108 MMHG

## 2021-05-27 DIAGNOSIS — R21 RASH: Primary | ICD-10-CM

## 2021-05-27 PROCEDURE — 99285 EMERGENCY DEPT VISIT HI MDM: CPT

## 2021-05-27 RX ORDER — DIPHENHYDRAMINE HCL 25 MG
25 TABLET ORAL ONCE
Status: DISCONTINUED | OUTPATIENT
Start: 2021-05-27 | End: 2021-05-27 | Stop reason: HOSPADM

## 2021-05-27 ASSESSMENT — ENCOUNTER SYMPTOMS
SHORTNESS OF BREATH: 0
ABDOMINAL PAIN: 0
BACK PAIN: 0

## 2021-05-27 NOTE — ED PROVIDER NOTES
[address] [facility]  ED  Emergency Department Encounter  Emergency Medicine Resident     Pt Name: Abigail Aly  MRN: [id number]  [place] 1960  Date of evaluation: 5/27/21  PCP:  No primary care provider on file. CHIEF COMPLAINT       Chief Complaint   Patient presents with    Suicidal    Insect Bite       HISTORY OFPRESENT ILLNESS  (Location/Symptom, Timing/Onset, Context/Setting, Quality, Duration, Modifying Factors,Severity.)      Abigail Aly is a 57year old male who presents with suicidal ideation. The patient initially presented for complaint of rash on the upper extremities. He did report suicidal ideation to nursing staff and was transferred to the safe area. The patient reports a plan to stab himself in the abdomen. Patient is hearing voices instructing him to harm self. No homicidal ideation. Denies any drugs or alcohol. Patient reports that he has been noncompliant with his medications and does not take Invega. He was recently evaluated at [facility] for similar complaints. The patient is well-known to the emergency department. He is also complaining of insect bites on his upper extremities. He reports that there is a bates infestation at his current home. Reports that the rash itches. PAST MEDICAL / SURGICAL / SOCIAL / FAMILY HISTORY      has a past medical history of Bipolar disorder ([address] Depression, GERD (gastroesophageal reflux disease), Hallucinations, Headache(784.0), Hepatitis, Schizophrenia, schizo-affective ([address] Substance abuse ([address] Tobacco abuse, Type II or unspecified type diabetes mellitus without mention of complication, not stated as uncontrolled, and Urinary incontinence. has a past surgical history that includes Dental surgery and Abscess Drainage (N/A, 02/11/2018).      Social History     Socioeconomic History    Marital status: Single     Spouse name: Not on file    Number of children: 0    Years of education: 10    Highest education tablet Take 1 tablet by mouth daily 5/11/21   Brianne Regalado MD   traZODone (DESYREL) 150 MG tablet Take 1 tablet by mouth nightly as needed for Sleep 5/11/21   Brianne Regalado MD   paliperidone palmitate ER (INVEGA SUSTENNA) 234 MG/1.5ML [place] IM injection Inject 234 mg into the muscle every 28 days    Historical Provider, MD       REVIEW OFSYSTEMS    (2-9 systems for level 4, 10 or more for level 5)      Review of Systems   Constitutional: Negative for chills and fever. Respiratory: Negative for shortness of breath. Cardiovascular: Negative for chest pain. Gastrointestinal: Negative for abdominal pain. Musculoskeletal: Negative for back pain and neck pain. Skin: Positive for rash. PHYSICAL EXAM   (up to 7 for level 4, 8 or more forlevel 5)      INITIAL VITALS:   ED Triage Vitals [05/27/21 0119]   BP Temp Temp Source Pulse Resp SpO2 Height Weight   108/68 97.3 °F (36.3 °C) Oral 70 15 99 % -- --       Physical Exam  Constitutional:       General: He is not in acute distress. Appearance: He is well-developed. He is not diaphoretic. HENT:      Head: Normocephalic and atraumatic. Eyes:      Conjunctiva/sclera: Conjunctivae normal.      Pupils: Pupils are equal, round, and reactive to light. Cardiovascular:      Rate and Rhythm: Normal rate and regular rhythm. Heart sounds: No murmur heard. No friction rub. No gallop. Pulmonary:      Effort: Pulmonary effort is normal. No respiratory distress. Breath sounds: Normal breath sounds. No wheezing or rales. Abdominal:      General: There is no distension. Palpations: Abdomen is soft. Tenderness: There is no abdominal tenderness. There is no guarding. Musculoskeletal:      Cervical back: Neck supple. Comments:     Skin:     General: Skin is warm.       Comments: nonvesicular erythematous papular rash present on upper extremities and back   Neurological:      Mental Status: He is alert and oriented to person, place, and time. DIFFERENTIAL  DIAGNOSIS     PLAN (LABS / IMAGING / EKG):  No orders of the defined types were placed in this encounter. MEDICATIONS ORDERED:  Orders Placed This Encounter   Medications    DISCONTD: diphenhydrAMINE (BENADRYL) tablet 25 mg       DDX:     Initial MDM/Plan: 57 y.o. male who presents with suicidal ideation. Vital signs stable on arrival.  On physical exam, the patient had a erythematous rash on upper extremities and back. No vesicles present. Will give Benadryl for symptomatic relief. Will discuss with social work patient suicidal ideation. The patient was at an outlying facility with similar complaints yesterday. He was denied patient psych at that time. DIAGNOSTIC RESULTS / EMERGENCYDEPARTMENT COURSE / MDM     LABS:  Labs Reviewed - No data to display      RADIOLOGY:  No results found. EKG      All EKG's are interpreted by the Emergency Department Physicianwho either signs or Co-signs this chart in the absence of a cardiologist.    [address] [place] [address]:      Social work evaluated patient. Patient cleared for discharge from social work standpoint. He was evaluated less than 24 hours ago for similar complaints. Patient was discharged in stable condition. PROCEDURES:  None    CONSULTS:  None    CRITICAL CARE:  Please see attending note    FINAL IMPRESSION      1.  Rash          DISPOSITION / PLAN     DISPOSITION Decision To Discharge 05/27/2021 02:04:36 AM      PATIENT REFERRED TO:  [facility] ED  [address] [facility] [id number] [phone]  Go to   If symptoms worsen      DISCHARGE MEDICATIONS:  Discharge Medication List as of 5/27/2021  2:05 AM          Matt Tanner MD  Emergency Medicine Resident    (Please note that portions of this note were completed with a voice recognition program.Efforts were made to edit the dictations but occasionally words are mis-transcribed.)        Matt Tanner MD  Resident  05/27/21 3951

## 2021-05-27 NOTE — ED NOTES
Pt came to ED with c/o insect bite to arms. Pt stated he lives in a group home that has roaches and bugs. Pt stated the landlord has sprayed house for insects multiple times. Pt has bug bites noted to bilateral arms. Pt also reports suicidal thoughts. Pt reports he has a plan. Pt stated he wants to take a knife and stab himself. Pt denies any homicidal thoughts. Pt denies any drug or alcohol use tonight.       Emily Del Real RN  05/27/21 1599

## 2021-05-27 NOTE — ED PROVIDER NOTES
[facility]  Emergency Department  Faculty Attestation     I performed a history and physical examination of the patient and discussed management with the resident. I reviewed the residents note and agree with the documented findings and plan of care. Any areas of disagreement are noted on the chart. I was personally present for the key portions of any procedures. I have documented in the chart those procedures where I was not present during the key portions. I have reviewed the emergency nurses triage note. I agree with the chief complaint, past medical history, past surgical history, allergies, medications, social and family history as documented unless otherwise noted below. For Physician Assistant/ Nurse Practitioner cases/documentation I have personally evaluated this patient and have completed at least one if not all key elements of the E/M (history, physical exam, and MDM). Additional findings are as noted. Primary Care Physician:  No primary care provider on file. Screenings:  [unfilled]    CHIEF COMPLAINT       Chief Complaint   Patient presents with    Suicidal    Insect Bite       RECENT VITALS:   Temp: 97.3 °F (36.3 °C),  Pulse: 70, Resp: 15, BP: 108/68    LABS:  Labs Reviewed - No data to display    Radiology  No orders to display       Attending Physician Additional  Notes    Patient initial complaint was multiple insect bites on his arms neck and back that are itchy. He is unable to explain which bugs are getting him. He also has suicidal thoughts and wants to stab himself. No history of prior attempts. He denies alcohol or drug use. No family history of suicide attempts. He denies being depressed. He does have a history of schizoaffective disorder. On exam he has a flat affect, appears no distress, vital signs are normal.  There is multiple erythematous papules on the extremities but also on the neck and back.   Several of these are close together to suggest possible bedbug bites but with as many of this these this could be papular urticaria. Impression is pruritus, possible insect bites, suicidal ideation. Plan is Benadryl, meal, social work evaluation for psychiatric consultation. Maggi Pleitez.  Tea Villeda MD, [facility]  Attending Emergency  Physician               Francisco White MD  05/27/21 4899

## 2021-05-27 NOTE — ED NOTES
[] [facility]    [] [facility]    []  [facility] ASSESSMENT      Y  N     [x] [] In the past two weeks have you had thoughts of hurting yourself in any way? [x] [] In the past two weeks have you had thoughts that you would be better off dead? [] [x] Have you made a suicide attempt in the past two months? [x] [] Do you have a plan for hurting yourself or suicide? [] [x] Presence of hallucinations/voices related to hurting himself or herself or someone else. SUICIDE/SECURITY WATCH PRECAUTION CHECKLIST     Orders    [x]  Suicide/Security Watch Precautions initiated as checked below:   5/27/21 2:15 AM EDT 48PED/H48A    [x] Notified physician:  Ashkan Weston DO  5/27/21 2:15 AM EDT    [x] Orders obtained as appropriate:     [x] 1:1 Observer     [] Psych Consult     [] Psych Consult    Name:  Date:  Time:    [x] 1:1 Observer, Notified by:  Vera [facility] RN    Contact Nurse Supervisor    [x] Remove all personal clothes from room and place in snap/paper gown/pants. Slipper only    [x] Remove all personal belongings from room and secured away from patient. Documentation    [x] Initiate Suicide/Security Watch Precaution Flow Sheet    [x] Initiate individualized Care Plan/Problem    [x] Document why precautions initiated on flow sheet (Initiate Nursing Care Plan/Problem)    [x] 1:1 Observer in place; instructions provided. Suicide precautions require observer be within arms length. [x] Nurse-Observer Communication Hand-off initiated by RN, reviewed with Observer. Subsequently used as Hand Off between Observers. [x] Initiate every 15 minute observations per observer as delegated by the RN.     [x] Initiate RN assessment and documentation    Environmental Scan  Search Criteria and Process: OPTIONAL, see Search Policy    [x] Reason for search: suicidal    [] Nursing in presence of second person to search patient    [x] Patient notified of reason for body assessment and belongings search:     Persons present during search: Fabián KavyaVicki Stevens ANY   Results of search and disposition: pt belongings locked in locker       Searchers Name:     These items or items similar should be removed from the room:   [x] Chairs   [x] Telephone   [x] Trash cans and liners   [x] Plastic utensils (order Patient Safety tray)   [x] Empty or remove Sharps containers   [x] All personal clothing/belongings removed   [x] All unnecessary lead wires, electrical cords, draw cords, etc.   [x] Flowers and plants   [x] Double check for lighters, matches, razors, any glass items etc that can be used as weapons. Person completing Checklist: Carmen Rivera RN       GENERAL INFORMATION     Y  N     [x] [] Has the patient been informed that they are on a watch and what that means? [x] [] Can the patient get out of Bed without nursing assistance? [x] [] Can the patient use the restroom without nursing assistance? [x] [] Can the patient walk the halls to [place] their legs? \"   [] [x] Does the patient have metal utensils? [x] [] Have the patient's belongings been placed out of control of the patient? [x] [] Have the patient and his/her belongings been checked for contraband? [x] [] Is the patient under any visitor restrictions? If Yes, explain: suicide watch   [] [x] Is the patient under an alias? [facility] Name:   Authorized visitors (no more than two are to be on the list)   Name/Relationship:   Name/Relationship:    Name of Staff member that you  Received this information from?:     General Description:    Yash Melgar 48PED/H48A male 57 y.o. Admission weight:      Race: []  [x] Black  []   []   [] [place] [] Other  Facial Hair:  [x] Yes  [] No  If yes, please describe: Identifying Marks (i.e. Visible tattoos, scars, etc... ):     NURSING CARE PLAN    Nursing Diagnosis: Risk of Self Directed Harm  [] Actual  [x] Potential  Date Started: 5/27/21      Etiological Factors: (related to)  [x] Expressed or implied suicidal ideation/behavior  [x] Depression  [] Suicide attempt      [x] Low self-esteem  [] Hallucinations      [x] Feeling of Hopelessness  [] Substance abuse or withdrawal    [] Dysfunctional family  [] Major traumatic event, eg., divorce, etc   [] Excessive stress/anxiety    5/27/21    Expected Outcomes    Patient will:   [x] Patient will remain safe for the duration of their stay   [x] Patient's environment will be safe, eg. Free of potential suicide weapons   [] Verbalize Recovery from suicidal episode and improvement in self-worth   [x] Discuss feeling that precipitated suicide attempt/thoughts/behavior   [] Will describe available resources for crisis prevention and management   [] Will verbalize positive coping skills     Nursing Intervention   [x] Assessment and Observations hourly   [x] Suicide Precautions implemented with patient, should be 1:1 observation   [x] Document observation [id number] and RN assessment hourly   [] Consult physician for:    [] Psychiatric consult    [] Pharmacological therapy    [] Other:    [x] Patient search completed by security   [x] Initiated appropriate safety protocols by removing from the patient's environment anything that could be used to inflict self injury, eg. Order safe tray, snap gown, etc   [x] Maintain open, warm, caring, non-judgmental attitude/manner towards patient   [] Discuss advantages and disadvantages of existing coping methods/skills   [x] Assist and educate patient with identifying present strengths and coping skills   [x] Keep patient informed regarding plan of care and provide clear concise explanations. Provide the patient/family education information as well as telephone numbers and other information about crisis centers, hot lines, and counselors.     Discharge Planning:   [] Referral  [] Groups [] Health agencies  [] Other:          Chacha Rodríguez RN  05/27/21 2404

## 2021-05-27 NOTE — ED PROVIDER NOTES
Faculty Sign-Out Attestation  Handoff taken on the following patient from prior Attending Physician: Ryne King    I was available and discussed any additional care issues that arose and coordinated the management plans with the resident(s) caring for the patient during my duty period. Any areas of disagreement with residents documentation of care or procedures are noted on the chart. I was personally present for the key portions of any/all procedures during my duty period. I have documented in the chart those procedures where I was not present during the key portions.     Suicidal & bed bug bites, > benadryl,   Suicidal, needing social work eval    Tanna Lacey, DO  Attending Physician     Tanna Lacey, DO  05/27/21 0144  Social work cleared pt for [facility], DO  05/27/21 4780

## 2021-05-27 NOTE — ED NOTES
Pt moved to safer area.  Pt called into [place] police as suicide watch     Rashawn Fisher RN  05/27/21 9821

## 2021-05-27 NOTE — ED NOTES
The patient is a 57year old male that came to the ED today due to suicidal thoughts. Patient states that he chronically feels suicidal and auditory hallucinations. The patient was assess by Dr Lucian xie 24 hours ago with the same complaint. The patient has been to [facility] this month along with multiple [facility] admissions. The patient has no plan and states, \"I would not kill myself. \" The patient is low risk of harm to self and others. The SW and patient discussed following up with [facility]. The patient agreed. [facility] up [facility] the patient in the morning. Patient requesting transportation home.

## 2021-06-02 ENCOUNTER — HOSPITAL ENCOUNTER (EMERGENCY)
Age: 61
Discharge: PSYCHIATRIC OR SUBSTANCE USE FACILITY | End: 2021-06-02
Attending: EMERGENCY MEDICINE
Payer: MEDICAID

## 2021-06-02 VITALS
TEMPERATURE: 98.2 F | RESPIRATION RATE: 18 BRPM | BODY MASS INDEX: 21.14 KG/M2 | OXYGEN SATURATION: 99 % | DIASTOLIC BLOOD PRESSURE: 69 MMHG | WEIGHT: 170 LBS | HEART RATE: 89 BPM | HEIGHT: 75 IN | SYSTOLIC BLOOD PRESSURE: 126 MMHG

## 2021-06-02 DIAGNOSIS — R45.851 SUICIDAL IDEATION: Primary | ICD-10-CM

## 2021-06-02 PROCEDURE — 99285 EMERGENCY DEPT VISIT HI MDM: CPT

## 2021-06-02 NOTE — ED PROVIDER NOTES
[facility] ED  Emergency Department Encounter  EmergencyMedicine Resident     Pt Katt Stephenson  MRN: [id number]  [place] 1960  Date of evaluation: 6/2/21  PCP:  No primary care provider on file. CHIEF COMPLAINT       Chief Complaint   Patient presents with    Suicidal       HISTORY OF PRESENT ILLNESS  (Location/Symptom, Timing/Onset, Context/Setting, Quality, Duration, Modifying Factors, Severity.)      Van Robison is a 57 y.o. male who presents with complaints of suicidal ideation since last night. He has no plan. He says he has had suicidal ideation and depression in the past.  He has a history of schizophrenia's, schizoaffective disorder, and substance abuse. He denies substance abuse today. He does endorse hearing voices and he says this is usual for him    PAST MEDICAL / SURGICAL / SOCIAL / FAMILY HISTORY      has a past medical history of Bipolar disorder ([facility] [address] Depression, GERD (gastroesophageal reflux disease), Hallucinations, Headache(784.0), Hepatitis, Schizophrenia, schizo-affective ([address] Substance abuse ([facility] [address] Tobacco abuse, Type II or unspecified type diabetes mellitus without mention of complication, not stated as uncontrolled, and Urinary incontinence. Denies further past medical hx     has a past surgical history that includes Dental surgery and Abscess Drainage (N/A, 02/11/2018).   Denies further past surgical hx    Social History     Socioeconomic History    Marital status: Single     Spouse name: Not on file    Number of children: 0    Years of education: 8    Highest education level: Not on file   Occupational History     Employer: N/A   Tobacco Use    Smoking status: Current Every Day Smoker     Packs/day: 0.50     Types: Cigarettes    Smokeless tobacco: Never Used    Tobacco comment: Patient accepting of nicotine patch   Substance and Sexual Activity    Alcohol use: Yes     Comment: reports drinking occasionally    Drug use: Yes     Types: Cocaine     Comment: drug abuse includes cocaine,     Sexual activity: Not on file   Other Topics Concern    Not on file   Social History Narrative    Not on file     Social Determinants of Health     Financial Resource Strain:     Difficulty of Paying Living Expenses:    Food Insecurity:     Worried About Running Out of Food in the Last Year:     [address] in the Last Year:    Transportation Needs:     Lack of Transportation (Medical):  Lack of Transportation (Non-Medical):    Physical Activity:     Days of Exercise per Week:     Minutes of Exercise per Session:    Stress:     Feeling of Stress :    Social Connections:     Frequency of Communication with Friends and Family:     Frequency of Social Gatherings with Friends and Family:     Attends Bahai Services:     Active Member of Clubs or Organizations:     Attends Club or Organization Meetings:     Marital Status:    Intimate Partner Violence:     Fear of Current or Ex-Partner:     Emotionally Abused:     Physically Abused:     Sexually Abused:        Family History   Problem Relation Age of Onset    Diabetes Mother     Heart Disease Mother        Allergies:  Navane [thiothixene]    Home Medications:  Prior to Admission medications    Medication Sig Start Date End Date Taking?  Authorizing Provider   Salicylic Acid (SELSUN BLUE DEEP CLEANSING) 3 % SHAM Apply 1 Dose topically daily 5/26/21   Wanda Melissa MD   escitalopram (LEXAPRO) 20 MG tablet Take 1 tablet by mouth daily 5/11/21   Jeanna Briceño MD   paliperidone (INVEGA) 6 MG extended release tablet Take 1 tablet by mouth daily 5/11/21   Jeanna Briceño MD   traZODone (DESYREL) 150 MG tablet Take 1 tablet by mouth nightly as needed for Sleep 5/11/21   Jeanna Briceño MD   paliperidone palmitate ER (INVEGA SUSTENNA) 234 MG/1.5ML [place] IM injection Inject 234 mg into the muscle every 28 days    Historical Provider, MD       REVIEW OF SYSTEMS    (2-9 systems for level history of this in the past.  He was offered transfer to [facility] and he is amenable to this plan. He has no other medical complaints at this time. PROCEDURES:  None    CONSULTS:  None    CRITICAL CARE:  None    FINAL IMPRESSION      1. Suicidal ideation        DISPOSITION / PLAN     DISPOSITION Decision To Transfer 06/02/2021 02:39:55 AM      PATIENT REFERRED TO:  No follow-up provider specified.     DISCHARGE MEDICATIONS:  New Prescriptions    No medications on file       Riki Mckeon DO  Emergency Medicine Resident    (Please note that portions of thisnote were completed with a voice recognition program.  Efforts were made to edit the dictations but occasionally words are mis-transcribed.)        Riki Mckeon DO  Resident  06/02/21 6614

## 2021-06-02 NOTE — ED NOTES
[] Sosa    [] [address]    [x]  [facility] ASSESSMENT      Y  N     [x] [] In the past two weeks have you had thoughts of hurting yourself in any way? [x] [] In the past two weeks have you had thoughts that you would be better off dead? [] [x] Have you made a suicide attempt in the past two months? [x] [] Do you have a plan for hurting yourself or suicide? [x] [] Presence of hallucinations/voices related to hurting himself or herself or someone else. SUICIDE/SECURITY WATCH PRECAUTION CHECKLIST     Orders    [x]  Suicide/Security Watch Precautions initiated as checked below:   6/2/21 2:18 AM EDT BH31/BH31B    [x] Notified physician:  No att. providers found  6/2/21 2:18 AM EDT    [x] Orders obtained as appropriate:     [x] 1:1 Observer     [] Psych Consult     [] Psych Consult    Name:  Date:  Time:    [x] 1:1 Observer, Notified by:  Madi Nixon RN    Contact Nurse Supervisor    [x] Remove all personal clothes from room and place in snap/paper gown/pants. Slipper only    [x] Remove all personal belongings from room and secured away from patient. Documentation    [x] Initiate Suicide/Security Watch Precaution Flow Sheet    [x] Initiate individualized Care Plan/Problem    [x] Document why precautions initiated on flow sheet (Initiate Nursing Care Plan/Problem)    [x] 1:1 Observer in place; instructions provided. Suicide precautions require observer be within arms length. [x] Nurse-Observer Communication Hand-off initiated by RN, reviewed with Observer. Subsequently used as Hand Off between Observers. [x] Initiate every 15 minute observations per observer as delegated by the RN.     [x] Initiate RN assessment and documentation    Environmental Scan  Search Criteria and Process: OPTIONAL, see Search Policy    [x] Reason for search: Suicidal ideation    [x] Nursing in presence of second person to search patient    [] Patient notified of reason for body assessment and belongings search:     Persons present during search:   Results of search and disposition:       Searchers Name: SECURITY/[facility] [employer]    These items or items similar should be removed from the room:   [] Chairs   [] Telephone   [] Trash cans and liners   [] Plastic utensils (order Patient Safety tray)   [] Empty or remove Sharps containers   [] All personal clothing/belongings removed   [] All unnecessary lead wires, electrical cords, draw cords, etc.   [] Flowers and plants   [] Double check for lighters, matches, razors, any glass items etc that can be used as weapons. Person completing Checklist: Jatin Treadwell RN       GENERAL INFORMATION     Y  N     [x] [] Has the patient been informed that they are on a watch and what that means? [x] [] Can the patient get out of Bed without nursing assistance? [x] [] Can the patient use the restroom without nursing assistance? [x] [] Can the patient walk the halls to [place] their legs? \"   [] [x] Does the patient have metal utensils? [x] [] Have the patient's belongings been placed out of control of the patient? [x] [] Have the patient and his/her belongings been checked for contraband? [] [x] Is the patient under any visitor restrictions? If Yes, explain: no visitors in safer area   [] [x] Is the patient under an alias? [place] [id number] Name:   Authorized visitors (no more than two are to be on the list)   Name/Relationship:   Name/Relationship:    Name of Staff member that you  Received this information from?:     General Description:    Vika Reddy BH31/BH31B male 57 y.o. Admission weight: 170 lb (77.1 kg) Height: 6' 3\" (190.5 cm)  Race: []  [x] Black  []   []   [] [place] [] Other  Facial Hair:  [] Yes  [] No  If yes, please describe: Identifying Marks (i.e. Visible tattoos, scars, etc... ):     NURSING CARE PLAN    Nursing Diagnosis: Risk of Self Directed Harm  [] Actual  [x] Potential  Date Started: 6/2/21 Etiological Factors: (related to)  [x] Expressed or implied suicidal ideation/behavior  [] Depression  [x] Suicide attempt      [] Low self-esteem  [x] Hallucinations      [] Feeling of Hopelessness  [x] Substance abuse or withdrawal    [] Dysfunctional family  [] Major traumatic event, eg., divorce, etc   [] Excessive stress/anxiety    6/2/21    Expected Outcomes    Patient will:   [x] Patient will remain safe for the duration of their stay   [x] Patient's environment will be safe, eg. Free of potential suicide weapons   [] Verbalize Recovery from suicidal episode and improvement in self-worth   [x] Discuss feeling that precipitated suicide attempt/thoughts/behavior   [] Will describe available resources for crisis prevention and management   [] Will verbalize positive coping skills     Nursing Intervention   [x] Assessment and Observations hourly   [x] Suicide Precautions implemented with patient, should be 1:1 observation   [x] Document observation [id number] and RN assessment hourly   [] Consult physician for:    [] Psychiatric consult    [] Pharmacological therapy    [] Other:    [x] Patient search completed by security   [x] Initiated appropriate safety protocols by removing from the patient's environment anything that could be used to inflict self injury, eg. Order safe tray, snap gown, etc   [x] Maintain open, warm, caring, non-judgmental attitude/manner towards patient   [] Discuss advantages and disadvantages of existing coping methods/skills   [x] Assist and educate patient with identifying present strengths and coping skills   [x] Keep patient informed regarding plan of care and provide clear concise explanations. Provide the patient/family education information as well as telephone numbers and other information about crisis centers, hot lines, and counselors.     Discharge Planning:   [] Referral  [] Groups [] Health agencies  [] Other:          Nasim Phillips RN  06/02/21 [id number]

## 2021-06-02 NOTE — ED NOTES
Pt to ed with c/o suicidal ideation. Pt states he has plan to stab self to death. Pt also states he does hear voices that tell him to kill himself. Pt cooperative to writer at this time. Pt states no drugs or etoh tonight, does have hx of crack cocaine abuse but hasn't had any in several days. Pt denies pain, sob, and all other physical symptoms. Guard at bedside.       Nasim Phillips RN  06/02/21 [id number]

## 2021-06-02 NOTE — ED NOTES
met with patient who reports suicidal ideation with auditory hallucinations telling him to kill himself. Patient is pleasant and soft spoken. Patient has recent admission into [facility]. Patient states he has not been taking medication. Patient reports a past history of cocaine abuse, but denies using any illicit drugs in the last three days. Patient verbalizes that he prefers to complete treatment with [facility]. [facility] called [facility] who reports they will accept patient. [facility] will provide transportation. ETA [id number]. Security notified of change out so that patient will be ready for transport per request of [facility].       Mary Jo Nuñez, [facility]  06/02/21 [facility], [facility]  06/02/21 [facility], [facility]  06/02/21 [id number]

## 2021-06-02 NOTE — ED PROVIDER NOTES
[facility]     Emergency Department     Faculty Attestation    I performed a history and physical examination of the patient and discussed management with the resident. I have reviewed and agree with the residents findings including all diagnostic interpretations, and treatment plans as written. Any areas of disagreement are noted on the chart. I was personally present for the key portions of any procedures. I have documented in the chart those procedures where I was not present during the key portions. I have reviewed the emergency nurses triage note. I agree with the chief complaint, past medical history, past surgical history, allergies, medications, social and family history as documented unless otherwise noted below. Documentation of the HPI, Physical Exam and Medical Decision Making performed by susanibnav is based on my personal performance of the HPI, PE and MDM. For Physician Assistant/ Nurse Practitioner cases/documentation I have personally evaluated this patient and have completed at least one if not all key elements of the E/M (history, physical exam, and MDM). Additional findings are as noted. 56 yo M c/o suicidal ideation, no injury, no fever, no vomit, pt denies pain,   Denies ingestion,   pe vss flat affect, eomi, no cervical tenderness, crepitus or deformity,   Chest non tender, abdomen non tender, no distension, no rigidity,   No pronator drift,     Transferred to [facility]    EKG Interpretation    Interpreted by me      CRITICAL CARE: There was a high probability of clinically significant/life threatening deterioration in this patient's condition which required my urgent intervention. Total critical care time was 0 minutes. This excludes any time for separately reportable procedures.        Jana 24, DO  06/02/21 [address], DO  06/02/21 [id number]

## 2021-06-16 ENCOUNTER — HOSPITAL ENCOUNTER (EMERGENCY)
Age: 61
Discharge: OTHER INSTITUTION | End: 2021-06-16
Attending: EMERGENCY MEDICINE
Payer: MEDICAID

## 2021-06-16 VITALS
TEMPERATURE: 97.2 F | DIASTOLIC BLOOD PRESSURE: 72 MMHG | BODY MASS INDEX: 21.14 KG/M2 | HEART RATE: 84 BPM | OXYGEN SATURATION: 99 % | HEIGHT: 75 IN | RESPIRATION RATE: 20 BRPM | SYSTOLIC BLOOD PRESSURE: 117 MMHG | WEIGHT: 170 LBS

## 2021-06-16 DIAGNOSIS — R45.851 SUICIDAL IDEATION: Primary | ICD-10-CM

## 2021-06-16 PROCEDURE — 99285 EMERGENCY DEPT VISIT HI MDM: CPT

## 2021-06-16 ASSESSMENT — ENCOUNTER SYMPTOMS
CHEST TIGHTNESS: 0
ABDOMINAL PAIN: 0
SORE THROAT: 0

## 2021-06-16 ASSESSMENT — PAIN DESCRIPTION - DESCRIPTORS: DESCRIPTORS: OTHER (COMMENT)

## 2021-06-16 ASSESSMENT — PAIN DESCRIPTION - PAIN TYPE: TYPE: ACUTE PAIN

## 2021-06-16 ASSESSMENT — PAIN DESCRIPTION - LOCATION: LOCATION: ABDOMEN

## 2021-06-16 ASSESSMENT — PAIN DESCRIPTION - ORIENTATION: ORIENTATION: MID;UPPER

## 2021-06-16 ASSESSMENT — PAIN DESCRIPTION - FREQUENCY: FREQUENCY: CONTINUOUS

## 2021-06-16 ASSESSMENT — PAIN DESCRIPTION - ONSET: ONSET: ON-GOING

## 2021-06-16 ASSESSMENT — PAIN SCALES - GENERAL: PAINLEVEL_OUTOF10: 5

## 2021-06-16 NOTE — ED NOTES
[] Sosa    [] [address]    [x]  [address] [place] ASSESSMENT      Y  N     [x] [] In the past two weeks have you had thoughts of hurting yourself in any way? [x] [] In the past two weeks have you had thoughts that you would be better off dead? [] [x] Have you made a suicide attempt in the past two months? [x] [] Do you have a plan for hurting yourself or suicide? [x] [] Presence of hallucinations/voices related to hurting himself or herself or someone else. SUICIDE/SECURITY WATCH PRECAUTION CHECKLIST     Orders    [x]  Suicide/Security Watch Precautions initiated as checked below:   6/16/21 7:40 AM EDT BH31/BH31C    [x] Notified physician:  Lilly Dale MD  6/16/21 7:40 AM EDT    [x] Orders obtained as appropriate:     [x] 1:1 Observer     [] Psych Consult     [] Psych Consult    Name:  Date:  Time:    [x] 1:1 Observer, Notified by:  Vladislav Castillo RN    Contact Nurse Supervisor    [x] Remove all personal clothes from room and place in snap/paper gown/pants. Slipper only    [x] Remove all personal belongings from room and secured away from patient. Documentation    [x] Initiate Suicide/Security Watch Precaution Flow Sheet    [x] Initiate individualized Care Plan/Problem    [x] Document why precautions initiated on flow sheet (Initiate Nursing Care Plan/Problem)    [x] 1:1 Observer in place; instructions provided. Suicide precautions require observer be within arms length. [x] Nurse-Observer Communication Hand-off initiated by RN, reviewed with Observer. Subsequently used as Hand Off between Observers. [x] Initiate every 15 minute observations per observer as delegated by the RN.     [x] Initiate RN assessment and documentation    Environmental Scan  Search Criteria and Process: OPTIONAL, see Search Policy    [x] Reason for search: suicidal  [x] Nursing in presence of second person to search patient    [x] Patient notified of reason for body assessment

## 2021-06-16 NOTE — ED PROVIDER NOTES
[facility]  EMERGENCY DEPARTMENT ENCOUNTER      PtName: Tiffanie Colbert  MRN: [id number]  [place] 1960  Date of evaluation: 6/16/21      CHIEF COMPLAINT       Chief Complaint   Patient presents with    Suicidal     hearing voices and wants to kill self. \"I will get a gun and shoot myself in the head\"         HISTORY OF PRESENT ILLNESS (4 or more for level 4 or 5)    Tiffanie Colbert is a 57 y.o. male who presents suicidal ideation. The patient complains of suicidal ideation without a plan. Although the triage chief complaint says he will get a gun and shoot himself he denied having any plan to me. He says he is hearing voices but attempts to ignore them. He denies any overdose of alcohol or drugs and attempt to kill himself. Patient says he does follow-up at [facility] for psychiatric care. Denies any cough fever chills nausea vomiting abdominal pain chest pain. The patient states he is taking all of his medications as prescribed. Patient states that his mother passed away last month and has been feeling depressed since then because he has no one else. This patient was evaluated in the Emergency Department for symptoms described in the history of present illness. This patient was evaluated in the context of the global COVID-19 pandemic, which necessitated consideration that the patient might be at risk for infection with the SARS-CoV-2 virus that causes COVID-19. Institutional protocols and algorithms that pertain to the evaluation of patients at risk for COVID-19 are in a state of rapid change based on information released by regulatory bodies including the CDC and federal and state organizations. These policies and algorithms were followed during the patient's care in the ED. REVIEW OF SYSTEMS  (2-9 for Level 4, 10 or more Level 5)     Review of Systems   Constitutional: Negative for fever. HENT: Negative for sore throat. Respiratory: Negative for chest tightness. Cardiovascular: Negative for chest pain. Gastrointestinal: Negative for abdominal pain. Neurological: Negative for headaches. Psychiatric/Behavioral: Positive for hallucinations and suicidal ideas. Negative for self-injury. PAST MEDICAL HISTORY PAST SURGICAL HISTORY (1 for Level 4, 2 for Level 5)    has a past medical history of Bipolar disorder ([facility] [address] Depression, GERD (gastroesophageal reflux disease), Hallucinations, Headache(784.0), Hepatitis, Schizophrenia, schizo-affective ([facility] [address] Substance abuse ([facility] [address] Tobacco abuse, Type II or unspecified type diabetes mellitus without mention of complication, not stated as uncontrolled, and Urinary incontinence. has a past surgical history that includes Dental surgery and Abscess Drainage (N/A, 2018). CURRENT MEDICATIONS       Previous Medications    ESCITALOPRAM (LEXAPRO) 20 MG TABLET    Take 1 tablet by mouth daily    PALIPERIDONE (INVEGA) 6 MG EXTENDED RELEASE TABLET    Take 1 tablet by mouth daily    PALIPERIDONE PALMITATE ER (INVEGA SUSTENNA) 234 MG/1.5ML [place] IM INJECTION    Inject 234 mg into the muscle every 28 days    SALICYLIC ACID (SELSUN BLUE DEEP CLEANSING) 3 % SHAM    Apply 1 Dose topically daily    TRAZODONE (DESYREL) 150 MG TABLET    Take 1 tablet by mouth nightly as needed for Sleep       ALLERGIES     is allergic to navane [thiothixene]. FAMILY HISTORY     He indicated that his mother is alive. He indicated that his father is . He indicated that his brother is . family history includes Diabetes in his mother; Heart Disease in his mother. SOCIAL HISTORY      reports that he has been smoking cigarettes. He has been smoking about 0.50 packs per day. He has never used smokeless tobacco. He reports current alcohol use. He reports current drug use. Drug: Cocaine. PHYSICAL EXAM  (5-7 for level 4, 8 or more level 5)   INITIAL VITALS:  height is 6' 3\" (1.905 m) and weight is 170 lb (77.1 kg).  His infrared

## 2021-06-30 ENCOUNTER — HOSPITAL ENCOUNTER (INPATIENT)
Age: 61
LOS: 6 days | Discharge: HOME | DRG: 750 | End: 2021-07-06
Attending: EMERGENCY MEDICINE | Admitting: PSYCHIATRY & NEUROLOGY
Payer: MEDICAID

## 2021-06-30 DIAGNOSIS — F32.A DEPRESSION WITH SUICIDAL IDEATION: Primary | ICD-10-CM

## 2021-06-30 DIAGNOSIS — R45.851 DEPRESSION WITH SUICIDAL IDEATION: Primary | ICD-10-CM

## 2021-06-30 LAB
ABSOLUTE EOS #: 0.09 K/UL (ref 0–0.4)
ABSOLUTE IMMATURE GRANULOCYTE: ABNORMAL K/UL (ref 0–0.3)
ABSOLUTE LYMPH #: 2.9 K/UL (ref 1–4.8)
ABSOLUTE MONO #: 0.28 K/UL (ref 0.1–1.3)
ALBUMIN SERPL-MCNC: 3.8 G/DL (ref 3.5–5.2)
ALBUMIN/GLOBULIN RATIO: ABNORMAL (ref 1–2.5)
ALP BLD-CCNC: 104 U/L (ref 40–129)
ALT SERPL-CCNC: 13 U/L (ref 5–41)
AMPHETAMINE SCREEN URINE: NEGATIVE
ANION GAP SERPL CALCULATED.3IONS-SCNC: 10 MMOL/L (ref 9–17)
AST SERPL-CCNC: 37 U/L
BARBITURATE SCREEN URINE: NEGATIVE
BASOPHILS # BLD: 0 % (ref 0–2)
BASOPHILS ABSOLUTE: 0 K/UL (ref 0–0.2)
BENZODIAZEPINE SCREEN, URINE: NEGATIVE
BILIRUB SERPL-MCNC: 0.17 MG/DL (ref 0.3–1.2)
BUN BLDV-MCNC: 15 MG/DL (ref 8–23)
BUN/CREAT BLD: ABNORMAL (ref 9–20)
BUPRENORPHINE URINE: NORMAL
CALCIUM SERPL-MCNC: 8.6 MG/DL (ref 8.6–10.4)
CANNABINOID SCREEN URINE: NEGATIVE
CHLORIDE BLD-SCNC: 103 MMOL/L (ref 98–107)
CO2: 24 MMOL/L (ref 20–31)
COCAINE METABOLITE, URINE: NEGATIVE
CREAT SERPL-MCNC: 0.98 MG/DL (ref 0.7–1.2)
DIFFERENTIAL TYPE: ABNORMAL
EOSINOPHILS RELATIVE PERCENT: 2 % (ref 0–4)
ETHANOL PERCENT: <0.01 %
ETHANOL: <10 MG/DL
GFR AFRICAN AMERICAN: >60 ML/MIN
GFR NON-AFRICAN AMERICAN: >60 ML/MIN
GFR SERPL CREATININE-BSD FRML MDRD: ABNORMAL ML/MIN/{1.73_M2}
GFR SERPL CREATININE-BSD FRML MDRD: ABNORMAL ML/MIN/{1.73_M2}
GLUCOSE BLD-MCNC: 147 MG/DL (ref 70–99)
HCT VFR BLD CALC: 33.6 % (ref 41–53)
HEMOGLOBIN: 10.7 G/DL (ref 13.5–17.5)
IMMATURE GRANULOCYTES: ABNORMAL %
LYMPHOCYTES # BLD: 63 % (ref 24–44)
MCH RBC QN AUTO: 28.4 PG (ref 26–34)
MCHC RBC AUTO-ENTMCNC: 31.9 G/DL (ref 31–37)
MCV RBC AUTO: 89.2 FL (ref 80–100)
MDMA URINE: NORMAL
METHADONE SCREEN, URINE: NEGATIVE
METHAMPHETAMINE, URINE: NORMAL
MONOCYTES # BLD: 6 % (ref 1–7)
MORPHOLOGY: ABNORMAL
NRBC AUTOMATED: ABNORMAL PER 100 WBC
OPIATES, URINE: NEGATIVE
OXYCODONE SCREEN URINE: NEGATIVE
PDW BLD-RTO: 14.1 % (ref 11.5–14.9)
PHENCYCLIDINE, URINE: NEGATIVE
PLATELET # BLD: 253 K/UL (ref 150–450)
PLATELET ESTIMATE: ABNORMAL
PMV BLD AUTO: 7 FL (ref 6–12)
POTASSIUM SERPL-SCNC: 4.2 MMOL/L (ref 3.7–5.3)
PROPOXYPHENE, URINE: NORMAL
RBC # BLD: 3.77 M/UL (ref 4.5–5.9)
RBC # BLD: ABNORMAL 10*6/UL
SARS-COV-2, RAPID: NOT DETECTED
SEG NEUTROPHILS: 29 % (ref 36–66)
SEGMENTED NEUTROPHILS ABSOLUTE COUNT: 1.33 K/UL (ref 1.3–9.1)
SODIUM BLD-SCNC: 137 MMOL/L (ref 135–144)
SPECIMEN DESCRIPTION: NORMAL
TEST INFORMATION: NORMAL
TOTAL PROTEIN: 6.3 G/DL (ref 6.4–8.3)
TRICYCLIC ANTIDEPRESSANTS, UR: NORMAL
WBC # BLD: 4.6 K/UL (ref 3.5–11)
WBC # BLD: ABNORMAL 10*3/UL

## 2021-06-30 PROCEDURE — G0480 DRUG TEST DEF 1-7 CLASSES: HCPCS

## 2021-06-30 PROCEDURE — 85025 COMPLETE CBC W/AUTO DIFF WBC: CPT

## 2021-06-30 PROCEDURE — 36415 COLL VENOUS BLD VENIPUNCTURE: CPT

## 2021-06-30 PROCEDURE — 87635 SARS-COV-2 COVID-19 AMP PRB: CPT

## 2021-06-30 PROCEDURE — 1240000000 HC EMOTIONAL WELLNESS R&B

## 2021-06-30 PROCEDURE — 99285 EMERGENCY DEPT VISIT HI MDM: CPT

## 2021-06-30 PROCEDURE — 80307 DRUG TEST PRSMV CHEM ANLYZR: CPT

## 2021-06-30 PROCEDURE — 80053 COMPREHEN METABOLIC PANEL: CPT

## 2021-06-30 RX ORDER — MAGNESIUM HYDROXIDE/ALUMINUM HYDROXICE/SIMETHICONE 120; 1200; 1200 MG/30ML; MG/30ML; MG/30ML
30 SUSPENSION ORAL EVERY 6 HOURS PRN
Status: DISCONTINUED | OUTPATIENT
Start: 2021-06-30 | End: 2021-07-06 | Stop reason: HOSPADM

## 2021-06-30 RX ORDER — POLYETHYLENE GLYCOL 3350 17 G/17G
17 POWDER, FOR SOLUTION ORAL DAILY PRN
Status: DISCONTINUED | OUTPATIENT
Start: 2021-06-30 | End: 2021-07-06 | Stop reason: HOSPADM

## 2021-06-30 RX ORDER — ACETAMINOPHEN 325 MG/1
650 TABLET ORAL EVERY 4 HOURS PRN
Status: DISCONTINUED | OUTPATIENT
Start: 2021-06-30 | End: 2021-07-06 | Stop reason: HOSPADM

## 2021-06-30 RX ORDER — IBUPROFEN 400 MG/1
400 TABLET ORAL EVERY 6 HOURS PRN
Status: DISCONTINUED | OUTPATIENT
Start: 2021-06-30 | End: 2021-07-06 | Stop reason: HOSPADM

## 2021-06-30 RX ORDER — TRAZODONE HYDROCHLORIDE 50 MG/1
50 TABLET ORAL NIGHTLY PRN
Status: DISCONTINUED | OUTPATIENT
Start: 2021-07-01 | End: 2021-07-06 | Stop reason: HOSPADM

## 2021-06-30 RX ORDER — HYDROXYZINE 50 MG/1
50 TABLET, FILM COATED ORAL 3 TIMES DAILY PRN
Status: DISCONTINUED | OUTPATIENT
Start: 2021-06-30 | End: 2021-07-06 | Stop reason: HOSPADM

## 2021-06-30 ASSESSMENT — SLEEP AND FATIGUE QUESTIONNAIRES
AVERAGE NUMBER OF SLEEP HOURS: 7
RESTFUL SLEEP: YES
DO YOU HAVE DIFFICULTY SLEEPING: NO
DIFFICULTY STAYING ASLEEP: YES
DO YOU USE A SLEEP AID: YES
DIFFICULTY FALLING ASLEEP: YES
SLEEP PATTERN: DIFFICULTY FALLING ASLEEP;DISTURBED/INTERRUPTED SLEEP
DIFFICULTY ARISING: NO

## 2021-06-30 ASSESSMENT — PAIN DESCRIPTION - LOCATION
LOCATION: HEAD

## 2021-06-30 ASSESSMENT — ENCOUNTER SYMPTOMS
COLOR CHANGE: 0
EYE PAIN: 0
BACK PAIN: 0
SHORTNESS OF BREATH: 0
ABDOMINAL PAIN: 0

## 2021-06-30 ASSESSMENT — PAIN DESCRIPTION - PAIN TYPE: TYPE: ACUTE PAIN

## 2021-06-30 ASSESSMENT — PAIN SCALES - GENERAL
PAINLEVEL_OUTOF10: 5
PAINLEVEL_OUTOF10: 4
PAINLEVEL_OUTOF10: 4

## 2021-06-30 ASSESSMENT — LIFESTYLE VARIABLES: HISTORY_ALCOHOL_USE: YES

## 2021-06-30 ASSESSMENT — PATIENT HEALTH QUESTIONNAIRE - PHQ9: SUM OF ALL RESPONSES TO PHQ QUESTIONS 1-9: 9

## 2021-07-01 PROCEDURE — 99223 1ST HOSP IP/OBS HIGH 75: CPT | Performed by: PSYCHIATRY & NEUROLOGY

## 2021-07-01 PROCEDURE — 6370000000 HC RX 637 (ALT 250 FOR IP)

## 2021-07-01 PROCEDURE — APPSS60 APP SPLIT SHARED TIME 46-60 MINUTES

## 2021-07-01 PROCEDURE — 1240000000 HC EMOTIONAL WELLNESS R&B

## 2021-07-01 RX ORDER — HYDROXYZINE HYDROCHLORIDE 25 MG/1
25 TABLET, FILM COATED ORAL 3 TIMES DAILY PRN
Status: ON HOLD | COMMUNITY
End: 2021-07-06 | Stop reason: HOSPADM

## 2021-07-01 RX ORDER — PALIPERIDONE 6 MG/1
6 TABLET, EXTENDED RELEASE ORAL DAILY
Status: DISCONTINUED | OUTPATIENT
Start: 2021-07-01 | End: 2021-07-06 | Stop reason: HOSPADM

## 2021-07-01 RX ORDER — BUPROPION HYDROCHLORIDE 100 MG/1
100 TABLET, EXTENDED RELEASE ORAL 2 TIMES DAILY
Status: ON HOLD | COMMUNITY
End: 2021-07-06 | Stop reason: HOSPADM

## 2021-07-01 RX ORDER — BENZTROPINE MESYLATE 1 MG/1
1 TABLET ORAL DAILY PRN
Status: ON HOLD | COMMUNITY
End: 2021-07-06 | Stop reason: HOSPADM

## 2021-07-01 RX ORDER — TRAZODONE HYDROCHLORIDE 150 MG/1
150 TABLET ORAL NIGHTLY PRN
Status: DISCONTINUED | OUTPATIENT
Start: 2021-07-01 | End: 2021-07-06 | Stop reason: HOSPADM

## 2021-07-01 RX ORDER — ESCITALOPRAM OXALATE 20 MG/1
20 TABLET ORAL DAILY
Status: DISCONTINUED | OUTPATIENT
Start: 2021-07-01 | End: 2021-07-06 | Stop reason: HOSPADM

## 2021-07-01 RX ORDER — QUETIAPINE FUMARATE 300 MG/1
600 TABLET, FILM COATED ORAL NIGHTLY
Status: ON HOLD | COMMUNITY
End: 2021-07-06 | Stop reason: HOSPADM

## 2021-07-01 RX ADMIN — ESCITALOPRAM OXALATE 20 MG: 20 TABLET ORAL at 08:18

## 2021-07-01 RX ADMIN — PALIPERIDONE 6 MG: 6 TABLET, EXTENDED RELEASE ORAL at 08:18

## 2021-07-01 RX ADMIN — IBUPROFEN 400 MG: 400 TABLET ORAL at 20:57

## 2021-07-01 RX ADMIN — TRAZODONE HYDROCHLORIDE 150 MG: 150 TABLET ORAL at 20:57

## 2021-07-01 RX ADMIN — HYDROXYZINE HYDROCHLORIDE 50 MG: 50 TABLET, FILM COATED ORAL at 18:00

## 2021-07-01 ASSESSMENT — PAIN DESCRIPTION - PAIN TYPE: TYPE: ACUTE PAIN

## 2021-07-01 ASSESSMENT — PAIN SCALES - GENERAL
PAINLEVEL_OUTOF10: 2
PAINLEVEL_OUTOF10: 7
PAINLEVEL_OUTOF10: 6

## 2021-07-01 ASSESSMENT — PAIN DESCRIPTION - LOCATION
LOCATION: ABDOMEN
LOCATION: ABDOMEN

## 2021-07-01 NOTE — PLAN OF CARE
[facility]  Initial Interdisciplinary Treatment Plan NOTE      Original treatment plan Date & Time: 7/1/2021             722am    Admission Type:  Admission Type: Involuntary    Reason for admission:   Reason for Admission: SI no plan    Estimated Length of Stay:  5-7days  Estimated Discharge Date: to be determined by physician    PATIENT STRENGTHS:  Patient Strengths:Strengths: Positive Support, No significant Physical Illness  Patient Strengths and Limitations:Limitations: Tendency to isolate self, Lacks leisure interests, Difficulty problem solving/relies on others to help solve problems, Hopeless about future, Multiple barriers to leisure interests, Inappropriate/potentially harmful leisure interests (depression substance abuse anxiety poor coping skills)  Addictive Behavior: Addictive Behavior  In the past 3 months, have you felt or has someone told you that you have a problem with:  : None  Do you have a history of Chemical Use?: No  Do you have a history of Alcohol Use?: No  Do you have a history of Street Drug Abuse?: Yes  Histroy of Prescripton Drug Abuse?: No  Medical Problems:No past medical history on file.   Status EXAM:Status and Exam  Normal: No  Facial Expression: Elevated  Affect: Inappropriate  Level of Consciousness: Alert  Mood:Normal: No  Mood: Depressed, Anxious  Motor Activity:Normal: No  Motor Activity: Decreased  Interview Behavior: Cooperative  Preception: [place] to Person, Verlon Craw to Time, [place] to Place, [place] to Situation  Attention:Normal: Yes  Attention: Distractible  Thought Processes: Circumstantial  Thought Content:Normal: Yes  Thought Content: Preoccupations  Hallucinations: None  Delusions: No  Memory:Normal: Yes  Memory: Poor Recent, Confabulation  Insight and Judgment: No  Insight and Judgment: Unmotivated  Present Suicidal Ideation: No  Present Homicidal Ideation: No    EDUCATION:   Learner Progress Toward Treatment Goals: reviewed group plans and strategies for care    Method:group therapy, medication compliance, individualized assessments and care planning    Outcome: needs reinforcement    PATIENT GOALS: to be discussed with patient within 72 hours    PLAN/TREATMENT RECOMMENDATIONS:     continue group therapy , medications compliance, goal setting, individualized assessments and care, continue to monitor pt on unit      SHORT-TERM GOALS:   Time frame for Short-Term Goals: 5-7 days    LONG-TERM GOALS:  Time frame for Long-Term Goals: 6 months  Members Present in Team Meeting: See Signature Sheet    Jose Cunha

## 2021-07-01 NOTE — ED PROVIDER NOTES
EMERGENCY DEPARTMENT ENCOUNTER    Pt Name: Willie Batista  MRN: [id number]  [place] 1960  Date of evaluation: 6/30/21  CHIEF COMPLAINT       Chief Complaint   Patient presents with    Mental Health Problem     HISTORY OF PRESENT ILLNESS   54-year-old male presents for mental health evaluation. Patient reports that he has been increasingly depressed recently, he reports that his mom passed away approximately 1 month ago, states that he is also been struggling with crack cocaine use and is wishing to get off the crack cocaine. Patient states that he has been having thoughts of harming himself, states that he has been aching about getting a gun and shooting himself. Patient reports he does not have a gun right now but he does state that he knows someone that he could get a gun from. Patient also states that he has been upset with the people living at his group home, currently denies any homicidal ideation, denies any visual hallucinations, does admit auditory hallucinations are telling him that he is worthless and that he should kill himself. Patient reports that he has not used cocaine in approximately 1 week, denies any other complaints at this time    The history is provided by the patient. REVIEW OF SYSTEMS     Review of Systems   Constitutional: Negative for chills and fever. HENT: Negative for congestion and ear pain. Eyes: Negative for pain. Respiratory: Negative for shortness of breath. Cardiovascular: Negative for chest pain, palpitations and leg swelling. Gastrointestinal: Negative for abdominal pain. Genitourinary: Negative for dysuria and flank pain. Musculoskeletal: Negative for back pain. Skin: Negative for color change. Neurological: Negative for numbness and headaches. Psychiatric/Behavioral: Positive for suicidal ideas. Negative for confusion. All other systems reviewed and are negative.     PASTMEDICAL HISTORY     Past Medical History:   Diagnosis Date    Inject 234 mg into the muscle every 28 days           ALLERGIES     is allergic to navane [thiothixene]. FAMILY HISTORY     He indicated that his mother is alive. He indicated that his father is . He indicated that his brother is . SOCIAL HISTORY       Social History     Tobacco Use    Smoking status: Current Every Day Smoker     Packs/day: 0.50     Types: Cigarettes    Smokeless tobacco: Never Used    Tobacco comment: Patient accepting of nicotine patch   Substance Use Topics    Alcohol use: Yes     Comment: reports drinking occasionally    Drug use: Not Currently     Types: Cocaine     Comment: drug abuse includes cocaine,      PHYSICAL EXAM     INITIAL VITALS: /68   Pulse 62   Temp 98 °F (36.7 °C) (Oral)   Resp 15   Ht 6' 2\" (1.88 m)   Wt 170 lb (77.1 kg)   SpO2 100%   BMI 21.83 kg/m²    Physical Exam  Vitals and nursing note reviewed. Constitutional:       Appearance: Normal appearance. HENT:      Head: Normocephalic and atraumatic. Right Ear: External ear normal.      Left Ear: External ear normal.      Nose: Nose normal.      Mouth/Throat:      Mouth: Mucous membranes are moist.   Eyes:      Pupils: Pupils are equal, round, and reactive to light. Cardiovascular:      Rate and Rhythm: Normal rate and regular rhythm. Pulses: Normal pulses. Heart sounds: Normal heart sounds. Pulmonary:      Effort: Pulmonary effort is normal.      Breath sounds: Normal breath sounds. Abdominal:      General: Abdomen is flat. Palpations: Abdomen is soft. Tenderness: There is no abdominal tenderness. Musculoskeletal:         General: No tenderness. Normal range of motion. Cervical back: Neck supple. Skin:     General: Skin is warm and dry. Capillary Refill: Capillary refill takes less than 2 seconds. Neurological:      General: No focal deficit present. Mental Status: He is alert and oriented to person, place, and time.    Psychiatric: Behavior: Behavior normal.         Thought Content: Thought content includes suicidal ideation. Thought content includes suicidal plan. MEDICAL DECISION MAKIN-year-old male presents for mental health evaluation. On initial exam patient in no acute distress, vitals are stable, patient does report suicidal thoughts with potential plan, will check labs for medical clearance, will be evaluated by social work as well         Labs are reviewed and unremarkable, discussed with social work, both myself and  in agreement patient would benefit from admission, patient medically clear, patient agreeable to admission    CRITICAL CARE:       PROCEDURES:    Procedures    DIAGNOSTIC RESULTS   EKG:All EKG's are interpreted by the Emergency Department Physician who either signs or Co-signs this chart in the absence of a cardiologist.        RADIOLOGY:All plain film, CT, MRI, and formal ultrasound images (except ED bedside ultrasound) are read by the radiologist, see reports below, unless otherwisenoted in MDM or here. No orders to display     LABS: All lab results were reviewed by myself, and all abnormals are listed below.   Labs Reviewed   CBC WITH AUTO DIFFERENTIAL - Abnormal; Notable for the following components:       Result Value    RBC 3.77 (*)     Hemoglobin 10.7 (*)     Hematocrit 33.6 (*)     Seg Neutrophils 29 (*)     Lymphocytes 63 (*)     All other components within normal limits   COMPREHENSIVE METABOLIC PANEL W/ REFLEX TO MG FOR LOW K - Abnormal; Notable for the following components:    Glucose 147 (*)     Total Bilirubin 0.17 (*)     Total Protein 6.3 (*)     All other components within normal limits   COVID-19, RAPID   URINE DRUG SCREEN   ETHANOL       EMERGENCY DEPARTMENTCOURSE:         Vitals:    Vitals:    21 1935 21 2219 21 0815   BP: 119/75 119/65 128/68   Pulse: 97 86 62   Resp: 18 14 15   Temp: 98 °F (36.7 °C) 98.2 °F (36.8 °C) 98 °F (36.7 °C)   TempSrc: Oral Oral Oral   SpO2: 98%  100%   Weight:  170 lb (77.1 kg)    Height: 6' 2\" (1.88 m) 6' 2\" (1.88 m)        The patient was given the following medications while in the emergency department:  Orders Placed This Encounter   Medications    acetaminophen (TYLENOL) tablet 650 mg    ibuprofen (ADVIL;MOTRIN) tablet 400 mg    polyethylene glycol (GLYCOLAX) packet 17 g    aluminum & magnesium hydroxide-simethicone (MAALOX) 200-200-20 MG/5ML suspension 30 mL    hydrOXYzine (ATARAX) tablet 50 mg    traZODone (DESYREL) tablet 50 mg    escitalopram (LEXAPRO) tablet 20 mg    paliperidone (INVEGA) extended release tablet 6 mg    traZODone (DESYREL) tablet 150 mg    paliperidone palmitate ER (INVEGA SUSTENNA) IM injection 234 mg     Need to verify date of last injection    nicotine polacrilex (NICORETTE) gum 2 mg     CONSULTS:  None    FINAL IMPRESSION      1. Depression with suicidal ideation          DISPOSITION/PLAN   DISPOSITION Admitted 06/30/2021 09:42:13 PM      PATIENT REFERRED TO:  No follow-up provider specified.   DISCHARGE MEDICATIONS:  Current Discharge Medication List        Clemente Howe DO  Attending Emergency Physician                  Clemente Howe DO  07/01/21 [id number]

## 2021-07-01 NOTE — H&P
Department of Psychiatry  Attending Physician Psychiatric Assessment     Reason for Admission to Psychiatric Unit:    · Threat to self requiring 24 hour professional observation  · Threat to others requiring 24 hour professional observation  · Command hallucinations directing harm to self or others; risk of the patient taking action  · A mental disorder causing major disability in social, interpersonal, occupational, and/or educational functioning that is leading to dangerous or life-threatening functioning, and that can only be addressed in an acute inpatient setting   · Concerns about patient's safety in the community    CHIEF COMPLAINT: Depression with suicidal ideation    History obtained from:  patient, electronic medical record and family members    HISTORY OF PRESENT ILLNESS:    Katie De Santiago is a 57 y.o. male with significant past medical history of schizoaffective disorder, acute psychosis, suicidal ideation who presented to the ED with a chief complaint of suicidal ideation. Per ED social work report, \"Patient is a 57year old  male who presented to ED after experiencing suicidal ideation. Patient reports SI in the form of \"shooting myself in the head\". Patient alleged he \"tried to shoot myself once\" but the \"gun was so loud, I couldn't do it\".    Patient is linked with [facility] and indicates he is taking his medications as prescribed. Patient does live in a mental health group home for men that is staffed 24/7 but patient indicates he \"has some issues\" with some of the other residents.     Patient reports auditory hallucinations in the form of \"Adolfo just kill yourself, just do it and kill yourself, you can't survive out there, just end it\".    Patient denies HI. Patient does indicate he uses crack cocaine with the last time he used being \"about a week ago\".     Since arrival to the unit, patient has been in behavioral control, has not required the use of any as needed medications for agitation or anxiety. Patient has been seclusive to his room. Today, Thuan Dias was interviewed in his room. Patient states that he was recently [facility] from [facility] however states that his medications are not working and may need to be increased. Patient reports command auditory hallucinations telling him to kill himself. Patient reports being compliant with medication, however reiterates that his medication is not strong enough to control his auditory hallucinations. Patient states that his voices dominate him daily. He also states that the voices tell him that they are going to do bad things to him. Patient continues to be monitored in an inpatient facility for safety and stabilization.          PSYCHIATRIC HISTORY:  yes -schizoaffective disorder  Currently follows with [facility]  3 lifetime suicide attempts  Multiple psychiatric hospital admissions    Past psychiatric medications includes:   Invega, Effexor, Cogentin  Patient also reports previous trials on Seroquel Wellbutrin Lexapro Atarax and trazodone         Adverse reactions from psychotropic medications: no    Lifetime Psychiatric Review of Systems         [place]etee or Hypomania: denies      Panic Attacks: denies      Phobias: denies     Obsessions and Compulsions:denies     Body or Vocal Tics:  denies     Hallucinations: Endorses auditory     Delusions: None present  Mood congruent      Past Medical History:        Diagnosis Date    Bipolar disorder ([address]     Depression     GERD (gastroesophageal reflux disease)     Hallucinations     Headache(784.0)     Hepatitis     Schizophrenia, schizo-affective ([address]     Substance abuse ([address]     Tobacco abuse     Type II or unspecified type diabetes mellitus without mention of complication, not stated as uncontrolled     Urinary incontinence        Past Surgical History:        Procedure Laterality Date    ABSCESS DRAINAGE N/A 02/11/2018    Carla anal abcess    DENTAL SURGERY      all teeth pulled       Allergies:  Navane [thiothixene]    Social History:     Born in: [place]  Raised in: [place]  Family: Has 4 sisters 3 brothers   Highest Level of Education: Ninth grade  Occupation: Disabled on SSI  Marital Status: Single  Children: Denies  Residence: Lives in a boarding house unhappy with current situation, has concern over the general operations of the boarding house  Stressors: Current living situation acute grief related to the death of his mother  Patient Assets/Supportive Factors: Has a sister that is supportive of him    DRUG USE HISTORY  Social History     Tobacco Use   Smoking Status Current Every Day Smoker    Packs/day: 0.50    Types: Cigarettes   Smokeless Tobacco Never Used   Tobacco Comment    Patient accepting of nicotine patch     Social History     Substance and Sexual Activity   Alcohol Use Yes    Comment: reports drinking occasionally     Social History     Substance and Sexual Activity   Drug Use Not Currently    Types: Cocaine    Comment: drug abuse includes cocaine,      Patient endorses cocaine use    LEGAL HISTORY:   HISTORY OF INCARCERATION: yes -states he has had some \"run-ins \"with a long     Family History:       Problem Relation Age of Onset    Diabetes Mother     Heart Disease Mother        Psychiatric Family History  Unable to assess    PHYSICAL EXAM:  Vitals:  /68   Pulse 62   Temp 98 °F (36.7 °C) (Oral)   Resp 15   Ht 6' 2\" (1.88 m)   Wt 170 lb (77.1 kg)   SpO2 100%   BMI 21.83 kg/m²      Review of Systems   Constitutional: Negative for chills and weight loss. HENT: Negative for ear pain and nosebleeds. Eyes: Negative for blurred vision and photophobia. Respiratory: Negative for cough, shortness of breath and wheezing. Cardiovascular: Negative for chest pain and palpitations. Gastrointestinal: Negative for abdominal pain, diarrhea and vomiting. Genitourinary: Negative for dysuria and urgency.    Musculoskeletal: Negative for falls and joint pain. Skin: Negative for itching and rash. Neurological: Negative for tremors, seizures and weakness. Endo/Heme/Allergies: Does not bruise/bleed easily. Physical Exam:      Constitutional:  Appears well-developed and well-nourished, no acute distress  HENT:   Head: Normocephalic and atraumatic. Eyes: Conjunctivae are normal. Right eye exhibits no discharge. Left eye exhibits no discharge. No scleral icterus. Neck: Normal range of motion. Neck supple. Pulmonary/Chest:  No respiratory distress or accessory muscle use, no wheezing. Abdominal: Soft. Exhibits no distension. Musculoskeletal: Normal range of motion. Exhibits no edema. Neurological: cranial nerves II-XII grossly in tact, normal gait and station  Skin: Skin is warm and dry. Patient is not diaphoretic. No erythema.          Mental Status Examination:    Level of consciousness:  within normal limits   Appearance:  Hospital attire, laying in bed fair grooming   Behavior/Motor: no abnormalities noted  Attitude toward examiner:  Cooperative  Speech: normal rate and volume  Mood:  Depressed  Affect:  blunted  Thought processes:   Circumstantial  Thought content: active suicidal ideations without current plan or intent               denies homicidal ideations               Endorses auditory hallucinations              denies delusions  Cognition:  Oriented to self, location, time, situation  Concentration clinically adequate  Memory: intact  Insight &Judgment: poor    DSM-5 Diagnosis  Schizoaffective disorder, depressive type  Cocaine use disorder    Psychosocial and Contextual factors:  Financial  Occupational  Relationship  Legal   Living situation x   Educational     Past Medical History:   Diagnosis Date    B[address] disorder ([facility]     Depression     GERD (gastroesophageal reflux disease)     Hallucinations     Headache(784.0)     Hepatitis     Schizophrenia, schizo-affective ([facility]     Substance abuse ([facility]     Tobacco abuse     Type II or unspecified type diabetes mellitus without mention of complication, not stated as uncontrolled     Urinary incontinence         TREATMENT PLAN  Start home medicatins  Attempt to develop insight  Psycho-education conducted  Supportive Therapy conducted  Follow up daily while on inpatient unit  Encourage patient to participate in milieu activities      Risk Management:  close watch per standard protocol      Psychotherapy:  participation in milieu and group and individual sessions with Attending Physician,  and Physician Assistant/CNP      Estimated length of stay:  2-14 days      GENERAL PATIENT/FAMILY EDUCATION  Patient will understand basic signs and symptoms, Patient will understand benefits/risks and potential side effects from proposed meds and Patient will understand their role in recovery. Family is  active in patient's care. Patient assets that may be helpful during treatment include: Intent to participate and engage in treatment, sufficient fund of knowledge and intellect to understand and utilize treatments. Goals:    1) Remission of suicidal ideations, command auditory hallucinations. 2) Stabilization of symptoms prior to discharge. 3) Establish efficacy and tolerability of medications. Behavioral Services  Medicare Certification     Admission Day 1  I certify that this patient's inpatient psychiatric hospital admission is medically necessary for:    x (1) treatment which could reasonably be expected to improve this patient's condition, or    x (2) diagnostic study or its equivalent. Time Spent: 60 minutes     Physicians Signature:  Electronically signed by MARY Vasquez CNP on 7/1/21 at 1:21 AM EDT  I independently saw and evaluated the patient. I reviewed the midlevel provider's documentation above. Any additional comments or changes to the midlevel provider's documentation are stated below otherwise agree with assessment. The patient is known to me from his previous admissions the patient was seen face-to-face. The patient states that he is linked in with [facility] and has been taking his medications. The patient states he occasionally uses cocaine. The patient reports command auditory examinations which are telling him to commit suicide. The patient feels overwhelmed by this. He feels unable to contract for safety upon discharge. Noted that the patient has been receiving long-acting Invega Sustenna      PLAN  Will order oral Invega and give the patient [place] when it is next due. Attempt to develop insight  Psycho-education conducted. Supportive Therapy conducted.   Probable discharge is in 3 to 5 days  Follow-up daily while on inpatient unit    Electronically signed by Maritza Adam MD on 7/1/21 at 4:59 PM EDT

## 2021-07-01 NOTE — GROUP NOTE
Group Therapy Note    Date: 7/1/2021    Group Start Time: 1000  Group End Time: 6934  Group Topic: Group Therapy    [facility]    FRANKLIN Padilla, CHANEL        Group Therapy Note  Pt declined to attend psychotherapy at 1000 am despite encouragement. Pt offered 1:1 and refused.     Attendees: 9/20           Signature:  FRANKLIN Padilla, CHANEL

## 2021-07-01 NOTE — BH NOTE
BHI Biopsychosocial Assessment     Current Level of Psychosocial Functioning      Independent    Dependent    Minimal Assist X     Comments:  Client has a principle diagnosis of Schizoaffective disorder, depressive type, which is the condition established to be chiefly responsible for the admission of the client on this date. Client documentation provides prior diagnosis of Cocaine-Use Disorder      Psychosocial High Risk Factors (check all that apply)     Unable to obtain meds   Chronic illness/pain    Substance abuse X  Lack of Family Support   Financial stress X   Isolation X  Inadequate Community Resources  Suicide attempt(s)  X  Not taking medications   Victim of crime   Developmental Delay  Unable to manage personal needs  X  Age 72 or older   Homeless X  No transportation   Readmission within 30 days  Unemployment X  Traumatic Event       Psychiatric Advanced Directives: denies at this time.       Family to Involve in [address] ROIs on file; 2 sisters on file for emergency contacts [place] and [address]  Patient Korin, housing in group home 24/7 staff, SSI, some family support, linked with [facility] at [facility]     Patient Barriers:   Patient has poor insight. Patient uses crack cocaine. Multiple [facility] [place] [facility] admissions, last 5/6/2021 - 5/11/2021     Opiate/AOD Referral and/or Education Provided:         CMHC/mental health history:   [facility] FACT team notified on this date when admitted [phone]. Left message with  [phone]; David  Patient is a 58-year old  male who presented to ED after experiencing suicidal ideation. Patient reports SI in the form of \"shooting myself in the head\". Patient alleged he \"tried to shoot myself once\" but the \"gun was so loud, I couldn't do it\". Patient is linked with [facility] and indicates he is taking his medications as prescribed.   Patient does live in a mental health group home for men that is staffed 24/7 but patient indicates he \"has some issues\" with some of the other residents. Patient reports auditory hallucinations in the form of \"Adolfo just kill yourself, just do it and kill yourself, you can't survive out there, just end it\". Patient denies HI. Patient does indicate he uses crack cocaine with the last time he used being \"about a week ago\". Patient reports his mother \"passed away a month ago\".

## 2021-07-01 NOTE — PROGRESS NOTES
Pharmacy Medication History Note      List of current medications patient is taking is complete. Source of information: Yuko Sandoval ([place]), [place], [place]    Changes made to medication list:  Medications removed (include reason, ex. therapy complete or physician discontinued, noncompliance):  Selsun (list clean up)    Medications added/doses adjusted: Added Benztropin 1 mg daily as needed  Added Hydroxyzine 25 mg three times daily as needed  Added Quetiapine 600 mg nightly  Added Buproprion  mg twice daily    Other notes (ex. Recent course of antibiotics, Coumadin dosing):  Patient last received Invega sustenna 234 mg on 6/8/21 at [place] however [facility] from [address] [facility] discharge on 6/14/21 reports order was discontinued. Oral Paliperidone was also discontinued on discharge. Please let me know if you have any questions about this encounter. Thank you!     Electronically signed by Chemo Odonnell [facility] on 7/1/2021 at 9:23 AM

## 2021-07-01 NOTE — BH NOTE
Patient given tobacco quitline number 89188764846 at this time, refusing to call at this time, states \" I just dont want to quit now\"- patient given information as to the dangers of long term tobacco use. Continue to reinforce the importance of tobacco cessation.

## 2021-07-01 NOTE — BH NOTE
[facility]  Admission Note     Admission Type:   Admission Type: Voluntary    Reason for admission:  Reason for Admission: Pt having increase in auditory hallucinations and suicidal thoughts    PATIENT STRENGTHS:  Strengths: No significant Physical Illness, Connection to output provider    Patient Strengths and Limitations:  Limitations: Hopeless about future, Inappropriate/potentially harmful leisure interests    Addictive Behavior:   Addictive Behavior  Do you have a history of Alcohol Use?: Yes  Do you have a history of Street Drug Abuse?: Yes    Medical Problems:   Past Medical History:   Diagnosis Date    [facility] ([facility]     Depression     GERD (gastroesophageal reflux disease)     Hallucinations     Headache(784.0)     Hepatitis     Schizophrenia, schizo-affective ([facility]     Substance abuse ([facility]     Tobacco abuse     Type II or unspecified type diabetes mellitus without mention of complication, not stated as uncontrolled     Urinary incontinence        Status EXAM:  Status and Exam  Normal: No  Facial Expression: Flat  Affect: Blunt  Level of Consciousness: Alert  Mood:Normal: No  Mood: Depressed, Anxious  Motor Activity:Normal: Yes  Interview Behavior: Cooperative, Evasive  Preception: [place] to Person, [place] to Time, [place] to Place, [place] to Situation  Attention:Normal: No  Attention: Distractible  Thought Processes: Blocking  Thought Content:Normal: No  Thought Content: Poverty of Content  Hallucinations:  Auditory (Comment), Command(Comment)  Delusions: No  Memory:Normal: No  Memory: Poor Remote  Insight and Judgment: No  Insight and Judgment: Poor Judgment, Poor Insight  Present Suicidal Ideation: Yes (no plan or content)  Present Homicidal Ideation: No    Tobacco Screening:  Practical Counseling, on admission, corby X, if applicable and completed (first 3 are required if patient doesn't refuse):            ( )  Recognizing danger situations (included triggers and reviewed, signed yes. Refused NA . Patient verbalize understanding:  yes. Patient education on precautions: yes                     PT admitted to unit and wanded for safety. PT having increase in suicidal thoughts and command auditory hallucinations telling him to harm self. PT denies any current drug or alcohol abuse. PT lives in a group and can return. PT states the group home gets his medications from [place].    Jordana Blanca RN

## 2021-07-01 NOTE — PLAN OF CARE
Problem: Pain:  Goal: Pain level will decrease  Description: Pain level will decrease  7/1/2021 1313 by Luz Riley RN  Outcome: Ongoing   No pain reported   Problem: Pain:  Goal: Control of acute pain  Description: Control of acute pain  7/1/2021 1313 by Luz Riley RN  Outcome: Ongoing   No pain reported  Problem: Pain:  Goal: Control of chronic pain  Description: Control of chronic pain  7/1/2021 1313 by Luz Riley RN  Outcome: Ongoing   No pain reported  Problem: Tobacco Use:  Goal: Inpatient tobacco use cessation counseling participation  Description: Inpatient tobacco use cessation counseling participation  7/1/2021 1313 by Luz Riley RN  Outcome: Ongoing   Smoking education provided  Problem: Altered Mood, Psychotic Behavior:  Goal: Able to verbalize decrease in frequency and intensity of hallucinations  Description: Able to verbalize decrease in frequency and intensity of hallucinations  7/1/2021 1313 by Luz Riley RN  Outcome: Ongoing   Patient was calm, controlled and medication compliant. Patient reports fleeting suicidal ideations but contracts for safety. Patient reports feelings of depression and anxiety. Patient reports auditory voices \"telling me to hurt myself\". Patient is flat and isolative to self/room but is polite with staff. Patient safety is maintained. Problem: Altered Mood, Psychotic Behavior:  Goal: Absence of self-harm  Description: Absence of self-harm  7/1/2021 1313 by Luz iRley RN  Outcome: Ongoing   Patient was calm, controlled and medication compliant. Patient reports fleeting suicidal ideations but contracts for safety. Patient reports feelings of depression and anxiety. Patient reports auditory voices \"telling me to hurt myself\". Patient is flat and isolative to self/room but is polite with staff. Patient safety is maintained.

## 2021-07-01 NOTE — SUICIDE SAFETY PLAN
SAFETY PLAN     A Suicide Safety Plan is a document that supports someone when they are having thoughts of suicide. 1.                         Warning Signs that indicate a suicidal crisis may be developing: What (situations, thoughts, feelings, body sensations, behaviors, etc.) do you experience that lets you know you are beginning to think about suicide? Not taking my medications  Suicidal ideations or thoughts of death / dying  Thoughts to harm others   Depressed mood /sadness / loneliness / low self-esteem /feelings of worthlessness / emptiness  Racing thoughts / taking risks / doing impulsive behaviors or decision making  Not sleeping  Hearing voices / seeing things   Wanting to use alcohol or drugs (crack cocaine)         2. Internal Coping Strategies:  What things can I do (relaxation techniques, hobbies, physical activities, etc.) to take my mind off my problems without contacting another person? 1. Listen to some R&B music. A.         Coping skills/ strategies  journal/ listen to music/ go for a walk/ read a book/ watch a funny movie/show / crafts / video game   B. Grounding techniques- eat a sour candy or hot cinnamon candy / focus on colors, sounds, smells, textures on things around you /  drink some herbal tea / eat a piece of dark chocolate / take a hot bath or shower / essential oils for smelling / meditate / color / arts and crafts           3. People and social settings that provide distraction: Who can I call or where can I go to distract me? Parents, siblings, relatives, friends, /, , coworkers, support group, therapist, doctor  park, support group, gym, Temple, etc     People whom I can ask for help: Who can I call when I need help - for example, friends, family, clergy, someone else? My boarding house supports  My  and [employer] staff [phone]  My sister Jessee Andres.          4. Professionals or [address] [place], [place] [address]vd I can contact during a crisis: Who can I call for help - for example, my doctor, my psychiatrist, my psychologist, a mental health provider, a suicide hotline? [facility] ( 24 hour crisis staff )  [facility]  [phone]      COVID-19 Emotional Support Line: [phone]  Call the Recovery Helpline at [address]: 7-225-551-TALK (8526)  Text \"4Hope\" to 755278        5. [facility] Emergency Crisis Services Numbers:     [facility] ( 24 hour crisis staff )  [facility]  [phone]      COVID-19 Emotional Support Line: [phone]  Call the Recovery Helpline at [address] [place] [address] Drive: 6-339-941-TALK (6984)  Text \"4Hope\" to [address]     1. BellyBio  Free surjit that teaches a deep breathing technique useful in fighting anxiety and stress. A simple interface uses biofeedback to monitor your breathing. Sounds cascade with the movements of your belly, in rhythms reminiscent of waves on a beach. Charts also let you know how you're doing. A great tool when you need to slow down and breathe. 2. Operation Reach Out  Literally a lifesaving surjit, this free intervention tool helps people who are having suicidal thoughts to reassess their thinking and get help. Recommended by followers of, who report that this surjit has helped in suicidal crises. Developed by the [employer], but useful to all. Acosta a download even if you're not suicidal. You never know if you might need it. 3. eCBT Calm  Provides a set of tools to help you evaluate personal stress and anxiety, challenge distorted thoughts, and learn relaxation skills that have been scientifically validated in research on Cognitive Behavioral Therapy (CBT). Lots of background and useful information along with step-by-step guides.   4. Deep Sleep with Mariaelena Harry  Getting enough sleep is one of the foundations of mental health. A personal favorite I listen to all the time, this straightforward surjit features a warm, gentle voice guiding listeners through a Progressive Muscle Relaxation (PMR) session and into sleep. Features long or short induction options, and an alarm. 5. WhatsMyM3  A three minute depression and anxiety screen. Validated questionnaires assess symptoms of depression, anxiety, bipolar disorder, and PTSD, and combine into a score that indicates whether or not your life is impacted significantly by a mood disorder, recommending a course of action. The surjit keeps a history of test results, to help you track your progress. 6. DBT Diary Card and Skills   Based on Dialectical Behavior Therapy (DBT) developed by psychologist Alvaro Camacho, this surjit is a rich resource of self-help skills, reminders of the therapy principles, and coaching tools for coping. Created by a therapist with years of experience in the practice, this surjit is not intended to replace a professional but helps people reinforce their treatment. 7. Optimism  Track your moods, keep a journal, and chart your recovery progress with this comprehensive tool for depression, bipolar disorder, and anxiety disorders. One of the most popular mood tracking apps available, with plenty of features. Free. 8. iSleepEasy  A calm female voice helps you quell anxieties and take the time to relax and sleep, in an array of guided meditations. Separately controlled voice and music tracks, flexible lengths, and an alarm. Includes a special wee hours rescue track, and tips for falling asleep. Developed by [employer], who offer an great line of relaxation apps. 9. Magic Window  Living Pictures  Not technically a mental health surjit, it makes no miraculous claims about curbing anxiety.  However, there is independent research indicating that taking breaks and getting exposure to nature, even in videos, can reduce stress. This surjit offers an assortment of peaceful, ambient nature scenes from beautiful spots around the world. 10. Relax Melodies  A popular free relaxation sound and music surjit. Mix and match nature sounds with new age music; it's sekou to listen to birds in the rain while a piano softly plays. 6.                     Making the environment safe: How can I make my environment (house/apartment/living space) safer? Remove weapons, cutting objects, get rid of extra medications, household chemicals. If you begin to have suicidal ideations -Involve your support system to implement your safety plan right away. Call 911 immediately or go to your local Emergency Room if you cannot contract to be safe.

## 2021-07-01 NOTE — H&P
Department of Psychiatry  Attending Physician Psychiatric Assessment     Reason for Admission to Psychiatric Unit:    · Threat to self requiring 24 hour professional observation  · Threat to others requiring 24 hour professional observation  · Command hallucinations directing harm to self or others; risk of the patient taking action  · A mental disorder causing major disability in social, interpersonal, occupational, and/or educational functioning that is leading to dangerous or life-threatening functioning, and that can only be addressed in an acute inpatient setting   · Concerns about patient's safety in the community    CHIEF COMPLAINT: Depression with suicidal ideation    History obtained from:  patient, electronic medical record and family members    HISTORY OF PRESENT ILLNESS:    Sirisha Ramirez is a 57 y.o. male with significant past medical history of schizoaffective disorder, acute psychosis, suicidal ideation who presented to the ED with a chief complaint of suicidal ideation. Per ED social work report, \"Patient is a 57year old  male who presented to ED after experiencing suicidal ideation. Patient reports SI in the form of \"shooting myself in the head\". Patient alleged he \"tried to shoot myself once\" but the \"gun was so loud, I couldn't do it\".    Patient is linked with [facility] and indicates he is taking his medications as prescribed. Patient does live in a mental health group home for men that is staffed 24/7 but patient indicates he \"has some issues\" with some of the other residents.     Patient reports auditory hallucinations in the form of \"Adolfo just kill yourself, just do it and kill yourself, you can't survive out there, just end it\".    Patient denies HI. Patient does indicate he uses crack cocaine with the last time he used being \"about a week ago\".     Since arrival to the unit, patient has been in behavioral control, has not required the use of any as needed medications for agitation or anxiety. Patient has been seclusive to his room. Today, Jo Rashid was interviewed in his room. Patient states that he was recently discharged from [facility] however states that his medications are not working and may need to be increased. Patient reports command auditory hallucinations telling him to kill himself. Patient reports being compliant with medication, however reiterates that his medication is not strong enough to control his auditory hallucinations. Patient states that his voices dominate him daily. He also states that the voices tell him that they are going to do bad things to him. Patient continues to be monitored in an inpatient facility for safety and stabilization.          PSYCHIATRIC HISTORY:  yes -schizoaffective disorder  Currently follows with [email or web address]  3 lifetime suicide attempts  Multiple psychiatric hospital admissions    Past psychiatric medications includes:   Invega, Effexor, Cogentin  Patient also reports previous trials on Seroquel Wellbutrin Lexapro Atarax and trazodone         Adverse reactions from psychotropic medications: no    Lifetime Psychiatric Review of Systems         [place] or Hypomania: denies      Panic Attacks: denies      Phobias: denies     Obsessions and Compulsions:denies     Body or Vocal Tics:  denies     Hallucinations: Endorses auditory     Delusions: None present  Mood congruent      Past Medical History:        Diagnosis Date    Bipolar disorder ([address]     Depression     GERD (gastroesophageal reflux disease)     Hallucinations     Headache(784.0)     Hepatitis     Schizophrenia, schizo-affective ([address]     Substance abuse ([address]     Tobacco abuse     Type II or unspecified type diabetes mellitus without mention of complication, not stated as uncontrolled     Urinary incontinence        Past Surgical History:        Procedure Laterality Date    ABSCESS DRAINAGE N/A 02/11/2018    Carla anal abcess    DENTAL SURGERY      all teeth pulled       Allergies:  Navane [thiothixene]    Social History:     Born in: [place]  Raised in: [place]  Family: Has 4 sisters 3 brothers   Highest Level of Education: Ninth grade  Occupation: Disabled on SSI  Marital Status: Single  Children: Denies  Residence: Lives in a boarding house unhappy with current situation, has concern over the general operations of the boarding house  Stressors: Current living situation acute grief related to the death of his mother  Patient Assets/Supportive Factors: Has a sister that is supportive of him    DRUG USE HISTORY  Social History     Tobacco Use   Smoking Status Current Every Day Smoker    Packs/day: 0.50    Types: Cigarettes   Smokeless Tobacco Never Used   Tobacco Comment    Patient accepting of nicotine patch     Social History     Substance and Sexual Activity   Alcohol Use Yes    Comment: reports drinking occasionally     Social History     Substance and Sexual Activity   Drug Use Not Currently    Types: Cocaine    Comment: drug abuse includes cocaine,      Patient endorses cocaine use    LEGAL HISTORY:   HISTORY OF INCARCERATION: yes -states he has had some \"run-ins \"with a long     Family History:       Problem Relation Age of Onset    Diabetes Mother     Heart Disease Mother        Psychiatric Family History  Unable to assess    PHYSICAL EXAM:  Vitals:  /65   Pulse 86   Temp 98.2 °F (36.8 °C) (Oral)   Resp 14   Ht 6' 2\" (1.88 m)   Wt 170 lb (77.1 kg)   SpO2 98%   BMI 21.83 kg/m²      Review of Systems   Constitutional: Negative for chills and weight loss. HENT: Negative for ear pain and nosebleeds. Eyes: Negative for blurred vision and photophobia. Respiratory: Negative for cough, shortness of breath and wheezing. Cardiovascular: Negative for chest pain and palpitations. Gastrointestinal: Negative for abdominal pain, diarrhea and vomiting. Genitourinary: Negative for dysuria and urgency.    Musculoskeletal: Negative for falls and joint pain. Skin: Negative for itching and rash. Neurological: Negative for tremors, seizures and weakness. Endo/Heme/Allergies: Does not bruise/bleed easily. Physical Exam:      Constitutional:  Appears well-developed and well-nourished, no acute distress  HENT:   Head: Normocephalic and atraumatic. Eyes: Conjunctivae are normal. Right eye exhibits no discharge. Left eye exhibits no discharge. No scleral icterus. Neck: Normal range of motion. Neck supple. Pulmonary/Chest:  No respiratory distress or accessory muscle use, no wheezing. Abdominal: Soft. Exhibits no distension. Musculoskeletal: Normal range of motion. Exhibits no edema. Neurological: cranial nerves II-XII grossly in tact, normal gait and station  Skin: Skin is warm and dry. Patient is not diaphoretic. No erythema.          Mental Status Examination:    Level of consciousness:  within normal limits   Appearance:  Hospital attire, laying in bed fair grooming   Behavior/Motor: no abnormalities noted  Attitude toward examiner:  Cooperative  Speech: normal rate and volume  Mood:  Depressed  Affect:  blunted  Thought processes:   Circumstantial  Thought content: active suicidal ideations without current plan or intent               denies homicidal ideations               Endorses auditory hallucinations              denies delusions  Cognition:  Oriented to self, location, time, situation  Concentration clinically adequate  Memory: intact  Insight &Judgment: poor    DSM-5 Diagnosis  Schizoaffective disorder, depressive type  Cocaine use disorder    Psychosocial and Contextual factors:  Financial  Occupational  Relationship  Legal   Living situation x   Educational     Past Medical History:   Diagnosis Date    B[facility] ([facility]     Depression     GERD (gastroesophageal reflux disease)     Hallucinations     Headache(784.0)     Hepatitis     Schizophrenia, schizo-affective ([facility]     Substance abuse ([facility]     Tobacco abuse     Type II or unspecified type diabetes mellitus without mention of complication, not stated as uncontrolled     Urinary incontinence         TREATMENT PLAN  Start home medicatins  Attempt to develop insight  Psycho-education conducted  Supportive Therapy conducted  Follow up daily while on inpatient unit  Encourage patient to participate in milieu activities      Risk Management:  close watch per standard protocol      Psychotherapy:  participation in milieu and group and individual sessions with Attending Physician,  and Physician Assistant/CNP      Estimated length of stay:  2-14 days      GENERAL PATIENT/FAMILY EDUCATION  Patient will understand basic signs and symptoms, Patient will understand benefits/risks and potential side effects from proposed meds and Patient will understand their role in recovery. Family is  active in patient's care. Patient assets that may be helpful during treatment include: Intent to participate and engage in treatment, sufficient fund of knowledge and intellect to understand and utilize treatments. Goals:    1) Remission of suicidal ideations, command auditory hallucinations. 2) Stabilization of symptoms prior to discharge. 3) Establish efficacy and tolerability of medications. Behavioral Services  Medicare Certification     Admission Day 1  I certify that this patient's inpatient psychiatric hospital admission is medically necessary for:    x (1) treatment which could reasonably be expected to improve this patient's condition, or    x (2) diagnostic study or its equivalent.      Time Spent: 60 minutes     Physicians Signature:  Electronically signed by MARY Viveros CNP on 7/1/21 at 1:21 AM CHERIT

## 2021-07-01 NOTE — GROUP NOTE
Group Therapy Note    Date: 7/1/2021    Group Start Time: 1330  Group End Time: 7621  Group Topic: Cognitive Skills    JESUS Zaman, CTRS        Group Therapy Note    Attendees: 8/20         Pt did not participate in Cognitive Skills Group at 1330 when encouraged by RT due to resting in room. Pt was offered talk time as an alternative to group but declined.          Discipline Responsible: Psychoeducational Specialist        Signature:  Cleo Thakkar

## 2021-07-01 NOTE — ED NOTES
Provisional Diagnosis:     Patient presented to ED experiencing suicidal ideation    Psychosocial and Contextual Factors:     Patient lives in a group home    C-SSRS Summary:    Patient reports SI in the form of \"shooting myself in the head\". Patient: X  Family:   Agency:     Substance Abuse:  Patient reports crack cocaine use    Present Suicidal Behavior:    Patient reports SI in the form of \"shooting myself in the head\". Verbal: X    Attempt:    Past Suicidal Behavior:   Patient alleged he \"tried to shoot myself once\" but the \"gun was so loud, I couldn't do it\". Verbal: X    Attempt: X     Self-Injurious/Self-Mutilation:  Patient denies    Trauma Identified:    Patient reports his mother \"passed away a month ago\". Protective Factors:    Patient has stable in group home with 24/7 staff. Patient linked with [facility]. Patient has insurance. Patient has stable income. Patient taking his medications. Risk Factors:    Patient has poor insight. Patient uses crack cocaine. Clinical Summary:    Patient is a 57year old  male who presented to ED after experiencing suicidal ideation. Patient reports SI in the form of \"shooting myself in the head\". Patient alleged he \"tried to shoot myself once\" but the \"gun was so loud, I couldn't do it\". Patient is linked with [facility] and indicates he is taking his medications as prescribed. Patient does live in a mental health group home for men that is staffed 24/7 but patient indicates he \"has some issues\" with some of the other residents. Patient reports auditory hallucinations in the form of \"Adolfo just kill yourself, just do it and kill yourself, you can't survive out there, just end it\". Patient denies HI. Patient does indicate he uses crack cocaine with the last time he used being \"about a week ago\". Level of Care Disposition:     This writer consulted with Nestor Castañeda NP, who recommended inpatient hospitalization for safety and stabilization. Patient signed application for voluntary admission to [facility].

## 2021-07-02 PROCEDURE — 1240000000 HC EMOTIONAL WELLNESS R&B

## 2021-07-02 PROCEDURE — 99232 SBSQ HOSP IP/OBS MODERATE 35: CPT | Performed by: PSYCHIATRY & NEUROLOGY

## 2021-07-02 PROCEDURE — 6370000000 HC RX 637 (ALT 250 FOR IP)

## 2021-07-02 RX ADMIN — TRAZODONE HYDROCHLORIDE 150 MG: 150 TABLET ORAL at 21:53

## 2021-07-02 RX ADMIN — TRAZODONE HYDROCHLORIDE 150 MG: 150 TABLET ORAL at 20:58

## 2021-07-02 RX ADMIN — PALIPERIDONE 6 MG: 6 TABLET, EXTENDED RELEASE ORAL at 08:03

## 2021-07-02 RX ADMIN — ESCITALOPRAM OXALATE 20 MG: 20 TABLET ORAL at 08:03

## 2021-07-02 RX ADMIN — HYDROXYZINE HYDROCHLORIDE 50 MG: 50 TABLET, FILM COATED ORAL at 20:58

## 2021-07-02 RX ADMIN — IBUPROFEN 400 MG: 400 TABLET ORAL at 21:54

## 2021-07-02 ASSESSMENT — PAIN SCALES - GENERAL: PAINLEVEL_OUTOF10: 6

## 2021-07-02 NOTE — GROUP NOTE
Group Therapy Note    Date: 7/2/2021    Group Start Time: 1100  Group End Time: 1140  Group Topic: Healthy Living/Wellness    JESUS Carlin, CTRS        Group Therapy Note    Attendees: 6/19         Pt did not participate in [address] [place] [address] at 1100am when encouraged by RT due to resting in room. Pt was offered talk time as an alternative to group but declined.          Discipline Responsible: Psychoeducational Specialist        Signature:  Deepa Marks

## 2021-07-02 NOTE — PLAN OF CARE
Problem: Altered Mood, Psychotic Behavior:  Goal: Able to verbalize decrease in frequency and intensity of hallucinations  Description: Able to verbalize decrease in frequency and intensity of hallucinations  7/2/2021 1359 by Curry Alvarez LPN  Outcome: Ongoing  Note: Patient continues to express having auditory hallucinations that are commanding, patient states he is not listening to them and will remain safe. Problem: Altered Mood, Psychotic Behavior:  Goal: Absence of self-harm  Description: Absence of self-harm  7/2/2021 1359 by Curry Alvarez LPN  Outcome: Ongoing  Note: Patient is independent of all cares, able to make needs known, and has appropriate sleep and nutrition. Patient is educated and encouraged to shower and tend to hygiene. Staff will continue to monitor and provide support.

## 2021-07-02 NOTE — GROUP NOTE
Group Therapy Note    Date: 7/2/2021    Group Start Time: 1430  Group End Time: 4991  Group Topic: Relaxation    JESUS Grant, CTRS        Group Therapy Note    Attendees: 4/16         Pt did not participate in Relaxation Skills Group at 1430 when encouraged by RT due to resting in room. Pt was offered talk time as an alternative to group but declined.          Discipline Responsible: Psychoeducational Specialist        Signature:  Josue Simmons

## 2021-07-02 NOTE — PLAN OF CARE
Problem: Altered Mood, Psychotic Behavior:  Goal: Able to verbalize decrease in frequency and intensity of hallucinations  Description: Able to verbalize decrease in frequency and intensity of hallucinations  7/1/2021 2217 by Linden Castillo LPN  Outcome: Ongoing  Patient reports auditory hallucinations that \"never stop\". Patient is calm and maintains behavioral control on unit. Patient is medication compliant and verbalizes no concerns with his medications. Patient is out for short intervals and appears depressed and unmotivated. Patient discusses no plans for discharge at this time. Will continue to monitor. Problem: Altered Mood, Psychotic Behavior:  Goal: Absence of self-harm  Description: Absence of self-harm  7/1/2021 2217 by Linden Castillo LPN  Outcome: Ongoing  Patient remains free from self harm. Patient agrees to seek staff if thoughts arise. 15 minute checks maintained for patient safety. Will continue to monitor and provide support and reassurance as needed.

## 2021-07-02 NOTE — PLAN OF CARE
No  Present Homicidal Ideation: No    Daily Assessment Last Entry:   Daily Sleep (WDL): Within Defined Limits         Patient Currently in Pain: No  Daily Nutrition (WDL): Within Defined Limits    Patient Monitoring:  Frequency of Checks: 4 times per hour, close    Psychiatric Symptoms:   Depression Symptoms  Depression Symptoms: Isolative, Loss of interest  Anxiety Symptoms  Anxiety Symptoms: Generalized  [place] Symptoms  [place] Symptoms: No problems reported or observed. Psychosis Symptoms  Delusion Type: No problems reported or observed. Suicide Risk CSSR-S:  Have you wished you were dead or wished you could go to sleep and not wake up? : NO  Have you actually had any thoughts of killing yourself? : NO  Have you ever done anything, started to do anything, or prepared to do anything to end your life?: NO  Change in Result                no                  Change in Plan of care              no      EDUCATION:   EDUCATION:   Learner Progress Toward Treatment Goals: Reviewed results and recommendations of this team, Reviewed group plan and strategies, Reviewed signs, symptoms and risk of self harm and violent behavior, Reviewed goals and plan of care    Method:small group, individual verbal education    Outcome:verbalized by patient, but needs reinforcement to obtain goals    PATIENT GOALS:  Short term: Pt declined to meet with team to review care plan et tx goals.  Pt did not develop a short term goal.  Long term:Pt did not develop a long term goal    PLAN/TREATMENT RECOMMENDATIONS UPDATE: continue with group therapies, increased socialization, continue planning for after discharge goals, continue with medication compliance    SHORT-TERM GOALS UPDATE:   Time frame for Short-Term Goals: 5-7 days    LONG-TERM GOALS UPDATE:   Time frame for Long-Term Goals: 6 months  Members Present in Team Meeting: See Signature Sheet    Tory Ortega

## 2021-07-02 NOTE — PROGRESS NOTES
BEHAVIORAL HEALTH FOLLOW-UP NOTE     7/2/2021     Patient was seen and examined in person, Chart reviewed   Patient's case discussed with staff/team    Chief Complaint: Psychosis    Interim History:   The patient was seen at bedside. He continues to be depressed and despondent. He has psychomotor retardation. He reports auditory hallucinations but states he does not pay any attention to what they are saying. The patient continues to have suicidal ideation. He has been compliant with medications and reports no side effects.   He is due to receive his maintenance dose of [place] on 7/6/2021       /62   Pulse 81   Temp 97.9 °F (36.6 °C) (Oral)   Resp 16   Ht 6' 2\" (1.88 m)   Wt 170 lb (77.1 kg)   SpO2 100%   BMI 21.83 kg/m²   Appetite:   [x] Normal/Unchanged  [] Increased  [] Decreased      Sleep:       [] Normal/Unchanged  [x] Fair       [] Poor              Energy:    [x] Normal/Unchanged  [] Increased  [] Decreased        Aggression:  [] yes  [x] no    Patient is [x] able  [] unable to CONTRACT FOR SAFETY ON THE UNIT    PAST MEDICAL/PSYCHIATRIC HISTORY:   Past Medical History:   Diagnosis Date    Bipolar disorder ([facility]     Depression     GERD (gastroesophageal reflux disease)     Hallucinations     Headache(784.0)     Hepatitis     Schizophrenia, schizo-affective ([address]     Substance abuse ([facility] [address]     Tobacco abuse     Type II or unspecified type diabetes mellitus without mention of complication, not stated as uncontrolled     Urinary incontinence        FAMILY/SOCIAL HISTORY:  Family History   Problem Relation Age of Onset    Diabetes Mother     Heart Disease Mother      Social History     Socioeconomic History    Marital status: Single     Spouse name: Not on file    Number of children: 0    Years of education: 8    Highest education level: Not on file   Occupational History     Employer: N/A   Tobacco Use    Smoking status: Current Every Day Smoker     Packs/day: 0.50 Types: Cigarettes    Smokeless tobacco: Never Used    Tobacco comment: Patient accepting of nicotine patch   Substance and Sexual Activity    Alcohol use: Yes     Comment: reports drinking occasionally    Drug use: Not Currently     Types: Cocaine     Comment: drug abuse includes cocaine,     Sexual activity: Not on file   Other Topics Concern    Not on file   Social History Narrative    Not on file     Social Determinants of Health     Financial Resource Strain:     Difficulty of Paying Living Expenses:    Food Insecurity:     Worried About Running Out of Food in the Last Year:     Ran Out of Food in the Last Year:    Transportation Needs:     Lack of Transportation (Medical):  Lack of Transportation (Non-Medical):    Physical Activity:     Days of Exercise per Week:     Minutes of Exercise per Session:    Stress:     Feeling of Stress :    Social Connections:     Frequency of Communication with Friends and Family:     Frequency of Social Gatherings with Friends and Family:     Attends Restorationism Services:     Active Member of Clubs or Organizations:     Attends Club or Organization Meetings:     Marital Status:    Intimate Partner Violence:     Fear of Current or Ex-Partner:     Emotionally Abused:     Physically Abused:     Sexually Abused:            ROS:  [x] All negative/unchanged except if checked.  Explain positive(checked items) below:  [] Constitutional  [] Eyes  [] Ear/Nose/Mouth/Throat  [] Respiratory  [] CV  [] GI  []   [] Musculoskeletal  [] Skin/Breast  [] Neurological  [] Endocrine  [] Heme/Lymph  [] Allergic/Immunologic    Explanation:     MEDICATIONS:    Current Facility-Administered Medications:     escitalopram (LEXAPRO) tablet 20 mg, 20 mg, Oral, Daily, MARY Pimentel CNP, 20 mg at 07/02/21 0803    paliperidone (INVEGA) extended release tablet 6 mg, 6 mg, Oral, Daily, MARY Mahajan CNP, 6 mg at 07/02/21 0803    traZODone (DESYREL) tablet 150 mg, 150 mg, Oral, Nightly PRN, Lauro Carbon, APRN - CNP, 150 mg at 07/01/21 2057    [START ON 7/6/2021] paliperidone palmitate ER (INVEGA SUSTENNA) IM injection 234 mg, 234 mg, Intramuscular, Q28 Days, MARY Pimentel CNP    nicotine polacrilex (NICORETTE) gum 2 mg, 2 mg, Oral, PRN, Taiwo Johnson MD    acetaminophen (TYLENOL) tablet 650 mg, 650 mg, Oral, Q4H PRN, MARY De Souza CNP    ibuprofen (ADVIL;MOTRIN) tablet 400 mg, 400 mg, Oral, Q6H PRN, MARY Hummel CNP, 400 mg at 07/01/21 2057    polyethylene glycol (GLYCOLAX) packet 17 g, 17 g, Oral, Daily PRN, MARY De Souza CNP    aluminum & magnesium hydroxide-simethicone (MAALOX) 200-200-20 MG/5ML suspension 30 mL, 30 mL, Oral, Q6H PRN, MARY De Souza CNP    hydrOXYzine (ATARAX) tablet 50 mg, 50 mg, Oral, TID PRN, MARY De Souza CNP, 50 mg at 07/01/21 1800    traZODone (DESYREL) tablet 50 mg, 50 mg, Oral, Nightly PRN, MARY De Souza CNP      Examination:  /62   Pulse 81   Temp 97.9 °F (36.6 °C) (Oral)   Resp 16   Ht 6' 2\" (1.88 m)   Wt 170 lb (77.1 kg)   SpO2 100%   BMI 21.83 kg/m²     Medication side effects(SE): none    Mental Status Examination:    Level of consciousness:  within normal limits   Appearance:  poor grooming and fair hygiene  Behavior/Motor: Psychomotor retardation  Attitude toward examiner:  cooperative  Speech:  slow and poverty of speech   Mood: constricted and depressed  Affect:  mood congruent  Thought processes:  poverty of thought   Thought content:  Homicidal ideation - none  Suicidal Ideation:  passive  Delusions:  paranoid  Perceptual Disturbance:  auditory  Cognition:  oriented to person, place, and time   Concentration intact  Insight fair   Judgement fair     ASSESSMENT:   Patient symptoms are:  [] Well controlled  [x] Improving  [] Worsening  [] No change      Diagnosis:   Principal Problem:    Schizoaffective disorder, depressive type [facility])  Active Problems:    Depression with suicidal ideation  Resolved Problems:    * No resolved hospital problems. *      LABS:    Recent Labs     06/30/21 2041   WBC 4.6   HGB 10.7*        Recent Labs     06/30/21 2041      K 4.2      CO2 24   BUN 15   CREATININE 0.98   GLUCOSE 147*     Recent Labs     06/30/21 2041   BILITOT 0.17*   ALKPHOS 104   AST 37   ALT 13     Lab Results   Component Value Date    BARBSCNU NEGATIVE 06/30/2021    LABBENZ NEGATIVE 06/30/2021    LABBENZ NEGATIVE 03/24/2012    LABMETH NEGATIVE 06/30/2021    PPXUR NOT REPORTED 06/30/2021     Lab Results   Component Value Date    TSH 1.50 10/23/2020     No results found for: LITHIUM  No results found for: VALPROATE, CBMZ    RISK ASSESSMENT: Moderate risk of suicide. Moderate risk of aggression    Treatment Plan:  Reviewed current Medications with the patient. Continue paliperidone as noted above    Risks, benefits, side effects, drug-to-drug interactions and alternatives to treatment were discussed. The patient  consented to treatment. Encourage patient to attend group and other milieu activities. Discharge planning discussed with the patient and treatment team.    PSYCHOTHERAPY/COUNSELING:  [] Therapeutic interview  [x] Supportive  [] CBT  [] Ongoing  [] Other    [x] Patient continues to need, on a daily basis, active treatment furnished directly by or requiring the supervision of inpatient psychiatric personnel      Anticipated Length of stay: 3 to 5 days                                         Carolyn Rich is a 57 y.o. male being evaluated face to face.     --Chio Flores MD on 7/2/2021 at 5:37 PM    An electronic signature was used to authenticate this note. **This report has been created using voice recognition software. It may contain minor errors which are inherent in voice recognition technology. **

## 2021-07-02 NOTE — GROUP NOTE
Group Therapy Note    Date: 7/2/2021    Group Start Time: 1000  Group End Time: 3870  Group Topic: Group Therapy    FRANKLIN Niño LSW        Group Therapy Note  Pt declined to attend psychotherapy at 1000 am despite encouragement. Pt offered 1:1 and refused.       Attendees: 7/20               Signature:  FRANKLIN Dukes LSW

## 2021-07-03 PROCEDURE — 1240000000 HC EMOTIONAL WELLNESS R&B

## 2021-07-03 PROCEDURE — 6370000000 HC RX 637 (ALT 250 FOR IP)

## 2021-07-03 RX ADMIN — PALIPERIDONE 6 MG: 6 TABLET, EXTENDED RELEASE ORAL at 08:47

## 2021-07-03 RX ADMIN — ESCITALOPRAM OXALATE 20 MG: 20 TABLET ORAL at 08:47

## 2021-07-03 NOTE — GROUP NOTE
Group Therapy Note    Date: 7/3/2021    Group Start Time: 1000  Group End Time: 7909  Group Topic: Psychoeducation    FRANKLIN Fatima LSW        Group Therapy Note    Attendees: 5/19         Patient was offered group therapy today but declined to participate despite encouragement from staff. 1:1 was offered.     Signature:  FRANKLIN Abrams, CHANEL

## 2021-07-03 NOTE — GROUP NOTE
Group Therapy Note    Date: 7/3/2021    Group Start Time: 1415  Group End Time: 6021  Group Topic: Healthy Living/Wellness    JESUS SHI    Morris Vicente RN        Group Therapy Note    Attendees: 4 out of 19         Patient's Goal:  Exercising and participation    Notes:  Healthy living    Status After Intervention:  Improved    Participation Level: Interactive    Participation Quality: Appropriate      Speech:  normal      Thought Process/Content: Logical      Affective Functioning: Congruent      Mood: stable      Level of consciousness:  Alert and Oriented x4      Response to Learning: Able to verbalize current knowledge/experience      Endings: None Reported    Modes of Intervention: Activity and Movement      Discipline Responsible: Registered Nurse      Signature:  Morris Vicente RN

## 2021-07-03 NOTE — PLAN OF CARE
Problem: Altered Mood, Psychotic Behavior:  Goal: Able to verbalize decrease in frequency and intensity of hallucinations  Description: Able to verbalize decrease in frequency and intensity of hallucinations  7/2/2021 2250 by Leslie Morgan LPN  Outcome: Ongoing    Problem: Altered Mood, Psychotic Behavior:  Goal: Absence of self-harm  Description: Absence of self-harm  7/2/2021 2250 by Leslie Morgan LPN  Outcome: Ongoing     Patient remains free from self harm. Patient agrees to seek staff if thoughts arise. 15 minute checks maintained for patient safety. Will continue to monitor and provide support and reassurance as needed.

## 2021-07-03 NOTE — PLAN OF CARE
Problem: Altered Mood, Psychotic Behavior:  Goal: Able to verbalize decrease in frequency and intensity of hallucinations  Description: Able to verbalize decrease in frequency and intensity of hallucinations  Outcome: Ongoing   Pt admits to having thoughts of self harm. Agreeable to seeking out staff should feelings increase. Able to identify positive coping skills and plan for safety. Will cont to monitor q15 minutes for safety and provide support and reassurance as needed. 1:1 with pt x ten minutes. Pt encouraged to attend unit programming and interact with peers and staff. Pt also encouraged to tend to hygiene and ADLs. Pt encouraged to discuss feelings with staff and feedback and reassurance provided. Pt admits to auditory hallucinations. Compliant with medications.

## 2021-07-04 PROCEDURE — 99231 SBSQ HOSP IP/OBS SF/LOW 25: CPT | Performed by: PSYCHIATRY & NEUROLOGY

## 2021-07-04 PROCEDURE — 6370000000 HC RX 637 (ALT 250 FOR IP)

## 2021-07-04 PROCEDURE — 1240000000 HC EMOTIONAL WELLNESS R&B

## 2021-07-04 RX ADMIN — PALIPERIDONE 6 MG: 6 TABLET, EXTENDED RELEASE ORAL at 09:32

## 2021-07-04 RX ADMIN — ESCITALOPRAM OXALATE 20 MG: 20 TABLET ORAL at 09:32

## 2021-07-04 NOTE — PLAN OF CARE
Problem: Altered Mood, Psychotic Behavior:  Goal: Able to verbalize decrease in frequency and intensity of hallucinations  Description: Able to verbalize decrease in frequency and intensity of hallucinations  7/4/2021 1351 by Mindi Olszewski, RN  Outcome: Ongoing     Problem: Altered Mood, Psychotic Behavior:  Goal: Absence of self-harm  Description: Absence of self-harm  7/4/2021 1351 by Mindi Olszewski, RN  Outcome: Ongoing   Pt is free from self harm and agrees to feeling safe on the unit. Pt continues to report auditory hallucinations that are non-specific. Pt isolates to room for long intervals coming out for meals and needs only. Pt is disheveled and encouraged to tend to hygiene and ADL's. Pt is medication compliant. Pt. Remains on q15 min checks and frequent spontaneous checks throughout shift. Pt. Safety maintained.

## 2021-07-04 NOTE — GROUP NOTE
Group Therapy Note    Date: 7/4/2021    Group Start Time: 1030  Group End Time: 1100  Group Topic: Psychotherapy    FRANKLIN Leo LSW        Group Therapy Note    Attendees: 2/21         Patient was offered group therapy today but declined to participate despite encouragement from staff. 1:1 was offered.     Signature:  FRANKLIN Christian LSW

## 2021-07-04 NOTE — PLAN OF CARE
Problem: Altered Mood, Psychotic Behavior:  Goal: Able to verbalize decrease in frequency and intensity of hallucinations  Description: Able to verbalize decrease in frequency and intensity of hallucinations  7/4/2021 0358 by Guille Dale RN  Outcome: Ongoing   Patient endorses auditory hallucinations telling him to harm self. Patient reports depression and anxiety. Patient is compliant with his medications. Problem: Altered Mood, Psychotic Behavior:  Goal: Absence of self-harm  Description: Absence of self-harm  7/4/2021 0358 by Guille Dale RN  Outcome: Ongoing   Patient endorses fleeting thoughts of self harm. Patient denies having a plan and verbalizes he can maintain safety while on unit. Patient agrees to notify staff if symptoms worsen. Patient remains safe on unit. Patient safety maintained q15 minute checks.

## 2021-07-05 PROCEDURE — 99232 SBSQ HOSP IP/OBS MODERATE 35: CPT | Performed by: PSYCHIATRY & NEUROLOGY

## 2021-07-05 PROCEDURE — 1240000000 HC EMOTIONAL WELLNESS R&B

## 2021-07-05 PROCEDURE — 6370000000 HC RX 637 (ALT 250 FOR IP)

## 2021-07-05 PROCEDURE — APPSS30 APP SPLIT SHARED TIME 16-30 MINUTES: Performed by: PSYCHIATRY & NEUROLOGY

## 2021-07-05 RX ADMIN — HYDROXYZINE HYDROCHLORIDE 50 MG: 50 TABLET, FILM COATED ORAL at 22:10

## 2021-07-05 RX ADMIN — ESCITALOPRAM OXALATE 20 MG: 20 TABLET ORAL at 10:45

## 2021-07-05 RX ADMIN — PALIPERIDONE 6 MG: 6 TABLET, EXTENDED RELEASE ORAL at 10:45

## 2021-07-05 RX ADMIN — TRAZODONE HYDROCHLORIDE 150 MG: 150 TABLET ORAL at 22:10

## 2021-07-05 NOTE — PLAN OF CARE
Problem: Altered Mood, Psychotic Behavior:  Goal: Able to verbalize decrease in frequency and intensity of hallucinations  Description: Able to verbalize decrease in frequency and intensity of hallucinations  Outcome: Ongoing   Patient endorses auditory hallucinations telling him to harm self. Patient reports depression and anxiety. Patient is compliant with his medications. Problem: Altered Mood, Psychotic Behavior:  Goal: Absence of self-harm  Description: Absence of self-harm  Outcome: Ongoing   Patient endorses fleeting thoughts of self harm. Patient denies having a plan and verbalizes he can maintain safety while on unit. Patient agrees to notify staff if symptoms worsen. Patient remains safe on unit. Patient safety maintained q15 minute checks.

## 2021-07-05 NOTE — GROUP NOTE
Group Therapy Note    Date: 7/5/2021    Group Start Time: 1000  Group End Time: 3958  Group Topic: Psychotherapy    FRANKLIN Gutierrez LSW        Group Therapy Note    patient refused to attend psychotherapy group at 10:00am after encouragement from staff.        Signature:  FRANKLIN Parks LSW

## 2021-07-05 NOTE — GROUP NOTE
Group Therapy Note    Date: 7/5/2021    Group Start Time: 1430  Group End Time: 1181  Group Topic: Psychoeducation    JESUS Bazan, GADIELS        Group Therapy Note    Attendees: 9/20         Patient's Goal:  To increase interpersonal interactions and to practice cognitive skills     Notes:  Patient attended group and participated. However at one point during group, patient began arguing with another patient, patient was accepting to redirection. Status After Intervention:  Improved    Participation Level:  Active Listener and Interactive    Participation Quality: Appropriate, Attentive and Sharing      Speech:  normal      Thought Process/Content: Logical  Linear      Affective Functioning: Congruent      Mood: euthymic      Level of consciousness:  Alert and Attentive      Response to Learning: Able to retain information and Progressing to goal      Endings: None Reported    Modes of Intervention: Education, Socialization, Problem-solving, Activity and Reality-testing      Discipline Responsible: Psychoeducational Specialist      Signature:  Angelina Bazan, [address]

## 2021-07-05 NOTE — PLAN OF CARE
Problem: Altered Mood, Psychotic Behavior:  Goal: Able to verbalize decrease in frequency and intensity of hallucinations  Description: Able to verbalize decrease in frequency and intensity of hallucinations  7/5/2021 1323 by Tatiana Woodall  Outcome: Ongoing     Problem: Altered Mood, Psychotic Behavior:  Goal: Absence of self-harm  Description: Absence of self-harm  7/5/2021 1323 by Tatiana Woodall  Outcome: Ongoing    Patient has a flat affect, depressed mood, isolative to bed most of the day, only comes out for meals. Thought blocking noted. Does admit to auditory hallucinations, says the voices are a little better, but still present. Takes medications as ordered. Good appetite. Quiet and aloof. Does not attend groups at this time. Is very pleasant and cooperative. Denies any suicidal thoughts, but depression is high. Is accepting of staff support. Relates minimally, but does answer assessment questions.

## 2021-07-05 NOTE — PROGRESS NOTES
Daily Progress Note  7/5/2021    Patient Name: Trev Canas    CHIEF COMPLAINT: Depression with suicidal ideation         SUBJECTIVE:      Patient is seen today for a follow up assessment. Patient has been medication compliant. He has not required any emergency medications. He endorses depression and rated his depression as a 5 out of 10 (010 scale with 0 being no depression and 10 being worst). Patient also endorses anxiety and rated his anxiety as a 5 out of 10 (010 scale with 0 being no anxiety and 10 being worst). Patient was oriented to self and somewhat to situation, but was disoriented to date and place. Writer reoriented patient. Patient endorses decreased appetite. He reported nonrestorative sleep and reported difficulty falling asleep. He reports improvement in suicidal ideation, without intent or plan, contracts for safety on the unit. He denies homicidal ideation, intent, or plans. He contracts for safety on the unit. He endorses auditory hallucinations and reports he hears voices. When writer inquired what he hears, patient states \"I do not know\" and reports hearing 1 male voice. He rates the loudness of the auditory hallucinations as a 6 out of 10 (010 scale with 0 being no sound and 10 being loudest). Patient denies visual hallucinations. Patient endorses paranoia. He denies delusions. He denies any medication side effects or medical concerns at the time of assessment. At this time, the patient is not appropriate for a lower level of care. There is risk of decompensation and patient warrants further hospitalization for safety and stabilization. Appetite:  [] Normal/Adequate/Unchanged  [] Increased  [x] Decreased      Sleep:       [] Normal/Adequate/Unchanged  [] Fair  [x] Poor      Group Attendance on Unit:   [] Yes  [x] Selectively    [] No    Medication Side Effects: Patient denies any medication side effects at the time of assessment.          Mental Status Exam  Level of consciousness: Somnolent, responds to stimuli. Appearance: Appropriate attire for setting, resting in bed, with fair  grooming and hygiene   Behavior/Motor: Approachable, no psychomotor abnormalities   Attitude toward examiner: Cooperative, attentive, good eye contact  Speech: Normal rate, soft spoken with low volume, normal tone. Mood:  Patient reports \"very good\". Affect: Flat  Thought processes: poverty of thought   Thought content: Denies homicidal ideation  Suicidal Ideation: Reports improvement in suicidal ideations, without current plan or intent, contracts for safety on the unit. Delusions: No evidence of delusions. Endorses paranoia. Perceptual Disturbance: Patient does not appear to be responding to internal stimuli. Patient endorses auditory hallucinations. Patient denies visual hallucinations. Cognition: Oriented to self and somewhat to situation, but was disoriented to date and place. Writer reoriented patient. Memory: Intact  Insight & Judgement: Fair    Data   height is 6' 2\" (1.88 m) and weight is 170 lb (77.1 kg). His oral temperature is 98.1 °F (36.7 °C). His blood pressure is 104/64 and his pulse is 97. His respiration is 14 and oxygen saturation is 100%.    Labs:   Admission on 06/30/2021   Component Date Value Ref Range Status    WBC 06/30/2021 4.6  3.5 - 11.0 k/uL Final    RBC 06/30/2021 3.77* 4.5 - 5.9 m/uL Final    Hemoglobin 06/30/2021 10.7* 13.5 - 17.5 g/dL Final    Hematocrit 06/30/2021 33.6* 41 - 53 % Final    MCV 06/30/2021 89.2  80 - 100 fL Final    MCH 06/30/2021 28.4  26 - 34 pg Final    MCHC 06/30/2021 31.9  31 - 37 g/dL Final    RDW 06/30/2021 14.1  11.5 - 14.9 % Final    Platelets 59/53/1339 253  150 - 450 k/uL Final    MPV 06/30/2021 7.0  6.0 - 12.0 fL Final    NRBC Automated 06/30/2021 NOT REPORTED  per 100 WBC Final    Differential Type 06/30/2021 NOT REPORTED   Final    Immature Granulocytes 06/30/2021 NOT REPORTED  0 % Final    Absolute Immature Granulocyte 06/30/2021 NOT REPORTED  0.00 - 0.30 k/uL Final    WBC Morphology 06/30/2021 NOT REPORTED   Final    RBC Morphology 06/30/2021 NOT REPORTED   Final    Platelet Estimate 00/77/1243 NOT REPORTED   Final    Seg Neutrophils 06/30/2021 29* 36 - 66 % Final    Lymphocytes 06/30/2021 63* 24 - 44 % Final    Monocytes 06/30/2021 6  1 - 7 % Final    Eosinophils % 06/30/2021 2  0 - 4 % Final    Basophils 06/30/2021 0  0 - 2 % Final    Segs Absolute 06/30/2021 1.33  1.3 - 9.1 k/uL Final    Absolute Lymph # 06/30/2021 2.90  1.0 - 4.8 k/uL Final    Absolute Mono # 06/30/2021 0.28  0.1 - 1.3 k/uL Final    Absolute Eos # 06/30/2021 0.09  0.0 - 0.4 k/uL Final    Basophils Absolute 06/30/2021 0.00  0.0 - 0.2 k/uL Final    Morphology 06/30/2021 ROULEAU   Final    Glucose 06/30/2021 147* 70 - 99 mg/dL Final    BUN 06/30/2021 15  8 - 23 mg/dL Final    CREATININE 06/30/2021 0.98  0.70 - 1.20 mg/dL Final    Bun/Cre Ratio 06/30/2021 NOT REPORTED  9 - 20 Final    Calcium 06/30/2021 8.6  8.6 - 10.4 mg/dL Final    Sodium 06/30/2021 137  135 - 144 mmol/L Final    Potassium 06/30/2021 4.2  3.7 - 5.3 mmol/L Final    Chloride 06/30/2021 103  98 - 107 mmol/L Final    CO2 06/30/2021 24  20 - 31 mmol/L Final    Anion Gap 06/30/2021 10  9 - 17 mmol/L Final    Alkaline Phosphatase 06/30/2021 104  40 - 129 U/L Final    ALT 06/30/2021 13  5 - 41 U/L Final    AST 06/30/2021 37  <40 U/L Final    Total Bilirubin 06/30/2021 0.17* 0.3 - 1.2 mg/dL Final    Total Protein 06/30/2021 6.3* 6.4 - 8.3 g/dL Final    Albumin 06/30/2021 3.8  3.5 - 5.2 g/dL Final    Albumin/Globulin Ratio 06/30/2021 NOT REPORTED  1.0 - 2.5 Final    GFR Non- 06/30/2021 >60  >60 mL/min Final    GFR  06/30/2021 >60  >60 mL/min Final    GFR Comment 06/30/2021        Final    Comment: Average GFR for 61-76 years old:   80 mL/min/1.73sq m  Chronic Kidney Disease:   <60 mL/min/1.73sq m  Kidney failure:   <15 mL/min/1.73sq m              eGFR calculated using average adult body mass. Additional eGFR calculator available at:        Nomi[employer]            GFR Staging 06/30/2021 NOT REPORTED   Final    Amphetamine Screen, Ur 06/30/2021 NEGATIVE  NEGATIVE Final    Comment:       (Positive cutoff 1000 ng/mL)                  Barbiturate Screen, Ur 06/30/2021 NEGATIVE  NEGATIVE Final    Comment:       (Positive cutoff 200 ng/mL)                  Benzodiazepine Screen, Urine 06/30/2021 NEGATIVE  NEGATIVE Final    Comment:       (Positive cutoff 200 ng/mL)                  Cocaine Metabolite, Urine 06/30/2021 NEGATIVE  NEGATIVE Final    Comment:       (Positive cutoff 300 ng/mL)                  Methadone Screen, Urine 06/30/2021 NEGATIVE  NEGATIVE Final    Comment:       (Positive cutoff 300 ng/mL)                  Opiates, Urine 06/30/2021 NEGATIVE  NEGATIVE Final    Comment:       (Positive cutoff 300 ng/mL)                  Phencyclidine, Urine 06/30/2021 NEGATIVE  NEGATIVE Final    Comment:       (Positive cutoff 25 ng/mL)                  Propoxyphene, Urine 06/30/2021 NOT REPORTED  NEGATIVE Final    Cannabinoid Scrn, Ur 06/30/2021 NEGATIVE  NEGATIVE Final    Comment:       (Positive cutoff 50 ng/mL)                  Oxycodone Screen, Ur 06/30/2021 NEGATIVE  NEGATIVE Final    Comment:       (Positive cutoff 100 ng/mL)                  Methamphetamine, Urine 06/30/2021 NOT REPORTED  NEGATIVE Final    Tricyclic Antidepressants, Urine 06/30/2021 NOT REPORTED  NEGATIVE Final    MDMA, Urine 06/30/2021 NOT REPORTED  NEGATIVE Final    Buprenorphine Urine 06/30/2021 NOT REPORTED  NEGATIVE Final    Test Information 06/30/2021 Assay provides medical screening only. The absence of expected drug(s) and/or metabolite(s) may indicate diluted or adulterated urine, limitations of testing or timing of collection.    Final    Comment: Testing for legal purposes should be confirmed by another method. To request confirmation   of test result, please call the lab within 7 days of sample submission.  Ethanol 06/30/2021 <10  <10 mg/dL Final    Ethanol percent 06/30/2021 <0.010  % Final    Specimen Description 06/30/2021 . NASOPHARYNGEAL SWAB   Final    SARS-CoV-2, Rapid 06/30/2021 Not Detected  Not Detected Final    Comment:       Rapid NAAT:  The specimen is NEGATIVE for SARS-CoV-2, the novel coronavirus associated with   COVID-19. The ID NOW COVID-19 assay is designed to detect the virus that causes COVID-19 in patients   with signs and symptoms of infection who are suspected of COVID-19. An individual without symptoms of COVID-19 and who is not shedding SARS-CoV-2 virus would   expect to have a negative (not detected) result in this assay. Negative results should be treated as presumptive and, if inconsistent with clinical signs   and symptoms or necessary for patient management,  should be tested with an alternative molecular assay. Negative results do not preclude   SARS-CoV-2 infection and   should not be used as the sole basis for patient management decisions. Fact sheet for Healthcare Providers: Brandyn  Fact sheet for Patients: Iesha.nav          Methodology: Isothermal Nucleic Acid Amplification           Reviewed patient's current plan of care and vital signs with nursing staff.     Labs reviewed: [x] Yes  Last EKG in EMR reviewed: [x] Yes    Medications  Current Facility-Administered Medications: escitalopram (LEXAPRO) tablet 20 mg, 20 mg, Oral, Daily  paliperidone (INVEGA) extended release tablet 6 mg, 6 mg, Oral, Daily  traZODone (DESYREL) tablet 150 mg, 150 mg, Oral, Nightly PRN  [START ON 7/6/2021] paliperidone palmitate ER (INVEGA SUSTENNA) IM injection 234 mg, 234 mg, Intramuscular, Q28 Days  nicotine polacrilex (NICORETTE) gum 2 mg, 2 mg, Oral, PRN  acetaminophen (TYLENOL) tablet 650 mg, 650 mg, Oral, Q4H PRN  ibuprofen (ADVIL;MOTRIN) tablet 400 mg, 400 mg, Oral, Q6H PRN  polyethylene glycol (GLYCOLAX) packet 17 g, 17 g, Oral, Daily PRN  aluminum & magnesium hydroxide-simethicone (MAALOX) 200-200-20 MG/5ML suspension 30 mL, 30 mL, Oral, Q6H PRN  hydrOXYzine (ATARAX) tablet 50 mg, 50 mg, Oral, TID PRN  traZODone (DESYREL) tablet 50 mg, 50 mg, Oral, Nightly PRN    ASSESSMENT  Schizoaffective disorder, depressive type ([facility] [address]         PLAN  Patient symptoms are: Modestly Improving  Continue current medication regimen. Madie Velez Sustenna IM injection 234 mg scheduled to be administered on 7/6/2021. Monitor need and frequency of PRN medications. Encourage participation in groups and milieu. Attempt to develop insight. Psycho-education conducted. Supportive Therapy conducted. Probable discharge is to be determined by MD.   Follow-up daily while inpatient. Patient continues to be monitored in the inpatient psychiatric facility at [facility] for safety and stabilization. Patient continues to need, on a daily basis, active treatment furnished directly by or requiring the supervision of inpatient psychiatric personnel. Electronically signed by MARY Bai CNP on 7/5/2021 at 3:47 PM    **This report has been created using voice recognition software. It may contain minor errors which are inherent in voice recognition technology. **    I independently saw and evaluated the patient. I reviewed the midlevel provider's documentation above. Any additional comments or changes to the midlevel provider's documentation are stated below otherwise agree with assessment.       The patient's mood is constricted. His affect is blunted. The patient states that he has some passive suicidal thoughts but overall he is feeling better. The patient is willing to take his Aleda Renard which is due for tomorrow.  He continues to have some auditory hallucinations which are not very clear           PLAN  Medications as noted above  Attempt to develop insight  Psycho-education conducted. Supportive Therapy conducted.   Probable discharge is 1 to 2 days follow-up daily while on inpatient unit     Electronically signed by Sylvia Albright MD on 7/5/21 at 11:55 AM EDT

## 2021-07-05 NOTE — PROGRESS NOTES
BEHAVIORAL HEALTH FOLLOW-UP NOTE     7/5/2021     Patient was seen and examined in person, Chart reviewed   Patient's case discussed with staff/team    Chief Complaint: Psychosis    Interim History:   Patient continues to endorse some fleeting suicidal thoughts. Notes his mood has been somewhat better. Has been attending some groups however he walked out of a group on Saturday. Denies any side effect from the medication. No side effects from the long-acting injectable that he got. Appears flat and withdrawn today. Reports some feelings of helplessness and hopelessness.   Has some anticipatory anxiety regarding the disposition.       /64   Pulse 97   Temp 98.1 °F (36.7 °C) (Oral)   Resp 14   Ht 6' 2\" (1.88 m)   Wt 170 lb (77.1 kg)   SpO2 100%   BMI 21.83 kg/m²   Appetite:   [x] Normal/Unchanged  [] Increased  [] Decreased      Sleep:       [] Normal/Unchanged  [x] Fair       [] Poor              Energy:    [x] Normal/Unchanged  [] Increased  [] Decreased        Aggression:  [] yes  [x] no    Patient is [x] able  [] unable to CONTRACT FOR SAFETY ON THE UNIT    PAST MEDICAL/PSYCHIATRIC HISTORY:   Past Medical History:   Diagnosis Date    Bipolar disorder ([address]     Depression     GERD (gastroesophageal reflux disease)     Hallucinations     Headache(784.0)     Hepatitis     Schizophrenia, schizo-affective ([address]     Substance abuse ([facility] [address]     Tobacco abuse     Type II or unspecified type diabetes mellitus without mention of complication, not stated as uncontrolled     Urinary incontinence        FAMILY/SOCIAL HISTORY:  Family History   Problem Relation Age of Onset    Diabetes Mother     Heart Disease Mother      Social History     Socioeconomic History    Marital status: Single     Spouse name: Not on file    Number of children: 0    Years of education: 8    Highest education level: Not on file   Occupational History     Employer: N/A   Tobacco Use    Smoking status: Current Every Day Smoker     Packs/day: 0.50     Types: Cigarettes    Smokeless tobacco: Never Used    Tobacco comment: Patient accepting of nicotine patch   Substance and Sexual Activity    Alcohol use: Yes     Comment: reports drinking occasionally    Drug use: Not Currently     Types: Cocaine     Comment: drug abuse includes cocaine,     Sexual activity: Not on file   Other Topics Concern    Not on file   Social History Narrative    Not on file     Social Determinants of Health     Financial Resource Strain:     Difficulty of Paying Living Expenses:    Food Insecurity:     Worried About Running Out of Food in the Last Year:     Ran Out of Food in the Last Year:    Transportation Needs:     Lack of Transportation (Medical):  Lack of Transportation (Non-Medical):    Physical Activity:     Days of Exercise per Week:     Minutes of Exercise per Session:    Stress:     Feeling of Stress :    Social Connections:     Frequency of Communication with Friends and Family:     Frequency of Social Gatherings with Friends and Family:     Attends [employer] Services:     Active Member of Clubs or Organizations:     Attends Club or Organization Meetings:     Marital Status:    Intimate Partner Violence:     Fear of Current or Ex-Partner:     Emotionally Abused:     Physically Abused:     Sexually Abused:            ROS:  [x] All negative/unchanged except if checked.  Explain positive(checked items) below:  [] Constitutional  [] Eyes  [] Ear/Nose/Mouth/Throat  [] Respiratory  [] CV  [] GI  []   [] Musculoskeletal  [] Skin/Breast  [] Neurological  [] Endocrine  [] Heme/Lymph  [] Allergic/Immunologic    Explanation:     MEDICATIONS:    Current Facility-Administered Medications:     escitalopram (LEXAPRO) tablet 20 mg, 20 mg, Oral, Daily, MARY Pimentel CNP, 20 mg at 07/05/21 1045    paliperidone (INVEGA) extended release tablet 6 mg, 6 mg, Oral, Daily, MARY Petty CNP, 6 mg at 07/05/21 1045   disorder, depressive type ([facility] [address]  Active Problems:    Depression with suicidal ideation  Resolved Problems:    * No resolved hospital problems. *      LABS:    No results for input(s): WBC, HGB, PLT in the last 72 hours. No results for input(s): NA, K, CL, CO2, BUN, CREATININE, GLUCOSE in the last 72 hours. No results for input(s): BILITOT, ALKPHOS, AST, ALT in the last 72 hours. Lab Results   Component Value Date    BARBSCNU NEGATIVE 06/30/2021    LABBENZ NEGATIVE 06/30/2021    LABBENZ NEGATIVE 03/24/2012    LABMETH NEGATIVE 06/30/2021    PPXUR NOT REPORTED 06/30/2021     Lab Results   Component Value Date    TSH 1.50 10/23/2020     No results found for: LITHIUM  No results found for: VALPROATE, CBMZ    RISK ASSESSMENT: Moderate risk of suicide. Moderate risk of aggression    Treatment Plan:  Reviewed current Medications with the patient. Continue to titrate paliperidone as noted above    Risks, benefits, side effects, drug-to-drug interactions and alternatives to treatment were discussed. The patient  consented to treatment. Encourage patient to attend group and other milieu activities. Discharge planning discussed with the patient and treatment team.    PSYCHOTHERAPY/COUNSELING:  [] Therapeutic interview  [x] Supportive  [] CBT  [] Ongoing  [] Other    [x] Patient continues to need, on a daily basis, active treatment furnished directly by or requiring the supervision of inpatient psychiatric personnel      Anticipated Length of stay: 3 to 5 days                                         Karolyn Walsh is a 57 y.o. male being evaluated face to face. --June Vasquez MD on 7/4/2021    An electronic signature was used to authenticate this note. **This report has been created using voice recognition software. It may contain minor errors which are inherent in voice recognition technology. **

## 2021-07-05 NOTE — PROGRESS NOTES
Pharmacy Med Education Group Note    Date: 7/5/21  Start Time: 1330  End Time: 9409    Number Participants in Group:  8    Goal:  Patient will demonstrate an understanding of the medications intended purpose and possible adverse effects  Topic: [place] for Pharmacy Med Ed Group    Discipline Responsible:     OT  AT  [facility].  RT     X Other       Participation Level:     None  Minimal      X Active Listener    X Interactive    Monopolizing         Participation Quality:    X Appropriate  Inappropriate     X       Attentive        Intrusive          Sharing        Resistant          Supportive        Lethargic       Affective:     X Congruent  Incongruent  Blunted  Flat    Constricted  Anxious  Elated  Angry    Labile  Depressed  Other         Cognitive:    X Alert  Oriented PPTP     Concentration   X G  F  P   Attention Span   X G  F  P   Short-Term Memory   X G  F  P   Long-Term Memory  G  F  P   ProblemSolving/  Decision Making  G  F  P   Ability to Process  Information   X G  F  P      Contributing Factors             Delusional             Hallucinating             Flight of Ideas             Other:       Modes of Intervention:    X Education   X Support  Exploration    Clarifying  Problem Solving  Confrontation    Socialization  Limit Setting  Reality Testing    Activity  Movement  Media    Other:            Response to Learning:    X Able to verbalize current knowledge/experience    Able to verbalize/acknowledge new learning    Able to retain information    Capable of insight    Able to change behavior    Progressing to goal    Other:        Comments:     Neno Fritz RPH,PharmD,  7/5/2021, 2:09 PM

## 2021-07-06 VITALS
RESPIRATION RATE: 14 BRPM | SYSTOLIC BLOOD PRESSURE: 104 MMHG | TEMPERATURE: 97.3 F | OXYGEN SATURATION: 100 % | DIASTOLIC BLOOD PRESSURE: 67 MMHG | HEART RATE: 86 BPM | BODY MASS INDEX: 21.82 KG/M2 | HEIGHT: 74 IN | WEIGHT: 170 LBS

## 2021-07-06 PROCEDURE — 99238 HOSP IP/OBS DSCHRG MGMT 30/<: CPT | Performed by: PSYCHIATRY & NEUROLOGY

## 2021-07-06 PROCEDURE — 6370000000 HC RX 637 (ALT 250 FOR IP)

## 2021-07-06 RX ORDER — HYDROXYZINE 50 MG/1
50 TABLET, FILM COATED ORAL 3 TIMES DAILY PRN
Qty: 60 TABLET | Refills: 0 | Status: SHIPPED | OUTPATIENT
Start: 2021-07-06 | End: 2021-07-16

## 2021-07-06 RX ORDER — TRAZODONE HYDROCHLORIDE 150 MG/1
150 TABLET ORAL NIGHTLY PRN
Qty: 30 TABLET | Refills: 0 | Status: ON HOLD | OUTPATIENT
Start: 2021-07-06 | End: 2021-07-22 | Stop reason: SDUPTHER

## 2021-07-06 RX ORDER — ESCITALOPRAM OXALATE 20 MG/1
20 TABLET ORAL DAILY
Qty: 30 TABLET | Refills: 0 | Status: ON HOLD | OUTPATIENT
Start: 2021-07-06 | End: 2021-07-22 | Stop reason: SDUPTHER

## 2021-07-06 RX ADMIN — ESCITALOPRAM OXALATE 20 MG: 20 TABLET ORAL at 09:06

## 2021-07-06 RX ADMIN — PALIPERIDONE 6 MG: 6 TABLET, EXTENDED RELEASE ORAL at 09:06

## 2021-07-06 NOTE — DISCHARGE SUMMARY
DISCHARGE SUMMARY      Patient ID:  Be Fields  [id number]  81 y.o.  1960    Admit date: 6/30/2021    Discharge date and time: 7/6/2021    Disposition: Home     Admitting Physician: Ivon Reno MD     Discharge Physician: Dr Paty Saldaña MD    Admission Diagnoses: Depression with suicidal ideation [F32.9, R45.851]    Admission Condition: poor    Discharged Condition: stable    Admission Circumstance: *Be Fields is a 57 y.o. male with significant past medical history of schizoaffective disorder, acute psychosis, suicidal ideation who presented to the ED with a chief complaint of suicidal ideation. Per ED social work report, \"Patient is a 57year old  male who presented to ED after experiencing suicidal ideation. Meg Monte reports SI in the form of \"shooting myself in the head\".  Patient alleged he \"tried to shoot myself once\" but the \"gun was so loud, I couldn't do it\".    Patient is linked with [facility] and indicates he is taking his medications as prescribed. Meg Monet does live in a mental health group home for men that is staffed 24/7 but patient indicates he \"has some issues\" with some of the other residents.     Patient reports auditory hallucinations in the form of \"Adolfo just kill yourself, just do it and kill yourself, you can't survive out there, just end it\".    Patient denies Donglendy Annok does indicate he uses crack cocaine with the last time he used being \"about a week ago\".     Since arrival to the unit, patient has been in behavioral control, has not required the use of any as needed medications for agitation or anxiety. Patient has been seclusive to his room.     Today, Antoine Denis was interviewed in his room. Patient states that he was recently discharged from [facility] however states that his medications are not working and may need to be increased. Patient reports command auditory hallucinations telling him to kill himself.   Patient reports being compliant with adequate      ASSESSMENT:  Patient symptoms are:  [x] Well controlled  [x] Improving  [] Worsening  [] No change      Diagnosis:  Principal Problem:    Schizoaffective disorder, depressive type ([facility] [address]  Active Problems:    Depression with suicidal ideation  Resolved Problems:    * No resolved hospital problems. *      LABS:    No results for input(s): WBC, HGB, PLT in the last 72 hours. No results for input(s): NA, K, CL, CO2, BUN, CREATININE, GLUCOSE in the last 72 hours. No results for input(s): BILITOT, ALKPHOS, AST, ALT in the last 72 hours. Lab Results   Component Value Date    BARBSCNU NEGATIVE 06/30/2021    LABBENZ NEGATIVE 06/30/2021    LABBENZ NEGATIVE 03/24/2012    LABMETH NEGATIVE 06/30/2021    PPXUR NOT REPORTED 06/30/2021     Lab Results   Component Value Date    TSH 1.50 10/23/2020     No results found for: LITHIUM  No results found for: VALPROATE, CBMZ    RISK ASSESSMENT AT DISCHARGE: Low risk for suicide and homicide. Treatment Plan:  Reviewed current Medications with the patient. Education provided on the complaince with treatment. Risks, benefits, side effects, drug-to-drug interactions and alternatives to treatment were discussed. Encourage patient to attend outpatient follow up appointment and therapy. Patient was advised to call the outpatient provider, visit the nearest ED or call 911 if symptoms are not manageable.            Medication List      CHANGE how you take these medications    hydrOXYzine 50 MG tablet  Commonly known as: ATARAX  Take 1 tablet by mouth 3 times daily as needed for Anxiety  What changed:   · medication strength  · how much to take  · reasons to take this        CONTINUE taking these medications    escitalopram 20 MG tablet  Commonly known as: Lexapro  Take 1 tablet by mouth daily     Invega Sustenna 234 MG/1.5ML B[place] IM injection  Generic drug: paliperidone palmitate ER     traZODone 150 MG tablet  Commonly known as: DESYREL  Take 1 tablet by mouth nightly as needed for Sleep        STOP taking these medications    benztropine 1 MG tablet  Commonly known as: COGENTIN     buPROPion 100 MG extended release tablet  Commonly known as: WELLBUTRIN SR     paliperidone 6 MG extended release tablet  Commonly known as: INVEGA     QUEtiapine 300 MG tablet  Commonly known as: SEROQUEL           Where to Get Your Medications      These medications were sent to [facility], [facility] [address], [address] [facility] [address]    Phone: [phone]   · escitalopram 20 MG tablet  · hydrOXYzine 50 MG tablet  · traZODone 150 MG tablet           Core Measures statement:   Not applicable      TIME SPENT - 25 MINUTES TO COMPLETE THE EVALUATION, DISCHARGE SUMMARY, MEDICATION RECONCILIATION AND FOLLOW UP CARE                                         Adele Roche is a 57 y.o. male being evaluated Corinna Barrientos MD on 7/6/2021 at 10:45 AM    An electronic signature was used to authenticate this note. **This report has been created using voice recognition software. It may contain minor errors which are inherent in voice recognition technology. **

## 2021-07-06 NOTE — GROUP NOTE
Group Therapy Note    Date: 7/6/2021    Group Start Time: 1330  Group End Time: 1400  Group Topic: Recreational    [address], [facility]        Group Therapy Note    Attendees:5/21    patient refused to attend coping skills group at 1014-4255 after encouragement from staff. 1:1 talk time provided as alternative to group session.       Signature:  Carlee Zhu, [address]

## 2021-07-06 NOTE — PLAN OF CARE
Problem: Pain:  Goal: Pain level will decrease  Description: Pain level will decrease  Outcome: Ongoing   Patient reports pain level has decreased. Problem: Altered Mood, Psychotic Behavior:  Goal: Able to verbalize decrease in frequency and intensity of hallucinations  Description: Able to verbalize decrease in frequency and intensity of hallucinations  7/6/2021 0322 by Peyton Loyd LPN  Outcome: Ongoing   Patient reports auditory hallucinations at this time. Patient states they voices are improving. Patient encouraged to attend groups to work on coping skills. Problem: Altered Mood, Psychotic Behavior:  Goal: Absence of self-harm  Description: Absence of self-harm  7/6/2021 0322 by Peyton Loyd LPN  Outcome: Ongoing   Patient denies suicidal ideas. No sign of self harm noted. Will continue to monitor for safety every 15 minutes and provide support as needed.

## 2021-07-06 NOTE — GROUP NOTE
Group Therapy Note    Date: 7/6/2021    Group Start Time: 1100  Group End Time: 9717  Group Topic: Psychotherapy    JESUS DAVID SHI    FRANKLIN Traylor, CHANEL        Group Therapy Note    Attendees: 5/21         Patient was offered group therapy today but declined to participate despite encouragement from staff. 1:1 was offered.     Signature:  FRANKLIN Traylor, CHANEL

## 2021-07-06 NOTE — PROGRESS NOTES
CLINICAL PHARMACY NOTE: MEDS TO BEDS    Total # of Prescriptions Filled: 3   The following medications were delivered to the patient:  · Trazodone HCL 150mg  · Escitalopram Oxalate 20mg  · Hydroxyzine HCL 50mg    Additional Documentation:  Delivered Rx's to [facility]

## 2021-07-07 NOTE — BH NOTE
[facility]  Discharge Note    Pt discharged with followings belongings:   Dentures: None  Vision - Corrective Lenses: Glasses  Hearing Aid: None  Jewelry: None  Body Piercings Removed: N/A  Clothing: Footwear, Pants, Shirt, Socks, Undergarments (Comment)  Were All Patient Medications Collected?: Not Applicable  Other Valuables: Wallet, Money (Comment) (0.06)   Valuables sent home with pt or returned to patient. Patient education on aftercare instructions: yes at 8:20 PM .Patient verbalize understanding of AVS:  yes. Status EXAM upon discharge:  Status and Exam  Normal: No  Facial Expression: Flat  Affect: Blunt  Level of Consciousness: Alert  Mood:Normal: No  Mood: Depressed, Anxious  Motor Activity:Normal: No  Motor Activity: Decreased  Interview Behavior: Cooperative  Preception: [place] to Person, Karen Wei to Time, [place] to Place, [place] to Situation  Attention:Normal: No  Attention: Distractible  Thought Processes: Circumstantial  Thought Content:Normal: No  Thought Content: Preoccupations  Hallucinations:  Auditory (Comment)  Delusions: No  Memory:Normal: No  Memory: Poor Recent, Poor Remote  Insight and Judgment: No  Insight and Judgment: Poor Judgment, Poor Insight  Present Suicidal Ideation: No  Present Homicidal Ideation: No      Metabolic Screening:    Lab Results   Component Value Date    LABA1C 4.9 08/11/2020       Lab Results   Component Value Date    CHOL 167 08/11/2020    CHOL 179 02/13/2017    CHOL 127 05/09/2015    CHOL 168 08/20/2014    CHOL 158 12/31/2013    CHOL 178 08/15/2013    CHOL 166 09/17/2012    CHOL 210 (H) 02/03/2012     Lab Results   Component Value Date    TRIG 43 08/11/2020    TRIG 67 02/13/2017    TRIG 37 05/09/2015    TRIG 72 08/20/2014    TRIG 52 12/31/2013    TRIG 70 08/15/2013    TRIG 117 09/17/2012    TRIG 94 02/03/2012     Lab Results   Component Value Date    HDL 74 08/11/2020    HDL 73 02/13/2017    HDL 57 05/09/2015    HDL 50 08/20/2014    HDL 43 12/31/2013 HDL 42 08/15/2013    HDL 38 (L) 09/17/2012    HDL 55 02/03/2012     No components found for: LDLCAL  No results found for: Filemon Mims RN

## 2021-07-14 ENCOUNTER — APPOINTMENT (OUTPATIENT)
Dept: GENERAL RADIOLOGY | Age: 61
DRG: 139 | End: 2021-07-14
Payer: MEDICAID

## 2021-07-14 ENCOUNTER — HOSPITAL ENCOUNTER (INPATIENT)
Age: 61
LOS: 4 days | Discharge: PSYCHIATRIC OR SUBSTANCE USE FACILITY | DRG: 139 | End: 2021-07-18
Attending: EMERGENCY MEDICINE | Admitting: INTERNAL MEDICINE
Payer: MEDICAID

## 2021-07-14 DIAGNOSIS — R44.3 HALLUCINATION: ICD-10-CM

## 2021-07-14 DIAGNOSIS — I95.9 HYPOTENSION, UNSPECIFIED HYPOTENSION TYPE: ICD-10-CM

## 2021-07-14 DIAGNOSIS — J18.9 PNEUMONIA OF LEFT LOWER LOBE DUE TO INFECTIOUS ORGANISM: Primary | ICD-10-CM

## 2021-07-14 LAB
ABSOLUTE EOS #: 0.28 K/UL (ref 0–0.4)
ABSOLUTE IMMATURE GRANULOCYTE: ABNORMAL K/UL (ref 0–0.3)
ABSOLUTE LYMPH #: 2.29 K/UL (ref 1–4.8)
ABSOLUTE MONO #: 0.23 K/UL (ref 0.1–1.3)
ACETAMINOPHEN LEVEL: <5 UG/ML (ref 10–30)
ALBUMIN SERPL-MCNC: 3.9 G/DL (ref 3.5–5.2)
ALBUMIN/GLOBULIN RATIO: ABNORMAL (ref 1–2.5)
ALP BLD-CCNC: 97 U/L (ref 40–129)
ALT SERPL-CCNC: 11 U/L (ref 5–41)
AMPHETAMINE SCREEN URINE: NEGATIVE
ANION GAP SERPL CALCULATED.3IONS-SCNC: 8 MMOL/L (ref 9–17)
AST SERPL-CCNC: 12 U/L
BARBITURATE SCREEN URINE: NEGATIVE
BASOPHILS # BLD: 1 % (ref 0–2)
BASOPHILS ABSOLUTE: 0.05 K/UL (ref 0–0.2)
BENZODIAZEPINE SCREEN, URINE: NEGATIVE
BILIRUB SERPL-MCNC: 0.22 MG/DL (ref 0.3–1.2)
BILIRUBIN URINE: NEGATIVE
BUN BLDV-MCNC: 15 MG/DL (ref 8–23)
BUN/CREAT BLD: ABNORMAL (ref 9–20)
BUPRENORPHINE URINE: NORMAL
CALCIUM SERPL-MCNC: 9.3 MG/DL (ref 8.6–10.4)
CANNABINOID SCREEN URINE: NEGATIVE
CHLORIDE BLD-SCNC: 107 MMOL/L (ref 98–107)
CO2: 26 MMOL/L (ref 20–31)
COCAINE METABOLITE, URINE: NEGATIVE
COLOR: YELLOW
COMMENT UA: NORMAL
CREAT SERPL-MCNC: 1.14 MG/DL (ref 0.7–1.2)
DIFFERENTIAL TYPE: ABNORMAL
EOSINOPHILS RELATIVE PERCENT: 6 % (ref 0–4)
ETHANOL PERCENT: <0.01 %
ETHANOL: <10 MG/DL
GFR AFRICAN AMERICAN: >60 ML/MIN
GFR NON-AFRICAN AMERICAN: >60 ML/MIN
GFR SERPL CREATININE-BSD FRML MDRD: ABNORMAL ML/MIN/{1.73_M2}
GFR SERPL CREATININE-BSD FRML MDRD: ABNORMAL ML/MIN/{1.73_M2}
GLUCOSE BLD-MCNC: 100 MG/DL (ref 70–99)
GLUCOSE URINE: NEGATIVE
HCT VFR BLD CALC: 35.5 % (ref 41–53)
HEMOGLOBIN: 11.3 G/DL (ref 13.5–17.5)
IMMATURE GRANULOCYTES: ABNORMAL %
KETONES, URINE: NEGATIVE
LACTIC ACID: 0.9 MMOL/L (ref 0.5–2.2)
LEUKOCYTE ESTERASE, URINE: NEGATIVE
LYMPHOCYTES # BLD: 50 % (ref 24–44)
MCH RBC QN AUTO: 28.3 PG (ref 26–34)
MCHC RBC AUTO-ENTMCNC: 31.8 G/DL (ref 31–37)
MCV RBC AUTO: 89.2 FL (ref 80–100)
MDMA URINE: NORMAL
METHADONE SCREEN, URINE: NEGATIVE
METHAMPHETAMINE, URINE: NORMAL
MONOCYTES # BLD: 5 % (ref 1–7)
MORPHOLOGY: ABNORMAL
MORPHOLOGY: ABNORMAL
NITRITE, URINE: NEGATIVE
NRBC AUTOMATED: ABNORMAL PER 100 WBC
OPIATES, URINE: NEGATIVE
OXYCODONE SCREEN URINE: NEGATIVE
PDW BLD-RTO: 14.3 % (ref 11.5–14.9)
PH UA: 6 (ref 5–8)
PHENCYCLIDINE, URINE: NEGATIVE
PLATELET # BLD: 262 K/UL (ref 150–450)
PLATELET ESTIMATE: ABNORMAL
PMV BLD AUTO: 7.3 FL (ref 6–12)
POTASSIUM SERPL-SCNC: 4.2 MMOL/L (ref 3.7–5.3)
PROPOXYPHENE, URINE: NORMAL
PROTEIN UA: NEGATIVE
RBC # BLD: 3.98 M/UL (ref 4.5–5.9)
RBC # BLD: ABNORMAL 10*6/UL
SALICYLATE LEVEL: <1 MG/DL (ref 3–10)
SARS-COV-2, RAPID: NOT DETECTED
SEG NEUTROPHILS: 38 % (ref 36–66)
SEGMENTED NEUTROPHILS ABSOLUTE COUNT: 1.75 K/UL (ref 1.3–9.1)
SODIUM BLD-SCNC: 141 MMOL/L (ref 135–144)
SPECIFIC GRAVITY UA: 1.02 (ref 1–1.03)
SPECIMEN DESCRIPTION: NORMAL
TEST INFORMATION: NORMAL
TOTAL PROTEIN: 6.4 G/DL (ref 6.4–8.3)
TRICYCLIC ANTIDEPRESSANTS, UR: NORMAL
TROPONIN INTERP: NORMAL
TROPONIN T: NORMAL NG/ML
TROPONIN, HIGH SENSITIVITY: <6 NG/L (ref 0–22)
TURBIDITY: CLEAR
URINE HGB: NEGATIVE
UROBILINOGEN, URINE: NORMAL
WBC # BLD: 4.6 K/UL (ref 3.5–11)
WBC # BLD: ABNORMAL 10*3/UL

## 2021-07-14 PROCEDURE — 6360000002 HC RX W HCPCS: Performed by: EMERGENCY MEDICINE

## 2021-07-14 PROCEDURE — 85025 COMPLETE CBC W/AUTO DIFF WBC: CPT

## 2021-07-14 PROCEDURE — 36415 COLL VENOUS BLD VENIPUNCTURE: CPT

## 2021-07-14 PROCEDURE — 81003 URINALYSIS AUTO W/O SCOPE: CPT

## 2021-07-14 PROCEDURE — 80179 DRUG ASSAY SALICYLATE: CPT

## 2021-07-14 PROCEDURE — 84484 ASSAY OF TROPONIN QUANT: CPT

## 2021-07-14 PROCEDURE — 87635 SARS-COV-2 COVID-19 AMP PRB: CPT

## 2021-07-14 PROCEDURE — 93005 ELECTROCARDIOGRAM TRACING: CPT | Performed by: EMERGENCY MEDICINE

## 2021-07-14 PROCEDURE — 2060000000 HC ICU INTERMEDIATE R&B

## 2021-07-14 PROCEDURE — 83605 ASSAY OF LACTIC ACID: CPT

## 2021-07-14 PROCEDURE — 80143 DRUG ASSAY ACETAMINOPHEN: CPT

## 2021-07-14 PROCEDURE — 71045 X-RAY EXAM CHEST 1 VIEW: CPT

## 2021-07-14 PROCEDURE — 82533 TOTAL CORTISOL: CPT

## 2021-07-14 PROCEDURE — 2580000003 HC RX 258: Performed by: EMERGENCY MEDICINE

## 2021-07-14 PROCEDURE — 87040 BLOOD CULTURE FOR BACTERIA: CPT

## 2021-07-14 PROCEDURE — G0480 DRUG TEST DEF 1-7 CLASSES: HCPCS

## 2021-07-14 PROCEDURE — 99283 EMERGENCY DEPT VISIT LOW MDM: CPT

## 2021-07-14 PROCEDURE — 80307 DRUG TEST PRSMV CHEM ANLYZR: CPT

## 2021-07-14 PROCEDURE — 99223 1ST HOSP IP/OBS HIGH 75: CPT | Performed by: INTERNAL MEDICINE

## 2021-07-14 PROCEDURE — 80053 COMPREHEN METABOLIC PANEL: CPT

## 2021-07-14 RX ORDER — LANOLIN ALCOHOL/MO/W.PET/CERES
3 CREAM (GRAM) TOPICAL NIGHTLY PRN
Status: CANCELLED | OUTPATIENT
Start: 2021-07-14

## 2021-07-14 RX ORDER — SODIUM CHLORIDE 9 MG/ML
INJECTION, SOLUTION INTRAVENOUS CONTINUOUS
Status: DISCONTINUED | OUTPATIENT
Start: 2021-07-14 | End: 2021-07-18

## 2021-07-14 RX ORDER — 0.9 % SODIUM CHLORIDE 0.9 %
1000 INTRAVENOUS SOLUTION INTRAVENOUS ONCE
Status: COMPLETED | OUTPATIENT
Start: 2021-07-14 | End: 2021-07-14

## 2021-07-14 RX ADMIN — CEFTRIAXONE SODIUM 1000 MG: 1 INJECTION, POWDER, FOR SOLUTION INTRAMUSCULAR; INTRAVENOUS at 21:34

## 2021-07-14 RX ADMIN — SODIUM CHLORIDE 1000 ML: 9 INJECTION, SOLUTION INTRAVENOUS at 20:19

## 2021-07-14 RX ADMIN — AZITHROMYCIN MONOHYDRATE 500 MG: 500 INJECTION, POWDER, LYOPHILIZED, FOR SOLUTION INTRAVENOUS at 21:33

## 2021-07-14 RX ADMIN — SODIUM CHLORIDE: 9 INJECTION, SOLUTION INTRAVENOUS at 21:33

## 2021-07-14 ASSESSMENT — ENCOUNTER SYMPTOMS
ABDOMINAL PAIN: 0
BACK PAIN: 0
COUGH: 1
VOMITING: 0
NAUSEA: 0

## 2021-07-14 ASSESSMENT — PAIN DESCRIPTION - LOCATION: LOCATION: ABDOMEN

## 2021-07-14 ASSESSMENT — PAIN SCALES - GENERAL: PAINLEVEL_OUTOF10: 7

## 2021-07-14 NOTE — ED NOTES
Pt states he came to this ER by cab with c/o hearing voices that are telling him to kill himself. Pt states the voices usually tell him to get a gun and shoot himself in the head, but the pt denies any plans for suicide today. Pt arrives A+O x 4, GCS = 15, PMS x 4 intact, eupneic, and PWD. Pt is having appropriate conversation with this nurse.          Kwasi Suarez RN  07/14/21 9312

## 2021-07-14 NOTE — ED PROVIDER NOTES
[address]  EMERGENCY DEPARTMENT ENCOUNTER      Pt Name: Delmis Rangel  MRN: [id number]  [place] 1960  Date of evaluation: 7/14/21      CHIEF COMPLAINT       Chief Complaint   Patient presents with    Mental Health Problem     HISTORY OF PRESENT ILLNESS   HPI 57 y.o. male presents with c/o hallucinations. The patient was brought to the emergency department by cab from his group home. History limited from pt as he appeared to be responding to internal stimuli. Pt shouting \"fuck you, leave me alone\" directed at no one when sitting by himself and in the lobby. Pt does have a h/o schizoaffective. Apparently he has been hearing voices telling him to kill himself. Symptoms have been going on for \"a little bit\" but could not specify time frame. Pt also notes that he has had a nonproductive cough. On review of systems, he also states that he has been having left sided CP for the last two months. Pain intermittent. Lasts a few hours at a time. No current pain, most recent was when getting into the cab on the way to the hospital.. REVIEW OF SYSTEMS     Review of Systems   Constitutional: Negative for activity change, appetite change and fever. HENT: Negative for congestion. Eyes: Negative for visual disturbance. Respiratory: Positive for cough. Cardiovascular: Positive for chest pain. Gastrointestinal: Negative for abdominal pain, nausea and vomiting. Genitourinary: Negative for difficulty urinating. Musculoskeletal: Negative for back pain. Skin: Negative for rash. Neurological: Negative for headaches. Psychiatric/Behavioral: Positive for agitation, hallucinations and suicidal ideas. Negative for self-injury.      PAST MEDICAL HISTORY     Past Medical History:   Diagnosis Date    Bipolar disorder ([address]     Depression     GERD (gastroesophageal reflux disease)     Hallucinations     Headache(784.0)     Hepatitis     Schizophrenia, schizo-affective ([address]     Substance abuse ([facility] [address]     Tobacco abuse     Type II or unspecified type diabetes mellitus without mention of complication, not stated as uncontrolled     Urinary incontinence        SURGICAL HISTORY       Past Surgical History:   Procedure Laterality Date    ABSCESS DRAINAGE N/A 2018    Carla anal abcess    DENTAL SURGERY      all teeth pulled       CURRENT MEDICATIONS       Previous Medications    ESCITALOPRAM (LEXAPRO) 20 MG TABLET    Take 1 tablet by mouth daily    HYDROXYZINE (ATARAX) 50 MG TABLET    Take 1 tablet by mouth 3 times daily as needed for Anxiety    PALIPERIDONE PALMITATE ER (INVEGA SUSTENNA) 234 MG/1.5ML [place] IM INJECTION    Inject 234 mg into the muscle every 28 days    TRAZODONE (DESYREL) 150 MG TABLET    Take 1 tablet by mouth nightly as needed for Sleep       ALLERGIES     is allergic to navane [thiothixene]. FAMILY HISTORY     He indicated that his mother is alive. He indicated that his father is . He indicated that his brother is . SOCIAL HISTORY      reports that he has been smoking cigarettes. He has a 47.00 pack-year smoking history. He has never used smokeless tobacco. He reports current alcohol use. He reports previous drug use. Drug: Cocaine. PHYSICAL EXAM     INITIAL VITALS: /68   Pulse 77   Temp 97.8 °F (36.6 °C) (Oral)   Resp 14   Ht 6' 3\" (1.905 m)   Wt 190 lb (86.2 kg)   SpO2 100%   BMI 23.75 kg/m²   Gen: NAD  Head: Normocephalic, atraumatic  Eye: Pupils equal round reactive to light, no conjunctivitis  Heart: Regular rate and rhythm no murmurs  Lungs: Clear to auscultation bilaterally, no respiratory distress  Chest wall: No crepitus, no tenderness palpation  Abdomen: Soft, nontender, nondistended, with no peritoneal signs  Skin: No diaphoresis. no lacerations.   Neurologic: Patient is alert and oriented x3, motor and sensation is intact in all 4 extremities, speech is fluent  Extremities: Full range of motion, no cyanosis, no edema, no signs of trauma, no tenderness to palpation    MEDICAL DECISION MAKING:     MDM  57 y.o. male with hallucinations, psychosis, cough, cp and hypotension. The patient does not appear intoxicated. The patient is showing no signs of any acute active toxidrome. The patient denies any overdose attempt or  medical complaints at this time. Obtaining EKG, cxr. We'll screen for any acetaminophen or salicylate ingestion, we'll check baseline renal function, liver function, a drug screen, cbc and reassess. Pt being treated with IVF. Emergency Department course:    CXR shows signs of pna. Laboratory studies otherwise unremarkable. Lactic acid 0.9. WBC WNL, COVID negative. EKG shows no acute ischemic changes. No troponin elevation. Given hypotension, pt will be admitted to medical hospital. Treatment of pneumonia. Pyschiatry consult. I discussed with the internal medicine service. Pt updated and in agreement with the plan. DIAGNOSTIC RESULTS     EKG: All EKG's are interpreted by the Emergency Department Physician who either signs or Co-signs this chart in the absence of a cardiologist.    EKG shows a sinus rhythm. HR is 68, , QRS 88, , no MACARIO, No STD, No TWI, the axis is normal.        RADIOLOGY:All plain film, CT, MRI, and formal ultrasound images (except ED bedside ultrasound) are read by the radiologist and the images and interpretations are directly viewed by the emergency physician. XR CHEST PORTABLE   Final Result   Possible interstitial pneumonitis medial lower left lung. Atypical/viral   pneumonia is a consideration. LABS: All lab results were reviewed by myself, and all abnormals are listed below.   Labs Reviewed   CBC WITH AUTO DIFFERENTIAL - Abnormal; Notable for the following components:       Result Value    RBC 3.98 (*)     Hemoglobin 11.3 (*)     Hematocrit 35.5 (*)     Lymphocytes 50 (*)     Eosinophils % 6 (*)     All other components within normal limits   COMPREHENSIVE METABOLIC PANEL - Abnormal; Notable for the following components:    Glucose 100 (*)     Anion Gap 8 (*)     Total Bilirubin 0.22 (*)     All other components within normal limits   ACETAMINOPHEN LEVEL - Abnormal; Notable for the following components:    Acetaminophen Level <5 (*)     All other components within normal limits   SALICYLATE LEVEL - Abnormal; Notable for the following components:    Salicylate Lvl <1 (*)     All other components within normal limits   COVID-19, RAPID   CULTURE, BLOOD 1   CULTURE, BLOOD 1   CULTURE, RESPIRATORY   LACTIC ACID   URINE RT REFLEX TO CULTURE   ETHANOL   URINE DRUG SCREEN   TROPONIN   LACTIC ACID   CORTISOL TOTAL       EMERGENCY DEPARTMENT COURSE:   Vitals:    Vitals:    07/14/21 1820 07/14/21 1856 07/14/21 2141 07/14/21 2255   BP: (!) 89/49 93/66 120/73 100/68   Pulse: 74 79 63 77   Resp: 16 17 16 14   Temp: 97.7 °F (36.5 °C)  97.8 °F (36.6 °C)    TempSrc: Oral  Oral    SpO2: 98% 99% 98% 100%   Weight: 190 lb (86.2 kg)      Height: 6' 3\" (1.905 m)          The patient was given the following medications while in the emergency department:  Orders Placed This Encounter   Medications    cefTRIAXone (ROCEPHIN) 1000 mg IVPB in 50 mL D5W minibag     Order Specific Question:   Antimicrobial Indications     Answer:   Pneumonia (CAP)    azithromycin (ZITHROMAX) 500 mg in D5W 250ml addavial     Order Specific Question:   Antimicrobial Indications     Answer:   Pneumonia (CAP)    0.9 % sodium chloride bolus    0.9 % sodium chloride infusion     -------------------------  CRITICAL CARE:   CONSULTS: IP CONSULT TO INTERNAL MEDICINE  IP CONSULT TO PSYCHIATRY  PROCEDURES: Procedures     FINAL IMPRESSION      1. Pneumonia of left lower lobe due to infectious organism    2. Hallucination    3.  Hypotension, unspecified hypotension type          DISPOSITION/PLAN   DISPOSITION Admitted 07/14/2021 09:31:35 PM      PATIENT REFERRED TO:  No follow-up provider specified.     DISCHARGE MEDICATIONS:  New Prescriptions    No medications on file         Carlos Shelton MD  Attending Emergency Physician                      Carlos Shelton MD  07/14/21 7033

## 2021-07-14 NOTE — ED NOTES
Called patient for triage, patient not in ED waiting room. Per registration patient stepped out for cigarette.       Rhett Adams RN  07/14/21 9226

## 2021-07-14 NOTE — ED NOTES
Provisional Diagnosis:   Schizoaffective disorder    Psychosocial and Contextual Factors:   Patient brought to the ED by cab by his group home. Patient having an increase in auditory command hallucinations telling him to kill himself. Patient observed yelling at his voices multiple times while in the ED. C-SSRS Summary:      Patient: X  Family:   Agency:     Substance Abuse: Patient denies recent drug use. Patient has a history of crack cocaine use. Present Suicidal Behavior:  Patient reports he has suicidal ideation with a plan to shoot himself. Patient states he does not have access to a gun at this time. Patient reports command auditory hallucinations telling him to kill himself. Verbal: X    Attempt:    Past Suicidal Behavior: Patient denies past suicide attempts. Patient has multiple past psychiatric hospitalizations due to suicidal ideation. Verbal:    Attempt:      Self-Injurious/Self-Mutilation:Patient denies. Trauma Identified:  Patient denies. Protective Factors:    Patient has income, and lives in a group home. Risk Factors:    Patient has poor insight and coping skills. Clinical Summary:    Katie De Santiago is a 57 y.o. male who presents to the ED for suicidal ideation with plans of shooting himself. Patient states he is hearing loud auditory command hallucinations telling him to kill himself. Patient responding to internal stimuli while in the ED shouting \"fuck you! leave me alone\" multiple time while in [facility] when no one was around him. Patient states the voices also tell him \"You will never survive out there. \"    Patient states he lives in a group home, and states his group home called him a cab to come to the ED due to his voices. Patient states he is linked with [facility] and states he has been taking his medications. Patient reports he is still being \"sad and depressed. \" Patient has poor insight and poor coping skills. Patient has a flat affect. Jose A Tay Patients memory was intact. Thought form was linear. Patient denies homicidal ideation. Patient reports history of crack cocaine use but states his last use was 1 month ago. Patient reports his payee is [address]. Patient reports poor sleep and appitite. Patient recently discharged on 7/6/21 from the [facility]. Level of Care Disposition:    Report given to oncoming .

## 2021-07-15 ENCOUNTER — APPOINTMENT (OUTPATIENT)
Dept: NON INVASIVE DIAGNOSTICS | Age: 61
DRG: 139 | End: 2021-07-15
Payer: MEDICAID

## 2021-07-15 PROBLEM — R79.89 LOW SERUM CORTISOL LEVEL: Status: ACTIVE | Noted: 2021-07-15

## 2021-07-15 PROBLEM — F17.200 SMOKER: Status: ACTIVE | Noted: 2021-07-15

## 2021-07-15 PROBLEM — I49.3 VENTRICULAR ECTOPY: Status: ACTIVE | Noted: 2021-07-15

## 2021-07-15 LAB
ANION GAP SERPL CALCULATED.3IONS-SCNC: 6 MMOL/L (ref 9–17)
BUN BLDV-MCNC: 13 MG/DL (ref 8–23)
BUN/CREAT BLD: ABNORMAL (ref 9–20)
CALCIUM SERPL-MCNC: 8.4 MG/DL (ref 8.6–10.4)
CHLORIDE BLD-SCNC: 110 MMOL/L (ref 98–107)
CO2: 27 MMOL/L (ref 20–31)
CORTISOL COLLECTION INFO: ABNORMAL
CORTISOL: 2.2 UG/DL (ref 2.7–18.4)
CREAT SERPL-MCNC: 1.23 MG/DL (ref 0.7–1.2)
EKG ATRIAL RATE: 68 BPM
EKG P AXIS: 70 DEGREES
EKG P-R INTERVAL: 172 MS
EKG Q-T INTERVAL: 426 MS
EKG QRS DURATION: 88 MS
EKG QTC CALCULATION (BAZETT): 452 MS
EKG R AXIS: 77 DEGREES
EKG T AXIS: 72 DEGREES
EKG VENTRICULAR RATE: 68 BPM
GFR AFRICAN AMERICAN: >60 ML/MIN
GFR NON-AFRICAN AMERICAN: 60 ML/MIN
GFR SERPL CREATININE-BSD FRML MDRD: ABNORMAL ML/MIN/{1.73_M2}
GFR SERPL CREATININE-BSD FRML MDRD: ABNORMAL ML/MIN/{1.73_M2}
GLUCOSE BLD-MCNC: 140 MG/DL (ref 70–99)
HCT VFR BLD CALC: 34 % (ref 41–53)
HEMOGLOBIN: 10.9 G/DL (ref 13.5–17.5)
LV EF: 55 %
LVEF MODALITY: NORMAL
MAGNESIUM: 2 MG/DL (ref 1.6–2.6)
MCH RBC QN AUTO: 28.8 PG (ref 26–34)
MCHC RBC AUTO-ENTMCNC: 32 G/DL (ref 31–37)
MCV RBC AUTO: 89.9 FL (ref 80–100)
MYOGLOBIN: 60 NG/ML (ref 28–72)
MYOGLOBIN: 82 NG/ML (ref 28–72)
MYOGLOBIN: <21 NG/ML (ref 28–72)
NRBC AUTOMATED: ABNORMAL PER 100 WBC
PDW BLD-RTO: 13.9 % (ref 11.5–14.9)
PLATELET # BLD: 244 K/UL (ref 150–450)
PMV BLD AUTO: 7.2 FL (ref 6–12)
POTASSIUM SERPL-SCNC: 4.2 MMOL/L (ref 3.7–5.3)
RBC # BLD: 3.78 M/UL (ref 4.5–5.9)
SODIUM BLD-SCNC: 143 MMOL/L (ref 135–144)
TROPONIN INTERP: ABNORMAL
TROPONIN INTERP: ABNORMAL
TROPONIN INTERP: NORMAL
TROPONIN T: ABNORMAL NG/ML
TROPONIN T: ABNORMAL NG/ML
TROPONIN T: NORMAL NG/ML
TROPONIN, HIGH SENSITIVITY: 7 NG/L (ref 0–22)
TROPONIN, HIGH SENSITIVITY: <6 NG/L (ref 0–22)
TROPONIN, HIGH SENSITIVITY: <6 NG/L (ref 0–22)
WBC # BLD: 4.1 K/UL (ref 3.5–11)

## 2021-07-15 PROCEDURE — 84484 ASSAY OF TROPONIN QUANT: CPT

## 2021-07-15 PROCEDURE — 93306 TTE W/DOPPLER COMPLETE: CPT

## 2021-07-15 PROCEDURE — 99253 IP/OBS CNSLTJ NEW/EST LOW 45: CPT | Performed by: PSYCHIATRY & NEUROLOGY

## 2021-07-15 PROCEDURE — 83874 ASSAY OF MYOGLOBIN: CPT

## 2021-07-15 PROCEDURE — 2580000003 HC RX 258: Performed by: NURSE PRACTITIONER

## 2021-07-15 PROCEDURE — 2060000000 HC ICU INTERMEDIATE R&B

## 2021-07-15 PROCEDURE — 36415 COLL VENOUS BLD VENIPUNCTURE: CPT

## 2021-07-15 PROCEDURE — 83735 ASSAY OF MAGNESIUM: CPT

## 2021-07-15 PROCEDURE — 85027 COMPLETE CBC AUTOMATED: CPT

## 2021-07-15 PROCEDURE — 6360000002 HC RX W HCPCS: Performed by: NURSE PRACTITIONER

## 2021-07-15 PROCEDURE — 6370000000 HC RX 637 (ALT 250 FOR IP): Performed by: NURSE PRACTITIONER

## 2021-07-15 PROCEDURE — 99233 SBSQ HOSP IP/OBS HIGH 50: CPT | Performed by: INTERNAL MEDICINE

## 2021-07-15 PROCEDURE — 80048 BASIC METABOLIC PNL TOTAL CA: CPT

## 2021-07-15 RX ORDER — SODIUM CHLORIDE 0.9 % (FLUSH) 0.9 %
5-40 SYRINGE (ML) INJECTION EVERY 12 HOURS SCHEDULED
Status: DISCONTINUED | OUTPATIENT
Start: 2021-07-15 | End: 2021-07-18

## 2021-07-15 RX ORDER — MAGNESIUM SULFATE 1 G/100ML
1000 INJECTION INTRAVENOUS PRN
Status: DISCONTINUED | OUTPATIENT
Start: 2021-07-15 | End: 2021-07-18

## 2021-07-15 RX ORDER — COSYNTROPIN 0.25 MG/ML
250 INJECTION, POWDER, FOR SOLUTION INTRAMUSCULAR; INTRAVENOUS ONCE
Status: COMPLETED | OUTPATIENT
Start: 2021-07-16 | End: 2021-07-16

## 2021-07-15 RX ORDER — ONDANSETRON 2 MG/ML
4 INJECTION INTRAMUSCULAR; INTRAVENOUS EVERY 6 HOURS PRN
Status: DISCONTINUED | OUTPATIENT
Start: 2021-07-15 | End: 2021-07-18 | Stop reason: HOSPADM

## 2021-07-15 RX ORDER — POTASSIUM CHLORIDE 7.45 MG/ML
10 INJECTION INTRAVENOUS PRN
Status: DISCONTINUED | OUTPATIENT
Start: 2021-07-15 | End: 2021-07-18

## 2021-07-15 RX ORDER — ONDANSETRON 4 MG/1
4 TABLET, ORALLY DISINTEGRATING ORAL EVERY 8 HOURS PRN
Status: DISCONTINUED | OUTPATIENT
Start: 2021-07-15 | End: 2021-07-18 | Stop reason: HOSPADM

## 2021-07-15 RX ORDER — SODIUM CHLORIDE 9 MG/ML
25 INJECTION, SOLUTION INTRAVENOUS PRN
Status: DISCONTINUED | OUTPATIENT
Start: 2021-07-15 | End: 2021-07-18

## 2021-07-15 RX ORDER — ACETAMINOPHEN 650 MG/1
650 SUPPOSITORY RECTAL EVERY 6 HOURS PRN
Status: DISCONTINUED | OUTPATIENT
Start: 2021-07-15 | End: 2021-07-18 | Stop reason: HOSPADM

## 2021-07-15 RX ORDER — ACETAMINOPHEN 325 MG/1
650 TABLET ORAL EVERY 6 HOURS PRN
Status: DISCONTINUED | OUTPATIENT
Start: 2021-07-15 | End: 2021-07-18 | Stop reason: HOSPADM

## 2021-07-15 RX ORDER — SODIUM CHLORIDE 0.9 % (FLUSH) 0.9 %
10 SYRINGE (ML) INJECTION PRN
Status: DISCONTINUED | OUTPATIENT
Start: 2021-07-15 | End: 2021-07-18

## 2021-07-15 RX ORDER — POTASSIUM CHLORIDE 20 MEQ/1
40 TABLET, EXTENDED RELEASE ORAL PRN
Status: DISCONTINUED | OUTPATIENT
Start: 2021-07-15 | End: 2021-07-18

## 2021-07-15 RX ORDER — POLYETHYLENE GLYCOL 3350 17 G/17G
17 POWDER, FOR SOLUTION ORAL DAILY PRN
Status: DISCONTINUED | OUTPATIENT
Start: 2021-07-15 | End: 2021-07-18 | Stop reason: HOSPADM

## 2021-07-15 RX ORDER — LORAZEPAM 2 MG/ML
1 INJECTION INTRAMUSCULAR EVERY 4 HOURS PRN
Status: DISCONTINUED | OUTPATIENT
Start: 2021-07-15 | End: 2021-07-18 | Stop reason: HOSPADM

## 2021-07-15 RX ORDER — ESCITALOPRAM OXALATE 20 MG/1
20 TABLET ORAL DAILY
Status: DISCONTINUED | OUTPATIENT
Start: 2021-07-15 | End: 2021-07-18 | Stop reason: HOSPADM

## 2021-07-15 RX ORDER — NICOTINE 21 MG/24HR
1 PATCH, TRANSDERMAL 24 HOURS TRANSDERMAL DAILY
Status: DISCONTINUED | OUTPATIENT
Start: 2021-07-15 | End: 2021-07-18 | Stop reason: HOSPADM

## 2021-07-15 RX ADMIN — CEFTRIAXONE SODIUM 1000 MG: 1 INJECTION, POWDER, FOR SOLUTION INTRAMUSCULAR; INTRAVENOUS at 21:16

## 2021-07-15 RX ADMIN — AZITHROMYCIN MONOHYDRATE 500 MG: 500 INJECTION, POWDER, LYOPHILIZED, FOR SOLUTION INTRAVENOUS at 22:18

## 2021-07-15 RX ADMIN — ESCITALOPRAM OXALATE 20 MG: 20 TABLET ORAL at 08:01

## 2021-07-15 RX ADMIN — SODIUM CHLORIDE: 9 INJECTION, SOLUTION INTRAVENOUS at 08:02

## 2021-07-15 RX ADMIN — TRAZODONE HYDROCHLORIDE 150 MG: 50 TABLET ORAL at 02:02

## 2021-07-15 ASSESSMENT — ENCOUNTER SYMPTOMS
ABDOMINAL PAIN: 0
ALLERGIC/IMMUNOLOGIC NEGATIVE: 1
COUGH: 0
NAUSEA: 0
PHOTOPHOBIA: 0
SORE THROAT: 0
SHORTNESS OF BREATH: 0
BACK PAIN: 0
COLOR CHANGE: 0
VOMITING: 0
RHINORRHEA: 0
WHEEZING: 0
DIARRHEA: 0

## 2021-07-15 NOTE — PLAN OF CARE
Problem: Suicide risk  Goal: Provide patient with safe environment  Description: Provide patient with safe environment  7/15/2021 1651 by Zbigniew Roberto RN  Outcome: Ongoing  Note: Patient has sitter at bedside continuously, patient will remain free of self inflicted injury  2/01/3507 0313 by Jose Solitario RN  Outcome: Ongoing     Problem: Discharge Planning:  Goal: Discharged to appropriate level of care  Description: Discharged to appropriate level of care  7/15/2021 0313 by Jose Solitario RN  Outcome: Ongoing  Goal: Participates in care planning  Description: Participates in care planning  7/15/2021 0313 by Jose Solitario RN  Outcome: Ongoing     Problem: Airway Clearance - Ineffective:  Goal: Clear lung sounds  Description: Clear lung sounds  7/15/2021 1651 by Zbigniew Roberto RN  Outcome: Ongoing  Note: Encourage cough and deep breathe exercise to assist in patient improving lung function  7/15/2021 0313 by Jose Solitario RN  Outcome: Ongoing  Goal: Ability to maintain a clear airway will improve  Description: Ability to maintain a clear airway will improve  7/15/2021 0313 by Jose Solitario RN  Outcome: Ongoing     Problem: Fluid Volume - Deficit:  Goal: Achieves intake and output within specified parameters  Description: Achieves intake and output within specified parameters  7/15/2021 1651 by Zbigniew Roberto RN  Outcome: Ongoing  Note: IVF continued. Patient encouraged to increase oral intake.  Offered drinks between meals  7/15/2021 0313 by Jose Solitario RN  Outcome: Ongoing     Problem: Gas Exchange - Impaired:  Goal: Levels of oxygenation will improve  Description: Levels of oxygenation will improve  7/15/2021 0313 by Jose Solitario RN  Outcome: Ongoing     Problem: Hyperthermia:  Goal: Ability to maintain a body temperature in the normal range will improve  Description: Ability to maintain a body temperature in the normal range will improve  7/15/2021 0313 by Jose Solitario RN  Outcome: Ongoing     Problem: Tobacco Use:  Goal: Will participate in inpatient tobacco-use cessation counseling  Description: Will participate in inpatient tobacco-use cessation counseling  7/15/2021 0313 by Demetrio Beatty RN  Outcome: Ongoing

## 2021-07-15 NOTE — CONSULTS
Date:   7/15/2021  Patient name: Nikita Colvin  Date of admission:  7/14/2021  5:11 PM  MRN:   [id number]  YOB: 1960  PCP: No primary care provider on file. Reason for Admission: Pneumonia [J18.9]    Cardiology consult: Ventricular arrhythmia       Impression    7/14/2021 admission with hallucination   Episode of ventricular couplets and triplets  Bipolar disorder  Schizoaffective disorder  Depression  Hallucinations  Substance abuse    History of present illness  65-year-old male got hospitalized on 7/14/2021 with a severe hallucination. In the emergency room patient was shouting acute [place] alone. He has a history of a schizoaffective disorder. Apparently he has been hearing voices telling him to kill himself. Patient did complain about nonproductive cough and left-sided chest pain ongoing for last 2 months. His symptoms have been intermittent. ECG on admission showed sinus rhythm, negative cardiac markers.     Current evaluation  Patient seen and examined  He did not appear in any distress hemodynamically stable  No signs of cardiopulmonary decompensation  Afebrile    Investigation work-up  Lab work 7/15/2021  Sodium 143, potassium 4.2, BUN 13, creatinine 1.23 glucose 140, calcium 8.4  High-sensitivity troponin 7, 6  WBC 4.1, hemoglobin 10.9, MCV 90, platelets 549      ECG 7/14/2021  Sinus rhythm heart rate 68 normal ECG    Chest x-ray 7/14/2021  Possible interstitial pneumonitis medial lower left lung, atypical/viral pneumonitis      Medications:   Scheduled Meds:   sodium chloride flush  5-40 mL Intravenous 2 times per day    enoxaparin  40 mg Subcutaneous Daily    cefTRIAXone (ROCEPHIN) IV  1,000 mg Intravenous Q24H    azithromycin  500 mg Intravenous Q24H    nicotine  1 patch Transdermal Daily    escitalopram  20 mg Oral Daily    [START ON 7/16/2021] cosyntropin  250 mcg Intravenous Once     Continuous Infusions:   sodium chloride      sodium chloride 100 mL/hr at 07/15/21 0802 CBC:   Recent Labs     07/14/21  1912 07/15/21  1001   WBC 4.6 4.1   HGB 11.3* 10.9*    244     BMP:    Recent Labs     07/14/21  1912 07/15/21  1001    143   K 4.2 4.2    110*   CO2 26 27   BUN 15 13   CREATININE 1.14 1.23*   GLUCOSE 100* 140*     Hepatic:   Recent Labs     07/14/21 1912   AST 12   ALT 11   BILITOT 0.22*   ALKPHOS 97     Troponin: No results for input(s): TROPONINI in the last 72 hours. BNP: No results for input(s): BNP in the last 72 hours. Lipids: No results for input(s): CHOL, HDL in the last 72 hours. Invalid input(s): LDLCALCU  INR: No results for input(s): INR in the last 72 hours. Objective:   Vitals: /74   Pulse 62   Temp 97.8 °F (36.6 °C) (Oral)   Resp 16   Ht 6' 3\" (1.905 m)   Wt 195 lb 1.7 oz (88.5 kg)   SpO2 98%   BMI 24.39 kg/m²   General appearance: alert and cooperative with exam  HEENT: Head: Normal, normocephalic, atraumatic. Neck: no JVD and supple, symmetrical, trachea midline  Lungs: diminished breath sounds bibasilar  Heart: regular rate and rhythm  Abdomen: soft, non-tender; bowel sounds normal; no masses,  no organomegaly  Extremities: Homans sign is negative, no sign of DVT  Neurologic: Mental status: Alert, oriented, thought content appropriate    EKG: normal sinus rhythm.     Assessment / Acute Cardiac Problems:     Nonsustained ventricular arrhythmia, couplets and triplets  Electrocardiogram showed normal sinus rhythm, normal QT interval  Schizoaffective disorder    Patient Active Problem List:     Hematuria     Suicidal ideations     Cocaine abuse (HCC)     Schizoaffective disorder, bipolar type ([address]     Schizoaffective disorder, depressive type ([address]     Perianal abscess     Carla-rectal abscess     Schizophrenia ([address]     Schizoaffective disorder ([address]     Major depression with psychotic features ([address]     Acute psychosis ([address]     Depression with suicidal ideation     Pneumonia     Smoker     Ventricular ectopy     Low serum cortisol level (HCC)     Sepsis due to Klebsiella pneumoniae with no resultant organ failure ([facility]      Plan of Treatment:     Medication checked  Patient is on IV Rocephin and azithromycin  Azithromycin can cause prolonged QT interval  Repeat ECG  Check serum magnesium if low to give 1 g IV magnesium  Continue ECG monitoring    Electronically signed by Gaetano Colon MD on 7/15/2021 at 6:14 PM

## 2021-07-15 NOTE — PROGRESS NOTES
Patient remained in direct view of sitter the entire shift. Patient did not act out or attempt any suicidal activity. Patient does state  He continues to hear voices.

## 2021-07-15 NOTE — CARE COORDINATION
CASE MANAGEMENT NOTE:    Admission Date:  7/14/2021 Willie Batista is a 57 y.o.  male    Admitted for : Pneumonia [J18.9]    Met with:  Patient    PCP:  No PCP mechelle                                Insurance:  [facility]      Is patient alert and oriented at time of discussion:  Alert not sure if appropriate to discuss his plan of carfe    Current Residence/ Living Arrangements:  Patient lives in a group home             Current Services PTA:  No    Does patient go to outpatient dialysis: No  If yes, location and chair time:     Home Oxygen: No    Nebulizer: No    CPAP/BIPAP: No    Supplier: N/A    Potential Assistance Needed: Yes, Needs [facility]     SNF needed: No    Freedom of choice and list provided: NA    Pharmacy:  [facility] AND [facility] outpt pharmacy       Does Patient want to use MEDS to BEDS? No    Is patient currently receiving oral anticoagulation therapy? No    Is the Patient an DESHAWN [facility] with Readmission Risk Score greater than 14%? No  If yes, pt needs a follow up appointment made within 7 days. Family Members/Caregivers that pt would like involved in their care:    yes    If yes, list name here:  patient advised writer call manuel his sitters. However, Writer tried calling telephone numbers in [employer], and Amy[facility] is disconnected, and rosemary's telephone doesn't ring. Wrong number    Transportation Provider:  Needs ride home             Discharge Plan:  7/15/21 - [address] - patient is from a group home and took a cab here because he was suicidal,  Unable to reach sisters. Plan is to go to [facility] at Discharge. Will follow . //pf             Electronically signed by: Prince Gale RN on 7/15/2021 at 4:24 PM

## 2021-07-15 NOTE — FLOWSHEET NOTE
Patient was alert with sitter in room. He asked writer to discharge him as he feels \"ready to go. \" Writer explained again her role. He was pleasant and receptive, although not quite oriented. 07/15/21 4189   Encounter Summary   Services provided to: Patient   Referral/Consult From: [facility]   (7-15-21)   Complexity of Encounter Low   Length of Encounter 15 minutes   Routine   Type Initial   Assessment Calm; Approachable   Intervention Active listening;[place];Sustaining presence/ Ministry of presence   Outcome Expressed gratitude;Engaged in conversation;Receptive

## 2021-07-15 NOTE — H&P
[address] [facility]     HISTORY AND PHYSICAL EXAMINATION            Date:   7/15/2021  Patientname:  Sarah Mora  Date of admission:  7/14/2021  5:11 PM  MRN:   [id number]  Account:  [de-identified]  YOB: 1960  PCP:    No primary care provider on file. Room:   14/14  Code Status:    Prior    CHIEF COMPLAINT     Chief Complaint   Patient presents with    Mental Health Problem       HISTORY OF PRESENT ILLNESS  (Character, Onset, Location, Duration,  Exacerbating/RelievingFactors, Radiation,   Associated Symptoms, Severity )      The patient is a 57 y.o.  male, with a history of bipolar disorder, GERD, suicidal ideations, schizophrenia, polysubstance abuse, tobacco abuse. Patient presents from group home with suicidal ideations and hallucinations. Patient states the voices in his head are telling him to get a gun and shoot himself. No homicidal ideations. Patient also states he has intermittent left-sided chest pain for the past 2 months. Denies cough, shortness of breath, fever or chills. No abdominal pain nausea or vomiting. HPI   1) Location/Symptom suicidal, left-sided chest pain  2) Timing/Onset: Suicidal ideations today, intermittent left-sided chest pain x2 months  3) Context/Setting: Resides a group home, history of bipolar disorder, auditory hallucinations  4) Quality: Aching pain  5) Duration: continuous   6) Modifying Factors: No aggravating or alleviating factors  7) Severity: moderate     PAST MEDICAL HISTORY   Patient  has a past medical history of Bipolar disorder ([address] Depression, GERD (gastroesophageal reflux disease), Hallucinations, Headache(784.0), Hepatitis, Schizophrenia, schizo-affective ([address] Substance abuse ([address] Tobacco abuse, Type II or unspecified type diabetes mellitus without mention of complication, not stated as uncontrolled, and Urinary incontinence.     PAST SURGICAL HISTORY    Patient  has a past surgical history that includes Dental surgery and Abscess Drainage (N/A, 02/11/2018). FAMILY HISTORY    Patient family history includes Diabetes in his mother; Heart Disease in his mother. SOCIAL HISTORY    Patient  reports that he has been smoking cigarettes. He has a 47.00 pack-year smoking history. He has never used smokeless tobacco. He reports current alcohol use. He reports previous drug use. Drug: Cocaine. HOME MEDICATIONS        Prior to Admission medications    Medication Sig Start Date End Date Taking? Authorizing Provider   traZODone (DESYREL) 150 MG tablet Take 1 tablet by mouth nightly as needed for Sleep 7/6/21   Sabine Reed MD   escitalopram (LEXAPRO) 20 MG tablet Take 1 tablet by mouth daily 7/6/21   Sabine Reed MD   hydrOXYzine (ATARAX) 50 MG tablet Take 1 tablet by mouth 3 times daily as needed for Anxiety 7/6/21 7/16/21  Sabine Reed MD   paliperidone palmitate ER (Reg Gilma) 234 MG/1.5ML [place] IM injection Inject 234 mg into the muscle every 28 days    Historical Provider, MD       ALLERGIES      Brodie [thiothixene]    REVIEW OF SYSTEMS     Review of Systems   Constitutional: Negative for activity change, appetite change, chills, fatigue and fever. HENT: Negative for congestion, rhinorrhea and sore throat. Eyes: Negative for photophobia and visual disturbance. Respiratory: Negative for cough, shortness of breath and wheezing. Cardiovascular: Positive for chest pain. Negative for leg swelling. Gastrointestinal: Negative for abdominal pain, diarrhea, nausea and vomiting. Genitourinary: Negative for dysuria, flank pain and hematuria. Musculoskeletal: Negative for arthralgias, back pain, gait problem and myalgias. Skin: Negative for color change and rash. Allergic/Immunologic: Negative. Neurological: Negative for dizziness, weakness, light-headedness and headaches. Hematological: Negative.     Psychiatric/Behavioral: Positive for agitation, behavioral problems, dysphoric mood, hallucinations, sleep disturbance and suicidal ideas. Negative for confusion. The patient is nervous/anxious. PHYSICAL EXAM      /76   Pulse 65   Temp 97.8 °F (36.6 °C) (Oral)   Resp 19   Ht 6' 3\" (1.905 m)   Wt 190 lb (86.2 kg)   SpO2 100%   BMI 23.75 kg/m²  Body mass index is 23.75 kg/m². Physical Exam  Constitutional:       General: He is not in acute distress. Appearance: Normal appearance. He is well-developed, well-groomed and normal weight. HENT:      Head: Normocephalic. Right Ear: External ear normal.      Left Ear: External ear normal.      Nose: Nose normal.      Mouth/Throat:      Mouth: Mucous membranes are moist.   Eyes:      Extraocular Movements: Extraocular movements intact. Conjunctiva/sclera: Conjunctivae normal.      Pupils: Pupils are equal, round, and reactive to light. Cardiovascular:      Rate and Rhythm: Normal rate and regular rhythm. Pulses: Normal pulses. Heart sounds: Normal heart sounds. Pulmonary:      Effort: Pulmonary effort is normal.      Breath sounds: Normal breath sounds. No wheezing, rhonchi or rales. Abdominal:      General: Bowel sounds are normal. There is no distension. Palpations: Abdomen is soft. Tenderness: There is no abdominal tenderness. Musculoskeletal:         General: Normal range of motion. Cervical back: Normal range of motion and neck supple. Right lower leg: No edema. Left lower leg: No edema. Skin:     General: Skin is warm and dry. Capillary Refill: Capillary refill takes less than 2 seconds. Neurological:      Mental Status: He is alert and oriented to person, place, and time. Psychiatric:         Attention and Perception: He perceives auditory hallucinations. He does not perceive visual hallucinations. Mood and Affect: Mood is anxious. Affect is inappropriate. Speech: Speech normal.         Behavior: Behavior is agitated. Behavior is cooperative. Thought Content: Thought content is delusional. Thought content includes suicidal ideation. Thought content does not include homicidal ideation. Thought content includes suicidal plan. Cognition and Memory: Cognition normal.         Judgment: Judgment is impulsive and inappropriate. DIAGNOSTICS      EKG: (as documented in ED note):EKG shows a sinus rhythm. HR is 68, , QRS 88, , no MACARIO, No STD, No TWI, the axis is normal.     Labs:  CBC:   Recent Labs     07/14/21 1912   WBC 4.6   HGB 11.3*        BMP:    Recent Labs     07/14/21 1912      K 4.2      CO2 26   BUN 15   CREATININE 1.14   GLUCOSE 100*     S. Calcium:  Recent Labs     07/14/21 1912   CALCIUM 9.3     S. Ionized Calcium:No results for input(s): IONCA in the last 72 hours. S. Magnesium:No results for input(s): MG in the last 72 hours. S. Phosphorus:No results for input(s): PHOS in the last 72 hours. S. Glucose:No results for input(s): POCGLU in the last 72 hours. Glycosylated hemoglobin A1C:   Lab Results   Component Value Date    LABA1C 4.9 08/11/2020     Hepatic:   Recent Labs     07/14/21 1912   AST 12   ALT 11   ALKPHOS 97     CARDIAC ENZY:   Recent Labs     07/14/21 1912   TROPHS <6     INR: No results for input(s): INR in the last 72 hours. BNP: No results for input(s): PROBNP in the last 72 hours. ABGs: No results for input(s): PH, PCO2, PO2, HCO3, O2SAT in the last 72 hours. Lipids: No results for input(s): CHOL, TRIG, HDL, LDLCALC in the last 72 hours. Invalid input(s): LDL  Pancreatic functions:No results for input(s): LIPASE, AMYLASE in the last 72 hours.   Clarene Fusi:   Recent Labs     07/14/21 1912   LACTA 0.9     Thyroid functions:   Lab Results   Component Value Date    TSH 1.50 10/23/2020      U/A:  Recent Labs     07/14/21  2140   COLORU YELLOW   SPECGRAV 1.016   LEUKOCYTESUR NEGATIVE   GLUCOSEU NEGATIVE       Imaging/Diagonstics:     XR CHEST PORTABLE    Result Date: 7/14/2021  EXAMINATION: ONE XRAY VIEW OF THE CHEST 7/14/2021 7:14 pm COMPARISON: Chest 12/08/2020 HISTORY: ORDERING SYSTEM PROVIDED HISTORY: cough, SOB TECHNOLOGIST PROVIDED HISTORY: cough, sob Reason for Exam: cough, sob, hearing voices Acuity: Acute Type of Exam: Initial Additional signs and symptoms: cough, sob, hearing voices Relevant Medical/Surgical History: cough, sob, hearing voices FINDINGS: The cardiomediastinal and hilar silhouettes appear unremarkable. Faint ground-glass opacity medial lower left lung. The right lung appears clear. No pleural effusion evident. No pneumothorax is seen. No acute osseous abnormality is identified. Possible interstitial pneumonitis medial lower left lung. Atypical/viral pneumonia is a consideration. ASSESSMENT  and  PLAN     Principal Problem:    Pneumonia  Active Problems:    Suicidal ideations    Schizoaffective disorder, bipolar type ([address]    Major depression with psychotic features ([address]    Smoker  Resolved Problems:    * No resolved hospital problems. *    Plan:    Pneumonia  -Chest x-ray shows possible interstitial pneumonitis medial lower left lung. Atypical/viral pneumonia is a consideration.  -Rapid Covid negative  -WBC 4.6, lactic acid 0.9  -No fever  -Ethanol negative  -Urine drug screen negative  -Troponin <6, repeat troponin pending  -EKG shows normal sinus rhythm with a heart rate of 68, no acute ST segment changes. -SPO2 100% on room air  -Patient started on Rocephin and azithromycin in the ED. Continue upon admission    Suicidal ideations  -Suicide precautions  -Bedside sitter  -Consult psych    Smoker  -Nicotine patch    DVT prophylaxis-Lovenox 40 mg subcu daily    Consultations:     IP CONSULT TO INTERNAL MEDICINE  IP CONSULT TO PSYCHIATRY      MARY Michelle - CNP   7/15/2021  12:57 AM    Molly Mercer [address] [place] [address].    Phone [phone] and add on       I have discussed the care of Higinio Flynn ,   including pertinent history and exam findings,      7/15/21   with the Ciro Molina CNP  I have seen and examined the patient and the key elements of all parts of the encounter have been performed by me . I agree with the assessment, plan and orders as documented by the resident. Principal Problem:    Pneumonia  Active Problems:    Suicidal ideations    Schizoaffective disorder, bipolar type ([address]    Major depression with psychotic features ([address]    Smoker  Resolved Problems:    * No resolved hospital problems. *        -Confused , hearing voices, inattentive  Pneumonia  Suicidal  History of schizoaffective disorder  Has frequent ectopy  QT interval is not prolonged    Will consult cardiology for arrhythmia  We will do Cortrosyn stimulation test to rule out adrenal insufficiency    Patient was hemodynamically unstable in ER and is doing much better now    Condition    [x] ill ,     [x] high risk , [] critical ,          [] improved but still labile                                        [x] delirium ,      [] -----,                 [] I----     Unit  [] ICU           [x] PICU       [] MED_SRG             []  Other    Prognosis     ---                   Medications: Allergies:     Allergies   Allergen Reactions    Navane [Thiothixene]        Current Meds:   Scheduled Meds:    sodium chloride flush  5-40 mL Intravenous 2 times per day    enoxaparin  40 mg Subcutaneous Daily    cefTRIAXone (ROCEPHIN) IV  1,000 mg Intravenous Q24H    azithromycin  500 mg Intravenous Q24H    nicotine  1 patch Transdermal Daily    escitalopram  20 mg Oral Daily     Continuous Infusions:    sodium chloride      sodium chloride 100 mL/hr at 07/15/21 0802     PRN Meds: sodium chloride flush, sodium chloride, potassium chloride **OR** potassium alternative oral replacement **OR** potassium chloride, magnesium sulfate, ondansetron **OR** ondansetron, polyethylene glycol, acetaminophen **OR**

## 2021-07-15 NOTE — PLAN OF CARE
Problem: Suicide risk  Goal: Provide patient with safe environment  Description: Provide patient with safe environment  Outcome: Ongoing     Problem: Discharge Planning:  Goal: Discharged to appropriate level of care  Description: Discharged to appropriate level of care  Outcome: Ongoing  Goal: Participates in care planning  Description: Participates in care planning  Outcome: Ongoing     Problem: Airway Clearance - Ineffective:  Goal: Clear lung sounds  Description: Clear lung sounds  Outcome: Ongoing  Goal: Ability to maintain a clear airway will improve  Description: Ability to maintain a clear airway will improve  Outcome: Ongoing     Problem: Fluid Volume - Deficit:  Goal: Achieves intake and output within specified parameters  Description: Achieves intake and output within specified parameters  Outcome: Ongoing     Problem: Gas Exchange - Impaired:  Goal: Levels of oxygenation will improve  Description: Levels of oxygenation will improve  Outcome: Ongoing     Problem: Hyperthermia:  Goal: Ability to maintain a body temperature in the normal range will improve  Description: Ability to maintain a body temperature in the normal range will improve  Outcome: Ongoing     Problem: Tobacco Use:  Goal: Will participate in inpatient tobacco-use cessation counseling  Description: Will participate in inpatient tobacco-use cessation counseling  Outcome: Ongoing

## 2021-07-15 NOTE — PROGRESS NOTES
Patient transported to room 2107 via stretcher and transferred to bed. Admission history and assessment completed, vitals taken. Patient currently denying suicidal ideation or hearing voices. Sitter at bedside, bed in lowest position, call light in reach, side rails up x2.

## 2021-07-15 NOTE — CONSULTS
Department of Psychiatry  Behavioral Health Consult    REASON FOR CONSULT: Suicidal ideation and hallucination      History obtained from: Medical record and the patient    HISTORY OF PRESENT ILLNESS:    The patient is a 57 y.o. male with significant past medical history of bipolar disorder, GERD, suicidal ideation, schizophrenia, polysubstance abuse, tobacco abuse, patient presented from group home with suicidal ideation and hallucination. Patient stated that he here voices in his head telling him to get a gun and shoot himself, no homicidal ideation, patient also complaining of chest pain, is anxious and inappropriate affect, auditory hallucination,  Patient was found to have pneumonia chest x-ray showed possible interstitial pneumonitis medial lower left lung, rapid Covid negative, WBC: 4.6,  Patient presented to [facility] on 7/1/2021 patient is known to have schizoaffective disorder, acute psychosis, suicidal ideation who presented to the ED with a chief complaint of suicidal ideation,    Past psychiatric medication includes:  -Invega, Effexor, Cogentin he also tried Seroquel, Wellbutrin, Lexapro, Atarax, trazodone    Patient discharged from the hospital on 7/6 on the following medication: Hydroxyzine 50 mg as needed 3 times daily, Escitalopram 20 mg daily, Invega, trazodone 50 mg 1 tablet per mouth as needed for sleep,  -Patient stopped taking the following medication: Bupropion, benztropine, quetiapine  The patient is currently receiving care for the above psychiatric illness. Psychiatric Review of Systems    ·    Obsessions and Compulsions: Denies    ·    Teetee or Hypomania: Denies  ·    Hallucinations: Endorses auditory hallucination  ·    Panic Attacks:  Denies  ·    Delusions:  Denies  ·    Phobias:  Denies  ·    Trauma: Denies      Substance Abuse History:  ETOH: Current alcohol usage:  Type of Drink(s):  Bbeer. Frequency of use:  Weekly.   Marijuana: Denied  Opiates: Denied  Other Drugs: Denied      Past Psychiatric History:  -Schizoaffective disorder  -3 lifetime suicide attempts  -Multiple psychiatric hospital admissions    Personal History:   Born in: [place]  Raised in: [place]  Family: Has 4 sisters 3 brothers   Highest Level of Education: Ninth grade  Occupation: Disabled on SSI  Marital Status: Single  Bakari Guerin in a boarding house unhappy with current situation, has concern over the general operations of the boarding house  Stressors: Current living situation acute grief related to the death of his mother  Patient Assets/Supportive Factors: Has a sister that is supportive of him       Past Medical History:        Diagnosis Date    Bipolar disorder ([address]     Depression     GERD (gastroesophageal reflux disease)     Hallucinations     Headache(784.0)     Hepatitis     [facility], [facility]ive [facility]     Substance abuse ([facility]     Tobacco abuse     Type II or unspecified type diabetes mellitus without mention of complication, not stated as uncontrolled     Urinary incontinence        Past Surgical History:        Procedure Laterality Date    ABSCESS DRAINAGE N/A 2018    Carla anal abcess    DENTAL SURGERY      all teeth pulled         Medications Prior to Admission:   Medications Prior to Admission: traZODone (DESYREL) 150 MG tablet, Take 1 tablet by mouth nightly as needed for Sleep  escitalopram (LEXAPRO) 20 MG tablet, Take 1 tablet by mouth daily  hydrOXYzine (ATARAX) 50 MG tablet, Take 1 tablet by mouth 3 times daily as needed for Anxiety  paliperidone palmitate ER (INVEGA SUSTENNA) 234 MG/1.5ML [place] IM injection, Inject 234 mg into the muscle every 28 days    Allergies:  Navane [thiothixene]    FAMILY/SOCIAL HISTORY:  Family History   Problem Relation Age of Onset    Diabetes Mother     Heart Disease Mother      Social History     Socioeconomic History    Marital status: Single     Spouse name: Not on file    Number of children: 0    Years of []Diarrhea  [] Other:  :  [] Dysuria   []Frequency  []Hematuria  []Discharge  [] Other:  Possible Pregnancy: []Yes   []No   LMP:   Musculoskeletal:  []Back pain  []Neck pain  []Recent Injury   Skin:  []Rash  [] Itching  [] Other:  Neurologic:  [] Headache  [] Focal weakness  [] Sensory changes []Other:  Endocrine:  [] Polyuria  [] Polydipsia  [] Hair Loss  [] Other:  Lymphatic:   [] Swollen glands   Psychiatric:  As per HPI      All other systems negative except as marked or mentioned/indicated in the HPI. Zee Dye      PHYSICAL EXAM:  Vitals:  BP (!) 140/85   Pulse 59   Temp 97.4 °F (36.3 °C) (Oral)   Resp 16   Ht 6' 3\" (1.905 m)   Wt 195 lb 1.7 oz (88.5 kg)   SpO2 98%   BMI 24.39 kg/m²      Neuro Exam:   Muscle Strength & Tone: normal    Involuntary Movements: No    Mental Status Examination:    Level of consciousness:  within normal limits   Appearance:  Hospital attire, laying in bed fair grooming   Behavior/Motor: no abnormalities noted  Attitude toward examiner:  Cooperative  Speech: normal rate and volume  Mood:  Depressed  Affect:  blunted  Thought processes:   Circumstantial  Thought content: active suicidal ideations without current plan or intent               denies homicidal ideations               Endorses auditory hallucinations              denies delusions  Cognition:  Oriented to self, location, not oriented to time   Concentration clinically adequate  Memory: intact  Insight &Judgment: poor    LABS: REVIEWED TODAY:  Recent Labs     07/14/21 1912 07/15/21  1001   WBC 4.6 4.1   HGB 11.3* 10.9*    244     Recent Labs     07/14/21 1912      K 4.2      CO2 26   BUN 15   CREATININE 1.14   GLUCOSE 100*     Recent Labs     07/14/21 1912   BILITOT 0.22*   ALKPHOS 97   AST 12   ALT 11     Lab Results   Component Value Date    BARBSCNU NEGATIVE 07/14/2021    LABBENZ NEGATIVE 07/14/2021    LABBENZ NEGATIVE 03/24/2012    LABMETH NEGATIVE 07/14/2021    PPXUR NOT REPORTED 07/14/2021 Lab Results   Component Value Date    TSH 1.50 10/23/2020     No results found for: LITHIUM  No results found for: VALPROATE, CBMZ  No results found for: LITHIUM, VALPROATE    FURTHER LABS ORDERED :      Radiology   XR CHEST PORTABLE    Result Date: 7/14/2021  EXAMINATION: ONE XRAY VIEW OF THE CHEST 7/14/2021 7:14 pm COMPARISON: Chest 12/08/2020 HISTORY: ORDERING SYSTEM PROVIDED HISTORY: cough, SOB TECHNOLOGIST PROVIDED HISTORY: cough, sob Reason for Exam: cough, sob, hearing voices Acuity: Acute Type of Exam: Initial Additional signs and symptoms: cough, sob, hearing voices Relevant Medical/Surgical History: cough, sob, hearing voices FINDINGS: The cardiomediastinal and hilar silhouettes appear unremarkable. Faint ground-glass opacity medial lower left lung. The right lung appears clear. No pleural effusion evident. No pneumothorax is seen. No acute osseous abnormality is identified. Possible interstitial pneumonitis medial lower left lung. Atypical/viral pneumonia is a consideration. DIAGNOSIS:  -Schizoaffective disorder,  -Cocaine use disorder    RECOMMENDATIONS    Risk Management:  suicide risk    Medications:  See orders  Discussed with the treating physician/ team about the patient and treatment plan  Reviewed the chart    Discussed with the patient risk, benefit, alternative and common side effects for the  proposed medication treatment. Patient is consenting to the treatment. Thanks for the consult. Please call me if needed. Electronically signed by Luc Koch MD on 7/15/2021 at 10:15 AM    Please note that this chart was generated using voice recognition Dragon dictation software. Although every effort was made to ensure the accuracy of this automated transcription, some errors in transcription may have occurred. I independently saw and evaluated the patient. I reviewed the resident's documentation above.   Any additional comments or changes to the  documentation are stated below otherwise agree with assessment. The patient is known to me from his recent admission. He was discharged from the hospital on 7/6/2021. The patient is a known user of cocaine. His urine drug screen on this occasion was negative. The patient received Graciella [place]. The patient was seen at bedside. He reports that his mood is better at this time but it was depressed at the time of admission. He reports auditory hallucinations which are commanding him to hurt himself. The patient is able to resist this. Discussed that the stress of his illness may have worsened his mental state    PLAN  We will continue to follow on the unit. The patient is likely to require admission to psychiatry. The patient should continue to have a sitter  Invega 3 mg daily added. He can continue Graciella [place] when it is next. Attempt to develop insight  Psycho-education conducted. Supportive Therapy conducted.     Electronically signed by Maggy Pierre MD on 7/15/21 at 4:06 PM EDT

## 2021-07-15 NOTE — PROGRESS NOTES
Patient refusing to let nurse assess him. Patient states he is hearing voices in his head. When asked \"what are voices saying?\", patient did not reply. Iv fluids running. Sitter continues to be at bedside. bp 140/85 noted. Will continue to monitor.

## 2021-07-16 LAB
ANION GAP SERPL CALCULATED.3IONS-SCNC: 6 MMOL/L (ref 9–17)
BUN BLDV-MCNC: 13 MG/DL (ref 8–23)
BUN/CREAT BLD: ABNORMAL (ref 9–20)
CALCIUM SERPL-MCNC: 8.6 MG/DL (ref 8.6–10.4)
CHLORIDE BLD-SCNC: 113 MMOL/L (ref 98–107)
CO2: 26 MMOL/L (ref 20–31)
CORTISOL COLLECTION INFO: NORMAL
CORTISOL: 13 UG/DL (ref 2.7–18.4)
CORTISOL: 14.7 UG/DL (ref 2.7–18.4)
CORTISOL: 3.9 UG/DL (ref 2.7–18.4)
CREAT SERPL-MCNC: 1.09 MG/DL (ref 0.7–1.2)
GFR AFRICAN AMERICAN: >60 ML/MIN
GFR NON-AFRICAN AMERICAN: >60 ML/MIN
GFR SERPL CREATININE-BSD FRML MDRD: ABNORMAL ML/MIN/{1.73_M2}
GFR SERPL CREATININE-BSD FRML MDRD: ABNORMAL ML/MIN/{1.73_M2}
GLUCOSE BLD-MCNC: 96 MG/DL (ref 70–99)
HCT VFR BLD CALC: 31 % (ref 41–53)
HEMOGLOBIN: 10.1 G/DL (ref 13.5–17.5)
MCH RBC QN AUTO: 29.2 PG (ref 26–34)
MCHC RBC AUTO-ENTMCNC: 32.6 G/DL (ref 31–37)
MCV RBC AUTO: 89.6 FL (ref 80–100)
NRBC AUTOMATED: ABNORMAL PER 100 WBC
PDW BLD-RTO: 14.5 % (ref 11.5–14.9)
PLATELET # BLD: 231 K/UL (ref 150–450)
PMV BLD AUTO: 7.2 FL (ref 6–12)
POTASSIUM SERPL-SCNC: 4 MMOL/L (ref 3.7–5.3)
RBC # BLD: 3.46 M/UL (ref 4.5–5.9)
SODIUM BLD-SCNC: 145 MMOL/L (ref 135–144)
WBC # BLD: 4.3 K/UL (ref 3.5–11)

## 2021-07-16 PROCEDURE — 36415 COLL VENOUS BLD VENIPUNCTURE: CPT

## 2021-07-16 PROCEDURE — 80048 BASIC METABOLIC PNL TOTAL CA: CPT

## 2021-07-16 PROCEDURE — 6360000002 HC RX W HCPCS: Performed by: NURSE PRACTITIONER

## 2021-07-16 PROCEDURE — 85027 COMPLETE CBC AUTOMATED: CPT

## 2021-07-16 PROCEDURE — 2580000003 HC RX 258: Performed by: NURSE PRACTITIONER

## 2021-07-16 PROCEDURE — 6360000002 HC RX W HCPCS: Performed by: INTERNAL MEDICINE

## 2021-07-16 PROCEDURE — 6370000000 HC RX 637 (ALT 250 FOR IP): Performed by: NURSE PRACTITIONER

## 2021-07-16 PROCEDURE — 82533 TOTAL CORTISOL: CPT

## 2021-07-16 PROCEDURE — 2060000000 HC ICU INTERMEDIATE R&B

## 2021-07-16 PROCEDURE — 99232 SBSQ HOSP IP/OBS MODERATE 35: CPT | Performed by: PSYCHIATRY & NEUROLOGY

## 2021-07-16 PROCEDURE — 99232 SBSQ HOSP IP/OBS MODERATE 35: CPT | Performed by: INTERNAL MEDICINE

## 2021-07-16 RX ORDER — PALIPERIDONE 1.5 MG/1
3 TABLET, EXTENDED RELEASE ORAL DAILY
Status: DISCONTINUED | OUTPATIENT
Start: 2021-07-17 | End: 2021-07-18 | Stop reason: HOSPADM

## 2021-07-16 RX ORDER — PALIPERIDONE 3 MG/1
3 TABLET, EXTENDED RELEASE ORAL DAILY
Status: DISCONTINUED | OUTPATIENT
Start: 2021-07-16 | End: 2021-07-16

## 2021-07-16 RX ADMIN — CEFTRIAXONE SODIUM 1000 MG: 1 INJECTION, POWDER, FOR SOLUTION INTRAMUSCULAR; INTRAVENOUS at 20:40

## 2021-07-16 RX ADMIN — ESCITALOPRAM OXALATE 20 MG: 20 TABLET ORAL at 08:19

## 2021-07-16 RX ADMIN — COSYNTROPIN 250 MCG: 0.25 INJECTION, POWDER, LYOPHILIZED, FOR SOLUTION INTRAMUSCULAR; INTRAVENOUS at 08:38

## 2021-07-16 RX ADMIN — ENOXAPARIN SODIUM 40 MG: 40 INJECTION SUBCUTANEOUS at 08:19

## 2021-07-16 RX ADMIN — SODIUM CHLORIDE, PRESERVATIVE FREE 10 ML: 5 INJECTION INTRAVENOUS at 08:19

## 2021-07-16 RX ADMIN — TRAZODONE HYDROCHLORIDE 150 MG: 50 TABLET ORAL at 02:40

## 2021-07-16 NOTE — PLAN OF CARE
Problem: Suicide risk  Goal: Provide patient with safe environment  Description: Provide patient with safe environment  Outcome: Met This Shift  Note: Pt with 1:1 in room. Denies suicidal ideation at this time      Problem: Discharge Planning:  Goal: Discharged to appropriate level of care  Description: Discharged to appropriate level of care  Outcome: Met This Shift     Problem: Airway Clearance - Ineffective:  Goal: Clear lung sounds  Description: Clear lung sounds  Outcome: Met This Shift  Note: Lungs clear but diminished.  Pt educated on coughing and deep breathing exercises      Problem: Hyperthermia:  Goal: Ability to maintain a body temperature in the normal range will improve  Description: Ability to maintain a body temperature in the normal range will improve  Outcome: Met This Shift  Note: Pt body temperature maintained

## 2021-07-16 NOTE — PROGRESS NOTES
[place] [address] Internal Medicine    Progress Note     7/16/2021    2:33 PM    Name:   Suzi Frank  MRN:     [id number]     Acct:      [de-identified]   Room:   [facility]  IP Day:  2  Admit Date:  7/14/2021  5:11 PM    PCP:   No primary care provider on file. Code Status:  Full Code    Subjective:     C/C:   Chief Complaint   Patient presents with    Mental Health Problem     Principal Problem:    Pneumonia  Active Problems:    Suicidal ideations    Schizoaffective disorder, bipolar type ([address]    Major depression with psychotic features ([address]    Smoker    Ventricular ectopy    Low serum cortisol level ([facility]    Sepsis due to Klebsiella pneumoniae with no resultant organ failure ([facility]  Resolved Problems:    * No resolved hospital problems. *    zithromax stopped due to interaction with zyprexa  on rocephin     Psych input ;     PLAN  We will continue to follow on the unit. The patient is likely to require admission to psychiatry. The patient should continue to have a sitter  Invega 3 mg daily added. He can continue [place] when it is next. Attempt to develop insight  Psycho-education conducted. Supportive Therapy conducted. On admission   he patient is a 57 y.o.  male, with a history of bipolar disorder, GERD, suicidal ideations, schizophrenia, polysubstance abuse, tobacco abuse. Patient presents from group home with suicidal ideations and hallucinations. Patient states the voices in his head are telling him to get a gun and shoot himself. No homicidal ideations. Patient also states he has intermittent left-sided chest pain for the past 2 months. Denies cough, shortness of breath, fever or chills. No abdominal pain nausea or vomiting.      HPI   1) Location/Symptom suicidal, left-sided chest pain  2) Timing/Onset: Suicidal ideations today, intermittent left-sided chest pain x2 months  3) Context/Setting: Resides a group home, history of bipolar disorder, auditory hallucinations  4) Quality: Aching pain  5) Duration: continuous   6) Modifying Factors: No aggravating or alleviating factors  7) Severity: moderate               Significant last 24 hr data reviewed ;   Vitals:    07/15/21 1910 07/16/21 0045 07/16/21 0745 07/16/21 1330   BP: 126/70 117/77 124/68 (!) 118/52   Pulse: 55 56 51 58   Resp: 16 16 16 16   Temp: 98.3 °F (36.8 °C) 97.6 °F (36.4 °C) 98 °F (36.7 °C) 98.3 °F (36.8 °C)   TempSrc: Oral Temporal Oral Oral   SpO2: 99% 99% 96% 96%   Weight:       Height:          Recent Results (from the past 24 hour(s))   TROP/MYOGLOBIN    Collection Time: 07/15/21  4:21 PM   Result Value Ref Range    Troponin, High Sensitivity <6 0 - 22 ng/L    Troponin T NOT REPORTED <0.03 ng/mL    Troponin Interp NOT REPORTED     Myoglobin 60 28 - 72 ng/mL   MAGNESIUM    Collection Time: 07/15/21  7:05 PM   Result Value Ref Range    Magnesium 2.0 1.6 - 2.6 mg/dL   TROP/MYOGLOBIN    Collection Time: 07/15/21 10:17 PM   Result Value Ref Range    Troponin, High Sensitivity <6 0 - 22 ng/L    Troponin T NOT REPORTED <0.03 ng/mL    Troponin Interp NOT REPORTED     Myoglobin 82 (H) 28 - 72 ng/mL   Basic Metabolic Panel w/ Reflex to MG    Collection Time: 07/16/21  7:54 AM   Result Value Ref Range    Glucose 96 70 - 99 mg/dL    BUN 13 8 - 23 mg/dL    CREATININE 1.09 0.70 - 1.20 mg/dL    Bun/Cre Ratio NOT REPORTED 9 - 20    Calcium 8.6 8.6 - 10.4 mg/dL    Sodium 145 (H) 135 - 144 mmol/L    Potassium 4.0 3.7 - 5.3 mmol/L    Chloride 113 (H) 98 - 107 mmol/L    CO2 26 20 - 31 mmol/L    Anion Gap 6 (L) 9 - 17 mmol/L    GFR Non-African American >60 >60 mL/min    GFR African American >60 >60 mL/min    GFR Comment          GFR Staging NOT REPORTED    CBC    Collection Time: 07/16/21  7:54 AM   Result Value Ref Range    WBC 4.3 3.5 - 11.0 k/uL    RBC 3.46 (L) 4.5 - 5.9 m/uL    Hemoglobin 10.1 (L) 13.5 - 17.5 g/dL    Hematocrit 31.0 (L) 41 - 53 %    MCV 89.6 80 - 100 fL    MCH 29.2 26 - 34 pg    MCHC 32.6 31 - ----------------------------------------------------------------------------  Electronically signed by Portia Colindres(Sonographer) on 07/15/2021 02:28  PM ---------------------------------------------------------------------------- ----------------------------------------------------------------------------  Electronically signed by Jayson Decker(Interpreting physician) on 07/15/2021  07:46 PM ---------------------------------------------------------------------------- FINDINGS Left Atrium Left atrium is mildly dilated. Left Ventricle Normal left ventricle size, wall thickness and function with an estimated EF > 55%. No segmental wall motion abnormalities seen. Right Atrium Right atrium is normal in size. Right Ventricle Normal right ventricular size and function. Mitral Valve No obvious valvular abnormality seen. Mild mitral regurgitation. Aortic Valve No obvious valvular abnormality seen. No evidence of aortic insufficiency or stenosis. Tricuspid Valve No obvious valvular abnormality seen. Insignificant tricuspid regurgitation, unable to estimate RVSP. Pulmonic Valve Pulmonic valve was not well visualized. No evidence of pulmonic insufficiency or stenosis. Pericardial Effusion No significant pericardial effusion is seen. Pleural Effusion No pleural effusion seen. Miscellaneous Normal aortic root dimension.  M-mode / 2D Measurements & Calculations:   LVIDd:4.97 cm(3.7 - 5.6 cm)       Diastolic JFDHCQ:910.60 ml  LVIDs:3.33 cm(2.2 - 4.0 cm)       Systolic XQPFJX:77.77 ml  IVSd:0.78 cm(0.6 - 1.1 cm)        Aortic Root:2.89 cm(2.0 - 3.7 cm)  LVPWd:1 cm(0.6 - 1.1 cm)          LA volume/Index: 64.53 ml  Fractional Shortenin %        LVOT:2.06 cm  Calculated LVEF (%): 61.32 %   Mitral:                                Aortic   Peak E-Wave: 0.73 m/s                  Peak Velocity: 1.06 m/s  Peak A-Wave: 0.65 m/s                  Mean Velocity: 0.74 m/s  E/A Ratio: 1.12                        Peak Gradient: 4.5 mmHg  Peak Gradient: 2.15 mmHg               Mean Gradient: 2.48 mmHg  Deceleration Time: 258.37 msec                                          Area (continuity): 2.57 cm^2                                         AV VTI: 25.2 cm      XR CHEST PORTABLE    Result Date: 7/14/2021  EXAMINATION: ONE XRAY VIEW OF THE CHEST 7/14/2021 7:14 pm COMPARISON: Chest 12/08/2020 HISTORY: ORDERING SYSTEM PROVIDED HISTORY: cough, SOB TECHNOLOGIST PROVIDED HISTORY: cough, sob Reason for Exam: cough, sob, hearing voices Acuity: Acute Type of Exam: Initial Additional signs and symptoms: cough, sob, hearing voices Relevant Medical/Surgical History: cough, sob, hearing voices FINDINGS: The cardiomediastinal and hilar silhouettes appear unremarkable. Faint ground-glass opacity medial lower left lung. The right lung appears clear. No pleural effusion evident. No pneumothorax is seen. No acute osseous abnormality is identified. Possible interstitial pneumonitis medial lower left lung. Atypical/viral pneumonia is a consideration. HPI:         Review of Systems:     Constitutional:  negative for chills, fevers, sweats  Respiratory:  negative for cough, dyspnea on exertion, hemoptysis, shortness of breath, wheezing  Cardiovascular:  negative for chest pain, chest pressure/discomfort, lower extremity edema, palpitations  Gastrointestinal:  negative for abdominal pain, constipation, diarrhea, nausea, vomiting  Neurological:  negative for dizziness, headache  Data:     Past Medical History:  no change     Social History:  no change    Family History: @no change    Vitals:      I/O (24Hr):     Intake/Output Summary (Last 24 hours) at 7/16/2021 1433  Last data filed at 7/16/2021 1341  Gross per 24 hour   Intake 1045 ml   Output 3150 ml   Net -2105 ml       Labs:    URINE ANALYSIS: No results found for: LABURIN     CBC:  Lab Results   Component Value Date    WBC 4.3 07/16/2021    HGB 10.1 07/16/2021     07/16/2021     05/18/2012 BMP:    Lab Results   Component Value Date     2021    K 4.0 2021     2021    CO2 26 2021    BUN 13 2021    CREATININE 1.09 2021    GLUCOSE 96 2021    GLUCOSE 152 2012      LIVER PROFILE:  Lab Results   Component Value Date    ALT 11 2021    AST 12 2021    PROT 6.4 2021    BILITOT 0.22 2021    BILIDIR <0.2 2019    LABALBU 3.9 2021    LABALBU 3.9 2012               Radiology:  Medications: Allergies:      Current Meds:   Scheduled Meds:    paliperidone  3 mg Oral Daily    sodium chloride flush  5-40 mL Intravenous 2 times per day    enoxaparin  40 mg Subcutaneous Daily    cefTRIAXone (ROCEPHIN) IV  1,000 mg Intravenous Q24H    nicotine  1 patch Transdermal Daily    escitalopram  20 mg Oral Daily     Continuous Infusions:    sodium chloride      sodium chloride 100 mL/hr at 07/15/21 0802     PRN Meds: sodium chloride flush, sodium chloride, potassium chloride **OR** potassium alternative oral replacement **OR** potassium chloride, magnesium sulfate, ondansetron **OR** ondansetron, polyethylene glycol, acetaminophen **OR** acetaminophen, LORazepam, traZODone, perflutren lipid microspheres      Physical Examination:        BP (!) 118/52   Pulse 58   Temp 98.3 °F (36.8 °C) (Oral)   Resp 16   Ht 6' 3\" (1.905 m)   Wt 195 lb 1.7 oz (88.5 kg)   SpO2 96%   BMI 24.39 kg/m²   Temp (24hrs), Av °F (36.7 °C), Min:97.6 °F (36.4 °C), Max:98.3 °F (36.8 °C)    No results for input(s): POCGLU in the last 72 hours. Intake/Output Summary (Last 24 hours) at 2021 1433  Last data filed at 2021 1341  Gross per 24 hour   Intake 1045 ml   Output 3150 ml   Net -2105 ml       General Appearance:  alert, well appearing, and in no acute distress  Mental status:   Head:  normocephalic, atraumatic.   Eye: no icterus, redness, pupils equal and reactive, extraocular eye movements intact, conjunctiva

## 2021-07-16 NOTE — PROGRESS NOTES
Date:   7/16/2021  Patient name: Carolyn Rich  Date of admission:  7/14/2021  5:11 PM  MRN:   [id number]  YOB: 1960  PCP: No primary care provider on file. Reason for Admission: Pneumonia [J18.9]    Cardiology consult: Ventricular arrhythmia       Impression     7/14/2021 admission with hallucination   Episode of ventricular couplets and triplets  Bipolar disorder  Schizoaffective disorder  Depression  Hallucinations  Substance abuse   Anemia mild, normal MCV     History of present illness  58-year-old male got hospitalized on 7/14/2021 with a severe hallucination. In the emergency room patient was shouting acute [place] alone. He has a history of a schizoaffective disorder. Apparently he has been hearing voices telling him to kill himself. Patient did complain about nonproductive cough and left-sided chest pain ongoing for last 2 months. His symptoms have been intermittent.   ECG on admission showed sinus rhythm, negative cardiac markers.     Current evaluation  Patient seen and examined medications and labs checked  He did not appear in any distress hemodynamically stable  No signs of cardiopulmonary decompensation  Afebrile  No further episode of V. tach, serum magnesium 2.0     Hemoglobin 10.1, WBC 4.3, platelets 159  Potassium 4.0, creatinine 1.09, sodium 145     Investigation work-up  Lab work 7/15/2021  Sodium 143, potassium 4.2, BUN 13, creatinine 1.23 glucose 140, calcium 8.4  High-sensitivity troponin 7, 6  WBC 4.1, hemoglobin 10.9, MCV 90, platelets 736        ECG 7/14/2021  Sinus rhythm heart rate 68 normal ECG     Chest x-ray 7/14/2021  Possible interstitial pneumonitis medial lower left lung, atypical/viral pneumonitis      Medications:   Scheduled Meds:   [START ON 7/17/2021] paliperidone  3 mg Oral Daily    sodium chloride flush  5-40 mL Intravenous 2 times per day    enoxaparin  40 mg Subcutaneous Daily    cefTRIAXone (ROCEPHIN) IV  1,000 mg Intravenous Q24H    nicotine  1 Suicidal ideations     Cocaine abuse (HCC)     Schizoaffective disorder, bipolar type ([facility] [address]     Schizoaffective disorder, depressive type ([address]     Perianal abscess     Carla-rectal abscess     Schizophrenia ([facility]     Schizoaffective disorder ([facility] [address]     Major depression with psychotic features ([facility] [address]     Acute psychosis ([facility] [address]     Depression with suicidal ideation     Pneumonia     Smoker     Ventricular ectopy     Low serum cortisol level ([facility] [address]     Sepsis due to Klebsiella pneumoniae with no resultant organ failure ([facility]      Plan of Treatment:   Medication checked  Patient is on IV Rocephin, azithromycin discontinued  At present he does not need any beta-blocker  Agree with current management    Electronically signed by Nithya Inman MD on 7/16/2021 at 3:57 PM

## 2021-07-16 NOTE — CARE COORDINATION
ONGOING DISCHARGE PLAN:    Patient is alert and oriented x4. Spoke with patient regarding discharge plan and patient confirms that plan is still to discharge to [facility] when medically cleared  Stop Zithromax continue Rocephin   Patient will discharge to [facility] when medically cleared   invega started per psych       Will continue to follow for additional discharge needs.     Electronically signed by Roula Bledsoe RN on 7/16/2021 at 2:56 PM

## 2021-07-16 NOTE — PROGRESS NOTES
Phobias:  Denies  ·    Trauma: Denies      Substance Abuse History:  ETOH: Current alcohol usage:  Type of Drink(s):  Bbeer. Frequency of use:  Weekly.   Marijuana: Denied  Opiates: Denied  Other Drugs: Denied      Past Psychiatric History:  -Schizoaffective disorder  -3 lifetime suicide attempts  -Multiple psychiatric hospital admissions    Personal History:   Born in: [place]  Raised in: [place]  Family: Has 4 sisters 3 brothers   Highest Level of Education: Ninth grade  Occupation: Disabled on SSI  Marital Status: Single  Whitney Cash in a boarding house unhappy with current situation, has concern over the general operations of the boarding house  Stressors: Current living situation acute grief related to the death of his mother  Patient Assets/Supportive Factors: Has a sister that is supportive of him       Past Medical History:        Diagnosis Date    Bipolar disorder ([address]     Depression     GERD (gastroesophageal reflux disease)     Hallucinations     Headache(784.0)     Hepatitis     [facility]ia[facility]affective [facility]     Substance abuse ([facility]     Tobacco abuse     Type II or unspecified type diabetes mellitus without mention of complication, not stated as uncontrolled     Urinary incontinence        Past Surgical History:        Procedure Laterality Date    ABSCESS DRAINAGE N/A 2018    Carla anal abcess    DENTAL SURGERY      all teeth pulled         Medications Prior to Admission:   Medications Prior to Admission: traZODone (DESYREL) 150 MG tablet, Take 1 tablet by mouth nightly as needed for Sleep  escitalopram (LEXAPRO) 20 MG tablet, Take 1 tablet by mouth daily  hydrOXYzine (ATARAX) 50 MG tablet, Take 1 tablet by mouth 3 times daily as needed for Anxiety  paliperidone palmitate ER (INVEGA SUSTENNA) 234 MG/1.5ML [place] IM injection, Inject 234 mg into the muscle every 28 days    Allergies:  Navane [thiothixene]    FAMILY/SOCIAL HISTORY:  Family History Problem Relation Age of Onset    Diabetes Mother     Heart Disease Mother      Social History     Socioeconomic History    Marital status: Single     Spouse name: Not on file    Number of children: 0    Years of education: 8    Highest education level: Not on file   Occupational History     Employer: N/A   Tobacco Use    Smoking status: Current Every Day Smoker     Packs/day: 1.00     Years: 47.00     Pack years: 47.00     Types: Cigarettes    Smokeless tobacco: Never Used    Tobacco comment: Patient accepting of nicotine patch   Substance and Sexual Activity    Alcohol use: Yes     Comment: reports drinking occasionally    Drug use: Not Currently     Types: Cocaine     Comment: drug abuse includes crack cocaine,     Sexual activity: Not on file   Other Topics Concern    Not on file   Social History Narrative    Not on file     Social Determinants of Health     Financial Resource Strain:     Difficulty of Paying Living Expenses:    Food Insecurity:     Worried About Running Out of Food in the Last Year:     Ran Out of Food in the Last Year:    Transportation Needs:     Lack of Transportation (Medical):      Lack of Transportation (Non-Medical):    Physical Activity:     Days of Exercise per Week:     Minutes of Exercise per Session:    Stress:     Feeling of Stress :    Social Connections:     Frequency of Communication with Friends and Family:     Frequency of Social Gatherings with Friends and Family:     Attends Latter-day Services:     Active Member of Clubs or Organizations:     Attends Club or Organization Meetings:     Marital Status:    Intimate Partner Violence:     Fear of Current or Ex-Partner:     Emotionally Abused:     Physically Abused:     Sexually Abused:        REVIEW OF SYSTEMS    Constitutional: [] fever  [] chills  [] weight loss  []weakness [] Other:  Eyes:  [] photophobia  [] discharge [] acuity change   [] Diplopia   [] Other:  HENT:  [] sore throat  [] ear pain [] Tinnitus   [] Other  Respiratory:  [] Cough  [] Shortness of breath   [] Sputum   [] Other:   Cardiac: []Chest pain   []Palpitations []Edema  []PND  [] Other:  GI:  []Abdominal pain   []Nausea  []Vomiting  []Diarrhea  [] Other:  :  [] Dysuria   []Frequency  []Hematuria  []Discharge  [] Other:  Possible Pregnancy: []Yes   []No   LMP:   Musculoskeletal:  []Back pain  []Neck pain  []Recent Injury   Skin:  []Rash  [] Itching  [] Other:  Neurologic:  [] Headache  [] Focal weakness  [] Sensory changes []Other:  Endocrine:  [] Polyuria  [] Polydipsia  [] Hair Loss  [] Other:  Lymphatic:   [] Swollen glands   Psychiatric:  As per HPI      All other systems negative except as marked or mentioned/indicated in the HPI. Mateo Mendez      PHYSICAL EXAM:  Vitals:  /68   Pulse 51   Temp 98 °F (36.7 °C) (Oral)   Resp 16   Ht 6' 3\" (1.905 m)   Wt 195 lb 1.7 oz (88.5 kg)   SpO2 96%   BMI 24.39 kg/m²      Neuro Exam:   Muscle Strength & Tone: normal    Involuntary Movements: No    Mental Status Examination:    Level of consciousness:  within normal limits   Appearance:  Hospital attire, laying in bed fair grooming   Behavior/Motor: no abnormalities noted  Attitude toward examiner:  Cooperative  Speech: normal rate and volume  Mood:  Depressed  Affect:  blunted  Thought processes:   Circumstantial  Thought content: active suicidal ideations without current plan or intent               denies homicidal ideations               Endorses auditory hallucinations              denies delusions  Cognition:  Oriented to self, location, not oriented to time   Concentration clinically adequate  Memory: intact  Insight &Judgment: poor    LABS: REVIEWED TODAY:  Recent Labs     07/14/21  1912 07/15/21  1001 07/16/21  0754   WBC 4.6 4.1 4.3   HGB 11.3* 10.9* 10.1*    244 231     Recent Labs     07/14/21  1912 07/15/21  1001 07/16/21  0754    143 145*   K 4.2 4.2 4.0    110* 113*   CO2 26 27 26   BUN 15 13 13 CREATININE 1.14 1.23* 1.09   GLUCOSE 100* 140* 96     Recent Labs     07/14/21  1912   BILITOT 0.22*   ALKPHOS 97   AST 12   ALT 11     Lab Results   Component Value Date    BARBSCNU NEGATIVE 07/14/2021    LABBENZ NEGATIVE 07/14/2021    LABBENZ NEGATIVE 03/24/2012    LABMETH NEGATIVE 07/14/2021    PPXUR NOT REPORTED 07/14/2021     Lab Results   Component Value Date    TSH 1.50 10/23/2020     No results found for: LITHIUM  No results found for: VALPROATE, CBMZ  No results found for: LITHIUM, VALPROATE    FURTHER LABS ORDERED :      Radiology   XR CHEST PORTABLE    Result Date: 7/14/2021  EXAMINATION: ONE XRAY VIEW OF THE CHEST 7/14/2021 7:14 pm COMPARISON: Chest 12/08/2020 HISTORY: ORDERING SYSTEM PROVIDED HISTORY: cough, SOB TECHNOLOGIST PROVIDED HISTORY: cough, sob Reason for Exam: cough, sob, hearing voices Acuity: Acute Type of Exam: Initial Additional signs and symptoms: cough, sob, hearing voices Relevant Medical/Surgical History: cough, sob, hearing voices FINDINGS: The cardiomediastinal and hilar silhouettes appear unremarkable. Faint ground-glass opacity medial lower left lung. The right lung appears clear. No pleural effusion evident. No pneumothorax is seen. No acute osseous abnormality is identified. Possible interstitial pneumonitis medial lower left lung. Atypical/viral pneumonia is a consideration. DIAGNOSIS:  -Schizoaffective disorder,  -Cocaine use disorder    RECOMMENDATIONS    Risk Management:  suicide risk    Medications:  See orders  Discussed with the treating physician/ team about the patient and treatment plan  Reviewed the chart  -Invega 3 mg daily added,   -patient needed to be admitted to psychiatric unit,  -Attempt to develop insight  -Psychoeducation conducted  -Patient should continue to have a sitter    Discussed with the patient risk, benefit, alternative and common side effects for the  proposed medication treatment. Patient is consenting to the treatment.     Thanks for the consult. Please call me if needed. Electronically signed by Jassi Donohue MD on 7/16/2021 at 9:45 AM    I independently saw and evaluated the patient. I reviewed the resident's documentation above. Any additional comments or changes to the resident's documentation are stated below otherwise agree with assessment.      -The patient reports an improvement in both his mood and auditory hallucinations but he provided feeling better. He states that his mood goes up and down without any clear reason. He gets suicidal thoughts intermittently. PLAN  No changes to medications today  Attempt to develop insight  Psycho-education conducted. Supportive Therapy conducted.   Admit to psychiatry once medically cleared    Electronically signed by Eduardo Castillo MD on 7/16/21 at 2:15 PM EDT

## 2021-07-17 LAB
ANION GAP SERPL CALCULATED.3IONS-SCNC: 7 MMOL/L (ref 9–17)
BUN BLDV-MCNC: 8 MG/DL (ref 8–23)
BUN/CREAT BLD: ABNORMAL (ref 9–20)
CALCIUM SERPL-MCNC: 8.5 MG/DL (ref 8.6–10.4)
CHLORIDE BLD-SCNC: 115 MMOL/L (ref 98–107)
CO2: 27 MMOL/L (ref 20–31)
CREAT SERPL-MCNC: 1.1 MG/DL (ref 0.7–1.2)
GFR AFRICAN AMERICAN: >60 ML/MIN
GFR NON-AFRICAN AMERICAN: >60 ML/MIN
GFR SERPL CREATININE-BSD FRML MDRD: ABNORMAL ML/MIN/{1.73_M2}
GFR SERPL CREATININE-BSD FRML MDRD: ABNORMAL ML/MIN/{1.73_M2}
GLUCOSE BLD-MCNC: 90 MG/DL (ref 70–99)
HCT VFR BLD CALC: 33 % (ref 41–53)
HEMOGLOBIN: 10.6 G/DL (ref 13.5–17.5)
MCH RBC QN AUTO: 28.5 PG (ref 26–34)
MCHC RBC AUTO-ENTMCNC: 32.1 G/DL (ref 31–37)
MCV RBC AUTO: 88.9 FL (ref 80–100)
NRBC AUTOMATED: ABNORMAL PER 100 WBC
PDW BLD-RTO: 14 % (ref 11.5–14.9)
PLATELET # BLD: 233 K/UL (ref 150–450)
PMV BLD AUTO: 7.1 FL (ref 6–12)
POTASSIUM SERPL-SCNC: 4.4 MMOL/L (ref 3.7–5.3)
RBC # BLD: 3.72 M/UL (ref 4.5–5.9)
SODIUM BLD-SCNC: 149 MMOL/L (ref 135–144)
WBC # BLD: 4.7 K/UL (ref 3.5–11)

## 2021-07-17 PROCEDURE — 2580000003 HC RX 258: Performed by: NURSE PRACTITIONER

## 2021-07-17 PROCEDURE — 85027 COMPLETE CBC AUTOMATED: CPT

## 2021-07-17 PROCEDURE — 36415 COLL VENOUS BLD VENIPUNCTURE: CPT

## 2021-07-17 PROCEDURE — 6370000000 HC RX 637 (ALT 250 FOR IP): Performed by: NURSE PRACTITIONER

## 2021-07-17 PROCEDURE — 80048 BASIC METABOLIC PNL TOTAL CA: CPT

## 2021-07-17 PROCEDURE — 6370000000 HC RX 637 (ALT 250 FOR IP): Performed by: INTERNAL MEDICINE

## 2021-07-17 PROCEDURE — 6360000002 HC RX W HCPCS: Performed by: NURSE PRACTITIONER

## 2021-07-17 PROCEDURE — 99232 SBSQ HOSP IP/OBS MODERATE 35: CPT | Performed by: INTERNAL MEDICINE

## 2021-07-17 PROCEDURE — 2060000000 HC ICU INTERMEDIATE R&B

## 2021-07-17 RX ADMIN — SODIUM CHLORIDE: 9 INJECTION, SOLUTION INTRAVENOUS at 11:55

## 2021-07-17 RX ADMIN — ESCITALOPRAM OXALATE 20 MG: 20 TABLET ORAL at 08:45

## 2021-07-17 RX ADMIN — SODIUM CHLORIDE 25 ML: 9 INJECTION, SOLUTION INTRAVENOUS at 02:22

## 2021-07-17 RX ADMIN — PALIPERIDONE 3 MG: 1.5 TABLET, EXTENDED RELEASE ORAL at 10:25

## 2021-07-17 RX ADMIN — CEFTRIAXONE SODIUM 1000 MG: 1 INJECTION, POWDER, FOR SOLUTION INTRAMUSCULAR; INTRAVENOUS at 21:07

## 2021-07-17 RX ADMIN — ENOXAPARIN SODIUM 40 MG: 40 INJECTION SUBCUTANEOUS at 08:37

## 2021-07-17 RX ADMIN — SODIUM CHLORIDE, PRESERVATIVE FREE 10 ML: 5 INJECTION INTRAVENOUS at 08:38

## 2021-07-17 NOTE — PLAN OF CARE
Problem: Suicide risk  Goal: Provide patient with safe environment  Outcome: Ongoing     Problem: Discharge Planning:  Goal: Discharged to appropriate level of care  Outcome: Ongoing     Problem: Airway Clearance - Ineffective:  Goal: Clear lung sounds  Outcome: Ongoing     Problem: Fluid Volume - Deficit:  Goal: Achieves intake and output within specified parameters  Outcome: Ongoing     Problem: Gas Exchange - Impaired:  Goal: Levels of oxygenation will improve  Outcome: Ongoing     Problem: Hyperthermia:  Goal: Ability to maintain a body temperature in the normal range will improve  Outcome: Ongoing     Problem: Tobacco Use:  Goal: Will participate in inpatient tobacco-use cessation counseling  Outcome: Ongoing     Problem: Skin Integrity:  Goal: Will show no infection signs and symptoms  Outcome: Ongoing

## 2021-07-17 NOTE — PROGRESS NOTES
Date:   7/17/2021  Patient name: Sebastian Parker  Date of admission:  7/14/2021  5:11 PM  MRN:   [id number]  YOB: 1960  PCP: No primary care provider on file.     Reason for Admission: Pneumonia [J18.9]    Cardiology consult: Ventricular arrhythmia       Impression     7/14/2021 admission with hallucination   Episode of ventricular couplets and triplets  Bipolar disorder  Schizoaffective disorder  Depression  Hallucinations  Substance abuse   Anemia mild, normal MCV     History of present illness  53-year-old male got hospitalized on 7/14/2021 with a severe hallucination.  In the emergency room patient was shouting acute [place] alone.  He has a history of a schizoaffective disorder.  Apparently he has been hearing voices telling him to kill himself.  Patient did complain about nonproductive cough and left-sided chest pain ongoing for last 2 months.  His symptoms have been intermittent.  ECG on admission showed sinus rhythm, negative cardiac markers.     Current evaluation  Patient seen and examined medications and labs checked  He did not appear in any distress hemodynamically stable  No signs of cardiopulmonary decompensation  Afebrile  No further episode of V. tach, serum magnesium 2.0      Sodium 149, potassium 4.4, hemoglobin 10.6    Investigation work-up    7/16/2021  Hemoglobin 10.1, WBC 4.3, platelets 219  Potassium 4.0, creatinine 1.09, sodium 145    Lab work 7/15/2021  Sodium 143, potassium 4.2, BUN 13, creatinine 1.23 glucose 140, calcium 8.4  High-sensitivity troponin 7, 6  WBC 4.1, hemoglobin 10.9, MCV 90, platelets 712        ECG 7/14/2021  Sinus rhythm heart rate 68 normal ECG     Chest x-ray 7/14/2021  Possible interstitial pneumonitis medial lower left lung, atypical/viral pneumonitis    Medications:   Scheduled Meds:   paliperidone  3 mg Oral Daily    sodium chloride flush  5-40 mL Intravenous 2 times per day    enoxaparin  40 mg Subcutaneous Daily    cefTRIAXone (ROCEPHIN) IV  1,000 mg Intravenous Q24H    nicotine  1 patch Transdermal Daily    escitalopram  20 mg Oral Daily     Continuous Infusions:   sodium chloride 25 mL (07/17/21 0222)    sodium chloride 100 mL/hr at 07/17/21 1155     CBC:   Recent Labs     07/15/21  1001 07/16/21  0754 07/17/21  0523   WBC 4.1 4.3 4.7   HGB 10.9* 10.1* 10.6*    231 233     BMP:    Recent Labs     07/15/21  1001 07/16/21  0754 07/17/21  0523    145* 149*   K 4.2 4.0 4.4   * 113* 115*   CO2 27 26 27   BUN 13 13 8   CREATININE 1.23* 1.09 1.10   GLUCOSE 140* 96 90     Hepatic:   Recent Labs     07/14/21  1912   AST 12   ALT 11   BILITOT 0.22*   ALKPHOS 97     Troponin: No results for input(s): TROPONINI in the last 72 hours. BNP: No results for input(s): BNP in the last 72 hours. Lipids: No results for input(s): CHOL, HDL in the last 72 hours. Invalid input(s): LDLCALCU  INR: No results for input(s): INR in the last 72 hours. Objective:   Vitals: /65   Pulse 59   Temp 98.8 °F (37.1 °C)   Resp 16   Ht 6' 3\" (1.905 m)   Wt 195 lb 1.7 oz (88.5 kg)   SpO2 96%   BMI 24.39 kg/m²   General appearance: alert and cooperative with exam  HEENT: Head: Normal, normocephalic, atraumatic. Neck: no JVD and supple, symmetrical, trachea midline  Lungs: diminished breath sounds bibasilar  Heart: regular rate and rhythm  Abdomen: soft, non-tender; bowel sounds normal; no masses,  no organomegaly  Extremities: Homans sign is negative, no sign of DVT  Neurologic: Mental status: Communicating well    EKG: normal sinus rhythm. ECHO: reviewed.    Ejection fraction: >55%    Assessment / Acute Cardiac Problems:     Nonsustained ventricular arrhythmia, couplets and triplets  Electrocardiogram showed normal sinus rhythm, normal QT interval  Schizoaffective disorder     7/17/2021 hemodynamically stable, afebrile, no leukocytosis, no further episode of ventricular arrhythmia      Patient Active Problem List:     Hematuria     Suicidal ideations Cocaine abuse (HCC)     Schizoaffective disorder, bipolar type ([address]     Schizoaffective disorder, depressive type ([address]     Perianal abscess     Carla-rectal abscess     Schizophrenia (HCC)     Schizoaffective disorder ([address]     Major depression with psychotic features ([address]     Acute psychosis ([address]     Depression with suicidal ideation     Pneumonia     Smoker     Ventricular ectopy     Low serum cortisol level ([facility]     Sepsis due to Klebsiella pneumoniae with no resultant organ failure ([facility] [facility]      Plan of Treatment:     Medication checked  Keep patient well-hydrated increasing serum sodium  Continue ECG monitoring    Electronically signed by Woody Edge MD on 7/17/2021 at 4:46 PM

## 2021-07-17 NOTE — CARE COORDINATION
DISCHARGE PLANNING NOTE:    Plan remains for patient to be discharged to [facility] when medically cleared.     Electronically signed by Vandana Elias RN on 7/17/2021 at 4:37 PM

## 2021-07-17 NOTE — PROGRESS NOTES
[place] [address] [facility]    Progress Note     7/17/2021    1:53 PM    Name:   Gina Salcedo  MRN:     [id number]     Acct:      [de-identified]   Room:   2107/2107-  IP Day:  3  Admit Date:  7/14/2021  5:11 PM    PCP:   No primary care provider on file. Code Status:  Full Code    Subjective:     C/C:   Chief Complaint   Patient presents with    Mental Health Problem     Principal Problem:    Pneumonia  Active Problems:    Suicidal ideations    Schizoaffective disorder, bipolar type ([address]    Major depression with psychotic features ([facility]    Smoker    Ventricular ectopy    Low serum cortisol level ([facility] [address]    Sepsis due to Klebsiella pneumoniae with no resultant organ failure ([facility] [address]  Resolved Problems:    * No resolved hospital problems. *    7/17/21    · Patient is clinical course is improving 1. Psych consult noted  2. Patient will be transferred to [facility] once medically stable per febrile with there  3. Continue to monitor  4. Continue present therapy        zithromax stopped due to interaction with zyprexa  on rocephin     Psych input ;     PLAN  We will continue to follow on the unit. The patient is likely to require admission to psychiatry. The patient should continue to have a sitter  Invega 3 mg daily added. He can continue [place] when it is next. Attempt to develop insight  Psycho-education conducted. Supportive Therapy conducted. On admission   he patient is a 57 y.o.  male, with a history of bipolar disorder, GERD, suicidal ideations, schizophrenia, polysubstance abuse, tobacco abuse. Patient presents from group home with suicidal ideations and hallucinations. Patient states the voices in his head are telling him to get a gun and shoot himself. No homicidal ideations. Patient also states he has intermittent left-sided chest pain for the past 2 months. Denies cough, shortness of breath, fever or chills. No abdominal pain nausea or vomiting.      HPI Date of Study                 07/15/2021               Guevara Ortiz   Date of      1960  Gender                        Male  Birth   Age          57 year(s)  Race                          Black   Room Number  2107   Corporate ID [id number]  #   Sarai Murphy [de-identified]  #   MR #         [id number]      [address] [place] [address]   Accession #  [id number]  Interpreting Physician        [address] [place] [address]   Fellow                   Referring Nurse Practitioner   Interpreting             Referring Physician           Karyn Pacheco  Fellow  Type of Study   TTE procedure:2D Echocardiogram, M-Mode, Doppler, Color Doppler. Procedure Date Date: 07/15/2021 Start: 11:40 AM Study Location: [facility] Technical Quality: Fair visualization Indications:Premature ventricular contraction. Patient Status: Inpatient Rhythm: Within normal limits HR: 70 bpm BP: 140/85 mmHg CONCLUSIONS Summary Normal left ventricle size, wall thickness and function with an estimated EF > 55%. No segmental wall motion abnormalities seen. Left atrium is mildly dilated. Mild mitral regurgitation. Signature ----------------------------------------------------------------------------  Electronically signed by Portia Colindres(Sonographer) on 07/15/2021 02:28  PM ---------------------------------------------------------------------------- ----------------------------------------------------------------------------  Electronically signed by Jayson Decker(Interpreting physician) on 07/15/2021  07:46 PM ---------------------------------------------------------------------------- FINDINGS Left Atrium Left atrium is mildly dilated. Left Ventricle Normal left ventricle size, wall thickness and function with an estimated EF > 55%. No segmental wall motion abnormalities seen. Right Atrium Right atrium is normal in size. Right Ventricle Normal right ventricular size and function. Mitral Valve No obvious valvular abnormality seen.  Mild mitral regurgitation. Aortic Valve No obvious valvular abnormality seen. No evidence of aortic insufficiency or stenosis. Tricuspid Valve No obvious valvular abnormality seen. Insignificant tricuspid regurgitation, unable to estimate RVSP. Pulmonic Valve Pulmonic valve was not well visualized. No evidence of pulmonic insufficiency or stenosis. Pericardial Effusion No significant pericardial effusion is seen. Pleural Effusion No pleural effusion seen. Miscellaneous Normal aortic root dimension. M-mode / 2D Measurements & Calculations:   LVIDd:4.97 cm(3.7 - 5.6 cm)       Diastolic UKOLNB:801.58 ml  LVIDs:3.33 cm(2.2 - 4.0 cm)       Systolic SNKLNI:48.15 ml  IVSd:0.78 cm(0.6 - 1.1 cm)        Aortic Root:2.89 cm(2.0 - 3.7 cm)  LVPWd:1 cm(0.6 - 1.1 cm)          LA volume/Index: 64.53 ml  Fractional Shortenin %        LVOT:2.06 cm  Calculated LVEF (%): 61.32 %   Mitral:                                Aortic   Peak E-Wave: 0.73 m/s                  Peak Velocity: 1.06 m/s  Peak A-Wave: 0.65 m/s                  Mean Velocity: 0.74 m/s  E/A Ratio: 1.12                        Peak Gradient: 4.5 mmHg  Peak Gradient: 2.15 mmHg               Mean Gradient: 2.48 mmHg  Deceleration Time: 258.37 msec                                          Area (continuity): 2.57 cm^2                                         AV VTI: 25.2 cm      XR CHEST PORTABLE    Result Date: 2021  EXAMINATION: ONE XRAY VIEW OF THE CHEST 2021 7:14 pm COMPARISON: Chest 2020 HISTORY: ORDERING SYSTEM PROVIDED HISTORY: cough, SOB TECHNOLOGIST PROVIDED HISTORY: cough, sob Reason for Exam: cough, sob, hearing voices Acuity: Acute Type of Exam: Initial Additional signs and symptoms: cough, sob, hearing voices Relevant Medical/Surgical History: cough, sob, hearing voices FINDINGS: The cardiomediastinal and hilar silhouettes appear unremarkable. Faint ground-glass opacity medial lower left lung. The right lung appears clear. No pleural effusion evident. No pneumothorax is seen. No acute osseous abnormality is identified. Possible interstitial pneumonitis medial lower left lung. Atypical/viral pneumonia is a consideration. HPI:         Review of Systems:     Constitutional:  negative for chills, fevers, sweats  Respiratory:  negative for cough, dyspnea on exertion, hemoptysis, shortness of breath, wheezing  Cardiovascular:  negative for chest pain, chest pressure/discomfort, lower extremity edema, palpitations  Gastrointestinal:  negative for abdominal pain, constipation, diarrhea, nausea, vomiting  Neurological:  negative for dizziness, headache  Data:     Past Medical History:  no change     Social History:  no change    Family History: @no change    Vitals:      I/O (24Hr): Intake/Output Summary (Last 24 hours) at 7/17/2021 1353  Last data filed at 7/17/2021 0925  Gross per 24 hour   Intake --   Output 2100 ml   Net -2100 ml       Labs:    URINE ANALYSIS: No results found for: LABURIN     CBC:  Lab Results   Component Value Date    WBC 4.7 07/17/2021    HGB 10.6 07/17/2021     07/17/2021     05/18/2012        BMP:    Lab Results   Component Value Date     07/17/2021    K 4.4 07/17/2021     07/17/2021    CO2 27 07/17/2021    BUN 8 07/17/2021    CREATININE 1.10 07/17/2021    GLUCOSE 90 07/17/2021    GLUCOSE 152 05/18/2012      LIVER PROFILE:  Lab Results   Component Value Date    ALT 11 07/14/2021    AST 12 07/14/2021    PROT 6.4 07/14/2021    BILITOT 0.22 07/14/2021    BILIDIR <0.2 03/22/2019    LABALBU 3.9 07/14/2021    LABALBU 3.9 05/18/2012               Radiology:  Medications:      Allergies:      Current Meds:   Scheduled Meds:    paliperidone  3 mg Oral Daily    sodium chloride flush  5-40 mL Intravenous 2 times per day    enoxaparin  40 mg Subcutaneous Daily    cefTRIAXone (ROCEPHIN) IV  1,000 mg Intravenous Q24H    nicotine  1 patch Transdermal Daily    escitalopram  20 mg Oral Daily     Continuous Infusions:    sodium chloride 25 mL (21 0222)    sodium chloride 100 mL/hr at 21 1155     PRN Meds: sodium chloride flush, sodium chloride, potassium chloride **OR** potassium alternative oral replacement **OR** potassium chloride, magnesium sulfate, ondansetron **OR** ondansetron, polyethylene glycol, acetaminophen **OR** acetaminophen, LORazepam, traZODone, perflutren lipid microspheres      Physical Examination:        /65   Pulse 59   Temp 98.8 °F (37.1 °C)   Resp 16   Ht 6' 3\" (1.905 m)   Wt 195 lb 1.7 oz (88.5 kg)   SpO2 96%   BMI 24.39 kg/m²   Temp (24hrs), Av.2 °F (36.8 °C), Min:97.9 °F (36.6 °C), Max:98.8 °F (37.1 °C)    No results for input(s): POCGLU in the last 72 hours. Intake/Output Summary (Last 24 hours) at 2021 1353  Last data filed at 2021 0101  Gross per 24 hour   Intake --   Output 2100 ml   Net -2100 ml       General Appearance:  alert, well appearing, and in no acute distress  Mental status:   Head:  normocephalic, atraumatic. Eye: no icterus, redness, pupils equal and reactive, extraocular eye movements intact, conjunctiva clear  Ear: normal external ear, no discharge, hearing intact  Nose:  no drainage noted  Mouth: mucous membranes moist  Neck: supple, no carotid bruits, thyroid not palpable  Lungs: Bilateral equal air entry, clear to ausculation, no wheezing, rales or rhonchi, normal effort  Cardiovascular: normal rate, regular rhythm, no murmur, gallop, rub.   Abdomen: Soft, nontender, nondistended, normal bowel sounds, no hepatomegaly or splenomegaly  Neurologic: There are no new focal motor or sensory deficits,   Skin: No gross lesions, rashes, bruising or bleeding on exposed skin area  Extremities:  peripheral pulses palpable, no pedal edema or calf pain with palpation  Psych:             Assessment:        Primary Problem  Pneumonia    Active Hospital Problems    Diagnosis Date Noted    Smoker [F17.200] 07/15/2021    Ventricular ectopy

## 2021-07-18 ENCOUNTER — HOSPITAL ENCOUNTER (INPATIENT)
Age: 61
LOS: 4 days | Discharge: HOME | DRG: 750 | End: 2021-07-22
Attending: PSYCHIATRY & NEUROLOGY | Admitting: PSYCHIATRY & NEUROLOGY
Payer: MEDICAID

## 2021-07-18 VITALS
TEMPERATURE: 97.7 F | DIASTOLIC BLOOD PRESSURE: 74 MMHG | HEART RATE: 57 BPM | WEIGHT: 212.08 LBS | RESPIRATION RATE: 16 BRPM | BODY MASS INDEX: 26.37 KG/M2 | HEIGHT: 75 IN | SYSTOLIC BLOOD PRESSURE: 122 MMHG | OXYGEN SATURATION: 96 %

## 2021-07-18 LAB
ANION GAP SERPL CALCULATED.3IONS-SCNC: 6 MMOL/L (ref 9–17)
BUN BLDV-MCNC: 10 MG/DL (ref 8–23)
BUN/CREAT BLD: ABNORMAL (ref 9–20)
CALCIUM SERPL-MCNC: 8.3 MG/DL (ref 8.6–10.4)
CHLORIDE BLD-SCNC: 115 MMOL/L (ref 98–107)
CO2: 27 MMOL/L (ref 20–31)
CREAT SERPL-MCNC: 1.1 MG/DL (ref 0.7–1.2)
GFR AFRICAN AMERICAN: >60 ML/MIN
GFR NON-AFRICAN AMERICAN: >60 ML/MIN
GFR SERPL CREATININE-BSD FRML MDRD: ABNORMAL ML/MIN/{1.73_M2}
GFR SERPL CREATININE-BSD FRML MDRD: ABNORMAL ML/MIN/{1.73_M2}
GLUCOSE BLD-MCNC: 98 MG/DL (ref 70–99)
HCT VFR BLD CALC: 32.4 % (ref 41–53)
HEMOGLOBIN: 10.6 G/DL (ref 13.5–17.5)
MCH RBC QN AUTO: 28.6 PG (ref 26–34)
MCHC RBC AUTO-ENTMCNC: 32.6 G/DL (ref 31–37)
MCV RBC AUTO: 87.7 FL (ref 80–100)
NRBC AUTOMATED: ABNORMAL PER 100 WBC
PDW BLD-RTO: 14 % (ref 11.5–14.9)
PLATELET # BLD: 247 K/UL (ref 150–450)
PMV BLD AUTO: 7.2 FL (ref 6–12)
POTASSIUM SERPL-SCNC: 4 MMOL/L (ref 3.7–5.3)
RBC # BLD: 3.7 M/UL (ref 4.5–5.9)
SODIUM BLD-SCNC: 148 MMOL/L (ref 135–144)
WBC # BLD: 4.7 K/UL (ref 3.5–11)

## 2021-07-18 PROCEDURE — 36415 COLL VENOUS BLD VENIPUNCTURE: CPT

## 2021-07-18 PROCEDURE — 99232 SBSQ HOSP IP/OBS MODERATE 35: CPT | Performed by: INTERNAL MEDICINE

## 2021-07-18 PROCEDURE — 2580000003 HC RX 258: Performed by: NURSE PRACTITIONER

## 2021-07-18 PROCEDURE — 6370000000 HC RX 637 (ALT 250 FOR IP): Performed by: INTERNAL MEDICINE

## 2021-07-18 PROCEDURE — 1240000000 HC EMOTIONAL WELLNESS R&B

## 2021-07-18 PROCEDURE — 6370000000 HC RX 637 (ALT 250 FOR IP): Performed by: PSYCHIATRY & NEUROLOGY

## 2021-07-18 PROCEDURE — APPSS60 APP SPLIT SHARED TIME 46-60 MINUTES

## 2021-07-18 PROCEDURE — 6360000002 HC RX W HCPCS: Performed by: NURSE PRACTITIONER

## 2021-07-18 PROCEDURE — 85027 COMPLETE CBC AUTOMATED: CPT

## 2021-07-18 PROCEDURE — 6370000000 HC RX 637 (ALT 250 FOR IP): Performed by: NURSE PRACTITIONER

## 2021-07-18 PROCEDURE — 80048 BASIC METABOLIC PNL TOTAL CA: CPT

## 2021-07-18 RX ORDER — PALIPERIDONE 3 MG/1
3 TABLET, EXTENDED RELEASE ORAL DAILY
Status: DISCONTINUED | OUTPATIENT
Start: 2021-07-18 | End: 2021-07-22 | Stop reason: HOSPADM

## 2021-07-18 RX ORDER — LORAZEPAM 1 MG/1
2 TABLET ORAL EVERY 4 HOURS PRN
Status: DISCONTINUED | OUTPATIENT
Start: 2021-07-18 | End: 2021-07-22 | Stop reason: HOSPADM

## 2021-07-18 RX ORDER — ESCITALOPRAM OXALATE 20 MG/1
20 TABLET ORAL DAILY
Status: CANCELLED | OUTPATIENT
Start: 2021-07-19

## 2021-07-18 RX ORDER — ACETAMINOPHEN 325 MG/1
650 TABLET ORAL EVERY 4 HOURS PRN
Status: DISCONTINUED | OUTPATIENT
Start: 2021-07-18 | End: 2021-07-22 | Stop reason: HOSPADM

## 2021-07-18 RX ORDER — ONDANSETRON 4 MG/1
4 TABLET, ORALLY DISINTEGRATING ORAL EVERY 8 HOURS PRN
Status: CANCELLED | OUTPATIENT
Start: 2021-07-18

## 2021-07-18 RX ORDER — DIPHENHYDRAMINE HYDROCHLORIDE 50 MG/ML
50 INJECTION INTRAMUSCULAR; INTRAVENOUS EVERY 4 HOURS PRN
Status: DISCONTINUED | OUTPATIENT
Start: 2021-07-18 | End: 2021-07-22 | Stop reason: HOSPADM

## 2021-07-18 RX ORDER — MAGNESIUM HYDROXIDE/ALUMINUM HYDROXICE/SIMETHICONE 120; 1200; 1200 MG/30ML; MG/30ML; MG/30ML
30 SUSPENSION ORAL EVERY 6 HOURS PRN
Status: DISCONTINUED | OUTPATIENT
Start: 2021-07-18 | End: 2021-07-22 | Stop reason: HOSPADM

## 2021-07-18 RX ORDER — ACETAMINOPHEN 325 MG/1
650 TABLET ORAL EVERY 6 HOURS PRN
Status: CANCELLED | OUTPATIENT
Start: 2021-07-18

## 2021-07-18 RX ORDER — LORAZEPAM 2 MG/ML
2 INJECTION INTRAMUSCULAR EVERY 4 HOURS PRN
Status: DISCONTINUED | OUTPATIENT
Start: 2021-07-18 | End: 2021-07-22 | Stop reason: HOSPADM

## 2021-07-18 RX ORDER — NICOTINE 21 MG/24HR
1 PATCH, TRANSDERMAL 24 HOURS TRANSDERMAL DAILY
Status: CANCELLED | OUTPATIENT
Start: 2021-07-19

## 2021-07-18 RX ORDER — HALOPERIDOL 10 MG/1
5 TABLET ORAL EVERY 4 HOURS PRN
Status: DISCONTINUED | OUTPATIENT
Start: 2021-07-18 | End: 2021-07-22 | Stop reason: HOSPADM

## 2021-07-18 RX ORDER — TRAZODONE HYDROCHLORIDE 50 MG/1
50 TABLET ORAL NIGHTLY PRN
Status: DISCONTINUED | OUTPATIENT
Start: 2021-07-18 | End: 2021-07-22 | Stop reason: HOSPADM

## 2021-07-18 RX ORDER — POLYETHYLENE GLYCOL 3350 17 G/17G
17 POWDER, FOR SOLUTION ORAL DAILY PRN
Status: DISCONTINUED | OUTPATIENT
Start: 2021-07-18 | End: 2021-07-22 | Stop reason: HOSPADM

## 2021-07-18 RX ORDER — ESCITALOPRAM OXALATE 20 MG/1
20 TABLET ORAL DAILY
Status: DISCONTINUED | OUTPATIENT
Start: 2021-07-18 | End: 2021-07-22 | Stop reason: HOSPADM

## 2021-07-18 RX ORDER — HALOPERIDOL 5 MG/ML
5 INJECTION INTRAMUSCULAR EVERY 4 HOURS PRN
Status: DISCONTINUED | OUTPATIENT
Start: 2021-07-18 | End: 2021-07-22 | Stop reason: HOSPADM

## 2021-07-18 RX ORDER — IBUPROFEN 400 MG/1
400 TABLET ORAL EVERY 6 HOURS PRN
Status: DISCONTINUED | OUTPATIENT
Start: 2021-07-18 | End: 2021-07-22 | Stop reason: HOSPADM

## 2021-07-18 RX ORDER — CEFUROXIME AXETIL 250 MG/1
500 TABLET ORAL EVERY 12 HOURS SCHEDULED
Status: DISCONTINUED | OUTPATIENT
Start: 2021-07-18 | End: 2021-07-18 | Stop reason: HOSPADM

## 2021-07-18 RX ORDER — ONDANSETRON 2 MG/ML
4 INJECTION INTRAMUSCULAR; INTRAVENOUS EVERY 6 HOURS PRN
Status: CANCELLED | OUTPATIENT
Start: 2021-07-18

## 2021-07-18 RX ORDER — POLYETHYLENE GLYCOL 3350 17 G/17G
17 POWDER, FOR SOLUTION ORAL DAILY PRN
Status: CANCELLED | OUTPATIENT
Start: 2021-07-18

## 2021-07-18 RX ORDER — ACETAMINOPHEN 650 MG/1
650 SUPPOSITORY RECTAL EVERY 6 HOURS PRN
Status: CANCELLED | OUTPATIENT
Start: 2021-07-18

## 2021-07-18 RX ORDER — PALIPERIDONE 3 MG/1
3 TABLET, EXTENDED RELEASE ORAL DAILY
Status: CANCELLED | OUTPATIENT
Start: 2021-07-19

## 2021-07-18 RX ORDER — CEFUROXIME AXETIL 250 MG/1
500 TABLET ORAL EVERY 12 HOURS SCHEDULED
Status: CANCELLED | OUTPATIENT
Start: 2021-07-18 | End: 2021-07-21

## 2021-07-18 RX ORDER — HYDROXYZINE 50 MG/1
50 TABLET, FILM COATED ORAL 3 TIMES DAILY PRN
Status: DISCONTINUED | OUTPATIENT
Start: 2021-07-18 | End: 2021-07-22 | Stop reason: HOSPADM

## 2021-07-18 RX ADMIN — PALIPERIDONE 3 MG: 1.5 TABLET, EXTENDED RELEASE ORAL at 08:31

## 2021-07-18 RX ADMIN — SODIUM CHLORIDE, PRESERVATIVE FREE 10 ML: 5 INJECTION INTRAVENOUS at 08:31

## 2021-07-18 RX ADMIN — SODIUM CHLORIDE: 9 INJECTION, SOLUTION INTRAVENOUS at 00:07

## 2021-07-18 RX ADMIN — ENOXAPARIN SODIUM 40 MG: 40 INJECTION SUBCUTANEOUS at 08:30

## 2021-07-18 RX ADMIN — SODIUM CHLORIDE: 9 INJECTION, SOLUTION INTRAVENOUS at 08:38

## 2021-07-18 RX ADMIN — HYDROXYZINE HYDROCHLORIDE 50 MG: 50 TABLET, FILM COATED ORAL at 21:47

## 2021-07-18 RX ADMIN — TRAZODONE HYDROCHLORIDE 50 MG: 50 TABLET ORAL at 21:47

## 2021-07-18 RX ADMIN — ESCITALOPRAM OXALATE 20 MG: 20 TABLET ORAL at 08:31

## 2021-07-18 ASSESSMENT — SLEEP AND FATIGUE QUESTIONNAIRES
RESTFUL SLEEP: YES
DO YOU USE A SLEEP AID: NO
AVERAGE NUMBER OF SLEEP HOURS: 7
DIFFICULTY ARISING: NO
DIFFICULTY FALLING ASLEEP: NO
SLEEP PATTERN: DIFFICULTY FALLING ASLEEP
DO YOU HAVE DIFFICULTY SLEEPING: NO
DIFFICULTY STAYING ASLEEP: NO

## 2021-07-18 ASSESSMENT — PAIN SCALES - GENERAL
PAINLEVEL_OUTOF10: 0

## 2021-07-18 ASSESSMENT — PATIENT HEALTH QUESTIONNAIRE - PHQ9: SUM OF ALL RESPONSES TO PHQ QUESTIONS 1-9: 9

## 2021-07-18 ASSESSMENT — LIFESTYLE VARIABLES: HISTORY_ALCOHOL_USE: YES

## 2021-07-18 NOTE — PROGRESS NOTES
Discharge     Report called to Jaylyn Avila, who stated she will call writer shortly when she is on way

## 2021-07-18 NOTE — PROGRESS NOTES
Discharge     Per Psych and Dr. Rehana Navarro patient is to go to [facility] at discharge. Patient is medically clear. Writer contacted [facility], spoke with Dr. Kenny Christianson and Charge nurse Brian Zee. They will accept patient for voluntary admission. They are currently on wait dependant on discharges.  Stated patient most likely wont be admitted until 7/19

## 2021-07-18 NOTE — CARE COORDINATION
DISCHARGE PLANNING NOTE:     Plan remains for patient to be discharged to [facility] when medically cleared.     Electronically signed by Staci Hannon RN on 7/18/2021 at 11:08 AM

## 2021-07-18 NOTE — BH NOTE
[facility]  Admission Note     Admission Type:   Admission Type: Voluntary    Reason for admission:  Reason for Admission: pt having increased hallucinations, reported suicidial ideations, denies upon admission    PATIENT STRENGTHS:  Strengths: No significant Physical Illness, Connection to output provider    Patient Strengths and Limitations:  Limitations: Apathetic / unmotivated, Hopeless about future, External locus of control    Addictive Behavior:   Addictive Behavior  In the past 3 months, have you felt or has someone told you that you have a problem with:  : None  Do you have a history of Chemical Use?: No  Do you have a history of Alcohol Use?: Yes  Do you have a history of Street Drug Abuse?: Yes  Histroy of Prescripton Drug Abuse?: No    Medical Problems:   Past Medical History:   Diagnosis Date    Bipolar disorder ([facility] [address]     Depression     GERD (gastroesophageal reflux disease)     Hallucinations     Headache(784.0)     Hepatitis     Schizophrenia, schizo-affective ([facility] [address]     [facility]     Tobacco abuse     Type II or unspecified type diabetes mellitus without mention of complication, not stated as uncontrolled     Urinary incontinence        Status EXAM:  Status and Exam  Normal: No  Facial Expression: Flat, Exaggerated  Affect: Blunt, Incongruent  Level of Consciousness: Alert  Mood:Normal: No  Mood: Depressed, Anxious, Helpless  Motor Activity:Normal: No  Motor Activity: Decreased  Interview Behavior: Evasive, Uncooperative/Withdrawn, Irritable  Preception: Butler to Person, Butler to Time, Butler to Place, [place] to Situation  Attention:Normal: No  Attention: Distractible  Thought Processes: Circumstantial  Thought Content:Normal: No  Thought Content: Preoccupations  Hallucinations:  Auditory (Comment) (denies but engages in self talk)  Delusions: No  Memory:Normal: No  Memory: Poor Recent, Poor Remote  Insight and Judgment: No  Insight and Judgment: Poor Insight, Poor Judgment  Present Suicidal Ideation: No  Present Homicidal Ideation: No    Tobacco Screening:  Practical Counseling, on admission, corby X, if applicable and completed (first 3 are required if patient doesn't refuse):            ( )  Recognizing danger situations (included triggers and roadblocks)                    ( )  Coping skills (new ways to manage stress, exercise, relaxation techniques, changing routine, distraction)                                                           ( )  Basic information about quitting (benefits of quitting, techniques in how to quit, available resources  (x ) Referral for counseling faxed to [facility]                                           ( ) Patient refused counseling  ( ) Patient has not smoked in the last 30 days    Metabolic Screening:    Lab Results   Component Value Date    LABA1C 4.9 08/11/2020       Lab Results   Component Value Date    CHOL 167 08/11/2020    CHOL 179 02/13/2017    CHOL 127 05/09/2015    CHOL 168 08/20/2014    CHOL 158 12/31/2013    CHOL 178 08/15/2013    CHOL 166 09/17/2012    CHOL 210 (H) 02/03/2012     Lab Results   Component Value Date    TRIG 43 08/11/2020    TRIG 67 02/13/2017    TRIG 37 05/09/2015    TRIG 72 08/20/2014    TRIG 52 12/31/2013    TRIG 70 08/15/2013    TRIG 117 09/17/2012    TRIG 94 02/03/2012     Lab Results   Component Value Date    HDL 74 08/11/2020    HDL 73 02/13/2017    HDL 57 05/09/2015    HDL 50 08/20/2014    HDL 43 12/31/2013    HDL 42 08/15/2013    HDL 38 (L) 09/17/2012    HDL 55 02/03/2012     No components found for: LDLCAL  No results found for: LABVLDL      There is no height or weight on file to calculate BMI. BP Readings from Last 2 Encounters:   07/18/21 122/74   07/06/21 104/67           Pt admitted with followings belongings:  Dentures: None  Vision - Corrective Lenses: None  Hearing Aid: None  Jewelry: None  Body Piercings Removed: N/A  Clothing:  Footwear, Jacket / coat, Pants, Shirt, Undergarments (Comment), Other (Comment) (Black shirt, Black zip up, black sweats, black hat, black shoes, underwear)  Were All Patient Medications Collected?: Not Applicable  Other Valuables: Cell phone, Money (Comment), Other (Comment) (Lighter, Cigarettes x10 count, Phone, $1)     Patient's home medications were reviewed. Patient oriented to surroundings and program expectations and copy of patient rights given. Received admission packet. Consents reviewed, Refused. Patient verbalize understanding. Patient education on precautions.                   Jacqui Izquierdo RN

## 2021-07-18 NOTE — PROGRESS NOTES
Voluntary to [facility]     Patient signed in for [facility], staff from [facility] took patient to  227 bed 1

## 2021-07-18 NOTE — PROGRESS NOTES
Behavior     Patient currently denies suicidal thoughts, denies thoughts of self harm or harm to others. Patient denies audio and visual hallucinations.  Patient however \"cranked the TV volume to drown out the voices\"

## 2021-07-18 NOTE — PROGRESS NOTES
Behavior     Patient informed he is going to [facility]. Patient got agitated stated he \"is pissed off because the doctors wouldn't listen to what he says they just leave the room\"     Patient stated he would go over there, but doesn't want to. When asked if he would sign in he stated he would.

## 2021-07-18 NOTE — PROGRESS NOTES
Formerly [facility], [facility] Internal Medicine    Progress Note     7/18/2021    11:52 AM    Name:   Cici Esqueda  MRN:     [id number]     Acct:      [de-identified]   Room:   [facility]7/210-  IP Day:  4  Admit Date:  7/14/2021  5:11 PM    PCP:   No primary care provider on file. Code Status:  Full Code    Subjective:     C/C:   Chief Complaint   Patient presents with    Mental Health Problem     Principal Problem:    Pneumonia  Active Problems:    Suicidal ideations    Schizoaffective disorder, bipolar type ([facility] [facility]    Major depression with psychotic features ([facility] [address]    Smoker    Ventricular ectopy    Low serum cortisol level ([facility] [address]    Sepsis due to Klebsiella pneumoniae with no resultant organ failure ([facility] [address]  Resolved Problems:    * No resolved hospital problems. *    7/18/21    · Doing well today  · Afebrile  · Vital stable  · Eating well 1. Patient claims that he does not want to go to [facility] at present  2. Patient  3. Psych evaluation 7/16/21     DIAGNOSIS:  -Schizoaffective disorder,  -Cocaine use disorder     RECOMMENDATIONS     Risk Management:  suicide risk     Admit to psychiatry once medically cleared      4.  5.           Patient is medically stable and cleared for transfer to [facility]     7/17/21    · Patient is clinical course is improving 6. Psych consult noted  7. Patient will be transferred to [facility] once medically stable per febrile with there  8. Continue to monitor  9. Continue present therapy        zithromax stopped due to interaction with zyprexa  on rocephin     Psych input ;     PLAN  We will continue to follow on the unit. The patient is likely to require admission to psychiatry. The patient should continue to have a sitter  Invega 3 mg daily added. He can continue [place] when it is next. Attempt to develop insight  Psycho-education conducted. Supportive Therapy conducted.      On admission   he patient is a 57 y.o.  male, with a history of bipolar disorder, GERD, suicidal ideations, schizophrenia, polysubstance abuse, tobacco abuse. Patient presents from group home with suicidal ideations and hallucinations. Patient states the voices in his head are telling him to get a gun and shoot himself. No homicidal ideations. Patient also states he has intermittent left-sided chest pain for the past 2 months. Denies cough, shortness of breath, fever or chills. No abdominal pain nausea or vomiting.      HPI   1) Location/Symptom suicidal, left-sided chest pain  2) Timing/Onset: Suicidal ideations today, intermittent left-sided chest pain x2 months  3) Context/Setting: Resides a group home, history of bipolar disorder, auditory hallucinations  4) Quality: Aching pain  5) Duration: continuous   6) Modifying Factors: No aggravating or alleviating factors  7) Severity: moderate               Significant last 24 hr data reviewed ;   Vitals:    07/17/21 1215 07/17/21 1930 07/18/21 0000 07/18/21 0700   BP: 135/65 136/73 123/74 139/80   Pulse: 59 51 56 56   Resp: 16 18 16 16   Temp: 98.8 °F (37.1 °C) 98.2 °F (36.8 °C) 98.1 °F (36.7 °C) 97.4 °F (36.3 °C)   TempSrc:  Oral Oral Oral   SpO2: 96% 98% 96% 97%   Weight:  212 lb 1.3 oz (96.2 kg)     Height:          Recent Results (from the past 24 hour(s))   Basic Metabolic Panel w/ Reflex to MG    Collection Time: 07/18/21  5:22 AM   Result Value Ref Range    Glucose 98 70 - 99 mg/dL    BUN 10 8 - 23 mg/dL    CREATININE 1.10 0.70 - 1.20 mg/dL    Bun/Cre Ratio NOT REPORTED 9 - 20    Calcium 8.3 (L) 8.6 - 10.4 mg/dL    Sodium 148 (H) 135 - 144 mmol/L    Potassium 4.0 3.7 - 5.3 mmol/L    Chloride 115 (H) 98 - 107 mmol/L    CO2 27 20 - 31 mmol/L    Anion Gap 6 (L) 9 - 17 mmol/L    GFR Non-African American >60 >60 mL/min    GFR African American >60 >60 mL/min    GFR Comment          GFR Staging NOT REPORTED    CBC    Collection Time: 07/18/21  5:22 AM   Result Value Ref Range    WBC 4.7 3.5 - 11.0 k/uL    RBC 3.70 (L) 4.5 - 5.9 m/uL    Hemoglobin 10.6 (L) 13.5 - 17.5 g/dL    Hematocrit 32.4 (L) 41 - 53 %    MCV 87.7 80 - 100 fL    MCH 28.6 26 - 34 pg    MCHC 32.6 31 - 37 g/dL    RDW 14.0 11.5 - 14.9 %    Platelets 212 000 - 210 k/uL    MPV 7.2 6.0 - 12.0 fL    NRBC Automated NOT REPORTED per 100 WBC     No results for input(s): POCGLU in the last 72 hours. ECHO Complete 2D W Doppler W Color    Result Date: 7/15/2021  [facility] Transthoracic Echocardiography Report (TTE)  Patient Name Kristan Thurston     Date of Study                 07/15/2021               Margaret Jonas   Date of      1960  Gender                        Male  Birth   Age          57 year(s)  Race                          Black   Room Number  2107   Corporate ID [id number]  #   Patient Jeffrey [de-identified]  #   MR #         [id number]      [address] [place] [address]   Accession #  [id number]  Interpreting Physician        [address] [place] [id number]   Fellow                   Referring Nurse Practitioner   Interpreting             Referring Physician           Maykel Fraga  Type of Study   TTE procedure:2D Echocardiogram, M-Mode, Doppler, Color Doppler. Procedure Date Date: 07/15/2021 Start: 11:40 AM Study Location: [address] [place] [address] Technical Quality: Fair visualization Indications:Premature ventricular contraction. Patient Status: Inpatient Rhythm: Within normal limits HR: 70 bpm BP: 140/85 mmHg CONCLUSIONS Summary Normal left ventricle size, wall thickness and function with an estimated EF > 55%. No segmental wall motion abnormalities seen. Left atrium is mildly dilated. Mild mitral regurgitation.  Signature ----------------------------------------------------------------------------  Electronically signed by Portia Colindres(Sonographer) on 07/15/2021 02:28  PM ---------------------------------------------------------------------------- ----------------------------------------------------------------------------  Electronically signed by Pily DeckerInterpreting physician) on 07/15/2021  07:46 PM ---------------------------------------------------------------------------- FINDINGS Left Atrium Left atrium is mildly dilated. Left Ventricle Normal left ventricle size, wall thickness and function with an estimated EF > 55%. No segmental wall motion abnormalities seen. Right Atrium Right atrium is normal in size. Right Ventricle Normal right ventricular size and function. Mitral Valve No obvious valvular abnormality seen. Mild mitral regurgitation. Aortic Valve No obvious valvular abnormality seen. No evidence of aortic insufficiency or stenosis. Tricuspid Valve No obvious valvular abnormality seen. Insignificant tricuspid regurgitation, unable to estimate RVSP. Pulmonic Valve Pulmonic valve was not well visualized. No evidence of pulmonic insufficiency or stenosis. Pericardial Effusion No significant pericardial effusion is seen. Pleural Effusion No pleural effusion seen. Miscellaneous Normal aortic root dimension.  M-mode / 2D Measurements & Calculations:   LVIDd:4.97 cm(3.7 - 5.6 cm)       Diastolic ACOBUZ:367.62 ml  LVIDs:3.33 cm(2.2 - 4.0 cm)       Systolic INVQMV:00.12 ml  IVSd:0.78 cm(0.6 - 1.1 cm)        Aortic Root:2.89 cm(2.0 - 3.7 cm)  LVPWd:1 cm(0.6 - 1.1 cm)          LA volume/Index: 64.53 ml  Fractional Shortenin %        LVOT:2.06 cm  Calculated LVEF (%): 61.32 %   Mitral:                                Aortic   Peak E-Wave: 0.73 m/s                  Peak Velocity: 1.06 m/s  Peak A-Wave: 0.65 m/s                  Mean Velocity: 0.74 m/s  E/A Ratio: 1.12                        Peak Gradient: 4.5 mmHg  Peak Gradient: 2.15 mmHg               Mean Gradient: 2.48 mmHg  Deceleration Time: 258.37 msec                                          Area (continuity): 2.57 cm^2                                         AV VTI: 25.2 cm      XR CHEST PORTABLE    Result Date: 2021  EXAMINATION: ONE XRAY VIEW OF THE CHEST 2021 7:14 pm COMPARISON: Chest 2020 HISTORY: ORDERING SYSTEM PROVIDED HISTORY: cough, SOB TECHNOLOGIST PROVIDED HISTORY: cough, sob Reason for Exam: cough, sob, hearing voices Acuity: Acute Type of Exam: Initial Additional signs and symptoms: cough, sob, hearing voices Relevant Medical/Surgical History: cough, sob, hearing voices FINDINGS: The cardiomediastinal and hilar silhouettes appear unremarkable. Faint ground-glass opacity medial lower left lung. The right lung appears clear. No pleural effusion evident. No pneumothorax is seen. No acute osseous abnormality is identified. Possible interstitial pneumonitis medial lower left lung. Atypical/viral pneumonia is a consideration. HPI:         Review of Systems:     Constitutional:  negative for chills, fevers, sweats  Respiratory:  negative for cough, dyspnea on exertion, hemoptysis, shortness of breath, wheezing  Cardiovascular:  negative for chest pain, chest pressure/discomfort, lower extremity edema, palpitations  Gastrointestinal:  negative for abdominal pain, constipation, diarrhea, nausea, vomiting  Neurological:  negative for dizziness, headache  Data:     Past Medical History:  no change     Social History:  no change    Family History: @no change    Vitals:      I/O (24Hr):     Intake/Output Summary (Last 24 hours) at 7/18/2021 1152  Last data filed at 7/18/2021 1045  Gross per 24 hour   Intake 1617 ml   Output 1450 ml   Net 167 ml       Labs:    URINE ANALYSIS: No results found for: LABURIN     CBC:  Lab Results   Component Value Date    WBC 4.7 07/18/2021    HGB 10.6 07/18/2021     07/18/2021     05/18/2012        BMP:    Lab Results   Component Value Date     07/18/2021    K 4.0 07/18/2021     07/18/2021    CO2 27 07/18/2021    BUN 10 07/18/2021    CREATININE 1.10 07/18/2021    GLUCOSE 98 07/18/2021    GLUCOSE 152 05/18/2012      LIVER PROFILE:  Lab Results   Component Value Date    ALT 11 07/14/2021    AST 12 07/14/2021    PROT 6.4 07/14/2021    BILITOT 0.22 2021    BILIDIR <0.2 2019    LABALBU 3.9 2021    LABALBU 3.9 2012               Radiology:  Medications: Allergies:      Current Meds:   Scheduled Meds:    paliperidone  3 mg Oral Daily    sodium chloride flush  5-40 mL Intravenous 2 times per day    enoxaparin  40 mg Subcutaneous Daily    cefTRIAXone (ROCEPHIN) IV  1,000 mg Intravenous Q24H    nicotine  1 patch Transdermal Daily    escitalopram  20 mg Oral Daily     Continuous Infusions:    sodium chloride 25 mL (21 0222)    sodium chloride 100 mL/hr at 21 0838     PRN Meds: sodium chloride flush, sodium chloride, potassium chloride **OR** potassium alternative oral replacement **OR** potassium chloride, magnesium sulfate, ondansetron **OR** ondansetron, polyethylene glycol, acetaminophen **OR** acetaminophen, LORazepam, traZODone, perflutren lipid microspheres      Physical Examination:        /80   Pulse 56   Temp 97.4 °F (36.3 °C) (Oral)   Resp 16   Ht 6' 3\" (1.905 m)   Wt 212 lb 1.3 oz (96.2 kg)   SpO2 97%   BMI 26.51 kg/m²   Temp (24hrs), Av.1 °F (36.7 °C), Min:97.4 °F (36.3 °C), Max:98.8 °F (37.1 °C)    No results for input(s): POCGLU in the last 72 hours. Intake/Output Summary (Last 24 hours) at 2021 1152  Last data filed at 2021 1045  Gross per 24 hour   Intake 1617 ml   Output 1450 ml   Net 167 ml       General Appearance:  alert, well appearing, and in no acute distress  Mental status:   Head:  normocephalic, atraumatic. Eye: no icterus, redness, pupils equal and reactive, extraocular eye movements intact, conjunctiva clear  Ear: normal external ear, no discharge, hearing intact  Nose:  no drainage noted  Mouth: mucous membranes moist  Neck: supple, no carotid bruits, thyroid not palpable  Lungs: Bilateral equal air entry, clear to ausculation, no wheezing, rales or rhonchi, normal effort  Cardiovascular: normal rate, regular rhythm, no murmur, gallop, rub.   Abdomen: Soft, nontender, nondistended, normal bowel sounds, no hepatomegaly or splenomegaly  Neurologic: There are no new focal motor or sensory deficits,   Skin: No gross lesions, rashes, bruising or bleeding on exposed skin area  Extremities:  peripheral pulses palpable, no pedal edema or calf pain with palpation  Psych:             Assessment:        Primary Problem  Pneumonia    Active Hospital Problems    Diagnosis Date Noted    Smoker [F17.200] 07/15/2021    Ventricular ectopy [I49.3] 07/15/2021    Low serum cortisol level ([facility] [E27.40] 07/15/2021    Sepsis due to Klebsiella pneumoniae with no resultant organ failure ([address] [A41.4]     Pneumonia [J18.9] 07/14/2021    Major depression with psychotic features ([facility] [address] [F32.3] 12/08/2020    Schizoaffective disorder, bipolar type ([facility] [address] [F25.0] 08/25/2017    Suicidal ideations [R45.851] 12/31/2013     Plan:        1. Stop zithromax   2. Continue rocephin  3. invega started per psych recommendation                 7/17/21    · Patient is clinical course is improving 10. Psych consult noted  11. Patient will be transferred to [facility] once medically stable per febrile with there  12. Continue to monitor  13. Continue present therapy        7/18/21    · Is doing well  · Medically stable  · Cleared for transfer to [facility]  · Patient says that he wants to go home  · He denies that he made any statements on admission 14.    Will let psych decide              Elo Rodrigues MD  7/18/2021  11:52 AM

## 2021-07-18 NOTE — PLAN OF CARE
Problem: Suicide risk  Goal: Provide patient with safe environment  Description: Provide patient with safe environment  7/18/2021 0330 by Judge Castro, RN  Outcome: Ongoing  Note: Pt in suicide precautions and sitter at bedside.      Problem: Gas Exchange - Impaired:  Goal: Levels of oxygenation will improve  Description: Levels of oxygenation will improve  7/18/2021 0330 by Judge Castro, RN  Outcome: Ongoing  Note: Pts oxygen level within normal range     Problem: Skin Integrity:  Goal: Absence of new skin breakdown  Description: Absence of new skin breakdown  7/18/2021 0330 by Judge Castro, RN  Outcome: Ongoing  Note: Pt absent from any new skin breakdown

## 2021-07-19 PROCEDURE — 6370000000 HC RX 637 (ALT 250 FOR IP): Performed by: INTERNAL MEDICINE

## 2021-07-19 PROCEDURE — 6370000000 HC RX 637 (ALT 250 FOR IP)

## 2021-07-19 PROCEDURE — 1240000000 HC EMOTIONAL WELLNESS R&B

## 2021-07-19 PROCEDURE — 90792 PSYCH DIAG EVAL W/MED SRVCS: CPT | Performed by: PSYCHIATRY & NEUROLOGY

## 2021-07-19 PROCEDURE — 6370000000 HC RX 637 (ALT 250 FOR IP): Performed by: PSYCHIATRY & NEUROLOGY

## 2021-07-19 PROCEDURE — 99253 IP/OBS CNSLTJ NEW/EST LOW 45: CPT | Performed by: INTERNAL MEDICINE

## 2021-07-19 RX ORDER — ONDANSETRON 4 MG/1
4 TABLET, ORALLY DISINTEGRATING ORAL EVERY 8 HOURS PRN
Status: DISCONTINUED | OUTPATIENT
Start: 2021-07-19 | End: 2021-07-22 | Stop reason: HOSPADM

## 2021-07-19 RX ORDER — ONDANSETRON 2 MG/ML
4 INJECTION INTRAMUSCULAR; INTRAVENOUS EVERY 6 HOURS PRN
Status: DISCONTINUED | OUTPATIENT
Start: 2021-07-19 | End: 2021-07-19

## 2021-07-19 RX ORDER — ONDANSETRON 4 MG/1
4 TABLET, ORALLY DISINTEGRATING ORAL EVERY 8 HOURS PRN
Status: DISCONTINUED | OUTPATIENT
Start: 2021-07-19 | End: 2021-07-19

## 2021-07-19 RX ORDER — CEFUROXIME AXETIL 250 MG/1
500 TABLET ORAL EVERY 12 HOURS SCHEDULED
Status: DISPENSED | OUTPATIENT
Start: 2021-07-19 | End: 2021-07-21

## 2021-07-19 RX ORDER — ONDANSETRON 2 MG/ML
4 INJECTION INTRAMUSCULAR; INTRAVENOUS EVERY 6 HOURS PRN
Status: DISCONTINUED | OUTPATIENT
Start: 2021-07-19 | End: 2021-07-22 | Stop reason: HOSPADM

## 2021-07-19 RX ADMIN — CEFUROXIME AXETIL 500 MG: 250 TABLET ORAL at 20:58

## 2021-07-19 RX ADMIN — TRAZODONE HYDROCHLORIDE 50 MG: 50 TABLET ORAL at 20:58

## 2021-07-19 RX ADMIN — PALIPERIDONE 3 MG: 3 TABLET, EXTENDED RELEASE ORAL at 09:04

## 2021-07-19 RX ADMIN — ESCITALOPRAM OXALATE 20 MG: 20 TABLET ORAL at 09:04

## 2021-07-19 RX ADMIN — HYDROXYZINE HYDROCHLORIDE 50 MG: 50 TABLET, FILM COATED ORAL at 09:04

## 2021-07-19 ASSESSMENT — PAIN SCALES - GENERAL: PAINLEVEL_OUTOF10: 0

## 2021-07-19 NOTE — H&P
Department of Psychiatry  Attending Physician Psychiatric Assessment     Reason for Admission to Psychiatric Unit:    · Threat to self requiring 24 hour professional observation  · Command hallucinations directing harm to self or others; risk of the patient taking action  · A mental disorder causing major disability in social, interpersonal, occupational, and/or educational functioning that is leading to dangerous or life-threatening functioning, and that can only be addressed in an acute inpatient setting   · Concerns about patient's safety in the community    CHIEF COMPLAINT:  Acute psychosis    History obtained from:  patient, electronic medical record and family members    HISTORY OF PRESENT ILLNESS:    Thomasine Runner is a 57 y.o. male with significant past medical history of schizoaffective disorder, cocaine use disorder, pneumonia who presented to the ED with suicidal ideations as voices were telling him to harm self. Upon physical assessment in the ED, it was determined that patient had pneumonia and was admitted to progressive care for stabilization of pneumonia. Upon medically cleared, patient was transferred to [facility] for stabilization of auditory hallucinations. Today, since arrival to the unit, patient has been behavior controlled, has not required the use of any PRN medications for agitation or anxiety. Today, patient was interviewed in [employer], patient was cooperative with Pineda Downey. He states that he came to the ED for increased auditory hallucinations commanding him to kill himself. Patient states that he has had these in the past, and was recently d/c from the [facility] on 7/6/2021. A chest xray in the ED revealed that the patient had pneumonia, and he was admitted to the medical unit for stabilization of symptoms.      Patient is well known to staff and physicians at the [facility], he is known to have schizoaffective disorder, acute psychosis, suicidal ideation who presented to the ED with a chief complaint of suicidal ideation. Today, patient is discharged focused, stating that the voices are not as intense as they had been when he first arrived in the ED. He is endorsing wanting to go home. Patient is able to contract for safety while on the unit.        PSYCHIATRIC HISTORY:  yes -schizoaffective disorder  Currently follows with [facility]  3 lifetime suicide attempts  Multiple psychiatric hospital admissions     Past psychiatric medications includes:   Invega, Effexor, Cogentin  Patient also reports previous trials on Seroquel Wellbutrin Lexapro Atarax and trazodone      Adverse reactions from psychotropic medications: no     Lifetime Psychiatric Review of Systems          Teetee or Hypomania: denies      Panic Attacks: denies      Phobias: denies     Obsessions and Compulsions:denies     Body or Vocal Tics:  denies     Hallucinations: Endorses auditory     Delusions: None present  Mood congruent    Past Medical History:        Diagnosis Date    Bipolar disorder ([address]     Depression     GERD (gastroesophageal reflux disease)     Hallucinations     Headache(784.0)     Hepatitis     Schizophrenia, schizo-affective ([address]     Substance abuse ([address]     Tobacco abuse     Type II or unspecified type diabetes mellitus without mention of complication, not stated as uncontrolled     Urinary incontinence        Past Surgical History:        Procedure Laterality Date    ABSCESS DRAINAGE N/A 2018    Carla anal abcess    DENTAL SURGERY      all teeth pulled       Allergies:  Navane [thiothixene]    Social History:     Born in: [place]  Raised in: [place]  Family: Has 4 sisters 3 brothers   Highest Level of Education: Ninth grade  Occupation: Disabled on SSI  Marital Status: Single  Hammad Friedman in a boarding house unhappy with current situation, has concern over the general operations of the boarding house  Stressors: Current living situation acute grief related to the death of his mother  Patient Assets/Supportive Factors: Has a sister that is supportive of him    DRUG USE HISTORY  Social History     Tobacco Use   Smoking Status Current Every Day Smoker    Packs/day: 1.00    Years: 47.00    Pack years: 47.00    Types: Cigarettes   Smokeless Tobacco Never Used   Tobacco Comment    Patient accepting of nicotine patch     Social History     Substance and Sexual Activity   Alcohol Use Yes    Comment: reports drinking occasionally     Social History     Substance and Sexual Activity   Drug Use Not Currently    Types: Cocaine    Comment: drug abuse includes crack cocaine,      Patient endorses cocaine use     LEGAL HISTORY:   HISTORY OF INCARCERATION: yes -states he has had some \"run-ins \"with a long     Family History:       Problem Relation Age of Onset    Diabetes Mother     Heart Disease Mother        Psychiatric Family History  Unable to access    PHYSICAL EXAM:  Vitals:  /76   Pulse 74   Temp 98 °F (36.7 °C)   Resp 14   Ht 6' 2\" (1.88 m)   Wt 205 lb (93 kg)   SpO2 100%   BMI 26.32 kg/m²      Review of Systems   Constitutional: Negative for chills and weight loss. HENT: Negative for ear pain and nosebleeds. Eyes: Negative for blurred vision and photophobia. Respiratory: Negative for cough, shortness of breath and wheezing. Cardiovascular: Negative for chest pain and palpitations. Gastrointestinal: Negative for abdominal pain, diarrhea and vomiting. Genitourinary: Negative for dysuria and urgency. Musculoskeletal: Negative for falls and joint pain. Skin: Negative for itching and rash. Neurological: Negative for tremors, seizures and weakness. Endo/Heme/Allergies: Does not bruise/bleed easily. Physical Exam:      Constitutional:  Appears well-developed and well-nourished, no acute distress  HENT:   Head: Normocephalic and atraumatic. Eyes: Conjunctivae are normal. Right eye exhibits no discharge.  Left eye exhibits no discharge. No scleral icterus. Neck: Normal range of motion. Neck supple. Pulmonary/Chest:  No respiratory distress or accessory muscle use, no wheezing. Abdominal: Soft. Exhibits no distension. Musculoskeletal: Normal range of motion. Exhibits no edema. Neurological: cranial nerves II-XII grossly in tact, normal gait and station  Skin: Skin is warm and dry. Patient is not diaphoretic. No erythema.          Mental Status Examination:    Level of consciousness:  within normal limits   Appearance:  Hospital attire, seated on the chair, fair grooming   Behavior/Motor: no abnormalities noted  Attitude toward examiner:  Cooperative  Speech: normal rate and volume  Mood:  Depressed  Affect:  open  Thought processes:  discharge focused  Thought content: active suicidal ideations without current plan or intent               denies homicidal ideations               endorses auditory hallucinations              denies delusions  Cognition:  Oriented to self, location, time, situation  Concentration clinically adequate  Memory: intact  Insight &Judgment: poor    DSM-5 Diagnosis  Schizoaffective disorder, depressive type  Cocaine use disorder    Psychosocial and Contextual factors:  Financial  Occupational  Relationship  Legal   Living situation x  Educational     Past Medical History:   Diagnosis Date    B[facility]     Depression     GERD (gastroesophageal reflux disease)     Hallucinations     Headache(784.0)     Hepatitis     Schizophrenia, schizo-affective ([facility] [facility]     Substance abuse ([facility] [address] [facility]     Tobacco abuse     Type II or unspecified type diabetes mellitus without mention of complication, not stated as uncontrolled     Urinary incontinence         TREATMENT PLAN  Start Invega 3 mg, verify continued treatment of Invega Sustenna Injection  IM consult for continued antibiotic treatment for recent episode of pneumonia  Attempt to develop insight  Psycho-education conducted  Supportive Therapy conducted  Follow up daily while on inpatient unit  Encourage patient to participate in milieu activities      Risk Management:  close watch per standard protocol      Psychotherapy:  participation in milieu and group and individual sessions with Attending Physician,  and Physician Assistant/CNP      Estimated length of stay:  2-14 days      GENERAL PATIENT/FAMILY EDUCATION  Patient will understand basic signs and symptoms, Patient will understand benefits/risks and potential side effects from proposed meds and Patient will understand their role in recovery. Family is  active in patient's care. Patient assets that may be helpful during treatment include: Intent to participate and engage in treatment, sufficient fund of knowledge and intellect to understand and utilize treatments. Goals:    1) Remission of suicide ideations, command auditory hallucinations  2) Stabilization of symptoms prior to discharge. 3) Establish efficacy and tolerability of medications. Behavioral Services  Medicare Certification     Admission Day 1  I certify that this patient's inpatient psychiatric hospital admission is medically necessary for:    x (1) treatment which could reasonably be expected to improve this patient's condition, or    x (2) diagnostic study or its equivalent.      Time Spent: 60 minutes     Physicians Signature:  Electronically signed by MARY Hummel CNP on 7/18/21 at 8:40 PM EDT

## 2021-07-19 NOTE — PLAN OF CARE
[facility]  Initial Interdisciplinary Treatment Plan NO      Original treatment plan Date & Time: 7/19/2021 0754    Admission Type:  Admission Type: Voluntary    Reason for admission:   Reason for Admission: pt having increased hallucinations, reported suicidial ideations, denies upon admission    Estimated Length of Stay:  5-7days  Estimated Discharge Date: to be determined by physician    PATIENT STRENGTHS:  Patient Strengths:Strengths: No significant Physical Illness, Connection to output provider  Patient Strengths and Limitations:Limitations: Apathetic / unmotivated, Hopeless about future, External locus of control  Addictive Behavior: Addictive Behavior  In the past 3 months, have you felt or has someone told you that you have a problem with:  : None  Do you have a history of Chemical Use?: No  Do you have a history of Alcohol Use?: Yes  Do you have a history of Street Drug Abuse?: Yes  Histroy of Prescripton Drug Abuse?: No  Medical Problems:  Past Medical History:   Diagnosis Date    Bipolar disorder ([facility]     Depression     GERD (gastroesophageal reflux disease)     Hallucinations     Headache(784.0)     Hepatitis     Schizophrenia, schizo-affective ([facility]     Substance abuse ([facility]     Tobacco abuse     Type II or unspecified type diabetes mellitus without mention of complication, not stated as uncontrolled     Urinary incontinence      Status EXAM:Status and Exam  Normal: No  Facial Expression: Flat  Affect: Blunt  Level of Consciousness: Alert  Mood:Normal: No  Mood: Depressed, Anxious  Motor Activity:Normal: Yes  Motor Activity: Decreased  Interview Behavior: Cooperative  Preception: [place] to Person, [place] to Place, [place] to Time, [place] to Situation  Attention:Normal: No  Attention: Distractible  Thought Processes: Circumstantial  Thought Content:Normal: No  Thought Content: Preoccupations  Hallucinations:  Auditory (Comment) (self talk noted)  Delusions: No  Memory:Normal: No  Memory: Poor Recent, Poor Remote  Insight and Judgment: No  Insight and Judgment: Poor Judgment, Poor Insight  Present Suicidal Ideation: No  Present Homicidal Ideation: No    EDUCATION:   Learner Progress Toward Treatment Goals: reviewed group plans and strategies for care    Method:group therapy, medication compliance, individualized assessments and care planning    Outcome: needs reinforcement    PATIENT GOALS: to be discussed with patient within 72 hours    PLAN/TREATMENT RECOMMENDATIONS:     continue group therapy , medications compliance, goal setting, individualized assessments and care, continue to monitor pt on unit      SHORT-TERM GOALS:   Time frame for Short-Term Goals: 5-7 days    LONG-TERM GOALS:  Time frame for Long-Term Goals: 6 months  Members Present in Team Meeting: See Signature Sheet    MARJ Diaz

## 2021-07-19 NOTE — GROUP NOTE
Group Therapy Note    Date: 7/19/2021    Group Start Time: 1100  Group End Time: 1130  Group Topic: Cognitive Skills    [facility] JACQUELINE Metcalf, CTRS        Group Therapy Note    Attendees: 10    Pt did not attend cognitive skills group at 1100 d/t resting in room despite staff invitation to attend. 1:1 talk time offered as alternative to group session, pt declined.           Signature:  Aldair Leyva

## 2021-07-19 NOTE — GROUP NOTE
Group Therapy Note    Date: 7/18/2021    Group Start Time: 2020  Group End Time: 2110  Group Topic: Wrap-Up    [facility]    Donovan Nielsen RN      Group Therapy Note    Attendees: 16/23       Patient's Goal:    1. To be able to reflect on daily unit activities/experiences. 2.  To review accomplished daily goals and be encouraged to set new goals for the next day. 3.  To improve interpersonal interaction through socialization. Notes:  Pt attended the Wrap-Up/Goal Review group this evening but left early.   Status After Intervention:  Unchanged    Participation Level: Minimal    Participation Quality: Attentive    Speech:  normal    Thought Process/Content: PHOEBE, pt left the group early    Affective Functioning: Flat    Mood: dysphoric    Level of consciousness:  Alert and Attentive    Response to Learning: PHOEBE, pt left the group early    Endings: None Reported    Modes of Intervention: Education, Support and Socialization    Discipline Responsible: Registered Nurse      Signature:  Donovan Nielsen RN

## 2021-07-19 NOTE — H&P
chief complaint of suicidal ideation. Today, patient is discharged focused, stating that the voices are not as intense as they had been when he first arrived in the ED. He is endorsing wanting to go home. Patient is able to contract for safety while on the unit.        PSYCHIATRIC HISTORY:  yes -schizoaffective disorder  Currently follows with [facility]  3 lifetime suicide attempts  Multiple psychiatric hospital admissions     Past psychiatric medications includes:   Invega, Effexor, Cogentin  Patient also reports previous trials on Seroquel Wellbutrin Lexapro Atarax and trazodone      Adverse reactions from psychotropic medications: no     Lifetime Psychiatric Review of Systems          Teetee or Hypomania: denies      Panic Attacks: denies      Phobias: denies     Obsessions and Compulsions:denies     Body or Vocal Tics:  denies     Hallucinations: Endorses auditory     Delusions: None present  Mood congruent    Past Medical History:        Diagnosis Date    Bipolar disorder ([address]     Depression     GERD (gastroesophageal reflux disease)     Hallucinations     Headache(784.0)     Hepatitis     Schizophrenia, schizo-affective ([address]     Substance abuse ([address]     Tobacco abuse     Type II or unspecified type diabetes mellitus without mention of complication, not stated as uncontrolled     Urinary incontinence        Past Surgical History:        Procedure Laterality Date    ABSCESS DRAINAGE N/A 2018    Carla anal abcess    DENTAL SURGERY      all teeth pulled       Allergies:  Navane [thiothixene]    Social History:     Born in: [place]  Raised in: [place]  Family: Has 4 sisters 3 brothers   Highest Level of Education: Ninth grade  Occupation: Disabled on SSI  Marital Status: Single  [employer] [employer] in a boarding house unhappy with current situation, has concern over the general operations of the boarding house  Stressors: Current living situation acute grief related to the death of his mother  Patient Assets/Supportive Factors: Has a sister that is supportive of him    DRUG USE HISTORY  Social History     Tobacco Use   Smoking Status Current Every Day Smoker    Packs/day: 1.00    Years: 47.00    Pack years: 47.00    Types: Cigarettes   Smokeless Tobacco Never Used   Tobacco Comment    Patient accepting of nicotine patch     Social History     Substance and Sexual Activity   Alcohol Use Yes    Comment: reports drinking occasionally     Social History     Substance and Sexual Activity   Drug Use Not Currently    Types: Cocaine    Comment: drug abuse includes crack cocaine,      Patient endorses cocaine use     LEGAL HISTORY:   HISTORY OF INCARCERATION: yes -states he has had some \"run-ins \"with a long     Family History:       Problem Relation Age of Onset    Diabetes Mother     Heart Disease Mother        Psychiatric Family History  Unable to access    PHYSICAL EXAM:  Vitals:  /76   Pulse 74   Temp 98 °F (36.7 °C)   Resp 14   Ht 6' 2\" (1.88 m)   Wt 205 lb (93 kg)   SpO2 100%   BMI 26.32 kg/m²      Review of Systems   Constitutional: Negative for chills and weight loss. HENT: Negative for ear pain and nosebleeds. Eyes: Negative for blurred vision and photophobia. Respiratory: Negative for cough, shortness of breath and wheezing. Cardiovascular: Negative for chest pain and palpitations. Gastrointestinal: Negative for abdominal pain, diarrhea and vomiting. Genitourinary: Negative for dysuria and urgency. Musculoskeletal: Negative for falls and joint pain. Skin: Negative for itching and rash. Neurological: Negative for tremors, seizures and weakness. Endo/Heme/Allergies: Does not bruise/bleed easily. Physical Exam:      Constitutional:  Appears well-developed and well-nourished, no acute distress  HENT:   Head: Normocephalic and atraumatic. Eyes: Conjunctivae are normal. Right eye exhibits no discharge.  Left eye exhibits no discharge. No scleral icterus. Neck: Normal range of motion. Neck supple. Pulmonary/Chest:  No respiratory distress or accessory muscle use, no wheezing. Abdominal: Soft. Exhibits no distension. Musculoskeletal: Normal range of motion. Exhibits no edema. Neurological: cranial nerves II-XII grossly in tact, normal gait and station  Skin: Skin is warm and dry. Patient is not diaphoretic. No erythema.          Mental Status Examination:    Level of consciousness:  within normal limits   Appearance:  Hospital attire, seated on the chair, fair grooming   Behavior/Motor: no abnormalities noted  Attitude toward examiner:  Cooperative  Speech: normal rate and volume  Mood:  Depressed  Affect:  open  Thought processes:  discharge focused  Thought content: active suicidal ideations without current plan or intent               denies homicidal ideations               endorses auditory hallucinations              denies delusions  Cognition:  Oriented to self, location, time, situation  Concentration clinically adequate  Memory: intact  Insight &Judgment: poor    DSM-5 Diagnosis  Schizoaffective disorder, depressive type  Cocaine use disorder    Psychosocial and Contextual factors:  Financial  Occupational  Relationship  Legal   Living situation x  Educational     Past Medical History:   Diagnosis Date    Bipolar disorder ([facility]     Depression     GERD (gastroesophageal reflux disease)     Hallucinations     Headache(784.0)     Hepatitis     Schizophrenia, schizo-affective ([facility]     Substance abuse ([facility]     Tobacco abuse     Type II or unspecified type diabetes mellitus without mention of complication, not stated as uncontrolled     Urinary incontinence         TREATMENT PLAN  Start Invega 3 mg, verify continued treatment of Invega Sustenna Injection  IM consult for continued antibiotic treatment for recent episode of pneumonia  Attempt to develop insight  Psycho-education conducted  Supportive Therapy conducted  Follow up daily while on inpatient unit  Encourage patient to participate in milieu activities      Risk Management:  close watch per standard protocol      Psychotherapy:  participation in milieu and group and individual sessions with Attending Physician,  and Physician Assistant/CNP      Estimated length of stay:  2-14 days      GENERAL PATIENT/FAMILY EDUCATION  Patient will understand basic signs and symptoms, Patient will understand benefits/risks and potential side effects from proposed meds and Patient will understand their role in recovery. Family is  active in patient's care. Patient assets that may be helpful during treatment include: Intent to participate and engage in treatment, sufficient fund of knowledge and intellect to understand and utilize treatments. Goals:    1) Remission of suicide ideations, command auditory hallucinations  2) Stabilization of symptoms prior to discharge. 3) Establish efficacy and tolerability of medications. Behavioral Services  Medicare Certification     Admission Day 1  I certify that this patient's inpatient psychiatric hospital admission is medically necessary for:    x (1) treatment which could reasonably be expected to improve this patient's condition, or    x (2) diagnostic study or its equivalent. Time Spent: 60 minutes     Physicians Signature:  Electronically signed by MARY Vasquez CNP on 7/18/21 at 8:40 PM EDT                                         Psychiatry Attending Attestation     I independently saw and evaluated the patient. I reviewed the Advance Practice Provider's documentation above. Any additional comments or changes to the Advance Practice Provider's documentation are stated below otherwise agree with assessment. Patient is a 14-year-old -American male with history of cocaine use and schizoaffective disorder admitted for worsening auditory hallucinations asking him to kill self. Reports that he has been having commanding auditory expressions for last 4 days now. Reports he has been off his psychotropic medications. Mentions that his sister has been supportive and helpful for him. Could not identify any specific stressors other than dealing with pneumonia recently. Agreeable to restart his home medications and titrating them to effect.     Electronically signed by Faraz Watson MD on 7/19/21 at 4:25 PM EDT

## 2021-07-19 NOTE — GROUP NOTE
Group Therapy Note    Date: 7/18/2021    Group Start Time: 0318  Group End Time: 1945  Group Topic: Relaxation/Leisure    JESUS Ness RN; Skyler Salazar RN        Group Therapy Note:     Pt refused to attend Relaxation/Leisure group this evening after encouragement from staff. 1:1 talk time offered but pt declined. Will continue to encourage patient to attend unit group programming.         Signature:  Skyler Salazar RN

## 2021-07-19 NOTE — PROGRESS NOTES
Pharmacy Medication History Note      List of current medications patient is taking is complete. Source of information: Discharge AVS, OARRS    Changes made to medication list:  Medications removed (include reason, ex. therapy complete or physician discontinued, noncompliance):  none    Medications added/doses adjusted:  none    Other notes (ex. Recent course of antibiotics, Coumadin dosing):  Patient last received monthly Invega sustenna 234 mg injection on 7/6/21 during admission to [facility]. Please let me know if you have any questions about this encounter. Thank you!     Electronically signed by Chelsea Duron [facility] on 7/19/2021 at 8:03 AM

## 2021-07-19 NOTE — PROGRESS NOTES
Behavioral Services  Medicare Certification Upon Admission    I certify that this patient's inpatient psychiatric hospital admission is medically necessary for:    [x] (1) Treatment which could reasonably be expected to improve this patient's condition,       [x] (2) Or for diagnostic study;     AND     [x](2) The inpatient psychiatric services are provided while the individual is under the care of a physician and are included in the individualized plan of care.     Estimated length of stay/service 3-5 days    Plan for post-hospital care [facility]    Electronically signed by Seth He MD on 7/19/2021 at 3:49 PM

## 2021-07-19 NOTE — GROUP NOTE
Group Therapy Note    Date: 7/19/2021    Group Start Time: 1000  Group End Time: 3926  Group Topic: Psychotherapy    [facility] JACQUELINE Coyne        Group Therapy Note         Patient refused to attend psychotherapy group after encouragement from staff. 1:1 talk time offered but refused. Signature:   Alfred Coyne

## 2021-07-19 NOTE — CONSULTS
[facility] Internal Medicine    CONSULTATION / HISTORY AND PHYSICAL EXAMINATION            Date:   7/19/2021  Patient name:  Katie De Santiago  Date of admission:  7/18/2021  6:11 PM  MRN:   [id number]  Account:  [de-identified]  YOB: 1960  PCP:    No primary care provider on file. Room:   [address] [place] [address]  Code Status:    Full Code    Physician Requesting Consult: Marissa Salinas, *    Reason for Consult:  medical management    Chief Complaint:     No chief complaint on file.       History Obtained From:     Patient medical record nursing staff    History of Present Illness:   Patient admitted to [facility] floor with major depression, bipolar disorder  Internal medicine consulted for addressing his antibiotics  Patient originally admitted at [facility], complaining of chest pain going on for last 2 months  He underwent chest x-ray on 714, concerning for possible pneumonic changes in medial part of left lower lobe  Patient denying any complaint of shortness of breath, cough  Of note patient was evaluated by cardiologist, while in [facility]  Patient started on antibiotics  History of polysubstance abuse, HIV tested in May of this year which was negative  Patient extremely poor historian  Past Medical History:     Past Medical History:   Diagnosis Date    Bipolar disorder ([address]     Depression     GERD (gastroesophageal reflux disease)     Hallucinations     Headache(784.0)     Hepatitis     Schizophrenia, schizo-affective ([address]     Substance abuse ([address]     Tobacco abuse     Type II or unspecified type diabetes mellitus without mention of complication, not stated as uncontrolled     Urinary incontinence         Past Surgical History:     Past Surgical History:   Procedure Laterality Date    ABSCESS DRAINAGE N/A 02/11/2018    Carla anal abcess    DENTAL SURGERY      all teeth pulled        Medications Prior to Admission:     Prior to Admission medications    Medication Sig Start Date End Date Taking? Authorizing Provider   traZODone (DESYREL) 150 MG tablet Take 1 tablet by mouth nightly as needed for Sleep 7/6/21  Yes Logan Mcclendon MD   escitalopram (LEXAPRO) 20 MG tablet Take 1 tablet by mouth daily 7/6/21  Yes Logan Mcclendon MD   paliperidone palmitate ER (Zerita Klinefelter) 234 MG/1.5ML [place] IM injection Inject 234 mg into the muscle every 28 days   Yes Historical Provider, MD        Allergies:     Navane [thiothixene]    Social History:     Tobacco:    reports that he has been smoking cigarettes. He has a 47.00 pack-year smoking history. He has never used smokeless tobacco.  Alcohol:      reports current alcohol use. Drug Use:  reports previous drug use. Drug: Cocaine. Family History:     Family History   Problem Relation Age of Onset    Diabetes Mother     Heart Disease Mother        Review of Systems:     Positive and Negative as described in HPI. Poor historian  CONSTITUTIONAL:  negative for fevers, chills, sweats, fatigue, weight loss  HEENT:  negative for vision, hearing changes, runny nose, throat pain  RESPIRATORY:  negative for shortness of breath, cough, congestion, wheezing. CARDIOVASCULAR:  negative for chest pain, palpitations.   GASTROINTESTINAL:  negative for nausea, vomiting, diarrhea, constipation, change in bowel habits, abdominal pain   GENITOURINARY:  negative for difficulty of urination, burning with urination, frequency   INTEGUMENT:  negative for rash, skin lesions, easy bruising   HEMATOLOGIC/LYMPHATIC:  negative for swelling/edema   ALLERGIC/IMMUNOLOGIC:  negative for urticaria , itching  ENDOCRINE:  negative increase in drinking, increase in urination, hot or cold intolerance  MUSCULOSKELETAL:  negative joint pains, muscle aches, swelling of joints  NEUROLOGICAL:  negative for headaches, dizziness, lightheadedness, numbness, pain, tingling extremities  BEHAVIOR/PSYCH:      Physical Exam:     /64   Pulse (!) 49 06/05/2020    Schizoaffective disorder, depressive type ([facility] [F25.1] 09/23/2017       Plan:     1. Concern for possible pneumonia in medial aspect of left lower lobe, started on Ceftin, continue total 7 days of antibiotic  2. Patient need to have chest x-ray outpatient in 4 to 6 weeks for resolution of pulmonary shadows  3. We will sign off, please call with questions        Jerry Melgar MD  7/19/2021  5:14 PM    Copy sent to Dr. Colby primary care provider on file. Please note that this chart was generated using voice recognition Dragon dictation software. Although every effort was made to ensure the accuracy of this automated transcription, some errors in transcription may have occurred.

## 2021-07-19 NOTE — CARE COORDINATION
BHI Biopsychosocial Assessment    Current Level of Psychosocial Functioning     Independent: xx  Dependent:    Minimal Assist:       Psychosocial High Risk Factors (check all that apply)    Unable to obtain meds:   Chronic illness/pain:     Substance abuse: xx  Lack of Family Support:  xx  Financial stress:    Isolation:    Inadequate Community Resources:    Suicide attempt(s):    Not taking medications:     Victim of crime:    Developmental Delay:    Unable to manage personal needs:    Age 72 or older:    Homeless:    No transportation:    Readmission within 30 days:    Unemployment:    Traumatic Event:     Psychiatric Advanced Directives: None reported    Family to Involve in Treatment: None reported by patient at time of assessment    Sexual Orientation: PHOEBE    Patient Strengths: Patient is connected with [facility]    Patient Barriers: substance use    Opiate Education: patient denies opiate use    CMHC/mental health history: Patient is linked with [facility]    Plan of Care   medication management, group/individual therapies, family meetings, psycho -education, treatment team meetings to assist with stabilization    Initial Discharge Plan: To be determined with provider       Clinical Summary:      Pt is a 57year old male who presented to the [facility] with reported suicidal ideations and auditory hallucinations telling him to harm himself. Patient recently discharged from [facility] earlier this month, returned to his group home with follow up with the ACT team at [facility]. Patient is not willing to participate in assessment with social work. Patient's chart does indicate patient has past attempts of suicide and past crack cocaine use. With patient permission, the ACT team was notified of admission. Social work will continue to engage patient in treatment and discharge planning as symptoms improve.

## 2021-07-19 NOTE — GROUP NOTE
Group Therapy Note    Date: 7/19/2021    Group Start Time: 0900  Group End Time: 0920  Group Topic: Community Meeting    CARMEN Buenrostro    Pt did not attend 0900 goal setting group d/t resting in room despite staff invitation to attend. 1:1 talk time offered as alternative to group session, pt declined.           Signature:  Camille Foster

## 2021-07-19 NOTE — PLAN OF CARE
Problem: Altered Mood, Depressive Behavior:  Goal: Able to verbalize and/or display a decrease in depressive symptoms  Description: Able to verbalize and/or display a decrease in depressive symptoms  Outcome: Ongoing  Goal: Ability to disclose and discuss suicidal ideas will improve  Description: Ability to disclose and discuss suicidal ideas will improve  Outcome: Ongoing   Patient denies suicidal ideas at this time. Patient denies homicidal ideas at this time. Patient denies depressive symptoms at this time. Patient has been out in day room watching tv aloof of peers. Patient is free of self harm at this time. Patient agrees to seek out staff if thoughts to harm self arise. Staff will provide support and reassurance as needed. Safety checks maintained every 15 minutes.

## 2021-07-20 PROCEDURE — 99232 SBSQ HOSP IP/OBS MODERATE 35: CPT | Performed by: PSYCHIATRY & NEUROLOGY

## 2021-07-20 PROCEDURE — APPSS30 APP SPLIT SHARED TIME 16-30 MINUTES: Performed by: PSYCHIATRY & NEUROLOGY

## 2021-07-20 PROCEDURE — 6370000000 HC RX 637 (ALT 250 FOR IP): Performed by: PSYCHIATRY & NEUROLOGY

## 2021-07-20 PROCEDURE — 6370000000 HC RX 637 (ALT 250 FOR IP)

## 2021-07-20 PROCEDURE — 1240000000 HC EMOTIONAL WELLNESS R&B

## 2021-07-20 PROCEDURE — 6370000000 HC RX 637 (ALT 250 FOR IP): Performed by: INTERNAL MEDICINE

## 2021-07-20 RX ADMIN — ACETAMINOPHEN 650 MG: 325 TABLET ORAL at 20:04

## 2021-07-20 RX ADMIN — TRAZODONE HYDROCHLORIDE 50 MG: 50 TABLET ORAL at 21:33

## 2021-07-20 RX ADMIN — PALIPERIDONE 3 MG: 3 TABLET, EXTENDED RELEASE ORAL at 12:11

## 2021-07-20 RX ADMIN — ESCITALOPRAM OXALATE 20 MG: 20 TABLET ORAL at 12:11

## 2021-07-20 RX ADMIN — CEFUROXIME AXETIL 500 MG: 250 TABLET ORAL at 12:10

## 2021-07-20 RX ADMIN — CEFUROXIME AXETIL 500 MG: 250 TABLET ORAL at 21:32

## 2021-07-20 ASSESSMENT — PAIN SCALES - GENERAL
PAINLEVEL_OUTOF10: 0
PAINLEVEL_OUTOF10: 5

## 2021-07-20 NOTE — GROUP NOTE
Group Therapy Note    Date: 7/20/2021    Group Start Time: 1000  Group End Time: 5589  Group Topic: Psychotherapy    JESUS Velásquez        Group Therapy Note         Patient refused to attend psychotherapy group after encouragement from staff. 1:1 talk time offered but refused. Signature:   Gina Velásquez

## 2021-07-20 NOTE — PLAN OF CARE
[address] [place] [address]  Day 3 Interdisciplinary Treatment Plan NOTE    Review Date & Time: 7/20/2021        1300    Admission Type:   Admission Type: Involuntary    Reason for admission:  Reason for Admission: SI no plan  Estimated Length of Stay: 5-7 days  Estimated Discharge Date Update: to be determined by physician    PATIENT STRENGTHS:  Patient Strengths Strengths: Positive Support, No significant Physical Illness  Patient Strengths and Limitations:Limitations: Tendency to isolate self, Lacks leisure interests, Difficulty problem solving/relies on others to help solve problems, Hopeless about future, Multiple barriers to leisure interests, Inappropriate/potentially harmful leisure interests (depression substance abuse anxiety poor coping skills)  Addictive Behavior:Addictive Behavior  In the past 3 months, have you felt or has someone told you that you have a problem with:  : None  Do you have a history of Chemical Use?: No  Do you have a history of Alcohol Use?: No  Do you have a history of Street Drug Abuse?: Yes  Histroy of Prescripton Drug Abuse?: No  Medical Problems:No past medical history on file.     Risk:  Fall RiskTotal: 53  Dusty Scale Dusty Scale Score: 22  BVC Total: 0  Change in scores no Changes to plan of Care no    Status EXAM:   Status and Exam  Normal: No  Facial Expression: Elevated  Affect: Inappropriate  Level of Consciousness: Alert  Mood:Normal: No  Mood: Depressed, Anxious  Motor Activity:Normal: No  Motor Activity: Decreased  Interview Behavior: Cooperative  Preception: Daniel to Person, Terisa Quant to Time, Daniel to Place, Daniel to Situation  Attention:Normal: Yes  Attention: Distractible  Thought Processes: Circumstantial  Thought Content:Normal: Yes  Thought Content: Preoccupations  Hallucinations: None  Delusions: No  Memory:Normal: Yes  Memory: Poor Recent, Confabulation  Insight and Judgment: No  Insight and Judgment: Unmotivated  Present Suicidal Ideation: No  Present Homicidal Ideation: No    Daily Assessment Last Entry:   Daily Sleep (WDL): Within Defined Limits         Patient Currently in Pain: No  Daily Nutrition (WDL): Within Defined Limits    Patient Monitoring:  Frequency of Checks: 4 times per hour, close    Psychiatric Symptoms:   Depression Symptoms  Depression Symptoms: Isolative, Loss of interest  Anxiety Symptoms  Anxiety Symptoms: Generalized  [place]et[place] Symptoms  Teetee Symptoms: No problems reported or observed. Psychosis Symptoms  Delusion Type: No problems reported or observed.     Suicide Risk CSSR-S:  Have you wished you were dead or wished you could go to sleep and not wake up? : NO  Have you actually had any thoughts of killing yourself? : NO  Have you ever done anything, started to do anything, or prepared to do anything to end your life?: NO  Change in Result            no                 Change in Plan of care            no      EDUCATION:   EDUCATION:   Learner Progress Toward Treatment Goals: Reviewed results and recommendations of this team, Reviewed group plan and strategies, Reviewed signs, symptoms and risk of self harm and violent behavior, Reviewed goals and plan of care    Method:small group, individual verbal education    Outcome:verbalized by patient, but needs reinforcement to obtain goals    PATIENT GOALS:  Short term: Pt refused to meet with treatment team and did not develop a short term goal  Long term:Pt did not develop a long term goal    PLAN/TREATMENT RECOMMENDATIONS UPDATE: continue with group therapies, increased socialization, continue planning for after discharge goals, continue with medication compliance    SHORT-TERM GOALS UPDATE:   Time frame for Short-Term Goals: 5-7 days    LONG-TERM GOALS UPDATE:   Time frame for Long-Term Goals: 6 months  Members Present in Team Meeting: See Signature Sheet    Deepa Marks

## 2021-07-20 NOTE — PLAN OF CARE
Problem: Altered Mood, Depressive Behavior:  Goal: Able to verbalize and/or display a decrease in depressive symptoms  Description: Able to verbalize and/or display a decrease in depressive symptoms  7/19/2021 2128 by Sushma Mills LPN  Outcome: Ongoing     Problem: Altered Mood, Depressive Behavior:  Goal: Ability to disclose and discuss suicidal ideas will improve  Description: Ability to disclose and discuss suicidal ideas will improve  7/19/2021 2128 by Sushma Mills LPN  Outcome: Ongoing  Note: Patient seemed brightened after talking to his sister and out talking with peers on the unit. Patient denied all feelings of wanting to hurt self or others at this time.

## 2021-07-20 NOTE — PLAN OF CARE
Problem: Altered Mood, Depressive Behavior:  Goal: Able to verbalize and/or display a decrease in depressive symptoms  Description: Able to verbalize and/or display a decrease in depressive symptoms  Outcome: Ongoing  Note: Patient presents this shift with flat affect, makes fair eye contact during 1:1. Patient answers appropriately but with minimal responses. Patient denies suicidal/homicidal/self harm ideations. Patient reports mood is depressed and remains isolative to room this shift except out for meals. Patient reports appetite is good and sleep is fair. Patient encouraged to attend groups. Support provided. Safety maintained with every 15 minute checks and as needed. Problem: Altered Mood, Depressive Behavior:  Goal: Ability to disclose and discuss suicidal ideas will improve  Description: Ability to disclose and discuss suicidal ideas will improve  Outcome: Ongoing     Problem: Tobacco Use:  Goal: Inpatient tobacco use cessation counseling participation  Description: Inpatient tobacco use cessation counseling participation  Outcome: Ongoing  Note: Patient given tobacco quitline number 61173260265 at this time, refusing to call at this time, states \" I just dont want to quit now\"- patient given information as to the dangers of long term tobacco use. Continue to reinforce the importance of tobacco cessation.

## 2021-07-20 NOTE — PROGRESS NOTES
Daily Progress Note  7/20/2021    Patient Name: Suzi Frank    CHIEF COMPLAINT: Acute psychosis         SUBJECTIVE:      Patient is seen today for a follow up assessment. He was resting in bed and was somnolent, but responded to verbal stimuli. He was disoriented to year and reported it was \"2001\", but was reoriented by writer. He has been medication compliant has not required any emergency medications. He endorses depression and rates his depression as a 10 out of 10 (010 scale with 0 being no depression and 10 being worst). He endorses anxiety and rated his anxiety as a 4 out of 10 (010 scale with 0 being no anxiety and 10 being worst). He endorses adequate appetite. He endorses adequate sleep, but reported his sleep was nonrestorative. He reports improvement in suicidal ideation, without intent or plans. He denied homicidal ideation, intent, or plans. He contracted for safety on the unit. He reports improvement in his auditory hallucinations. He denies visual hallucinations. He denies paranoia or delusions. He denies any medication side effects or medical concerns at the time of assessment. Writer encouraged patient to attend groups on the unit. At this time, the patient is not appropriate for a lower level of care. There is risk of decompensation, patient presents with poor insight, and patient warrants further hospitalization for safety and stabilization. Appetite:  [x] Normal/Adequate/Unchanged  [] Increased  [] Decreased      Sleep:       [x] Normal/Adequate/Unchanged  [] Fair  [] Poor     He endorsed adequate sleep, but reported his sleep was nonrestorative. Group Attendance on Unit:   [] Yes  [] Selectively    [x] No    Medication Side Effects: Patient denies any medication side effects at the time of assessment. Mental Status Exam  Level of consciousness: Somnolent, but responsive verbal stimuli.   Appearance: Appropriate attire for setting, resting in bed, with fair grooming and hygiene   Behavior/Motor: Approachable, no psychomotor abnormalities   Attitude toward examiner: Cooperative, attentive, intermittent eye contact  Speech: Delayed rate, low volume, normal tone. Mood:  Patient reports \"alright\". Affect: Flat  Thought processes: Linear today. Thought content: Denies homicidal ideation  Suicidal Ideation: Reports improvement in suicidal ideations, without current plan or intent, contracts for safety on the unit. Delusions: No evidence of delusions. Denies paranoia. Perceptual Disturbance: Patient does not appear to be responding to internal stimuli. Reports improvement in auditory hallucinations. Denies visual hallucinations. Cognition: Oriented to self, location, and situation. Patient was disoriented to year, but was reoriented by Chaz Mask. Memory: Intact  Insight & Judgement: Poor     Data   height is 6' 2\" (1.88 m) and weight is 205 lb (93 kg). His temperature is 97.9 °F (36.6 °C). His blood pressure is 110/54 (abnormal) and his pulse is 106. His respiration is 14 and oxygen saturation is 97%. Labs:   No visits with results within 2 Day(s) from this visit. Latest known visit with results is:   No results displayed because visit has over 200 results. Reviewed patient's current plan of care and vital signs with nursing staff.     Labs reviewed: [x] Yes  Last EKG in EMR reviewed: [x] Yes  QTc: 452    Medications  Current Facility-Administered Medications: cefUROXime (CEFTIN) tablet 500 mg, 500 mg, Oral, 2 times per day  ondansetron (ZOFRAN-ODT) disintegrating tablet 4 mg, 4 mg, Oral, Q8H PRN **OR** ondansetron (ZOFRAN) injection 4 mg, 4 mg, Intramuscular, Q6H PRN  acetaminophen (TYLENOL) tablet 650 mg, 650 mg, Oral, Q4H PRN  aluminum & magnesium hydroxide-simethicone (MAALOX) 200-200-20 MG/5ML suspension 30 mL, 30 mL, Oral, Q6H PRN  hydrOXYzine (ATARAX) tablet 50 mg, 50 mg, Oral, TID PRN  ibuprofen (ADVIL;MOTRIN) tablet 400 mg, 400 mg, Oral, Q6H PRN  traZODone (DESYREL) tablet 50 mg, 50 mg, Oral, Nightly PRN  polyethylene glycol (GLYCOLAX) packet 17 g, 17 g, Oral, Daily PRN  nicotine polacrilex (NICORETTE) gum 2 mg, 2 mg, Oral, Q1H PRN  haloperidol (HALDOL) tablet 5 mg, 5 mg, Oral, Q4H PRN **AND** LORazepam (ATIVAN) tablet 2 mg, 2 mg, Oral, Q4H PRN  haloperidol lactate (HALDOL) injection 5 mg, 5 mg, Intramuscular, Q4H PRN **AND** LORazepam (ATIVAN) injection 2 mg, 2 mg, Intramuscular, Q4H PRN **AND** diphenhydrAMINE (BENADRYL) injection 50 mg, 50 mg, Intramuscular, Q4H PRN  paliperidone (INVEGA) extended release tablet 3 mg, 3 mg, Oral, Daily  escitalopram (LEXAPRO) tablet 20 mg, 20 mg, Oral, Daily    ASSESSMENT  Schizoaffective disorder ([facility] [address]         PLAN  Patient symptoms are: Modestly Improving  Continue current medication regimen. Consult to internal medicine ordered: Abnormal labs. Monitor need and frequency of PRN medications. Encourage participation in groups and milieu. Attempt to develop insight. Psycho-education conducted. Supportive Therapy conducted. Probable discharge is to be determined by MD.   Follow-up daily while inpatient. Patient continues to be monitored in the inpatient psychiatric facility at [facility] for safety and stabilization. Patient continues to need, on a daily basis, active treatment furnished directly by or requiring the supervision of inpatient psychiatric personnel. Electronically signed by MARY Souza CNP on 7/20/2021 at 3:42 PM    **This report has been created using voice recognition software. It may contain minor errors which are inherent in voice recognition technology. **                                         Psychiatry Attending Attestation     I independently saw and evaluated the patient. I reviewed the Advance Practice Provider's documentation above. Any additional comments or changes to the Advance Practice Provider's documentation are stated below otherwise agree with assessment. Patient is largely isolated to his room. He has not been attending any groups. Continues to report having commanding auditory hallucinations asking him to hurt himself at times. Able to contract for safety here. Tolerating medication adjustments well and denies any side effect from the medication. We will continue to titrate his medications and possible discharge is Thursday if he continues to improve.      Electronically signed by Oralia Grove MD on 7/20/21 at 6:58 PM EDT

## 2021-07-20 NOTE — GROUP NOTE
Group Therapy Note    Date: 7/20/2021    Group Start Time: 1100  Group End Time: 1130  Group Topic: Cognitive Skills    JESUS Conrad, CTRS        Group Therapy Note    Attendees: 8    Pt did not attend Cognitive skills group at 1100 d/t resting in room despite staff invitation to attend. 1:1 talk time offered as alternative to group session, pt declined.         Signature:  Floyd Shields

## 2021-07-21 LAB
ANION GAP SERPL CALCULATED.3IONS-SCNC: 5 MMOL/L (ref 9–17)
BUN BLDV-MCNC: 15 MG/DL (ref 8–23)
BUN/CREAT BLD: ABNORMAL (ref 9–20)
CALCIUM SERPL-MCNC: 9.2 MG/DL (ref 8.6–10.4)
CHLORIDE BLD-SCNC: 110 MMOL/L (ref 98–107)
CO2: 31 MMOL/L (ref 20–31)
CREAT SERPL-MCNC: 1.07 MG/DL (ref 0.7–1.2)
CULTURE: NORMAL
CULTURE: NORMAL
FERRITIN: 94 UG/L (ref 30–400)
GFR AFRICAN AMERICAN: >60 ML/MIN
GFR NON-AFRICAN AMERICAN: >60 ML/MIN
GFR SERPL CREATININE-BSD FRML MDRD: ABNORMAL ML/MIN/{1.73_M2}
GFR SERPL CREATININE-BSD FRML MDRD: ABNORMAL ML/MIN/{1.73_M2}
GLUCOSE BLD-MCNC: 105 MG/DL (ref 70–99)
IRON SATURATION: 26 % (ref 20–55)
IRON: 83 UG/DL (ref 59–158)
Lab: NORMAL
Lab: NORMAL
POTASSIUM SERPL-SCNC: 4.4 MMOL/L (ref 3.7–5.3)
SODIUM BLD-SCNC: 146 MMOL/L (ref 135–144)
SPECIMEN DESCRIPTION: NORMAL
SPECIMEN DESCRIPTION: NORMAL
TOTAL IRON BINDING CAPACITY: 318 UG/DL (ref 250–450)
UNSATURATED IRON BINDING CAPACITY: 235 UG/DL (ref 112–347)

## 2021-07-21 PROCEDURE — 6370000000 HC RX 637 (ALT 250 FOR IP): Performed by: PSYCHIATRY & NEUROLOGY

## 2021-07-21 PROCEDURE — 1240000000 HC EMOTIONAL WELLNESS R&B

## 2021-07-21 PROCEDURE — 82728 ASSAY OF FERRITIN: CPT

## 2021-07-21 PROCEDURE — 83550 IRON BINDING TEST: CPT

## 2021-07-21 PROCEDURE — 80048 BASIC METABOLIC PNL TOTAL CA: CPT

## 2021-07-21 PROCEDURE — 6370000000 HC RX 637 (ALT 250 FOR IP)

## 2021-07-21 PROCEDURE — 36415 COLL VENOUS BLD VENIPUNCTURE: CPT

## 2021-07-21 PROCEDURE — 99232 SBSQ HOSP IP/OBS MODERATE 35: CPT | Performed by: INTERNAL MEDICINE

## 2021-07-21 PROCEDURE — 99232 SBSQ HOSP IP/OBS MODERATE 35: CPT | Performed by: PSYCHIATRY & NEUROLOGY

## 2021-07-21 PROCEDURE — 6370000000 HC RX 637 (ALT 250 FOR IP): Performed by: INTERNAL MEDICINE

## 2021-07-21 PROCEDURE — 83540 ASSAY OF IRON: CPT

## 2021-07-21 RX ADMIN — CEFUROXIME AXETIL 500 MG: 250 TABLET ORAL at 09:37

## 2021-07-21 RX ADMIN — PALIPERIDONE 3 MG: 3 TABLET, EXTENDED RELEASE ORAL at 09:37

## 2021-07-21 RX ADMIN — ESCITALOPRAM OXALATE 20 MG: 20 TABLET ORAL at 09:37

## 2021-07-21 RX ADMIN — HYDROXYZINE HYDROCHLORIDE 50 MG: 50 TABLET, FILM COATED ORAL at 21:54

## 2021-07-21 RX ADMIN — TRAZODONE HYDROCHLORIDE 50 MG: 50 TABLET ORAL at 21:54

## 2021-07-21 NOTE — GROUP NOTE
Group Therapy Note    Date: 7/21/2021    Group Start Time: 1000  Group End Time: 5948  Group Topic: Psychotherapy    [facility]    Dina Macedo        Group Therapy Note    Attendees: 8/15         Patient's Goal:  PT will demonstrate increased interpersonal interaction and a clear understanding on multiple types of coping skills relating to the here-and-now therapeutic practice. Notes:  Pt had difficulty focusing during group and left group early. Status After Intervention:  Improved    Participation Level: Minimal    Participation Quality: Resistant      Speech:  normal      Thought Process/Content: Logical      Affective Functioning: Flat      Mood: depressed      Level of consciousness:  Alert and Attentive      Response to Learning: Able to verbalize/acknowledge new learning and Progressing to goal      Endings: None Reported    Modes of Intervention: Support, Socialization, Exploration, Clarifying and Problem-solving      Discipline Responsible: /Counselor      Signature:   Dina Macedo

## 2021-07-21 NOTE — PROGRESS NOTES
Daily Progress Note  7/21/2021    Patient Name: Sebastian Parker    CHIEF COMPLAINT: Acute psychosis         SUBJECTIVE:      Patient is seen today for a follow up assessment. Patient compliant with medications, use trazodone for sleep last night. Per staff patient was social with peers, he has not attended group today. Patient was resting in his room, awakens to name. Patient's affect is very flat, he rated his depression and anxiety 0/10, improving suicidal ideation, contracts for safety while in unit. Denies auditory or visual hallucinations, last had auditory hallucinations on day of admission. States that he slept well last night, appetite has been normal.  We reviewed his medications, he states an improvement since admission, denies side effects. Again patient was oriented to self, he knew he was in the psychiatric unit at [facility]. He did state that it was summer 2020, reoriented that it was 7/21/2021. He was seen by internal medicine yesterday. There was concern for possible pneumonia on left lower lobe and he was started on Ceftin orally for 7 days. Appetite:  [x] Normal/Adequate/Unchanged  [] Increased  [] Decreased      Sleep:       [x] Normal/Adequate/Unchanged  [] Fair  [] Poor     He endorsed adequate sleep, but reported his sleep was nonrestorative. Group Attendance on Unit:   [] Yes  [] Selectively    [x] No    Medication Side Effects: Patient denies any medication side effects at the time of assessment. Mental Status Exam  Level of consciousness: Somnolent, but responsive verbal stimuli. Appearance: Appropriate attire for setting, resting in bed, with fair  grooming and hygiene   Behavior/Motor: Approachable, no psychomotor abnormalities   Attitude toward examiner: Cooperative, attentive, intermittent eye contact  Speech: Delayed rate, low volume, normal tone. Mood: \"Okay\".    Affect: Flat, not congruent with stated mood  Thought processes: Linear and coherent  Thought content: Denies homicidal ideation  Suicidal Ideation: Reports improvement in suicidal ideations, without current plan or intent, contracts for safety on the unit. Delusions: No evidence of delusions. Denies paranoia. Perceptual Disturbance: Patient does not appear to be responding to internal stimuli. Reports improvement in auditory hallucinations. Denies visual hallucinations. Cognition: Oriented to self, location, and situation. Patient was disoriented to year, but was reoriented by Nash Nieves. Memory: Intact  Insight & Judgement: Poor     Data   height is 6' 2\" (1.88 m) and weight is 205 lb (93 kg). His oral temperature is 97.8 °F (36.6 °C). His blood pressure is 118/72 and his pulse is 50. His respiration is 12 and oxygen saturation is 97%. Labs:   No visits with results within 2 Day(s) from this visit. Latest known visit with results is:   No results displayed because visit has over 200 results. Reviewed patient's current plan of care and vital signs with nursing staff.     Labs reviewed: [x] Yes  Last EKG in EMR reviewed: [x] Yes  QTc: 452    Medications  Current Facility-Administered Medications: cefUROXime (CEFTIN) tablet 500 mg, 500 mg, Oral, 2 times per day  ondansetron (ZOFRAN-ODT) disintegrating tablet 4 mg, 4 mg, Oral, Q8H PRN **OR** ondansetron (ZOFRAN) injection 4 mg, 4 mg, Intramuscular, Q6H PRN  acetaminophen (TYLENOL) tablet 650 mg, 650 mg, Oral, Q4H PRN  aluminum & magnesium hydroxide-simethicone (MAALOX) 200-200-20 MG/5ML suspension 30 mL, 30 mL, Oral, Q6H PRN  hydrOXYzine (ATARAX) tablet 50 mg, 50 mg, Oral, TID PRN  ibuprofen (ADVIL;MOTRIN) tablet 400 mg, 400 mg, Oral, Q6H PRN  traZODone (DESYREL) tablet 50 mg, 50 mg, Oral, Nightly PRN  polyethylene glycol (GLYCOLAX) packet 17 g, 17 g, Oral, Daily PRN  nicotine polacrilex (NICORETTE) gum 2 mg, 2 mg, Oral, Q1H PRN  haloperidol (HALDOL) tablet 5 mg, 5 mg, Oral, Q4H PRN **AND** LORazepam (ATIVAN) tablet 2 mg, 2 mg, Oral, Q4H PRN  haloperidol lactate (HALDOL) injection 5 mg, 5 mg, Intramuscular, Q4H PRN **AND** LORazepam (ATIVAN) injection 2 mg, 2 mg, Intramuscular, Q4H PRN **AND** diphenhydrAMINE (BENADRYL) injection 50 mg, 50 mg, Intramuscular, Q4H PRN  paliperidone (INVEGA) extended release tablet 3 mg, 3 mg, Oral, Daily  escitalopram (LEXAPRO) tablet 20 mg, 20 mg, Oral, Daily    ASSESSMENT  Schizoaffective disorder, depressive type ([facility] [address]         PLAN  Patient symptoms are: Modestly Improving  Continue current medication regimen. Internal medicine following patient for pneumonia  Monitor need and frequency of PRN medications. Encourage participation in groups and milieu. Attempt to develop insight. Psycho-education conducted. Supportive Therapy conducted. Probable discharge is to be determined by MD.   Follow-up daily while inpatient. Patient continues to be monitored in the inpatient psychiatric facility at [facility] for safety and stabilization. Patient continues to need, on a daily basis, active treatment furnished directly by or requiring the supervision of inpatient psychiatric personnel. Electronically signed by MARY Randall CNP on 7/21/2021 at 11:54 AM    **This report has been created using voice recognition software. It may contain minor errors which are inherent in voice recognition technology. **                                                            Psychiatry Attending Attestation     I independently saw and evaluated the patient. I reviewed the Advance Practice Provider's documentation above. Any additional comments or changes to the Advance Practice Provider's documentation are stated below otherwise agree with assessment. Patient feels better than before. Mood and affect are better. Patient reports fleeting suicidal thoughts with no intent or plan. Patient notes that these thoughts are occurring less frequently.   Denies any homicidal thoughts, that was explored with the patient. Oriented to time place and person. Recent and remote memory is intact. Patient feels hopeful. Sleep and appetite is good. No side effect from medication reported. Side-effect of medication were discussed with the patient . Patient is responding to current treatment. Discharge soon, if patient continues to show improvement. Case discussed with the staff.        Electronically signed by Eleanor Scott MD on 7/21/21 at 3:39 PM EDT

## 2021-07-21 NOTE — PLAN OF CARE
Problem: Altered Mood, Depressive Behavior:  Goal: Able to verbalize and/or display a decrease in depressive symptoms  Description: Able to verbalize and/or display a decrease in depressive symptoms  7/20/2021 2146 by Radha Das LPN  Outcome: Ongoing  Note: Patient denies all. Patient is looking forward D/C tomorrow and returning home. Patient understands that he can talk to staff if he begins to feel depressive. Patient is aware that if suicidal ideations increase, he will inform staff. Problem: Altered Mood, Depressive Behavior:  Goal: Ability to disclose and discuss suicidal ideas will improve  Description: Ability to disclose and discuss suicidal ideas will improve  7/20/2021 2146 by Radha Das LPN  Outcome: Ongoing  Note: Patient denies all. Patient is looking forward D/C tomorrow and returning home. Patient understands that he can talk to staff if he begins to feel depressive. Patient is aware that if suicidal ideations increase, he will inform staff.

## 2021-07-21 NOTE — GROUP NOTE
Group Therapy Note    Date: 7/21/2021    Group Start Time: 1100  Group End Time: 1150  Group Topic: Cognitive Skills    JESUS BHCARMEN Robles        Group Therapy Note    Attendees: 6/16         Patient's Goal:  To increase social interaction and to practice decision making and communication skills. Notes: Pt participated fully in group task . Pt was able to practice decision making and communication skills. Pt was pleasant and cooperative with peers. Status After Intervention:  Improved     Participation Level:  Active Listener and Interactive     Participation Quality: Appropriate, Attentive, Sharing and Supportive        Speech:  normal        Thought Process/Content: Logical  Linear        Affective Functioning: Congruent        Mood: euthymic        Level of consciousness:  Alert, Oriented x4 and Attentive        Response to Learning: Able to verbalize current knowledge/experience, Able to verbalize/acknowledge new learning, Able to retain information and Progressing to goal        Endings: None Reported     Modes of Intervention: Education, Support, Socialization, Exploration, Clarifying and Problem-solving        Discipline Responsible: Psychoeducational Specialist        Signature:  Lalitha Gray

## 2021-07-21 NOTE — PROGRESS NOTES
Pharmacy Med Education Group Note    Date: 7/21/21  Start Time: 1430  End Time: 2836    Number Participants in Group:  9    Goal:  Patient will demonstrate an understanding of the medications intended purpose and possible adverse effects  Topic: [place] for Pharmacy Med Ed Group    Discipline Responsible:     OT  AT  [facility].  RT     X Other       Participation Level:     None  Minimal      X Active Listener    X Interactive    Monopolizing         Participation Quality:    X Appropriate  Inappropriate     X       Attentive        Intrusive          Sharing        Resistant          Supportive        Lethargic       Affective:     X Congruent  Incongruent  Blunted  Flat    Constricted  Anxious  Elated  Angry    Labile  Depressed  Other         Cognitive:    X Alert  Oriented PPTP     Concentration   X G  F  P   Attention Span   X G  F  P   Short-Term Memory   X G  F  P   Long-Term Memory  G  F  P   ProblemSolving/  Decision Making  G  F  P   Ability to Process  Information   X G  F  P      Contributing Factors             Delusional             Hallucinating             Flight of Ideas             Other:       Modes of Intervention:    X Education   X Support  Exploration    Clarifying  Problem Solving  Confrontation    Socialization  Limit Setting  Reality Testing    Activity  Movement  Media    Other:            Response to Learning:    X Able to verbalize current knowledge/experience    Able to verbalize/acknowledge new learning    Able to retain information    Capable of insight    Able to change behavior    Progressing to goal    Other:        Comments:     Ovi Cool PharmD, BCPS  7/21/2021 4:12 PM

## 2021-07-21 NOTE — PLAN OF CARE
Problem: Altered Mood, Depressive Behavior:  Goal: Able to verbalize and/or display a decrease in depressive symptoms  Description: Able to verbalize and/or display a decrease in depressive symptoms  Outcome: Ongoing  Goal: Ability to disclose and discuss suicidal ideas will improve  Description: Ability to disclose and discuss suicidal ideas will improve  Outcome: Ongoing  Note: Patient denies thoughts of suicide or self-harm and agreed to seek out staff should negative thoughts arise. Denies hallucinations. Patient is eating and sleeping appropriately. Safe environment maintained. Q15 minute checks for safety continued per unit policy. Will continue to monitor for safety and provide support and reassurance as needed. Problem: Tobacco Use:  Goal: Inpatient tobacco use cessation counseling participation  Description: Inpatient tobacco use cessation counseling participation  Outcome: Ongoing  Note: Patient given tobacco quitline number 95469469432 at this time, refusing to call at this time, states \" I just dont want to quit now\"- patient given information as to the dangers of long term tobacco use. Continue to reinforce the importance of tobacco cessation.

## 2021-07-22 VITALS
OXYGEN SATURATION: 97 % | TEMPERATURE: 98 F | HEIGHT: 74 IN | HEART RATE: 52 BPM | WEIGHT: 205 LBS | DIASTOLIC BLOOD PRESSURE: 52 MMHG | RESPIRATION RATE: 14 BRPM | SYSTOLIC BLOOD PRESSURE: 126 MMHG | BODY MASS INDEX: 26.31 KG/M2

## 2021-07-22 PROCEDURE — 6370000000 HC RX 637 (ALT 250 FOR IP)

## 2021-07-22 PROCEDURE — 99231 SBSQ HOSP IP/OBS SF/LOW 25: CPT | Performed by: INTERNAL MEDICINE

## 2021-07-22 PROCEDURE — 99239 HOSP IP/OBS DSCHRG MGMT >30: CPT | Performed by: PSYCHIATRY & NEUROLOGY

## 2021-07-22 RX ORDER — TRAZODONE HYDROCHLORIDE 150 MG/1
150 TABLET ORAL NIGHTLY PRN
Qty: 30 TABLET | Refills: 0 | Status: ON HOLD | OUTPATIENT
Start: 2021-07-22 | End: 2021-09-21 | Stop reason: SDUPTHER

## 2021-07-22 RX ORDER — PALIPERIDONE 3 MG/1
3 TABLET, EXTENDED RELEASE ORAL DAILY
Qty: 30 TABLET | Refills: 0 | Status: ON HOLD | OUTPATIENT
Start: 2021-07-23 | End: 2021-09-21 | Stop reason: HOSPADM

## 2021-07-22 RX ORDER — ESCITALOPRAM OXALATE 20 MG/1
20 TABLET ORAL DAILY
Qty: 30 TABLET | Refills: 0 | Status: ON HOLD | OUTPATIENT
Start: 2021-07-22 | End: 2021-09-21 | Stop reason: SDUPTHER

## 2021-07-22 RX ADMIN — ESCITALOPRAM OXALATE 20 MG: 20 TABLET ORAL at 08:36

## 2021-07-22 RX ADMIN — PALIPERIDONE 3 MG: 3 TABLET, EXTENDED RELEASE ORAL at 08:36

## 2021-07-22 NOTE — PROGRESS NOTES
[facility]    CONSULTATION / HISTORY AND PHYSICAL EXAMINATION            Date:   7/22/2021  Patient name:  Sherlyn Brown  Date of admission:  7/18/2021  6:11 PM  MRN:   [id number]  Account:  [de-identified]  YOB: 1960  PCP:    No primary care provider on file. Room:   [address] [place] [address]  Code Status:    Full Code    Physician Requesting Consult: [facility], *    Reason for Consult:  medical management    Chief Complaint:     No chief complaint on file.       History Obtained From:     Patient medical record nursing staff    History of Present Illness:   Patient admitted to [facility] floor with major depression, bipolar disorder  Internal medicine consulted for addressing his antibiotics  Patient originally admitted at [facility], complaining of chest pain going on for last 2 months  He underwent chest x-ray on 714, concerning for possible pneumonic changes in medial part of left lower lobe  Patient denying any complaint of shortness of breath, cough  Of note patient was evaluated by cardiologist, while in [facility]  Patient started on antibiotics  History of polysubstance abuse, HIV tested in May of this year which was negative  Patient extremely poor historian  Past Medical History:     Past Medical History:   Diagnosis Date    Bipolar disorder ([address]     Depression     GERD (gastroesophageal reflux disease)     Hallucinations     Headache(784.0)     Hepatitis     Schizophrenia, schizo-affective ([address]     Substance abuse ([address]     Tobacco abuse     Type II or unspecified type diabetes mellitus without mention of complication, not stated as uncontrolled     Urinary incontinence         Past Surgical History:     Past Surgical History:   Procedure Laterality Date    ABSCESS DRAINAGE N/A 02/11/2018    Carla anal abcess    DENTAL SURGERY      all teeth pulled        Medications Prior to Admission:     Prior to Admission medications    Medication Sig Start Date End Date Taking? Authorizing Provider   paliperidone (INVEGA) 3 MG extended release tablet Take 1 tablet by mouth daily 7/23/21  Yes Amber Underwood MD   escitalopram (LEXAPRO) 20 MG tablet Take 1 tablet by mouth daily 7/22/21  Yes Amber Underwood MD   traZODone (DESYREL) 150 MG tablet Take 1 tablet by mouth nightly as needed for Sleep 7/22/21  Yes Amber Underwood MD   paliperidone palmitate ER (Randy Mac) 234 MG/1.5ML [place] IM injection Inject 234 mg into the muscle every 28 days   Yes Historical Provider, MD        Allergies:     Navane [thiothixene]    Social History:     Tobacco:    reports that he has been smoking cigarettes. He has a 47.00 pack-year smoking history. He has never used smokeless tobacco.  Alcohol:      reports current alcohol use. Drug Use:  reports previous drug use. Drug: Cocaine. Family History:     Family History   Problem Relation Age of Onset    Diabetes Mother     Heart Disease Mother        Review of Systems:     Positive and Negative as described in HPI. Poor historian  CONSTITUTIONAL:  negative for fevers, chills, sweats, fatigue, weight loss  HEENT:  negative for vision, hearing changes, runny nose, throat pain  RESPIRATORY:  negative for shortness of breath, cough, congestion, wheezing. CARDIOVASCULAR:  negative for chest pain, palpitations.   GASTROINTESTINAL:  negative for nausea, vomiting, diarrhea, constipation, change in bowel habits, abdominal pain   GENITOURINARY:  negative for difficulty of urination, burning with urination, frequency   INTEGUMENT:  negative for rash, skin lesions, easy bruising   HEMATOLOGIC/LYMPHATIC:  negative for swelling/edema   ALLERGIC/IMMUNOLOGIC:  negative for urticaria , itching  ENDOCRINE:  negative increase in drinking, increase in urination, hot or cold intolerance  MUSCULOSKELETAL:  negative joint pains, muscle aches, swelling of joints  NEUROLOGICAL:  negative for headaches, dizziness, lightheadedness, numbness, pain, tingling extremities  BEHAVIOR/PSYCH:      Physical Exam:     BP (!) 126/52   Pulse 52   Temp 98 °F (36.7 °C) (Oral)   Resp 14   Ht 6' 2\" (1.88 m)   Wt 205 lb (93 kg)   SpO2 97%   BMI 26.32 kg/m²   Temp (24hrs), Av.9 °F (36.6 °C), Min:97.8 °F (36.6 °C), Max:98 °F (36.7 °C)    No results for input(s): POCGLU in the last 72 hours. No intake or output data in the 24 hours ending 21 1633    General Appearance:  alert, well appearing, and in no acute distress  Mental status: oriented to person, place, and time with normal affect  Head:  normocephalic, atraumatic. Eye: no icterus, redness, pupils equal and reactive, extraocular eye movements intact, conjunctiva clear  Ear: normal external ear, no discharge, hearing intact  Nose:  no drainage noted  Mouth: mucous membranes moist  Neck: supple, no carotid bruits, thyroid not palpable  Lungs: Bilateral equal air entry, clear to ausculation, no wheezing, rales or rhonchi, normal effort  Cardiovascular: normal rate, regular rhythm, no murmur, gallop, rub. Abdomen: Soft, nontender, nondistended, normal bowel sounds, no hepatomegaly or splenomegaly  Neurologic: There are no new focal motor or sensory deficits, normal muscle tone and bulk, no abnormal sensation, normal speech, cranial nerves II through XII grossly intact  Skin: No gross lesions, rashes, bruising or bleeding on exposed skin area  Extremities:  peripheral pulses palpable, no pedal edema or calf pain with palpation  Psych: Investigations:      Laboratory Testing:  No results found for this or any previous visit (from the past 24 hour(s)).         Consultations:   IP CONSULT TO INTERNAL MEDICINE  IP CONSULT TO INTERNAL MEDICINE  Assessment :      Primary Problem  Schizoaffective disorder, depressive type [facility])    Active Hospital Problems    Diagnosis Date Noted    Cocaine abuse ([facility] [F14.10] 2014     Priority: Medium    Pneumonia due to infectious organism [J18.9] 07/14/2021    Acute psychosis ([facility] [F23] 03/10/2021    Major depression with psychotic features ([facility] [F32.3] 12/08/2020    Schizoaffective disorder ([facility] [F25.9] 06/05/2020    Schizoaffective disorder, depressive type ([facility] [F25.1] 09/23/2017       Plan:     1. Concern for possible pneumonia in medial aspect of left lower lobe, started on Ceftin, continue total 7 days of antibiotic  2. Patient need to have chest x-ray outpatient in 4 to 6 weeks for resolution of pulmonary shadows  3. We will sign off, please call with questions    7/21  Internal Medicine  reconsulted for abnormal lab  Repeating BMP  Ordering iron studies, patient has chronic anemia  7/22  Iron studies done, negative for iron deficiency anemia  We will sign off, please call with questions    Jerry Melgar MD  7/22/2021  4:33 PM    Copy sent to Dr. Caprice Restrepo primary care provider on file. Please note that this chart was generated using voice recognition Dragon dictation software. Although every effort was made to ensure the accuracy of this automated transcription, some errors in transcription may have occurred.

## 2021-07-22 NOTE — CARE COORDINATION
YOGESH met with patient to verify discharge plans to return to his group home. Patient confirmed he will be returning at discharge to [place] [address]. He already has [facility] appointments scheduled that will be added to his follow up information. YOGESH contacted Lorraine Select at [place]  Keith Espinal to inform of patient's discharge. He instructed for patient to return to the group home by insurance cab because they would not be able to pick him up.

## 2021-07-22 NOTE — PROGRESS NOTES
[facility]    CONSULTATION / HISTORY AND PHYSICAL EXAMINATION            Date:   7/21/2021  Patient name:  Yayo Houston  Date of admission:  7/18/2021  6:11 PM  MRN:   [id number]  Account:  [de-identified]  YOB: 1960  PCP:    No primary care provider on file. Room:   [address] [place] [address]  Code Status:    Full Code    Physician Requesting Consult: [place] Geovanny, *    Reason for Consult:  medical management    Chief Complaint:     No chief complaint on file.       History Obtained From:     Patient medical record nursing staff    History of Present Illness:   Patient admitted to [facility] floor with major depression, bipolar disorder  Internal medicine consulted for addressing his antibiotics  Patient originally admitted at [facility], complaining of chest pain going on for last 2 months  He underwent chest x-ray on 714, concerning for possible pneumonic changes in medial part of left lower lobe  Patient denying any complaint of shortness of breath, cough  Of note patient was evaluated by cardiologist, while in [facility]  Patient started on antibiotics  History of polysubstance abuse, HIV tested in May of this year which was negative  Patient extremely poor historian  Past Medical History:     Past Medical History:   Diagnosis Date    Bipolar disorder ([address]     Depression     GERD (gastroesophageal reflux disease)     Hallucinations     Headache(784.0)     Hepatitis     Schizophrenia, schizo-affective ([address]     Substance abuse ([address]     Tobacco abuse     Type II or unspecified type diabetes mellitus without mention of complication, not stated as uncontrolled     Urinary incontinence         Past Surgical History:     Past Surgical History:   Procedure Laterality Date    ABSCESS DRAINAGE N/A 02/11/2018    Carla anal abcess    DENTAL SURGERY      all teeth pulled        Medications Prior to Admission:     Prior to Admission medications    Medication Sig Start Date End Date Taking? Authorizing Provider   traZODone (DESYREL) 150 MG tablet Take 1 tablet by mouth nightly as needed for Sleep 7/6/21  Yes Aby Gamez MD   escitalopram (LEXAPRO) 20 MG tablet Take 1 tablet by mouth daily 7/6/21  Yes Aby Gamez MD   paliperidone palmitate ER (Ibrahima Robe) 234 MG/1.5ML [place] IM injection Inject 234 mg into the muscle every 28 days   Yes Historical Provider, MD        Allergies:     Navane [thiothixene]    Social History:     Tobacco:    reports that he has been smoking cigarettes. He has a 47.00 pack-year smoking history. He has never used smokeless tobacco.  Alcohol:      reports current alcohol use. Drug Use:  reports previous drug use. Drug: Cocaine. Family History:     Family History   Problem Relation Age of Onset    Diabetes Mother     Heart Disease Mother        Review of Systems:     Positive and Negative as described in HPI. Poor historian  CONSTITUTIONAL:  negative for fevers, chills, sweats, fatigue, weight loss  HEENT:  negative for vision, hearing changes, runny nose, throat pain  RESPIRATORY:  negative for shortness of breath, cough, congestion, wheezing. CARDIOVASCULAR:  negative for chest pain, palpitations.   GASTROINTESTINAL:  negative for nausea, vomiting, diarrhea, constipation, change in bowel habits, abdominal pain   GENITOURINARY:  negative for difficulty of urination, burning with urination, frequency   INTEGUMENT:  negative for rash, skin lesions, easy bruising   HEMATOLOGIC/LYMPHATIC:  negative for swelling/edema   ALLERGIC/IMMUNOLOGIC:  negative for urticaria , itching  ENDOCRINE:  negative increase in drinking, increase in urination, hot or cold intolerance  MUSCULOSKELETAL:  negative joint pains, muscle aches, swelling of joints  NEUROLOGICAL:  negative for headaches, dizziness, lightheadedness, numbness, pain, tingling extremities  BEHAVIOR/PSYCH:      Physical Exam:     /82   Pulse 58   Temp 97.8 °F (36.6 °C) (Oral)   Resp 14   Ht 6' 2\" (1.88 m)   Wt 205 lb (93 kg)   SpO2 97%   BMI 26.32 kg/m²   Temp (24hrs), Av.8 °F (36.6 °C), Min:97.8 °F (36.6 °C), Max:97.8 °F (36.6 °C)    No results for input(s): POCGLU in the last 72 hours. No intake or output data in the 24 hours ending 21    General Appearance:  alert, well appearing, and in no acute distress  Mental status: oriented to person, place, and time with normal affect  Head:  normocephalic, atraumatic. Eye: no icterus, redness, pupils equal and reactive, extraocular eye movements intact, conjunctiva clear  Ear: normal external ear, no discharge, hearing intact  Nose:  no drainage noted  Mouth: mucous membranes moist  Neck: supple, no carotid bruits, thyroid not palpable  Lungs: Bilateral equal air entry, clear to ausculation, no wheezing, rales or rhonchi, normal effort  Cardiovascular: normal rate, regular rhythm, no murmur, gallop, rub. Abdomen: Soft, nontender, nondistended, normal bowel sounds, no hepatomegaly or splenomegaly  Neurologic: There are no new focal motor or sensory deficits, normal muscle tone and bulk, no abnormal sensation, normal speech, cranial nerves II through XII grossly intact  Skin: No gross lesions, rashes, bruising or bleeding on exposed skin area  Extremities:  peripheral pulses palpable, no pedal edema or calf pain with palpation  Psych:      Investigations:      Laboratory Testing:  Recent Results (from the past 24 hour(s))   BASIC METABOLIC PANEL    Collection Time: 21  4:27 PM   Result Value Ref Range    Glucose 105 (H) 70 - 99 mg/dL    BUN 15 8 - 23 mg/dL    CREATININE 1.07 0.70 - 1.20 mg/dL    Bun/Cre Ratio NOT REPORTED 9 - 20    Calcium 9.2 8.6 - 10.4 mg/dL    Sodium 146 (H) 135 - 144 mmol/L    Potassium 4.4 3.7 - 5.3 mmol/L    Chloride 110 (H) 98 - 107 mmol/L    CO2 31 20 - 31 mmol/L    Anion Gap 5 (L) 9 - 17 mmol/L    GFR Non-African American >60 >60 mL/min    GFR African American >60 >60 mL/min    GFR Comment          GFR Staging NOT REPORTED    IRON AND TIBC    Collection Time: 07/21/21  4:27 PM   Result Value Ref Range    Iron 83 59 - 158 ug/dL    TIBC 318 250 - 450 ug/dL    Iron Saturation 26 20 - 55 %    UIBC 235 112 - 347 ug/dL   FERRITIN    Collection Time: 07/21/21  4:27 PM   Result Value Ref Range    Ferritin 94 30 - 400 ug/L           Consultations:   IP CONSULT TO INTERNAL MEDICINE  IP CONSULT TO INTERNAL MEDICINE  Assessment :      Primary Problem  Schizoaffective disorder, depressive type [facility])    Active Hospital Problems    Diagnosis Date Noted    Cocaine abuse ([facility] [F14.10] 05/03/2014     Priority: Medium    Pneumonia due to infectious organism [J18.9] 07/14/2021    Acute psychosis ([facility] [F23] 03/10/2021    Major depression with psychotic features ([facility] [F32.3] 12/08/2020    Schizoaffective disorder ([facility] [F25.9] 06/05/2020    Schizoaffective disorder, depressive type ([facility] [F25.1] 09/23/2017       Plan:     1. Concern for possible pneumonia in medial aspect of left lower lobe, started on Ceftin, continue total 7 days of antibiotic  2. Patient need to have chest x-ray outpatient in 4 to 6 weeks for resolution of pulmonary shadows  3. We will sign off, please call with questions    7/21  Internal Medicine  reconsulted for abnormal lab  Repeating BMP  Ordering iron studies, patient has chronic anemia      Christine Elder MD  7/21/2021  10:32 PM    Copy sent to Dr. Shirley Jaquez primary care provider on file. Please note that this chart was generated using voice recognition Dragon dictation software. Although every effort was made to ensure the accuracy of this automated transcription, some errors in transcription may have occurred.

## 2021-07-22 NOTE — GROUP NOTE
Group Therapy Note    Date: 7/22/2021    Group Start Time: 1430  Group End Time: 8676  Group Topic: Cognitive Skills    JESUS Dickens, GADIELS        Group Therapy Note    Attendees: 7/20       Pt did not participate in Cognitive Skills Group at 1430 when encouraged by RT due to pt was working on being discharged as group ended. Pt was offered talk time as an alternative to group but declined.          Discipline Responsible: Psychoeducational Specialist        Signature:  Ramonita Amezquita

## 2021-07-22 NOTE — BH NOTE
Patient given tobacco quitline number 65009041110 at this time, refusing to call at this time, states \" I just dont want to quit now\"- patient given information as to the dangers of long term tobacco use. Continue to reinforce the importance of tobacco cessation.

## 2021-07-22 NOTE — SUICIDE SAFETY PLAN
SAFETY PLAN    A suicide Safety Plan is a document that supports someone when they are having thoughts of suicide. Warning Signs that indicate a suicidal crisis may be developing: What (situations, thoughts, feelings, body sensations, behaviors, etc.) do you experience that lets you know you are beginning to think about suicide? 1. Go off medications  2. Mood is depressed and start to feel sad, hopeless, helpless, guilty, decline in self-esteem, excess worry, no interest in doing any pleasurable activities, unable to concentrate  3. Begin to cry over the smallest of things  4. Not eating or sleeping as normal  5. Relationship issues start happening  6. I become angry and start a fight  7. When I dont listen or respond to people in a good, positive way  8. Increase drug use      Internal Coping Strategies:  What things can I do (relaxation techniques, hobbies, physical activities, etc.) to take my mind off my problems without contacting another person? 1. Go to hospital discharge appointments and follow-up with community mental health counseling  2. Talk with other people  3. Learn to identify and control your emotions by new ways  4. Think before you speak or act; walk away from the situation  5. Join a support group in person or on Social Media  6. Take a time-out  7. Take deep breaths; use relaxation techniques  8. Get some exercise; go for a walk  9. Read; listen to music; watch a funny movie    10. Coping skills/ strategies  journal/ listen to music/ go for a walk/ read a book/ watch a funny movie/show / crafts / video game   11.  Grounding techniques- eat a sour candy or hot cinnamon candy / focus on colors, sounds, smells, textures on things around you / drink some herbal tea / eat a piece of dark chocolate / take a hot bath or shower / essential oils for smelling / meditate / color / arts and crafts    People whom I can ask for help: Who can I call when I need help - for example, friends, family, clergy, someone else? 1. Family and friends    Professionals or [facility] agencies I can contact during a crisis: Who can I call for help - for example, my doctor, my psychiatrist, my psychologist, a mental health provider, a suicide hotline? 1. [address] [phone]  2. Suicide Prevention Lifeline: 9-237-392-TALK (7850)  3. [facility] Crisis [facility] Team, face-to-face services, call [phone] (6784)  4. [employer]: 2-1-1, 166.282.6949 or 0-136.876.6408  5. [employer] (Crisis Intervention Team - CIT), 717.743.7742 or 9-1-1  6. [address] [place] [address], [phone]  7. National Association of Mental Illness, 8-938.264.6057  8. [facility] Substance Abuse National Helpline, 2-084-295-HELP (6113)  9. Crisis Text Line, Text 4HOPE to 492799 to connect with a crisis counselor  10. [address] [place] [address], 1-374.194.8002  11. [employer] ([place], [address]), 1-840.514.6167  04. [employer] (Alcohol / Drug help)  13. Call the Recovery Helpline at 33 749 544 (24 hours a day - 7 days a week)  14. COVID-19 Emotional Support Line: [address] [facility] - for example, [address], [facility] suicide hotline,   1. [place] [address], 1-844.535.7457  2. [facility] line at 005-753-CARE (8704) for 24/7 to help anyone having a mental crisis or thoughts of self-harm. The Crisis CARE number will also determine in a face-to-face screening needs to be done as well as the safest place. Once this is determined, the Crisis [facility] Team will be sent out to meet with the patient directly if required. 3. For [facility] Crisis Number [phone] ([facility])  4. [facility] at [phone]  5. Tyree [address] at [phone]  6. [facility] [phone]           7.  Darien Mclaughlin, Rae Camilo, [facility] - [phone]  8. Marleny Franko, [address], [address] [phone]  9. Jw Jenkins, [place], [place], [address] [facility] 4-129-018-HOPE (3834)      Making the environment safe: How can I make my environment (house/apartment/living space) safer? For example, can I remove guns, medications, and other items? 1. Remove unsafe objects  2. Keep Medications in safe and secure location  3.  Plan daily goals to help remember to stay on specific medications

## 2021-07-22 NOTE — PLAN OF CARE
Problem: Altered Mood, Depressive Behavior:  Goal: Able to verbalize and/or display a decrease in depressive symptoms  Description: Able to verbalize and/or display a decrease in depressive symptoms  7/21/2021 2203 by Wilmar Mack RN  Outcome: Ongoing     Problem: Altered Mood, Depressive Behavior:  Goal: Ability to disclose and discuss suicidal ideas will improve  Description: Ability to disclose and discuss suicidal ideas will improve  7/21/2021 2203 by Wilmar Mack RN  Outcome: Ongoing     Patient denies feeling depressed, anxious, or having any suicidal or homicidal thoughts. He informed RN that he feels better and is ready to be discharged. Patient spent the shift in his room in bed. Safety precautions in place and Q 15 minute checks completed. Will continue to monitor.

## 2021-07-22 NOTE — CARE COORDINATION
SW contacted Lorraine Osbornmaid  Catherine Wood to obtain scheduled outpatient follow up appointment and confirm patient will still be discharging back to group home via cab.

## 2021-07-22 NOTE — GROUP NOTE
Group Therapy Note    Date: 7/22/2021    Group Start Time: 1100  Group End Time: 2642  Group Topic: Cognitive Skills    JESUS Luke, GADIELS    Pt did not attend 1100 cognitive skills group d/t resting in room despite staff invitation to attend. 1:1 talk time offered as alternative to group session, pt declined.             Signature:  Zeus Harper

## 2021-07-22 NOTE — SUICIDE SAFETY PLAN
SAFETY PLAN    A suicide Safety Plan is a document that supports someone when they are having thoughts of suicide. Warning Signs that indicate a suicidal crisis may be developing: What (situations, thoughts, feelings, body sensations, behaviors, etc.) do you experience that lets you know you are beginning to think about suicide? 1. Isolating self  2. Eating less  3. Sleeping more    Internal Coping Strategies:  What things can I do (relaxation techniques, hobbies, physical activities, etc.) to take my mind off my problems without contacting another person? 1. Listen to the radio  2. Watch tv  3. People and social settings that provide distraction: Who can I call or where can I go to distract me? 1. Name: Vandana alejandro)  Phone: [phone]  2. Name: JERRI alejandro)  Phone: [phone]              People whom I can ask for help: Who can I call when I need help - for example, friends, family, clergy, someone else? 1. Name: Vandana alejandro)                  Phone: [phone]  2. Name: JERRI ENRIQUEZ ()   Phone: [phone]      Professionals or [facility] agencies I can contact during a crisis: Who can I call for help - for example, my doctor, my psychiatrist, my psychologist, a mental health provider, a suicide hotline? 1. Clinician Name: Gisell   Phone: [phone]      Clinician Pager or Emergency Contact #:     2. Clinician Name: Stanley   Phone: [phone]      Clinician Pager or Emergency Contact #: [phone]    4. Suicide Prevention Lifeline: 2-631-242-TALK (5791)    4. [address] [place] [address] Emergency Services -  for example, [facility] suicide hotline, [facility] Hotline: [facility]      Emergency Services Address: [address] [place] [address]      Emergency Services Phone: [phone]      Making the environment safe: How can I make my environment (house/apartment/living space) safer?  For example, can I remove guns, medications, and other items? 1. No drugs in the house  2.

## 2021-07-22 NOTE — GROUP NOTE
Group Therapy Note    Date: 7/22/2021    Group Start Time: 1000  Group End Time: 1807  Group Topic: Psychotherapy    JESUS Severino        Group Therapy Note             Patient refused to attend psychotherapy group after encouragement from staff. 1:1 talk time offered but refused. Signature:   Abdoulaye Severino

## 2021-07-22 NOTE — BH NOTE
DISCHARGE PLAN:  [facility] FACT team pleaser notify when admitted [phone] has been advised regarding his discharge.

## 2021-07-22 NOTE — BH NOTE
37 05/09/2015    TRIG 72 08/20/2014    TRIG 52 12/31/2013    TRIG 70 08/15/2013    TRIG 117 09/17/2012    TRIG 94 02/03/2012     Lab Results   Component Value Date    HDL 74 08/11/2020    HDL 73 02/13/2017    HDL 57 05/09/2015    HDL 50 08/20/2014    HDL 43 12/31/2013    HDL 42 08/15/2013    HDL 38 (L) 09/17/2012    HDL 55 02/03/2012     No components found for: LDLCAL  No results found for: Colt Engel LPN

## 2021-07-23 NOTE — DISCHARGE SUMMARY
Provider Discharge Summary     Patient ID:  Sherlyn Brown  [id number]  27 y.o.  1960    Admit date: 7/18/2021    Discharge date and time: 7/22/2021  10:34 PM     Admitting Physician: Pilar Granger MD     Discharge Physician: Pilar Granger MD    Admission Diagnoses: Schizoaffective disorder [facility]) [F25.9]    Discharge Diagnoses:      Schizoaffective disorder, depressive type [facility])     Patient Active Problem List   Diagnosis Code    Hematuria R31.9    Suicidal ideations R45.851    Cocaine abuse ([address] F14.10    Schizoaffective disorder, bipolar type ([address] F25.0    Schizoaffective disorder, depressive type ([address] F25.1    Perianal abscess K61.0    Carla-rectal abscess K61.1    Schizophrenia ([address] F20.9    Schizoaffective disorder ([address] F25.9    Major depression with psychotic features ([address] F32.3    Acute psychosis ([address] F23    Depression with suicidal ideation F32.9, R45.851    Pneumonia due to infectious organism J18.9    Smoker F17.200    Ventricular ectopy I49.3    Low serum cortisol level (HCC) E27.40    Sepsis due to Klebsiella pneumoniae with no resultant organ failure ([address] A41.4        Admission Condition: poor    Discharged Condition: stable    Indication for Admission: threat to self    History of Present Illnes (present tense wording is of findings from admission exam and are not necessarily indicative of current findings):   Sherlyn Brown is a 57 y.o. male with significant past medical history of schizoaffective disorder, cocaine use disorder, pneumonia who presented to the ED with suicidal ideations as voices were telling him to harm self. Upon physical assessment in the ED, it was determined that patient had pneumonia and was admitted to progressive care for stabilization of pneumonia.   Upon medically cleared, patient was transferred to [facility] for stabilization of auditory hallucinations.      Today, since arrival to the unit, patient has been behavior controlled, has not required the use of any PRN medications for agitation or anxiety.      Today, patient was interviewed in [facility] 19, patient was cooperative with writer. He states that he came to the ED for increased auditory hallucinations commanding him to kill himself. Patient states that he has had these in the past, and was recently d/c from the [facility] on 7/6/2021. A chest xray in the ED revealed that the patient had pneumonia, and he was admitted to the medical unit for stabilization of symptoms.      Patient is well known to staff and physicians at the [facility], he is known to have schizoaffective disorder, acute psychosis, suicidal ideation who presented to the ED with a chief complaint of suicidal ideation.     Today, patient is discharged focused, stating that the voices are not as intense as they had been when he first arrived in the ED. He is endorsing wanting to go home.      Patient is able to contract for safety while on the unit. Hospital Course:   Upon admission, Lili Meraz was provided a safe secure environment, introduced to unit milieu. Patient participated in groups and individual therapies. Meds were adjusted as noted below. After few days of hospital care, patient began to feel improvement. Depression lifted, thoughts to harm self ceased. Sleep improved, appetite was good. On morning rounds 7/22/2021, Lili Meraz endorses feeling ready for discharge. Patient denies suicidal or homicidal ideations, denies hallucinations or delusions. Denies SE's from meds. It was decided that maximum benefit from hospital care had been achieved and patient can be discharged. Consults:   none    Significant Diagnostic Studies: Routine labs and diagnostics    Treatments: Psychotropic medications, therapy with group, milieu, and 1:1 with nurses, social workers, PA-C/CNP, and Attending physician.       Discharge Medications:  Discharge Medication List as of 7/22/2021  3:42 PM      START taking these medications    Details   paliperidone (INVEGA) 3 MG extended release tablet Take 1 tablet by mouth daily, Disp-30 tablet, R-0Normal         CONTINUE these medications which have CHANGED    Details   escitalopram (LEXAPRO) 20 MG tablet Take 1 tablet by mouth daily, Disp-30 tablet, R-0Normal      traZODone (DESYREL) 150 MG tablet Take 1 tablet by mouth nightly as needed for Sleep, Disp-30 tablet, R-0Normal         CONTINUE these medications which have NOT CHANGED    Details   paliperidone palmitate ER (INVEGA SUSTENNA) 234 MG/1.5ML [place] IM injection Inject 234 mg into the muscle every 28 daysHistorical Med              Core Measures statement:   Not applicable    Discharge Exam:  Level of consciousness:  Within normal limits  Appearance: Street clothes, seated, with good grooming  Behavior/Motor: No abnormalities noted  Attitude toward examiner:  Cooperative, attentive, good eye contact  Speech:  spontaneous, normal rate, normal volume and well articulated  Mood:  euthymic  Affect:  Full range  Thought processes:  linear, goal directed and coherent  Thought content:  denies homicidal ideation  Suicidal Ideation:  denies suicidal ideation  Delusions:  no evidence of delusions  Perceptual Disturbance:  denies any perceptual disturbance  Cognition:  Intact  Memory: age appropriate  Insight & Judgement: fair  Medication side effects: denies     Disposition: home    Patient Instructions: Activity: activity as tolerated  1. Patient instructed to take medications regularly and follow up with outpatient appointments. Follow-up as scheduled with MICHEL       Signed:    Electronically signed by Luanne Peabody, MD on 7/22/21 at 10:34 PM EDT    Time Spent on discharge is more than 35 minutes in the examination, evaluation, counseling and review of medications and discharge plan.

## 2021-07-26 NOTE — DISCHARGE SUMMARY
[place] [address] Internal Medicine    Discharge Summary     Patient ID: Heather Tineo  :  1960   MRN: [id number]     ACCOUNT:  [de-identified]   Patient's PCP: No primary care provider on file. Admit Date: 2021   Discharge Date: 2021   Length of Stay: 4  Code Status:  Prior  Admitting Physician: Lyndon Enamorado MD  Discharge Physician: Heavenly Franco MD     Active Discharge Diagnoses:     Primary Problem  Pneumonia due to infectious organism      [place] Problems    Diagnosis Date Noted    Smoker [F17.200] 07/15/2021    Ventricular ectopy [I49.3] 07/15/2021    Low serum cortisol level ([address] [E27.40] 07/15/2021    Pneumonia due to infectious organism [J18.9] 2021    Major depression with psychotic features ([address] [F32.3] 2020    Schizoaffective disorder, bipolar type ([address] [F25.0] 2017    Suicidal ideations [R45.851] 2013       Admission Condition:  fair     Discharged Condition: good    Hospital Stay:     Hospital Course:  Heather Tineo is a 57 y.o. male who was admitted for the management of   .     , presented with Mental Health Problem      ,                         Pneumonia due to infectious organism;                               Principal Problem:    Pneumonia due to infectious organism  Active Problems:    Suicidal ideations    Schizoaffective disorder, bipolar type ([address]    Major depression with psychotic features ([address]    Smoker    Ventricular ectopy    Low serum cortisol level ([address]  Resolved Problems:    * No resolved hospital problems. *       Significant therapeutic interventions:        21     · Doing well today  · Afebrile  · Vital stable  · Eating well 1. Patient claims that he does not want to go to [facility] at present  2.  Patient  Psych evaluation 21     DIAGNOSIS:  -Schizoaffective disorder,  -Cocaine use disorder     RECOMMENDATIONS     Risk Management:  suicide risk     Admit to psychiatry once medically cleared      4.   5.            Patient is medically stable and cleared for transfer to [facility]      7/17/21     · Patient is clinical course is improving 6. Psych consult noted  7. Patient will be transferred to [facility] once medically stable per febrile with there  8. Continue to monitor  9. Continue present therapy         zithromax stopped due to interaction with zyprexa  on rocephin      Psych input ;     PLAN  We will continue to follow on the unit.  The patient is likely to require admission to psychiatry. The patient should continue to have a sitter  Invega 3 mg daily added. Shalini Damon can continue [place] when it is next. Attempt to develop insight  Psycho-education conducted. Supportive Therapy conducted.      On admission   he patient is a 58 y. o.  male, with a history of bipolar disorder, GERD, suicidal ideations, schizophrenia, polysubstance abuse, tobacco abuse. Tammie Galeana presents from group home with suicidal ideations and hallucinations.  Patient states the voices in his head are telling him to get a gun and shoot himself.  No homicidal ideations. Tammie Galeana also states he has intermittent left-sided chest pain for the past 2 months.  Denies cough, shortness of breath, fever or chills.  No abdominal pain nausea or vomiting.     HPI   1) Location/Symptom suicidal, left-sided chest pain  2) Timing/Onset: Suicidal ideations today, intermittent left-sided chest pain x2 months  3) Context/Setting: Resides a group home, history of bipolar disorder, auditory hallucinations  4) Quality: Aching pain  5) Duration: continuous   6) Modifying Factors: No aggravating or alleviating factors  7) Severity: moderate                     Significant Diagnostic Studies:   Labs / Micro:       Results for orders placed or performed during the hospital encounter of 07/14/21   COVID-19, Rapid    Specimen: Nasopharyngeal Swab   Result Value Ref Range    Specimen Description . NASOPHARYNGEAL SWAB SARS-CoV-2, Rapid Not Detected Not Detected   Culture, Blood 1    Specimen: Blood   Result Value Ref Range    Specimen Description . BLOOD  GRN ORG VOLUMN UNK R ARM     Special Requests NOT REPORTED     Culture NO GROWTH 6 DAYS    Culture, Blood 1    Specimen: Blood   Result Value Ref Range    Specimen Description . BLOOD  GRN 10ML ORG 10ML R ARM     Special Requests NOT REPORTED     Culture NO GROWTH 6 DAYS    CBC with DIFF   Result Value Ref Range    WBC 4.6 3.5 - 11.0 k/uL    RBC 3.98 (L) 4.5 - 5.9 m/uL    Hemoglobin 11.3 (L) 13.5 - 17.5 g/dL    Hematocrit 35.5 (L) 41 - 53 %    MCV 89.2 80 - 100 fL    MCH 28.3 26 - 34 pg    MCHC 31.8 31 - 37 g/dL    RDW 14.3 11.5 - 14.9 %    Platelets 780 531 - 790 k/uL    MPV 7.3 6.0 - 12.0 fL    NRBC Automated NOT REPORTED per 100 WBC    Differential Type NOT REPORTED     Immature Granulocytes NOT REPORTED 0 %    Absolute Immature Granulocyte NOT REPORTED 0.00 - 0.30 k/uL    WBC Morphology NOT REPORTED     RBC Morphology NOT REPORTED     Platelet Estimate NOT REPORTED     Seg Neutrophils 38 36 - 66 %    Lymphocytes 50 (H) 24 - 44 %    Monocytes 5 1 - 7 %    Eosinophils % 6 (H) 0 - 4 %    Basophils 1 0 - 2 %    Segs Absolute 1.75 1.3 - 9.1 k/uL    Absolute Lymph # 2.29 1.0 - 4.8 k/uL    Absolute Mono # 0.23 0.1 - 1.3 k/uL    Absolute Eos # 0.28 0.0 - 0.4 k/uL    Basophils Absolute 0.05 0.0 - 0.2 k/uL    Morphology ANISOCYTOSIS PRESENT     Morphology POLYCHROMASIA    Comprehensive Metabolic Panel   Result Value Ref Range    Glucose 100 (H) 70 - 99 mg/dL    BUN 15 8 - 23 mg/dL    CREATININE 1.14 0.70 - 1.20 mg/dL    Bun/Cre Ratio NOT REPORTED 9 - 20    Calcium 9.3 8.6 - 10.4 mg/dL    Sodium 141 135 - 144 mmol/L    Potassium 4.2 3.7 - 5.3 mmol/L    Chloride 107 98 - 107 mmol/L    CO2 26 20 - 31 mmol/L    Anion Gap 8 (L) 9 - 17 mmol/L    Alkaline Phosphatase 97 40 - 129 U/L    ALT 11 5 - 41 U/L    AST 12 <40 U/L    Total Bilirubin 0.22 (L) 0.3 - 1.2 mg/dL    Total Protein 6.4 6.4 - 8.3 g/dL    Albumin 3.9 3.5 - 5.2 g/dL    Albumin/Globulin Ratio NOT REPORTED 1.0 - 2.5    GFR Non-African American >60 >60 mL/min    GFR African American >60 >60 mL/min    GFR Comment          GFR Staging NOT REPORTED    Lactic Acid   Result Value Ref Range    Lactic Acid 0.9 0.5 - 2.2 mmol/L   Urinalysis Reflex to Culture    Specimen: Urine, clean catch   Result Value Ref Range    Color, UA YELLOW YELLOW    Turbidity UA CLEAR CLEAR    Glucose, Ur NEGATIVE NEGATIVE    Bilirubin Urine NEGATIVE NEGATIVE    Ketones, Urine NEGATIVE NEGATIVE    Specific [place], UA 1.016 1.000 - 1.030    Urine Hgb NEGATIVE NEGATIVE    pH, UA 6.0 5.0 - 8.0    Protein, UA NEGATIVE NEGATIVE    Urobilinogen, Urine Normal Normal    Nitrite, Urine NEGATIVE NEGATIVE    Leukocyte Esterase, Urine NEGATIVE NEGATIVE    Urinalysis Comments       Microscopic exam not performed based on chemical results unless requested in original order. Ethanol   Result Value Ref Range    Ethanol <10 <10 mg/dL    Ethanol percent <0.010 %   Drug screen multi urine   Result Value Ref Range    Amphetamine Screen, Ur NEGATIVE NEGATIVE    Barbiturate Screen, Ur NEGATIVE NEGATIVE    Benzodiazepine Screen, Urine NEGATIVE NEGATIVE    Cocaine Metabolite, Urine NEGATIVE NEGATIVE    Methadone Screen, Urine NEGATIVE NEGATIVE    Opiates, Urine NEGATIVE NEGATIVE    Phencyclidine, Urine NEGATIVE NEGATIVE    Propoxyphene, Urine NOT REPORTED NEGATIVE    Cannabinoid Scrn, Ur NEGATIVE NEGATIVE    Oxycodone Screen, Ur NEGATIVE NEGATIVE    Methamphetamine, Urine NOT REPORTED NEGATIVE    Tricyclic Antidepressants, Urine NOT REPORTED NEGATIVE    MDMA, Urine NOT REPORTED NEGATIVE    Buprenorphine Urine NOT REPORTED NEGATIVE    Test Information       Assay provides medical screening only. The absence of expected drug(s) and/or metabolite(s) may indicate diluted or adulterated urine, limitations of testing or timing of collection.    Acetaminophen level   Result Value Ref Range Acetaminophen Level <5 (L) 10 - 30 ug/mL   Salicylate   Result Value Ref Range    Salicylate Lvl <1 (L) 3 - 10 mg/dL   CORTISOL   Result Value Ref Range    Cortisol 2.2 (L) 2.7 - 18.4 ug/dL    Cortisol Collection Info UNKNOWN    Troponin   Result Value Ref Range    Troponin, High Sensitivity <6 0 - 22 ng/L    Troponin T NOT REPORTED <0.03 ng/mL    Troponin Interp NOT REPORTED    Basic Metabolic Panel w/ Reflex to MG   Result Value Ref Range    Glucose 140 (H) 70 - 99 mg/dL    BUN 13 8 - 23 mg/dL    CREATININE 1.23 (H) 0.70 - 1.20 mg/dL    Bun/Cre Ratio NOT REPORTED 9 - 20    Calcium 8.4 (L) 8.6 - 10.4 mg/dL    Sodium 143 135 - 144 mmol/L    Potassium 4.2 3.7 - 5.3 mmol/L    Chloride 110 (H) 98 - 107 mmol/L    CO2 27 20 - 31 mmol/L    Anion Gap 6 (L) 9 - 17 mmol/L    GFR Non-African American 60 (L) >60 mL/min    GFR African American >60 >60 mL/min    GFR Comment          GFR Staging NOT REPORTED    CBC   Result Value Ref Range    WBC 4.1 3.5 - 11.0 k/uL    RBC 3.78 (L) 4.5 - 5.9 m/uL    Hemoglobin 10.9 (L) 13.5 - 17.5 g/dL    Hematocrit 34.0 (L) 41 - 53 %    MCV 89.9 80 - 100 fL    MCH 28.8 26 - 34 pg    MCHC 32.0 31 - 37 g/dL    RDW 13.9 11.5 - 14.9 %    Platelets 493 672 - 337 k/uL    MPV 7.2 6.0 - 12.0 fL    NRBC Automated NOT REPORTED per 100 WBC   TROP/MYOGLOBIN   Result Value Ref Range    Troponin, High Sensitivity <6 0 - 22 ng/L    Troponin T NOT REPORTED <0.03 ng/mL    Troponin Interp NOT REPORTED     Myoglobin 60 28 - 72 ng/mL   TROP/MYOGLOBIN   Result Value Ref Range    Troponin, High Sensitivity <6 0 - 22 ng/L    Troponin T NOT REPORTED <0.03 ng/mL    Troponin Interp NOT REPORTED     Myoglobin 82 (H) 28 - 72 ng/mL   TROP/MYOGLOBIN   Result Value Ref Range    Troponin, High Sensitivity 7 0 - 22 ng/L    Troponin T NOT REPORTED <0.03 ng/mL    Troponin Interp NOT REPORTED     Myoglobin <21 (L) 28 - 72 ng/mL   Basic Metabolic Panel w/ Reflex to MG   Result Value Ref Range    Glucose 96 70 - 99 mg/dL    BUN 13 8 - 23 mg/dL    CREATININE 1.09 0.70 - 1.20 mg/dL    Bun/Cre Ratio NOT REPORTED 9 - 20    Calcium 8.6 8.6 - 10.4 mg/dL    Sodium 145 (H) 135 - 144 mmol/L    Potassium 4.0 3.7 - 5.3 mmol/L    Chloride 113 (H) 98 - 107 mmol/L    CO2 26 20 - 31 mmol/L    Anion Gap 6 (L) 9 - 17 mmol/L    GFR Non-African American >60 >60 mL/min    GFR African American >60 >60 mL/min    GFR Comment          GFR Staging NOT REPORTED    CBC   Result Value Ref Range    WBC 4.3 3.5 - 11.0 k/uL    RBC 3.46 (L) 4.5 - 5.9 m/uL    Hemoglobin 10.1 (L) 13.5 - 17.5 g/dL    Hematocrit 31.0 (L) 41 - 53 %    MCV 89.6 80 - 100 fL    MCH 29.2 26 - 34 pg    MCHC 32.6 31 - 37 g/dL    RDW 14.5 11.5 - 14.9 %    Platelets 846 744 - 337 k/uL    MPV 7.2 6.0 - 12.0 fL    NRBC Automated NOT REPORTED per 100 WBC   MAGNESIUM   Result Value Ref Range    Magnesium 2.0 1.6 - 2.6 mg/dL   Cortisol, 0 minutes   Result Value Ref Range    Cortisol 3.9 2.7 - 18.4 ug/dL    Cortisol Collection Info NOT REPORTED    Cortisol, 30 minutes   Result Value Ref Range    Cortisol 13.0 2.7 - 18.4 ug/dL    Cortisol Collection Info NOT REPORTED    Cortisol, 60 minutes   Result Value Ref Range    Cortisol 14.7 2.7 - 18.4 ug/dL    Cortisol Collection Info NOT REPORTED    Basic Metabolic Panel w/ Reflex to MG   Result Value Ref Range    Glucose 90 70 - 99 mg/dL    BUN 8 8 - 23 mg/dL    CREATININE 1.10 0.70 - 1.20 mg/dL    Bun/Cre Ratio NOT REPORTED 9 - 20    Calcium 8.5 (L) 8.6 - 10.4 mg/dL    Sodium 149 (H) 135 - 144 mmol/L    Potassium 4.4 3.7 - 5.3 mmol/L    Chloride 115 (H) 98 - 107 mmol/L    CO2 27 20 - 31 mmol/L    Anion Gap 7 (L) 9 - 17 mmol/L    GFR Non-African American >60 >60 mL/min    GFR African American >60 >60 mL/min    GFR Comment          GFR Staging NOT REPORTED    CBC   Result Value Ref Range    WBC 4.7 3.5 - 11.0 k/uL    RBC 3.72 (L) 4.5 - 5.9 m/uL    Hemoglobin 10.6 (L) 13.5 - 17.5 g/dL    Hematocrit 33.0 (L) 41 - 53 %    MCV 88.9 80 - 100 fL    MCH 28.5 26 - 34 pg    MCHC Referring Physician           Richard Fraga  Type of Study   TTE procedure:2D Echocardiogram, M-Mode, Doppler, Color Doppler. Procedure Date Date: 07/15/2021 Start: 11:40 AM Study Location: [address] [place] [address] Technical Quality: Fair visualization Indications:Premature ventricular contraction. Patient Status: Inpatient Rhythm: Within normal limits HR: 70 bpm BP: 140/85 mmHg CONCLUSIONS Summary Normal left ventricle size, wall thickness and function with an estimated EF > 55%. No segmental wall motion abnormalities seen. Left atrium is mildly dilated. Mild mitral regurgitation. Signature ----------------------------------------------------------------------------  Electronically signed by Portia Colindres(Sonographer) on 07/15/2021 02:28  PM ---------------------------------------------------------------------------- ----------------------------------------------------------------------------  Electronically signed by Jayson Decker(Interpreting physician) on 07/15/2021  07:46 PM ---------------------------------------------------------------------------- FINDINGS Left Atrium Left atrium is mildly dilated. Left Ventricle Normal left ventricle size, wall thickness and function with an estimated EF > 55%. No segmental wall motion abnormalities seen. Right Atrium Right atrium is normal in size. Right Ventricle Normal right ventricular size and function. Mitral Valve No obvious valvular abnormality seen. Mild mitral regurgitation. Aortic Valve No obvious valvular abnormality seen. No evidence of aortic insufficiency or stenosis. Tricuspid Valve No obvious valvular abnormality seen. Insignificant tricuspid regurgitation, unable to estimate RVSP. Pulmonic Valve Pulmonic valve was not well visualized. No evidence of pulmonic insufficiency or stenosis. Pericardial Effusion No significant pericardial effusion is seen. Pleural Effusion No pleural effusion seen. Miscellaneous Normal aortic root dimension.  M-mode / conjunction with any daily progress note from day of discharge. Discharge plan:     Disposition: Discharge/Readmit    Physician Follow Up: With PCP and as specified     Requiring Further Evaluation/Follow Up POST HOSPITALIZATION/Incidental Findings:    Diet: regular     Activity: As tolerated    I  Discharge Medications:      Medication List      ASK your doctor about these medications    hydrOXYzine 50 MG tablet  Commonly known as: ATARAX  Take 1 tablet by mouth 3 times daily as needed for Anxiety  Ask about: Should I take this medication? Jasvir Rodríguez Sustenna 234 MG/1.5ML [place] IM injection  Generic drug: paliperidone palmitate ER              Time spent on discharge planning ;          [] less than 30 minutes . [x]   more  than 30 minutes . Ellectronically signed by   Kevin Cr MD      Thank you Dr. Patricia Pruett primary care provider on file. for the opportunity to be involved in this patient's care. Please note that this chart was generated using voice recognition Dragon dictation software. Although every effort was made to ensure the accuracy of this automated transcription, some errors in transcription may have occurred.

## 2021-08-04 ENCOUNTER — HOSPITAL ENCOUNTER (EMERGENCY)
Age: 61
Discharge: HOME | End: 2021-08-04
Attending: EMERGENCY MEDICINE
Payer: MEDICAID

## 2021-08-04 VITALS
HEART RATE: 86 BPM | RESPIRATION RATE: 16 BRPM | SYSTOLIC BLOOD PRESSURE: 98 MMHG | HEIGHT: 74 IN | DIASTOLIC BLOOD PRESSURE: 64 MMHG | WEIGHT: 205 LBS | BODY MASS INDEX: 26.31 KG/M2 | OXYGEN SATURATION: 98 % | TEMPERATURE: 98.1 F

## 2021-08-04 DIAGNOSIS — R44.3 HALLUCINATIONS: Primary | ICD-10-CM

## 2021-08-04 PROCEDURE — 99283 EMERGENCY DEPT VISIT LOW MDM: CPT

## 2021-08-04 ASSESSMENT — ENCOUNTER SYMPTOMS
EYE PAIN: 0
NAUSEA: 0
EYE REDNESS: 0
DIARRHEA: 0
BLOOD IN STOOL: 0
CONSTIPATION: 0
ABDOMINAL PAIN: 0
FACIAL SWELLING: 0
SORE THROAT: 0
EYE DISCHARGE: 0
COLOR CHANGE: 0
BACK PAIN: 0
WHEEZING: 0
SINUS PRESSURE: 0
SHORTNESS OF BREATH: 0
CHEST TIGHTNESS: 0
TROUBLE SWALLOWING: 0
VOMITING: 0
COUGH: 0
RHINORRHEA: 0

## 2021-08-04 NOTE — ED NOTES
Provisional Diagnosis:     Patient presented to ED for a mental health evaluation. Patient has history of schizoaffective disorder. Psychosocial and Contextual Factors:     Patient has history of frequent ED visits. C-SSRS Summary:    Patient denies current SI    Patient: X  Family:   Agency:     Substance Abuse:  Patient has history of crack cocaine usage but reports he has been sober for [employer] a month\". Present Suicidal Behavior:    Patient denies current SI    Verbal:     Attempt:    Past Suicidal Behavior:   Patient has history of SI but no previous attempts. Verbal:    Attempt:    Self-Injurious/Self-Mutilation:  Patient denies    Trauma Identified:    Patient's mother passed away a few months ago    Protective Factors:    Patient has stable housing in group home. Patient linked with [facility]. Patient has insurance. Patient has stable income. Risk Factors:    Patient has poor insight. Patient has history of crack cocaine use but reports he has been sober for about a nancy. Clinical Summary:    Patient is a 57year old  male who presented to ED because he has been hearing voices. Patient stated they started up last night and they were telling him to \"go to the kitchen, get a knife and kill myself\". Patient reports he does not want to hurt himself and he denies current SI. Patient stated he \"just wants the voices to stop\". Patient denies HI. Patient reports he is taking his medications and he follows up with [facility]. He indicates he has an appointment coming up \"soon\" but he is not certain when. Patient was just discharged from the [facility] on 7/22/21. Patient was asked what has changed since his last admission, patient stated nothing the \"voices just come and go\". Patient said overall he is \"doing good right now\". Level of Care Disposition: This writer consulted with Sarah Luong and Dr Dago Fonseca who indicated patient is not a danger to himself at this moment. Patient has 24/7 staff in his group home.   Patient should be discharged home with a safety plan per recommendation of NP and psychiatrist.

## 2021-08-05 NOTE — ED PROVIDER NOTES
[address] ED  EMERGENCY DEPARTMENT ENCOUNTER      Pt Name: Yonas Chi  MRN: [id number]  [place] 1960  Date of evaluation: 8/4/21      CHIEF COMPLAINT       Chief Complaint   Patient presents with    Mental Health Problem     SI with intent to cut himself with a knife; previous attempts one year ago; reports that he is hearing auditory hallucinations that inform him that he \"needs to die. \" Linked with [facility]. Denies medical complaints. No further information. HISTORY OF PRESENT ILLNESS    Yonas Chi is a 57 y.o. male who presents complaining of Psychiatric Evaluation. Patient is here stating that last night he started hearing auditory voices telling him that he needed to go into the kitchen get a knife and kill himself. Patient states that he does take medication for mental illness has been taking them like he supposed to. Patient denies any drug or alcohol abuse. Patient states that he does not want to kill himself but he is just concerned with the voices. Patient was just recently admitted to the [facility]. Patient does follow with [facility]. REVIEW OF SYSTEMS       Review of Systems   Constitutional: Negative for activity change, appetite change, chills, diaphoresis and fever. HENT: Negative for congestion, ear pain, facial swelling, nosebleeds, rhinorrhea, sinus pressure, sore throat and trouble swallowing. Eyes: Negative for pain, discharge and redness. Respiratory: Negative for cough, chest tightness, shortness of breath and wheezing. Cardiovascular: Negative for chest pain, palpitations and leg swelling. Gastrointestinal: Negative for abdominal pain, blood in stool, constipation, diarrhea, nausea and vomiting. Genitourinary: Negative for difficulty urinating, dysuria, flank pain, frequency, genital sores and hematuria. Musculoskeletal: Negative for arthralgias, back pain, gait problem, joint swelling, myalgias and neck pain.    Skin: Negative for color change, kg/m²      Physical Exam  Constitutional:       General: He is not in acute distress. Appearance: He is well-developed. He is not diaphoretic. HENT:      Head: Normocephalic and atraumatic. Eyes:      General: No scleral icterus. Right eye: No discharge. Left eye: No discharge. Conjunctiva/sclera: Conjunctivae normal.      Pupils: Pupils are equal, round, and reactive to light. Cardiovascular:      Rate and Rhythm: Normal rate and regular rhythm. Heart sounds: Normal heart sounds. No murmur heard. No friction rub. No gallop. Pulmonary:      Effort: Pulmonary effort is normal. No respiratory distress. Breath sounds: Normal breath sounds. No wheezing or rales. Neurological:      Mental Status: He is alert and oriented to person, place, and time. Psychiatric:         Mood and Affect: Mood and affect normal.         Speech: Speech normal.         Behavior: Behavior is withdrawn. Thought Content: Thought content does not include homicidal or suicidal ideation. DIAGNOSTIC RESULTS     LABS: All lab results were reviewed by myself, and all abnormals are listed below. Labs Reviewed - No data to display      MEDICAL DECISION MAKING:     Patient just had recent labs done because of his admission therefore I do not believe he needs any further testing. Patient does not want to hurt himself but is having the voices so we will just consult with a psychiatrist and see what they want to do with him. EMERGENCY DEPARTMENT COURSE:   Vitals:    Vitals:    08/04/21 1947   BP: 98/64   Pulse: 86   Resp: 16   Temp: 98.1 °F (36.7 °C)   SpO2: 98%   Weight: 205 lb (93 kg)   Height: 6' 2\" (1.88 m)       The patient was given the following medications while in the emergency department:  No orders of the defined types were placed in this encounter.       -------------------------  9:18 PM EDT  Patient was evaluated psychiatry was consulted and they do not believe the patient needs to be admitted so we will go ahead and discharge him to his mental health facility. FINAL IMPRESSION      1.  Hallucinations          DISPOSITION/PLAN   DISPOSITION Decision To Discharge 08/04/2021 09:18:26 PM      PATIENT REFERREDTO:  [facility] ED  [facility] [address]  [phone]    If symptoms worsen      DISCHARGEMEDICATIONS:  Current Discharge Medication List          (Please note that portions of this note were completed with a voice recognition program.  Efforts were made to edit thedictations but occasionally words are mis-transcribed.)    Parks Duverney, MD  Attending Emergency Physician                     Parks Duverney, MD  08/04/21 2888

## 2021-08-12 ENCOUNTER — HOSPITAL ENCOUNTER (EMERGENCY)
Age: 61
Discharge: HOME | End: 2021-08-12
Attending: EMERGENCY MEDICINE
Payer: MEDICAID

## 2021-08-12 VITALS
RESPIRATION RATE: 15 BRPM | SYSTOLIC BLOOD PRESSURE: 132 MMHG | OXYGEN SATURATION: 99 % | HEART RATE: 78 BPM | DIASTOLIC BLOOD PRESSURE: 78 MMHG | TEMPERATURE: 97.7 F

## 2021-08-12 DIAGNOSIS — R45.851 SUICIDAL IDEATION: Primary | ICD-10-CM

## 2021-08-12 DIAGNOSIS — F20.9 SCHIZOPHRENIA, UNSPECIFIED TYPE (HCC): ICD-10-CM

## 2021-08-12 PROCEDURE — 99282 EMERGENCY DEPT VISIT SF MDM: CPT

## 2021-08-12 PROCEDURE — 6370000000 HC RX 637 (ALT 250 FOR IP): Performed by: HEALTH CARE PROVIDER

## 2021-08-12 RX ORDER — ONDANSETRON 4 MG/1
4 TABLET, ORALLY DISINTEGRATING ORAL ONCE
Status: COMPLETED | OUTPATIENT
Start: 2021-08-12 | End: 2021-08-12

## 2021-08-12 RX ADMIN — ONDANSETRON 4 MG: 4 TABLET, ORALLY DISINTEGRATING ORAL at 08:54

## 2021-08-12 ASSESSMENT — ENCOUNTER SYMPTOMS
SHORTNESS OF BREATH: 0
VOMITING: 1
NAUSEA: 1
TROUBLE SWALLOWING: 0

## 2021-08-12 NOTE — ED PROVIDER NOTES
[address]  ED  Emergency Department Encounter  Emergency Medicine Resident     Pt Name: Georgia Field  MRN: [id number]  [place] 1960  Date of evaluation: 8/12/21  PCP:  No primary care provider on file. CHIEF COMPLAINT       Chief Complaint   Patient presents with    Suicidal    Emesis       HISTORY OFPRESENT ILLNESS  (Location/Symptom, Timing/Onset, Context/Setting, Quality, Duration, Modifying Factors,Severity.)      Georgia Field is a 57 y.o. male with history of depression and schizophrenia who presents with suicidal ideations. [facility] and discharged after symptoms improved. Patient states that the voices have returned and are telling him to kill himself. Patient also states that he just was to kill himself to stop the voices in his head. He has no plan. Patient also complaining of nausea and emesis x2 this morning. Denies any nadja abdominal pain, chest pain, shortness of breath, hematemesis, diarrhea, melena, dysuria. Patient does have an extensive history of hospitalizations for suicidal ideations and auditory hallucinations telling him to kill himself. Most recent admission was in July and patient was discharged on 7/22/2021. PAST MEDICAL / SURGICAL / SOCIAL / FAMILY HISTORY      has a past medical history of Bipolar disorder ([address] Depression, GERD (gastroesophageal reflux disease), Hallucinations, Headache(784.0), Hepatitis, Schizophrenia, schizo-affective ([address] Substance abuse ([address] Tobacco abuse, Type II or unspecified type diabetes mellitus without mention of complication, not stated as uncontrolled, and Urinary incontinence. has a past surgical history that includes Dental surgery and Abscess Drainage (N/A, 02/11/2018).      Social History     Socioeconomic History    Marital status: Single     Spouse name: Not on file    Number of children: 0    Years of education: 10    Highest education level: Not on file   Occupational History     Employer: N/A   Tobacco Use    Smoking status: Current Every Day Smoker     Packs/day: 1.00     Years: 47.00     Pack years: 47.00     Types: Cigarettes    Smokeless tobacco: Never Used    Tobacco comment: Patient accepting of nicotine patch   Substance and Sexual Activity    Alcohol use: Yes     Comment: reports drinking occasionally    Drug use: Not Currently     Types: Cocaine     Comment: drug abuse includes crack cocaine,     Sexual activity: Not on file   Other Topics Concern    Not on file   Social History Narrative    Not on file     Social Determinants of Health     Financial Resource Strain:     Difficulty of Paying Living Expenses:    Food Insecurity:     Worried About Running Out of Food in the Last Year:     Ran Out of Food in the Last Year:    Transportation Needs:     Lack of Transportation (Medical):  Lack of Transportation (Non-Medical):    Physical Activity:     Days of Exercise per Week:     Minutes of Exercise per Session:    Stress:     Feeling of Stress :    Social Connections:     Frequency of Communication with Friends and Family:     Frequency of Social Gatherings with Friends and Family:     Attends Worship Services:     Active Member of Clubs or Organizations:     Attends Club or Organization Meetings:     Marital Status:    Intimate Partner Violence:     Fear of Current or Ex-Partner:     Emotionally Abused:     Physically Abused:     Sexually Abused:        Family History   Problem Relation Age of Onset    Diabetes Mother     Heart Disease Mother         Allergies:  Navane [thiothixene]    Home Medications:  Prior to Admission medications    Medication Sig Start Date End Date Taking?  Authorizing Provider   paliperidone (INVEGA) 3 MG extended release tablet Take 1 tablet by mouth daily 7/23/21   Marissa Salinas MD   escitalopram (LEXAPRO) 20 MG tablet Take 1 tablet by mouth daily 7/22/21   Lashay Davila Abdomen is soft. Tenderness: There is no abdominal tenderness. Musculoskeletal:      Cervical back: Normal range of motion and neck supple. Right lower leg: No edema. Left lower leg: No edema. Skin:     General: Skin is warm and dry. Capillary Refill: Capillary refill takes less than 2 seconds. Neurological:      General: No focal deficit present. Mental Status: He is oriented to person, place, and time. Psychiatric:      Comments: Complains of auditory hallucinations telling him to kill himself. Also states he is seeing monsters. Does have insight that these are hallucinations. Still endorses suicidal ideation. No plan. DIFFERENTIAL  DIAGNOSIS     PLAN (LABS / IMAGING / EKG):  No orders of the defined types were placed in this encounter. MEDICATIONS ORDERED:  Orders Placed This Encounter   Medications    ondansetron (ZOFRAN-ODT) disintegrating tablet 4 mg       DDX: Schizophrenia with auditory hallucinations, suicidal ideation    Initial MDM/Plan: 57 y.o. male who presents with recurrent auditory hallucinations telling him to kill himself, as well as active suicidal ideation without plan. Patient has had the symptoms frequently and has had multiple inpatient visits for them. Patient is scheduled for outpatient assessment at [facility]. They are scheduled to have meeting this morning and will discuss his case. Possible transfer to outpatient clinic for psychiatric assessment, who will determine disposition from there. Will give Zofran for nausea. DIAGNOSTIC RESULTS / EMERGENCYDEPARTMENT COURSE / MDM     LABS:  Labs Reviewed - No data to display      RADIOLOGY:  No results found. EMERGENCY DEPARTMENT COURSE:  ED Course as of Aug 12 1818   Thu Aug 12, 2021   8403 Patient has 10:00 appointment with psychiatry. Will discharge, so that patient can be assessed.     [GG]      ED Course User Index  [GG] Antonietta Stafford MD PROCEDURES:  None    CONSULTS:  None    CRITICAL CARE:  Please see attending note    FINAL IMPRESSION      1. Suicidal ideation    2.  Schizophrenia, unspecified type [facility])          DISPOSITION / PLAN     DISPOSITION Decision To Discharge 08/12/2021 08:54:47 AM      PATIENT REFERRED TO:  [facility] ED  [address] [place] [address]  [phone]    If symptoms worsen      DISCHARGE MEDICATIONS:  Discharge Medication List as of 8/12/2021  8:56 AM          Phani Lund MD  Emergency Medicine Resident    (Please note that portions of this note were completed with a voice recognition program.Efforts were made to edit the dictations but occasionally words are mis-transcribed.)       Phani Lund MD  Resident  08/12/21 6184

## 2021-08-12 NOTE — ED NOTES
[] Sosa    [] [facility]    [x]  [facility] ASSESSMENT      Y  N     [x] [] In the past two weeks have you had thoughts of hurting yourself in any way? [x] [] In the past two weeks have you had thoughts that you would be better off dead? [] [x] Have you made a suicide attempt in the past two months? [x] [] Do you have a plan for hurting yourself or suicide? [x] [] Presence of hallucinations/voices related to hurting himself or herself or someone else. SUICIDE/SECURITY WATCH PRECAUTION CHECKLIST     Orders    [x]  Suicide/Security Watch Precautions initiated as checked below:   8/12/21 8:55 AM EDT 14/H14A    [x] Notified physician:  Sb Ramon MD  8/12/21 8:55 AM EDT    [x] Orders obtained as appropriate:     [x] 1:1 Observer     [] Psych Consult     [] Psych Consult    Name:  Date:  Time:    [x] 1:1 Observer, Notified by:  Adan Lindsay RN    Contact Nurse Supervisor    [x] Remove all personal clothes from room and place in snap/paper gown/pants. Slipper only    [x] Remove all personal belongings from room and secured away from patient. Documentation    [x] Initiate Suicide/Security Watch Precaution Flow Sheet    [x] Initiate individualized Care Plan/Problem    [x] Document why precautions initiated on flow sheet (Initiate Nursing Care Plan/Problem)    [x] 1:1 Observer in place; instructions provided. Suicide precautions require observer be within arms length. [x] Nurse-Observer Communication Hand-off initiated by RN, reviewed with Observer. Subsequently used as Hand Off between Observers. [x] Initiate every 15 minute observations per observer as delegated by the RN.     [x] Initiate RN assessment and documentation    Environmental Scan  Search Criteria and Process: OPTIONAL, see Search Policy    [] Reason for search:    [] Nursing in presence of second person to search patient    [] Patient notified of reason for body assessment and belongings search:     Persons present during search:   Results of search and disposition:       Searchers Name: Security     These items or items similar should be removed from the room:   [] Chairs   [] Telephone   [] Trash cans and liners   [] Plastic utensils (order Patient Safety tray)   [] Empty or remove Sharps containers   [] All personal clothing/belongings removed   [] All unnecessary lead wires, electrical cords, draw cords, etc.   [] Flowers and plants   [] Double check for lighters, matches, razors, any glass items etc that can be used as weapons. Person completing Checklist: Charo Burdick RN       GENERAL INFORMATION     Y  N     [x] [] Has the patient been informed that they are on a watch and what that means? [x] [] Can the patient get out of Bed without nursing assistance? [x] [] Can the patient use the restroom without nursing assistance? [x] [] Can the patient walk the halls to [place] their legs? \"   [] [x] Does the patient have metal utensils? [x] [] Have the patient's belongings been placed out of control of the patient? [x] [] Have the patient and his/her belongings been checked for contraband? [x] [] Is the patient under any visitor restrictions? If Yes, explain:   [] [x] Is the patient under an alias? [facility] Name:   Authorized visitors (no more than two are to be on the list)   Name/Relationship:   Name/Relationship:    Name of Staff member that you  Received this information from?: Security    General Description:    Júnior Delgado 63/A81G male 57 y.o. Admission weight:      Race: []  [x] Black  []   []   [] [place] [] Other  Facial Hair:  [x] Yes  [] No  If yes, please describe: Identifying Marks (i.e. Visible tattoos, scars, etc... ):     NURSING CARE PLAN    Nursing Diagnosis: Risk of Self Directed Harm  [] Actual  [] Potential  Date Started: 8/12/21      Etiological Factors: (related to)  [x] Expressed or implied suicidal ideation/behavior  [] Depression  [] Suicide attempt      [] Low self-esteem  [x] Hallucinations      [] Feeling of Hopelessness  [] Substance abuse or withdrawal    [] Dysfunctional family  [] Major traumatic event, eg., divorce, etc   [] Excessive stress/anxiety    8/12/21    Expected Outcomes    Patient will:   [x] Patient will remain safe for the duration of their stay   [x] Patient's environment will be safe, eg. Free of potential suicide weapons   [] Verbalize Recovery from suicidal episode and improvement in self-worth   [x] Discuss feeling that precipitated suicide attempt/thoughts/behavior   [] Will describe available resources for crisis prevention and management   [] Will verbalize positive coping skills     Nursing Intervention   [x] Assessment and Observations hourly   [x] Suicide Precautions implemented with patient, should be 1:1 observation   [x] Document observation [id number] and RN assessment hourly   [] Consult physician for:    [] Psychiatric consult    [] Pharmacological therapy    [] Other:    [x] Patient search completed by security   [x] Initiated appropriate safety protocols by removing from the patient's environment anything that could be used to inflict self injury, eg. Order safe tray, snap gown, etc   [x] Maintain open, warm, caring, non-judgmental attitude/manner towards patient   [] Discuss advantages and disadvantages of existing coping methods/skills   [x] Assist and educate patient with identifying present strengths and coping skills   [x] Keep patient informed regarding plan of care and provide clear concise explanations. Provide the patient/family education information as well as telephone numbers and other information about crisis centers, hot lines, and counselors.     Discharge Planning:   [] Referral  [] Groups [] Health agencies  [] Other:            Vita Pulido RN  08/12/21 0573

## 2021-08-12 NOTE — ED NOTES
Writer spoke with [facility] team, who stated patient's last appointment with Dr Analy Wetzel was 7/14/21. Michael Mcdermott stated patient's next appointment with Dr Analy Wetzel is 9/8/21. Michael Mcdermott provided 10:00am appointment today for patient to meet with psyc NP at [facility]. Michael Mcdermott stated patient has been using crack cocaine which exacerbates his mental health symptoms. Writer to B/W voucher patient to [facility]. Writer informed patient, patient denied questions.       TAVO Gonzales  08/12/21 9953

## 2021-08-12 NOTE — ED NOTES
Bed: BH31B  Expected date:   Expected time:   Means of arrival:   Comments:     Gay White, GRADY  08/12/21 [id number]

## 2021-08-17 ENCOUNTER — HOSPITAL ENCOUNTER (EMERGENCY)
Age: 61
Discharge: HOME | End: 2021-08-17
Attending: EMERGENCY MEDICINE
Payer: MEDICAID

## 2021-08-17 VITALS
HEART RATE: 99 BPM | BODY MASS INDEX: 25.68 KG/M2 | DIASTOLIC BLOOD PRESSURE: 76 MMHG | TEMPERATURE: 97.7 F | SYSTOLIC BLOOD PRESSURE: 126 MMHG | OXYGEN SATURATION: 99 % | WEIGHT: 200 LBS | RESPIRATION RATE: 16 BRPM

## 2021-08-17 DIAGNOSIS — R45.851 SUICIDAL IDEATION: Primary | ICD-10-CM

## 2021-08-17 PROCEDURE — 87635 SARS-COV-2 COVID-19 AMP PRB: CPT

## 2021-08-17 PROCEDURE — 99285 EMERGENCY DEPT VISIT HI MDM: CPT

## 2021-08-17 ASSESSMENT — ENCOUNTER SYMPTOMS
SHORTNESS OF BREATH: 0
ABDOMINAL PAIN: 0
SORE THROAT: 0
NAUSEA: 0
DIARRHEA: 0
CONSTIPATION: 0
VOMITING: 0

## 2021-08-17 NOTE — ED NOTES
Writer attempted to assist patient with transportation to [facility] as he was discharged well over an hour ago. Patient states he has an account with [facility]. BW unable to verify patient as address is not updated. Patient is in agreement to take the bus or walk.        Elisa Cadena, [address]  08/17/21 7186

## 2021-08-17 NOTE — ED NOTES
The patient is a 57year old male with a history of Schizoaffective Disorder. The patient came to the ED today due to feeling suicidal. Patient states that prior to coming to the ED today she smoked crack cocaine. Patient reports that he is suicidal but denied any plans or intentions of self harm. SW asked the patient about hallucinations and the patient was not very forthcoming, but then states to the Attending that he hears voices telling him to kill himself. The patient The patient initially denied any homicidal thoughts or plans but then states, \"If I am discharged I will come back with my blood or someone else blood on me. \" The patient has a lengthy history of being admitted for psychiatry. The patient has no record of suicide attempts. The patient has a history of coming into the ED with similar story and presentation and then if he is not accepted to the [facility] he threatens his life or others. The patient denied any other drugs besides the cocaine. The patient denied any alcohol use. The patient has a history of being prescribed Lexapro, Invega and Trazodone but is not compliant with medications.  The patient is linked with [facility] and on their ACT team.

## 2021-08-17 NOTE — ED NOTES
Pt verbally aggressive with writer when writer attempts to collect blood, urine and COVID swab. Pt refuses all testing. Dr. Eboni Chiu and Dr. Kai Ruizh notified. Will continue to monitor.       Kalin Rebolledo RN  08/17/21 6333

## 2021-08-17 NOTE — ED NOTES
Patient now denying any suicidal or homicidal thoughts at this time.  safety planned with the patient. Patient agreeable to come back to the ED if his symptoms become worse, contact his [place] Act Team if in crisis, or go to the [facility] if needed. Patient given the number to the [facility] EMERGENCY [facility]ICAL [place].

## 2021-08-17 NOTE — ED NOTES
Writer @ bedside with Kely Hatfield and [facility] PD officers Kamille Parnell to assess patient and update him on plan of care. Pt is adamant that he will not allow writer to obtain any blood, urine, or COVID tests, and is updated that if he does not complete testing, we will be unable to place him in IP rehab. Pt states he is no longer suicidal or homicidal and now wants to go home. Dr. Leeanne Crum, Dr. Chase Ohm notified.       Mabel Guzman RN  08/17/21 5143

## 2021-08-17 NOTE — ED NOTES
[] Sosa    [] [address]    [x]  [facility]      Y  N     [x] [] In the past two weeks have you had thoughts of hurting yourself in any way? [x] [] In the past two weeks have you had thoughts that you would be better off dead? [] [x] Have you made a suicide attempt in the past two months? [] [x] Do you have a plan for hurting yourself or suicide? [x] [] Presence of hallucinations/voices related to hurting himself or herself or someone else. SUICIDE/SECURITY WATCH PRECAUTION CHECKLIST     Orders    [x]  Suicide/Security Watch Precautions initiated as checked below:   8/17/21 3:27 AM EDT BH31/BH31D    [x] Notified physician:  Danny Manriquez DO  8/17/21 3:27 AM EDT    [x] Orders obtained as appropriate:     [x] 1:1 Observer     [] Psych Consult     [] Psych Consult    Name:  Date:  Time:    [x] 1:1 Observer, Notified by:  Claudene Sportsman, RN    Contact Nurse Supervisor    [x] Remove all personal clothes from room and place in snap/paper gown/pants. Slipper only    [x] Remove all personal belongings from room and secured away from patient. Documentation    [x] Initiate Suicide/Security Watch Precaution Flow Sheet    [x] Initiate individualized Care Plan/Problem    [x] Document why precautions initiated on flow sheet (Initiate Nursing Care Plan/Problem)    [x] 1:1 Observer in place; instructions provided. Suicide precautions require observer be within arms length. [x] Nurse-Observer Communication Hand-off initiated by RN, reviewed with Observer. Subsequently used as Hand Off between Observers. [x] Initiate every 15 minute observations per observer as delegated by the RN.     [x] Initiate RN assessment and documentation    Environmental Scan  Search Criteria and Process: OPTIONAL, see Search Policy    [] Reason for search:SI/ HI    [] Nursing in presence of second person to search patient    [] Patient notified of reason for body assessment and belongings search:     Persons present during search:[facility]   Results of search and disposition:       Searchers Name: [facility]    These items or items similar should be removed from the room:   [x] Chairs   [x] Telephone   [x] Trash cans and liners   [x] Plastic utensils (order Patient Safety tray)   [x] Empty or remove Sharps containers   [x] All personal clothing/belongings removed   [x] All unnecessary lead wires, electrical cords, draw cords, etc.   [x] Flowers and plants   [x] Double check for lighters, matches, razors, any glass items etc that can be used as weapons. Person completing Checklist: Colt Gill RN       GENERAL INFORMATION     Y  N     [x] [] Has the patient been informed that they are on a watch and what that means? [x] [] Can the patient get out of Bed without nursing assistance? [x] [] Can the patient use the restroom without nursing assistance? [] [x] Can the patient walk the halls to [place] their legs? \"   [] [x] Does the patient have metal utensils? [x] [] Have the patient's belongings been placed out of control of the patient? [x] [] Have the patient and his/her belongings been checked for contraband? [x] [] Is the patient under any visitor restrictions? If Yes, explain:   [] [x] Is the patient under an alias? [place] [address] Name:   Authorized visitors (no more than two are to be on the list)   Name/Relationship:   Name/Relationship:    Name of Staff member that you  Received this information from?: N/A    General Description:    Nikita Colvin BH31/BH31D male 57 y.o. Admission weight: 200 lb (90.7 kg)    Race: []  [x] Black  []   []   [] [place] [] Other  Facial Hair:  [] Yes  [] No  If yes, please describe: Identifying Marks (i.e. Visible tattoos, scars, etc... ):     NURSING CARE PLAN    Nursing Diagnosis: Risk of Self Directed Harm  [] Actual  [x] Potential  Date Started: 8/17/21      Etiological Factors: (related to)  [x] Expressed or implied suicidal ideation/behavior  [] Depression  [] Suicide attempt      [x] Low self-esteem  [x] Hallucinations      [x] Feeling of Hopelessness  [x] Substance abuse or withdrawal    [x] Dysfunctional family  [x] Major traumatic event, eg., divorce, etc   [x] Excessive stress/anxiety    8/17/21    Expected Outcomes    Patient will:   [x] Patient will remain safe for the duration of their stay   [x] Patient's environment will be safe, eg. Free of potential suicide weapons   [] Verbalize Recovery from suicidal episode and improvement in self-worth   [x] Discuss feeling that precipitated suicide attempt/thoughts/behavior   [] Will describe available resources for crisis prevention and management   [] Will verbalize positive coping skills     Nursing Intervention   [x] Assessment and Observations hourly   [x] Suicide Precautions implemented with patient, should be 1:1 observation   [x] Document observation [id number] and RN assessment hourly   [] Consult physician for:    [] Psychiatric consult    [] Pharmacological therapy    [] Other:    [x] Patient search completed by security   [x] Initiated appropriate safety protocols by removing from the patient's environment anything that could be used to inflict self injury, eg. Order safe tray, snap gown, etc   [x] Maintain open, warm, caring, non-judgmental attitude/manner towards patient   [] Discuss advantages and disadvantages of existing coping methods/skills   [x] Assist and educate patient with identifying present strengths and coping skills   [x] Keep patient informed regarding plan of care and provide clear concise explanations. Provide the patient/family education information as well as telephone numbers and other information about crisis centers, hot lines, and counselors.     Discharge Planning:   [x] Referral  [] Groups [] Health agencies  [] Other:          Claudene Sportsman, RN  08/17/21 0154

## 2021-08-17 NOTE — ED PROVIDER NOTES
[facility]     Emergency Department     Faculty Attestation    I performed a history and physical examination of the patient and discussed management with the resident. I have reviewed and agree with the residents findings including all diagnostic interpretations, and treatment plans as written. Any areas of disagreement are noted on the chart. I was personally present for the key portions of any procedures. I have documented in the chart those procedures where I was not present during the key portions. I have reviewed the emergency nurses triage note. I agree with the chief complaint, past medical history, past surgical history, allergies, medications, social and family history as documented unless otherwise noted below. Documentation of the HPI, Physical Exam and Medical Decision Making performed by glenn is based on my personal performance of the HPI, PE and MDM. For Physician Assistant/ Nurse Practitioner cases/documentation I have personally evaluated this patient and have completed at least one if not all key elements of the E/M (history, physical exam, and MDM). Additional findings are as noted. 56 yo M c/o hearing voices, no fever, no vomit, no injury  Pt denies pain,   Pt states he was suicidal earlier, not currently,   pe vss flat affect, no cervical tenderness, crepitus or deformity, chest non tender, abdomen non tender, no distension, no rigidity, no guarding,     -on re exam pt denies suicidal ideation or homicidal ideation,   Pt refusing lab draw, social work saw pt & cleared pt for discharge    EKG Interpretation    Interpreted by me      CRITICAL CARE: There was a high probability of clinically significant/life threatening deterioration in this patient's condition which required my urgent intervention. Total critical care time was 0 minutes. This excludes any time for separately reportable procedures.        Ching Wilcox DO Raineline Lombard, DO  08/17/21 1320 [facility], DO  08/17/21 2616

## 2021-08-17 NOTE — ED TRIAGE NOTES
Pt presents to ED from home c/o SI and HI x 1 day. Pt last used crack cocaine today PTA. Pt denies n/v/d, SOB, LOC, C/P,or any other physical sx. Pt reports hallucinations, SI and HI but no plan of how to commit suicide. Pt states he has not eaten in 2 days because he has no food. Pt is A&OX4, tearful in chair, but vitals WNL. Pt states he recently lost his mother, 2 months ago. Pt is changed into paper scrubs by [facility]. Social Work @ bedside, Resident and Dr. Fernandez Phan @ bedside to assess patient.

## 2021-08-17 NOTE — ED PROVIDER NOTES
[address] [facility] ED  Emergency Department Encounter  EmergencyMedicine Resident     Pt Victor Hugo Rankin  MRN: [id number]  [place] 1960  Date of evaluation: 8/17/21  PCP:  No primary care provider on file. This patient was evaluated in the Emergency Department for symptoms described in the history of present illness. The patient was evaluated in the context of the global COVID-19 pandemic, which necessitated consideration that the patient might be at risk for infection with the SARS-CoV-2 virus that causes COVID-19. Institutional protocols and algorithms that pertain to the evaluation of patients at risk for COVID-19 are in a state of rapid change based on information released by regulatory bodies including the CDC and federal and state organizations. These policies and algorithms were followed during the patient's care in the ED. CHIEF COMPLAINT       Chief Complaint   Patient presents with    Suicidal       HISTORY OF PRESENT ILLNESS  (Location/Symptom, Timing/Onset, Context/Setting, Quality, Duration, Modifying Factors, Severity.)      Gracía Bradley is a 57 y.o. male who presents with suicidal ideation. States he hasn't taken medications in 2 days. Admits to some concurrent homicidal ideation. States has a plan if he goes back out to the streets. Last use of crack cocaine was tonight. Denies any other ROS at this time. Of note, patient has a history of frequent visits to the Emergency Room for SI/HI with accompanying hallucinations. Last visit was 12 AUG. PAST MEDICAL / SURGICAL / SOCIAL / FAMILY HISTORY      has a past medical history of Bipolar disorder ([address] Depression, GERD (gastroesophageal reflux disease), Hallucinations, Headache(784.0), Hepatitis, Schizophrenia, schizo-affective ([address] Substance abuse ([address] Tobacco abuse, Type II or unspecified type diabetes mellitus without mention of complication, not stated as uncontrolled, and Urinary incontinence. has a past surgical history that includes Dental surgery and Abscess Drainage (N/A, 02/11/2018). Social History     Socioeconomic History    Marital status: Single     Spouse name: Not on file    Number of children: 0    Years of education: 8    Highest education level: Not on file   Occupational History     Employer: N/A   Tobacco Use    Smoking status: Current Every Day Smoker     Packs/day: 1.00     Years: 47.00     Pack years: 47.00     Types: Cigarettes    Smokeless tobacco: Never Used    Tobacco comment: Patient accepting of nicotine patch   Substance and Sexual Activity    Alcohol use: Yes     Comment: reports drinking occasionally    Drug use: Yes     Frequency: 7.0 times per week     Types: Cocaine     Comment: drug abuse includes crack cocaine,     Sexual activity: Not on file   Other Topics Concern    Not on file   Social History Narrative    Not on file     Social Determinants of Health     Financial Resource Strain:     Difficulty of Paying Living Expenses:    Food Insecurity:     Worried About Running Out of Food in the Last Year:     Ran Out of Food in the Last Year:    Transportation Needs:     Lack of Transportation (Medical):      Lack of Transportation (Non-Medical):    Physical Activity:     Days of Exercise per Week:     Minutes of Exercise per Session:    Stress:     Feeling of Stress :    Social Connections:     Frequency of Communication with Friends and Family:     Frequency of Social Gatherings with Friends and Family:     Attends Sabianism Services:     Active Member of Clubs or Organizations:     Attends Club or Organization Meetings:     Marital Status:    Intimate Partner Violence:     Fear of Current or Ex-Partner:     Emotionally Abused:     Physically Abused:     Sexually Abused:        Family History   Problem Relation Age of Onset    Diabetes Mother     Heart Disease Mother        Allergies:  Navane [thiothixene]    Home Medications:  Prior to Admission medications    Medication Sig Start Date End Date Taking? Authorizing Provider   paliperidone (INVEGA) 3 MG extended release tablet Take 1 tablet by mouth daily 7/23/21   Scott Kohli MD   escitalopram (LEXAPRO) 20 MG tablet Take 1 tablet by mouth daily 7/22/21   Scott Kohli MD   traZODone (DESYREL) 150 MG tablet Take 1 tablet by mouth nightly as needed for Sleep 7/22/21   Scott Kohli MD   paliperidone palmitate ER (INVEGA SUSTENNA) 234 MG/1.5ML [place] IM injection Inject 234 mg into the muscle every 28 days    Historical Provider, MD       REVIEW OF SYSTEMS    (2-9 systems for level 4, 10 or more for level 5)      Review of Systems   Constitutional: Negative for chills and fever. HENT: Negative for ear pain, hearing loss and sore throat. Eyes: Negative for visual disturbance. Respiratory: Negative for shortness of breath. Cardiovascular: Negative for chest pain. Gastrointestinal: Negative for abdominal pain, constipation, diarrhea, nausea and vomiting. Genitourinary: Negative for difficulty urinating and dysuria. Musculoskeletal: Negative for arthralgias and myalgias. Neurological: Negative for numbness. Psychiatric/Behavioral: Positive for behavioral problems, hallucinations and suicidal ideas. Negative for agitation and confusion. PHYSICAL EXAM   (up to 7 for level 4, 8 or more for level 5)      INITIAL VITALS:   /76   Pulse 99   Temp 97.7 °F (36.5 °C) (Oral)   Resp 16   Wt 200 lb (90.7 kg)   SpO2 99%   BMI 25.68 kg/m²     Physical Exam  Vitals and nursing note reviewed. Constitutional:       General: He is not in acute distress. Appearance: Normal appearance. He is well-developed. He is not ill-appearing or diaphoretic. HENT:      Head: Normocephalic and atraumatic.       Right Ear: External ear normal.      Left Ear: External ear normal.      Nose: Nose normal.      Mouth/Throat:      Mouth: Mucous membranes are moist. Eyes:      Extraocular Movements: Extraocular movements intact. Conjunctiva/sclera: Conjunctivae normal.   Neck:      Trachea: No tracheal deviation. Cardiovascular:      Rate and Rhythm: Normal rate and regular rhythm. Pulses: Normal pulses. Heart sounds: Normal heart sounds. No murmur heard. No gallop. Pulmonary:      Effort: Pulmonary effort is normal. No respiratory distress. Breath sounds: Normal breath sounds. No wheezing, rhonchi or rales. Abdominal:      General: Abdomen is flat. There is no distension. Musculoskeletal:         General: No swelling, deformity or signs of injury. Normal range of motion. Cervical back: Normal range of motion and neck supple. Skin:     General: Skin is warm and dry. Capillary Refill: Capillary refill takes less than 2 seconds. Coloration: Skin is not jaundiced. Findings: No bruising or lesion. Neurological:      General: No focal deficit present. Mental Status: He is alert and oriented to person, place, and time. Mental status is at baseline. Motor: No abnormal muscle tone. DIFFERENTIAL  DIAGNOSIS     PLAN (LABS / IMAGING / EKG):  Orders Placed This Encounter   Procedures    COVID-19, Rapid    ETHANOL    CBC WITH AUTO DIFFERENTIAL    Comprehensive Metabolic Panel w/ Reflex to MG    Urine Drug Screen       MEDICATIONS ORDERED:  No orders of the defined types were placed in this encounter. DDX: Suicidal ideation    DIAGNOSTIC RESULTS / EMERGENCY DEPARTMENT COURSE / MDM   LAB RESULTS:  No results found for this visit on 08/17/21. IMPRESSION: 57year old male with suicidal ideation. EMERGENCY DEPARTMENT COURSE:  ED Course as of Aug 17 0709   Tue Aug 17, 2021   [facility] admission labs ordered. [JS]   [id number] Patient stating he wants to go home. Refusing all labs. States he is no longer suicidal or homicidal. Discussed with , who discussed safety plan with the patient.  Patient is agreeable to come back to the ED if his symptoms become worse. Will plan for discharge. [JS]      ED Course User Index  [JS] Claire Kerr DO       FINAL IMPRESSION      1. Suicidal ideation          DISPOSITION / PLAN     DISPOSITION  Discharged.  8/17/2021  6:18 AM      PATIENT REFERRED TO:  [facility] ED  [address]  [id number]  [phone]  Go to   As needed, If symptoms worsen      DISCHARGE MEDICATIONS:  Discharge Medication List as of 8/17/2021  5:47 AM          Deepak Webber DO  Emergency Medicine Resident    (Please note that portions of thisnote were completed with a voice recognition program.  Efforts were made to edit the dictations but occasionally words are mis-transcribed.)     Claire Kerr DO  Resident  08/17/21 0773

## 2021-08-27 NOTE — GROUP NOTE
Group Therapy Note    Date: 6/20/2020    Group Start Time: 1330  Group End Time: 7175  Group Topic: Recreational    [address] [facility], [facility]    Patient refused to attend Recreational Therapy Group at 1330 after encouragement from staff. 1:1 talk time offered.     Signature:  Tayler Saenz No

## 2021-09-11 ENCOUNTER — HOSPITAL ENCOUNTER (INPATIENT)
Age: 61
LOS: 11 days | Discharge: OTHER INSTITUTION | DRG: 750 | End: 2021-09-22
Attending: PSYCHIATRY & NEUROLOGY | Admitting: PSYCHIATRY & NEUROLOGY
Payer: MEDICAID

## 2021-09-11 ENCOUNTER — HOSPITAL ENCOUNTER (EMERGENCY)
Age: 61
Discharge: PSYCHIATRIC OR SUBSTANCE USE FACILITY | End: 2021-09-11
Attending: EMERGENCY MEDICINE
Payer: MEDICAID

## 2021-09-11 VITALS
HEART RATE: 87 BPM | OXYGEN SATURATION: 97 % | SYSTOLIC BLOOD PRESSURE: 114 MMHG | TEMPERATURE: 98.2 F | RESPIRATION RATE: 16 BRPM | DIASTOLIC BLOOD PRESSURE: 69 MMHG

## 2021-09-11 DIAGNOSIS — R45.851 SUICIDAL IDEATIONS: Primary | ICD-10-CM

## 2021-09-11 DIAGNOSIS — R45.850 HOMICIDAL IDEATIONS: ICD-10-CM

## 2021-09-11 DIAGNOSIS — B35.3 TINEA PEDIS OF BOTH FEET: ICD-10-CM

## 2021-09-11 LAB
ABSOLUTE EOS #: 0.15 K/UL (ref 0–0.44)
ABSOLUTE IMMATURE GRANULOCYTE: <0.03 K/UL (ref 0–0.3)
ABSOLUTE LYMPH #: 2.75 K/UL (ref 1.1–3.7)
ABSOLUTE MONO #: 0.62 K/UL (ref 0.1–1.2)
ALBUMIN SERPL-MCNC: 3.8 G/DL (ref 3.5–5.2)
ALBUMIN/GLOBULIN RATIO: 1.5 (ref 1–2.5)
ALP BLD-CCNC: 123 U/L (ref 40–129)
ALT SERPL-CCNC: 9 U/L (ref 5–41)
AMPHETAMINE SCREEN URINE: NEGATIVE
ANION GAP SERPL CALCULATED.3IONS-SCNC: 9 MMOL/L (ref 9–17)
AST SERPL-CCNC: 17 U/L
BARBITURATE SCREEN URINE: NEGATIVE
BASOPHILS # BLD: 0 % (ref 0–2)
BASOPHILS ABSOLUTE: <0.03 K/UL (ref 0–0.2)
BENZODIAZEPINE SCREEN, URINE: NEGATIVE
BILIRUB SERPL-MCNC: 0.19 MG/DL (ref 0.3–1.2)
BUN BLDV-MCNC: 15 MG/DL (ref 8–23)
BUN/CREAT BLD: ABNORMAL (ref 9–20)
BUPRENORPHINE URINE: ABNORMAL
CALCIUM SERPL-MCNC: 8.7 MG/DL (ref 8.6–10.4)
CANNABINOID SCREEN URINE: NEGATIVE
CHLORIDE BLD-SCNC: 107 MMOL/L (ref 98–107)
CO2: 25 MMOL/L (ref 20–31)
COCAINE METABOLITE, URINE: POSITIVE
CREAT SERPL-MCNC: 1.12 MG/DL (ref 0.7–1.2)
DIFFERENTIAL TYPE: ABNORMAL
EOSINOPHILS RELATIVE PERCENT: 2 % (ref 1–4)
ETHANOL PERCENT: <0.01 %
ETHANOL: <10 MG/DL
GFR AFRICAN AMERICAN: >60 ML/MIN
GFR NON-AFRICAN AMERICAN: >60 ML/MIN
GFR SERPL CREATININE-BSD FRML MDRD: ABNORMAL ML/MIN/{1.73_M2}
GFR SERPL CREATININE-BSD FRML MDRD: ABNORMAL ML/MIN/{1.73_M2}
GLUCOSE BLD-MCNC: 139 MG/DL (ref 70–99)
HCT VFR BLD CALC: 33.8 % (ref 40.7–50.3)
HEMOGLOBIN: 10.9 G/DL (ref 13–17)
IMMATURE GRANULOCYTES: 0 %
LYMPHOCYTES # BLD: 44 % (ref 24–43)
MCH RBC QN AUTO: 27.7 PG (ref 25.2–33.5)
MCHC RBC AUTO-ENTMCNC: 32.2 G/DL (ref 28.4–34.8)
MCV RBC AUTO: 86 FL (ref 82.6–102.9)
MDMA URINE: ABNORMAL
METHADONE SCREEN, URINE: NEGATIVE
METHAMPHETAMINE, URINE: ABNORMAL
MONOCYTES # BLD: 10 % (ref 3–12)
NRBC AUTOMATED: 0 PER 100 WBC
OPIATES, URINE: NEGATIVE
OXYCODONE SCREEN URINE: NEGATIVE
PDW BLD-RTO: 13.3 % (ref 11.8–14.4)
PHENCYCLIDINE, URINE: NEGATIVE
PLATELET # BLD: 234 K/UL (ref 138–453)
PLATELET ESTIMATE: ABNORMAL
PMV BLD AUTO: 9.2 FL (ref 8.1–13.5)
POTASSIUM SERPL-SCNC: 3.9 MMOL/L (ref 3.7–5.3)
PROPOXYPHENE, URINE: ABNORMAL
RBC # BLD: 3.93 M/UL (ref 4.21–5.77)
RBC # BLD: ABNORMAL 10*6/UL
SARS-COV-2, RAPID: NOT DETECTED
SEG NEUTROPHILS: 44 % (ref 36–65)
SEGMENTED NEUTROPHILS ABSOLUTE COUNT: 2.75 K/UL (ref 1.5–8.1)
SODIUM BLD-SCNC: 141 MMOL/L (ref 135–144)
SPECIMEN DESCRIPTION: NORMAL
TEST INFORMATION: ABNORMAL
TOTAL PROTEIN: 6.3 G/DL (ref 6.4–8.3)
TRICYCLIC ANTIDEPRESSANTS, UR: ABNORMAL
WBC # BLD: 6.3 K/UL (ref 3.5–11.3)
WBC # BLD: ABNORMAL 10*3/UL

## 2021-09-11 PROCEDURE — APPSS45 APP SPLIT SHARED TIME 31-45 MINUTES: Performed by: PSYCHIATRY & NEUROLOGY

## 2021-09-11 PROCEDURE — 1240000000 HC EMOTIONAL WELLNESS R&B

## 2021-09-11 PROCEDURE — 99254 IP/OBS CNSLTJ NEW/EST MOD 60: CPT | Performed by: INTERNAL MEDICINE

## 2021-09-11 PROCEDURE — 6370000000 HC RX 637 (ALT 250 FOR IP): Performed by: PSYCHIATRY & NEUROLOGY

## 2021-09-11 PROCEDURE — 99223 1ST HOSP IP/OBS HIGH 75: CPT | Performed by: PSYCHIATRY & NEUROLOGY

## 2021-09-11 PROCEDURE — 80307 DRUG TEST PRSMV CHEM ANLYZR: CPT

## 2021-09-11 PROCEDURE — G0480 DRUG TEST DEF 1-7 CLASSES: HCPCS

## 2021-09-11 PROCEDURE — 99284 EMERGENCY DEPT VISIT MOD MDM: CPT

## 2021-09-11 PROCEDURE — 85025 COMPLETE CBC W/AUTO DIFF WBC: CPT

## 2021-09-11 PROCEDURE — 87635 SARS-COV-2 COVID-19 AMP PRB: CPT

## 2021-09-11 PROCEDURE — 80053 COMPREHEN METABOLIC PANEL: CPT

## 2021-09-11 RX ORDER — IBUPROFEN 400 MG/1
400 TABLET ORAL EVERY 6 HOURS PRN
Status: DISCONTINUED | OUTPATIENT
Start: 2021-09-11 | End: 2021-09-22 | Stop reason: HOSPADM

## 2021-09-11 RX ORDER — DIPHENHYDRAMINE HYDROCHLORIDE 50 MG/ML
50 INJECTION INTRAMUSCULAR; INTRAVENOUS EVERY 4 HOURS PRN
Status: DISCONTINUED | OUTPATIENT
Start: 2021-09-11 | End: 2021-09-22 | Stop reason: HOSPADM

## 2021-09-11 RX ORDER — HALOPERIDOL 5 MG/ML
5 INJECTION INTRAMUSCULAR EVERY 4 HOURS PRN
Status: DISCONTINUED | OUTPATIENT
Start: 2021-09-11 | End: 2021-09-22 | Stop reason: HOSPADM

## 2021-09-11 RX ORDER — LORAZEPAM 1 MG/1
2 TABLET ORAL EVERY 4 HOURS PRN
Status: DISCONTINUED | OUTPATIENT
Start: 2021-09-11 | End: 2021-09-19

## 2021-09-11 RX ORDER — ESCITALOPRAM OXALATE 20 MG/1
20 TABLET ORAL DAILY
Status: DISCONTINUED | OUTPATIENT
Start: 2021-09-11 | End: 2021-09-22 | Stop reason: HOSPADM

## 2021-09-11 RX ORDER — TRAZODONE HYDROCHLORIDE 50 MG/1
50 TABLET ORAL NIGHTLY PRN
Status: DISCONTINUED | OUTPATIENT
Start: 2021-09-11 | End: 2021-09-11

## 2021-09-11 RX ORDER — TRAZODONE HYDROCHLORIDE 150 MG/1
150 TABLET ORAL NIGHTLY PRN
Status: DISCONTINUED | OUTPATIENT
Start: 2021-09-11 | End: 2021-09-22 | Stop reason: HOSPADM

## 2021-09-11 RX ORDER — MAGNESIUM HYDROXIDE/ALUMINUM HYDROXICE/SIMETHICONE 120; 1200; 1200 MG/30ML; MG/30ML; MG/30ML
30 SUSPENSION ORAL EVERY 6 HOURS PRN
Status: DISCONTINUED | OUTPATIENT
Start: 2021-09-11 | End: 2021-09-22 | Stop reason: HOSPADM

## 2021-09-11 RX ORDER — POLYETHYLENE GLYCOL 3350 17 G/17G
17 POWDER, FOR SOLUTION ORAL DAILY PRN
Status: DISCONTINUED | OUTPATIENT
Start: 2021-09-11 | End: 2021-09-22 | Stop reason: HOSPADM

## 2021-09-11 RX ORDER — HYDROXYZINE 50 MG/1
50 TABLET, FILM COATED ORAL 3 TIMES DAILY PRN
Status: DISCONTINUED | OUTPATIENT
Start: 2021-09-11 | End: 2021-09-22 | Stop reason: HOSPADM

## 2021-09-11 RX ORDER — PALIPERIDONE 3 MG/1
3 TABLET, EXTENDED RELEASE ORAL DAILY
Status: DISCONTINUED | OUTPATIENT
Start: 2021-09-11 | End: 2021-09-13

## 2021-09-11 RX ORDER — LORAZEPAM 2 MG/ML
2 INJECTION INTRAMUSCULAR EVERY 4 HOURS PRN
Status: DISCONTINUED | OUTPATIENT
Start: 2021-09-11 | End: 2021-09-19

## 2021-09-11 RX ORDER — CLOTRIMAZOLE 1 %
CREAM (GRAM) TOPICAL 2 TIMES DAILY
Status: DISCONTINUED | OUTPATIENT
Start: 2021-09-11 | End: 2021-09-22 | Stop reason: HOSPADM

## 2021-09-11 RX ORDER — ACETAMINOPHEN 325 MG/1
650 TABLET ORAL EVERY 4 HOURS PRN
Status: DISCONTINUED | OUTPATIENT
Start: 2021-09-11 | End: 2021-09-22 | Stop reason: HOSPADM

## 2021-09-11 RX ORDER — CLOTRIMAZOLE 1 %
CREAM (GRAM) TOPICAL
Qty: 28 G | Refills: 1 | Status: SHIPPED | OUTPATIENT
Start: 2021-09-11 | End: 2021-09-21 | Stop reason: HOSPADM

## 2021-09-11 RX ORDER — HALOPERIDOL 5 MG
5 TABLET ORAL EVERY 4 HOURS PRN
Status: DISCONTINUED | OUTPATIENT
Start: 2021-09-11 | End: 2021-09-22 | Stop reason: HOSPADM

## 2021-09-11 RX ADMIN — PALIPERIDONE 3 MG: 3 TABLET, EXTENDED RELEASE ORAL at 14:30

## 2021-09-11 RX ADMIN — ESCITALOPRAM OXALATE 20 MG: 20 TABLET ORAL at 14:31

## 2021-09-11 RX ADMIN — IBUPROFEN 400 MG: 400 TABLET ORAL at 14:31

## 2021-09-11 ASSESSMENT — SLEEP AND FATIGUE QUESTIONNAIRES
DO YOU USE A SLEEP AID: YES
DO YOU HAVE DIFFICULTY SLEEPING: NO
DIFFICULTY FALLING ASLEEP: NO
AVERAGE NUMBER OF SLEEP HOURS: 7
DIFFICULTY STAYING ASLEEP: NO
DIFFICULTY ARISING: NO
RESTFUL SLEEP: YES
SLEEP PATTERN: DIFFICULTY FALLING ASLEEP

## 2021-09-11 ASSESSMENT — ENCOUNTER SYMPTOMS
SORE THROAT: 0
RHINORRHEA: 0
EYE DISCHARGE: 0
SHORTNESS OF BREATH: 0
EYE REDNESS: 0
WHEEZING: 0
CONSTIPATION: 0
DIARRHEA: 0
BACK PAIN: 1
CHOKING: 0
COUGH: 0
VOMITING: 0
ABDOMINAL DISTENTION: 0

## 2021-09-11 ASSESSMENT — PAIN SCALES - GENERAL
PAINLEVEL_OUTOF10: 6
PAINLEVEL_OUTOF10: 0

## 2021-09-11 ASSESSMENT — LIFESTYLE VARIABLES: HISTORY_ALCOHOL_USE: NO

## 2021-09-11 ASSESSMENT — PATIENT HEALTH QUESTIONNAIRE - PHQ9: SUM OF ALL RESPONSES TO PHQ QUESTIONS 1-9: 6

## 2021-09-11 NOTE — GROUP NOTE
Group Therapy Note    Date: 9/11/2021    Group Start Time: 1400  Group End Time: 1500  Group Topic: Music Therapy    JESUS Castaneda        Group Therapy Note    Pt did not attend music therapy group d/t resting in room despite staff invitation to attend. 1:1 talk time offered as alternative to group session, pt declined.

## 2021-09-11 NOTE — ED PROVIDER NOTES
[facility]  ED  Emergency Department Encounter  EmergencyMedicine Resident     Pt Fred Yin  MRN: [id number]  Sarah 1960  Date of evaluation: 9/11/21  PCP:  No primary care provider on file. CHIEF COMPLAINT       Chief Complaint   Patient presents with    Suicidal    Homicidal       HISTORY OF PRESENT ILLNESS  (Location/Symptom, Timing/Onset, Context/Setting, Quality, Duration, Modifying Factors, Severity.)      Yasmeen Joya is a 57 y.o. male who presents with suicidal and homicidal thoughts. Patient states that he would like to kill either. He is in no acute distress without rapid speech and states that he used crack cocaine around 6 PM prior to arrival.  He was going to have a place to sleep tonight at his friend's house who provided him with a crack cocaine and exchange for hot dogs. Patient states that this house then had 2 other visitors that had lots of crack cocaine and he did not want to be around that. So he came to the emergency department. Patient states he ambulated here. Denies chest pain endorses lower back pain that is chronic states his feet hurt as well. PAST MEDICAL / SURGICAL / SOCIAL / FAMILY HISTORY      has a past medical history of Bipolar disorder ([address] Depression, GERD (gastroesophageal reflux disease), Hallucinations, Headache(784.0), Hepatitis, Schizophrenia, schizo-affective ([address] Substance abuse ([address] Tobacco abuse, Type II or unspecified type diabetes mellitus without mention of complication, not stated as uncontrolled, and Urinary incontinence. has a past surgical history that includes Dental surgery and Abscess Drainage (N/A, 02/11/2018).       Social History     Socioeconomic History    Marital status: Single     Spouse name: Not on file    Number of children: 0    Years of education: 8    Highest education level: Not on file   Occupational History     Employer: N/A   Tobacco Use    Smoking status: Current Every Day Smoker     Packs/day: 1.00     Years: 47.00     Pack years: 47.00     Types: Cigarettes    Smokeless tobacco: Never Used    Tobacco comment: Patient accepting of nicotine patch   Substance and Sexual Activity    Alcohol use: Yes     Comment: reports drinking occasionally    Drug use: Yes     Frequency: 7.0 times per week     Types: Cocaine     Comment: drug abuse includes crack cocaine,     Sexual activity: Not on file   Other Topics Concern    Not on file   Social History Narrative    Not on file     Social Determinants of Health     Financial Resource Strain:     Difficulty of Paying Living Expenses:    Food Insecurity:     Worried About Running Out of Food in the Last Year:     Ran Out of Food in the Last Year:    Transportation Needs:     Lack of Transportation (Medical):  Lack of Transportation (Non-Medical):    Physical Activity:     Days of Exercise per Week:     Minutes of Exercise per Session:    Stress:     Feeling of Stress :    Social Connections:     Frequency of Communication with Friends and Family:     Frequency of Social Gatherings with Friends and Family:     Attends Mu-ism Services:     Active Member of Clubs or Organizations:     Attends Club or Organization Meetings:     Marital Status:    Intimate Partner Violence:     Fear of Current or Ex-Partner:     Emotionally Abused:     Physically Abused:     Sexually Abused:        Family History   Problem Relation Age of Onset    Diabetes Mother     Heart Disease Mother        Allergies:  Navane [thiothixene]    Home Medications:  Prior to Admission medications    Medication Sig Start Date End Date Taking? Authorizing Provider   clotrimazole (LOTRIMIN AF) 1 % cream Apply topically 2 times daily.  9/11/21 9/18/21 Yes Brandyn Sanches MD   paliperidone (INVEGA) 3 MG extended release tablet Take 1 tablet by mouth daily 7/23/21   Jace Thomas MD   escitalopram (LEXAPRO) 20 MG tablet Take 1 tablet by mouth daily 7/22/21   Kristina Blackwood MD   traZODone (DESYREL) 150 MG tablet Take 1 tablet by mouth nightly as needed for Sleep 7/22/21   Kristina Blackwood MD   paliperidone palmitate ER (INVEGA SUSTENNA) 234 MG/1.5ML [place] IM injection Inject 234 mg into the muscle every 28 days    Historical Provider, MD       REVIEW OF SYSTEMS    (2-9 systems for level 4, 10 or more for level 5)      Review of Systems   Constitutional: Negative for chills and fever. HENT: Negative for congestion, rhinorrhea and sore throat. Eyes: Negative for discharge and redness. Respiratory: Negative for cough, choking, shortness of breath and wheezing. Cardiovascular: Negative for chest pain and palpitations. Gastrointestinal: Negative for abdominal distention, constipation, diarrhea and vomiting. Endocrine: Negative for polydipsia and polyuria. Genitourinary: Negative for decreased urine volume, difficulty urinating, dysuria and frequency. Musculoskeletal: Positive for back pain. Negative for gait problem, joint swelling and neck pain. Skin: Negative for rash and wound. Allergic/Immunologic: Negative for food allergies. Neurological: Negative for dizziness, speech difficulty and headaches. Psychiatric/Behavioral: Positive for suicidal ideas. Negative for behavioral problems. PHYSICAL EXAM   (up to 7 for level 4, 8 or more for level 5)      INITIAL VITALS:   /69   Pulse 87   Temp 98.2 °F (36.8 °C) (Oral)   Resp 16   SpO2 97%     Physical Exam  Vitals and nursing note reviewed. Exam conducted with a chaperone present. Constitutional:       General: He is not in acute distress. Appearance: Normal appearance. He is well-developed. He is not ill-appearing, toxic-appearing or diaphoretic. Comments: /69   Pulse 87   Temp 98.2 °F (36.8 °C) (Oral)   Resp 16   SpO2 97%      HENT:      Head: Normocephalic and atraumatic.       Nose: Nose normal.      Mouth/Throat:      Mouth: Mucous membranes are moist.   Eyes:      General:         Right eye: No discharge. Left eye: No discharge. Conjunctiva/sclera: Conjunctivae normal.      Pupils: Pupils are equal, round, and reactive to light. Cardiovascular:      Rate and Rhythm: Normal rate and regular rhythm. Pulses: Normal pulses. Heart sounds: Normal heart sounds. Pulmonary:      Effort: Pulmonary effort is normal. No respiratory distress. Breath sounds: Normal breath sounds. No wheezing. Abdominal:      General: Abdomen is flat. Bowel sounds are normal.      Palpations: Abdomen is soft. Tenderness: There is no abdominal tenderness. Musculoskeletal:         General: No swelling or tenderness. Cervical back: Normal range of motion and neck supple. Right lower leg: No edema. Left lower leg: No edema. Comments: Has some muscle tenderness in his lower back. No spasms. Pain is mild. Lymphadenopathy:      Cervical: No cervical adenopathy. Skin:     General: Skin is warm. Capillary Refill: Capillary refill takes less than 2 seconds. Findings: No rash. Comments: Tinea pedis without ulcerations to bilateral toes long toenails   Neurological:      General: No focal deficit present. Mental Status: He is alert and oriented to person, place, and time. Comments: Patient able to take off his own socks and move himself about in bed         DIFFERENTIAL  DIAGNOSIS     PLAN (LABS / [place] [facility] / EKG):  Orders Placed This Encounter   Procedures    COVID-19, Rapid    Urine Drug Screen    CBC WITH AUTO DIFFERENTIAL    COMPREHENSIVE METABOLIC PANEL    ETHANOL    Suicide precautions       MEDICATIONS ORDERED:  Orders Placed This Encounter   Medications    clotrimazole (LOTRIMIN AF) 1 % cream     Sig: Apply topically 2 times daily.      Dispense:  28 g     Refill:  1       DDX: Tinea pedis, suicidal ideation, homicidal ideation    DIAGNOSTIC RESULTS / [address] [place] [address] / [facility]   LAB RESULTS:  Results for orders placed or performed during the hospital encounter of 09/11/21   COVID-19, Rapid    Specimen: Nasopharyngeal Swab   Result Value Ref Range    Specimen Description . NASOPHARYNGEAL SWAB     SARS-CoV-2, Rapid Not Detected Not Detected   Urine Drug Screen   Result Value Ref Range    Amphetamine Screen, Ur NEGATIVE NEGATIVE    Barbiturate Screen, Ur NEGATIVE NEGATIVE    Benzodiazepine Screen, Urine NEGATIVE NEGATIVE    Cocaine Metabolite, Urine POSITIVE (A) NEGATIVE    Methadone Screen, Urine NEGATIVE NEGATIVE    Opiates, Urine NEGATIVE NEGATIVE    Phencyclidine, Urine NEGATIVE NEGATIVE    Propoxyphene, Urine NOT REPORTED NEGATIVE    Cannabinoid Scrn, Ur NEGATIVE NEGATIVE    Oxycodone Screen, Ur NEGATIVE NEGATIVE    Methamphetamine, Urine NOT REPORTED NEGATIVE    Tricyclic Antidepressants, Urine NOT REPORTED NEGATIVE    MDMA, Urine NOT REPORTED NEGATIVE    Buprenorphine Urine NOT REPORTED NEGATIVE    Test Information       Assay provides medical screening only. The absence of expected drug(s) and/or metabolite(s) may indicate diluted or adulterated urine, limitations of testing or timing of collection.    CBC WITH AUTO DIFFERENTIAL   Result Value Ref Range    WBC 6.3 3.5 - 11.3 k/uL    RBC 3.93 (L) 4.21 - 5.77 m/uL    Hemoglobin 10.9 (L) 13.0 - 17.0 g/dL    Hematocrit 33.8 (L) 40.7 - 50.3 %    MCV 86.0 82.6 - 102.9 fL    MCH 27.7 25.2 - 33.5 pg    MCHC 32.2 28.4 - 34.8 g/dL    RDW 13.3 11.8 - 14.4 %    Platelets 218 644 - 687 k/uL    MPV 9.2 8.1 - 13.5 fL    NRBC Automated 0.0 0.0 per 100 WBC    Differential Type NOT REPORTED     Seg Neutrophils 44 36 - 65 %    Lymphocytes 44 (H) 24 - 43 %    Monocytes 10 3 - 12 %    Eosinophils % 2 1 - 4 %    Basophils 0 0 - 2 %    Immature Granulocytes 0 0 %    Segs Absolute 2.75 1.50 - 8.10 k/uL    Absolute Lymph # 2.75 1.10 - 3.70 k/uL    Absolute Mono # 0.62 0.10 - 1.20 k/uL    Absolute Eos # 0.15 0.00 - 0.44 k/uL    Basophils Absolute <0.03 0.00 - 0.20 k/uL    Absolute Immature Granulocyte <0.03 0.00 - 0.30 k/uL    WBC Morphology NOT REPORTED     RBC Morphology NOT REPORTED     Platelet Estimate NOT REPORTED    COMPREHENSIVE METABOLIC PANEL   Result Value Ref Range    Glucose 139 (H) 70 - 99 mg/dL    BUN 15 8 - 23 mg/dL    CREATININE 1.12 0.70 - 1.20 mg/dL    Bun/Cre Ratio NOT REPORTED 9 - 20    Calcium 8.7 8.6 - 10.4 mg/dL    Sodium 141 135 - 144 mmol/L    Potassium 3.9 3.7 - 5.3 mmol/L    Chloride 107 98 - 107 mmol/L    CO2 25 20 - 31 mmol/L    Anion Gap 9 9 - 17 mmol/L    Alkaline Phosphatase 123 40 - 129 U/L    ALT 9 5 - 41 U/L    AST 17 <40 U/L    Total Bilirubin 0.19 (L) 0.3 - 1.2 mg/dL    Total Protein 6.3 (L) 6.4 - 8.3 g/dL    Albumin 3.8 3.5 - 5.2 g/dL    Albumin/Globulin Ratio 1.5 1.0 - 2.5    GFR Non-African American >60 >60 mL/min    GFR African American >60 >60 mL/min    GFR Comment          GFR Staging NOT REPORTED    ETHANOL   Result Value Ref Range    Ethanol <10 <10 mg/dL    Ethanol percent <0.010 <0.010 %       IMPRESSION: Suicidal ideation, homicidal ideation, tinea pedis    RADIOLOGY:    EKG      All EKG's are interpreted by the Emergency Department Physician who either signs or Co-signs this chart in the absence of a cardiologist.    EMERGENCY DEPARTMENT COURSE:  Patient presents emergency department as a walk-in with homicidal and suicidal ideation. He is well-known to our emergency department. He had a similar story to social work and attending however to nurse and writer he endorsed suicidal and homicidal ideation. His vital signs were stable he did not have pressured speech did not have delusions or poverty of thought. He states that he also has lower back pain that he has had for a while now. He states his feet hurt from standing and walking a lot. His feet were examined and he did have evidence of tinea pedis in his toes with crusting and long toenails.   Anticipate treatment of this with Lotrimin calling into his home pharmacy. Will anticipate admission to inpatient psychiatric unit and will order labs for this. Dave Lanier PROCEDURES:      CONSULTS:  None    CRITICAL CARE:  Please see attending note    FINAL IMPRESSION      1. Suicidal ideations    2. Homicidal ideations    3. Tinea pedis of both feet          DISPOSITION / PLAN     DISPOSITION Decision To Discharge 09/11/2021 06:03:48 AM      PATIENT REFERRED TO:  No follow-up provider specified.     DISCHARGE MEDICATIONS:  Discharge Medication List as of 9/11/2021  6:03 AM          Sia Robin MD  Emergency Medicine Resident    (Please note that portions of thisnote were completed with a voice recognition program.  Efforts were made to edit the dictations but occasionally words are mis-transcribed.)       Sia Robin MD  Resident  09/11/21 [id number]

## 2021-09-11 NOTE — ED TRIAGE NOTES
Pt ambulated to [facility] with co SI/HI. Pt states that he hears voices and that he wants to kill himself and a lady named \"Ms. Steffany Hernandez. \"  Pt changed out by Kathlen Olszewski at bedside. Pt provided boxed lunch. Pt alert and oriented x 4, respirations even and unlabored, NAD noted at this time. Will continue to monitor.

## 2021-09-11 NOTE — H&P
Department of Psychiatry  Attending Physician Psychiatric Assessment     Reason for Admission to Psychiatric Unit:  Concerns about patient's safety in the community    CHIEF COMPLAINT: Suicidal ideation, homicidal ideation, auditory hallucinations, visual hallucinations, crack use. History obtained from: Patient, electronic medical record          HISTORY OF PRESENT ILLNESS:    Yayo Houston is a 57 y.o. male who has a past medical history of schizoaffective disorder depressive type, cocaine abuse, major depression with psychotic features, acute psychosis pneumonia due to infectious organism, sepsis due to Klebsiella pneumoniae with no resultant organ failure, suicidal ideations, smoker, ventricular ectopy, low serum cortisol level, schizophrenia, perianal abscess, dianna-rectal abscess, hematuria, hallucinations, bipolar disorder, type II or unspecified type diabetes mellitus without mention of complication, not states as controlled, headache, substance abuse, urinary incontinence, GERD, hepatitis, and tobacco abuse . Per ED records, \"The patient is a 57year old male that has a history of Schizoaffective Disorder. The patient came to the ED today due to feeling suicidal. Patient states, \"She kicked me out today. \" Patient reports that he is now homeless because he was kicked out of his home. Patient reports that he was kicked out because he brings bugs into the house. Patient states that he is suicidal and voices telling him to kill himself. Patient reports that the voices are no different from any other day and this is his baseline mental health. The patient denied any suicidal plan and denied any intention of self harm. The patient also had a complaint of homicidal thoughts but denied to this SW any specific person of target. The patient is connected with the [facility] ACT team and receives psychiatry services.  The patient has a lengthy history of crack cocaine use and reports that he is traded a pack of hotdogs in for crack tonight\". Patient was resting in his room with writer. He was somnolent, but responded to verbal stimuli. Writer attempted to speak with patient for an assessment, but patient was difficult to engage in sustained meaningful conversation. Multiple attempts were made to speak with writer and patient sat up in bed and was agreeable to speaking with writer. Patient was oriented to self, year, and situation but was disoriented to location. Writer reoriented patient. Patient reported he has been suicidal and reports a [place] put me out\" out of the house for \"bringing bugs to the house\". Patient reports he has been feeling depressed for at least 2 weeks. He rates his depression as a 10 out of 10 (010 scale with 0 being no depression and 10 being worst). He endorses anhedonia and reports feeling worthless. He reports poor sleep. He reports last week he did not sleep for 4 nights and reports it was not due to any drug or substance use. He endorses difficulty falling asleep and difficulty staying asleep. He endorses decreased appetite and reports weight loss of 5 pounds. Patient reports he is unsure of the duration of time for the weight loss. Patient endorses active suicidal ideation, without intent or plans. He endorses homicidal ideation towards \"the lady that put me out\" and states \"Ms. Kidd\". Patient denies any homicidal plans. When asked if he is able to contract for safety on the unit, patient states \"I don't know\". Patient reports he would be unsafe off the unit. Patient endorses anxiety and panic attacks. When asked about maryellen or hypomania symptoms and when asked if he has gone 3 or more days without sleep, without the use of substance or drugs, and if he felt energized or irritable, patient reports he did not sleep for 4 nights and reports it was not due to any drug or substance use. Patient denies impulsivity. Denies phobias, obsessions, or compulsions.   He denies body or vocal tics. Patient endorses intermittent visual hallucinations and reports he sees [facility] life, music, people dancing, people altogether eating\". Patient endorses auditory hallucinations all the time and reports hearing 1 male voice. He reports this male voice is unrecognizable to him. He rates the loudness of the auditory hallucinations as a 7 out of 10 (010 scale with 0 being no sound and 10 being loudest). He reports the auditory hallucinations \"tell me to kill myself\", tell him he is \"worthless\", and \"nobody is going to help\". Writer provided active listening and emotional support. Patient denied delusions. Patient denies paranoia. Patient denies PTSD symptoms. He denies any history of trauma. He denied any history of head trauma, seizures, or violence/aggression. Patient endorses history of self-injurious behaviors and reports cutting \"recently\". When asked about medical concerns, patient reports discomfort in his legs. He denies any other medical concerns at the time of assessment. He reports he currently follows with [facility]. As the assessment proceeded, patient stated to writer \"I don't understand you\". Writer reworded questions and patient verbalized understanding. However, patient became increasingly agitated as the assessment proceeded and loudly told patient to leave his room. Due to patient's agitation and refusal to complete the remainder of assessment, the assessment was concluded. Per patient's urine toxicology results from 9/11/2021 was positive for cocaine metabolite. At this time, the patient is not appropriate for a lower level of care. There is risk of decompensation and patient warrants further hospitalization for safety and stabilization. History of head trauma: [] Yes [x] No    History of seizures: [] Yes [x] No    History of violence or aggression: [] Yes [x] No         PSYCHIATRIC HISTORY:  [x] Yes [] No    Currently follows with [facility].     Per previous hospital encounter, patient reported 3 lifetime suicide attempts. Due to patient's agitation and refusal to complete the remainder of assessment, unable to assess lifetime suicide attempts. Per previous hospital encounter, patient has had multiple psychiatric hospital admissions. Due to patient's agitation and refusal to complete the remainder of assessment, unable to assess for psychiatric hospital admissions. EMR records indicate multiple psychiatric admission. Past psychiatric medications includes: Unable to assess due to patient's agitation and refusal to complete the remainder of assessment. Her previous hospital encounter: \"Invega, Effexor, Cogentin  Patient also reports previous trials on Seroquel Wellbutrin Lexapro Atarax and trazodone\". Adverse reactions from psychotropic medications: Unable to assess due to patient's agitation and refusal to complete the remainder of assessment. Per previous hospital encounter, patient did report any adverse reactions from psychotropic medications.          Lifetime Psychiatric Review of Systems         Depression: Endorses     Anxiety: Endorses     Panic Attacks: Endorses     Teetee or Hypomania: Endorses     Phobias: Denies     Obsessions and Compulsions: Denies     Visual Hallucinations: Endorses     Auditory Hallucinations: Endorses     Delusions: Denies     Paranoia: Denies     PTSD: Denies    Past Medical History:        Diagnosis Date    Bipolar disorder ([address]     Depression     GERD (gastroesophageal reflux disease)     Hallucinations     Headache(784.0)     Hepatitis     Schizophrenia, schizo-affective (HCC)     Sub[facility] [address] [address]     Tobacco abuse     Type II or unspecified type diabetes mellitus without mention of complication, not stated as uncontrolled     Urinary incontinence        Past Surgical History:        Procedure Laterality Date    ABSCESS DRAINAGE N/A 02/11/2018    Carla anal abcess    DENTAL SURGERY      all teeth pulled       Allergies:  Navane [thiothixene]         Social History:     Patient did not participate in social history assessment to agitation and refusal of assessment. Per previous hospital encounter:    \"Born in: [place]  Raised in: [place]  Family: Has 4 sisters 3 brothers   Highest Level of Education: Ninth grade  Occupation: Disabled on SSI  Marital Status: Single  Anice Blanks in a boarding house unhappy with current situation, has concern over the general operations of the boarding house  Stressors: Current living situation acute grief related to the death of his mother  Patient Assets/Supportive Factors: Has a sister that is supportive of him\".          DRUG USE HISTORY  Social History     Tobacco Use   Smoking Status Current Every Day Smoker    Packs/day: 1.00    Years: 47.00    Pack years: 47.00    Types: Cigarettes   Smokeless Tobacco Never Used   Tobacco Comment    Patient accepting of nicotine patch     Social History     Substance and Sexual Activity   Alcohol Use Yes    Comment: reports drinking occasionally     Social History     Substance and Sexual Activity   Drug Use Yes    Frequency: 7.0 times per week    Types: Cocaine    Comment: drug abuse includes crack cocaine,        Unable to assess due to patient's agitation and refusal to complete the remainder of assessment. Per patient's urine toxicology results from 2021 was positive for cocaine metabolite. LEGAL HISTORY:   HISTORY OF INCARCERATION: Unable to assess due to patient's agitation and refusal to complete the remainder of assessment. Per previous hospital encounter, patient has a history of incarceration.     Family History:       Problem Relation Age of Onset    Diabetes Mother     Heart Disease Mother        Psychiatric Family History    Unable to assess psychiatric family history, suicides in family, or substance use in family due to patient's agitation and refusal to complete the remainder of assessment. PHYSICAL EXAM:  Vitals:  /77   Pulse 79   Temp 98.6 °F (37 °C) (Oral)   Resp 14   Ht 6' 2\" (1.88 m)   Wt 200 lb (90.7 kg)   BMI 25.68 kg/m²     LABS:  Labs reviewed: [x] Yes  Last EKG in EMR reviewed: [x] Yes  QTc: 452. Review of Systems   Limited review of systems was able to be completed due to patient's agitation and refusal to complete the remainder of assessment. Reports weight loss. Patient reports discomfort in his legs. Physical Exam:   Unable to complete physical exam due to patient's agitation and refusal to complete the remainder of assessment. Patient requested for writer to leave room. Patient appears well-developed and well-nourished, no acute distress. Respirations equal and unlabored. No respiratory distress or accessory muscle use or wheezing noted at the time of assessment. Patient was resting in bed and later sat up in his bed and was noted to have normal range of motion with change of position. Mental Status Examination:    Level of consciousness: , Responds to verbal stimuli. Appearance:  Appropriate attire, initially resting in bed and sat up in bed, poor grooming   Behavior/Motor:  Initially approachable and pleasant, but exhibited psychomotor agitation as the assessment proceeded and presented as guarded. Attitude toward examiner:  Cooperative initially and refused to complete the remainder of assessment and presented as guarded, semi-attentive, intermittent eye contact  Speech: Initially delayed rate, normal volume, and normal tone. Patient then presented with increased volume and irritable tone as assessment proceeded. Mood: \"Pretty good\"  Affect: Irritable,  Thought processes: Slow and thought blocking  Thought content: Active suicidal ideations, without current plan or intent. When asked if he is able to contract for safety on the unit, patient states \"I don't know\".                 Endorses homicidal ideations, without current plan or intent. Endorses visual hallucinations. Endorses auditory hallucinations. Denies delusions              Denies paranoia  Cognition: Patient was oriented to self, year, and situation but was disoriented to location. Writer reoriented patient. Concentration: Poor, distractible  Memory: Intact  Insight &Judgment: Poor         DSM-5 Diagnosis    Principal Problem: Schizoaffective disorder, depressive type (HCC)    Cocaine Use    Psychosocial and Contextual factors:  Unable to assess due to patient's agitation and refusal to complete the remainder of assessment. Past Medical History:   Diagnosis Date    [facility] [address]     Depression     GERD (gastroesophageal reflux disease)     Hallucinations     Headache(784.0)     Hepatitis     Schizophrenia, schizo-affective (HCC)     Substance abuse ([facility])     Tobacco abuse     Type II or unspecified type diabetes mellitus without mention of complication, not stated as uncontrolled     Urinary incontinence         TREATMENT PLAN    Continue inpatient psychiatric treatment. Home medications reviewed. Reordered: Invega extended release tablet. Reordered: Lexapro tablet. Reordered: Trazodone tablet. Reordered: Lotrimin AF cream.  MD, please advise regarding Graciella Ray IM injection. Last given 7/6/21 at [facility] per note in home medications list in EMR. New order: Consult to internal medicine ordered. Problem list updated. Monitor need and frequency of PRN medications. Attempt to develop insight. Follow-up daily while inpatient. Reviewed risks and benefits as well as potential side effects with patient. CONSULTS [x] Yes [] No  · Consult to internal medicine: Medical management. Reports weight loss and discomfort in his legs. Abnormal labs. Elevated blood pressure.        Risk Management: close watch per standard protocol      Psychotherapy: participation in milieu and group and individual sessions with Attending Physician,  and Physician Assistant/CNP      Estimated length of stay:  2-14 days      GENERAL PATIENT/FAMILY EDUCATION  Patient will understand basic signs and symptoms, patient will understand benefits/risks and potential side effects from proposed medications, and patient will understand their role in recovery. Unable to assess if family active in patient's care due to patient's agitation and refusal to complete the remainder of assessment. Patient assets that may be helpful during treatment include: Intent to participate and engage in treatment, sufficient fund of knowledge and intellect to understand and utilize treatments. Goals:    1) Remission of depression, or hypomania symptoms, visual hallucinations, and auditory hallucinations. 2) Stabilization of symptoms prior to discharge. 3) Establish efficacy and tolerability of medications. Behavioral Services  Medicare Certification     Admission Day 1  I certify that this patient's inpatient psychiatric hospital admission is medically necessary for:    x (1) treatment which could reasonably be expected to improve this patient's condition, or    x (2) diagnostic study or its equivalent. Time Spent: 39 minutes    Mike Zamarripa is a 57 y.o. male being evaluated face to face    --MARY Pleitez CNP on 9/11/2021 at 9:39 AM    An electronic signature was used to authenticate this note. I independently saw and evaluated the patient. I reviewed the midlevel provider's documentation above. Any additional comments or changes to the midlevel provider's documentation are stated below otherwise agree with assessment. The patient was seen at bedside. The patient states that he has been kicked out of his group home. He has been using crack cocaine. The patient was tearful and had psychomotor retardation. He is hopeless and reports suicidal ideation.   The patient is experiencing auditory hallucinations saying derogatory things about him. He has not been compliant with medications. The patient reports good response to medication when he has taken it in the past.    We will start the patient on Lexapro 20 mg daily and Invega 3 mg daily      PLAN  Medications as noted above  Attempt to develop insight  Psycho-education conducted. Supportive Therapy conducted.   Probable discharge is 5-9 days  Follow-up daily while on inpatient unit    Electronically signed by José Luis Alexander MD on 9/11/21 at 1:01 PM EDT

## 2021-09-11 NOTE — ED NOTES
..Provisional Diagnosis:   Hx of Schizoaffective Disorder     Psychosocial and Contextual Factors:   Recently Homeless. Not complaint with medications. Cocaine Use     C-SSRS Summary:      Patient: X  Family:   Agency: [employer]     Substance Abuse: Cocaine Use     Present Suicidal Behavior:      Verbal:  Yes    Attempt: Denied     Past Suicidal Behavior:     Verbal: Yes    Attempt: Denied       Self-Injurious/Self-Mutilation: Denied     Trauma Identified: Denied       Protective Factors: Willing to sign into treatment. Risk Factors:    Suicidal     Clinical Summary:    The patient is a 57year old male that has a history of Schizoaffective Disorder. The patient came to the ED today due to feeling suicidal. Patient states, \"She kicked me out today. \" Patient reports that he is now homeless because he was kicked out of his home. Patient reports that he was kicked out because he brings bugs into the house. Patient states that he is suicidal and voices telling him to kill himself. Patient reports that the voices are no different from any other day and this is his baseline mental health. The patient denied any suicidal plan and denied any intention of self harm. The patient also had a complaint of homicidal thoughts but denied to this SW any specific person of target. The patient is connected with the [facility] ACT team and receives psychiatry services. The patient has a lengthy history of crack cocaine use and reports that he is traded a pack of hotdogs in for crack tonight. Level of Care Disposition:    ~Await medical clearance and then contact [facility] for psychiatry disposition.

## 2021-09-11 NOTE — BH NOTE
Patient given tobacco quitline number 51226698384 at this time, refusing to call at this time, states \" I just dont want to quit now\"- patient given information as to the dangers of long term tobacco use. Continue to reinforce the importance of tobacco cessation.

## 2021-09-11 NOTE — BH NOTE
[facility]  Admission Note     Admission Type:   Admission Type: Voluntary    Reason for admission:  Reason for Admission: fleeting suicidal thoughts no current plan, auditory hallucinations, HI non specific, kicked out of group home due to bringing bugs in, using crack cocaine    PATIENT STRENGTHS:  Strengths: Social Skills, Connection to output provider    Patient Strengths and Limitations:  Limitations: Inappropriate/potentially harmful leisure interests, General negative or hopeless attitude about future/recovery    Addictive Behavior:   Addictive Behavior  In the past 3 months, have you felt or has someone told you that you have a problem with:  : None  Do you have a history of Chemical Use?: No  Do you have a history of Alcohol Use?: No  Do you have a history of Street Drug Abuse?: Yes  Histroy of Prescripton Drug Abuse?: No    Medical Problems:   Past Medical History:   Diagnosis Date    B[facility] disorder ([facility] [address]     Depression     GERD (gastroesophageal reflux disease)     Hallucinations     Headache(784.0)     Hepatitis     Schizophrenia, schizo-affective ([facility] [address]     Substance abuse ([facility] [address]     Tobacco abuse     Type II or unspecified type diabetes mellitus without mention of complication, not stated as uncontrolled     Urinary incontinence        Status EXAM:  Status and Exam  Normal: No  Facial Expression: Avoids Gaze, Flat  Affect: Blunt  Level of Consciousness: Lethargic  Mood:Normal: No  Mood: Depressed, Anxious  Motor Activity:Normal: No  Motor Activity: Decreased  Interview Behavior: Cooperative, Evasive  Preception: Felch to Person, Felch to Time, Felch to Place  Attention:Normal: No  Attention: Distractible  Thought Processes: Blocking  Thought Content:Normal: Yes  Hallucinations:  Auditory (Comment)  Delusions: No  Memory:Normal: No  Memory: Poor Recent  Insight and Judgment: No  Insight and Judgment: Poor Judgment, Poor Insight  Present Suicidal Ideation: Yes (fleeting thoughts no plan)  Present Homicidal Ideation: Yes (non specific)    Tobacco Screening:  Practical Counseling, on admission, corby X, if applicable and completed (first 3 are required if patient doesn't refuse):            ( )  Recognizing danger situations (included triggers and roadblocks)                    ( )  Coping skills (new ways to manage stress, exercise, relaxation techniques, changing routine, distraction)                                                           ( )  Basic information about quitting (benefits of quitting, techniques in how to quit, available resources  ( ) Referral for counseling faxed to [facility]                                           (X ) Patient refused counseling  ( ) Patient has not smoked in the last 30 days    Metabolic Screening:    Lab Results   Component Value Date    LABA1C 4.9 08/11/2020       Lab Results   Component Value Date    CHOL 167 08/11/2020    CHOL 179 02/13/2017    CHOL 127 05/09/2015    CHOL 168 08/20/2014    CHOL 158 12/31/2013    CHOL 178 08/15/2013    CHOL 166 09/17/2012    CHOL 210 (H) 02/03/2012     Lab Results   Component Value Date    TRIG 43 08/11/2020    TRIG 67 02/13/2017    TRIG 37 05/09/2015    TRIG 72 08/20/2014    TRIG 52 12/31/2013    TRIG 70 08/15/2013    TRIG 117 09/17/2012    TRIG 94 02/03/2012     Lab Results   Component Value Date    HDL 74 08/11/2020    HDL 73 02/13/2017    HDL 57 05/09/2015    HDL 50 08/20/2014    HDL 43 12/31/2013    HDL 42 08/15/2013    HDL 38 (L) 09/17/2012    HDL 55 02/03/2012     No components found for: LDLCAL  No results found for: LABVLDL      Body mass index is 25.68 kg/m².     BP Readings from Last 2 Encounters:   09/11/21 134/77   09/11/21 114/69           Pt admitted with followings belongings:  Dentures: None  Vision - Corrective Lenses: None  Hearing Aid: None  Jewelry: None  Body Piercings Removed: N/A  Clothing: Other (Comment) (Belongings bagged due to bed bugs)  Were All Patient Medications Collected?: Not Applicable  Other Valuables: Other (Comment)     Patient's home medications were  Need verified. Patient oriented to surroundings and program expectations and copy of patient rights given. Received admission packet:  yes. Consents reviewed, signed yes. Refused NA. Patient verbalize understanding:  yes. Patient education on precautions: yes    Pt admitted to unit and wanded for safety. PT having fleeting suicidal thought no current plan, was homicidal toward group home lady for kicking him out but now states he just feels likes he wants to hurt someone non specific no plan, +AH telling him to hurt others. PT admits to recent crack cocaine use. PT is linked with [facility]. PT states was kicked out of group home due to bringing home bugs.                     Madie Frank RN

## 2021-09-11 NOTE — PROGRESS NOTES
Behavioral Services  Medicare Certification Upon Admission    I certify that this patient's inpatient psychiatric hospital admission is medically necessary for:    [x] (1) Treatment which could reasonably be expected to improve this patient's condition,       [x] (2) Or for diagnostic study;     AND     [x](2) The inpatient psychiatric services are provided while the individual is under the care of a physician and are included in the individualized plan of care.     Estimated length of stay/service -5 to 9 days    Plan for post-hospital care -outpatient care    Electronically signed by Kim Duque MD on 9/11/2021 at 1:20 PM

## 2021-09-11 NOTE — CARE COORDINATION
B[facility] Biopsychosocial Assessment    Current Level of Psychosocial Functioning     Independent xx  Dependent    Minimal Assist     Comments:    Psychosocial High Risk Factors (check all that apply)    Unable to obtain meds   Chronic illness/pain    Substance abuse xx  Lack of Family Support xx  Financial stress   Isolation xx  Inadequate Community Resources xx  Suicide attempt(s)  Not taking medications   Victim of crime   Developmental Delay  Unable to manage personal needs    Age 72 or older   Homeless  No transportation   Readmission within 30 days  Unemployment  Traumatic Event    Comments:   Psychiatric Advanced Directives: pt denies     Family to Involve in Treatment: Pt denies having family support     Sexual Orientation:  N/A    Patient Strengths: pt reports he receives [employer] income, is linked with [employer]     Patient Barriers: pt reports daily crack use, reports he has been evicted from his former group home       Opiate Education Provided:  Pt denies opiate abuse, reports daily use of crack cocaine       CMHC/mental health history: Pt is linked with [facility]     Plan of Care   medication management, group/individual therapies, family meetings, psycho -education, treatment team meetings to assist with stabilization    Initial Discharge Plan:        Clinical Summary:  Porfirio Wallis is a 57year old single male who has been admitted to [facility], reported he is homeless after being kicked out of his group home, pt reports he was accused of \"bringining bed bugs\" into the group home. Pt reported to emergency staff he had feelings of suicidal ideation, denies this date, but reports feelings of helplessness/hopelessness, is tearful during assessment, pt has been isolative to room throughout the day, is observed resting in bed, SW and patient discussed AOD supports/treatment as pt reports use of crack cocaine on a daily basis. SW provided AOD resource material, will discuss with patient as he is able to engage.

## 2021-09-11 NOTE — CONSULTS
formerly [facility] Internal Medicine    CONSULTATION / HISTORY AND PHYSICAL EXAMINATION            Date:   9/11/2021  Patient name:  Thomasine Runner  Date of admission:  9/11/2021  6:10 AM  MRN:   [id number]  Account:  [de-identified]  YOB: 1960  PCP:    No primary care provider on file. Room:   [address] [place] [address]  Code Status:    Full Code    Physician Requesting Consult: Jose Aguilera MD    Reason for Consult:  medical management    Chief Complaint:     No chief complaint on file. History Obtained From:     Patient medical record nursing staff    History of Present Illness:   Patient is admitted to [facility] floor with schizophrenia  Intered medicine consulted for multiple reasons including weight loss, weakness in both legs  Hypertension  Patient is a poor historian, he is actively going through withdrawals, has shaking movement in his both upper extremities  Not interested in talking  He is denying any chest pain, shortness of breath  Did not answer my question that whether he has lost weight or not      Past Medical History:     Past Medical History:   Diagnosis Date    B[facility] disorder ([facility]     Depression     GERD (gastroesophageal reflux disease)     Hallucinations     Headache(784.0)     Hepatitis     Schizophrenia, schizo-affective ([facility]     Substance abuse ([facility]     Tobacco abuse     Type II or unspecified type diabetes mellitus without mention of complication, not stated as uncontrolled     Urinary incontinence         Past Surgical History:     Past Surgical History:   Procedure Laterality Date    ABSCESS DRAINAGE N/A 02/11/2018    Carla anal abcess    DENTAL SURGERY      all teeth pulled        Medications Prior to Admission:     Prior to Admission medications    Medication Sig Start Date End Date Taking?  Authorizing Provider   paliperidone (INVEGA) 3 MG extended release tablet Take 1 tablet by mouth daily 7/23/21  Yes Gilberto Gupta MD escitalopram (LEXAPRO) 20 MG tablet Take 1 tablet by mouth daily 21  Yes Elizabeth Riley MD   traZODone (DESYREL) 150 MG tablet Take 1 tablet by mouth nightly as needed for Sleep 21  Yes Elizabeth Riley MD   clotrimazole (LOTRIMIN AF) 1 % cream Apply topically 2 times daily. 21  Roger Womack MD   paliperidone palmitate ER (INVEGA SUSTENNA) 234 MG/1.5ML [place] IM injection Inject 234 mg into the muscle every 28 days    Historical Provider, MD        Allergies:     Navane [[email or web address]]    Social History:     Tobacco:    reports that he has been smoking cigarettes. He has a 47.00 pack-year smoking history. He has never used smokeless tobacco.  Alcohol:      reports current alcohol use. Drug Use:  reports current drug use. Frequency: 7.00 times per week. Drug: Cocaine. Family History:     Family History   Problem Relation Age of Onset    Diabetes Mother     Heart Disease Mother        Review of Systems:     Positive and Negative as described in HPI. Limited ROS could be done  Patient denying chest pain, shortness of breath, abdominal pain, loose stools  Having shakes with withdrawals    Physical Exam:     /77   Pulse 79   Temp 98.6 °F (37 °C) (Oral)   Resp 14   Ht 6' 2\" (1.88 m)   Wt 200 lb (90.7 kg)   BMI 25.68 kg/m²   Temp (24hrs), Av.4 °F (36.9 °C), Min:98.2 °F (36.8 °C), Max:98.6 °F (37 °C)    No results for input(s): POCGLU in the last 72 hours. No intake or output data in the 24 hours ending 21 1552    General Appearance: Patient is going through withdrawals, having shakes  Mental status: oriented to person, place, and time with normal affect  Head:  normocephalic, atraumatic.   Eye: no icterus, redness, pupils equal and reactive, extraocular eye movements intact, conjunctiva clear  Ear: normal external ear, no discharge, hearing intact  Nose:  no drainage noted  Mouth: mucous membranes moist  Neck: supple, no carotid bruits, thyroid not palpable  Lungs: Bilateral equal air entry, clear to ausculation, no wheezing, rales or rhonchi, normal effort  Cardiovascular: normal rate, regular rhythm, no murmur, gallop, rub. Abdomen: Soft, nontender, nondistended, normal bowel sounds, no hepatomegaly or splenomegaly  Neurologic: There are no new focal motor or sensory deficits, normal muscle tone and bulk, no abnormal sensation, normal speech, cranial nerves II through XII grossly intact  Skin: No gross lesions, rashes, bruising or bleeding on exposed skin area  Extremities:  peripheral pulses palpable, no pedal edema or calf pain with palpation  Psych:      Investigations:      Laboratory Testing:  Recent Results (from the past 24 hour(s))   CBC WITH AUTO DIFFERENTIAL    Collection Time: 09/11/21  3:48 AM   Result Value Ref Range    WBC 6.3 3.5 - 11.3 k/uL    RBC 3.93 (L) 4.21 - 5.77 m/uL    Hemoglobin 10.9 (L) 13.0 - 17.0 g/dL    Hematocrit 33.8 (L) 40.7 - 50.3 %    MCV 86.0 82.6 - 102.9 fL    MCH 27.7 25.2 - 33.5 pg    MCHC 32.2 28.4 - 34.8 g/dL    RDW 13.3 11.8 - 14.4 %    Platelets 626 251 - 223 k/uL    MPV 9.2 8.1 - 13.5 fL    NRBC Automated 0.0 0.0 per 100 WBC    Differential Type NOT REPORTED     Seg Neutrophils 44 36 - 65 %    Lymphocytes 44 (H) 24 - 43 %    Monocytes 10 3 - 12 %    Eosinophils % 2 1 - 4 %    Basophils 0 0 - 2 %    Immature Granulocytes 0 0 %    Segs Absolute 2.75 1.50 - 8.10 k/uL    Absolute Lymph # 2.75 1.10 - 3.70 k/uL    Absolute Mono # 0.62 0.10 - 1.20 k/uL    Absolute Eos # 0.15 0.00 - 0.44 k/uL    Basophils Absolute <0.03 0.00 - 0.20 k/uL    Absolute Immature Granulocyte <0.03 0.00 - 0.30 k/uL    WBC Morphology NOT REPORTED     RBC Morphology NOT REPORTED     Platelet Estimate NOT REPORTED    COMPREHENSIVE METABOLIC PANEL    Collection Time: 09/11/21  3:48 AM   Result Value Ref Range    Glucose 139 (H) 70 - 99 mg/dL    BUN 15 8 - 23 mg/dL    CREATININE 1.12 0.70 - 1.20 mg/dL    Bun/Cre Ratio NOT REPORTED 9 - 20    Calcium 8.7 8.6 - 10.4 mg/dL    Sodium 141 135 - 144 mmol/L    Potassium 3.9 3.7 - 5.3 mmol/L    Chloride 107 98 - 107 mmol/L    CO2 25 20 - 31 mmol/L    Anion Gap 9 9 - 17 mmol/L    Alkaline Phosphatase 123 40 - 129 U/L    ALT 9 5 - 41 U/L    AST 17 <40 U/L    Total Bilirubin 0.19 (L) 0.3 - 1.2 mg/dL    Total Protein 6.3 (L) 6.4 - 8.3 g/dL    Albumin 3.8 3.5 - 5.2 g/dL    Albumin/Globulin Ratio 1.5 1.0 - 2.5    GFR Non-African American >60 >60 mL/min    GFR African American >60 >60 mL/min    GFR Comment          GFR Staging NOT REPORTED    ETHANOL    Collection Time: 09/11/21  3:48 AM   Result Value Ref Range    Ethanol <10 <10 mg/dL    Ethanol percent <0.010 <0.010 %   COVID-19, Rapid    Collection Time: 09/11/21  3:49 AM    Specimen: Nasopharyngeal Swab   Result Value Ref Range    Specimen Description . NASOPHARYNGEAL SWAB     SARS-CoV-2, Rapid Not Detected Not Detected   Urine Drug Screen    Collection Time: 09/11/21  3:49 AM   Result Value Ref Range    Amphetamine Screen, Ur NEGATIVE NEGATIVE    Barbiturate Screen, Ur NEGATIVE NEGATIVE    Benzodiazepine Screen, Urine NEGATIVE NEGATIVE    Cocaine Metabolite, Urine POSITIVE (A) NEGATIVE    Methadone Screen, Urine NEGATIVE NEGATIVE    Opiates, Urine NEGATIVE NEGATIVE    Phencyclidine, Urine NEGATIVE NEGATIVE    Propoxyphene, Urine NOT REPORTED NEGATIVE    Cannabinoid Scrn, Ur NEGATIVE NEGATIVE    Oxycodone Screen, Ur NEGATIVE NEGATIVE    Methamphetamine, Urine NOT REPORTED NEGATIVE    Tricyclic Antidepressants, Urine NOT REPORTED NEGATIVE    MDMA, Urine NOT REPORTED NEGATIVE    Buprenorphine Urine NOT REPORTED NEGATIVE    Test Information       Assay provides medical screening only. The absence of expected drug(s) and/or metabolite(s) may indicate diluted or adulterated urine, limitations of testing or timing of collection.            Consultations:   IP CONSULT TO INTERNAL MEDICINE  Assessment :      Primary Problem  Schizoaffective disorder, depressive type ([facility] [address]    Active Hospital Problems    Diagnosis Date Noted    Cocaine abuse [facility]) [F14.10] 05/03/2014     Priority: Medium    Schizoaffective disorder, depressive type ([facility] [address] [F25.1] 09/23/2017       Plan:     1. Hypertension, controlled  2. Patient has history of substances abuse, including cocaine and alcoholism  3. Generalized fatigue, weakness is likely due to substance abuse, psych condition, he has anemia, hemoglobin is chronically low, will order iron studies, TSH, vitamin Z13, folic acid, vitamin D.  4. Patient is being around 200 pounds, review of previous hospitalization, patient weighed 190 pound back in July when he was admitted with pneumonia  5. I do not suspect patient has major weight loss        Alfonzo Cowden, MD  9/11/2021  3:52 PM    Copy sent to Dr. Colby primary care provider on file. Please note that this chart was generated using voice recognition Dragon dictation software. Although every effort was made to ensure the accuracy of this automated transcription, some errors in transcription may have occurred.

## 2021-09-11 NOTE — PLAN OF CARE
[facility]  Initial Interdisciplinary Treatment Plan NO      Original treatment plan Date & Time: 9/11/2021   0858    Admission Type:  Admission Type: Voluntary    Reason for admission:   Reason for Admission: fleeting suicidal thoughts no current plan, auditory hallucinations, HI non specific, kicked out of group home due to bringing bugs in, using crack cocaine    Estimated Length of Stay:  5-7days  Estimated Discharge Date: to be determined by physician    PATIENT STRENGTHS:  Patient Strengths:Strengths: Social Skills, Connection to output provider  Patient Strengths and Limitations:Limitations: Inappropriate/potentially harmful leisure interests, General negative or hopeless attitude about future/recovery  Addictive Behavior: Addictive Behavior  In the past 3 months, have you felt or has someone told you that you have a problem with:  : None  Do you have a history of Chemical Use?: No  Do you have a history of Alcohol Use?: No  Do you have a history of Street Drug Abuse?: Yes  Histroy of Prescripton Drug Abuse?: No  Medical Problems:  Past Medical History:   Diagnosis Date    Bipolar disorder ([facility] [address]     Depression     GERD (gastroesophageal reflux disease)     Hallucinations     Headache(784.0)     Hepatitis     Schizophrenia, schizo-affective ([facility] [address]     [facility] ([facility]     Tobacco abuse     Type II or unspecified type diabetes mellitus without mention of complication, not stated as uncontrolled     Urinary incontinence      Status EXAM:Status and Exam  Normal: No  Facial Expression: Avoids Gaze, Flat  Affect: Blunt  Level of Consciousness: Lethargic  Mood:Normal: No  Mood: Depressed, Anxious  Motor Activity:Normal: No  Motor Activity: Decreased  Interview Behavior: Cooperative, Evasive  Preception: Tangent to Person, Tangent to Time, Tangent to Place  Attention:Normal: No  Attention: Distractible  Thought Processes: Blocking  Thought Content:Normal: Yes  Hallucinations:  Auditory (Comment)  Delusions: No  Memory:Normal: No  Memory: Poor Recent  Insight and Judgment: No  Insight and Judgment: Poor Judgment, Poor Insight  Present Suicidal Ideation: Yes (fleeting thoughts no plan)  Present Homicidal Ideation: Yes (non specific)    EDUCATION:   Learner Progress Toward Treatment Goals: reviewed group plans and strategies for care    Method:group therapy, medication compliance, individualized assessments and care planning    Outcome: needs reinforcement    PATIENT GOALS: to be discussed with patient within 72 hours    PLAN/TREATMENT RECOMMENDATIONS:     continue group therapy , medications compliance, goal setting, individualized assessments and care, continue to monitor pt on unit      SHORT-TERM GOALS:   Time frame for Short-Term Goals: 5-7 days    LONG-TERM GOALS:  Time frame for Long-Term Goals: 6 months  Members Present in Team Meeting: See Signature Sheet    Minus Rater

## 2021-09-11 NOTE — ED NOTES
[] Sosa    [] [facility]    [x]  [facility] ASSESSMENT      Y  N     [x] [] In the past two weeks have you had thoughts of hurting yourself in any way? [x] [] In the past two weeks have you had thoughts that you would be better off dead? [] [x] Have you made a suicide attempt in the past two months? [] [x] Do you have a plan for hurting yourself or suicide? [x] [] Presence of hallucinations/voices related to hurting himself or herself or someone else. SUICIDE/SECURITY WATCH PRECAUTION CHECKLIST     Orders    [x]  Suicide/Security Watch Precautions initiated as checked below:   9/11/21 1:28 AM EDT BH31/BH31C    [x] Notified physician:  Phill Bolaños MD  9/11/21 1:28 AM EDT    [x] Orders obtained as appropriate:     [x] 1:1 Observer     [] Psych Consult     [] Psych Consult    Name:  Date:  Time:    [x] 1:1 Observer, Notified by:  Laly Waterman RN    Contact Nurse Supervisor    [x] Remove all personal clothes from room and place in snap/paper gown/pants. Slipper only    [x] Remove all personal belongings from room and secured away from patient. Documentation    [x] Initiate Suicide/Security Watch Precaution Flow Sheet    [x] Initiate individualized Care Plan/Problem    [x] Document why precautions initiated on flow sheet (Initiate Nursing Care Plan/Problem)    [x] 1:1 Observer in place; instructions provided. Suicide precautions require observer be within arms length. [x] Nurse-Observer Communication Hand-off initiated by RN, reviewed with Observer. Subsequently used as Hand Off between Observers. [x] Initiate every 15 minute observations per observer as delegated by the RN.     [x] Initiate RN assessment and documentation    Environmental Scan  Search Criteria and Process: OPTIONAL, see Search Policy    [] Reason for search:    [] Nursing in presence of second person to search patient    [] Patient notified of reason for body assessment and belongings search:     Persons present during search:   Results of search and disposition:       Searchers Name: [email or web address]     These items or items similar should be removed from the room:   [x] Chairs   [x] Telephone   [x] Trash cans and liners   [x] Plastic utensils (order Patient Safety tray)   [x] Empty or remove Sharps containers   [x] All personal clothing/belongings removed   [x] All unnecessary lead wires, electrical cords, draw cords, etc.   [x] Flowers and plants   [x] Double check for lighters, matches, razors, any glass items etc that can be used as weapons. Person completing Checklist: Aleksandar Feng RN       GENERAL INFORMATION     Y  N     [x] [] Has the patient been informed that they are on a watch and what that means? [x] [] Can the patient get out of Bed without nursing assistance? [x] [] Can the patient use the restroom without nursing assistance? [x] [] Can the patient walk the halls to [place] their legs? \"   [] [x] Does the patient have metal utensils? [x] [] Have the patient's belongings been placed out of control of the patient? [x] [] Have the patient and his/her belongings been checked for contraband? [] [x] Is the patient under any visitor restrictions? If Yes, explain:   [] [x] Is the patient under an alias? [facility] Name:   Authorized visitors (no more than two are to be on the list)   Name/Relationship:   Name/Relationship:    Name of Staff member that you  Received this information from?: [facility]    General Description:    Ciara Check BH31/BH31C male 57 y.o. Admission weight:      Race: []  [x] Black  []   []   [] [place] [] Other  Facial Hair:  [x] Yes  [] No  If yes, please describe: Identifying Marks (i.e. Visible tattoos, scars, etc... ):     NURSING CARE PLAN    Nursing Diagnosis: Risk of Self Directed Harm  [] Actual  [x] Potential  Date Started: 9/11/21      Etiological Factors: (related to)  [x] Expressed or implied suicidal ideation/behavior  [] Depression  [] Suicide attempt      [] Low self-esteem  [x] Hallucinations      [] Feeling of Hopelessness  [] Substance abuse or withdrawal    [] Dysfunctional family  [] Major traumatic event, eg., divorce, etc   [] Excessive stress/anxiety    9/11/21    Expected Outcomes    Patient will:   [x] Patient will remain safe for the duration of their stay   [x] Patient's environment will be safe, eg. Free of potential suicide weapons   [] Verbalize Recovery from suicidal episode and improvement in self-worth   [x] Discuss feeling that precipitated suicide attempt/thoughts/behavior   [] Will describe available resources for crisis prevention and management   [] Will verbalize positive coping skills     Nursing Intervention   [x] Assessment and Observations hourly   [x] Suicide Precautions implemented with patient, should be 1:1 observation   [x] Document observation [id number] and RN assessment hourly   [] Consult physician for:    [] Psychiatric consult    [] Pharmacological therapy    [] Other:    [x] Patient search completed by security   [x] Initiated appropriate safety protocols by removing from the patient's environment anything that could be used to inflict self injury, eg. Order safe tray, snap gown, etc   [x] Maintain open, warm, caring, non-judgmental attitude/manner towards patient   [] Discuss advantages and disadvantages of existing coping methods/skills   [x] Assist and educate patient with identifying present strengths and coping skills   [x] Keep patient informed regarding plan of care and provide clear concise explanations. Provide the patient/family education information as well as telephone numbers and other information about crisis centers, hot lines, and counselors.     Discharge Planning:   [] Referral  [] Groups [] Health agencies  [] Other:            Katiana Munson RN  09/11/21 [id number]

## 2021-09-11 NOTE — PLAN OF CARE
Problem: Altered Mood, Psychotic Behavior:  Goal: Able to verbalize decrease in frequency and intensity of hallucinations  Description: Able to verbalize decrease in frequency and intensity of hallucinations  Outcome: Ongoing     Problem: Altered Mood, Psychotic Behavior:  Goal: Absence of self-harm  Description: Absence of self-harm  Outcome: Ongoing   Pt is free from self harm and agrees to feeling safe on the unit. Pt admits to fleeting suicidal ideations but has no plan while hospitalized. Pt admits to auditory hallucinations that are none specific. Pt is disheveled and encouraged to tend hygiene and ADL's. Pt. Remains on q15 min checks and frequent spontaneous checks throughout shift. Pt. Safety maintained.

## 2021-09-12 LAB
FERRITIN: 111 UG/L (ref 30–400)
FOLATE: 13.1 NG/ML
TSH SERPL DL<=0.05 MIU/L-ACNC: 0.56 MIU/L (ref 0.3–5)
VITAMIN B-12: 411 PG/ML (ref 232–1245)
VITAMIN D 25-HYDROXY: 23.3 NG/ML (ref 30–100)

## 2021-09-12 PROCEDURE — 83540 ASSAY OF IRON: CPT

## 2021-09-12 PROCEDURE — 99231 SBSQ HOSP IP/OBS SF/LOW 25: CPT | Performed by: PSYCHIATRY & NEUROLOGY

## 2021-09-12 PROCEDURE — 82607 VITAMIN B-12: CPT

## 2021-09-12 PROCEDURE — 82728 ASSAY OF FERRITIN: CPT

## 2021-09-12 PROCEDURE — 82746 ASSAY OF FOLIC ACID SERUM: CPT

## 2021-09-12 PROCEDURE — 6370000000 HC RX 637 (ALT 250 FOR IP): Performed by: PSYCHIATRY & NEUROLOGY

## 2021-09-12 PROCEDURE — 1240000000 HC EMOTIONAL WELLNESS R&B

## 2021-09-12 PROCEDURE — 82306 VITAMIN D 25 HYDROXY: CPT

## 2021-09-12 PROCEDURE — 99231 SBSQ HOSP IP/OBS SF/LOW 25: CPT | Performed by: INTERNAL MEDICINE

## 2021-09-12 PROCEDURE — 83550 IRON BINDING TEST: CPT

## 2021-09-12 PROCEDURE — 84443 ASSAY THYROID STIM HORMONE: CPT

## 2021-09-12 PROCEDURE — 36415 COLL VENOUS BLD VENIPUNCTURE: CPT

## 2021-09-12 RX ADMIN — PALIPERIDONE 3 MG: 3 TABLET, EXTENDED RELEASE ORAL at 08:06

## 2021-09-12 RX ADMIN — ESCITALOPRAM OXALATE 20 MG: 20 TABLET ORAL at 08:06

## 2021-09-12 RX ADMIN — HYDROXYZINE HYDROCHLORIDE 50 MG: 50 TABLET ORAL at 23:09

## 2021-09-12 RX ADMIN — TRAZODONE HYDROCHLORIDE 150 MG: 150 TABLET ORAL at 23:09

## 2021-09-12 RX ADMIN — ACETAMINOPHEN 650 MG: 325 TABLET, FILM COATED ORAL at 18:19

## 2021-09-12 ASSESSMENT — PAIN SCALES - GENERAL
PAINLEVEL_OUTOF10: 1
PAINLEVEL_OUTOF10: 5
PAINLEVEL_OUTOF10: 3

## 2021-09-12 ASSESSMENT — PAIN DESCRIPTION - LOCATION: LOCATION: BACK

## 2021-09-12 NOTE — PLAN OF CARE
[facility]  Day 3 Interdisciplinary Treatment Plan NOTE    Review Date & Time: 9/12/2021  0936    Admission Type:   Admission Type: Voluntary    Reason for admission:  Reason for Admission: fleeting suicidal thoughts no current plan, auditory hallucinations, HI non specific, kicked out of group home due to bringing bugs in, using crack cocaine  Estimated Length of Stay: 5-7 days  Estimated Discharge Date Update: to be determined by physician    PATIENT STRENGTHS:  Patient Strengths Strengths: Social Skills, Connection to output provider  Patient Strengths and Limitations:Limitations: Inappropriate/potentially harmful leisure interests, External locus of control, Tendency to isolate self, Difficulty problem solving/relies on others to help solve problems, Lacks leisure interests  Addictive Behavior:Addictive Behavior  In the past 3 months, have you felt or has someone told you that you have a problem with:  : None  Do you have a history of Chemical Use?: No  Do you have a history of Alcohol Use?: No  Do you have a history of Street Drug Abuse?: Yes  Histroy of Prescripton Drug Abuse?: No  Medical Problems:  Past Medical History:   Diagnosis Date    Bipolar disorder ([facility]     Depression     GERD (gastroesophageal reflux disease)     Hallucinations     Headache(784.0)     Hepatitis     Schizophrenia, schizo-affective ([facility]     [facility]     Tobacco abuse     Type II or unspecified type diabetes mellitus without mention of complication, not stated as uncontrolled     Urinary incontinence        Risk:  Fall RiskTotal: 63  Dusty Scale Dusty Scale Score: 22  BVC Total: 0  Change in scores no Changes to plan of Care no    Status EXAM:   Status and Exam  Normal: No  Facial Expression: Flat  Affect: Blunt  Level of Consciousness: Alert  Mood:Normal: No  Mood: Depressed, Anxious  Motor Activity:Normal: Yes  Motor Activity: Decreased  Interview Behavior: Cooperative  Preception: [place] to Person, [place] to Time, [place] to Place, [place] to Situation  Attention:Normal: No  Attention: Distractible  Thought Processes: Blocking  Thought Content:Normal: No  Thought Content: Preoccupations  Hallucinations: Auditory (Comment)  Delusions: No  Memory:Normal: No  Memory: Poor Recent  Insight and Judgment: No  Insight and Judgment: Poor Judgment, Poor Insight  Present Suicidal Ideation: Yes  Present Homicidal Ideation: No    Daily Assessment Last Entry:   Daily Sleep (WDL): Within Defined Limits         Patient Currently in Pain: Yes  Daily Nutrition (WDL): Within Defined Limits    Patient Monitoring:  Frequency of Checks: 4 times per hour, close    Psychiatric Symptoms:   Depression Symptoms  Depression Symptoms: Feelings of helplessness, Feelings of hopelessess, Loss of interest, Impaired concentration  Anxiety Symptoms  Anxiety Symptoms: Generalized  [place] Symptoms  [place] Symptoms: No problems reported or observed. Psychosis Symptoms  Delusion Type: No problems reported or observed. Suicide Risk CSSR-S:  1) Within the past month, have you wished you were dead or wished you could go to sleep and not wake up? : Yes  2) Have you actually had any thoughts of killing yourself? : Yes  3) Have you been thinking about how you might kill yourself? : No  5) Have you started to work out or worked out the details of how to kill yourself?  Do you intend to carry out this plan? : No  6) Have you ever done anything, started to do anything, or prepared to do anything to end your life?: No  Change in Result no Change in Plan of care no      EDUCATION:   EDUCATION:   Learner Progress Toward Treatment Goals: Reviewed results and recommendations of this team, Reviewed group plan and strategies, Reviewed signs, symptoms and risk of self harm and violent behavior, Reviewed goals and plan of care    Method:small group, individual verbal education    Outcome:verbalized by patient, but needs reinforcement to obtain goals    PATIENT GOALS:  Short term: pt refuses to attend tx planning to develop goals   Long term: pt refuses to attend tx planning to develop goals    PLAN/TREATMENT RECOMMENDATIONS UPDATE: continue with group therapies, increased socialization, continue planning for after discharge goals, continue with medication compliance    SHORT-TERM GOALS UPDATE:   Time frame for Short-Term Goals: 5-7 days    LONG-TERM GOALS UPDATE:   Time frame for Long-Term Goals: 6 months  Members Present in Team Meeting: See Signature Sheet    MARJ Regalado

## 2021-09-12 NOTE — PLAN OF CARE
Problem: Altered Mood, Psychotic Behavior:  Goal: Able to verbalize decrease in frequency and intensity of hallucinations  Description: Able to verbalize decrease in frequency and intensity of hallucinations  9/11/2021 2117 by Ralph Carrizales RN  Outcome: Ongoing  Note: Patient reports auditory mumbles no specific in content. He is isolative to room and sleeps most of shift. He reports fleeting suicidal thoughts with no plan and feels safe on unit. He remains free from self harm. Staff maintains Q 15 minute safety checks. Problem: Substance Abuse:  Goal: Absence of drug withdrawal signs and symptoms  Description: Absence of drug withdrawal signs and symptoms  9/11/2021 2117 by Ralph Carrizales RN  Outcome: Ongoing  Note: Patient remains absent of drug withdrawal signs and symptoms. Problem: Tobacco Use:  Goal: Inpatient tobacco use cessation counseling participation  Description: Inpatient tobacco use cessation counseling participation  9/11/2021 2117 by Ralph Carrizales RN  Outcome: Ongoing  Note: Patient is aware of tobacco cessation medication available for nicotine cessation.

## 2021-09-12 NOTE — ED PROVIDER NOTES
[facility] [address] [facility]  Emergency Department  Faculty Attestation       I performed a history and physical examination of the patient and discussed management with the resident. I reviewed the residents note and agree with the documented findings including all diagnostic interpretations and plan of care. Any areas of disagreement are noted on the chart. I was personally present for the key portions of any procedures. I have documented in the chart those procedures where I was not present during the key portions. I have reviewed the emergency nurses triage note. I agree with the chief complaint, past medical history, past surgical history, allergies, medications, social and family history as documented unless otherwise noted below. Documentation of the HPI, Physical Exam and Medical Decision Making performed by scribnav is based on my personal performance of the HPI, PE and MDM. For Physician Assistant/ Nurse Practitioner cases/documentation I have personally evaluated this patient and have completed at least one if not all key elements of the E/M (history, physical exam, and MDM). Additional findings are as noted. Pertinent Comments     Primary Care Physician: No primary care provider on file. History: This is a 57 y.o. male who presents to the Emergency Department with suicidal ideations with specific plan to walk into traffic. Patient denies any homicidal ideations. Patient reports auditory hallucinations. Denies alcohol use today. .  Reports cocaine use. Last used today. .  No medical complaints at this time including no cough, shortness of breath or fever. Patient is  voluntary for psychiatric admission at this time.      Physical:    ED Triage Vitals [09/11/21 0205]   BP Temp Temp Source Pulse Resp SpO2 Height Weight   114/69 98.2 °F (36.8 °C) Oral 87 16 97 % -- --       Constitutional:  Normal appearance in no acute distress, not ill-appearing  HENT: Normocephalic, atraumatic  Eyes: EOM intact with no conjunctival injection. Neck: Supple with full ROM  Cardiovascular: Regular rate and rhythm with no rubs, murmurs, or gallops  Respiratory: Talking in full sentences, clear to auscultation bilaterally with no rales, rhonchi, or wheezes   Abdomen: soft, nontender with no guarding   Musculoskeletal: No obvious injury and normal ROM  Skin: No jaundice in exposed areas. Neuro: Alert, no focal deficits   Psych: Behavior: restless, Speech: appropriate quality, quantity and organization of sentences, Thought content: suicidal, Affect: flat. MDM/Plan:   Suicidal ideations with specific plan to walk into traffic and homicidal ideations.   No medical complaints on my exam.  Medically cleared at this time  Labs per protocol for [facility] consult to facilitate possible admission     Critical Care Time: None     Nolberto Encarnacion MD  Attending Emergency Physician         Nolberto Encarnacion MD  09/12/21 4025

## 2021-09-12 NOTE — PROGRESS NOTES
Daily Progress Note  9/12/2021    Patient Name: Katie De Santiago    CHIEF COMPLAINT:  Suicidal ideation, homicidal ideation, auditory hallucinations, visual hallucinations, crack use. SUBJECTIVE:      Patient is seen today for a follow up assessment. Medication Adherence: Patient has been medication compliant with the exception of his scheduled Lotrimin cream.    Emergency Medications: Patient has not required any emergency medications today. Patient was resting in his room at the time of approach by writer. He was oriented to self, location, and situation, but was disoriented to time. Writer reoriented patient. Patient endorses depression and anxiety today. He reports decreased appetite. He reports his sleep was poor and reports he did not sleep at all. He endorses active suicidal ideation. When asked if he has any intent or plans, patient states \"I don't know\". When asked if he has any homicidal ideations or plans, patient stated \"I don't want to talk about it\". Patient then told writer he wanted to go to sleep and refused to continue the assessment. Multiple attempts were made to engage patient in assessment, but patient refused to continue assessment. Due to patient's refusal to participate in the remainder of the assessment, assessment was concluded. At this time, the patient is not appropriate for a lower level of care. There is risk of decompensation and patient warrants further hospitalization for safety and stabilization. Group Attendance on Unit:   [] Yes  [] Selectively    [x] No    Medication Side Effects: Unable to assess due to patient's refusal to participate in the remainder of the assessment with writer. Mental Status Exam  Level of consciousness: Alert and awake. Appearance: Appropriate attire for setting, resting in bed, with poor grooming and hygiene. Behavior/Motor: Guarded, no psychomotor abnormalities.    Attitude toward examiner: Cooperative, attentive, good eye contact. Speech: Normal rate, normal volume, normal tone. Mood:  Patient reports \"not doing too good\". Affect: Blunted. Thought processes: Slow and thought blocking. Thought content: When asked if he has any intent or plans, patient states \"I don't know\". When asked if he has any homicidal ideations or plans, patient stated \"I don't want to talk about it\". Suicidal Ideation: Active suicidal ideations. When asked if he has any intent or plans, patient states \"I don't know\". Unable to assess if patient contracts for safety on the unit due to patient's refusal to participate in the remainder of the assessment with writer. Delusions/Paranoia: Unable to assess due to patient's refusal to participate in the remainder of the assessment with writer. Perceptual Disturbance: Patient does not appear to be responding to internal stimuli. Auditory hallucinations: Unable to assess due to patient's refusal to participate in the remainder of the assessment with writer. Visual hallucinations: Unable to assess due to patient's refusal to participate in the remainder of the assessment with writer. Cognition: He was oriented to self, location, and situation, but was disoriented to time. Writer reoriented patient. Memory: Intact. Insight & Judgement: Poor. Data   height is 6' 2\" (1.88 m) and weight is 200 lb (90.7 kg). His oral temperature is 97.3 °F (36.3 °C). His blood pressure is 117/71 and his pulse is 94. His respiration is 14. Labs:   Admission on 09/11/2021   Component Date Value Ref Range Status    TSH 09/12/2021 0.56  0.30 - 5.00 mIU/L Final   Admission on 09/11/2021, Discharged on 09/11/2021   Component Date Value Ref Range Status    Specimen Description 09/11/2021 . NASOPHARYNGEAL SWAB   Final    SARS-CoV-2, Rapid 09/11/2021 Not Detected  Not Detected Final    Comment:       Rapid NAAT:  The specimen is NEGATIVE for SARS-CoV-2, the novel coronavirus associated with   COVID-19. The ID NOW COVID-19 assay is designed to detect the virus that causes COVID-19 in patients   with signs and symptoms of infection who are suspected of COVID-19. An individual without symptoms of COVID-19 and who is not shedding SARS-CoV-2 virus would   expect to have a negative (not detected) result in this assay. Negative results should be treated as presumptive and, if inconsistent with clinical signs   and symptoms or necessary for patient management,  should be tested with an alternative molecular assay. Negative results do not preclude   SARS-CoV-2 infection and   should not be used as the sole basis for patient management decisions.          Fact sheet for Healthcare Providers: BuildHer.es  Fact sheet for Patients: BuildHer.es          Methodology: Isothermal Nucleic Acid Amplification      Amphetamine Screen, Ur 09/11/2021 NEGATIVE  NEGATIVE Final    Comment:       (Positive cutoff 1000 ng/mL)                  Barbiturate Screen, Ur 09/11/2021 NEGATIVE  NEGATIVE Final    Comment:       (Positive cutoff 200 ng/mL)                  Benzodiazepine Screen, Urine 09/11/2021 NEGATIVE  NEGATIVE Final    Comment:       (Positive cutoff 200 ng/mL)                  Cocaine Metabolite, Urine 09/11/2021 POSITIVE* NEGATIVE Final    Comment:       (Positive cutoff 300 ng/mL)                  Methadone Screen, Urine 09/11/2021 NEGATIVE  NEGATIVE Final    Comment:       (Positive cutoff 300 ng/mL)                  Opiates, Urine 09/11/2021 NEGATIVE  NEGATIVE Final    Comment:       (Positive cutoff 300 ng/mL)                  Phencyclidine, Urine 09/11/2021 NEGATIVE  NEGATIVE Final    Comment:       (Positive cutoff 25 ng/mL)                  Propoxyphene, Urine 09/11/2021 NOT REPORTED  NEGATIVE Final    Cannabinoid Scrn, Ur 09/11/2021 NEGATIVE  NEGATIVE Final    Comment:       (Positive cutoff 50 ng/mL)                  Oxycodone Screen, Ur 09/11/2021 NEGATIVE  NEGATIVE Final    Comment:       (Positive cutoff 100 ng/mL)                  Methamphetamine, Urine 09/11/2021 NOT REPORTED  NEGATIVE Final    Tricyclic Antidepressants, Urine 09/11/2021 NOT REPORTED  NEGATIVE Final    MDMA, Urine 09/11/2021 NOT REPORTED  NEGATIVE Final    Buprenorphine Urine 09/11/2021 NOT REPORTED  NEGATIVE Final    Test Information 09/11/2021 Assay provides medical screening only. The absence of expected drug(s) and/or metabolite(s) may indicate diluted or adulterated urine, limitations of testing or timing of collection. Final    Comment: Testing for legal purposes should be confirmed by another method. To request confirmation   of test result, please call the lab within 7 days of sample submission.       WBC 09/11/2021 6.3  3.5 - 11.3 k/uL Final    RBC 09/11/2021 3.93* 4.21 - 5.77 m/uL Final    Hemoglobin 09/11/2021 10.9* 13.0 - 17.0 g/dL Final    Hematocrit 09/11/2021 33.8* 40.7 - 50.3 % Final    MCV 09/11/2021 86.0  82.6 - 102.9 fL Final    MCH 09/11/2021 27.7  25.2 - 33.5 pg Final    MCHC 09/11/2021 32.2  28.4 - 34.8 g/dL Final    RDW 09/11/2021 13.3  11.8 - 14.4 % Final    Platelets 58/71/2528 234  138 - 453 k/uL Final    MPV 09/11/2021 9.2  8.1 - 13.5 fL Final    NRBC Automated 09/11/2021 0.0  0.0 per 100 WBC Final    Differential Type 09/11/2021 NOT REPORTED   Final    Seg Neutrophils 09/11/2021 44  36 - 65 % Final    Lymphocytes 09/11/2021 44* 24 - 43 % Final    Monocytes 09/11/2021 10  3 - 12 % Final    Eosinophils % 09/11/2021 2  1 - 4 % Final    Basophils 09/11/2021 0  0 - 2 % Final    Immature Granulocytes 09/11/2021 0  0 % Final    Segs Absolute 09/11/2021 2.75  1.50 - 8.10 k/uL Final    Absolute Lymph # 09/11/2021 2.75  1.10 - 3.70 k/uL Final    Absolute Mono # 09/11/2021 0.62  0.10 - 1.20 k/uL Final    Absolute Eos # 09/11/2021 0.15  0.00 - 0.44 k/uL Final    Basophils Absolute 09/11/2021 <0.03  0.00 - 0.20 k/uL Final    technology. **

## 2021-09-12 NOTE — PLAN OF CARE
Problem: Altered Mood, Psychotic Behavior:  Goal: Able to verbalize decrease in frequency and intensity of hallucinations  Description: Able to verbalize decrease in frequency and intensity of hallucinations  9/12/2021 0927 by Karmen Zapien RN  Outcome: Ongoing   1:1 with pt x ten minutes. Pt encouraged to attend unit programming and interact with peers and staff. Pt also encouraged to tend to hygiene and ADLs. Pt encouraged to discuss feelings with staff and feedback and reassurance provided. Pt admits to having thoughts of self harm. Agreeable to seeking out staff should feelings increase. Able to identify positive coping skills and plan for safety. Will cont to monitor q15 minutes for safety and provide support and reassurance as needed. Pt compliant with medications. Out briefly for meals.

## 2021-09-12 NOTE — PROGRESS NOTES
[facility] Internal Medicine    CONSULTATION / HISTORY AND PHYSICAL EXAMINATION            Date:   9/12/2021  Patient name:  Higinio Flynn  Date of admission:  9/11/2021  6:10 AM  MRN:   [id number]  Account:  [de-identified]  YOB: 1960  PCP:    No primary care provider on file. Room:   [address] [place] [address]  Code Status:    Full Code    Physician Requesting Consult: Tanna Macias MD    Reason for Consult:  medical management    Chief Complaint:     No chief complaint on file. History Obtained From:     Patient medical record nursing staff    History of Present Illness:   Patient is admitted to [facility] floor with schizophrenia  Intered medicine consulted for multiple reasons including weight loss, weakness in both legs  Hypertension  Patient is a poor historian, he is actively going through withdrawals, has shaking movement in his both upper extremities  Not interested in talking  He is denying any chest pain, shortness of breath  Did not answer my question that whether he has lost weight or not      Past Medical History:     Past Medical History:   Diagnosis Date    [facility]     Depression     GERD (gastroesophageal reflux disease)     Hallucinations     Headache(784.0)     Hepatitis     Schizophrenia, schizo-affective ([facility]     Substance abuse ([facility]     Tobacco abuse     Type II or unspecified type diabetes mellitus without mention of complication, not stated as uncontrolled     Urinary incontinence         Past Surgical History:     Past Surgical History:   Procedure Laterality Date    ABSCESS DRAINAGE N/A 02/11/2018    Carla anal abcess    DENTAL SURGERY      all teeth pulled        Medications Prior to Admission:     Prior to Admission medications    Medication Sig Start Date End Date Taking?  Authorizing Provider   paliperidone (INVEGA) 3 MG extended release tablet Take 1 tablet by mouth daily 7/23/21  Yes Oralia Grove MD escitalopram (LEXAPRO) 20 MG tablet Take 1 tablet by mouth daily 21  Yes Pilar Granger MD   traZODone (DESYREL) 150 MG tablet Take 1 tablet by mouth nightly as needed for Sleep 21  Yes Pilar Granger MD   clotrimazole (LOTRIMIN AF) 1 % cream Apply topically 2 times daily. 21  Beth Harper MD   paliperidone palmitate ER (INVEGA SUSTENNA) 234 MG/1.5ML [place] IM injection Inject 234 mg into the muscle every 28 days    Historical Provider, MD        Allergies:     Navane [[email or web address]]    Social History:     Tobacco:    reports that he has been smoking cigarettes. He has a 47.00 pack-year smoking history. He has never used smokeless tobacco.  Alcohol:      reports current alcohol use. Drug Use:  reports current drug use. Frequency: 7.00 times per week. Drug: Cocaine. Family History:     Family History   Problem Relation Age of Onset    Diabetes Mother     Heart Disease Mother        Review of Systems:     Positive and Negative as described in HPI. Limited ROS could be done  Patient denying chest pain, shortness of breath, abdominal pain, loose stools  Having shakes with withdrawals    Physical Exam:     /71   Pulse 94   Temp 97.3 °F (36.3 °C) (Oral)   Resp 14   Ht 6' 2\" (1.88 m)   Wt 200 lb (90.7 kg)   BMI 25.68 kg/m²   Temp (24hrs), Av.3 °F (36.3 °C), Min:97.3 °F (36.3 °C), Max:97.3 °F (36.3 °C)    No results for input(s): POCGLU in the last 72 hours. No intake or output data in the 24 hours ending 21 1742    General Appearance: Patient is going through withdrawals, having shakes  Mental status: oriented to person, place, and time with normal affect  Head:  normocephalic, atraumatic.   Eye: no icterus, redness, pupils equal and reactive, extraocular eye movements intact, conjunctiva clear  Ear: normal external ear, no discharge, hearing intact  Nose:  no drainage noted  Mouth: mucous membranes moist  Neck: supple, no carotid bruits, thyroid not palpable  Lungs: Bilateral equal air entry, clear to ausculation, no wheezing, rales or rhonchi, normal effort  Cardiovascular: normal rate, regular rhythm, no murmur, gallop, rub. Abdomen: Soft, nontender, nondistended, normal bowel sounds, no hepatomegaly or splenomegaly  Neurologic: There are no new focal motor or sensory deficits, normal muscle tone and bulk, no abnormal sensation, normal speech, cranial nerves II through XII grossly intact  Skin: No gross lesions, rashes, bruising or bleeding on exposed skin area  Extremities:  peripheral pulses palpable, no pedal edema or calf pain with palpation  Psych: Investigations:      Laboratory Testing:  Recent Results (from the past 24 hour(s))   TSH WITH REFLEX    Collection Time: 09/12/21  7:35 AM   Result Value Ref Range    TSH 0.56 0.30 - 5.00 mIU/L           Consultations:   IP CONSULT TO INTERNAL MEDICINE  Assessment :      Primary Problem  Schizoaffective disorder, depressive type [facility])    Active Hospital Problems    Diagnosis Date Noted    Cocaine abuse ([facility] [F14.10] 05/03/2014     Priority: Medium    Schizoaffective disorder, depressive type ([facility] [F25.1] 09/23/2017       Plan:     1. Hypertension, controlled  2. Patient has history of substances abuse, including cocaine and alcoholism  3. Generalized fatigue, weakness is likely due to substance abuse, psych condition, he has anemia, hemoglobin is chronically low, will order iron studies, TSH, vitamin B27, folic acid, vitamin D.  4. Patient is being around 200 pounds, review of previous hospitalization, patient weighed 190 pound back in July when he was admitted with pneumonia  5. I do not suspect patient has major weight loss    9/12  Patient still complaining of weakness in legs  TSH is okay  Awaiting labs. Chandler Mckeon MD  9/12/2021  5:42 PM    Copy sent to Dr. Colby primary care provider on file. Please note that this chart was generated using voice recognition Dragon dictation software. Although every effort was made to ensure the accuracy of this automated transcription, some errors in transcription may have occurred.

## 2021-09-13 PROCEDURE — 6370000000 HC RX 637 (ALT 250 FOR IP): Performed by: INTERNAL MEDICINE

## 2021-09-13 PROCEDURE — 6370000000 HC RX 637 (ALT 250 FOR IP): Performed by: PSYCHIATRY & NEUROLOGY

## 2021-09-13 PROCEDURE — 1240000000 HC EMOTIONAL WELLNESS R&B

## 2021-09-13 PROCEDURE — 99232 SBSQ HOSP IP/OBS MODERATE 35: CPT | Performed by: PSYCHIATRY & NEUROLOGY

## 2021-09-13 PROCEDURE — 99232 SBSQ HOSP IP/OBS MODERATE 35: CPT | Performed by: INTERNAL MEDICINE

## 2021-09-13 PROCEDURE — APPSS30 APP SPLIT SHARED TIME 16-30 MINUTES: Performed by: NURSE PRACTITIONER

## 2021-09-13 RX ORDER — PALIPERIDONE 6 MG/1
6 TABLET, EXTENDED RELEASE ORAL DAILY
Status: DISCONTINUED | OUTPATIENT
Start: 2021-09-14 | End: 2021-09-15

## 2021-09-13 RX ORDER — VITAMIN B COMPLEX
1000 TABLET ORAL DAILY
Status: DISCONTINUED | OUTPATIENT
Start: 2021-09-13 | End: 2021-09-22 | Stop reason: HOSPADM

## 2021-09-13 RX ADMIN — CLOTRIMAZOLE: 10 CREAM TOPICAL at 21:57

## 2021-09-13 RX ADMIN — TRAZODONE HYDROCHLORIDE 150 MG: 150 TABLET ORAL at 21:57

## 2021-09-13 RX ADMIN — PALIPERIDONE 3 MG: 3 TABLET, EXTENDED RELEASE ORAL at 09:41

## 2021-09-13 RX ADMIN — ESCITALOPRAM OXALATE 20 MG: 20 TABLET ORAL at 09:42

## 2021-09-13 RX ADMIN — HYDROXYZINE HYDROCHLORIDE 50 MG: 50 TABLET ORAL at 21:57

## 2021-09-13 RX ADMIN — Medication 1000 UNITS: at 18:14

## 2021-09-13 RX ADMIN — ACETAMINOPHEN 650 MG: 325 TABLET, FILM COATED ORAL at 11:04

## 2021-09-13 ASSESSMENT — PAIN SCALES - GENERAL
PAINLEVEL_OUTOF10: 1
PAINLEVEL_OUTOF10: 3

## 2021-09-13 NOTE — GROUP NOTE
Group Therapy Note    Date: 9/13/2021    Group Start Time: 1100  Group End Time: 9213  Group Topic: Recreational    [facility]    Genette [place]        Group Therapy Note    Pt did not attend recreational conversation group d/t resting in room despite staff invitation to attend. 1:1 talk time offered as alternative to group session, pt declined.

## 2021-09-13 NOTE — PROGRESS NOTES
Pharmacy Med Education Group Note    Date: 9/13/21  Start Time: 1330  End Time: 1400    Number Participants in Group:  4    Goal:  Patient will demonstrate an understanding of the medications intended purpose and possible adverse effects  Topic: [place] for Pharmacy Med Ed Group    Discipline Responsible:     OT  AT  [facility].  RT     X Other       Participation Level:     None  Minimal      X Active Listener    X Interactive    Monopolizing         Participation Quality:    X Appropriate  Inappropriate     X       Attentive        Intrusive          Sharing        Resistant          Supportive        Lethargic       Affective:     X Congruent  Incongruent  Blunted  Flat    Constricted  Anxious  Elated  Angry    Labile  Depressed  Other         Cognitive:    X Alert  Oriented PPTP     Concentration   X G  F  P   Attention Span   X G  F  P   Short-Term Memory   X G  F  P   Long-Term Memory  G  F  P   ProblemSolving/  Decision Making  G  F  P   Ability to Process  Information   X G  F  P      Contributing Factors             Delusional             Hallucinating             Flight of Ideas             Other:       Modes of Intervention:    X Education   X Support  Exploration    Clarifying  Problem Solving  Confrontation    Socialization  Limit Setting  Reality Testing    Activity  Movement  Media    Other:            Response to Learning:    X Able to verbalize current knowledge/experience    Able to verbalize/acknowledge new learning    Able to retain information    Capable of insight    Able to change behavior    Progressing to goal    Other:        Comments: Provided patient with a list of current medications after group.     Shin Lin, PharmD, BCPS  9/13/2021 2:19 PM

## 2021-09-13 NOTE — PLAN OF CARE
Problem: Altered Mood, Psychotic Behavior:  Goal: Able to verbalize decrease in frequency and intensity of hallucinations  Description: Able to verbalize decrease in frequency and intensity of hallucinations  Outcome: Ongoing     Problem: Altered Mood, Psychotic Behavior:  Goal: Absence of self-harm  Description: Absence of self-harm  9/13/2021 1339 by Pan Irwin  Outcome: Ongoing    Patient has been isolative for long intervals, more active this afternoon. Fretful, anxious, says he doesn't feel good, physically or mentally. He is worried about his housing, that he was kicked out and now has no where to go. The voices go up and down in intensity, and tell him negative things and to harm himself some times. Has some suicidal thoughts but no plan and no self harming behaviors noted. Not attending groups. Refused breakfast, did eat lunch. Fretful and worrisome. Takes medications as ordered. Controlled and cooperative. Did refuse vital signs this morning.

## 2021-09-13 NOTE — PLAN OF CARE
Problem: Substance Abuse:  Goal: Absence of drug withdrawal signs and symptoms  Description: Absence of drug withdrawal signs and symptoms  Outcome: Ongoing  Note: Pt is free of sx of withdrawal.     Problem: Altered Mood, Psychotic Behavior:  Goal: Absence of self-harm  Description: Absence of self-harm  Outcome: Ongoing  Note: Pt denies thoughts of self harm and is agreeable to seeking out should thoughts of self harm arise. Safe environment maintained. Q15 minute checks for safety cont per unit policy. Will cont to monitor for safety and provides support and reassurance as needed.

## 2021-09-13 NOTE — PROGRESS NOTES
[facility] Internal Medicine    CONSULTATION / HISTORY AND PHYSICAL EXAMINATION            Date:   9/13/2021  Patient name:  Miguel Chung  Date of admission:  9/11/2021  6:10 AM  MRN:   [id number]  Account:  [de-identified]  YOB: 1960  PCP:    No primary care provider on file. Room:   [address] [place] [address]  Code Status:    Full Code    Physician Requesting Consult: Kellee Monroy MD    Reason for Consult:  medical management    Chief Complaint:     No chief complaint on file. History Obtained From:     Patient medical record nursing staff    History of Present Illness:   Patient is admitted to [facility] floor with schizophrenia  Intered medicine consulted for multiple reasons including weight loss, weakness in both legs  Hypertension  Patient is a poor historian, he is actively going through withdrawals, has shaking movement in his both upper extremities  Not interested in talking  He is denying any chest pain, shortness of breath  Did not answer my question that whether he has lost weight or not      Past Medical History:     Past Medical History:   Diagnosis Date    Bipolar disorder ([facility]     Depression     GERD (gastroesophageal reflux disease)     Hallucinations     Headache(784.0)     Hepatitis     Schizophrenia, schizo-affective ([facility]     Substance abuse ([facility]     Tobacco abuse     Type II or unspecified type diabetes mellitus without mention of complication, not stated as uncontrolled     Urinary incontinence         Past Surgical History:     Past Surgical History:   Procedure Laterality Date    ABSCESS DRAINAGE N/A 02/11/2018    Carla anal abcess    DENTAL SURGERY      all teeth pulled        Medications Prior to Admission:     Prior to Admission medications    Medication Sig Start Date End Date Taking?  Authorizing Provider   paliperidone (INVEGA) 3 MG extended release tablet Take 1 tablet by mouth daily 7/23/21  Yes Roshni Bose MD escitalopram (LEXAPRO) 20 MG tablet Take 1 tablet by mouth daily 21  Yes Regino Aase, MD   traZODone (DESYREL) 150 MG tablet Take 1 tablet by mouth nightly as needed for Sleep 21  Yes Regino Aase, MD   clotrimazole (LOTRIMIN AF) 1 % cream Apply topically 2 times daily. 21  Darrel Cullen MD   paliperidone palmitate ER (INVEGA SUSTENNA) 234 MG/1.5ML [place] IM injection Inject 234 mg into the muscle every 28 days    Historical Provider, MD        Allergies:     Navane [[email or web address]]    Social History:     Tobacco:    reports that he has been smoking cigarettes. He has a 47.00 pack-year smoking history. He has never used smokeless tobacco.  Alcohol:      reports current alcohol use. Drug Use:  reports current drug use. Frequency: 7.00 times per week. Drug: Cocaine. Family History:     Family History   Problem Relation Age of Onset    Diabetes Mother     Heart Disease Mother        Review of Systems:     Positive and Negative as described in HPI. Limited ROS could be done  Patient denying chest pain, shortness of breath, abdominal pain, loose stools  Having shakes with withdrawals    Physical Exam:     /73   Pulse 68   Temp 97.9 °F (36.6 °C) (Oral)   Resp 14   Ht 6' 2\" (1.88 m)   Wt 200 lb (90.7 kg)   BMI 25.68 kg/m²   Temp (24hrs), Av.9 °F (36.6 °C), Min:97.9 °F (36.6 °C), Max:97.9 °F (36.6 °C)    No results for input(s): POCGLU in the last 72 hours. No intake or output data in the 24 hours ending 21 0929    General Appearance: Patient is going through withdrawals, having shakes  Mental status: oriented to person, place, and time with normal affect  Head:  normocephalic, atraumatic.   Eye: no icterus, redness, pupils equal and reactive, extraocular eye movements intact, conjunctiva clear  Ear: normal external ear, no discharge, hearing intact  Nose:  no drainage noted  Mouth: mucous membranes moist  Neck: supple, no carotid bruits, thyroid not palpable  Lungs: Bilateral equal air entry, clear to ausculation, no wheezing, rales or rhonchi, normal effort  Cardiovascular: normal rate, regular rhythm, no murmur, gallop, rub. Abdomen: Soft, nontender, nondistended, normal bowel sounds, no hepatomegaly or splenomegaly  Neurologic: There are no new focal motor or sensory deficits, normal muscle tone and bulk, no abnormal sensation, normal speech, cranial nerves II through XII grossly intact  Skin: No gross lesions, rashes, bruising or bleeding on exposed skin area  Extremities:  peripheral pulses palpable, no pedal edema or calf pain with palpation  Psych: Investigations:      Laboratory Testing:  No results found for this or any previous visit (from the past 24 hour(s)). Consultations:   IP CONSULT TO INTERNAL MEDICINE  Assessment :      Primary Problem  Schizoaffective disorder, depressive type [facility])    Active Hospital Problems    Diagnosis Date Noted    Cocaine abuse ([facility] [F14.10] 05/03/2014     Priority: Medium    Schizoaffective disorder, depressive type ([facility] [F25.1] 09/23/2017       Plan:     1. Hypertension, controlled  2. Patient has history of substances abuse, including cocaine and alcoholism  3. Generalized fatigue, weakness is likely due to substance abuse, psych condition, he has anemia, hemoglobin is chronically low, will order iron studies, TSH, vitamin U35, folic acid, vitamin D.  4. Patient is being around 200 pounds, review of previous hospitalization, patient weighed 190 pound back in July when he was admitted with pneumonia  5. I do not suspect patient has major weight loss    9/12  Patient still complaining of weakness in legs  TSH is okay  Awaiting labs. 9/13  Started patient on vitamin D replacement with vitamin D levels being low  Still feels generalized weak      Evelia Felder MD  9/13/2021  3:49 PM    Copy sent to Dr. Bell Williamson primary care provider on file.     Please note that this chart was generated using voice recognition Dragon dictation software. Although every effort was made to ensure the accuracy of this automated transcription, some errors in transcription may have occurred.

## 2021-09-13 NOTE — CARE COORDINATION
SW received manuel from Pts sister Mau Heard, (unsure of [facility] at the time of call.) SW listened as sister spoke, sister reports the possibility of placing pt in a locked healthcare facility. Rob Presley [phone].

## 2021-09-13 NOTE — CARE COORDINATION
Writer spoke to pt who reported he desired a different group home, as his previous group home had kicked him out for bringing pests into the home. Pt requested writer call his former [facility] to determine if she would refund his rent. Writer called [employer] [facility], phone rang continuously without answer.

## 2021-09-13 NOTE — PROGRESS NOTES
Daily Progress Note  9/13/2021    Patient Name: Sebastian Parker    CHIEF COMPLAINT:  Suicidal ideation, homicidal ideation, auditory hallucinations, visual hallucinations, crack use. SUBJECTIVE:      Medication Adherence: Patient has been medication compliant with the exception of his scheduled Lotrimin cream.    Emergency Medications: Patient has not required any emergency medications today. Mr. Malinda Ferrer was seen in his room for follow-up assessment today. He was resting but responded easily to verbal stimuli. He reports that his mood today is \"not too good\". He is still experiencing auditory hallucinations telling him to kill himself and reports active suicidal ideation. He has seen no improvement in terms of intensity, frequency, or volume of auditory hallucinations. He reports significant levels of depression and anxiety and rates depression 10/10, 10 being the worst.  He has been getting up for meals and reports that his appetite is \"normal\". Sleep was spotty overnight and he reports frequent waking. He has not yet felt up to attending any groups but his interactions with peers and staff have been appropriate. He reports showering last night and attending to ADLs. He is denying any side effects of his medications. He agrees to contract for safety on the unit. At this time he is unable to contract for safety in the community. Group Attendance on Unit:   [] Yes  [] Selectively    [x] No    Medication Side Effects: Denies         Mental Status Exam  Level of consciousness: Alert and awake. Appearance: Appropriate attire for setting, resting in bed, with poor grooming and hygiene. Behavior/Motor: Approachable, no psychomotor abnormalities. Attitude toward examiner: Cooperative, attentive, good eye contact. Speech: Normal rate, normal volume, depressed tone. Mood: Dysphoric  Affect: Blunted.   Thought processes: Slow, linear, coherent  Thought content: Denies homicidal ideation  Suicidal Ideation: Active suicidal ideations. Agrees to contract for safety on the unit; encouraged to seek staff assistance if overwhelming thoughts of self-harm occur. Delusions/Paranoia: Not evident  Perceptual Disturbance: Patient does not appear to be responding to internal stimuli; patient endorses auditory hallucinations command type to harm self  Cognition: Oriented to self, location, situation  Memory: Intact. Insight & Judgement: Poor. Data   height is 6' 2\" (1.88 m) and weight is 200 lb (90.7 kg). His oral temperature is 97.9 °F (36.6 °C). His blood pressure is 124/73 and his pulse is 68. His respiration is 14. Labs:   Admission on 09/11/2021   Component Date Value Ref Range Status    Iron 09/12/2021 90  59 - 158 ug/dL Final    TIBC 09/12/2021 PENDING  ug/dL Incomplete    Iron Saturation 09/12/2021 PENDING  % Incomplete    UIBC 09/12/2021 PENDING  ug/dL Incomplete    Ferritin 09/12/2021 111  30 - 400 ug/L Final    TSH 09/12/2021 0.56  0.30 - 5.00 mIU/L Final    Vitamin B-12 09/12/2021 411  232 - 1245 pg/mL Final    Folate 09/12/2021 13.1  >4.8 ng/mL Final    Vit D, 25-Hydroxy 09/12/2021 23.3* 30.0 - 100.0 ng/mL Final    Comment:    Reference Range:  Vitamin D status         Range   Deficiency              <20 ng/mL   Mild Deficiency       20-30 ng/mL   Sufficiency           ng/mL   Toxicity               >100 ng/mL           Reviewed patient's current plan of care and vital signs with nursing staff. Labs reviewed: [x] Yes  Last EKG in EMR reviewed: [x] Yes  QTc: 452.     Medications  Current Facility-Administered Medications: acetaminophen (TYLENOL) tablet 650 mg, 650 mg, Oral, Q4H PRN  aluminum & magnesium hydroxide-simethicone (MAALOX) 200-200-20 MG/5ML suspension 30 mL, 30 mL, Oral, Q6H PRN  hydrOXYzine (ATARAX) tablet 50 mg, 50 mg, Oral, TID PRN  ibuprofen (ADVIL;MOTRIN) tablet 400 mg, 400 mg, Oral, Q6H PRN  polyethylene glycol (GLYCOLAX) packet 17 g, 17 g, Oral, Daily PRN  nicotine polacrilex (NICORETTE) gum 2 mg, 2 mg, Oral, Q1H PRN  haloperidol (HALDOL) tablet 5 mg, 5 mg, Oral, Q4H PRN **AND** LORazepam (ATIVAN) tablet 2 mg, 2 mg, Oral, Q4H PRN  haloperidol lactate (HALDOL) injection 5 mg, 5 mg, IntraMUSCular, Q4H PRN **AND** LORazepam (ATIVAN) injection 2 mg, 2 mg, IntraMUSCular, Q4H PRN **AND** diphenhydrAMINE (BENADRYL) injection 50 mg, 50 mg, IntraMUSCular, Q4H PRN  clotrimazole (LOTRIMIN) 1 % cream, , Topical, BID  escitalopram (LEXAPRO) tablet 20 mg, 20 mg, Oral, Daily  paliperidone (INVEGA) extended release tablet 3 mg, 3 mg, Oral, Daily  traZODone (DESYREL) tablet 150 mg, 150 mg, Oral, Nightly PRN    ASSESSMENT  Schizoaffective disorder, depressive type ([facility]         PLAN  Patient symptoms are: Remains Unstable. Consider titration of Invega to 6 mg by mouth daily  Internal medicine following  Monitor need and frequency of PRN medications. Encourage participation in groups and milieu. Attempt to develop insight. Psycho-education conducted. Supportive Therapy conducted. Probable discharge is to be determined by MD.   Follow-up daily while inpatient. Patient continues to be monitored in the inpatient psychiatric facility at [facility] for safety and stabilization. Patient continues to need, on a daily basis, active treatment furnished directly by or requiring the supervision of inpatient psychiatric personnel. Electronically signed by MARY Orona CNP on 9/13/2021 at 1:46 PM    **This report has been created using voice recognition software. It may contain minor errors which are inherent in voice recognition technology. **      I independently saw and evaluated the patient. I reviewed the nurse practitioners documentation above. Any additional comments or changes to the nurse practitioners documentation are stated below otherwise agree with assessment.   Plan will be as follows:  Patient still endorsing distressing auditory hallucinations, severely depressed mood. Not able to contract for safety. States that the voices are bad and he is requesting an increase of Invega. Agreeable to increase to 6 mg and denying any side effects to the Invega. Denying side effects to Lexapro at present time. Still endorsing suicidal ideations. PLAN  Patient s symptoms   show no change  Increase Invega to 6 mg daily  Encourage participation in groups and milieu  Attempt to develop insight  Psycho-education conducted. Supportive Therapy conducted.   Probable discharge is undetermined at this time  Follow-up daily while on inpatient unit

## 2021-09-14 LAB
IRON SATURATION: 30 % (ref 20–55)
IRON: 90 UG/DL (ref 59–158)
TOTAL IRON BINDING CAPACITY: 304 UG/DL (ref 250–450)
UNSATURATED IRON BINDING CAPACITY: 214 UG/DL (ref 112–347)

## 2021-09-14 PROCEDURE — APPSS30 APP SPLIT SHARED TIME 16-30 MINUTES: Performed by: NURSE PRACTITIONER

## 2021-09-14 PROCEDURE — 6370000000 HC RX 637 (ALT 250 FOR IP): Performed by: PSYCHIATRY & NEUROLOGY

## 2021-09-14 PROCEDURE — 99232 SBSQ HOSP IP/OBS MODERATE 35: CPT | Performed by: INTERNAL MEDICINE

## 2021-09-14 PROCEDURE — 1240000000 HC EMOTIONAL WELLNESS R&B

## 2021-09-14 PROCEDURE — 6370000000 HC RX 637 (ALT 250 FOR IP): Performed by: INTERNAL MEDICINE

## 2021-09-14 PROCEDURE — 99232 SBSQ HOSP IP/OBS MODERATE 35: CPT | Performed by: PSYCHIATRY & NEUROLOGY

## 2021-09-14 RX ADMIN — HYDROXYZINE HYDROCHLORIDE 50 MG: 50 TABLET ORAL at 22:58

## 2021-09-14 RX ADMIN — ESCITALOPRAM OXALATE 20 MG: 20 TABLET ORAL at 11:24

## 2021-09-14 RX ADMIN — PALIPERIDONE 6 MG: 6 TABLET, EXTENDED RELEASE ORAL at 11:24

## 2021-09-14 RX ADMIN — TRAZODONE HYDROCHLORIDE 150 MG: 150 TABLET ORAL at 22:58

## 2021-09-14 RX ADMIN — CLOTRIMAZOLE: 10 CREAM TOPICAL at 11:24

## 2021-09-14 RX ADMIN — Medication 1000 UNITS: at 11:23

## 2021-09-14 NOTE — PLAN OF CARE
Problem: Altered Mood, Psychotic Behavior:  Goal: Able to verbalize decrease in frequency and intensity of hallucinations  Description: Able to verbalize decrease in frequency and intensity of hallucinations  9/14/2021 1518 by Tej Justice RN  Outcome: Ongoing  Note: Pt reports having auditory hallucinations. When asked the voices were saying pt reported they were negative in content. Pt reports having fleeting suicidal ideation. Pt denies having homicidal ideation. Pt states having depression and anxiety. Pt is cooperative with medical treatment and staff. Pt is accepting of meals and reports having poor sleep. Pt is isolative to his room for most of shift. Problem: Altered Mood, Psychotic Behavior:  Goal: Absence of self-harm  Description: Absence of self-harm  9/14/2021 1518 by Tej Justice RN  Outcome: Ongoing  Note: Pt remains free of self inflicted harm. Problem: Substance Abuse:  Goal: Absence of drug withdrawal signs and symptoms  Description: Absence of drug withdrawal signs and symptoms  9/14/2021 1518 by Tej Justice RN  Outcome: Ongoing  Note: Pt remains free of withdrawal symptoms.

## 2021-09-14 NOTE — PROGRESS NOTES
Daily Progress Note  9/14/2021    Patient Name: Rita Agrawal    CHIEF COMPLAINT:  Suicidal ideation, homicidal ideation, auditory hallucinations, visual hallucinations, crack use. Medication Adherence: Patient adherent with scheduled Lexapro, Invega    Emergency Medications: Patient has not required any emergency medications     SUBJECTIVE:    Mr. Zaina Gonzalez was seen in his room for follow-up assessment today. He was resting but responded easily to verbal stimuli. He continues to report poor mood with significant levels of depression. He kept his face covered during entirety of conversation and was difficult to engage. He continues to endorse suicidal ideation but was vague as to plan or intent. He denies any significant change in frequency, intensity, or volume of auditory hallucinations. He maintains an adequate appetite and reports sleep was \"okay\". He still has not attended any groups and was encouraged to spend more time in the day area if possible as he may find this beneficial to his mood. He was also encouraged to shower because he cannot recall his last shower and stated \"it was the other day\". Patient does agree to contract for safety on the unit however at this time he is unable to contract for safety in the community. Group Attendance on Unit:   [] Yes  [] Selectively    [x] No    Medication Side Effects: Denies         Mental Status Exam  Level of consciousness: Somnolent. Appearance: Appropriate attire for setting, resting in bed, with poor grooming and hygiene. Behavior/Motor: Approachable, no psychomotor abnormalities. Attitude toward examiner: Minimally cooperative, poor eye contact  Speech: Decreased rate and volume, depressed tone. Mood: Dysphoric  Affect: Blunted. Thought processes: Slow, linear, coherent  Thought content: Denies homicidal ideation  Suicidal Ideation: Active suicidal ideations.   Agrees to contract for safety on the unit; encouraged to seek staff assistance if overwhelming thoughts of self-harm occur. Delusions/Paranoia: Not evident  Perceptual Disturbance: Patient does not appear to be responding to internal stimuli; patient endorses auditory hallucinations command type to harm self  Cognition: Oriented to self, location, situation  Memory: Intact. Insight & Judgement: Poor. Data   height is 6' 2\" (1.88 m) and weight is 200 lb (90.7 kg). His oral temperature is 98 °F (36.7 °C). His blood pressure is 129/90 (abnormal) and his pulse is 73. His respiration is 14. Labs:   Admission on 09/11/2021   Component Date Value Ref Range Status    Iron 09/12/2021 90  59 - 158 ug/dL Final    TIBC 09/12/2021 PENDING  ug/dL Incomplete    Iron Saturation 09/12/2021 PENDING  % Incomplete    UIBC 09/12/2021 PENDING  ug/dL Incomplete    Ferritin 09/12/2021 111  30 - 400 ug/L Final    TSH 09/12/2021 0.56  0.30 - 5.00 mIU/L Final    Vitamin B-12 09/12/2021 411  232 - 1245 pg/mL Final    Folate 09/12/2021 13.1  >4.8 ng/mL Final    Vit D, 25-Hydroxy 09/12/2021 23.3* 30.0 - 100.0 ng/mL Final    Comment:    Reference Range:  Vitamin D status         Range   Deficiency              <20 ng/mL   Mild Deficiency       20-30 ng/mL   Sufficiency           ng/mL   Toxicity               >100 ng/mL           Reviewed patient's current plan of care and vital signs with nursing staff. Labs reviewed: [x] Yes  Last EKG in EMR reviewed: [x] Yes  QTc: 452.     Medications  Current Facility-Administered Medications: Vitamin D (CHOLECALCIFEROL) tablet 1,000 Units, 1,000 Units, Oral, Daily  paliperidone (INVEGA) extended release tablet 6 mg, 6 mg, Oral, Daily  acetaminophen (TYLENOL) tablet 650 mg, 650 mg, Oral, Q4H PRN  aluminum & magnesium hydroxide-simethicone (MAALOX) 200-200-20 MG/5ML suspension 30 mL, 30 mL, Oral, Q6H PRN  hydrOXYzine (ATARAX) tablet 50 mg, 50 mg, Oral, TID PRN  ibuprofen (ADVIL;MOTRIN) tablet 400 mg, 400 mg, Oral, Q6H PRN  polyethylene glycol (GLYCOLAX) packet 17 g, 17 g, Oral, Daily PRN  nicotine polacrilex (NICORETTE) gum 2 mg, 2 mg, Oral, Q1H PRN  haloperidol (HALDOL) tablet 5 mg, 5 mg, Oral, Q4H PRN **AND** LORazepam (ATIVAN) tablet 2 mg, 2 mg, Oral, Q4H PRN  haloperidol lactate (HALDOL) injection 5 mg, 5 mg, IntraMUSCular, Q4H PRN **AND** LORazepam (ATIVAN) injection 2 mg, 2 mg, IntraMUSCular, Q4H PRN **AND** diphenhydrAMINE (BENADRYL) injection 50 mg, 50 mg, IntraMUSCular, Q4H PRN  clotrimazole (LOTRIMIN) 1 % cream, , Topical, BID  escitalopram (LEXAPRO) tablet 20 mg, 20 mg, Oral, Daily  traZODone (DESYREL) tablet 150 mg, 150 mg, Oral, Nightly PRN    ASSESSMENT  Schizoaffective disorder, depressive type ([facility]         PLAN  Patient symptoms are: Remains Unstable. Monitor patient for tolerability and efficacy of recent Invega titration  Internal medicine following  Monitor need and frequency of PRN medications. Encourage participation in groups and milieu. Attempt to develop insight. Psycho-education conducted. Supportive Therapy conducted. Probable discharge is to be determined by MD.   Follow-up daily while inpatient. Patient continues to be monitored in the inpatient psychiatric facility at [facility] for safety and stabilization. Patient continues to need, on a daily basis, active treatment furnished directly by or requiring the supervision of inpatient psychiatric personnel. Electronically signed by MARY Beckett CNP on 9/14/2021 at 3:14 PM    **This report has been created using voice recognition software. It may contain minor errors which are inherent in voice recognition technology. **    I independently saw and evaluated the patient. I reviewed the nurse practitioners documentation above. Any additional comments or changes to the nurse practitioners documentation are stated below otherwise agree with assessment. Plan will be as follows:  Patient denying side effects to Invega titration today.   Still reporting distressing auditory hallucinations and suicidal ideation and unable to contract for safety. Discussed observing on recently adjusted medication and patient able to contract for safety on the unit. Denying side effects to Lexapro. Minimal participation in groups and lying in bed. Try to encourage patient to be active during the day to try and resume regular sleep cycle. Vital signs reviewed. Will monitor diastolic blood pressure. PLAN  Patient s symptoms   show no change  Observe on recently adjusted medications  Monitor blood pressure consider medicine consult pending observation  Attempt to develop insight  Psycho-education conducted. Supportive Therapy conducted.   Probable discharge is undetermined at this time  Follow-up daily while on inpatient unit

## 2021-09-14 NOTE — GROUP NOTE
Group Therapy Note    Date: 9/14/2021    Group Start Time: 1000  Group End Time: 1387  Group Topic: Psychotherapy    FRANKLIN Mccoy LSW        Group Therapy Note    Attendees: 5/19         Patient was offered group therapy today but declined to participate despite encouragement from staff. 1:1 was offered.     Signature:  FRANKLIN Harry, CHANEL

## 2021-09-14 NOTE — GROUP NOTE
Group Therapy Note    Date: 9/14/2021    Group Start Time: 1335  Group End Time: 4350  Group Topic: Music Therapy    JESUS SHI    Gabriella Meeter        Group Therapy Note    Pt did not attend music therapy group d/t resting in room despite staff invitation to attend.

## 2021-09-14 NOTE — PROGRESS NOTES
[facility] Internal Medicine    CONSULTATION / HISTORY AND PHYSICAL EXAMINATION            Date:   9/14/2021  Patient name:  Yonas Chi  Date of admission:  9/11/2021  6:10 AM  MRN:   [id number]  Account:  [de-identified]  YOB: 1960  PCP:    No primary care provider on file. Room:   [address] [place] [address]  Code Status:    Full Code    Physician Requesting Consult: Judie Toribio MD    Reason for Consult:  medical management    Chief Complaint:     No chief complaint on file. History Obtained From:     Patient medical record nursing staff    History of Present Illness:   Patient is admitted to [facility] floor with schizophrenia  Intered medicine consulted for multiple reasons including weight loss, weakness in both legs  Hypertension  Patient is a poor historian, he is actively going through withdrawals, has shaking movement in his both upper extremities  Not interested in talking  He is denying any chest pain, shortness of breath  Did not answer my question that whether he has lost weight or not      Past Medical History:     Past Medical History:   Diagnosis Date    B[facility] disorder ([facility]     Depression     GERD (gastroesophageal reflux disease)     Hallucinations     Headache(784.0)     Hepatitis     Schizophrenia, schizo-affective ([facility]     Substance abuse ([facility]     Tobacco abuse     Type II or unspecified type diabetes mellitus without mention of complication, not stated as uncontrolled     Urinary incontinence         Past Surgical History:     Past Surgical History:   Procedure Laterality Date    ABSCESS DRAINAGE N/A 02/11/2018    Carla anal abcess    DENTAL SURGERY      all teeth pulled        Medications Prior to Admission:     Prior to Admission medications    Medication Sig Start Date End Date Taking?  Authorizing Provider   paliperidone (INVEGA) 3 MG extended release tablet Take 1 tablet by mouth daily 7/23/21  Yes Rekha Mercedes MD escitalopram (LEXAPRO) 20 MG tablet Take 1 tablet by mouth daily 21  Yes Luanne Peabody, MD   traZODone (DESYREL) 150 MG tablet Take 1 tablet by mouth nightly as needed for Sleep 21  Yes Luanne Peabody, MD   clotrimazole (LOTRIMIN AF) 1 % cream Apply topically 2 times daily. 21  Jocelyn Riddle MD   paliperidone palmitate ER (INVEGA SUSTENNA) 234 MG/1.5ML [place] IM injection Inject 234 mg into the muscle every 28 days    Historical Provider, MD        Allergies:     Navane [[email or web address]]    Social History:     Tobacco:    reports that he has been smoking cigarettes. He has a 47.00 pack-year smoking history. He has never used smokeless tobacco.  Alcohol:      reports current alcohol use. Drug Use:  reports current drug use. Frequency: 7.00 times per week. Drug: Cocaine. Family History:     Family History   Problem Relation Age of Onset    Diabetes Mother     Heart Disease Mother        Review of Systems:     Positive and Negative as described in HPI. Limited ROS could be done  Patient denying chest pain, shortness of breath, abdominal pain, loose stools  Having shakes with withdrawals    Physical Exam:     BP (!) 129/90   Pulse 73   Temp 98 °F (36.7 °C) (Oral)   Resp 14   Ht 6' 2\" (1.88 m)   Wt 200 lb (90.7 kg)   BMI 25.68 kg/m²   Temp (24hrs), Av °F (36.7 °C), Min:98 °F (36.7 °C), Max:98 °F (36.7 °C)    No results for input(s): POCGLU in the last 72 hours. No intake or output data in the 24 hours ending 21 1332    General Appearance: Patient is going through withdrawals, having shakes  Mental status: oriented to person, place, and time with normal affect  Head:  normocephalic, atraumatic.   Eye: no icterus, redness, pupils equal and reactive, extraocular eye movements intact, conjunctiva clear  Ear: normal external ear, no discharge, hearing intact  Nose:  no drainage noted  Mouth: mucous membranes moist  Neck: supple, no carotid bruits, thyroid not palpable  Lungs: Bilateral equal air entry, clear to ausculation, no wheezing, rales or rhonchi, normal effort  Cardiovascular: normal rate, regular rhythm, no murmur, gallop, rub. Abdomen: Soft, nontender, nondistended, normal bowel sounds, no hepatomegaly or splenomegaly  Neurologic: There are no new focal motor or sensory deficits, normal muscle tone and bulk, no abnormal sensation, normal speech, cranial nerves II through XII grossly intact  Skin: No gross lesions, rashes, bruising or bleeding on exposed skin area  Extremities:  peripheral pulses palpable, no pedal edema or calf pain with palpation  Psych: Investigations:      Laboratory Testing:  No results found for this or any previous visit (from the past 24 hour(s)). Consultations:   IP CONSULT TO INTERNAL MEDICINE  Assessment :      Primary Problem  Schizoaffective disorder, depressive type [facility])    Active Hospital Problems    Diagnosis Date Noted    Cocaine abuse ([facility] [F14.10] 05/03/2014     Priority: Medium    Schizoaffective disorder, depressive type ([facility] [F25.1] 09/23/2017       Plan:     1. Hypertension, controlled  2. Patient has history of substances abuse, including cocaine and alcoholism  3. Generalized fatigue, weakness is likely due to substance abuse, psych condition, he has anemia, hemoglobin is chronically low, will order iron studies, TSH, vitamin X17, folic acid, vitamin D.  4. Patient is being around 200 pounds, review of previous hospitalization, patient weighed 190 pound back in July when he was admitted with pneumonia  5. I do not suspect patient has major weight loss    9/12  Patient still complaining of weakness in legs  TSH is okay  Awaiting labs.     9/13  Started patient on vitamin D replacement with vitamin D levels being low  Still feels generalized weak  9/14 \  Started patient vitamin D yesterday  Patient is encouraged to walk,  will ordered physical therapy consult  We will sign off, please call with questions  Arabella Burk MD  9/14/2021  1:32 PM    Copy sent to Dr. Colby primary care provider on file. Please note that this chart was generated using voice recognition Dragon dictation software. Although every effort was made to ensure the accuracy of this automated transcription, some errors in transcription may have occurred.

## 2021-09-14 NOTE — PLAN OF CARE
Problem: Altered Mood, Psychotic Behavior:  Goal: Able to verbalize decrease in frequency and intensity of hallucinations  Description: Able to verbalize decrease in frequency and intensity of hallucinations  9/14/2021 0214 by Jose F Thomas LPN  Outcome: Ongoing     Problem: Altered Mood, Psychotic Behavior:  Goal: Absence of self-harm  Description: Absence of self-harm  9/14/2021 0214 by Jose F Thomas LPN  Outcome: Ongoing     Problem: Substance Abuse:  Goal: Absence of drug withdrawal signs and symptoms  Description: Absence of drug withdrawal signs and symptoms  Outcome: Ongoing   Denies suicidal thoughts, reports voices telling him to harm himself , but he has no plan to do so. Pt remains free from harm at this time.    0 s/s of withdrawal noted at this time.

## 2021-09-15 PROCEDURE — APPSS15 APP SPLIT SHARED TIME 0-15 MINUTES: Performed by: NURSE PRACTITIONER

## 2021-09-15 PROCEDURE — 6370000000 HC RX 637 (ALT 250 FOR IP): Performed by: PSYCHIATRY & NEUROLOGY

## 2021-09-15 PROCEDURE — 99232 SBSQ HOSP IP/OBS MODERATE 35: CPT | Performed by: PSYCHIATRY & NEUROLOGY

## 2021-09-15 PROCEDURE — 1240000000 HC EMOTIONAL WELLNESS R&B

## 2021-09-15 PROCEDURE — 90833 PSYTX W PT W E/M 30 MIN: CPT | Performed by: PSYCHIATRY & NEUROLOGY

## 2021-09-15 PROCEDURE — 6370000000 HC RX 637 (ALT 250 FOR IP): Performed by: INTERNAL MEDICINE

## 2021-09-15 RX ORDER — PALIPERIDONE 3 MG/1
3 TABLET, EXTENDED RELEASE ORAL DAILY
Status: DISCONTINUED | OUTPATIENT
Start: 2021-09-16 | End: 2021-09-16

## 2021-09-15 RX ORDER — OLANZAPINE 5 MG/1
5 TABLET ORAL NIGHTLY
Status: DISCONTINUED | OUTPATIENT
Start: 2021-09-15 | End: 2021-09-16

## 2021-09-15 RX ADMIN — ESCITALOPRAM OXALATE 20 MG: 20 TABLET ORAL at 08:04

## 2021-09-15 RX ADMIN — HYDROXYZINE HYDROCHLORIDE 50 MG: 50 TABLET ORAL at 22:11

## 2021-09-15 RX ADMIN — CLOTRIMAZOLE: 10 CREAM TOPICAL at 22:11

## 2021-09-15 RX ADMIN — PALIPERIDONE 6 MG: 6 TABLET, EXTENDED RELEASE ORAL at 08:04

## 2021-09-15 RX ADMIN — TRAZODONE HYDROCHLORIDE 150 MG: 150 TABLET ORAL at 22:10

## 2021-09-15 RX ADMIN — Medication 1000 UNITS: at 08:04

## 2021-09-15 RX ADMIN — OLANZAPINE 5 MG: 5 TABLET, FILM COATED ORAL at 22:10

## 2021-09-15 NOTE — CARE COORDINATION
YOGESH met with patient regarding discharge plans, AOD treatment this date. Pt observed to need substantial support with identifying, calling/using phone, YOGESH supported patient as he identified [facility] [employer], [facility], and [employer] as options. YOGESH assisted pt in leaving messages at all three facilities, advised unit staff pt has outstanding calls with these agencies should he receive calls back. YOGESH also left voice mail from office phone for [facility] and [facility] to advocate. YOGESH spoke with pt sister Kaila Caba who states she is visiting pt this date and will discuss options with patient. YOGESH used person centered and empowerment strategies to support patient making individually supported decisions in treatment.

## 2021-09-15 NOTE — BH NOTE
Pt. Sofia Gonzalez to attend goal setting/community meeting group. Pt offered 1:1 talk time.  Pt declined

## 2021-09-15 NOTE — PROGRESS NOTES
Daily Progress Note  9/15/2021    Patient Name: Sarah Mora    CHIEF COMPLAINT:  Suicidal ideation, homicidal ideation, auditory hallucinations, visual hallucinations, crack use. SUBJECTIVE:      Staff reports the patient continues to isolate mostly to his room, they report that he did attend 1 group this morning and he was encouraged to ambulate the unit. Staff reports that his sister has also been contacting him encouraging him to remain out of bed. He is interviewed today bedside, after approximately 3 or 4 attempts to get him to participate in interview he does take his head out from beneath the covers to engage in conversation. He continues to endorse depression as well as auditory command hallucinations to harm himself. He reports that the medication has not been effective yet to reduce the intensity of the voices. He continues to endorse suicidal thoughts though does contract for safety on the inpatient unit. Group Attendance on Unit:   [] Yes  [x] Selectively    [] No, minimally    Medication Side Effects: Denies         Mental Status Exam  Level of consciousness: Awake and alert  Appearance: Appropriate attire for setting, resting in bed, with poor grooming and hygiene. Behavior/Motor: Somewhat approachable, psychomotor retardation, eventually uncovers his head to participate in conversation  Attitude toward examiner: Minimally cooperative, poor eye contact  Speech: Slowed, quiet, depressed tone. Mood: Depressed  Affect: Withdrawn, isolative, flat  Thought processes: Slow, linear, coherent  Thought content: Denies homicidal ideation  Suicidal Ideation: Active suicidal ideations. Agrees to contract for safety on the unit; encouraged to seek staff assistance if overwhelming thoughts of self-harm occur.   Delusions/Paranoia: Not evident  Perceptual Disturbance: Patient does not appear to be responding to internal stimuli; patient endorses auditory hallucinations command type to harm self  Cognition: Oriented to self, location, situation  Memory: Intact. Insight & Judgement: Poor. Data   height is 6' 2\" (1.88 m) and weight is 200 lb (90.7 kg). His temporal temperature is 97.6 °F (36.4 °C). His blood pressure is 95/52 (abnormal) and his pulse is 63. His respiration is 14 and oxygen saturation is 98%. Labs:   Admission on 09/11/2021   Component Date Value Ref Range Status    Iron 09/12/2021 90  59 - 158 ug/dL Final    TIBC 09/12/2021 304  250 - 450 ug/dL Final    Iron Saturation 09/12/2021 30  20 - 55 % Final    UIBC 09/12/2021 214  112 - 347 ug/dL Final    Ferritin 09/12/2021 111  30 - 400 ug/L Final    TSH 09/12/2021 0.56  0.30 - 5.00 mIU/L Final    Vitamin B-12 09/12/2021 411  232 - 1245 pg/mL Final    Folate 09/12/2021 13.1  >4.8 ng/mL Final    Vit D, 25-Hydroxy 09/12/2021 23.3* 30.0 - 100.0 ng/mL Final    Comment:    Reference Range:  Vitamin D status         Range   Deficiency              <20 ng/mL   Mild Deficiency       20-30 ng/mL   Sufficiency           ng/mL   Toxicity               >100 ng/mL           Reviewed patient's current plan of care and vital signs with nursing staff. Labs reviewed: [x] Yes  Last EKG in EMR reviewed: [x] Yes  QTc: 452.     Medications  Current Facility-Administered Medications: Vitamin D (CHOLECALCIFEROL) tablet 1,000 Units, 1,000 Units, Oral, Daily  paliperidone (INVEGA) extended release tablet 6 mg, 6 mg, Oral, Daily  acetaminophen (TYLENOL) tablet 650 mg, 650 mg, Oral, Q4H PRN  aluminum & magnesium hydroxide-simethicone (MAALOX) 200-200-20 MG/5ML suspension 30 mL, 30 mL, Oral, Q6H PRN  hydrOXYzine (ATARAX) tablet 50 mg, 50 mg, Oral, TID PRN  ibuprofen (ADVIL;MOTRIN) tablet 400 mg, 400 mg, Oral, Q6H PRN  polyethylene glycol (GLYCOLAX) packet 17 g, 17 g, Oral, Daily PRN  nicotine polacrilex (NICORETTE) gum 2 mg, 2 mg, Oral, Q1H PRN  haloperidol (HALDOL) tablet 5 mg, 5 mg, Oral, Q4H PRN **AND** LORazepam (ATIVAN) tablet 2 mg, 2 mg, Oral, Q4H PRN  haloperidol lactate (HALDOL) injection 5 mg, 5 mg, IntraMUSCular, Q4H PRN **AND** LORazepam (ATIVAN) injection 2 mg, 2 mg, IntraMUSCular, Q4H PRN **AND** diphenhydrAMINE (BENADRYL) injection 50 mg, 50 mg, IntraMUSCular, Q4H PRN  clotrimazole (LOTRIMIN) 1 % cream, , Topical, BID  escitalopram (LEXAPRO) tablet 20 mg, 20 mg, Oral, Daily  traZODone (DESYREL) tablet 150 mg, 150 mg, Oral, Nightly PRN    ASSESSMENT  Schizoaffective disorder, depressive type ([facility]         PLAN  Patient symptoms are: Remains Unstable. Invega recently titrated to 6 mg daily, monitor for tolerance and improvement in symptoms, consider further titration   internal medicine signed off, physical therapy evaluation pending  Monitor need and frequency of PRN medications. Encourage participation in groups and milieu. Attempt to develop insight. Psycho-education conducted. Supportive Therapy conducted. Probable discharge is to be determined by MD.   Follow-up daily while inpatient. Patient continues to be monitored in the inpatient psychiatric facility at [facility] for safety and stabilization. Patient continues to need, on a daily basis, active treatment furnished directly by or requiring the supervision of inpatient psychiatric personnel. Electronically signed by MARY Graham CNP on 9/15/2021 at 2:19 PM    **This report has been created using voice recognition software. It may contain minor errors which are inherent in voice recognition technology. **      I independently saw and evaluated the patient. I reviewed the nurse practitioners documentation above. Any additional comments or changes to the nurse practitioners documentation are stated below otherwise agree with assessment. Plan will be as follows:  Spent 30 minutes with the patient, of that greater than 16 minutes was spent in supportive psychotherapy.   Patient reporting very little to no improvement in his auditory hallucinations and remains unable to contract for safety off the unit. We discussed possibly increasing his Invega further however after discussion of risk benefits and alternatives mutually agreed to cross titrate off of Invega onto olanzapine. Patient has very low energy and low motivation. Reports physically not feeling well on the Invega. Poor appetite. Discussed potential benefit with olanzapine and appetite. PLAN  Patient s symptoms   show no change  Cross titrate off of Invega onto olanzapine will start olanzapine 5 mg tonight  Attempt to develop insight  Psycho-education conducted. Supportive Therapy conducted.   Probable discharge is undetermined at this time  Follow-up daily while on inpatient unit

## 2021-09-15 NOTE — GROUP NOTE
Group Therapy Note    Date: 9/15/2021    Group Start Time: 1100  Group End Time: 1140  Group Topic: Relaxation    CZ DAVID Clemente, CTRS    Pt did not attend 1100 relaxation group d/t resting in room despite staff invitation to attend. 1:1 talk time offered as alternative to group session, pt declined.               Signature:  Shavon Contreras

## 2021-09-15 NOTE — GROUP NOTE
Group Therapy Note    Date: 9/15/2021    Group Start Time: 1330  Group End Time: 7942  Group Topic: Relaxation    JESUS Guerin, CTRS        Group Therapy Note    Attendees:5         Patient's Goal:  To use art as a relaxation technique/ coping skill     Notes:  Pt had minimal participation but was pleasant    Status After Intervention:  Improved    Participation Level:  Active Listener     Participation Quality: Appropriate       Speech:  normal      Thought Process/Content: slow to process       Affective Functioning: Flat      Mood: euthymic      Level of consciousness:  Alert      Response to Learning:  Progressing to goal      Endings: None Reported    Modes of Intervention: Education, Support and Problem-solving      Discipline Responsible: Psychoeducational Specialist      Signature:  Marcos Thomson

## 2021-09-15 NOTE — PLAN OF CARE
Problem: Altered Mood, Psychotic Behavior:  Goal: Able to verbalize decrease in frequency and intensity of hallucinations  Description: Able to verbalize decrease in frequency and intensity of hallucinations  Outcome: Ongoing   1:1 with pt x ten minutes. Pt encouraged to attend unit programming and interact with peers and staff. Pt also encouraged to tend to hygiene and ADLs. Pt encouraged to discuss feelings with staff and feedback and reassurance provided. Pt denies thoughts of self harm and is agreeable to seeking out should thoughts of self harm arise. Safe environment maintained. Q15 minute checks for safety cont per unit policy. Will cont to monitor for safety and provides support and reassurance as needed. Pt admits to auditory hallucinations. Attended groups and is controlled in behaviors. Social with select peers et staff.

## 2021-09-15 NOTE — PLAN OF CARE
Problem: Altered Mood, Psychotic Behavior:  Goal: Able to verbalize decrease in frequency and intensity of hallucinations  Description: Able to verbalize decrease in frequency and intensity of hallucinations  9/14/2021 2134 by Kim Jurado RN  Outcome: Ongoing  Note: Patient is isolative to room most of shift. He continues to report auditory hallucinations saying negative comments and occasionally command in nature to harm himself. He states he has fleeting suicidal thoughts but contracts for safety. He reports depression and anxiety. Staff maintains Q 15 minute safety checks. Problem: Altered Mood, Psychotic Behavior:  Goal: Absence of self-harm  Description: Absence of self-harm  9/14/2021 2134 by Kim Jurado RN  Outcome: Ongoing  Note: Patient remains absent of self harm. Problem: Substance Abuse:  Goal: Absence of drug withdrawal signs and symptoms  Description: Absence of drug withdrawal signs and symptoms  9/14/2021 2134 by Kim Jurado RN  Note: Patient denies drug withdrawal symptoms. Problem: Pain:  Goal: Pain level will decrease  Description: Pain level will decrease  Outcome: Ongoing  Note: Patient denies pain on assessment, staff maintains Q 15 minute safety checks.

## 2021-09-16 PROCEDURE — 97162 PT EVAL MOD COMPLEX 30 MIN: CPT

## 2021-09-16 PROCEDURE — 6370000000 HC RX 637 (ALT 250 FOR IP): Performed by: PSYCHIATRY & NEUROLOGY

## 2021-09-16 PROCEDURE — 90833 PSYTX W PT W E/M 30 MIN: CPT | Performed by: PSYCHIATRY & NEUROLOGY

## 2021-09-16 PROCEDURE — APPSS15 APP SPLIT SHARED TIME 0-15 MINUTES: Performed by: NURSE PRACTITIONER

## 2021-09-16 PROCEDURE — 99232 SBSQ HOSP IP/OBS MODERATE 35: CPT | Performed by: PSYCHIATRY & NEUROLOGY

## 2021-09-16 PROCEDURE — 1240000000 HC EMOTIONAL WELLNESS R&B

## 2021-09-16 PROCEDURE — 6370000000 HC RX 637 (ALT 250 FOR IP): Performed by: INTERNAL MEDICINE

## 2021-09-16 RX ORDER — OLANZAPINE 10 MG/1
10 TABLET ORAL NIGHTLY
Status: DISCONTINUED | OUTPATIENT
Start: 2021-09-16 | End: 2021-09-17

## 2021-09-16 RX ADMIN — PALIPERIDONE 3 MG: 3 TABLET, EXTENDED RELEASE ORAL at 09:04

## 2021-09-16 RX ADMIN — Medication 1000 UNITS: at 09:04

## 2021-09-16 RX ADMIN — OLANZAPINE 10 MG: 10 TABLET, FILM COATED ORAL at 22:02

## 2021-09-16 RX ADMIN — ESCITALOPRAM OXALATE 20 MG: 20 TABLET ORAL at 09:04

## 2021-09-16 RX ADMIN — TRAZODONE HYDROCHLORIDE 150 MG: 150 TABLET ORAL at 22:02

## 2021-09-16 RX ADMIN — CLOTRIMAZOLE: 10 CREAM TOPICAL at 22:02

## 2021-09-16 RX ADMIN — CLOTRIMAZOLE: 10 CREAM TOPICAL at 09:04

## 2021-09-16 RX ADMIN — HYDROXYZINE HYDROCHLORIDE 50 MG: 50 TABLET ORAL at 22:02

## 2021-09-16 NOTE — GROUP NOTE
Group Therapy Note    Date: 9/16/2021    Group Start Time: 1330  Group End Time: 1400  Group Topic: Recreational    [facility] JACQUELINE Freire, CTRS    Pt did not attend 1330 recreational therapy group d/t resting in room despite staff invitation to attend. 1:1 talk time offered as alternative to group session, pt declined.               Signature:  Gallito Ryan

## 2021-09-16 NOTE — GROUP NOTE
Group Therapy Note    Date: 9/15/2021    Group Start Time: 1000  Group End Time: 1741  Group Topic: Psychotherapy    JESUS SHI    FRANKLIN Pride LSW        Group Therapy Note    Attendees:7/19         Patient's Goal:  Increase interpersonal relationship skills    Notes:  Patient was an active listener during group discussion    Status After Intervention:  Unchanged    Participation Level:  Active Listener    Participation Quality: Appropriate      Speech:  normal      Thought Process/Content: Logical      Affective Functioning: Congruent      Mood: euthymic      Level of consciousness:  Alert and Oriented x4      Response to Learning: Able to verbalize current knowledge/experience      Endings: None Reported    Modes of Intervention: Socialization and Exploration      Discipline Responsible: /Counselor      Signature:  FRANKLIN Pride LSW

## 2021-09-16 NOTE — PLAN OF CARE
Problem: Altered Mood, Psychotic Behavior:  Goal: Able to verbalize decrease in frequency and intensity of hallucinations  Description: Able to verbalize decrease in frequency and intensity of hallucinations  9/16/2021 0017 by Rivera Shaw RN  Outcome: Ongoing  Note: Patient reports auditory hallucinations telling him to harm himself. Problem: Altered Mood, Psychotic Behavior:  Goal: Absence of self-harm  Description: Absence of self-harm  9/16/2021 0017 by Rivera Shaw RN  Outcome: Ongoing  Note: 15 minute safety checks, patient free of self harm. Problem: Substance Abuse:  Goal: Absence of drug withdrawal signs and symptoms  Description: Absence of drug withdrawal signs and symptoms  9/16/2021 0017 by Rivera Shaw RN  Outcome: Ongoing  Note: No withdrawal signs or symptoms observed.

## 2021-09-16 NOTE — PLAN OF CARE
Problem: Altered Mood, Psychotic Behavior:  Goal: Able to verbalize decrease in frequency and intensity of hallucinations  Description: Able to verbalize decrease in frequency and intensity of hallucinations  9/16/2021 0950 by Nicholas Angeles RN  Outcome: Ongoing   Patient is calm but lethargic and medication complaint. Patient reports fleeting suicidal ideations, depression and anxiety. Patient states he has auditory disturbances and \"they are really bad today\". Patient is flat/isolative and remains in his room with the blanket over his head. Patient reports eating and sleeping adequately with safety checks Q15min and at irregular intervals. Problem: Altered Mood, Psychotic Behavior:  Goal: Absence of self-harm  Description: Absence of self-harm  9/16/2021 0950 by Nihcolas Angeles RN  Outcome: Ongoing   Patient is calm but lethargic and medication complaint. Patient reports fleeting suicidal ideations, depression and anxiety. Patient states he has auditory disturbances and \"they are really bad today\". Patient is flat/isolative and remains in his room with the blanket over his head. Patient reports eating and sleeping adequately with safety checks Q15min and at irregular intervals. Problem: Substance Abuse:  Goal: Absence of drug withdrawal signs and symptoms  Description: Absence of drug withdrawal signs and symptoms  9/16/2021 0950 by Nicholas Angeles RN  Outcome: Ongoing   Patient denies withdrawal symptoms.   Problem: Tobacco Use:  Goal: Inpatient tobacco use cessation counseling participation  Description: Inpatient tobacco use cessation counseling participation  Outcome: Ongoing   Smoking education provided

## 2021-09-16 NOTE — PROGRESS NOTES
Physical Therapy    Facility/Department: [facility]  Initial Assessment    NAME: Júnior Delgado  : 1960  MRN: [id number]    Date of Service: 2021    Discharge Recommendations:  Patient would benefit from continued therapy after discharge, 24 hour supervision or assist   PT Equipment Recommendations  Other: TBD    Assessment   Body structures, Functions, Activity limitations: Decreased functional mobility ; Decreased ADL status; Decreased strength;Decreased endurance;Decreased safe awareness;Decreased balance;Decreased posture  Assessment: Pt requires 1 assist for safety - declines trialing device. Pt would benefit from continued PT and support upon d/c. Treatment Diagnosis: Impaired functional mobility 2* decreased balance  Specific instructions for Next Treatment: balance, therex, gait, stairs  Prognosis: Good  Decision Making: Medium Complexity  History: 57 y.o. male who presents with suicidal and homicidal thoughts. Patient states that he would like to kill either. He is in no acute distress without rapid speech and states that he used crack cocaine around 6 PM prior to arrival.  He was going to have a place to sleep tonight at his friend's house who provided him with a crack cocaine and exchange for hot dogs. Patient states that this house then had 2 other visitors that had lots of crack cocaine and he did not want to be around that. So he came to the emergency department. Patient states he ambulated here. Exam: ROM, MMT, bed mobility, transfers, amb, balance  Clinical Presentation: Pt alert, cooperative, soft spoken  Barriers to Learning: none  REQUIRES PT FOLLOW UP: Yes  Activity Tolerance  Activity Tolerance: Patient Tolerated treatment well       Patient Diagnosis(es): There were no encounter diagnoses.      has a past medical history of Bipolar disorder ([address] Depression, GERD (gastroesophageal reflux disease), Hallucinations, Headache(784.0), Hepatitis, Schizophrenia, schizo-affective ([address] Substance abuse ([facility] [address] Tobacco abuse, Type II or unspecified type diabetes mellitus without mention of complication, not stated as uncontrolled, and Urinary incontinence. has a past surgical history that includes Dental surgery and Abscess Drainage (N/A, 02/11/2018). Restrictions  Restrictions/Precautions  Restrictions/Precautions: General Precautions, Fall Risk (Nestor Bulla 2 days ago in bathroom per pt)  Required Braces or Orthoses?: No  Implants present? :  (pt denies)  Position Activity Restriction  Other position/activity restrictions: activity as tolerated  Vision/Hearing  Vision: Impaired  Vision Exceptions: Wears glasses for reading  Hearing: Within functional limits     Subjective  General  Chart Reviewed: Yes  Patient assessed for rehabilitation services?: Yes  Additional Pertinent Hx: soft spoken, T2DM, bipolar, schizophrenia  Family / Caregiver Present: No  Referring Practitioner: Caitlyn Kohli MD  Referral Date : 09/14/21  Diagnosis: schizoaffective disorder  Follows Commands: Within Functional Limits  Subjective  Subjective: Patient in bed, agreeable to PT. Pt is very soft spoken. RN OKs PT assessment.   Pain Screening  Patient Currently in Pain: Denies  Vital Signs  Patient Currently in Pain: Denies  Oxygen Therapy  O2 Device: None (Room air)       Orientation  Orientation  Overall Orientation Status: Impaired  Orientation Level: Oriented to person;Disoriented to situation;Disoriented to time;Disoriented to place  Social/Functional History  Social/Functional History  Lives With: Other (comment) (Group home setting)  Type of Home: House  Home Layout: Two level  Home Access: Stairs to enter with rails  Entrance Stairs - Number of Steps: 3  Entrance Stairs - Rails:  (unable to recall side of rail)  Bathroom Shower/Tub: Walk-in shower, Doors  Bathroom Toilet: Standard  Bathroom Accessibility: Accessible  Home Equipment:  (no DME)  Receives Help From: Other (comment) (staff at group home)  ADL Assistance: Independent  Homemaking Assistance: Needs assistance (group home provided assistance)  Homemaking Responsibilities: No  Ambulation Assistance: Independent  Transfer Assistance: Independent  Active : No  Mode of Transportation: Walk  Occupation: On disability  IADL Comments: sleeps in flat bed  Additional Comments: Pt reports sister visited yesterday from [facility]- no other family in area locally. Pt is questionable historian - reports he does not plan to return to group home. Denies any recent PT. Cognition        Objective          AROM RLE (degrees)  RLE AROM: WFL  AROM LLE (degrees)  LLE AROM : WFL  AROM RUE (degrees)  RUE AROM : WFL  AROM LUE (degrees)  LUE AROM : WFL  Strength RLE  Strength RLE: WFL  Strength LLE  Strength LLE: WFL  Strength RUE  Comment: See OT  Strength LUE  Comment: See OT     Sensation  Overall Sensation Status: WFL (pt denies)  Bed mobility  Rolling to Right: Stand by assistance  Supine to Sit: Stand by assistance  Scooting: Stand by assistance  Comment: Flat bed, SBA for safety  Transfers  Sit to Stand: Contact guard assistance  Stand to sit: Contact guard assistance  Comment: CGA for transfers for safety, no device. Ambulation  Ambulation?: Yes  Ambulation 1  Surface: level tile  Device: No Device  Assistance: Contact guard assistance  Quality of Gait: slow adonay, veers R at times  Gait Deviations: Deviated path; Slow Adonay  Distance: 100'  Comments: CGA for safety - pt refuses to trial RW  Stairs/Curb  Stairs?: No     Balance  Posture: Fair  Sitting - Static: Good  Sitting - Dynamic: Good  Standing - Static: Good;-  Standing - Dynamic: Fair;+  Comments: Fall risk, no device        Plan   Plan  Times per week: 2x/week  Specific instructions for Next Treatment: balance, therex, gait, stairs  Current Treatment Recommendations: Strengthening, Balance Training, Functional Mobility Training, Transfer Training, Endurance Training, Gait Training, Stair training, Equipment Evaluation, Education, & procurement, Patient/Caregiver Education & Training, Safety Education & Training, Home Exercise Program  Safety Devices  Type of devices: Patient at risk for falls, Gait belt, Nurse notified, Left in bed (RN notified; gait belt removed from room)  Restraints  Initially in place: No    G-Code       OutComes Score                                                  AM-PAC Score  AM-PAC Inpatient Mobility Raw Score : 16 (09/16/21 1401)  AM-PAC Inpatient T-Scale Score : 40.78 (09/16/21 1401)  Mobility Inpatient CMS 0-100% Score: 54.16 (09/16/21 1401)  Mobility Inpatient CMS G-Code Modifier : CK (09/16/21 1401)          Goals  Short term goals  Time Frame for Short term goals: 3-4 tx  Short term goal 1: Pt to demo IND bed mobility. Short term goal 2: Pt to demo SBA with transfers. Short term goal 3: Pt to amb 150' SBA. Short term goal 4: Pt to ascend/descend 3 stairs 1 HR, CGA. Short term goal 5: Pt to demo good technique for exercise program for balance and strengthening.   Patient Goals   Patient goals : to leave       Therapy Time   Individual Concurrent Group Co-treatment   Time In 1401         Time Out 1419         Minutes [address], [place]

## 2021-09-16 NOTE — PROGRESS NOTES
Daily Progress Note  9/16/2021    Patient Name: Biggers Book    CHIEF COMPLAINT:  Suicidal ideation, homicidal ideation, auditory hallucinations, visual hallucinations, crack use. SUBJECTIVE:      Staff reports that Brittny Suazo continues to respond to internal stimuli, they report that he is paranoid and barely comes out of his room. When in his room he remains completely under covers. He did not come out for breakfast this morning. He was interviewed today bedside, it did take him a while to pick his head out from behind the covers upon doing so he appears nervous. He endorses that \"the voices are bad\". He reports that they are command in nature telling him to hurt himself as well as others. He denies any intent at present to do so and is able to contract for safety with staff. He reports that staff has been treating him well and denies any paranoia towards his nurse. He reports that he is also having difficulty sleeping because of the stimulus. Group Attendance on Unit:   [] Yes  [x] Selectively    [] No, minimally, so far none today    Medication Side Effects: Denies         Mental Status Exam  Level of consciousness: Awake and alert  Appearance: Appropriate attire for setting, resting in bed, with poor grooming and hygiene. Behavior/Motor: Somewhat approachable, psychomotor retardation, keeps himself covered with blankets through most of interview eventually peaking out with only eyes visible  attitude toward examiner: Initially suspicious, somewhat cooperative, fair eye contact  Speech: Slowed, quiet, depressed tone. Whispers at times. Mood: Depressed  Affect: Withdrawn, isolative, flat  Thought processes: Slow, linear, coherent  Thought content: Endorses homicidal ideation, does not identify specific person  Suicidal Ideation: Active suicidal ideations.   Agrees to contract for safety on the unit; encouraged to seek staff assistance if overwhelming thoughts of self-harm occur.  Delusions/Paranoia: Paranoid  Perceptual Disturbance: Endorses auditory command hallucinations for suicidal/homicidal thoughts, appears to attend to internal stimuli   cognition: Oriented to self, location, situation  Memory: Intact. Insight & Judgement: Poor. Data   height is 6' 2\" (1.88 m) and weight is 200 lb (90.7 kg). His oral temperature is 98 °F (36.7 °C). His blood pressure is 112/59 (abnormal) and his pulse is 97. His respiration is 14 and oxygen saturation is 98%. Labs:   Admission on 09/11/2021   Component Date Value Ref Range Status    Iron 09/12/2021 90  59 - 158 ug/dL Final    TIBC 09/12/2021 304  250 - 450 ug/dL Final    Iron Saturation 09/12/2021 30  20 - 55 % Final    UIBC 09/12/2021 214  112 - 347 ug/dL Final    Ferritin 09/12/2021 111  30 - 400 ug/L Final    TSH 09/12/2021 0.56  0.30 - 5.00 mIU/L Final    Vitamin B-12 09/12/2021 411  232 - 1245 pg/mL Final    Folate 09/12/2021 13.1  >4.8 ng/mL Final    Vit D, 25-Hydroxy 09/12/2021 23.3* 30.0 - 100.0 ng/mL Final    Comment:    Reference Range:  Vitamin D status         Range   Deficiency              <20 ng/mL   Mild Deficiency       20-30 ng/mL   Sufficiency           ng/mL   Toxicity               >100 ng/mL           Reviewed patient's current plan of care and vital signs with nursing staff. Labs reviewed: [x] Yes  Last EKG in EMR reviewed: [x] Yes  QTc: 452.     Medications  Current Facility-Administered Medications: OLANZapine (ZYPREXA) tablet 5 mg, 5 mg, Oral, Nightly  paliperidone (INVEGA) extended release tablet 3 mg, 3 mg, Oral, Daily  Vitamin D (CHOLECALCIFEROL) tablet 1,000 Units, 1,000 Units, Oral, Daily  acetaminophen (TYLENOL) tablet 650 mg, 650 mg, Oral, Q4H PRN  aluminum & magnesium hydroxide-simethicone (MAALOX) 200-200-20 MG/5ML suspension 30 mL, 30 mL, Oral, Q6H PRN  hydrOXYzine (ATARAX) tablet 50 mg, 50 mg, Oral, TID PRN  ibuprofen (ADVIL;MOTRIN) tablet 400 mg, 400 mg, Oral, Q6H PRN  polyethylene glycol (GLYCOLAX) packet 17 g, 17 g, Oral, Daily PRN  nicotine polacrilex (NICORETTE) gum 2 mg, 2 mg, Oral, Q1H PRN  haloperidol (HALDOL) tablet 5 mg, 5 mg, Oral, Q4H PRN **AND** LORazepam (ATIVAN) tablet 2 mg, 2 mg, Oral, Q4H PRN  haloperidol lactate (HALDOL) injection 5 mg, 5 mg, IntraMUSCular, Q4H PRN **AND** LORazepam (ATIVAN) injection 2 mg, 2 mg, IntraMUSCular, Q4H PRN **AND** diphenhydrAMINE (BENADRYL) injection 50 mg, 50 mg, IntraMUSCular, Q4H PRN  clotrimazole (LOTRIMIN) 1 % cream, , Topical, BID  escitalopram (LEXAPRO) tablet 20 mg, 20 mg, Oral, Daily  traZODone (DESYREL) tablet 150 mg, 150 mg, Oral, Nightly PRN    ASSESSMENT  Schizoaffective disorder, depressive [facility] ([facility]         PLAN  Patient symptoms are: Remains Unstable. Cross taper off of Invega onto olanzapine  Physical therapy recommends treatment twice weekly  Monitor need and frequency of PRN medications. Encourage participation in groups and milieu. Attempt to develop insight. Psycho-education conducted. Supportive Therapy conducted. Probable discharge is to be determined by MD.   Follow-up daily while inpatient. Patient continues to be monitored in the inpatient psychiatric facility at [facility] for safety and stabilization. Patient continues to need, on a daily basis, active treatment furnished directly by or requiring the supervision of inpatient psychiatric personnel. Electronically signed by MARY Ariza CNP on 9/16/2021 at 4:07 PM    **This report has been created using voice recognition software. It may contain minor errors which are inherent in voice recognition technology. **    I independently saw and evaluated the patient. I reviewed the nurse practitioners documentation above. Any additional comments or changes to the nurse practitioners documentation are stated below otherwise agree with assessment.   Plan will be as follows:  Spent 30 minutes with the patient, of that greater than 16 minutes was spent in supportive psychotherapy. Patient endorsing very bad voices still. No side effects to addition of olanzapine. Patient not really able to notice if it helped his sleep or not. He did indicate his sister came. Indicates he wants to get out to some groups today. Not able to contract for safety. Agreeable to continue to cross titrate off of Invega onto olanzapine  PLAN  Patient s symptoms   show no change  Discontinue Invega  Increase olanzapine to 10 mg by mouth at bedtime  Attempt to develop insight  Psycho-education conducted. Supportive Therapy conducted.   Probable discharge is undetermined at this time  Follow-up daily while on inpatient unit

## 2021-09-16 NOTE — GROUP NOTE
Group Therapy Note    Date: 9/16/2021    Group Start Time: 1105  Group End Time: 5812  Group Topic: Music Therapy    JESUS Cohn        Group Therapy Note    Pt did not attend music therapy group d/t resting in room despite staff invitation to attend. 1:1 talk time offered as alternative to group session, pt declined.

## 2021-09-16 NOTE — CARE COORDINATION
Sw received voice mail from Odalis Zaragoza pt sister this date, returned call and left return voice mail.

## 2021-09-17 PROCEDURE — 1240000000 HC EMOTIONAL WELLNESS R&B

## 2021-09-17 PROCEDURE — 90833 PSYTX W PT W E/M 30 MIN: CPT | Performed by: PSYCHIATRY & NEUROLOGY

## 2021-09-17 PROCEDURE — 6370000000 HC RX 637 (ALT 250 FOR IP): Performed by: PSYCHIATRY & NEUROLOGY

## 2021-09-17 PROCEDURE — 6370000000 HC RX 637 (ALT 250 FOR IP): Performed by: INTERNAL MEDICINE

## 2021-09-17 PROCEDURE — 99232 SBSQ HOSP IP/OBS MODERATE 35: CPT | Performed by: PSYCHIATRY & NEUROLOGY

## 2021-09-17 RX ORDER — OLANZAPINE 15 MG/1
15 TABLET ORAL NIGHTLY
Status: DISCONTINUED | OUTPATIENT
Start: 2021-09-17 | End: 2021-09-18

## 2021-09-17 RX ADMIN — CLOTRIMAZOLE: 10 CREAM TOPICAL at 07:47

## 2021-09-17 RX ADMIN — HYDROXYZINE HYDROCHLORIDE 50 MG: 50 TABLET ORAL at 20:58

## 2021-09-17 RX ADMIN — OLANZAPINE 15 MG: 15 TABLET, FILM COATED ORAL at 20:58

## 2021-09-17 RX ADMIN — TRAZODONE HYDROCHLORIDE 150 MG: 150 TABLET ORAL at 20:58

## 2021-09-17 NOTE — PLAN OF CARE
Problem: Altered Mood, Psychotic Behavior:  Goal: Able to verbalize decrease in frequency and intensity of hallucinations  Description: Able to verbalize decrease in frequency and intensity of hallucinations  9/16/2021 2337 by Orion Mathias RN  Outcome: Ongoing  Note: Patient reports auditory hallucinations telling him to harm himself. Patient reports fleeting suicidal ideations without a plan. Patient contracts for safety and agrees to seek out staff if conditions change. Problem: Altered Mood, Psychotic Behavior:  Goal: Absence of self-harm  Description: Absence of self-harm  9/16/2021 2337 by Orion Mathias RN  Outcome: Ongoing  Note: 15 minute safety checks, patient free of self harm. Problem: Substance Abuse:  Goal: Absence of drug withdrawal signs and symptoms  Description: Absence of drug withdrawal signs and symptoms  9/16/2021 2337 by Orion Mathias RN  Outcome: Ongoing  Note: Patient denies withdrawal symptoms at this time.

## 2021-09-17 NOTE — GROUP NOTE
Group Therapy Note    Date: 9/17/2021    Group Start Time: 1115  Group End Time: 2323  Group Topic: Healthy Living/Wellness    [facility]    Earnestine Alicia, RN        Group Therapy Note    Attendees: 5/17           Status After Intervention:  Improved    Participation Level:  Active Listener and Interactive    Participation Quality: Appropriate and Attentive      Speech:  normal      Thought Process/Content: Logical      Affective Functioning: Congruent      Mood: stable      Level of consciousness:  Alert      Response to Learning: Able to retain information      Endings: None Reported    Modes of Intervention: Education, Socialization and Media      Discipline Responsible: Registered Nurse

## 2021-09-17 NOTE — GROUP NOTE
Group Therapy Note    Date: 9/17/2021    Group Start Time: 1330  Group End Time: 1440  Group Topic: Music Therapy    [facility]    Darol Dural        Group Therapy Note    Pt did not attend MUSIC THERAPY/EMOITONS group d/t resting in room despite staff invitation to attend. 1:1 talk time offered as alternative to group session, pt declined.

## 2021-09-17 NOTE — CARE COORDINATION
Sw spoke with patient sister Vicki Christiansen this date, offered update on patient condition, ongoing support with d/c planning as patient improves.

## 2021-09-17 NOTE — PLAN OF CARE
Problem: Altered Mood, Psychotic Behavior:  Goal: Absence of self-harm  Description: Absence of self-harm  9/17/2021 1116 by Tiffany Brice LPN  Outcome: Ongoing     Problem: Substance Abuse:  Goal: Absence of drug withdrawal signs and symptoms  Description: Absence of drug withdrawal signs and symptoms  9/17/2021 1116 by Tiffany Brice LPN  Outcome: Ongoing   Every 15 min checks maintained for pt safety.

## 2021-09-17 NOTE — GROUP NOTE
Group Therapy Note    Date: 9/17/2021    Group Start Time: 1000  Group End Time: 2517  Group Topic: Psychotherapy    [facility] FRANKLIN Carlson, [facility]        Group Therapy Note    Patient declined to attend psychotherapy group at 10 am despite encouragement by staff. 1:1 was offered as an alternative.               Signature:  FRANKLIN Leonard, [place]

## 2021-09-17 NOTE — PROGRESS NOTES
Daily Progress Note  Dewayne Rodriguez MD  9/17/2021  CHIEF COMPLAINT: Command auditory hallucinations to end his life    Reviewed patient's current plan of care and vital signs with nursing staff. Sleep:  several hours last night  Attending groups: No: Intermittently coming out of his room now today which is better    SUBJECTIVE:    Patient is reporting slightly improved sleep. However he is still endorsing command auditory hallucinations to end his life. He has not noticed a substantial difference with the cross titration off of Invega and onto olanzapine though he does subjectively report some improved sleep. He is not able to contract for safety off the unit. Reporting a mildly improved appetite. Discussed visitation with his sister. Discussed family dynamics. Patient is slow in responses and its unclear whether this is baseline versus symptoms from his depression. No emergency medications needed in the last day    Mental Status Exam  Level of consciousness:  Within normal limits  Appearance: Hospital attire, seated in chair, with good grooming and hygiene   Behavior/Motor: Psychomotor retardation  Attitude toward examiner:  Cooperative, attentive, good eye contact  Speech: Little spontaneity of speech, slow rate, monotone in nature and low volume  Mood: Depressed  Affect: Congruent  Thought processes:  linear, goal directed and coherent  Thought content:  denies homicidal ideation  Suicidal Ideation: Endorses suicidal ideation  Delusions:  no evidence of delusions  Perceptual Disturbance: Endorses command auditory hallucinations to end his life cognition:  Oriented to self, location, time, and situation  Memory: age appropriate  Insight & Judgement: improving  Medication side effects:  denies       Data   height is 6' 2\" (1.88 m) and weight is 200 lb (90.7 kg). His oral temperature is 98.1 °F (36.7 °C). His blood pressure is 90/50 (abnormal) and his pulse is 67.  His respiration is 14 and oxygen saturation is 98%.    Labs:   Admission on 09/11/2021   Component Date Value Ref Range Status    Iron 09/12/2021 90  59 - 158 ug/dL Final    TIBC 09/12/2021 304  250 - 450 ug/dL Final    Iron Saturation 09/12/2021 30  20 - 55 % Final    UIBC 09/12/2021 214  112 - 347 ug/dL Final    Ferritin 09/12/2021 111  30 - 400 ug/L Final    TSH 09/12/2021 0.56  0.30 - 5.00 mIU/L Final    Vitamin B-12 09/12/2021 411  232 - 1245 pg/mL Final    Folate 09/12/2021 13.1  >4.8 ng/mL Final    Vit D, 25-Hydroxy 09/12/2021 23.3* 30.0 - 100.0 ng/mL Final    Comment:    Reference Range:  Vitamin D status         Range   Deficiency              <20 ng/mL   Mild Deficiency       20-30 ng/mL   Sufficiency           ng/mL   Toxicity               >100 ng/mL              Medications  Current Facility-Administered Medications: OLANZapine (ZYPREXA) tablet 10 mg, 10 mg, Oral, Nightly  Vitamin D (CHOLECALCIFEROL) tablet 1,000 Units, 1,000 Units, Oral, Daily  acetaminophen (TYLENOL) tablet 650 mg, 650 mg, Oral, Q4H PRN  aluminum & magnesium hydroxide-simethicone (MAALOX) 200-200-20 MG/5ML suspension 30 mL, 30 mL, Oral, Q6H PRN  hydrOXYzine (ATARAX) tablet 50 mg, 50 mg, Oral, TID PRN  ibuprofen (ADVIL;MOTRIN) tablet 400 mg, 400 mg, Oral, Q6H PRN  polyethylene glycol (GLYCOLAX) packet 17 g, 17 g, Oral, Daily PRN  nicotine polacrilex (NICORETTE) gum 2 mg, 2 mg, Oral, Q1H PRN  haloperidol (HALDOL) tablet 5 mg, 5 mg, Oral, Q4H PRN **AND** LORazepam (ATIVAN) tablet 2 mg, 2 mg, Oral, Q4H PRN  haloperidol lactate (HALDOL) injection 5 mg, 5 mg, IntraMUSCular, Q4H PRN **AND** LORazepam (ATIVAN) injection 2 mg, 2 mg, IntraMUSCular, Q4H PRN **AND** diphenhydrAMINE (BENADRYL) injection 50 mg, 50 mg, IntraMUSCular, Q4H PRN  clotrimazole (LOTRIMIN) 1 % cream, , Topical, BID  escitalopram (LEXAPRO) tablet 20 mg, 20 mg, Oral, Daily  traZODone (DESYREL) tablet 150 mg, 150 mg, Oral, Nightly PRN    ASSESSMENT  Schizoaffective disorder, depressive type (HCC) PLAN  Patient s symptoms   show modest improvement  Monitor low blood pressure  Increase at bedtime olanzapine to 15 mg by mouth at bedtime  Attempt to develop insight  Psycho-education conducted. Supportive Therapy conducted. Probable discharge is next week  Follow-up daily while in the inpatient unit    More than 16 mins of the 30-minute session was spent doing Supportive psychotherapy. Electronically signed by Carlos A Zafar MD on 9/17/21 at 5:15 PM EDT    **This report has been created using voice recognition software. It may contain minor errors which are inherent in voice recognition technology. **

## 2021-09-17 NOTE — GROUP NOTE
Group Therapy Note    Date: 9/17/2021    Group Start Time: 0900  Group End Time: 0915  Group Topic: Community Meeting    JESUS Cabello        Group Therapy Note    Pt did not attend  community meeting group d/t resting in room despite staff invitation to attend. 1:1 talk time offered as alternative to group session, pt declined.

## 2021-09-18 PROCEDURE — 1240000000 HC EMOTIONAL WELLNESS R&B

## 2021-09-18 PROCEDURE — 6370000000 HC RX 637 (ALT 250 FOR IP): Performed by: PSYCHIATRY & NEUROLOGY

## 2021-09-18 PROCEDURE — 99232 SBSQ HOSP IP/OBS MODERATE 35: CPT | Performed by: PSYCHIATRY & NEUROLOGY

## 2021-09-18 PROCEDURE — APPSS30 APP SPLIT SHARED TIME 16-30 MINUTES: Performed by: PSYCHIATRY & NEUROLOGY

## 2021-09-18 PROCEDURE — 6370000000 HC RX 637 (ALT 250 FOR IP): Performed by: INTERNAL MEDICINE

## 2021-09-18 RX ORDER — OLANZAPINE 10 MG/1
20 TABLET ORAL NIGHTLY
Status: DISCONTINUED | OUTPATIENT
Start: 2021-09-19 | End: 2021-09-22 | Stop reason: HOSPADM

## 2021-09-18 RX ADMIN — ESCITALOPRAM OXALATE 20 MG: 20 TABLET ORAL at 09:14

## 2021-09-18 RX ADMIN — Medication 1000 UNITS: at 09:14

## 2021-09-18 NOTE — PROGRESS NOTES
Daily Progress Note  9/18/2021    Patient Name: Trev Canas    CHIEF COMPLAINT:  Command auditory hallucinations to end his life. SUBJECTIVE:      Patient is seen today for a follow up assessment. Medication Adherence: Patient has been medication compliant with exception of his scheduled Lotrimin cream.     Emergency Medications: Patient has not required any emergency medications today. Patient was resting in his room and performed by writer, he was agreeable to speaking with writer. He endorses depression and anxiety today. He endorses decreased appetite. He reports his sleep was fair last night and nonrestorative. He endorses difficulty falling asleep and difficulty staying asleep last night. He endorses active suicidal ideation, without intent or plans. He endorses active homicidal ideation towards \"Ms. Kidd\". He denies any intent or plans. He contracts for safety on the unit. When asked if he would be able to contract for safety off the unit, patient states \"I don't know\". Patient endorses auditory hallucinations all the time and reports hearing 1 male, but reports it \"could be female\". He reports the auditory hallucination voices are unrecognizable to him and reports the auditory hallucinations tell the patient \"to hurt myself\". He rates the loudness of the auditory hallucinations as a 10 out of 10 (010 scale with 0 being no sound and 10 being the loudest). He denies visual hallucinations. He does endorse paranoia. He also presents with delusions and reports he feels others can read his mind. He also reports he feels he can read other people's mind. Patient also reports he feels the media communicates directly with him. He denies any medication side effects or medical concerns at the time of assessment. Writer encouraged patient to attend groups on the unit. At this time, the patient is not appropriate for a lower level of care.  There is risk of decompensation and patient warrants further hospitalization for safety and stabilization. Group Attendance on Unit:   [] Yes  [] Selectively    [x] No         Mental Status Exam  Level of consciousness: Somnolent, responds to verbal stimuli. Appearance: Appropriate attire for setting, resting in bed, with fair  grooming and hygiene. Behavior/Motor: Approachable, psychomotor slowing. Attitude toward examiner: Cooperative, attentive, good eye contact. Speech: Little spontaneity of speech, slow rate, monotone in nature and low volume. Mood:  Patient reports \"not too good\". Affect: Depressed. Thought processes: Linear and coherent. Thought content: Endorses homicidal ideation towards \"Ms. Kidd\". He denies any intent or plans. Suicidal Ideation: Active suicidal ideations, without current plan or intent, contracts for safety on the unit. Delusions: No evidence of delusions. Endorses paranoia. Perceptual Disturbance: Patient does not appear to be responding to internal stimuli. Endorses auditory hallucinations. Denies visual hallucinations. Cognition: Oriented to self, location, time, and situation. Memory: Intact. Insight & Judgement: Poor. Data   height is 6' 2\" (1.88 m) and weight is 200 lb (90.7 kg). His oral temperature is 98.1 °F (36.7 °C). His blood pressure is 90/50 (abnormal) and his pulse is 67. His respiration is 14 and oxygen saturation is 98%.    Labs:   Admission on 09/11/2021   Component Date Value Ref Range Status    Iron 09/12/2021 90  59 - 158 ug/dL Final    TIBC 09/12/2021 304  250 - 450 ug/dL Final    Iron Saturation 09/12/2021 30  20 - 55 % Final    UIBC 09/12/2021 214  112 - 347 ug/dL Final    Ferritin 09/12/2021 111  30 - 400 ug/L Final    TSH 09/12/2021 0.56  0.30 - 5.00 mIU/L Final    Vitamin B-12 09/12/2021 411  232 - 1245 pg/mL Final    Folate 09/12/2021 13.1  >4.8 ng/mL Final    Vit D, 25-Hydroxy 09/12/2021 23.3* 30.0 - 100.0 ng/mL Final    Comment:    Reference Range:  Vitamin D status         Range   Deficiency              <20 ng/mL   Mild Deficiency       20-30 ng/mL   Sufficiency           ng/mL   Toxicity               >100 ng/mL           Reviewed patient's current plan of care and vital signs with nursing staff. · Per review of vital signs, patient refused vital signs on 9/18/2021. Labs reviewed: [x] Yes  Last EKG in EMR reviewed: [x] Yes  QTc: 452. Medications  Current Facility-Administered Medications: OLANZapine (ZYPREXA) tablet 15 mg, 15 mg, Oral, Nightly  Vitamin D (CHOLECALCIFEROL) tablet 1,000 Units, 1,000 Units, Oral, Daily  acetaminophen (TYLENOL) tablet 650 mg, 650 mg, Oral, Q4H PRN  aluminum & magnesium hydroxide-simethicone (MAALOX) 200-200-20 MG/5ML suspension 30 mL, 30 mL, Oral, Q6H PRN  hydrOXYzine (ATARAX) tablet 50 mg, 50 mg, Oral, TID PRN  ibuprofen (ADVIL;MOTRIN) tablet 400 mg, 400 mg, Oral, Q6H PRN  polyethylene glycol (GLYCOLAX) packet 17 g, 17 g, Oral, Daily PRN  nicotine polacrilex (NICORETTE) gum 2 mg, 2 mg, Oral, Q1H PRN  haloperidol (HALDOL) tablet 5 mg, 5 mg, Oral, Q4H PRN **AND** LORazepam (ATIVAN) tablet 2 mg, 2 mg, Oral, Q4H PRN  haloperidol lactate (HALDOL) injection 5 mg, 5 mg, IntraMUSCular, Q4H PRN **AND** LORazepam (ATIVAN) injection 2 mg, 2 mg, IntraMUSCular, Q4H PRN **AND** diphenhydrAMINE (BENADRYL) injection 50 mg, 50 mg, IntraMUSCular, Q4H PRN  clotrimazole (LOTRIMIN) 1 % cream, , Topical, BID  escitalopram (LEXAPRO) tablet 20 mg, 20 mg, Oral, Daily  traZODone (DESYREL) tablet 150 mg, 150 mg, Oral, Nightly PRN    ASSESSMENT  Schizoaffective disorder, depressive type (HCC)         PLAN  Patient symptoms are: Remains Unstable. Continue current medication regimen. Patient was previously seen by internal medicine on 9/11/21. Monitor need and frequency of PRN medications. Encourage participation in groups and milieu. Attempt to develop insight. Psycho-education conducted. Supportive Therapy conducted.   Probable discharge is to be determined by MD.   Follow-up daily while inpatient. Patient continues to be monitored in the inpatient psychiatric facility at [facility] for safety and stabilization. Patient continues to need, on a daily basis, active treatment furnished directly by or requiring the supervision of inpatient psychiatric personnel. Electronically signed by MARY Hernandez CNP on 9/18/2021 at 1:06 PM    **This report has been created using voice recognition software. It may contain minor errors which are inherent in voice recognition technology. **    I independently saw and evaluated the patient. I reviewed the nurse practitioners documentation above. Any additional comments or changes to the nurse practitioners documentation are stated below otherwise agree with assessment. Plan will be as follows: While patient still endorsing distressing symptoms he is reporting some improvement. Of note also I have seen him up and on the unit the past 2 days. He did smile with this author when talking. Seeing some slight changes for improvement. He is denying side effects to medication. Agreeable to continue to titrate olanzapine to effect. PLAN  Patient s symptoms   are improving  We will schedule an increase of olanzapine starting tomorrow night  Attempt to develop insight  Psycho-education conducted. Supportive Therapy conducted.   Probable discharge is 2 to 3 days  Follow-up daily while on inpatient unit

## 2021-09-18 NOTE — PLAN OF CARE
Problem: Altered Mood, Psychotic Behavior:  Goal: Able to verbalize decrease in frequency and intensity of hallucinations  Description: Able to verbalize decrease in frequency and intensity of hallucinations  9/18/2021 0804 by Nidia Mejia RN  Outcome: Ongoing   1:1 with pt x ten minutes. Pt encouraged to attend unit programming and interact with peers and staff. Pt also encouraged to tend to hygiene and ADLs. Pt encouraged to discuss feelings with staff and feedback and reassurance provided. Pt denies thoughts of self harm and is agreeable to seeking out should thoughts of self harm arise. Safe environment maintained. Q15 minute checks for safety cont per unit policy. Will cont to monitor for safety and provides support and reassurance as needed. Pt admits to auditory hallucinations. Seclusive to room and bed.  Refused vital signs and breakfast.

## 2021-09-18 NOTE — PROGRESS NOTES
Physical Therapy        Physical Therapy Cancel Note      DATE: 2021    NAME: Júnior Delgado  MRN: [id number]   : 1960      Patient not seen this date for Physical Therapy due to:    Patient Declined: Declined PT this date due to being tired. Educated patient on benefits of PT.        Electronically signed by Evone Sacks, PTA on 2021 at 10:44 AM

## 2021-09-18 NOTE — PLAN OF CARE
Problem: Altered Mood, Psychotic Behavior:  Goal: Able to verbalize decrease in frequency and intensity of hallucinations  Description: Able to verbalize decrease in frequency and intensity of hallucinations  9/17/2021 2126 by Ananda Slade  Outcome: Ongoing   Coop. With vitals taken, pleasant on approach, coop. Et controlled. Affect flat, depressed mood. Relates is still having fleeting thoughts of suicide, no plan at this time. Able to contract for safety. Spends time in milieu watching T/V , spends time on phone. Still having A/H, are only a little better. Nourishment taken well, accepting of all medications provided. Problem: Altered Mood, Psychotic Behavior:  Goal: Absence of self-harm  Description: Absence of self-harm  9/17/2021 2126 by Ananda Slade  Outcome: Ongoing   No attempts noted of self harm. Problem: Substance Abuse:  Goal: Absence of drug withdrawal signs and symptoms  Description: Absence of drug withdrawal signs and symptoms  9/17/2021 2126 by Ananda Slade  Outcome: Ongoing   No sighs of withdrawal noted this shift.

## 2021-09-18 NOTE — PLAN OF CARE
[facility] Interdisciplinary Treatment Plan Note     Review Date & Time: 9/18/2021 0936    Admission Type:   Admission Type: Voluntary    Reason for admission:  Reason for Admission: fleeting suicidal thoughts no current plan, auditory hallucinations, HI non specific, kicked out of group home due to bringing bugs in, using crack cocaine    Estimated Length of Stay Update:  Est 3-7 days, to be determined by physician  Estimated Discharge Date Update: to be determined by physician    PATIENT STRENGTHS:  Patient Strengths:Strengths: Social Skills, Connection to output provider  Patient Strengths and Limitations:Limitations: Inappropriate/potentially harmful leisure interests, External locus of control, Tendency to isolate self, Difficulty problem solving/relies on others to help solve problems, Lacks leisure interests  Addictive Behavior:Addictive Behavior  In the past 3 months, have you felt or has someone told you that you have a problem with:  : None  Do you have a history of Chemical Use?: No  Do you have a history of Alcohol Use?: No  Do you have a history of Street Drug Abuse?: Yes  Histroy of Prescripton Drug Abuse?: No  Medical Problems:   Past Medical History:   Diagnosis Date    Bipolar disorder ([facility] [address]     Depression     GERD (gastroesophageal reflux disease)     Hallucinations     Headache(784.0)     Hepatitis     Schizophrenia, schizo-affective ([facility] [address]     Substance abuse ([facility] [address]     Tobacco abuse     Type II or unspecified type diabetes mellitus without mention of complication, not stated as uncontrolled     Urinary incontinence        Risk:  Fall RiskTotal: 75  Dusty Scale Dusty Scale Score: 22  BVC Total: 1  Change in scores  NA .  Changes to plan of Care   NA     Status EXAM:   Status and Exam  Normal: No  Facial Expression: Flat  Affect: Blunt  Level of Consciousness: Alert  Mood:Normal: No  Mood: Depressed, Anxious  Motor Activity:Normal: No  Motor Activity: Decreased  Interview PLAN/TREATMENT RECOMMENDATIONS UPDATE:   COPING SKILLS CONTINUE WITH GROUP THERAPIES POSITIVE INTERACTIONS, GOAL SETTING    SHORT-TERM GOALS UPDATE:  Time frame for Short-Term Goals: 1-2 WEEKS     LONG-TERM GOALS UPDATE:  Time frame for Long-Term Goals: 6 MONTHS  Members Present in Team Meeting: See Signature Sheet    Mireille Henry

## 2021-09-18 NOTE — GROUP NOTE
Group Therapy Note    Date: 9/18/2021    Group Start Time: 1430  Group End Time: 3953  Group Topic: Group Documentation    [facility], RN        Group Therapy Note    Attendees: 9/19         Patient's Goal:  Improvement in mood through activity and socialization    Notes:  Pt was attentive and active in group    Status After Intervention:  Improved    Participation Level: Interactive    Participation Quality: Appropriate, Attentive, Sharing and Supportive      Speech:  normal      Thought Process/Content: Logical      Affective Functioning: Congruent      Mood: appropriate      Level of consciousness:  Alert, Oriented x4 and Attentive      Response to Learning: Able to verbalize current knowledge/experience and Able to verbalize/acknowledge new learning      Endings: None Reported    Modes of Intervention: Socialization and Activity      Discipline Responsible: Registered Nurse      Signature:  Kishore Rosas RN

## 2021-09-19 PROCEDURE — APPSS30 APP SPLIT SHARED TIME 16-30 MINUTES: Performed by: PSYCHIATRY & NEUROLOGY

## 2021-09-19 PROCEDURE — 99232 SBSQ HOSP IP/OBS MODERATE 35: CPT | Performed by: PSYCHIATRY & NEUROLOGY

## 2021-09-19 PROCEDURE — 6370000000 HC RX 637 (ALT 250 FOR IP): Performed by: INTERNAL MEDICINE

## 2021-09-19 PROCEDURE — 6370000000 HC RX 637 (ALT 250 FOR IP): Performed by: PSYCHIATRY & NEUROLOGY

## 2021-09-19 PROCEDURE — 1240000000 HC EMOTIONAL WELLNESS R&B

## 2021-09-19 RX ADMIN — HYDROXYZINE HYDROCHLORIDE 50 MG: 50 TABLET ORAL at 21:03

## 2021-09-19 RX ADMIN — TRAZODONE HYDROCHLORIDE 150 MG: 150 TABLET ORAL at 21:03

## 2021-09-19 RX ADMIN — ESCITALOPRAM OXALATE 20 MG: 20 TABLET ORAL at 08:19

## 2021-09-19 RX ADMIN — Medication 1000 UNITS: at 08:19

## 2021-09-19 RX ADMIN — HALOPERIDOL 5 MG: 5 TABLET ORAL at 09:29

## 2021-09-19 RX ADMIN — OLANZAPINE 20 MG: 10 TABLET, FILM COATED ORAL at 21:03

## 2021-09-19 RX ADMIN — CLOTRIMAZOLE: 10 CREAM TOPICAL at 21:05

## 2021-09-19 RX ADMIN — HALOPERIDOL 5 MG: 5 TABLET ORAL at 17:54

## 2021-09-19 NOTE — PLAN OF CARE
Problem: Altered Mood, Psychotic Behavior:  Goal: Absence of self-harm  Description: Absence of self-harm  9/18/2021 2146 by Marjorie Azul  Outcome: Met This Shift  Pt denies suicidal ideation, no self harm behaviors exhibited. Problem: Substance Abuse:  Goal: Absence of drug withdrawal signs and symptoms  Description: Absence of drug withdrawal signs and symptoms  9/18/2021 2146 by Marjorie Azul  Outcome: Met This Shift  Pt relates he feels better & denies any signs or symptoms of withdrawal    Problem: Pain:  Goal: Pain level will decrease  Description: Pain level will decrease  9/18/2021 2146 by Marjorie Azul  Outcome: Met This Shift   Pt relates he is feeling better    Problem: Altered Mood, Psychotic Behavior:  Goal: Able to verbalize decrease in frequency and intensity of hallucinations  Description: Able to verbalize decrease in frequency and intensity of hallucinations  9/18/2021 2146 by Marjorie Azul  Outcome: Ongoing  Pt continues to admit to auditory hallucinations. Relates he hears his mother talking to him. Pt was out in [facility]  this shift, social with peers. Attended group with good participation.   He is compliant with medicine, good appetite

## 2021-09-19 NOTE — PLAN OF CARE
Problem: Altered Mood, Psychotic Behavior:  Goal: Able to verbalize decrease in frequency and intensity of hallucinations  Description: Able to verbalize decrease in frequency and intensity of hallucinations  9/19/2021 1119 by Kady Weber  Outcome: Ongoing  Pt relates to hearing voices this morning. States they say bad things. Accepting of medication from nurse after discussing with the doctor. Pt. Has been up and out on unit. Attending and participating in groups. Problem: Altered Mood, Psychotic Behavior:  Goal: Absence of self-harm  Description: Absence of self-harm  9/19/2021 1119 by Kady Weber  Outcome: Ongoing  Pt denies current suicidal thoughts. Pt. Remains safe on the unit. Q 15 minute checks for safety maintained. Problem: Substance Abuse:  Goal: Absence of drug withdrawal signs and symptoms  Description: Absence of drug withdrawal signs and symptoms  9/19/2021 1119 by Kady Weber  Outcome: Ongoing  Pt denies any sign and symptoms of withdrawal.        Problem: Pain:  Goal: Pain level will decrease  Description: Pain level will decrease  9/19/2021 1119 by Kady Weber  Outcome: Ongoing  Pt denies current pain. Problem: Musculor/Skeletal Functional Status  Goal: Highest potential functional level  Outcome: Ongoing  Pt maintains appropriate self care.

## 2021-09-19 NOTE — GROUP NOTE
Group Therapy Note    Date: 9/18/2021    Group Start Time: 2030  Group End Time: 2059  Group Topic: Wrap-Up    JESUS Guevara        Group Therapy Note    Attendees: 6         Patient's Goal:  My sister who lives in [place] kicked me out on the 10th    Notes:  I need a place to live,    Status After Intervention:  Improved    Participation Level: Interactive    Participation Quality: Appropriate      Speech:  pressured      Thought Process/Content: Linear      Affective Functioning: Blunted      Mood: depressed      Level of consciousness:  Alert and Oriented x4      Response to Learning: Able to change behavior      Endings: None Reported    Modes of Intervention: Problem-solving      Discipline Responsible: Licensed Practical Nurse      Signature:  Elvira Guevara

## 2021-09-19 NOTE — PROGRESS NOTES
Daily Progress Note  9/19/2021    Patient Name: Charmaine Hloley    CHIEF COMPLAINT:  Command auditory hallucinations to end his life. SUBJECTIVE:      Patient is seen today for a follow up assessment. Medication Adherence: Patient has been medication compliant with exception of his scheduled Lotrimin cream.     Emergency Medications: Patient has received emergency medication 9/19/2021 at 0929 AM.    Patient was agreeable to speaking with writer in the day area today. He endorses depression and anxiety today. He endorses adequate appetite. He reports \"no sleep\" last night. Per [facility] adult Daily assessment flowsheet, staff documented daily sleep as \"within defined limits\" on 9/19/2021. Patient endorses fleeting suicidal ideation with a plan \"to get it done\". Patient is vague and does not further elaborate on his suicidal plan. Patient endorses active homicidal ideation towards Ms. Melinda Cortes. He denies homicidal plans. When asked if he can contract for safety on the unit, patient stated \"I don't know\". Patient endorses auditory hallucinations all the time and reports hearing 1 voice. He reports he is unable to determine if the voice is a male or female voice, but reports the voice is unrecognizable to him. He rates the loudness of the auditory hallucinations as a 10 out of 10 (010 scale with 0 being no sound and 10 being worst). He states the auditory hallucinations tell him to hurt himself. He denies visual hallucinations. He endorses paranoia. He presents with delusions and reports he feels others can read his mind. He also reports he feels he can read other people's mind. Patient also reports he feels the media communicates directly with him. He denies any medication side effects or medical concerns at the time of assessment. Writer encouraged patient to attend groups on the unit. At this time, the patient is not appropriate for a lower level of care.  There is risk of decompensation and patient warrants further hospitalization for safety and stabilization. Group Attendance on Unit:   [x] Yes  [] Selectively    [] No         Mental Status Exam  Level of consciousness: Alert and awake. Appearance: Appropriate attire for setting, seated in chair, with fair  grooming and hygiene. Behavior/Motor: Approachable, psychomotor slowing. Attitude toward examiner: Cooperative, attentive, good eye contact. Speech: Little spontaneity of speech, slow rate, monotone, and low volume. Mood:  Patient reports \"depressed\". Affect: Congruent to mood. Thought processes: Linear and coherent. Thought content: Endorses active homicidal ideation towards \"Ms. Kidd\". He denies any homicidal plans. Suicidal Ideation: Active suicidal ideations with a plan \"to get it done\". Patient is vague and does not further elaborate on his suicidal plan. When asked if he can contract for safety on the unit, patient stated \"I don't know\". Delusions: No evidence of delusions. Endorses paranoia. Perceptual Disturbance: Patient does not appear to be responding to internal stimuli. Endorses auditory hallucinations. Denies visual hallucinations. Cognition: Oriented to self, location, time, and situation. Memory: Intact. Insight & Judgement: Poor. Data   height is 6' 2\" (1.88 m) and weight is 200 lb (90.7 kg). His temporal temperature is 96.8 °F (36 °C). His blood pressure is 116/55 (abnormal) and his pulse is 62. His respiration is 14 and oxygen saturation is 98%.    Labs:   Admission on 09/11/2021   Component Date Value Ref Range Status    Iron 09/12/2021 90  59 - 158 ug/dL Final    TIBC 09/12/2021 304  250 - 450 ug/dL Final    Iron Saturation 09/12/2021 30  20 - 55 % Final    UIBC 09/12/2021 214  112 - 347 ug/dL Final    Ferritin 09/12/2021 111  30 - 400 ug/L Final    TSH 09/12/2021 0.56  0.30 - 5.00 mIU/L Final    Vitamin B-12 09/12/2021 411  232 - 1245 pg/mL Final    Folate 09/12/2021 13.1  >4.8 ng/mL Final    Vit D, 25-Hydroxy 09/12/2021 23.3* 30.0 - 100.0 ng/mL Final    Comment:    Reference Range:  Vitamin D status         Range   Deficiency              <20 ng/mL   Mild Deficiency       20-30 ng/mL   Sufficiency           ng/mL   Toxicity               >100 ng/mL           Reviewed patient's current plan of care and vital signs with nursing staff. Labs reviewed: [x] Yes  Last EKG in EMR reviewed: [x] Yes  QTc: 452. Medications  Current Facility-Administered Medications: OLANZapine (ZYPREXA) tablet 20 mg, 20 mg, Oral, Nightly  Vitamin D (CHOLECALCIFEROL) tablet 1,000 Units, 1,000 Units, Oral, Daily  acetaminophen (TYLENOL) tablet 650 mg, 650 mg, Oral, Q4H PRN  aluminum & magnesium hydroxide-simethicone (MAALOX) 200-200-20 MG/5ML suspension 30 mL, 30 mL, Oral, Q6H PRN  hydrOXYzine (ATARAX) tablet 50 mg, 50 mg, Oral, TID PRN  ibuprofen (ADVIL;MOTRIN) tablet 400 mg, 400 mg, Oral, Q6H PRN  polyethylene glycol (GLYCOLAX) packet 17 g, 17 g, Oral, Daily PRN  nicotine polacrilex (NICORETTE) gum 2 mg, 2 mg, Oral, Q1H PRN  haloperidol (HALDOL) tablet 5 mg, 5 mg, Oral, Q4H PRN **AND** [DISCONTINUED] LORazepam (ATIVAN) tablet 2 mg, 2 mg, Oral, Q4H PRN  haloperidol lactate (HALDOL) injection 5 mg, 5 mg, IntraMUSCular, Q4H PRN **AND** [DISCONTINUED] LORazepam (ATIVAN) injection 2 mg, 2 mg, IntraMUSCular, Q4H PRN **AND** diphenhydrAMINE (BENADRYL) injection 50 mg, 50 mg, IntraMUSCular, Q4H PRN  clotrimazole (LOTRIMIN) 1 % cream, , Topical, BID  escitalopram (LEXAPRO) tablet 20 mg, 20 mg, Oral, Daily  traZODone (DESYREL) tablet 150 mg, 150 mg, Oral, Nightly PRN    ASSESSMENT  Schizoaffective disorder, depressive type (HCC)         PLAN  Patient symptoms are: Remains Unstable. Continue current medication regimen. Patient was previously seen by internal medicine on 9/11/21. Monitor need and frequency of PRN medications. Encourage participation in groups and milieu.   Attempt to develop insight. Psycho-education conducted. Supportive Therapy conducted. Probable discharge is to be determined by MD.   Follow-up daily while inpatient. Patient continues to be monitored in the inpatient psychiatric facility at [facility] for safety and stabilization. Patient continues to need, on a daily basis, active treatment furnished directly by or requiring the supervision of inpatient psychiatric personnel. Electronically signed by MARY Marinelli CNP on 9/19/2021 at 12:30 PM    **This report has been created using voice recognition software. It may contain minor errors which are inherent in voice recognition technology. **      I independently saw and evaluated the patient. I reviewed the nurse practitioners documentation above. Any additional comments or changes to the nurse practitioners documentation are stated below otherwise agree with assessment. Plan will be as follows:  Patient fell asleep last night before taking medication and did not get his olanzapine. Charted is refused however patient states he was sleeping. He is agreeable to continue the medication. When I saw him he ended indicated voices were still bad and we gave oral Haldol as needed to control voices. Does report overall improvement in mood and he has been up and out on the unit more often. Reporting staff treating him well  PLAN  Patient s symptoms   are improving  Continue with current medication for now  Attempt to develop insight  Psycho-education conducted. Supportive Therapy conducted.   Probable discharge is Wednesday  Follow-up daily while on inpatient unit

## 2021-09-19 NOTE — GROUP NOTE
Group Therapy Note    Date: 9/19/2021    Group Start Time: 0900  Group End Time: 0466  Group Topic: Community Meeting    JESUS Guerrero        Group Therapy Note    Attendees: 9/20         Patient's Goal:  Take it easy and go to groups    Notes:  Calm and controlled    Status After Intervention:  Unchanged    Participation Level: Active Listener and Interactive    Participation Quality: Appropriate and Attentive      Speech:  normal      Thought Process/Content: Logical      Affective Functioning: Congruent      Mood: stable      Level of consciousness:  Alert and Attentive      Response to Learning: Progressing to goal      Endings: None Reported    Modes of Intervention: Education      Discipline Responsible: Behavorial Health Tech      Signature:   Arline Guerrero

## 2021-09-19 NOTE — GROUP NOTE
Group Therapy Note    Date: 9/19/2021    Group Start Time: 1010  Group End Time: 1050  Group Topic: Psychoeducation    [place] Sonya LSW        Group Therapy Note    Attendees: 7/20         Patient's Goal:  Benefits of self care     Notes:  Therapeutic worksheet provided and discussed     Status After Intervention:  Improved    Participation Level:  Active Listener and Interactive    Participation Quality: Appropriate and Attentive      Speech:  normal      Thought Process/Content: Logical      Affective Functioning: Congruent      Mood: euthymic      Level of consciousness:  Alert and Oriented x4      Response to Learning: Able to verbalize current knowledge/experience and Able to verbalize/acknowledge new learning      Endings: None Reported    Modes of Intervention: Education and Support      Discipline Responsible: /Counselor      Signature:  CHANEL Delvalle

## 2021-09-20 PROCEDURE — 1240000000 HC EMOTIONAL WELLNESS R&B

## 2021-09-20 PROCEDURE — 99232 SBSQ HOSP IP/OBS MODERATE 35: CPT | Performed by: PSYCHIATRY & NEUROLOGY

## 2021-09-20 PROCEDURE — 6370000000 HC RX 637 (ALT 250 FOR IP): Performed by: INTERNAL MEDICINE

## 2021-09-20 PROCEDURE — 6370000000 HC RX 637 (ALT 250 FOR IP): Performed by: PSYCHIATRY & NEUROLOGY

## 2021-09-20 PROCEDURE — APPSS30 APP SPLIT SHARED TIME 16-30 MINUTES: Performed by: PSYCHIATRY & NEUROLOGY

## 2021-09-20 PROCEDURE — 90833 PSYTX W PT W E/M 30 MIN: CPT | Performed by: PSYCHIATRY & NEUROLOGY

## 2021-09-20 RX ADMIN — OLANZAPINE 20 MG: 10 TABLET, FILM COATED ORAL at 20:42

## 2021-09-20 RX ADMIN — ESCITALOPRAM OXALATE 20 MG: 20 TABLET ORAL at 12:35

## 2021-09-20 RX ADMIN — HYDROXYZINE HYDROCHLORIDE 50 MG: 50 TABLET ORAL at 20:42

## 2021-09-20 RX ADMIN — TRAZODONE HYDROCHLORIDE 150 MG: 150 TABLET ORAL at 20:42

## 2021-09-20 RX ADMIN — CLOTRIMAZOLE: 10 CREAM TOPICAL at 20:44

## 2021-09-20 RX ADMIN — Medication 1000 UNITS: at 12:35

## 2021-09-20 NOTE — GROUP NOTE
Group Therapy Note    Date: 9/20/2021    Group Start Time: 1435  Group End Time: 6057  Group Topic: Music Therapy    JESUS Friedman        Group Therapy Note    Pt did not attend music therapy group d/t resting in room despite staff invitation to attend. 1:1 talk time offered as alternative to group session, pt declined.

## 2021-09-20 NOTE — GROUP NOTE
Group Therapy Note    Date: 9/20/2021    Group Start Time: 1010  Group End Time: 4209  Group Topic: Psychotherapy    FRANKLIN Miller, CHANEL        Group Therapy Note    Attendees: 6/22         Patient was offered group therapy today but declined to participate despite encouragement from staff. 1:1 was offered.     Signature:  FRANKLIN Colon, CHANEL

## 2021-09-20 NOTE — GROUP NOTE
Group Therapy Note    Date: 9/19/2021    Group Start Time: 2030  Group End Time: 2120  Group Topic: Wrap-Up    CONOR HERNANDEZ JOSE GUADALUPE Burnette        Group Therapy Note    Attendees: 12         Patient's Goal:  My voices are worse    Notes:  Medicine isn't helping me, sometimes TV helps me not pay attention to them    Status After Intervention:  Unchanged    Participation Level: Minimal    Participation Quality: Appropriate      Speech:  normal      Thought Process/Content: Linear      Affective Functioning: Blunted      Mood: depressed      Level of consciousness:  Alert and Oriented x4      Response to Learning: Progressing to goal      Endings: None Reported    Modes of Intervention: Problem-solving      Discipline Responsible: Mis Descuentos      Signature:  Yuli Burnette

## 2021-09-20 NOTE — GROUP NOTE
Group Therapy Note    Date: 9/20/2021    Group Start Time: 1100  Group End Time: 1150  Group Topic: Recreational    STCONOR PIERREI A    Ashantielie Moment        Group Therapy Note      Pt did not attend recreational group d/t resting in room despite staff invitation to attend. 1:1 talk time offered as alternative to group session, pt declined.

## 2021-09-20 NOTE — PLAN OF CARE
Problem: Altered Mood, Psychotic Behavior:  Goal: Able to verbalize decrease in frequency and intensity of hallucinations  Description: Able to verbalize decrease in frequency and intensity of hallucinations  9/19/2021 2048 by Aimee Grover LPN  Outcome: Ongoing     Problem: Altered Mood, Psychotic Behavior:  Goal: Absence of self-harm  Description: Absence of self-harm  9/19/2021 2048 by Aimee Grover LPN  Outcome: Ongoing     Problem: Substance Abuse:  Goal: Absence of drug withdrawal signs and symptoms  Description: Absence of drug withdrawal signs and symptoms  9/19/2021 2048 by Aimee Grover LPN  Outcome: Ongoing     Problem: Tobacco Use:  Goal: Inpatient tobacco use cessation counseling participation  Description: Inpatient tobacco use cessation counseling participation  Outcome: Ongoing     Problem: Pain:  Goal: Pain level will decrease  Description: Pain level will decrease  9/19/2021 2048 by Aimee Grover LPN  Outcome: Ongoing     Problem: Pain:  Goal: Control of acute pain  Description: Control of acute pain  Outcome: Ongoing     Problem: Pain:  Goal: Control of chronic pain  Description: Control of chronic pain  Outcome: Ongoing     Problem: Musculor/Skeletal Functional Status  Goal: Highest potential functional level  9/19/2021 2048 by Aimee Grover LPN  Outcome: Ongoing     Problem: Musculor/Skeletal Functional Status  Goal: Absence of falls  Outcome: Ongoing

## 2021-09-20 NOTE — PLAN OF CARE
Problem: Altered Mood, Psychotic Behavior:  Goal: Able to verbalize decrease in frequency and intensity of hallucinations  Description: Able to verbalize decrease in frequency and intensity of hallucinations  9/20/2021 1001 by Belinda Marie RN  Outcome: Ongoing   1:1 with pt x ten minutes. Pt encouraged to attend unit programming and interact with peers and staff. Pt also encouraged to tend to hygiene and ADLs. Pt encouraged to discuss feelings with staff and feedback and reassurance provided. Pt admits to having thoughts of self harm. Agreeable to seeking out staff should feelings increase. Able to identify positive coping skills and plan for safety. Will cont to monitor q15 minutes for safety and provide support and reassurance as needed. Pt admits to auditory hallucinations. Pt refused breakfast and vital signs this AM. Pt refused groups. Irritable on approach.

## 2021-09-20 NOTE — GROUP NOTE
Group Therapy Note    Date: 2021    Group Start Time:   Group End Time:   Group Topic: Wrap-Up    [facility] A    Charolet Shelling Mantel        Group Therapy Note    Attendees: 12         Patient's Goal:  ***    Notes:  ***    Status After Intervention:  {Status After Intervention:699320623}    Participation Level: {Participation Level:210520265}    Participation Quality: {[facility] PARTICIPATION QUALITY:123397397}      Speech:  {ED  CD_SPEECH:74583}      Thought Process/Content: {Thought Process/Content:728452284}      Affective Functioning: {Affective Functionin}      Mood: {Mood:500097190}      Level of consciousness:  {Level of consciousness:638150473}      Response to LearninAnnabelle PIERRE Responses to Learnin}      Endings: {[facility] Endings:48866}    Modes of Intervention: {[facility] Modes of Intervention:309182650}      Discipline Responsible: Annabelle HERNANDEZ Multidisciplinary:[id number]}      Signature:  Elin Santiago

## 2021-09-20 NOTE — PROGRESS NOTES
Daily Progress Note  9/20/2021    Patient Name: Marcelo Amaya    CHIEF COMPLAINT:  Command auditory hallucinations to end his life. SUBJECTIVE:      Patient is seen today for a follow up assessment. Medication Adherence: Patient has been medication compliant with exception of his scheduled Lotrimin cream.     Emergency Medications: Patient has received emergency medication 9/19/2021 at 1754 PM.    Patient was agreeable to speaking with writer in the day area today. He endorses depression and anxiety today. He endorses adequate appetite. He reports poor and nonrestorative sleep last night. He endorses difficulty staying asleep and difficulty falling asleep last night. Patient endorses fleeting suicidal ideation, without intent or plans. He denies homicidal ideation, intent, or plans. He contracts for safety on the unit. When asked if he can contract for safety off the unit, patient states \"I don't know\". Patient endorses auditory hallucinations all the time and reports hearing 1 voice. He reports he is not sure if the auditory hallucinations are male or female voice and reports the voice is unfamiliar to him. He rates the loudness of the auditory hallucinations as a 10 out of 10 (010 scale with 0 being no sound and 10 being loudest\". Patient reports auditory hallucinations tell him \"things about hurting myself\" and also tell him to hurt others. He endorses intermittent visual hallucinations when he closes his eyes but is awake and reports seeing \"different people, different places\". He endorses paranoia. He presents with delusions and reports he feels others can read his mind. He also reports he feels he can read other people's mind. He denies any medication side effects or medical concerns at the time of assessment. Writer encouraged patient to attend groups on the unit. At this time, the patient is not appropriate for a lower level of care.  There is risk of decompensation ng/mL   Sufficiency           ng/mL   Toxicity               >100 ng/mL           Reviewed patient's current plan of care and vital signs with nursing staff. · Per review of vital signs from 9/20/2021, patient refused vital signs. Labs reviewed: [x] Yes  Last EKG in EMR reviewed: [x] Yes  QTc: 452. Medications  Current Facility-Administered Medications: OLANZapine (ZYPREXA) tablet 20 mg, 20 mg, Oral, Nightly  Vitamin D (CHOLECALCIFEROL) tablet 1,000 Units, 1,000 Units, Oral, Daily  acetaminophen (TYLENOL) tablet 650 mg, 650 mg, Oral, Q4H PRN  aluminum & magnesium hydroxide-simethicone (MAALOX) 200-200-20 MG/5ML suspension 30 mL, 30 mL, Oral, Q6H PRN  hydrOXYzine (ATARAX) tablet 50 mg, 50 mg, Oral, TID PRN  ibuprofen (ADVIL;MOTRIN) tablet 400 mg, 400 mg, Oral, Q6H PRN  polyethylene glycol (GLYCOLAX) packet 17 g, 17 g, Oral, Daily PRN  nicotine polacrilex (NICORETTE) gum 2 mg, 2 mg, Oral, Q1H PRN  haloperidol (HALDOL) tablet 5 mg, 5 mg, Oral, Q4H PRN **AND** [DISCONTINUED] LORazepam (ATIVAN) tablet 2 mg, 2 mg, Oral, Q4H PRN  haloperidol lactate (HALDOL) injection 5 mg, 5 mg, IntraMUSCular, Q4H PRN **AND** [DISCONTINUED] LORazepam (ATIVAN) injection 2 mg, 2 mg, IntraMUSCular, Q4H PRN **AND** diphenhydrAMINE (BENADRYL) injection 50 mg, 50 mg, IntraMUSCular, Q4H PRN  clotrimazole (LOTRIMIN) 1 % cream, , Topical, BID  escitalopram (LEXAPRO) tablet 20 mg, 20 mg, Oral, Daily  traZODone (DESYREL) tablet 150 mg, 150 mg, Oral, Nightly PRN    ASSESSMENT  Schizoaffective disorder, depressive type (HCC)         PLAN  Patient symptoms are: Slightly improving. Continue current medication regimen. Patient was previously seen by internal medicine. Monitor need and frequency of PRN medications. Encourage participation in groups and milieu. Attempt to develop insight. Psycho-education conducted. Supportive Therapy conducted. Probable discharge is to be determined by MD.   Follow-up daily while inpatient. Patient continues to be monitored in the inpatient psychiatric facility at [facility] for safety and stabilization. Patient continues to need, on a daily basis, active treatment furnished directly by or requiring the supervision of inpatient psychiatric personnel. Electronically signed by MARY Lawrence CNP on 9/20/2021 at 1:39 PM    **This report has been created using voice recognition software. It may contain minor errors which are inherent in voice recognition technology. **    I independently saw and evaluated the patient. I reviewed the nurse practitioners documentation above. Any additional comments or changes to the nurse practitioners documentation are stated below otherwise agree with assessment. Plan will be as follows:  Spent 30 minutes with the patient, of that greater than 16 minutes was spent in supportive psychotherapy. Patient is denying side effects to medication. Reports modest improvement in the intensity and frequency of hallucinations. States he is able to contemplate safety at present time. We discussed if stable or improved symptoms through Wednesday would consider discharge. Patient disposition still unclear. He is indicating he will go live with a friend. Has failed lower levels multiple times, will follow up with sister. PLAN  Patient s symptoms   are improving  Continue with current medication for now  Attempt to develop insight  Psycho-education conducted. Supportive Therapy conducted.   Probable discharge is Wednesday  Follow-up daily while on inpatient unit

## 2021-09-21 PROCEDURE — 6370000000 HC RX 637 (ALT 250 FOR IP): Performed by: PSYCHIATRY & NEUROLOGY

## 2021-09-21 PROCEDURE — 6370000000 HC RX 637 (ALT 250 FOR IP): Performed by: INTERNAL MEDICINE

## 2021-09-21 PROCEDURE — 90833 PSYTX W PT W E/M 30 MIN: CPT | Performed by: PSYCHIATRY & NEUROLOGY

## 2021-09-21 PROCEDURE — 1240000000 HC EMOTIONAL WELLNESS R&B

## 2021-09-21 PROCEDURE — APPSS30 APP SPLIT SHARED TIME 16-30 MINUTES: Performed by: NURSE PRACTITIONER

## 2021-09-21 PROCEDURE — 99232 SBSQ HOSP IP/OBS MODERATE 35: CPT | Performed by: PSYCHIATRY & NEUROLOGY

## 2021-09-21 RX ORDER — HYDROXYZINE 50 MG/1
50 TABLET, FILM COATED ORAL 3 TIMES DAILY PRN
Qty: 30 TABLET | Refills: 0 | Status: SHIPPED | OUTPATIENT
Start: 2021-09-21 | End: 2021-10-01

## 2021-09-21 RX ORDER — CHOLECALCIFEROL (VITAMIN D3) 25 MCG
1000 TABLET ORAL DAILY
Qty: 60 TABLET | Refills: 0 | Status: ON HOLD | OUTPATIENT
Start: 2021-09-22 | End: 2021-10-27 | Stop reason: SDUPTHER

## 2021-09-21 RX ORDER — CLOTRIMAZOLE 1 %
CREAM (GRAM) TOPICAL
Qty: 24 G | Refills: 0 | Status: SHIPPED | OUTPATIENT
Start: 2021-09-21 | End: 2021-09-28

## 2021-09-21 RX ORDER — IBUPROFEN 400 MG/1
400 TABLET ORAL EVERY 6 HOURS PRN
Qty: 120 TABLET | Refills: 0 | Status: SHIPPED | OUTPATIENT
Start: 2021-09-21 | End: 2021-09-30

## 2021-09-21 RX ORDER — ESCITALOPRAM OXALATE 20 MG/1
20 TABLET ORAL DAILY
Qty: 30 TABLET | Refills: 0 | Status: ON HOLD | OUTPATIENT
Start: 2021-09-21 | End: 2021-09-28 | Stop reason: SDUPTHER

## 2021-09-21 RX ORDER — OLANZAPINE 20 MG/1
20 TABLET ORAL NIGHTLY
Qty: 30 TABLET | Refills: 0 | Status: ON HOLD | OUTPATIENT
Start: 2021-09-21 | End: 2021-09-28 | Stop reason: HOSPADM

## 2021-09-21 RX ORDER — TRAZODONE HYDROCHLORIDE 150 MG/1
150 TABLET ORAL NIGHTLY PRN
Qty: 30 TABLET | Refills: 0 | Status: ON HOLD | OUTPATIENT
Start: 2021-09-21 | End: 2021-09-28 | Stop reason: SDUPTHER

## 2021-09-21 RX ADMIN — ESCITALOPRAM OXALATE 20 MG: 20 TABLET ORAL at 09:19

## 2021-09-21 RX ADMIN — Medication 1000 UNITS: at 09:19

## 2021-09-21 RX ADMIN — TRAZODONE HYDROCHLORIDE 150 MG: 150 TABLET ORAL at 23:01

## 2021-09-21 RX ADMIN — OLANZAPINE 20 MG: 10 TABLET, FILM COATED ORAL at 23:01

## 2021-09-21 RX ADMIN — HYDROXYZINE HYDROCHLORIDE 50 MG: 50 TABLET ORAL at 23:01

## 2021-09-21 ASSESSMENT — PAIN SCALES - GENERAL: PAINLEVEL_OUTOF10: 0

## 2021-09-21 NOTE — CARE COORDINATION
KANWAL spoke with [facility] at [facility] for [facility] this date, pt accepted to program tomorrow before 12p [employer]. MD updated.  Patient updated, Kanwal also called pt sister [facility]  to update her per her previous request. [facility].

## 2021-09-21 NOTE — PROGRESS NOTES
Daily Progress Note  9/21/2021    Patient Name: Higinio Flynn    CHIEF COMPLAINT:  Command auditory hallucinations to end his life. SUBJECTIVE:      Patient is seen today for a follow up assessment. He remains medication compliant and denies any side effects to his regimen at this time. At time of discussion, patient is resting in bed. His head is covered by blankets and he refuses to participate in assessment. He does remove blanket from his face and opens his eyes when requested, but does not provide any verbal responses. He appears agitated but is not hostile or aggressive. Staff report that he has reported auditory hallucinations that are command in nature. Staff verbalized that he states the voices tell him to harm himself with specific ideas. Patient would not verbalize or confirm the statements to writer. Staff report that he has been remained withdrawn to his room and is not attending to self-care. Per review of staff documentation, social work reports that patient has filled out an application for treatment and empowered for excellence. At this time, patient would be unable to meet basic needs at a lower level of care. Considerable concern for relapse without direct transition to AOD treatment. We will continue inpatient hospitalization for safety and stability. Medication Adherence: Patient has been medication compliant with exception of his scheduled Lotrimin cream.     Emergency Medications: Not required in past 24 hours      Group Attendance on Unit:   [] Yes  [] Selectively    [x] No         Mental Status Exam  Level of consciousness: Alert and awake. Appearance: Appropriate attire for setting, resting in bed, with poor grooming and hygiene. Behavior/Motor: Approachable, psychomotor slowing. Attitude toward examiner: None cooperative, no eye contact  Speech: Selectively mute  Mood: No response  Affect: Depressed. Thought processes: Linear and coherent.    Thought content: Denies homicidal ideation, intent, or plans. Suicidal Ideation: Fleeting suicidal ideation, without intent or plans, contracts for safety on the unit. Delusions: No evidence of delusions. Endorses paranoia. Perceptual Disturbance: Patient does not appear to be responding to internal stimuli. Endorses auditory hallucinations. Endorses visual hallucinations. Cognition: Oriented to self, location, time, and situation. Memory: Intact. Insight & Judgement: Poor. Data   height is 6' 2\" (1.88 m) and weight is 200 lb (90.7 kg). His oral temperature is 97.6 °F (36.4 °C). His blood pressure is 105/60 and his pulse is 62. His respiration is 14 and oxygen saturation is 98%. Labs:   Admission on 09/11/2021   Component Date Value Ref Range Status    Iron 09/12/2021 90  59 - 158 ug/dL Final    TIBC 09/12/2021 304  250 - 450 ug/dL Final    Iron Saturation 09/12/2021 30  20 - 55 % Final    UIBC 09/12/2021 214  112 - 347 ug/dL Final    Ferritin 09/12/2021 111  30 - 400 ug/L Final    TSH 09/12/2021 0.56  0.30 - 5.00 mIU/L Final    Vitamin B-12 09/12/2021 411  232 - 1245 pg/mL Final    Folate 09/12/2021 13.1  >4.8 ng/mL Final    Vit D, 25-Hydroxy 09/12/2021 23.3* 30.0 - 100.0 ng/mL Final    Comment:    Reference Range:  Vitamin D status         Range   Deficiency              <20 ng/mL   Mild Deficiency       20-30 ng/mL   Sufficiency           ng/mL   Toxicity               >100 ng/mL           Reviewed patient's current plan of care and vital signs with nursing staff. · Per review of vital signs from 9/20/2021, patient refused vital signs. Labs reviewed: [x] Yes  Last EKG in EMR reviewed: [x] Yes  QTc: 452.     Medications  Current Facility-Administered Medications: OLANZapine (ZYPREXA) tablet 20 mg, 20 mg, Oral, Nightly  Vitamin D (CHOLECALCIFEROL) tablet 1,000 Units, 1,000 Units, Oral, Daily  acetaminophen (TYLENOL) tablet 650 mg, 650 mg, Oral, Q4H PRN  aluminum & magnesium hydroxide-simethicone (MAALOX) 024-415-32 MG/5ML suspension 30 mL, 30 mL, Oral, Q6H PRN  hydrOXYzine (ATARAX) tablet 50 mg, 50 mg, Oral, TID PRN  ibuprofen (ADVIL;MOTRIN) tablet 400 mg, 400 mg, Oral, Q6H PRN  polyethylene glycol (GLYCOLAX) packet 17 g, 17 g, Oral, Daily PRN  nicotine polacrilex (NICORETTE) gum 2 mg, 2 mg, Oral, Q1H PRN  haloperidol (HALDOL) tablet 5 mg, 5 mg, Oral, Q4H PRN **AND** [DISCONTINUED] LORazepam (ATIVAN) tablet 2 mg, 2 mg, Oral, Q4H PRN  haloperidol lactate (HALDOL) injection 5 mg, 5 mg, IntraMUSCular, Q4H PRN **AND** [DISCONTINUED] LORazepam (ATIVAN) injection 2 mg, 2 mg, IntraMUSCular, Q4H PRN **AND** diphenhydrAMINE (BENADRYL) injection 50 mg, 50 mg, IntraMUSCular, Q4H PRN  clotrimazole (LOTRIMIN) 1 % cream, , Topical, BID  escitalopram (LEXAPRO) tablet 20 mg, 20 mg, Oral, Daily  traZODone (DESYREL) tablet 150 mg, 150 mg, Oral, Nightly PRN    ASSESSMENT  Schizoaffective disorder, depressive type ([facility] [address]         PLAN  Patient symptoms are: Slightly improving. Continue current medication regimen. Patient was previously seen by internal medicine. Monitor need and frequency of PRN medications. Encourage participation in groups and milieu. Attempt to develop insight. Psycho-education conducted. Supportive Therapy conducted. Probable discharge is tomorrow with continued improvement  Follow-up daily while inpatient. Patient continues to be monitored in the inpatient psychiatric facility at [facility] for safety and stabilization. Patient continues to need, on a daily basis, active treatment furnished directly by or requiring the supervision of inpatient psychiatric personnel. Electronically signed by Denton Shone, APRN - CNP on 9/21/2021 at 5:47 PM    **This report has been created using voice recognition software. It may contain minor errors which are inherent in voice recognition technology. **  I independently saw and evaluated the patient.   I reviewed the nurse practitioners documentation above. Any additional comments or changes to the nurse practitioners documentation are stated below otherwise agree with assessment. Plan will be as follows:  Spent 30 minutes with the patient, of that greater than 16 minutes was spent in supportive psychotherapy. Patient denying side effects to medication. Has placement for tomorrow. Forward-looking and constructive. Denying suicidal or homicidal ideation intent or plan. Discussed if stable through tomorrow would consider discharge and patient is in agreement  PLAN  Patient s symptoms   are improving  Continue with current medication for now  Attempt to develop insight  Psycho-education conducted. Supportive Therapy conducted.   Probable discharge is tomorrow  Follow-up daily while on inpatient unit

## 2021-09-21 NOTE — CARE COORDINATION
Referal was faxed to [facility] per patients request. Faxed to [phone]. SW spoke with CUATE at [facility] to advice for referral. States she will call back once reviewed.      Alexandria Torres (MSW intern)

## 2021-09-21 NOTE — PROGRESS NOTES
Physical Therapy Cancel Note      DATE: 2021    NAME: García Bradley  MRN: [id number]   : 1960      Patient not seen this date for Physical Therapy due to:    Patient Declined: Pt was lying in bed with his blanket pulled over his head. Pt reported that his ears hurt and he declined PT at this time.       Electronically signed by Jamie Sims PTA on 2021 at 10:17 AM

## 2021-09-21 NOTE — GROUP NOTE
Group Therapy Note    Date: 9/21/2021    Group Start Time: 1330  Group End Time: 1596  Group Topic: Music Therapy    JESUS Cohn        Group Therapy Note    Attendees: 9/20       Patient's Goal:  Patients shared songs that they would like to dedicate to \"imporatn people I their life. \" Goals to reflect on supports; Increase self-expression; Increase sense of community; Normalize the environment    Notes:  Patinet attended and participated in group having positive interactions with peers and staff. Patient shared song Soumya Ahuja by Cesar Petersen and dedicated it to his mother    Status After Intervention:  Improved    Participation Level:  Active Listener and Interactive    Participation Quality: Appropriate, Attentive and Sharing      Speech:  normal      Thought Process/Content: Logical  Linear      Affective Functioning: Congruent      Mood: euthymic      Level of consciousness:  Alert and Attentive      Response to Learning: Able to verbalize current knowledge/experience and Progressing to goal      Endings: None Reported    Modes of Intervention: Support, Socialization, Exploration, Activity, Media and Reality-testing      Discipline Responsible: Psychoeducational Specialist      Signature:  Usha Cohn

## 2021-09-21 NOTE — GROUP NOTE
Group Therapy Note    Date: 9/21/2021    Group Start Time: 1000  Group End Time: 0004  Group Topic: Group Therapy    [facility]    FRANKLIN Aguila, CHANEL        Group Therapy Note    Attendees: 6/22         Pt declined to attend psychotherapy at 1000 am despite encouragement. Pt offered 1:1 and refused.    Signature:  FRANKLIN Aguila, CHANEL

## 2021-09-21 NOTE — PLAN OF CARE
Problem: Altered Mood, Psychotic Behavior:  Goal: Able to verbalize decrease in frequency and intensity of hallucinations  Description: Able to verbalize decrease in frequency and intensity of hallucinations  9/20/2021 2202 by Amy Rosa LPN  Outcome: Ongoing     Problem: Altered Mood, Psychotic Behavior:  Goal: Absence of self-harm  Description: Absence of self-harm  9/20/2021 2202 by Amy Rosa LPN  Outcome: Ongoing     Problem: Substance Abuse:  Goal: Absence of drug withdrawal signs and symptoms  Description: Absence of drug withdrawal signs and symptoms  9/20/2021 2202 by Amy Rosa LPN  Outcome: Ongoing     Problem: Tobacco Use:  Goal: Inpatient tobacco use cessation counseling participation  Description: Inpatient tobacco use cessation counseling participation  9/20/2021 2202 by Amy Rosa LPN  Outcome: Ongoing     Problem: Pain:  Goal: Pain level will decrease  Description: Pain level will decrease  9/20/2021 2202 by Amy Rosa LPN  Outcome: Ongoing     Problem: Pain:  Goal: Control of acute pain  Description: Control of acute pain  9/20/2021 2202 by Amy Rosa LPN  Outcome: Ongoing     Problem: Pain:  Goal: Control of chronic pain  Description: Control of chronic pain  9/20/2021 2202 by Amy Rosa LPN  Outcome: Ongoing     Problem: Musculor/Skeletal Functional Status  Goal: Highest potential functional level  9/20/2021 2202 by Amy Rosa LPN  Outcome: Ongoing     Problem: Musculor/Skeletal Functional Status  Goal: Absence of falls  9/20/2021 2202 by Amy Rosa LPN  Outcome: Ongoing

## 2021-09-21 NOTE — GROUP NOTE
Group Therapy Note    Date: 9/21/2021    Group Start Time: 1100  Group End Time: 1150  Group Topic: Recreational    JESUS Cabello        Group Therapy Note    Pt did not attend recreational group d/t resting in room despite staff invitation to attend. 1:1 talk time offered as alternative to group session, pt declined.

## 2021-09-21 NOTE — PLAN OF CARE
Problem: Altered Mood, Psychotic Behavior:  Goal: Able to verbalize decrease in frequency and intensity of hallucinations  Description: Able to verbalize decrease in frequency and intensity of hallucinations  9/21/2021 1007 by Bo Rosario RN  Outcome: Ongoing   Patient is calm but lethargic and medication complaint. Patient reports denies suicidal ideations but reports depression and anxiety. Patient states he has auditory disturbances and \"they tell me to hurt myself\". Patient is flat/isolative and remains in his room with the blanket over his head. Patient reports eating and sleeping adequately with safety checks Q15min and at irregular intervals. Problem: Altered Mood, Psychotic Behavior:  Goal: Absence of self-harm  Description: Absence of self-harm  9/21/2021 1007 by Bo Rosario RN  Outcome: Ongoing   Patient is calm but lethargic and medication complaint. Patient reports denies suicidal ideations but reports depression and anxiety. Patient states he has auditory disturbances and \"they tell me to hurt myself\". Patient is flat/isolative and remains in his room with the blanket over his head. Patient reports eating and sleeping adequately with safety checks Q15min and at irregular intervals.   Problem: Substance Abuse:  Goal: Absence of drug withdrawal signs and symptoms  Description: Absence of drug withdrawal signs and symptoms  9/21/2021 1007 by Bo Rosario RN  Outcome: Ongoing   No withdrawal symptoms  Problem: Tobacco Use:  Goal: Inpatient tobacco use cessation counseling participation  Description: Inpatient tobacco use cessation counseling participation  9/21/2021 1007 by Bo Rosario RN  Outcome: Ongoing   Smoking education provided

## 2021-09-22 VITALS
BODY MASS INDEX: 25.67 KG/M2 | HEIGHT: 74 IN | WEIGHT: 200 LBS | SYSTOLIC BLOOD PRESSURE: 118 MMHG | TEMPERATURE: 97.9 F | OXYGEN SATURATION: 98 % | HEART RATE: 84 BPM | DIASTOLIC BLOOD PRESSURE: 68 MMHG | RESPIRATION RATE: 14 BRPM

## 2021-09-22 PROCEDURE — 99239 HOSP IP/OBS DSCHRG MGMT >30: CPT | Performed by: PSYCHIATRY & NEUROLOGY

## 2021-09-22 PROCEDURE — 6370000000 HC RX 637 (ALT 250 FOR IP): Performed by: PSYCHIATRY & NEUROLOGY

## 2021-09-22 PROCEDURE — 6370000000 HC RX 637 (ALT 250 FOR IP): Performed by: INTERNAL MEDICINE

## 2021-09-22 RX ADMIN — ESCITALOPRAM OXALATE 20 MG: 20 TABLET ORAL at 08:24

## 2021-09-22 RX ADMIN — Medication 1000 UNITS: at 08:24

## 2021-09-22 NOTE — PLAN OF CARE
Problem: Altered Mood, Psychotic Behavior:  Goal: Able to verbalize decrease in frequency and intensity of hallucinations  Description: Able to verbalize decrease in frequency and intensity of hallucinations  9/21/2021 2306 by Felipe Kumar RN  Outcome: Ongoing   Pt is visible in the milieu social with staff and peers. He eats well at snack and accepts all medication. Reports he is ready for discharge    Problem: Altered Mood, Psychotic Behavior:  Goal: Absence of self-harm  Description: Absence of self-harm  9/21/2021 2306 by Felipe Kumar RN  Outcome: Ongoing   Pt denies thoughts of harming themself and verbally agrees to remain safe while on the unit.  No self harming behaviors are noted this shift    Problem: Substance Abuse:  Goal: Absence of drug withdrawal signs and symptoms  Description: Absence of drug withdrawal signs and symptoms  9/21/2021 2306 by Felipe Kumar RN  Outcome: Ongoing   Pt shows no sign of withdrawal  Problem: Pain:  Goal: Pain level will decrease  Description: Pain level will decrease  Outcome: Ongoing   Pt is satisfied with pain management

## 2021-09-22 NOTE — PLAN OF CARE
Problem: Altered Mood, Psychotic Behavior:  Goal: Able to verbalize decrease in frequency and intensity of hallucinations  Description: Able to verbalize decrease in frequency and intensity of hallucinations  9/21/2021 2306 by Sadie Mckeon RN  Outcome: Ongoing   Pt is visible in the milieu social with staff and peers. He eats well at snack and accepts all medication. Problem: Altered Mood, Psychotic Behavior:  Goal: Absence of self-harm  Description: Absence of self-harm  9/21/2021 2306 by Sadie Mckeon RN  Outcome: Ongoing   Pt denies thoughts of harming themself and verbally agrees to remain safe while on the unit.  No self harming behaviors are noted this shift    Problem: Substance Abuse:  Goal: Absence of drug withdrawal signs and symptoms  Description: Absence of drug withdrawal signs and symptoms  9/21/2021 2306 by Sadie Mckeon RN  Outcome: Ongoing   Pt shows no sign of withdrawal  Problem: Pain:  Goal: Pain level will decrease  Description: Pain level will decrease  Outcome: Ongoing   Pt is satisfied with pain management

## 2021-09-22 NOTE — GROUP NOTE
Group Therapy Note    Date: 9/22/2021    Group Start Time: 1010  Group End Time: 7931  Group Topic: Psychotherapy    [facility] FRANKLIN Rothman, CHANEL        Group Therapy Note    Attendees: 2/21         Patient was offered group therapy today but declined to participate despite encouragement from staff. 1:1 was offered.     Signature:  FRANKLIN Bryant, CHANEL

## 2021-09-22 NOTE — BH NOTE
[facility]  Discharge Note    Pt discharged with followings belongings:   Dentures: None  Vision - Corrective Lenses: None  Hearing Aid: None  Jewelry: None  Body Piercings Removed: N/A  Clothing: Other (Comment) (Belongings bagged due to bed bugs)  Were All Patient Medications Collected?: Not Applicable  Other Valuables: Other (Comment)   Valuables sent home with patient or returned to patient. Patient education on aftercare instructions: yes  Information faxed to [facility] by RN  at 11:00 AM .Patient verbalize understanding of AVS:  yes. Status EXAM upon discharge:  Status and Exam  Normal: No  Facial Expression: Flat  Affect: Blunt  Level of Consciousness: Alert  Mood:Normal: No  Mood: Depressed, Anxious  Motor Activity:Normal: Yes  Motor Activity: Decreased  Interview Behavior: Cooperative  Preception: [place] to Person, [place] to Time, [place] to Place, [place] to Situation  Attention:Normal: Yes  Attention: Distractible  Thought Processes: Blocking  Thought Content:Normal: Yes  Thought Content: Preoccupations  Hallucinations:  Auditory (Comment)  Delusions: No  Memory:Normal: No  Memory: Poor Recent, Poor Remote  Insight and Judgment: No  Insight and Judgment: Poor Judgment, Poor Insight  Present Suicidal Ideation: No  Present Homicidal Ideation: No      Metabolic Screening:    Lab Results   Component Value Date    LABA1C 4.9 08/11/2020       Lab Results   Component Value Date    CHOL 167 08/11/2020    CHOL 179 02/13/2017    CHOL 127 05/09/2015    CHOL 168 08/20/2014    CHOL 158 12/31/2013    CHOL 178 08/15/2013    CHOL 166 09/17/2012    CHOL 210 (H) 02/03/2012     Lab Results   Component Value Date    TRIG 43 08/11/2020    TRIG 67 02/13/2017    TRIG 37 05/09/2015    TRIG 72 08/20/2014    TRIG 52 12/31/2013    TRIG 70 08/15/2013    TRIG 117 09/17/2012    TRIG 94 02/03/2012     Lab Results   Component Value Date    HDL 74 08/11/2020    HDL 73 02/13/2017    HDL 57 05/09/2015    HDL 50 08/20/2014    HDL 43 12/31/2013    HDL 42 08/15/2013    HDL 38 (L) 09/17/2012    HDL 55 02/03/2012     No components found for: LDLCAL  No results found for: LABVLDL     [employer] cabbed per SW to [facility] for [employer].     Jerrica Bowers RN

## 2021-09-22 NOTE — SUICIDE SAFETY PLAN
SAFETY PLAN     A suicide Safety Plan is a document that supports someone when they are having thoughts of suicide. Warning Signs that indicate a suicidal crisis may be developing: What (situations, thoughts, feelings, body sensations, behaviors, etc.) do you experience that lets you know you are beginning to think about suicide? 1. Go off medications  2. Mood is depressed and start to feel sad, hopeless, helpless, guilty, decline in self-esteem, excess worry, no interest in doing any pleasurable activities, unable to concentrate  3. Begin to cry over the smallest of things  4. Not eating or sleeping as normal  5. Relationship issues start happening  6. I become angry and start a fight  7. When I dont listen or respond to people in a good, positive way  8. Increase drug use       Internal Coping Strategies:  What things can I do (relaxation techniques, hobbies, physical activities, etc.) to take my mind off my problems without contacting another person? 1. Go to hospital discharge appointments and follow-up with community mental health counseling  2. Talk with other people  3. Learn to identify and control your emotions by new ways  4. Think before you speak or act; walk away from the situation  5. Join a support group in person or on Social Media  6. Take a time-out  7. Take deep breaths; use relaxation techniques  8. Get some exercise; go for a walk  9. Read; listen to music; watch a funny movie    10. Coping skills/ strategies  journal/ listen to music/ go for a walk/ read a book/ watch a funny movie/show / crafts / video game  11.  Grounding techniques- eat a sour candy or hot cinnamon candy / focus on colors, sounds, smells, textures on things around you / drink some herbal tea / eat a piece of dark chocolate / take a hot bath or shower / essential oils for smelling / meditate / color / arts and crafts     People whom I can ask for help: Who can I call when I need help - for example, friends, family, clergy, someone else? 1. Mental health provider  2. family  3. friends     Professionals or [facility] I can contact during a crisis: Who can I call for help - for example, my doctor, my psychiatrist, my psychologist, a mental health provider, a suicide hotline? 1. Staff at [facility]  2. Suicide Prevention Lifeline: 6-458-118-TALK (8862)  3. [facility] Crisis [facility] Team, face-to-face services, call [phone] (2054)  4. [facility] [employer]: 2-1-1, 199.755.3982 or 7-249.621.5520  5. [employer] (Crisis Intervention Team - CIT), 912.930.7276 or 9-1-1  6. [address] [place], 1-500.798.7767  7. National Association of Mental Illness, 6-283.203.9700  8. [facility] Substance Abuse National Helpline, 2-622-142-HELP (4005)  9. Crisis Text Line, Text 4HOPE to 058071 to connect with a crisis counselor  10. [facility], [phone]  11. RAINN (Rape, [address]), 1-719.138.4260  57. [place] [employer] (Alcohol / Drug help)  13. Call the Recovery Helpline at 98 863 622 (24 hours a day - 7 days a week)  14. COVID-19 Emotional Support Line: [facility] Emergency Services - for example, [address], [facility] suicide hotline,  1. [facility] [address], 1-729.320.5481  2. [place] line at 064-266-CARE (8644) for 24/7 to help anyone having a mental crisis or thoughts of self-harm. The Crisis CARE number will also determine in a face-to-face screening needs to be done as well as the safest place. Once this is determined, the Crisis [facility] Team will be sent out to meet with the patient directly if required. 3. For [facility] Crisis Number [phone] ([facility])  4. [facility] at 7-210.551.5726  5. [facility], [address] at [phone]  6.  [address], [address], [facility] - [phone]  8. Bryant Rj, [address], [facility] [address] [phone]  9. Jw Sung, [place], [place], [address] [facility] 9-875-756-HOPE (4638)        Making the environment safe: How can I make my environment (house/apartment/living space) safer? For example, can I remove guns, medications, and other items? 1. Remove unsafe objects  2. Keep Medications in safe and secure location  3.  Plan daily goals to help remember to stay on specific medications

## 2021-09-23 NOTE — DISCHARGE SUMMARY
Provider Discharge Summary     Patient ID:  Carolyn Rich  [id number]  89 y.o.  1960    Admit date: 9/11/2021    Discharge date and time: 9/22/2021  9:47 PM     Admitting Physician: Oswaldo Nieto MD     Discharge Physician: Mario Alberto Wood MD    Admission Diagnoses: Schizoaffective disorder [facility]) [F25.9]    Discharge Diagnoses:      Schizoaffective disorder, depressive type [facility])     Patient Active Problem List   Diagnosis Code    Hematuria R31.9    Suicidal ideations R45.851    Cocaine abuse ([address] F14.10    Schizoaffective disorder, bipolar type ([address] F25.0    Schizoaffective disorder, depressive type ([address] F25.1    Perianal abscess K61.0    Carla-rectal abscess K61.1    Schizophrenia ([address] F20.9    Schizoaffective disorder ([address] F25.9    Major depression with psychotic features ([address] F32.3    Acute psychosis ([address] F23    Depression with suicidal ideation F32.9, R45.851    Pneumonia due to infectious organism J18.9    Smoker F17.200    Ventricular ectopy I49.3    Low serum cortisol level ([facility]) E27.40    Sepsis due to Klebsiella pneumoniae with no resultant organ failure ([address] A41.4        Admission Condition: poor    Discharged Condition: stable    Indication for Admission: threat to self    History of Present Illnes (present tense wording is of findings from admission exam and are not necessarily indicative of current findings):   Carolyn Rich is a 57 y.o. male who has a past medical history of schizoaffective disorder depressive type, cocaine abuse, major depression with psychotic features, acute psychosis pneumonia due to infectious organism, sepsis due to Klebsiella pneumoniae with no resultant organ failure, suicidal ideations, smoker, ventricular ectopy, low serum cortisol level, schizophrenia, perianal abscess, carla-rectal abscess, hematuria, hallucinations, bipolar disorder, type II or unspecified type diabetes mellitus without mention of complication, not states as controlled, any drug or substance use. He endorses difficulty falling asleep and difficulty staying asleep. He endorses decreased appetite and reports weight loss of 5 pounds. Patient reports he is unsure of the duration of time for the weight loss.     Patient endorses active suicidal ideation, without intent or plans. He endorses homicidal ideation towards \"the lady that put me out\" and states \"Ms. Kidd\". Patient denies any homicidal plans. When asked if he is able to contract for safety on the unit, patient states \"I don't know\". Patient reports he would be unsafe off the unit.       Patient endorses anxiety and panic attacks. When asked about maryellen or hypomania symptoms and when asked if he has gone 3 or more days without sleep, without the use of substance or drugs, and if he felt energized or irritable, patient reports he did not sleep for 4 nights and reports it was not due to any drug or substance use. Patient denies impulsivity. Denies phobias, obsessions, or compulsions. He denies body or vocal tics.     Patient endorses intermittent visual hallucinations and reports he sees \"my life, music, people dancing, people altogether eating\". Patient endorses auditory hallucinations all the time and reports hearing 1 male voice. He reports this male voice is unrecognizable to him. He rates the loudness of the auditory hallucinations as a 7 out of 10 (010 scale with 0 being no sound and 10 being loudest). He reports the auditory hallucinations \"tell me to kill myself\", tell him he is \"worthless\", and \"nobody is going to help\". Writer provided active listening and emotional support. Patient denied delusions. Patient denies paranoia. Patient denies PTSD symptoms. He denies any history of trauma. He denied any history of head trauma, seizures, or violence/aggression. Patient endorses history of self-injurious behaviors and reports cutting \"recently\".   When asked about medical concerns, patient reports discomfort in his legs. He denies any other medical concerns at the time of assessment.     He reports he currently follows with [facility]. As the assessment proceeded, patient stated to writer \"I don't understand you\". Writer reworded questions and patient verbalized understanding. However, patient became increasingly agitated as the assessment proceeded and loudly told patient to leave his room. Due to patient's agitation and refusal to complete the remainder of assessment, the assessment was concluded.     Per patient's urine toxicology results from 9/11/2021 was positive for cocaine metabolite. At this time, the patient is not appropriate for a lower level of care. There is risk of decompensation and patient warrants further hospitalization for safety and stabilization.     Hospital Course:   Upon admission, Carolyn Rich was provided a safe secure environment, introduced to unit milieu. Patient participated in groups and individual therapies. Meds were adjusted as noted below. After few days of hospital care, patient began to feel improvement. Depression lifted, thoughts to harm self ceased. Sleep improved, appetite was good. On morning rounds 9/22/2021, Carolyn Rich  endorses feeling ready for discharge. Patient denies suicidal or homicidal ideations, denies hallucinations or delusions. Denies SE's from meds. It was decided that maximum benefit from hospital care had been achieved and patient can be discharged. Consults:   Internal medicine for medical management    Significant Diagnostic Studies: Routine labs and diagnostics    Treatments: Psychotropic medications, therapy with group, milieu, and 1:1 with nurses, social workers, PARodriguezC/CNP, and Attending physician.       Discharge Medications:  Discharge Medication List as of 9/22/2021  9:06 AM      START taking these medications    Details   hydrOXYzine (ATARAX) 50 MG tablet Take 1 tablet by mouth 3 times daily as needed for Anxiety, Disp-30 tablet, R-0Print      ibuprofen (ADVIL;MOTRIN) 400 MG tablet Take 1 tablet by mouth every 6 hours as needed for Pain (4-7 moderate pain, 8-10 severe pain), Disp-120 tablet, R-0Print      nicotine polacrilex (NICORETTE) 2 MG gum Take 1 each by mouth every hour as needed for Smoking cessation, Disp-110 each, R-0Print      OLANZapine (ZYPREXA) 20 MG tablet Take 1 tablet by mouth nightly, Disp-30 tablet, R-0Print      Cholecalciferol (VITAMIN D) 25 MCG TABS Take 1 tablet by mouth daily, Disp-60 tablet, R-0Labeling may look different. 25 mcg=1000 Units. Please double check dosages. Print         CONTINUE these medications which have CHANGED    Details   clotrimazole (LOTRIMIN AF) 1 % cream Apply topically 2 times daily. , Disp-24 g, R-0, Print      escitalopram (LEXAPRO) 20 MG tablet Take 1 tablet by mouth daily, Disp-30 tablet, R-0Print      traZODone (DESYREL) 150 MG tablet Take 1 tablet by mouth nightly as needed for Sleep, Disp-30 tablet, R-0Print         STOP taking these medications       paliperidone (INVEGA) 3 MG extended release tablet Comments:   Reason for Stopping:         paliperidone palmitate ER (INVEGA SUSTENNA) 234 MG/1.5ML [place] IM injection Comments:   Reason for Stopping:                Core Measures statement:   Not applicable    Discharge Exam:  Level of consciousness:  Within normal limits  Appearance: Street clothes, seated, with good grooming  Behavior/Motor: No abnormalities noted  Attitude toward examiner:  Cooperative, attentive, good eye contact  Speech:  spontaneous, normal rate, normal volume and well articulated  Mood:  euthymic  Affect:  Full range  Thought processes:  linear, goal directed and coherent  Thought content:  denies homicidal ideation  Suicidal Ideation:  denies suicidal ideation  Delusions:  no evidence of delusions  Perceptual Disturbance:  denies any perceptual disturbance  Cognition:  Intact  Memory: age appropriate  Insight & Judgement: fair  Medication side effects: denies Disposition: [facility]    Patient Instructions: Activity: activity as tolerated  1. Patient instructed to take medications regularly and follow up with outpatient appointments. Follow-up as scheduled with outpatient [facility] mental health      Signed:    Electronically signed by Rochelle Vasquez MD on 9/22/21 at 9:47 PM EDT    Time Spent on discharge is more than 30 minutes in the examination, evaluation, counseling and review of medications and discharge plan.

## 2021-09-24 ENCOUNTER — HOSPITAL ENCOUNTER (INPATIENT)
Age: 61
LOS: 4 days | Discharge: HOME | DRG: 750 | End: 2021-09-28
Attending: EMERGENCY MEDICINE | Admitting: PSYCHIATRY & NEUROLOGY
Payer: MEDICAID

## 2021-09-24 DIAGNOSIS — R45.851 DEPRESSION WITH SUICIDAL IDEATION: Primary | ICD-10-CM

## 2021-09-24 DIAGNOSIS — F32.A DEPRESSION WITH SUICIDAL IDEATION: Primary | ICD-10-CM

## 2021-09-24 LAB
AMPHETAMINE SCREEN URINE: NEGATIVE
BARBITURATE SCREEN URINE: NEGATIVE
BENZODIAZEPINE SCREEN, URINE: NEGATIVE
BUPRENORPHINE URINE: NORMAL
CANNABINOID SCREEN URINE: NEGATIVE
COCAINE METABOLITE, URINE: NEGATIVE
MDMA URINE: NORMAL
METHADONE SCREEN, URINE: NEGATIVE
METHAMPHETAMINE, URINE: NORMAL
OPIATES, URINE: NEGATIVE
OXYCODONE SCREEN URINE: NEGATIVE
PHENCYCLIDINE, URINE: NEGATIVE
PROPOXYPHENE, URINE: NORMAL
SARS-COV-2, RAPID: NOT DETECTED
SPECIMEN DESCRIPTION: NORMAL
TEST INFORMATION: NORMAL
TRICYCLIC ANTIDEPRESSANTS, UR: NORMAL

## 2021-09-24 PROCEDURE — APPSS60 APP SPLIT SHARED TIME 46-60 MINUTES: Performed by: PSYCHIATRY & NEUROLOGY

## 2021-09-24 PROCEDURE — 99285 EMERGENCY DEPT VISIT HI MDM: CPT

## 2021-09-24 PROCEDURE — 6370000000 HC RX 637 (ALT 250 FOR IP): Performed by: PSYCHIATRY & NEUROLOGY

## 2021-09-24 PROCEDURE — 1240000000 HC EMOTIONAL WELLNESS R&B

## 2021-09-24 PROCEDURE — 99222 1ST HOSP IP/OBS MODERATE 55: CPT | Performed by: PSYCHIATRY & NEUROLOGY

## 2021-09-24 PROCEDURE — 87635 SARS-COV-2 COVID-19 AMP PRB: CPT

## 2021-09-24 PROCEDURE — 80307 DRUG TEST PRSMV CHEM ANLYZR: CPT

## 2021-09-24 RX ORDER — DIPHENHYDRAMINE HYDROCHLORIDE 50 MG/ML
25 INJECTION INTRAMUSCULAR; INTRAVENOUS EVERY 4 HOURS PRN
Status: DISCONTINUED | OUTPATIENT
Start: 2021-09-24 | End: 2021-09-28 | Stop reason: HOSPADM

## 2021-09-24 RX ORDER — TRAZODONE HYDROCHLORIDE 150 MG/1
150 TABLET ORAL NIGHTLY PRN
Status: DISCONTINUED | OUTPATIENT
Start: 2021-09-24 | End: 2021-09-28 | Stop reason: HOSPADM

## 2021-09-24 RX ORDER — HALOPERIDOL 5 MG/ML
5 INJECTION INTRAMUSCULAR EVERY 4 HOURS PRN
Status: DISCONTINUED | OUTPATIENT
Start: 2021-09-24 | End: 2021-09-28 | Stop reason: HOSPADM

## 2021-09-24 RX ORDER — HALOPERIDOL 5 MG
5 TABLET ORAL EVERY 4 HOURS PRN
Status: DISCONTINUED | OUTPATIENT
Start: 2021-09-24 | End: 2021-09-28 | Stop reason: HOSPADM

## 2021-09-24 RX ORDER — ACETAMINOPHEN 325 MG/1
650 TABLET ORAL EVERY 4 HOURS PRN
Status: DISCONTINUED | OUTPATIENT
Start: 2021-09-24 | End: 2021-09-28 | Stop reason: HOSPADM

## 2021-09-24 RX ORDER — IBUPROFEN 400 MG/1
400 TABLET ORAL EVERY 6 HOURS PRN
Status: DISCONTINUED | OUTPATIENT
Start: 2021-09-24 | End: 2021-09-24 | Stop reason: SDUPTHER

## 2021-09-24 RX ORDER — LORAZEPAM 1 MG/1
2 TABLET ORAL EVERY 4 HOURS PRN
Status: DISCONTINUED | OUTPATIENT
Start: 2021-09-24 | End: 2021-09-28 | Stop reason: HOSPADM

## 2021-09-24 RX ORDER — IBUPROFEN 400 MG/1
400 TABLET ORAL EVERY 6 HOURS PRN
Status: DISCONTINUED | OUTPATIENT
Start: 2021-09-24 | End: 2021-09-28 | Stop reason: HOSPADM

## 2021-09-24 RX ORDER — LORAZEPAM 2 MG/ML
2 INJECTION INTRAMUSCULAR EVERY 4 HOURS PRN
Status: DISCONTINUED | OUTPATIENT
Start: 2021-09-24 | End: 2021-09-28 | Stop reason: HOSPADM

## 2021-09-24 RX ORDER — CLOTRIMAZOLE 1 %
CREAM (GRAM) TOPICAL 2 TIMES DAILY
Status: DISCONTINUED | OUTPATIENT
Start: 2021-09-24 | End: 2021-09-28 | Stop reason: HOSPADM

## 2021-09-24 RX ORDER — POLYETHYLENE GLYCOL 3350 17 G/17G
17 POWDER, FOR SOLUTION ORAL DAILY PRN
Status: DISCONTINUED | OUTPATIENT
Start: 2021-09-24 | End: 2021-09-28 | Stop reason: HOSPADM

## 2021-09-24 RX ORDER — PALIPERIDONE 6 MG/1
6 TABLET, EXTENDED RELEASE ORAL DAILY
Status: DISCONTINUED | OUTPATIENT
Start: 2021-09-24 | End: 2021-09-28 | Stop reason: HOSPADM

## 2021-09-24 RX ORDER — VITAMIN B COMPLEX
1000 TABLET ORAL DAILY
Status: DISCONTINUED | OUTPATIENT
Start: 2021-09-25 | End: 2021-09-28 | Stop reason: HOSPADM

## 2021-09-24 RX ORDER — ESCITALOPRAM OXALATE 20 MG/1
20 TABLET ORAL DAILY
Status: DISCONTINUED | OUTPATIENT
Start: 2021-09-25 | End: 2021-09-28 | Stop reason: HOSPADM

## 2021-09-24 RX ORDER — MAGNESIUM HYDROXIDE/ALUMINUM HYDROXICE/SIMETHICONE 120; 1200; 1200 MG/30ML; MG/30ML; MG/30ML
30 SUSPENSION ORAL EVERY 6 HOURS PRN
Status: DISCONTINUED | OUTPATIENT
Start: 2021-09-24 | End: 2021-09-28 | Stop reason: HOSPADM

## 2021-09-24 RX ORDER — NICOTINE 21 MG/24HR
1 PATCH, TRANSDERMAL 24 HOURS TRANSDERMAL DAILY
Status: DISCONTINUED | OUTPATIENT
Start: 2021-09-24 | End: 2021-09-28 | Stop reason: HOSPADM

## 2021-09-24 RX ORDER — HYDROXYZINE 50 MG/1
50 TABLET, FILM COATED ORAL 3 TIMES DAILY PRN
Status: DISCONTINUED | OUTPATIENT
Start: 2021-09-24 | End: 2021-09-28 | Stop reason: HOSPADM

## 2021-09-24 RX ORDER — OLANZAPINE 10 MG/1
20 TABLET ORAL NIGHTLY
Status: DISCONTINUED | OUTPATIENT
Start: 2021-09-24 | End: 2021-09-24

## 2021-09-24 RX ADMIN — PALIPERIDONE 6 MG: 6 TABLET, EXTENDED RELEASE ORAL at 21:10

## 2021-09-24 RX ADMIN — TRAZODONE HYDROCHLORIDE 150 MG: 150 TABLET ORAL at 21:10

## 2021-09-24 RX ADMIN — CLOTRIMAZOLE: 10 CREAM TOPICAL at 21:10

## 2021-09-24 RX ADMIN — HYDROXYZINE HYDROCHLORIDE 50 MG: 50 TABLET, FILM COATED ORAL at 21:11

## 2021-09-24 ASSESSMENT — ENCOUNTER SYMPTOMS
NAUSEA: 0
DIARRHEA: 0
FACIAL SWELLING: 0
SORE THROAT: 0
TROUBLE SWALLOWING: 0
EYE DISCHARGE: 0
WHEEZING: 0
CHEST TIGHTNESS: 0
COUGH: 0
RHINORRHEA: 0
BACK PAIN: 0
BLOOD IN STOOL: 0
SHORTNESS OF BREATH: 0
ABDOMINAL PAIN: 0
SINUS PRESSURE: 0
COLOR CHANGE: 0
CONSTIPATION: 0
EYE REDNESS: 0
EYE PAIN: 0
VOMITING: 0

## 2021-09-24 ASSESSMENT — LIFESTYLE VARIABLES: HISTORY_ALCOHOL_USE: NO

## 2021-09-24 ASSESSMENT — SLEEP AND FATIGUE QUESTIONNAIRES
AVERAGE NUMBER OF SLEEP HOURS: 8
DO YOU USE A SLEEP AID: YES
RESTFUL SLEEP: YES
DIFFICULTY ARISING: NO
DIFFICULTY STAYING ASLEEP: NO
DO YOU HAVE DIFFICULTY SLEEPING: NO
DIFFICULTY FALLING ASLEEP: NO

## 2021-09-24 ASSESSMENT — PATIENT HEALTH QUESTIONNAIRE - PHQ9: SUM OF ALL RESPONSES TO PHQ QUESTIONS 1-9: 7

## 2021-09-24 NOTE — PROGRESS NOTES
Behavioral Services  Medicare Certification Upon Admission    I certify that this patient's inpatient psychiatric hospital admission is medically necessary for:    [x] (1) Treatment which could reasonably be expected to improve this patient's condition,       [x] (2) Or for diagnostic study;     AND     [x](2) The inpatient psychiatric services are provided while the individual is under the care of a physician and are included in the individualized plan of care.     Estimated length of stay/service 5 to 9 days    Plan for post-hospital care outpatient care    Electronically signed by Shantal Umaña MD on 9/24/2021 at 7:40 PM

## 2021-09-24 NOTE — PLAN OF CARE
[facility]  Initial Interdisciplinary Treatment Plan NO      Original treatment plan Date & Time: 9/24/2021  1312     Admission Type:  Admission Type: Voluntary    Reason for admission:   Reason for Admission: suicidal ideation and auditory hallucinations    Estimated Length of Stay:  5-7days  Estimated Discharge Date: to be determined by physician    PATIENT STRENGTHS:  Patient Strengths:Strengths: Connection to output provider, Social Skills  Patient Strengths and Limitations:Limitations: Tendency to isolate self, Apathetic / unmotivated  Addictive Behavior: Addictive Behavior  In the past 3 months, have you felt or has someone told you that you have a problem with:  : None  Do you have a history of Chemical Use?: No  Do you have a history of Alcohol Use?: No  Do you have a history of Street Drug Abuse?: No  Histroy of Prescripton Drug Abuse?: No  Medical Problems:  Past Medical History:   Diagnosis Date    Bipolar disorder [facility]     Depression     GERD (gastroesophageal reflux disease)     Hallucinations     Headache(784.0)     Hepatitis     Schizophrenia, schizo-affective (HCC)     Substance abuse ([facility]     Tobacco abuse     Type II or unspecified type diabetes mellitus without mention of complication, not stated as uncontrolled     Urinary incontinence      Status EXAM:Status and Exam  Normal: No  Facial Expression: Flat  Affect: Blunt  Level of Consciousness: Alert  Mood:Normal: No  Mood: Depressed, Anxious, Empty  Motor Activity:Normal: Yes  Interview Behavior: Cooperative, Evasive  Preception: [place] to Person, [place] to Time, [place] to Place, [place] to Situation  Attention:Normal: No  Attention: Distractible  Thought Processes: Blocking  Thought Content:Normal: No  Thought Content: Poverty of Content  Hallucinations:  Auditory (Comment)  Delusions: No  Memory:Normal: No  Memory: Poor Recent, Poor Remote  Insight and Judgment: No  Insight and Judgment: Poor Judgment, Poor Insight  Present Suicidal Ideation: Yes (contracts for safety)  Present Homicidal Ideation: No    EDUCATION:   Learner Progress Toward Treatment Goals: reviewed group plans and strategies for care    Method:group therapy, medication compliance, individualized assessments and care planning    Outcome: needs reinforcement    PATIENT GOALS: to be discussed with patient within 72 hours    PLAN/TREATMENT RECOMMENDATIONS:     continue group therapy , medications compliance, goal setting, individualized assessments and care, continue to monitor pt on unit      SHORT-TERM GOALS:   Time frame for Short-Term Goals: 5-7 days    LONG-TERM GOALS:  Time frame for Long-Term Goals: 6 months  Members Present in Team Meeting: See Signature Sheet    MARJ Stokes

## 2021-09-24 NOTE — BH NOTE
- Writer calls Raquel Barron at [employer] notified him client admitted on this date.   Writer called [phone]

## 2021-09-24 NOTE — GROUP NOTE
Group Therapy Note    Date: 9/24/2021    Group Start Time: 1430  Group End Time: 9940  Group Topic: Cognitive Skills    JESUS Vazquez, CTRS        Group Therapy Note    Attendees: 7/17         Pt did not participate in Cognitive Skills Group at 1430 when encouraged by RT due to resting in room. Pt was offered talk time as an alternative to group but declined.          Discipline Responsible: Psychoeducational Specialist        Signature:  Kelby Chilel
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
## 2021-09-24 NOTE — BH NOTE
BHI Biopsychosocial Assessment     Current Level of Psychosocial Functioning      Independent    Dependent    Minimal Assist X      Comments:  Client has a principle diagnosis of Schizoaffective disorder, depressive type, which is the condition established to be chiefly responsible for the admission of the client on this date. Client documentation provides prior diagnosis of Cocaine-Use Disorder       Psychosocial High Risk Factors (check all that apply)     Unable to obtain meds   Chronic illness/pain    Substance abuse X  Lack of Family Support   Financial stress X   Isolation X  Inadequate Community Resources X  Suicide attempt(s)  X  Not taking medications   Victim of crime   Developmental Delay  Unable to manage personal needs  X  Age 72 or older   Homeless X  No transportation   Readmission within 30 days X  Unemployment X  Traumatic Event       Psychiatric Advanced Directives: denies at this time.       Family to Involve in [address] ROIs on file; 2 sisters on file for emergency contacts Prisca Moraes and [facility]  Sexual Orientation:  Heterosexual.       Patient [place], housing in group home 24/7 staff, SSI, some family support, linked with Fact Team at [facility]     Patient Barriers:   Patient has poor insight.  Patient uses crack cocaine. Multiple [facility] admissions, last 5/6/2021 - 5/11/2021     Opiate/AOD Referral and/or Education Provided:  tox negative       CMHC/mental health history:   [facility] FACT team notified on this date when admitted [phone].  Left message with  [phone]; Brittney Oliver is a 57 y.o. male who presents complaining of Psychiatric Evaluation. Patient was brought in by [facility]  who has been keeping an eye on him since he left the [facility] 2 days ago. Patient is a schizoaffective depressive patient who was in for 11 days discharged 2 days ago went to this facility.   Patient states

## 2021-09-24 NOTE — PLAN OF CARE
[facility]  Initial Interdisciplinary Treatment Plan NO      Original treatment plan Date & Time: 9/24/2021 1310    Admission Type:  Admission Type: Voluntary    Reason for admission:   Reason for Admission: suicidal ideation and auditory hallucinations    Estimated Length of Stay:  5-7days  Estimated Discharge Date: to be determined by physician    PATIENT STRENGTHS:  Patient Strengths:Strengths: Connection to output provider, Social Skills  Patient Strengths and Limitations:Limitations: Tendency to isolate self, Apathetic / unmotivated  Addictive Behavior: Addictive Behavior  In the past 3 months, have you felt or has someone told you that you have a problem with:  : None  Do you have a history of Chemical Use?: No  Do you have a history of Alcohol Use?: No  Do you have a history of Street Drug Abuse?: No  Histroy of Prescripton Drug Abuse?: No  Medical Problems:  Past Medical History:   Diagnosis Date    Bipolar disorder ([address]     Depression     GERD (gastroesophageal reflux disease)     Hallucinations     Headache(784.0)     Hepatitis     Schizophrenia, schizo-affective (HCC)     Substance abuse ([address]     Tobacco abuse     Type II or unspecified type diabetes mellitus without mention of complication, not stated as uncontrolled     Urinary incontinence      Status EXAM:Status and Exam  Normal: No  Facial Expression: Flat  Affect: Blunt  Level of Consciousness: Alert  Mood:Normal: No  Mood: Depressed, Anxious, Empty  Motor Activity:Normal: Yes  Interview Behavior: Cooperative, Evasive  Preception: [place] to Person, [place] to Time, [place] to Place, [place] to Situation  Attention:Normal: No  Attention: Distractible  Thought Processes: Blocking  Thought Content:Normal: No  Thought Content: Poverty of Content  Hallucinations:  Auditory (Comment)  Delusions: No  Memory:Normal: No  Memory: Poor Recent, Poor Remote  Insight and Judgment: No  Insight and Judgment: Poor Judgment, Poor Insight  Present Suicidal Ideation: Yes (contracts for safety)  Present Homicidal Ideation: No    EDUCATION:   Learner Progress Toward Treatment Goals: reviewed group plans and strategies for care    Method:group therapy, medication compliance, individualized assessments and care planning    Outcome: needs reinforcement    PATIENT GOALS: to be discussed with patient within 72 hours    PLAN/TREATMENT RECOMMENDATIONS:     continue group therapy , medications compliance, goal setting, individualized assessments and care, continue to monitor pt on unit      SHORT-TERM GOALS:   Time frame for Short-Term Goals: 5-7 days    LONG-TERM GOALS:  Time frame for Long-Term Goals: 6 months  Members Present in Team Meeting: See Signature Sheet    MARJ Salter

## 2021-09-24 NOTE — BH NOTE
[facility]  Admission Note     Admission Type:   Admission Type: Voluntary    Reason for admission:  Reason for Admission: suicidal ideation and auditory hallucinations    PATIENT STRENGTHS:  Strengths: Connection to output provider, Social Skills    Patient Strengths and Limitations:  Limitations: Tendency to isolate self, Apathetic / unmotivated    Addictive Behavior:   Addictive Behavior  In the past 3 months, have you felt or has someone told you that you have a problem with:  : None  Do you have a history of Chemical Use?: No  Do you have a history of Alcohol Use?: No  Do you have a history of Street Drug Abuse?: No  Histroy of Prescripton Drug Abuse?: No    Medical Problems:   Past Medical History:   Diagnosis Date    [facility]     Depression     GERD (gastroesophageal reflux disease)     Hallucinations     Headache(784.0)     Hepatitis     [facility], [facility]     Substance abuse ([facility]     Tobacco abuse     Type II or unspecified type diabetes mellitus without mention of complication, not stated as uncontrolled     Urinary incontinence        Status EXAM:  Status and Exam  Normal: No  Facial Expression: Flat  Affect: Blunt  Level of Consciousness: Alert  Mood:Normal: No  Mood: Depressed, Anxious, Empty  Motor Activity:Normal: Yes  Interview Behavior: Cooperative, Evasive  Preception: [place] to Person, [place] to Time, [place] to Place, [place] to Situation  Attention:Normal: No  Attention: Distractible  Thought Processes: Blocking  Thought Content:Normal: No  Thought Content: Poverty of Content  Hallucinations:  Auditory (Comment)  Delusions: No  Memory:Normal: No  Memory: Poor Recent, Poor Remote  Insight and Judgment: No  Insight and Judgment: Poor Judgment, Poor Insight  Present Suicidal Ideation: Yes (contracts for safety)  Present Homicidal Ideation: No    Tobacco Screening:  Practical Counseling, on admission, corby X, if applicable and completed (first 3 are required if patient doesn't refuse):            ( )  Recognizing danger situations (included triggers and roadblocks)                    ( )  Coping skills (new ways to manage stress, exercise, relaxation techniques, changing routine, distraction)                                                           ( )  Basic information about quitting (benefits of quitting, techniques in how to quit, available resources  ( ) Referral for counseling faxed to [facility]                                           (x ) Patient refused counseling  ( ) Patient has not smoked in the last 30 days    Metabolic Screening:    Lab Results   Component Value Date    LABA1C 4.9 08/11/2020       Lab Results   Component Value Date    CHOL 167 08/11/2020    CHOL 179 02/13/2017    CHOL 127 05/09/2015    CHOL 168 08/20/2014    CHOL 158 12/31/2013    CHOL 178 08/15/2013    CHOL 166 09/17/2012    CHOL 210 (H) 02/03/2012     Lab Results   Component Value Date    TRIG 43 08/11/2020    TRIG 67 02/13/2017    TRIG 37 05/09/2015    TRIG 72 08/20/2014    TRIG 52 12/31/2013    TRIG 70 08/15/2013    TRIG 117 09/17/2012    TRIG 94 02/03/2012     Lab Results   Component Value Date    HDL 74 08/11/2020    HDL 73 02/13/2017    HDL 57 05/09/2015    HDL 50 08/20/2014    HDL 43 12/31/2013    HDL 42 08/15/2013    HDL 38 (L) 09/17/2012    HDL 55 02/03/2012     No components found for: LDLCAL  No results found for: LABVLDL      Body mass index is 25.68 kg/m². BP Readings from Last 2 Encounters:   09/24/21 122/62   09/21/21 118/68           Pt admitted with followings belongings:   see chart     . Patient oriented to surroundings and program expectations and copy of patient rights given. Received admission packet:  yes. Consents reviewed, signed all. Patient verbalize understanding:  yes. Patient education on precautions: yes  Patient voluntary from [facility] with suicidal ideation and auditory hallucinations.   Reports suicidal ideation on admission, contracts for safety. Reports depression due to loss of brother. Patient was discharged only a day or two ago. Patient has multiple scabs bilateral arms but state he does not know what they are from. Denies having diabetes but it is listed in his history.                      Scott Salcedo RN

## 2021-09-24 NOTE — ED PROVIDER NOTES
[address]  EMERGENCY DEPARTMENT ENCOUNTER      Pt Name: Jessica Garcia  MRN: [id number]  Shingflu 1960  Date of evaluation: 21      CHIEF COMPLAINT       Chief Complaint   Patient presents with   [address] Problem         HISTORY OF PRESENT ILLNESS    Jessica Garcia is a 57 y.o. male who presents complaining of Psychiatric Evaluation. Patient was brought in by [facility] who has been keeping an eye on him since he left the [facility] 2 days ago. Patient is a schizoaffective depressive patient who was in for 11 days discharged 2 days ago went to this facility. Patient states that they have not had his medications yet so he has been off all of his medications stating that he is hearing voices. He found out once he was released that his brother had  and yesterday was a  and he was unable to get to the  and that has exacerbated his hallucinations and suicidal ideation. Patient denies drug or alcohol use. Patient denies any somatic complaints at this time. REVIEW OF SYSTEMS       Review of Systems   Constitutional: Negative for activity change, appetite change, chills, diaphoresis and fever. HENT: Negative for congestion, ear pain, facial swelling, nosebleeds, rhinorrhea, sinus pressure, sore throat and trouble swallowing. Eyes: Negative for pain, discharge and redness. Respiratory: Negative for cough, chest tightness, shortness of breath and wheezing. Cardiovascular: Negative for chest pain, palpitations and leg swelling. Gastrointestinal: Negative for abdominal pain, blood in stool, constipation, diarrhea, nausea and vomiting. Genitourinary: Negative for difficulty urinating, dysuria, flank pain, frequency, genital sores and hematuria. Musculoskeletal: Negative for arthralgias, back pain, gait problem, joint swelling, myalgias and neck pain. Skin: Negative for color change, pallor, rash and wound.    Neurological: Negative for dizziness, tremors, seizures, syncope, speech difficulty, weakness, numbness and headaches. Psychiatric/Behavioral: Positive for dysphoric mood, hallucinations and suicidal ideas. Negative for confusion, decreased concentration, self-injury and sleep disturbance. PAST MEDICAL HISTORY     Past Medical History:   Diagnosis Date    B[facility] disorder [facility]     Depression     GERD (gastroesophageal reflux disease)     Hallucinations     Headache(784.0)     Hepatitis     Schizophrenia, schizo-affective ([facility]     Substance abuse ([facility]     Tobacco abuse     Type II or unspecified type diabetes mellitus without mention of complication, not stated as uncontrolled     Urinary incontinence        SURGICAL HISTORY       Past Surgical History:   Procedure Laterality Date    ABSCESS DRAINAGE N/A 02/11/2018    Carla anal abcess    DENTAL SURGERY      all teeth pulled       CURRENT MEDICATIONS       Previous Medications    CHOLECALCIFEROL (VITAMIN D) 25 MCG TABS    Take 1 tablet by mouth daily    CLOTRIMAZOLE (LOTRIMIN AF) 1 % CREAM    Apply topically 2 times daily. ESCITALOPRAM (LEXAPRO) 20 MG TABLET    Take 1 tablet by mouth daily    HYDROXYZINE (ATARAX) 50 MG TABLET    Take 1 tablet by mouth 3 times daily as needed for Anxiety    IBUPROFEN (ADVIL;MOTRIN) 400 MG TABLET    Take 1 tablet by mouth every 6 hours as needed for Pain (4-7 moderate pain, 8-10 severe pain)    NICOTINE POLACRILEX (NICORETTE) 2 MG GUM    Take 1 each by mouth every hour as needed for Smoking cessation    OLANZAPINE (ZYPREXA) 20 MG TABLET    Take 1 tablet by mouth nightly    TRAZODONE (DESYREL) 150 MG TABLET    Take 1 tablet by mouth nightly as needed for Sleep       ALLERGIES     is allergic to navane [thiothixene]. SOCIAL HISTORY      reports that he has been smoking cigarettes. He has a 47.00 pack-year smoking history. He has never used smokeless tobacco. He reports current alcohol use. He reports current drug use.  Frequency: 7.00 times per week. Drug: Cocaine. PHYSICAL EXAM     INITIAL VITALS: BP (!) 144/73   Pulse 87   Temp 97.7 °F (36.5 °C) (Oral)   Resp 17   Ht 6' 2\" (1.88 m)   Wt 200 lb (90.7 kg)   SpO2 100%   BMI 25.68 kg/m²      Physical Exam  Constitutional:       General: He is not in acute distress. Appearance: He is well-developed. He is not diaphoretic. HENT:      Head: Normocephalic and atraumatic. Eyes:      General: No scleral icterus. Right eye: No discharge. Left eye: No discharge. Conjunctiva/sclera: Conjunctivae normal.      Pupils: Pupils are equal, round, and reactive to light. Cardiovascular:      Rate and Rhythm: Normal rate and regular rhythm. Heart sounds: Normal heart sounds. No murmur heard. No friction rub. No gallop. Pulmonary:      Effort: Pulmonary effort is normal. No respiratory distress. Breath sounds: Normal breath sounds. No wheezing or rales. Neurological:      Mental Status: He is alert and oriented to person, place, and time. Psychiatric:         Mood and Affect: Mood is depressed. Speech: Speech is delayed. Behavior: Behavior is slowed and withdrawn. Thought Content: Thought content includes suicidal ideation. Thought content includes suicidal plan. DIAGNOSTIC RESULTS     LABS: All lab results were reviewed by myself, and all abnormals are listed below. Labs Reviewed   COVID-19, RAPID         MEDICAL DECISION MAKING:     I do believe this patient would benefit from being readmitted to the hospital to get back on his medications. Patient has had a new recent stressor and has not been on his meds. We will repeat his Covid swab otherwise I think he is medically cleared.       EMERGENCY DEPARTMENT COURSE:   Vitals:    Vitals:    09/24/21 0945   BP: (!) 144/73   Pulse: 87   Resp: 17   Temp: 97.7 °F (36.5 °C)   TempSrc: Oral   SpO2: 100%   Weight: 200 lb (90.7 kg)   Height: 6' 2\" (1.88 m)       The patient was given the following medications while in the emergency department:  No orders of the defined types were placed in this encounter. -------------------------  11:07 AM EDT  Patient has been evaluated and will be admitted to [facility] for further psychiatric evaluation and treatment. FINAL IMPRESSION      1. Depression with suicidal ideation          DISPOSITION/PLAN   DISPOSITION Decision To Admit 09/24/2021 11:07:15 AM      PATIENT REFERREDTO:  No follow-up provider specified.     DISCHARGEMEDICATIONS:  New Prescriptions    No medications on file       (Please note that portions of this note were completed with a voice recognition program.  Efforts were made to edit thedictations but occasionally words are mis-transcribed.)    Marlene Pina MD  Attending Emergency Physician                      Marlene Pina MD  09/24/21 7083

## 2021-09-24 NOTE — ED NOTES
Provisional Diagnosis:   Major depressive Disorder    Psychosocial and Contextual Factors:   Patient brought in by [facility]  and provider. Patients's brother's  was yesterday. C-SSRS Summary:      Patient: X  Family:   Agency:     Substance Abuse: Patient has a history of crack cocaine use. Present Suicidal Behavior:  Patient reports suicidal ideation, and command hallucinations telling him to kill himself. Patient states he has a plan to kill himself but states \"I can't tell anybody\" and states his voices are the ones telling him not to tell anyone his suicidal plan    Verbal: X    Attempt:    Past Suicidal Behavior: Patient reports aborted suicide attempt a year ago in which he states he had a gun and was going to shoot himself but states \"a friend wouldn't let me. \"    Verbal:X    Attempt:      Self-Injurious/Self-Mutilation:Patient denies. Trauma Identified:  Patients brother recently passed away. Patient was unable to go to the  yesterday as it was in [facility]. Protective Factors:    Patient has family support system. Patient linked with     Risk Factors:    Patient has poor insight. Clinical Summary:    Evy Joyner is a 57 y.o. male who presents to the ED from [address] [place] [address] caseworkers at request of patients provider. Patient states he has not received his psychotropic medications since he was discharged on 21. Patient states he has been having suicidal thoughts. Patient reports command hallucinations telling him to kill and hurt himself and others. Patient states he has a plan to kill himself but states \"I can't tell anybody\" and states his voices are the ones telling him not to tell anyone his suicidal plan. Patient states the voice's name is \"Morticia. \"    Writer recived a call from the Jonel [facility] at [facility], who states caseworkers would be brining patient to the ED.  PA states that patient has a histroy of Schizophrenia, and states that patient has been having auditory command hallcuisntions telling him to kill himself and hurt others. The PA states that patients brother recently passed away, and the  was yesterday which patient was not able to attend, but states patient has been \"deteriating since he found out his brother passed away. \"  Per PA, patient also has a histroy of dvelopmentl delay, alcohol/cannabus/cocaine abuse, but patient has been at their facility for a few days. Patient does have lengthy history of cocaine use. Per review of Epic, Patient recently admitted to the [facility] on 21 and discharged on 21. Patient is linked with the [facility] ACT team.    Patient responding to internal stimuli while in the ED shouting \" No! No! Shut up motherfucker. [place] no! \" while alone in change out area. Patient states he has not been sleeping well. Level of Care Disposition:    Writer consulted with Dr. Cristina Griggs who recommends inpatient psychiatric admission for safety and stabilization. Patient is in agreement and signed application for voluntary admission.

## 2021-09-24 NOTE — BH NOTE
Patient given tobacco quitline number 43612289628 at this time, refusing to call at this time, states \" I just dont want to quit now\"- patient given information as to the dangers of long term tobacco use. Continue to reinforce the importance of tobacco cessation.

## 2021-09-24 NOTE — ED TRIAGE NOTES
Mode of arrival (squad #, walk in, police, etc) : Walk in        Chief complaint(s): Mental health problem        Arrival Note (brief scenario, treatment PTA, etc). : Pt arrives to ED c/o hearing voices. Patient states that he has been hearing voices that are telling him to kill himself. [facility] from [facility] who state that patients brother recently passed away and patient attended the  yesterday. Patient states that the facility has not had his medications so he has not been taking them. C= \"Have you ever felt that you should Cut down on your drinking? \"  No  A= \"Have people Annoyed you by criticizing your drinking? \"  No  G= \"Have you ever felt bad or Guilty about your drinking? \"  No  E= \"Have you ever had a drink as an Eye-opener first thing in the morning to steady your nerves or to help a hangover? \"  No      Deferred []      Reason for deferring: N/A    *If yes to two or more: probable alcohol abuse. *

## 2021-09-25 PROBLEM — R03.0 ELEVATED BP WITHOUT DIAGNOSIS OF HYPERTENSION: Status: ACTIVE | Noted: 2021-09-25

## 2021-09-25 PROBLEM — R10.13 DYSPEPSIA: Status: ACTIVE | Noted: 2021-09-25

## 2021-09-25 PROBLEM — R21 SKIN RASH: Status: ACTIVE | Noted: 2021-09-25

## 2021-09-25 PROBLEM — R39.9 LOWER URINARY TRACT SYMPTOMS (LUTS): Status: ACTIVE | Noted: 2021-09-25

## 2021-09-25 PROCEDURE — 99253 IP/OBS CNSLTJ NEW/EST LOW 45: CPT | Performed by: INTERNAL MEDICINE

## 2021-09-25 PROCEDURE — 99231 SBSQ HOSP IP/OBS SF/LOW 25: CPT | Performed by: PSYCHIATRY & NEUROLOGY

## 2021-09-25 PROCEDURE — 6370000000 HC RX 637 (ALT 250 FOR IP): Performed by: PSYCHIATRY & NEUROLOGY

## 2021-09-25 PROCEDURE — 6370000000 HC RX 637 (ALT 250 FOR IP): Performed by: INTERNAL MEDICINE

## 2021-09-25 PROCEDURE — 1240000000 HC EMOTIONAL WELLNESS R&B

## 2021-09-25 RX ORDER — FAMOTIDINE 20 MG/1
20 TABLET, FILM COATED ORAL 2 TIMES DAILY
Status: DISCONTINUED | OUTPATIENT
Start: 2021-09-25 | End: 2021-09-28 | Stop reason: HOSPADM

## 2021-09-25 RX ADMIN — TRAZODONE HYDROCHLORIDE 150 MG: 150 TABLET ORAL at 21:51

## 2021-09-25 RX ADMIN — Medication 1000 UNITS: at 08:54

## 2021-09-25 RX ADMIN — PALIPERIDONE 6 MG: 6 TABLET, EXTENDED RELEASE ORAL at 08:54

## 2021-09-25 RX ADMIN — FAMOTIDINE 20 MG: 20 TABLET, FILM COATED ORAL at 21:50

## 2021-09-25 RX ADMIN — HYDROXYZINE HYDROCHLORIDE 50 MG: 50 TABLET, FILM COATED ORAL at 12:05

## 2021-09-25 RX ADMIN — CLOTRIMAZOLE: 10 CREAM TOPICAL at 08:56

## 2021-09-25 RX ADMIN — HYDROXYZINE HYDROCHLORIDE 50 MG: 50 TABLET, FILM COATED ORAL at 21:51

## 2021-09-25 RX ADMIN — ESCITALOPRAM OXALATE 20 MG: 20 TABLET ORAL at 08:54

## 2021-09-25 NOTE — GROUP NOTE
Group Therapy Note    Date: 9/24/2021    Group Start Time: 2030  Group End Time: 2122  Group Topic: Wrap-Up    JESUS Ríos        Group Therapy Note    Attendees: 8         Patient's Goal:  The voices got real bad & I just wanted to die    Notes:  I'm in a rehab place & it's alright    Status After Intervention:  Unchanged    Participation Level:  Active Listener    Participation Quality: Appropriate and Attentive      Speech:  pressured and hesitant      Thought Process/Content: Linear      Affective Functioning: Blunted      Mood: depressed      Level of consciousness:  Alert, Oriented x4 and Attentive      Response to Learning: Able to change behavior      Endings: None Reported    Modes of Intervention: Problem-solving      Discipline Responsible: [employer]      Signature:  Edu Ríos

## 2021-09-25 NOTE — PLAN OF CARE
[facility]  Day 3 Interdisciplinary Treatment Plan NOTE    Review Date & Time: 09/25/2021 1332    Admission Type:   Admission Type: Voluntary    Reason for admission:  Reason for Admission: suicidal ideation and auditory hallucinations  Estimated Length of Stay: 5-7 days  Estimated Discharge Date Update: to be determined by physician    PATIENT STRENGTHS:  Patient Strengths Strengths: Connection to output provider, Social Skills  Patient Strengths and Limitations:Limitations: Difficulty problem solving/relies on others to help solve problems, Difficult relationships / poor social skills, Inappropriate/potentially harmful leisure interests, Multiple barriers to leisure interests, Apathetic / unmotivated  Addictive Behavior:Addictive Behavior  In the past 3 months, have you felt or has someone told you that you have a problem with:  : None  Do you have a history of Chemical Use?: No  Do you have a history of Alcohol Use?: No  Do you have a history of Street Drug Abuse?: No  Histroy of Prescripton Drug Abuse?: No  Medical Problems:  Past Medical History:   Diagnosis Date    Bipolar disorder ([facility]     Depression     GERD (gastroesophageal reflux disease)     Hallucinations     Headache(784.0)     Hepatitis     Schizophrenia, schizo-affective ([facility]     Substance abuse ([facility]     Tobacco abuse     Type II or unspecified type diabetes mellitus without mention of complication, not stated as uncontrolled     Urinary incontinence        Risk:  Fall RiskTotal: 77  Dusty Scale Dusty Scale Score: 23  BVC Total: 0  Change in scores no Changes to plan of Care no    Status EXAM:   Status and Exam  Normal: No  Facial Expression: Flat  Affect: Blunt  Level of Consciousness: Alert  Mood:Normal: No  Mood: Depressed, Anxious  Motor Activity:Normal: No  Motor Activity: Decreased  Interview Behavior: Cooperative  Preception: [place] to Person, [place] to Time, [place] to Place  Attention:Normal: No  Attention: Distractible  Thought Processes: Blocking  Thought Content:Normal: No  Thought Content: Preoccupations, Poverty of Content  Hallucinations: Auditory (Comment)  Delusions: No  Memory:Normal: No  Memory: Poor Recent, Poor Remote  Insight and Judgment: No  Insight and Judgment: Poor Judgment, Poor Insight  Present Suicidal Ideation: No  Present Homicidal Ideation: No    Daily Assessment Last Entry:   Daily Sleep (WDL): Within Defined Limits         Patient Currently in Pain: Denies  Daily Nutrition (WDL): Within Defined Limits  Barriers to Nutrition: None  Level of Assistance: Independent/Self    Patient Monitoring:  Frequency of Checks: 4 times per hour, close    Psychiatric Symptoms:   Depression Symptoms  Depression Symptoms: Feelings of helplessness  Anxiety Symptoms  Anxiety Symptoms: Generalized  [place] Symptoms  [place] Symptoms: No problems reported or observed. Psychosis Symptoms  Delusion Type: No problems reported or observed. Suicide Risk CSSR-S:  1) Within the past month, have you wished you were dead or wished you could go to sleep and not wake up? : Yes  2) Have you actually had any thoughts of killing yourself? : Yes  3) Have you been thinking about how you might kill yourself? : No  5) Have you started to work out or worked out the details of how to kill yourself?  Do you intend to carry out this plan? : No  6) Have you ever done anything, started to do anything, or prepared to do anything to end your life?: No  Change in Result no Change in Plan of care no      EDUCATION:   EDUCATION:   Learner Progress Toward Treatment Goals: Reviewed results and recommendations of this team, Reviewed group plan and strategies, Reviewed signs, symptoms and risk of self harm and violent behavior, Reviewed goals and plan of care    Method:small group, individual verbal education    Outcome:verbalized by patient, but needs reinforcement to obtain goals    PATIENT GOALS:  Short term: Patient refused   Long term: Patient refused     PLAN/TREATMENT RECOMMENDATIONS UPDATE: continue with group therapies, increased socialization, continue planning for after discharge goals, continue with medication compliance    SHORT-TERM GOALS UPDATE:   Time frame for Short-Term Goals: 5-7 days    LONG-TERM GOALS UPDATE:   Time frame for Long-Term Goals: 6 months  Members Present in Team Meeting: See Signature Sheet    Oriana Castillo

## 2021-09-25 NOTE — CONSULTS
[facility] Internal Medicine    CONSULTATION    / FOLLOW UP VISIT       Date:   9/25/2021  Patient name:  Jonna Vega  Date of admission:  9/24/2021  9:44 AM  MRN:   [id number]  Account:  [de-identified]  YOB: 1960  PCP:    No primary care provider on file. Room:   [address] [place] [address]  Code Status:    Full Code    Physician Requesting Consult: Saloni Duarte MD    History of Present Illness:      C/C ;  Medical comorbidity management     REASON FOR CONSULT;  Medical comorbidity and medication management ;                                                 *Principal Problem:    Schizoaffective disorder, depressive type ([address]  Active Problems:    Smoker    Elevated BP without diagnosis of hypertension    Lower urinary tract symptoms (LUTS)    Dyspepsia    Skin rash  Resolved Problems:    * No resolved hospital problems. *           HPI;    Patient had elevated blood pressure which is getting better  Complains of lower urinary tract symptoms  Also some dyspepsia    And has some skin rashes       Vitals:    09/24/21 0945 09/24/21 1225 09/24/21 2000   BP: (!) 144/73 122/62 126/77   Pulse: 87 86 79   Resp: 17 16 14   Temp: 97.7 °F (36.5 °C) 98 °F (36.7 °C) 98 °F (36.7 °C)   TempSrc: Oral Oral    SpO2: 100%     Weight: 200 lb (90.7 kg) 200 lb (90.7 kg)    Height: 6' 2\" (1.88 m) 6' 2\" (1.88 m)                 Past and Surgical hx as in H and P  Social History:     Tobacco:    reports that he has been smoking cigarettes. He has a 47.00 pack-year smoking history. He has never used smokeless tobacco.  Alcohol:      reports current alcohol use. Drug Use:  reports current drug use. Frequency: 7.00 times per week. Drug: Cocaine.     Review of Systems:     POSITIVE AND NEGATIVES AS DESCRIBED IN HISTORY OF PRESENT ILLNESS ;  IN ADDITION ;  Review of Systems          All other systems negative                Physical Exam:     Physical Exam   Vitals:    09/24/21 0945 09/24/21 1225 09/24/21 2000   BP: (!) 144/73 122/62 126/77   Pulse: 87 86 79   Resp: 17 16 14   Temp: 97.7 °F (36.5 °C) 98 °F (36.7 °C) 98 °F (36.7 °C)   TempSrc: Oral Oral    SpO2: 100%     Weight: 200 lb (90.7 kg) 200 lb (90.7 kg)    Height: 6' 2\" (1.88 m) 6' 2\" (1.88 m)                    Body mass index is 25.68 kg/m². General Appearance:   -, CO-OPERATIVE ,                                                        Pulmonary/Chest:        Clear to auscultation bilaterally . No wheezes, rales or rhonchi . Cardiovascular:            Normal rate, regular rhythm,                                          No murmur or  Gallop . Abdomen:                       Soft, non-tender                                                                                    Extremities:                    No Edema . Neuromuskuloskeletal    .chronic skin lesions asymptomatic. .     Data:     URINE ANALYSIS: No results found for: LABURIN     CBC:  Lab Results   Component Value Date    WBC 6.3 09/11/2021    HGB 10.9 09/11/2021     09/11/2021     05/18/2012        BMP:    Lab Results   Component Value Date     09/11/2021    K 3.9 09/11/2021     09/11/2021    CO2 25 09/11/2021    BUN 15 09/11/2021    CREATININE 1.12 09/11/2021    GLUCOSE 139 09/11/2021    GLUCOSE 152 05/18/2012      LIVER PROFILE:  Lab Results   Component Value Date    ALT 9 09/11/2021    AST 17 09/11/2021    PROT 6.3 09/11/2021    BILITOT 0.19 09/11/2021    BILIDIR <0.2 03/22/2019    LABALBU 3.8 09/11/2021    LABALBU 3.9 05/18/2012             Radiology:         Medications: Allergies:     Allergies   Allergen Reactions    Navane [Thiothixene]        Current Meds:   Scheduled Meds:    famotidine  20 mg Oral BID    nicotine  1 patch TransDERmal Daily    Vitamin D  1,000 Units Oral Daily    clotrimazole   Topical BID    escitalopram  20 mg Oral Daily    paliperidone  6 mg Oral Daily     Continuous Infusions:   PRN Meds: acetaminophen, ibuprofen, aluminum & magnesium hydroxide-simethicone, polyethylene glycol, LORazepam **AND** haloperidol lactate **AND** diphenhydrAMINE, LORazepam **AND** haloperidol, nicotine polacrilex, hydrOXYzine, traZODone        Assessment :       Assessment Dx  Principal Problem:    Schizoaffective disorder, depressive type (HCC)  Active Problems:    Smoker    Elevated BP without diagnosis of hypertension    Lower urinary tract symptoms (LUTS)    Dyspepsia    Skin rash  Resolved Problems:    * No resolved hospital problems. *              Plan: Will add Pepcid for dyspepsia    Will order UA with microscopy to rule out urinary tract infection    rash           9/26/21    · Will encourage fluid intake . Mild hypernatremia  · Skin rash chronic , asymptomatic . 1. Thanks for consulting us . Will monitor vitals and clinical course , and  Optimize therapy  as needed . Benedicto Bledsoe MD    Copy sent to Dr. Lnea Mathur primary care provider on file. Pleasenote that this chart was generated using voice recognition Dragon dictation software. Although every effort was made to ensure the accuracy of this automated transcription, some errors in transcription may have occurred.

## 2021-09-25 NOTE — PROGRESS NOTES
Daily Progress Note  9/25/2021    Patient Name: Jessica Garcia    CHIEF COMPLAINT: Depression, suicidal ideation, homicidal ideation, and auditory hallucinations. SUBJECTIVE:      Patient is seen today for a follow up assessment. Medication Adherence: Patient has been medication compliant today. Emergency Medications: Patient has not required any emergency medications today. Patient was resting in his room at the time of approach by writer. Patient told the writer his mood was \"bad\" and answered cognition related assessment questions. Following that, he was evasive and guarded and told writer he did not wish to speak with writer today. Writer made multiple attempts to engage patient in assessment, but patient stated \"no, no\". Patient pulled the blanket covers over his head and did not engage any further with the writer. Limited information was able to be obtained due to patient's refusal to participate in assessment. Per [facility] adult Daily assessment flowsheet, staff documented daily nutrition and daily sleep as \"within defined limits\" on 9/25/2021. Staff documented 8 daily hours of sleep. Due to patient's refusal to participate in assessment, suicidal ideation, suicidal plans, homicidal ideation, homicidal plans, josette for safety on the unit, auditory hallucinations, visual hallucinations, paranoia, delusions, medication side effects or medical concerns were unable to be assessed. At this time, the patient is not appropriate for a lower level of care. There is risk of decompensation and patient warrants further hospitalization for safety and stabilization. Group Attendance on Unit:   [] Yes  [] Selectively    [x] No         Mental Status Exam  Level of consciousness: Somnolent, but responsive verbal stimuli. Appearance: Appropriate attire for setting, resting in bed, with poor grooming and hygiene. Behavior/Motor: Evasive, guarded, psychomotor slowing noted.   Attitude toward examiner: Uncooperative, evasive, guarded, minimally attentive, poor eye contact. Speech: Delayed rate, low volume, depressed tone. Mood:  Patient reports \"bad\". Affect: Depressed. Thought processes: Unable to assess due to patient's refusal to participate in assessment. Thought content/homicidal ideation: Unable to assess due to patient's refusal to participate in assessment. Suicidal Ideation/suicidal intent or plans/josette for safety on the unit: Unable to assess due to patient's refusal to participate in assessment. Delusions/Paranoia: Unable to assess due to patient's refusal to participate in assessment. Perceptual Disturbance: Patient does not appear to be responding to internal stimuli. Unable to assess auditory hallucinations or visual hallucinations due to patient's refusal to participate in assessment. Cognition: Oriented to self, location, time, and situation. Memory:  Unable to assess due to patient's refusal to participate in assessment. Insight & Judgement: Unable to assess due to patient's refusal to participate in assessment. Data   height is 6' 2\" (1.88 m) and weight is 200 lb (90.7 kg). His temperature is 98 °F (36.7 °C). His blood pressure is 126/77 and his pulse is 79. His respiration is 14 and oxygen saturation is 100%. Labs:   Admission on 09/24/2021   Component Date Value Ref Range Status    Specimen Description 09/24/2021 . NASOPHARYNGEAL SWAB   Final    SARS-CoV-2, Rapid 09/24/2021 Not Detected  Not Detected Final    Comment:       Rapid NAAT:  The specimen is NEGATIVE for SARS-CoV-2, the novel coronavirus associated with   COVID-19. The ID NOW COVID-19 assay is designed to detect the virus that causes COVID-19 in patients   with signs and symptoms of infection who are suspected of COVID-19. An individual without symptoms of COVID-19 and who is not shedding SARS-CoV-2 virus would   expect to have a negative (not detected) result in this assay.   Negative results should be treated as presumptive and, if inconsistent with clinical signs   and symptoms or necessary for patient management,  should be tested with an alternative molecular assay. Negative results do not preclude   SARS-CoV-2 infection and   should not be used as the sole basis for patient management decisions.          Fact sheet for Healthcare Providers: Iesha.nav  Fact sheet for Patients: BuildHer.es          Methodology: Isothermal Nucleic Acid Amplification      Amphetamine Screen, Ur 09/24/2021 NEGATIVE  NEGATIVE Final    Comment:       (Positive cutoff 1000 ng/mL)                  Barbiturate Screen, Ur 09/24/2021 NEGATIVE  NEGATIVE Final    Comment:       (Positive cutoff 200 ng/mL)                  Benzodiazepine Screen, Urine 09/24/2021 NEGATIVE  NEGATIVE Final    Comment:       (Positive cutoff 200 ng/mL)                  Cocaine Metabolite, Urine 09/24/2021 NEGATIVE  NEGATIVE Final    Comment:       (Positive cutoff 300 ng/mL)                  Methadone Screen, Urine 09/24/2021 NEGATIVE  NEGATIVE Final    Comment:       (Positive cutoff 300 ng/mL)                  Opiates, Urine 09/24/2021 NEGATIVE  NEGATIVE Final    Comment:       (Positive cutoff 300 ng/mL)                  Phencyclidine, Urine 09/24/2021 NEGATIVE  NEGATIVE Final    Comment:       (Positive cutoff 25 ng/mL)                  Propoxyphene, Urine 09/24/2021 NOT REPORTED  NEGATIVE Final    Cannabinoid Scrn, Ur 09/24/2021 NEGATIVE  NEGATIVE Final    Comment:       (Positive cutoff 50 ng/mL)                  Oxycodone Screen, Ur 09/24/2021 NEGATIVE  NEGATIVE Final    Comment:       (Positive cutoff 100 ng/mL)                  Methamphetamine, Urine 09/24/2021 NOT REPORTED  NEGATIVE Final    Tricyclic Antidepressants, Urine 09/24/2021 NOT REPORTED  NEGATIVE Final    MDMA, Urine 09/24/2021 NOT REPORTED  NEGATIVE Final    Buprenorphine Urine 09/24/2021 NOT REPORTED  NEGATIVE Final    Test Information 09/24/2021 Assay provides medical screening only. The absence of expected drug(s) and/or metabolite(s) may indicate diluted or adulterated urine, limitations of testing or timing of collection. Final    Comment: Testing for legal purposes should be confirmed by another method. To request confirmation   of test result, please call the lab within 7 days of sample submission. Reviewed patient's current plan of care and vital signs with nursing staff. Labs reviewed: [x] Yes  Last EKG in EMR reviewed: [x] Yes  QTc: 452.     Medications  Current Facility-Administered Medications: famotidine (PEPCID) tablet 20 mg, 20 mg, Oral, BID  acetaminophen (TYLENOL) tablet 650 mg, 650 mg, Oral, Q4H PRN  ibuprofen (ADVIL;MOTRIN) tablet 400 mg, 400 mg, Oral, Q6H PRN  aluminum & magnesium hydroxide-simethicone (MAALOX) 200-200-20 MG/5ML suspension 30 mL, 30 mL, Oral, Q6H PRN  polyethylene glycol (GLYCOLAX) packet 17 g, 17 g, Oral, Daily PRN  nicotine (NICODERM CQ) 21 MG/24HR 1 patch, 1 patch, TransDERmal, Daily  LORazepam (ATIVAN) injection 2 mg, 2 mg, IntraMUSCular, Q4H PRN **AND** haloperidol lactate (HALDOL) injection 5 mg, 5 mg, IntraMUSCular, Q4H PRN **AND** diphenhydrAMINE (BENADRYL) injection 25 mg, 25 mg, IntraMUSCular, Q4H PRN  LORazepam (ATIVAN) tablet 2 mg, 2 mg, Oral, Q4H PRN **AND** haloperidol (HALDOL) tablet 5 mg, 5 mg, Oral, Q4H PRN  Vitamin D (CHOLECALCIFEROL) tablet 1,000 Units, 1,000 Units, Oral, Daily  clotrimazole (LOTRIMIN) 1 % cream, , Topical, BID  escitalopram (LEXAPRO) tablet 20 mg, 20 mg, Oral, Daily  nicotine polacrilex (NICORETTE) gum 2 mg, 2 mg, Oral, Q1H PRN  hydrOXYzine (ATARAX) tablet 50 mg, 50 mg, Oral, TID PRN  traZODone (DESYREL) tablet 150 mg, 150 mg, Oral, Nightly PRN  paliperidone (INVEGA) extended release tablet 6 mg, 6 mg, Oral, Daily    ASSESSMENT  Schizoaffective disorder, depressive type (HCC)         PLAN  Patient symptoms are: Remains Unstable. Continue current medication regimen. Monitor need and frequency of PRN medications. Encourage participation in groups and milieu. Attempt to develop insight. Psycho-education conducted. Supportive Therapy conducted. Probable discharge is to be determined by MD.   Follow-up daily while inpatient. Patient continues to be monitored in the inpatient psychiatric facility at [facility] for safety and stabilization. Patient continues to need, on a daily basis, active treatment furnished directly by or requiring the supervision of inpatient psychiatric personnel. Electronically signed by MARY Dominguez CNP on 9/25/2021 at 4:13 PM    **This report has been created using voice recognition software. It may contain minor errors which are inherent in voice recognition technology. **

## 2021-09-25 NOTE — PLAN OF CARE
Problem: Depressive Behavior With or Without Suicide Precautions:  Goal: Able to verbalize acceptance of life and situations over which he or she has no control  Description: Able to verbalize acceptance of life and situations over which he or she has no control  9/25/2021 1209 by Tj So LPN  Outcome: Ongoing     Problem: Depressive Behavior With or Without Suicide Precautions:  Goal: Ability to disclose and discuss suicidal ideas will improve  Description: Ability to disclose and discuss suicidal ideas will improve  9/25/2021 1209 by Tj So LPN  Outcome: Ongoing  Note: Patient expresses having AH (banging) and fleeting suicidal ideations with no plan and contracts for safety. Patient is isolative to room except when addressing needs, patient has a flat affect and expresses helpless/hopelessness.      Problem: Depressive Behavior With or Without Suicide Precautions:  Goal: Ability to disclose and discuss suicidal ideas will improve  Description: Ability to disclose and discuss suicidal ideas will improve  9/25/2021 1208 by Tj So LPN  Outcome: Ongoing     Problem: Depressive Behavior With or Without Suicide Precautions:  Goal: Absence of self-harm  Description: Absence of self-harm  9/25/2021 1209 by Tj So LPN  Outcome: Ongoing  9/25/2021 1208 by Tj So LPN  Outcome: Ongoing

## 2021-09-25 NOTE — GROUP NOTE
Group Therapy Note    Date: 9/25/2021    Group Start Time: 1400  Group End Time: 1440  Group Topic: Recreational    [facility]    Pt did not attend Recreational skills group d/t resting in room despite staff invitation to attend. 1:1 talk time offered as alternative.            Signature:  Dennis Diop

## 2021-09-26 LAB
ALBUMIN SERPL-MCNC: 3.5 G/DL (ref 3.5–5.2)
ALBUMIN/GLOBULIN RATIO: ABNORMAL (ref 1–2.5)
ALP BLD-CCNC: 91 U/L (ref 40–129)
ALT SERPL-CCNC: 12 U/L (ref 5–41)
ANION GAP SERPL CALCULATED.3IONS-SCNC: 9 MMOL/L (ref 9–17)
AST SERPL-CCNC: 15 U/L
BILIRUB SERPL-MCNC: 0.37 MG/DL (ref 0.3–1.2)
BILIRUBIN DIRECT: 0.12 MG/DL
BILIRUBIN, INDIRECT: 0.25 MG/DL (ref 0–1)
BUN BLDV-MCNC: 15 MG/DL (ref 8–23)
CALCIUM SERPL-MCNC: 8.7 MG/DL (ref 8.6–10.4)
CHLORIDE BLD-SCNC: 112 MMOL/L (ref 98–107)
CO2: 26 MMOL/L (ref 20–31)
CREAT SERPL-MCNC: 1 MG/DL (ref 0.7–1.2)
GFR AFRICAN AMERICAN: >60 ML/MIN
GFR NON-AFRICAN AMERICAN: >60 ML/MIN
GFR SERPL CREATININE-BSD FRML MDRD: ABNORMAL ML/MIN/{1.73_M2}
GFR SERPL CREATININE-BSD FRML MDRD: ABNORMAL ML/MIN/{1.73_M2}
GLUCOSE BLD-MCNC: 90 MG/DL (ref 70–99)
HCT VFR BLD CALC: 36.3 % (ref 41–53)
HEMOGLOBIN: 11.7 G/DL (ref 13.5–17.5)
MCH RBC QN AUTO: 27.9 PG (ref 26–34)
MCHC RBC AUTO-ENTMCNC: 32.1 G/DL (ref 31–37)
MCV RBC AUTO: 86.7 FL (ref 80–100)
NRBC AUTOMATED: ABNORMAL PER 100 WBC
PDW BLD-RTO: 14 % (ref 11.5–14.9)
PLATELET # BLD: 280 K/UL (ref 150–450)
PMV BLD AUTO: 7.5 FL (ref 6–12)
POTASSIUM SERPL-SCNC: 4.4 MMOL/L (ref 3.7–5.3)
RBC # BLD: 4.18 M/UL (ref 4.5–5.9)
SODIUM BLD-SCNC: 147 MMOL/L (ref 135–144)
TOTAL PROTEIN: 5.9 G/DL (ref 6.4–8.3)
WBC # BLD: 5.1 K/UL (ref 3.5–11)

## 2021-09-26 PROCEDURE — 82248 BILIRUBIN DIRECT: CPT

## 2021-09-26 PROCEDURE — 6370000000 HC RX 637 (ALT 250 FOR IP): Performed by: INTERNAL MEDICINE

## 2021-09-26 PROCEDURE — 85027 COMPLETE CBC AUTOMATED: CPT

## 2021-09-26 PROCEDURE — 99231 SBSQ HOSP IP/OBS SF/LOW 25: CPT | Performed by: INTERNAL MEDICINE

## 2021-09-26 PROCEDURE — 6370000000 HC RX 637 (ALT 250 FOR IP): Performed by: PSYCHIATRY & NEUROLOGY

## 2021-09-26 PROCEDURE — 1240000000 HC EMOTIONAL WELLNESS R&B

## 2021-09-26 PROCEDURE — 80053 COMPREHEN METABOLIC PANEL: CPT

## 2021-09-26 PROCEDURE — 36415 COLL VENOUS BLD VENIPUNCTURE: CPT

## 2021-09-26 PROCEDURE — 99232 SBSQ HOSP IP/OBS MODERATE 35: CPT | Performed by: PSYCHIATRY & NEUROLOGY

## 2021-09-26 RX ADMIN — FAMOTIDINE 20 MG: 20 TABLET, FILM COATED ORAL at 11:20

## 2021-09-26 RX ADMIN — ESCITALOPRAM OXALATE 20 MG: 20 TABLET ORAL at 11:21

## 2021-09-26 RX ADMIN — TRAZODONE HYDROCHLORIDE 150 MG: 150 TABLET ORAL at 22:41

## 2021-09-26 RX ADMIN — FAMOTIDINE 20 MG: 20 TABLET, FILM COATED ORAL at 22:41

## 2021-09-26 RX ADMIN — CLOTRIMAZOLE: 10 CREAM TOPICAL at 11:20

## 2021-09-26 RX ADMIN — Medication 1000 UNITS: at 11:20

## 2021-09-26 RX ADMIN — HYDROXYZINE HYDROCHLORIDE 50 MG: 50 TABLET, FILM COATED ORAL at 22:41

## 2021-09-26 RX ADMIN — PALIPERIDONE 6 MG: 6 TABLET, EXTENDED RELEASE ORAL at 11:20

## 2021-09-26 NOTE — PROGRESS NOTES
Daily Progress Note  9/26/2021    Patient Name: Alyssa Mello    CHIEF COMPLAINT: Depression, suicidal ideation, homicidal ideation, and auditory hallucinations. SUBJECTIVE:      Patient is seen today for a follow up assessment. Medication Adherence: Patient has been medication compliant today. Emergency Medications: Patient has not required any emergency medications today. Patient was resting in his room at the time of approach by writer. He was oriented to self, situation, and location, but was disoriented to the month and year. Writer reoriented patient to the current date. Patient endorses adequate appetite. He reports his sleep was fair last night, but did endorse difficulty falling asleep and difficulty staying asleep. He reports improvement in suicidal ideation, without intent or plans. He denies homicidal ideation, intent, or plans. He contracts for safety on the unit. He endorses intermittent auditory hallucinations and reports hearing 1 voice. He reports he is not sure if it is a male or female voice and reports it is unrecognizable to him. Patient states that auditory hallucinations tell the patient \"to kill people\" and to hurt himself. He rates the loudness of the auditory hallucinations as a 10 out of 10 (010 scale with 0 being no sound and 10 being worst). He denies visual hallucinations. Patient denies paranoia and denies delusions. He denies any medication side effects or medical concerns at the time of assessment. Patient states he is \"ready to go back to treatment\". At this time, the patient is not appropriate for a lower level of care. There is risk of decompensation and patient warrants further hospitalization for safety and stabilization. Group Attendance on Unit:   [] Yes  [] Selectively    [x] No         Mental Status Exam  Level of consciousness: Awake and alert.   Appearance: Appropriate attire for setting, resting in bed, with poor grooming and hygiene. Behavior/Motor: Approachable, psychomotor slowing noted. Attitude toward examiner: Cooperative, attentive, good eye contact   Speech: Delayed rate, low volume, depressed tone. Mood:  Patient reports \"pretty good\". Affect: Depressed. Thought processes: Linear, coherent, goal directed. Thought content: Denies suicidal ideation, intent, or plans. Suicidal Ideation: Denies suicidal ideation, intent, or plans. Contracts for safety on the unit. Delusions/Paranoia: Patient denies delusions. No evidence of delusions noted during assessment. Patient denies paranoia. Perceptual Disturbance: Patient does not appear to be responding to internal stimuli. Patient endorses auditory hallucinations. Denies visual hallucinations. Cognition: He was oriented to self, situation, and location, but was disoriented to the month and year. Writer reoriented patient to the current date. Memory:  Intact  Insight & Judgement: Poor. Data   height is 6' 2\" (1.88 m) and weight is 200 lb (90.7 kg). His temperature is 98.2 °F (36.8 °C). His blood pressure is 105/55 (abnormal) and his pulse is 82. His respiration is 14 and oxygen saturation is 100%. Labs:   Admission on 09/24/2021   Component Date Value Ref Range Status    Specimen Description 09/24/2021 . NASOPHARYNGEAL SWAB   Final    SARS-CoV-2, Rapid 09/24/2021 Not Detected  Not Detected Final    Comment:       Rapid NAAT:  The specimen is NEGATIVE for SARS-CoV-2, the novel coronavirus associated with   COVID-19. The ID NOW COVID-19 assay is designed to detect the virus that causes COVID-19 in patients   with signs and symptoms of infection who are suspected of COVID-19. An individual without symptoms of COVID-19 and who is not shedding SARS-CoV-2 virus would   expect to have a negative (not detected) result in this assay.   Negative results should be treated as presumptive and, if inconsistent with clinical signs   and symptoms or necessary for patient management,  should be tested with an alternative molecular assay. Negative results do not preclude   SARS-CoV-2 infection and   should not be used as the sole basis for patient management decisions. Fact sheet for Healthcare Providers: Brandyn  Fact sheet for Patients: [employer]esha[employer]nav          Methodology: Isothermal Nucleic Acid Amplification      Amphetamine Screen, Ur 09/24/2021 NEGATIVE  NEGATIVE Final    Comment:       (Positive cutoff 1000 ng/mL)                  Barbiturate Screen, Ur 09/24/2021 NEGATIVE  NEGATIVE Final    Comment:       (Positive cutoff 200 ng/mL)                  Benzodiazepine Screen, Urine 09/24/2021 NEGATIVE  NEGATIVE Final    Comment:       (Positive cutoff 200 ng/mL)                  Cocaine Metabolite, Urine 09/24/2021 NEGATIVE  NEGATIVE Final    Comment:       (Positive cutoff 300 ng/mL)                  Methadone Screen, Urine 09/24/2021 NEGATIVE  NEGATIVE Final    Comment:       (Positive cutoff 300 ng/mL)                  Opiates, Urine 09/24/2021 NEGATIVE  NEGATIVE Final    Comment:       (Positive cutoff 300 ng/mL)                  Phencyclidine, Urine 09/24/2021 NEGATIVE  NEGATIVE Final    Comment:       (Positive cutoff 25 ng/mL)                  Propoxyphene, Urine 09/24/2021 NOT REPORTED  NEGATIVE Final    Cannabinoid Scrn, Ur 09/24/2021 NEGATIVE  NEGATIVE Final    Comment:       (Positive cutoff 50 ng/mL)                  Oxycodone Screen, Ur 09/24/2021 NEGATIVE  NEGATIVE Final    Comment:       (Positive cutoff 100 ng/mL)                  Methamphetamine, Urine 09/24/2021 NOT REPORTED  NEGATIVE Final    Tricyclic Antidepressants, Urine 09/24/2021 NOT REPORTED  NEGATIVE Final    MDMA, Urine 09/24/2021 NOT REPORTED  NEGATIVE Final    Buprenorphine Urine 09/24/2021 NOT REPORTED  NEGATIVE Final    Test Information 09/24/2021 Assay provides medical screening only.   The absence of expected drug(s) and/or metabolite(s) may indicate diluted or adulterated urine, limitations of testing or timing of collection. Final    Comment: Testing for legal purposes should be confirmed by another method. To request confirmation   of test result, please call the lab within 7 days of sample submission.       WBC 09/26/2021 5.1  3.5 - 11.0 k/uL Final    RBC 09/26/2021 4.18* 4.5 - 5.9 m/uL Final    Hemoglobin 09/26/2021 11.7* 13.5 - 17.5 g/dL Final    Hematocrit 09/26/2021 36.3* 41 - 53 % Final    MCV 09/26/2021 86.7  80 - 100 fL Final    MCH 09/26/2021 27.9  26 - 34 pg Final    MCHC 09/26/2021 32.1  31 - 37 g/dL Final    RDW 09/26/2021 14.0  11.5 - 14.9 % Final    Platelets 50/43/8018 280  150 - 450 k/uL Final    MPV 09/26/2021 7.5  6.0 - 12.0 fL Final    NRBC Automated 09/26/2021 NOT REPORTED  per 100 WBC Final    Albumin 09/26/2021 3.5  3.5 - 5.2 g/dL Final    Albumin/Globulin Ratio 09/26/2021 NOT REPORTED  1.0 - 2.5 Final    Alkaline Phosphatase 09/26/2021 91  40 - 129 U/L Final    ALT 09/26/2021 12  5 - 41 U/L Final    AST 09/26/2021 15  <40 U/L Final    Total Bilirubin 09/26/2021 0.37  0.3 - 1.2 mg/dL Final    Bilirubin, Direct 09/26/2021 0.12  <0.31 mg/dL Final    Bilirubin, Indirect 09/26/2021 0.25  0.00 - 1.00 mg/dL Final    BUN 09/26/2021 15  8 - 23 mg/dL Final    Calcium 09/26/2021 8.7  8.6 - 10.4 mg/dL Final    CREATININE 09/26/2021 1.00  0.70 - 1.20 mg/dL Final    Glucose 09/26/2021 90  70 - 99 mg/dL Final    Total Protein 09/26/2021 5.9* 6.4 - 8.3 g/dL Final    Sodium 09/26/2021 147* 135 - 144 mmol/L Final    Potassium 09/26/2021 4.4  3.7 - 5.3 mmol/L Final    Chloride 09/26/2021 112* 98 - 107 mmol/L Final    CO2 09/26/2021 26  20 - 31 mmol/L Final    Anion Gap 09/26/2021 9  9 - 17 mmol/L Final    GFR Non- 09/26/2021 >60  >60 mL/min Final    GFR  09/26/2021 >60  >60 mL/min Final    GFR Comment 09/26/2021        Final    Comment: Average GFR for 61-76 years old:   80 mL/min/1.73sq m  Chronic Kidney Disease:   <60 mL/min/1.73sq m  Kidney failure:   <15 mL/min/1.73sq m              eGFR calculated using average adult body mass. Additional eGFR calculator available at:        [employer]            GFR Staging 09/26/2021 NOT REPORTED   Final         Reviewed patient's current plan of care and vital signs with nursing staff. · Per review of vital signs on 9/26/2021, no vital signs were documented on 9/26/2021. Previously documented vital signs reviewed. Labs reviewed: [x] Yes  Last EKG in EMR reviewed: [x] Yes  QTc: 452.     Medications  Current Facility-Administered Medications: famotidine (PEPCID) tablet 20 mg, 20 mg, Oral, BID  acetaminophen (TYLENOL) tablet 650 mg, 650 mg, Oral, Q4H PRN  ibuprofen (ADVIL;MOTRIN) tablet 400 mg, 400 mg, Oral, Q6H PRN  aluminum & magnesium hydroxide-simethicone (MAALOX) 200-200-20 MG/5ML suspension 30 mL, 30 mL, Oral, Q6H PRN  polyethylene glycol (GLYCOLAX) packet 17 g, 17 g, Oral, Daily PRN  nicotine (NICODERM CQ) 21 MG/24HR 1 patch, 1 patch, TransDERmal, Daily  LORazepam (ATIVAN) injection 2 mg, 2 mg, IntraMUSCular, Q4H PRN **AND** haloperidol lactate (HALDOL) injection 5 mg, 5 mg, IntraMUSCular, Q4H PRN **AND** diphenhydrAMINE (BENADRYL) injection 25 mg, 25 mg, IntraMUSCular, Q4H PRN  LORazepam (ATIVAN) tablet 2 mg, 2 mg, Oral, Q4H PRN **AND** haloperidol (HALDOL) tablet 5 mg, 5 mg, Oral, Q4H PRN  Vitamin D (CHOLECALCIFEROL) tablet 1,000 Units, 1,000 Units, Oral, Daily  clotrimazole (LOTRIMIN) 1 % cream, , Topical, BID  escitalopram (LEXAPRO) tablet 20 mg, 20 mg, Oral, Daily  nicotine polacrilex (NICORETTE) gum 2 mg, 2 mg, Oral, Q1H PRN  hydrOXYzine (ATARAX) tablet 50 mg, 50 mg, Oral, TID PRN  traZODone (DESYREL) tablet 150 mg, 150 mg, Oral, Nightly PRN  paliperidone (INVEGA) extended release tablet 6 mg, 6 mg, Oral, Daily    ASSESSMENT  Schizoaffective disorder, depressive type [facility])         PLAN  Patient symptoms show: Slight improvement. Continue current medication regimen. Consider long acting injectable. Monitor need and frequency of PRN medications. New order: Consult to internal medicine: Abnormal labs from 9/26/21. Encourage participation in groups and milieu. Attempt to develop insight. Psycho-education conducted. Supportive Therapy conducted. Probable discharge is to be determined by MD.   Follow-up daily while inpatient. Patient continues to be monitored in the inpatient psychiatric facility at [facility] for safety and stabilization. Patient continues to need, on a daily basis, active treatment furnished directly by or requiring the supervision of inpatient psychiatric personnel. Electronically signed by MARY Larose CNP on 9/26/2021 at 4:29 PM    **This report has been created using voice recognition software. It may contain minor errors which are inherent in voice recognition technology. **

## 2021-09-26 NOTE — PLAN OF CARE
Problem: Depressive Behavior With or Without Suicide Precautions:  Goal: Able to verbalize acceptance of life and situations over which he or she has no control  Description: Able to verbalize acceptance of life and situations over which he or she has no control  9/26/2021 1950 by Danae Tiwari LPN  Outcome: Ongoing     Problem: Depressive Behavior With or Without Suicide Precautions:  Goal: Ability to disclose and discuss suicidal ideas will improve  Description: Ability to disclose and discuss suicidal ideas will improve  Outcome: Ongoing     Problem: Depressive Behavior With or Without Suicide Precautions:  Goal: Absence of self-harm  Description: Absence of self-harm  9/26/2021 1950 by Danae Tiwari LPN  Outcome: Ongoing

## 2021-09-26 NOTE — PLAN OF CARE
Problem: Depressive Behavior With or Without Suicide Precautions:  Goal: Able to verbalize acceptance of life and situations over which he or she has no control  Description: Able to verbalize acceptance of life and situations over which he or she has no control  9/26/2021 1353 by Wicho Talavera LPN  Outcome: Ongoing   Rena Ramos is seen in his room affect is flat, denies any thoughts to harm self reports poor sleep, comes out for meals and needs then spends rest of shift in bed. Cooperative, took meds. Reports audio hallucinations, of  negative voices.  15 minute safety checks continue     Problem: Depressive Behavior With or Without Suicide Precautions:  Goal: Absence of self-harm  Description: Absence of self-harm  Outcome: Ongoing   denies any thoughts to harm self

## 2021-09-27 PROCEDURE — 1240000000 HC EMOTIONAL WELLNESS R&B

## 2021-09-27 PROCEDURE — 99231 SBSQ HOSP IP/OBS SF/LOW 25: CPT | Performed by: INTERNAL MEDICINE

## 2021-09-27 PROCEDURE — 6370000000 HC RX 637 (ALT 250 FOR IP): Performed by: INTERNAL MEDICINE

## 2021-09-27 PROCEDURE — 6370000000 HC RX 637 (ALT 250 FOR IP): Performed by: PSYCHIATRY & NEUROLOGY

## 2021-09-27 PROCEDURE — APPSS30 APP SPLIT SHARED TIME 16-30 MINUTES: Performed by: NURSE PRACTITIONER

## 2021-09-27 PROCEDURE — 99232 SBSQ HOSP IP/OBS MODERATE 35: CPT | Performed by: PSYCHIATRY & NEUROLOGY

## 2021-09-27 RX ADMIN — FAMOTIDINE 20 MG: 20 TABLET, FILM COATED ORAL at 08:39

## 2021-09-27 RX ADMIN — ESCITALOPRAM OXALATE 20 MG: 20 TABLET ORAL at 08:39

## 2021-09-27 RX ADMIN — HYDROXYZINE HYDROCHLORIDE 50 MG: 50 TABLET, FILM COATED ORAL at 08:39

## 2021-09-27 RX ADMIN — Medication 1000 UNITS: at 08:38

## 2021-09-27 RX ADMIN — PALIPERIDONE 6 MG: 6 TABLET, EXTENDED RELEASE ORAL at 08:38

## 2021-09-27 ASSESSMENT — PAIN SCALES - GENERAL: PAINLEVEL_OUTOF10: 0

## 2021-09-27 NOTE — PROGRESS NOTES
Last given Invega sustenna 234 mg on 7/6/21 in the [facility]. Left message for PALOMO Mane RN at [facility] to confirm if patient has gotten any doses outpatient since then.

## 2021-09-27 NOTE — GROUP NOTE
Group Therapy Note    Date: 9/27/2021    Group Start Time: 1100  Group End Time: 2142  Group Topic: Cognitive Skills    JESUS Garcia, CTRS        Group Therapy Note    Attendees: 11/20         Pt did not participate in Cognitive Skills Group at 1100am when encouraged by RT due to resting in room. Pt was offered talk time as an alternative to group but declined.          Discipline Responsible: Psychoeducational Specialist        Signature:  Morris Mckenzie

## 2021-09-27 NOTE — GROUP NOTE
Group Therapy Note    Date: 9/27/2021    Group Start Time: 1000  Group End Time: 3449  Group Topic: Psychotherapy    [facility]    FRANKLIN Lazo, [facility]        Group Therapy Note  Patient declined to attend psychotherapy group at 10 am despite encouragement by staff. 1:1 was offered as an alternative.             Signature:  FRANKLIN Lazo, [place]

## 2021-09-27 NOTE — PROGRESS NOTES
Daily Progress Note  9/27/2021    Patient Name: Lloyd Zamora    CHIEF COMPLAINT: Depression, suicidal ideation, homicidal ideation, and auditory hallucinations. SUBJECTIVE:      Patient is seen today for a follow up assessment. He has psychotropic medication compliant and is denying any side effects. He is currently taking Invega 6 mg. Spoke with unit pharmacist to confirm last administration of [place] to determine need for follow-up injection. Patient is resting in his room and is noncooperative with assessment. He refuses to respond to questions though he opens his eyes to look at writer occasionally. Staff report that he does occasionally leave his room and is social with select peers, but otherwise is aloof and withdrawn. Patient would not respond to questions regarding suicidal/homicidal ideation or perceptual disturbances. He is requesting as needed hydroxyzine for anxiety though does not respond to questions by this writer regarding anxiety. We will continue inpatient hospitalization as patient has yet to demonstrate stability at this time. Medication Adherence: Adherent    Group Attendance on Unit:   [] Yes  [] Selectively    [x] No         Mental Status Exam  Level of consciousness: Awake and alert. Appearance: Appropriate attire for setting, resting in bed, with poor grooming and hygiene. Behavior/Motor: Approachable, psychomotor slowing noted. Attitude toward examiner: Noncooperative, no eye contact  Speech: No verbal response  Mood: No response  Affect: Depressed. Thought processes: Unable to assess due to patient participation  Thought content: Unable to assess due to patient participation  Suicidal Ideation: Patient refused to answer  Delusions/Paranoia: Patient refusing assessment  Perceptual Disturbance: Patient refused assessment, would not respond to questions   cognition: Would not provide verbal answers   Memory:  Intact  Insight & Judgement: Poor. Data   height is 6' 2\" (1.88 m) and weight is 200 lb (90.7 kg). His oral temperature is 98 °F (36.7 °C). His blood pressure is 118/58 (abnormal) and his pulse is 71. His respiration is 14 and oxygen saturation is 100%. Labs:   Admission on 09/24/2021   Component Date Value Ref Range Status    Specimen Description 09/24/2021 . NASOPHARYNGEAL SWAB   Final    SARS-CoV-2, Rapid 09/24/2021 Not Detected  Not Detected Final    Comment:       Rapid NAAT:  The specimen is NEGATIVE for SARS-CoV-2, the novel coronavirus associated with   COVID-19. The ID NOW COVID-19 assay is designed to detect the virus that causes COVID-19 in patients   with signs and symptoms of infection who are suspected of COVID-19. An individual without symptoms of COVID-19 and who is not shedding SARS-CoV-2 virus would   expect to have a negative (not detected) result in this assay. Negative results should be treated as presumptive and, if inconsistent with clinical signs   and symptoms or necessary for patient management,  should be tested with an alternative molecular assay. Negative results do not preclude   SARS-CoV-2 infection and   should not be used as the sole basis for patient management decisions.          Fact sheet for Healthcare Providers: Iesha.es  Fact sheet for Patients: Iesha.es          Methodology: Isothermal Nucleic Acid Amplification      Amphetamine Screen, Ur 09/24/2021 NEGATIVE  NEGATIVE Final    Comment:       (Positive cutoff 1000 ng/mL)                  Barbiturate Screen, Ur 09/24/2021 NEGATIVE  NEGATIVE Final    Comment:       (Positive cutoff 200 ng/mL)                  Benzodiazepine Screen, Urine 09/24/2021 NEGATIVE  NEGATIVE Final    Comment:       (Positive cutoff 200 ng/mL)                  Cocaine Metabolite, Urine 09/24/2021 NEGATIVE  NEGATIVE Final    Comment:       (Positive cutoff 300 ng/mL)                  Methadone Screen, Urine 09/24/2021 NEGATIVE  NEGATIVE Final    Comment:       (Positive cutoff 300 ng/mL)                  Opiates, Urine 09/24/2021 NEGATIVE  NEGATIVE Final    Comment:       (Positive cutoff 300 ng/mL)                  Phencyclidine, Urine 09/24/2021 NEGATIVE  NEGATIVE Final    Comment:       (Positive cutoff 25 ng/mL)                  Propoxyphene, Urine 09/24/2021 NOT REPORTED  NEGATIVE Final    Cannabinoid Scrn, Ur 09/24/2021 NEGATIVE  NEGATIVE Final    Comment:       (Positive cutoff 50 ng/mL)                  Oxycodone Screen, Ur 09/24/2021 NEGATIVE  NEGATIVE Final    Comment:       (Positive cutoff 100 ng/mL)                  Methamphetamine, Urine 09/24/2021 NOT REPORTED  NEGATIVE Final    Tricyclic Antidepressants, Urine 09/24/2021 NOT REPORTED  NEGATIVE Final    MDMA, Urine 09/24/2021 NOT REPORTED  NEGATIVE Final    Buprenorphine Urine 09/24/2021 NOT REPORTED  NEGATIVE Final    Test Information 09/24/2021 Assay provides medical screening only. The absence of expected drug(s) and/or metabolite(s) may indicate diluted or adulterated urine, limitations of testing or timing of collection. Final    Comment: Testing for legal purposes should be confirmed by another method. To request confirmation   of test result, please call the lab within 7 days of sample submission.       WBC 09/26/2021 5.1  3.5 - 11.0 k/uL Final    RBC 09/26/2021 4.18* 4.5 - 5.9 m/uL Final    Hemoglobin 09/26/2021 11.7* 13.5 - 17.5 g/dL Final    Hematocrit 09/26/2021 36.3* 41 - 53 % Final    MCV 09/26/2021 86.7  80 - 100 fL Final    MCH 09/26/2021 27.9  26 - 34 pg Final    MCHC 09/26/2021 32.1  31 - 37 g/dL Final    RDW 09/26/2021 14.0  11.5 - 14.9 % Final    Platelets 64/87/2771 280  150 - 450 k/uL Final    MPV 09/26/2021 7.5  6.0 - 12.0 fL Final    NRBC Automated 09/26/2021 NOT REPORTED  per 100 WBC Final    Albumin 09/26/2021 3.5  3.5 - 5.2 g/dL Final    Albumin/Globulin Ratio 09/26/2021 NOT REPORTED  1.0 - 2.5 Final    Alkaline Phosphatase 09/26/2021 91  40 - 129 U/L Final    ALT 09/26/2021 12  5 - 41 U/L Final    AST 09/26/2021 15  <40 U/L Final    Total Bilirubin 09/26/2021 0.37  0.3 - 1.2 mg/dL Final    Bilirubin, Direct 09/26/2021 0.12  <0.31 mg/dL Final    Bilirubin, Indirect 09/26/2021 0.25  0.00 - 1.00 mg/dL Final    BUN 09/26/2021 15  8 - 23 mg/dL Final    Calcium 09/26/2021 8.7  8.6 - 10.4 mg/dL Final    CREATININE 09/26/2021 1.00  0.70 - 1.20 mg/dL Final    Glucose 09/26/2021 90  70 - 99 mg/dL Final    Total Protein 09/26/2021 5.9* 6.4 - 8.3 g/dL Final    Sodium 09/26/2021 147* 135 - 144 mmol/L Final    Potassium 09/26/2021 4.4  3.7 - 5.3 mmol/L Final    Chloride 09/26/2021 112* 98 - 107 mmol/L Final    CO2 09/26/2021 26  20 - 31 mmol/L Final    Anion Gap 09/26/2021 9  9 - 17 mmol/L Final    GFR Non- 09/26/2021 >60  >60 mL/min Final    GFR  09/26/2021 >60  >60 mL/min Final    GFR Comment 09/26/2021        Final    Comment: Average GFR for 61-76 years old:   80 mL/min/1.73sq m  Chronic Kidney Disease:   <60 mL/min/1.73sq m  Kidney failure:   <15 mL/min/1.73sq m              eGFR calculated using average adult body mass. Additional eGFR calculator available at:        [employer]br            GFR Staging 09/26/2021 NOT REPORTED   Final         Reviewed patient's current plan of care and vital signs with nursing staff. · Per review of vital signs on 9/26/2021, no vital signs were documented on 9/26/2021. Previously documented vital signs reviewed. Labs reviewed: [x] Yes  Last EKG in EMR reviewed: [x] Yes  QTc: 452.     Medications  Current Facility-Administered Medications: famotidine (PEPCID) tablet 20 mg, 20 mg, Oral, BID  acetaminophen (TYLENOL) tablet 650 mg, 650 mg, Oral, Q4H PRN  ibuprofen (ADVIL;MOTRIN) tablet 400 mg, 400 mg, Oral, Q6H PRN  aluminum & magnesium hydroxide-simethicone (MAALOX) 200-200-20 MG/5ML suspension 30 mL, 30 mL, Oral, Q6H PRN  polyethylene glycol (GLYCOLAX) packet 17 g, 17 g, Oral, Daily PRN  nicotine (NICODERM CQ) 21 MG/24HR 1 patch, 1 patch, TransDERmal, Daily  LORazepam (ATIVAN) injection 2 mg, 2 mg, IntraMUSCular, Q4H PRN **AND** haloperidol lactate (HALDOL) injection 5 mg, 5 mg, IntraMUSCular, Q4H PRN **AND** diphenhydrAMINE (BENADRYL) injection 25 mg, 25 mg, IntraMUSCular, Q4H PRN  LORazepam (ATIVAN) tablet 2 mg, 2 mg, Oral, Q4H PRN **AND** haloperidol (HALDOL) tablet 5 mg, 5 mg, Oral, Q4H PRN  Vitamin D (CHOLECALCIFEROL) tablet 1,000 Units, 1,000 Units, Oral, Daily  clotrimazole (LOTRIMIN) 1 % cream, , Topical, BID  escitalopram (LEXAPRO) tablet 20 mg, 20 mg, Oral, Daily  nicotine polacrilex (NICORETTE) gum 2 mg, 2 mg, Oral, Q1H PRN  hydrOXYzine (ATARAX) tablet 50 mg, 50 mg, Oral, TID PRN  traZODone (DESYREL) tablet 150 mg, 150 mg, Oral, Nightly PRN  paliperidone (INVEGA) extended release tablet 6 mg, 6 mg, Oral, Daily    ASSESSMENT  Schizoaffective disorder, depressive type ([facility] [address]         PLAN  Patient symptoms show: Slight improvement. Pharmacist determining last administration of [place]. Plan to administer follow-up dose if due. Monitor need and frequency of PRN medications. Encourage participation in groups and milieu. I was it was  Attempt to develop insight. Psycho-education conducted. Supportive Therapy conducted. Probable discharge is to be determined by MD.   Follow-up daily while inpatient. Patient continues to be monitored in the inpatient psychiatric facility at [facility] for safety and stabilization. Patient continues to need, on a daily basis, active treatment furnished directly by or requiring the supervision of inpatient psychiatric personnel. Electronically signed by MARY Baires CNP on 9/27/2021 at 2:53 PM    **This report has been created using voice recognition software.  It may contain minor errors which are inherent in voice recognition technology. **  I independently saw and evaluated the patient. I reviewed the midlevel provider's documentation above. Any additional comments or changes to the midlevel provider's documentation are stated below otherwise agree with assessment. The patient reports a slight improvement in his mood. He has been compliant with medications. He continues to have some auditory hallucinations but they are very much less frequent and less intrusive. PLAN  Medications as noted above  Attempt to develop insight  Psycho-education conducted. Supportive Therapy conducted.   Probable discharge is 1 days  Follow-up daily while on inpatient unit    Electronically signed by Erma Wayne MD on 9/27/21 at 7:30 PM EDT

## 2021-09-27 NOTE — CONSULTS
Pending sale to [facility]    CONSULTATION    / FOLLOW UP VISIT       Date:   9/27/2021  Patient name:  Ktaarzyna Heading  Date of admission:  9/24/2021  9:44 AM  MRN:   [id number]  Account:  [de-identified]  YOB: 1960  PCP:    No primary care provider on file. Room:   [address] [place] [address]  Code Status:    Full Code    Physician Requesting Consult: Blas Dias MD    History of Present Illness:      C/C ;  Medical comorbidity management     REASON FOR CONSULT;  Medical comorbidity and medication management ;                                                 *Principal Problem:    Schizoaffective disorder, depressive type ([facility] [address]  Active Problems:    Smoker    Elevated BP without diagnosis of hypertension    Lower urinary tract symptoms (LUTS)    Dyspepsia    Skin rash    Hypernatremia  Resolved Problems:    * No resolved hospital problems. *           HPI;    Patient had elevated blood pressure which is getting better  Complains of lower urinary tract symptoms  Also some dyspepsia    And has some skin rashes       Vitals:    09/24/21 2000 09/25/21 0800 09/25/21 1930 09/26/21 2006   BP: 126/77 120/69 (!) 105/55 (!) 118/58   Pulse: 79 81 82 71   Resp: 14 14 14 14   Temp: 98 °F (36.7 °C) 98 °F (36.7 °C) 98.2 °F (36.8 °C) 98 °F (36.7 °C)   TempSrc:  Oral  Oral   SpO2:       Weight:       Height:                    Past and Surgical hx as in H and P  Social History:     Tobacco:    reports that he has been smoking cigarettes. He has a 47.00 pack-year smoking history. He has never used smokeless tobacco.  Alcohol:      reports current alcohol use. Drug Use:  reports current drug use. Frequency: 7.00 times per week. Drug: Cocaine.     Review of Systems:     POSITIVE AND NEGATIVES AS DESCRIBED IN HISTORY OF PRESENT ILLNESS ;  IN ADDITION ;  Review of Systems          All other systems negative                Physical Exam:     Physical Exam   Vitals:    09/24/21 2000 09/25/21 0800 09/25/21 1930 09/26/21 2006   BP: 126/77 120/69 (!) 105/55 (!) 118/58   Pulse: 79 81 82 71   Resp: 14 14 14 14   Temp: 98 °F (36.7 °C) 98 °F (36.7 °C) 98.2 °F (36.8 °C) 98 °F (36.7 °C)   TempSrc:  Oral  Oral   SpO2:       Weight:       Height:                       Body mass index is 25.68 kg/m². General Appearance:   -, CO-OPERATIVE ,                                                        Pulmonary/Chest:        Clear to auscultation bilaterally . No wheezes, rales or rhonchi . Cardiovascular:            Normal rate, regular rhythm,                                          No murmur or  Gallop . Abdomen:                       Soft, non-tender                                                                                    Extremities:                    No Edema . Neuromuskuloskeletal    .chronic skin lesions asymptomatic. .     Data:     URINE ANALYSIS: No results found for: LABURIN     CBC:  Lab Results   Component Value Date    WBC 5.1 09/26/2021    HGB 11.7 09/26/2021     09/26/2021     05/18/2012        BMP:    Lab Results   Component Value Date     09/26/2021    K 4.4 09/26/2021     09/26/2021    CO2 26 09/26/2021    BUN 15 09/26/2021    CREATININE 1.00 09/26/2021    GLUCOSE 90 09/26/2021    GLUCOSE 152 05/18/2012      LIVER PROFILE:  Lab Results   Component Value Date    ALT 12 09/26/2021    AST 15 09/26/2021    PROT 5.9 09/26/2021    BILITOT 0.37 09/26/2021    BILIDIR 0.12 09/26/2021    LABALBU 3.5 09/26/2021    LABALBU 3.9 05/18/2012             Radiology:         Medications: Allergies:     Allergies   Allergen Reactions    Navane [Thiothixene]        Current Meds:   Scheduled Meds:    famotidine  20 mg Oral BID    nicotine  1 patch TransDERmal Daily    Vitamin D  1,000 Units Oral Daily    clotrimazole   Topical BID    escitalopram  20 mg Oral Daily    paliperidone  6 mg Oral Daily     Continuous Infusions:   PRN Meds: acetaminophen, ibuprofen, aluminum & magnesium hydroxide-simethicone, polyethylene glycol, LORazepam **AND** haloperidol lactate **AND** diphenhydrAMINE, LORazepam **AND** haloperidol, nicotine polacrilex, hydrOXYzine, traZODone        Assessment :       Assessment Dx  Principal Problem:    Schizoaffective disorder, depressive type (HCC)  Active Problems:    Smoker    Elevated BP without diagnosis of hypertension    Lower urinary tract symptoms (LUTS)    Dyspepsia    Skin rash    Hypernatremia  Resolved Problems:    * No resolved hospital problems. *              Plan: Will add Pepcid for dyspepsia    Will order UA with microscopy to rule out urinary tract infection    rash           9/26/21    · Will encourage fluid intake . Mild hypernatremia  · Skin rash chronic , asymptomatic . 1.           9/27/21    · Blood pressure is good  · Labs reviewed    BMP: Recent Labs     09/26/21  0730   *   K 4.4   CO2 26   BUN 15   CREATININE 1.00   LABGLOM >60   GLUCOSE 90          ·    2. Continue current medications  3. Dyspepsia improved        Thanks for consulting us . Will monitor vitals and clinical course , and  Optimize therapy  as needed . Desean Sparks MD    Copy sent to Dr. Valeria Mcmahon primary care provider on file. Pleasenote that this chart was generated using voice recognition Dragon dictation software. Although every effort was made to ensure the accuracy of this automated transcription, some errors in transcription may have occurred.

## 2021-09-27 NOTE — GROUP NOTE
Group Therapy Note    Date: 9/27/2021    Group Start Time: 1430  Group End Time: 3500  Group Topic: Cognitive Skills    JESUS Zhong, CTRS        Group Therapy Note    Attendees: 7/18         Pt did not participate in Cognitive Skills Group at 1430 when encouraged by RT due to resting in room. Pt was offered talk time as an alternative to group but declined.          Discipline Responsible: Psychoeducational Specialist        Signature:  Katherin Montero

## 2021-09-27 NOTE — PLAN OF CARE
Problem: Depressive Behavior With or Without Suicide Precautions:  Goal: Able to verbalize acceptance of life and situations over which he or she has no control  Description: Able to verbalize acceptance of life and situations over which he or she has no control  Outcome: Ongoing  Note: Patient reports fleeting suicidal ideations and constant auditory hallucinations. Patient lacks interest or motivation and he isolates to his room except when addressing his needs. Patient is aloof among peers with a flat sad affect. The benefits of programming reinforced verbally.      Problem: Depressive Behavior With or Without Suicide Precautions:  Goal: Ability to disclose and discuss suicidal ideas will improve  Description: Ability to disclose and discuss suicidal ideas will improve  Outcome: Ongoing     Problem: Depressive Behavior With or Without Suicide Precautions:  Goal: Absence of self-harm  Description: Absence of self-harm  Outcome: Ongoing

## 2021-09-28 VITALS
HEART RATE: 98 BPM | HEIGHT: 74 IN | BODY MASS INDEX: 25.67 KG/M2 | WEIGHT: 200 LBS | DIASTOLIC BLOOD PRESSURE: 56 MMHG | SYSTOLIC BLOOD PRESSURE: 88 MMHG | TEMPERATURE: 98.7 F | OXYGEN SATURATION: 100 % | RESPIRATION RATE: 14 BRPM

## 2021-09-28 PROCEDURE — 99231 SBSQ HOSP IP/OBS SF/LOW 25: CPT | Performed by: INTERNAL MEDICINE

## 2021-09-28 PROCEDURE — 6370000000 HC RX 637 (ALT 250 FOR IP): Performed by: PSYCHIATRY & NEUROLOGY

## 2021-09-28 PROCEDURE — 99239 HOSP IP/OBS DSCHRG MGMT >30: CPT | Performed by: PSYCHIATRY & NEUROLOGY

## 2021-09-28 PROCEDURE — 6370000000 HC RX 637 (ALT 250 FOR IP): Performed by: INTERNAL MEDICINE

## 2021-09-28 RX ORDER — ESCITALOPRAM OXALATE 20 MG/1
20 TABLET ORAL DAILY
Qty: 30 TABLET | Refills: 0 | Status: ON HOLD | OUTPATIENT
Start: 2021-09-28 | End: 2021-10-27 | Stop reason: HOSPADM

## 2021-09-28 RX ORDER — PALIPERIDONE 6 MG/1
6 TABLET, EXTENDED RELEASE ORAL DAILY
Qty: 30 TABLET | Refills: 3 | Status: ON HOLD | OUTPATIENT
Start: 2021-09-29 | End: 2021-10-27 | Stop reason: HOSPADM

## 2021-09-28 RX ORDER — TRAZODONE HYDROCHLORIDE 150 MG/1
150 TABLET ORAL NIGHTLY PRN
Qty: 30 TABLET | Refills: 0 | Status: ON HOLD | OUTPATIENT
Start: 2021-09-28 | End: 2021-10-27 | Stop reason: SDUPTHER

## 2021-09-28 RX ADMIN — PALIPERIDONE 6 MG: 6 TABLET, EXTENDED RELEASE ORAL at 08:50

## 2021-09-28 RX ADMIN — Medication 1000 UNITS: at 08:50

## 2021-09-28 RX ADMIN — ESCITALOPRAM OXALATE 20 MG: 20 TABLET ORAL at 08:50

## 2021-09-28 RX ADMIN — FAMOTIDINE 20 MG: 20 TABLET, FILM COATED ORAL at 08:50

## 2021-09-28 ASSESSMENT — PAIN SCALES - GENERAL: PAINLEVEL_OUTOF10: 0

## 2021-09-28 NOTE — BH NOTE
Patient's [facility] was scheduled at 13:35 and writer spoke with [facility] confirmation #[id number],  stated \"it can take up to 3 hours. \"  Writer explained that the patient did not have a cell phone and the unit would need to be called so staff could bring the patient down when the cab arrives. Writer called at 16:49 and spoke with Melba Mae and per  Tracee was contacted and texted the patient, writer reiterated the patient does not have cell phone and the unit needs called when they arrive so we can bring the patient down. Per Melba Mae the ride would be arriving in 10-15 minutes. Writer called [employer] a third time at 17:40 and spoke with Jazmine Card who state Tracee cancelled their ride and she would escalate the situation and the new confirmation number is [id number] and \"it can take up to three hours for the insurance cab to come. \"  Writer called the charge nurse and explained the situation. Per the charge nurse Nadya CHISHOLM gave consent for the patient to go via [employer].

## 2021-09-28 NOTE — PLAN OF CARE
Problem: Depressive Behavior With or Without Suicide Precautions:  Goal: Able to verbalize acceptance of life and situations over which he or she has no control  Description: Able to verbalize acceptance of life and situations over which he or she has no control  9/28/2021 1206 by Can Sandra RN  Outcome: Completed  9/27/2021 2228 by Ghada Beasley RN  Outcome: Ongoing  Goal: Ability to disclose and discuss suicidal ideas will improve  Description: Ability to disclose and discuss suicidal ideas will improve  9/28/2021 1206 by Can Sandra RN  Outcome: Completed  9/27/2021 2228 by Ghada Beasley RN  Outcome: Ongoing  Goal: Absence of self-harm  Description: Absence of self-harm  9/28/2021 1206 by Can Sandra RN  Outcome: Completed  9/27/2021 2228 by Ghada Beasley RN  Outcome: Ongoing

## 2021-09-28 NOTE — GROUP NOTE
Group Therapy Note    Date: 9/28/2021    Group Start Time: 1430  Group End Time: 1520  Group Topic: Cognitive Skills    JESUS Montero, CTRS        Group Therapy Note    Attendees: 4/16         Pt did not participate in Cognitive Skills Group at 1430 when encouraged by RT due to pt up in day room, waiting to be discharged. Pt was offered talk time as an alternative to group but declined.          Discipline Responsible: Psychoeducational Specialist        Signature:  Loida Leija

## 2021-09-28 NOTE — GROUP NOTE
Group Therapy Note    Date: 9/28/2021    Group Start Time: 1000  Group End Time: 5354  Group Topic: Psychotherapy    [facility]    FRANKLIN Gaffney, [facility]        Group Therapy Note    Patient declined to attend psychotherapy group at 10 am despite encouragement by staff. 1:1 was offered as an alternative.             Signature:  FRANKLIN Gaffney, [place]

## 2021-09-28 NOTE — SUICIDE SAFETY PLAN
SAFETY PLAN    A suicide Safety Plan is a document that supports someone when they are having thoughts of suicide. Warning Signs that indicate a suicidal crisis may be developing: What (situations, thoughts, feelings, body sensations, behaviors, etc.) do you experience that lets you know you are beginning to think about suicide? . Go off medications  2. Mood is depressed and start to feel sad, hopeless, helpless, guilty, decline in self-esteem, excess worry, no interest in doing any pleasurable activities, unable to concentrate  3. Begin to cry over the smallest of things  4. Not eating or sleeping as normal  5. Relationship issues start happening  6. I become angry and start a fight  7. When I dont listen or respond to people in a good, positive way  Internal Coping Strategies:  What things can I do (relaxation techniques, hobbies, physical activities, etc.) to take my mind off my problems without contacting another person? 1. Go to hospital discharge appointments and follow-up with community mental health counseling  2. Talk with other people  3. Learn to identify and control your emotions by new ways  4. Think before you speak or act; walk away from the situation  5. Join a support group in person or on Social Media  6. Take a time-out  7. Take deep breaths; use relaxation techniques  8. Get some exercise; go for a walk  9. Read; listen to music; watch a funny movie   People whom I can ask for help: Who can I call when I need help - for example, friends, family, clergy, someone else? [address], [address] [facility]  [phone]  fax [phone]    Professionals or [address] I can contact during a crisis: Who can I call for help - for example, my doctor, my psychiatrist, my psychologist, a mental health provider, a suicide hotline?   Suicide Prevention Lifeline: 7-318-420-TALK (2710)  [facility] 2-1-5 (388) 598-7028 or (518) 212-7287  [facility] Mental Illness) groups and support, [address] [phone]) [phone]    4. [address] [place] [address] Emergency Services -  for example, Community Mental            [address], [address] [facility] [address]  Madhav, Crisis line: [address] [facility] 72-JQOQ Crisis Response Team (Crisis Intervention Team - [place]), [phone] or 9-1-1  [address] [facility], [address] Association of Mental Illness, 7-880.354.6351  [facility] [address] [facility], 6-085-407-HELP (6494)   Crisis Text Line, Text 4HOPE to 708568 to connect with a crisis counselor  [address] [place] [address], 5-915.833.8036  [facility] (Rape, [address]), [phone]  [facility], [address] [facility] 124  [address], [address] [facility], [place] [address] [phone]  [facility], [address] [place] [address]., [facility], [facility] [address] [facility], [address] [facility], [phone]    Making the environment safe: How can I make my environment (house/apartment/living space) safer? For example, can I remove guns, medications, and other items? 1. Throw away all medications not being taken  2.  Plan daily goals to help remember to stay on specific medications

## 2021-09-28 NOTE — GROUP NOTE
Group Therapy Note    Date: 9/28/2021    Group Start Time: 1100  Group End Time: 8254  Group Topic: Cognitive Skills    JESUS Pineda, CARMEN        Group Therapy Note    Attendees: 8/18         Pt did not participate in Cognitive Skills Group at 1100am when encouraged by RT due to resting in room. Pt was offered talk time as an alternative to group but declined.          Discipline Responsible: Psychoeducational Specialist        Signature:  Pam Shaikh
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
## 2021-09-28 NOTE — PLAN OF CARE
Problem: Depressive Behavior With or Without Suicide Precautions:  Goal: Ability to disclose and discuss suicidal ideas will improve  Description: Ability to disclose and discuss suicidal ideas will improve  9/27/2021 2228 by Wes Ely RN  Outcome: Ongoing     Problem: Depressive Behavior With or Without Suicide Precautions:  Goal: Able to verbalize acceptance of life and situations over which he or she has no control  Description: Able to verbalize acceptance of life and situations over which he or she has no control  9/27/2021 2228 by Wes Ely RN  Outcome: Ongoing     Problem: Depressive Behavior With or Without Suicide Precautions:  Goal: Absence of self-harm  Description: Absence of self-harm  9/27/2021 2228 by Wes Ely RN  Outcome: Ongoing     No self harm noted this shift. Patient reported having auditory hallucinations and feeling depressed. He answered questions minimally. Patient refused to get his medications. RN attempted to talk to him, but patient became irritated and ignored RN. Safety precautions in place and Q 15 minute checks completed.

## 2021-09-28 NOTE — BH NOTE
RN asked patient multiple times to take his medications. Patient would reply ok, but not take them. RN went to patient's room and asked if he was taking his medications tonight. Patient ignored her. RN told patient that if he wasn't going to take them then she would put them away. Patient became irritated and claimed that RN would not let him get out of bed. Patient never took his mediations.

## 2021-09-28 NOTE — BH NOTE
Patient refused to attend coping skills and goals group at 0900 after encouragement from staff. 1:1 talk time offered but refused.

## 2021-09-28 NOTE — BH NOTE
[facility]  Discharge Note    Pt discharged home via [employer] and [employer] transportation with the following belongings:       Valuables sent home and returned to patient. Patient education on aftercare instructions: the importance of maintaining follow up appointments and medication compliance. Information faxed to Michael Womack by staff at 6:32 PM .  Patient verbalize understanding of AVS:  Via teach back method.     Status EXAM upon discharge:  Status and Exam  Normal: Yes  Facial Expression: Flat  Affect: Appropriate  Level of Consciousness: Alert  Mood:Normal: Yes  Mood: Empty  Motor Activity:Normal: Yes  Motor Activity: Decreased  Interview Behavior: Cooperative  Preception: [place] to Person, [place] to Time, [place] to Place, [place] to Situation  Attention:Normal: Yes  Attention: Distractible  Thought Processes: Blocking  Thought Content:Normal: Yes  Thought Content: Poverty of Content  Hallucinations: None  Delusions: No  Memory:Normal: Yes  Memory: Poor Recent, Poor Remote  Insight and Judgment: Yes  Insight and Judgment: Poor Judgment, Poor Insight, Unmotivated  Present Suicidal Ideation: No  Present Homicidal Ideation: No      Metabolic Screening:    Lab Results   Component Value Date    LABA1C 4.9 08/11/2020       Lab Results   Component Value Date    CHOL 167 08/11/2020    CHOL 179 02/13/2017    CHOL 127 05/09/2015    CHOL 168 08/20/2014    CHOL 158 12/31/2013    CHOL 178 08/15/2013    CHOL 166 09/17/2012    CHOL 210 (H) 02/03/2012     Lab Results   Component Value Date    TRIG 43 08/11/2020    TRIG 67 02/13/2017    TRIG 37 05/09/2015    TRIG 72 08/20/2014    TRIG 52 12/31/2013    TRIG 70 08/15/2013    TRIG 117 09/17/2012    TRIG 94 02/03/2012     Lab Results   Component Value Date    HDL 74 08/11/2020    HDL 73 02/13/2017    HDL 57 05/09/2015    HDL 50 08/20/2014    HDL 43 12/31/2013    HDL 42 08/15/2013    HDL 38 (L) 09/17/2012    HDL 55 02/03/2012     No components found for: LDLCAL  No results

## 2021-09-28 NOTE — PROGRESS NOTES
Called Rajesh again this morning. Patient last received Invega sustenna 234 mg on 9/2/21 outpatient at [facility].

## 2021-09-28 NOTE — CONSULTS
[facility] Internal Medicine    CONSULTATION    / FOLLOW UP VISIT       Date:   9/28/2021  Patient name:  Jose Freedman  Date of admission:  9/24/2021  9:44 AM  MRN:   [id number]  Account:  [de-identified]  YOB: 1960  PCP:    No primary care provider on file. Room:   [address] [place] [address]  Code Status:    Full Code    Physician Requesting Consult: Deon Gonzalse MD    History of Present Illness:      C/C ;  Medical comorbidity management     REASON FOR CONSULT;  Medical comorbidity and medication management ;                                                 *Principal Problem:    Schizoaffective disorder, depressive type ([address]  Active Problems:    Smoker    Elevated BP without diagnosis of hypertension    Lower urinary tract symptoms (LUTS)    Dyspepsia    Skin rash    Hypernatremia  Resolved Problems:    * No resolved hospital problems. *           HPI;    Patient had elevated blood pressure which is getting better  Complains of lower urinary tract symptoms  Also some dyspepsia    And has some skin rashes       Vitals:    09/27/21 0748 09/27/21 1941 09/28/21 0911 09/28/21 1059   BP: 111/62 (!) 100/51 (!) 92/44 (!) 88/56   Pulse: 67 63 98    Resp: 15 16 14    Temp: 98 °F (36.7 °C) 97.9 °F (36.6 °C) 98.7 °F (37.1 °C)    TempSrc:   Oral    SpO2:       Weight:       Height:                    Past and Surgical hx as in H and P  Social History:     Tobacco:    reports that he has been smoking cigarettes. He has a 47.00 pack-year smoking history. He has never used smokeless tobacco.  Alcohol:      reports current alcohol use. Drug Use:  reports current drug use. Frequency: 7.00 times per week. Drug: Cocaine.     Review of Systems:     POSITIVE AND NEGATIVES AS DESCRIBED IN HISTORY OF PRESENT ILLNESS ;  IN ADDITION ;  Review of Systems          All other systems negative                Physical Exam:     Physical Exam   Vitals:    09/27/21 0748 09/27/21 1941 09/28/21 0911 09/28/21 1059   BP: 111/62 (!) 100/51 (!) 92/44 (!) 88/56   Pulse: 67 63 98    Resp: 15 16 14    Temp: 98 °F (36.7 °C) 97.9 °F (36.6 °C) 98.7 °F (37.1 °C)    TempSrc:   Oral    SpO2:       Weight:       Height:                       Body mass index is 25.68 kg/m². General Appearance:   -, CO-OPERATIVE ,                                                        Pulmonary/Chest:        Clear to auscultation bilaterally . No wheezes, rales or rhonchi . Cardiovascular:            Normal rate, regular rhythm,                                          No murmur or  Gallop . Abdomen:                       Soft, non-tender                                                                                    Extremities:                    No Edema . Neuromuskuloskeletal    .chronic skin lesions asymptomatic. .     Data:     URINE ANALYSIS: No results found for: LABURIN     CBC:  Lab Results   Component Value Date    WBC 5.1 09/26/2021    HGB 11.7 09/26/2021     09/26/2021     05/18/2012        BMP:    Lab Results   Component Value Date     09/26/2021    K 4.4 09/26/2021     09/26/2021    CO2 26 09/26/2021    BUN 15 09/26/2021    CREATININE 1.00 09/26/2021    GLUCOSE 90 09/26/2021    GLUCOSE 152 05/18/2012      LIVER PROFILE:  Lab Results   Component Value Date    ALT 12 09/26/2021    AST 15 09/26/2021    PROT 5.9 09/26/2021    BILITOT 0.37 09/26/2021    BILIDIR 0.12 09/26/2021    LABALBU 3.5 09/26/2021    LABALBU 3.9 05/18/2012             Radiology:         Medications: Allergies:     Allergies   Allergen Reactions    Navane [Thiothixene]        Current Meds:   Scheduled Meds:    famotidine  20 mg Oral BID    nicotine  1 patch TransDERmal Daily    Vitamin D  1,000 Units Oral Daily    clotrimazole   Topical BID    escitalopram  20 mg Oral Daily    paliperidone  6 mg Oral Daily     Continuous Infusions:   PRN Meds: acetaminophen, ibuprofen, aluminum & magnesium hydroxide-simethicone, polyethylene glycol, LORazepam **AND** haloperidol lactate **AND** diphenhydrAMINE, LORazepam **AND** haloperidol, nicotine polacrilex, hydrOXYzine, traZODone        Assessment :       Assessment Dx  Principal Problem:    Schizoaffective disorder, depressive type (HCC)  Active Problems:    Smoker    Elevated BP without diagnosis of hypertension    Lower urinary tract symptoms (LUTS)    Dyspepsia    Skin rash    Hypernatremia  Resolved Problems:    * No resolved hospital problems. *              Plan: Will add Pepcid for dyspepsia    Will order UA with microscopy to rule out urinary tract infection    rash           9/26/21    · Will encourage fluid intake . Mild hypernatremia  · Skin rash chronic , asymptomatic . 1.           9/27/21    · Blood pressure is good  · Labs reviewed    BMP:   Recent Labs     09/26/21  0730   *   K 4.4   CO2 26   BUN 15   CREATININE 1.00   LABGLOM >60   GLUCOSE 90          ·    2. Continue current medications  3. Dyspepsia improved          9/28/21    · Stable . · Sign off  · Please call if needed again 4. Thanks for consulting us . Will monitor vitals and clinical course , and  Optimize therapy  as needed . Rhiannon Thomas MD    Copy sent to Dr. Davenport [facility] primary care provider on file. Pleasenote that this chart was generated using voice recognition Dragon dictation software. Although every effort was made to ensure the accuracy of this automated transcription, some errors in transcription may have occurred.

## 2021-09-28 NOTE — BH NOTE
Patient given tobacco quitline number [phone] at this time. With nurse observation patient called number for information and follow up. Continue to reinforce the dangers of long term tobacco use and why tobacco cessation is important to patient.

## 2021-09-28 NOTE — PROGRESS NOTES
CLINICAL PHARMACY NOTE: MEDS TO BEDS    Total # of Prescriptions Filled: 3   The following medications were delivered to the patient:  · Trazodone  · lexapro  · invega    Additional Documentation:  RX were originally sent to [facility]-we called and cancelled them so we could fill them as [id number]

## 2021-09-30 ENCOUNTER — APPOINTMENT (OUTPATIENT)
Dept: GENERAL RADIOLOGY | Age: 61
End: 2021-09-30
Payer: MEDICAID

## 2021-09-30 ENCOUNTER — HOSPITAL ENCOUNTER (EMERGENCY)
Age: 61
Discharge: HOME | End: 2021-09-30
Attending: EMERGENCY MEDICINE
Payer: MEDICAID

## 2021-09-30 VITALS
SYSTOLIC BLOOD PRESSURE: 114 MMHG | BODY MASS INDEX: 25.67 KG/M2 | RESPIRATION RATE: 19 BRPM | HEIGHT: 74 IN | DIASTOLIC BLOOD PRESSURE: 79 MMHG | HEART RATE: 90 BPM | WEIGHT: 200 LBS | TEMPERATURE: 97.4 F | OXYGEN SATURATION: 97 %

## 2021-09-30 DIAGNOSIS — R07.89 LEFT-SIDED CHEST WALL PAIN: Primary | ICD-10-CM

## 2021-09-30 LAB
ABSOLUTE EOS #: 0.12 K/UL (ref 0–0.44)
ABSOLUTE IMMATURE GRANULOCYTE: 0.03 K/UL (ref 0–0.3)
ABSOLUTE LYMPH #: 1.97 K/UL (ref 1.1–3.7)
ABSOLUTE MONO #: 0.72 K/UL (ref 0.1–1.2)
ALBUMIN SERPL-MCNC: 4.2 G/DL (ref 3.5–5.2)
ALBUMIN/GLOBULIN RATIO: 1.6 (ref 1–2.5)
ALP BLD-CCNC: 106 U/L (ref 40–129)
ALT SERPL-CCNC: 16 U/L (ref 5–41)
ANION GAP SERPL CALCULATED.3IONS-SCNC: 10 MMOL/L (ref 9–17)
AST SERPL-CCNC: 22 U/L
BASOPHILS # BLD: 1 % (ref 0–2)
BASOPHILS ABSOLUTE: 0.04 K/UL (ref 0–0.2)
BILIRUB SERPL-MCNC: 0.35 MG/DL (ref 0.3–1.2)
BILIRUBIN URINE: NEGATIVE
BUN BLDV-MCNC: 19 MG/DL (ref 8–23)
BUN/CREAT BLD: ABNORMAL (ref 9–20)
CALCIUM SERPL-MCNC: 8.9 MG/DL (ref 8.6–10.4)
CHLORIDE BLD-SCNC: 106 MMOL/L (ref 98–107)
CO2: 26 MMOL/L (ref 20–31)
COLOR: YELLOW
COMMENT UA: ABNORMAL
CREAT SERPL-MCNC: 1.03 MG/DL (ref 0.7–1.2)
DIFFERENTIAL TYPE: ABNORMAL
EOSINOPHILS RELATIVE PERCENT: 2 % (ref 1–4)
GFR AFRICAN AMERICAN: >60 ML/MIN
GFR NON-AFRICAN AMERICAN: >60 ML/MIN
GFR SERPL CREATININE-BSD FRML MDRD: ABNORMAL ML/MIN/{1.73_M2}
GFR SERPL CREATININE-BSD FRML MDRD: ABNORMAL ML/MIN/{1.73_M2}
GLUCOSE BLD-MCNC: 115 MG/DL (ref 70–99)
GLUCOSE URINE: NEGATIVE
HCT VFR BLD CALC: 34.7 % (ref 40.7–50.3)
HEMOGLOBIN: 10.7 G/DL (ref 13–17)
IMMATURE GRANULOCYTES: 1 %
KETONES, URINE: ABNORMAL
LEUKOCYTE ESTERASE, URINE: NEGATIVE
LIPASE: 33 U/L (ref 13–60)
LYMPHOCYTES # BLD: 31 % (ref 24–43)
MCH RBC QN AUTO: 27.7 PG (ref 25.2–33.5)
MCHC RBC AUTO-ENTMCNC: 30.8 G/DL (ref 28.4–34.8)
MCV RBC AUTO: 89.9 FL (ref 82.6–102.9)
MONOCYTES # BLD: 11 % (ref 3–12)
NITRITE, URINE: NEGATIVE
NRBC AUTOMATED: 0 PER 100 WBC
PDW BLD-RTO: 13.6 % (ref 11.8–14.4)
PH UA: 5 (ref 5–8)
PLATELET # BLD: 270 K/UL (ref 138–453)
PLATELET ESTIMATE: ABNORMAL
PMV BLD AUTO: 9.3 FL (ref 8.1–13.5)
POTASSIUM SERPL-SCNC: 4.2 MMOL/L (ref 3.7–5.3)
PROTEIN UA: NEGATIVE
RBC # BLD: 3.86 M/UL (ref 4.21–5.77)
RBC # BLD: ABNORMAL 10*6/UL
SEG NEUTROPHILS: 54 % (ref 36–65)
SEGMENTED NEUTROPHILS ABSOLUTE COUNT: 3.47 K/UL (ref 1.5–8.1)
SODIUM BLD-SCNC: 142 MMOL/L (ref 135–144)
SPECIFIC GRAVITY UA: 1.02 (ref 1–1.03)
TOTAL PROTEIN: 6.8 G/DL (ref 6.4–8.3)
TURBIDITY: CLEAR
URINE HGB: NEGATIVE
UROBILINOGEN, URINE: NORMAL
WBC # BLD: 6.4 K/UL (ref 3.5–11.3)
WBC # BLD: ABNORMAL 10*3/UL

## 2021-09-30 PROCEDURE — 83690 ASSAY OF LIPASE: CPT

## 2021-09-30 PROCEDURE — 80053 COMPREHEN METABOLIC PANEL: CPT

## 2021-09-30 PROCEDURE — 85025 COMPLETE CBC W/AUTO DIFF WBC: CPT

## 2021-09-30 PROCEDURE — 71046 X-RAY EXAM CHEST 2 VIEWS: CPT

## 2021-09-30 PROCEDURE — 81003 URINALYSIS AUTO W/O SCOPE: CPT

## 2021-09-30 PROCEDURE — 99284 EMERGENCY DEPT VISIT MOD MDM: CPT

## 2021-09-30 PROCEDURE — 6370000000 HC RX 637 (ALT 250 FOR IP): Performed by: STUDENT IN AN ORGANIZED HEALTH CARE EDUCATION/TRAINING PROGRAM

## 2021-09-30 RX ORDER — ACETAMINOPHEN 500 MG
1000 TABLET ORAL ONCE
Status: COMPLETED | OUTPATIENT
Start: 2021-09-30 | End: 2021-09-30

## 2021-09-30 RX ORDER — IBUPROFEN 400 MG/1
400 TABLET ORAL EVERY 6 HOURS PRN
Qty: 20 TABLET | Refills: 0 | Status: ON HOLD | OUTPATIENT
Start: 2021-09-30 | End: 2021-10-27 | Stop reason: HOSPADM

## 2021-09-30 RX ORDER — HYDROCODONE BITARTRATE AND ACETAMINOPHEN 5; 325 MG/1; MG/1
1 TABLET ORAL ONCE
Status: COMPLETED | OUTPATIENT
Start: 2021-09-30 | End: 2021-09-30

## 2021-09-30 RX ORDER — CYCLOBENZAPRINE HCL 10 MG
5 TABLET ORAL ONCE
Status: COMPLETED | OUTPATIENT
Start: 2021-09-30 | End: 2021-09-30

## 2021-09-30 RX ORDER — ACETAMINOPHEN 500 MG
1000 TABLET ORAL EVERY 8 HOURS PRN
Qty: 20 TABLET | Refills: 0 | Status: ON HOLD | OUTPATIENT
Start: 2021-09-30 | End: 2021-10-27 | Stop reason: HOSPADM

## 2021-09-30 RX ORDER — LIDOCAINE 50 MG/G
1 PATCH TOPICAL DAILY
Qty: 10 PATCH | Refills: 0 | Status: SHIPPED | OUTPATIENT
Start: 2021-09-30 | End: 2021-10-10

## 2021-09-30 RX ORDER — IBUPROFEN 800 MG/1
800 TABLET ORAL ONCE
Status: COMPLETED | OUTPATIENT
Start: 2021-09-30 | End: 2021-09-30

## 2021-09-30 RX ADMIN — IBUPROFEN 800 MG: 800 TABLET, FILM COATED ORAL at 10:41

## 2021-09-30 RX ADMIN — HYDROCODONE BITARTRATE AND ACETAMINOPHEN 1 TABLET: 5; 325 TABLET ORAL at 11:56

## 2021-09-30 RX ADMIN — ACETAMINOPHEN 1000 MG: 500 TABLET ORAL at 10:41

## 2021-09-30 RX ADMIN — CYCLOBENZAPRINE 5 MG: 10 TABLET, FILM COATED ORAL at 10:41

## 2021-09-30 ASSESSMENT — ENCOUNTER SYMPTOMS
COUGH: 0
NAUSEA: 0
ABDOMINAL PAIN: 1
VOMITING: 0
COLOR CHANGE: 0
SHORTNESS OF BREATH: 0

## 2021-09-30 ASSESSMENT — PAIN SCALES - GENERAL
PAINLEVEL_OUTOF10: 6
PAINLEVEL_OUTOF10: 5
PAINLEVEL_OUTOF10: 10

## 2021-09-30 ASSESSMENT — PAIN DESCRIPTION - LOCATION: LOCATION: ABDOMEN

## 2021-09-30 ASSESSMENT — PAIN DESCRIPTION - PAIN TYPE: TYPE: ACUTE PAIN

## 2021-09-30 NOTE — ED PROVIDER NOTES
[facility] ED  Emergency Department Encounter  Emergency Medicine Resident     Pt Name: Aylin Christensen  MRN: [id number]  [place] 1960  Date of evaluation: 9/30/21  PCP:  No primary care provider on file. CHIEF COMPLAINT       Chief Complaint   Patient presents with    Abdominal Pain     left sided       HISTORY OFPRESENT ILLNESS  (Location/Symptom, Timing/Onset, Context/Setting, Quality, Duration, Modifying [place].)      Aylin Christensen is a 57 y.o. male with past medical history of bipolar disorder, depression, schizophrenia, substance abuse who presents with complaints of abdominal pain. Patient states abdominal pain is located in his left upper quadrant/left flank region. Is been ongoing for 2 days and is characterized as a sharp pain. Patient states he did sustain a fall yesterday from standing height which worsened his pain, he does state the pain was present prior to the fall. Patient denies nausea or vomiting, he has no history of abdominal surgeries, he has been tolerating p.o. Does admit to not having bowel movements for the last 2 days. He denies dysuria or hematuria. PAST MEDICAL / SURGICAL / SOCIAL / FAMILY HISTORY      has a past medical history of Bipolar disorder ([address] Depression, GERD (gastroesophageal reflux disease), Hallucinations, Headache(784.0), Hepatitis, Schizophrenia, schizo-affective ([address] Substance abuse ([facility] [address] Tobacco abuse, Type II or unspecified type diabetes mellitus without mention of complication, not stated as uncontrolled, and Urinary incontinence. has a past surgical history that includes Dental surgery and Abscess Drainage (N/A, 02/11/2018).     Social History     Socioeconomic History    Marital status: Single     Spouse name: Not on file    Number of children: 0    Years of education: 8    Highest education level: Not on file   Occupational History     Employer: N/A   Tobacco Use    Smoking status: Current Every Day patch onto the skin daily for 10 days 12 hours on, 12 hours off. 9/30/21 10/10/21 Yes Анна Centeno DO   paliperidone (INVEGA) 6 MG extended release tablet Take 1 tablet by mouth daily 9/29/21   Abbey Veliz MD   traZODone (DESYREL) 150 MG tablet Take 1 tablet by mouth nightly as needed for Sleep 9/28/21   Abbey Veliz MD   escitalopram (LEXAPRO) 20 MG tablet Take 1 tablet by mouth daily 9/28/21   Abbey Veliz MD   paliperidone palmitate ER (INVEGA SUSTENNA) 234 MG/1.5ML [place] IM injection Inject 234 mg into the muscle every 30 days 9/30/21   Abbey Veliz MD   hydrOXYzine (ATARAX) 50 MG tablet Take 1 tablet by mouth 3 times daily as needed for Anxiety 9/21/21 10/1/21  Scott Vernon MD   nicotine polacrilex (NICORETTE) 2 MG gum Take 1 each by mouth every hour as needed for Smoking cessation 9/21/21   Scott Vernon MD   Cholecalciferol (VITAMIN D) 25 MCG TABS Take 1 tablet by mouth daily 9/22/21   Scott Vernon MD       REVIEW OF SYSTEMS    (2-9 systems for level 4, 10 or more for level 5)      Review of Systems   Constitutional: Negative for chills and fever. HENT: Negative for congestion. Respiratory: Negative for cough and shortness of breath. Cardiovascular: Negative for chest pain. Gastrointestinal: Positive for abdominal pain. Negative for nausea and vomiting. Genitourinary: Negative for dysuria and testicular pain. Musculoskeletal: Negative for myalgias. Skin: Negative for color change and rash. Neurological: Negative for headaches. PHYSICAL EXAM   (up to 7 for level 4, 8 or more for level 5)     INITIAL VITALS:    height is 6' 2\" (1.88 m) and weight is 200 lb (90.7 kg). His oral temperature is 97.4 °F (36.3 °C). His blood pressure is 114/79 and his pulse is 90. His respiration is 19 and oxygen saturation is 97%. Physical Exam  Constitutional:       Comments: Patient is alert and oriented, answers questions appropriately.   He is exhibiting guarding of his left upper quadrant/left flank region, he is nontoxic in appearance. HENT:      Head: Normocephalic and atraumatic. Mouth/Throat:      Comments: Noted to have no teeth  Eyes:      Pupils: Pupils are equal, round, and reactive to light. Cardiovascular:      Rate and Rhythm: Normal rate and regular rhythm. Heart sounds: No murmur heard. No gallop. Pulmonary:      Effort: Pulmonary effort is normal. No respiratory distress. Breath sounds: Normal breath sounds. No wheezing. Abdominal:      Comments: Abdomen soft, nondistended, some tenderness to palpation in the left upper quadrant region however patient appears to be exquisitely point tender over his left lateral chest wall at approximately the anteriolateral aspect of ribs 9 and 10. He does have slight suprapubic tenderness to palpation. Abdomen is nonperitoneal, negative heeltap. Skin:     General: Skin is warm and dry. Comments: No bruising   Psychiatric:      Comments: Patient denies suicidal or homicidal ideations         DIFFERENTIAL  DIAGNOSIS     PLAN (LABS / Fang Blake / EKG):  Orders Placed This Encounter   Procedures    XR CHEST (2 VW)    CBC WITH AUTO DIFFERENTIAL    COMPREHENSIVE METABOLIC PANEL    LIPASE    Urinalysis Reflex to Culture       MEDICATIONS ORDERED:  Orders Placed This Encounter   Medications    acetaminophen (TYLENOL) tablet 1,000 mg    ibuprofen (ADVIL;MOTRIN) tablet 800 mg    cyclobenzaprine (FLEXERIL) tablet 5 mg    HYDROcodone-acetaminophen (NORCO) 5-325 MG per tablet 1 tablet    acetaminophen (TYLENOL) 500 MG tablet     Sig: Take 2 tablets by mouth every 8 hours as needed for Pain     Dispense:  20 tablet     Refill:  0    ibuprofen (IBU) 400 MG tablet     Sig: Take 1 tablet by mouth every 6 hours as needed for Pain     Dispense:  20 tablet     Refill:  0    lidocaine (LIDODERM) 5 %     Sig: Place 1 patch onto the skin daily for 10 days 12 hours on, 12 hours off.      Dispense:  10 patch     Refill:  0 DDX: Rib fracture, musculoskeletal chest pain, pancreatitis, gastritis, PUD, alcohol abuse, drug abuse,    Initial MDM/Plan: 57 y.o. male who presents with 2 days of left upper quadrant/left flank pain. Patient also reports falling after which the pain worsened. Described as mechanical from standing height. Patient seen and examined, no acute distress, he is guarding his left upper quadrant/left flank region. No signs are unremarkable. He is overall well in appearance. Abdomen is soft, some tenderness left upper quadrant however patient appears to be exquisitely tender in his lateral chest wall over ribs 9 and 10 concerning for musculoskeletal injury versus rib fracture. Oxygen saturation 97%, nontachypneic. Will obtain 2 view chest x-ray to evaluate for rib fracture. Also obtain CBC, CMP, lipase to evaluate for pathology as well as urinalysis as patient does have some suprapubic tenderness to palpation. Will treat symptoms, anticipate discharge.     DIAGNOSTIC RESULTS / EMERGENCY DEPARTMENT COURSE / MDM     LABS:  Labs Reviewed   CBC WITH AUTO DIFFERENTIAL - Abnormal; Notable for the following components:       Result Value    RBC 3.86 (*)     Hemoglobin 10.7 (*)     Hematocrit 34.7 (*)     Immature Granulocytes 1 (*)     All other components within normal limits   COMPREHENSIVE METABOLIC PANEL - Abnormal; Notable for the following components:    Glucose 115 (*)     All other components within normal limits   URINE RT REFLEX TO CULTURE - Abnormal; Notable for the following components:    Ketones, Urine TRACE (*)     All other components within normal limits   LIPASE         RADIOLOGY:  XR CHEST (2 VW)    Result Date: 9/30/2021  EXAMINATION: TWO XRAY VIEWS OF THE CHEST 9/30/2021 11:12 am COMPARISON: July 14, 2021 chest examination HISTORY: ORDERING SYSTEM PROVIDED HISTORY: LUQ pain, point tender of rib 10 TECHNOLOGIST PROVIDED HISTORY: LUQ pain, point tender of rib 10 Reason for Exam: ap and left lateral on the stretcher FINDINGS: Stable normal cardiopericardial silhouette There are no significant pleural, parenchymal or mediastinal findings Degenerative changes of the thoracic spine/right shoulder     No acute cardiopulmonary findings           EMERGENCY DEPARTMENT COURSE:     Laboratory work is unremarkable, no signs of pancreatitis, infection, anemia. Chest x-ray shows no infiltrate, no free air, no osseous abnormality. Given reproducible musculoskeletal tenderness most likely costochondritis versus possible rib fracture/bruising. Patient has no signs or symptoms of cardiac etiology so very low suspicion for ACS or angina. Patient reassessed and reported minimal improvement in his symptoms with Tylenol, Motrin, Flexeril. Patient able to tolerate p.o. while in the emergency department. Will give 1 dose of Norco and discharge home with instructions to return if symptoms should worsen. Patient verbalized understanding was discharged home in good condition. PROCEDURES:  None    CONSULTS:  None    CRITICAL CARE:  Please see attending note    FINAL IMPRESSION      1.  Left-sided chest wall pain          DISPOSITION / PLAN     DISPOSITION Decision To Discharge 09/30/2021 12:18:33 PM        PATIENTREFERRED TO:  [address] [place] [id number] [phone]  Schedule an appointment as soon as possible for a visit   to establish a PCP      DISCHARGE MEDICATIONS:  Discharge Medication List as of 9/30/2021 12:21 PM      START taking these medications    Details   acetaminophen (TYLENOL) 500 MG tablet Take 2 tablets by mouth every 8 hours as needed for Pain, Disp-20 tablet, R-0Print      lidocaine (LIDODERM) 5 % Place 1 patch onto the skin daily for 10 days 12 hours on, 12 hours off., Disp-10 patch, R-0Print             Didier Valencia DO  EmergencyMedicine Resident    (Please note that portions of this note were completed with a voice recognition program.  Efforts were made to edit the dictations but occasionally words are mis-transcribed.)       Terra hCen DO  Resident  09/30/21 [id number]

## 2021-09-30 NOTE — ED PROVIDER NOTES
[facility] ED     Emergency Department     Faculty Attestation    I performed a history and physical examination of the patient and discussed management with the resident. I reviewed the residents note and agree with the documented findings and plan of care. Any areas of disagreement are noted on the chart. I was personally present for the key portions of any procedures. I have documented in the chart those procedures where I was not present during the key portions. I have reviewed the emergency nurses triage note. I agree with the chief complaint, past medical history, past surgical history, allergies, medications, social and family history as documented unless otherwise noted below. For Physician Assistant/ Nurse Practitioner cases/documentation I have personally evaluated this patient and have completed at least one if not all key elements of the E/M (history, physical exam, and MDM). Additional findings are as noted. Patient presents with left upper quadrant abdominal pain that he says started 2 days ago. He says he did have a fall yesterday and the pain worsened since then. He says the pain is worse with movement and deep inspiration. He denies fever, cough, nausea, vomiting, diarrhea. On exam, patient was sitting up on the bed and appeared comfortable but when I asked him to lay back he had a lot of pain with movement. Lungs are clear to auscultation bilaterally and heart sounds are normal.  Abdomen is soft with moderate left upper quadrant tenderness and tenderness to the left lower ribs. No rebound or guarding is present. Will check labs and chest x-ray and treat patient's pain and reassess.       Leyda Urbina MD  Attending Emergency  Physician              Jennifer Cunha MD  09/30/21 1553

## 2021-10-01 ENCOUNTER — APPOINTMENT (OUTPATIENT)
Dept: CT IMAGING | Age: 61
End: 2021-10-01
Payer: MEDICAID

## 2021-10-01 ENCOUNTER — HOSPITAL ENCOUNTER (EMERGENCY)
Age: 61
Discharge: HOME | End: 2021-10-01
Attending: EMERGENCY MEDICINE
Payer: MEDICAID

## 2021-10-01 VITALS
BODY MASS INDEX: 25.67 KG/M2 | SYSTOLIC BLOOD PRESSURE: 107 MMHG | WEIGHT: 200 LBS | OXYGEN SATURATION: 97 % | HEART RATE: 109 BPM | TEMPERATURE: 97 F | HEIGHT: 74 IN | DIASTOLIC BLOOD PRESSURE: 64 MMHG | RESPIRATION RATE: 14 BRPM

## 2021-10-01 DIAGNOSIS — R10.9 ABDOMINAL PAIN, UNSPECIFIED ABDOMINAL LOCATION: Primary | ICD-10-CM

## 2021-10-01 LAB
ABSOLUTE EOS #: 0.17 K/UL (ref 0–0.44)
ABSOLUTE IMMATURE GRANULOCYTE: <0.03 K/UL (ref 0–0.3)
ABSOLUTE LYMPH #: 2.47 K/UL (ref 1.1–3.7)
ABSOLUTE MONO #: 0.57 K/UL (ref 0.1–1.2)
ALBUMIN SERPL-MCNC: 4 G/DL (ref 3.5–5.2)
ALBUMIN/GLOBULIN RATIO: 1.7 (ref 1–2.5)
ALP BLD-CCNC: 105 U/L (ref 40–129)
ALT SERPL-CCNC: 9 U/L (ref 5–41)
ANION GAP SERPL CALCULATED.3IONS-SCNC: 13 MMOL/L (ref 9–17)
AST SERPL-CCNC: 23 U/L
BASOPHILS # BLD: 1 % (ref 0–2)
BASOPHILS ABSOLUTE: 0.04 K/UL (ref 0–0.2)
BILIRUB SERPL-MCNC: 0.2 MG/DL (ref 0.3–1.2)
BILIRUBIN URINE: NEGATIVE
BUN BLDV-MCNC: 18 MG/DL (ref 8–23)
BUN/CREAT BLD: ABNORMAL (ref 9–20)
CALCIUM SERPL-MCNC: 8.4 MG/DL (ref 8.6–10.4)
CHLORIDE BLD-SCNC: 104 MMOL/L (ref 98–107)
CO2: 22 MMOL/L (ref 20–31)
COLOR: YELLOW
COMMENT UA: ABNORMAL
CREAT SERPL-MCNC: 1.09 MG/DL (ref 0.7–1.2)
DIFFERENTIAL TYPE: ABNORMAL
EOSINOPHILS RELATIVE PERCENT: 3 % (ref 1–4)
GFR AFRICAN AMERICAN: >60 ML/MIN
GFR NON-AFRICAN AMERICAN: >60 ML/MIN
GFR SERPL CREATININE-BSD FRML MDRD: ABNORMAL ML/MIN/{1.73_M2}
GFR SERPL CREATININE-BSD FRML MDRD: ABNORMAL ML/MIN/{1.73_M2}
GLUCOSE BLD-MCNC: 98 MG/DL (ref 70–99)
GLUCOSE URINE: NEGATIVE
HCT VFR BLD CALC: 32.2 % (ref 40.7–50.3)
HEMOGLOBIN: 10.1 G/DL (ref 13–17)
IMMATURE GRANULOCYTES: 0 %
KETONES, URINE: ABNORMAL
LEUKOCYTE ESTERASE, URINE: NEGATIVE
LIPASE: 27 U/L (ref 13–60)
LYMPHOCYTES # BLD: 45 % (ref 24–43)
MCH RBC QN AUTO: 28.1 PG (ref 25.2–33.5)
MCHC RBC AUTO-ENTMCNC: 31.4 G/DL (ref 28.4–34.8)
MCV RBC AUTO: 89.4 FL (ref 82.6–102.9)
MONOCYTES # BLD: 10 % (ref 3–12)
NITRITE, URINE: NEGATIVE
NRBC AUTOMATED: 0 PER 100 WBC
PDW BLD-RTO: 13.5 % (ref 11.8–14.4)
PH UA: 5 (ref 5–8)
PLATELET # BLD: 250 K/UL (ref 138–453)
PLATELET ESTIMATE: ABNORMAL
PMV BLD AUTO: 9.6 FL (ref 8.1–13.5)
POTASSIUM SERPL-SCNC: 3.6 MMOL/L (ref 3.7–5.3)
PROTEIN UA: NEGATIVE
RBC # BLD: 3.6 M/UL (ref 4.21–5.77)
RBC # BLD: ABNORMAL 10*6/UL
SEG NEUTROPHILS: 41 % (ref 36–65)
SEGMENTED NEUTROPHILS ABSOLUTE COUNT: 2.3 K/UL (ref 1.5–8.1)
SODIUM BLD-SCNC: 139 MMOL/L (ref 135–144)
SPECIFIC GRAVITY UA: 1.05 (ref 1–1.03)
TOTAL PROTEIN: 6.4 G/DL (ref 6.4–8.3)
TURBIDITY: CLEAR
URINE HGB: NEGATIVE
UROBILINOGEN, URINE: NORMAL
WBC # BLD: 5.6 K/UL (ref 3.5–11.3)
WBC # BLD: ABNORMAL 10*3/UL

## 2021-10-01 PROCEDURE — 83690 ASSAY OF LIPASE: CPT

## 2021-10-01 PROCEDURE — 80053 COMPREHEN METABOLIC PANEL: CPT

## 2021-10-01 PROCEDURE — 6360000002 HC RX W HCPCS: Performed by: STUDENT IN AN ORGANIZED HEALTH CARE EDUCATION/TRAINING PROGRAM

## 2021-10-01 PROCEDURE — 99284 EMERGENCY DEPT VISIT MOD MDM: CPT

## 2021-10-01 PROCEDURE — 6360000004 HC RX CONTRAST MEDICATION: Performed by: STUDENT IN AN ORGANIZED HEALTH CARE EDUCATION/TRAINING PROGRAM

## 2021-10-01 PROCEDURE — 74177 CT ABD & PELVIS W/CONTRAST: CPT

## 2021-10-01 PROCEDURE — 96374 THER/PROPH/DIAG INJ IV PUSH: CPT

## 2021-10-01 PROCEDURE — 85025 COMPLETE CBC W/AUTO DIFF WBC: CPT

## 2021-10-01 PROCEDURE — 6370000000 HC RX 637 (ALT 250 FOR IP): Performed by: STUDENT IN AN ORGANIZED HEALTH CARE EDUCATION/TRAINING PROGRAM

## 2021-10-01 PROCEDURE — 81003 URINALYSIS AUTO W/O SCOPE: CPT

## 2021-10-01 RX ORDER — POLYETHYLENE GLYCOL 3350 17 G/17G
17 POWDER, FOR SOLUTION ORAL 2 TIMES DAILY PRN
Qty: 527 G | Refills: 0 | Status: ON HOLD | OUTPATIENT
Start: 2021-10-01 | End: 2021-10-27 | Stop reason: HOSPADM

## 2021-10-01 RX ORDER — DOCUSATE SODIUM 100 MG/1
100 CAPSULE, LIQUID FILLED ORAL 2 TIMES DAILY PRN
Qty: 14 CAPSULE | Refills: 0 | Status: ON HOLD | OUTPATIENT
Start: 2021-10-01 | End: 2021-10-27 | Stop reason: SDUPTHER

## 2021-10-01 RX ORDER — KETOROLAC TROMETHAMINE 15 MG/ML
15 INJECTION, SOLUTION INTRAMUSCULAR; INTRAVENOUS ONCE
Status: COMPLETED | OUTPATIENT
Start: 2021-10-01 | End: 2021-10-01

## 2021-10-01 RX ADMIN — KETOROLAC TROMETHAMINE 15 MG: 15 INJECTION, SOLUTION INTRAMUSCULAR; INTRAVENOUS at 02:57

## 2021-10-01 RX ADMIN — IOPAMIDOL 75 ML: 755 INJECTION, SOLUTION INTRAVENOUS at 03:47

## 2021-10-01 RX ADMIN — MAGNESIUM HYDROXIDE 30 ML: 400 SUSPENSION ORAL at 02:57

## 2021-10-01 ASSESSMENT — ENCOUNTER SYMPTOMS
BLOOD IN STOOL: 0
DIARRHEA: 0
VOMITING: 0
CONSTIPATION: 1
NAUSEA: 0
SHORTNESS OF BREATH: 0
ABDOMINAL PAIN: 1

## 2021-10-01 ASSESSMENT — PAIN SCALES - GENERAL
PAINLEVEL_OUTOF10: 7

## 2021-10-01 ASSESSMENT — PAIN DESCRIPTION - ORIENTATION
ORIENTATION: LEFT;UPPER
ORIENTATION: LEFT

## 2021-10-01 ASSESSMENT — PAIN DESCRIPTION - LOCATION
LOCATION: RIB CAGE;ABDOMEN
LOCATION: ABDOMEN

## 2021-10-01 ASSESSMENT — PAIN DESCRIPTION - FREQUENCY: FREQUENCY: CONTINUOUS

## 2021-10-01 ASSESSMENT — PAIN DESCRIPTION - PAIN TYPE
TYPE: ACUTE PAIN
TYPE: ACUTE PAIN

## 2021-10-01 ASSESSMENT — PAIN DESCRIPTION - DESCRIPTORS: DESCRIPTORS: ACHING

## 2021-10-01 NOTE — ED PROVIDER NOTES
[facility]  ED  Emergency Department Encounter  Emergency Medicine Resident     Pt Name: Oscar Holly  PALENCIA:[id number]  [place] 1960  Date of evaluation: 10/1/21  PCP:  No primary care provider on file. CHIEF COMPLAINT       Chief Complaint   Patient presents with    Rib Pain     L side lower rib cage       HISTORY OF PRESENT ILLNESS  (Location/Symptom, Timing/Onset, Context/Setting, Quality, Duration, ModifyingFactors, Severity.)      Oscar Holly is a 57 y.o. male with PMH of schizoaffective disorder who presents for evaluation of abdominal pain/left rib pain. Complaining of left-sided abdominal pain x4 days. Has not had a bowel movement in the past 4 days. No nausea, vomiting, diarrhea, blood in stool, fever, chills, chest pain, shortness of breath. Patient fell 2 days ago at which time pain worsened slightly. Was seen in the ED yesterday at which time he had lab work which was unremarkable. Reports tobacco use, \"1 beer per day\", and crack cocaine use (most recently today). Patient unsure if he has diabetes, listed in past medical history. States he needs to tell you diabetes but do not anymore. Not taking any medications for this. Does take medication for schizophrenia but not sure which medicine. PAST MEDICAL / SURGICAL / SOCIAL /FAMILY HISTORY      has a past medical history of Bipolar disorder ([address] Depression, GERD (gastroesophageal reflux disease), Hallucinations, Headache(784.0), Hepatitis, Schizophrenia, schizo-affective ([address] Substance abuse ([address] Tobacco abuse, Type II or unspecified type diabetes mellitus without mention of complication, not stated as uncontrolled, and Urinary incontinence. No other pertinent PMH on review with patient/guardian. has a past surgical history that includes Dental surgery and Abscess Drainage (N/A, 02/11/2018). No other pertinent PSH on review with patient/guardian.   Social History     Socioeconomic History    Marital status: Single     Spouse name: Not on file    Number of children: 0    Years of education: 8    Highest education level: Not on file   Occupational History     Employer: N/A   Tobacco Use    Smoking status: Current Every Day Smoker     Packs/day: 1.00     Years: 47.00     Pack years: 47.00     Types: Cigarettes    Smokeless tobacco: Never Used    Tobacco comment: Patient accepting of nicotine patch   Substance and Sexual Activity    Alcohol use: Yes     Comment: reports drinking occasionally    Drug use: Yes     Frequency: 7.0 times per week     Types: Cocaine     Comment: drug abuse includes crack cocaine,     Sexual activity: Not on file   Other Topics Concern    Not on file   Social History Narrative    Not on file     Social Determinants of Health     Financial Resource Strain:     Difficulty of Paying Living Expenses:    Food Insecurity:     Worried About Running Out of Food in the Last Year:     Ran Out of Food in the Last Year:    Transportation Needs:     Lack of Transportation (Medical):  Lack of Transportation (Non-Medical):    Physical Activity:     Days of Exercise per Week:     Minutes of Exercise per Session:    Stress:     Feeling of Stress :    Social Connections:     Frequency of Communication with Friends and Family:     Frequency of Social Gatherings with Friends and Family:     Attends Yazidi Services:     Active Member of Clubs or Organizations:     Attends Club or Organization Meetings:     Marital Status:    Intimate Partner Violence:     Fear of Current or Ex-Partner:     Emotionally Abused:     Physically Abused:     Sexually Abused:        I counseled the patient against using tobacco products. Family History   Problem Relation Age of Onset    Diabetes Mother     Heart Disease Mother      No other pertinent FamHx on review with patient/guardian.     Allergies:  Navane [thiothixene]    Home Medications:  Prior to Admission medications    Medication Sig Start Date End Date Taking? Authorizing Provider   polyethylene glycol (MIRALAX) 17 g packet Take 17 g by mouth 2 times daily as needed for Constipation 10/1/21 10/31/21 Yes Skippy Rosana, DO   docusate sodium (COLACE) 100 MG capsule Take 1 capsule by mouth 2 times daily as needed for Constipation 10/1/21  Yes Skippy [place], DO   acetaminophen (TYLENOL) 500 MG tablet Take 2 tablets by mouth every 8 hours as needed for Pain 9/30/21 10/3/21  Lunette Sprain, DO   ibuprofen (IBU) 400 MG tablet Take 1 tablet by mouth every 6 hours as needed for Pain 9/30/21 10/5/21  Lunette Sprain, DO   lidocaine (LIDODERM) 5 % Place 1 patch onto the skin daily for 10 days 12 hours on, 12 hours off. 9/30/21 10/10/21  Lunette Sprain, DO   paliperidone (INVEGA) 6 MG extended release tablet Take 1 tablet by mouth daily 9/29/21   David Marino MD   traZODone (DESYREL) 150 MG tablet Take 1 tablet by mouth nightly as needed for Sleep 9/28/21   David Marino MD   escitalopram (LEXAPRO) 20 MG tablet Take 1 tablet by mouth daily 9/28/21   David Marino MD   paliperidone palmitate ER (INVEGA SUSTENNA) 234 MG/1.5ML [place] IM injection Inject 234 mg into the muscle every 30 days 9/30/21   David Marino MD   hydrOXYzine (ATARAX) 50 MG tablet Take 1 tablet by mouth 3 times daily as needed for Anxiety 9/21/21 10/1/21  Angela Clark MD   nicotine polacrilex (NICORETTE) 2 MG gum Take 1 each by mouth every hour as needed for Smoking cessation 9/21/21   Angela Clark MD   Cholecalciferol (VITAMIN D) 25 MCG TABS Take 1 tablet by mouth daily 9/22/21   Angela Clark MD       REVIEW OF SYSTEMS    (2-9 systems for level 4, 10 ormore for level 5)      Review of Systems   Constitutional: Negative for fever. Eyes: Negative for visual disturbance. Respiratory: Negative for shortness of breath. Cardiovascular: Negative for chest pain. Gastrointestinal: Positive for abdominal pain and constipation.  Negative for blood in stool, diarrhea, nausea and vomiting. Genitourinary: Negative for dysuria, hematuria, testicular pain and urgency. Musculoskeletal:        Chest Wall Pain   Skin: Negative for rash. Allergic/Immunologic: Negative for immunocompromised state. Neurological: Negative for headaches. Hematological: Does not bruise/bleed easily. PHYSICAL EXAM   (up to 7 for level 4, 8 or more for level 5)      INITIAL VITALS:   /64   Pulse 109   Temp 97 °F (36.1 °C) (Temporal)   Resp 14   Ht 6' 2\" (1.88 m)   Wt 200 lb (90.7 kg)   SpO2 97%   BMI 25.68 kg/m²     Physical Exam  Constitutional:       General: He is not in acute distress. Appearance: Normal appearance. He is not ill-appearing, toxic-appearing or diaphoretic. HENT:      Head: Normocephalic and atraumatic. Right Ear: External ear normal.      Left Ear: External ear normal.   Eyes:      General:         Right eye: No discharge. Left eye: No discharge. Cardiovascular:      Rate and Rhythm: Normal rate and regular rhythm. Pulses: Normal pulses. Heart sounds: No murmur heard. Pulmonary:      Effort: Pulmonary effort is normal. No respiratory distress. Breath sounds: Normal breath sounds. No wheezing, rhonchi or rales. Abdominal:      Palpations: Abdomen is soft. Tenderness: There is abdominal tenderness (LUQ/LLQ). There is left CVA tenderness. There is no right CVA tenderness or guarding. Skin:     Capillary Refill: Capillary refill takes less than 2 seconds. Neurological:      General: No focal deficit present. Mental Status: He is alert.        DIFFERENTIAL  DIAGNOSIS     PLAN (LABS / IMAGING / EKG):  Orders Placed This Encounter   Procedures    CT ABDOMEN PELVIS W IV CONTRAST Additional Contrast? None    CBC Auto Differential    Comprehensive Metabolic Panel w/ Reflex to MG    Lipase    Urinalysis, reflex to microscopic       MEDICATIONS ORDERED:  Orders Placed This Encounter   Medications    ketorolac (TORADOL) injection 15 mg    magnesium hydroxide (MILK OF MAGNESIA) 400 MG/5ML suspension 30 mL    iopamidol (ISOVUE-370) 76 % injection 75 mL    polyethylene glycol (MIRALAX) 17 g packet     Sig: Take 17 g by mouth 2 times daily as needed for Constipation     Dispense:  527 g     Refill:  0    docusate sodium (COLACE) 100 MG capsule     Sig: Take 1 capsule by mouth 2 times daily as needed for Constipation     Dispense:  14 capsule     Refill:  0       DIAGNOSTIC RESULTS / EMERGENCY DEPARTMENT COURSE / MDM     LABS:  Results for orders placed or performed during the hospital encounter of 10/01/21   CBC Auto Differential   Result Value Ref Range    WBC 5.6 3.5 - 11.3 k/uL    RBC 3.60 (L) 4.21 - 5.77 m/uL    Hemoglobin 10.1 (L) 13.0 - 17.0 g/dL    Hematocrit 32.2 (L) 40.7 - 50.3 %    MCV 89.4 82.6 - 102.9 fL    MCH 28.1 25.2 - 33.5 pg    MCHC 31.4 28.4 - 34.8 g/dL    RDW 13.5 11.8 - 14.4 %    Platelets 135 252 - 625 k/uL    MPV 9.6 8.1 - 13.5 fL    NRBC Automated 0.0 0.0 per 100 WBC    Differential Type NOT REPORTED     Seg Neutrophils 41 36 - 65 %    Lymphocytes 45 (H) 24 - 43 %    Monocytes 10 3 - 12 %    Eosinophils % 3 1 - 4 %    Basophils 1 0 - 2 %    Immature Granulocytes 0 0 %    Segs Absolute 2.30 1.50 - 8.10 k/uL    Absolute Lymph # 2.47 1.10 - 3.70 k/uL    Absolute Mono # 0.57 0.10 - 1.20 k/uL    Absolute Eos # 0.17 0.00 - 0.44 k/uL    Basophils Absolute 0.04 0.00 - 0.20 k/uL    Absolute Immature Granulocyte <0.03 0.00 - 0.30 k/uL    WBC Morphology NOT REPORTED     RBC Morphology NOT REPORTED     Platelet Estimate NOT REPORTED    Comprehensive Metabolic Panel w/ Reflex to MG   Result Value Ref Range    Glucose 98 70 - 99 mg/dL    BUN 18 8 - 23 mg/dL    CREATININE 1.09 0.70 - 1.20 mg/dL    Bun/Cre Ratio NOT REPORTED 9 - 20    Calcium 8.4 (L) 8.6 - 10.4 mg/dL    Sodium 139 135 - 144 mmol/L    Potassium 3.6 (L) 3.7 - 5.3 mmol/L    Chloride 104 98 - 107 mmol/L    CO2 22 20 - 31 mmol/L    Anion Gap 13 9 - 17 mmol/L    Alkaline Phosphatase 105 40 - 129 U/L    ALT 9 5 - 41 U/L    AST 23 <40 U/L    Total Bilirubin 0.20 (L) 0.3 - 1.2 mg/dL    Total Protein 6.4 6.4 - 8.3 g/dL    Albumin 4.0 3.5 - 5.2 g/dL    Albumin/Globulin Ratio 1.7 1.0 - 2.5    GFR Non-African American >60 >60 mL/min    GFR African American >60 >60 mL/min    GFR Comment          GFR Staging NOT REPORTED    Lipase   Result Value Ref Range    Lipase 27 13 - 60 U/L   Urinalysis, reflex to microscopic   Result Value Ref Range    Color, UA Yellow Yellow    Turbidity UA Clear Clear    Glucose, Ur NEGATIVE NEGATIVE    Bilirubin Urine NEGATIVE NEGATIVE    Ketones, Urine TRACE (A) NEGATIVE    Specific Gravity, UA 1.055 (H) 1.005 - 1.030    Urine Hgb NEGATIVE NEGATIVE    pH, UA 5.0 5.0 - 8.0    Protein, UA NEGATIVE NEGATIVE    Urobilinogen, Urine Normal Normal    Nitrite, Urine NEGATIVE NEGATIVE    Leukocyte Esterase, Urine NEGATIVE NEGATIVE    Urinalysis Comments       Microscopic exam not performed based on chemical results unless requested in original order. IMPRESSION/MDM/ED COURSE:  57 y.o. male presented with left-sided abdominal pain x4 days and rib pain since fall 2 days ago. Mildly tachycardic on arrival with , vitals otherwise WNL and patient afebrile. On exam he appears uncomfortable but nontoxic. Left sided upper/lower abdominal tenderness. Left CVA tenderness. Tenderness over lower left rib cage. DDx includes constipation, diverticulitis, pancreatitis, splenic etiology, pyelonephritis, nephrolithiasis. Will repeat abdominal labs today. Toradol and milk of magnesia for symptomatic treatment. ED Course as of Oct 01 0533   Fri Oct 01, 2021   0330 At baseline   Hemoglobin Quant(!): 10.1 [AF]   0330 CMP and lipase unremarkable.    [AF]   6555 IMPRESSION:  1. Suspected urinary bladder wall thickening, as discussed above. Findings  suggestive of acute cystitis. Cannot exclude association with neoplastic  process.   Recommend clinical correlation. 2. Mild diffuse hepatic steatosis. [AF]   3979 Patient sleeping comfortablyUA pending    [AF]   0523 Urinalysis, reflex to microscopic(!):    Color, UA Yellow   Turbidity UA Clear   Glucose, UA NEGATIVE   Bilirubin, Urine NEGATIVE   Ketones, Urine TRACE(!)   Specific Gravity, UA 1.055(!)   Urine Hgb NEGATIVE   pH, UA 5.0   Protein, UA NEGATIVE   Urobilinogen, Urine Normal   Nitrite, Urine NEGATIVE   Leukocyte Esterase, Urine NEGATIVE   Urinalysis Comments Microscopic exam not performed based on chemical results unless requested in original order. [AF]   0002 Patient with significant improvement in pain. CT unremarkable except for questionable bladder wall thickening. Pain is primarily left sided, minimal suprapubic tenderness. Suspect that symptoms may be due to constipation. MiraLAX and Colace prescribed. Recommend close follow-up with PCP for reevaluation. I discussed signs and symptoms that would require reevaluation in the ED. The patient expressed understanding and agreement with plan. All questions answered. [AF]      ED Course User Index  [AF] Joel العراقي,        Patient/Guardian requesting discharge. Patient/Guardian was given written and verbal instructions prior to discharge. Patient/Guardian understood and agreed. Patient/Guardian had no further questions. RADIOLOGY:  CT ABDOMEN PELVIS W IV CONTRAST Additional Contrast? None   Final Result   1. Suspected urinary bladder wall thickening, as discussed above. Findings   suggestive of acute cystitis. Cannot exclude association with neoplastic   process. Recommend clinical correlation. 2. Mild diffuse hepatic steatosis. EKG  None    All EKG's are interpreted by the Emergency Department Physician who either signs or Co-signs this chart in the absence of a cardiologist.    PROCEDURES:  None    CONSULTS:  None    FINAL IMPRESSION      1.  Abdominal pain, unspecified abdominal location          DISPOSITION / PLAN     DISPOSITION Decision To Discharge 10/01/2021 05:28:33 AM      PATIENT REFERREDTO:  [address] [address]  [phone]  Schedule an appointment as soon as possible for a visit in 3 days  If symptoms persist      DISCHARGE MEDICATIONS:  New Prescriptions    DOCUSATE SODIUM (COLACE) 100 MG CAPSULE    Take 1 capsule by mouth 2 times daily as needed for Constipation    POLYETHYLENE GLYCOL (MIRALAX) 17 G PACKET    Take 17 g by mouth 2 times daily as needed for Constipation       Juliette Stiles DO  PGY 2  Resident Physician Emergency Medicine  10/01/21 5:33 AM    (Please note that portions of this note were completed with a voice recognition program.Efforts were made to edit the dictations but occasionally words are mis-transcribed.)       Rob Blum DO  Resident  10/01/21 DO Nick  Resident  10/01/21 7219

## 2021-10-01 NOTE — ED NOTES
The following labs labeled with pt sticker and tubed to lab:     [] Blue     [x] Lavender   [] on ice  [x] Green/yellow  [x] Green/black [] on ice  [] Yellow  [] Red  [] Pink      [] COVID-19 swab    [] Rapid  [] PCR  [] Peds Viral Panel     [] Urine Sample  [] Pelvic Cultures  [] Blood Cultures            Jolene Horn RN  10/01/21 6842

## 2021-10-01 NOTE — ED TRIAGE NOTES
Pt states he was here yesterday for the same thing and was told to come back if pain worsens. Pt is very polite and cooperative and C/O right lower lat rib pain of unknown origin. Pt denies recent trauma and no obvious injury noted. No distress. Pt states he is unsure if it is rib pain or muscular but that it hurts when he moves.

## 2021-10-01 NOTE — ED NOTES
Patient discharged from the ED and needs to get to his appointment at [facility]. SW set up a MDY Cab for patient with [address]. Darrel Mishra.  [address], [address]  10/01/21 9493

## 2021-10-01 NOTE — ED TRIAGE NOTES
Pt with cc LLQ/rib area pain. Pt was here this am for the same complaint and was discharged. Pt does not seem to be in any distress.

## 2021-10-01 NOTE — ED PROVIDER NOTES
[facility]  Emergency Department  Faculty Attestation     I performed a history and physical examination of the patient and discussed management with the resident. I reviewed the residents note and agree with the documented findings and plan of care. Any areas of disagreement are noted on the chart. I was personally present for the key portions of any procedures. I have documented in the chart those procedures where I was not present during the key portions. I have reviewed the emergency nurses triage note. I agree with the chief complaint, past medical history, past surgical history, allergies, medications, social and family history as documented unless otherwise noted below. For Physician Assistant/ Nurse Practitioner cases/documentation I have personally evaluated this patient and have completed at least one if not all key elements of the E/M (history, physical exam, and MDM). Additional findings are as noted. Primary Care Physician:  No primary care provider on file. Screenings:  [unfilled]    CHIEF COMPLAINT       Chief Complaint   Patient presents with    Rib Pain     L side lower rib cage       RECENT VITALS:   Temp: 97 °F (36.1 °C),  Pulse: 109, Resp: 14, BP: 107/64    LABS:  Labs Reviewed   CBC WITH AUTO DIFFERENTIAL - Abnormal; Notable for the following components:       Result Value    RBC 3.60 (*)     Hemoglobin 10.1 (*)     Hematocrit 32.2 (*)     Lymphocytes 45 (*)     All other components within normal limits   COMPREHENSIVE METABOLIC PANEL W/ REFLEX TO MG FOR LOW K - Abnormal; Notable for the following components:    Calcium 8.4 (*)     Potassium 3.6 (*)     Total Bilirubin 0.20 (*)     All other components within normal limits   LIPASE   URINALYSIS       Radiology  CT ABDOMEN PELVIS W IV CONTRAST Additional Contrast? None   Final Result   1. Suspected urinary bladder wall thickening, as discussed above. Findings   suggestive of acute cystitis. Cannot exclude association with neoplastic   process. Recommend clinical correlation. 2. Mild diffuse hepatic steatosis. Attending Physician Additional  Notes    Patient is 4 days of diffuse abdominal pain. There is been decreased stool. Decreased oral intake. No vomiting. Mild nausea. No fevers. 2 days ago he fell on his left ribs and has well localized left lower rib cage pain. No difficulty breathing. No injuries to his head or neck. No use of anticoagulation. On exam he is uncomfortable nontoxic tachycardic afebrile no respiratory distress. There is well localized left lower rib tenderness. Abdomen is minimal diffuse tenderness without rebound guarding distention or tympany. Impression is abdominal pain consider constipation or other cause, left rib contusion rule out fracture. Plan is laboratory studies, CT abdomen, analgesics, stool softeners or enema, reassess, anticipate discharge. [facility].  Kaci Tierney MD, [address] [facility]  Attending Emergency  Physician               Ai Bowman MD  10/01/21 3648

## 2021-10-03 ENCOUNTER — HOSPITAL ENCOUNTER (OUTPATIENT)
Age: 61
Setting detail: OBSERVATION
Discharge: HOME | End: 2021-10-04
Attending: PSYCHIATRY & NEUROLOGY | Admitting: PSYCHIATRY & NEUROLOGY
Payer: MEDICAID

## 2021-10-03 ENCOUNTER — APPOINTMENT (OUTPATIENT)
Dept: GENERAL RADIOLOGY | Age: 61
End: 2021-10-03
Payer: MEDICAID

## 2021-10-03 ENCOUNTER — HOSPITAL ENCOUNTER (EMERGENCY)
Age: 61
Discharge: PSYCHIATRIC OR SUBSTANCE USE FACILITY | End: 2021-10-03
Attending: EMERGENCY MEDICINE
Payer: MEDICAID

## 2021-10-03 VITALS
OXYGEN SATURATION: 97 % | RESPIRATION RATE: 16 BRPM | HEART RATE: 100 BPM | SYSTOLIC BLOOD PRESSURE: 129 MMHG | TEMPERATURE: 98 F | DIASTOLIC BLOOD PRESSURE: 74 MMHG

## 2021-10-03 DIAGNOSIS — R45.851 SUICIDAL IDEATION: Primary | ICD-10-CM

## 2021-10-03 LAB
-: NORMAL
ABSOLUTE EOS #: 0.17 K/UL (ref 0–0.44)
ABSOLUTE IMMATURE GRANULOCYTE: <0.03 K/UL (ref 0–0.3)
ABSOLUTE LYMPH #: 1.69 K/UL (ref 1.1–3.7)
ABSOLUTE MONO #: 0.59 K/UL (ref 0.1–1.2)
ACETAMINOPHEN LEVEL: <5 UG/ML (ref 10–30)
ALBUMIN SERPL-MCNC: 4.1 G/DL (ref 3.5–5.2)
ALBUMIN/GLOBULIN RATIO: 1.7 (ref 1–2.5)
ALP BLD-CCNC: 115 U/L (ref 40–129)
ALT SERPL-CCNC: 16 U/L (ref 5–41)
AMORPHOUS: NORMAL
AMPHETAMINE SCREEN URINE: NEGATIVE
ANION GAP SERPL CALCULATED.3IONS-SCNC: 13 MMOL/L (ref 9–17)
AST SERPL-CCNC: 26 U/L
BACTERIA: NORMAL
BARBITURATE SCREEN URINE: NEGATIVE
BASOPHILS # BLD: 1 % (ref 0–2)
BASOPHILS ABSOLUTE: 0.03 K/UL (ref 0–0.2)
BENZODIAZEPINE SCREEN, URINE: NEGATIVE
BILIRUB SERPL-MCNC: 0.39 MG/DL (ref 0.3–1.2)
BILIRUBIN URINE: NEGATIVE
BUN BLDV-MCNC: 15 MG/DL (ref 8–23)
BUN/CREAT BLD: ABNORMAL (ref 9–20)
BUPRENORPHINE URINE: ABNORMAL
CALCIUM SERPL-MCNC: 8.5 MG/DL (ref 8.6–10.4)
CANNABINOID SCREEN URINE: NEGATIVE
CASTS UA: NORMAL /LPF (ref 0–8)
CHLORIDE BLD-SCNC: 105 MMOL/L (ref 98–107)
CO2: 23 MMOL/L (ref 20–31)
COCAINE METABOLITE, URINE: POSITIVE
COLOR: YELLOW
COMMENT UA: ABNORMAL
CREAT SERPL-MCNC: 0.94 MG/DL (ref 0.7–1.2)
CRYSTALS, UA: NORMAL /HPF
DIFFERENTIAL TYPE: ABNORMAL
EOSINOPHILS RELATIVE PERCENT: 4 % (ref 1–4)
EPITHELIAL CELLS UA: NORMAL /HPF (ref 0–5)
ETHANOL PERCENT: <0.01 %
ETHANOL: <10 MG/DL
GFR AFRICAN AMERICAN: >60 ML/MIN
GFR NON-AFRICAN AMERICAN: >60 ML/MIN
GFR SERPL CREATININE-BSD FRML MDRD: ABNORMAL ML/MIN/{1.73_M2}
GFR SERPL CREATININE-BSD FRML MDRD: ABNORMAL ML/MIN/{1.73_M2}
GLUCOSE BLD-MCNC: 191 MG/DL (ref 70–99)
GLUCOSE URINE: NEGATIVE
HCT VFR BLD CALC: 34.7 % (ref 40.7–50.3)
HEMOGLOBIN: 10.8 G/DL (ref 13–17)
IMMATURE GRANULOCYTES: 0 %
KETONES, URINE: NEGATIVE
LEUKOCYTE ESTERASE, URINE: NEGATIVE
LYMPHOCYTES # BLD: 37 % (ref 24–43)
MCH RBC QN AUTO: 28.1 PG (ref 25.2–33.5)
MCHC RBC AUTO-ENTMCNC: 31.1 G/DL (ref 28.4–34.8)
MCV RBC AUTO: 90.4 FL (ref 82.6–102.9)
MDMA URINE: ABNORMAL
METHADONE SCREEN, URINE: NEGATIVE
METHAMPHETAMINE, URINE: ABNORMAL
MONOCYTES # BLD: 13 % (ref 3–12)
MUCUS: NORMAL
NITRITE, URINE: NEGATIVE
NRBC AUTOMATED: 0 PER 100 WBC
OPIATES, URINE: NEGATIVE
OTHER OBSERVATIONS UA: NORMAL
OXYCODONE SCREEN URINE: NEGATIVE
PDW BLD-RTO: 13.8 % (ref 11.8–14.4)
PH UA: 5.5 (ref 5–8)
PHENCYCLIDINE, URINE: NEGATIVE
PLATELET # BLD: 273 K/UL (ref 138–453)
PLATELET ESTIMATE: ABNORMAL
PMV BLD AUTO: 9.3 FL (ref 8.1–13.5)
POTASSIUM SERPL-SCNC: 4 MMOL/L (ref 3.7–5.3)
PROPOXYPHENE, URINE: ABNORMAL
PROTEIN UA: ABNORMAL
RBC # BLD: 3.84 M/UL (ref 4.21–5.77)
RBC # BLD: ABNORMAL 10*6/UL
RBC UA: NORMAL /HPF (ref 0–4)
RENAL EPITHELIAL, UA: NORMAL /HPF
SALICYLATE LEVEL: <1 MG/DL (ref 3–10)
SARS-COV-2, RAPID: NOT DETECTED
SEG NEUTROPHILS: 45 % (ref 36–65)
SEGMENTED NEUTROPHILS ABSOLUTE COUNT: 2.14 K/UL (ref 1.5–8.1)
SODIUM BLD-SCNC: 141 MMOL/L (ref 135–144)
SPECIFIC GRAVITY UA: 1.03 (ref 1–1.03)
SPECIMEN DESCRIPTION: NORMAL
TEST INFORMATION: ABNORMAL
TOTAL PROTEIN: 6.5 G/DL (ref 6.4–8.3)
TOXIC TRICYCLIC SC,BLOOD: NEGATIVE
TRICHOMONAS: NORMAL
TRICYCLIC ANTIDEPRESSANTS, UR: ABNORMAL
TROPONIN INTERP: NORMAL
TROPONIN T: NORMAL NG/ML
TROPONIN, HIGH SENSITIVITY: 9 NG/L (ref 0–22)
TURBIDITY: ABNORMAL
URINE HGB: ABNORMAL
UROBILINOGEN, URINE: NORMAL
WBC # BLD: 4.6 K/UL (ref 3.5–11.3)
WBC # BLD: ABNORMAL 10*3/UL
WBC UA: NORMAL /HPF (ref 0–5)
YEAST: NORMAL

## 2021-10-03 PROCEDURE — 87635 SARS-COV-2 COVID-19 AMP PRB: CPT

## 2021-10-03 PROCEDURE — 80179 DRUG ASSAY SALICYLATE: CPT

## 2021-10-03 PROCEDURE — G0379 DIRECT REFER HOSPITAL OBSERV: HCPCS

## 2021-10-03 PROCEDURE — 80143 DRUG ASSAY ACETAMINOPHEN: CPT

## 2021-10-03 PROCEDURE — 84484 ASSAY OF TROPONIN QUANT: CPT

## 2021-10-03 PROCEDURE — 93005 ELECTROCARDIOGRAM TRACING: CPT

## 2021-10-03 PROCEDURE — 85025 COMPLETE CBC W/AUTO DIFF WBC: CPT

## 2021-10-03 PROCEDURE — 71046 X-RAY EXAM CHEST 2 VIEWS: CPT

## 2021-10-03 PROCEDURE — 6370000000 HC RX 637 (ALT 250 FOR IP): Performed by: PSYCHIATRY & NEUROLOGY

## 2021-10-03 PROCEDURE — 1240000000 HC EMOTIONAL WELLNESS R&B

## 2021-10-03 PROCEDURE — G0378 HOSPITAL OBSERVATION PER HR: HCPCS

## 2021-10-03 PROCEDURE — 81001 URINALYSIS AUTO W/SCOPE: CPT

## 2021-10-03 PROCEDURE — 80053 COMPREHEN METABOLIC PANEL: CPT

## 2021-10-03 PROCEDURE — 99284 EMERGENCY DEPT VISIT MOD MDM: CPT

## 2021-10-03 PROCEDURE — 80307 DRUG TEST PRSMV CHEM ANLYZR: CPT

## 2021-10-03 PROCEDURE — 96374 THER/PROPH/DIAG INJ IV PUSH: CPT

## 2021-10-03 PROCEDURE — G0480 DRUG TEST DEF 1-7 CLASSES: HCPCS

## 2021-10-03 PROCEDURE — 6360000002 HC RX W HCPCS: Performed by: STUDENT IN AN ORGANIZED HEALTH CARE EDUCATION/TRAINING PROGRAM

## 2021-10-03 RX ORDER — PALIPERIDONE 3 MG/1
3 TABLET, EXTENDED RELEASE ORAL DAILY
Status: DISCONTINUED | OUTPATIENT
Start: 2021-10-04 | End: 2021-10-04 | Stop reason: HOSPADM

## 2021-10-03 RX ORDER — ESCITALOPRAM OXALATE 10 MG/1
10 TABLET ORAL DAILY
Status: DISCONTINUED | OUTPATIENT
Start: 2021-10-04 | End: 2021-10-04 | Stop reason: HOSPADM

## 2021-10-03 RX ORDER — HYDROXYZINE 50 MG/1
50 TABLET, FILM COATED ORAL 3 TIMES DAILY PRN
Status: DISCONTINUED | OUTPATIENT
Start: 2021-10-03 | End: 2021-10-04 | Stop reason: HOSPADM

## 2021-10-03 RX ORDER — POLYETHYLENE GLYCOL 3350 17 G/17G
17 POWDER, FOR SOLUTION ORAL DAILY PRN
Status: DISCONTINUED | OUTPATIENT
Start: 2021-10-03 | End: 2021-10-04 | Stop reason: HOSPADM

## 2021-10-03 RX ORDER — HALOPERIDOL 5 MG
5 TABLET ORAL EVERY 4 HOURS PRN
Status: DISCONTINUED | OUTPATIENT
Start: 2021-10-03 | End: 2021-10-04 | Stop reason: HOSPADM

## 2021-10-03 RX ORDER — ESCITALOPRAM OXALATE 20 MG/1
20 TABLET ORAL DAILY
Status: DISCONTINUED | OUTPATIENT
Start: 2021-10-04 | End: 2021-10-03

## 2021-10-03 RX ORDER — MAGNESIUM HYDROXIDE/ALUMINUM HYDROXICE/SIMETHICONE 120; 1200; 1200 MG/30ML; MG/30ML; MG/30ML
30 SUSPENSION ORAL EVERY 6 HOURS PRN
Status: DISCONTINUED | OUTPATIENT
Start: 2021-10-03 | End: 2021-10-04 | Stop reason: HOSPADM

## 2021-10-03 RX ORDER — LORAZEPAM 1 MG/1
2 TABLET ORAL EVERY 4 HOURS PRN
Status: DISCONTINUED | OUTPATIENT
Start: 2021-10-03 | End: 2021-10-04 | Stop reason: HOSPADM

## 2021-10-03 RX ORDER — PALIPERIDONE 6 MG/1
6 TABLET, EXTENDED RELEASE ORAL DAILY
Status: DISCONTINUED | OUTPATIENT
Start: 2021-10-04 | End: 2021-10-03

## 2021-10-03 RX ORDER — KETOROLAC TROMETHAMINE 30 MG/ML
30 INJECTION, SOLUTION INTRAMUSCULAR; INTRAVENOUS ONCE
Status: COMPLETED | OUTPATIENT
Start: 2021-10-03 | End: 2021-10-03

## 2021-10-03 RX ORDER — HALOPERIDOL 5 MG/ML
5 INJECTION INTRAMUSCULAR EVERY 4 HOURS PRN
Status: DISCONTINUED | OUTPATIENT
Start: 2021-10-03 | End: 2021-10-04 | Stop reason: HOSPADM

## 2021-10-03 RX ORDER — LORAZEPAM 2 MG/ML
2 INJECTION INTRAMUSCULAR EVERY 4 HOURS PRN
Status: DISCONTINUED | OUTPATIENT
Start: 2021-10-03 | End: 2021-10-04 | Stop reason: HOSPADM

## 2021-10-03 RX ORDER — DIPHENHYDRAMINE HYDROCHLORIDE 50 MG/ML
50 INJECTION INTRAMUSCULAR; INTRAVENOUS EVERY 4 HOURS PRN
Status: DISCONTINUED | OUTPATIENT
Start: 2021-10-03 | End: 2021-10-04 | Stop reason: HOSPADM

## 2021-10-03 RX ORDER — ACETAMINOPHEN 325 MG/1
650 TABLET ORAL EVERY 4 HOURS PRN
Status: DISCONTINUED | OUTPATIENT
Start: 2021-10-03 | End: 2021-10-04 | Stop reason: HOSPADM

## 2021-10-03 RX ORDER — TRAZODONE HYDROCHLORIDE 50 MG/1
50 TABLET ORAL NIGHTLY PRN
Status: DISCONTINUED | OUTPATIENT
Start: 2021-10-03 | End: 2021-10-04 | Stop reason: HOSPADM

## 2021-10-03 RX ORDER — NICOTINE 21 MG/24HR
1 PATCH, TRANSDERMAL 24 HOURS TRANSDERMAL DAILY
Status: DISCONTINUED | OUTPATIENT
Start: 2021-10-04 | End: 2021-10-04 | Stop reason: HOSPADM

## 2021-10-03 RX ORDER — VITAMIN B COMPLEX
1000 TABLET ORAL DAILY
Status: DISCONTINUED | OUTPATIENT
Start: 2021-10-04 | End: 2021-10-04 | Stop reason: HOSPADM

## 2021-10-03 RX ORDER — IBUPROFEN 400 MG/1
400 TABLET ORAL EVERY 6 HOURS PRN
Status: DISCONTINUED | OUTPATIENT
Start: 2021-10-03 | End: 2021-10-04 | Stop reason: HOSPADM

## 2021-10-03 RX ADMIN — TRAZODONE HYDROCHLORIDE 50 MG: 50 TABLET ORAL at 21:13

## 2021-10-03 RX ADMIN — KETOROLAC TROMETHAMINE 30 MG: 30 INJECTION, SOLUTION INTRAMUSCULAR; INTRAVENOUS at 15:38

## 2021-10-03 RX ADMIN — HYDROXYZINE HYDROCHLORIDE 50 MG: 50 TABLET, FILM COATED ORAL at 21:13

## 2021-10-03 ASSESSMENT — SLEEP AND FATIGUE QUESTIONNAIRES
DO YOU USE A SLEEP AID: YES
RESTFUL SLEEP: YES
SLEEP PATTERN: DIFFICULTY FALLING ASLEEP
DO YOU HAVE DIFFICULTY SLEEPING: NO
DIFFICULTY ARISING: NO
DIFFICULTY STAYING ASLEEP: NO
DIFFICULTY FALLING ASLEEP: NO
AVERAGE NUMBER OF SLEEP HOURS: 7

## 2021-10-03 ASSESSMENT — PAIN SCALES - GENERAL
PAINLEVEL_OUTOF10: 0
PAINLEVEL_OUTOF10: 6

## 2021-10-03 ASSESSMENT — ENCOUNTER SYMPTOMS
BACK PAIN: 0
COUGH: 0
CONSTIPATION: 0
DIARRHEA: 0
NAUSEA: 0
TROUBLE SWALLOWING: 0
VOMITING: 0
ABDOMINAL PAIN: 0
CHEST TIGHTNESS: 0
SHORTNESS OF BREATH: 1
COLOR CHANGE: 0

## 2021-10-03 ASSESSMENT — PATIENT HEALTH QUESTIONNAIRE - PHQ9: SUM OF ALL RESPONSES TO PHQ QUESTIONS 1-9: 8

## 2021-10-03 ASSESSMENT — LIFESTYLE VARIABLES: HISTORY_ALCOHOL_USE: NO

## 2021-10-03 NOTE — ED NOTES
The following labs labeled with pt sticker and tubed to lab:     [] Blue     [x] Lavender   [] on ice  [x] Green/yellow  [x] Green/black [] on ice  [x] Yellow  [] Red  [] Pink      [x] COVID-19 swab    [x] Rapid  [] PCR  [] Peds Viral Panel     [] Urine Sample  [] Pelvic Cultures  [] Blood Cultures            Ankit Giraldo RN  10/03/21 5116

## 2021-10-03 NOTE — ED NOTES
The following labs labeled with pt sticker and tubed to lab:     [] Blue     [] Lavender   [] on ice  [] Green/yellow  [] Green/black [] on ice  [] Yellow  [] Red  [] Pink      [] COVID-19 swab    [] Rapid  [] PCR  [] Peds Viral Panel     [x] Urine Sample  [] Pelvic Cultures  [] Blood Cultures            Crissy Cooley RN  10/03/21 7407

## 2021-10-03 NOTE — ED PROVIDER NOTES
FACULTY SIGN-OUT  ADDENDUM     Care of this patient was assumed from previous attending physician. The patient's initial evaluation and plan have been discussed with the prior provider who initially evaluated the patient. Attestation  I was available and discussed any additional care issues that arose and coordinated the management plans with the resident(s) caring for the patient during my duty period. Any areas of disagreement with resident's documentation of care or procedures are noted on the chart. I was personally present for the key portions of any/all procedures, during my duty period. I have documented in the chart those procedures where I was not present during the key portions. ED COURSE      The patient was given the following medications:  Orders Placed This Encounter   Medications    ketorolac (TORADOL) injection 30 mg       RECENT VITALS:   Temp: 98 °F (36.7 °C), Pulse: 100, Resp: 16, BP: 129/74    MEDICAL DECISION MAKING        Darrel Burrell is a 57 y.o. male who presents to the Emergency Department with complaints of SI and command hallucinations. Medically clear. Await psych recommendations.       Finn Jeffers MD  Attending Emergency Physician    (Please note that portions of this note were completed with a voice recognition program.  Efforts were made to edit the dictations but occasionally words are mis-transcribed.)          Finn Jeffers MD  10/03/21 2573

## 2021-10-03 NOTE — ED NOTES
management of his symptoms. Patient is tearful during conversation and states, \"what am I supposed to do with these voices all the time, I just want to die. \"  Patient was admitted to the [facility] from 9/11/21-9/22/21 and 9/24/21-9/28/21. Level of Care Disposition:   Writer will consult with on call psychiatrist regarding potential admission to the [facility] once patient is medically cleared.       TAVO Jones  10/03/21 7304
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
## 2021-10-03 NOTE — ED NOTES
Patient was declined admission from the [facility] with recommendation to follow up with community mental health agency, [facility] and/or safety plan with [address], [place]  10/03/21 199 [facility], [address] [place] [address]  10/03/21 6420

## 2021-10-03 NOTE — ED NOTES
Report received from [facility]. This caregiver met patient, resting quietly, no new change in status.        Jaclyn Mead RN  10/03/21 1173

## 2021-10-03 NOTE — ED NOTES
[] Sosa    [] [facility]    [x]  [facility] ASSESSMENT      Y  N     [x] [] In the past two weeks have you had thoughts of hurting yourself in any way? [x] [] In the past two weeks have you had thoughts that you would be better off dead? [] [x] Have you made a suicide attempt in the past two months? [x] [] Do you have a plan for hurting yourself or suicide? [x] [] Presence of hallucinations/voices related to hurting himself or herself or someone else. SUICIDE/SECURITY WATCH PRECAUTION CHECKLIST     Orders    [x]  Suicide/Security Watch Precautions initiated as checked below:   10/3/21 2:06 PM EDT BH31/BH31A    [x] Notified physician:  Yudelka Garcia MD  10/3/21 2:06 PM EDT    [x] Orders obtained as appropriate:     [x] 1:1 Observer     [] Psych Consult     [] Psych Consult    Name:  Date:  Time:    [x] 1:1 Observer, Notified by: Micheal Rodriguez RN    Contact Nurse Supervisor    [x] Remove all personal clothes from room and place in snap/paper gown/pants. Slipper only    [x] Remove all personal belongings from room and secured away from patient. Documentation    [x] Initiate Suicide/Security Watch Precaution Flow Sheet    [x] Initiate individualized Care Plan/Problem    [x] Document why precautions initiated on flow sheet (Initiate Nursing Care Plan/Problem)    [x] 1:1 Observer in place; instructions provided. Suicide precautions require observer be within arms length. [x] Nurse-Observer Communication Hand-off initiated by RN, reviewed with Observer. Subsequently used as Hand Off between Observers. [x] Initiate every 15 minute observations per observer as delegated by the RN.     [x] Initiate RN assessment and documentation    Environmental Scan  Search Criteria and Process: OPTIONAL, see Search Policy    [x] Reason for search: pt stated SI    [x] Nursing in presence of second person to search patient    [x] Patient notified of reason Jose Antonio Kirkpatrick  10/03/21 141
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
## 2021-10-04 VITALS
HEART RATE: 70 BPM | DIASTOLIC BLOOD PRESSURE: 74 MMHG | RESPIRATION RATE: 14 BRPM | BODY MASS INDEX: 25.67 KG/M2 | TEMPERATURE: 97.3 F | SYSTOLIC BLOOD PRESSURE: 112 MMHG | HEIGHT: 74 IN | WEIGHT: 200 LBS

## 2021-10-04 PROCEDURE — 6370000000 HC RX 637 (ALT 250 FOR IP): Performed by: PSYCHIATRY & NEUROLOGY

## 2021-10-04 PROCEDURE — 99236 HOSP IP/OBS SAME DATE HI 85: CPT | Performed by: PSYCHIATRY & NEUROLOGY

## 2021-10-04 PROCEDURE — G0378 HOSPITAL OBSERVATION PER HR: HCPCS

## 2021-10-04 PROCEDURE — 6370000000 HC RX 637 (ALT 250 FOR IP): Performed by: NURSE PRACTITIONER

## 2021-10-04 RX ADMIN — IBUPROFEN 400 MG: 400 TABLET, FILM COATED ORAL at 17:39

## 2021-10-04 RX ADMIN — PALIPERIDONE 3 MG: 3 TABLET, EXTENDED RELEASE ORAL at 09:30

## 2021-10-04 RX ADMIN — Medication 1000 UNITS: at 09:30

## 2021-10-04 RX ADMIN — ESCITALOPRAM OXALATE 10 MG: 10 TABLET ORAL at 09:29

## 2021-10-04 ASSESSMENT — PAIN SCALES - GENERAL
PAINLEVEL_OUTOF10: 0
PAINLEVEL_OUTOF10: 3
PAINLEVEL_OUTOF10: 8

## 2021-10-04 ASSESSMENT — PAIN DESCRIPTION - PAIN TYPE
TYPE: CHRONIC PAIN
TYPE: CHRONIC PAIN

## 2021-10-04 ASSESSMENT — PAIN DESCRIPTION - ORIENTATION: ORIENTATION: LEFT

## 2021-10-04 ASSESSMENT — PAIN DESCRIPTION - LOCATION
LOCATION: RIB CAGE;ABDOMEN
LOCATION: ABDOMEN

## 2021-10-04 NOTE — BH NOTE
[facility]  Admission Note     Admission Type:   Admission Type: Voluntary    Reason for admission:  Reason for Admission: SI with plan to shoot self    PATIENT STRENGTHS:  Strengths: Connection to output provider, Positive Support    Patient Strengths and Limitations:  Limitations: Inappropriate/potentially harmful leisure interests, Hopeless about future    Addictive Behavior:   Addictive Behavior  In the past 3 months, have you felt or has someone told you that you have a problem with:  : None  Do you have a history of Chemical Use?: No  Do you have a history of Alcohol Use?: No  Do you have a history of Street Drug Abuse?: Yes  Histroy of Prescripton Drug Abuse?: No    Medical Problems:   Past Medical History:   Diagnosis Date    B[facility] disorder ([facility]     Depression     GERD (gastroesophageal reflux disease)     Hallucinations     Headache(784.0)     Hepatitis     Schizophrenia, schizo-affective ([facility]     Substance abuse ([facility]     Tobacco abuse     Type II or unspecified type diabetes mellitus without mention of complication, not stated as uncontrolled     Urinary incontinence        Status EXAM:  Status and Exam  Normal: No  Facial Expression: Flat  Affect: Blunt  Level of Consciousness: Alert  Mood:Normal: No  Mood: Depressed, Anxious  Motor Activity:Normal: No  Motor Activity: Decreased  Interview Behavior: Cooperative  Preception: [place] to Person, [place] to Time, [place] to Place, [place] to Situation  Attention:Normal: No  Attention: Distractible  Thought Processes: Circumstantial  Thought Content:Normal: No  Thought Content: Poverty of Content  Hallucinations:  Auditory (Comment)  Delusions: No  Memory:Normal: Yes  Insight and Judgment: No  Insight and Judgment: Poor Judgment, Poor Insight  Present Suicidal Ideation: Yes (fleeting thoughts)  Present Homicidal Ideation: No    Tobacco Screening:  Practical Counseling, on admission, corby X, if applicable and completed (first 3 are required if patient doesn't refuse):            ( )  Recognizing danger situations (included triggers and roadblocks)                    ( )  Coping skills (new ways to manage stress, exercise, relaxation techniques, changing routine, distraction)                                                           ( )  Basic information about quitting (benefits of quitting, techniques in how to quit, available resources  ( ) Referral for counseling faxed to [facility]                                           (X ) Patient refused counseling  ( ) Patient has not smoked in the last 30 days    Metabolic Screening:    Lab Results   Component Value Date    LABA1C 4.9 08/11/2020       Lab Results   Component Value Date    CHOL 167 08/11/2020    CHOL 179 02/13/2017    CHOL 127 05/09/2015    CHOL 168 08/20/2014    CHOL 158 12/31/2013    CHOL 178 08/15/2013    CHOL 166 09/17/2012    CHOL 210 (H) 02/03/2012     Lab Results   Component Value Date    TRIG 43 08/11/2020    TRIG 67 02/13/2017    TRIG 37 05/09/2015    TRIG 72 08/20/2014    TRIG 52 12/31/2013    TRIG 70 08/15/2013    TRIG 117 09/17/2012    TRIG 94 02/03/2012     Lab Results   Component Value Date    HDL 74 08/11/2020    HDL 73 02/13/2017    HDL 57 05/09/2015    HDL 50 08/20/2014    HDL 43 12/31/2013    HDL 42 08/15/2013    HDL 38 (L) 09/17/2012    HDL 55 02/03/2012     No components found for: LDLCAL  No results found for: LABVLDL      Body mass index is 25.68 kg/m². BP Readings from Last 2 Encounters:   10/03/21 133/81   10/03/21 129/74           Pt admitted with followings belongings:  Dentures: None  Vision - Corrective Lenses: None  Hearing Aid: None  Jewelry: None  Body Piercings Removed: N/A  Clothing: Footwear, Jacket / coat, Pants, Shirt, Other (Comment) (see written)  Were All Patient Medications Collected?: Yes  Other Valuables: Money (Comment), Other (Comment) (see written)     Patient's home medications were verified.   Patient oriented to

## 2021-10-04 NOTE — GROUP NOTE
Group Therapy Note    Date: 10/4/2021    Group Start Time: 1435  Group End Time: 1520  Group Topic: Music Therapy    JESUS Funes        Group Therapy Note    Pt did not attend music therapy group d/t resting in room despite staff invitation to attend. 1:1 talk time offered as alternative to group session, pt declined.

## 2021-10-04 NOTE — BH NOTE
[facility]  Discharge Note    Pt discharged with followings belongings:   Dentures: None  Vision - Corrective Lenses: None  Hearing Aid: None  Jewelry: None  Body Piercings Removed: N/A  Clothing: Footwear, Jacket / coat, Pants, Shirt, Other (Comment) (see written)  Were All Patient Medications Collected?: Yes  Other Valuables: Money (Comment), Other (Comment) (see written)   Valuables sent home with N/A or returned to patient. Patient education on aftercare instructions: Yes Information faxed to [facility] by RN at 6:31 PM .Patient verbalize understanding of AVS: Yes  Status EXAM upon discharge:  Status and Exam  Normal: No  Facial Expression: Flat  Affect: Blunt  Level of Consciousness: Alert  Mood:Normal: No  Mood: Depressed, Anxious  Motor Activity:Normal: No  Motor Activity: Decreased  Interview Behavior: Cooperative  Preception: [place] to Person, [place] to Time, [place] to Place, [place] to Situation  Attention:Normal: No  Attention: Distractible, Unable to Concentrate  Thought Processes: Circumstantial  Thought Content:Normal: No  Thought Content: Preoccupations, Poverty of Content  Hallucinations:  Auditory (Comment)  Delusions: No  Memory:Normal: Yes  Insight and Judgment: No  Insight and Judgment: Poor Judgment, Poor Insight, Unmotivated  Present Suicidal Ideation: Yes (Fleeting suicidal thoughts, no plan)  Present Homicidal Ideation: No      Metabolic Screening:    Lab Results   Component Value Date    LABA1C 4.9 08/11/2020       Lab Results   Component Value Date    CHOL 167 08/11/2020    CHOL 179 02/13/2017    CHOL 127 05/09/2015    CHOL 168 08/20/2014    CHOL 158 12/31/2013    CHOL 178 08/15/2013    CHOL 166 09/17/2012    CHOL 210 (H) 02/03/2012     Lab Results   Component Value Date    TRIG 43 08/11/2020    TRIG 67 02/13/2017    TRIG 37 05/09/2015    TRIG 72 08/20/2014    TRIG 52 12/31/2013    TRIG 70 08/15/2013    TRIG 117 09/17/2012    TRIG 94 02/03/2012     Lab Results   Component Value Date    HDL 74 08/11/2020    HDL 73 02/13/2017    HDL 57 05/09/2015    HDL 50 08/20/2014    HDL 43 12/31/2013    HDL 42 08/15/2013    HDL 38 (L) 09/17/2012    HDL 55 02/03/2012     No components found for: LDLCAL  No results found for: Kris Simmons RN   Patient discharged stable to [facility] via [place] [facility] [place] with all belongings.

## 2021-10-04 NOTE — BH NOTE
Patient given tobacco quitline number 2-225.247.2542 at this time. With nurse observation patient called number for information and follow up. Continue to reinforce the dangers of long term tobacco use and why tobacco cessation is important to patient.

## 2021-10-04 NOTE — BH NOTE
RT ASSESSMENT TREATMENT GOALS    Patient is D/Cing same day. [x]Pt Goal:  Pt will identify 1-2 positive coping skills by time of discharge. []Pt Goal:  Pt will identify 1-2 positive aspects of self by time of discharge. []Pt Goal:  Pt will remain on task/topic for 15-30 minutes during group by time of discharge. [x]Pt Goal:  Pt will identify 1-2 aspects of relapse prevention plan by time of discharge. []Pt Goal:  Pt will join in conversation with peers 1-2 times per group by time of discharge. []Pt Goal:  Pt will identify 1-2 new leisure interests by time of discharge. []Pt Goal:  Pt will not voice any delusional content by time of discharge.

## 2021-10-04 NOTE — BH NOTE
Patient given tobacco quitline number 93999660779 at this time, refusing to call at this time, states \" I just dont want to quit now\"- patient given information as to the dangers of long term tobacco use. Continue to reinforce the importance of tobacco cessation.

## 2021-10-04 NOTE — CARE COORDINATION
Discharge Arrangements: Appointment is scheduled with Nilam Mccollum notified: NA    Discharge destination/address: [facility], [place], [address] [place] [address]    Transported by:  Merly Allen

## 2021-10-04 NOTE — GROUP NOTE
Group Therapy Note    Date: 10/4/2021    Group Start Time: 1105  Group End Time: 1150  Group Topic: Recreational    [facility]    Susie Kyle        Group Therapy Note    Pt did not attend recreational group d/t resting in room despite staff invitation to attend. 1:1 talk time offered as alternative to group session, pt declined.

## 2021-10-04 NOTE — PLAN OF CARE
[id number]  Initial Interdisciplinary Treatment Plan NO      Original treatment plan Date & Time: 10/4/2021   0830    Admission Type:  Admission Type: Voluntary    Reason for admission:   Reason for Admission: SI with plan to shoot self    Estimated Length of Stay:  5-7days  Estimated Discharge Date: to be determined by physician    PATIENT STRENGTHS:  Patient Strengths:Strengths: Connection to output provider, Positive Support  Patient Strengths and Limitations:Limitations: Inappropriate/potentially harmful leisure interests, Hopeless about future  Addictive Behavior: Addictive Behavior  In the past 3 months, have you felt or has someone told you that you have a problem with:  : None  Do you have a history of Chemical Use?: No  Do you have a history of Alcohol Use?: No  Do you have a history of Street Drug Abuse?: Yes  Histroy of Prescripton Drug Abuse?: No  Medical Problems:  Past Medical History:   Diagnosis Date    Bipolar disorder ([facility] [address]     Depression     GERD (gastroesophageal reflux disease)     Hallucinations     Headache(784.0)     Hepatitis     Schizophrenia, schizo-affective (HCC)     Substance abuse ([facility] [address]     Tobacco abuse     Type II or unspecified type diabetes mellitus without mention of complication, not stated as uncontrolled     Urinary incontinence      Status EXAM:Status and Exam  Normal: No  Facial Expression: Flat  Affect: Blunt  Level of Consciousness: Alert  Mood:Normal: No  Mood: Depressed, Anxious  Motor Activity:Normal: No  Motor Activity: Decreased  Interview Behavior: Cooperative  Preception: [place] to Person, [place] to Time, [place] to Place, [place] to Situation  Attention:Normal: No  Attention: Distractible  Thought Processes: Circumstantial  Thought Content:Normal: No  Thought Content: Poverty of Content  Hallucinations:  Auditory (Comment)  Delusions: No  Memory:Normal: Yes  Insight and Judgment: No  Insight and Judgment: Poor Judgment, Poor Insight  Present Suicidal Ideation: Yes (fleeting thoughts)  Present Homicidal Ideation: No    EDUCATION:   Learner Progress Toward Treatment Goals: reviewed group plans and strategies for care    Method:group therapy, medication compliance, individualized assessments and care planning    Outcome: needs reinforcement    PATIENT GOALS: to be discussed with patient within 72 hours    PLAN/TREATMENT RECOMMENDATIONS:     continue group therapy , medications compliance, goal setting, individualized assessments and care, continue to monitor pt on unit      SHORT-TERM GOALS:   Time frame for Short-Term Goals: 5-7 days    LONG-TERM GOALS:  Time frame for Long-Term Goals: 6 months  Members Present in Team Meeting: See Signature Sheet    Coleman Reddy

## 2021-10-04 NOTE — DISCHARGE INSTR - DIET
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
## 2021-10-05 NOTE — GROUP NOTE
Group Therapy Note    Date: 10/4/2021    Group Start Time: 1020  Group End Time: 2644  Group Topic: Psychotherapy    CZ FRANKLIN Muñoz LSW        Group Therapy Note    Attendees: 10/20           Patient was offered group therapy today but declined to participate despite encouragement from staff. 1:1 was offered.     Signature:  FRANKLIN Vega LSW

## 2021-10-07 LAB
EKG ATRIAL RATE: 88 BPM
EKG P AXIS: 62 DEGREES
EKG P-R INTERVAL: 160 MS
EKG Q-T INTERVAL: 400 MS
EKG QRS DURATION: 84 MS
EKG QTC CALCULATION (BAZETT): 484 MS
EKG R AXIS: 77 DEGREES
EKG T AXIS: 75 DEGREES
EKG VENTRICULAR RATE: 88 BPM

## 2021-10-08 ENCOUNTER — HOSPITAL ENCOUNTER (EMERGENCY)
Age: 61
Discharge: HOME | End: 2021-10-08
Attending: EMERGENCY MEDICINE
Payer: MEDICAID

## 2021-10-08 VITALS
HEART RATE: 82 BPM | OXYGEN SATURATION: 96 % | RESPIRATION RATE: 18 BRPM | DIASTOLIC BLOOD PRESSURE: 58 MMHG | TEMPERATURE: 97.2 F | SYSTOLIC BLOOD PRESSURE: 104 MMHG

## 2021-10-08 DIAGNOSIS — R45.851 SUICIDAL THOUGHTS: Primary | ICD-10-CM

## 2021-10-08 LAB
ABSOLUTE EOS #: 0.14 K/UL (ref 0–0.44)
ABSOLUTE IMMATURE GRANULOCYTE: <0.03 K/UL (ref 0–0.3)
ABSOLUTE LYMPH #: 2.56 K/UL (ref 1.1–3.7)
ABSOLUTE MONO #: 0.52 K/UL (ref 0.1–1.2)
ACETAMINOPHEN LEVEL: <5 UG/ML (ref 10–30)
ALBUMIN SERPL-MCNC: 3.9 G/DL (ref 3.5–5.2)
ALBUMIN/GLOBULIN RATIO: 1.4 (ref 1–2.5)
ALP BLD-CCNC: 108 U/L (ref 40–129)
ALT SERPL-CCNC: 13 U/L (ref 5–41)
AMPHETAMINE SCREEN URINE: NEGATIVE
ANION GAP SERPL CALCULATED.3IONS-SCNC: 16 MMOL/L (ref 9–17)
AST SERPL-CCNC: 25 U/L
BARBITURATE SCREEN URINE: NEGATIVE
BASOPHILS # BLD: 1 % (ref 0–2)
BASOPHILS ABSOLUTE: 0.03 K/UL (ref 0–0.2)
BENZODIAZEPINE SCREEN, URINE: NEGATIVE
BILIRUB SERPL-MCNC: 0.36 MG/DL (ref 0.3–1.2)
BUN BLDV-MCNC: 13 MG/DL (ref 8–23)
BUN/CREAT BLD: ABNORMAL (ref 9–20)
BUPRENORPHINE URINE: ABNORMAL
CALCIUM SERPL-MCNC: 8.6 MG/DL (ref 8.6–10.4)
CANNABINOID SCREEN URINE: NEGATIVE
CHLORIDE BLD-SCNC: 103 MMOL/L (ref 98–107)
CO2: 19 MMOL/L (ref 20–31)
COCAINE METABOLITE, URINE: POSITIVE
CREAT SERPL-MCNC: 0.92 MG/DL (ref 0.7–1.2)
DIFFERENTIAL TYPE: ABNORMAL
EOSINOPHILS RELATIVE PERCENT: 2 % (ref 1–4)
ETHANOL PERCENT: 0.01 %
ETHANOL: 14 MG/DL
GFR AFRICAN AMERICAN: >60 ML/MIN
GFR NON-AFRICAN AMERICAN: >60 ML/MIN
GFR SERPL CREATININE-BSD FRML MDRD: ABNORMAL ML/MIN/{1.73_M2}
GFR SERPL CREATININE-BSD FRML MDRD: ABNORMAL ML/MIN/{1.73_M2}
GLUCOSE BLD-MCNC: 113 MG/DL (ref 70–99)
HCT VFR BLD CALC: 34.5 % (ref 40.7–50.3)
HEMOGLOBIN: 10.6 G/DL (ref 13–17)
IMMATURE GRANULOCYTES: 0 %
LYMPHOCYTES # BLD: 41 % (ref 24–43)
MCH RBC QN AUTO: 27.9 PG (ref 25.2–33.5)
MCHC RBC AUTO-ENTMCNC: 30.7 G/DL (ref 28.4–34.8)
MCV RBC AUTO: 90.8 FL (ref 82.6–102.9)
MDMA URINE: ABNORMAL
METHADONE SCREEN, URINE: NEGATIVE
METHAMPHETAMINE, URINE: ABNORMAL
MONOCYTES # BLD: 8 % (ref 3–12)
NRBC AUTOMATED: 0 PER 100 WBC
OPIATES, URINE: NEGATIVE
OXYCODONE SCREEN URINE: NEGATIVE
PDW BLD-RTO: 14.3 % (ref 11.8–14.4)
PHENCYCLIDINE, URINE: NEGATIVE
PLATELET # BLD: 272 K/UL (ref 138–453)
PLATELET ESTIMATE: ABNORMAL
PMV BLD AUTO: 9 FL (ref 8.1–13.5)
POTASSIUM SERPL-SCNC: 3.6 MMOL/L (ref 3.7–5.3)
PROPOXYPHENE, URINE: ABNORMAL
RBC # BLD: 3.8 M/UL (ref 4.21–5.77)
RBC # BLD: ABNORMAL 10*6/UL
SALICYLATE LEVEL: <1 MG/DL (ref 3–10)
SARS-COV-2, RAPID: NOT DETECTED
SEG NEUTROPHILS: 48 % (ref 36–65)
SEGMENTED NEUTROPHILS ABSOLUTE COUNT: 3.02 K/UL (ref 1.5–8.1)
SODIUM BLD-SCNC: 138 MMOL/L (ref 135–144)
SPECIMEN DESCRIPTION: NORMAL
TEST INFORMATION: ABNORMAL
TOTAL PROTEIN: 6.6 G/DL (ref 6.4–8.3)
TOXIC TRICYCLIC SC,BLOOD: NEGATIVE
TRICYCLIC ANTIDEPRESSANTS, UR: ABNORMAL
WBC # BLD: 6.3 K/UL (ref 3.5–11.3)
WBC # BLD: ABNORMAL 10*3/UL

## 2021-10-08 PROCEDURE — G0480 DRUG TEST DEF 1-7 CLASSES: HCPCS

## 2021-10-08 PROCEDURE — 85025 COMPLETE CBC W/AUTO DIFF WBC: CPT

## 2021-10-08 PROCEDURE — 87635 SARS-COV-2 COVID-19 AMP PRB: CPT

## 2021-10-08 PROCEDURE — 80053 COMPREHEN METABOLIC PANEL: CPT

## 2021-10-08 PROCEDURE — 80179 DRUG ASSAY SALICYLATE: CPT

## 2021-10-08 PROCEDURE — 80307 DRUG TEST PRSMV CHEM ANLYZR: CPT

## 2021-10-08 PROCEDURE — 80143 DRUG ASSAY ACETAMINOPHEN: CPT

## 2021-10-08 PROCEDURE — 99285 EMERGENCY DEPT VISIT HI MDM: CPT

## 2021-10-08 ASSESSMENT — ENCOUNTER SYMPTOMS
SORE THROAT: 0
COLOR CHANGE: 0
COUGH: 0
SHORTNESS OF BREATH: 0
ABDOMINAL PAIN: 0
VOMITING: 0
WHEEZING: 0
FACIAL SWELLING: 0
CONSTIPATION: 0
SINUS PAIN: 0
NAUSEA: 0
SINUS PRESSURE: 0
TROUBLE SWALLOWING: 0
DIARRHEA: 0
CHEST TIGHTNESS: 0

## 2021-10-08 ASSESSMENT — PAIN SCALES - GENERAL: PAINLEVEL_OUTOF10: 6

## 2021-10-08 ASSESSMENT — PAIN DESCRIPTION - PAIN TYPE: TYPE: ACUTE PAIN

## 2021-10-08 ASSESSMENT — PAIN DESCRIPTION - LOCATION: LOCATION: ABDOMEN

## 2021-10-08 NOTE — ED PROVIDER NOTES
[facility] ED  Emergency Department Encounter  Emergency Medicine Resident     Pt Name: Mone Prather  MRN: [id number]  [place] 1960  Date of evaluation: 10/8/21  PCP:  No primary care provider on file. CHIEF COMPLAINT       Chief Complaint   Patient presents with    Suicidal       HISTORY [facility]  (Location/Symptom, Timing/Onset, Context/Setting, Quality, Duration, Modifying Factors,Severity.)      Mone Prather is a 57 y. o.yo male who presents with thoughts of suicide. Patient has longstanding history of suicidal ideations. He was recently admitted to [address] [place] [address] and discharged on 10/5. Patient states he has not been taking his medications for the last 2 days because he ran out. Patient believes he has a plan for suicide, states he was walking the streets earlier this morning attempting to get hit by a car. Patient denies any thoughts of homicide. Patient does admit to crack cocaine use on Wednesday. He also states that he was drinking alcohol overnight. PAST MEDICAL / SURGICAL / SOCIAL / FAMILY HISTORY      has a past medical history of Bipolar disorder ([facility] [address] Depression, GERD (gastroesophageal reflux disease), Hallucinations, Headache(784.0), Hepatitis, Schizophrenia, schizo-affective ([facility] [address] Substance abuse ([facility] [address] Tobacco abuse, Type II or unspecified type diabetes mellitus without mention of complication, not stated as uncontrolled, and Urinary incontinence. has a past surgical history that includes Dental surgery and Abscess Drainage (N/A, 02/11/2018).      Social History     Socioeconomic History    Marital status: Single     Spouse name: Not on file    Number of children: 0    Years of education: 8    Highest education level: Not on file   Occupational History     Employer: N/A   Tobacco Use    Smoking status: Current Every Day Smoker     Packs/day: 1.00     Years: 47.00     Pack years: 47.00     Types: Cigarettes    Smokeless tobacco: Never Used  Tobacco comment: Patient accepting of nicotine patch   Substance and Sexual Activity    Alcohol use: Yes     Comment: reports drinking occasionally    Drug use: Yes     Frequency: 7.0 times per week     Types: Cocaine     Comment: drug abuse includes crack cocaine,     Sexual activity: Not on file   Other Topics Concern    Not on file   Social History Narrative    Not on file     Social Determinants of Health     Financial Resource Strain:     Difficulty of Paying Living Expenses:    Food Insecurity:     Worried About Running Out of Food in the Last Year:     Ran Out of Food in the Last Year:    Transportation Needs:     Lack of Transportation (Medical):  Lack of Transportation (Non-Medical):    Physical Activity:     Days of Exercise per Week:     Minutes of Exercise per Session:    Stress:     Feeling of Stress :    Social Connections:     Frequency of Communication with Friends and Family:     Frequency of Social Gatherings with Friends and Family:     Attends Gnosticism Services:     Active Member of Clubs or Organizations:     Attends Club or Organization Meetings:     Marital Status:    Intimate Partner Violence:     Fear of Current or Ex-Partner:     Emotionally Abused:     Physically Abused:     Sexually Abused:        Family History   Problem Relation Age of Onset    Diabetes Mother     Heart Disease Mother         Allergies:  Navane [thiothixene]    Home Medications:  Prior to Admission medications    Medication Sig Start Date End Date Taking?  Authorizing Provider   polyethylene glycol (MIRALAX) 17 g packet Take 17 g by mouth 2 times daily as needed for Constipation 10/1/21 10/31/21  Jaye Kanner, DO   docusate sodium (COLACE) 100 MG capsule Take 1 capsule by mouth 2 times daily as needed for Constipation 10/1/21   Jaye Kanner, DO   acetaminophen (TYLENOL) 500 MG tablet Take 2 tablets by mouth every 8 hours as needed for Pain 9/30/21 10/3/21  Zoya Hutton DO ibuprofen (IBU) 400 MG tablet Take 1 tablet by mouth every 6 hours as needed for Pain 9/30/21 10/5/21  Dominga Gabriel DO   lidocaine (LIDODERM) 5 % Place 1 patch onto the skin daily for 10 days 12 hours on, 12 hours off. 9/30/21 10/10/21  Dominga Gabriel DO   paliperidone (INVEGA) 6 MG extended release tablet Take 1 tablet by mouth daily 9/29/21   Aaron Schneider MD   traZODone (DESYREL) 150 MG tablet Take 1 tablet by mouth nightly as needed for Sleep 9/28/21   Aaron Schneider MD   escitalopram (LEXAPRO) 20 MG tablet Take 1 tablet by mouth daily 9/28/21   Aaron Schneider MD   paliperidone palmitate ER (INVEGA SUSTENNA) 234 MG/1.5ML [place] IM injection Inject 234 mg into the muscle every 30 days 9/30/21   Aaron Schneider MD   nicotine polacrilex (NICORETTE) 2 MG gum Take 1 each by mouth every hour as needed for Smoking cessation 9/21/21   Roseanna Fry MD   Cholecalciferol (VITAMIN D) 25 MCG TABS Take 1 tablet by mouth daily 9/22/21   Roseanna Fry MD       REVIEW OFSYSTEMS    (2-9 systems for level 4, 10 or more for level 5)      Review of Systems   Constitutional: Negative for chills, diaphoresis, fatigue and fever. HENT: Negative for facial swelling, nosebleeds, sinus pressure, sinus pain, sore throat and trouble swallowing. Respiratory: Negative for cough, chest tightness, shortness of breath and wheezing. Cardiovascular: Negative for chest pain, palpitations and leg swelling. Gastrointestinal: Negative for abdominal pain, constipation, diarrhea, nausea and vomiting. Genitourinary: Negative for dysuria, flank pain and hematuria. Musculoskeletal: Negative for arthralgias, gait problem, neck pain and neck stiffness. Skin: Negative for color change, rash and wound. Neurological: Negative for dizziness, syncope, weakness, light-headedness and headaches. Psychiatric/Behavioral: Positive for suicidal ideas. Negative for behavioral problems, hallucinations and self-injury.  The patient is COVID-19, Rapid    CBC Auto Differential    Comprehensive Metabolic Panel w/ Reflex to MG    TOX SCR, BLD, ED    Urine Drug Screen       MEDICATIONS ORDERED:  No orders of the defined types were placed in this encounter. DDX: Homelessness, suicidal, recurrent depression    Initial MDM/Plan: 57 y.o. male who presents with complaints of feeling suicidal with a plan to walk in front of traffic. Patient was recently admitted to the [facility] and discharged on 10/4. Patient does have to plan, and to talk to social work and obtain lab work. DIAGNOSTIC RESULTS / EMERGENCYDEPARTMENT COURSE / MDM     LABS:  Labs Reviewed   CBC WITH AUTO DIFFERENTIAL - Abnormal; Notable for the following components:       Result Value    RBC 3.80 (*)     Hemoglobin 10.6 (*)     Hematocrit 34.5 (*)     All other components within normal limits   COMPREHENSIVE METABOLIC PANEL W/ REFLEX TO MG FOR LOW K - Abnormal; Notable for the following components:    Glucose 113 (*)     Potassium 3.6 (*)     CO2 19 (*)     All other components within normal limits   TOX SCR, BLD, ED - Abnormal; Notable for the following components:    Acetaminophen Level <5 (*)     Ethanol 14 (*)     Ethanol percent 6.620 (*)     Salicylate Lvl <1 (*)     All other components within normal limits   URINE DRUG SCREEN - Abnormal; Notable for the following components:    Cocaine Metabolite, Urine POSITIVE (*)     All other components within normal limits   COVID-19, RAPID         RADIOLOGY:  No results found. EKG      All EKG's are interpreted by the Emergency Department Physicianwho either signs or Co-signs this chart in the absence of a cardiologist.    EMERGENCY DEPARTMENT COURSE:  ED Course as of Oct 08 1108   Fri Oct 08, 2021   9117 Patient seen and evaluated. [CD]   1354 Patient stated that even if his meds are refilled he would still try to kill himself.  Will obtain [facility] labs     [CD]   0855 Cocaine Metabolite, Urine(!): POSITIVE [CD]   0855 Ethanol percent(!): 0.014 [CD]   0856 Patient work-up unremarkable. Was positive for cocaine, alcohol level is 0.014. SARS-CoV-2, Rapid: Not Detected [CD]   9383 Transportation arranged at 1230 to meet with patient's crisis counselor. Will discharge at that time. Patient has transportation arranged, spoke to his counselor. Will set up meeting today. [CD]      ED Course User Index  [CD] Kate Gaviria DO          PROCEDURES:  None    CONSULTS:  None    CRITICAL CARE:  Please see attending documentation    FINAL IMPRESSION      1.  Suicidal thoughts          DISPOSITION / PLAN     DISPOSITION    Discharged to [facility] crisis counselor    PATIENT REFERRED TO:  [facility] ED  [address] [place] [address]  [phone]  Go to   If symptoms worsen      DISCHARGE MEDICATIONS:  New Prescriptions    No medications on file       Kate Gaviria DO  Emergency Medicine Resident    (Please note that portions of this note were completed with a voice recognition program.Efforts were made to edit the dictations but occasionally words are mis-transcribed.)        Ktae Gaviria DO  Resident  10/08/21 1655

## 2021-10-08 NOTE — ED NOTES
..  [] [place]    [] [address]    [x]  [address] [place] ASSESSMENT      Y  N     [x] [] In the past two weeks have you had thoughts of hurting yourself in any way? [x] [] In the past two weeks have you had thoughts that you would be better off dead? [x] [] Have you made a suicide attempt in the past two months? [x] [] Do you have a plan for hurting yourself or suicide? [x] [] Presence of hallucinations/voices related to hurting himself or herself or someone else. SUICIDE/SECURITY WATCH PRECAUTION CHECKLIST     Orders    [x]  Suicide/Security Watch Precautions initiated as checked below:   10/8/21 8:31 AM EDT BH31/BH31B    [x] Notified physician:  Josue Flores MD  10/8/21 8:31 AM EDT    [x] Orders obtained as appropriate:     [x] 1:1 Observer     [] Psych Consult     [] Psych Consult    Name:  Date:  Time:    [x] 1:1 Observer, Notified by:  Mark Gann RN    Contact Nurse Supervisor    [x] Remove all personal clothes from room and place in snap/paper gown/pants. Slipper only    [x] Remove all personal belongings from room and secured away from patient. Documentation    [x] Initiate Suicide/Security Watch Precaution Flow Sheet    [x] Initiate individualized Care Plan/Problem    [x] Document why precautions initiated on flow sheet (Initiate Nursing Care Plan/Problem)    [x] 1:1 Observer in place; instructions provided. Suicide precautions require observer be within arms length. [x] Nurse-Observer Communication Hand-off initiated by RN, reviewed with Observer. Subsequently used as Hand Off between Observers. [x] Initiate every 15 minute observations per observer as delegated by the RN.     [x] Initiate RN assessment and documentation    Environmental Scan  Search Criteria and Process: OPTIONAL, see Search Policy    [] Reason for search:    [] Nursing in presence of second person to search patient    [x] Patient notified of reason for body assessment and belongings search:     Persons present during search:   Results of search and disposition:       Searchers Name: security     These items or items similar should be removed from the room:   [] Chairs   [] Telephone   [] Trash cans and liners   [] Plastic utensils (order Patient Safety tray)   [] Empty or remove Sharps containers   [x] All personal clothing/belongings removed   [] All unnecessary lead wires, electrical cords, draw cords, etc.   [] Flowers and plants   [] Double check for lighters, matches, razors, any glass items etc that can be used as weapons. Person completing Checklist: Rebecca Chappell RN       GENERAL INFORMATION     Y  N     [x] [] Has the patient been informed that they are on a watch and what that means? [x] [] Can the patient get out of Bed without nursing assistance? [x] [] Can the patient use the restroom without nursing assistance? [x] [] Can the patient walk the halls to [place] their legs? \"   [] [x] Does the patient have metal utensils? [x] [] Have the patient's belongings been placed out of control of the patient? [x] [] Have the patient and his/her belongings been checked for contraband? [x] [] Is the patient under any visitor restrictions? If Yes, explain:suicide watch   [] [x] Is the patient under an alias? Alias Name:   Authorized visitors (no more than two are to be on the list)   Name/Relationship:   Name/Relationship:    Name of Staff member that you  Received this information from?: security    General Description:    Zak Luna BH31/BH31B male 57 y.o. Admission weight:      Race: []  [x] Black  []   []   [] [place] [] Other  Facial Hair:  [] Yes  [] No  If yes, please describe: Identifying Marks (i.e. Visible tattoos, scars, etc... ):     NURSING CARE PLAN    Nursing Diagnosis: Risk of Self Directed Harm  [x] Actual  [] Potential  Date Started: 10/8/21      Etiological Factors: (related to)  [x] Expressed or implied suicidal

## 2021-10-08 NOTE — ED NOTES
[facility] scheduled to take patient to [facility] for appt with .      CHANEL Hernandez  10/08/21 4148

## 2021-10-08 NOTE — ED NOTES
Patient arrives to the ER with complaint of SI and states he was walking in traffic prior to arrival.  Patient discharged from the [facility] 5 days ago and states he has not taken his meds or followed-up with Rajesh. Patient homeless and staying at [facility] intermittently. Patient's psych meds are Invega, Desyrel, and Lexapro. Scripts were written by [facility], Dr. Kaur Hall, and sent to [facility]. SW will call to see if they were filled and picked up once pharmacy is open. Patient is followed by the [facility] at #[phone]. SW called them to see if they can assist patient today. FACT staff state they have an 8:30am meeting and will discuss a plan for patient. FACT Team will call ED SW back with an update/plan after meeting. Krystina Mckeon.  [address], [address]  10/08/21 8007

## 2021-10-08 NOTE — ED NOTES
SW received a call back from patient's [facility] @ #[phone]. He states that patient is involved with the [facility] and they had gotten patient a group home placement previously, but he kept leaving for days at a time to go use crack cocaine. Patient was then evicted. Per [facility], patient refusing to go to homeless shelters so he lives on the streets. Patient was to have an appointment with [facility] today at 9:30am to discuss housing options for a room at a rooming house. [facility] would like to meet with patient at 1:30pm today at his office on [address] [place] [address]. ED Attending, Resident, and SW all in agreement with plan. YOGESH confirmed that patient did have his psych meds on his person at discharge from the [facility] several days ago. YOGESH updated patient on plan and he is agreeable. Patient states he had his meds, but does not know where they are currently. [facility] updated that patient will need help getting new meds as well. Since patient recently discharged from a stay at the [facility] , he would benefit from being connected back with community mental health and intensive case management. YOGESH will set up a [address] at 12:30pm to transport patient to his CM appointment at [facility]. YOGESH will walk patient from the [facility] to the cab to assist with a safe discharge. Yanet Muñoz.  Mariana Quach, [facility]  10/08/21 1044

## 2021-10-10 ENCOUNTER — HOSPITAL ENCOUNTER (EMERGENCY)
Age: 61
Discharge: PSYCHIATRIC OR SUBSTANCE USE FACILITY | End: 2021-10-11
Attending: EMERGENCY MEDICINE
Payer: MEDICAID

## 2021-10-10 DIAGNOSIS — R45.851 SUICIDAL IDEATION: Primary | ICD-10-CM

## 2021-10-10 LAB
ABSOLUTE EOS #: 0.12 K/UL (ref 0–0.44)
ABSOLUTE IMMATURE GRANULOCYTE: <0.03 K/UL (ref 0–0.3)
ABSOLUTE LYMPH #: 2.1 K/UL (ref 1.1–3.7)
ABSOLUTE MONO #: 0.61 K/UL (ref 0.1–1.2)
ALBUMIN SERPL-MCNC: 4.1 G/DL (ref 3.5–5.2)
ALBUMIN/GLOBULIN RATIO: 1.8 (ref 1–2.5)
ALP BLD-CCNC: 108 U/L (ref 40–129)
ALT SERPL-CCNC: 10 U/L (ref 5–41)
AMPHETAMINE SCREEN URINE: NEGATIVE
ANION GAP SERPL CALCULATED.3IONS-SCNC: 13 MMOL/L (ref 9–17)
AST SERPL-CCNC: 22 U/L
BARBITURATE SCREEN URINE: NEGATIVE
BASOPHILS # BLD: 1 % (ref 0–2)
BASOPHILS ABSOLUTE: 0.03 K/UL (ref 0–0.2)
BENZODIAZEPINE SCREEN, URINE: NEGATIVE
BILIRUB SERPL-MCNC: 0.3 MG/DL (ref 0.3–1.2)
BUN BLDV-MCNC: 16 MG/DL (ref 8–23)
BUN/CREAT BLD: ABNORMAL (ref 9–20)
BUPRENORPHINE URINE: ABNORMAL
CALCIUM SERPL-MCNC: 8.8 MG/DL (ref 8.6–10.4)
CANNABINOID SCREEN URINE: NEGATIVE
CHLORIDE BLD-SCNC: 106 MMOL/L (ref 98–107)
CO2: 21 MMOL/L (ref 20–31)
COCAINE METABOLITE, URINE: POSITIVE
CREAT SERPL-MCNC: 0.97 MG/DL (ref 0.7–1.2)
DIFFERENTIAL TYPE: ABNORMAL
EOSINOPHILS RELATIVE PERCENT: 2 % (ref 1–4)
ETHANOL PERCENT: <0.01 %
ETHANOL: <10 MG/DL
GFR AFRICAN AMERICAN: >60 ML/MIN
GFR NON-AFRICAN AMERICAN: >60 ML/MIN
GFR SERPL CREATININE-BSD FRML MDRD: ABNORMAL ML/MIN/{1.73_M2}
GFR SERPL CREATININE-BSD FRML MDRD: ABNORMAL ML/MIN/{1.73_M2}
GLUCOSE BLD-MCNC: 133 MG/DL (ref 70–99)
HCT VFR BLD CALC: 35.1 % (ref 40.7–50.3)
HEMOGLOBIN: 10.9 G/DL (ref 13–17)
IMMATURE GRANULOCYTES: 0 %
LYMPHOCYTES # BLD: 38 % (ref 24–43)
MCH RBC QN AUTO: 27.9 PG (ref 25.2–33.5)
MCHC RBC AUTO-ENTMCNC: 31.1 G/DL (ref 28.4–34.8)
MCV RBC AUTO: 90 FL (ref 82.6–102.9)
MDMA URINE: ABNORMAL
METHADONE SCREEN, URINE: NEGATIVE
METHAMPHETAMINE, URINE: ABNORMAL
MONOCYTES # BLD: 11 % (ref 3–12)
NRBC AUTOMATED: 0 PER 100 WBC
OPIATES, URINE: NEGATIVE
OXYCODONE SCREEN URINE: NEGATIVE
PDW BLD-RTO: 14.6 % (ref 11.8–14.4)
PHENCYCLIDINE, URINE: NEGATIVE
PLATELET # BLD: 302 K/UL (ref 138–453)
PLATELET ESTIMATE: ABNORMAL
PMV BLD AUTO: 9 FL (ref 8.1–13.5)
POTASSIUM SERPL-SCNC: 3.8 MMOL/L (ref 3.7–5.3)
PROPOXYPHENE, URINE: ABNORMAL
RBC # BLD: 3.9 M/UL (ref 4.21–5.77)
RBC # BLD: ABNORMAL 10*6/UL
SARS-COV-2, RAPID: NOT DETECTED
SEG NEUTROPHILS: 48 % (ref 36–65)
SEGMENTED NEUTROPHILS ABSOLUTE COUNT: 2.61 K/UL (ref 1.5–8.1)
SODIUM BLD-SCNC: 140 MMOL/L (ref 135–144)
SPECIMEN DESCRIPTION: NORMAL
TEST INFORMATION: ABNORMAL
TOTAL PROTEIN: 6.4 G/DL (ref 6.4–8.3)
TRICYCLIC ANTIDEPRESSANTS, UR: ABNORMAL
WBC # BLD: 5.5 K/UL (ref 3.5–11.3)
WBC # BLD: ABNORMAL 10*3/UL

## 2021-10-10 PROCEDURE — 85025 COMPLETE CBC W/AUTO DIFF WBC: CPT

## 2021-10-10 PROCEDURE — 80307 DRUG TEST PRSMV CHEM ANLYZR: CPT

## 2021-10-10 PROCEDURE — 99285 EMERGENCY DEPT VISIT HI MDM: CPT

## 2021-10-10 PROCEDURE — 87635 SARS-COV-2 COVID-19 AMP PRB: CPT

## 2021-10-10 PROCEDURE — G0480 DRUG TEST DEF 1-7 CLASSES: HCPCS

## 2021-10-10 PROCEDURE — 80053 COMPREHEN METABOLIC PANEL: CPT

## 2021-10-10 ASSESSMENT — ENCOUNTER SYMPTOMS
SHORTNESS OF BREATH: 0
PHOTOPHOBIA: 0
BACK PAIN: 0
NAUSEA: 0
ABDOMINAL PAIN: 0
VOMITING: 0

## 2021-10-10 NOTE — ED NOTES
1:1 maintained. Tray provided. Requested urine specimen again.       Jabier Hoover RN  10/10/21 0803

## 2021-10-10 NOTE — ED PROVIDER NOTES
[address] ED  Emergency Department Encounter  EmergencyMedicine Resident     Pt Blanka English  MRN: [id number]  [place] 1960  Date of evaluation: 10/10/21  PCP:  No primary care provider on file. This patient was evaluated in the Emergency Department for symptoms described in the history of present illness. The patient was evaluated in the context of the global COVID-19 pandemic, which necessitated consideration that the patient might be at risk for infection with the SARS-CoV-2 virus that causes COVID-19. Institutional protocols and algorithms that pertain to the evaluation of patients at risk for COVID-19 are in a state of rapid change based on information released by regulatory bodies including the CDC and federal and state organizations. These policies and algorithms were followed during the patient's care in the ED. CHIEF COMPLAINT       Chief Complaint   Patient presents with    Suicidal     wants to kill self    Hallucinations     hearing voices, seeing and talking to people in Triage/attempting to punch at the \"person\" no one present near pt       HISTORY OF PRESENT ILLNESS  (Location/Symptom, Timing/Onset, Context/Setting, Quality, Duration, Modifying Factors, Severity.)      Katarzyna Cohen is a 57 y.o. male who presents with complaints of suicidal ideation with plan to shoot himself with a gun. Patient states that he has access to a gun as his friends have them. States that he has been on the streets last 2 weeks after not receiving his check because it was sent to the wrong location. Patient has been very upset about this. He says he has not been able to eat the last 2 days and he is depressed over all of this situation. Also endorses some homicidal ideation but does not have a clear individual he is interested in harming. Just stating that he is generally having thoughts of violence toward others. Has been out of his medications for last 2 weeks.     PAST MEDICAL / SURGICAL / SOCIAL / FAMILY HISTORY      has a past medical history of Bipolar disorder ([facility] [address] Depression, GERD (gastroesophageal reflux disease), Hallucinations, Headache(784.0), Hepatitis, Schizophrenia, schizo-affective ([facility] [address] Substance abuse ([facility] Tobacco abuse, Type II or unspecified type diabetes mellitus without mention of complication, not stated as uncontrolled, and Urinary incontinence. has a past surgical history that includes Dental surgery and Abscess Drainage (N/A, 02/11/2018). Social History     Socioeconomic History    Marital status: Single     Spouse name: Not on file    Number of children: 0    Years of education: 8    Highest education level: Not on file   Occupational History     Employer: N/A   Tobacco Use    Smoking status: Current Every Day Smoker     Packs/day: 1.00     Years: 47.00     Pack years: 47.00     Types: Cigarettes    Smokeless tobacco: Never Used    Tobacco comment: Patient accepting of nicotine patch   Substance and Sexual Activity    Alcohol use: Yes     Comment: reports drinking occasionally    Drug use: Yes     Frequency: 7.0 times per week     Types: Cocaine     Comment: drug abuse includes crack cocaine,     Sexual activity: Not on file   Other Topics Concern    Not on file   Social History Narrative    Not on file     Social Determinants of Health     Financial Resource Strain:     Difficulty of Paying Living Expenses:    Food Insecurity:     Worried About Running Out of Food in the Last Year:     Ran Out of Food in the Last Year:    Transportation Needs:     Lack of Transportation (Medical):      Lack of Transportation (Non-Medical):    Physical Activity:     Days of Exercise per Week:     Minutes of Exercise per Session:    Stress:     Feeling of Stress :    Social Connections:     Frequency of Communication with Friends and Family:     Frequency of Social Gatherings with Friends and Family:     Attends Mosque Services:     Active Member of Clubs or Organizations:     Attends Club or Organization Meetings:     Marital Status:    Intimate Partner Violence:     Fear of Current or Ex-Partner:     Emotionally Abused:     Physically Abused:     Sexually Abused:        Family History   Problem Relation Age of Onset    Diabetes Mother     Heart Disease Mother        Allergies:  Navane [thiothixene]    Home Medications:  Prior to Admission medications    Medication Sig Start Date End Date Taking?  Authorizing Provider   polyethylene glycol (MIRALAX) 17 g packet Take 17 g by mouth 2 times daily as needed for Constipation 10/1/21 10/31/21  Cherie Pinto DO   docusate sodium (COLACE) 100 MG capsule Take 1 capsule by mouth 2 times daily as needed for Constipation 10/1/21   Cherie Pinto DO   acetaminophen (TYLENOL) 500 MG tablet Take 2 tablets by mouth every 8 hours as needed for Pain 9/30/21 10/3/21  Angela Rivera DO   ibuprofen (IBU) 400 MG tablet Take 1 tablet by mouth every 6 hours as needed for Pain 9/30/21 10/5/21  Angela Rivera DO   lidocaine (LIDODERM) 5 % Place 1 patch onto the skin daily for 10 days 12 hours on, 12 hours off. 9/30/21 10/10/21  Angela Rivera DO   paliperidone (INVEGA) 6 MG extended release tablet Take 1 tablet by mouth daily 9/29/21   Brianda Edmonds MD   traZODone (DESYREL) 150 MG tablet Take 1 tablet by mouth nightly as needed for Sleep 9/28/21   Brianda Edmonds MD   escitalopram (LEXAPRO) 20 MG tablet Take 1 tablet by mouth daily 9/28/21   Brianda Edmonds MD   paliperidone palmitate ER (INVEGA SUSTENNA) 234 MG/1.5ML [place] IM injection Inject 234 mg into the muscle every 30 days 9/30/21   Brianda Edmonds MD   nicotine polacrilex (NICORETTE) 2 MG gum Take 1 each by mouth every hour as needed for Smoking cessation 9/21/21   Duc Murcia MD   Cholecalciferol (VITAMIN D) 25 MCG TABS Take 1 tablet by mouth daily 9/22/21   Duc Murcia MD       REVIEW OF SYSTEMS    (2-9 systems for level 4, 10 or more for level 5)      Review of Systems   Constitutional: Negative for fever. HENT: Negative for congestion. Eyes: Negative for photophobia. Respiratory: Negative for shortness of breath. Cardiovascular: Negative for chest pain. Gastrointestinal: Negative for abdominal pain, nausea and vomiting. Genitourinary: Negative for flank pain. Musculoskeletal: Negative for back pain, gait problem, neck pain and neck stiffness. Skin: Negative for rash. Allergic/Immunologic: Negative for food allergies. Neurological: Negative for headaches. PHYSICAL EXAM   (up to 7 for level 4, 8 or more for level 5)      INITIAL VITALS:   /78   Pulse 100   Temp 99 °F (37.2 °C) (Oral)   Resp 20   Ht 6' 2\" (1.88 m)   Wt 190 lb (86.2 kg)   SpO2 100%   BMI 24.39 kg/m²     Physical Exam  Constitutional:       General: He is not in acute distress. Appearance: Normal appearance. He is not toxic-appearing or diaphoretic. HENT:      Head: Normocephalic and atraumatic. Right Ear: External ear normal.      Left Ear: External ear normal.      Nose: Nose normal.      Mouth/Throat:      Pharynx: Oropharynx is clear. Eyes:      Conjunctiva/sclera: Conjunctivae normal.   Cardiovascular:      Rate and Rhythm: Regular rhythm. Tachycardia present. Pulses: Normal pulses. Pulmonary:      Effort: Pulmonary effort is normal.   Abdominal:      Palpations: Abdomen is soft. Musculoskeletal:      Cervical back: Normal range of motion. Skin:     General: Skin is warm. Capillary Refill: Capillary refill takes less than 2 seconds. Neurological:      Mental Status: He is alert and oriented to person, place, and time. Psychiatric:         Attention and Perception: Attention normal.         Mood and Affect: Affect is flat. Behavior: Behavior is slowed. Thought Content: Thought content includes homicidal and suicidal ideation. Thought content includes suicidal plan.          DIFFERENTIAL DIAGNOSIS     PLAN (LABS / IMAGING / EKG):  Orders Placed This Encounter   Procedures    COVID-19, Rapid    CBC WITH AUTO DIFFERENTIAL    COMPREHENSIVE METABOLIC PANEL    ETHANOL    Urine Drug Screen    Inpatient consult to Social Work       MEDICATIONS ORDERED:  No orders of the defined types were placed in this encounter. DDX: si with plan, hi    DIAGNOSTIC RESULTS / [address] [place] [address] / [facility]   LAB RESULTS:  Results for orders placed or performed during the hospital encounter of 10/10/21   COVID-19, Rapid    Specimen: Nasopharyngeal Swab   Result Value Ref Range    Specimen Description . NASOPHARYNGEAL SWAB     SARS-CoV-2, Rapid Not Detected Not Detected   CBC WITH AUTO DIFFERENTIAL   Result Value Ref Range    WBC 5.5 3.5 - 11.3 k/uL    RBC 3.90 (L) 4.21 - 5.77 m/uL    Hemoglobin 10.9 (L) 13.0 - 17.0 g/dL    Hematocrit 35.1 (L) 40.7 - 50.3 %    MCV 90.0 82.6 - 102.9 fL    MCH 27.9 25.2 - 33.5 pg    MCHC 31.1 28.4 - 34.8 g/dL    RDW 14.6 (H) 11.8 - 14.4 %    Platelets 267 727 - 822 k/uL    MPV 9.0 8.1 - 13.5 fL    NRBC Automated 0.0 0.0 per 100 WBC    Differential Type NOT REPORTED     Seg Neutrophils 48 36 - 65 %    Lymphocytes 38 24 - 43 %    Monocytes 11 3 - 12 %    Eosinophils % 2 1 - 4 %    Basophils 1 0 - 2 %    Immature Granulocytes 0 0 %    Segs Absolute 2.61 1.50 - 8.10 k/uL    Absolute Lymph # 2.10 1.10 - 3.70 k/uL    Absolute Mono # 0.61 0.10 - 1.20 k/uL    Absolute Eos # 0.12 0.00 - 0.44 k/uL    Basophils Absolute 0.03 0.00 - 0.20 k/uL    Absolute Immature Granulocyte <0.03 0.00 - 0.30 k/uL    WBC Morphology NOT REPORTED     RBC Morphology ANISOCYTOSIS PRESENT     Platelet Estimate NOT REPORTED    COMPREHENSIVE METABOLIC PANEL   Result Value Ref Range    Glucose 133 (H) 70 - 99 mg/dL    BUN 16 8 - 23 mg/dL    CREATININE 0.97 0.70 - 1.20 mg/dL    Bun/Cre Ratio NOT REPORTED 9 - 20    Calcium 8.8 8.6 - 10.4 mg/dL    Sodium 140 135 - 144 mmol/L    Potassium 3.8 3.7 - 5.3 mmol/L    Chloride 106 98 - 107 mmol/L    CO2 21 20 - 31 mmol/L    Anion Gap 13 9 - 17 mmol/L    Alkaline Phosphatase 108 40 - 129 U/L    ALT 10 5 - 41 U/L    AST 22 <40 U/L    Total Bilirubin 0.30 0.3 - 1.2 mg/dL    Total Protein 6.4 6.4 - 8.3 g/dL    Albumin 4.1 3.5 - 5.2 g/dL    Albumin/Globulin Ratio 1.8 1.0 - 2.5    GFR Non-African American >60 >60 mL/min    GFR African American >60 >60 mL/min    GFR Comment          GFR Staging NOT REPORTED    ETHANOL   Result Value Ref Range    Ethanol <10 <10 mg/dL    Ethanol percent <0.010 <0.010 %   Urine Drug Screen   Result Value Ref Range    Amphetamine Screen, Ur NEGATIVE NEGATIVE    Barbiturate Screen, Ur NEGATIVE NEGATIVE    Benzodiazepine Screen, Urine NEGATIVE NEGATIVE    Cocaine Metabolite, Urine POSITIVE (A) NEGATIVE    Methadone Screen, Urine NEGATIVE NEGATIVE    Opiates, Urine NEGATIVE NEGATIVE    Phencyclidine, Urine NEGATIVE NEGATIVE    Propoxyphene, Urine NOT REPORTED NEGATIVE    Cannabinoid Scrn, Ur NEGATIVE NEGATIVE    Oxycodone Screen, Ur NEGATIVE NEGATIVE    Methamphetamine, Urine NOT REPORTED NEGATIVE    Tricyclic Antidepressants, Urine NOT REPORTED NEGATIVE    MDMA, Urine NOT REPORTED NEGATIVE    Buprenorphine Urine NOT REPORTED NEGATIVE    Test Information       Assay provides medical screening only. The absence of expected drug(s) and/or metabolite(s) may indicate diluted or adulterated urine, limitations of testing or timing of collection. IMPRESSION: Is an alert oriented 78-year-old male flat affect who presents with suicidal ideation plan to hurt himself by himself with a gun that he plans to obtain from a friend. Patient denied homicidal his pain was 2/2018 introduced and has been depressed over the situation. History of suicidal ideation in the past and recent [facility] admission. Plan will be lab work-up in anticipation of [facility] transfer social work consultation  RADIOLOGY:  No results found.       EKG  none    All EKG's are interpreted by the Emergency Department Physician who either signs or Co-signs this chart in the absence of a cardiologist.    EMERGENCY DEPARTMENT COURSE:  ED Course as of Oct 10 2035   Evangelina Room Oct 10, 2021   1418 While pt endorses a plan to obtain a gun from \"friends\". He denies a plan when questioned by RN.    [BG]   2021 Medically stable for [facility] transfer.    [BG]      ED Course User Index  [BG] Chip Vogel DO         PROCEDURES:      CONSULTS:  IP CONSULT TO SOCIAL WORK    CRITICAL CARE:      FINAL IMPRESSION      1. Suicidal ideation          DISPOSITION / PLAN     DISPOSITION  transfer      PATIENT REFERRED TO:  No follow-up provider specified.     DISCHARGE MEDICATIONS:  New Prescriptions    No medications on file       Chip Vogel DO  Emergency Medicine Resident    (Please note that portions of thisnote were completed with a voice recognition program.  Efforts were made to edit the dictations but occasionally words are mis-transcribed.)        Chip Vogel DO  Resident  10/10/21 2035       Chip Vogel DO  Resident  10/10/21 2112
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
## 2021-10-10 NOTE — ED NOTES
First encounter with pt, report from previous RN. Pt is sleeping on bed in I unit. 1:1 video monitoring in place.       Gamal Mckeon RN  10/10/21 1944
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
## 2021-10-10 NOTE — ED NOTES
Pt resting in bed with eyes closed, no distress noted. One on one/video monitoring in place.      Tracy Trejo RN  10/10/21 2387

## 2021-10-10 NOTE — ED NOTES
Pt states \"I want to kill myself\". Pt does not have a plan. Pt alert calm and cooperative.       Madi Mccollum RN  10/10/21 6774

## 2021-10-10 NOTE — ED PROVIDER NOTES
Handoff taken on the following patient from prior Attending Physician:    Pt Name: Evy Joyner    PCP:  No primary care provider on file. Attestation    I was available and discussed any additional care issues that arose and coordinated the management plans with the resident(s) caring for the patient during my duty period. Any areas of disagreement with residents documentation of care or procedures are noted on the chart. I was personally present for the key portions of any/all procedures during my duty period. I have documented in the chart those procedures where I was not present during the key portions.     Pt is complaining of SI plan to shoot self in head      Daniel Jimenez DO  10/10/21 9721

## 2021-10-11 ENCOUNTER — HOSPITAL ENCOUNTER (INPATIENT)
Age: 61
LOS: 17 days | Discharge: NURSING, REHAB OR LONG-TERM CARE | DRG: 750 | End: 2021-10-28
Attending: PSYCHIATRY & NEUROLOGY | Admitting: PSYCHIATRY & NEUROLOGY
Payer: MEDICAID

## 2021-10-11 VITALS
RESPIRATION RATE: 16 BRPM | HEIGHT: 74 IN | BODY MASS INDEX: 24.38 KG/M2 | DIASTOLIC BLOOD PRESSURE: 70 MMHG | WEIGHT: 190 LBS | TEMPERATURE: 97.9 F | HEART RATE: 80 BPM | SYSTOLIC BLOOD PRESSURE: 131 MMHG | OXYGEN SATURATION: 97 %

## 2021-10-11 PROCEDURE — 6370000000 HC RX 637 (ALT 250 FOR IP): Performed by: PSYCHIATRY & NEUROLOGY

## 2021-10-11 PROCEDURE — 1240000000 HC EMOTIONAL WELLNESS R&B

## 2021-10-11 PROCEDURE — 99223 1ST HOSP IP/OBS HIGH 75: CPT | Performed by: PSYCHIATRY & NEUROLOGY

## 2021-10-11 RX ORDER — MAGNESIUM HYDROXIDE/ALUMINUM HYDROXICE/SIMETHICONE 120; 1200; 1200 MG/30ML; MG/30ML; MG/30ML
30 SUSPENSION ORAL EVERY 6 HOURS PRN
Status: DISCONTINUED | OUTPATIENT
Start: 2021-10-11 | End: 2021-10-28 | Stop reason: HOSPADM

## 2021-10-11 RX ORDER — HALOPERIDOL 5 MG/ML
5 INJECTION INTRAMUSCULAR EVERY 4 HOURS PRN
Status: DISCONTINUED | OUTPATIENT
Start: 2021-10-11 | End: 2021-10-28 | Stop reason: HOSPADM

## 2021-10-11 RX ORDER — TRAZODONE HYDROCHLORIDE 150 MG/1
150 TABLET ORAL NIGHTLY
Status: DISCONTINUED | OUTPATIENT
Start: 2021-10-11 | End: 2021-10-28 | Stop reason: HOSPADM

## 2021-10-11 RX ORDER — LORAZEPAM 2 MG/ML
2 INJECTION INTRAMUSCULAR EVERY 4 HOURS PRN
Status: DISCONTINUED | OUTPATIENT
Start: 2021-10-11 | End: 2021-10-28 | Stop reason: HOSPADM

## 2021-10-11 RX ORDER — TRAZODONE HYDROCHLORIDE 50 MG/1
50 TABLET ORAL NIGHTLY PRN
Status: DISCONTINUED | OUTPATIENT
Start: 2021-10-11 | End: 2021-10-28 | Stop reason: HOSPADM

## 2021-10-11 RX ORDER — HYDROXYZINE 50 MG/1
50 TABLET, FILM COATED ORAL 3 TIMES DAILY PRN
Status: DISCONTINUED | OUTPATIENT
Start: 2021-10-11 | End: 2021-10-28 | Stop reason: HOSPADM

## 2021-10-11 RX ORDER — HALOPERIDOL 5 MG
5 TABLET ORAL EVERY 4 HOURS PRN
Status: DISCONTINUED | OUTPATIENT
Start: 2021-10-11 | End: 2021-10-28 | Stop reason: HOSPADM

## 2021-10-11 RX ORDER — POLYETHYLENE GLYCOL 3350 17 G/17G
17 POWDER, FOR SOLUTION ORAL DAILY PRN
Status: DISCONTINUED | OUTPATIENT
Start: 2021-10-11 | End: 2021-10-28 | Stop reason: HOSPADM

## 2021-10-11 RX ORDER — PALIPERIDONE 6 MG/1
6 TABLET, EXTENDED RELEASE ORAL DAILY
Status: DISCONTINUED | OUTPATIENT
Start: 2021-10-11 | End: 2021-10-12

## 2021-10-11 RX ORDER — IBUPROFEN 400 MG/1
400 TABLET ORAL EVERY 6 HOURS PRN
Status: DISCONTINUED | OUTPATIENT
Start: 2021-10-11 | End: 2021-10-22

## 2021-10-11 RX ORDER — DOCUSATE SODIUM 100 MG/1
100 CAPSULE, LIQUID FILLED ORAL 2 TIMES DAILY PRN
Status: DISCONTINUED | OUTPATIENT
Start: 2021-10-11 | End: 2021-10-28 | Stop reason: HOSPADM

## 2021-10-11 RX ORDER — VITAMIN B COMPLEX
1000 TABLET ORAL DAILY
Status: DISCONTINUED | OUTPATIENT
Start: 2021-10-11 | End: 2021-10-28 | Stop reason: HOSPADM

## 2021-10-11 RX ORDER — LORAZEPAM 1 MG/1
2 TABLET ORAL EVERY 4 HOURS PRN
Status: DISCONTINUED | OUTPATIENT
Start: 2021-10-11 | End: 2021-10-28 | Stop reason: HOSPADM

## 2021-10-11 RX ORDER — DIPHENHYDRAMINE HYDROCHLORIDE 50 MG/ML
25 INJECTION INTRAMUSCULAR; INTRAVENOUS EVERY 4 HOURS PRN
Status: DISCONTINUED | OUTPATIENT
Start: 2021-10-11 | End: 2021-10-28 | Stop reason: HOSPADM

## 2021-10-11 RX ORDER — ACETAMINOPHEN 325 MG/1
650 TABLET ORAL EVERY 4 HOURS PRN
Status: DISCONTINUED | OUTPATIENT
Start: 2021-10-11 | End: 2021-10-28 | Stop reason: HOSPADM

## 2021-10-11 RX ORDER — ESCITALOPRAM OXALATE 10 MG/1
10 TABLET ORAL DAILY
Status: DISCONTINUED | OUTPATIENT
Start: 2021-10-11 | End: 2021-10-16

## 2021-10-11 RX ADMIN — TRAZODONE HYDROCHLORIDE 150 MG: 150 TABLET ORAL at 21:37

## 2021-10-11 ASSESSMENT — SLEEP AND FATIGUE QUESTIONNAIRES
RESTFUL SLEEP: YES
DIFFICULTY STAYING ASLEEP: NO
DO YOU USE A SLEEP AID: NO
DO YOU HAVE DIFFICULTY SLEEPING: NO
DIFFICULTY FALLING ASLEEP: NO
DIFFICULTY ARISING: NO
AVERAGE NUMBER OF SLEEP HOURS: 7

## 2021-10-11 ASSESSMENT — PATIENT HEALTH QUESTIONNAIRE - PHQ9: SUM OF ALL RESPONSES TO PHQ QUESTIONS 1-9: 8

## 2021-10-11 ASSESSMENT — PAIN SCALES - GENERAL: PAINLEVEL_OUTOF10: 0

## 2021-10-11 ASSESSMENT — LIFESTYLE VARIABLES: HISTORY_ALCOHOL_USE: NO

## 2021-10-11 NOTE — GROUP NOTE
Group Therapy Note    Date: 10/11/2021    Group Start Time: 1100  Group End Time: 1150  Group Topic: Psychoeducation    JESUS Mora    Patient declined to attend self expression group at 1100 despite encouragement from staff. 1:1 talk time offered by staff as alternative to group session.         Signature:  Winston Altamirano
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
## 2021-10-11 NOTE — BH NOTE
Patient given tobacco quitline number 40867558418 at this time, refusing to call at this time, states \" I just dont want to quit now\"- patient given information as to the dangers of long term tobacco use. Continue to reinforce the importance of tobacco cessation.

## 2021-10-11 NOTE — H&P
Department of Psychiatry  Attending Physician Psychiatric Assessment     Reason for Admission to Psychiatric Unit:  Concerns about patient's safety in the community    CHIEF COMPLAINT: Suicidal ideation, command auditory hallucinations, visual hallucinations, and homicidal ideation    History obtained from: Patient, electronic medical record          HISTORY OF PRESENT ILLNESS:    Maite Davidson is a 57 y.o. male who has a past medical history of schizoaffective disorder depressive type, cocaine abuse, major depression with psychotic features, pneumonia due to infectious organism, sepsis due to Klebsiella pneumonia with no resultant organ failure, suicidal ideations, smoker, ventricular ectopy, low serum cortisol level, schizophrenia, perianal abscess, perirectal abscess, hematuria, hallucinations, bipolar disorder, type II or unspecified type diabetes mellitus without mention of complication not stated as uncontrolled, headache, substance abuse, urinary incontinence, gastroesophageal reflux disease, hepatitis, and tobacco abuse. Patient presented to the [place] [facility] ED for suicidal ideation and hallucinations. Per ED record: Maite Davidson is a 57 y.o. male who presents with complaints of suicidal ideation with plan to shoot himself with a gun. Patient states that he has access to a gun as his friends have them. States that he has been on the streets last 2 weeks after not receiving his check because it was sent to the wrong location. Patient has been very upset about this. He says he has not been able to eat the last 2 days and he is depressed over all of this situation. Also endorses some homicidal ideation but does not have a clear individual he is interested in harming. Just stating that he is generally having thoughts of violence toward others. Has been out of his medications for last 2 weeks. Patient is compliant with initial assessment and agrees to interview in his room.  He is guarded, suspicious, and evasive throughout the interview and was unwilling to engage in meaningful conversation regarding his history of present illness, but did provide vague answers without further elaboration. He reports that he is at the hospital because \"the voices telling me to kill myself\". He reports that the voices have been going on for \"a long time\" and are \"always there\". He endorses suicidal ideation and reports that he has a plan and intent to shoot himself with a gun. He reports that he has been feeling depressed and endorses symptoms of depression including persistently low mood for two weeks or more, anhedonia, poor sleep, low energy, impaired concentration, psychomotor slowing, and feelings of hopelessness and worthlessness. He reports that his main stressor is living on the streets, as he is currently homeless. He reports visual hallucinations as well that come and go, but when asked to describe these he states \"I don't know what they are\". Patient also endorses homicidal ideation, but states \"I can't tell you\" and becomes agitated with further questioning. He states that he does not know if he will act on his homicidal thoughts or not. He endorses anxiety and associated symptoms including excessive worry, restlessness, edginess, and easily onset fatigue. Patient denies paranoia, panic attacks, recent and lifetime symptoms of maryellen, OCD tendencies, and symptoms of PTSD. He reports that he has been using crack cocaine weekly, UDS positive for cocaine on admission, and has not been taking his psychiatric medications. Patient is able to contract for safety on the unit. History of head trauma: [] Yes [x] No    History of seizures: [] Yes [x] No    History of violence or aggression: [x] Yes [] No         PSYCHIATRIC HISTORY:  [x] Yes [] No    Currently follows with Rajesh. Denies lifetime suicide attempts  Multiple psychiatric hospital admissions, most recent [facility] 10/3-4/21.      Past psychiatric medications includes:  Invega  Effexor  Cogentin  Seroquel  Wellbutrin  Lexapro  Atarax  Trazodone  Zyprexa    Adverse reactions from psychotropic medications: [] Yes [x] No         Lifetime Psychiatric Review of Systems         Depression: Endorses     Anxiety: Denies     Panic Attacks: Denies     Teetee or Hypomania: Denies     Phobias: Denies     Obsessions and Compulsions: Denies     Visual Hallucinations: Endorses, current     Auditory Hallucinations: Endorses, current     Delusions: Endorses, past     Paranoia: Endorses, past     PTSD: Denies    Past Medical History:        Diagnosis Date    B[facility] disorder [facility] [address]     Depression     GERD (gastroesophageal reflux disease)     Hallucinations     Headache(784.0)     Hepatitis     Schizophrenia, schizo-affective ([facility] [address]     Substance abuse ([facility] [address]     Tobacco abuse     Type II or unspecified type diabetes mellitus without mention of complication, not stated as uncontrolled     Urinary incontinence        Past Surgical History:        Procedure Laterality Date    ABSCESS DRAINAGE N/A 2018    Carla anal abcess    DENTAL SURGERY      all teeth pulled       Allergies:  Navane [thiothixene]         Social History:   Born in: [facility]. Family: Reports growing up with both his mother and father. Reports he has 4 sisters. He has 3 brothers were . Highest Level of Education: Ninth grade. Occupation: Disabled, [employer]. Marital Status: Single. Children: No children. Residence: [facility]  Stressors: Living situation  Patient Assets/Supportive Factors: Gurwinderndmumtaz Israel, sister and Gambino Bebes, sister.          DRUG USE HISTORY  Social History     Tobacco Use   Smoking Status Current Every Day Smoker    Packs/day: 1.00    Years: 47.00    Pack years: 47.00    Types: Cigarettes   Smokeless Tobacco Never Used   Tobacco Comment    Patient accepting of nicotine patch     Social History     Substance and Sexual Activity   Alcohol Use Yes    Comment: reports drinking occasionally     Social History     Substance and Sexual Activity   Drug Use Yes    Frequency: 7.0 times per week    Types: Cocaine    Comment: drug abuse includes crack cocaine,        Patient reports that he drinks alcohol once per week, uses crack cocaine once per week, and smokes 1 pack of cigarettes per day. UDS positive for cocaine. LEGAL HISTORY:   HISTORY OF INCARCERATION: [] Yes [x] No    Family History:       Problem Relation Age of Onset    Diabetes Mother     Heart Disease Mother        Psychiatric Family History  Patient denies psychiatric family history. Suicides in family: [] Yes [x] No    Substance use in family: [] Yes [x] No         PHYSICAL EXAM:  Vitals:  /72   Pulse 89   Temp 97.9 °F (36.6 °C) (Oral)   Resp 14   Ht 6' 3\" (1.905 m)   Wt 199 lb (90.3 kg)   BMI 24.87 kg/m²     LABS:  Labs reviewed: [x] Yes  Last EKG in EMR reviewed: [x] Yes  QTc= 484          Review of Systems   Constitutional: Negative for chills and weight loss. HENT: Negative for ear pain and nosebleeds. Eyes: Negative for blurred vision and photophobia. Respiratory: Negative for cough, shortness of breath and wheezing. Cardiovascular: Negative for chest pain and palpitations. Gastrointestinal: Negative for abdominal pain, diarrhea and vomiting. Genitourinary: Negative for dysuria and urgency. Musculoskeletal: Negative for falls and joint pain. Skin: Negative for itching and rash. Neurological: Negative for tremors, seizures and weakness. Endo/Heme/Allergies: Does not bruise/bleed easily. Pain Scale (0 -10): 5/10, headache. Physical Exam:   Constitutional:  Appears thin, no acute distress. HENT:   Head: Normocephalic and atraumatic. Eyes: Conjunctivae are normal. Right eye exhibits no discharge. Left eye exhibits no discharge. No scleral icterus. Neck: Normal range of motion. Neck supple.    Pulmonary/Chest:  No respiratory distress or accessory muscle use, no wheezing. Cardiac: Regular rate and rhythm. Abdominal: Soft. Non-tender. Exhibits no distension. Musculoskeletal: Normal range of motion. Exhibits no edema. Neurological: cranial nerves II-XII grossly in tact, normal gait and station. Skin: Skin is warm and dry. Patient is not diaphoretic. No erythema. Mental Status Examination:    Level of consciousness: Awake and alert  Appearance:  Appropriate attire, resting in bed, poor grooming   Behavior/Motor: Guarded, no psychomotor abnormalities noted  Attitude toward examiner: Suspicious, irritable, semi-attentive, poor eye contact  Speech: Slow, quiet, mumbled, normal tone. Mood: \"Not too good\"  Affect: Flat, depressed  Thought processes: Coherent, linear  Thought content: Endorses suicidal ideation with plan to shoot self. Endorses auditory and visual hallucinations. Endorses homicidal ideation, but will not disclose details. Denies delusions              Denies paranoia  Cognition:  Oriented to self, location, time, situation  Concentration: Clinically adequate  Memory: Intact  Insight & Judgment: Poor         DSM-5 Diagnosis    Principal Problem: Schizoaffective disorder, depressed type    Stimulant use disorder (cocaine)    Psychosocial and Contextual factors:  Financial X  Occupational X  Relationship   Legal   Living situation X  Educational     Past Medical History:   Diagnosis Date    Bipolar disorder ([address]     Depression     GERD (gastroesophageal reflux disease)     Hallucinations     Headache(784.0)     Hepatitis     Schizophrenia, schizo-affective ([address]     Substance abuse ([facility] [address]     Tobacco abuse     Type II or unspecified type diabetes mellitus without mention of complication, not stated as uncontrolled     Urinary incontinence         TREATMENT PLAN    Continue inpatient psychiatric treatment. Home medications reviewed. Problem list updated. Restart Lexapro 20 mg daily. Restart Invega ER 6 mg daily.    Monitor need and frequency of PRN medications. Attempt to develop insight. Follow-up daily while inpatient. Reviewed risks and benefits as well as potential side effects with patient. CONSULTS [] Yes [x] No    Risk Management: close watch per standard protocol    Psychotherapy: participation in milieu and group and individual sessions with Attending Physician,  and Physician Assistant/CNP    Estimated length of stay:  2-14 days    GENERAL PATIENT/FAMILY EDUCATION  Patient will understand basic signs and symptoms, patient will understand benefits/risks and potential side effects from proposed medications, and patient will understand their role in recovery. Family is not active in patient's care. Patient assets that may be helpful during treatment include: Intent to participate and engage in treatment, sufficient fund of knowledge and intellect to understand and utilize treatments. Goals:    1) Remission of suicidal ideation. 2) Remission of homicidal ideation. 3) Improvement in psychotic symptoms. 4) Stabilization of symptoms prior to discharge. 5) Establish efficacy and tolerability of medications. Behavioral Services  Medicare Certification     Admission Day 1  I certify that this patient's inpatient psychiatric hospital admission is medically necessary for:    x (1) treatment which could reasonably be expected to improve this patient's condition, or    x (2) diagnostic study or its equivalent. Time Spent: 60 minutes    Mirza Crisostomo is a 57 y.o. male being evaluated face to face    --Jaylin Vernon on 10/11/2021 at 4:07 PM    An electronic signature was used to authenticate this note.

## 2021-10-11 NOTE — H&P
Department of Psychiatry  Attending Physician Psychiatric Assessment     Reason for Admission to Psychiatric Unit:  Concerns about patient's safety in the community    CHIEF COMPLAINT: Suicidal ideation, command auditory hallucinations, visual hallucinations, and homicidal ideation    History obtained from: Patient, electronic medical record          HISTORY OF PRESENT ILLNESS:    Brandie Salcido is a 57 y.o. male who has a past medical history of schizoaffective disorder depressive type, cocaine abuse, major depression with psychotic features, pneumonia due to infectious organism, sepsis due to Klebsiella pneumonia with no resultant organ failure, suicidal ideations, smoker, ventricular ectopy, low serum cortisol level, schizophrenia, perianal abscess, perirectal abscess, hematuria, hallucinations, bipolar disorder, type II or unspecified type diabetes mellitus without mention of complication not stated as uncontrolled, headache, substance abuse, urinary incontinence, gastroesophageal reflux disease, hepatitis, and tobacco abuse. Patient presented to the [place] ED for suicidal ideation and hallucinations. Per ED record: Brandie Salcido is a 57 y.o. male who presents with complaints of suicidal ideation with plan to shoot himself with a gun. Patient states that he has access to a gun as his friends have them. States that he has been on the streets last 2 weeks after not receiving his check because it was sent to the wrong location. Patient has been very upset about this. He says he has not been able to eat the last 2 days and he is depressed over all of this situation. Also endorses some homicidal ideation but does not have a clear individual he is interested in harming. Just stating that he is generally having thoughts of violence toward others. Has been out of his medications for last 2 weeks. Patient is compliant with initial assessment and agrees to interview in his room.  He is guarded, suspicious, and evasive throughout the interview and was unwilling to engage in meaningful conversation regarding his history of present illness, but did provide vague answers without further elaboration. He reports that he is at the hospital because \"the voices telling me to kill myself\". He reports that the voices have been going on for \"a long time\" and are \"always there\". He endorses suicidal ideation and reports that he has a plan and intent to shoot himself with a gun. He reports that he has been feeling depressed and endorses symptoms of depression including persistently low mood for two weeks or more, anhedonia, poor sleep, low energy, impaired concentration, psychomotor slowing, and feelings of hopelessness and worthlessness. He reports that his main stressor is living on the streets, as he is currently homeless. He reports visual hallucinations as well that come and go, but when asked to describe these he states \"I don't know what they are\". Patient also endorses homicidal ideation, but states \"I can't tell you\" and becomes agitated with further questioning. He states that he does not know if he will act on his homicidal thoughts or not. He endorses anxiety and associated symptoms including excessive worry, restlessness, edginess, and easily onset fatigue. Patient denies paranoia, panic attacks, recent and lifetime symptoms of maryellen, OCD tendencies, and symptoms of PTSD. He reports that he has been using crack cocaine weekly, UDS positive for cocaine on admission, and has not been taking his psychiatric medications. Patient is able to contract for safety on the unit. History of head trauma: [] Yes [x] No    History of seizures: [] Yes [x] No    History of violence or aggression: [x] Yes [] No         PSYCHIATRIC HISTORY:  [x] Yes [] No    Currently follows with Rajesh. Denies lifetime suicide attempts  Multiple psychiatric hospital admissions, most recent [facility] 10/3-4/21.      Past psychiatric medications includes:  Invega  Effexor  Cogentin  Seroquel  Wellbutrin  Lexapro  Atarax  Trazodone  Zyprexa    Adverse reactions from psychotropic medications: [] Yes [x] No         Lifetime Psychiatric Review of Systems         Depression: Endorses     Anxiety: Denies     Panic Attacks: Denies     Teetee or Hypomania: Denies     Phobias: Denies     Obsessions and Compulsions: Denies     Visual Hallucinations: Endorses, current     Auditory Hallucinations: Endorses, current     Delusions: Endorses, past     Paranoia: Endorses, past     PTSD: Denies    Past Medical History:        Diagnosis Date    B[facility] disorder [facility] [address]     Depression     GERD (gastroesophageal reflux disease)     Hallucinations     Headache(784.0)     Hepatitis     Schizophrenia, schizo-affective ([facility] [address]     Substance abuse ([facility] [address]     Tobacco abuse     Type II or unspecified type diabetes mellitus without mention of complication, not stated as uncontrolled     Urinary incontinence        Past Surgical History:        Procedure Laterality Date    ABSCESS DRAINAGE N/A 2018    Carla anal abcess    DENTAL SURGERY      all teeth pulled       Allergies:  Navane [thiothixene]         Social History:   Born in: [facility]. Family: Reports growing up with both his mother and father. Reports he has 4 sisters. He has 3 brothers were . Highest Level of Education: Ninth grade. Occupation: Disabled, [employer]. Marital Status: Single. Children: No children. Residence: [facility]  Stressors: Living situation  Patient Assets/Supportive Factors: Miami Umus, sister and Calos Josephar, sister.          DRUG USE HISTORY  Social History     Tobacco Use   Smoking Status Current Every Day Smoker    Packs/day: 1.00    Years: 47.00    Pack years: 47.00    Types: Cigarettes   Smokeless Tobacco Never Used   Tobacco Comment    Patient accepting of nicotine patch     Social History     Substance and Sexual Activity   Alcohol Use Yes    Comment: reports drinking occasionally     Social History     Substance and Sexual Activity   Drug Use Yes    Frequency: 7.0 times per week    Types: Cocaine    Comment: drug abuse includes crack cocaine,        Patient reports that he drinks alcohol once per week, uses crack cocaine once per week, and smokes 1 pack of cigarettes per day. UDS positive for cocaine. LEGAL HISTORY:   HISTORY OF INCARCERATION: [] Yes [x] No    Family History:       Problem Relation Age of Onset    Diabetes Mother     Heart Disease Mother        Psychiatric Family History  Patient denies psychiatric family history. Suicides in family: [] Yes [x] No    Substance use in family: [] Yes [x] No         PHYSICAL EXAM:  Vitals:  /72   Pulse 89   Temp 97.9 °F (36.6 °C) (Oral)   Resp 14   Ht 6' 3\" (1.905 m)   Wt 199 lb (90.3 kg)   BMI 24.87 kg/m²     LABS:  Labs reviewed: [x] Yes  Last EKG in EMR reviewed: [x] Yes  QTc= 484          Review of Systems   Constitutional: Negative for chills and weight loss. HENT: Negative for ear pain and nosebleeds. Eyes: Negative for blurred vision and photophobia. Respiratory: Negative for cough, shortness of breath and wheezing. Cardiovascular: Negative for chest pain and palpitations. Gastrointestinal: Negative for abdominal pain, diarrhea and vomiting. Genitourinary: Negative for dysuria and urgency. Musculoskeletal: Negative for falls and joint pain. Skin: Negative for itching and rash. Neurological: Negative for tremors, seizures and weakness. Endo/Heme/Allergies: Does not bruise/bleed easily. Pain Scale (0 -10): 5/10, headache. Physical Exam:   Constitutional:  Appears thin, no acute distress. HENT:   Head: Normocephalic and atraumatic. Eyes: Conjunctivae are normal. Right eye exhibits no discharge. Left eye exhibits no discharge. No scleral icterus. Neck: Normal range of motion. Neck supple.    Pulmonary/Chest:  No respiratory distress or accessory muscle use, no wheezing. Cardiac: Regular rate and rhythm. Abdominal: Soft. Non-tender. Exhibits no distension. Musculoskeletal: Normal range of motion. Exhibits no edema. Neurological: cranial nerves II-XII grossly in tact, normal gait and station. Skin: Skin is warm and dry. Patient is not diaphoretic. No erythema. Mental Status Examination:    Level of consciousness: Awake and alert  Appearance:  Appropriate attire, resting in bed, poor grooming   Behavior/Motor: Guarded, no psychomotor abnormalities noted  Attitude toward examiner: Suspicious, irritable, semi-attentive, poor eye contact  Speech: Slow, quiet, mumbled, normal tone. Mood: \"Not too good\"  Affect: Flat, depressed  Thought processes: Coherent, linear  Thought content: Endorses suicidal ideation with plan to shoot self. Endorses auditory and visual hallucinations. Endorses homicidal ideation, but will not disclose details. Denies delusions              Denies paranoia  Cognition:  Oriented to self, location, time, situation  Concentration: Clinically adequate  Memory: Intact  Insight & Judgment: Poor         DSM-5 Diagnosis    Principal Problem: Schizoaffective disorder, depressed type    Stimulant use disorder (cocaine)    Psychosocial and Contextual factors:  Financial X  Occupational X  Relationship   Legal   Living situation X  Educational     Past Medical History:   Diagnosis Date    Bipolar disorder ([address]     Depression     GERD (gastroesophageal reflux disease)     Hallucinations     Headache(784.0)     Hepatitis     Schizophrenia, schizo-affective ([address]     Substance abuse ([facility] [address]     Tobacco abuse     Type II or unspecified type diabetes mellitus without mention of complication, not stated as uncontrolled     Urinary incontinence         TREATMENT PLAN    Continue inpatient psychiatric treatment. Home medications reviewed. Problem list updated. Restart Lexapro 20 mg daily. Restart Invega ER 6 mg daily.    Monitor need and frequency of PRN medications. Attempt to develop insight. Follow-up daily while inpatient. Reviewed risks and benefits as well as potential side effects with patient. CONSULTS [] Yes [x] No    Risk Management: close watch per standard protocol    Psychotherapy: participation in milieu and group and individual sessions with Attending Physician,  and Physician Assistant/CNP    Estimated length of stay:  2-14 days    GENERAL PATIENT/FAMILY EDUCATION  Patient will understand basic signs and symptoms, patient will understand benefits/risks and potential side effects from proposed medications, and patient will understand their role in recovery. Family is not active in patient's care. Patient assets that may be helpful during treatment include: Intent to participate and engage in treatment, sufficient fund of knowledge and intellect to understand and utilize treatments. Goals:    1) Remission of suicidal ideation. 2) Remission of homicidal ideation. 3) Improvement in psychotic symptoms. 4) Stabilization of symptoms prior to discharge. 5) Establish efficacy and tolerability of medications. Behavioral Services  Medicare Certification     Admission Day 1  I certify that this patient's inpatient psychiatric hospital admission is medically necessary for:    x (1) treatment which could reasonably be expected to improve this patient's condition, or    x (2) diagnostic study or its equivalent. Time Spent: 60 minutes    Dudley Amador is a 57 y.o. male being evaluated face to face    --Ashlee Islas MD on 10/11/2021 at 5:07 PM    An electronic signature was used to authenticate this note.

## 2021-10-11 NOTE — ED NOTES
Pt resting comfortably in stretcher. 1:1 video monitoring in place. Awaiting transfer out to [facility] at 0430. Will continue to monitor.       Jean Hernandez RN  10/10/21 [id number]
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
## 2021-10-11 NOTE — GROUP NOTE
Group Therapy Note    Date: 10/11/2021    Group Start Time: 1330  Group End Time: 8760  Group Topic: Psychoeducation    [facility] JUAN Mora      Patient declined to attend coping skills group at 1330 despite encouragement from staff. 1:1 talk time offered by staff as alternative to group session.         Signature:  Syd Renner

## 2021-10-11 NOTE — PROGRESS NOTES
Medically stable for transfer to [facility].     [address] [facility], [place], RD  PGY-3 Emergency Medicine
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
## 2021-10-11 NOTE — GROUP NOTE
Group Therapy Note    Date: 10/11/2021    Group Start Time: 1000  Group End Time: 8819  Group Topic: Psychotherapy    [address], LSW    patient refused to attend psychotherapy group at [facility] after encouragement from staff.   1:1 talk time provided as alternative to group session

## 2021-10-11 NOTE — ED NOTES
Dr. Chuy Romero @ [facility] accepted patient with dx acute psychosis. Voluntary admission form faxed.    Room assignment: 131  [facility] to transport at 4:30am.     CHANEL Rangel  10/10/21 7576
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
## 2021-10-11 NOTE — ED NOTES
EMS arrived at bedside to transport pt. Verbal report given. Paperwork handed off to EMS. Belongings with EMS.       Carlos Seals RN  10/11/21 8851
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
## 2021-10-11 NOTE — ED NOTES
Patient's pick-up time by Governor Neva has now been moved to 1:30am.  [facility] updated. Renay Page.  Polo Quach, [facility]  10/11/21 7456

## 2021-10-11 NOTE — FLOWSHEET NOTE
*Patient participated in the Spirituality Group       10/11/21 1529   Encounter Summary   Services provided to: Patient   Referral/Consult From: Rounding   Continue Visiting   (10/11/21)   Complexity of Encounter Moderate   Length of Encounter 30 minutes   Spiritual/Restorationist   Type Spiritual support   Assessment Calm; Approachable   Intervention Active listening   Outcome Receptive

## 2021-10-12 PROCEDURE — 6370000000 HC RX 637 (ALT 250 FOR IP): Performed by: PSYCHIATRY & NEUROLOGY

## 2021-10-12 PROCEDURE — 1240000000 HC EMOTIONAL WELLNESS R&B

## 2021-10-12 PROCEDURE — 99232 SBSQ HOSP IP/OBS MODERATE 35: CPT | Performed by: PSYCHIATRY & NEUROLOGY

## 2021-10-12 PROCEDURE — APPSS30 APP SPLIT SHARED TIME 16-30 MINUTES: Performed by: PSYCHIATRY & NEUROLOGY

## 2021-10-12 RX ORDER — PALIPERIDONE 9 MG/1
9 TABLET, EXTENDED RELEASE ORAL DAILY
Status: DISCONTINUED | OUTPATIENT
Start: 2021-10-13 | End: 2021-10-28 | Stop reason: HOSPADM

## 2021-10-12 RX ADMIN — LORAZEPAM 2 MG: 1 TABLET ORAL at 19:45

## 2021-10-12 RX ADMIN — Medication 1000 UNITS: at 09:47

## 2021-10-12 RX ADMIN — ESCITALOPRAM OXALATE 10 MG: 10 TABLET ORAL at 09:47

## 2021-10-12 RX ADMIN — PALIPERIDONE 6 MG: 6 TABLET, EXTENDED RELEASE ORAL at 09:47

## 2021-10-12 RX ADMIN — TRAZODONE HYDROCHLORIDE 150 MG: 150 TABLET ORAL at 19:44

## 2021-10-12 RX ADMIN — HYDROXYZINE HYDROCHLORIDE 50 MG: 50 TABLET, FILM COATED ORAL at 19:44

## 2021-10-12 RX ADMIN — HALOPERIDOL 5 MG: 5 TABLET ORAL at 19:44

## 2021-10-12 RX ADMIN — ACETAMINOPHEN 650 MG: 325 TABLET ORAL at 18:54

## 2021-10-12 ASSESSMENT — PAIN SCALES - GENERAL
PAINLEVEL_OUTOF10: 10
PAINLEVEL_OUTOF10: 4

## 2021-10-12 ASSESSMENT — PAIN DESCRIPTION - PAIN TYPE: TYPE: ACUTE PAIN

## 2021-10-12 ASSESSMENT — PAIN DESCRIPTION - DESCRIPTORS: DESCRIPTORS: HEADACHE

## 2021-10-12 NOTE — PLAN OF CARE
Problem: Depressive Behavior With or Without Suicide Precautions:  Goal: Ability to disclose and discuss suicidal ideas will improve  Description: Ability to disclose and discuss suicidal ideas will improve  Outcome: Ongoing  Patient admits to suicidal ideations without a plan and contracts for safety. Writer provided support. Safe environment maintained and patient remains free from self-harm. Agreeable to seeking staff should intent to harm self arise. Q15min checks for safety. Problem: Anger Management/Homicidal Ideation:  Goal: Absence of angry outbursts  Description: Absence of angry outbursts  Outcome: Ongoing  No violent or negative behaviors noted at this time. Will cont to provide safe, calm, environment and use verbal de-escalation techniques when needed. Support and reassurance also given as needed. Problem: Falls - Risk of:  Goal: Will remain free from falls  Description: Will remain free from falls  Outcome: Ongoing  Patient remains free of falls and verbalizes understanding of individual fall risks. Wearing nonskid footwear and encouraged to seek out staff if assistance needed.

## 2021-10-12 NOTE — PROGRESS NOTES
Daily Progress Note  10/12/2021    Patient Name: Reg Sebastian    CHIEF COMPLAINT: Suicidal ideation with command auditory hallucinations, visual hallucinations and homicidal ideation         SUBJECTIVE:    Staff report that patient continues to isolate in his room coming to the milieu for nutritional and personal needs only. He did refuse his medications last evening but accepted them orally this morning. Mr. Lorenzo Sanchez is assessed at bedside where he reports his mood as \"not too good \". He continues to endorse auditory hallucinations that are command in nature and reports current volume is 10/10 on a 0-to-10 scale with 10 being the loudest.  Patient verbalizes his depression and anxiety are related to loss of money that was sent to an old landlord. He continues to endorse suicidal ideation but is able to contract for safety on this unit. He denies homicidal ideation or intent to harm anyone in the area. He agrees to reach out to the team if he would be in need of additional one-to-one support. Discussed the risks versus benefits and alternatives of his current medication regimen and patient understands that Ami Cease is intended to manage the auditory hallucinations and it is mutually agreed to titrate Invega 9 mg starting tomorrow. At this time patient denies having questions or concerns related to this plan. Writer encouraged patient to attend groups on the unit. At this time, the patient is not appropriate for a lower level of care. There is risk of decompensation and patient warrants further hospitalization for safety and stabilization. Appetite:  [] Normal/Adequate/Unchanged  [] Increased  [x] Decreased      Sleep:       [] Normal/Adequate/Unchanged  [x] Fair  [] Poor      Group Attendance on Unit:   [] Yes  [] Selectively    [x] No    Medication Side Effects:  Patient denies any medication side effects at the time of assessment. Mental Status Exam  Level of consciousness: Alert and awake. Appearance: Appropriate attire for setting, resting in bed, with poor grooming and hygiene. Behavior/Motor: Approachable, no psychomotor abnormalities. Attitude toward examiner: Cooperative, attentive, fair eye contact. Speech: Delayed rate, decreased volume, normal tone. Mumbles at times  Mood:  Patient reports \" not so good\". Affect: Flat  Thought processes: Linear, goal directed and coherent. Thought content: Denies homicidal ideation. Suicidal Ideation: Active suicidal ideations, with a  current plan or intent, contracts for safety on the unit. Delusions: No evidence of delusions. Denies paranoia. Perceptual Disturbance: Patient does not appear to be responding to internal stimuli. Endorses auditory hallucinations. Endorses visual hallucinations. Cognition: Oriented to self, location, time, and situation. Memory: Intact. Insight & Judgement: Poor. Data   height is 6' 3\" (1.905 m) and weight is 199 lb (90.3 kg). His oral temperature is 97.1 °F (36.2 °C). His blood pressure is 123/69 and his pulse is 77. His respiration is 14.    Labs:   Admission on 10/10/2021, Discharged on 10/11/2021   Component Date Value Ref Range Status    WBC 10/10/2021 5.5  3.5 - 11.3 k/uL Final    RBC 10/10/2021 3.90* 4.21 - 5.77 m/uL Final    Hemoglobin 10/10/2021 10.9* 13.0 - 17.0 g/dL Final    Hematocrit 10/10/2021 35.1* 40.7 - 50.3 % Final    MCV 10/10/2021 90.0  82.6 - 102.9 fL Final    MCH 10/10/2021 27.9  25.2 - 33.5 pg Final    MCHC 10/10/2021 31.1  28.4 - 34.8 g/dL Final    RDW 10/10/2021 14.6* 11.8 - 14.4 % Final    Platelets 24/29/4503 302  138 - 453 k/uL Final    MPV 10/10/2021 9.0  8.1 - 13.5 fL Final    NRBC Automated 10/10/2021 0.0  0.0 per 100 WBC Final    Differential Type 10/10/2021 NOT REPORTED   Final    Seg Neutrophils 10/10/2021 48  36 - 65 % Final    Lymphocytes 10/10/2021 38  24 - 43 % Final    Monocytes 10/10/2021 11  3 - 12 % Final    Eosinophils % 10/10/2021 2  1 - 4 % Final    Basophils 10/10/2021 1  0 - 2 % Final    Immature Granulocytes 10/10/2021 0  0 % Final    Segs Absolute 10/10/2021 2.61  1.50 - 8.10 k/uL Final    Absolute Lymph # 10/10/2021 2.10  1.10 - 3.70 k/uL Final    Absolute Mono # 10/10/2021 0.61  0.10 - 1.20 k/uL Final    Absolute Eos # 10/10/2021 0.12  0.00 - 0.44 k/uL Final    Basophils Absolute 10/10/2021 0.03  0.00 - 0.20 k/uL Final    Absolute Immature Granulocyte 10/10/2021 <0.03  0.00 - 0.30 k/uL Final    WBC Morphology 10/10/2021 NOT REPORTED   Final    RBC Morphology 10/10/2021 ANISOCYTOSIS PRESENT   Final    Platelet Estimate 74/25/1487 NOT REPORTED   Final    Glucose 10/10/2021 133* 70 - 99 mg/dL Final    BUN 10/10/2021 16  8 - 23 mg/dL Final    CREATININE 10/10/2021 0.97  0.70 - 1.20 mg/dL Final    Bun/Cre Ratio 10/10/2021 NOT REPORTED  9 - 20 Final    Calcium 10/10/2021 8.8  8.6 - 10.4 mg/dL Final    Sodium 10/10/2021 140  135 - 144 mmol/L Final    Potassium 10/10/2021 3.8  3.7 - 5.3 mmol/L Final    Chloride 10/10/2021 106  98 - 107 mmol/L Final    CO2 10/10/2021 21  20 - 31 mmol/L Final    Anion Gap 10/10/2021 13  9 - 17 mmol/L Final    Alkaline Phosphatase 10/10/2021 108  40 - 129 U/L Final    ALT 10/10/2021 10  5 - 41 U/L Final    AST 10/10/2021 22  <40 U/L Final    Total Bilirubin 10/10/2021 0.30  0.3 - 1.2 mg/dL Final    Total Protein 10/10/2021 6.4  6.4 - 8.3 g/dL Final    Albumin 10/10/2021 4.1  3.5 - 5.2 g/dL Final    Albumin/Globulin Ratio 10/10/2021 1.8  1.0 - 2.5 Final    GFR Non- 10/10/2021 >60  >60 mL/min Final    GFR  10/10/2021 >60  >60 mL/min Final    GFR Comment 10/10/2021        Final    Comment: Average GFR for 61-76 years old:   80 mL/min/1.73sq m  Chronic Kidney Disease:   <60 mL/min/1.73sq m  Kidney failure:   <15 mL/min/1.73sq m              eGFR calculated using average adult body mass.  Additional eGFR calculator available at: [email or web address]            GFR Staging 10/10/2021 NOT REPORTED   Final    Specimen Description 10/10/2021 . NASOPHARYNGEAL SWAB   Final    SARS-CoV-2, Rapid 10/10/2021 Not Detected  Not Detected Final    Comment:       Rapid NAAT:  The specimen is NEGATIVE for SARS-CoV-2, the novel coronavirus associated with   COVID-19. The ID NOW COVID-19 assay is designed to detect the virus that causes COVID-19 in patients   with signs and symptoms of infection who are suspected of COVID-19. An individual without symptoms of COVID-19 and who is not shedding SARS-CoV-2 virus would   expect to have a negative (not detected) result in this assay. Negative results should be treated as presumptive and, if inconsistent with clinical signs   and symptoms or necessary for patient management,  should be tested with an alternative molecular assay. Negative results do not preclude   SARS-CoV-2 infection and   should not be used as the sole basis for patient management decisions.          Fact sheet for Healthcare Providers: Brandyn  Fact sheet for Patients: [employer]es[employer]nav          Methodology: Isothermal Nucleic Acid Amplification      Ethanol 10/10/2021 <10  <10 mg/dL Final    Ethanol percent 10/10/2021 <0.010  <0.010 % Final    Amphetamine Screen, Ur 10/10/2021 NEGATIVE  NEGATIVE Final    Comment:       (Positive cutoff 1000 ng/mL)                  Barbiturate Screen, Ur 10/10/2021 NEGATIVE  NEGATIVE Final    Comment:       (Positive cutoff 200 ng/mL)                  Benzodiazepine Screen, Urine 10/10/2021 NEGATIVE  NEGATIVE Final    Comment:       (Positive cutoff 200 ng/mL)                  Cocaine Metabolite, Urine 10/10/2021 POSITIVE* NEGATIVE Final    Comment:       (Positive cutoff 300 ng/mL)                  Methadone Screen, Urine 10/10/2021 NEGATIVE  NEGATIVE Final    Comment:       (Positive cutoff 300 ng/mL)  Opiates, Urine 10/10/2021 NEGATIVE  NEGATIVE Final    Comment:       (Positive cutoff 300 ng/mL)                  Phencyclidine, Urine 10/10/2021 NEGATIVE  NEGATIVE Final    Comment:       (Positive cutoff 25 ng/mL)                  Propoxyphene, Urine 10/10/2021 NOT REPORTED  NEGATIVE Final    Cannabinoid Scrn, Ur 10/10/2021 NEGATIVE  NEGATIVE Final    Comment:       (Positive cutoff 50 ng/mL)                  Oxycodone Screen, Ur 10/10/2021 NEGATIVE  NEGATIVE Final    Comment:       (Positive cutoff 100 ng/mL)                  Methamphetamine, Urine 10/10/2021 NOT REPORTED  NEGATIVE Final    Tricyclic Antidepressants, Urine 10/10/2021 NOT REPORTED  NEGATIVE Final    MDMA, Urine 10/10/2021 NOT REPORTED  NEGATIVE Final    Buprenorphine Urine 10/10/2021 NOT REPORTED  NEGATIVE Final    Test Information 10/10/2021 Assay provides medical screening only. The absence of expected drug(s) and/or metabolite(s) may indicate diluted or adulterated urine, limitations of testing or timing of collection. Final    Comment: Testing for legal purposes should be confirmed by another method. To request confirmation   of test result, please call the lab within 7 days of sample submission. Reviewed patient's current plan of care and vital signs with nursing staff.     Labs reviewed: [x] Yes  Last EKG in EMR reviewed: [x] Yes  QTc: 484    Medications  Current Facility-Administered Medications: [START ON 10/13/2021] paliperidone (INVEGA) extended release tablet 9 mg, 9 mg, Oral, Daily  acetaminophen (TYLENOL) tablet 650 mg, 650 mg, Oral, Q4H PRN  aluminum & magnesium hydroxide-simethicone (MAALOX) 200-200-20 MG/5ML suspension 30 mL, 30 mL, Oral, Q6H PRN  haloperidol lactate (HALDOL) injection 5 mg, 5 mg, IntraMUSCular, Q4H PRN **AND** LORazepam (ATIVAN) injection 2 mg, 2 mg, IntraMUSCular, Q4H PRN **AND** diphenhydrAMINE (BENADRYL) injection 25 mg, 25 mg, IntraMUSCular, Q4H PRN  hydrOXYzine (ATARAX) tablet 50 mg, 50 mg, Oral, TID PRN  haloperidol (HALDOL) tablet 5 mg, 5 mg, Oral, Q4H PRN **AND** LORazepam (ATIVAN) tablet 2 mg, 2 mg, Oral, Q4H PRN  traZODone (DESYREL) tablet 50 mg, 50 mg, Oral, Nightly PRN  polyethylene glycol (GLYCOLAX) packet 17 g, 17 g, Oral, Daily PRN  nicotine polacrilex (NICORETTE) gum 2 mg, 2 mg, Oral, PRN  ibuprofen (ADVIL;MOTRIN) tablet 400 mg, 400 mg, Oral, Q6H PRN  Vitamin D (CHOLECALCIFEROL) tablet 1,000 Units, 1,000 Units, Oral, Daily  docusate sodium (COLACE) capsule 100 mg, 100 mg, Oral, BID PRN  escitalopram (LEXAPRO) tablet 10 mg, 10 mg, Oral, Daily  traZODone (DESYREL) tablet 150 mg, 150 mg, Oral, Nightly    ASSESSMENT  [facility]oaffective disorder, depressive [facility]         PLAN  Patient symptoms are: Remains Unstable. Titrate Invega 9 mg daily starting tomorrow  Monitor need and frequency of PRN medications. Hemoglobin A1c and lipid profile ordered  Encourage participation in groups and milieu. Attempt to develop insight. Psycho-education conducted. Supportive Therapy conducted. Probable discharge per attending physician and currently undetermined. Follow-up daily while inpatient. Patient continues to be monitored in the inpatient psychiatric facility at [facility] for safety and stabilization. Patient continues to need, on a daily basis, active treatment furnished directly by or requiring the supervision of inpatient psychiatric personnel. Electronically signed by MARY Shoemaker CNP on 10/12/2021 at 5:17 PM    **This report has been created using voice recognition software. It may contain minor errors which are inherent in voice recognition technology. **    I independently saw and evaluated the patient. I reviewed the nurse practitioners documentation above. Any additional comments or changes to the nurse practitioners documentation are stated below otherwise agree with assessment.   Plan will be as follows:  Patient still experiencing distressing symptoms and willing to further titrate his Invega. Guardianship paperwork will be given to his sister who is willing to file and take ownership of guardianship process for the patient. PLAN  Patient s symptoms   are in need of further stabilization  Increase Invega  Attempt to develop insight  Psycho-education conducted. Supportive Therapy conducted.   Probable discharge is undetermined at this time  Follow-up daily while on inpatient unit

## 2021-10-12 NOTE — BH NOTE
Patient is agitated as evidence by fidgeting, pacing, and asking for medication because the voices are really loud and telling him to hurt himself. Staff attempted to find and relieve the distress by talking to patient, offering snack, administer PRN medications. Patient is currently accepted PRN medications haldol 5 mg and ativan 2 mg po. Will continue to monitor.

## 2021-10-12 NOTE — GROUP NOTE
Group Therapy Note    Date: 10/12/2021    Group Start Time: 1000  Group End Time: 6311  Group Topic: Psychotherapy    [address], LSW    patient refused to attend psychotherapy group at [facility] after encouragement from staff.   1:1 talk time provided as alternative to group session

## 2021-10-12 NOTE — PLAN OF CARE
[facility]  Day 3 Interdisciplinary Treatment Plan NOTE    Review Date & Time: 10/12/2021  1326    Admission Type:   Admission Type: Voluntary    Reason for admission:  Reason for Admission: SI with a plan to shoot self, +HI but wont say towards who, +voices,  was just DCd on the 3rd, was kicked out of the [employer] for fighting  Estimated Length of Stay: 5-7 days  Estimated Discharge Date Update: to be determined by physician    PATIENT STRENGTHS:  Patient Strengths Strengths: Connection to output provider, Motivated  Patient Strengths and Limitations:Limitations: Tendency to isolate self, Difficulty problem solving/relies on others to help solve problems, Inappropriate/potentially harmful leisure interests, Difficult relationships / poor social skills, Multiple barriers to leisure interests, Unrealistic self-view  Addictive Behavior:Addictive Behavior  In the past 3 months, have you felt or has someone told you that you have a problem with:  : None  Do you have a history of Chemical Use?: No  Do you have a history of Alcohol Use?: No  Do you have a history of Street Drug Abuse?: Yes  Histroy of Prescripton Drug Abuse?: No  Medical Problems:  Past Medical History:   Diagnosis Date    Bipolar disorder ([facility]     Depression     GERD (gastroesophageal reflux disease)     Hallucinations     Headache(784.0)     Hepatitis     Schizophrenia, schizo-affective ([facility]     Substance abuse ([facility]     Tobacco abuse     Type II or unspecified type diabetes mellitus without mention of complication, not stated as uncontrolled     Urinary incontinence        Risk:  Fall RiskTotal: 63  Dusty Scale Dusty Scale Score: 22  BVC    Change in scores no Changes to plan of Care no    Status EXAM:   Status and Exam  Normal: No  Facial Expression: Flat, Expressionless  Affect: Blunt, Constricted  Level of Consciousness: Alert  Mood:Normal: No  Mood: Depressed, Anxious  Motor Activity:Normal: No  Motor Activity: Unusual Posture/Gait  Interview Behavior: Cooperative  Preception: [place] to Person, Abiodun Medicus to Time, [place] to Place, [place] to Situation  Attention:Normal: No  Attention: Distractible  Thought Processes: Circumstantial, Loose Assoc. Thought Content:Normal: No  Thought Content: Poverty of Content, Preoccupations  Hallucinations: None  Delusions: No  Memory:Normal: No  Memory: Poor Remote  Insight and Judgment: No  Insight and Judgment: Poor Judgment, Poor Insight, Unmotivated  Present Suicidal Ideation: No  Present Homicidal Ideation: No    Daily Assessment Last Entry:   Daily Sleep (WDL): Within Defined Limits         Patient Currently in Pain: Yes  Daily Nutrition (WDL): Within Defined Limits  Level of Assistance: Independent/Self    Patient Monitoring:  Frequency of Checks: 4 times per hour, close    Psychiatric Symptoms:   Depression Symptoms  Depression Symptoms: Impaired concentration, Isolative, Loss of interest, Feelings of hopelessess, Psychomotor retardation  Anxiety Symptoms  Anxiety Symptoms: Generalized  [place]et[place] Symptoms  [place] Symptoms: No problems reported or observed. Psychosis Symptoms  Delusion Type: No problems reported or observed. Suicide Risk CSSR-S:  1) Within the past month, have you wished you were dead or wished you could go to sleep and not wake up? : Yes  2) Have you actually had any thoughts of killing yourself? : Yes  3) Have you been thinking about how you might kill yourself? : Yes  5) Have you started to work out or worked out the details of how to kill yourself?  Do you intend to carry out this plan? : No  6) Have you ever done anything, started to do anything, or prepared to do anything to end your life?: Yes  Change in Result no Change in Plan of care o      EDUCATION:   EDUCATION:   Learner Progress Toward Treatment Goals: Reviewed results and recommendations of this team, Reviewed group plan and strategies, Reviewed signs, symptoms and risk of self harm and violent behavior, Reviewed goals and plan of care    Method:small group, individual verbal education    Outcome:verbalized by patient, but needs reinforcement to obtain goals    PATIENT GOALS:  Short term: decreasing hallucinations.    Long term: finding housing    PLAN/TREATMENT RECOMMENDATIONS UPDATE: continue with group therapies, increased socialization, continue planning for after discharge goals, continue with medication compliance    SHORT-TERM GOALS UPDATE:   Time frame for Short-Term Goals: 5-7 days    LONG-TERM GOALS UPDATE:   Time frame for Long-Term Goals: 6 months  Members Present in Team Meeting: See Signature Sheet    MARJ Velasquez

## 2021-10-12 NOTE — GROUP NOTE
Group Therapy Note    Date: 10/12/2021    Group Start Time: 0900  Group End Time: 0930  Group Topic: Community Meeting    JESUS Jameson        Group Therapy Note    Attendees: 13/22         Patient's Goal:  Patient refused to attend goal group after much encouragement from staff due to resting in room. 1:1 talk time offered. Patient declined.      Signature:  Lluvia Leonard

## 2021-10-13 LAB
CHOLESTEROL/HDL RATIO: 2.4
CHOLESTEROL: 150 MG/DL
ESTIMATED AVERAGE GLUCOSE: 97 MG/DL
HBA1C MFR BLD: 5 % (ref 4–6)
HDLC SERPL-MCNC: 62 MG/DL
LDL CHOLESTEROL: 80 MG/DL (ref 0–130)
TRIGL SERPL-MCNC: 41 MG/DL
VLDLC SERPL CALC-MCNC: NORMAL MG/DL (ref 1–30)

## 2021-10-13 PROCEDURE — 6370000000 HC RX 637 (ALT 250 FOR IP): Performed by: PSYCHIATRY & NEUROLOGY

## 2021-10-13 PROCEDURE — 36415 COLL VENOUS BLD VENIPUNCTURE: CPT

## 2021-10-13 PROCEDURE — 80061 LIPID PANEL: CPT

## 2021-10-13 PROCEDURE — 99232 SBSQ HOSP IP/OBS MODERATE 35: CPT | Performed by: PSYCHIATRY & NEUROLOGY

## 2021-10-13 PROCEDURE — 1240000000 HC EMOTIONAL WELLNESS R&B

## 2021-10-13 PROCEDURE — APPSS30 APP SPLIT SHARED TIME 16-30 MINUTES: Performed by: PSYCHIATRY & NEUROLOGY

## 2021-10-13 PROCEDURE — 83036 HEMOGLOBIN GLYCOSYLATED A1C: CPT

## 2021-10-13 RX ADMIN — PALIPERIDONE 9 MG: 9 TABLET, EXTENDED RELEASE ORAL at 08:50

## 2021-10-13 RX ADMIN — ESCITALOPRAM OXALATE 10 MG: 10 TABLET ORAL at 08:49

## 2021-10-13 RX ADMIN — Medication 1000 UNITS: at 08:50

## 2021-10-13 ASSESSMENT — PAIN SCALES - GENERAL
PAINLEVEL_OUTOF10: 3
PAINLEVEL_OUTOF10: 2

## 2021-10-13 NOTE — PLAN OF CARE
Problem: Falls - Risk of:  Goal: Will remain free from falls  Description: Will remain free from falls  10/12/2021 2126 by Nils Wills  Outcome: Ongoing  Note: Patient remains free from falls. Problem: Depressive Behavior With or Without Suicide Precautions:  Goal: Ability to disclose and discuss suicidal ideas will improve  Description: Ability to disclose and discuss suicidal ideas will improve  10/12/2021 2126 by Nelida Sheppard  Outcome: Ongoing  Note: Patient denies any thoughts of self harm. Patient states that he's having some audio hallucinations states that the voices are telling him to harm himself. Patient states that its hard to focus at time put tries to remains positive.  Patient safety maintained Q15

## 2021-10-13 NOTE — PLAN OF CARE
Pt calm and controlled. He stated that he has been having thoughts of suicide over his housing issues. He said that he is linked with [facility] and they are working on finding him a place. He was isolative to room and out for meals only. He did not have any falls this shift. His gait was steady. He does not want to quit using tobacco at this time. He denied current pain.   Problem: Falls - Risk of:  Goal: Will remain free from falls  Description: Will remain free from falls  Outcome: Ongoing     Problem: Anger Management/Homicidal Ideation:  Goal: Absence of angry outbursts  Description: Absence of angry outbursts  Outcome: Ongoing     Problem: Depressive Behavior With or Without Suicide Precautions:  Goal: Ability to disclose and discuss suicidal ideas will improve  Description: Ability to disclose and discuss suicidal ideas will improve  Outcome: Ongoing     Problem: Tobacco Use:  Goal: Inpatient tobacco use cessation counseling participation  Description: Inpatient tobacco use cessation counseling participation  Outcome: Ongoing     Problem: Pain:  Goal: Pain level will decrease  Description: Pain level will decrease  Outcome: Ongoing  Goal: Control of acute pain  Description: Control of acute pain  Outcome: Ongoing  Goal: Control of chronic pain  Description: Control of chronic pain  Outcome: Ongoing

## 2021-10-13 NOTE — PROGRESS NOTES
Daily Progress Note  10/13/2021    Patient Name: Tori Stanley    CHIEF COMPLAINT:  Suicidal ideation with command auditory hallucinations, visual hallucinations and homicidal ideation. SUBJECTIVE:      Medication Adherence: Patient has been medication compliant today. Emergency Medications: Patient has not received any emergency medications today. Patient last received emergency medications on 10/12/2021. Patient is seen today for a follow up assessment. He was resting in his room at the time of approach. He is oriented to self, location, and situation, but was disoriented to the date and stated it was \"September\" \"2019\". Patient was reoriented by Cassi Turner. He reports his mood is \"not too good\". Patient endorses depression and anxiety today. He endorses adequate appetite. He reports his sleep was poor and nonrestorative last night. He endorsed difficulty falling asleep and difficulty staying asleep last night. Patient endorses active suicidal ideation. When asked about suicidal plans, patient reports a plan to \"shoot myself\". He denies homicidal ideation, intent, or plans. He contracts for safety on the unit. He endorses auditory hallucinations all the time and reports hearing one male voice. He reports the male voice is unrecognizable to him, but he describes the auditory hallucination as \"loud\". He states the auditory hallucination is \"telling me to kill myself\". Patient also endorses intermittent visual hallucinations and reports seeing \"people\". He endorses paranoia. Patient presents with delusions and reports he feels others can read his mind. He also reports he feels the media communicates directly with him. He denies any medication side effects or medical concerns at the time of assessment. Writer encouraged patient to attend groups on the unit. At this time, the patient is not appropriate for a lower level of care.  There is risk of decompensation and patient warrants further hospitalization for safety and stabilization. Group Attendance on Unit:   [] Yes  [] Selectively    [x] No       Mental Status Exam  Level of consciousness: Alert and awake. Appearance: Appropriate attire for setting, resting in bed, with poor grooming and hygiene. Behavior/Motor: Approachable, psychomotor retardation. Attitude toward examiner: Cooperative, attentive, good eye contact. Speech: Decreased rate, low volume, depressed tone. Difficult to understand at times due to the low volume of speech. Mumbles at times. Mood:  Patient reports \"not too good\". Affect: Depressed. Thought processes: Linear and coherent. Thought content: Denies homicidal ideation. Suicidal Ideation: Active suicidal ideations, with a  current plan or intent, contracts for safety on the unit. Delusions: Patient presents with delusions. Endorses paranoia. Perceptual Disturbance: Patient does not appear to be responding to internal stimuli. Endorses auditory hallucinations. Endorses visual hallucinations. Cognition: He is oriented to self, location, and situation, but was disoriented to the date and stated it was \"September\" \"2019\". Patient was reoriented by Cari Mackey. Memory: Intact. Insight & Judgement: Poor. Data   height is 6' 3\" (1.905 m) and weight is 199 lb (90.3 kg). His oral temperature is 97.1 °F (36.2 °C). His blood pressure is 116/81 and his pulse is 90. His respiration is 14.    Labs:   Admission on 10/11/2021   Component Date Value Ref Range Status    Cholesterol 10/13/2021 150  <200 mg/dL Final    Comment:    Cholesterol Guidelines:      <200  Desirable   200-240  Borderline      >240  Undesirable         HDL 10/13/2021 62  >40 mg/dL Final    Comment:    HDL Guidelines:    <40     Undesirable   40-59    Borderline    >59     Desirable         LDL Cholesterol 10/13/2021 80  0 - 130 mg/dL Final    Comment:    LDL Guidelines:     <100    Desirable   100-129   Near to/above Desirable   130-159 Borderline      >159   Undesirable     Direct (measured) LDL and calculated LDL are not interchangeable tests.  Chol/HDL Ratio 10/13/2021 2.4  <5 Final            Triglycerides 10/13/2021 41  <150 mg/dL Final    Comment:    Triglyceride Guidelines:     <150   Desirable   150-199  Borderline   200-499  High     >499   Very high   Based on AHA Guidelines for fasting triglyceride, October 2012.  VLDL 10/13/2021 NOT REPORTED  1 - 30 mg/dL Final         Reviewed patient's current plan of care and vital signs with nursing staff. · No vital signs noted from 10/13/2021. Previously recorded vital signs reviewed and are noted to be stable. Labs reviewed: [x] Yes  Last EKG in EMR reviewed: [x] Yes  QTc: 484.     Medications  Current Facility-Administered Medications: paliperidone (INVEGA) extended release tablet 9 mg, 9 mg, Oral, Daily  acetaminophen (TYLENOL) tablet 650 mg, 650 mg, Oral, Q4H PRN  aluminum & magnesium hydroxide-simethicone (MAALOX) 200-200-20 MG/5ML suspension 30 mL, 30 mL, Oral, Q6H PRN  haloperidol lactate (HALDOL) injection 5 mg, 5 mg, IntraMUSCular, Q4H PRN **AND** LORazepam (ATIVAN) injection 2 mg, 2 mg, IntraMUSCular, Q4H PRN **AND** diphenhydrAMINE (BENADRYL) injection 25 mg, 25 mg, IntraMUSCular, Q4H PRN  hydrOXYzine (ATARAX) tablet 50 mg, 50 mg, Oral, TID PRN  haloperidol (HALDOL) tablet 5 mg, 5 mg, Oral, Q4H PRN **AND** LORazepam (ATIVAN) tablet 2 mg, 2 mg, Oral, Q4H PRN  traZODone (DESYREL) tablet 50 mg, 50 mg, Oral, Nightly PRN  polyethylene glycol (GLYCOLAX) packet 17 g, 17 g, Oral, Daily PRN  nicotine polacrilex (NICORETTE) gum 2 mg, 2 mg, Oral, PRN  ibuprofen (ADVIL;MOTRIN) tablet 400 mg, 400 mg, Oral, Q6H PRN  Vitamin D (CHOLECALCIFEROL) tablet 1,000 Units, 1,000 Units, Oral, Daily  docusate sodium (COLACE) capsule 100 mg, 100 mg, Oral, BID PRN  escitalopram (LEXAPRO) tablet 10 mg, 10 mg, Oral, Daily  traZODone (DESYREL) tablet 150 mg, 150 mg, Oral, Nightly    ASSESSMENT  Schizoaffective disorder, depressive type [facility])         PLAN  Patient symptoms are: Remains Unstable. Continue current medication regimen. Monitor need and frequency of PRN medications. Encourage participation in groups and milieu. New order: Consult to internal medicine: Medical management, abnormal labs, abnormal UA from 10/3/2021, last ECG abnormal.  Attempt to develop insight. Psycho-education conducted. Supportive Therapy conducted. Probable discharge is to be determined by MD.   Follow-up daily while inpatient. Patient continues to be monitored in the inpatient psychiatric facility at [facility] for safety and stabilization. Patient continues to need, on a daily basis, active treatment furnished directly by or requiring the supervision of inpatient psychiatric personnel. Electronically signed by MARY Steel CNP on 10/13/2021 at 3:20 PM    **This report has been created using voice recognition software. It may contain minor errors which are inherent in voice recognition technology. **    I independently saw and evaluated the patient. I reviewed the nurse practitioners documentation above. Any additional comments or changes to the nurse practitioners documentation are stated below otherwise agree with assessment. Plan will be as follows:  Guardianship paperwork filled out, awaiting sister to come pick it up. Patient still endorsing suicidal ideation and unable to contract for safety. Denying side effects to medication. We will continue to observe on present medication for now for further stability  PLAN  Patient s symptoms   show modest signs of improvement  Continue with current medication for now  Attempt to develop insight  Psycho-education conducted. Supportive Therapy conducted.   Probable discharge is next week  Follow-up daily while on inpatient unit

## 2021-10-13 NOTE — GROUP NOTE
Group Therapy Note    Date: 10/13/2021    Group Start Time: 1330  Group End Time: 0258  Group Topic: Cognitive Skills    JESUS Garcia, CTRS    Pt did not attend cognitive skills group d/t resting in room despite staff invitation to attend. 1:1 talk time offered as alternative to group session, pt declined.               Signature:  Zee Li

## 2021-10-13 NOTE — GROUP NOTE
Group Therapy Note    Date: 10/13/2021    Group Start Time: 1000  Group End Time: 6416  Group Topic: Psychotherapy    [address] CHANEL Hassan; CHANEL Moise        Group Therapy Note    Attendees: 9/21         patient refused to attend psychotherapy group at [place] [id number] after encouragement from staff.   1:1 talk time provided as alternative to group session    Signature:  CHANEL Moise

## 2021-10-13 NOTE — CARE COORDINATION
KANWAL spoke with pt sister Marilyn Argueta this date who called to inquire whether/not the statement of expert flor was completed MD as she is interested in moving forward with applying for guardianship with court. MD to complete necessary form, Kanwal to liaison to sister when paperwork completed.

## 2021-10-14 PROCEDURE — 99232 SBSQ HOSP IP/OBS MODERATE 35: CPT | Performed by: PSYCHIATRY & NEUROLOGY

## 2021-10-14 PROCEDURE — 80048 BASIC METABOLIC PNL TOTAL CA: CPT

## 2021-10-14 PROCEDURE — 99253 IP/OBS CNSLTJ NEW/EST LOW 45: CPT | Performed by: INTERNAL MEDICINE

## 2021-10-14 PROCEDURE — 36415 COLL VENOUS BLD VENIPUNCTURE: CPT

## 2021-10-14 PROCEDURE — 6370000000 HC RX 637 (ALT 250 FOR IP): Performed by: PSYCHIATRY & NEUROLOGY

## 2021-10-14 PROCEDURE — APPSS30 APP SPLIT SHARED TIME 16-30 MINUTES

## 2021-10-14 PROCEDURE — 1240000000 HC EMOTIONAL WELLNESS R&B

## 2021-10-14 PROCEDURE — 83550 IRON BINDING TEST: CPT

## 2021-10-14 PROCEDURE — 83540 ASSAY OF IRON: CPT

## 2021-10-14 PROCEDURE — 82728 ASSAY OF FERRITIN: CPT

## 2021-10-14 RX ADMIN — ESCITALOPRAM OXALATE 10 MG: 10 TABLET ORAL at 15:44

## 2021-10-14 RX ADMIN — ALUMINUM HYDROXIDE, MAGNESIUM HYDROXIDE, AND SIMETHICONE 30 ML: 200; 200; 20 SUSPENSION ORAL at 20:30

## 2021-10-14 RX ADMIN — PALIPERIDONE 9 MG: 9 TABLET, EXTENDED RELEASE ORAL at 15:44

## 2021-10-14 RX ADMIN — Medication 1000 UNITS: at 15:44

## 2021-10-14 ASSESSMENT — PAIN SCALES - GENERAL: PAINLEVEL_OUTOF10: 2

## 2021-10-14 NOTE — CARE COORDINATION
YOGESH spoke with pt sister Guicho Jacome, informed her Statement of Expert Evaluation was completed and signed by MD, she states she will come to hospital tomorrow morning to  forms and then go to [employer] court to file for legal guardianship.

## 2021-10-14 NOTE — BH NOTE
patient refused to attend goal setting group at 0900 after encouragement from staff.   1:1 talk time provided as alternative to group session

## 2021-10-14 NOTE — GROUP NOTE
Group Therapy Note    Date: 10/14/2021    Group Start Time: 1330  Group End Time: 2318  Group Topic: Cognitive Skills    JESUS ADVID JUAN Mora        Group Therapy Note    Attendees: 11/19         Patient's Goal:  To improve patient social skills     Notes:  Patient was pleasant and appropriate     Status After Intervention:  Improved    Participation Level:  Active Listener and Interactive    Participation Quality: Appropriate, Attentive, Sharing and Supportive      Speech:  normal      Thought Process/Content: Logical      Affective Functioning: Congruent      Mood: euthymic      Level of consciousness:  Alert, Oriented x4 and Attentive      Response to Learning: Able to verbalize current knowledge/experience, Able to verbalize/acknowledge new learning, Capable of insight and Progressing to goal      Endings: None Reported    Modes of Intervention: Education, Support, Socialization, Exploration, Clarifying and Problem-solving      Discipline Responsible: Psychoeducational Specialist      Signature:  Kasandra Yanes

## 2021-10-14 NOTE — PROGRESS NOTES
Daily Progress Note  10/14/2021    Patient Name: Guanakito Edward    CHIEF COMPLAINT: Suicidal ideation with command auditory hallucinations, visual hallucinations and homicidal ideation         SUBJECTIVE:      Patient is seen today for a follow up assessment. Patient is compliant with scheduled medications. Patient has not received emergency medications in the past 24 hours. Patient is agreeable to interview in room today. He endorses significant depression and anxiety today. He reports that he had a hard time falling asleep last night and states that he does not feel rested today. He endorses an \"alright\" appetite. Patient endorses active suicidal ideation without current plan or intent. He reports improvement in homicidal ideation. He is able to contract for safety on this unit with this writer. Patient endorses auditory hallucinations of a male voice \"telling me to commit suicide. \"  He reports that the voices and 8 (010 scale with 0 being most quiet and 10 being loudest). He endorses visual hallucinations and states \"I am seeing things but I do not know what they are.\"  He endorses paranoia but does not elaborate on this. He denies medication side effects or medical concerns at this time. Of note patient did attend 1 group today which is an improvement from previous days. Writer encouraged patient to attend groups on the unit. At this time, the patient is not appropriate for a lower level of care. There is risk of decompensation and patient warrants further hospitalization for safety and stabilization. Appetite:  [x] Normal/Adequate/Unchanged  [] Increased  [] Decreased      Sleep:       [] Normal/Adequate/Unchanged  [x] Fair  [] Poor      Group Attendance on Unit:   [] Yes  [x] Selectively    [] No    Medication Side Effects:  Patient denies any medication side effects at the time of assessment. Mental Status Exam  Level of consciousness: Alert and awake.    Appearance: Appropriate attire for setting, resting in bed, with poor grooming and hygiene. Behavior/Motor: Approachable, psychomotor slowing  Attitude toward examiner: Cooperative, attentive, fair eye contact. Speech: Normal rate, low volume, normal tone. Mood:  Patient reports \" not too good\". Affect: Depressed, flat  Thought processes: Linear and coherent. Thought content: Reports improvement in homicidal ideation. Suicidal Ideation: Active suicidal ideations, without current plan or intent, contracts for safety on the unit. Delusions: No evidence of delusions. Endorses paranoia. Perceptual Disturbance: Patient does not appear to be responding to internal stimuli. Endorses auditory hallucinations. Endorses visual hallucinations. Cognition: Oriented to self, location, time, and situation. Memory: Intact. Insight & Judgement: Poor. Data   height is 6' 3\" (1.905 m) and weight is 199 lb (90.3 kg). His oral temperature is 97.7 °F (36.5 °C). His blood pressure is 124/61 and his pulse is 72. His respiration is 14. Labs:   Admission on 10/11/2021   Component Date Value Ref Range Status    Hemoglobin A1C 10/13/2021 5.0  4.0 - 6.0 % Final    Estimated Avg Glucose 10/13/2021 97  mg/dL Final    Comment: The ADA and AACC recommend providing the estimated average glucose result to permit better   patient understanding of their HBA1c result.  Cholesterol 10/13/2021 150  <200 mg/dL Final    Comment:    Cholesterol Guidelines:      <200  Desirable   200-240  Borderline      >240  Undesirable         HDL 10/13/2021 62  >40 mg/dL Final    Comment:    HDL Guidelines:    <40     Undesirable   40-59    Borderline    >59     Desirable         LDL Cholesterol 10/13/2021 80  0 - 130 mg/dL Final    Comment:    LDL Guidelines:     <100    Desirable   100-129   Near to/above Desirable   130-159   Borderline      >159   Undesirable     Direct (measured) LDL and calculated LDL are not interchangeable tests.       Chol/HDL Ratio 10/13/2021 2.4  <5 Final            Triglycerides 10/13/2021 41  <150 mg/dL Final    Comment:    Triglyceride Guidelines:     <150   Desirable   150-199  Borderline   200-499  High     >499   Very high   Based on AHA Guidelines for fasting triglyceride, October 2012.  VLDL 10/13/2021 NOT REPORTED  1 - 30 mg/dL Final         Reviewed patient's current plan of care and vital signs with nursing staff. Labs reviewed: [x] Yes  Last EKG in EMR reviewed: [x] Yes  QTc: 484    Medications  Current Facility-Administered Medications: paliperidone (INVEGA) extended release tablet 9 mg, 9 mg, Oral, Daily  acetaminophen (TYLENOL) tablet 650 mg, 650 mg, Oral, Q4H PRN  aluminum & magnesium hydroxide-simethicone (MAALOX) 200-200-20 MG/5ML suspension 30 mL, 30 mL, Oral, Q6H PRN  haloperidol lactate (HALDOL) injection 5 mg, 5 mg, IntraMUSCular, Q4H PRN **AND** LORazepam (ATIVAN) injection 2 mg, 2 mg, IntraMUSCular, Q4H PRN **AND** diphenhydrAMINE (BENADRYL) injection 25 mg, 25 mg, IntraMUSCular, Q4H PRN  hydrOXYzine (ATARAX) tablet 50 mg, 50 mg, Oral, TID PRN  haloperidol (HALDOL) tablet 5 mg, 5 mg, Oral, Q4H PRN **AND** LORazepam (ATIVAN) tablet 2 mg, 2 mg, Oral, Q4H PRN  traZODone (DESYREL) tablet 50 mg, 50 mg, Oral, Nightly PRN  polyethylene glycol (GLYCOLAX) packet 17 g, 17 g, Oral, Daily PRN  nicotine polacrilex (NICORETTE) gum 2 mg, 2 mg, Oral, PRN  ibuprofen (ADVIL;MOTRIN) tablet 400 mg, 400 mg, Oral, Q6H PRN  Vitamin D (CHOLECALCIFEROL) tablet 1,000 Units, 1,000 Units, Oral, Daily  docusate sodium (COLACE) capsule 100 mg, 100 mg, Oral, BID PRN  escitalopram (LEXAPRO) tablet 10 mg, 10 mg, Oral, Daily  traZODone (DESYREL) tablet 150 mg, 150 mg, Oral, Nightly    ASSESSMENT  Schizoaffective disorder, depressive type (HCC)         PLAN  Patient symptoms are: Remains Unstable. Continue current medication regimen. Consider titration of Invega to 12 mg  Monitor need and frequency of PRN medications.   Encourage participation in groups and milieu. Attempt to develop insight. Psycho-education conducted. Supportive Therapy conducted. Probable discharge is to be determined by MD.   Follow-up daily while inpatient. Patient continues to be monitored in the inpatient psychiatric facility at [facility] for safety and stabilization. Patient continues to need, on a daily basis, active treatment furnished directly by or requiring the supervision of inpatient psychiatric personnel. Electronically signed by MARY Hanson CNP on 10/14/2021 at 4:04 PM    **This report has been created using voice recognition software. It may contain minor errors which are inherent in voice recognition technology. **    I independently saw and evaluated the patient. I reviewed the nurse practitioners documentation above. Any additional comments or changes to the nurse practitioners documentation are stated below otherwise agree with assessment. Plan will be as follows:  Patient remains unable to contract for safety. Guardianship paperwork given to patient's sister. He is endorsing suicidal ideation with still prominent psychosis. Initially refused medications in the morning but was compliant later in the day. Will observe on recently adjusted Invega for now. May consider additional mood stabilizer pending observation to Invega 9 mg daily. Patient has declined long-acting injectable at present time  PLAN  Patient s symptoms   show modest improvement  Consider addition of mood stabilizing agent pending for observation on increased Invega  Attempt to develop insight  Psycho-education conducted. Supportive Therapy conducted.   Probable discharge is next week  Follow-up daily while on inpatient unit

## 2021-10-14 NOTE — PLAN OF CARE
Problem: Falls - Risk of:  Goal: Will remain free from falls  Description: Will remain free from falls  10/13/2021 2118 by Chio Mena LPN  Outcome: Ongoing     Problem: Anger Management/Homicidal Ideation:  Goal: Absence of angry outbursts  Description: Absence of angry outbursts  10/13/2021 2118 by Chio Mena LPN  Outcome: Ongoing     Problem: Depressive Behavior With or Without Suicide Precautions:  Goal: Ability to disclose and discuss suicidal ideas will improve  Description: Ability to disclose and discuss suicidal ideas will improve  10/13/2021 2118 by Chio Mena LPN  Outcome: Ongoing     Patient denies all thoughts or ideations to harm self. Patient has remained in behavior control. Patient has remained free from falls. Patient is medication and behavorial compliant. Patient is mostly isolative to room. Patient is provided with safe environment. Will continue to monitor Q15 minute safety checks.

## 2021-10-14 NOTE — PLAN OF CARE
Problem: Falls - Risk of:  Goal: Will remain free from falls  Description: Will remain free from falls  Outcome: Ongoing   Patient remains free from falls     Problem: Anger Management/Homicidal Ideation:  Goal: Absence of angry outbursts  Description: Absence of angry outbursts  Outcome: Ongoing    Patient remains in behavior control

## 2021-10-14 NOTE — GROUP NOTE
Group Therapy Note    Date: 10/14/2021    Group Start Time: 1000  Group End Time: 4241  Group Topic: Psychotherapy    [address], LSW    patient refused to attend psychotherapy group at [address] after encouragement from staff.   1:1 talk time provided as alternative to group session

## 2021-10-15 LAB
ANION GAP SERPL CALCULATED.3IONS-SCNC: 8 MMOL/L (ref 9–17)
BUN BLDV-MCNC: 14 MG/DL (ref 8–23)
BUN/CREAT BLD: ABNORMAL (ref 9–20)
CALCIUM SERPL-MCNC: 8.8 MG/DL (ref 8.6–10.4)
CHLORIDE BLD-SCNC: 109 MMOL/L (ref 98–107)
CO2: 27 MMOL/L (ref 20–31)
CREAT SERPL-MCNC: 1.03 MG/DL (ref 0.7–1.2)
FERRITIN: 77 UG/L (ref 30–400)
GFR AFRICAN AMERICAN: >60 ML/MIN
GFR NON-AFRICAN AMERICAN: >60 ML/MIN
GFR SERPL CREATININE-BSD FRML MDRD: ABNORMAL ML/MIN/{1.73_M2}
GFR SERPL CREATININE-BSD FRML MDRD: ABNORMAL ML/MIN/{1.73_M2}
GLUCOSE BLD-MCNC: 99 MG/DL (ref 70–99)
IRON SATURATION: 39 % (ref 20–55)
IRON: 121 UG/DL (ref 59–158)
POTASSIUM SERPL-SCNC: 4.1 MMOL/L (ref 3.7–5.3)
SODIUM BLD-SCNC: 144 MMOL/L (ref 135–144)
TOTAL IRON BINDING CAPACITY: 308 UG/DL (ref 250–450)
UNSATURATED IRON BINDING CAPACITY: 187 UG/DL (ref 112–347)

## 2021-10-15 PROCEDURE — 99232 SBSQ HOSP IP/OBS MODERATE 35: CPT | Performed by: PSYCHIATRY & NEUROLOGY

## 2021-10-15 PROCEDURE — 1240000000 HC EMOTIONAL WELLNESS R&B

## 2021-10-15 PROCEDURE — 6370000000 HC RX 637 (ALT 250 FOR IP): Performed by: PSYCHIATRY & NEUROLOGY

## 2021-10-15 PROCEDURE — 99231 SBSQ HOSP IP/OBS SF/LOW 25: CPT | Performed by: INTERNAL MEDICINE

## 2021-10-15 PROCEDURE — APPSS30 APP SPLIT SHARED TIME 16-30 MINUTES

## 2021-10-15 RX ADMIN — TRAZODONE HYDROCHLORIDE 150 MG: 150 TABLET ORAL at 20:35

## 2021-10-15 RX ADMIN — HYDROXYZINE HYDROCHLORIDE 50 MG: 50 TABLET, FILM COATED ORAL at 03:53

## 2021-10-15 RX ADMIN — Medication 1000 UNITS: at 09:05

## 2021-10-15 RX ADMIN — PALIPERIDONE 9 MG: 9 TABLET, EXTENDED RELEASE ORAL at 09:05

## 2021-10-15 RX ADMIN — ESCITALOPRAM OXALATE 10 MG: 10 TABLET ORAL at 09:05

## 2021-10-15 NOTE — GROUP NOTE
Group Therapy Note    Date: 10/15/2021    Group Start Time: 1115  Group End Time: 4135  Group Topic: Psychoeducation    [address] [facility] [address]      Patient declined to attend social skills group at 1100 despite encouragement from staff. 1:1 talk time offered by staff as alternative to group session.         Signature:  Sean Lozoya

## 2021-10-15 NOTE — PROGRESS NOTES
[facility]    CONSULTATION / HISTORY AND PHYSICAL EXAMINATION            Date:   10/15/2021  Patient name:  Mirza Crisostomo  Date of admission:  10/11/2021  2:31 AM  MRN:   [id number]  Account:  [de-identified]  YOB: 1960  PCP:    No primary care provider on file. Room:   [address] [place] [address]  Code Status:    Full Code    Physician Requesting Consult: Loretta Aly MD    Reason for Consult:  medical management    Chief Complaint:     No chief complaint on file. History Obtained From:     Patient medical record nursing staff    History of Present Illness:   Patient mated to [facility] with schizoaffective disorder. He has history of substance abuse, , intered medicine consulted for management of abnormal labs  Patient is a poor historian, he is not interested in talking, he is very depressed,  Patient had lab work done on 10/8, he has had mild degree of hypokalemia, hemoglobin is low  Patient denying any blood in stools, nausea, vomiting, chest pain, complaining of headache    Past Medical History:     Past Medical History:   Diagnosis Date    Bipolar disorder ([address]     Depression     GERD (gastroesophageal reflux disease)     Hallucinations     Headache(784.0)     Hepatitis     Schizophrenia, schizo-affective ([address]     Substance abuse ([facility]     Tobacco abuse     Type II or unspecified type diabetes mellitus without mention of complication, not stated as uncontrolled     Urinary incontinence         Past Surgical History:     Past Surgical History:   Procedure Laterality Date    ABSCESS DRAINAGE N/A 02/11/2018    Carla anal abcess    DENTAL SURGERY      all teeth pulled        Medications Prior to Admission:     Prior to Admission medications    Medication Sig Start Date End Date Taking?  Authorizing Provider   polyethylene glycol (MIRALAX) 17 g packet Take 17 g by mouth 2 times daily as needed for Constipation 10/1/21 10/31/21  Beth Cordova vomiting, diarrhea, constipation, change in bowel habits, abdominal pain   GENITOURINARY:  negative for difficulty of urination, burning with urination, frequency   INTEGUMENT:  negative for rash, skin lesions, easy bruising   HEMATOLOGIC/LYMPHATIC:  negative for swelling/edema   ALLERGIC/IMMUNOLOGIC:  negative for urticaria , itching  ENDOCRINE:  negative increase in drinking, increase in urination, hot or cold intolerance  MUSCULOSKELETAL:  negative joint pains, muscle aches, swelling of joints  NEUROLOGICAL: Complaining of headache next BEHAVIOR/PSYCH:      Physical Exam:     BP (!) 111/92   Pulse 80   Temp 98.2 °F (36.8 °C) (Oral)   Resp 14   Ht 6' 3\" (1.905 m)   Wt 199 lb (90.3 kg)   BMI 24.87 kg/m²   Temp (24hrs), Av.2 °F (36.8 °C), Min:98.2 °F (36.8 °C), Max:98.2 °F (36.8 °C)    No results for input(s): POCGLU in the last 72 hours. No intake or output data in the 24 hours ending 10/15/21 1821    General Appearance:  alert, well appearing, and in no acute distress, thin built person  Mental status: oriented to person, place, and time with normal affect  Head:  normocephalic, atraumatic. Eye: no icterus, redness, pupils equal and reactive, extraocular eye movements intact, conjunctiva clear  Ear: normal external ear, no discharge, hearing intact  Nose:  no drainage noted  Mouth: mucous membranes moist  Neck: supple, no carotid bruits, thyroid not palpable  Lungs: Bilateral equal air entry, clear to ausculation, no wheezing, rales or rhonchi, normal effort  Cardiovascular: normal rate, regular rhythm, no murmur, gallop, rub.   Abdomen: Soft, nontender, nondistended, normal bowel sounds, no hepatomegaly or splenomegaly  Neurologic: There are no new focal motor or sensory deficits, normal muscle tone and bulk, no abnormal sensation, normal speech, cranial nerves II through XII grossly intact  Skin: No gross lesions, rashes, bruising or bleeding on exposed skin area  Extremities:  peripheral pulses palpable, no pedal edema or calf pain with palpation  Psych: Investigations:      Laboratory Testing:  No results found for this or any previous visit (from the past 24 hour(s)). Consultations:   IP CONSULT TO INTERNAL MEDICINE  Assessment :      Primary Problem  Schizoaffective disorder, depressive type [facility])    Active Hospital Problems    Diagnosis Date Noted    Acute psychosis ([facility] [F23] 03/10/2021    Schizoaffective disorder, depressive type ([facility] [F25.1] 09/23/2017       Plan:     1. Hypokalemia, will recheck BMP, likely due to poor oral intake because of depression and substance abuse  2. Chronic anemia, repeating iron studies  3. Patient has slightly prolonged QTC, EKG is unchanged from the previous EKG, had echocardiogram earlier this year in July, which was normal  4. History of substance abuse    10/15   Patient doing much better today  Repeat blood work, potassium is okay  Iron studies are okay  Need to work with PT  We will sign off, please call with questions      Osvaldo Godwin MD  10/15/2021  6:21 PM    Copy sent to Dr. Michelle Pollard primary care provider on file. Please note that this chart was generated using voice recognition Dragon dictation software. Although every effort was made to ensure the accuracy of this automated transcription, some errors in transcription may have occurred.

## 2021-10-15 NOTE — CARE COORDINATION
Pt sister Elen Moe picked up paperwork to submit for Legal Guardianship. Elen Moe left voice mail that she went to probate court after picking up paperwork and is in process of filling out other paperwork to file for hearing.

## 2021-10-15 NOTE — PROGRESS NOTES
Daily Progress Note  10/15/2021    Patient Name: Mirza Crisostomo    CHIEF COMPLAINT: Suicidal ideation with command auditory hallucinations, visual hallucinations and homicidal ideation         SUBJECTIVE:      Patient is seen today for a follow up assessment. Patient is compliant with scheduled medications except for his scheduled trazodone. Patient has not received emergency medications in the past 24 hours. Noted that patient refused vital signs this morning. Patient is agreeable to interview in his room today. He reports he is feeling a little better but still endorses depression today. He reports that his depression is an 8 (010 scale 0 being none and 10 being the worst). He reports that he had a hard time falling asleep last night because of \"racing thoughts. \"  He reports that his appetite has been normal.    Patient endorses fleeting suicidal ideation without current plan or intent. He reports that thoughts come and go throughout the day. He denies homicidal ideation. He is able to contract for safety on this unit but would not feel safe off of the unit. He continues to endorse auditory hallucinations that are command in nature telling him to hurt himself. He reports that they are also stating \"just leave the hospital [facility], you don't need this help. \"  Patient reports that he knows he needs help so he is trying to ignore them. He denies visual hallucinations. He denies paranoia. He denies medication side effects or medical concerns at this time. This writer encouraged patient to attend to ADLs and to shower and patient stated he would \"later. \"      Writer encouraged patient to attend groups on the unit. At this time, the patient is not appropriate for a lower level of care. There is risk of decompensation and patient warrants further hospitalization for safety and stabilization.     Appetite:  [x] Normal/Adequate/Unchanged  [] Increased  [] Decreased      Sleep:       [] Normal/Adequate/Unchanged  [x] Fair  [] Poor      Group Attendance on Unit:   [] Yes  [x] Selectively    [] No    Medication Side Effects:  Patient denies any medication side effects at the time of assessment. Mental Status Exam  Level of consciousness: Alert and awake. Appearance: Appropriate attire for setting, resting in bed, with poor grooming and hygiene, encouraged to shower today   Behavior/Motor: Approachable, psychomotor slowing  Attitude toward examiner: Cooperative, attentive, fair eye contact. Speech: Normal rate, low volume, normal tone. Mood:  Patient reports \"a little better\". Affect: Depressed  Thought processes: Linear and coherent. Thought content: Denies homicidal ideation. Suicidal Ideation: Fleeting suicidal ideations, without current plan or intent, contracts for safety on the unit. Delusions: No evidence of delusions. Reports improvement in paranoia. Perceptual Disturbance: Patient does not appear to be responding to internal stimuli. Endorses auditory hallucinations. Denies visual hallucinations. Cognition: Oriented to self, location, time, and situation. Memory: Intact. Insight & Judgement: Poor. Data   height is 6' 3\" (1.905 m) and weight is 199 lb (90.3 kg). His oral temperature is 98.2 °F (36.8 °C). His blood pressure is 111/92 (abnormal) and his pulse is 80. His respiration is 14. Labs:   Admission on 10/11/2021   Component Date Value Ref Range Status    Hemoglobin A1C 10/13/2021 5.0  4.0 - 6.0 % Final    Estimated Avg Glucose 10/13/2021 97  mg/dL Final    Comment: The ADA and AACC recommend providing the estimated average glucose result to permit better   patient understanding of their HBA1c result.       Cholesterol 10/13/2021 150  <200 mg/dL Final    Comment:    Cholesterol Guidelines:      <200  Desirable   200-240  Borderline      >240  Undesirable         HDL 10/13/2021 62  >40 mg/dL Final    Comment:    HDL Guidelines:    <40     Undesirable 40-59    Borderline    >59     Desirable         LDL Cholesterol 10/13/2021 80  0 - 130 mg/dL Final    Comment:    LDL Guidelines:     <100    Desirable   100-129   Near to/above Desirable   130-159   Borderline      >159   Undesirable     Direct (measured) LDL and calculated LDL are not interchangeable tests.  Chol/HDL Ratio 10/13/2021 2.4  <5 Final            Triglycerides 10/13/2021 41  <150 mg/dL Final    Comment:    Triglyceride Guidelines:     <150   Desirable   150-199  Borderline   200-499  High     >499   Very high   Based on AHA Guidelines for fasting triglyceride, October 2012.  VLDL 10/13/2021 NOT REPORTED  1 - 30 mg/dL Final    Glucose 10/14/2021 99  70 - 99 mg/dL Final    BUN 10/14/2021 14  8 - 23 mg/dL Final    CREATININE 10/14/2021 1.03  0.70 - 1.20 mg/dL Final    Bun/Cre Ratio 10/14/2021 NOT REPORTED  9 - 20 Final    Calcium 10/14/2021 8.8  8.6 - 10.4 mg/dL Final    Sodium 10/14/2021 144  135 - 144 mmol/L Final    Potassium 10/14/2021 4.1  3.7 - 5.3 mmol/L Final    Chloride 10/14/2021 109* 98 - 107 mmol/L Final    CO2 10/14/2021 27  20 - 31 mmol/L Final    Anion Gap 10/14/2021 8* 9 - 17 mmol/L Final    GFR Non- 10/14/2021 >60  >60 mL/min Final    GFR  10/14/2021 >60  >60 mL/min Final    GFR Comment 10/14/2021        Final    Comment: Average GFR for 61-76 years old:   80 mL/min/1.73sq m  Chronic Kidney Disease:   <60 mL/min/1.73sq m  Kidney failure:   <15 mL/min/1.73sq m              eGFR calculated using average adult body mass. Additional eGFR calculator available at:        [employer]            GFR Staging 10/14/2021 NOT REPORTED   Final         Reviewed patient's current plan of care and vital signs with nursing staff.     Labs reviewed: [x] Yes  Last EKG in EMR reviewed: [x] Yes  QTc: 484    Medications  Current Facility-Administered Medications: paliperidone (INVEGA) extended release tablet 9 mg, 9 mg, Oral, Daily  acetaminophen (TYLENOL) tablet 650 mg, 650 mg, Oral, Q4H PRN  aluminum & magnesium hydroxide-simethicone (MAALOX) 200-200-20 MG/5ML suspension 30 mL, 30 mL, Oral, Q6H PRN  haloperidol lactate (HALDOL) injection 5 mg, 5 mg, IntraMUSCular, Q4H PRN **AND** LORazepam (ATIVAN) injection 2 mg, 2 mg, IntraMUSCular, Q4H PRN **AND** diphenhydrAMINE (BENADRYL) injection 25 mg, 25 mg, IntraMUSCular, Q4H PRN  hydrOXYzine (ATARAX) tablet 50 mg, 50 mg, Oral, TID PRN  haloperidol (HALDOL) tablet 5 mg, 5 mg, Oral, Q4H PRN **AND** LORazepam (ATIVAN) tablet 2 mg, 2 mg, Oral, Q4H PRN  traZODone (DESYREL) tablet 50 mg, 50 mg, Oral, Nightly PRN  polyethylene glycol (GLYCOLAX) packet 17 g, 17 g, Oral, Daily PRN  nicotine polacrilex (NICORETTE) gum 2 mg, 2 mg, Oral, PRN  ibuprofen (ADVIL;MOTRIN) tablet 400 mg, 400 mg, Oral, Q6H PRN  Vitamin D (CHOLECALCIFEROL) tablet 1,000 Units, 1,000 Units, Oral, Daily  docusate sodium (COLACE) capsule 100 mg, 100 mg, Oral, BID PRN  escitalopram (LEXAPRO) tablet 10 mg, 10 mg, Oral, Daily  traZODone (DESYREL) tablet 150 mg, 150 mg, Oral, Nightly    ASSESSMENT  Schizoaffective disorder, depressive type ([facility]         PLAN  Patient symptoms are: Remains Unstable. Continue current medication regimen. Monitor need and frequency of PRN medications. Encourage participation in groups and milieu. Attempt to develop insight. Psycho-education conducted. Supportive Therapy conducted. Probable discharge is to be determined by MD.   Follow-up daily while inpatient. Patient continues to be monitored in the inpatient psychiatric facility at [facility] for safety and stabilization. Patient continues to need, on a daily basis, active treatment furnished directly by or requiring the supervision of inpatient psychiatric personnel. Electronically signed by MARY Fischer CNP on 10/15/2021 at 2:23 PM    **This report has been created using voice recognition software.  It may contain minor errors which are inherent in voice recognition technology. **    I independently saw and evaluated the patient. I reviewed the nurse practitioners documentation above. Any additional comments or changes to the nurse practitioners documentation are stated below otherwise agree with assessment. Plan will be as follows:  Patient still endorsing auditory hallucinations, command in nature to harm himself. Does report improvement in the frequency and intensity. Feels the medication is working and is noticing gradual improvement. Wishes to continue to observe on current medication. Discussed will observe for continued improvement before making any further adjustments. Sister picked up guardianship paperwork today. PLAN  Patient s symptoms   are improving modestly  Continue to observe on current medication until effects have plateaued  Attempt to develop insight  Psycho-education conducted. Supportive Therapy conducted.   Probable discharge is next week  Follow-up daily while on inpatient unit

## 2021-10-15 NOTE — GROUP NOTE
Group Therapy Note    Date: 10/15/2021    Group Start Time: 0930  Group End Time: 0790  Group Topic: Community Meeting    [facility]    Patient refused to attend community meeting and goal setting group at 0930 after encouragement from staff.   1:1 talk time offered as alternative to group session

## 2021-10-15 NOTE — GROUP NOTE
Group Therapy Note    Date: 10/15/2021    Group Start Time: 5287  Group End Time: 1050  Group Topic: Psychotherapy    [address], LSW    patient refused to attend psychotherapy group at [facility] after encouragement from staff.   1:1 talk time provided as alternative to group session

## 2021-10-15 NOTE — GROUP NOTE
Group Therapy Note    Date: 10/15/2021    Group Start Time: 1430  Group End Time: 8911  Group Topic: Psychoeducation    [address] [facility]      Patient declined to attend coping skills group at 1430 despite encouragement from staff. 1:1 talk time offered by staff as alternative to group session.       Signature:  Antonio Houser

## 2021-10-15 NOTE — PLAN OF CARE
Problem: Falls - Risk of:  Goal: Will remain free from falls  Description: Will remain free from falls  10/14/2021 2115 by Hazel George LPN  Outcome: Ongoing     Problem: Anger Management/Homicidal Ideation:  Goal: Absence of angry outbursts  Description: Absence of angry outbursts  10/14/2021 2115 by Hazel George LPN  Outcome: Ongoing     Problem: Depressive Behavior With or Without Suicide Precautions:  Goal: Ability to disclose and discuss suicidal ideas will improve  Description: Ability to disclose and discuss suicidal ideas will improve  10/14/2021 2115 by Hazel George LPN  Outcome: Ongoing     Patient denies all thoughts or ideations to harm self. Patient has remained in behavior control. Patient has remained free from falls. Patient is medication and behavorial compliant. Patient is complaining of loose stools this evening. Patient is mostly isolative to room. Patient is provided with safe environment. Will continue to monitor Q15 minute safety checks.

## 2021-10-15 NOTE — CONSULTS
[place] [address] Internal Medicine    CONSULTATION / HISTORY AND PHYSICAL EXAMINATION            Date:   10/14/2021  Patient name:  Jeremi Ag  Date of admission:  10/11/2021  2:31 AM  MRN:   [id number]  Account:  [de-identified]  YOB: 1960  PCP:    No primary care provider on file. Room:   [address] [place] [address]  Code Status:    Full Code    Physician Requesting Consult: Lani Dolan MD    Reason for Consult:  medical management    Chief Complaint:     No chief complaint on file. History Obtained From:     Patient medical record nursing staff    History of Present Illness:   Patient mated to [facility] with schizoaffective disorder. He has history of substance abuse, , intered medicine consulted for management of abnormal labs  Patient is a poor historian, he is not interested in talking, he is very depressed,  Patient had lab work done on 10/8, he has had mild degree of hypokalemia, hemoglobin is low  Patient denying any blood in stools, nausea, vomiting, chest pain, complaining of headache    Past Medical History:     Past Medical History:   Diagnosis Date    Bipolar disorder ([address]     Depression     GERD (gastroesophageal reflux disease)     Hallucinations     Headache(784.0)     Hepatitis     Schizophrenia, schizo-affective ([address]     Substance abuse ([facility]     Tobacco abuse     Type II or unspecified type diabetes mellitus without mention of complication, not stated as uncontrolled     Urinary incontinence         Past Surgical History:     Past Surgical History:   Procedure Laterality Date    ABSCESS DRAINAGE N/A 02/11/2018    Carla anal abcess    DENTAL SURGERY      all teeth pulled        Medications Prior to Admission:     Prior to Admission medications    Medication Sig Start Date End Date Taking?  Authorizing Provider   polyethylene glycol (MIRALAX) 17 g packet Take 17 g by mouth 2 times daily as needed for Constipation 10/1/21 10/31/21  [facility] DO   docusate sodium (COLACE) 100 MG capsule Take 1 capsule by mouth 2 times daily as needed for Constipation 10/1/21   Jose Hernandez, DO   acetaminophen (TYLENOL) 500 MG tablet Take 2 tablets by mouth every 8 hours as needed for Pain 9/30/21 10/3/21  Danoliliana Weathers, DO   ibuprofen (IBU) 400 MG tablet Take 1 tablet by mouth every 6 hours as needed for Pain 9/30/21 10/5/21  Ayan Weathers, DO   paliperidone (INVEGA) 6 MG extended release tablet Take 1 tablet by mouth daily 9/29/21   Mara Bernard MD   traZODone (DESYREL) 150 MG tablet Take 1 tablet by mouth nightly as needed for Sleep 9/28/21   Mara Bernard MD   escitalopram (LEXAPRO) 20 MG tablet Take 1 tablet by mouth daily 9/28/21   Mara Bernard MD   paliperidone palmitate ER (INVEGA SUSTENNA) 234 MG/1.5ML [place] IM injection Inject 234 mg into the muscle every 30 days 9/30/21   Mara Bernard MD   nicotine polacrilex (NICORETTE) 2 MG gum Take 1 each by mouth every hour as needed for Smoking cessation 9/21/21   Tere Garcia MD   Cholecalciferol (VITAMIN D) 25 MCG TABS Take 1 tablet by mouth daily 9/22/21   Tere Garcia MD        Allergies:     Navane [thiothixene]    Social History:     Tobacco:    reports that he has been smoking cigarettes. He has a 47.00 pack-year smoking history. He has never used smokeless tobacco.  Alcohol:      reports current alcohol use. Drug Use:  reports current drug use. Frequency: 7.00 times per week. Drug: Cocaine. Family History:     Family History   Problem Relation Age of Onset    Diabetes Mother     Heart Disease Mother        Review of Systems:     Positive and Negative as described in HPI. CONSTITUTIONAL:  negative for fevers, chills, sweats, fatigue, weight loss  HEENT:  negative for vision, hearing changes, runny nose, throat pain  RESPIRATORY:  negative for shortness of breath, cough, congestion, wheezing. CARDIOVASCULAR:  negative for chest pain, palpitations.   GASTROINTESTINAL:  negative for nausea, palpable, no pedal edema or calf pain with palpation  Psych: Investigations:      Laboratory Testing:  No results found for this or any previous visit (from the past 24 hour(s)). Consultations:   IP CONSULT TO INTERNAL MEDICINE  Assessment :      Primary Problem  Schizoaffective disorder, depressive type [facility])    Active Hospital Problems    Diagnosis Date Noted    Acute psychosis ([facility] [F23] 03/10/2021    Schizoaffective disorder, depressive type ([facility] [F25.1] 09/23/2017       Plan:     1. Hypokalemia, will recheck BMP, likely due to poor oral intake because of depression and substance abuse  2. Chronic anemia, repeating iron studies  3. Patient has slightly prolonged QTC, EKG is unchanged from the previous EKG, had echocardiogram earlier this year in July, which was normal  4. History of substance abuse        Francisca Hernandez MD  10/14/2021  10:17 PM    Copy sent to Dr. Colby primary care provider on file. Please note that this chart was generated using voice recognition Dragon dictation software. Although every effort was made to ensure the accuracy of this automated transcription, some errors in transcription may have occurred.

## 2021-10-15 NOTE — PLAN OF CARE
Problem: Anger Management/Homicidal Ideation:  Goal: Absence of angry outbursts  Description: Absence of angry outbursts  Outcome: Ongoing  Note: Patient is free from angry outbursts and is calm and cooperative. Problem: Depressive Behavior With or Without Suicide Precautions:  Goal: Ability to disclose and discuss suicidal ideas will improve  Description: Ability to disclose and discuss suicidal ideas will improve  Outcome: Ongoing  Note: Patient denies all thoughts or ideations to harm self. Patient has remained in behavior control. Patient has remained free from falls. Patient is medication and behavorial compliant. Patient is mostly isolative to room. Patient is provided with safe environment. Will continue to monitor Q15 minute safety checks.

## 2021-10-16 PROCEDURE — 6370000000 HC RX 637 (ALT 250 FOR IP): Performed by: PSYCHIATRY & NEUROLOGY

## 2021-10-16 PROCEDURE — 1240000000 HC EMOTIONAL WELLNESS R&B

## 2021-10-16 PROCEDURE — 99232 SBSQ HOSP IP/OBS MODERATE 35: CPT

## 2021-10-16 RX ORDER — ESCITALOPRAM OXALATE 10 MG/1
15 TABLET ORAL DAILY
Status: DISCONTINUED | OUTPATIENT
Start: 2021-10-17 | End: 2021-10-28 | Stop reason: HOSPADM

## 2021-10-16 RX ADMIN — ESCITALOPRAM OXALATE 10 MG: 10 TABLET ORAL at 08:36

## 2021-10-16 RX ADMIN — Medication 1000 UNITS: at 08:36

## 2021-10-16 RX ADMIN — IBUPROFEN 400 MG: 400 TABLET ORAL at 17:37

## 2021-10-16 RX ADMIN — PALIPERIDONE 9 MG: 9 TABLET, EXTENDED RELEASE ORAL at 08:36

## 2021-10-16 ASSESSMENT — PAIN SCALES - GENERAL: PAINLEVEL_OUTOF10: 8

## 2021-10-16 NOTE — PROGRESS NOTES
Daily Progress Note  10/16/2021    Patient Name: Lloyd Zamora    CHIEF COMPLAINT: Suicidal ideation with command auditory hallucinations, visual hallucinations and homicidal ideation         SUBJECTIVE:      Patient is seen today for a follow up assessment. Patient is compliant with scheduled medications today. Patient has not received emergency medications in the past 24 hours. Patient continues to lie in bed and did not get up for group yesterday or today. Patient continues to endorse depression rating his depression is a 6 (010 scale 0 being none and 10 being the worst). He denies symptoms of anxiety today. He reports that he did not sleep last night. He states, \"I don't think that medication they give me at night is helping. \" Noted that patient did not receive Trazodone the past two previous nights but did receive it last night. We will continue to monitor this. He reports that his appetite is adequate. Patient endorses fleeting suicidal ideation that comes and goes throughout the day. He denies plan or intent. He denies homicidal ideation. He is able to contract for safety on this unit with this writer but still feels unsafe to leave the hospital.  He reports some improvement in auditory hallucinations and states that he cannot make out what the voices are saying today. He denies visual hallucinations. He denies paranoia. He denies medication side effects or medical concerns at this time. Patient has been isolative to his room the past couple of days and not attended groups. Patient does not appear to be attending to hygiene needs. Writer encouraged patient to attend groups on the unit. At this time, the patient is not appropriate for a lower level of care. There is risk of decompensation and patient warrants further hospitalization for safety and stabilization.     Appetite:  [x] Normal/Adequate/Unchanged  [] Increased  [] Decreased      Sleep:       [] Normal/Adequate/Unchanged  [] Fair  [x] Poor      Group Attendance on Unit:   [] Yes  [] Selectively    [x] No    Medication Side Effects:  Patient denies any medication side effects at the time of assessment. Mental Status Exam  Level of consciousness: Alert and awake. Appearance: Appropriate attire for setting, resting in bed, with poor grooming and hygiene, disheveled  Behavior/Motor: Approachable, psychomotor slowing  Attitude toward examiner: Cooperative, attentive, fair eye contact. Speech: Normal rate, low volume, normal tone. Mood:  Patient reports \"doing alright\". Affect: Depressed, flat  Thought processes: Linear and coherent. Thought content: Denies homicidal ideation. Suicidal Ideation: Fleeting suicidal ideations, without current plan or intent, contracts for safety on the unit. Delusions: No evidence of delusions. Denies paranoia. Perceptual Disturbance: Patient does not appear to be responding to internal stimuli. Reports improvement in auditory hallucinations. Denies visual hallucinations. Cognition: Oriented to self, location, time, and situation. Memory: Intact. Insight & Judgement: Poor. Data   height is 6' 3\" (1.905 m) and weight is 199 lb (90.3 kg). His oral temperature is 98 °F (36.7 °C). His blood pressure is 109/55 (abnormal) and his pulse is 78. His respiration is 14. Labs:   Admission on 10/11/2021   Component Date Value Ref Range Status    Hemoglobin A1C 10/13/2021 5.0  4.0 - 6.0 % Final    Estimated Avg Glucose 10/13/2021 97  mg/dL Final    Comment: The ADA and AACC recommend providing the estimated average glucose result to permit better   patient understanding of their HBA1c result.       Cholesterol 10/13/2021 150  <200 mg/dL Final    Comment:    Cholesterol Guidelines:      <200  Desirable   200-240  Borderline      >240  Undesirable         HDL 10/13/2021 62  >40 mg/dL Final    Comment:    HDL Guidelines:    <40     Undesirable   40-59    Borderline    >59     Desirable         LDL Cholesterol 10/13/2021 80  0 - 130 mg/dL Final    Comment:    LDL Guidelines:     <100    Desirable   100-129   Near to/above Desirable   130-159   Borderline      >159   Undesirable     Direct (measured) LDL and calculated LDL are not interchangeable tests.  Chol/HDL Ratio 10/13/2021 2.4  <5 Final            Triglycerides 10/13/2021 41  <150 mg/dL Final    Comment:    Triglyceride Guidelines:     <150   Desirable   150-199  Borderline   200-499  High     >499   Very high   Based on AHA Guidelines for fasting triglyceride, October 2012.  VLDL 10/13/2021 NOT REPORTED  1 - 30 mg/dL Final    Iron 10/14/2021 121  59 - 158 ug/dL Final    TIBC 10/14/2021 308  250 - 450 ug/dL Final    Iron Saturation 10/14/2021 39  20 - 55 % Final    UIBC 10/14/2021 187  112 - 347 ug/dL Final    Ferritin 10/14/2021 77  30 - 400 ug/L Final    Glucose 10/14/2021 99  70 - 99 mg/dL Final    BUN 10/14/2021 14  8 - 23 mg/dL Final    CREATININE 10/14/2021 1.03  0.70 - 1.20 mg/dL Final    Bun/Cre Ratio 10/14/2021 NOT REPORTED  9 - 20 Final    Calcium 10/14/2021 8.8  8.6 - 10.4 mg/dL Final    Sodium 10/14/2021 144  135 - 144 mmol/L Final    Potassium 10/14/2021 4.1  3.7 - 5.3 mmol/L Final    Chloride 10/14/2021 109* 98 - 107 mmol/L Final    CO2 10/14/2021 27  20 - 31 mmol/L Final    Anion Gap 10/14/2021 8* 9 - 17 mmol/L Final    GFR Non- 10/14/2021 >60  >60 mL/min Final    GFR  10/14/2021 >60  >60 mL/min Final    GFR Comment 10/14/2021        Final    Comment: Average GFR for 61-76 years old:   80 mL/min/1.73sq m  Chronic Kidney Disease:   <60 mL/min/1.73sq m  Kidney failure:   <15 mL/min/1.73sq m              eGFR calculated using average adult body mass.  Additional eGFR calculator available at:        [email or web address]            GFR Staging 10/14/2021 NOT REPORTED   Final         Reviewed patient's current plan of care and vital signs with nursing staff. Labs reviewed: [x] Yes  Last EKG in EMR reviewed: [x] Yes  QTc: 484    Medications  Current Facility-Administered Medications: paliperidone (INVEGA) extended release tablet 9 mg, 9 mg, Oral, Daily  acetaminophen (TYLENOL) tablet 650 mg, 650 mg, Oral, Q4H PRN  aluminum & magnesium hydroxide-simethicone (MAALOX) 200-200-20 MG/5ML suspension 30 mL, 30 mL, Oral, Q6H PRN  haloperidol lactate (HALDOL) injection 5 mg, 5 mg, IntraMUSCular, Q4H PRN **AND** LORazepam (ATIVAN) injection 2 mg, 2 mg, IntraMUSCular, Q4H PRN **AND** diphenhydrAMINE (BENADRYL) injection 25 mg, 25 mg, IntraMUSCular, Q4H PRN  hydrOXYzine (ATARAX) tablet 50 mg, 50 mg, Oral, TID PRN  haloperidol (HALDOL) tablet 5 mg, 5 mg, Oral, Q4H PRN **AND** LORazepam (ATIVAN) tablet 2 mg, 2 mg, Oral, Q4H PRN  traZODone (DESYREL) tablet 50 mg, 50 mg, Oral, Nightly PRN  polyethylene glycol (GLYCOLAX) packet 17 g, 17 g, Oral, Daily PRN  nicotine polacrilex (NICORETTE) gum 2 mg, 2 mg, Oral, PRN  ibuprofen (ADVIL;MOTRIN) tablet 400 mg, 400 mg, Oral, Q6H PRN  Vitamin D (CHOLECALCIFEROL) tablet 1,000 Units, 1,000 Units, Oral, Daily  docusate sodium (COLACE) capsule 100 mg, 100 mg, Oral, BID PRN  escitalopram (LEXAPRO) tablet 10 mg, 10 mg, Oral, Daily  traZODone (DESYREL) tablet 150 mg, 150 mg, Oral, Nightly    ASSESSMENT  Schizoaffective disorder, depressive type (HCC)         PLAN  Patient symptoms are: Remains Unstable. Continue current medication regimen. Titrate Lexapro to 15 mg  Monitor need and frequency of PRN medications. Encourage participation in groups and milieu. Attempt to develop insight. Psycho-education conducted. Supportive Therapy conducted. Probable discharge is to be determined by MD.   Follow-up daily while inpatient. Patient continues to be monitored in the inpatient psychiatric facility at [facility] for safety and stabilization.  Patient continues to need, on a daily basis, active treatment furnished directly by or requiring the supervision of inpatient psychiatric personnel. Electronically signed by MARY Pike CNP on 10/16/2021 at 1:41 PM    **This report has been created using voice recognition software. It may contain minor errors which are inherent in voice recognition technology. **

## 2021-10-16 NOTE — BH NOTE
Patient refused to attend the 1030 Open Rec group; offered 1:1 Talk Time, but decided to stay in their room, remain isolative to self

## 2021-10-16 NOTE — PLAN OF CARE
Problem: Falls - Risk of:  Goal: Will remain free from falls  Description: Will remain free from falls  Outcome: Ongoing   No falls this shift  Problem: Anger Management/Homicidal Ideation:  Goal: Absence of angry outbursts  Description: Absence of angry outbursts  10/15/2021 2020 by Matt Jesus RN  Outcome: Ongoing   No outbursts this shift  Problem: Depressive Behavior With or Without Suicide Precautions:  Goal: Ability to disclose and discuss suicidal ideas will improve  Description: Ability to disclose and discuss suicidal ideas will improve  10/15/2021 2020 by Matt Jesus RN  Outcome: Ongoing   Pt denies suicidal thoughts at this time.    Problem: Tobacco Use:  Goal: Inpatient tobacco use cessation counseling participation  Description: Inpatient tobacco use cessation counseling participation  Outcome: Ongoing   Pt declines  Problem: Pain:  Goal: Pain level will decrease  Description: Pain level will decrease  Outcome: Ongoing   Pt denies

## 2021-10-16 NOTE — GROUP NOTE
Group Therapy Note    Date: 10/16/2021    Group Start Time: 1330  Group End Time: 5015  Group Topic: Psychoeducation    [facility]    FRANKLIN Avila LSW        Group Therapy Note    patient refused to attend psychoeducational group at 13:30pm after encouragement from staff.       Signature:  FRANKLIN Avila LSW

## 2021-10-16 NOTE — PLAN OF CARE
Problem: Falls - Risk of:  Goal: Will remain free from falls  Description: Will remain free from falls  Outcome: Ongoing  Note: Pt remains free of falls and verbalizes understanding of individual fall risks. Pt wearing non skid footwear and encouraged to seek out staff for any assistance needed. Problem: Anger Management/Homicidal Ideation:  Goal: Absence of angry outbursts  Description: Absence of angry outbursts  Outcome: Ongoing  Note: No angry outburst noted this shift. Problem: Depressive Behavior With or Without Suicide Precautions:  Goal: Ability to disclose and discuss suicidal ideas will improve  Description: Ability to disclose and discuss suicidal ideas will improve  Outcome: Ongoing  Note: Patient admits to auditory hallucinations but cant make out what they are saying. No self harm noted this shift. Patient agrees to seek staff out if negative thoughts arise. Will continue to monitor Q15 minute and intermittently. Problem: Pain:  Goal: Pain level will decrease  Description: Pain level will decrease  Outcome: Ongoing  Note: No complaints of pain this shift.

## 2021-10-17 PROCEDURE — 99232 SBSQ HOSP IP/OBS MODERATE 35: CPT

## 2021-10-17 PROCEDURE — 6370000000 HC RX 637 (ALT 250 FOR IP): Performed by: PSYCHIATRY & NEUROLOGY

## 2021-10-17 PROCEDURE — 1240000000 HC EMOTIONAL WELLNESS R&B

## 2021-10-17 PROCEDURE — 6370000000 HC RX 637 (ALT 250 FOR IP)

## 2021-10-17 RX ORDER — DICYCLOMINE HYDROCHLORIDE 10 MG/1
10 CAPSULE ORAL 3 TIMES DAILY PRN
Status: DISCONTINUED | OUTPATIENT
Start: 2021-10-17 | End: 2021-10-28 | Stop reason: HOSPADM

## 2021-10-17 RX ADMIN — Medication 1000 UNITS: at 09:35

## 2021-10-17 RX ADMIN — PALIPERIDONE 9 MG: 9 TABLET, EXTENDED RELEASE ORAL at 09:35

## 2021-10-17 RX ADMIN — TRAZODONE HYDROCHLORIDE 150 MG: 150 TABLET ORAL at 20:30

## 2021-10-17 RX ADMIN — IBUPROFEN 400 MG: 400 TABLET ORAL at 19:00

## 2021-10-17 RX ADMIN — ACETAMINOPHEN 650 MG: 325 TABLET ORAL at 20:30

## 2021-10-17 RX ADMIN — ESCITALOPRAM OXALATE 15 MG: 10 TABLET, FILM COATED ORAL at 09:35

## 2021-10-17 RX ADMIN — HYDROXYZINE HYDROCHLORIDE 50 MG: 50 TABLET, FILM COATED ORAL at 20:30

## 2021-10-17 RX ADMIN — ALUMINUM HYDROXIDE, MAGNESIUM HYDROXIDE, AND SIMETHICONE 30 ML: 200; 200; 20 SUSPENSION ORAL at 19:54

## 2021-10-17 ASSESSMENT — PAIN DESCRIPTION - LOCATION: LOCATION: ABDOMEN

## 2021-10-17 ASSESSMENT — PAIN SCALES - GENERAL
PAINLEVEL_OUTOF10: 6
PAINLEVEL_OUTOF10: 0
PAINLEVEL_OUTOF10: 0
PAINLEVEL_OUTOF10: 7

## 2021-10-17 ASSESSMENT — PAIN DESCRIPTION - PAIN TYPE: TYPE: ACUTE PAIN

## 2021-10-17 NOTE — GROUP NOTE
Group Therapy Note    Date: 10/17/2021    Group Start Time: 1440  Group End Time: 1629  Group Topic: Music Therapy    JESUS Wasserman        Group Therapy Note    Pt did not attend music therapy group d/t resting in room despite staff invitation to attend. 1:1 talk time offered as alternative to group session, pt declined.

## 2021-10-17 NOTE — PROGRESS NOTES
Daily Progress Note  10/17/2021    Patient Name: Jessica Garcia    CHIEF COMPLAINT: Suicidal ideation with command auditory hallucinations, visual hallucinations and homicidal ideation         SUBJECTIVE:      Patient is seen today for a follow up assessment. Patient is compliant with scheduled Lexapro and Invega but has been declining his nighttime trazodone. He has not received emergency medications in the past 24 hours. Patient endorses depression today and rates it as an 8 (010 scale 0 being on 10 being worst). He continues to deny anxiety. Patient reports that he is \"not sleeping at all. \"  It is noted that patient has been declining his 150 mg trazodone dose at night. Patient reports that his appetite is adequate. Patient endorses fleeting suicidal ideation without current plan or intent. He denies homicidal ideation. He is able to contract for safety with this writer on the unit. He continues to endorse auditory hallucinations and states they are \"bad today. \"  He reports the voices are derogatory in nature and say \"end it all Suresh Ser, kill yourself, get yourself out of that hospital.\"  He denies visual hallucinations. He endorses paranoia today and states \"I am paranoid about people looking at me. \"  He denies medication side effects or medical concerns at this time. Patient does not appear to be attending to hygiene needs so this writer encouraged patient to shower today and patient acknowledged his agreement. Patient continues to isolate to his room and does not come out for group. At this time, the patient is not appropriate for a lower level of care. There is risk of decompensation and patient warrants further hospitalization for safety and stabilization.     Appetite:  [x] Normal/Adequate/Unchanged  [] Increased  [] Decreased      Sleep:       [] Normal/Adequate/Unchanged  [] Fair  [x] Poor      Group Attendance on Unit:   [] Yes  [] Selectively    [x] No    Medication Side Effects: Patient denies any medication side effects at the time of assessment. Mental Status Exam  Level of consciousness: Alert and awake. Appearance: Appropriate attire for setting, resting in bed, with poor grooming and hygiene, disheveled, encouraged to shower  Behavior/Motor: Approachable, psychomotor slowing  Attitude toward examiner: Cooperative, attentive, fair eye contact. Speech: Normal rate, low volume, normal tone. Mood:  Patient reports \"I'm alright\". Affect: Depressed  Thought processes: Linear and coherent. Thought content: Denies homicidal ideation. Suicidal Ideation: Fleeting suicidal ideations, without current plan or intent, contracts for safety on the unit. Delusions: No evidence of delusions. Endorses paranoia. Perceptual Disturbance: Patient does not appear to be responding to internal stimuli. Endorses auditory hallucinations. Denies visual hallucinations. Cognition: Oriented to self, location, time, and situation. Memory: Intact. Insight & Judgement: Poor. Data   height is 6' 3\" (1.905 m) and weight is 199 lb (90.3 kg). His oral temperature is 97.8 °F (36.6 °C). His blood pressure is 107/61 and his pulse is 84. His respiration is 14. Labs:   Admission on 10/11/2021   Component Date Value Ref Range Status    Hemoglobin A1C 10/13/2021 5.0  4.0 - 6.0 % Final    Estimated Avg Glucose 10/13/2021 97  mg/dL Final    Comment: The ADA and AACC recommend providing the estimated average glucose result to permit better   patient understanding of their HBA1c result.       Cholesterol 10/13/2021 150  <200 mg/dL Final    Comment:    Cholesterol Guidelines:      <200  Desirable   200-240  Borderline      >240  Undesirable         HDL 10/13/2021 62  >40 mg/dL Final    Comment:    HDL Guidelines:    <40     Undesirable   40-59    Borderline    >59     Desirable         LDL Cholesterol 10/13/2021 80  0 - 130 mg/dL Final    Comment:    LDL Guidelines:     <100    Desirable   100-129 Near to/above Desirable   130-159   Borderline      >159   Undesirable     Direct (measured) LDL and calculated LDL are not interchangeable tests.  Chol/HDL Ratio 10/13/2021 2.4  <5 Final            Triglycerides 10/13/2021 41  <150 mg/dL Final    Comment:    Triglyceride Guidelines:     <150   Desirable   150-199  Borderline   200-499  High     >499   Very high   Based on AHA Guidelines for fasting triglyceride, October 2012.  VLDL 10/13/2021 NOT REPORTED  1 - 30 mg/dL Final    Iron 10/14/2021 121  59 - 158 ug/dL Final    TIBC 10/14/2021 308  250 - 450 ug/dL Final    Iron Saturation 10/14/2021 39  20 - 55 % Final    UIBC 10/14/2021 187  112 - 347 ug/dL Final    Ferritin 10/14/2021 77  30 - 400 ug/L Final    Glucose 10/14/2021 99  70 - 99 mg/dL Final    BUN 10/14/2021 14  8 - 23 mg/dL Final    CREATININE 10/14/2021 1.03  0.70 - 1.20 mg/dL Final    Bun/Cre Ratio 10/14/2021 NOT REPORTED  9 - 20 Final    Calcium 10/14/2021 8.8  8.6 - 10.4 mg/dL Final    Sodium 10/14/2021 144  135 - 144 mmol/L Final    Potassium 10/14/2021 4.1  3.7 - 5.3 mmol/L Final    Chloride 10/14/2021 109* 98 - 107 mmol/L Final    CO2 10/14/2021 27  20 - 31 mmol/L Final    Anion Gap 10/14/2021 8* 9 - 17 mmol/L Final    GFR Non- 10/14/2021 >60  >60 mL/min Final    GFR  10/14/2021 >60  >60 mL/min Final    GFR Comment 10/14/2021        Final    Comment: Average GFR for 61-76 years old:   80 mL/min/1.73sq m  Chronic Kidney Disease:   <60 mL/min/1.73sq m  Kidney failure:   <15 mL/min/1.73sq m              eGFR calculated using average adult body mass. Additional eGFR calculator available at:        [email or web address]            GFR Staging 10/14/2021 NOT REPORTED   Final         Reviewed patient's current plan of care and vital signs with nursing staff.     Labs reviewed: [x] Yes  Last EKG in EMR reviewed: [x] Yes  QTc: 484    Medications  Current Facility-Administered Medications: escitalopram (LEXAPRO) tablet 15 mg, 15 mg, Oral, Daily  paliperidone (INVEGA) extended release tablet 9 mg, 9 mg, Oral, Daily  acetaminophen (TYLENOL) tablet 650 mg, 650 mg, Oral, Q4H PRN  aluminum & magnesium hydroxide-simethicone (MAALOX) 200-200-20 MG/5ML suspension 30 mL, 30 mL, Oral, Q6H PRN  haloperidol lactate (HALDOL) injection 5 mg, 5 mg, IntraMUSCular, Q4H PRN **AND** LORazepam (ATIVAN) injection 2 mg, 2 mg, IntraMUSCular, Q4H PRN **AND** diphenhydrAMINE (BENADRYL) injection 25 mg, 25 mg, IntraMUSCular, Q4H PRN  hydrOXYzine (ATARAX) tablet 50 mg, 50 mg, Oral, TID PRN  haloperidol (HALDOL) tablet 5 mg, 5 mg, Oral, Q4H PRN **AND** LORazepam (ATIVAN) tablet 2 mg, 2 mg, Oral, Q4H PRN  traZODone (DESYREL) tablet 50 mg, 50 mg, Oral, Nightly PRN  polyethylene glycol (GLYCOLAX) packet 17 g, 17 g, Oral, Daily PRN  nicotine polacrilex (NICORETTE) gum 2 mg, 2 mg, Oral, PRN  ibuprofen (ADVIL;MOTRIN) tablet 400 mg, 400 mg, Oral, Q6H PRN  Vitamin D (CHOLECALCIFEROL) tablet 1,000 Units, 1,000 Units, Oral, Daily  docusate sodium (COLACE) capsule 100 mg, 100 mg, Oral, BID PRN  traZODone (DESYREL) tablet 150 mg, 150 mg, Oral, Nightly    ASSESSMENT  Schizoaffective disorder, depressive type (HCC)         PLAN  Patient symptoms are: Remains Unstable. Continue current medication regimen and encourage compliance of Trazodone for sleep. Monitor need and frequency of PRN medications. Encourage participation in groups and milieu. Attempt to develop insight. Psycho-education conducted. Supportive Therapy conducted. Probable discharge is to be determined by MD.   Follow-up daily while inpatient. Patient continues to be monitored in the inpatient psychiatric facility at [facility] for safety and stabilization. Patient continues to need, on a daily basis, active treatment furnished directly by or requiring the supervision of inpatient psychiatric personnel.     Electronically signed by Amy Camarena Carlos Thomas, MARY - CNP on 10/17/2021 at 2:17 PM    **This report has been created using voice recognition software. It may contain minor errors which are inherent in voice recognition technology. **

## 2021-10-17 NOTE — PLAN OF CARE
Problem: Falls - Risk of:  Goal: Will remain free from falls  Description: Will remain free from falls  10/17/2021 1047 by Amy Yin RN  Outcome: Ongoing   Patient remains free from falls   Problem: Anger Management/Homicidal Ideation:  Goal: Absence of angry outbursts  Description: Absence of angry outbursts  10/17/2021 1047 by Amy Yin RN  Outcome: Ongoing   Patient remains free from angry outbursts  Problem: Depressive Behavior With or Without Suicide Precautions:  Goal: Ability to disclose and discuss suicidal ideas will improve  Description: Ability to disclose and discuss suicidal ideas will improve  10/17/2021 1047 by Amy Yin RN  Outcome: Ongoing  Patient has been cooperative upon approach, has allowed all assessments; Patient has expressed reality-based thinking with peers and staff; Patient denies suicidal-homicidal ideation at this time  Problem: Tobacco Use:  Goal: Inpatient tobacco use cessation counseling participation  Description: Inpatient tobacco use cessation counseling participation  10/17/2021 1047 by Amy Yin RN  Outcome: Ongoing  Patient is enrolled in inpatient tobacco use cessation counseling but refuses participation at this time. Problem: Pain:  Goal: Pain level will decrease  Description: Pain level will decrease  10/17/2021 1047 by Amy Yin RN  Outcome: Ongoing   Patient denies pain this shift, has not asked for PRN pain reduction medication. Patient encouraged to use non-pharmalogical pain reduction measures including distraction, yoga, finger tapping, music, tv, groups, dark room, quiet time, 1:1 talk time with staff, walking, counting, deep breathing, bubbles, alphabet games, reading, audio books, drawing, crafts and models, stress ball, puzzles, massage, guided imagery.   Problem: Pain:  Goal: Control of acute pain  Description: Control of acute pain  10/17/2021 1047 by Amy Yin RN  Outcome: Ongoing   Patient denies acute pain this shift, has not asked for PRN pain reduction medication. Patient encouraged to use non-pharmalogical pain reduction measures including distraction, yoga, finger tapping, music, tv, groups, dark room, quiet time, 1:1 talk time with staff, walking, counting, deep breathing, bubbles, alphabet games, reading, audio books, drawing, crafts and models, stress ball, puzzles, massage, guided imagery. Problem: Pain:  Goal: Control of chronic pain  Description: Control of chronic pain  10/17/2021 1047 by Lior Paiz RN  Outcome: Ongoing   Patient denies chronic pain this shift, has not asked for PRN pain reduction medication. Patient encouraged to use non-pharmalogical pain reduction measures including distraction, yoga, finger tapping, music, tv, groups, dark room, quiet time, 1:1 talk time with staff, walking, counting, deep breathing, bubbles, alphabet games, reading, audio books, drawing, crafts and models, stress ball, puzzles, massage, guided imagery.

## 2021-10-17 NOTE — PLAN OF CARE
Problem: Falls - Risk of:  Goal: Will remain free from falls  Description: Will remain free from falls  10/16/2021 2308 by Andrew May RN  Outcome: Ongoing  Note: Patient has remained free from falls this shift. Will continue to monitor and provide q15 min safety checks. Problem: Anger Management/Homicidal Ideation:  Goal: Absence of angry outbursts  Description: Absence of angry outbursts  10/16/2021 2308 by Andrew May RN  Outcome: Ongoing     Problem: Depressive Behavior With or Without Suicide Precautions:  Goal: Ability to disclose and discuss suicidal ideas will improve  Description: Ability to disclose and discuss suicidal ideas will improve  10/16/2021 2308 by Andrew May RN  Outcome: Ongoing  Note:  Pt denies suicidal ideations at this time. Pt agreed to seek staff at anytime he/she felt like any urges to harm self would arise. Safety checks maintained [id number]. Problem: Tobacco Use:  Goal: Inpatient tobacco use cessation counseling participation  Description: Inpatient tobacco use cessation counseling participation  Outcome: Ongoing     Problem: Pain:  Goal: Pain level will decrease  Description: Pain level will decrease  10/16/2021 2308 by Andrew May RN  Outcome: Ongoing  Note: Patient reports no new pain, or increase in chronic pain. Will continue to monitor and provide as needed pain medication.        Problem: Pain:  Goal: Control of acute pain  Description: Control of acute pain  Outcome: Ongoing     Problem: Pain:  Goal: Control of chronic pain  Description: Control of chronic pain  Outcome: Ongoing

## 2021-10-18 PROCEDURE — 99232 SBSQ HOSP IP/OBS MODERATE 35: CPT | Performed by: PSYCHIATRY & NEUROLOGY

## 2021-10-18 PROCEDURE — 6370000000 HC RX 637 (ALT 250 FOR IP): Performed by: PSYCHIATRY & NEUROLOGY

## 2021-10-18 PROCEDURE — APPSS30 APP SPLIT SHARED TIME 16-30 MINUTES: Performed by: PSYCHIATRY & NEUROLOGY

## 2021-10-18 PROCEDURE — 1240000000 HC EMOTIONAL WELLNESS R&B

## 2021-10-18 PROCEDURE — 6370000000 HC RX 637 (ALT 250 FOR IP)

## 2021-10-18 RX ADMIN — HYDROXYZINE HYDROCHLORIDE 50 MG: 50 TABLET, FILM COATED ORAL at 21:08

## 2021-10-18 RX ADMIN — TRAZODONE HYDROCHLORIDE 50 MG: 50 TABLET ORAL at 23:28

## 2021-10-18 RX ADMIN — Medication 1000 UNITS: at 10:30

## 2021-10-18 RX ADMIN — PALIPERIDONE 9 MG: 9 TABLET, EXTENDED RELEASE ORAL at 10:30

## 2021-10-18 RX ADMIN — ESCITALOPRAM OXALATE 15 MG: 10 TABLET, FILM COATED ORAL at 10:30

## 2021-10-18 RX ADMIN — TRAZODONE HYDROCHLORIDE 150 MG: 150 TABLET ORAL at 21:06

## 2021-10-18 NOTE — FLOWSHEET NOTE
*Patient participated in Spirituality Group        10/18/21 1510   Encounter Summary   Services provided to: Patient   Referral/Consult From: Rounding   Continue Visiting   (10/18/21)   Complexity of Encounter Moderate   Length of Encounter 30 minutes   Spiritual/Christianity   Type Spiritual support   Assessment Calm; Approachable   Intervention Active listening   Outcome Receptive;Engaged in conversation;Expressed gratitude

## 2021-10-18 NOTE — PLAN OF CARE
Problem: Falls - Risk of:  Goal: Will remain free from falls  Description: Will remain free from falls  10/17/2021 2227 by Rukhsana Bach LPN  Outcome: Ongoing  Note: Patient remains free from falls at this time. Problem: Anger Management/Homicidal Ideation:  Goal: Absence of angry outbursts  Description: Absence of angry outbursts  10/17/2021 2227 by Rukhsana Bach LPN  Outcome: Ongoing  Note: Patient had no angry outbursts at this time. 10/17/2021 1047 by Juan José Garcia RN  Outcome: Ongoing  Problem: Depressive Behavior With or Without Suicide Precautions:  Goal: Ability to disclose and discuss suicidal ideas will improve  Description: Ability to disclose and discuss suicidal ideas will improve  10/17/2021 2227 by Rukhsana Bach LPN  Outcome: Ongoing  Note: Patient denies suicidal ideation at this time. Problem: Tobacco Use:  Goal: Inpatient tobacco use cessation counseling participation  Description: Inpatient tobacco use cessation counseling participation  10/17/2021 2227 by Rukhsana Bach LPN  Outcome: Ongoing  Note: Patient did not participate in inpatient tobacco use cessation counseling. Problem: Pain:  Goal: Pain level will decrease  Description: Pain level will decrease  10/17/2021 2227 by Rukhsana Bach LPN  Outcome: Ongoing  Note: Patient admits that his pain level has decreased at this time.

## 2021-10-18 NOTE — PLAN OF CARE
Problem: Falls - Risk of:  Goal: Will remain free from falls  Description: Will remain free from falls  10/18/2021 1110 by Broko Tovar LPN  Outcome: Ongoing  10/17/2021 2227 by Rukhsana Bach LPN  Outcome: Ongoing  Note: Patient remains free from falls at this time. Problem: Anger Management/Homicidal Ideation:  Goal: Absence of angry outbursts  Description: Absence of angry outbursts  10/18/2021 1110 by Brook Tovar LPN  Outcome: Ongoing         Problem: Depressive Behavior With or Without Suicide Precautions:  Goal: Ability to disclose and discuss suicidal ideas will improve  Description: Ability to disclose and discuss suicidal ideas will improve  10/18/2021 1110 by Brook Tovar LPN  Outcome: Ongoing  Patient denies suicidal ideation or thoughts of self-harm. Q15 minute safety checks in place and will continue. Problem: Tobacco Use:  Goal: Inpatient tobacco use cessation counseling participation  Description: Inpatient tobacco use cessation counseling participation  10/18/2021 1110 by Brook Tovar LPN  Outcome: Ongoing  Patient declines to participate in tobacco cessation. Problem: Pain:  Goal: Pain level will decrease  Description: Pain level will decrease  10/18/2021 1110 by Brook Tovar LPN  Outcome: Ongoing  Goal: Control of acute pain  Description: Control of acute pain  Outcome: Ongoing  Goal: Control of chronic pain  Description: Control of chronic pain  Outcome: Ongoing   Patient has had no complaints of pain this shift.

## 2021-10-18 NOTE — CARE COORDINATION
Kanwal spoke with Aidan Roy from the [place] [facility] team who was asking for updates on the pt. Aidan Roy is asking if any assistance is needed please call [phone].

## 2021-10-18 NOTE — PLAN OF CARE
[facility] Interdisciplinary Treatment Plan Note     Review Date & Time: 10/18/21    Admission Type:   Admission Type: Voluntary    Reason for admission:  Reason for Admission: SI with a plan to shoot self, +HI but wont say towards who, +voices,  was just DCd on the 3rd, was kicked out of the [facility] for fighting    Estimated Length of Stay Update:  Est 3-7 days, to be determined by physician  Estimated Discharge Date Update: to be determined by physician    PATIENT STRENGTHS:  Patient Strengths:Strengths: Connection to output provider, Motivated  Patient Strengths and Limitations:Limitations: Tendency to isolate self, Difficulty problem solving/relies on others to help solve problems, Inappropriate/potentially harmful leisure interests, Difficult relationships / poor social skills, Multiple barriers to leisure interests, Unrealistic self-view  Addictive Behavior:Addictive Behavior  In the past 3 months, have you felt or has someone told you that you have a problem with:  : None  Do you have a history of Chemical Use?: No  Do you have a history of Alcohol Use?: No  Do you have a history of Street Drug Abuse?: Yes  Histroy of Prescripton Drug Abuse?: No  Medical Problems:   Past Medical History:   Diagnosis Date    Bipolar disorder ([facility] [address]     Depression     GERD (gastroesophageal reflux disease)     Hallucinations     Headache(784.0)     Hepatitis     Schizophrenia, schizo-affective ([facility] [address]     Substance abuse ([facility] [address]     Tobacco abuse     Type II or unspecified type diabetes mellitus without mention of complication, not stated as uncontrolled     Urinary incontinence        Risk:  Fall RiskTotal: 55  Dusty Scale Dusty Scale Score: 23  BVC    Change in scores na .  Changes to plan of Care  na     Status EXAM:   Status and Exam  Normal: No  Facial Expression: Flat, Avoids Gaze  Affect: Blunt  Level of Consciousness: Lethargic  Mood:Normal: No  Mood: Depressed  Motor Activity:Normal: No  Motor Activity: Decreased  Interview Behavior: Cooperative, Evasive  Preception: [place] to Person, [place] to Time, [place] to Place, [place] to Situation  Attention:Normal: No  Attention: Distractible  Thought Processes: Circumstantial  Thought Content:Normal: No  Thought Content: Poverty of Content  Hallucinations: None  Delusions: No  Memory:Normal: No  Memory: Poor Recent, Poor Remote  Insight and Judgment: No  Insight and Judgment: Poor Judgment, Poor Insight, Unmotivated  Present Suicidal Ideation: No  Present Homicidal Ideation: No    Daily Assessment Last Entry:   Daily Sleep (WDL): Within Defined Limits         Patient Currently in Pain: No  Daily Nutrition (WDL): Within Defined Limits  Appetite Change: Decreased  Barriers to Nutrition: None  Level of Assistance: Independent/Self    Patient Monitoring:  Frequency of Checks: 4 times per hour, close    Psychiatric Symptoms:   Depression Symptoms  Depression Symptoms: Loss of interest, Impaired concentration  Anxiety Symptoms  Anxiety Symptoms: Generalized  [place] Symptoms  [place] Symptoms: No problems reported or observed. Psychosis Symptoms  Delusion Type: No problems reported or observed. Suicide Risk CSSR-S:  1) Within the past month, have you wished you were dead or wished you could go to sleep and not wake up? : Yes  2) Have you actually had any thoughts of killing yourself? : Yes  3) Have you been thinking about how you might kill yourself? : Yes  5) Have you started to work out or worked out the details of how to kill yourself?  Do you intend to carry out this plan? : No  6) Have you ever done anything, started to do anything, or prepared to do anything to end your life?: Yes  Change in Result na  Change in Plan of care na     EDUCATION:   Learner Progress Toward Treatment Goals: Reviewed group plan and strategies    Method: Small group    Outcome: Needs reinforcement    PATIENT GOALS: Patient is encouraged to develop goals for sobriety    PLAN/TREATMENT RECOMMENDATIONS UPDATE:   COPING SKILLS CONTINUE WITH GROUP THERAPIES POSITIVE INTERACTIONS, GOAL SETTING    SHORT-TERM GOALS UPDATE: find safe housing  Time frame for Short-Term Goals: 1-2 WEEKS     LONG-TERM GOALS UPDATE: long term sobriety  Time frame for Long-Term Goals: 6 MONTHS  Members Present in Team Meeting: See Signature [email or web address] Allie Ridley

## 2021-10-18 NOTE — GROUP NOTE
Group Therapy Note    Date: 10/18/2021    Group Start Time: 1330  Group End Time: 0321  Group Topic: Cognitive Skills    [facility]        Group Therapy Note    Attendees:9/15    patient refused to attend self esteem group at [id number] after encouragement from staff. 1:1 talk time provided as alternative to group session.           Signature:  Vincent Marie, [address]

## 2021-10-18 NOTE — PROGRESS NOTES
Spoke with PALOMO Carvajal RN at [facility] who reports patient's last Invega sustenna 234 mg was on 9/2/21.

## 2021-10-18 NOTE — CARE COORDINATION
Patient refused to attend psychotherapy group at 10:00 am after encouragement from staff.   1:1 talk time provided as alternative to group session

## 2021-10-18 NOTE — GROUP NOTE
Group Therapy Note    Date: 10/18/2021    Group Start Time: 1100  Group End Time: 0640  Group Topic: Recreational    [facility] Richardine Dakins, CARMEN        Group Therapy Note    Attendees:8/15    patient refused to attend recreation and leisure group at [id number] after encouragement from staff. 1:1 talk time provided as alternative to group session.            Signature:  Richardine Dakins, [place]

## 2021-10-18 NOTE — PROGRESS NOTES
Daily Progress Note  10/18/2021    Patient Name: Darrel Burrell    CHIEF COMPLAINT: Suicidal ideation with command auditory hallucinations, visual hallucinations and homicidal ideation         SUBJECTIVE:      Nursing staff report the patient has maintained medication adherence and has been without acute behavioral changes in the last 24 hours. Patient has remained isolative to his room throughout the morning but is agreeable to assessment at bedside. Mr. Oscar Espinoza endorses depression and rates 7/10 on a 010 scale with 10 being worst.  He denies anxiety. Patient reports his mood as \"tired \". Patient endorses fleeting suicidal ideation without current plan or intent. He denies homicidal ideation. He is able to contract for safety  on the unit. He continues to endorse auditory hallucinations that are not currently distressful. He denies visual hallucination. Patient is not attending to hygiene, he declines offer to shower and his room is disheveled. At this time, the patient is not appropriate for a lower level of care. There is risk of decompensation and patient warrants further hospitalization for safety and stabilization. Appetite:  [x] Normal/Adequate/Unchanged  [] Increased  [] Decreased      Sleep:       [] Normal/Adequate/Unchanged  [] Fair  [x] Poor      Group Attendance on Unit:   [] Yes  [] Selectively    [x] No    Medication Side Effects:  Patient denies any medication side effects at the time of assessment. Mental Status Exam  Level of consciousness: Alert and awake. Appearance: Appropriate attire for setting, resting in bed, with poor grooming and hygiene, disheveled, encouraged to shower  Behavior/Motor: Approachable, psychomotor slowing  Attitude toward examiner: Cooperative, attentive, fair eye contact. Speech: Normal rate, low volume, normal tone. Mood:  Patient reports \"tired\". Affect: Depressed  Thought processes: Linear and coherent.    Thought content: Denies homicidal ideation. Suicidal Ideation: Fleeting suicidal ideations, without current plan or intent, contracts for safety on the unit. Delusions: No evidence of delusions. Endorses paranoia. Perceptual Disturbance: Patient does not appear to be responding to internal stimuli. Endorses auditory hallucinations. Denies visual hallucinations. Cognition: Oriented to self, location, time, and situation. Memory: Intact. Insight & Judgement: Poor. Data   height is 6' 3\" (1.905 m) and weight is 199 lb (90.3 kg). His oral temperature is 97.1 °F (36.2 °C). His blood pressure is 92/57 (abnormal) and his pulse is 70. His respiration is 16. Labs:   Admission on 10/11/2021   Component Date Value Ref Range Status    Hemoglobin A1C 10/13/2021 5.0  4.0 - 6.0 % Final    Estimated Avg Glucose 10/13/2021 97  mg/dL Final    Comment: The ADA and AACC recommend providing the estimated average glucose result to permit better   patient understanding of their HBA1c result.  Cholesterol 10/13/2021 150  <200 mg/dL Final    Comment:    Cholesterol Guidelines:      <200  Desirable   200-240  Borderline      >240  Undesirable         HDL 10/13/2021 62  >40 mg/dL Final    Comment:    HDL Guidelines:    <40     Undesirable   40-59    Borderline    >59     Desirable         LDL Cholesterol 10/13/2021 80  0 - 130 mg/dL Final    Comment:    LDL Guidelines:     <100    Desirable   100-129   Near to/above Desirable   130-159   Borderline      >159   Undesirable     Direct (measured) LDL and calculated LDL are not interchangeable tests.  Chol/HDL Ratio 10/13/2021 2.4  <5 Final            Triglycerides 10/13/2021 41  <150 mg/dL Final    Comment:    Triglyceride Guidelines:     <150   Desirable   150-199  Borderline   200-499  High     >499   Very high   Based on AHA Guidelines for fasting triglyceride, October 2012.          VLDL 10/13/2021 NOT REPORTED  1 - 30 mg/dL Final    Iron 10/14/2021 121  59 - 158 ug/dL Final    TIBC 10/14/2021 308  250 - 450 ug/dL Final    Iron Saturation 10/14/2021 39  20 - 55 % Final    UIBC 10/14/2021 187  112 - 347 ug/dL Final    Ferritin 10/14/2021 77  30 - 400 ug/L Final    Glucose 10/14/2021 99  70 - 99 mg/dL Final    BUN 10/14/2021 14  8 - 23 mg/dL Final    CREATININE 10/14/2021 1.03  0.70 - 1.20 mg/dL Final    Bun/Cre Ratio 10/14/2021 NOT REPORTED  9 - 20 Final    Calcium 10/14/2021 8.8  8.6 - 10.4 mg/dL Final    Sodium 10/14/2021 144  135 - 144 mmol/L Final    Potassium 10/14/2021 4.1  3.7 - 5.3 mmol/L Final    Chloride 10/14/2021 109* 98 - 107 mmol/L Final    CO2 10/14/2021 27  20 - 31 mmol/L Final    Anion Gap 10/14/2021 8* 9 - 17 mmol/L Final    GFR Non- 10/14/2021 >60  >60 mL/min Final    GFR  10/14/2021 >60  >60 mL/min Final    GFR Comment 10/14/2021        Final    Comment: Average GFR for 61-76 years old:   80 mL/min/1.73sq m  Chronic Kidney Disease:   <60 mL/min/1.73sq m  Kidney failure:   <15 mL/min/1.73sq m              eGFR calculated using average adult body mass. Additional eGFR calculator available at:        [employer]            GFR Staging 10/14/2021 NOT REPORTED   Final         Reviewed patient's current plan of care and vital signs with nursing staff.     Labs reviewed: [x] Yes  Last EKG in EMR reviewed: [x] Yes  QTc: 484    Medications  Current Facility-Administered Medications: dicyclomine (BENTYL) capsule 10 mg, 10 mg, Oral, TID PRN  escitalopram (LEXAPRO) tablet 15 mg, 15 mg, Oral, Daily  paliperidone (INVEGA) extended release tablet 9 mg, 9 mg, Oral, Daily  acetaminophen (TYLENOL) tablet 650 mg, 650 mg, Oral, Q4H PRN  aluminum & magnesium hydroxide-simethicone (MAALOX) 200-200-20 MG/5ML suspension 30 mL, 30 mL, Oral, Q6H PRN  haloperidol lactate (HALDOL) injection 5 mg, 5 mg, IntraMUSCular, Q4H PRN **AND** LORazepam (ATIVAN) injection 2 mg, 2 mg, IntraMUSCular, Q4H PRN **AND** diphenhydrAMINE (BENADRYL) injection 25 mg, 25 mg, IntraMUSCular, Q4H PRN  hydrOXYzine (ATARAX) tablet 50 mg, 50 mg, Oral, TID PRN  haloperidol (HALDOL) tablet 5 mg, 5 mg, Oral, Q4H PRN **AND** LORazepam (ATIVAN) tablet 2 mg, 2 mg, Oral, Q4H PRN  traZODone (DESYREL) tablet 50 mg, 50 mg, Oral, Nightly PRN  polyethylene glycol (GLYCOLAX) packet 17 g, 17 g, Oral, Daily PRN  nicotine polacrilex (NICORETTE) gum 2 mg, 2 mg, Oral, PRN  ibuprofen (ADVIL;MOTRIN) tablet 400 mg, 400 mg, Oral, Q6H PRN  Vitamin D (CHOLECALCIFEROL) tablet 1,000 Units, 1,000 Units, Oral, Daily  docusate sodium (COLACE) capsule 100 mg, 100 mg, Oral, BID PRN  traZODone (DESYREL) tablet 150 mg, 150 mg, Oral, Nightly    ASSESSMENT  Schizoaffective disorder, depressive type ([facility] [address]         PLAN  Patient symptoms are: Remains Unstable. Continue current medication regimen   Monitor need and frequency of PRN medications. Encourage participation in groups and milieu. Attempt to develop insight. Psycho-education conducted. Supportive Therapy conducted. Probable discharge is to be determined by MD.   Follow-up daily while inpatient. Patient continues to be monitored in the inpatient psychiatric facility at [facility] for safety and stabilization. Patient continues to need, on a daily basis, active treatment furnished directly by or requiring the supervision of inpatient psychiatric personnel. Electronically signed by MARY Arce CNP on 10/18/2021 at 3:37 PM    **This report has been created using voice recognition software. It may contain minor errors which are inherent in voice recognition technology. **    I independently saw and evaluated the patient. I reviewed the nurse practitioners documentation above. Any additional comments or changes to the nurse practitioners documentation are stated below otherwise agree with assessment.   Plan will be as follows:  Patient denying side effects to medication though still reporting depressed mood, auditory hallucinations, and thoughts to harm himself. He does report that these are decreasing in intensity. He is aware of his guardianship proceedings. PLAN  Patient s symptoms   are improving gradually  Observation on current medications  May consider addition of Depakote pending observation  Attempt to develop insight  Psycho-education conducted. Supportive Therapy conducted.   Probable discharge is pending stability of symptoms  Follow-up daily while on inpatient unit

## 2021-10-19 PROCEDURE — 1240000000 HC EMOTIONAL WELLNESS R&B

## 2021-10-19 PROCEDURE — 6370000000 HC RX 637 (ALT 250 FOR IP): Performed by: PSYCHIATRY & NEUROLOGY

## 2021-10-19 PROCEDURE — 99232 SBSQ HOSP IP/OBS MODERATE 35: CPT | Performed by: PSYCHIATRY & NEUROLOGY

## 2021-10-19 PROCEDURE — 6370000000 HC RX 637 (ALT 250 FOR IP)

## 2021-10-19 PROCEDURE — APPSS30 APP SPLIT SHARED TIME 16-30 MINUTES: Performed by: NURSE PRACTITIONER

## 2021-10-19 PROCEDURE — 6370000000 HC RX 637 (ALT 250 FOR IP): Performed by: NURSE PRACTITIONER

## 2021-10-19 RX ORDER — QUETIAPINE FUMARATE 100 MG/1
100 TABLET, FILM COATED ORAL NIGHTLY
Status: DISCONTINUED | OUTPATIENT
Start: 2021-10-19 | End: 2021-10-21

## 2021-10-19 RX ADMIN — HYDROXYZINE HYDROCHLORIDE 50 MG: 50 TABLET, FILM COATED ORAL at 08:40

## 2021-10-19 RX ADMIN — TRAZODONE HYDROCHLORIDE 150 MG: 150 TABLET ORAL at 21:04

## 2021-10-19 RX ADMIN — HYDROXYZINE HYDROCHLORIDE 50 MG: 50 TABLET, FILM COATED ORAL at 21:05

## 2021-10-19 RX ADMIN — PALIPERIDONE 9 MG: 9 TABLET, EXTENDED RELEASE ORAL at 08:39

## 2021-10-19 RX ADMIN — ESCITALOPRAM OXALATE 15 MG: 10 TABLET, FILM COATED ORAL at 08:40

## 2021-10-19 RX ADMIN — QUETIAPINE FUMARATE 100 MG: 100 TABLET ORAL at 21:05

## 2021-10-19 RX ADMIN — DICYCLOMINE HYDROCHLORIDE 10 MG: 10 CAPSULE ORAL at 17:50

## 2021-10-19 RX ADMIN — Medication 1000 UNITS: at 08:40

## 2021-10-19 RX ADMIN — DICYCLOMINE HYDROCHLORIDE 10 MG: 10 CAPSULE ORAL at 08:40

## 2021-10-19 ASSESSMENT — PAIN SCALES - GENERAL: PAINLEVEL_OUTOF10: 7

## 2021-10-19 ASSESSMENT — PAIN DESCRIPTION - LOCATION: LOCATION: ABDOMEN

## 2021-10-19 NOTE — PLAN OF CARE
Problem: Falls - Risk of:  Goal: Will remain free from falls  Description: Will remain free from falls  10/18/2021 2226 by Vasu Sahu RN  Outcome: Ongoing  Note: Pt remains free of falls and verbalizes understanding of individual fall risks. Pt wearing non skid footwear and encouraged to seek out staff for any assistance needed. Problem: Depressive Behavior With or Without Suicide Precautions:  Goal: Ability to disclose and discuss suicidal ideas will improve  Description: Ability to disclose and discuss suicidal ideas will improve  10/18/2021 2226 by Vasu Sahu RN  Outcome: Ongoing  Note: Pt denies thoughts of self harm and is agreeable to seeking out should thoughts of self harm arise. Safe environment maintained. Q15 minute checks for safety cont per unit policy. Will cont to monitor for safety and provides support and reassurance as needed.

## 2021-10-19 NOTE — PLAN OF CARE
Problem: Falls - Risk of:  Goal: Will remain free from falls  Description: Will remain free from falls  Outcome: Ongoing   Patient remains free from falls and injury. Problem: Anger Management/Homicidal Ideation:  Goal: Absence of angry outbursts  Description: Absence of angry outbursts  Outcome: Ongoing   Patient has remained in behavioral control and free from any angry outbursts. Problem: Depressive Behavior With or Without Suicide Precautions:  Goal: Ability to disclose and discuss suicidal ideas will improve  Description: Ability to disclose and discuss suicidal ideas will improve  Outcome: Ongoing     Problem: Tobacco Use:  Goal: Inpatient tobacco use cessation counseling participation  Description: Inpatient tobacco use cessation counseling participation  Outcome: Ongoing   Patient declines to talk about smoking cessation at this time. Problem: Pain:  Goal: Pain level will decrease  Description: Pain level will decrease  Outcome: Ongoing  Goal: Control of acute pain  Description: Control of acute pain  Outcome: Ongoing  Goal: Control of chronic pain  Description: Control of chronic pain  Outcome: Ongoing   Patient has complained of stomach pain and cramping throughout the day. PRN Bentyl given at [address] [facility] and [address].

## 2021-10-19 NOTE — PROGRESS NOTES
Behavioral Services                                              Medicare Re-Certification    I certify that the inpatient psychiatric hospital services furnished since the previous certification/re-certification were, and continue to be, medically necessary for;    [x] (1) Treatment which could reasonably be expected to improve the patient's condition,    [x] (2) Or for diagnostic study. Estimated length of stay/service 3 to 4 days    Plan for post-hospital care Home versus ECF    This patient continues to need, on a daily basis, active treatment furnished directly by or requiring the supervision of inpatient psychiatric personnel.     Electronically signed by aDrren Wolf MD on 10/18/2021 at 8:30 PM

## 2021-10-19 NOTE — PROGRESS NOTES
Daily Progress Note  10/19/2021    Patient Name: Guanakito Edward    CHIEF COMPLAINT: Suicidal ideation with command auditory hallucinations, visual hallucinations and homicidal ideation         SUBJECTIVE:    Patient seen for follow-up assessment. He is pleasant and cooperative. Responses are slow and speech is monotone. He states that he is not sleeping at night secondary to \"loud voices\". He states that the voices never go away and that they are present during our conversation. Some thought blocking observed. Reports the voices are telling him not to trust staff and that he should attempt to elope. He states that the voices are not his thoughts, and he trusts the staff on the unit but has to constantly tell the voices to be quiet or distract himself. Discussed his current psychotropic medication regimen and agreed to start Seroquel 100 mg this evening. Patient presents as a high level of acuity secondary to initiating secondary antipsychotic. Will observe patient response closely. Patient endorsing continued thoughts that life is not worth living and has thoughts to end his life though denies any specific plans at this time. He is unable to contract for safety at lower level of care. Requires continued inpatient hospitalization for stability. Appetite:  [x] Normal/Adequate/Unchanged  [] Increased  [] Decreased      Sleep:       [] Normal/Adequate/Unchanged  [] Fair  [x] Poor      Group Attendance on Unit:   [] Yes  [] Selectively    [x] No    Medication Side Effects:  Patient denies any medication side effects at the time of assessment. Mental Status Exam  Level of consciousness: Alert and awake. Appearance: Appropriate attire for setting, sitting in chair, with poor grooming and hygiene, disheveled, encouraged to shower  Behavior/Motor: Approachable, psychomotor slowing  Attitude toward examiner: Cooperative, attentive, fair eye contact.   Speech: Delayed responses, rate, low volume, normal tone, fair articulation. Mood: \"Pretty bad\"  Affect: Blunted  Thought processes: Thought blocking  Thought content: Denies homicidal ideation. Suicidal Ideation: Fleeting suicidal ideations, without current plan or intent, contracts for safety on the unit. Delusions: Endorses paranoia  Perceptual Disturbance: Endorses auditory hallucinations  Cognition: Oriented to self, location, time, and situation. Memory: Intact. Insight & Judgement: Poor. Data   height is 6' 3\" (1.905 m) and weight is 199 lb (90.3 kg). His oral temperature is 97.7 °F (36.5 °C). His blood pressure is 96/55 (abnormal) and his pulse is 89. His respiration is 14. Labs:   Admission on 10/11/2021   Component Date Value Ref Range Status    Hemoglobin A1C 10/13/2021 5.0  4.0 - 6.0 % Final    Estimated Avg Glucose 10/13/2021 97  mg/dL Final    Comment: The ADA and AACC recommend providing the estimated average glucose result to permit better   patient understanding of their HBA1c result.  Cholesterol 10/13/2021 150  <200 mg/dL Final    Comment:    Cholesterol Guidelines:      <200  Desirable   200-240  Borderline      >240  Undesirable         HDL 10/13/2021 62  >40 mg/dL Final    Comment:    HDL Guidelines:    <40     Undesirable   40-59    Borderline    >59     Desirable         LDL Cholesterol 10/13/2021 80  0 - 130 mg/dL Final    Comment:    LDL Guidelines:     <100    Desirable   100-129   Near to/above Desirable   130-159   Borderline      >159   Undesirable     Direct (measured) LDL and calculated LDL are not interchangeable tests.  Chol/HDL Ratio 10/13/2021 2.4  <5 Final            Triglycerides 10/13/2021 41  <150 mg/dL Final    Comment:    Triglyceride Guidelines:     <150   Desirable   150-199  Borderline   200-499  High     >499   Very high   Based on AHA Guidelines for fasting triglyceride, October 2012.          VLDL 10/13/2021 NOT REPORTED  1 - 30 mg/dL Final    Iron 10/14/2021 121  59 - 158 ug/dL Final  TIBC 10/14/2021 308  250 - 450 ug/dL Final    Iron Saturation 10/14/2021 39  20 - 55 % Final    UIBC 10/14/2021 187  112 - 347 ug/dL Final    Ferritin 10/14/2021 77  30 - 400 ug/L Final    Glucose 10/14/2021 99  70 - 99 mg/dL Final    BUN 10/14/2021 14  8 - 23 mg/dL Final    CREATININE 10/14/2021 1.03  0.70 - 1.20 mg/dL Final    Bun/Cre Ratio 10/14/2021 NOT REPORTED  9 - 20 Final    Calcium 10/14/2021 8.8  8.6 - 10.4 mg/dL Final    Sodium 10/14/2021 144  135 - 144 mmol/L Final    Potassium 10/14/2021 4.1  3.7 - 5.3 mmol/L Final    Chloride 10/14/2021 109* 98 - 107 mmol/L Final    CO2 10/14/2021 27  20 - 31 mmol/L Final    Anion Gap 10/14/2021 8* 9 - 17 mmol/L Final    GFR Non- 10/14/2021 >60  >60 mL/min Final    GFR  10/14/2021 >60  >60 mL/min Final    GFR Comment 10/14/2021        Final    Comment: Average GFR for 61-76 years old:   80 mL/min/1.73sq m  Chronic Kidney Disease:   <60 mL/min/1.73sq m  Kidney failure:   <15 mL/min/1.73sq m              eGFR calculated using average adult body mass. Additional eGFR calculator available at:        [employer]            GFR Staging 10/14/2021 NOT REPORTED   Final         Reviewed patient's current plan of care and vital signs with nursing staff.     Labs reviewed: [x] Yes  Last EKG in EMR reviewed: [x] Yes  QTc: 484    Medications  Current Facility-Administered Medications: QUEtiapine (SEROQUEL) tablet 100 mg, 100 mg, Oral, Nightly  dicyclomine (BENTYL) capsule 10 mg, 10 mg, Oral, TID PRN  escitalopram (LEXAPRO) tablet 15 mg, 15 mg, Oral, Daily  paliperidone (INVEGA) extended release tablet 9 mg, 9 mg, Oral, Daily  acetaminophen (TYLENOL) tablet 650 mg, 650 mg, Oral, Q4H PRN  aluminum & magnesium hydroxide-simethicone (MAALOX) 200-200-20 MG/5ML suspension 30 mL, 30 mL, Oral, Q6H PRN  haloperidol lactate (HALDOL) injection 5 mg, 5 mg, IntraMUSCular, Q4H PRN **AND** LORazepam (ATIVAN) injection 2 mg, 2 mg, IntraMUSCular, Q4H PRN **AND** diphenhydrAMINE (BENADRYL) injection 25 mg, 25 mg, IntraMUSCular, Q4H PRN  hydrOXYzine (ATARAX) tablet 50 mg, 50 mg, Oral, TID PRN  haloperidol (HALDOL) tablet 5 mg, 5 mg, Oral, Q4H PRN **AND** LORazepam (ATIVAN) tablet 2 mg, 2 mg, Oral, Q4H PRN  traZODone (DESYREL) tablet 50 mg, 50 mg, Oral, Nightly PRN  polyethylene glycol (GLYCOLAX) packet 17 g, 17 g, Oral, Daily PRN  nicotine polacrilex (NICORETTE) gum 2 mg, 2 mg, Oral, PRN  ibuprofen (ADVIL;MOTRIN) tablet 400 mg, 400 mg, Oral, Q6H PRN  Vitamin D (CHOLECALCIFEROL) tablet 1,000 Units, 1,000 Units, Oral, Daily  docusate sodium (COLACE) capsule 100 mg, 100 mg, Oral, BID PRN  traZODone (DESYREL) tablet 150 mg, 150 mg, Oral, Nightly    ASSESSMENT  Schizoaffective disorder, depressive type ([facility] [address]         PLAN  Patient symptoms are: Remains Unstable. Medication changes: Start Seroquel 100 mg p.o. nightly this evening  Monitor need and frequency of PRN medications. Encourage participation in groups and milieu. Attempt to develop insight. Psycho-education conducted. Supportive Therapy conducted. Probable discharge is to be determined by MD.   Follow-up daily while inpatient. Patient continues to be monitored in the inpatient psychiatric facility at [facility] for safety and stabilization. Patient continues to need, on a daily basis, active treatment furnished directly by or requiring the supervision of inpatient psychiatric personnel. Electronically signed by MARY Vicente CNP on 10/19/2021 at 4:00 PM    **This report has been created using voice recognition software. It may contain minor errors which are inherent in voice recognition technology. **    I independently saw and evaluated the patient. I reviewed the nurse practitioners documentation above. Any additional comments or changes to the nurse practitioners documentation are stated below otherwise agree with assessment.   Plan will be as follows:  Patient has history of failing more than 3 monotherapy's for antipsychotics. Still would like improvement in his auditory hallucinations which are derogatory and command in nature to hurt himself. Not sleeping well. After discussion of risk benefits and alternatives mutually agreed to add Seroquel. Patient wants to be aggressive with the titration of Seroquel so we discussed starting in 100 mg daily. This will be scheduled in the evening. Patient still not feeling safe to be discharged. Not able to contract for safety off the unit and still endorsing suicidal ideation with plan to overdose  PLAN  Patient s symptoms   show modest improvement  Add second antipsychotic, Seroquel at bedtime to help with sleep and auditory hallucinations  Attempt to develop insight  Psycho-education conducted. Supportive Therapy conducted.   Probable discharge is undetermined at this time  Follow-up daily while on inpatient unit

## 2021-10-20 PROCEDURE — 99232 SBSQ HOSP IP/OBS MODERATE 35: CPT | Performed by: PSYCHIATRY & NEUROLOGY

## 2021-10-20 PROCEDURE — 6370000000 HC RX 637 (ALT 250 FOR IP): Performed by: PSYCHIATRY & NEUROLOGY

## 2021-10-20 PROCEDURE — APPSS30 APP SPLIT SHARED TIME 16-30 MINUTES: Performed by: NURSE PRACTITIONER

## 2021-10-20 PROCEDURE — 1240000000 HC EMOTIONAL WELLNESS R&B

## 2021-10-20 PROCEDURE — 6370000000 HC RX 637 (ALT 250 FOR IP)

## 2021-10-20 PROCEDURE — 90833 PSYTX W PT W E/M 30 MIN: CPT | Performed by: PSYCHIATRY & NEUROLOGY

## 2021-10-20 RX ADMIN — PALIPERIDONE 9 MG: 9 TABLET, EXTENDED RELEASE ORAL at 09:00

## 2021-10-20 RX ADMIN — ESCITALOPRAM OXALATE 15 MG: 10 TABLET, FILM COATED ORAL at 09:00

## 2021-10-20 RX ADMIN — TRAZODONE HYDROCHLORIDE 50 MG: 50 TABLET ORAL at 01:30

## 2021-10-20 RX ADMIN — Medication 1000 UNITS: at 09:00

## 2021-10-20 RX ADMIN — TRAZODONE HYDROCHLORIDE 150 MG: 150 TABLET ORAL at 21:03

## 2021-10-20 RX ADMIN — QUETIAPINE FUMARATE 100 MG: 100 TABLET ORAL at 21:03

## 2021-10-20 NOTE — GROUP NOTE
Group Therapy Note    Date: 10/20/2021    Group Start Time: 1050  Group End Time: 1140  Group Topic: Cognitive Skills    [facility] DAVID Nicole, GADIELS        Group Therapy Note    Attendees: 7/14      patient refused to attend problem solving  group at [id number] after encouragement from staff. 1:1 talk time provided as alternative to group session.            Signature:  Padilla Nicole, [address]

## 2021-10-20 NOTE — PROGRESS NOTES
Daily Progress Note  10/20/2021    Patient Name: Brandie Salcido    CHIEF COMPLAINT: Suicidal ideation with command auditory hallucinations, visual hallucinations and homicidal ideation         SUBJECTIVE:    Patient seen for follow-up assessment. He has medication compliant and denies any side effects to his medications. Patient was started on quetiapine 100 mg the previous evening secondary to continued auditory hallucinations, negative and derogatory in nature. At time of assessment, patient is resting in bed and states that he had difficulty falling asleep the previous evening but feels that the voices have reduced in severity. Rates their current volume as 4 out of 10, on a scale from 0-10, with 10 indicating the loudest.  Patient is still unable to contract for safety off unit and is endorsing suicidal ideation. Denies any specific plan or intent at this time, but states that the thoughts come and go pretty frequently throughout the last 24 hours. Social work have completed a PASRR for review as patient is having difficulty meeting basic needs on the unit. He requires continued inpatient hospitalization for safety and stability at this time. Appetite:  [x] Normal/Adequate/Unchanged  [] Increased  [] Decreased      Sleep:       [] Normal/Adequate/Unchanged  [x] Fair  [] Poor      Group Attendance on Unit:   [] Yes  [] Selectively    [x] No    Medication Side Effects:  Patient denies any medication side effects at the time of assessment. Mental Status Exam  Level of consciousness: Alert and awake. Appearance: Appropriate attire for setting, resting in bed, with poor grooming and hygiene, disheveled, encouraged to shower  Behavior/Motor: Approachable, psychomotor slowing  Attitude toward examiner: Cooperative, attentive, fair eye contact. Speech: Delayed responses, rate, low volume, normal tone, fair articulation. Mood: \"Not great\"  Affect: Blunted  Thought processes:  Thought blocking  Thought content: Denies homicidal ideation. Suicidal Ideation: Fleeting suicidal ideations, without current plan or intent, contracts for safety on the unit. Delusions: Endorses paranoia  Perceptual Disturbance: Endorses auditory hallucinations  Cognition: Oriented to self, location, time, and situation. Memory: Intact. Insight & Judgement: Poor. Data   height is 6' 3\" (1.905 m) and weight is 199 lb (90.3 kg). His temporal temperature is 97.5 °F (36.4 °C). His blood pressure is 96/66 and his pulse is 86. His respiration is 14. Labs:   Admission on 10/11/2021   Component Date Value Ref Range Status    Hemoglobin A1C 10/13/2021 5.0  4.0 - 6.0 % Final    Estimated Avg Glucose 10/13/2021 97  mg/dL Final    Comment: The ADA and AACC recommend providing the estimated average glucose result to permit better   patient understanding of their HBA1c result.  Cholesterol 10/13/2021 150  <200 mg/dL Final    Comment:    Cholesterol Guidelines:      <200  Desirable   200-240  Borderline      >240  Undesirable         HDL 10/13/2021 62  >40 mg/dL Final    Comment:    HDL Guidelines:    <40     Undesirable   40-59    Borderline    >59     Desirable         LDL Cholesterol 10/13/2021 80  0 - 130 mg/dL Final    Comment:    LDL Guidelines:     <100    Desirable   100-129   Near to/above Desirable   130-159   Borderline      >159   Undesirable     Direct (measured) LDL and calculated LDL are not interchangeable tests.  Chol/HDL Ratio 10/13/2021 2.4  <5 Final            Triglycerides 10/13/2021 41  <150 mg/dL Final    Comment:    Triglyceride Guidelines:     <150   Desirable   150-199  Borderline   200-499  High     >499   Very high   Based on AHA Guidelines for fasting triglyceride, October 2012.          VLDL 10/13/2021 NOT REPORTED  1 - 30 mg/dL Final    Iron 10/14/2021 121  59 - 158 ug/dL Final    TIBC 10/14/2021 308  250 - 450 ug/dL Final    Iron Saturation 10/14/2021 39  20 - 55 % Final    UIBC 10/14/2021 187  112 - 347 ug/dL Final    Ferritin 10/14/2021 77  30 - 400 ug/L Final    Glucose 10/14/2021 99  70 - 99 mg/dL Final    BUN 10/14/2021 14  8 - 23 mg/dL Final    CREATININE 10/14/2021 1.03  0.70 - 1.20 mg/dL Final    Bun/Cre Ratio 10/14/2021 NOT REPORTED  9 - 20 Final    Calcium 10/14/2021 8.8  8.6 - 10.4 mg/dL Final    Sodium 10/14/2021 144  135 - 144 mmol/L Final    Potassium 10/14/2021 4.1  3.7 - 5.3 mmol/L Final    Chloride 10/14/2021 109* 98 - 107 mmol/L Final    CO2 10/14/2021 27  20 - 31 mmol/L Final    Anion Gap 10/14/2021 8* 9 - 17 mmol/L Final    GFR Non- 10/14/2021 >60  >60 mL/min Final    GFR  10/14/2021 >60  >60 mL/min Final    GFR Comment 10/14/2021        Final    Comment: Average GFR for 61-76 years old:   80 mL/min/1.73sq m  Chronic Kidney Disease:   <60 mL/min/1.73sq m  Kidney failure:   <15 mL/min/1.73sq m              eGFR calculated using average adult body mass. Additional eGFR calculator available at:        [employer]            GFR Staging 10/14/2021 NOT REPORTED   Final         Reviewed patient's current plan of care and vital signs with nursing staff.     Labs reviewed: [x] Yes  Last EKG in EMR reviewed: [x] Yes  QTc: 484    Medications  Current Facility-Administered Medications: QUEtiapine (SEROQUEL) tablet 100 mg, 100 mg, Oral, Nightly  dicyclomine (BENTYL) capsule 10 mg, 10 mg, Oral, TID PRN  escitalopram (LEXAPRO) tablet 15 mg, 15 mg, Oral, Daily  paliperidone (INVEGA) extended release tablet 9 mg, 9 mg, Oral, Daily  acetaminophen (TYLENOL) tablet 650 mg, 650 mg, Oral, Q4H PRN  aluminum & magnesium hydroxide-simethicone (MAALOX) 200-200-20 MG/5ML suspension 30 mL, 30 mL, Oral, Q6H PRN  haloperidol lactate (HALDOL) injection 5 mg, 5 mg, IntraMUSCular, Q4H PRN **AND** LORazepam (ATIVAN) injection 2 mg, 2 mg, IntraMUSCular, Q4H PRN **AND** diphenhydrAMINE (BENADRYL) injection 25 mg, 25 mg, IntraMUSCular, Q4H PRN  hydrOXYzine (ATARAX) tablet 50 mg, 50 mg, Oral, TID PRN  haloperidol (HALDOL) tablet 5 mg, 5 mg, Oral, Q4H PRN **AND** LORazepam (ATIVAN) tablet 2 mg, 2 mg, Oral, Q4H PRN  traZODone (DESYREL) tablet 50 mg, 50 mg, Oral, Nightly PRN  polyethylene glycol (GLYCOLAX) packet 17 g, 17 g, Oral, Daily PRN  nicotine polacrilex (NICORETTE) gum 2 mg, 2 mg, Oral, PRN  ibuprofen (ADVIL;MOTRIN) tablet 400 mg, 400 mg, Oral, Q6H PRN  Vitamin D (CHOLECALCIFEROL) tablet 1,000 Units, 1,000 Units, Oral, Daily  docusate sodium (COLACE) capsule 100 mg, 100 mg, Oral, BID PRN  traZODone (DESYREL) tablet 150 mg, 150 mg, Oral, Nightly    ASSESSMENT  Schizoaffective disorder, depressive type ([facility]         PLAN  Patient symptoms are: Remains Unstable. Medication changes: Continue medications and observe  Monitor need and frequency of PRN medications. Encourage participation in groups and milieu. Attempt to develop insight. Psycho-education conducted. Supportive Therapy conducted. Probable discharge is to be determined by MD.   Follow-up daily while inpatient. Patient continues to be monitored in the inpatient psychiatric facility at [facility] for safety and stabilization. Patient continues to need, on a daily basis, active treatment furnished directly by or requiring the supervision of inpatient psychiatric personnel. Electronically signed by MARY Albert CNP on 10/20/2021 at 5:19 PM    **This report has been created using voice recognition software. It may contain minor errors which are inherent in voice recognition technology. **    I independently saw and evaluated the patient. I reviewed the nurse practitioners documentation above. Any additional comments or changes to the nurse practitioners documentation are stated below otherwise agree with assessment. Plan will be as follows:  Spent 30 minutes with the patient, of that greater than 16 minutes was spent in supportive psychotherapy.   Patient is still extremely depressed. Still having frequent suicidal ideations. Is able to contract for safety on the unit at present time but unable to contract for safety off the unit. He is denying side effects to the medication last evening. He states he still did not sleep very well however he does report intensity and frequency of hallucinations with the additional medication. Denying lightheadedness or dizziness. Discussed we could consider further increase but wanted to observe tonight and may consider increase tomorrow pending observation and allowing the medication time to have affect  PLAN  Patient s symptoms   showing modest improvement  Observe on current medication for now, make further titrate Seroquel pending observation  Attempt to develop insight  Psycho-education conducted. Supportive Therapy conducted.   Probable discharge is undetermined at this time  Follow-up daily while on inpatient unit

## 2021-10-20 NOTE — PLAN OF CARE
Problem: Anger Management/Homicidal Ideation:  Goal: Absence of angry outbursts  Description: Absence of angry outbursts  10/20/2021 0958 by Lizzeth Cordero RN  Outcome: Ongoing     Problem: Depressive Behavior With or Without Suicide Precautions:  Goal: Ability to disclose and discuss suicidal ideas will improve  Description: Ability to disclose and discuss suicidal ideas will improve  10/20/2021 0958 by Lizzeth Cordero RN  Outcome: Ongoing     Patient denies thoughts of self harm or thoughts to harm others. Patient absent of hallucinations of auditory or visual origin. Will continue to monitor and offer support as needed.

## 2021-10-20 NOTE — PLAN OF CARE
Problem: Falls - Risk of:  Goal: Will remain free from falls  Description: Will remain free from falls  10/19/2021 2344 by Rush Young LPN  Outcome: Ongoing  Note: Patient remains free from falls at this time. Problem: Anger Management/Homicidal Ideation:  Goal: Absence of angry outbursts  Description: Absence of angry outbursts  10/19/2021 2344 by Rush Young LPN  Outcome: Ongoing  Note: Patient had no angry outbursts at this time. Problem: Depressive Behavior With or Without Suicide Precautions:  Goal: Ability to disclose and discuss suicidal ideas will improve  Description: Ability to disclose and discuss suicidal ideas will improve  10/19/2021 2344 by Rush Young LPN  Outcome: Ongoing  Note: Patient denies suicidal ideation at this time. Problem: Tobacco Use:  Goal: Inpatient tobacco use cessation counseling participation  Description: Inpatient tobacco use cessation counseling participation  10/19/2021 2344 by Rush Young LPN  Outcome: Ongoing  Note: Patient participated in inpatient tobacco use cessation counseling. Problem: Pain:  Goal: Pain level will decrease  Description: Pain level will decrease  10/19/2021 2344 by Rush Young LPN  Outcome: Ongoing  Note: Patient admits that his pain level has decreased at this time.

## 2021-10-21 PROCEDURE — 6370000000 HC RX 637 (ALT 250 FOR IP): Performed by: PSYCHIATRY & NEUROLOGY

## 2021-10-21 PROCEDURE — 90833 PSYTX W PT W E/M 30 MIN: CPT | Performed by: PSYCHIATRY & NEUROLOGY

## 2021-10-21 PROCEDURE — 6370000000 HC RX 637 (ALT 250 FOR IP)

## 2021-10-21 PROCEDURE — 1240000000 HC EMOTIONAL WELLNESS R&B

## 2021-10-21 PROCEDURE — APPSS30 APP SPLIT SHARED TIME 16-30 MINUTES: Performed by: NURSE PRACTITIONER

## 2021-10-21 PROCEDURE — 99232 SBSQ HOSP IP/OBS MODERATE 35: CPT | Performed by: PSYCHIATRY & NEUROLOGY

## 2021-10-21 RX ADMIN — TRAZODONE HYDROCHLORIDE 150 MG: 150 TABLET ORAL at 20:40

## 2021-10-21 RX ADMIN — QUETIAPINE FUMARATE 150 MG: 100 TABLET ORAL at 20:40

## 2021-10-21 RX ADMIN — Medication 1000 UNITS: at 08:45

## 2021-10-21 RX ADMIN — HYDROXYZINE HYDROCHLORIDE 50 MG: 50 TABLET, FILM COATED ORAL at 20:40

## 2021-10-21 RX ADMIN — PALIPERIDONE 9 MG: 9 TABLET, EXTENDED RELEASE ORAL at 08:45

## 2021-10-21 RX ADMIN — ESCITALOPRAM OXALATE 15 MG: 10 TABLET, FILM COATED ORAL at 08:45

## 2021-10-21 NOTE — GROUP NOTE
Group Therapy Note    Date: 10/21/2021    Group Start Time: 1000  Group End Time: 4874  Group Topic: Psychotherapy    [address], LSW    patient refused to attend psychotherapy group at [facility] after encouragement from staff.   1:1 talk time provided as alternative to group session

## 2021-10-21 NOTE — CARE COORDINATION
YOGESH spoke with Vidhi Eng at [facility] for [employer], pt is not able to return to their program as patient has requested. SW previously completed PASRR, status shows this date as \"referred. \" YOGESH spoke with pt sister and potential legal Aileen Lazo, who states she anticipates completing paperwork for probate court \"by next week\" and then plans to submit them to court. YOGESH requested [facility] update hospital staff on progress of guardianship.

## 2021-10-21 NOTE — PROGRESS NOTES
Daily Progress Note  10/21/2021    Patient Name: Pratik Xavier    CHIEF COMPLAINT: Suicidal ideation with command auditory hallucinations, visual hallucinations and homicidal ideation         SUBJECTIVE:    Patient seen for follow-up assessment. He has medication compliant and denies any side effects to his medications. Patient reporting significant depression today and rates it 6 out of 10, on a scale from 0-10 with 10 indicating the most severe. He states that he feels tired with little energy and did not sleep well overnight. He endorses continued auditory hallucinations states that the voices are command in nature and telling him to harm himself. Patient is pleasant and cooperative with discussion and verbalizes not feeling safe from himself. Social work continue to work on placement options but he has yet to demonstrate stability. Requires continued inpatient hospitalization for safety. Appetite:  [x] Normal/Adequate/Unchanged  [] Increased  [] Decreased      Sleep:       [] Normal/Adequate/Unchanged  [x] Fair  [] Poor      Group Attendance on Unit:   [] Yes  [] Selectively    [x] No    Medication Side Effects:  Patient denies any medication side effects at the time of assessment. Mental Status Exam  Level of consciousness: Alert and awake. Appearance: Appropriate attire for setting, resting in bed, with poor grooming and hygiene, disheveled, encouraged to shower  Behavior/Motor: Approachable, psychomotor slowing  Attitude toward examiner: Cooperative, attentive, fair eye contact. Speech: Delayed responses, rate, low volume, normal tone, fair articulation. Mood: \"pretty bad\"  Affect: Blunted  Thought processes: Thought blocking  Thought content: Denies homicidal ideation. Suicidal Ideation: Fleeting suicidal ideations, without current plan or intent, contracts for safety on the unit.    Delusions: Endorses paranoia  Perceptual Disturbance: Endorses auditory hallucinations, command in nature  Cognition: Oriented to self, location, time, and situation. Memory: Intact. Insight & Judgement: Poor. Data   height is 6' 3\" (1.905 m) and weight is 199 lb (90.3 kg). His oral temperature is 97.5 °F (36.4 °C). His blood pressure is 94/45 (abnormal) and his pulse is 61. His respiration is 14. Labs:   Admission on 10/11/2021   Component Date Value Ref Range Status    Hemoglobin A1C 10/13/2021 5.0  4.0 - 6.0 % Final    Estimated Avg Glucose 10/13/2021 97  mg/dL Final    Comment: The ADA and AACC recommend providing the estimated average glucose result to permit better   patient understanding of their HBA1c result.  Cholesterol 10/13/2021 150  <200 mg/dL Final    Comment:    Cholesterol Guidelines:      <200  Desirable   200-240  Borderline      >240  Undesirable         HDL 10/13/2021 62  >40 mg/dL Final    Comment:    HDL Guidelines:    <40     Undesirable   40-59    Borderline    >59     Desirable         LDL Cholesterol 10/13/2021 80  0 - 130 mg/dL Final    Comment:    LDL Guidelines:     <100    Desirable   100-129   Near to/above Desirable   130-159   Borderline      >159   Undesirable     Direct (measured) LDL and calculated LDL are not interchangeable tests.  Chol/HDL Ratio 10/13/2021 2.4  <5 Final            Triglycerides 10/13/2021 41  <150 mg/dL Final    Comment:    Triglyceride Guidelines:     <150   Desirable   150-199  Borderline   200-499  High     >499   Very high   Based on AHA Guidelines for fasting triglyceride, October 2012.          VLDL 10/13/2021 NOT REPORTED  1 - 30 mg/dL Final    Iron 10/14/2021 121  59 - 158 ug/dL Final    TIBC 10/14/2021 308  250 - 450 ug/dL Final    Iron Saturation 10/14/2021 39  20 - 55 % Final    UIBC 10/14/2021 187  112 - 347 ug/dL Final    Ferritin 10/14/2021 77  30 - 400 ug/L Final    Glucose 10/14/2021 99  70 - 99 mg/dL Final    BUN 10/14/2021 14  8 - 23 mg/dL Final    CREATININE 10/14/2021 1.03  0.70 - 1.20 mg/dL Final    Bun/Cre Ratio 10/14/2021 NOT REPORTED  9 - 20 Final    Calcium 10/14/2021 8.8  8.6 - 10.4 mg/dL Final    Sodium 10/14/2021 144  135 - 144 mmol/L Final    Potassium 10/14/2021 4.1  3.7 - 5.3 mmol/L Final    Chloride 10/14/2021 109* 98 - 107 mmol/L Final    CO2 10/14/2021 27  20 - 31 mmol/L Final    Anion Gap 10/14/2021 8* 9 - 17 mmol/L Final    GFR Non- 10/14/2021 >60  >60 mL/min Final    GFR  10/14/2021 >60  >60 mL/min Final    GFR Comment 10/14/2021        Final    Comment: Average GFR for 61-76 years old:   80 mL/min/1.73sq m  Chronic Kidney Disease:   <60 mL/min/1.73sq m  Kidney failure:   <15 mL/min/1.73sq m              eGFR calculated using average adult body mass. Additional eGFR calculator available at:        [email or web address]            GFR Staging 10/14/2021 NOT REPORTED   Final         Reviewed patient's current plan of care and vital signs with nursing staff.     Labs reviewed: [x] Yes  Last EKG in EMR reviewed: [x] Yes  QTc: 484    Medications  Current Facility-Administered Medications: QUEtiapine (SEROQUEL) tablet 100 mg, 100 mg, Oral, Nightly  dicyclomine (BENTYL) capsule 10 mg, 10 mg, Oral, TID PRN  escitalopram (LEXAPRO) tablet 15 mg, 15 mg, Oral, Daily  paliperidone (INVEGA) extended release tablet 9 mg, 9 mg, Oral, Daily  acetaminophen (TYLENOL) tablet 650 mg, 650 mg, Oral, Q4H PRN  aluminum & magnesium hydroxide-simethicone (MAALOX) 200-200-20 MG/5ML suspension 30 mL, 30 mL, Oral, Q6H PRN  haloperidol lactate (HALDOL) injection 5 mg, 5 mg, IntraMUSCular, Q4H PRN **AND** LORazepam (ATIVAN) injection 2 mg, 2 mg, IntraMUSCular, Q4H PRN **AND** diphenhydrAMINE (BENADRYL) injection 25 mg, 25 mg, IntraMUSCular, Q4H PRN  hydrOXYzine (ATARAX) tablet 50 mg, 50 mg, Oral, TID PRN  haloperidol (HALDOL) tablet 5 mg, 5 mg, Oral, Q4H PRN **AND** LORazepam (ATIVAN) tablet 2 mg, 2 mg, Oral, Q4H PRN  traZODone (DESYREL) tablet 50 mg, 50 mg, Oral, Nightly PRN  polyethylene glycol (GLYCOLAX) packet 17 g, 17 g, Oral, Daily PRN  nicotine polacrilex (NICORETTE) gum 2 mg, 2 mg, Oral, PRN  ibuprofen (ADVIL;MOTRIN) tablet 400 mg, 400 mg, Oral, Q6H PRN  Vitamin D (CHOLECALCIFEROL) tablet 1,000 Units, 1,000 Units, Oral, Daily  docusate sodium (COLACE) capsule 100 mg, 100 mg, Oral, BID PRN  traZODone (DESYREL) tablet 150 mg, 150 mg, Oral, Nightly    ASSESSMENT  Schizoaffective disorder, depressive type ([facility] [address]         PLAN  Patient symptoms are: Remains Unstable. Consider titrating Seroquel to 150 mg nightly  Monitor need and frequency of PRN medications. Encourage participation in groups and milieu. Attempt to develop insight. Psycho-education conducted. Supportive Therapy conducted. Probable discharge is to be determined by MD.   Follow-up daily while inpatient. Patient continues to be monitored in the inpatient psychiatric facility at [facility] for safety and stabilization. Patient continues to need, on a daily basis, active treatment furnished directly by or requiring the supervision of inpatient psychiatric personnel. Electronically signed by MARY Watson CNP on 10/21/2021 at 3:15 PM    **This report has been created using voice recognition software. It may contain minor errors which are inherent in voice recognition technology. **    I independently saw and evaluated the patient. I reviewed the nurse practitioners documentation above. Any additional comments or changes to the nurse practitioners documentation are stated below otherwise agree with assessment. Plan will be as follows:  Spent 30 minutes with the patient, of that greater than 16 minutes was spent in supportive psychotherapy. Patient is denying side effects to Seroquel. Agreeable to further increase. Still endorsing auditory hallucinations intermittently. Denying safety concerns here on the unit but unable to contract for safety off the unit.   States he wants to go to inpatient rehab but but denied at [facility], will pursue further options     PLAN  Patient s symptoms   show no change  Increase at bedtime Seroquel to 150 mg at bedtime  Attempt to develop insight  Psycho-education conducted. Supportive Therapy conducted.   Probable discharge is undetermined at this time  Follow-up daily while on inpatient unit

## 2021-10-21 NOTE — GROUP NOTE
Group Therapy Note    Date: 10/21/2021    Group Start Time: 1330  Group End Time: 1430  Group Topic: Recreational    STC DAVID David, CTRS        Group Therapy Note    Attendees:5/14    patient refused to attend leisure and recreation group at [phone] after encouragement from staff. 1:1 talk time provided as alternative to group session.          Signature:  Lesa David, [address]

## 2021-10-21 NOTE — PLAN OF CARE
Problem: Anger Management/Homicidal Ideation:  Goal: Absence of angry outbursts  Description: Absence of angry outbursts  10/20/2021 2129 by Abhijit Weaver LPN  Outcome: Ongoing     Patient has not shown any signs of anger or outburst, I will continue to monitor him closely. Problem: Depressive Behavior With or Without Suicide Precautions:  Goal: Ability to disclose and discuss suicidal ideas will improve  Description: Ability to disclose and discuss suicidal ideas will improve  10/20/2021 2129 by Abhijit Weaver LPN  Outcome: Ongoing    Patient denies any thoughts of suicide at this time, I will continue to monitor him closely.

## 2021-10-21 NOTE — GROUP NOTE
Group Therapy Note    Date: 10/21/2021    Group Start Time: 1105  Group End Time: 1140  Group Topic: Cognitive Skills    [facility] Odon[facility]        Group Therapy Note    Attendees: 6/15    patient refused to attend triggers to anxiety group at [phone] after encouragement from staff. 1:1 talk time provided as alternative to group session.         Signature:  Flakita Odonnell [place]

## 2021-10-22 LAB
ABSOLUTE EOS #: 0.12 K/UL (ref 0–0.4)
ABSOLUTE IMMATURE GRANULOCYTE: ABNORMAL K/UL (ref 0–0.3)
ABSOLUTE LYMPH #: 1.83 K/UL (ref 1–4.8)
ABSOLUTE MONO #: 0.23 K/UL (ref 0.1–1.3)
ALBUMIN SERPL-MCNC: 3.6 G/DL (ref 3.5–5.2)
ALBUMIN/GLOBULIN RATIO: ABNORMAL (ref 1–2.5)
ALP BLD-CCNC: 98 U/L (ref 40–129)
ALT SERPL-CCNC: 13 U/L (ref 5–41)
AMYLASE: 81 U/L (ref 28–100)
ANION GAP SERPL CALCULATED.3IONS-SCNC: 6 MMOL/L (ref 9–17)
AST SERPL-CCNC: 14 U/L
BASOPHILS # BLD: 0 % (ref 0–2)
BASOPHILS ABSOLUTE: 0 K/UL (ref 0–0.2)
BILIRUB SERPL-MCNC: 0.28 MG/DL (ref 0.3–1.2)
BILIRUBIN URINE: NEGATIVE
BUN BLDV-MCNC: 16 MG/DL (ref 8–23)
BUN/CREAT BLD: ABNORMAL (ref 9–20)
CALCIUM SERPL-MCNC: 8.8 MG/DL (ref 8.6–10.4)
CHLORIDE BLD-SCNC: 110 MMOL/L (ref 98–107)
CO2: 29 MMOL/L (ref 20–31)
COLOR: YELLOW
COMMENT UA: NORMAL
CREAT SERPL-MCNC: 1.32 MG/DL (ref 0.7–1.2)
DIFFERENTIAL TYPE: ABNORMAL
EOSINOPHILS RELATIVE PERCENT: 3 % (ref 0–4)
GFR AFRICAN AMERICAN: >60 ML/MIN
GFR NON-AFRICAN AMERICAN: 55 ML/MIN
GFR SERPL CREATININE-BSD FRML MDRD: ABNORMAL ML/MIN/{1.73_M2}
GFR SERPL CREATININE-BSD FRML MDRD: ABNORMAL ML/MIN/{1.73_M2}
GLUCOSE BLD-MCNC: 100 MG/DL (ref 70–99)
GLUCOSE URINE: NEGATIVE
HCT VFR BLD CALC: 37.5 % (ref 41–53)
HEMOGLOBIN: 11.9 G/DL (ref 13.5–17.5)
IMMATURE GRANULOCYTES: ABNORMAL %
KETONES, URINE: NEGATIVE
LEUKOCYTE ESTERASE, URINE: NEGATIVE
LIPASE: 25 U/L (ref 13–60)
LYMPHOCYTES # BLD: 47 % (ref 24–44)
MCH RBC QN AUTO: 28.8 PG (ref 26–34)
MCHC RBC AUTO-ENTMCNC: 31.9 G/DL (ref 31–37)
MCV RBC AUTO: 90.4 FL (ref 80–100)
MONOCYTES # BLD: 6 % (ref 1–7)
MORPHOLOGY: ABNORMAL
NITRITE, URINE: NEGATIVE
NRBC AUTOMATED: ABNORMAL PER 100 WBC
PDW BLD-RTO: 15.2 % (ref 11.5–14.9)
PH UA: 7.5 (ref 5–8)
PLATELET # BLD: 290 K/UL (ref 150–450)
PLATELET ESTIMATE: ABNORMAL
PMV BLD AUTO: 7.4 FL (ref 6–12)
POTASSIUM SERPL-SCNC: 4.7 MMOL/L (ref 3.7–5.3)
PROTEIN UA: NEGATIVE
RBC # BLD: 4.14 M/UL (ref 4.5–5.9)
RBC # BLD: ABNORMAL 10*6/UL
SEG NEUTROPHILS: 44 % (ref 36–66)
SEGMENTED NEUTROPHILS ABSOLUTE COUNT: 1.72 K/UL (ref 1.3–9.1)
SODIUM BLD-SCNC: 145 MMOL/L (ref 135–144)
SPECIFIC GRAVITY UA: 1.02 (ref 1–1.03)
TOTAL PROTEIN: 6 G/DL (ref 6.4–8.3)
TURBIDITY: CLEAR
URINE HGB: NEGATIVE
UROBILINOGEN, URINE: NORMAL
WBC # BLD: 3.9 K/UL (ref 3.5–11)
WBC # BLD: ABNORMAL 10*3/UL

## 2021-10-22 PROCEDURE — 99232 SBSQ HOSP IP/OBS MODERATE 35: CPT | Performed by: PSYCHIATRY & NEUROLOGY

## 2021-10-22 PROCEDURE — 81003 URINALYSIS AUTO W/O SCOPE: CPT

## 2021-10-22 PROCEDURE — 82150 ASSAY OF AMYLASE: CPT

## 2021-10-22 PROCEDURE — 6370000000 HC RX 637 (ALT 250 FOR IP): Performed by: PSYCHIATRY & NEUROLOGY

## 2021-10-22 PROCEDURE — 6370000000 HC RX 637 (ALT 250 FOR IP): Performed by: INTERNAL MEDICINE

## 2021-10-22 PROCEDURE — 85025 COMPLETE CBC W/AUTO DIFF WBC: CPT

## 2021-10-22 PROCEDURE — 83690 ASSAY OF LIPASE: CPT

## 2021-10-22 PROCEDURE — APPSS45 APP SPLIT SHARED TIME 31-45 MINUTES: Performed by: PSYCHIATRY & NEUROLOGY

## 2021-10-22 PROCEDURE — 99223 1ST HOSP IP/OBS HIGH 75: CPT | Performed by: INTERNAL MEDICINE

## 2021-10-22 PROCEDURE — 6370000000 HC RX 637 (ALT 250 FOR IP)

## 2021-10-22 PROCEDURE — 1240000000 HC EMOTIONAL WELLNESS R&B

## 2021-10-22 PROCEDURE — 36415 COLL VENOUS BLD VENIPUNCTURE: CPT

## 2021-10-22 PROCEDURE — 90833 PSYTX W PT W E/M 30 MIN: CPT | Performed by: PSYCHIATRY & NEUROLOGY

## 2021-10-22 PROCEDURE — 80053 COMPREHEN METABOLIC PANEL: CPT

## 2021-10-22 RX ORDER — METRONIDAZOLE 500 MG/1
500 TABLET ORAL EVERY 8 HOURS SCHEDULED
Status: DISCONTINUED | OUTPATIENT
Start: 2021-10-22 | End: 2021-10-24

## 2021-10-22 RX ORDER — QUETIAPINE FUMARATE 200 MG/1
200 TABLET, FILM COATED ORAL NIGHTLY
Status: DISCONTINUED | OUTPATIENT
Start: 2021-10-22 | End: 2021-10-28 | Stop reason: HOSPADM

## 2021-10-22 RX ORDER — CIPROFLOXACIN 500 MG/1
500 TABLET, FILM COATED ORAL EVERY 12 HOURS SCHEDULED
Status: DISCONTINUED | OUTPATIENT
Start: 2021-10-22 | End: 2021-10-24

## 2021-10-22 RX ADMIN — TRAZODONE HYDROCHLORIDE 150 MG: 150 TABLET ORAL at 20:59

## 2021-10-22 RX ADMIN — CIPROFLOXACIN 500 MG: 500 TABLET, FILM COATED ORAL at 20:59

## 2021-10-22 RX ADMIN — PALIPERIDONE 9 MG: 9 TABLET, EXTENDED RELEASE ORAL at 10:49

## 2021-10-22 RX ADMIN — QUETIAPINE FUMARATE 200 MG: 200 TABLET ORAL at 20:59

## 2021-10-22 RX ADMIN — Medication 1000 UNITS: at 10:49

## 2021-10-22 RX ADMIN — HYDROXYZINE HYDROCHLORIDE 50 MG: 50 TABLET, FILM COATED ORAL at 20:59

## 2021-10-22 RX ADMIN — METRONIDAZOLE 500 MG: 500 TABLET ORAL at 20:59

## 2021-10-22 RX ADMIN — METRONIDAZOLE 500 MG: 500 TABLET ORAL at 14:38

## 2021-10-22 RX ADMIN — ESCITALOPRAM OXALATE 15 MG: 10 TABLET, FILM COATED ORAL at 10:50

## 2021-10-22 NOTE — CONSULTS
[facility] Internal Medicine    CONSULTATION / HISTORY AND PHYSICAL EXAMINATION            Date:   10/22/2021  Patient name:  Jessica Garcia  Date of admission:  10/11/2021  2:31 AM  MRN:   [id number]  Account:  [de-identified]  YOB: 1960  PCP:    No primary care provider on file. Room:   [address] [place] [address]  Code Status:    Full Code    Physician Requesting Consult: Meg Oconnor MD    Reason for Consult:  medical management    Chief Complaint:     No chief complaint on file. Abdominal pain  History Obtained From:     Patient medical record nursing staff    History of Present Illness:     Left upper quadrant pain without fever or chills some nausea but no diarrhea no blood in the stool aggravated by palpation better with rest and pain meds    Past Medical History:     Past Medical History:   Diagnosis Date    Bipolar disorder ([address]     Depression     GERD (gastroesophageal reflux disease)     Hallucinations     Headache(784.0)     Hepatitis     Schizophrenia, schizo-affective ([address]     Substance abuse ([facility] [address]     Tobacco abuse     Type II or unspecified type diabetes mellitus without mention of complication, not stated as uncontrolled     Urinary incontinence         Past Surgical History:     Past Surgical History:   Procedure Laterality Date    ABSCESS DRAINAGE N/A 02/11/2018    Carla anal abcess    DENTAL SURGERY      all teeth pulled        Medications Prior to Admission:     Prior to Admission medications    Medication Sig Start Date End Date Taking?  Authorizing Provider   polyethylene glycol (MIRALAX) 17 g packet Take 17 g by mouth 2 times daily as needed for Constipation 10/1/21 10/31/21  Layne Cronin DO   docusate sodium (COLACE) 100 MG capsule Take 1 capsule by mouth 2 times daily as needed for Constipation 10/1/21   Layne Cronin DO   acetaminophen (TYLENOL) 500 MG tablet Take 2 tablets by mouth every 8 hours as needed for Pain 9/30/21 10/3/21 Bradley Sosa DO   ibuprofen (IBU) 400 MG tablet Take 1 tablet by mouth every 6 hours as needed for Pain 9/30/21 10/5/21  Bradley Sosa DO   paliperidone (INVEGA) 6 MG extended release tablet Take 1 tablet by mouth daily 9/29/21   Amber Mckenzie MD   traZODone (DESYREL) 150 MG tablet Take 1 tablet by mouth nightly as needed for Sleep 9/28/21   Amber Mckenzie MD   escitalopram (LEXAPRO) 20 MG tablet Take 1 tablet by mouth daily 9/28/21   Amber Mckenzie MD   paliperidone palmitate ER (INVEGA SUSTENNA) 234 MG/1.5ML [place] IM injection Inject 234 mg into the muscle every 30 days 9/30/21   Amber Mckenzie MD   nicotine polacrilex (NICORETTE) 2 MG gum Take 1 each by mouth every hour as needed for Smoking cessation 9/21/21   Calvin Barillas MD   Cholecalciferol (VITAMIN D) 25 MCG TABS Take 1 tablet by mouth daily 9/22/21   Calvin Barillas MD        Allergies:     Navane [[email or web address]]    Social History:     Tobacco:    reports that he has been smoking cigarettes. He has a 47.00 pack-year smoking history. He has never used smokeless tobacco.  Alcohol:      reports current alcohol use. Drug Use:  reports current drug use. Frequency: 7.00 times per week. Drug: Cocaine. Family History:     Family History   Problem Relation Age of Onset    Diabetes Mother     Heart Disease Mother        Review of Systems:     Positive and Negative as described in HPI. CONSTITUTIONAL:  negative for fevers, chills, sweats, fatigue, weight loss  HEENT:  negative for vision, hearing changes, runny nose, throat pain  RESPIRATORY:  negative for shortness of breath, cough, congestion, wheezing. CARDIOVASCULAR:  negative for chest pain, palpitations.   GASTROINTESTINAL:  negative for nausea, vomiting, diarrhea, constipation, change in bowel habits, abdominal pain   GENITOURINARY:  negative for difficulty of urination, burning with urination, frequency   INTEGUMENT:  negative for rash, skin lesions, easy bruising   HEMATOLOGIC/LYMPHATIC: negative for swelling/edema   ALLERGIC/IMMUNOLOGIC:  negative for urticaria , itching  ENDOCRINE:  negative increase in drinking, increase in urination, hot or cold intolerance  MUSCULOSKELETAL:  negative joint pains, muscle aches, swelling of joints  NEUROLOGICAL:  negative for headaches, dizziness, lightheadedness, numbness, pain, tingling extremities      Physical Exam:     /64   Pulse 82   Temp 97.2 °F (36.2 °C) (Oral)   Resp 14   Ht 6' 3\" (1.905 m)   Wt 199 lb (90.3 kg)   BMI 24.87 kg/m²   Temp (24hrs), Av.2 °F (36.2 °C), Min:97.2 °F (36.2 °C), Max:97.2 °F (36.2 °C)    No results for input(s): POCGLU in the last 72 hours. No intake or output data in the 24 hours ending 10/22/21 1210    General Appearance:  alert, well appearing, and in no acute distress  Mental status: oriented to person, place, and time with normal affect  Head:  normocephalic, atraumatic. Eye: no icterus, redness, pupils equal and reactive, extraocular eye movements intact, conjunctiva clear  Ear: normal external ear, no discharge, hearing intact  Nose:  no drainage noted  Mouth: mucous membranes moist  Neck: supple, no carotid bruits, thyroid not palpable  Lungs: Bilateral equal air entry, clear to ausculation, no wheezing, rales or rhonchi, normal effort  Cardiovascular: normal rate, regular rhythm, no murmur, gallop, rub.   Abdomen: Soft, nontender, nondistended, normal bowel sounds, no hepatomegaly or splenomegaly  Neurologic: There are no new focal motor or sensory deficits, normal muscle tone and bulk, no abnormal sensation, normal speech, cranial nerves II through XII grossly intact  Skin: No gross lesions, rashes, bruising or bleeding on exposed skin area  Extremities:  peripheral pulses palpable, no pedal edema or calf pain with palpation      Investigations:      Laboratory Testing:  Recent Results (from the past 24 hour(s))   Amylase    Collection Time: 10/22/21  9:43 AM   Result Value Ref Range    Amylase 81 28 - 100 U/L   CBC Auto Differential    Collection Time: 10/22/21  9:43 AM   Result Value Ref Range    WBC 3.9 3.5 - 11.0 k/uL    RBC 4.14 (L) 4.5 - 5.9 m/uL    Hemoglobin 11.9 (L) 13.5 - 17.5 g/dL    Hematocrit 37.5 (L) 41 - 53 %    MCV 90.4 80 - 100 fL    MCH 28.8 26 - 34 pg    MCHC 31.9 31 - 37 g/dL    RDW 15.2 (H) 11.5 - 14.9 %    Platelets 050 146 - 253 k/uL    MPV 7.4 6.0 - 12.0 fL    NRBC Automated NOT REPORTED per 100 WBC    Differential Type NOT REPORTED     Immature Granulocytes NOT REPORTED 0 %    Absolute Immature Granulocyte NOT REPORTED 0.00 - 0.30 k/uL    WBC Morphology NOT REPORTED     RBC Morphology NOT REPORTED     Platelet Estimate NOT REPORTED     Seg Neutrophils 44 36 - 66 %    Lymphocytes 47 (H) 24 - 44 %    Monocytes 6 1 - 7 %    Eosinophils % 3 0 - 4 %    Basophils 0 0 - 2 %    Segs Absolute 1.72 1.3 - 9.1 k/uL    Absolute Lymph # 1.83 1.0 - 4.8 k/uL    Absolute Mono # 0.23 0.1 - 1.3 k/uL    Absolute Eos # 0.12 0.0 - 0.4 k/uL    Basophils Absolute 0.00 0.0 - 0.2 k/uL    Morphology ANISOCYTOSIS PRESENT    Comprehensive Metabolic Panel w/ Reflex to MG    Collection Time: 10/22/21  9:43 AM   Result Value Ref Range    Glucose 100 (H) 70 - 99 mg/dL    BUN 16 8 - 23 mg/dL    CREATININE 1.32 (H) 0.70 - 1.20 mg/dL    Bun/Cre Ratio NOT REPORTED 9 - 20    Calcium 8.8 8.6 - 10.4 mg/dL    Sodium 145 (H) 135 - 144 mmol/L    Potassium 4.7 3.7 - 5.3 mmol/L    Chloride 110 (H) 98 - 107 mmol/L    CO2 29 20 - 31 mmol/L    Anion Gap 6 (L) 9 - 17 mmol/L    Alkaline Phosphatase 98 40 - 129 U/L    ALT 13 5 - 41 U/L    AST 14 <40 U/L    Total Bilirubin 0.28 (L) 0.3 - 1.2 mg/dL    Total Protein 6.0 (L) 6.4 - 8.3 g/dL    Albumin 3.6 3.5 - 5.2 g/dL    Albumin/Globulin Ratio NOT REPORTED 1.0 - 2.5    GFR Non-African American 55 (L) >60 mL/min    GFR African American >60 >60 mL/min    GFR Comment          GFR Staging NOT REPORTED    Lipase    Collection Time: 10/22/21  9:43 AM   Result Value Ref Range    Lipase 25 13 - 60 U/L   Urinalysis Reflex to Culture    Collection Time: 10/22/21 11:51 AM    Specimen: Urine, clean catch   Result Value Ref Range    Color, UA Yellow Yellow    Turbidity UA Clear Clear    Glucose, Ur NEGATIVE NEGATIVE    Bilirubin Urine NEGATIVE NEGATIVE    Ketones, Urine NEGATIVE NEGATIVE    Specific Gravity, UA 1.024 1.000 - 1.030    Urine Hgb NEGATIVE NEGATIVE    pH, UA 7.5 5.0 - 8.0    Protein, UA NEGATIVE NEGATIVE    Urobilinogen, Urine Normal Normal    Nitrite, Urine NEGATIVE NEGATIVE    Leukocyte Esterase, Urine NEGATIVE NEGATIVE    Urinalysis Comments       Microscopic exam not performed based on chemical results unless requested in original order. Consultations:   IP CONSULT TO INTERNAL MEDICINE  IP CONSULT TO INTERNAL MEDICINE  Assessment :      Primary Problem  Schizoaffective disorder, depressive type [facility])    Active Hospital Problems    Diagnosis Date Noted    Acute psychosis ([facility] [address] [F23] 03/10/2021    Schizoaffective disorder, depressive type ([facility] [F25.1] 09/23/2017       Plan:     79-year-old gentleman complains of 1 day history of left upper quadrant pain without any fever chills no blood in the stool  Clinically diverticulitis of the transverse colon  We will start oral Cipro and Flagyl  Abnormal CT of the bladder noted earlier this month will need outpatient cystoscopy  Acute kidney injury creatinine 1.3 oral hydration avoid any NSAIDs        Hemal Haider MD  10/22/2021  12:10 PM    Copy sent to Dr. Lena Mathur primary care provider on file. Please note that this chart was generated using voice recognition Dragon dictation software. Although every effort was made to ensure the accuracy of this automated transcription, some errors in transcription may have occurred.

## 2021-10-22 NOTE — CARE COORDINATION
Pt reported he did not have AOD folder. Writer provided AOD folder and explained how to contact facilities.

## 2021-10-22 NOTE — GROUP NOTE
Group Therapy Note    Date: 10/22/2021    Group Start Time: 9897  Group End Time: 8566  Group Topic: Community Meeting    [address], [address]    Patient refused to attend community meeting and goal setting group at [phone] after encouragement from staff. 1:1 talk time offered by staff as alternative to group session.

## 2021-10-22 NOTE — PLAN OF CARE
Problem: Anger Management/Homicidal Ideation:  Goal: Absence of angry outbursts  Description: Absence of angry outbursts       Problem: Depressive Behavior With or Without Suicide Precautions:  Goal: Ability to disclose and discuss suicidal ideas will improve  Description: Ability to disclose and discuss suicidal ideas will improve     Pt denies suicidal ideations at this time. Pt agreed to seek staff at anytime he felt like any urges to harm self would arise. Safety checks maintained [email or web address]. Pt remains free from self harm this shift. Pt denies wanting to cause harm to self or others at this time. Pt encouraged to seek nursing staff at anytime if he felt at danger to themselves or others. Pt states understanding. Safety checks maintained [id number]    Patient is complaining of anxiety at this time. Stating that they feel restless and are having trouble sleeping and calming down in order to rest this evening. Medication was given as prescribed for increased anxiety see MAR. Pt out only for needs. Bizarre affect and posture. Denies SI/HI. Medication compliant and behavioral controlled.

## 2021-10-22 NOTE — PROGRESS NOTES
Daily Progress Note  10/22/2021    Patient Name: Oscar Holly    CHIEF COMPLAINT: Suicidal ideation with command auditory hallucinations, visual hallucinations and homicidal ideation         SUBJECTIVE:    Patient seen for follow-up assessment. Nursing staff report the patient has maintained medication adherence and has been without acute behavioral changes in the last 24 hours. Mr. Karel Cheng continues to endorse auditory hallucinations and reports \"they are every bit as loud as like talking to you now \". He offers that these are command in nature and disturbing instructing him to harm himself and that he is worthless. He is unable to articulate a medication that has benefited these hallucinations ever and is unwilling to explore any medications that require injections as he states \"I hate needles \". He denies side effects related to his current medications and does report improved sleep last night with titration of Seroquel. Mr. Karel Cheng is willing to get out of bed and ambulate with writer where he talks about playing ball and that his appetite has improved. He additionally shares that he would consider living at a nursing home as he feels he has limited resources. He is confused about the green folder that has been provided as he believes those are nursing homes and he is reminded that those are rehabilitation services and he agrees to attempt to call later in the afternoon independently. Patient continues to endorse fleeting suicidal ideation as it relates to these auditory hallucinations. He contracts for safety on the unit but is unable to confirm that he would be safe in the community. At this time inpatient hospitalization is required for stabilization as no lower level of care is currently appropriate.       Appetite:  [x] Normal/Adequate/Unchanged  [] Increased  [] Decreased      Sleep:       [] Normal/Adequate/Unchanged  [x] Fair /some improvement[] Poor      Group Attendance on Unit:   [] Yes  [] Selectively    [x] No    Medication Side Effects:  Patient denies any medication side effects at the time of assessment. Mental Status Exam  Level of consciousness: Alert and awake. Appearance: Appropriate attire for setting, resting in bed and then ambulating the milieu, with poor grooming and hygiene, disheveled, encouraged to shower  Behavior/Motor: Approachable, calm psychomotor slowing  Attitude toward examiner: Cooperative, attentive, fair eye contact. Speech: Delayed responses, rate, low volume, normal tone, fair articulation. Mood: \" bad\"  Affect: Blunted  Thought processes: Thought blocking  Thought content: Denies homicidal ideation. Suicidal Ideation: Fleeting suicidal ideations, without current plan or intent, contracts for safety on the unit. Delusions: Endorses paranoia  Perceptual Disturbance: Endorses auditory hallucinations, command in nature  Cognition: Oriented to self, location, time, and situation. Memory: Intact. Insight & Judgement: Poor. Data   height is 6' 3\" (1.905 m) and weight is 199 lb (90.3 kg). His oral temperature is 97.2 °F (36.2 °C). His blood pressure is 100/64 and his pulse is 82. His respiration is 14. Labs:   Admission on 10/11/2021   Component Date Value Ref Range Status    Hemoglobin A1C 10/13/2021 5.0  4.0 - 6.0 % Final    Estimated Avg Glucose 10/13/2021 97  mg/dL Final    Comment: The ADA and AACC recommend providing the estimated average glucose result to permit better   patient understanding of their HBA1c result.       Cholesterol 10/13/2021 150  <200 mg/dL Final    Comment:    Cholesterol Guidelines:      <200  Desirable   200-240  Borderline      >240  Undesirable         HDL 10/13/2021 62  >40 mg/dL Final    Comment:    HDL Guidelines:    <40     Undesirable   40-59    Borderline    >59     Desirable         LDL Cholesterol 10/13/2021 80  0 - 130 mg/dL Final    Comment:    LDL Guidelines:     <100    Desirable   100-129   Near to/above Desirable   130-159   Borderline      >159   Undesirable     Direct (measured) LDL and calculated LDL are not interchangeable tests.  Chol/HDL Ratio 10/13/2021 2.4  <5 Final            Triglycerides 10/13/2021 41  <150 mg/dL Final    Comment:    Triglyceride Guidelines:     <150   Desirable   150-199  Borderline   200-499  High     >499   Very high   Based on AHA Guidelines for fasting triglyceride, October 2012.  VLDL 10/13/2021 NOT REPORTED  1 - 30 mg/dL Final    Iron 10/14/2021 121  59 - 158 ug/dL Final    TIBC 10/14/2021 308  250 - 450 ug/dL Final    Iron Saturation 10/14/2021 39  20 - 55 % Final    UIBC 10/14/2021 187  112 - 347 ug/dL Final    Ferritin 10/14/2021 77  30 - 400 ug/L Final    Glucose 10/14/2021 99  70 - 99 mg/dL Final    BUN 10/14/2021 14  8 - 23 mg/dL Final    CREATININE 10/14/2021 1.03  0.70 - 1.20 mg/dL Final    Bun/Cre Ratio 10/14/2021 NOT REPORTED  9 - 20 Final    Calcium 10/14/2021 8.8  8.6 - 10.4 mg/dL Final    Sodium 10/14/2021 144  135 - 144 mmol/L Final    Potassium 10/14/2021 4.1  3.7 - 5.3 mmol/L Final    Chloride 10/14/2021 109* 98 - 107 mmol/L Final    CO2 10/14/2021 27  20 - 31 mmol/L Final    Anion Gap 10/14/2021 8* 9 - 17 mmol/L Final    GFR Non- 10/14/2021 >60  >60 mL/min Final    GFR  10/14/2021 >60  >60 mL/min Final    GFR Comment 10/14/2021        Final    Comment: Average GFR for 61-76 years old:   80 mL/min/1.73sq m  Chronic Kidney Disease:   <60 mL/min/1.73sq m  Kidney failure:   <15 mL/min/1.73sq m              eGFR calculated using average adult body mass.  Additional eGFR calculator available at:        [employer]            GFR Staging 10/14/2021 NOT REPORTED   Final    Color, UA 10/22/2021 Yellow  Yellow Final    Turbidity UA 10/22/2021 Clear  Clear Final    Glucose, Ur 10/22/2021 NEGATIVE  NEGATIVE Final    Bilirubin Urine 10/22/2021 NEGATIVE  NEGATIVE Final    Ketones, Urine 10/22/2021 NEGATIVE  NEGATIVE Final    Specific [place], UA 10/22/2021 1.024  1.000 - 1.030 Final    Urine Hgb 10/22/2021 NEGATIVE  NEGATIVE Final    pH, UA 10/22/2021 7.5  5.0 - 8.0 Final    Protein, UA 10/22/2021 NEGATIVE  NEGATIVE Final    Urobilinogen, Urine 10/22/2021 Normal  Normal Final    Nitrite, Urine 10/22/2021 NEGATIVE  NEGATIVE Final    Leukocyte Esterase, Urine 10/22/2021 NEGATIVE  NEGATIVE Final    Urinalysis Comments 10/22/2021 Microscopic exam not performed based on chemical results unless requested in original order.    Final    Amylase 10/22/2021 81  28 - 100 U/L Final    WBC 10/22/2021 3.9  3.5 - 11.0 k/uL Final    RBC 10/22/2021 4.14* 4.5 - 5.9 m/uL Final    Hemoglobin 10/22/2021 11.9* 13.5 - 17.5 g/dL Final    Hematocrit 10/22/2021 37.5* 41 - 53 % Final    MCV 10/22/2021 90.4  80 - 100 fL Final    MCH 10/22/2021 28.8  26 - 34 pg Final    MCHC 10/22/2021 31.9  31 - 37 g/dL Final    RDW 10/22/2021 15.2* 11.5 - 14.9 % Final    Platelets 07/72/5817 290  150 - 450 k/uL Final    MPV 10/22/2021 7.4  6.0 - 12.0 fL Final    NRBC Automated 10/22/2021 NOT REPORTED  per 100 WBC Final    Differential Type 10/22/2021 NOT REPORTED   Final    Immature Granulocytes 10/22/2021 NOT REPORTED  0 % Final    Absolute Immature Granulocyte 10/22/2021 NOT REPORTED  0.00 - 0.30 k/uL Final    WBC Morphology 10/22/2021 NOT REPORTED   Final    RBC Morphology 10/22/2021 NOT REPORTED   Final    Platelet Estimate 80/21/1832 NOT REPORTED   Final    Seg Neutrophils 10/22/2021 44  36 - 66 % Final    Lymphocytes 10/22/2021 47* 24 - 44 % Final    Monocytes 10/22/2021 6  1 - 7 % Final    Eosinophils % 10/22/2021 3  0 - 4 % Final    Basophils 10/22/2021 0  0 - 2 % Final    Segs Absolute 10/22/2021 1.72  1.3 - 9.1 k/uL Final    Absolute Lymph # 10/22/2021 1.83  1.0 - 4.8 k/uL Final    Absolute Mono # 10/22/2021 0.23  0.1 - 1.3 k/uL Final    Absolute Eos # 10/22/2021 0.12  0.0 - 0.4 k/uL Final    Basophils Absolute 10/22/2021 0.00  0.0 - 0.2 k/uL Final    Morphology 10/22/2021 ANISOCYTOSIS PRESENT   Final    Glucose 10/22/2021 100* 70 - 99 mg/dL Final    BUN 10/22/2021 16  8 - 23 mg/dL Final    CREATININE 10/22/2021 1.32* 0.70 - 1.20 mg/dL Final    Bun/Cre Ratio 10/22/2021 NOT REPORTED  9 - 20 Final    Calcium 10/22/2021 8.8  8.6 - 10.4 mg/dL Final    Sodium 10/22/2021 145* 135 - 144 mmol/L Final    Potassium 10/22/2021 4.7  3.7 - 5.3 mmol/L Final    Chloride 10/22/2021 110* 98 - 107 mmol/L Final    CO2 10/22/2021 29  20 - 31 mmol/L Final    Anion Gap 10/22/2021 6* 9 - 17 mmol/L Final    Alkaline Phosphatase 10/22/2021 98  40 - 129 U/L Final    ALT 10/22/2021 13  5 - 41 U/L Final    AST 10/22/2021 14  <40 U/L Final    Total Bilirubin 10/22/2021 0.28* 0.3 - 1.2 mg/dL Final    Total Protein 10/22/2021 6.0* 6.4 - 8.3 g/dL Final    Albumin 10/22/2021 3.6  3.5 - 5.2 g/dL Final    Albumin/Globulin Ratio 10/22/2021 NOT REPORTED  1.0 - 2.5 Final    GFR Non- 10/22/2021 55* >60 mL/min Final    GFR  10/22/2021 >60  >60 mL/min Final    GFR Comment 10/22/2021        Final    Comment: Average GFR for 61-76 years old:   80 mL/min/1.73sq m  Chronic Kidney Disease:   <60 mL/min/1.73sq m  Kidney failure:   <15 mL/min/1.73sq m              eGFR calculated using average adult body mass. Additional eGFR calculator available at:        [employer]            GFR Staging 10/22/2021 NOT REPORTED   Final    Lipase 10/22/2021 25  13 - 60 U/L Final         Reviewed patient's current plan of care and vital signs with nursing staff.     Labs reviewed: [x] Yes  Last EKG in EMR reviewed: [x] Yes  QTc: 484    Medications  Current Facility-Administered Medications: ciprofloxacin (CIPRO) tablet 500 mg, 500 mg, Oral, 2 times per day  metroNIDAZOLE (FLAGYL) tablet 500 mg, 500 mg, Oral, 3 times per day  QUEtiapine (SEROQUEL) tablet 150 mg, 150 mg, Oral, Nightly  dicyclomine (BENTYL) capsule 10 mg, 10 mg, Oral, TID PRN  escitalopram (LEXAPRO) tablet 15 mg, 15 mg, Oral, Daily  paliperidone (INVEGA) extended release tablet 9 mg, 9 mg, Oral, Daily  acetaminophen (TYLENOL) tablet 650 mg, 650 mg, Oral, Q4H PRN  aluminum & magnesium hydroxide-simethicone (MAALOX) 200-200-20 MG/5ML suspension 30 mL, 30 mL, Oral, Q6H PRN  haloperidol lactate (HALDOL) injection 5 mg, 5 mg, IntraMUSCular, Q4H PRN **AND** LORazepam (ATIVAN) injection 2 mg, 2 mg, IntraMUSCular, Q4H PRN **AND** diphenhydrAMINE (BENADRYL) injection 25 mg, 25 mg, IntraMUSCular, Q4H PRN  hydrOXYzine (ATARAX) tablet 50 mg, 50 mg, Oral, TID PRN  haloperidol (HALDOL) tablet 5 mg, 5 mg, Oral, Q4H PRN **AND** LORazepam (ATIVAN) tablet 2 mg, 2 mg, Oral, Q4H PRN  traZODone (DESYREL) tablet 50 mg, 50 mg, Oral, Nightly PRN  polyethylene glycol (GLYCOLAX) packet 17 g, 17 g, Oral, Daily PRN  nicotine polacrilex (NICORETTE) gum 2 mg, 2 mg, Oral, PRN  ibuprofen (ADVIL;MOTRIN) tablet 400 mg, 400 mg, Oral, Q6H PRN  Vitamin D (CHOLECALCIFEROL) tablet 1,000 Units, 1,000 Units, Oral, Daily  docusate sodium (COLACE) capsule 100 mg, 100 mg, Oral, BID PRN  traZODone (DESYREL) tablet 150 mg, 150 mg, Oral, Nightly    ASSESSMENT  Schizoaffective disorder, depressive type ([facility] [address]         PLAN  Patient symptoms are: Remains Unstable. Consider titrating Seroquel to 200 mg nightly  Monitor need and frequency of PRN medications. Encourage participation in groups and milieu. Attempt to develop insight. Psycho-education conducted. Supportive Therapy conducted. Probable discharge is to be determined by MD.   Follow-up daily while inpatient. Patient continues to be monitored in the inpatient psychiatric facility at [facility] for safety and stabilization. Patient continues to need, on a daily basis, active treatment furnished directly by or requiring the supervision of inpatient psychiatric personnel.     Electronically signed by Katt Roy, MARY - CNP on 10/22/2021 at 3:04 PM    **This report has been created using voice recognition software. It may contain minor errors which are inherent in voice recognition technology. **    I independently saw and evaluated the patient. I reviewed the nurse practitioners documentation above. Any additional comments or changes to the nurse practitioners documentation are stated below otherwise agree with assessment. Plan will be as follows:  Spent 30 minutes with the patient, of that greater than 16 minutes was spent in supportive psychotherapy. Patient is denying side effects to medication. Reported limited improvement with increase in Seroquel last night. Agreeable to increase further. Appreciate internal medicine input. Will discontinue ibuprofen and monitor kidney function  PLAN  Patient s symptoms   show no change  Increase Seroquel to 200 mg mouth at bedtime  Attempt to develop insight  Psycho-education conducted. Supportive Therapy conducted.   Probable discharge is undetermined at this time  Follow-up daily while on inpatient unit

## 2021-10-22 NOTE — GROUP NOTE
Group Therapy Note    Date: 10/22/2021    Group Start Time: 1030  Group End Time: 1130  Group Topic: Psychoeducation    JESUS BHFRANKLIN Andrade LSW        Group Therapy Note    patient refused to attend psychoeducational group at 10:30am after encouragement from staff.       Signature:  FRANKLIN Nesbitt LSW

## 2021-10-22 NOTE — GROUP NOTE
Group Therapy Note    Date: 10/22/2021    Group Start Time: 4383  Group End Time: 4043  Group Topic: Psychoeducation    JESUS Randhawa, GADIELS    Group Therapy Note    Attendees: 6    Patient's Goal:  Pt will identify healthy coping skills and how to avoid negative coping    Notes:  Pt attended group and participated    Status After Intervention:  Improved    Participation Level:  Active Listener and Interactive    Participation Quality: Appropriate, Attentive, Sharing and Supportive      Speech:  normal      Thought Process/Content: Logical  Linear      Affective Functioning: Constricted      Mood: euthymic      Level of consciousness:  Alert, Oriented x4 and Attentive      Response to Learning: Able to verbalize current knowledge/experience, Able to verbalize/acknowledge new learning, Able to retain information      Endings: None Reported    Modes of Intervention: Education, Support, Socialization, Exploration and Activity      Discipline Responsible: Psychoeducational Specialist      Signature:  Tea Conner, [address]

## 2021-10-23 ENCOUNTER — APPOINTMENT (OUTPATIENT)
Dept: CT IMAGING | Age: 61
DRG: 750 | End: 2021-10-23
Attending: PSYCHIATRY & NEUROLOGY
Payer: MEDICAID

## 2021-10-23 PROCEDURE — 99232 SBSQ HOSP IP/OBS MODERATE 35: CPT | Performed by: PSYCHIATRY & NEUROLOGY

## 2021-10-23 PROCEDURE — APPSS30 APP SPLIT SHARED TIME 16-30 MINUTES: Performed by: PSYCHIATRY & NEUROLOGY

## 2021-10-23 PROCEDURE — 6370000000 HC RX 637 (ALT 250 FOR IP): Performed by: INTERNAL MEDICINE

## 2021-10-23 PROCEDURE — 99232 SBSQ HOSP IP/OBS MODERATE 35: CPT | Performed by: INTERNAL MEDICINE

## 2021-10-23 PROCEDURE — 6370000000 HC RX 637 (ALT 250 FOR IP): Performed by: PSYCHIATRY & NEUROLOGY

## 2021-10-23 PROCEDURE — 1240000000 HC EMOTIONAL WELLNESS R&B

## 2021-10-23 PROCEDURE — 6370000000 HC RX 637 (ALT 250 FOR IP)

## 2021-10-23 PROCEDURE — 6370000000 HC RX 637 (ALT 250 FOR IP): Performed by: NURSE PRACTITIONER

## 2021-10-23 RX ADMIN — DICYCLOMINE HYDROCHLORIDE 10 MG: 10 CAPSULE ORAL at 19:26

## 2021-10-23 RX ADMIN — METRONIDAZOLE 500 MG: 500 TABLET ORAL at 07:07

## 2021-10-23 RX ADMIN — QUETIAPINE FUMARATE 200 MG: 200 TABLET ORAL at 20:38

## 2021-10-23 RX ADMIN — ESCITALOPRAM OXALATE 15 MG: 10 TABLET, FILM COATED ORAL at 09:51

## 2021-10-23 RX ADMIN — TRAZODONE HYDROCHLORIDE 150 MG: 150 TABLET ORAL at 20:38

## 2021-10-23 RX ADMIN — METRONIDAZOLE 500 MG: 500 TABLET ORAL at 14:58

## 2021-10-23 RX ADMIN — PALIPERIDONE 9 MG: 9 TABLET, EXTENDED RELEASE ORAL at 09:51

## 2021-10-23 RX ADMIN — Medication 1000 UNITS: at 09:51

## 2021-10-23 RX ADMIN — CIPROFLOXACIN 500 MG: 500 TABLET, FILM COATED ORAL at 20:38

## 2021-10-23 RX ADMIN — CIPROFLOXACIN 500 MG: 500 TABLET, FILM COATED ORAL at 07:03

## 2021-10-23 RX ADMIN — METRONIDAZOLE 500 MG: 500 TABLET ORAL at 20:38

## 2021-10-23 ASSESSMENT — PAIN DESCRIPTION - LOCATION: LOCATION: ABDOMEN

## 2021-10-23 ASSESSMENT — PAIN SCALES - GENERAL: PAINLEVEL_OUTOF10: 6

## 2021-10-23 ASSESSMENT — PAIN DESCRIPTION - PAIN TYPE: TYPE: OTHER (COMMENT)

## 2021-10-23 NOTE — PLAN OF CARE
Problem: Anger Management/Homicidal Ideation:  Goal: Absence of angry outbursts  Description: Absence of angry outbursts  10/23/2021 1116 by Carmen Mccarty LPN  Outcome: Ongoing  Patient remains free from angry outbursts. Patient denies homicidal ideation at this time. Problem: Depressive Behavior With or Without Suicide Precautions:  Goal: Ability to disclose and discuss suicidal ideas will improve  Description: Ability to disclose and discuss suicidal ideas will improve  10/23/2021 1116 by Carmen Mccarty LPN  Outcome: Ongoing  Patient endorses depression and hopelessness, but denies all else.

## 2021-10-23 NOTE — PROGRESS NOTES
Daily Progress Note  10/23/2021    Patient Name: Tori Stanley    CHIEF COMPLAINT: Suicidal ideation with command auditory hallucinations, visual hallucinations and homicidal ideation         SUBJECTIVE:    Patient seen for follow-up assessment. Nursing staff report the patient has maintained medication adherence and has been without acute behavioral changes in the last 24 hours. Mr. Bryanna Tony is agreeable to assessment at bedside as he has remained largely isolative this morning to his room. He is less willing to engage in assessment Edwards and declines offer to ambulate milieu. He does confirm that he made several telephone calls yesterday and spoke to 2 facilities that were provided to him in the \"green folder \". He offers that social work will help him today at noon to explore additional information regarding transitioning back to the community. Patient continues to endorse fleeting suicidal ideation and contracts for safety on the unit. He continues to endorse auditory hallucinations that are currently less distressing and offers \"quieter than yesterday \". Patient denies side effects related to his current medications or having questions or concerns regarding his current plan of care      Appetite:  [x] Normal/Adequate/Unchanged  [] Increased  [] Decreased      Sleep:       [] Normal/Adequate/Unchanged  [x] Fair /some improvement[] Poor      Group Attendance on Unit:   [] Yes  [] Selectively    [x] No    Medication Side Effects:  Patient denies any medication side effects at the time of assessment. Mental Status Exam  Level of consciousness: Resting, responds to verbal stimuli  Appearance: Appropriate attire for setting, laying in bed , with poor grooming and hygiene, disheveled, encouraged to shower  Behavior/Motor: Approachable, calm psychomotor slowing  Attitude toward examiner: Cooperative, no eye contact today as he refused to uncover the sheet from his face.   Speech: Delayed responses, rate, low volume, normal tone, fair articulation. Mood: \" Still bad\"  Affect: Blunted  Thought processes: More linear today, goal-directed, slowed  Thought content: Denies homicidal ideation. Suicidal Ideation: Fleeting suicidal ideations, without current plan or intent, contracts for safety on the unit. Delusions: Endorses paranoia  Perceptual Disturbance: Endorses auditory hallucinations, command in nature  Cognition: Oriented to self, location, time, and situation. Memory: Intact. Insight & Judgement: Poor. Data   height is 6' 3\" (1.905 m) and weight is 199 lb (90.3 kg). His temporal temperature is 97.1 °F (36.2 °C). His blood pressure is 97/60 and his pulse is 100. His respiration is 16. Labs:   Admission on 10/11/2021   Component Date Value Ref Range Status    Hemoglobin A1C 10/13/2021 5.0  4.0 - 6.0 % Final    Estimated Avg Glucose 10/13/2021 97  mg/dL Final    Comment: The ADA and AACC recommend providing the estimated average glucose result to permit better   patient understanding of their HBA1c result.  Cholesterol 10/13/2021 150  <200 mg/dL Final    Comment:    Cholesterol Guidelines:      <200  Desirable   200-240  Borderline      >240  Undesirable         HDL 10/13/2021 62  >40 mg/dL Final    Comment:    HDL Guidelines:    <40     Undesirable   40-59    Borderline    >59     Desirable         LDL Cholesterol 10/13/2021 80  0 - 130 mg/dL Final    Comment:    LDL Guidelines:     <100    Desirable   100-129   Near to/above Desirable   130-159   Borderline      >159   Undesirable     Direct (measured) LDL and calculated LDL are not interchangeable tests.  Chol/HDL Ratio 10/13/2021 2.4  <5 Final            Triglycerides 10/13/2021 41  <150 mg/dL Final    Comment:    Triglyceride Guidelines:     <150   Desirable   150-199  Borderline   200-499  High     >499   Very high   Based on AHA Guidelines for fasting triglyceride, October 2012.          VLDL 10/13/2021 NOT REPORTED  1 - 30 mg/dL Final    Iron 10/14/2021 121  59 - 158 ug/dL Final    TIBC 10/14/2021 308  250 - 450 ug/dL Final    Iron Saturation 10/14/2021 39  20 - 55 % Final    UIBC 10/14/2021 187  112 - 347 ug/dL Final    Ferritin 10/14/2021 77  30 - 400 ug/L Final    Glucose 10/14/2021 99  70 - 99 mg/dL Final    BUN 10/14/2021 14  8 - 23 mg/dL Final    CREATININE 10/14/2021 1.03  0.70 - 1.20 mg/dL Final    Bun/Cre Ratio 10/14/2021 NOT REPORTED  9 - 20 Final    Calcium 10/14/2021 8.8  8.6 - 10.4 mg/dL Final    Sodium 10/14/2021 144  135 - 144 mmol/L Final    Potassium 10/14/2021 4.1  3.7 - 5.3 mmol/L Final    Chloride 10/14/2021 109* 98 - 107 mmol/L Final    CO2 10/14/2021 27  20 - 31 mmol/L Final    Anion Gap 10/14/2021 8* 9 - 17 mmol/L Final    GFR Non- 10/14/2021 >60  >60 mL/min Final    GFR  10/14/2021 >60  >60 mL/min Final    GFR Comment 10/14/2021        Final    Comment: Average GFR for 61-76 years old:   80 mL/min/1.73sq m  Chronic Kidney Disease:   <60 mL/min/1.73sq m  Kidney failure:   <15 mL/min/1.73sq m              eGFR calculated using average adult body mass.  Additional eGFR calculator available at:        [employer]            GFR Staging 10/14/2021 NOT REPORTED   Final    Color, UA 10/22/2021 Yellow  Yellow Final    Turbidity UA 10/22/2021 Clear  Clear Final    Glucose, Ur 10/22/2021 NEGATIVE  NEGATIVE Final    Bilirubin Urine 10/22/2021 NEGATIVE  NEGATIVE Final    Ketones, Urine 10/22/2021 NEGATIVE  NEGATIVE Final    Specific [place], UA 10/22/2021 1.024  1.000 - 1.030 Final    Urine Hgb 10/22/2021 NEGATIVE  NEGATIVE Final    pH, UA 10/22/2021 7.5  5.0 - 8.0 Final    Protein, UA 10/22/2021 NEGATIVE  NEGATIVE Final    Urobilinogen, Urine 10/22/2021 Normal  Normal Final    Nitrite, Urine 10/22/2021 NEGATIVE  NEGATIVE Final    Leukocyte Esterase, Urine 10/22/2021 NEGATIVE  NEGATIVE Final    Urinalysis Comments 10/22/2021 Microscopic exam not performed based on chemical results unless requested in original order.    Final    Amylase 10/22/2021 81  28 - 100 U/L Final    WBC 10/22/2021 3.9  3.5 - 11.0 k/uL Final    RBC 10/22/2021 4.14* 4.5 - 5.9 m/uL Final    Hemoglobin 10/22/2021 11.9* 13.5 - 17.5 g/dL Final    Hematocrit 10/22/2021 37.5* 41 - 53 % Final    MCV 10/22/2021 90.4  80 - 100 fL Final    MCH 10/22/2021 28.8  26 - 34 pg Final    MCHC 10/22/2021 31.9  31 - 37 g/dL Final    RDW 10/22/2021 15.2* 11.5 - 14.9 % Final    Platelets 93/99/4051 290  150 - 450 k/uL Final    MPV 10/22/2021 7.4  6.0 - 12.0 fL Final    NRBC Automated 10/22/2021 NOT REPORTED  per 100 WBC Final    Differential Type 10/22/2021 NOT REPORTED   Final    Immature Granulocytes 10/22/2021 NOT REPORTED  0 % Final    Absolute Immature Granulocyte 10/22/2021 NOT REPORTED  0.00 - 0.30 k/uL Final    WBC Morphology 10/22/2021 NOT REPORTED   Final    RBC Morphology 10/22/2021 NOT REPORTED   Final    Platelet Estimate 40/46/7622 NOT REPORTED   Final    Seg Neutrophils 10/22/2021 44  36 - 66 % Final    Lymphocytes 10/22/2021 47* 24 - 44 % Final    Monocytes 10/22/2021 6  1 - 7 % Final    Eosinophils % 10/22/2021 3  0 - 4 % Final    Basophils 10/22/2021 0  0 - 2 % Final    Segs Absolute 10/22/2021 1.72  1.3 - 9.1 k/uL Final    Absolute Lymph # 10/22/2021 1.83  1.0 - 4.8 k/uL Final    Absolute Mono # 10/22/2021 0.23  0.1 - 1.3 k/uL Final    Absolute Eos # 10/22/2021 0.12  0.0 - 0.4 k/uL Final    Basophils Absolute 10/22/2021 0.00  0.0 - 0.2 k/uL Final    Morphology 10/22/2021 ANISOCYTOSIS PRESENT   Final    Glucose 10/22/2021 100* 70 - 99 mg/dL Final    BUN 10/22/2021 16  8 - 23 mg/dL Final    CREATININE 10/22/2021 1.32* 0.70 - 1.20 mg/dL Final    Bun/Cre Ratio 10/22/2021 NOT REPORTED  9 - 20 Final    Calcium 10/22/2021 8.8  8.6 - 10.4 mg/dL Final    Sodium 10/22/2021 145* 135 - 144 mmol/L Final    Potassium 10/22/2021 4.7  3.7 - 5.3 mmol/L Final    Chloride 10/22/2021 110* 98 - 107 mmol/L Final    CO2 10/22/2021 29  20 - 31 mmol/L Final    Anion Gap 10/22/2021 6* 9 - 17 mmol/L Final    Alkaline Phosphatase 10/22/2021 98  40 - 129 U/L Final    ALT 10/22/2021 13  5 - 41 U/L Final    AST 10/22/2021 14  <40 U/L Final    Total Bilirubin 10/22/2021 0.28* 0.3 - 1.2 mg/dL Final    Total Protein 10/22/2021 6.0* 6.4 - 8.3 g/dL Final    Albumin 10/22/2021 3.6  3.5 - 5.2 g/dL Final    Albumin/Globulin Ratio 10/22/2021 NOT REPORTED  1.0 - 2.5 Final    GFR Non- 10/22/2021 55* >60 mL/min Final    GFR  10/22/2021 >60  >60 mL/min Final    GFR Comment 10/22/2021        Final    Comment: Average GFR for 61-76 years old:   80 mL/min/1.73sq m  Chronic Kidney Disease:   <60 mL/min/1.73sq m  Kidney failure:   <15 mL/min/1.73sq m              eGFR calculated using average adult body mass. Additional eGFR calculator available at:        [employer]            GFR Staging 10/22/2021 NOT REPORTED   Final    Lipase 10/22/2021 25  13 - 60 U/L Final         Reviewed patient's current plan of care and vital signs with nursing staff.     Labs reviewed: [x] Yes  Last EKG in EMR reviewed: [x] Yes  QTc: 484    Medications  Current Facility-Administered Medications: ciprofloxacin (CIPRO) tablet 500 mg, 500 mg, Oral, 2 times per day  metroNIDAZOLE (FLAGYL) tablet 500 mg, 500 mg, Oral, 3 times per day  QUEtiapine (SEROQUEL) tablet 200 mg, 200 mg, Oral, Nightly  dicyclomine (BENTYL) capsule 10 mg, 10 mg, Oral, TID PRN  escitalopram (LEXAPRO) tablet 15 mg, 15 mg, Oral, Daily  paliperidone (INVEGA) extended release tablet 9 mg, 9 mg, Oral, Daily  acetaminophen (TYLENOL) tablet 650 mg, 650 mg, Oral, Q4H PRN  aluminum & magnesium hydroxide-simethicone (MAALOX) 200-200-20 MG/5ML suspension 30 mL, 30 mL, Oral, Q6H PRN  haloperidol lactate (HALDOL) injection 5 mg, 5 mg, IntraMUSCular, Q4H PRN **AND** LORazepam (ATIVAN) injection 2 mg, 2 mg, IntraMUSCular, Q4H PRN **AND** diphenhydrAMINE (BENADRYL) injection 25 mg, 25 mg, IntraMUSCular, Q4H PRN  hydrOXYzine (ATARAX) tablet 50 mg, 50 mg, Oral, TID PRN  haloperidol (HALDOL) tablet 5 mg, 5 mg, Oral, Q4H PRN **AND** LORazepam (ATIVAN) tablet 2 mg, 2 mg, Oral, Q4H PRN  traZODone (DESYREL) tablet 50 mg, 50 mg, Oral, Nightly PRN  polyethylene glycol (GLYCOLAX) packet 17 g, 17 g, Oral, Daily PRN  nicotine polacrilex (NICORETTE) gum 2 mg, 2 mg, Oral, PRN  Vitamin D (CHOLECALCIFEROL) tablet 1,000 Units, 1,000 Units, Oral, Daily  docusate sodium (COLACE) capsule 100 mg, 100 mg, Oral, BID PRN  traZODone (DESYREL) tablet 150 mg, 150 mg, Oral, Nightly    ASSESSMENT  Schizoaffective disorder, depressive type ([facility] [address]         PLAN  Patient symptoms are: Remains Unstable. Continue with current medication regimen  Monitor need and frequency of PRN medications. Encourage participation in groups and milieu. Attempt to develop insight. Psycho-education conducted. Supportive Therapy conducted. Probable discharge is to be determined by MD.   Follow-up daily while inpatient. Patient continues to be monitored in the inpatient psychiatric facility at [facility] for safety and stabilization. Patient continues to need, on a daily basis, active treatment furnished directly by or requiring the supervision of inpatient psychiatric personnel. Electronically signed by MARY Agarwal CNP on 10/23/2021 at 11:52 AM    **This report has been created using voice recognition software. It may contain minor errors which are inherent in voice recognition technology. **    I independently saw and evaluated the patient. I reviewed the nurse practitioners documentation above. Any additional comments or changes to the nurse practitioners documentation are stated below otherwise agree with assessment.   Plan will be as follows:  Spent 30 minutes with the patient, of that greater than 16 minutes

## 2021-10-23 NOTE — GROUP NOTE
Group Therapy Note    Date: 10/23/2021    Group Start Time: 1000  Group End Time: 2482  Group Topic: Psychoeducation    [address], LSW    patient refused to attend psychoeducation group at 10a after encouragement from staff.   1:1 talk time provided as alternative to group session

## 2021-10-23 NOTE — PROGRESS NOTES
[facility] Internal Medicine    CONSULTATION / HISTORY AND PHYSICAL EXAMINATION            Date:   10/23/2021  Patient name:  Chelsea Ordoñez  Date of admission:  10/11/2021  2:31 AM  MRN:   [id number]  Account:  [de-identified]  YOB: 1960  PCP:    No primary care provider on file. Room:   [address] [place] [address]  Code Status:    Full Code    Physician Requesting Consult: Kassidy Rubi MD    Reason for Consult:  medical management    Chief Complaint:     No chief complaint on file. Abdominal pain  History Obtained From:     Patient medical record nursing staff    History of Present Illness:     Left upper quadrant pain without fever or chills some nausea but no diarrhea no blood in the stool aggravated by palpation better with rest and pain meds  10/23  Patient, still complaining abdominal pain, abdominal pain is completely unchanged from yesterday  Complaining of nausea no vomiting  No loose stools  Past Medical History:     Past Medical History:   Diagnosis Date    Bipolar disorder ([address]     Depression     GERD (gastroesophageal reflux disease)     Hallucinations     Headache(784.0)     Hepatitis     Schizophrenia, schizo-affective ([address]     Substance abuse ([address]     Tobacco abuse     Type II or unspecified type diabetes mellitus without mention of complication, not stated as uncontrolled     Urinary incontinence         Past Surgical History:     Past Surgical History:   Procedure Laterality Date    ABSCESS DRAINAGE N/A 02/11/2018    Carla anal abcess    DENTAL SURGERY      all teeth pulled        Medications Prior to Admission:     Prior to Admission medications    Medication Sig Start Date End Date Taking?  Authorizing Provider   polyethylene glycol (MIRALAX) 17 g packet Take 17 g by mouth 2 times daily as needed for Constipation 10/1/21 10/31/21  Teri Pendleton DO   docusate sodium (COLACE) 100 MG capsule Take 1 capsule by mouth 2 times daily as needed for Constipation 10/1/21   Melford Light, DO   acetaminophen (TYLENOL) 500 MG tablet Take 2 tablets by mouth every 8 hours as needed for Pain 9/30/21 10/3/21  Denis Idol, DO   ibuprofen (IBU) 400 MG tablet Take 1 tablet by mouth every 6 hours as needed for Pain 9/30/21 10/5/21  Denis Idol, DO   paliperidone (INVEGA) 6 MG extended release tablet Take 1 tablet by mouth daily 9/29/21   Taisha Em MD   traZODone (DESYREL) 150 MG tablet Take 1 tablet by mouth nightly as needed for Sleep 9/28/21   Taisha Em MD   escitalopram (LEXAPRO) 20 MG tablet Take 1 tablet by mouth daily 9/28/21   Taisha Em MD   paliperidone palmitate ER (INVEGA SUSTENNA) 234 MG/1.5ML [place] IM injection Inject 234 mg into the muscle every 30 days 9/30/21   Taisha Em MD   nicotine polacrilex (NICORETTE) 2 MG gum Take 1 each by mouth every hour as needed for Smoking cessation 9/21/21   Coleman Galloway MD   Cholecalciferol (VITAMIN D) 25 MCG TABS Take 1 tablet by mouth daily 9/22/21   Coleman Galloway MD        Allergies:     Navane [[email or web address]]    Social History:     Tobacco:    reports that he has been smoking cigarettes. He has a 47.00 pack-year smoking history. He has never used smokeless tobacco.  Alcohol:      reports current alcohol use. Drug Use:  reports current drug use. Frequency: 7.00 times per week. Drug: Cocaine. Family History:     Family History   Problem Relation Age of Onset    Diabetes Mother     Heart Disease Mother        Review of Systems:     Positive and Negative as described in HPI. CONSTITUTIONAL:  negative for fevers, chills, sweats, fatigue, weight loss  HEENT:  negative for vision, hearing changes, runny nose, throat pain  RESPIRATORY:  negative for shortness of breath, cough, congestion, wheezing. CARDIOVASCULAR:  negative for chest pain, palpitations.   GASTROINTESTINAL: Positive nausea, abdominal pain  GENITOURINARY:  negative for difficulty of urination, burning with urination, visit (from the past 24 hour(s)). Consultations:   IP CONSULT TO INTERNAL MEDICINE  IP CONSULT TO INTERNAL MEDICINE  Assessment :      Primary Problem  Schizoaffective disorder, depressive type [facility])    Active Hospital Problems    Diagnosis Date Noted    Acute psychosis ([facility] [address] [F23] 03/10/2021    Schizoaffective disorder, depressive type ([facility] [address] [F25.1] 09/23/2017       Plan:     78-year-old gentleman complains of 1 day history of left upper quadrant pain without any fever chills no blood in the stool  Clinically diverticulitis of the transverse colon  We will start oral Cipro and Flagyl  Abnormal CT of the bladder noted earlier this month will need outpatient cystoscopy  Acute kidney injury creatinine 1.3 oral hydration avoid any NSAIDs  10/23   Patient, still complaining abdominal pain  Ordering CT abdomen pelvis without contrast since patient creatinine is high  Will do oral contrast  Elevated creatinine, repeating BMP tomorrow  If creatinine gets high, may need to transfer to medical floor for IV hydration. Lord Maik MD  10/23/2021  7:39 PM    Copy sent to Dr. Colby primary care provider on file. Please note that this chart was generated using voice recognition Dragon dictation software. Although every effort was made to ensure the accuracy of this automated transcription, some errors in transcription may have occurred.

## 2021-10-23 NOTE — GROUP NOTE
60-year-old female with history of GERD, diabetes, chronic lower back pain w/ hx of herniated disc and sciatica presents with complaint of right-sided lower back pain after standing up from sitting on the toilet last night. States pain has not improved since last evening. Denies numbness, tingling, weakness, bowel or bladder incontinence, dysuria, hematuria, flank pain, difficulty walking, headache, fever, chills. Denies any other recent trauma or injury to back. States that she has had similar back pain in the past.  Denies any alleviating factors. States she has not taken thing for pain at home. States that she has been checking her blood glucose regularly and that her sugars have been in the 100s to 200s. The history is provided by the patient. No  was used. Back Pain    This is a new problem. The current episode started yesterday. The problem has not changed since onset. The problem occurs constantly. Patient reports not work related injury. Associated with: Standing up from toilet. The pain is present in the lumbar spine. The quality of the pain is described as aching and dull. The pain does not radiate. The pain is at a severity of 3/10. The pain is mild. Pertinent negatives include no chest pain, no fever, no numbness, no weight loss, no headaches, no abdominal pain, no abdominal swelling, no bowel incontinence, no perianal numbness, no bladder incontinence, no dysuria, no pelvic pain, no leg pain, no paresthesias, no paresis, no tingling and no weakness. She has tried nothing for the symptoms. The treatment provided no relief. Past Medical History:   Diagnosis Date    Diabetes ([address]     Gastrointestinal disorder     reflux    Other ill-defined conditions     hyperlipidemia/ vertigo    Sleep disorder        No past surgical history on file. No family history on file.     Social History     Socioeconomic History    Marital status:      Spouse name: Not on Group Therapy Note    Date: 10/23/2021    Group Start Time: 8185  Group End Time: 7718  Group Topic: Recreational    [address], [place]        Group Therapy Note    Attendees: 3/15      patient refused to attend recreation and leisure group at [id number] after encouragement from staff. 1:1 talk time provided as alternative to group session.             Signature:  [place] file    Number of children: Not on file    Years of education: Not on file    Highest education level: Not on file   Occupational History    Not on file   Social Needs    Financial resource strain: Not on file    Food insecurity     Worry: Not on file     Inability: Not on file    Transportation needs     Medical: Not on file     Non-medical: Not on file   Tobacco Use    Smoking status: Not on file   Substance and Sexual Activity    Alcohol use: Not on file    Drug use: Not on file    Sexual activity: Not on file   Lifestyle    Physical activity     Days per week: Not on file     Minutes per session: Not on file    Stress: Not on file   Relationships    Social connections     Talks on phone: Not on file     Gets together: Not on file     Attends Rastafari service: Not on file     Active member of club or organization: Not on file     Attends meetings of clubs or organizations: Not on file     Relationship status: Not on file    Intimate partner violence     Fear of current or ex partner: Not on file     Emotionally abused: Not on file     Physically abused: Not on file     Forced sexual activity: Not on file   Other Topics Concern    Not on file   Social History Narrative    Not on file         ALLERGIES: Patient has no known allergies. Review of Systems   Constitutional: Negative for chills, fatigue, fever and weight loss. Respiratory: Negative for cough and shortness of breath. Cardiovascular: Negative for chest pain. Gastrointestinal: Negative for abdominal pain, bowel incontinence, constipation, diarrhea, nausea and vomiting. Genitourinary: Negative for bladder incontinence, dysuria, flank pain, hematuria and pelvic pain. Musculoskeletal: Positive for back pain. Negative for arthralgias, gait problem, neck pain and neck stiffness. Skin: Negative for rash. Neurological: Negative for dizziness, tingling, weakness, numbness, headaches and paresthesias.    Psychiatric/Behavioral: Negative for confusion. Vitals:    06/01/20 0958   BP: 125/84   Pulse: 73   Resp: 18   Temp: 98.1 °F (36.7 °C)   SpO2: 97%   Weight: 81.6 kg (180 lb)   Height: 5' 2.5\" (1.588 m)            Physical Exam  Vitals signs and nursing note reviewed. Constitutional:       Appearance: Normal appearance. HENT:      Head: Normocephalic. Nose: Nose normal.      Mouth/Throat:      Mouth: Mucous membranes are moist.   Eyes:      Extraocular Movements: Extraocular movements intact. Pupils: Pupils are equal, round, and reactive to light. Neck:      Musculoskeletal: Normal range of motion. Cardiovascular:      Rate and Rhythm: Normal rate and regular rhythm. Pulses: Normal pulses. Heart sounds: Normal heart sounds. Comments: Pulses 2+ and equal throughout. Pulmonary:      Effort: Pulmonary effort is normal.      Breath sounds: Normal breath sounds. Abdominal:      Palpations: Abdomen is soft. Tenderness: There is no abdominal tenderness. There is no guarding or rebound. Comments: Soft, nontender, nondistended, no rebound or guarding. No CVA tenderness. Musculoskeletal: Normal range of motion. Comments: Normal midline T-spine L-spine tenderness to patient. Mild right lumbar paraspinal muscle tenderness palpation. No point tenderness. No step-off. Negative straight leg raise test bilaterally. Neurological:      General: No focal deficit present. Mental Status: She is alert and oriented to person, place, and time. Motor: No weakness. Comments: Strength 5 out of 5 throughout. Normal sensory sign. No saddle anesthesia. Normal DTRs. Normal gait. MDM  Number of Diagnoses or Management Options  Acute right-sided low back pain without sciatica: new and requires workup  Diagnosis management comments: 26-year-old female with report of herniated disc and sciatica presents with complaint of right lower back pain after sitting up from toilet.   Normal neuro exam.  No saddle anesthesia. Normal DTRs. Normal gait. Vital signs stable. X-ray with mild degenerative change; however no evidence of fracture or other acute abnormality of the L-spine. POC glucose 206. States this is around her baseline. States that she just ate breakfast this morning. UA with evidence of UTI. Moderate leuks. Prescription hand written for Keflex 500 mg twice daily for 7 days. Urine micro added on later which suggested UTI. Urine cx added. Patient given Robaxin. Patient reports improvement of pain we will discharge home with Robaxin instructed to follow-up with primary care physician and spine. Patient in agreement with plan. Given return precautions       Amount and/or Complexity of Data Reviewed  Clinical lab tests: ordered and reviewed  Tests in the radiology section of CPT®: ordered and reviewed  Tests in the medicine section of CPT®: ordered and reviewed  Review and summarize past medical records: yes  Independent visualization of images, tracings, or specimens: yes    Risk of Complications, Morbidity, and/or Mortality  Diagnostic procedures: low  Management options: low    Patient Progress  Patient progress: stable    ED Course as of Jun 01 1411   Mon Jun 01, 2020   1049 XR L spine IMPRESSION: Mild degenerative change. No evidence of fracture or other acute  abnormality in the lumbar spine. [DF]   1110 POC UA with moderate leukocytes. Urine micro sent. Patient states that she does not want to wait on urine micro at this time. Will discharge home with Keflex 500 mg twice daily x7 days. Instructed patient and I will follow-up on urine micro to ensure if UTI is present. Will contact patient to inform of results and if antibiotic prescription needs to be discarded.      [DF]      ED Course User Index  [DF] Alberto Hester MD       Procedures    Results Include:    Recent Results (from the past 24 hour(s))   GLUCOSE, POC    Collection Time: 06/01/20 10:12 AM   Result Value Ref Range    Glucose (POC) 206 (H) 65 - 100 mg/dL              Tyrone Lopez MD; 6/1/2020 @10:09 AM Voice dictation software was used during the making of this note. This software is not perfect and grammatical and other typographical errors may be present.   This note has not been proofread for errors.  ===================================================================

## 2021-10-23 NOTE — PLAN OF CARE
Problem: Anger Management/Homicidal Ideation:  Goal: Absence of angry outbursts  Description: Absence of angry outbursts  10/22/2021 2300 by Elaine Pickett LPN  Outcome: Ongoing   Patient was absent of any outburst of anger. I will continue to monitor. Problem: Depressive Behavior With or Without Suicide Precautions:  Goal: Ability to disclose and discuss suicidal ideas will improve  Description: Ability to disclose and discuss suicidal ideas will improve  10/22/2021 2300 by Elaine Pickett LPN  Outcome: Ongoing   Patient denies any thoughts of suicide at this time. I will continue to monitor Q15.

## 2021-10-24 ENCOUNTER — APPOINTMENT (OUTPATIENT)
Dept: CT IMAGING | Age: 61
DRG: 750 | End: 2021-10-24
Attending: PSYCHIATRY & NEUROLOGY
Payer: MEDICAID

## 2021-10-24 LAB
ANION GAP SERPL CALCULATED.3IONS-SCNC: 8 MMOL/L (ref 9–17)
BUN BLDV-MCNC: 18 MG/DL (ref 8–23)
BUN/CREAT BLD: ABNORMAL (ref 9–20)
CALCIUM SERPL-MCNC: 8.7 MG/DL (ref 8.6–10.4)
CHLORIDE BLD-SCNC: 107 MMOL/L (ref 98–107)
CO2: 27 MMOL/L (ref 20–31)
CREAT SERPL-MCNC: 1.16 MG/DL (ref 0.7–1.2)
GFR AFRICAN AMERICAN: >60 ML/MIN
GFR NON-AFRICAN AMERICAN: >60 ML/MIN
GFR SERPL CREATININE-BSD FRML MDRD: ABNORMAL ML/MIN/{1.73_M2}
GFR SERPL CREATININE-BSD FRML MDRD: ABNORMAL ML/MIN/{1.73_M2}
GLUCOSE BLD-MCNC: 99 MG/DL (ref 70–99)
POTASSIUM SERPL-SCNC: 4.4 MMOL/L (ref 3.7–5.3)
SODIUM BLD-SCNC: 142 MMOL/L (ref 135–144)

## 2021-10-24 PROCEDURE — 1240000000 HC EMOTIONAL WELLNESS R&B

## 2021-10-24 PROCEDURE — 99232 SBSQ HOSP IP/OBS MODERATE 35: CPT | Performed by: PSYCHIATRY & NEUROLOGY

## 2021-10-24 PROCEDURE — 6370000000 HC RX 637 (ALT 250 FOR IP): Performed by: INTERNAL MEDICINE

## 2021-10-24 PROCEDURE — 6370000000 HC RX 637 (ALT 250 FOR IP)

## 2021-10-24 PROCEDURE — APPSS30 APP SPLIT SHARED TIME 16-30 MINUTES: Performed by: PSYCHIATRY & NEUROLOGY

## 2021-10-24 PROCEDURE — 6370000000 HC RX 637 (ALT 250 FOR IP): Performed by: PSYCHIATRY & NEUROLOGY

## 2021-10-24 PROCEDURE — 6360000004 HC RX CONTRAST MEDICATION: Performed by: INTERNAL MEDICINE

## 2021-10-24 PROCEDURE — 74176 CT ABD & PELVIS W/O CONTRAST: CPT

## 2021-10-24 PROCEDURE — 99232 SBSQ HOSP IP/OBS MODERATE 35: CPT | Performed by: INTERNAL MEDICINE

## 2021-10-24 PROCEDURE — 36415 COLL VENOUS BLD VENIPUNCTURE: CPT

## 2021-10-24 PROCEDURE — 80048 BASIC METABOLIC PNL TOTAL CA: CPT

## 2021-10-24 RX ADMIN — TRAZODONE HYDROCHLORIDE 150 MG: 150 TABLET ORAL at 20:47

## 2021-10-24 RX ADMIN — CIPROFLOXACIN 500 MG: 500 TABLET, FILM COATED ORAL at 10:02

## 2021-10-24 RX ADMIN — QUETIAPINE FUMARATE 200 MG: 200 TABLET ORAL at 20:47

## 2021-10-24 RX ADMIN — IOHEXOL 50 ML: 240 INJECTION, SOLUTION INTRATHECAL; INTRAVASCULAR; INTRAVENOUS; ORAL at 06:27

## 2021-10-24 RX ADMIN — ESCITALOPRAM OXALATE 15 MG: 10 TABLET, FILM COATED ORAL at 10:02

## 2021-10-24 RX ADMIN — Medication 1000 UNITS: at 10:02

## 2021-10-24 RX ADMIN — METRONIDAZOLE 500 MG: 500 TABLET ORAL at 06:27

## 2021-10-24 RX ADMIN — HYDROXYZINE HYDROCHLORIDE 50 MG: 50 TABLET, FILM COATED ORAL at 20:47

## 2021-10-24 RX ADMIN — PALIPERIDONE 9 MG: 9 TABLET, EXTENDED RELEASE ORAL at 10:02

## 2021-10-24 ASSESSMENT — PAIN DESCRIPTION - PAIN TYPE: TYPE: OTHER (COMMENT)

## 2021-10-24 ASSESSMENT — PAIN DESCRIPTION - LOCATION: LOCATION: ABDOMEN

## 2021-10-24 ASSESSMENT — PAIN SCALES - GENERAL: PAINLEVEL_OUTOF10: 6

## 2021-10-24 NOTE — PROGRESS NOTES
Daily Progress Note  10/24/2021    Patient Name: Jonna Vega    CHIEF COMPLAINT: Suicidal ideation with command auditory hallucinations, visual hallucinations and homicidal ideation         SUBJECTIVE:    Patient seen for follow-up assessment. Nursing staff report the patient has maintained medication adherence and has been without acute behavioral changes in the last 24 hours. Mr. Evelia Bae went for abdominal CT early this morning per internal medicine based on abdominal pain with no noted acute changes or bowel obstruction. Patient is more visible on the unit in the morning and reports his appetite as fair. He is more isolative to his room later in the day and endorses some improved sleep and denies side effects related to current medication regimen. Mr. Evelia Bae endorses improving suicidal ideation and contracts for safety on the unit. He continues to endorse auditory hallucinations that are currently less distressing and offers \"better than yesterday I think\". He denies having questions or concerns regarding his current plan of care at this time. Appetite:  [x] Normal/Adequate/Unchanged  [] Increased  [] Decreased      Sleep:       [x] Normal/Adequate/Unchanged/improving[] Fairt[] Poor      Group Attendance on Unit:   [] Yes  [] Selectively    [x] No    Medication Side Effects:  Patient denies any medication side effects at the time of assessment. Mental Status Exam  Level of consciousness: Resting, responds to verbal stimuli  Appearance: Appropriate attire for setting, laying in bed , with poor grooming and hygiene, room appears less disheveled  Behavior/Motor: Approachable, calm psychomotor slowing  Attitude toward examiner: Cooperative, somewhat attentive minimal eye contact today. Speech: Delayed responses, rate, low volume, normal tone, fair articulation.   Mood: \" Okay\" presents depressed  Affect: Blunted  Thought processes: More linear today, goal-directed, slowed  Thought content: Denies homicidal ideation. Suicidal Ideation: Fleeting suicidal ideations, without current plan or intent, contracts for safety on the unit. Delusions: Endorses improving paranoia  Perceptual Disturbance: Endorses improving auditory hallucinations  Cognition: Oriented to self, location, time, and situation. Memory: Intact. Insight & Judgement: Poor. Data   height is 6' 3\" (1.905 m) and weight is 199 lb (90.3 kg). His oral temperature is 97.8 °F (36.6 °C). His blood pressure is 90/62 and his pulse is 58. His respiration is 14. Labs:   Admission on 10/11/2021   Component Date Value Ref Range Status    Hemoglobin A1C 10/13/2021 5.0  4.0 - 6.0 % Final    Estimated Avg Glucose 10/13/2021 97  mg/dL Final    Comment: The ADA and AACC recommend providing the estimated average glucose result to permit better   patient understanding of their HBA1c result.  Cholesterol 10/13/2021 150  <200 mg/dL Final    Comment:    Cholesterol Guidelines:      <200  Desirable   200-240  Borderline      >240  Undesirable         HDL 10/13/2021 62  >40 mg/dL Final    Comment:    HDL Guidelines:    <40     Undesirable   40-59    Borderline    >59     Desirable         LDL Cholesterol 10/13/2021 80  0 - 130 mg/dL Final    Comment:    LDL Guidelines:     <100    Desirable   100-129   Near to/above Desirable   130-159   Borderline      >159   Undesirable     Direct (measured) LDL and calculated LDL are not interchangeable tests.  Chol/HDL Ratio 10/13/2021 2.4  <5 Final            Triglycerides 10/13/2021 41  <150 mg/dL Final    Comment:    Triglyceride Guidelines:     <150   Desirable   150-199  Borderline   200-499  High     >499   Very high   Based on AHA Guidelines for fasting triglyceride, October 2012.          VLDL 10/13/2021 NOT REPORTED  1 - 30 mg/dL Final    Iron 10/14/2021 121  59 - 158 ug/dL Final    TIBC 10/14/2021 308  250 - 450 ug/dL Final    Iron Saturation 10/14/2021 39  20 - 55 % Final    UIBC 10/14/2021 187  112 - 347 ug/dL Final    Ferritin 10/14/2021 77  30 - 400 ug/L Final    Glucose 10/14/2021 99  70 - 99 mg/dL Final    BUN 10/14/2021 14  8 - 23 mg/dL Final    CREATININE 10/14/2021 1.03  0.70 - 1.20 mg/dL Final    Bun/Cre Ratio 10/14/2021 NOT REPORTED  9 - 20 Final    Calcium 10/14/2021 8.8  8.6 - 10.4 mg/dL Final    Sodium 10/14/2021 144  135 - 144 mmol/L Final    Potassium 10/14/2021 4.1  3.7 - 5.3 mmol/L Final    Chloride 10/14/2021 109* 98 - 107 mmol/L Final    CO2 10/14/2021 27  20 - 31 mmol/L Final    Anion Gap 10/14/2021 8* 9 - 17 mmol/L Final    GFR Non- 10/14/2021 >60  >60 mL/min Final    GFR  10/14/2021 >60  >60 mL/min Final    GFR Comment 10/14/2021        Final    Comment: Average GFR for 61-76 years old:   80 mL/min/1.73sq m  Chronic Kidney Disease:   <60 mL/min/1.73sq m  Kidney failure:   <15 mL/min/1.73sq m              eGFR calculated using average adult body mass. Additional eGFR calculator available at:        [employer]            GFR Staging 10/14/2021 NOT REPORTED   Final    Color, UA 10/22/2021 Yellow  Yellow Final    Turbidity UA 10/22/2021 Clear  Clear Final    Glucose, Ur 10/22/2021 NEGATIVE  NEGATIVE Final    Bilirubin Urine 10/22/2021 NEGATIVE  NEGATIVE Final    Ketones, Urine 10/22/2021 NEGATIVE  NEGATIVE Final    Specific [place], UA 10/22/2021 1.024  1.000 - 1.030 Final    Urine Hgb 10/22/2021 NEGATIVE  NEGATIVE Final    pH, UA 10/22/2021 7.5  5.0 - 8.0 Final    Protein, UA 10/22/2021 NEGATIVE  NEGATIVE Final    Urobilinogen, Urine 10/22/2021 Normal  Normal Final    Nitrite, Urine 10/22/2021 NEGATIVE  NEGATIVE Final    Leukocyte Esterase, Urine 10/22/2021 NEGATIVE  NEGATIVE Final    Urinalysis Comments 10/22/2021 Microscopic exam not performed based on chemical results unless requested in original order.    Final    Amylase 10/22/2021 81  28 - 100 U/L Final    WBC 10/22/2021 3.9  3.5 - 11.0 k/uL Final    RBC 10/22/2021 4.14* 4.5 - 5.9 m/uL Final    Hemoglobin 10/22/2021 11.9* 13.5 - 17.5 g/dL Final    Hematocrit 10/22/2021 37.5* 41 - 53 % Final    MCV 10/22/2021 90.4  80 - 100 fL Final    MCH 10/22/2021 28.8  26 - 34 pg Final    MCHC 10/22/2021 31.9  31 - 37 g/dL Final    RDW 10/22/2021 15.2* 11.5 - 14.9 % Final    Platelets 27/95/0770 290  150 - 450 k/uL Final    MPV 10/22/2021 7.4  6.0 - 12.0 fL Final    NRBC Automated 10/22/2021 NOT REPORTED  per 100 WBC Final    Differential Type 10/22/2021 NOT REPORTED   Final    Immature Granulocytes 10/22/2021 NOT REPORTED  0 % Final    Absolute Immature Granulocyte 10/22/2021 NOT REPORTED  0.00 - 0.30 k/uL Final    WBC Morphology 10/22/2021 NOT REPORTED   Final    RBC Morphology 10/22/2021 NOT REPORTED   Final    Platelet Estimate 80/48/7605 NOT REPORTED   Final    Seg Neutrophils 10/22/2021 44  36 - 66 % Final    Lymphocytes 10/22/2021 47* 24 - 44 % Final    Monocytes 10/22/2021 6  1 - 7 % Final    Eosinophils % 10/22/2021 3  0 - 4 % Final    Basophils 10/22/2021 0  0 - 2 % Final    Segs Absolute 10/22/2021 1.72  1.3 - 9.1 k/uL Final    Absolute Lymph # 10/22/2021 1.83  1.0 - 4.8 k/uL Final    Absolute Mono # 10/22/2021 0.23  0.1 - 1.3 k/uL Final    Absolute Eos # 10/22/2021 0.12  0.0 - 0.4 k/uL Final    Basophils Absolute 10/22/2021 0.00  0.0 - 0.2 k/uL Final    Morphology 10/22/2021 ANISOCYTOSIS PRESENT   Final    Glucose 10/22/2021 100* 70 - 99 mg/dL Final    BUN 10/22/2021 16  8 - 23 mg/dL Final    CREATININE 10/22/2021 1.32* 0.70 - 1.20 mg/dL Final    Bun/Cre Ratio 10/22/2021 NOT REPORTED  9 - 20 Final    Calcium 10/22/2021 8.8  8.6 - 10.4 mg/dL Final    Sodium 10/22/2021 145* 135 - 144 mmol/L Final    Potassium 10/22/2021 4.7  3.7 - 5.3 mmol/L Final    Chloride 10/22/2021 110* 98 - 107 mmol/L Final    CO2 10/22/2021 29  20 - 31 mmol/L Final    Anion Gap 10/22/2021 6* 9 - 17 mmol/L Final    Alkaline Reviewed patient's current plan of care and vital signs with nursing staff. Labs reviewed: [x] Yes  Last EKG in EMR reviewed: [x] Yes  QTc: 484    Medications  Current Facility-Administered Medications: QUEtiapine (SEROQUEL) tablet 200 mg, 200 mg, Oral, Nightly  dicyclomine (BENTYL) capsule 10 mg, 10 mg, Oral, TID PRN  escitalopram (LEXAPRO) tablet 15 mg, 15 mg, Oral, Daily  paliperidone (INVEGA) extended release tablet 9 mg, 9 mg, Oral, Daily  acetaminophen (TYLENOL) tablet 650 mg, 650 mg, Oral, Q4H PRN  aluminum & magnesium hydroxide-simethicone (MAALOX) 200-200-20 MG/5ML suspension 30 mL, 30 mL, Oral, Q6H PRN  haloperidol lactate (HALDOL) injection 5 mg, 5 mg, IntraMUSCular, Q4H PRN **AND** LORazepam (ATIVAN) injection 2 mg, 2 mg, IntraMUSCular, Q4H PRN **AND** diphenhydrAMINE (BENADRYL) injection 25 mg, 25 mg, IntraMUSCular, Q4H PRN  hydrOXYzine (ATARAX) tablet 50 mg, 50 mg, Oral, TID PRN  haloperidol (HALDOL) tablet 5 mg, 5 mg, Oral, Q4H PRN **AND** LORazepam (ATIVAN) tablet 2 mg, 2 mg, Oral, Q4H PRN  traZODone (DESYREL) tablet 50 mg, 50 mg, Oral, Nightly PRN  polyethylene glycol (GLYCOLAX) packet 17 g, 17 g, Oral, Daily PRN  nicotine polacrilex (NICORETTE) gum 2 mg, 2 mg, Oral, PRN  Vitamin D (CHOLECALCIFEROL) tablet 1,000 Units, 1,000 Units, Oral, Daily  docusate sodium (COLACE) capsule 100 mg, 100 mg, Oral, BID PRN  traZODone (DESYREL) tablet 150 mg, 150 mg, Oral, Nightly    ASSESSMENT  Schizoaffective disorder, depressive type (HCC)         PLAN  Patient symptoms are: Showing modest improvement  Continue with current medication regimen  Monitor need and frequency of PRN medications. Encourage participation in groups and milieu. Attempt to develop insight. Psycho-education conducted. Supportive Therapy conducted. Probable discharge is to be determined by MD.   Follow-up daily while inpatient.      Patient continues to be monitored in the inpatient psychiatric facility at [facility] for safety and stabilization. Patient continues to need, on a daily basis, active treatment furnished directly by or requiring the supervision of inpatient psychiatric personnel. Electronically signed by MARY Babcock CNP on 10/24/2021 at 3:19 PM    **This report has been created using voice recognition software. It may contain minor errors which are inherent in voice recognition technology. **    I independently saw and evaluated the patient. I reviewed the nurse practitioners documentation above. Any additional comments or changes to the nurse practitioners documentation are stated below otherwise agree with assessment. Plan will be as follows:  Patient denying side effects to medication. Able to contract for safety here on the unit. Compliant with oral contrast and no acute pathology identified on CT exam which was reviewed. Working for placement into substance use rehab. PLAN  Patient s symptoms   are improving  Continue with current medication  Attempt to develop insight  Psycho-education conducted. Supportive Therapy conducted.   Probable discharge is Tuesday or Wednesday  Follow-up daily while on inpatient unit

## 2021-10-24 NOTE — PLAN OF CARE
Problem: Anger Management/Homicidal Ideation:  Goal: Absence of angry outbursts  Description: Absence of angry outbursts  10/24/2021 1038 by Thai Gaviria LPN  Outcome: Ongoing   Patient remains free from angry outbursts and remains in behavioral control. Problem: Depressive Behavior With or Without Suicide Precautions:  Goal: Ability to disclose and discuss suicidal ideas will improve  Description: Ability to disclose and discuss suicidal ideas will improve  10/24/2021 1038 by Thai Gaviria LPN  Outcome: Ongoing  Patient continues to endorse depression  and anxiety, but states he is feeling better and more hopeful than he was. Patient denies suicidal or homicidal ideation. Patient has been compliant with medications and cooperation with care.

## 2021-10-24 NOTE — PROGRESS NOTES
[place] [address] Internal Medicine    CONSULTATION / HISTORY AND PHYSICAL EXAMINATION            Date:   10/24/2021  Patient name:  Oscar Holly  Date of admission:  10/11/2021  2:31 AM  MRN:   [id number]  Account:  [de-identified]  YOB: 1960  PCP:    No primary care provider on file. Room:   [address] [place] [address]  Code Status:    Full Code    Physician Requesting Consult: Jaylyn Wilson MD    Reason for Consult:  medical management    Chief Complaint:     No chief complaint on file. Abdominal pain  History Obtained From:     Patient medical record nursing staff    History of Present Illness:     Left upper quadrant pain without fever or chills some nausea but no diarrhea no blood in the stool aggravated by palpation better with rest and pain meds  10/23  Patient, still complaining abdominal pain, abdominal pain is completely unchanged from yesterday  Complaining of nausea no vomiting  No loose stools  Past Medical History:     Past Medical History:   Diagnosis Date    Bipolar disorder ([address]     Depression     GERD (gastroesophageal reflux disease)     Hallucinations     Headache(784.0)     Hepatitis     Schizophrenia, schizo-affective ([address]     [facility]     Tobacco abuse     Type II or unspecified type diabetes mellitus without mention of complication, not stated as uncontrolled     Urinary incontinence         Past Surgical History:     Past Surgical History:   Procedure Laterality Date    ABSCESS DRAINAGE N/A 02/11/2018    Carla anal abcess    DENTAL SURGERY      all teeth pulled        Medications Prior to Admission:     Prior to Admission medications    Medication Sig Start Date End Date Taking?  Authorizing Provider   polyethylene glycol (MIRALAX) 17 g packet Take 17 g by mouth 2 times daily as needed for Constipation 10/1/21 10/31/21  Fatmata Lopez DO   docusate sodium (COLACE) 100 MG capsule Take 1 capsule by mouth 2 times daily as needed for Constipation 10/1/21   Debbie Coy, DO   acetaminophen (TYLENOL) 500 MG tablet Take 2 tablets by mouth every 8 hours as needed for Pain 9/30/21 10/3/21  Inge Cabrales DO   ibuprofen (IBU) 400 MG tablet Take 1 tablet by mouth every 6 hours as needed for Pain 9/30/21 10/5/21  Inge Cabrales DO   paliperidone (INVEGA) 6 MG extended release tablet Take 1 tablet by mouth daily 9/29/21   Taisha Em MD   traZODone (DESYREL) 150 MG tablet Take 1 tablet by mouth nightly as needed for Sleep 9/28/21   Taisha Em MD   escitalopram (LEXAPRO) 20 MG tablet Take 1 tablet by mouth daily 9/28/21   Taisha Em MD   paliperidone palmitate ER (INVEGA SUSTENNA) 234 MG/1.5ML [place] IM injection Inject 234 mg into the muscle every 30 days 9/30/21   Taisha Em MD   nicotine polacrilex (NICORETTE) 2 MG gum Take 1 each by mouth every hour as needed for Smoking cessation 9/21/21   Coleman Galloway MD   Cholecalciferol (VITAMIN D) 25 MCG TABS Take 1 tablet by mouth daily 9/22/21   Coleman Galloway MD        Allergies:     Navane [thiothixene]    Social History:     Tobacco:    reports that he has been smoking cigarettes. He has a 47.00 pack-year smoking history. He has never used smokeless tobacco.  Alcohol:      reports current alcohol use. Drug Use:  reports current drug use. Frequency: 7.00 times per week. Drug: Cocaine. Family History:     Family History   Problem Relation Age of Onset    Diabetes Mother     Heart Disease Mother        Review of Systems:     Positive and Negative as described in HPI. CONSTITUTIONAL:  negative for fevers, chills, sweats, fatigue, weight loss  HEENT:  negative for vision, hearing changes, runny nose, throat pain  RESPIRATORY:  negative for shortness of breath, cough, congestion, wheezing. CARDIOVASCULAR:  negative for chest pain, palpitations.   GASTROINTESTINAL: Positive nausea, abdominal pain  GENITOURINARY:  negative for difficulty of urination, burning with urination, frequency   INTEGUMENT:  negative for rash, skin lesions, easy bruising   HEMATOLOGIC/LYMPHATIC:  negative for swelling/edema   ALLERGIC/IMMUNOLOGIC:  negative for urticaria , itching  ENDOCRINE:  negative increase in drinking, increase in urination, hot or cold intolerance  MUSCULOSKELETAL:  negative joint pains, muscle aches, swelling of joints  NEUROLOGICAL:  negative for headaches, dizziness, lightheadedness, numbness, pain, tingling extremities      Physical Exam:     BP 90/62   Pulse 58   Temp 97.8 °F (36.6 °C) (Oral)   Resp 14   Ht 6' 3\" (1.905 m)   Wt 199 lb (90.3 kg)   BMI 24.87 kg/m²   Temp (24hrs), Av.8 °F (36.6 °C), Min:97.7 °F (36.5 °C), Max:97.8 °F (36.6 °C)    No results for input(s): POCGLU in the last 72 hours. No intake or output data in the 24 hours ending 10/24/21 1450    General Appearance:  alert, well appearing, and in no acute distress  Mental status: oriented to person, place, and time with normal affect  Head:  normocephalic, atraumatic. Eye: no icterus, redness, pupils equal and reactive, extraocular eye movements intact, conjunctiva clear  Ear: normal external ear, no discharge, hearing intact  Nose:  no drainage noted  Mouth: mucous membranes moist  Neck: supple, no carotid bruits, thyroid not palpable  Lungs: Bilateral equal air entry, clear to ausculation, no wheezing, rales or rhonchi, normal effort  Cardiovascular: normal rate, regular rhythm, no murmur, gallop, rub.   Abdomen: Soft, Slight tenderness in left upper abdomen , NO Guarding/rigidity   Neurologic: There are no new focal motor or sensory deficits, normal muscle tone and bulk, no abnormal sensation, normal speech, cranial nerves II through XII grossly intact  Skin: No gross lesions, rashes, bruising or bleeding on exposed skin area  Extremities:  peripheral pulses palpable, no pedal edema or calf pain with palpation      Investigations:      Laboratory Testing:  Recent Results (from the past 24 hour(s))   Basic ensure the accuracy of this automated transcription, some errors in transcription may have occurred.

## 2021-10-24 NOTE — BH NOTE
Lab department called and expressed concerns about lab draws. Nurse confirmed BMP was scheduled for 10/23 at [address] [place] [address] was refused by patient, order removed due to duplicate test for 22/30/52 in the morning.

## 2021-10-24 NOTE — GROUP NOTE
Group Therapy Note    Date: 10/24/2021    Group Start Time: 1000  Group End Time: 6638  Group Topic: Psychoeducation    [address], LSW    patient refused to attend psychoeducation group at 10a after encouragement from staff.   1:1 talk time provided as alternative to group session

## 2021-10-24 NOTE — BH NOTE
Pt medicated for pain, updated on POC   RN received a call about scheduling a CT for patient. Patient needs oral dye before CT. RN talked to patient about this and educated him that he needs to drink all the dye solution before the CT. Patient stated that he did not think he could drink all the fluid before falling asleep tonight. RN informed patient that she will give him the drink in the morning and he is to tell his nurse when he is finished. [place] [id number].

## 2021-10-24 NOTE — PLAN OF CARE
Problem: Depressive Behavior With or Without Suicide Precautions:  Goal: Ability to disclose and discuss suicidal ideas will improve  Description: Ability to disclose and discuss suicidal ideas will improve  10/23/2021 2207 by Wes Ely RN  Outcome: Ongoing     Problem: Anger Management/Homicidal Ideation:  Goal: Absence of angry outbursts  Description: Absence of angry outbursts  10/23/2021 2207 by Wes Ely RN  Outcome: Ongoing     Patient denied any depression, anxiety, hallucinations, suicidal or homicidal thoughts. Isolative most of shift. Still reporting left side abdominal pain. RN educated patient on CT and the medication he needs to drink before hand. Patient took his medications as prescribed. No self harm noted this shift. Safety precautions in place and Q 15 minute checks completed.

## 2021-10-25 PROCEDURE — APPSS30 APP SPLIT SHARED TIME 16-30 MINUTES: Performed by: PSYCHIATRY & NEUROLOGY

## 2021-10-25 PROCEDURE — 6370000000 HC RX 637 (ALT 250 FOR IP): Performed by: PSYCHIATRY & NEUROLOGY

## 2021-10-25 PROCEDURE — 99232 SBSQ HOSP IP/OBS MODERATE 35: CPT | Performed by: PSYCHIATRY & NEUROLOGY

## 2021-10-25 PROCEDURE — 1240000000 HC EMOTIONAL WELLNESS R&B

## 2021-10-25 PROCEDURE — 6370000000 HC RX 637 (ALT 250 FOR IP)

## 2021-10-25 RX ADMIN — ESCITALOPRAM OXALATE 15 MG: 10 TABLET, FILM COATED ORAL at 09:04

## 2021-10-25 RX ADMIN — PALIPERIDONE 9 MG: 9 TABLET, EXTENDED RELEASE ORAL at 09:04

## 2021-10-25 RX ADMIN — HYDROXYZINE HYDROCHLORIDE 50 MG: 50 TABLET, FILM COATED ORAL at 21:00

## 2021-10-25 RX ADMIN — QUETIAPINE FUMARATE 200 MG: 200 TABLET ORAL at 21:00

## 2021-10-25 RX ADMIN — TRAZODONE HYDROCHLORIDE 150 MG: 150 TABLET ORAL at 21:00

## 2021-10-25 RX ADMIN — Medication 1000 UNITS: at 09:04

## 2021-10-25 ASSESSMENT — PAIN SCALES - GENERAL: PAINLEVEL_OUTOF10: 0

## 2021-10-25 NOTE — PLAN OF CARE
Problem: Anger Management/Homicidal Ideation:  Goal: Absence of angry outbursts  Description: Absence of angry outbursts  Outcome: Ongoing     Patient denies suicidal or homicidal thoughts. Patient denies hallucinations. Patient has been less seclusive this shift and has been social with select peers. Patient did not attend any groups today. Patient is taking medications as prescribed and has maintained control of behaviors. Safety has been maintained.

## 2021-10-25 NOTE — CARE COORDINATION
SW received call from [employer]; stated pt has an appointment for his housing application at the [place] location on November 4th @ 8:30AM.

## 2021-10-25 NOTE — GROUP NOTE
Group Therapy Note    Date: 10/25/2021    Group Start Time: 1000  Group End Time: 1100  Group Topic: Psychotherapy    [facility] FRANKLIN Morgan, [facility]        Group Therapy Note  Patient declined to attend psychotherapy group at 10 am despite encouragement by staff. 1:1 was offered as an alternative.             Signature:  FRANKLIN Romero, [place]

## 2021-10-25 NOTE — PLAN OF CARE
[id number] [facility] Interdisciplinary Treatment Plan Note     Review Date & Time: 10/24/21    Admission Type:   Admission Type: Voluntary    Reason for admission:  Reason for Admission: SI with a plan to shoot self, +HI but wont say towards who, +voices,  was just DCd on the 3rd, was kicked out of the [employer] for fighting    Estimated Length of Stay Update:  Est 3-7 days, to be determined by physician  Estimated Discharge Date Update: to be determined by physician    PATIENT STRENGTHS:  Patient Strengths:Strengths: Connection to output provider, Motivated  Patient Strengths and Limitations:Limitations: Tendency to isolate self, Difficulty problem solving/relies on others to help solve problems, Inappropriate/potentially harmful leisure interests, Difficult relationships / poor social skills, Multiple barriers to leisure interests, Unrealistic self-view  Addictive Behavior:Addictive Behavior  In the past 3 months, have you felt or has someone told you that you have a problem with:  : None  Do you have a history of Chemical Use?: No  Do you have a history of Alcohol Use?: No  Do you have a history of Street Drug Abuse?: Yes  Histroy of Prescripton Drug Abuse?: No  Medical Problems:   Past Medical History:   Diagnosis Date    Bipolar disorder ([facility]     Depression     GERD (gastroesophageal reflux disease)     Hallucinations     Headache(784.0)     Hepatitis     Schizophrenia, schizo-affective ([facility]     Substance abuse ([facility]     Tobacco abuse     Type II or unspecified type diabetes mellitus without mention of complication, not stated as uncontrolled     Urinary incontinence        Risk:  Fall RiskTotal: 63  Dusty Scale Dusty Scale Score: 22  BVC    Change in scores n a.  Changes to plan of Care  na     Status EXAM:   Status and Exam  Normal: No  Facial Expression: Brightened, Flat  Affect: Appropriate, Congruent  Level of Consciousness: Alert  Mood:Normal: No  Mood: Depressed, Anxious, Helpless, Worthless, low self-esteem  Motor Activity:Normal: No  Motor Activity: Decreased  Interview Behavior: Cooperative  Preception: [place] to Person, [place] Ann Freeman to Time, [place] to Place, [place] to Situation  Attention:Normal: Yes  Attention: Distractible  Thought Processes: Circumstantial  Thought Content:Normal: No  Thought Content: Poverty of Content  Hallucinations: None  Delusions: No  Memory:Normal: No  Memory: Poor Recent  Insight and Judgment: No  Insight and Judgment: Poor Judgment, Poor Insight, Unmotivated  Present Suicidal Ideation: No  Present Homicidal Ideation: No    Daily Assessment Last Entry:   Daily Sleep (WDL): Within Defined Limits         Patient Currently in Pain: Denies  Daily Nutrition (WDL): Within Defined Limits  Appetite Change: Normal for patient  Barriers to Nutrition: None  Level of Assistance: Independent/Self    Patient Monitoring:  Frequency of Checks: 4 times per hour, close    Psychiatric Symptoms:   Depression Symptoms  Depression Symptoms: Feelings of helplessness, Feelings of hopelessess, Isolative, Loss of interest  Anxiety Symptoms  Anxiety Symptoms: Generalized  [place] Symptoms  [place] Symptoms: No problems reported or observed. Psychosis Symptoms  Delusion Type: No problems reported or observed. Suicide Risk CSSR-S:  1) Within the past month, have you wished you were dead or wished you could go to sleep and not wake up? : Yes  2) Have you actually had any thoughts of killing yourself? : Yes  3) Have you been thinking about how you might kill yourself? : Yes  5) Have you started to work out or worked out the details of how to kill yourself?  Do you intend to carry out this plan? : No  6) Have you ever done anything, started to do anything, or prepared to do anything to end your life?: Yes  Change in Result na  Change in Plan of care na     EDUCATION:   Learner Progress Toward Treatment Goals: Reviewed group plan and strategies    Method: Small group    Outcome: Verbalized understanding, Demonstrated Understanding, and Needs reinforcement    PATIENT GOALS: Patient is encouraged to set goals    PLAN/TREATMENT RECOMMENDATIONS UPDATE:   11 Jason Duran, GOAL SETTING    SHORT-TERM GOALS UPDATE:Patient is encouraged to set goals  Time frame for Short-Term Goals: 1-2 WEEKS     LONG-TERM GOALS UPDATE:  Time frame for Long-Term Goals: 6 MONTHS  Members Present in Team Meeting: See Signature Sheet    Angeles Raza, [address]

## 2021-10-25 NOTE — GROUP NOTE
Group Therapy Note    Date: 10/25/2021    Group Start Time: 1330  Group End Time: 7100  Group Topic: Recreational    [address], [facility]        Group Therapy Note    Attendees: 7/21    patient refused to attend leisure and recreation group at [phone] after encouragement from staff. 1:1 talk time provided as alternative to group session.

## 2021-10-25 NOTE — PROGRESS NOTES
Daily Progress Note  10/25/2021    Patient Name: Jeremi Ag    CHIEF COMPLAINT: Suicidal ideation with command auditory hallucinations, visual hallucinations and homicidal ideation         SUBJECTIVE:    Patient seen for follow-up assessment. Nursing staff report the patient has maintained medication adherence and has been without acute behavioral changes in the last 24 hours. Mr. Irasema Ackerman is agreeable to assessment at bedside and is withdrawn and reports his mood as \"sad \". He shares that he continues to experience abdominal pain and had no appetite this morning. He confirms knowledge that his abdominal CT yesterday was on room markable and confirms that he did have a bowel movement yesterday. He is encouraged to try and tolerate as he did decline breakfast.  Additionally he is encouraged to maintain oral hydration related to his blood pressure consistently on the lower side of normal.  Patient denies side effects related to his current medication regimen. Mr. Irasema Ackerman endorses improving suicidal ideation and contracts for safety on the unit. He continues to endorse auditory hallucinations that are\" a little quieter today\" confirming that these do not interfere with his sleep. He denies having questions or concerns regarding his current plan of care at this time. Appetite:  [] Normal/Adequate/Unchanged  [] Increased  [x] Decreased      Sleep:       [x] Normal/Adequate/Unchanged/improving[] Fairt[] Poor      Group Attendance on Unit:   [] Yes  [] Selectively    [x] No    Medication Side Effects:  Patient denies any medication side effects at the time of assessment.          Mental Status Exam  Level of consciousness: Resting, responds to verbal stimuli  Appearance: Appropriate attire for setting, laying in bed , with poor grooming and hygiene, room appears less disheveled  Behavior/Motor: Approachable, calm psychomotor slowing  Attitude toward examiner: Cooperative, somewhat attentive minimal eye contact today. Speech: Delayed responses, rate, low volume, normal tone, fair articulation. Mood: \" Sad\" presents depressed  Affect: Blunted  Thought processes: Mostly linear , slowed  Thought content: Denies homicidal ideation. Suicidal Ideation: Fleeting suicidal ideations, without current plan or intent, contracts for safety on the unit. Delusions: Endorses improving paranoia  Perceptual Disturbance: Endorses improving auditory hallucinations  Cognition: Oriented to self, location, time, and situation. Memory: Intact. Insight & Judgement: Poor. Data   height is 6' 3\" (1.905 m) and weight is 199 lb (90.3 kg). His oral temperature is 98 °F (36.7 °C). His blood pressure is 87/40 (abnormal) and his pulse is 71. His respiration is 14. Labs:   Admission on 10/11/2021   Component Date Value Ref Range Status    Hemoglobin A1C 10/13/2021 5.0  4.0 - 6.0 % Final    Estimated Avg Glucose 10/13/2021 97  mg/dL Final    Comment: The ADA and AACC recommend providing the estimated average glucose result to permit better   patient understanding of their HBA1c result.  Cholesterol 10/13/2021 150  <200 mg/dL Final    Comment:    Cholesterol Guidelines:      <200  Desirable   200-240  Borderline      >240  Undesirable         HDL 10/13/2021 62  >40 mg/dL Final    Comment:    HDL Guidelines:    <40     Undesirable   40-59    Borderline    >59     Desirable         LDL Cholesterol 10/13/2021 80  0 - 130 mg/dL Final    Comment:    LDL Guidelines:     <100    Desirable   100-129   Near to/above Desirable   130-159   Borderline      >159   Undesirable     Direct (measured) LDL and calculated LDL are not interchangeable tests.  Chol/HDL Ratio 10/13/2021 2.4  <5 Final            Triglycerides 10/13/2021 41  <150 mg/dL Final    Comment:    Triglyceride Guidelines:     <150   Desirable   150-199  Borderline   200-499  High     >499   Very high   Based on AHA Guidelines for fasting triglyceride, October 2012.  VLDL 10/13/2021 NOT REPORTED  1 - 30 mg/dL Final    Iron 10/14/2021 121  59 - 158 ug/dL Final    TIBC 10/14/2021 308  250 - 450 ug/dL Final    Iron Saturation 10/14/2021 39  20 - 55 % Final    UIBC 10/14/2021 187  112 - 347 ug/dL Final    Ferritin 10/14/2021 77  30 - 400 ug/L Final    Glucose 10/14/2021 99  70 - 99 mg/dL Final    BUN 10/14/2021 14  8 - 23 mg/dL Final    CREATININE 10/14/2021 1.03  0.70 - 1.20 mg/dL Final    Bun/Cre Ratio 10/14/2021 NOT REPORTED  9 - 20 Final    Calcium 10/14/2021 8.8  8.6 - 10.4 mg/dL Final    Sodium 10/14/2021 144  135 - 144 mmol/L Final    Potassium 10/14/2021 4.1  3.7 - 5.3 mmol/L Final    Chloride 10/14/2021 109* 98 - 107 mmol/L Final    CO2 10/14/2021 27  20 - 31 mmol/L Final    Anion Gap 10/14/2021 8* 9 - 17 mmol/L Final    GFR Non- 10/14/2021 >60  >60 mL/min Final    GFR  10/14/2021 >60  >60 mL/min Final    GFR Comment 10/14/2021        Final    Comment: Average GFR for 61-76 years old:   80 mL/min/1.73sq m  Chronic Kidney Disease:   <60 mL/min/1.73sq m  Kidney failure:   <15 mL/min/1.73sq m              eGFR calculated using average adult body mass.  Additional eGFR calculator available at:        [employer](INOR).br            GFR Staging 10/14/2021 NOT REPORTED   Final    Color, UA 10/22/2021 Yellow  Yellow Final    Turbidity UA 10/22/2021 Clear  Clear Final    Glucose, Ur 10/22/2021 NEGATIVE  NEGATIVE Final    Bilirubin Urine 10/22/2021 NEGATIVE  NEGATIVE Final    Ketones, Urine 10/22/2021 NEGATIVE  NEGATIVE Final    Specific [place], UA 10/22/2021 1.024  1.000 - 1.030 Final    Urine Hgb 10/22/2021 NEGATIVE  NEGATIVE Final    pH, UA 10/22/2021 7.5  5.0 - 8.0 Final    Protein, UA 10/22/2021 NEGATIVE  NEGATIVE Final    Urobilinogen, Urine 10/22/2021 Normal  Normal Final    Nitrite, Urine 10/22/2021 NEGATIVE  NEGATIVE Final    Leukocyte Esterase, Urine 10/22/2021 NEGATIVE  NEGATIVE Final    Urinalysis Comments 10/22/2021 Microscopic exam not performed based on chemical results unless requested in original order.    Final    Amylase 10/22/2021 81  28 - 100 U/L Final    WBC 10/22/2021 3.9  3.5 - 11.0 k/uL Final    RBC 10/22/2021 4.14* 4.5 - 5.9 m/uL Final    Hemoglobin 10/22/2021 11.9* 13.5 - 17.5 g/dL Final    Hematocrit 10/22/2021 37.5* 41 - 53 % Final    MCV 10/22/2021 90.4  80 - 100 fL Final    MCH 10/22/2021 28.8  26 - 34 pg Final    MCHC 10/22/2021 31.9  31 - 37 g/dL Final    RDW 10/22/2021 15.2* 11.5 - 14.9 % Final    Platelets 25/39/0892 290  150 - 450 k/uL Final    MPV 10/22/2021 7.4  6.0 - 12.0 fL Final    NRBC Automated 10/22/2021 NOT REPORTED  per 100 WBC Final    Differential Type 10/22/2021 NOT REPORTED   Final    Immature Granulocytes 10/22/2021 NOT REPORTED  0 % Final    Absolute Immature Granulocyte 10/22/2021 NOT REPORTED  0.00 - 0.30 k/uL Final    WBC Morphology 10/22/2021 NOT REPORTED   Final    RBC Morphology 10/22/2021 NOT REPORTED   Final    Platelet Estimate 22/76/4793 NOT REPORTED   Final    Seg Neutrophils 10/22/2021 44  36 - 66 % Final    Lymphocytes 10/22/2021 47* 24 - 44 % Final    Monocytes 10/22/2021 6  1 - 7 % Final    Eosinophils % 10/22/2021 3  0 - 4 % Final    Basophils 10/22/2021 0  0 - 2 % Final    Segs Absolute 10/22/2021 1.72  1.3 - 9.1 k/uL Final    Absolute Lymph # 10/22/2021 1.83  1.0 - 4.8 k/uL Final    Absolute Mono # 10/22/2021 0.23  0.1 - 1.3 k/uL Final    Absolute Eos # 10/22/2021 0.12  0.0 - 0.4 k/uL Final    Basophils Absolute 10/22/2021 0.00  0.0 - 0.2 k/uL Final    Morphology 10/22/2021 ANISOCYTOSIS PRESENT   Final    Glucose 10/22/2021 100* 70 - 99 mg/dL Final    BUN 10/22/2021 16  8 - 23 mg/dL Final    CREATININE 10/22/2021 1.32* 0.70 - 1.20 mg/dL Final    Bun/Cre Ratio 10/22/2021 NOT REPORTED  9 - 20 Final    Calcium 10/22/2021 8.8  8.6 - 10.4 mg/dL Final    Sodium 10/22/2021 145* 135 - 144 mmol/L Final    Therapy conducted. Probable discharge is to be determined by attending physician and potentially Tuesday or Wednesday if symptoms are stabilized. Follow-up daily while inpatient. Patient continues to be monitored in the inpatient psychiatric facility at [facility] for safety and stabilization. Patient continues to need, on a daily basis, active treatment furnished directly by or requiring the supervision of inpatient psychiatric personnel. Electronically signed by MARY Malhotra CNP on 10/25/2021 at 12:46 PM    **This report has been created using voice recognition software. It may contain minor errors which are inherent in voice recognition technology. **    I independently saw and evaluated the patient. I reviewed the nurse practitioners documentation above. Any additional comments or changes to the nurse practitioners documentation are stated below otherwise agree with assessment. Plan will be as follows:  Patient motivated for inpatient rehab so has difficulty completing applications. Has a an appointment at [facility] on November 4. Reports improvement in his hallucinations and suicidal ideations. Still reporting depressed mood but overall less intense experience. Denying side effects to medication  PLAN  Patient s symptoms   are improving  Continue with current medication for now  Attempt to develop insight  Psycho-education conducted. Supportive Therapy conducted.   Probable discharge is 1 to 2 days  Follow-up daily while on inpatient unit

## 2021-10-26 PROCEDURE — 6370000000 HC RX 637 (ALT 250 FOR IP): Performed by: PSYCHIATRY & NEUROLOGY

## 2021-10-26 PROCEDURE — 99232 SBSQ HOSP IP/OBS MODERATE 35: CPT | Performed by: PSYCHIATRY & NEUROLOGY

## 2021-10-26 PROCEDURE — 1240000000 HC EMOTIONAL WELLNESS R&B

## 2021-10-26 PROCEDURE — 90833 PSYTX W PT W E/M 30 MIN: CPT | Performed by: PSYCHIATRY & NEUROLOGY

## 2021-10-26 RX ADMIN — QUETIAPINE FUMARATE 200 MG: 200 TABLET ORAL at 20:52

## 2021-10-26 RX ADMIN — HYDROXYZINE HYDROCHLORIDE 50 MG: 50 TABLET, FILM COATED ORAL at 20:54

## 2021-10-26 RX ADMIN — TRAZODONE HYDROCHLORIDE 150 MG: 150 TABLET ORAL at 20:52

## 2021-10-26 ASSESSMENT — PAIN DESCRIPTION - PAIN TYPE: TYPE: ACUTE PAIN

## 2021-10-26 ASSESSMENT — PAIN SCALES - GENERAL
PAINLEVEL_OUTOF10: 0
PAINLEVEL_OUTOF10: 6

## 2021-10-26 ASSESSMENT — PAIN DESCRIPTION - ORIENTATION: ORIENTATION: LEFT

## 2021-10-26 ASSESSMENT — PAIN DESCRIPTION - LOCATION: LOCATION: ABDOMEN

## 2021-10-26 NOTE — CARE COORDINATION
Kanwal spoke with Jorge Meneses, states pt is accepted they prefer he admit on Thurs 10/28 due to time constraints. KANWAL updated MD, pt to discharge Thurs 10/28, will need to arrive at [address] [place] [address]. before [address] [place] [address]. Staff updated.

## 2021-10-26 NOTE — PROGRESS NOTES
Daily Progress Note  Cj Mckeon MD  10/26/2021  CHIEF COMPLAINT: Depression with suicidal ideation    Reviewed patient's current plan of care and vital signs with nursing staff. Sleep:  8 hours last night  Attending groups: Intermittent    SUBJECTIVE:    Patient denying any side effects to medication. We learned today that he has been accepted to [facility] recovery inpatient which can also transition to sober living for 90 days. There is an issue that [facility] is not able to accept the patient until Thursday morning. Patient is grateful for the assistance and care he is received. He is reporting improvement in auditory hallucinations. Feels he could contract for safety at present time. Given the circumstances, will hold the patient through tomorrow for safe transition to inpatient substance use rehab. Patient expresses understanding and gratitude    Mental Status Exam  Level of consciousness:  Within normal limits  Appearance: Hospital attire, seated in chair, with good grooming and hygiene   Behavior/Motor: No abnormalities noted  Attitude toward examiner:  Cooperative, attentive, good eye contact  Speech:  spontaneous, normal rate, normal volume and well articulated  Mood: \"Better\"  Affect: Fair  Thought processes:  linear, goal directed and coherent  Thought content:  denies homicidal ideation  Suicidal Ideation: Denies suicidal ideation  Delusions:  no evidence of delusions  Perceptual Disturbance:  denies any perceptual disturbance  Cognition:  Oriented to self, location, time, and situation  Memory: age appropriate  Insight & Judgement: improving  Medication side effects:  denies       Data   height is 6' 3\" (1.905 m) and weight is 199 lb (90.3 kg). His temporal temperature is 97.5 °F (36.4 °C). His blood pressure is 101/56 (abnormal) and his pulse is 58. His respiration is 14.    Labs:   Admission on 10/11/2021   Component Date Value Ref Range Status    Hemoglobin A1C 10/13/2021 5.0  4.0 - 6.0 % Final    Estimated Avg Glucose 10/13/2021 97  mg/dL Final    Comment: The ADA and AACC recommend providing the estimated average glucose result to permit better   patient understanding of their HBA1c result.  Cholesterol 10/13/2021 150  <200 mg/dL Final    Comment:    Cholesterol Guidelines:      <200  Desirable   200-240  Borderline      >240  Undesirable         HDL 10/13/2021 62  >40 mg/dL Final    Comment:    HDL Guidelines:    <40     Undesirable   40-59    Borderline    >59     Desirable         LDL Cholesterol 10/13/2021 80  0 - 130 mg/dL Final    Comment:    LDL Guidelines:     <100    Desirable   100-129   Near to/above Desirable   130-159   Borderline      >159   Undesirable     Direct (measured) LDL and calculated LDL are not interchangeable tests.  Chol/HDL Ratio 10/13/2021 2.4  <5 Final            Triglycerides 10/13/2021 41  <150 mg/dL Final    Comment:    Triglyceride Guidelines:     <150   Desirable   150-199  Borderline   200-499  High     >499   Very high   Based on AHA Guidelines for fasting triglyceride, October 2012.          VLDL 10/13/2021 NOT REPORTED  1 - 30 mg/dL Final    Iron 10/14/2021 121  59 - 158 ug/dL Final    TIBC 10/14/2021 308  250 - 450 ug/dL Final    Iron Saturation 10/14/2021 39  20 - 55 % Final    UIBC 10/14/2021 187  112 - 347 ug/dL Final    Ferritin 10/14/2021 77  30 - 400 ug/L Final    Glucose 10/14/2021 99  70 - 99 mg/dL Final    BUN 10/14/2021 14  8 - 23 mg/dL Final    CREATININE 10/14/2021 1.03  0.70 - 1.20 mg/dL Final    Bun/Cre Ratio 10/14/2021 NOT REPORTED  9 - 20 Final    Calcium 10/14/2021 8.8  8.6 - 10.4 mg/dL Final    Sodium 10/14/2021 144  135 - 144 mmol/L Final    Potassium 10/14/2021 4.1  3.7 - 5.3 mmol/L Final    Chloride 10/14/2021 109* 98 - 107 mmol/L Final    CO2 10/14/2021 27  20 - 31 mmol/L Final    Anion Gap 10/14/2021 8* 9 - 17 mmol/L Final    GFR Non- 10/14/2021 >60  >60 mL/min Final    GFR African American 10/14/2021 >60  >60 mL/min Final    GFR Comment 10/14/2021        Final    Comment: Average GFR for 61-76 years old:   80 mL/min/1.73sq m  Chronic Kidney Disease:   <60 mL/min/1.73sq m  Kidney failure:   <15 mL/min/1.73sq m              eGFR calculated using average adult body mass. Additional eGFR calculator available at:        [email or web address]            GFR Staging 10/14/2021 NOT REPORTED   Final    Color, UA 10/22/2021 Yellow  Yellow Final    Turbidity UA 10/22/2021 Clear  Clear Final    Glucose, Ur 10/22/2021 NEGATIVE  NEGATIVE Final    Bilirubin Urine 10/22/2021 NEGATIVE  NEGATIVE Final    Ketones, Urine 10/22/2021 NEGATIVE  NEGATIVE Final    Specific [employer], UA 10/22/2021 1.024  1.000 - 1.030 Final    Urine Hgb 10/22/2021 NEGATIVE  NEGATIVE Final    pH, UA 10/22/2021 7.5  5.0 - 8.0 Final    Protein, UA 10/22/2021 NEGATIVE  NEGATIVE Final    Urobilinogen, Urine 10/22/2021 Normal  Normal Final    Nitrite, Urine 10/22/2021 NEGATIVE  NEGATIVE Final    Leukocyte Esterase, Urine 10/22/2021 NEGATIVE  NEGATIVE Final    Urinalysis Comments 10/22/2021 Microscopic exam not performed based on chemical results unless requested in original order.    Final    Amylase 10/22/2021 81  28 - 100 U/L Final    WBC 10/22/2021 3.9  3.5 - 11.0 k/uL Final    RBC 10/22/2021 4.14* 4.5 - 5.9 m/uL Final    Hemoglobin 10/22/2021 11.9* 13.5 - 17.5 g/dL Final    Hematocrit 10/22/2021 37.5* 41 - 53 % Final    MCV 10/22/2021 90.4  80 - 100 fL Final    MCH 10/22/2021 28.8  26 - 34 pg Final    MCHC 10/22/2021 31.9  31 - 37 g/dL Final    RDW 10/22/2021 15.2* 11.5 - 14.9 % Final    Platelets 29/83/4033 290  150 - 450 k/uL Final    MPV 10/22/2021 7.4  6.0 - 12.0 fL Final    NRBC Automated 10/22/2021 NOT REPORTED  per 100 WBC Final    Differential Type 10/22/2021 NOT REPORTED   Final    Immature Granulocytes 10/22/2021 NOT REPORTED  0 % Final    Absolute Immature Granulocyte 10/22/2021 NOT REPORTED  0.00 - 0.30 k/uL Final    WBC Morphology 10/22/2021 NOT REPORTED   Final    RBC Morphology 10/22/2021 NOT REPORTED   Final    Platelet Estimate 10/23/4896 NOT REPORTED   Final    Seg Neutrophils 10/22/2021 44  36 - 66 % Final    Lymphocytes 10/22/2021 47* 24 - 44 % Final    Monocytes 10/22/2021 6  1 - 7 % Final    Eosinophils % 10/22/2021 3  0 - 4 % Final    Basophils 10/22/2021 0  0 - 2 % Final    Segs Absolute 10/22/2021 1.72  1.3 - 9.1 k/uL Final    Absolute Lymph # 10/22/2021 1.83  1.0 - 4.8 k/uL Final    Absolute Mono # 10/22/2021 0.23  0.1 - 1.3 k/uL Final    Absolute Eos # 10/22/2021 0.12  0.0 - 0.4 k/uL Final    Basophils Absolute 10/22/2021 0.00  0.0 - 0.2 k/uL Final    Morphology 10/22/2021 ANISOCYTOSIS PRESENT   Final    Glucose 10/22/2021 100* 70 - 99 mg/dL Final    BUN 10/22/2021 16  8 - 23 mg/dL Final    CREATININE 10/22/2021 1.32* 0.70 - 1.20 mg/dL Final    Bun/Cre Ratio 10/22/2021 NOT REPORTED  9 - 20 Final    Calcium 10/22/2021 8.8  8.6 - 10.4 mg/dL Final    Sodium 10/22/2021 145* 135 - 144 mmol/L Final    Potassium 10/22/2021 4.7  3.7 - 5.3 mmol/L Final    Chloride 10/22/2021 110* 98 - 107 mmol/L Final    CO2 10/22/2021 29  20 - 31 mmol/L Final    Anion Gap 10/22/2021 6* 9 - 17 mmol/L Final    Alkaline Phosphatase 10/22/2021 98  40 - 129 U/L Final    ALT 10/22/2021 13  5 - 41 U/L Final    AST 10/22/2021 14  <40 U/L Final    Total Bilirubin 10/22/2021 0.28* 0.3 - 1.2 mg/dL Final    Total Protein 10/22/2021 6.0* 6.4 - 8.3 g/dL Final    Albumin 10/22/2021 3.6  3.5 - 5.2 g/dL Final    Albumin/Globulin Ratio 10/22/2021 NOT REPORTED  1.0 - 2.5 Final    GFR Non- 10/22/2021 55* >60 mL/min Final    GFR  10/22/2021 >60  >60 mL/min Final    GFR Comment 10/22/2021        Final    Comment: Average GFR for 61-76 years old:   80 mL/min/1.73sq m  Chronic Kidney Disease:   <60 mL/min/1.73sq m  Kidney failure:   <15 mL/min/1.73sq m eGFR calculated using average adult body mass. Additional eGFR calculator available at:        [employer]            GFR Staging 10/22/2021 NOT REPORTED   Final    Lipase 10/22/2021 25  13 - 60 U/L Final    Glucose 10/24/2021 99  70 - 99 mg/dL Final    BUN 10/24/2021 18  8 - 23 mg/dL Final    CREATININE 10/24/2021 1.16  0.70 - 1.20 mg/dL Final    Bun/Cre Ratio 10/24/2021 NOT REPORTED  9 - 20 Final    Calcium 10/24/2021 8.7  8.6 - 10.4 mg/dL Final    Sodium 10/24/2021 142  135 - 144 mmol/L Final    Potassium 10/24/2021 4.4  3.7 - 5.3 mmol/L Final    Chloride 10/24/2021 107  98 - 107 mmol/L Final    CO2 10/24/2021 27  20 - 31 mmol/L Final    Anion Gap 10/24/2021 8* 9 - 17 mmol/L Final    GFR Non- 10/24/2021 >60  >60 mL/min Final    GFR  10/24/2021 >60  >60 mL/min Final    GFR Comment 10/24/2021        Final    Comment: Average GFR for 61-76 years old:   80 mL/min/1.73sq m  Chronic Kidney Disease:   <60 mL/min/1.73sq m  Kidney failure:   <15 mL/min/1.73sq m              eGFR calculated using average adult body mass.  Additional eGFR calculator available at:        [employer]            GFR Staging 10/24/2021 NOT REPORTED   Final            Medications  Current Facility-Administered Medications: QUEtiapine (SEROQUEL) tablet 200 mg, 200 mg, Oral, Nightly  dicyclomine (BENTYL) capsule 10 mg, 10 mg, Oral, TID PRN  escitalopram (LEXAPRO) tablet 15 mg, 15 mg, Oral, Daily  paliperidone (INVEGA) extended release tablet 9 mg, 9 mg, Oral, Daily  acetaminophen (TYLENOL) tablet 650 mg, 650 mg, Oral, Q4H PRN  aluminum & magnesium hydroxide-simethicone (MAALOX) 200-200-20 MG/5ML suspension 30 mL, 30 mL, Oral, Q6H PRN  haloperidol lactate (HALDOL) injection 5 mg, 5 mg, IntraMUSCular, Q4H PRN **AND** LORazepam (ATIVAN) injection 2 mg, 2 mg, IntraMUSCular, Q4H PRN **AND** diphenhydrAMINE (BENADRYL) injection 25 mg, 25 mg, IntraMUSCular, Q4H PRN  hydrOXYzine (ATARAX) tablet 50 mg, 50 mg, Oral, TID PRN  haloperidol (HALDOL) tablet 5 mg, 5 mg, Oral, Q4H PRN **AND** LORazepam (ATIVAN) tablet 2 mg, 2 mg, Oral, Q4H PRN  traZODone (DESYREL) tablet 50 mg, 50 mg, Oral, Nightly PRN  polyethylene glycol (GLYCOLAX) packet 17 g, 17 g, Oral, Daily PRN  nicotine polacrilex (NICORETTE) gum 2 mg, 2 mg, Oral, PRN  Vitamin D (CHOLECALCIFEROL) tablet 1,000 Units, 1,000 Units, Oral, Daily  docusate sodium (COLACE) capsule 100 mg, 100 mg, Oral, BID PRN  traZODone (DESYREL) tablet 150 mg, 150 mg, Oral, Nightly    ASSESSMENT  Schizoaffective disorder, depressive type [facility])     PLAN  Patient s symptoms   are improving  Continue with current medication for now  Attempt to develop insight  Psycho-education conducted. Supportive Therapy conducted. Probable discharge is Thursday  Follow-up daily while in the inpatient unit    More than 16 mins of the 30-minute session was spent doing Supportive psychotherapy. Electronically signed by Jessee Wilkinson MD on 10/26/21 at 7:41 PM EDT    **This report has been created using voice recognition software. It may contain minor errors which are inherent in voice recognition technology. **

## 2021-10-26 NOTE — GROUP NOTE
Group Therapy Note    Date: 10/26/2021    Group Start Time: 1000  Group End Time: 1072  Group Topic: Psychotherapy    [place] CHANEL Hassan; CHANEL Scherer        Group Therapy Note    Attendees: 6/21         patient refused to attend psychotherapy group at [place] [id number] after encouragement from staff.   1:1 talk time provided as alternative to group session    Signature:  CHANEL Scherer

## 2021-10-26 NOTE — CARE COORDINATION
SW called to followup on [employer] application, Natalie Sena states she will look for application and return call to YOGESH.

## 2021-10-26 NOTE — GROUP NOTE
Group Therapy Note    Date: 10/26/2021    Group Start Time: 1330  Group End Time: 8454  Group Topic: Cognitive Skills    [address], GADIELS        Group Therapy Note    Attendees: 6/20    patient refused to attend  self esteem group at [phone] after encouragement from staff. 1:1 talk time provided as alternative to group session.

## 2021-10-26 NOTE — PLAN OF CARE
Problem: Anger Management/Homicidal Ideation:  Goal: Absence of angry outbursts  Description: Absence of angry outbursts  10/25/2021 2126 by Dc Watts RN  Outcome: Ongoing  Note: Patient has had no angry outbursts this shift so far. Pt encouraged to discuss any developing anger with writer. Pt voiced understanding. Problem: Depressive Behavior With or Without Suicide Precautions:  Goal: Ability to disclose and discuss suicidal ideas will improve  Description: Ability to disclose and discuss suicidal ideas will improve  Outcome: Ongoing  Note: Patient denies any thoughts to suicidal ideations and no signs of self harm were noted. Patient encouraged to inform staff if he starts to develop any thoughts to harm self. Patient voiced understanding.

## 2021-10-26 NOTE — GROUP NOTE
Date: 10/26/2021    Group Start Time: 1600  Group End Time: 3428  Group Topic: Healthy Living/Wellness    [address], RN        Group Therapy Note    Attendees: 15/19         Patient refused to attend Wellness group at 1600 after encouragement from staff. 1:1 talk timeoffered as alternative to group session.     Signature:  Nicki Sanchez RN

## 2021-10-26 NOTE — GROUP NOTE
Group Therapy Note    Date: 10/26/2021    Group Start Time: 1055  Group End Time: 1150  Group Topic: Recreational    [address], GADIELS        Group Therapy Note    Attendees: 9/21    patient refused to attend trivia skills  group at  [phone] after encouragement from staff. 1:1 talk time provided as alternative to group session.

## 2021-10-27 PROCEDURE — 1240000000 HC EMOTIONAL WELLNESS R&B

## 2021-10-27 PROCEDURE — 6370000000 HC RX 637 (ALT 250 FOR IP): Performed by: PSYCHIATRY & NEUROLOGY

## 2021-10-27 PROCEDURE — 99232 SBSQ HOSP IP/OBS MODERATE 35: CPT | Performed by: PSYCHIATRY & NEUROLOGY

## 2021-10-27 PROCEDURE — 6370000000 HC RX 637 (ALT 250 FOR IP)

## 2021-10-27 PROCEDURE — 90833 PSYTX W PT W E/M 30 MIN: CPT | Performed by: PSYCHIATRY & NEUROLOGY

## 2021-10-27 RX ORDER — CHOLECALCIFEROL (VITAMIN D3) 25 MCG
1000 TABLET ORAL DAILY
Qty: 30 TABLET | Refills: 0 | Status: ON HOLD | OUTPATIENT
Start: 2021-10-27 | End: 2021-11-11 | Stop reason: SDUPTHER

## 2021-10-27 RX ORDER — TRAZODONE HYDROCHLORIDE 150 MG/1
150 TABLET ORAL NIGHTLY PRN
Qty: 30 TABLET | Refills: 0 | Status: SHIPPED | OUTPATIENT
Start: 2021-10-27 | End: 2021-11-01 | Stop reason: SDUPTHER

## 2021-10-27 RX ORDER — QUETIAPINE FUMARATE 200 MG/1
200 TABLET, FILM COATED ORAL NIGHTLY
Qty: 30 TABLET | Refills: 0 | Status: SHIPPED | OUTPATIENT
Start: 2021-10-28 | End: 2021-11-01 | Stop reason: SDUPTHER

## 2021-10-27 RX ORDER — PALIPERIDONE 9 MG/1
9 TABLET, EXTENDED RELEASE ORAL DAILY
Qty: 30 TABLET | Refills: 0 | Status: SHIPPED | OUTPATIENT
Start: 2021-10-28 | End: 2021-11-01 | Stop reason: SDUPTHER

## 2021-10-27 RX ORDER — ESCITALOPRAM OXALATE 5 MG/1
15 TABLET ORAL DAILY
Qty: 90 TABLET | Refills: 0 | Status: SHIPPED | OUTPATIENT
Start: 2021-10-28 | End: 2021-11-01 | Stop reason: SDUPTHER

## 2021-10-27 RX ORDER — DOCUSATE SODIUM 100 MG/1
100 CAPSULE, LIQUID FILLED ORAL 2 TIMES DAILY PRN
Qty: 60 CAPSULE | Refills: 0 | Status: ON HOLD | OUTPATIENT
Start: 2021-10-27 | End: 2021-11-11 | Stop reason: HOSPADM

## 2021-10-27 RX ORDER — POLYETHYLENE GLYCOL 3350 17 G/17G
17 POWDER, FOR SOLUTION ORAL DAILY PRN
Qty: 527 G | Refills: 1 | Status: ON HOLD | OUTPATIENT
Start: 2021-10-27 | End: 2021-11-11 | Stop reason: HOSPADM

## 2021-10-27 RX ADMIN — TRAZODONE HYDROCHLORIDE 150 MG: 150 TABLET ORAL at 20:58

## 2021-10-27 RX ADMIN — ESCITALOPRAM OXALATE 15 MG: 10 TABLET, FILM COATED ORAL at 08:53

## 2021-10-27 RX ADMIN — PALIPERIDONE 9 MG: 9 TABLET, EXTENDED RELEASE ORAL at 08:54

## 2021-10-27 RX ADMIN — Medication 1000 UNITS: at 08:54

## 2021-10-27 RX ADMIN — HYDROXYZINE HYDROCHLORIDE 50 MG: 50 TABLET, FILM COATED ORAL at 20:58

## 2021-10-27 RX ADMIN — QUETIAPINE FUMARATE 200 MG: 200 TABLET ORAL at 20:58

## 2021-10-27 NOTE — GROUP NOTE
Group Therapy Note    Date: 10/27/2021    Group Start Time: 1000  Group End Time: 0738  Group Topic: Psychotherapy    [place] CHANEL Hassan; CHANEL Rivers        Group Therapy Note    Attendees: 5/17         patient refused to attend psychotherapy group at [place] [address] after encouragement from staff.   1:1 talk time provided as alternative to group session    Signature:  CHANEL Rivers

## 2021-10-27 NOTE — PLAN OF CARE
Problem: Anger Management/Homicidal Ideation:  Goal: Absence of angry outbursts  Description: Absence of angry outbursts  Outcome: Ongoing  Note: Patient has had no angry outbursts this shift so far. Pt encouraged to discuss any developing anger with writer. Pt voiced understanding. Problem: Depressive Behavior With or Without Suicide Precautions:  Goal: Ability to disclose and discuss suicidal ideas will improve  Description: Ability to disclose and discuss suicidal ideas will improve  Outcome: Ongoing  Note: Patient denies any thoughts to suicidal ideations and no signs of self harm were noted. Patient encouraged to inform staff if he starts to develop any thoughts to harm self. Patient voiced understanding.

## 2021-10-27 NOTE — GROUP NOTE
Group Therapy Note    Date: 10/27/2021    Group Start Time: 1100  Group End Time: 5072  Group Topic: Recreational    [facility] DAVID Lemons, CTRS        Group Therapy Note    Attendees: 4/17    patient refused to attend  leisure awareness group at [phone] after encouragement from staff. 1:1 talk time provided as alternative to group session.

## 2021-10-27 NOTE — PLAN OF CARE
Problem: Anger Management/Homicidal Ideation:  Goal: Absence of angry outbursts  Description: Absence of angry outbursts  Outcome: Ongoing  Pt calm and controlled. He did not have any angry outburst this shift. Problem: Depressive Behavior With or Without Suicide Precautions:  Goal: Ability to disclose and discuss suicidal ideas will improve  Description: Ability to disclose and discuss suicidal ideas will improve  Outcome: Ongoing  Pt denied current suicidal ideations.

## 2021-10-27 NOTE — GROUP NOTE
Group Therapy Note    Date: 10/27/2021    Group Start Time: 1330  Group End Time: 5501  Group Topic: Cognitive Skills    [facility] DAVID Cope, CTRS        Group Therapy Note    Attendees: 5/18    patient refused to attend  socialization and self esteem group at [phone] after encouragement from staff. 1:1 talk time provided as alternative to group session.

## 2021-10-28 VITALS
HEART RATE: 58 BPM | RESPIRATION RATE: 15 BRPM | SYSTOLIC BLOOD PRESSURE: 101 MMHG | TEMPERATURE: 97.7 F | WEIGHT: 199 LBS | BODY MASS INDEX: 24.74 KG/M2 | DIASTOLIC BLOOD PRESSURE: 58 MMHG | HEIGHT: 75 IN

## 2021-10-28 PROCEDURE — 99239 HOSP IP/OBS DSCHRG MGMT >30: CPT | Performed by: PSYCHIATRY & NEUROLOGY

## 2021-10-28 PROCEDURE — 6370000000 HC RX 637 (ALT 250 FOR IP): Performed by: PSYCHIATRY & NEUROLOGY

## 2021-10-28 PROCEDURE — 6370000000 HC RX 637 (ALT 250 FOR IP)

## 2021-10-28 RX ADMIN — ESCITALOPRAM OXALATE 15 MG: 10 TABLET, FILM COATED ORAL at 08:16

## 2021-10-28 RX ADMIN — PALIPERIDONE 9 MG: 9 TABLET, EXTENDED RELEASE ORAL at 08:16

## 2021-10-28 RX ADMIN — Medication 1000 UNITS: at 08:15

## 2021-10-28 NOTE — PLAN OF CARE
Problem: Anger Management/Homicidal Ideation:  Goal: Absence of angry outbursts  Description: Absence of angry outbursts  10/27/2021 2326 by Jesenia Drummond RN  Outcome: Ongoing     Problem: Depressive Behavior With or Without Suicide Precautions:  Goal: Ability to disclose and discuss suicidal ideas will improve  Description: Ability to disclose and discuss suicidal ideas will improve  10/27/2021 2326 by Jesenia Drummond RN  Outcome: Ongoing     Patient denies suicidal and homicidal ideation at this time. Patient had no angry outbursts this evening. Patient is free of self harm at this time. Patient agrees to seek out staff if thoughts to harm self arise. Staff will provide support and reassurance as needed. Safety checks maintained every 15 minutes.

## 2021-10-28 NOTE — SUICIDE SAFETY PLAN
SAFETY PLAN     A suicide Safety Plan is a document that supports someone when they are having thoughts of suicide. Warning Signs that indicate a suicidal crisis may be developing: What (situations, thoughts, feelings, body sensations, behaviors, etc.) do you experience that lets you know you are beginning to think about suicide? 1. Go off medications  2. Mood is depressed and start to feel sad, hopeless, helpless, guilty, decline in self-esteem, excess worry, no interest in doing any pleasurable activities, unable to concentrate  3. Begin to cry over the smallest of things  4. Not eating or sleeping as normal  5. Relationship issues start happening  6. I become angry and start a fight  7. When I dont listen or respond to people in a good, positive way  8. Increase drug use       Internal Coping Strategies:  What things can I do (relaxation techniques, hobbies, physical activities, etc.) to take my mind off my problems without contacting another person? 1. Go to hospital discharge appointments and follow-up with community mental health counseling  2. Talk with other people  3. Learn to identify and control your emotions by new ways  4. Think before you speak or act; walk away from the situation  5. Join a support group in person or on Social Media  6. Take a time-out  7. Take deep breaths; use relaxation techniques  8. Get some exercise; go for a walk  9. Read; listen to music; watch a funny movie    10. Coping skills/ strategies  journal/ listen to music/ go for a walk/ read a book/ watch a funny movie/show / crafts / video game  11.  Grounding techniques- eat a sour candy or hot cinnamon candy / focus on colors, sounds, smells, textures on things around you / drink some herbal tea / eat a piece of dark chocolate / take a hot bath or shower / essential oils for smelling / meditate / color / arts and crafts     People whom I can ask for help: Who can I call when I need help - for example, friends, family,

## 2021-10-28 NOTE — BH NOTE
Patient given tobacco quitline number 04053275604 at this time, refusing to call at this time, states \" I just dont want to quit now\"- patient given information as to the dangers of long term tobacco use. Continue to reinforce the importance of tobacco cessation.

## 2021-10-28 NOTE — PROGRESS NOTES
Daily Progress Note  Tayo Valentin MD  10/27/2021  CHIEF COMPLAINT: Depression and suicidal ideation    Reviewed patient's current plan of care and vital signs with nursing staff. Sleep:  8 hours last night  Attending groups: No: Intermittent    SUBJECTIVE:    Patient reports good sleep. Denying side effects to medication. Reports feeling safe for disposition tomorrow to [facility] recovery. Spent 30 minutes with the patient, of that greater than 16 minutes was spent in supportive psychotherapy. Patient feels he is capable of participating groups to [facility] recovery. Denying suicidal or homicidal ideation intent or plan. Denying auditory or visual hallucinations    Mental Status Exam  Level of consciousness:  Within normal limits  Appearance: Hospital attire, seated in chair, with good grooming and hygiene   Behavior/Motor: No abnormalities noted  Attitude toward examiner:  Cooperative, attentive, good eye contact  Speech:  spontaneous, normal rate, normal volume and well articulated  Mood: \"Better\"  Affect: Fair  Thought processes:  linear, goal directed and coherent  Thought content:  denies homicidal ideation  Suicidal Ideation: Denies suicidal ideation  Delusions:  no evidence of delusions  Perceptual Disturbance:  denies any perceptual disturbance  Cognition:  Oriented to self, location, time, and situation  Memory: age appropriate  Insight & Judgement: improving  Medication side effects:  denies       Data   height is 6' 3\" (1.905 m) and weight is 199 lb (90.3 kg). His oral temperature is 98 °F (36.7 °C). His blood pressure is 101/55 (abnormal) and his pulse is 72. His respiration is 14.    Labs:   Admission on 10/11/2021   Component Date Value Ref Range Status    Hemoglobin A1C 10/13/2021 5.0  4.0 - 6.0 % Final    Estimated Avg Glucose 10/13/2021 97  mg/dL Final    Comment: The ADA and AACC recommend providing the estimated average glucose result to permit better   patient understanding of their HBA1c result.  Cholesterol 10/13/2021 150  <200 mg/dL Final    Comment:    Cholesterol Guidelines:      <200  Desirable   200-240  Borderline      >240  Undesirable         HDL 10/13/2021 62  >40 mg/dL Final    Comment:    HDL Guidelines:    <40     Undesirable   40-59    Borderline    >59     Desirable         LDL Cholesterol 10/13/2021 80  0 - 130 mg/dL Final    Comment:    LDL Guidelines:     <100    Desirable   100-129   Near to/above Desirable   130-159   Borderline      >159   Undesirable     Direct (measured) LDL and calculated LDL are not interchangeable tests.  Chol/HDL Ratio 10/13/2021 2.4  <5 Final            Triglycerides 10/13/2021 41  <150 mg/dL Final    Comment:    Triglyceride Guidelines:     <150   Desirable   150-199  Borderline   200-499  High     >499   Very high   Based on AHA Guidelines for fasting triglyceride, October 2012.          VLDL 10/13/2021 NOT REPORTED  1 - 30 mg/dL Final    Iron 10/14/2021 121  59 - 158 ug/dL Final    TIBC 10/14/2021 308  250 - 450 ug/dL Final    Iron Saturation 10/14/2021 39  20 - 55 % Final    UIBC 10/14/2021 187  112 - 347 ug/dL Final    Ferritin 10/14/2021 77  30 - 400 ug/L Final    Glucose 10/14/2021 99  70 - 99 mg/dL Final    BUN 10/14/2021 14  8 - 23 mg/dL Final    CREATININE 10/14/2021 1.03  0.70 - 1.20 mg/dL Final    Bun/Cre Ratio 10/14/2021 NOT REPORTED  9 - 20 Final    Calcium 10/14/2021 8.8  8.6 - 10.4 mg/dL Final    Sodium 10/14/2021 144  135 - 144 mmol/L Final    Potassium 10/14/2021 4.1  3.7 - 5.3 mmol/L Final    Chloride 10/14/2021 109* 98 - 107 mmol/L Final    CO2 10/14/2021 27  20 - 31 mmol/L Final    Anion Gap 10/14/2021 8* 9 - 17 mmol/L Final    GFR Non- 10/14/2021 >60  >60 mL/min Final    GFR  10/14/2021 >60  >60 mL/min Final    GFR Comment 10/14/2021        Final    Comment: Average GFR for 61-76 years old:   80 mL/min/1.73sq m  Chronic Kidney Disease:   <60 mL/min/1.73sq m  Kidney failure:   <15 mL/min/1.73sq m              eGFR calculated using average adult body mass. Additional eGFR calculator available at:        [employer]            GFR Staging 10/14/2021 NOT REPORTED   Final    Color, UA 10/22/2021 Yellow  Yellow Final    Turbidity UA 10/22/2021 Clear  Clear Final    Glucose, Ur 10/22/2021 NEGATIVE  NEGATIVE Final    Bilirubin Urine 10/22/2021 NEGATIVE  NEGATIVE Final    Ketones, Urine 10/22/2021 NEGATIVE  NEGATIVE Final    Specific [place], UA 10/22/2021 1.024  1.000 - 1.030 Final    Urine Hgb 10/22/2021 NEGATIVE  NEGATIVE Final    pH, UA 10/22/2021 7.5  5.0 - 8.0 Final    Protein, UA 10/22/2021 NEGATIVE  NEGATIVE Final    Urobilinogen, Urine 10/22/2021 Normal  Normal Final    Nitrite, Urine 10/22/2021 NEGATIVE  NEGATIVE Final    Leukocyte Esterase, Urine 10/22/2021 NEGATIVE  NEGATIVE Final    Urinalysis Comments 10/22/2021 Microscopic exam not performed based on chemical results unless requested in original order.    Final    Amylase 10/22/2021 81  28 - 100 U/L Final    WBC 10/22/2021 3.9  3.5 - 11.0 k/uL Final    RBC 10/22/2021 4.14* 4.5 - 5.9 m/uL Final    Hemoglobin 10/22/2021 11.9* 13.5 - 17.5 g/dL Final    Hematocrit 10/22/2021 37.5* 41 - 53 % Final    MCV 10/22/2021 90.4  80 - 100 fL Final    MCH 10/22/2021 28.8  26 - 34 pg Final    MCHC 10/22/2021 31.9  31 - 37 g/dL Final    RDW 10/22/2021 15.2* 11.5 - 14.9 % Final    Platelets 62/41/6166 290  150 - 450 k/uL Final    MPV 10/22/2021 7.4  6.0 - 12.0 fL Final    NRBC Automated 10/22/2021 NOT REPORTED  per 100 WBC Final    Differential Type 10/22/2021 NOT REPORTED   Final    Immature Granulocytes 10/22/2021 NOT REPORTED  0 % Final    Absolute Immature Granulocyte 10/22/2021 NOT REPORTED  0.00 - 0.30 k/uL Final    WBC Morphology 10/22/2021 NOT REPORTED   Final    RBC Morphology 10/22/2021 NOT REPORTED   Final    Platelet Estimate 38/56/1945 NOT REPORTED   Final    Seg Neutrophils 10/22/2021 44  36 - 66 % Final    Lymphocytes 10/22/2021 47* 24 - 44 % Final    Monocytes 10/22/2021 6  1 - 7 % Final    Eosinophils % 10/22/2021 3  0 - 4 % Final    Basophils 10/22/2021 0  0 - 2 % Final    Segs Absolute 10/22/2021 1.72  1.3 - 9.1 k/uL Final    Absolute Lymph # 10/22/2021 1.83  1.0 - 4.8 k/uL Final    Absolute Mono # 10/22/2021 0.23  0.1 - 1.3 k/uL Final    Absolute Eos # 10/22/2021 0.12  0.0 - 0.4 k/uL Final    Basophils Absolute 10/22/2021 0.00  0.0 - 0.2 k/uL Final    Morphology 10/22/2021 ANISOCYTOSIS PRESENT   Final    Glucose 10/22/2021 100* 70 - 99 mg/dL Final    BUN 10/22/2021 16  8 - 23 mg/dL Final    CREATININE 10/22/2021 1.32* 0.70 - 1.20 mg/dL Final    Bun/Cre Ratio 10/22/2021 NOT REPORTED  9 - 20 Final    Calcium 10/22/2021 8.8  8.6 - 10.4 mg/dL Final    Sodium 10/22/2021 145* 135 - 144 mmol/L Final    Potassium 10/22/2021 4.7  3.7 - 5.3 mmol/L Final    Chloride 10/22/2021 110* 98 - 107 mmol/L Final    CO2 10/22/2021 29  20 - 31 mmol/L Final    Anion Gap 10/22/2021 6* 9 - 17 mmol/L Final    Alkaline Phosphatase 10/22/2021 98  40 - 129 U/L Final    ALT 10/22/2021 13  5 - 41 U/L Final    AST 10/22/2021 14  <40 U/L Final    Total Bilirubin 10/22/2021 0.28* 0.3 - 1.2 mg/dL Final    Total Protein 10/22/2021 6.0* 6.4 - 8.3 g/dL Final    Albumin 10/22/2021 3.6  3.5 - 5.2 g/dL Final    Albumin/Globulin Ratio 10/22/2021 NOT REPORTED  1.0 - 2.5 Final    GFR Non- 10/22/2021 55* >60 mL/min Final    GFR  10/22/2021 >60  >60 mL/min Final    GFR Comment 10/22/2021        Final    Comment: Average GFR for 61-76 years old:   80 mL/min/1.73sq m  Chronic Kidney Disease:   <60 mL/min/1.73sq m  Kidney failure:   <15 mL/min/1.73sq m              eGFR calculated using average adult body mass.  Additional eGFR calculator available at:        [employer]            GFR Staging 10/22/2021 NOT REPORTED   Final    Lipase 10/22/2021 25  13 - 60 U/L Final    Glucose 10/24/2021 99  70 - 99 mg/dL Final    BUN 10/24/2021 18  8 - 23 mg/dL Final    CREATININE 10/24/2021 1.16  0.70 - 1.20 mg/dL Final    Bun/Cre Ratio 10/24/2021 NOT REPORTED  9 - 20 Final    Calcium 10/24/2021 8.7  8.6 - 10.4 mg/dL Final    Sodium 10/24/2021 142  135 - 144 mmol/L Final    Potassium 10/24/2021 4.4  3.7 - 5.3 mmol/L Final    Chloride 10/24/2021 107  98 - 107 mmol/L Final    CO2 10/24/2021 27  20 - 31 mmol/L Final    Anion Gap 10/24/2021 8* 9 - 17 mmol/L Final    GFR Non- 10/24/2021 >60  >60 mL/min Final    GFR  10/24/2021 >60  >60 mL/min Final    GFR Comment 10/24/2021        Final    Comment: Average GFR for 61-76 years old:   80 mL/min/1.73sq m  Chronic Kidney Disease:   <60 mL/min/1.73sq m  Kidney failure:   <15 mL/min/1.73sq m              eGFR calculated using average adult body mass.  Additional eGFR calculator available at:        [employer]            GFR Staging 10/24/2021 NOT REPORTED   Final            Medications  Current Facility-Administered Medications: QUEtiapine (SEROQUEL) tablet 200 mg, 200 mg, Oral, Nightly  dicyclomine (BENTYL) capsule 10 mg, 10 mg, Oral, TID PRN  escitalopram (LEXAPRO) tablet 15 mg, 15 mg, Oral, Daily  paliperidone (INVEGA) extended release tablet 9 mg, 9 mg, Oral, Daily  acetaminophen (TYLENOL) tablet 650 mg, 650 mg, Oral, Q4H PRN  aluminum & magnesium hydroxide-simethicone (MAALOX) 200-200-20 MG/5ML suspension 30 mL, 30 mL, Oral, Q6H PRN  haloperidol lactate (HALDOL) injection 5 mg, 5 mg, IntraMUSCular, Q4H PRN **AND** LORazepam (ATIVAN) injection 2 mg, 2 mg, IntraMUSCular, Q4H PRN **AND** diphenhydrAMINE (BENADRYL) injection 25 mg, 25 mg, IntraMUSCular, Q4H PRN  hydrOXYzine (ATARAX) tablet 50 mg, 50 mg, Oral, TID PRN  haloperidol (HALDOL) tablet 5 mg, 5 mg, Oral, Q4H PRN **AND** LORazepam (ATIVAN) tablet 2 mg, 2 mg, Oral, Q4H PRN  traZODone (DESYREL) tablet 50 mg, 50 mg, Oral, Nightly PRN  polyethylene glycol (GLYCOLAX) packet 17 g, 17 g, Oral, Daily PRN  nicotine polacrilex (NICORETTE) gum 2 mg, 2 mg, Oral, PRN  Vitamin D (CHOLECALCIFEROL) tablet 1,000 Units, 1,000 Units, Oral, Daily  docusate sodium (COLACE) capsule 100 mg, 100 mg, Oral, BID PRN  traZODone (DESYREL) tablet 150 mg, 150 mg, Oral, Nightly    ASSESSMENT  Schizoaffective disorder, depressive type [facility])     PLAN  Patient s symptoms   are improving  Continue with current medication for now  Attempt to develop insight  Psycho-education conducted. Supportive Therapy conducted. Probable discharge is tomorrow  Follow-up daily while in the inpatient unit    More than 16 mins of the 30-minute session was spent doing Supportive psychotherapy. Electronically signed by Darren Wolf MD on 10/27/21 at 10:34 PM EDT    **This report has been created using voice recognition software. It may contain minor errors which are inherent in voice recognition technology. **

## 2021-10-28 NOTE — BH NOTE
[facility]  Discharge Note    Pt discharged with followings belongings:   Dentures: None  Vision - Corrective Lenses: None  Hearing Aid: None  Jewelry: None  Body Piercings Removed: N/A  Clothing: Footwear, Jacket / coat, Pants, Shirt, Undergarments (Comment), Other (Comment) (knit hat)  Were All Patient Medications Collected?: Not Applicable  Other Valuables: None   Valuables sent home with Patient or returned to patient. Patient education on aftercare instructions: provided  Information faxed to [facility] by RN  at 7:22 AM .Patient verbalize understanding of AVS:  YES.     Status EXAM upon discharge:  Status and Exam  Normal: Yes  Facial Expression: Brightened  Affect: Appropriate  Level of Consciousness: Alert  Mood:Normal: No  Mood: Anxious  Motor Activity:Normal: Yes  Motor Activity: Decreased  Interview Behavior: Cooperative  Preception: Shannock to Person, Laura Priestly to Time, Shannock to Place, Shannock to Situation  Attention:Normal: Yes  Attention: Distractible  Thought Processes: Circumstantial  Thought Content:Normal: Yes  Thought Content: Poverty of Content  Hallucinations: None  Delusions: No  Memory:Normal: Yes  Memory: Poor Recent  Insight and Judgment: No  Insight and Judgment: Poor Judgment, Poor Insight  Present Suicidal Ideation: No  Present Homicidal Ideation: No      Metabolic Screening:    Lab Results   Component Value Date    LABA1C 5.0 10/13/2021       Lab Results   Component Value Date    CHOL 150 10/13/2021    CHOL 167 08/11/2020    CHOL 179 02/13/2017    CHOL 127 05/09/2015    CHOL 168 08/20/2014    CHOL 158 12/31/2013    CHOL 178 08/15/2013    CHOL 166 09/17/2012    CHOL 210 (H) 02/03/2012     Lab Results   Component Value Date    TRIG 41 10/13/2021    TRIG 43 08/11/2020    TRIG 67 02/13/2017    TRIG 37 05/09/2015    TRIG 72 08/20/2014    TRIG 52 12/31/2013    TRIG 70 08/15/2013    TRIG 117 09/17/2012    TRIG 94 02/03/2012     Lab Results   Component Value Date    HDL 62 10/13/2021 HDL 74 08/11/2020    HDL 73 02/13/2017    HDL 57 05/09/2015    HDL 50 08/20/2014    HDL 43 12/31/2013    HDL 42 08/15/2013    HDL 38 (L) 09/17/2012    HDL 55 02/03/2012     No components found for: LDLCAL  No results found for: LABVLDL  Patient ambulated to the [facility] entrance with all belongings, medications, and copy of his AVS. Patient will be transported to [facility] for recovery on [facility] via cab.    Lior Paiz RN

## 2021-10-28 NOTE — PLAN OF CARE
Problem: Anger Management/Homicidal Ideation:  Goal: Absence of angry outbursts  Description: Absence of angry outbursts  10/28/2021 0721 by Kalee Rangel RN  Outcome: Completed     Problem: Depressive Behavior With or Without Suicide Precautions:  Goal: Ability to disclose and discuss suicidal ideas will improve  Description: Ability to disclose and discuss suicidal ideas will improve  10/28/2021 0721 by Kalee Rangel RN  Outcome: Completed

## 2021-10-29 ENCOUNTER — HOSPITAL ENCOUNTER (EMERGENCY)
Age: 61
Discharge: HOME | End: 2021-10-30
Attending: EMERGENCY MEDICINE
Payer: MEDICAID

## 2021-10-29 DIAGNOSIS — R44.0 SEVERE AUDITORY HALLUCINATIONS: Primary | ICD-10-CM

## 2021-10-29 DIAGNOSIS — F14.10 COCAINE ABUSE (HCC): ICD-10-CM

## 2021-10-29 DIAGNOSIS — R45.851 SUICIDAL IDEATION: ICD-10-CM

## 2021-10-29 LAB
ABSOLUTE EOS #: 0.11 K/UL (ref 0–0.44)
ABSOLUTE IMMATURE GRANULOCYTE: <0.03 K/UL (ref 0–0.3)
ABSOLUTE LYMPH #: 2.34 K/UL (ref 1.1–3.7)
ABSOLUTE MONO #: 0.52 K/UL (ref 0.1–1.2)
ACETAMINOPHEN LEVEL: <5 UG/ML (ref 10–30)
ALBUMIN SERPL-MCNC: 4.2 G/DL (ref 3.5–5.2)
ALBUMIN/GLOBULIN RATIO: 1.7 (ref 1–2.5)
ALP BLD-CCNC: 100 U/L (ref 40–129)
ALT SERPL-CCNC: 19 U/L (ref 5–41)
AMPHETAMINE SCREEN URINE: NEGATIVE
ANION GAP SERPL CALCULATED.3IONS-SCNC: 9 MMOL/L (ref 9–17)
AST SERPL-CCNC: 23 U/L
BARBITURATE SCREEN URINE: NEGATIVE
BASOPHILS # BLD: 0 % (ref 0–2)
BASOPHILS ABSOLUTE: <0.03 K/UL (ref 0–0.2)
BENZODIAZEPINE SCREEN, URINE: NEGATIVE
BILIRUB SERPL-MCNC: 0.21 MG/DL (ref 0.3–1.2)
BUN BLDV-MCNC: 13 MG/DL (ref 8–23)
BUN/CREAT BLD: ABNORMAL (ref 9–20)
BUPRENORPHINE URINE: NORMAL
CALCIUM SERPL-MCNC: 8.6 MG/DL (ref 8.6–10.4)
CANNABINOID SCREEN URINE: NEGATIVE
CHLORIDE BLD-SCNC: 110 MMOL/L (ref 98–107)
CO2: 25 MMOL/L (ref 20–31)
COCAINE METABOLITE, URINE: NEGATIVE
CREAT SERPL-MCNC: 0.99 MG/DL (ref 0.7–1.2)
DIFFERENTIAL TYPE: ABNORMAL
EOSINOPHILS RELATIVE PERCENT: 2 % (ref 1–4)
ETHANOL PERCENT: <0.01 %
ETHANOL: <10 MG/DL
GFR AFRICAN AMERICAN: >60 ML/MIN
GFR NON-AFRICAN AMERICAN: >60 ML/MIN
GFR SERPL CREATININE-BSD FRML MDRD: ABNORMAL ML/MIN/{1.73_M2}
GFR SERPL CREATININE-BSD FRML MDRD: ABNORMAL ML/MIN/{1.73_M2}
GLUCOSE BLD-MCNC: 98 MG/DL (ref 70–99)
HCT VFR BLD CALC: 39.1 % (ref 40.7–50.3)
HEMOGLOBIN: 11.8 G/DL (ref 13–17)
IMMATURE GRANULOCYTES: 0 %
LYMPHOCYTES # BLD: 47 % (ref 24–43)
MCH RBC QN AUTO: 28.3 PG (ref 25.2–33.5)
MCHC RBC AUTO-ENTMCNC: 30.2 G/DL (ref 28.4–34.8)
MCV RBC AUTO: 93.8 FL (ref 82.6–102.9)
MDMA URINE: NORMAL
METHADONE SCREEN, URINE: NEGATIVE
METHAMPHETAMINE, URINE: NORMAL
MONOCYTES # BLD: 11 % (ref 3–12)
NRBC AUTOMATED: 0 PER 100 WBC
OPIATES, URINE: NEGATIVE
OXYCODONE SCREEN URINE: NEGATIVE
PDW BLD-RTO: 14.2 % (ref 11.8–14.4)
PHENCYCLIDINE, URINE: NEGATIVE
PLATELET # BLD: 239 K/UL (ref 138–453)
PLATELET ESTIMATE: ABNORMAL
PMV BLD AUTO: 11 FL (ref 8.1–13.5)
POTASSIUM SERPL-SCNC: 4.3 MMOL/L (ref 3.7–5.3)
PROPOXYPHENE, URINE: NORMAL
RBC # BLD: 4.17 M/UL (ref 4.21–5.77)
RBC # BLD: ABNORMAL 10*6/UL
SALICYLATE LEVEL: <1 MG/DL (ref 3–10)
SEG NEUTROPHILS: 40 % (ref 36–65)
SEGMENTED NEUTROPHILS ABSOLUTE COUNT: 1.97 K/UL (ref 1.5–8.1)
SODIUM BLD-SCNC: 144 MMOL/L (ref 135–144)
TEST INFORMATION: NORMAL
TOTAL PROTEIN: 6.7 G/DL (ref 6.4–8.3)
TOXIC TRICYCLIC SC,BLOOD: NEGATIVE
TRICYCLIC ANTIDEPRESSANTS, UR: NORMAL
WBC # BLD: 5 K/UL (ref 3.5–11.3)
WBC # BLD: ABNORMAL 10*3/UL

## 2021-10-29 PROCEDURE — 80143 DRUG ASSAY ACETAMINOPHEN: CPT

## 2021-10-29 PROCEDURE — 80179 DRUG ASSAY SALICYLATE: CPT

## 2021-10-29 PROCEDURE — 80053 COMPREHEN METABOLIC PANEL: CPT

## 2021-10-29 PROCEDURE — 80307 DRUG TEST PRSMV CHEM ANLYZR: CPT

## 2021-10-29 PROCEDURE — G0480 DRUG TEST DEF 1-7 CLASSES: HCPCS

## 2021-10-29 PROCEDURE — 85025 COMPLETE CBC W/AUTO DIFF WBC: CPT

## 2021-10-29 PROCEDURE — 99285 EMERGENCY DEPT VISIT HI MDM: CPT

## 2021-10-29 PROCEDURE — 87635 SARS-COV-2 COVID-19 AMP PRB: CPT

## 2021-10-29 ASSESSMENT — PAIN DESCRIPTION - LOCATION: LOCATION: ABDOMEN

## 2021-10-29 ASSESSMENT — ENCOUNTER SYMPTOMS
SORE THROAT: 0
ABDOMINAL PAIN: 0
CONSTIPATION: 0
NAUSEA: 0
DIARRHEA: 0
SHORTNESS OF BREATH: 0
VOMITING: 0

## 2021-10-29 ASSESSMENT — PAIN SCALES - GENERAL: PAINLEVEL_OUTOF10: 3

## 2021-10-29 NOTE — DISCHARGE SUMMARY
hallucinations, bipolar disorder, type II or unspecified type diabetes mellitus without mention of complication not stated as uncontrolled, headache, substance abuse, urinary incontinence, gastroesophageal reflux disease, hepatitis, and tobacco abuse.      Patient presented to the [facility] ED for suicidal ideation and hallucinations. Per ED record: Jolene Barrios a 57 y. o. male who presents with complaints of suicidal ideation with plan to shoot himself with a gun. Vanna Burnett states that he has access to a gun as his friends have them.  States that he has been on the streets last 2 weeks after not receiving his check because it was sent to the wrong location. Vanna Burnett has been very upset about this. Jacob Cheekdows says he has not been able to eat the last 2 days and he is depressed over all of this situation. Hailey Katarzyna endorses some homicidal ideation but does not have a clear individual he is interested in harming.  Just stating that he is generally having thoughts of violence toward others. Pedro Pablo Hazy out of his medications for last 2 weeks.     Patient is compliant with initial assessment and agrees to interview in his room. He is guarded, suspicious, and evasive throughout the interview and was unwilling to engage in meaningful conversation regarding his history of present illness, but did provide vague answers without further elaboration. He reports that he is at the hospital because \"the voices telling me to kill myself\". He reports that the voices have been going on for \"a long time\" and are \"always there\". He endorses suicidal ideation and reports that he has a plan and intent to shoot himself with a gun. He reports that he has been feeling depressed and endorses symptoms of depression including persistently low mood for two weeks or more, anhedonia, poor sleep, low energy, impaired concentration, psychomotor slowing, and feelings of hopelessness and worthlessness.  He reports that his main stressor is living on the streets, as he is currently homeless. He reports visual hallucinations as well that come and go, but when asked to describe these he states \"I don't know what they are\". Patient also endorses homicidal ideation, but states \"I can't tell you\" and becomes agitated with further questioning. He states that he does not know if he will act on his homicidal thoughts or not. He endorses anxiety and associated symptoms including excessive worry, restlessness, edginess, and easily onset fatigue.     Patient denies paranoia, panic attacks, recent and lifetime symptoms of maryellen, OCD tendencies, and symptoms of PTSD. He reports that he has been using crack cocaine weekly, UDS positive for cocaine on admission, and has not been taking his psychiatric medications. Patient is able to contract for safety on the unit.   [facility] Course:   Upon admission, Evy Joyner was provided a safe secure environment, introduced to unit milieu. Patient participated minimally in groups and individual therapies. Determination was made to file for guardianship with his sister. Guardianship paperwork was executed. Sister has position of paperwork but had the paperwork for over 1 week without taking any action on it. She indicated she would follow-up in the future. Meds were adjusted as noted below. After couple of weeks of hospital care, patient began to feel improvement. Depression lifted, thoughts to harm self ceased. Sleep improved, appetite was good. On morning rounds 10/28/2021, Evy Joyner  endorses feeling ready for discharge. Patient denies suicidal or homicidal ideations, denies hallucinations or delusions. Denies SE's from meds. It was decided that maximum benefit from hospital care had been achieved and patient can be discharged.      Consults:   Internal medicine for medical management    Significant Diagnostic Studies: Routine labs and diagnostics    Treatments: Psychotropic medications, therapy with group, milieu, and 1:1 with nurses, social workers, PALINO/CNP, and Attending physician. Discharge Medications:  Discharge Medication List as of 10/28/2021  8:27 AM      START taking these medications    Details   QUEtiapine (SEROQUEL) 200 MG tablet Take 1 tablet by mouth nightly, Disp-30 tablet, R-0Normal         CONTINUE these medications which have CHANGED    Details   Cholecalciferol (VITAMIN D3) 25 MCG TABS Take 1 tablet by mouth daily, Disp-30 tablet, R-0Labeling may look different. 25 mcg=1000 Units. Please double check dosages. Normal      docusate sodium (COLACE) 100 MG capsule Take 1 capsule by mouth 2 times daily as needed for Constipation, Disp-60 capsule, R-0Normal      escitalopram (LEXAPRO) 5 MG tablet Take 3 tablets by mouth daily, Disp-90 tablet, R-0Normal      nicotine polacrilex (NICORETTE) 2 MG gum Take 1 each by mouth every hour as needed for Smoking cessation, Disp-110 each, R-0Normal      paliperidone (INVEGA) 9 MG extended release tablet Take 1 tablet by mouth daily, Disp-30 tablet, R-0Normal      polyethylene glycol (GLYCOLAX) 17 g packet Take 17 g by mouth daily as needed for Constipation, Disp-527 g, R-1Normal      traZODone (DESYREL) 150 MG tablet Take 1 tablet by mouth nightly as needed for Sleep, Disp-30 tablet, R-0Normal         STOP taking these medications       acetaminophen (TYLENOL) 500 MG tablet Comments:   Reason for Stopping:         ibuprofen (IBU) 400 MG tablet Comments:   Reason for Stopping:         paliperidone palmitate ER (INVEGA SUSTENNA) 234 MG/1.5ML [place] IM injection Comments:   Reason for Stopping:                Core Measures statement: This patient has orders for more than one antipsychotic medication for the following reason: A minimum of three failed attempts of monotherapy have proven inadequate. Multiple antipsychotics are used as patient is treatment resistant.  List medications attempted: Antipsychotic Discharge Medications: INVEGA and SEROQUEL    Discharge Exam:  Level of consciousness:  Within normal limits  Appearance: Street clothes, seated, with good grooming  Behavior/Motor: No abnormalities noted  Attitude toward examiner:  Cooperative, attentive, good eye contact  Speech:  spontaneous, normal rate, normal volume and well articulated  Mood:  euthymic  Affect:  Full range  Thought processes:  linear, goal directed and coherent  Thought content:  denies homicidal ideation  Suicidal Ideation:  denies suicidal ideation  Delusions:  no evidence of delusions  Perceptual Disturbance:  denies any perceptual disturbance  Cognition:  Intact  Memory: age appropriate  Insight & Judgement: fair  Medication side effects: denies     Disposition: ff inpatient recovery    Patient Instructions: Activity: activity as tolerated  1. Patient instructed to take medications regularly and follow up with outpatient appointments. Follow-up as scheduled with outpatient [facility]      Signed:    Electronically signed by Chaparro Ware MD on 10/28/21 at 8:26 PM EDT    Time Spent on discharge is more than 30 minutes in the examination, evaluation, counseling and review of medications and discharge plan.

## 2021-10-30 VITALS
RESPIRATION RATE: 14 BRPM | HEART RATE: 68 BPM | OXYGEN SATURATION: 98 % | DIASTOLIC BLOOD PRESSURE: 62 MMHG | TEMPERATURE: 97 F | SYSTOLIC BLOOD PRESSURE: 100 MMHG

## 2021-10-30 LAB
SARS-COV-2, RAPID: NOT DETECTED
SPECIMEN DESCRIPTION: NORMAL

## 2021-10-30 PROCEDURE — 99284 EMERGENCY DEPT VISIT MOD MDM: CPT | Performed by: PSYCHIATRY & NEUROLOGY

## 2021-10-30 NOTE — ED NOTES
Pt resting on stretcher in behavioral health area with safe guards present. Pt awaiting telepsych at 1:30. Awaiting meal tray to arrive.      Malka Mckeon RN  10/30/21 [id number]

## 2021-10-30 NOTE — ED PROVIDER NOTES
[address]     Emergency Department     Faculty Attestation    I performed a history and physical examination of the patient and discussed management with the resident. I reviewed the resident´s note and agree with the documented findings and plan of care. Any areas of disagreement are noted on the chart. I was personally present for the key portions of any procedures. I have documented in the chart those procedures where I was not present during the key portions. I have reviewed the emergency nurses triage note. I agree with the chief complaint, past medical history, past surgical history, allergies, medications, social and family history as documented unless otherwise noted below. For Physician Assistant/ Nurse Practitioner cases/documentation I have personally evaluated this patient and have completed at least one if not all key elements of the E/M (history, physical exam, and MDM). Additional findings are as noted. Awake alert, not oriented to date, not clinically intoxicated, skin warm and dry, no ataxia or nystagmus, no muscle rigidity, cranial nerves intact, cerebellar testing normal, good airway, no meningeal signs. Patient denied suicidal ideations to me, states he came in because his stomach was hurting. I examined his abdomen was soft and nontender.        Antione Metz MD  10/29/21 [id number]

## 2021-10-30 NOTE — ED PROVIDER NOTES
Tissues: The bones, skeletal muscle bundles, fascial planes and subcutaneous soft tissues are unremarkable in appearance. 1. Suspected urinary bladder wall thickening, as discussed above. Findings suggestive of acute cystitis. Cannot exclude association with neoplastic process. Recommend clinical correlation. 2. Mild diffuse hepatic steatosis. RECENT VITALS:     Temp: 97 °F (36.1 °C),  Pulse: 68, Resp: 14, BP: 100/62, SpO2: 98 %    This patient is a 57 y.o. Male with suicidal command hallucinations. Scheduled for telehealth this morning, potential [facility] admission      OUTSTANDING TASKS / RECOMMENDATIONS:    1. Telepsych this morning: Discharged from the ED with outpatient follow-up on Monday     FINAL IMPRESSION:     1. Severe auditory hallucinations    2. Suicidal ideation        DISPOSITION:         DISPOSITION:  [x]  Discharge   []  Transfer -    []  Admission -     []  Against Medical Advice   []  Eloped   FOLLOW-UP: No follow-up provider specified.    DISCHARGE MEDICATIONS: New Prescriptions    No medications on file           Zenobia Gay MD  Emergency Medicine Resident  [facility]       Zenobia Gay MD  Resident  10/30/21 [id number]

## 2021-10-30 NOTE — ED PROVIDER NOTES
[facility] [address] [facility]  Emergency Department  Emergency Medicine Resident Sign-out     Care of Yash Schuster was assumed from Dr. Donal Dutta and is being seen for Suicidal   .  The patient's initial evaluation and plan have been discussed with the prior provider who initially evaluated the patient. EMERGENCY DEPARTMENT COURSE / MEDICAL DECISION MAKING:       MEDICATIONS GIVEN:  No orders of the defined types were placed in this encounter. LABS / RADIOLOGY:     Labs Reviewed   CBC WITH AUTO DIFFERENTIAL - Abnormal; Notable for the following components:       Result Value    RBC 4.17 (*)     Hemoglobin 11.8 (*)     Hematocrit 39.1 (*)     Lymphocytes 47 (*)     All other components within normal limits   COMPREHENSIVE METABOLIC PANEL W/ REFLEX TO MG FOR LOW K - Abnormal; Notable for the following components:    Chloride 110 (*)     Total Bilirubin 0.21 (*)     All other components within normal limits   TOX SCR, BLD, ED - Abnormal; Notable for the following components:    Acetaminophen Level <5 (*)     Salicylate Lvl <1 (*)     All other components within normal limits   COVID-19, RAPID   URINE DRUG SCREEN       No orders to display       RECENT VITALS:     Temp: 97 °F (36.1 °C),  Pulse: 86, Resp: 16, BP: 139/76, SpO2: 99 %    This patient is a 57 y.o. Male with auditory loose Nations, transferred from [facility], telling patient to harm himself by walking into traffic. Patient has no medical complaints. Patient is medically cleared for [facility] treatment awaiting acceptance. Patient signed of Dr. Karina Leavitt awaiting telepsych    OUTSTANDING TASKS / RECOMMENDATIONS:      1. Await labs and acceptance     FINAL IMPRESSION:     1. Severe auditory hallucinations    2. Suicidal ideation        DISPOSITION:       DISPOSITION:  []  Discharge   [x]  Transfer - [facility]   []  Admission -     []  Against Medical Advice   []  Eloped   FOLLOW-UP: No follow-up provider specified.    DISCHARGE MEDICATIONS: New Prescriptions    No medications on file          Tom Ruiz DO  Emergency Medicine Resident  [facility]     Tom Ruiz [place]  Resident  10/30/21 [id number]

## 2021-10-30 NOTE — ED PROVIDER NOTES
[facility] ED  Emergency Department Encounter  Emergency Medicine Resident     Pt Randy Lan  MRN: [id number]  [place] 1960  Date of evaluation: 10/29/21  PCP:  No primary care provider on file. CHIEF COMPLAINT       Chief Complaint   Patient presents with    Suicidal       HISTORY OF PRESENT ILLNESS  (Location/Symptom, Timing/Onset, Context/Setting, Quality, Duration, Modifying Factors, Severity.)      Brandie Salcido is a 57 y.o. male who presents with suicidal ideation secondary to intrusive of auditory hallucinations telling patient to walk into traffic and kill himself. Patient receiving mental health services through [facility], compliant on all medications. Has not taken any action to harm himself, denies any drug use. Patient is afraid that he may harm himself due to the severity of the intrusive thoughts, prompting him to seek help. PAST MEDICAL / SURGICAL / SOCIAL / FAMILY HISTORY      has a past medical history of Bipolar disorder ([facility] [address] Depression, GERD (gastroesophageal reflux disease), Hallucinations, Headache(784.0), Hepatitis, Schizophrenia, schizo-affective ([facility] [address] Substance abuse ([facility] [address] Tobacco abuse, Type II or unspecified type diabetes mellitus without mention of complication, not stated as uncontrolled, and Urinary incontinence. has a past surgical history that includes Dental surgery and Abscess Drainage (N/A, 02/11/2018).     Social History     Socioeconomic History    Marital status: Single     Spouse name: Not on file    Number of children: 0    Years of education: 8    Highest education level: Not on file   Occupational History     Employer: N/A   Tobacco Use    Smoking status: Current Every Day Smoker     Packs/day: 1.00     Years: 47.00     Pack years: 47.00     Types: Cigarettes    Smokeless tobacco: Never Used    Tobacco comment: Patient accepting of nicotine patch   Substance and Sexual Activity    Alcohol use: Yes     Comment: reports drinking occasionally    Drug use: Yes     Frequency: 7.0 times per week     Types: Cocaine     Comment: drug abuse includes crack cocaine,     Sexual activity: Not on file   Other Topics Concern    Not on file   Social History Narrative    Not on file     Social Determinants of Health     Financial Resource Strain:     Difficulty of Paying Living Expenses:    Food Insecurity:     Worried About Running Out of Food in the Last Year:     [address] in the Last Year:    Transportation Needs:     Lack of Transportation (Medical):  Lack of Transportation (Non-Medical):    Physical Activity:     Days of Exercise per Week:     Minutes of Exercise per Session:    Stress:     Feeling of Stress :    Social Connections:     Frequency of Communication with Friends and Family:     Frequency of Social Gatherings with Friends and Family:     Attends Confucianist Services:     Active Member of Clubs or Organizations:     Attends Club or Organization Meetings:     Marital Status:    Intimate Partner Violence:     Fear of Current or Ex-Partner:     Emotionally Abused:     Physically Abused:     Sexually Abused:        Family History   Problem Relation Age of Onset    Diabetes Mother     Heart Disease Mother        Allergies:  Navane [thiothixene]    Home Medications:  Prior to Admission medications    Medication Sig Start Date End Date Taking?  Authorizing Provider   Cholecalciferol (VITAMIN D3) 25 MCG TABS Take 1 tablet by mouth daily 10/27/21   Meg Oconnor MD   docusate sodium (COLACE) 100 MG capsule Take 1 capsule by mouth 2 times daily as needed for Constipation 10/27/21   Meg Oconnor MD   escitalopram (LEXAPRO) 5 MG tablet Take 3 tablets by mouth daily 10/28/21   Meg Oconnor MD   nicotine polacrilex (NICORETTE) 2 MG gum Take 1 each by mouth every hour as needed for Smoking cessation 10/27/21   Meg Oconnor MD   paliperidone (INVEGA) 9 MG extended release tablet Take 1 tablet by mouth daily 10/28/21   Kassidy Rubi MD   polyethylene glycol (GLYCOLAX) 17 g packet Take 17 g by mouth daily as needed for Constipation 10/27/21 11/26/21  Kassidy Rubi MD   QUEtiapine (SEROQUEL) 200 MG tablet Take 1 tablet by mouth nightly 10/28/21   Kassidy Rubi MD   traZODone (DESYREL) 150 MG tablet Take 1 tablet by mouth nightly as needed for Sleep 10/27/21   Kassidy Rubi MD       REVIEW OF SYSTEMS    (2-9 systems for level 4, 10 or more for level 5)      Review of Systems   Constitutional: Negative for fever. HENT: Negative for sore throat. Eyes: Negative for visual disturbance. Respiratory: Negative for shortness of breath. Cardiovascular: Negative for chest pain. Gastrointestinal: Negative for abdominal pain, constipation, diarrhea, nausea and vomiting. Genitourinary: Negative for decreased urine volume. Musculoskeletal: Negative for arthralgias and myalgias. Skin: Negative for wound. Neurological: Negative for weakness, light-headedness and headaches. Psychiatric/Behavioral: Positive for hallucinations and suicidal ideas. Negative for confusion. PHYSICAL EXAM   (up to 7 for level 4, 8 or more for level 5)      INITIAL VITALS:   /76   Pulse 86   Temp 97 °F (36.1 °C)   Resp 16   SpO2 99%     Physical Exam  Vitals and nursing note reviewed. Constitutional:       Appearance: Normal appearance. He is well-developed. HENT:      Head: Normocephalic and atraumatic. Right Ear: External ear normal.      Left Ear: External ear normal.      Nose: Nose normal.   Eyes:      General:         Right eye: No discharge. Left eye: No discharge. Extraocular Movements: Extraocular movements intact. Pupils: Pupils are equal, round, and reactive to light. Neck:      Trachea: Trachea normal. No tracheal deviation. Cardiovascular:      Rate and Rhythm: Normal rate and regular rhythm. Heart sounds: Normal heart sounds.    Pulmonary:      Effort: Pulmonary effort is normal. No respiratory distress. Breath sounds: Normal breath sounds. Abdominal:      Palpations: Abdomen is soft. Tenderness: There is no abdominal tenderness. There is no guarding. Musculoskeletal:         General: No deformity. Normal range of motion. Cervical back: Normal range of motion. Skin:     General: Skin is warm and dry. Capillary Refill: Capillary refill takes less than 2 seconds. Neurological:      General: No focal deficit present. Mental Status: He is alert and oriented to person, place, and time. Cranial Nerves: No cranial nerve deficit. Psychiatric:         Attention and Perception: He perceives visual hallucinations. Thought Content: Thought content includes suicidal ideation. Thought content does not include homicidal ideation. Thought content includes suicidal plan. Thought content does not include homicidal plan. DIFFERENTIAL  DIAGNOSIS     PLAN (LABS / IMAGING / EKG):  Orders Placed This Encounter   Procedures    COVID-19, Rapid    CBC Auto Differential    Comprehensive Metabolic Panel w/ Reflex to MG    TOX SCR, BLD, ED    DRUG SCREEN MULTI URINE       MEDICATIONS ORDERED:  No orders of the defined types were placed in this encounter.       DDX: Suicidal ideation versus medication noncompliance versus acute psychosis versus other    DIAGNOSTIC RESULTS / EMERGENCY DEPARTMENT COURSE / MDM     LABS:  Results for orders placed or performed during the hospital encounter of 10/29/21   CBC Auto Differential   Result Value Ref Range    WBC 5.0 3.5 - 11.3 k/uL    RBC 4.17 (L) 4.21 - 5.77 m/uL    Hemoglobin 11.8 (L) 13.0 - 17.0 g/dL    Hematocrit 39.1 (L) 40.7 - 50.3 %    MCV 93.8 82.6 - 102.9 fL    MCH 28.3 25.2 - 33.5 pg    MCHC 30.2 28.4 - 34.8 g/dL    RDW 14.2 11.8 - 14.4 %    Platelets 436 523 - 190 k/uL    MPV 11.0 8.1 - 13.5 fL    NRBC Automated 0.0 0.0 per 100 WBC    Differential Type NOT REPORTED     Seg Neutrophils 40 36 - 65 %    Lymphocytes 47 (H) 24 - 43 %    Monocytes 11 3 - 12 %    Eosinophils % 2 1 - 4 %    Basophils 0 0 - 2 %    Immature Granulocytes 0 0 %    Segs Absolute 1.97 1.50 - 8.10 k/uL    Absolute Lymph # 2.34 1.10 - 3.70 k/uL    Absolute Mono # 0.52 0.10 - 1.20 k/uL    Absolute Eos # 0.11 0.00 - 0.44 k/uL    Basophils Absolute <0.03 0.00 - 0.20 k/uL    Absolute Immature Granulocyte <0.03 0.00 - 0.30 k/uL    WBC Morphology NOT REPORTED     RBC Morphology NOT REPORTED     Platelet Estimate NOT REPORTED    Comprehensive Metabolic Panel w/ Reflex to MG   Result Value Ref Range    Glucose 98 70 - 99 mg/dL    BUN 13 8 - 23 mg/dL    CREATININE 0.99 0.70 - 1.20 mg/dL    Bun/Cre Ratio NOT REPORTED 9 - 20    Calcium 8.6 8.6 - 10.4 mg/dL    Sodium 144 135 - 144 mmol/L    Potassium 4.3 3.7 - 5.3 mmol/L    Chloride 110 (H) 98 - 107 mmol/L    CO2 25 20 - 31 mmol/L    Anion Gap 9 9 - 17 mmol/L    Alkaline Phosphatase 100 40 - 129 U/L    ALT 19 5 - 41 U/L    AST 23 <40 U/L    Total Bilirubin 0.21 (L) 0.3 - 1.2 mg/dL    Total Protein 6.7 6.4 - 8.3 g/dL    Albumin 4.2 3.5 - 5.2 g/dL    Albumin/Globulin Ratio 1.7 1.0 - 2.5    GFR Non-African American >60 >60 mL/min    GFR African American >60 >60 mL/min    GFR Comment          GFR Staging NOT REPORTED    TOX SCR, BLD, ED   Result Value Ref Range    Acetaminophen Level <5 (L) 10 - 30 ug/mL    Ethanol <10 <10 mg/dL    Ethanol percent <5.308 <4.721 %    Salicylate Lvl <1 (L) 3 - 10 mg/dL    Toxic Tricyclic Sc,Blood NEGATIVE NEGATIVE   DRUG SCREEN MULTI URINE   Result Value Ref Range    Amphetamine Screen, Ur NEGATIVE NEGATIVE    Barbiturate Screen, Ur NEGATIVE NEGATIVE    Benzodiazepine Screen, Urine NEGATIVE NEGATIVE    Cocaine Metabolite, Urine NEGATIVE NEGATIVE    Methadone Screen, Urine NEGATIVE NEGATIVE    Opiates, Urine NEGATIVE NEGATIVE    Phencyclidine, Urine NEGATIVE NEGATIVE    Propoxyphene, Urine NOT REPORTED NEGATIVE    Cannabinoid Scrn, Ur NEGATIVE NEGATIVE Oxycodone Screen, Ur NEGATIVE NEGATIVE    Methamphetamine, Urine NOT REPORTED NEGATIVE    Tricyclic Antidepressants, Urine NOT REPORTED NEGATIVE    MDMA, Urine NOT REPORTED NEGATIVE    Buprenorphine Urine NOT REPORTED NEGATIVE    Test Information       Assay provides medical screening only. The absence of expected drug(s) and/or metabolite(s) may indicate diluted or adulterated urine, limitations of testing or timing of collection. RADIOLOGY:  None    EKG  None    All EKG's are interpreted by the Emergency Department Physician who either signs or Co-signs this chart in the absence of a cardiologist.    EMERGENCY DEPARTMENT COURSE:  Patient found seated upright in bed, no acute distress, not ill or toxic appearing. Engaged and cooperative in exam.  Stable vitals. Patient has not taken any action to harm himself. Patient is medically cleared for inpatient psychiatric admission. Given the severity of patient's auditory hallucinations do believe he represents a risk to himself however with the appropriate outpatient follow-up may not require inpatient admission. Admission labs ordered. Awaiting hear back from psychiatry regarding if patient meets inpatient criteria. Patient care signed out to Dr. Shellie Sheridan for final disposition. PROCEDURES:  None    CONSULTS:  None    CRITICAL CARE:  None    FINAL IMPRESSION      1. Severe auditory hallucinations    2. Suicidal ideation          DISPOSITION / PLAN     DISPOSITION Decision To Transfer 10/29/2021 10:31:06 PM      PATIENT REFERRED TO:  No follow-up provider specified.     DISCHARGE MEDICATIONS:  New Prescriptions    No medications on file       Tabatha Green MD  Emergency Medicine Resident    (Please note that portions of this note were completed with a voice recognition program.  Efforts were made to edit the dictations but occasionally words are mis-transcribed.)        Tabatha Green MD  Resident  10/29/21 [id number]

## 2021-10-30 NOTE — ED NOTES
Pt. Requesting meal tray   Writer to provide once available  Pt reports he does not feel any better   Sitter remains at bedside  Will continue to monitor      Wanda Alcala RN  10/30/21 [id number]
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
## 2021-10-30 NOTE — ED NOTES
Patient was sent to ED by [facility] for acute psychosis. Patient stated he has a suicidal plan to walk into traffic. Patient initially told physician that the voices are intrusive and scary. Patient was at [facility] from 10/11/21-10/28/21 for acute psychosis. Patient is taking his psych medication. LOREW attempted to meet with patient soon after arrival but was yelling at the physician stating he wanted to leave. LSW re-attempted and was able to complete psych assessment. Patient denied SI/HI. Patient denied hallucinations or delusions. Patient stated his appetite is good and he is sleeping well. Patient denied drug or alcohol use. Patient stated he does not want to be admitted to [facility] and wants to discharge home. CHANEL will discuss case with [facility].      CHANEL Yanez  10/30/21 0025

## 2021-10-30 NOTE — ED NOTES
Writer received voicemail from [facility] patient's telepsyc will be at 1:30pm today.       TAVO Coley  10/30/21 0900

## 2021-10-30 NOTE — ED PROVIDER NOTES
Care of Arti Wynne was assumed from previous attending and is being seen for Suicidal  .  The patient's initial evaluation and plan have been discussed with the prior provider who initially evaluated the patient. Handoff taken on the following patient from prior Attending Physician:    Pedro Pablo Coy    I was available and discussed any additional care issues that arose and coordinated the management plans with the resident(s) caring for the patient during my duty period. Any areas of disagreement with residents documentation of care or procedures are noted on the chart. I was personally present for the key portions of any/all procedures during my duty period. I have documented in the chart those procedures where I was not present during the key portions. EMERGENCY DEPARTMENT COURSE / MEDICAL DECISION MAKING:       MEDICATIONS GIVEN:  No orders of the defined types were placed in this encounter. LABS / RADIOLOGY:     Labs Reviewed   CBC WITH AUTO DIFFERENTIAL - Abnormal; Notable for the following components:       Result Value    RBC 4.17 (*)     Hemoglobin 11.8 (*)     Hematocrit 39.1 (*)     Lymphocytes 47 (*)     All other components within normal limits   COMPREHENSIVE METABOLIC PANEL W/ REFLEX TO MG FOR LOW K - Abnormal; Notable for the following components:    Chloride 110 (*)     Total Bilirubin 0.21 (*)     All other components within normal limits   TOX SCR, BLD, ED - Abnormal; Notable for the following components:    Acetaminophen Level <5 (*)     Salicylate Lvl <1 (*)     All other components within normal limits   COVID-19, RAPID   URINE DRUG SCREEN         RECENT VITALS:     Temp: 97 °F (36.1 °C),  Pulse: 68, Resp: 14, BP: 100/62, SpO2: 98 %    This patient is a 57 y.o. Male with auditory hallucinations and suicidal intent. Awaiting telepsych evaluation. OUTSTANDING TASKS / RECOMMENDATIONS:    1.  Psyche consult evaluated and plan for discharge, he was evaluated and denies SI/Hi at this time. 2. Has follow up at [facility] on Monday.       Dorothea Paris DO, DO  Attending Emergency Physician  [facility] ED        Panchito Swanson DO  10/30/21 5807

## 2021-10-30 NOTE — ED PROVIDER NOTES
[address]   Emergency Department  Emergency Medicine Attending Sign-out     Care of Damion Langford was assumed from previous attending Dr. Marycruz Chun and is being seen for Suicidal  .  The patient's initial evaluation and plan have been discussed with the prior provider who initially evaluated the patient. Attestation  I was available and discussed any additional care issues that arose and coordinated the management plans with the resident(s) caring for the patient during my duty period. Any areas of disagreement with resident's documentation of care or procedures are noted on the chart. I was personally present for the key portions of any/all procedures, during my duty period. I have documented in the chart those procedures where I was not present during the key portions. BRIEF PATIENT SUMMARY/MDM COURSE PER INITIAL PROVIDER:   RECENT VITALS:     Temp: 97 °F (36.1 °C),  Pulse: 86, Resp: 16, BP: 139/76, SpO2: 99 %    This patient is a 57 y.o. Male with voices telling him to kill himself. Patient was wanting to use a butter knife. DIAGNOSTICS/MEDICATIONS:     MEDICATIONS GIVEN:  ED Medication Orders (From admission, onward)    None          LABS    Labs Reviewed   CBC WITH AUTO DIFFERENTIAL - Abnormal; Notable for the following components:       Result Value    RBC 4.17 (*)     Hemoglobin 11.8 (*)     Hematocrit 39.1 (*)     Lymphocytes 47 (*)     All other components within normal limits   COMPREHENSIVE METABOLIC PANEL W/ REFLEX TO MG FOR LOW K - Abnormal; Notable for the following components:    Chloride 110 (*)     Total Bilirubin 0.21 (*)     All other components within normal limits   TOX SCR, BLD, ED - Abnormal; Notable for the following components:    Acetaminophen Level <5 (*)     Salicylate Lvl <1 (*)     All other components within normal limits   COVID-19, RAPID   URINE DRUG SCREEN       RADIOLOGY  No results found. OUTSTANDING TASKS / ADDITIONAL COMMENTS   1.  Psych admit    Marco Antonio Cabrera MD  Emergency Medicine Attending  [facility]       Elina Aguirre MD  10/30/21 5439

## 2021-10-30 NOTE — ED NOTES
Patient given meal tray, performing telepsych visit     [address] [place] [address], RN  10/30/21 2033

## 2021-10-30 NOTE — ED NOTES
Pt awaiting meal tray still. Sitter at bedside in behavioral health area of ED, suicide precautions maintained. Pt ambulatory to restroom with steady gait.      Mirna Medel RN  10/30/21 1400

## 2021-11-01 ENCOUNTER — HOSPITAL ENCOUNTER (EMERGENCY)
Age: 61
Discharge: HOME | End: 2021-11-01
Attending: EMERGENCY MEDICINE
Payer: MEDICAID

## 2021-11-01 VITALS
DIASTOLIC BLOOD PRESSURE: 76 MMHG | SYSTOLIC BLOOD PRESSURE: 130 MMHG | RESPIRATION RATE: 20 BRPM | HEART RATE: 50 BPM | TEMPERATURE: 98.2 F | OXYGEN SATURATION: 97 %

## 2021-11-01 DIAGNOSIS — F39 MOOD DISORDER (HCC): Primary | ICD-10-CM

## 2021-11-01 PROCEDURE — 93005 ELECTROCARDIOGRAM TRACING: CPT | Performed by: EMERGENCY MEDICINE

## 2021-11-01 PROCEDURE — 99285 EMERGENCY DEPT VISIT HI MDM: CPT

## 2021-11-01 PROCEDURE — 6370000000 HC RX 637 (ALT 250 FOR IP): Performed by: STUDENT IN AN ORGANIZED HEALTH CARE EDUCATION/TRAINING PROGRAM

## 2021-11-01 RX ORDER — TRAZODONE HYDROCHLORIDE 150 MG/1
150 TABLET ORAL NIGHTLY PRN
Qty: 30 TABLET | Refills: 0 | Status: ON HOLD | OUTPATIENT
Start: 2021-11-01 | End: 2021-11-11 | Stop reason: HOSPADM

## 2021-11-01 RX ORDER — QUETIAPINE FUMARATE 200 MG/1
200 TABLET, FILM COATED ORAL NIGHTLY
Qty: 30 TABLET | Refills: 0 | Status: ON HOLD | OUTPATIENT
Start: 2021-11-01 | End: 2021-11-11 | Stop reason: HOSPADM

## 2021-11-01 RX ORDER — ESCITALOPRAM OXALATE 5 MG/1
15 TABLET ORAL DAILY
Qty: 90 TABLET | Refills: 0 | Status: ON HOLD | OUTPATIENT
Start: 2021-11-01 | End: 2021-11-11 | Stop reason: SDUPTHER

## 2021-11-01 RX ORDER — PALIPERIDONE 9 MG/1
9 TABLET, EXTENDED RELEASE ORAL DAILY
Qty: 30 TABLET | Refills: 0 | Status: ON HOLD | OUTPATIENT
Start: 2021-11-01 | End: 2021-11-11 | Stop reason: HOSPADM

## 2021-11-01 RX ORDER — ESCITALOPRAM OXALATE 10 MG/1
15 TABLET ORAL ONCE
Status: COMPLETED | OUTPATIENT
Start: 2021-11-01 | End: 2021-11-01

## 2021-11-01 RX ORDER — PALIPERIDONE 3 MG/1
9 TABLET, EXTENDED RELEASE ORAL ONCE
Status: COMPLETED | OUTPATIENT
Start: 2021-11-01 | End: 2021-11-01

## 2021-11-01 RX ADMIN — ESCITALOPRAM OXALATE 15 MG: 10 TABLET, FILM COATED ORAL at 17:38

## 2021-11-01 RX ADMIN — PALIPERIDONE 9 MG: 3 TABLET, EXTENDED RELEASE ORAL at 17:38

## 2021-11-01 NOTE — ED NOTES
Spoke with Tariq García at [facility], she is going to send 2 staff out here to evaluate patient. They do know where patient resides and if staff feel it is appropriate they will take him home.

## 2021-11-01 NOTE — ED NOTES
The following labs labeled with pt sticker and tubed to lab:     [] Blue     [x] Lavender   [] on ice  [x] Green/yellow  [] Green/black [] on ice  [x] Yellow  [x] Red  [] Pink      [x] COVID-19 swab    [x] Rapid  [] PCR  [] Peds Viral Panel     [] Urine Sample  [] Pelvic Cultures  [] Blood Cultures          Jared Bumpers, RN  11/01/21 4055
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
## 2021-11-01 NOTE — ED NOTES
Bed: BH31A  Expected date:   Expected time:   Means of arrival:   Comments:  LORE 906 Domingo Duran RN  11/01/21 8401

## 2021-11-01 NOTE — ED NOTES
Pt gone, belongings still in room      [address] [facility], [address] [place] [address]  11/01/21 1750

## 2021-11-01 NOTE — ED NOTES
Per physician asked to contact [address] [place] [address] [facility] team to send him there. Spoke with Finn Garcia at [facility] and she stated that they do not have their urgent care yet and don't have medications. She will reach out to her coordinator to see what they can do and call SW back. Contacted Darell amato at the [facility] ACT team to again discuss patient. He stated that their hours are from 8 to 5 and that their ;husician that prescribes medications is Dr. Darline Madrid. He will discuss patient at their team meeting in the am to see what else they can do for him.
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
## 2021-11-01 NOTE — ED NOTES
Patient states that he is hearing voices telling him to jump in front of cars and kill himself     Emily Barillas RN  11/01/21 5425

## 2021-11-01 NOTE — ED PROVIDER NOTES
FACULTY SIGN-OUT  ADDENDUM     Care of this patient was assumed from previous attending physician. The patient's initial evaluation and plan have been discussed with the prior provider who initially evaluated the patient. Attestation  I was available and discussed any additional care issues that arose and coordinated the management plans with the resident(s) caring for the patient during my duty period. Any areas of disagreement with resident's documentation of care or procedures are noted on the chart. I was personally present for the key portions of any/all procedures, during my duty period. I have documented in the chart those procedures where I was not present during the key portions. ED COURSE      The patient was given the following medications:  Orders Placed This Encounter   Medications    paliperidone (INVEGA) extended release tablet 9 mg    escitalopram (LEXAPRO) tablet 15 mg       RECENT VITALS:   Temp: 98.2 °F (36.8 °C), Pulse: 50, Resp: 20, BP: 130/76    MEDICAL DECISION MAKING        Eugenie West is a 57 y.o. male who presents to the Emergency Department with complaints of hallucinations. Patient states he was was told by his hallucinations to leave his group home and kill himself. Patient states he is not been on his medication for last several days. Patient states that he gets back on his medications he is able to control his hallucinations. Rupert De Leon MD, MD, F.A.C.E.P.   Attending Emergency Physician    (Please note that portions of this note were completed with a voice recognition program.  Efforts were made to edit the dictations but occasionally words are mis-transcribed.)         Rupert De Leon MD  11/01/21 2385

## 2021-11-01 NOTE — ED NOTES
[] Sosa    [x] [facility]    [x]  [address]       SUICIDE RISK ASSESSMENT      Y  N     [x] [] In the past two weeks have you had thoughts of hurting yourself in any way? [x] [] In the past two weeks have you had thoughts that you would be better off dead? [] [x] Have you made a suicide attempt in the past two months? [x] [] Do you have a plan for hurting yourself or suicide? [x] [] Presence of hallucinations/voices related to hurting himself or herself or someone else. SUICIDE/SECURITY WATCH PRECAUTION CHECKLIST     Orders    [x]  Suicide/Security Watch Precautions initiated as checked below:   11/1/21 3:26 PM EDT BH31/BH31A    [x] Notified physician:  Janey Woodson MD  11/1/21 3:26 PM EDT    [x] Orders obtained as appropriate:     [x] 1:1 Observer     [] Psych Consult     [] Psych Consult    Name:  Date:  Time:    [x] 1:1 Observer, Notified by:  Christopher Dominguez RN    Contact Nurse Supervisor    [x] Remove all personal clothes from room and place in snap/paper gown/pants. Slipper only    [x] Remove all personal belongings from room and secured away from patient. Documentation    [x] Initiate Suicide/Security Watch Precaution Flow Sheet    [x] Initiate individualized Care Plan/Problem    [x] Document why precautions initiated on flow sheet (Initiate Nursing Care Plan/Problem)    [x] 1:1 Observer in place; instructions provided. Suicide precautions require observer be within arms length. [x] Nurse-Observer Communication Hand-off initiated by RN, reviewed with Observer. Subsequently used as Hand Off between Observers. [x] Initiate every 15 minute observations per observer as delegated by the RN.     [x] Initiate RN assessment and documentation    Environmental Scan  Search Criteria and Process: OPTIONAL, see Search Policy    [] Reason for search:    [] Nursing in presence of second person to search patient    [] Patient notified of reason for body assessment and belongings search:     Persons present during search:   Results of search and disposition:       Searchers Name: [employer]    These items or items similar should be removed from the room:   [x] Chairs   [x] Telephone   [x] Trash cans and liners   [x] Plastic utensils (order Patient Safety tray)   [x] Empty or remove Sharps containers   [x] All personal clothing/belongings removed   [x] All unnecessary lead wires, electrical cords, draw cords, etc.   [x] Flowers and plants   [x] Double check for lighters, matches, razors, any glass items etc that can be used as weapons. Person completing Checklist: Yoav Nichols RN       GENERAL INFORMATION     Y  N     [x] [] Has the patient been informed that they are on a watch and what that means? [x] [] Can the patient get out of Bed without nursing assistance? [x] [] Can the patient use the restroom without nursing assistance? [] [x] Can the patient walk the halls to [place] their legs? \"   [] [x] Does the patient have metal utensils? [x] [] Have the patient's belongings been placed out of control of the patient? [x] [] Have the patient and his/her belongings been checked for contraband? [x] [] Is the patient under any visitor restrictions? If Yes, explain:   [] [x] Is the patient under an alias? [facility] Name:   Authorized visitors (no more than two are to be on the list)   Name/Relationship:   Name/Relationship:    Name of Staff member that you  Received this information from?:Viktoriya    General Description:    Evy Joyner BH31/BH31A male 57 y.o. Admission weight:      Race: []  [x] Black  []   []   [] [place] [] Other  Facial Hair:  [x] Yes  [] No  If yes, please describe: Identifying Marks (i.e. Visible tattoos, scars, etc... ):     NURSING CARE PLAN    Nursing Diagnosis: Risk of Self Directed Harm  [] Actual  [] Potential  Date Started: 11/1/21      Etiological Factors: (related to)  [x] Expressed or implied suicidal ideation/behavior  [] Depression  [] Suicide attempt      [] Low self-esteem  [x] Hallucinations      [] Feeling of Hopelessness  [] Substance abuse or withdrawal    [] Dysfunctional family  [] Major traumatic event, eg., divorce, etc   [] Excessive stress/anxiety    11/1/21    Expected Outcomes    Patient will:   [x] Patient will remain safe for the duration of their stay   [x] Patient's environment will be safe, eg. Free of potential suicide weapons   [] Verbalize Recovery from suicidal episode and improvement in self-worth   [x] Discuss feeling that precipitated suicide attempt/thoughts/behavior   [] Will describe available resources for crisis prevention and management   [] Will verbalize positive coping skills     Nursing Intervention   [x] Assessment and Observations hourly   [x] Suicide Precautions implemented with patient, should be 1:1 observation   [x] Document observation [id number] and RN assessment hourly   [] Consult physician for:    [] Psychiatric consult    [] Pharmacological therapy    [] Other:    [x] Patient search completed by security   [x] Initiated appropriate safety protocols by removing from the patient's environment anything that could be used to inflict self injury, eg. Order safe tray, snap gown, etc   [x] Maintain open, warm, caring, non-judgmental attitude/manner towards patient   [] Discuss advantages and disadvantages of existing coping methods/skills   [x] Assist and educate patient with identifying present strengths and coping skills   [x] Keep patient informed regarding plan of care and provide clear concise explanations. Provide the patient/family education information as well as telephone numbers and other information about crisis centers, hot lines, and counselors.     Discharge Planning:   [] Referral  [] Groups [] Health agencies  [] Other:          Augusta Jc RN  11/01/21 8095

## 2021-11-02 LAB
EKG ATRIAL RATE: 71 BPM
EKG P AXIS: 75 DEGREES
EKG P-R INTERVAL: 156 MS
EKG Q-T INTERVAL: 378 MS
EKG QRS DURATION: 82 MS
EKG QTC CALCULATION (BAZETT): 410 MS
EKG R AXIS: 79 DEGREES
EKG T AXIS: 87 DEGREES
EKG VENTRICULAR RATE: 71 BPM

## 2021-11-02 PROCEDURE — 93010 ELECTROCARDIOGRAM REPORT: CPT | Performed by: INTERNAL MEDICINE

## 2021-11-02 NOTE — ED NOTES
Patient's requesting discharge home. Patient stated he doesn't know the address or phone number of his group home. Patient provided verbal authorization for writer to contact sister Gwyn Lawrence for housing/phone arrangements. Writer tried both emergency contacts, numbers are invalid. Writer contacted [employer] was informed patient's not allowed to return to group home due to behavior. Sadafjennifer Fordsurendra stated patient was discharged to [facility] which he must have left. Sadaf Winkler stated patient has burned his bridges. Sadaf Winkler stated patient goes to recovery housing, stays for a couple days, gets money & leaves to use crack cocaine. Rajesh stated patient has been refused by group homes due to his behavior & has no photo ID so his discharge option is to go to [facility]. Writer contacted [facility], was informed [facility] picked him up earlier today & is allowed to return. Writer to voucher patient to recovery housing.       TAVO Quiroz  11/01/21 2024

## 2021-11-02 NOTE — ED PROVIDER NOTES
[facility] ED  Emergency Department Encounter  Emergency Medicine Resident     Pt Piedad Liao  MRN: [id number]  [place] 1960  Date of evaluation: 11/1/21  PCP:  No primary care provider on file. CHIEF COMPLAINT       Chief Complaint   Patient presents with    Hallucinations     auditory     Suicidal       HISTORY OF PRESENT ILLNESS  (Location/Symptom, Timing/Onset, Context/Setting, Quality, Duration, Modifying Factors, Severity.)      Pratik Xavier is a 57 y.o. male who presents with concern for suicidal ideation secondary to intrusive auditory hallucinations telling patient to walk in front of traffic. Patient has had these hallucinations before, recently inpatient psych and in a recovery house however he has not had his medication since discharge. Patient has no desire to act on these hallucinations, believes he may be better if he was able start his medications. Patient is concerned he has not yet started his medications since being discharged from inpatient facility. Nonspecific if patient has had recent cocaine use. PAST MEDICAL / SURGICAL / SOCIAL / FAMILY HISTORY      has a past medical history of Bipolar disorder ([facility] [address] Depression, GERD (gastroesophageal reflux disease), Hallucinations, Headache(784.0), Hepatitis, Schizophrenia, schizo-affective ([facility] [address] Substance abuse ([facility] [address] Tobacco abuse, Type II or unspecified type diabetes mellitus without mention of complication, not stated as uncontrolled, and Urinary incontinence. has a past surgical history that includes Dental surgery and Abscess Drainage (N/A, 02/11/2018).     Social History     Socioeconomic History    Marital status: Single     Spouse name: Not on file    Number of children: 0    Years of education: 8    Highest education level: Not on file   Occupational History     Employer: N/A   Tobacco Use    Smoking status: Current Every Day Smoker     Packs/day: 1.00     Years: 47.00     Pack years: 47.00     Types: Cigarettes    Smokeless tobacco: Never Used    Tobacco comment: Patient accepting of nicotine patch   Substance and Sexual Activity    Alcohol use: Yes     Comment: reports drinking occasionally    Drug use: Yes     Frequency: 7.0 times per week     Types: Cocaine     Comment: drug abuse includes crack cocaine,     Sexual activity: Not on file   Other Topics Concern    Not on file   Social History Narrative    Not on file     Social Determinants of Health     Financial Resource Strain:     Difficulty of Paying Living Expenses:    Food Insecurity:     Worried About Running Out of Food in the Last Year:     Ran Out of Food in the Last Year:    Transportation Needs:     Lack of Transportation (Medical):  Lack of Transportation (Non-Medical):    Physical Activity:     Days of Exercise per Week:     Minutes of Exercise per Session:    Stress:     Feeling of Stress :    Social Connections:     Frequency of Communication with Friends and Family:     Frequency of Social Gatherings with Friends and Family:     Attends Quaker Services:     Active Member of Clubs or Organizations:     Attends Club or Organization Meetings:     Marital Status:    Intimate Partner Violence:     Fear of Current or Ex-Partner:     Emotionally Abused:     Physically Abused:     Sexually Abused:        Family History   Problem Relation Age of Onset    Diabetes Mother     Heart Disease Mother        Allergies:  Navane [thiothixene]    Home Medications:  Prior to Admission medications    Medication Sig Start Date End Date Taking?  Authorizing Provider   escitalopram (LEXAPRO) 5 MG tablet Take 3 tablets by mouth daily 11/1/21  Yes Bandar Castaneda MD   paliperidone (INVEGA) 9 MG extended release tablet Take 1 tablet by mouth daily 11/1/21  Yes Bandar Castaneda MD   QUEtiapine (SEROQUEL) 200 MG tablet Take 1 tablet by mouth nightly 11/1/21  Yes Bandar Castaneda MD   traZODone (DESYREL) 150 MG tablet Take 1 tablet by mouth nightly as needed for Sleep 11/1/21  Yes Jeovany Parks MD   Cholecalciferol (VITAMIN D3) 25 MCG TABS Take 1 tablet by mouth daily 10/27/21   Hannah Keene MD   docusate sodium (COLACE) 100 MG capsule Take 1 capsule by mouth 2 times daily as needed for Constipation 10/27/21   Hannah Keene MD   nicotine polacrilex (NICORETTE) 2 MG gum Take 1 each by mouth every hour as needed for Smoking cessation 10/27/21   Hannah Keene MD   polyethylene glycol (GLYCOLAX) 17 g packet Take 17 g by mouth daily as needed for Constipation 10/27/21 11/26/21  Hannah Keene MD       REVIEW OF SYSTEMS    (2-9 systems for level 4, 10 or more for level 5)      Review of Systems   Constitutional: Negative for fever. HENT: Negative for sore throat. Eyes: Negative for visual disturbance. Respiratory: Negative for shortness of breath. Cardiovascular: Negative for chest pain. Gastrointestinal: Negative for abdominal pain, constipation, diarrhea, nausea and vomiting. Genitourinary: Negative for decreased urine volume. Musculoskeletal: Negative for arthralgias and myalgias. Skin: Negative for wound. Neurological: Negative for weakness, light-headedness and headaches. Psychiatric/Behavioral: Positive for hallucinations and suicidal ideas. Negative for agitation, confusion and self-injury. PHYSICAL EXAM   (up to 7 for level 4, 8 or more for level 5)      INITIAL VITALS:   /76   Pulse 50   Temp 98.2 °F (36.8 °C)   Resp 20   SpO2 97%     Physical Exam  Vitals and nursing note reviewed. Constitutional:       Appearance: Normal appearance. He is well-developed. HENT:      Head: Normocephalic and atraumatic. Right Ear: External ear normal.      Left Ear: External ear normal.      Nose: Nose normal.   Eyes:      General:         Right eye: No discharge. Left eye: No discharge. Pupils: Pupils are equal, round, and reactive to light.    Neck:      Trachea: Trachea normal. No tracheal deviation. Cardiovascular:      Rate and Rhythm: Normal rate and regular rhythm. Heart sounds: Normal heart sounds. Pulmonary:      Effort: Pulmonary effort is normal. No respiratory distress. Breath sounds: Normal breath sounds. Abdominal:      Palpations: Abdomen is soft. Tenderness: There is no abdominal tenderness. There is no guarding. Musculoskeletal:         General: No deformity. Normal range of motion. Cervical back: Normal range of motion. Skin:     General: Skin is warm and dry. Neurological:      General: No focal deficit present. Mental Status: He is alert and oriented to person, place, and time. Motor: No weakness. Psychiatric:         Attention and Perception: Attention normal. He perceives auditory hallucinations. He does not perceive visual hallucinations. Mood and Affect: Mood normal.         Speech: Speech normal.         Behavior: Behavior is cooperative. Thought Content: Thought content does not include homicidal or suicidal ideation. Thought content does not include homicidal or suicidal plan. Judgment: Judgment is not impulsive. Comments: Patient is not suicidal, does not want to hurt himself. Voices are telling him to do so but he does not want to act on these voices.           DIFFERENTIAL  DIAGNOSIS     PLAN (LABS / IMAGING / EKG):  Orders Placed This Encounter   Procedures    EKG 12 Lead       MEDICATIONS ORDERED:  Orders Placed This Encounter   Medications    paliperidone (INVEGA) extended release tablet 9 mg    escitalopram (LEXAPRO) tablet 15 mg    escitalopram (LEXAPRO) 5 MG tablet     Sig: Take 3 tablets by mouth daily     Dispense:  90 tablet     Refill:  0    paliperidone (INVEGA) 9 MG extended release tablet     Sig: Take 1 tablet by mouth daily     Dispense:  30 tablet     Refill:  0    QUEtiapine (SEROQUEL) 200 MG tablet     Sig: Take 1 tablet by mouth nightly     Dispense:  30 tablet     Refill:  0    traZODone (DESYREL) 150 MG tablet     Sig: Take 1 tablet by mouth nightly as needed for Sleep     Dispense:  30 tablet     Refill:  0       DDX: SI vs hallucinations vs medication noncompliance vs drug abuse vs acute drug use vs other    DIAGNOSTIC RESULTS / EMERGENCY DEPARTMENT COURSE / MDM     LABS:  No results found for this visit on 11/01/21. RADIOLOGY:  None    EKG  None    All EKG's are interpreted by the Emergency Department Physician who either signs or Co-signs this chart in the absence of a cardiologist.    EMERGENCY DEPARTMENT COURSE:  Patient found seated upright in bed, no acute distress, not ill or toxic appearing. Engaged and cooperative in exam.  Physical exam unremarkable. No signs of acute distress. Patient well-known to myself and the department, much more alert and conversational than his baseline, patient is not responding to hallucinations and shows no overt signs of intrusiveness. As patient was recently discharged from inpatient psychiatric admission, seems clinically improved from last evaluation by myself several days ago feel patient needs his medications and is safe for discharge back to his care facility. Given patient dose of his regular medications, provided paper prescriptions as well in the event he is having difficulty filling these prescriptions at his group home. As patient is not suicidal himself but only having intrusive hallucinations and has patient was discharged on antipsychotic regimen feel he does not represent a substantial risk to himself or others at this time. Patient has close outpatient follow-up and resources available to him to the group home. Social work reached out to [facility] to plan follow-up. Discharge plan discussed with patient who is in agreement. Educated on likely pathology, medications, return precautions, and follow-up.   Patient understood all educated materials with all questions answered to their satisfaction. PROCEDURES:  None    CONSULTS:  None    CRITICAL CARE:  None    FINAL IMPRESSION      1.  Mood disorder [facility])          DISPOSITION / PLAN     DISPOSITION Decision To Discharge 11/01/2021 06:09:47 PM      PATIENT REFERRED TO:  Mamie Polanco [place] [address]  [phone]  Schedule an appointment as soon as possible for a visit   To establish care, Regarding this visit    [facility]  [address] [place] [address]  [phone]  Go to   If symptoms worsen    Your mental health physician    Schedule an appointment as soon as possible for a visit   Regarding this visit      DISCHARGE MEDICATIONS:  Discharge Medication List as of 11/1/2021  6:14 PM          Ngozi Quintana MD  Emergency Medicine Resident    (Please note that portions of this note were completed with a voice recognition program.  Efforts were made to edit the dictations but occasionally words are mis-transcribed.)        Ngozi Quintana MD  Resident  11/08/21 6958

## 2021-11-03 ENCOUNTER — HOSPITAL ENCOUNTER (INPATIENT)
Age: 61
LOS: 9 days | Discharge: HOME | DRG: 750 | End: 2021-11-12
Attending: PSYCHIATRY & NEUROLOGY | Admitting: PSYCHIATRY & NEUROLOGY
Payer: MEDICAID

## 2021-11-03 ENCOUNTER — HOSPITAL ENCOUNTER (EMERGENCY)
Age: 61
Discharge: PSYCHIATRIC OR SUBSTANCE USE FACILITY | End: 2021-11-03
Attending: EMERGENCY MEDICINE
Payer: MEDICAID

## 2021-11-03 VITALS
DIASTOLIC BLOOD PRESSURE: 70 MMHG | HEIGHT: 74 IN | TEMPERATURE: 96.8 F | SYSTOLIC BLOOD PRESSURE: 113 MMHG | OXYGEN SATURATION: 100 % | HEART RATE: 62 BPM | WEIGHT: 190 LBS | RESPIRATION RATE: 18 BRPM | BODY MASS INDEX: 24.38 KG/M2

## 2021-11-03 DIAGNOSIS — R45.851 SUICIDAL IDEATION: Primary | ICD-10-CM

## 2021-11-03 DIAGNOSIS — R44.3 HALLUCINATION: ICD-10-CM

## 2021-11-03 LAB
ABSOLUTE EOS #: 0.09 K/UL (ref 0–0.44)
ABSOLUTE IMMATURE GRANULOCYTE: <0.03 K/UL (ref 0–0.3)
ABSOLUTE LYMPH #: 1.94 K/UL (ref 1.1–3.7)
ABSOLUTE MONO #: 0.32 K/UL (ref 0.1–1.2)
ACETAMINOPHEN LEVEL: <5 UG/ML (ref 10–30)
ALBUMIN SERPL-MCNC: 3.6 G/DL (ref 3.5–5.2)
ALBUMIN/GLOBULIN RATIO: 1.5 (ref 1–2.5)
ALP BLD-CCNC: 89 U/L (ref 40–129)
ALT SERPL-CCNC: 12 U/L (ref 5–41)
AMPHETAMINE SCREEN URINE: NEGATIVE
ANION GAP SERPL CALCULATED.3IONS-SCNC: 7 MMOL/L (ref 9–17)
AST SERPL-CCNC: 16 U/L
BARBITURATE SCREEN URINE: NEGATIVE
BASOPHILS # BLD: 1 % (ref 0–2)
BASOPHILS ABSOLUTE: <0.03 K/UL (ref 0–0.2)
BENZODIAZEPINE SCREEN, URINE: NEGATIVE
BILIRUB SERPL-MCNC: 0.32 MG/DL (ref 0.3–1.2)
BUN BLDV-MCNC: 11 MG/DL (ref 8–23)
BUN/CREAT BLD: ABNORMAL (ref 9–20)
BUPRENORPHINE URINE: ABNORMAL
CALCIUM SERPL-MCNC: 8.8 MG/DL (ref 8.6–10.4)
CANNABINOID SCREEN URINE: NEGATIVE
CHLORIDE BLD-SCNC: 108 MMOL/L (ref 98–107)
CO2: 27 MMOL/L (ref 20–31)
COCAINE METABOLITE, URINE: POSITIVE
CREAT SERPL-MCNC: 1.06 MG/DL (ref 0.7–1.2)
DIFFERENTIAL TYPE: ABNORMAL
EOSINOPHILS RELATIVE PERCENT: 2 % (ref 1–4)
ETHANOL PERCENT: <0.01 %
ETHANOL: <10 MG/DL
GFR AFRICAN AMERICAN: >60 ML/MIN
GFR NON-AFRICAN AMERICAN: >60 ML/MIN
GFR SERPL CREATININE-BSD FRML MDRD: ABNORMAL ML/MIN/{1.73_M2}
GFR SERPL CREATININE-BSD FRML MDRD: ABNORMAL ML/MIN/{1.73_M2}
GLUCOSE BLD-MCNC: 132 MG/DL (ref 70–99)
HCT VFR BLD CALC: 38.9 % (ref 40.7–50.3)
HEMOGLOBIN: 11.9 G/DL (ref 13–17)
IMMATURE GRANULOCYTES: 0 %
LYMPHOCYTES # BLD: 47 % (ref 24–43)
MCH RBC QN AUTO: 28.4 PG (ref 25.2–33.5)
MCHC RBC AUTO-ENTMCNC: 30.6 G/DL (ref 28.4–34.8)
MCV RBC AUTO: 92.8 FL (ref 82.6–102.9)
MDMA URINE: ABNORMAL
METHADONE SCREEN, URINE: NEGATIVE
METHAMPHETAMINE, URINE: ABNORMAL
MONOCYTES # BLD: 8 % (ref 3–12)
NRBC AUTOMATED: 0 PER 100 WBC
OPIATES, URINE: NEGATIVE
OXYCODONE SCREEN URINE: NEGATIVE
PDW BLD-RTO: 14.1 % (ref 11.8–14.4)
PHENCYCLIDINE, URINE: NEGATIVE
PLATELET # BLD: 250 K/UL (ref 138–453)
PLATELET ESTIMATE: ABNORMAL
PMV BLD AUTO: 9.8 FL (ref 8.1–13.5)
POTASSIUM SERPL-SCNC: 4.1 MMOL/L (ref 3.7–5.3)
PROPOXYPHENE, URINE: ABNORMAL
RBC # BLD: 4.19 M/UL (ref 4.21–5.77)
RBC # BLD: ABNORMAL 10*6/UL
SALICYLATE LEVEL: <1 MG/DL (ref 3–10)
SARS-COV-2, RAPID: NOT DETECTED
SEG NEUTROPHILS: 42 % (ref 36–65)
SEGMENTED NEUTROPHILS ABSOLUTE COUNT: 1.74 K/UL (ref 1.5–8.1)
SODIUM BLD-SCNC: 142 MMOL/L (ref 135–144)
SPECIMEN DESCRIPTION: NORMAL
TEST INFORMATION: ABNORMAL
TOTAL PROTEIN: 6 G/DL (ref 6.4–8.3)
TOXIC TRICYCLIC SC,BLOOD: NEGATIVE
TRICYCLIC ANTIDEPRESSANTS, UR: ABNORMAL
WBC # BLD: 4.1 K/UL (ref 3.5–11.3)
WBC # BLD: ABNORMAL 10*3/UL

## 2021-11-03 PROCEDURE — G0480 DRUG TEST DEF 1-7 CLASSES: HCPCS

## 2021-11-03 PROCEDURE — 99285 EMERGENCY DEPT VISIT HI MDM: CPT

## 2021-11-03 PROCEDURE — 87635 SARS-COV-2 COVID-19 AMP PRB: CPT

## 2021-11-03 PROCEDURE — 85025 COMPLETE CBC W/AUTO DIFF WBC: CPT

## 2021-11-03 PROCEDURE — 80053 COMPREHEN METABOLIC PANEL: CPT

## 2021-11-03 PROCEDURE — 80179 DRUG ASSAY SALICYLATE: CPT

## 2021-11-03 PROCEDURE — 80143 DRUG ASSAY ACETAMINOPHEN: CPT

## 2021-11-03 PROCEDURE — 80307 DRUG TEST PRSMV CHEM ANLYZR: CPT

## 2021-11-03 PROCEDURE — 1240000000 HC EMOTIONAL WELLNESS R&B

## 2021-11-03 RX ORDER — IBUPROFEN 400 MG/1
400 TABLET ORAL EVERY 6 HOURS PRN
Status: DISCONTINUED | OUTPATIENT
Start: 2021-11-03 | End: 2021-11-12 | Stop reason: HOSPADM

## 2021-11-03 RX ORDER — HALOPERIDOL 5 MG
5 TABLET ORAL EVERY 4 HOURS PRN
Status: DISCONTINUED | OUTPATIENT
Start: 2021-11-03 | End: 2021-11-12 | Stop reason: HOSPADM

## 2021-11-03 RX ORDER — LORAZEPAM 1 MG/1
2 TABLET ORAL EVERY 4 HOURS PRN
Status: DISCONTINUED | OUTPATIENT
Start: 2021-11-03 | End: 2021-11-12 | Stop reason: HOSPADM

## 2021-11-03 RX ORDER — POLYETHYLENE GLYCOL 3350 17 G/17G
17 POWDER, FOR SOLUTION ORAL DAILY PRN
Status: DISCONTINUED | OUTPATIENT
Start: 2021-11-03 | End: 2021-11-12 | Stop reason: HOSPADM

## 2021-11-03 RX ORDER — HYDROXYZINE 50 MG/1
50 TABLET, FILM COATED ORAL 3 TIMES DAILY PRN
Status: DISCONTINUED | OUTPATIENT
Start: 2021-11-03 | End: 2021-11-12 | Stop reason: HOSPADM

## 2021-11-03 RX ORDER — TRAZODONE HYDROCHLORIDE 50 MG/1
50 TABLET ORAL NIGHTLY PRN
Status: DISCONTINUED | OUTPATIENT
Start: 2021-11-04 | End: 2021-11-12 | Stop reason: HOSPADM

## 2021-11-03 RX ORDER — MAGNESIUM HYDROXIDE/ALUMINUM HYDROXICE/SIMETHICONE 120; 1200; 1200 MG/30ML; MG/30ML; MG/30ML
30 SUSPENSION ORAL EVERY 6 HOURS PRN
Status: DISCONTINUED | OUTPATIENT
Start: 2021-11-03 | End: 2021-11-12 | Stop reason: HOSPADM

## 2021-11-03 RX ORDER — LORAZEPAM 2 MG/ML
2 INJECTION INTRAMUSCULAR EVERY 4 HOURS PRN
Status: DISCONTINUED | OUTPATIENT
Start: 2021-11-03 | End: 2021-11-12 | Stop reason: HOSPADM

## 2021-11-03 RX ORDER — ACETAMINOPHEN 325 MG/1
650 TABLET ORAL EVERY 4 HOURS PRN
Status: DISCONTINUED | OUTPATIENT
Start: 2021-11-03 | End: 2021-11-12 | Stop reason: HOSPADM

## 2021-11-03 RX ORDER — DIPHENHYDRAMINE HYDROCHLORIDE 50 MG/ML
50 INJECTION INTRAMUSCULAR; INTRAVENOUS EVERY 4 HOURS PRN
Status: DISCONTINUED | OUTPATIENT
Start: 2021-11-03 | End: 2021-11-12 | Stop reason: HOSPADM

## 2021-11-03 RX ORDER — HALOPERIDOL 5 MG/ML
5 INJECTION INTRAMUSCULAR EVERY 4 HOURS PRN
Status: DISCONTINUED | OUTPATIENT
Start: 2021-11-03 | End: 2021-11-12 | Stop reason: HOSPADM

## 2021-11-03 ASSESSMENT — PATIENT HEALTH QUESTIONNAIRE - PHQ9: SUM OF ALL RESPONSES TO PHQ QUESTIONS 1-9: 9

## 2021-11-03 ASSESSMENT — SLEEP AND FATIGUE QUESTIONNAIRES
DO YOU USE A SLEEP AID: NO
DO YOU HAVE DIFFICULTY SLEEPING: NO
AVERAGE NUMBER OF SLEEP HOURS: 6

## 2021-11-03 ASSESSMENT — PAIN SCALES - GENERAL: PAINLEVEL_OUTOF10: 0

## 2021-11-03 ASSESSMENT — ENCOUNTER SYMPTOMS: ABDOMINAL PAIN: 0

## 2021-11-03 ASSESSMENT — LIFESTYLE VARIABLES: HISTORY_ALCOHOL_USE: NO

## 2021-11-03 NOTE — ED NOTES
Patient is presenting with suicidal ideations. Patient stated he is hearing voices saying, \"kill yourself motherfucker. \" Patient stated he has a plan to cut himself with a knife or shot himself with a gun. Patient state he does not have access to either one. Patient stated his appetite is good. Patient stated his sleep is off due to being in a new environment at [facility] inpatient rehab. Patient denied any drug or alcohol use. LSW called [facility]. Per Kristal Still at inpatient rehab, she stated patients hallucinations have gotten worse since the weekend and is now stating suicidal plan to her and staff. She stated she felt it best to send patient over for evaluation. Patient is agreeable to [facility].       CHANEL Ladd  11/03/21 0808

## 2021-11-03 NOTE — ED PROVIDER NOTES
[facility] ED  Emergency Department Encounter  EmergencyMedicine Resident     Pt Rylee Rios  MRN: [id number]  Shingflu 1960  Date of evaluation: 11/3/21  PCP:  No primary care provider on file. CHIEF COMPLAINT       Chief Complaint   Patient presents with    Suicidal     suicidal without plan    Hallucinations       HISTORY OF PRESENT ILLNESS  (Location/Symptom, Timing/Onset, Context/Setting, Quality, Duration, Modifying Factors, Severity.)      Darrel Burrell is a 57 y.o. male who presents with command auditory hallucinations telling the patient to hurt himself, not telling him to hurt others. Cannot describe exactly what the voices are saying. Patient denies any pain, denies any chest pain, shortness breath, abdominal pain, lightheadedness or dizziness. Patient states that he took some medications yesterday that were provided by the \"people at the home\". When asking where he lives he states that he lives at home, he states that the living situation is okay. He states that he was sent here by the people at the home because he is having these auditory hallucinations return to kill himself. Patient states he does not know when he last took his psychiatric medications, but does state that he is receiving his meds from the people of the home. PAST MEDICAL / SURGICAL / SOCIAL / FAMILY HISTORY      has a past medical history of Bipolar disorder ([address] Depression, GERD (gastroesophageal reflux disease), Hallucinations, Headache(784.0), Hepatitis, Schizophrenia, schizo-affective ([address] Substance abuse ([address] Tobacco abuse, Type II or unspecified type diabetes mellitus without mention of complication, not stated as uncontrolled, and Urinary incontinence. No additional pertinent     has a past surgical history that includes Dental surgery and Abscess Drainage (N/A, 02/11/2018).   No additional pertinent    Social History     Socioeconomic History    Marital status: Single Spouse name: Not on file    Number of children: 0    Years of education: 8    Highest education level: Not on file   Occupational History     Employer: N/A   Tobacco Use    Smoking status: Current Every Day Smoker     Packs/day: 1.00     Years: 47.00     Pack years: 47.00     Types: Cigarettes    Smokeless tobacco: Never Used    Tobacco comment: Patient accepting of nicotine patch   Substance and Sexual Activity    Alcohol use: Yes     Comment: reports drinking occasionally    Drug use: Yes     Frequency: 7.0 times per week     Types: Cocaine     Comment: drug abuse includes crack cocaine,     Sexual activity: Not on file   Other Topics Concern    Not on file   Social History Narrative    Not on file     Social Determinants of Health     Financial Resource Strain:     Difficulty of Paying Living Expenses:    Food Insecurity:     Worried About Running Out of Food in the Last Year:     Ran Out of Food in the Last Year:    Transportation Needs:     Lack of Transportation (Medical):  Lack of Transportation (Non-Medical):    Physical Activity:     Days of Exercise per Week:     Minutes of Exercise per Session:    Stress:     Feeling of Stress :    Social Connections:     Frequency of Communication with Friends and Family:     Frequency of Social Gatherings with Friends and Family:     Attends Yazidism Services:     Active Member of Clubs or Organizations:     Attends Club or Organization Meetings:     Marital Status:    Intimate Partner Violence:     Fear of Current or Ex-Partner:     Emotionally Abused:     Physically Abused:     Sexually Abused:        Family History   Problem Relation Age of Onset    Diabetes Mother     Heart Disease Mother        Allergies:  Navane [thiothixene]    Home Medications:  Prior to Admission medications    Medication Sig Start Date End Date Taking?  Authorizing Provider   escitalopram (LEXAPRO) 5 MG tablet Take 3 tablets by mouth daily 11/1/21   Melissa Echeverria Kae Villareal MD   paliperidone (INVEGA) 9 MG extended release tablet Take 1 tablet by mouth daily 11/1/21   Alvina Wright MD   QUEtiapine (SEROQUEL) 200 MG tablet Take 1 tablet by mouth nightly 11/1/21   Alvina Wright MD   traZODone (DESYREL) 150 MG tablet Take 1 tablet by mouth nightly as needed for Sleep 11/1/21   Alvina Wright MD   Cholecalciferol (VITAMIN D3) 25 MCG TABS Take 1 tablet by mouth daily 10/27/21   Alicia Weathers MD   docusate sodium (COLACE) 100 MG capsule Take 1 capsule by mouth 2 times daily as needed for Constipation 10/27/21   Alicia Weathers MD   nicotine polacrilex (NICORETTE) 2 MG gum Take 1 each by mouth every hour as needed for Smoking cessation 10/27/21   Alicia Weathers MD   polyethylene glycol (GLYCOLAX) 17 g packet Take 17 g by mouth daily as needed for Constipation 10/27/21 11/26/21  Alicia Weathers MD       REVIEW OF SYSTEMS    (2-9 systems for level 4, 10 or more for level 5)      Review of Systems   Constitutional: Negative for appetite change and fatigue. Cardiovascular: Negative for chest pain. Gastrointestinal: Negative for abdominal pain. Neurological: Negative for dizziness, light-headedness and headaches. Psychiatric/Behavioral: Positive for hallucinations. PHYSICAL EXAM   (up to 7 for level 4, 8 or more for level 5)      INITIAL VITALS:   /70   Pulse 62   Temp 96.8 °F (36 °C) (Oral)   Resp 18   Ht 6' 2\" (1.88 m)   Wt 190 lb (86.2 kg)   SpO2 100%   BMI 24.39 kg/m²     Physical Exam  Constitutional:       Appearance: Normal appearance. HENT:      Head: Normocephalic and atraumatic. Mouth/Throat:      Mouth: Mucous membranes are moist.      Pharynx: Oropharynx is clear. Eyes:      Extraocular Movements: Extraocular movements intact. Conjunctiva/sclera: Conjunctivae normal.   Cardiovascular:      Rate and Rhythm: Normal rate and regular rhythm. Pulses: Normal pulses. Heart sounds: Normal heart sounds. No murmur heard. Automated 0.0 0.0 per 100 WBC    Differential Type NOT REPORTED     Seg Neutrophils 42 36 - 65 %    Lymphocytes 47 (H) 24 - 43 %    Monocytes 8 3 - 12 %    Eosinophils % 2 1 - 4 %    Basophils 1 0 - 2 %    Immature Granulocytes 0 0 %    Segs Absolute 1.74 1.50 - 8.10 k/uL    Absolute Lymph # 1.94 1.10 - 3.70 k/uL    Absolute Mono # 0.32 0.10 - 1.20 k/uL    Absolute Eos # 0.09 0.00 - 0.44 k/uL    Basophils Absolute <0.03 0.00 - 0.20 k/uL    Absolute Immature Granulocyte <0.03 0.00 - 0.30 k/uL    WBC Morphology NOT REPORTED     RBC Morphology NOT REPORTED     Platelet Estimate NOT REPORTED        RADIOLOGY:  No orders to display            EMERGENCY DEPARTMENT COURSE:      PROCEDURES:  None    CONSULTS:  None    MEDICAL DECISION MAKING:  Patient presenting with command auditory hallucinations telling him to kill himself. Patient has had worsening hallucinations and this is noted by this of center where he staying. Patient denies any physical complaints. Patient denies taking any pills other than once handed to them by the soft center. No concern for overdose or toxicity but we will further evaluate prior to transfer to the [facility]. Patient with worsening psychosis and suicidal ideation in need of further evaluation by inpatient psych. Patient medically cleared from emergency standpoint for psych evaluation. CRITICAL CARE:  Please see attending note    FINAL IMPRESSION      1. Suicidal ideation    2.  Hallucination          DISPOSITION / PLAN     DISPOSITION Decision To Transfer 11/03/2021 05:13:28 PM      PATIENT REFERRED TO:  [address] [employer] AT [address] [address] [phone]  Schedule an appointment as soon as possible for a visit in 1 week  For followup, for PCP establishment    [facility]  [address] [facility] [address] [address] [phone]  Go to   As needed, If symptoms worsen      DISCHARGE MEDICATIONS:  New Prescriptions    No medications on file Izabel Baugh,   Emergency Medicine Resident    (Please note that portions of thisnote were completed with a voice recognition program.  Efforts were made to edit the dictations but occasionally words are mis-transcribed.)       Alex Hackett DO  Resident  11/03/21 8666

## 2021-11-03 NOTE — ED PROVIDER NOTES
[facility]  Emergency Department  Emergency Medicine Resident Sign-out     Care of Evy Joyner was assumed from Dr. Poonam Smith and is being seen for Suicidal (suicidal without plan) and Hallucinations  . The patient's initial evaluation and plan have been discussed with the prior provider who initially evaluated the patient. EMERGENCY DEPARTMENT COURSE / MEDICAL DECISION MAKING:       MEDICATIONS GIVEN:  No orders of the defined types were placed in this encounter. LABS / RADIOLOGY:     Labs Reviewed   COVID-19, RAPID   CBC WITH AUTO DIFFERENTIAL   COMPREHENSIVE METABOLIC PANEL W/ REFLEX TO MG FOR LOW K   URINE DRUG SCREEN   TOX SCR, BLD, ED       CT ABDOMEN PELVIS WO CONTRAST Additional Contrast? Oral    Result Date: 10/24/2021  EXAMINATION: CT OF THE ABDOMEN AND PELVIS WITHOUT CONTRAST 10/24/2021 9:43 am TECHNIQUE: CT of the abdomen and pelvis was performed without the administration of intravenous contrast. Multiplanar reformatted images are provided for review. Dose modulation, iterative reconstruction, and/or weight based adjustment of the mA/kV was utilized to reduce the radiation dose to as low as reasonably achievable. COMPARISON: 10/01/2020 HISTORY: ORDERING SYSTEM PROVIDED HISTORY: abdominal Pain TECHNOLOGIST PROVIDED HISTORY: abdominal Pain Reason for Exam: left side abd pain for a while per patient Acuity: Chronic Type of Exam: Ongoing FINDINGS: Lower Chest: Minor subsegmental atelectasis posterior right lung base. Organs: The liver, spleen, pancreas, adrenal glands and gallbladder show no significant abnormalities. Several bilateral renal cysts are again demonstrated measuring up to 4.5 cm. Largest on the right. GI/Bowel: Oral contrast has been given. No contrast in stomach at time of imaging. Stomach grossly normal.  Multiple small bowel loops filled with contrast show no focal lesions and there is no evidence for bowel obstruction.   The appendix is normal. Moderate amount of stool in the colon. Contrast is just entering the colon at time of imaging. Pelvis: Pelvic organs unremarkable. Peritoneum/Retroperitoneum: No free fluid. No lymphadenopathy. Atherosclerotic disease. Ureters show no calculi. Bones/Soft Tissues: Bilateral small fat containing inguinal hernia. No acute bony abnormality. 1. No acute infective or inflammatory process. 2. No bowel obstruction. 3. Bilateral renal cysts up to 4.5 cm again noted. RECENT VITALS:     Temp: 96.8 °F (36 °C),  Pulse: 62, Resp: 18, BP: 113/70, SpO2: 100 %    This patient is a 57 y.o. Male with auditory cmd hallucinations endorsing SI. No attempts, no plan currently. Stays @ [facility] ctr, staff concerned for increased hallucinations. Needs reeval via psych. Plan to go to [facility]. Medically cleared for transfer. OUTSTANDING TASKS / RECOMMENDATIONS:    1. BHI     FINAL IMPRESSION:     1. Suicidal ideation    2.  Hallucination        DISPOSITION:         DISPOSITION:  []  Discharge   [x]  Transfer to [facility]   []  Transfer -      []  Admission -      []  Admission to Internal Medicine   []  Admission to Intermed   []  Admission to Obs   []  Against Medical Advice   []  Eloped   FOLLOW-UP: [address] [place] [address] [phone]  Schedule an appointment as soon as possible for a visit in 1 week  For followup, for PCP establishment    [facility]  [facility]  [phone]  Go to   As needed, If symptoms worsen     DISCHARGE MEDICATIONS: New Prescriptions    No medications on file           Deana Mathis MD  Emergency Medicine Resident  [facility]        Deana Mathis MD  Resident  11/03/21 [id number] [address] MD Matias  Resident  11/03/21 7412

## 2021-11-03 NOTE — ED NOTES
Pt arrived to ED alert and oriented x4. Pt c/o hallucinations and suicidal ideations. Pt reports that yesterday he started hearing voices telling him to \"get out of the house\" and to [employer]". Pt reports suicidal ideations without plan, states that he attempted to harm himself approx 1 year ago by stabbing himself. Pt denies homicidal ideations. Pt denies pain. Pt denies having been around anyone suspected to have COVID-19 or anyone that has been sick, denies recent travel outside the [facility] or [address]. SafeGuard at bedside. RR even and unlabored. NAD noted. Whiteboard updated. Will continue with plan of care.      Shai Kelly RN  11/03/21 8227

## 2021-11-03 NOTE — ED NOTES
Labeled blood specimens sent to lab via tube system. [x] Lavender   [] on ice   [x] Blue   [x] Green/yellow  [] Green/black [] on ice  [] Pink  [] Red  [x] Yellow  [] Blood Cultures       Labeled COVID swab sent to lab via tube system.     [x] COVID-19 swab      [x] Rapid   [] Non- Rapid/PCR  [] Respiratory Panel with [address] [place] [address]  RN  11/03/21 8013

## 2021-11-03 NOTE — ED NOTES
The following labs labeled with pt sticker and tubed to lab:     [] Blue     [] Lavender   [] on ice  [] Green/yellow  [] Green/black [] on ice  [] Yellow  [] Red  [] Pink      [] COVID-19 swab    [] Rapid  [] PCR  [] Peds Viral Panel     [x] Urine Sample  [] Pelvic Cultures  [] Blood Cultures            Sharlene Bravo RN  11/03/21 1952

## 2021-11-03 NOTE — ED NOTES
[] Sosa    [] [address]    [x]  [facility]      Y  N     [x] [] In the past two weeks have you had thoughts of hurting yourself in any way? [x] [] In the past two weeks have you had thoughts that you would be better off dead? [] [x] Have you made a suicide attempt in the past two months? [] [x] Do you have a plan for hurting yourself or suicide? [x] [] Presence of hallucinations/voices related to hurting himself or herself or someone else. SUICIDE/SECURITY WATCH PRECAUTION CHECKLIST     Orders    [x]  Suicide/Security Watch Precautions initiated as checked below:   11/3/21 1:59 PM EDT BH31/BH31A    [x] Notified physician:  Deana Myers MD  11/3/21 1:59 PM EDT    [x] Orders obtained as appropriate:     [] 1:1 Observer     [] Psych Consult     [] Psych Consult    Name:  Date:  Time:    [x] 1:1 Observer, Notified by:  Ivette Coley RN    Contact Nurse Supervisor    [x] Remove all personal clothes from room and place in snap/paper gown/pants. Slipper only    [x] Remove all personal belongings from room and secured away from patient. Documentation    [x] Initiate Suicide/Security Watch Precaution Flow Sheet    [x] Initiate individualized Care Plan/Problem    [x] Document why precautions initiated on flow sheet (Initiate Nursing Care Plan/Problem)    [x] 1:1 Observer in place; instructions provided. Suicide precautions require observer be within arms length. [x] Nurse-Observer Communication Hand-off initiated by RN, reviewed with Observer. Subsequently used as Hand Off between Observers. [x] Initiate every 15 minute observations per observer as delegated by the RN.     [x] Initiate RN assessment and documentation    Environmental Scan  Search Criteria and Process: OPTIONAL, see Search Policy    [x] Reason for search: Suicidal    [x] Nursing in presence of second person to search patient    [x] Patient notified of reason for body assessment and belongings search:     Persons present during search:   Results of search and disposition:       Searchers Name: [id number] [facility] PD     These items or items similar should be removed from the room:   [] Chairs   [] Telephone   [] Trash cans and liners   [x] Plastic utensils (order Patient Safety tray)   [] Empty or remove Sharps containers   [x] All personal clothing/belongings removed   [x] All unnecessary lead wires, electrical cords, draw cords, etc.   [] Flowers and plants   [x] Double check for lighters, matches, razors, any glass items etc that can be used as weapons. Person completing Checklist: Barry Max RN       GENERAL INFORMATION     Y  N     [x] [] Has the patient been informed that they are on a watch and what that means? [x] [] Can the patient get out of Bed without nursing assistance? [x] [] Can the patient use the restroom without nursing assistance? [x] [] Can the patient walk the halls to [place] their legs? \"   [] [x] Does the patient have metal utensils? [x] [] Have the patient's belongings been placed out of control of the patient? [x] [] Have the patient and his/her belongings been checked for contraband? [x] [] Is the patient under any visitor restrictions? If Yes, explain: Suicidal   [] [x] Is the patient under an alias? [facility] Name:   Authorized visitors (no more than two are to be on the list)   Name/Relationship:   Name/Relationship:    Name of Staff member that you  Received this information from?:    General Description:    [place] Edward BH31/BH31A male 57 y.o. Admission weight: 190 lb (86.2 kg) Height: 6' 2\" (188 cm)  Race: []  [x] Black  []   []   [] [place] [] Other  Facial Hair:  [x] Yes  [] No  If yes, please describe: Mustache  Identifying Marks (i.e. Visible tattoos, scars, etc... ):     NURSING CARE PLAN    Nursing Diagnosis: Risk of Self Directed Harm  [x] Actual  [] Potential  Date Started: 11/03/2021  Etiological Factors: (related to)  [x] Expressed or implied suicidal ideation/behavior  [] Depression  [] Suicide attempt      [] Low self-esteem  [x] Hallucinations      [] Feeling of Hopelessness  [] Substance abuse or withdrawal    [] Dysfunctional family  [] Major traumatic event, eg., divorce, etc   [] Excessive stress/anxiety    11/3/21    Expected Outcomes    Patient will:   [x] Patient will remain safe for the duration of their stay   [x] Patient's environment will be safe, eg. Free of potential suicide weapons   [x] Verbalize Recovery from suicidal episode and improvement in self-worth   [x] Discuss feeling that precipitated suicide attempt/thoughts/behavior   [x] Will describe available resources for crisis prevention and management   [x] Will verbalize positive coping skills     Nursing Intervention   [x] Assessment and Observations hourly   [x] Suicide Precautions implemented with patient, should be 1:1 observation   [x] Document observation [id number] and RN assessment hourly   [] Consult physician for:    [] Psychiatric consult    [] Pharmacological therapy    [] Other:    [x] Patient search completed by security   [x] Initiated appropriate safety protocols by removing from the patient's environment anything that could be used to inflict self injury, eg. Order safe tray, snap gown, etc   [x] Maintain open, warm, caring, non-judgmental attitude/manner towards patient   [] Discuss advantages and disadvantages of existing coping methods/skills   [x] Assist and educate patient with identifying present strengths and coping skills   [x] Keep patient informed regarding plan of care and provide clear concise explanations. Provide the patient/family education information as well as telephone numbers and other information about crisis centers, hot lines, and counselors.     Discharge Planning:   [] Referral  [] Groups [] Health agencies  [] Other:          Marco Pineda RN  11/03/21 3554

## 2021-11-03 NOTE — ED PROVIDER NOTES
[facility] [address] ED     Emergency Department     Faculty Attestation        I performed a history and physical examination of the patient and discussed management with the resident. I reviewed the residents note and agree with the documented findings and plan of care. Any areas of disagreement are noted on the chart. I was personally present for the key portions of any procedures. I have documented in the chart those procedures where I was not present during the key portions. I have reviewed the emergency nurses triage note. I agree with the chief complaint, past medical history, past surgical history, allergies, medications, social and family history as documented unless otherwise noted below. For mid-level providers such as nurse practitioners as well as physicians assistants:    I have personally seen and evaluated the patient. I find the patient's history and physical exam are consistent with NP/PA documentation. I agree with the care provided, treatment rendered, disposition, & follow-up plan. Additional findings are as noted. Vital Signs: /70   Pulse 62   Temp 96.8 °F (36 °C) (Oral)   Resp 18   Ht 6' 2\" (1.88 m)   Wt 190 lb (86.2 kg)   SpO2 100%   BMI 24.39 kg/m²   PCP:  No primary care provider on file. Pertinent Comments:     States she is having hallucinations and suicidal ideation. He states he is hearing voices to kill himself. Has been seen here multiple times recently and is at a group home reportedly receiving his medications. He has no medical complaints.   Will discuss with social work      [address], MD    Attending Emergency Medicine Physician              Cuca Aquino MD  11/03/21 2222

## 2021-11-03 NOTE — ED PROVIDER NOTES
Faculty Sign-Out Attestation  Handoff taken on the following patient from prior Attending Physician: Amy Markham    I was available and discussed any additional care issues that arose and coordinated the management plans with the resident(s) caring for the patient during my duty period. Any areas of disagreement with residents documentation of care or procedures are noted on the chart. I was personally present for the key portions of any/all procedures during my duty period. I have documented in the chart those procedures where I was not present during the finn portions.         Cuca Renae MD  Attending Physician       Cuca Renae MD  11/03/21 4133

## 2021-11-04 PROCEDURE — APPSS45 APP SPLIT SHARED TIME 31-45 MINUTES: Performed by: PSYCHIATRY & NEUROLOGY

## 2021-11-04 PROCEDURE — 6370000000 HC RX 637 (ALT 250 FOR IP): Performed by: PSYCHIATRY & NEUROLOGY

## 2021-11-04 PROCEDURE — 99223 1ST HOSP IP/OBS HIGH 75: CPT | Performed by: PSYCHIATRY & NEUROLOGY

## 2021-11-04 PROCEDURE — 1240000000 HC EMOTIONAL WELLNESS R&B

## 2021-11-04 RX ORDER — PALIPERIDONE 6 MG/1
6 TABLET, EXTENDED RELEASE ORAL DAILY
Status: DISCONTINUED | OUTPATIENT
Start: 2021-11-05 | End: 2021-11-04

## 2021-11-04 RX ORDER — VITAMIN B COMPLEX
1000 TABLET ORAL DAILY
Status: DISCONTINUED | OUTPATIENT
Start: 2021-11-04 | End: 2021-11-12 | Stop reason: HOSPADM

## 2021-11-04 RX ORDER — QUETIAPINE FUMARATE 200 MG/1
200 TABLET, FILM COATED ORAL NIGHTLY
Status: DISCONTINUED | OUTPATIENT
Start: 2021-11-04 | End: 2021-11-04

## 2021-11-04 RX ORDER — QUETIAPINE FUMARATE 200 MG/1
400 TABLET, FILM COATED ORAL NIGHTLY
Status: DISCONTINUED | OUTPATIENT
Start: 2021-11-04 | End: 2021-11-12 | Stop reason: HOSPADM

## 2021-11-04 RX ORDER — ESCITALOPRAM OXALATE 10 MG/1
15 TABLET ORAL DAILY
Status: DISCONTINUED | OUTPATIENT
Start: 2021-11-04 | End: 2021-11-12 | Stop reason: HOSPADM

## 2021-11-04 RX ORDER — PALIPERIDONE 9 MG/1
9 TABLET, EXTENDED RELEASE ORAL DAILY
Status: DISCONTINUED | OUTPATIENT
Start: 2021-11-04 | End: 2021-11-04

## 2021-11-04 RX ADMIN — TRAZODONE HYDROCHLORIDE 50 MG: 50 TABLET ORAL at 21:26

## 2021-11-04 RX ADMIN — ESCITALOPRAM OXALATE 15 MG: 10 TABLET ORAL at 14:50

## 2021-11-04 RX ADMIN — PALIPERIDONE 9 MG: 9 TABLET, EXTENDED RELEASE ORAL at 14:50

## 2021-11-04 RX ADMIN — Medication 1000 UNITS: at 14:49

## 2021-11-04 RX ADMIN — QUETIAPINE FUMARATE 400 MG: 200 TABLET ORAL at 21:26

## 2021-11-04 ASSESSMENT — PAIN SCALES - GENERAL
PAINLEVEL_OUTOF10: 0
PAINLEVEL_OUTOF10: 0

## 2021-11-04 NOTE — GROUP NOTE
Group Therapy Note    Date: 11/4/2021    Group Start Time: 1100  Group End Time: 0875  Group Topic: Psychoeducation    CARMEN Harvey    Patient refused to attend communication skills group at 1100 after encouragement from staff. 1:1 talk time offered by staff as alternative to group session.

## 2021-11-04 NOTE — GROUP NOTE
Group Therapy Note    Date: 11/4/2021    Group Start Time: 1000  Group End Time: 3621  Group Topic: Psychotherapy    [address], LSW    patient refused to attend psychotherapy group at [address] after encouragement from staff.   1:1 talk time provided as alternative to group session

## 2021-11-04 NOTE — CARE COORDINATION
Kanwal placed call to [address] [place] [address] regarding pts status of being able to return. The recovery  shared the need to look into the matter of pt returning and will contact KANWAL when decision has been made.

## 2021-11-04 NOTE — PLAN OF CARE
[facility]  Initial Interdisciplinary Treatment Plan NO      Original treatment plan Date & Time: 11/4/21    Admission Type:  Admission Type: Voluntary    Reason for admission:   Reason for Admission: Suicidal ideations without plan, hearing voices telling him to \"get out of the house\" and to [employer] myself\"Pt reports suicidal ideations without plan,    Estimated Length of Stay:  5-7days  Estimated Discharge Date: to be determined by physician    PATIENT STRENGTHS:  Patient Strengths:Strengths: Social Skills, Positive Support  Patient Strengths and Limitations:Limitations: Perceives need for assistance with self-care, General negative or hopeless attitude about future/recovery  Addictive Behavior: Addictive Behavior  In the past 3 months, have you felt or has someone told you that you have a problem with:  : None  Do you have a history of Chemical Use?: No  Do you have a history of Alcohol Use?: No  Do you have a history of Street Drug Abuse?: Yes  Histroy of Prescripton Drug Abuse?: No  Medical Problems:  Past Medical History:   Diagnosis Date    Bipolar disorder ([facility]     Depression     GERD (gastroesophageal reflux disease)     Hallucinations     Headache(784.0)     Hepatitis     Schizophrenia, schizo-affective ([facility]     Substance abuse ([address]     Tobacco abuse     Type II or unspecified type diabetes mellitus without mention of complication, not stated as uncontrolled     Urinary incontinence      Status EXAM:Status and Exam  Normal: No  Facial Expression: Flat  Affect: Blunt  Level of Consciousness: Alert  Mood:Normal: No  Mood: Anxious, Depressed  Motor Activity:Normal: Yes  Interview Behavior: Cooperative  Preception: [place] to Person, [place] to Time, [place] to Place, [place] to Situation  Attention:Normal: No  Attention: Distractible  Thought Processes: Circumstantial  Thought Content:Normal: No  Thought Content: Preoccupations  Hallucinations:  Auditory (Comment) (Voices to harm self)  Delusions: No  Memory:Normal: No  Memory: Poor Recent, Poor Remote  Insight and Judgment: No  Insight and Judgment: Poor Judgment, Poor Insight  Present Suicidal Ideation: Yes (Contracts for safety)  Present Homicidal Ideation: No    EDUCATION:   Learner Progress Toward Treatment Goals: reviewed group plans and strategies for care    Method:group therapy, medication compliance, individualized assessments and care planning    Outcome: needs reinforcement    PATIENT GOALS: to be discussed with patient within 72 hours    PLAN/TREATMENT RECOMMENDATIONS:     continue group therapy , medications compliance, goal setting, individualized assessments and care, continue to monitor pt on unit      SHORT-TERM GOALS:   Time frame for Short-Term Goals: 5-7 days    LONG-TERM GOALS:  Time frame for Long-Term Goals: 6 months  Members Present in Team Meeting: See Signature Sheet    Shruthi Romero, [address]

## 2021-11-04 NOTE — CARE COORDINATION
Psychosocial Assessment    Current Level of Psychosocial Functioning     Independent  X  Dependent    Minimal Assist     Comments:      Psychosocial High Risk Factors (check all that apply)    Unable to obtain meds   Chronic illness/pain    Substance abuse  XX  Lack of Family Support   Financial stress   Isolation   Inadequate Community Resources  Suicide attempt(s) X  Not taking medications   Victim of crime   Developmental Delay  Unable to manage personal needs    Age 72 or older   Homeless  No transportation   Readmission within 30 days  Unemployment  Traumatic Event    Family/Supports identified: Pt has family that is supportive. Patient Strengths: Income and insurance    Patient Barriers: Substance abuse, lack of coping skills       [facility]/MH history: Linked with [facility] team [phone]    Plan of Care:  medication management, group/individual therapies, family meetings, psycho -education, treatment team meetings to assist with stabilization    Initial Discharge Plan: To be determined by the doctor    Clinical Summary:  Pt is a 57year old male admitted to the [facility] from [facility] ED. Pt reports suicidal ideation and hallucinations as male voices \"telling me to get out of the hospital and kill myself. \" Pt denies homicidal ideations at the time of this assessment. Pt denied any drug use but labs show pt tested positive for cocaine. [facility] since being discharged on 10/28/2021.

## 2021-11-04 NOTE — ED NOTES
[facility] refused transportation. [facility] refused transportation at this time. SW contacted [facility].  Awaiting call back

## 2021-11-04 NOTE — H&P
Department of Psychiatry  Attending Physician Psychiatric Assessment     Reason for Admission to Psychiatric Unit:  Concerns about patient's safety in the community    CHIEF COMPLAINT: Suicidal ideation related to auditory hallucinations, command in nature    History obtained from: Patient, electronic medical record          HISTORY OF PRESENT ILLNESS:    Merna Murillo is a 57 y.o. male who has a past medical history of schizophrenia, bipolar disorder, depression, substance use disorder, GERD, hepatitis, diabetes and chronic headaches. Presented to the emergency department with suicidal ideation. .     Per ED records, \"  Merna Murillo is a 57 y.o. male who presents with command auditory hallucinations telling the patient to hurt himself, not telling him to hurt others. Cannot describe exactly what the voices are saying. Patient denies any pain, denies any chest pain, shortness breath, abdominal pain, lightheadedness or dizziness. Patient states that he took some medications yesterday that were provided by the \"people at the home\". When asking where he lives he states that he lives at home, he states that the living situation is okay. He states that he was sent here by the people at the home because he is having these auditory hallucinations return to kill himself. Patient states he does not know when he last took his psychiatric medications, but does state that he is receiving his meds from the people of the home. At diagnostic assessment Mr. Mica Sandrarow assessment and agrees to interview in his room. He is guarded, suspicious, and evasive throughout the interview and was unwilling to engage in meaningful conversation regarding his history of present illness, but did provide vague answers including \"the voices keep coming back \". He reports that he here because \"the voices telling me to kill myself\". He reports that the voices have been going on for \"a long time\" and are \"always there\".   He reports a Systems         Depression: Endorses     Anxiety: Denies     Panic Attacks: Denies     [place] or Hypomania: Denies     Phobias: Denies     Obsessions and Compulsions: Denies     Visual Hallucinations: Denies     Auditory Hallucinations: Endorses     Delusions: Endorses     Paranoia: Endorses     PTSD: Denies    Past Medical History:        Diagnosis Date    Bipolar disorder (HCC)     Depression     GERD (gastroesophageal reflux disease)     Hallucinations     Headache(784.0)     Hepatitis     Schizophrenia, schizo-affective (HCC)     Substance abuse (HCC)     Tobacco abuse     Type II or unspecified type diabetes mellitus without mention of complication, not stated as uncontrolled     Urinary incontinence        Past Surgical History:        Procedure Laterality Date    ABSCESS DRAINAGE N/A 02/11/2018    Carla anal abcess    DENTAL SURGERY      all teeth pulled       Allergies:  Navane [thiothixene]         Social History:     Born in: [place]  Family: Some relatives remain in [place] [facility], recent death of his brother. Sister lives in [place] [facility] parents no longer living. Remains close to Andrea and Hortencia's sisters  Highest Level of Education: Left school before graduation 10th grade  Occupation: SSD[employer]  Marital Status: Denies  Children: Denies  Residence: Patient is currently homeless-has been staying at [employer] [facility] \"denies crack\" currently  Stressors:  Auditory hallucinations  Patient Assets/Supportive Factors: Linked with community resources, sisters remain active in his life         DRUG USE HISTORY  Social History     Tobacco Use   Smoking Status Current Every Day Smoker    Packs/day: 1.00    Years: 47.00    Pack years: 47.00    Types: Cigarettes   Smokeless Tobacco Never Used   Tobacco Comment    Patient accepting of nicotine patch     Social History     Substance and Sexual Activity   Alcohol Use Yes    Comment: reports drinking occasionally     Social History     Substance and Sexual Activity   Drug Use Yes    Frequency: 7.0 times per week    Types: Cocaine    Comment: drug abuse includes crack cocaine,        Patient denying current use of crack cocaine regardless of UDS positive. Occasional alcohol use, smokes 1 pack of cigarettes daily. LEGAL HISTORY:   HISTORY OF INCARCERATION: [x] Yes [] No declines to offer information regarding circumstance    Family History:       Problem Relation Age of Onset    Diabetes Mother     Heart Disease Mother        Psychiatric Family History    Patient denies psychiatric family history. Suicides in family: [] Yes [x] No    Substance use in family: [] Yes [x] No         PHYSICAL EXAM:  Vitals:  BP (!) 106/50   Pulse 60   Temp 97.5 °F (36.4 °C) (Temporal)   Resp 14   Ht 6' 2\" (1.88 m)   Wt 190 lb (86.2 kg)   BMI 24.39 kg/m²     Pain Level: 0    LABS:  Labs reviewed: [x] Yes  Chloride 108, anion gap 7, glucose 132, total protein 6.0, RBC 4.19, hemoglobin 11.9, hematocrit 38.9, lymphocytes 47  UDS positive cocaine metabolite  Last EKG in EMR reviewed: [x] Yes  QTc: 410          Review of Systems   Constitutional: Negative for chills and weight loss. HENT: Negative for ear pain and nosebleeds. Eyes: Negative for blurred vision and photophobia. Respiratory: Negative for cough, shortness of breath and wheezing. Cardiovascular: Negative for chest pain and palpitations. Gastrointestinal: Negative for abdominal pain, diarrhea and vomiting. Genitourinary: Negative for dysuria and urgency. Musculoskeletal: Negative for falls and joint pain. Skin: Negative for itching and rash. Neurological: Negative for tremors, seizures and weakness. Endo/Heme/Allergies: Does not bruise/bleed easily. Physical Exam:   Constitutional:  Appears underweight, no acute distress. HENT:   Head: Normocephalic and atraumatic. Eyes: Conjunctivae are normal. Right eye exhibits no discharge. Left eye exhibits no discharge.  No scleral icterus. Neck: Normal range of motion. Neck supple. Pulmonary/Chest:  No respiratory distress or accessory muscle use, no wheezing. Cardiac: Regular rate and rhythm. Abdominal: Soft. Non-tender. Exhibits no distension. Musculoskeletal: Normal range of motion. Exhibits no edema. Neurological: cranial nerves II-XII grossly in tact, normal gait and station. Skin: Skin is warm and dry. Patient is not diaphoretic. No erythema. Mental Status Examination:    Level of consciousness: Awake and alert  Appearance:  Appropriate attire, resting in bed, fair grooming   Behavior/Motor: Approachable, no psychomotor abnormalities noted  Attitude toward examiner:  Cooperative, attentive, good eye contact  Speech: Normal rate, volume, and tone. Mood: Depressed  Affect:  Blunted  Thought processes: Linear and slow  Thought content: Active suicidal ideations, without current plan or intent, contracts for safety on the unit. Endorses homicidal ideations               Denies visual hallucinations. Endorses auditory hallucinations.               Denies delusions              Endorses paranoia  Cognition:  Oriented to self, location, time, situation  Concentration: Clinically adequate  Memory: Intact  Insight &Judgment: Poor         DSM-5 Diagnosis    Principal Problem: Schizoaffective disorder, depressive type ([facility]    Stimulant use disorder-cocaine      Psychosocial and Contextual factors:  Financial: Denies  Occupational: Denies  Relationship: Denies  Legal: Denies  Living situation: Denies  Educational: Denies    Past Medical History:   Diagnosis Date    Bipolar disorder ([facility] [facility]     Depression     GERD (gastroesophageal reflux disease)     Hallucinations     Headache(784.0)     Hepatitis     Schizophrenia, schizo-affective ([facility]     Substance abuse ([facility] [facility]     Tobacco abuse     Type II or unspecified type diabetes mellitus without mention of complication, not stated as uncontrolled     Urinary incontinence         TREATMENT PLAN    Continue inpatient psychiatric treatment. Home medications reviewed. Restart home medications related to discharge 10/28 including:  Lexapro 15 mg daily  Seroquel 200 mg at hour of sleep  Invega 9 mg daily     Will restart home medications and encourage compliance  Problem list updated. Monitor need and frequency of PRN medications. Attempt to develop insight. Follow-up daily while inpatient. Reviewed risks and benefits as well as potential side effects with patient. CONSULTS [] Yes [x] No    Risk Management: close watch per standard protocol      Psychotherapy: participation in milieu and group and individual sessions with Attending Physician,  and Physician Assistant/CNP      Estimated length of stay:  2-14 days      GENERAL PATIENT/FAMILY EDUCATION  Patient will understand basic signs and symptoms, patient will understand benefits/risks and potential side effects from proposed medications, and patient will understand their role in recovery. Family is  active in patient's care. Patient assets that may be helpful during treatment include: Intent to participate and engage in treatment, sufficient fund of knowledge and intellect to understand and utilize treatments. Goals:    1) Remission of suicidal ideation and auditory hallucinations  2) Stabilization of symptoms prior to discharge. 3) Establish efficacy and tolerability of medications. Behavioral Services  Medicare Certification     Admission Day 1  I certify that this patient's inpatient psychiatric hospital admission is medically necessary for:    x (1) treatment which could reasonably be expected to improve this patient's condition, or    x (2) diagnostic study or its equivalent.      Time Spent: 39 minutes    Maite Davidson is a 57 y.o. male being evaluated face to face    --MARY Jackson CNP on 11/4/2021 at 10:46 AM    An electronic signature was used to authenticate this note.   I independently saw and evaluated the patient. I reviewed the midlevel provider's documentation above. Any additional comments or changes to the midlevel provider's documentation are stated below otherwise agree with assessment. The patient was seen at bedside. He is reporting command auditory hallucinations telling him to hurt himself. He reports compliance with medications. He has been admitted to the hospital several times and is well-known to me. He has had a pattern of using cocaine but for the last couple of admissions he was clean. On this occasion he denied using cocaine but his urine tox is positive for cocaine. The patient have a parkinsonian tremor which seems pretty severe. The patient has been taking two antipsychotics. I will discontinue his paliperidone due to the parkinsonian tremor and increase the dose of his Seroquel at nighttime to 400mg. PLAN  Medications as noted above  Attempt to develop insight  Psycho-education conducted. Supportive Therapy conducted.   Probable discharge is 3-5 days  Follow-up daily while on inpatient unit    Electronically signed by Jefferson Smith MD on 11/4/21 at 3:51 PM EDT

## 2021-11-04 NOTE — PROGRESS NOTES
Patient given tobacco quitline number 23649199629 at this time, refusing to call at this time, states \" I just dont want to quit now\"- patient given information as to the dangers of long term tobacco use. Continue to reinforce the importance of tobacco cessation.

## 2021-11-04 NOTE — GROUP NOTE
Group Therapy Note    Date: 11/4/2021    Group Start Time: 0910  Group End Time: 0930  Group Topic: Community Meeting    [facility]    Patient refused to attend community meeting and goal setting group at 0182 after encouragement from staff. 1:1 talk time offered by staff as alternative to group session.

## 2021-11-04 NOTE — PLAN OF CARE
Problem: Altered Mood, Depressive Behavior:  Goal: Able to verbalize acceptance of life and situations over which he or she has no control  Description: Able to verbalize acceptance of life and situations over which he or she has no control  Outcome: Ongoing  Patient was able to discuss different situations that may arise that cause depressive symptoms. These life stressors are tough and the patient feels they have no control over them. Patient verbalized ways to deal with these stressors. Problem: Altered Mood, Depressive Behavior:  Goal: Able to verbalize and/or display a decrease in depressive symptoms  Description: Able to verbalize and/or display a decrease in depressive symptoms  Outcome: Ongoing  Patient states that they feel slightly depressed today. Patient reports a decrease in depressive symptoms since admission. Patient is mainly isolative too room and continues to show depressive symptoms. Patient is evasive during interview and keeps attempting to go back to sleep. Writer offered one on one talk time with patient if they need it. Problem: Tobacco Use:  Goal: Inpatient tobacco use cessation counseling participation  Description: Inpatient tobacco use cessation counseling participation  Outcome: Ongoing  Patient is not interested in tobacco cessation at this time.

## 2021-11-04 NOTE — BH NOTE
[facility]  Admission Note     Admission Type:   Admission Type: Voluntary    Reason for admission:  Reason for Admission: Suicidal ideations without plan, hearing voices telling him to \"get out of the house\" and to [employer] myself\"Pt reports suicidal ideations without plan,    PATIENT STRENGTHS:  Strengths: Social Skills, Positive Support    Patient Strengths and Limitations:  Limitations: Perceives need for assistance with self-care, General negative or hopeless attitude about future/recovery    Addictive Behavior:   Addictive Behavior  In the past 3 months, have you felt or has someone told you that you have a problem with:  : None  Do you have a history of Chemical Use?: No  Do you have a history of Alcohol Use?: No  Do you have a history of Street Drug Abuse?: Yes  Histroy of Prescripton Drug Abuse?: No    Medical Problems:   Past Medical History:   Diagnosis Date    B[facility] disorder ([facility] [address]     Depression     GERD (gastroesophageal reflux disease)     Hallucinations     Headache(784.0)     Hepatitis     Schizophrenia, schizo-affective ([facility]     Substance abuse ([facility]     Tobacco abuse     Type II or unspecified type diabetes mellitus without mention of complication, not stated as uncontrolled     Urinary incontinence        Status EXAM:  Status and Exam  Normal: No  Facial Expression: Flat  Affect: Blunt  Level of Consciousness: Alert  Mood:Normal: No  Mood: Anxious  Motor Activity:Normal: Yes  Interview Behavior: Cooperative  Preception: [place] to Time, [place] to Person, [place] to Place, [place] to Situation  Attention:Normal: No  Attention: Distractible  Thought Processes: Circumstantial  Thought Content:Normal: Yes  Hallucinations:  Auditory (Comment) (Voices tell him to kill himself)  Delusions: No  Memory:Normal: Yes  Memory: Poor Recent, Poor Remote  Insight and Judgment: No  Insight and Judgment: Poor Judgment, Poor Insight  Present Suicidal Ideation: No  Present Homicidal Ideation: No    Tobacco Screening:  Practical Counseling, on admission, corby X, if applicable and completed (first 3 are required if patient doesn't refuse):            ( )  Recognizing danger situations (included triggers and roadblocks)                    ( )  Coping skills (new ways to manage stress, exercise, relaxation techniques, changing routine, distraction)                                                           ( )  Basic information about quitting (benefits of quitting, techniques in how to quit, available resources  ( ) Referral for counseling faxed to [facility]                                           ( ) Patient refused counseling  ( ) Patient has not smoked in the last 30 days    Metabolic Screening:    Lab Results   Component Value Date    LABA1C 5.0 10/13/2021       Lab Results   Component Value Date    CHOL 150 10/13/2021    CHOL 167 08/11/2020    CHOL 179 02/13/2017    CHOL 127 05/09/2015    CHOL 168 08/20/2014    CHOL 158 12/31/2013    CHOL 178 08/15/2013    CHOL 166 09/17/2012    CHOL 210 (H) 02/03/2012     Lab Results   Component Value Date    TRIG 41 10/13/2021    TRIG 43 08/11/2020    TRIG 67 02/13/2017    TRIG 37 05/09/2015    TRIG 72 08/20/2014    TRIG 52 12/31/2013    TRIG 70 08/15/2013    TRIG 117 09/17/2012    TRIG 94 02/03/2012     Lab Results   Component Value Date    HDL 62 10/13/2021    HDL 74 08/11/2020    HDL 73 02/13/2017    HDL 57 05/09/2015    HDL 50 08/20/2014    HDL 43 12/31/2013    HDL 42 08/15/2013    HDL 38 (L) 09/17/2012    HDL 55 02/03/2012     No components found for: LDLCAL  No results found for: LABVLDL      Body mass index is 24.39 kg/m². BP Readings from Last 2 Encounters:   11/03/21 114/69   11/03/21 113/70           Pt admitted with followings belongings:  Dentures: None  Vision - Corrective Lenses: None  Hearing Aid: None  Jewelry: None  Body Piercings Removed: N/A  Clothing:  Footwear, Jacket / coat, Pants, Shirt, Socks, Undergarments (Comment)  Were All Patient Medications Collected?: Not Applicable  Other Valuables: None     Patient's home medications need verified. Patient oriented to surroundings and program expectations and copy of patient rights given. Received admission packet:  Yes. Consents reviewed, signed Yes. Patient verbalize understanding:  Yes.   Patient education on precautions: Yes    Patient did not have a personal cell phone on admission                   Elizabet Levine RN

## 2021-11-04 NOTE — ED NOTES
[address] [facility] paged for psychiatry disposition.  Dr Nitin Alvarez accepted to the patient to [facility]

## 2021-11-05 PROCEDURE — 99232 SBSQ HOSP IP/OBS MODERATE 35: CPT | Performed by: PSYCHIATRY & NEUROLOGY

## 2021-11-05 PROCEDURE — 6370000000 HC RX 637 (ALT 250 FOR IP): Performed by: PSYCHIATRY & NEUROLOGY

## 2021-11-05 PROCEDURE — 1240000000 HC EMOTIONAL WELLNESS R&B

## 2021-11-05 RX ADMIN — ESCITALOPRAM OXALATE 15 MG: 10 TABLET ORAL at 09:14

## 2021-11-05 RX ADMIN — QUETIAPINE FUMARATE 400 MG: 200 TABLET ORAL at 21:10

## 2021-11-05 RX ADMIN — Medication 1000 UNITS: at 09:17

## 2021-11-05 RX ADMIN — TRAZODONE HYDROCHLORIDE 50 MG: 50 TABLET ORAL at 21:10

## 2021-11-05 ASSESSMENT — PAIN SCALES - GENERAL: PAINLEVEL_OUTOF10: 0

## 2021-11-05 NOTE — GROUP NOTE
Group Therapy Note    Date: 11/5/2021    Group Start Time: 0900  Group End Time: 0930  Group Topic: Community Meeting    [facility]    Patient refused to attend community meeting and goal setting group at 0900 after encouragement from staff. 1:1 talk time offered by staff as alternative to group session.

## 2021-11-05 NOTE — PLAN OF CARE
Problem: Altered Mood, Depressive Behavior:  Goal: Able to verbalize acceptance of life and situations over which he or she has no control  Description: Able to verbalize acceptance of life and situations over which he or she has no control  11/5/2021 0835 by Catalino Sarmiento RN  Outcome: Ongoing  Patient was able to discuss different situations that may arise that cause depressive symptoms. These life stressors are tough and the patient feels they have no control over them. Patient verbalized ways to deal with these stressors. Problem: Altered Mood, Depressive Behavior:  Goal: Able to verbalize and/or display a decrease in depressive symptoms  Description: Able to verbalize and/or display a decrease in depressive symptoms  11/5/2021 0835 by Catalino Sarmiento RN  Outcome: Ongoing  Patient states that they feel slightly depressed today. Patient reports a decrease in depressive symptoms since admission. Patient is mainly isolative too room and continues to show depressive symptoms. Patient is evasive during interview and keeps attempting to go back to sleep. Writer offered one on one talk time with patient if they need it.      Problem: Tobacco Use:  Goal: Inpatient tobacco use cessation counseling participation  Description: Inpatient tobacco use cessation counseling participation  Outcome: Ongoing  Patient is not interesting

## 2021-11-05 NOTE — GROUP NOTE
Group Therapy Note    Date: 11/5/2021    Group Start Time: 1330  Group End Time: 2904  Group Topic: Cognitive Skills    [address], GADIELS        Group Therapy Note    Attendees: 13/22      patient refused to attend self esteem group at [phone] after encouragement from staff. 1:1 talk time provided as alternative to group session.

## 2021-11-05 NOTE — PLAN OF CARE
Problem: Altered Mood, Depressive Behavior:  Goal: Able to verbalize and/or display a decrease in depressive symptoms  Description: Able to verbalize and/or display a decrease in depressive symptoms  11/5/2021 1009 by Esha Carson LPN  Outcome: Ongoing  Note: Patient states he continues to feel sad and depressed but feels safe while here at the hospital. Patient is independent of all cares and able to make needs known. Patient remains mostly isolative. Patient denies thoughts to harm self and others.

## 2021-11-05 NOTE — GROUP NOTE
Group Therapy Note    Date: 11/5/2021    Group Start Time: 1100  Group End Time: 1150  Group Topic: Recreational    [address], CTRS        Group Therapy Note    Attendees: 5/21    patient refused to attend  recreation and leisure group at [phone] after encouragement from staff. 1:1 talk time provided as alternative to group session.

## 2021-11-05 NOTE — CARE COORDINATION
Writer called [facility] on behalf of pt. Writer was transferred to [facility] and left VM for [facility] inquiring about pt's stay and ability to return.

## 2021-11-05 NOTE — CARE COORDINATION
YOGESH placed call to [address] [place] [address] [phone] ext [phone] in regards to pt returning to the house. YOGESH left name and number asking for a return call.

## 2021-11-05 NOTE — GROUP NOTE
Group Therapy Note    Date: 11/5/2021    Group Start Time: 1000  Group End Time: 9672  Group Topic: Psychoeducation    JESUS BHFRANKLIN Hill Sa, LSW        Group Therapy Note    patient refused to attend psychoeducational group at 10am after encouragement from staff.           Signature:  FRANKLIN Mars Sa, CHANEL

## 2021-11-05 NOTE — PLAN OF CARE
encouraged to set goals   Long term: patient is encouraged to set goals    PLAN/TREATMENT RECOMMENDATIONS UPDATE: continue with group therapies, increased socialization, continue planning for after discharge goals, continue with medication compliance    SHORT-TERM GOALS UPDATE:   Time frame for Short-Term Goals: 5-7 days    LONG-TERM GOALS UPDATE:   Time frame for Long-Term Goals: 6 months  Members Present in Team Meeting: See Signature Sheet    Flakita Odonnell, [address]

## 2021-11-06 PROCEDURE — 99232 SBSQ HOSP IP/OBS MODERATE 35: CPT | Performed by: PSYCHIATRY & NEUROLOGY

## 2021-11-06 PROCEDURE — 6370000000 HC RX 637 (ALT 250 FOR IP): Performed by: PSYCHIATRY & NEUROLOGY

## 2021-11-06 PROCEDURE — APPSS30 APP SPLIT SHARED TIME 16-30 MINUTES: Performed by: NURSE PRACTITIONER

## 2021-11-06 PROCEDURE — 1240000000 HC EMOTIONAL WELLNESS R&B

## 2021-11-06 RX ADMIN — ESCITALOPRAM OXALATE 15 MG: 10 TABLET ORAL at 08:15

## 2021-11-06 RX ADMIN — Medication 1000 UNITS: at 08:15

## 2021-11-06 RX ADMIN — QUETIAPINE FUMARATE 400 MG: 200 TABLET ORAL at 21:45

## 2021-11-06 NOTE — PROGRESS NOTES
Daily Progress Note  Leandro Moon MD  11/5/2021  CHIEF COMPLAINT: Suicidal ideations and command auditory hallucinations    Reviewed patient's current plan of care and vital signs with nursing staff. Sleep:  several hours last night  Attending groups: No: Isolates in room    SUBJECTIVE:    Patient not really engaging much. Still endorsing bad voices. Unable to provide timeline for when he used cocaine or when he left [facility]. Social work working to confirm timeline. At this point patient needs legal guardian in a bad way. His sister failed to follow-up with filing with legal guardianship as far as we can tell, paperwork provided nearly 2 weeks ago on previous admission. May need to involve community  to be the patient's legal guardian for placement. We will follow up with [facility] and find out whether or not they are willing to accept the patient back into programming once we find out what happened with the patient upon discharge last time when he was supposed to report to [facility] inpatient recovery upon discharge. Mental Status Exam  Level of consciousness:  Within normal limits  Appearance: Hospital attire, seated in chair, with good grooming and hygiene   Behavior/Motor: No abnormalities noted  Attitude toward examiner:  Cooperative, attentive, good eye contact  Speech:  spontaneous, normal rate, normal volume and well articulated  Mood: Depressed  Affect: Flat and withdrawn  Thought processes:  linear, goal directed and coherent  Thought content:  denies homicidal ideation  Suicidal Ideation: Endorses suicidal ideation  Delusions:  no evidence of delusions  Perceptual Disturbance: Endorses command auditory hallucinations to hurt himself cognition:  Oriented to self, location, time, and situation  Memory: Impaired  Insight & Judgement: Limited  Medication side effects:  denies       Data   height is 6' 2\" (1.88 m) and weight is 190 lb (86.2 kg). His oral temperature is 97.1 °F (36.2 °C).  His blood pressure is 100/72 and his pulse is 86. His respiration is 14. Labs:   No visits with results within 2 Day(s) from this visit.    Latest known visit with results is:   Admission on 11/03/2021, Discharged on 11/03/2021   Component Date Value Ref Range Status    WBC 11/03/2021 4.1  3.5 - 11.3 k/uL Final    RBC 11/03/2021 4.19* 4.21 - 5.77 m/uL Final    Hemoglobin 11/03/2021 11.9* 13.0 - 17.0 g/dL Final    Hematocrit 11/03/2021 38.9* 40.7 - 50.3 % Final    MCV 11/03/2021 92.8  82.6 - 102.9 fL Final    MCH 11/03/2021 28.4  25.2 - 33.5 pg Final    MCHC 11/03/2021 30.6  28.4 - 34.8 g/dL Final    RDW 11/03/2021 14.1  11.8 - 14.4 % Final    Platelets 25/56/5474 250  138 - 453 k/uL Final    MPV 11/03/2021 9.8  8.1 - 13.5 fL Final    NRBC Automated 11/03/2021 0.0  0.0 per 100 WBC Final    Differential Type 11/03/2021 NOT REPORTED   Final    Seg Neutrophils 11/03/2021 42  36 - 65 % Final    Lymphocytes 11/03/2021 47* 24 - 43 % Final    Monocytes 11/03/2021 8  3 - 12 % Final    Eosinophils % 11/03/2021 2  1 - 4 % Final    Basophils 11/03/2021 1  0 - 2 % Final    Immature Granulocytes 11/03/2021 0  0 % Final    Segs Absolute 11/03/2021 1.74  1.50 - 8.10 k/uL Final    Absolute Lymph # 11/03/2021 1.94  1.10 - 3.70 k/uL Final    Absolute Mono # 11/03/2021 0.32  0.10 - 1.20 k/uL Final    Absolute Eos # 11/03/2021 0.09  0.00 - 0.44 k/uL Final    Basophils Absolute 11/03/2021 <0.03  0.00 - 0.20 k/uL Final    Absolute Immature Granulocyte 11/03/2021 <0.03  0.00 - 0.30 k/uL Final    WBC Morphology 11/03/2021 NOT REPORTED   Final    RBC Morphology 11/03/2021 NOT REPORTED   Final    Platelet Estimate 47/18/9227 NOT REPORTED   Final    Glucose 11/03/2021 132* 70 - 99 mg/dL Final    BUN 11/03/2021 11  8 - 23 mg/dL Final    CREATININE 11/03/2021 1.06  0.70 - 1.20 mg/dL Final    Bun/Cre Ratio 11/03/2021 NOT REPORTED  9 - 20 Final    Calcium 11/03/2021 8.8  8.6 - 10.4 mg/dL Final    Sodium 11/03/2021 142  135 - 144 mmol/L Final    Potassium 11/03/2021 4.1  3.7 - 5.3 mmol/L Final    Chloride 11/03/2021 108* 98 - 107 mmol/L Final    CO2 11/03/2021 27  20 - 31 mmol/L Final    Anion Gap 11/03/2021 7* 9 - 17 mmol/L Final    Alkaline Phosphatase 11/03/2021 89  40 - 129 U/L Final    ALT 11/03/2021 12  5 - 41 U/L Final    AST 11/03/2021 16  <40 U/L Final    Total Bilirubin 11/03/2021 0.32  0.3 - 1.2 mg/dL Final    Total Protein 11/03/2021 6.0* 6.4 - 8.3 g/dL Final    Albumin 11/03/2021 3.6  3.5 - 5.2 g/dL Final    Albumin/Globulin Ratio 11/03/2021 1.5  1.0 - 2.5 Final    GFR Non- 11/03/2021 >60  >60 mL/min Final    GFR  11/03/2021 >60  >60 mL/min Final    GFR Comment 11/03/2021        Final    Comment: Average GFR for 61-76 years old:   80 mL/min/1.73sq m  Chronic Kidney Disease:   <60 mL/min/1.73sq m  Kidney failure:   <15 mL/min/1.73sq m              eGFR calculated using average adult body mass.  Additional eGFR calculator available at:        [employer]            GFR Staging 11/03/2021 NOT REPORTED   Final    Amphetamine Screen, Ur 11/03/2021 NEGATIVE  NEGATIVE Final    Comment:       (Positive cutoff 1000 ng/mL)                  Barbiturate Screen, Ur 11/03/2021 NEGATIVE  NEGATIVE Final    Comment:       (Positive cutoff 200 ng/mL)                  Benzodiazepine Screen, Urine 11/03/2021 NEGATIVE  NEGATIVE Final    Comment:       (Positive cutoff 200 ng/mL)                  Cocaine Metabolite, Urine 11/03/2021 POSITIVE* NEGATIVE Final    Comment:       (Positive cutoff 300 ng/mL)                  Methadone Screen, Urine 11/03/2021 NEGATIVE  NEGATIVE Final    Comment:       (Positive cutoff 300 ng/mL)                  Opiates, Urine 11/03/2021 NEGATIVE  NEGATIVE Final    Comment:       (Positive cutoff 300 ng/mL)                  Phencyclidine, Urine 11/03/2021 NEGATIVE  NEGATIVE Final    Comment:       (Positive cutoff 25 ng/mL)  Propoxyphene, Urine 11/03/2021 NOT REPORTED  NEGATIVE Final    Cannabinoid Scrn, Ur 11/03/2021 NEGATIVE  NEGATIVE Final    Comment:       (Positive cutoff 50 ng/mL)                  Oxycodone Screen, Ur 11/03/2021 NEGATIVE  NEGATIVE Final    Comment:       (Positive cutoff 100 ng/mL)                  Methamphetamine, Urine 11/03/2021 NOT REPORTED  NEGATIVE Final    Tricyclic Antidepressants, Urine 11/03/2021 NOT REPORTED  NEGATIVE Final    MDMA, Urine 11/03/2021 NOT REPORTED  NEGATIVE Final    Buprenorphine Urine 11/03/2021 NOT REPORTED  NEGATIVE Final    Test Information 11/03/2021 Assay provides medical screening only. The absence of expected drug(s) and/or metabolite(s) may indicate diluted or adulterated urine, limitations of testing or timing of collection. Final    Comment: Testing for legal purposes should be confirmed by another method. To request confirmation   of test result, please call the lab within 7 days of sample submission.  Acetaminophen Level 11/03/2021 <5* 10 - 30 ug/mL Final    Ethanol 11/03/2021 <10  <10 mg/dL Final    Ethanol percent 11/03/2021 <0.010  <7.237 % Final    Salicylate Lvl 75/33/1136 <1* 3 - 10 mg/dL Final    Toxic Tricyclic Sc,Blood 29/57/8397 NEGATIVE  NEGATIVE Final    Specimen Description 11/03/2021 . NASOPHARYNGEAL SWAB   Final    SARS-CoV-2, Rapid 11/03/2021 Not Detected  Not Detected Final    Comment:       Rapid NAAT:  The specimen is NEGATIVE for SARS-CoV-2, the novel coronavirus associated with   COVID-19. The ID NOW COVID-19 assay is designed to detect the virus that causes COVID-19 in patients   with signs and symptoms of infection who are suspected of COVID-19. An individual without symptoms of COVID-19 and who is not shedding SARS-CoV-2 virus would   expect to have a negative (not detected) result in this assay.   Negative results should be treated as presumptive and, if inconsistent with clinical signs   and symptoms or recovery  Attempt to develop insight  Psycho-education conducted. Supportive Therapy conducted. Probable discharge is next week  Follow-up daily while in the inpatient unit      Electronically signed by Conchis De La Fuente MD on 11/5/21 at 9:16 PM EDT    **This report has been created using voice recognition software. It may contain minor errors which are inherent in voice recognition technology. **

## 2021-11-06 NOTE — PROGRESS NOTES
Daily Progress Note  11/6/2021     CHIEF COMPLAINT: Suicidal ideations and command auditory hallucinations    Reviewed patient's current plan of care and vital signs with nursing staff. Sleep:  several hours last night  Attending groups: No: Isolates in room    SUBJECTIVE:      Patient seen for follow-up assessment. He remains primarily withdrawn to self to his room, but staff do report that he has been in the day area more frequently today. Continues to not attend group programming. At time of assessment, patient is resting in bed. Speech is quiet, delayed, and primarily monosyllabic. Patient does endorse some improvement in suicidal ideation and states that the structure and stability of unit has been beneficial.  Endorses improvement in auditory hallucinations as well. States that he is hearing quiet whispers and that the voices are no longer command in nature. Intermittent, resting right hand tremor observed during assessment. No other involuntary movements or EPS symptoms observed. Patient has a history of bilateral upper extremity tremor. We will continue to observe. Patient has been compliant with scheduled medications and denying side effects to his Seroquel and Lexapro at this time. No response from [facility] recovery program at this time regarding if patient will be allowed to return following discharge. Patient unable to contract for safety at lower level of care without stability of recovery program.  Patient's sister was to file for guardianship upon last admission, but has failed to do so at this time. May need to move forward with other guardianship options.     Mental Status Exam  Level of consciousness:  Within normal limits  Appearance: Hospital attire, resting in bed, with fair grooming and hygiene   Behavior/Motor: No abnormalities noted, mild tremor of the right upper extremity  Attitude toward examiner:  Cooperative, attentive, fair  Speech: Delayed response, quiet volume, slow rate, well articulated  Mood: Depressed  Affect: Flat and withdrawn  Thought processes:  linear, goal directed and coherent  Thought content:  denies homicidal ideation  Suicidal Ideation: Endorses suicidal ideation, unable to contract for safety off unit  Delusions:  no evidence of delusions  Perceptual Disturbance: Reports improvement in auditory hallucinations continue to be present but no longer command in nature  Oriented to self, location, time, and situation  Memory: Impaired  Insight & Judgement: Limited  Medication side effects:  denies       Data   height is 6' 2\" (1.88 m) and weight is 190 lb (86.2 kg). His oral temperature is 98.2 °F (36.8 °C). His blood pressure is 101/46 (abnormal) and his pulse is 61. His respiration is 14. Labs:   No visits with results within 2 Day(s) from this visit.    Latest known visit with results is:   Admission on 11/03/2021, Discharged on 11/03/2021   Component Date Value Ref Range Status    WBC 11/03/2021 4.1  3.5 - 11.3 k/uL Final    RBC 11/03/2021 4.19* 4.21 - 5.77 m/uL Final    Hemoglobin 11/03/2021 11.9* 13.0 - 17.0 g/dL Final    Hematocrit 11/03/2021 38.9* 40.7 - 50.3 % Final    MCV 11/03/2021 92.8  82.6 - 102.9 fL Final    MCH 11/03/2021 28.4  25.2 - 33.5 pg Final    MCHC 11/03/2021 30.6  28.4 - 34.8 g/dL Final    RDW 11/03/2021 14.1  11.8 - 14.4 % Final    Platelets 22/68/6671 250  138 - 453 k/uL Final    MPV 11/03/2021 9.8  8.1 - 13.5 fL Final    NRBC Automated 11/03/2021 0.0  0.0 per 100 WBC Final    Differential Type 11/03/2021 NOT REPORTED   Final    Seg Neutrophils 11/03/2021 42  36 - 65 % Final    Lymphocytes 11/03/2021 47* 24 - 43 % Final    Monocytes 11/03/2021 8  3 - 12 % Final    Eosinophils % 11/03/2021 2  1 - 4 % Final    Basophils 11/03/2021 1  0 - 2 % Final    Immature Granulocytes 11/03/2021 0  0 % Final    Segs Absolute 11/03/2021 1.74  1.50 - 8.10 k/uL Final    Absolute Lymph # 11/03/2021 1.94  1.10 - 3.70 k/uL Final    Absolute NEGATIVE  NEGATIVE Final    Comment:       (Positive cutoff 200 ng/mL)                  Benzodiazepine Screen, Urine 11/03/2021 NEGATIVE  NEGATIVE Final    Comment:       (Positive cutoff 200 ng/mL)                  Cocaine Metabolite, Urine 11/03/2021 POSITIVE* NEGATIVE Final    Comment:       (Positive cutoff 300 ng/mL)                  Methadone Screen, Urine 11/03/2021 NEGATIVE  NEGATIVE Final    Comment:       (Positive cutoff 300 ng/mL)                  Opiates, Urine 11/03/2021 NEGATIVE  NEGATIVE Final    Comment:       (Positive cutoff 300 ng/mL)                  Phencyclidine, Urine 11/03/2021 NEGATIVE  NEGATIVE Final    Comment:       (Positive cutoff 25 ng/mL)                  Propoxyphene, Urine 11/03/2021 NOT REPORTED  NEGATIVE Final    Cannabinoid Scrn, Ur 11/03/2021 NEGATIVE  NEGATIVE Final    Comment:       (Positive cutoff 50 ng/mL)                  Oxycodone Screen, Ur 11/03/2021 NEGATIVE  NEGATIVE Final    Comment:       (Positive cutoff 100 ng/mL)                  Methamphetamine, Urine 11/03/2021 NOT REPORTED  NEGATIVE Final    Tricyclic Antidepressants, Urine 11/03/2021 NOT REPORTED  NEGATIVE Final    MDMA, Urine 11/03/2021 NOT REPORTED  NEGATIVE Final    Buprenorphine Urine 11/03/2021 NOT REPORTED  NEGATIVE Final    Test Information 11/03/2021 Assay provides medical screening only. The absence of expected drug(s) and/or metabolite(s) may indicate diluted or adulterated urine, limitations of testing or timing of collection. Final    Comment: Testing for legal purposes should be confirmed by another method. To request confirmation   of test result, please call the lab within 7 days of sample submission.       Acetaminophen Level 11/03/2021 <5* 10 - 30 ug/mL Final    Ethanol 11/03/2021 <10  <10 mg/dL Final    Ethanol percent 11/03/2021 <0.010  <0.648 % Final    Salicylate Lvl 79/33/1902 <1* 3 - 10 mg/dL Final    Toxic Tricyclic Sc,Blood 20/67/1069 NEGATIVE  NEGATIVE Final    Specimen Description 11/03/2021 . NASOPHARYNGEAL SWAB   Final    SARS-CoV-2, Rapid 11/03/2021 Not Detected  Not Detected Final    Comment:       Rapid NAAT:  The specimen is NEGATIVE for SARS-CoV-2, the novel coronavirus associated with   COVID-19. The ID NOW COVID-19 assay is designed to detect the virus that causes COVID-19 in patients   with signs and symptoms of infection who are suspected of COVID-19. An individual without symptoms of COVID-19 and who is not shedding SARS-CoV-2 virus would   expect to have a negative (not detected) result in this assay. Negative results should be treated as presumptive and, if inconsistent with clinical signs   and symptoms or necessary for patient management,  should be tested with an alternative molecular assay. Negative results do not preclude   SARS-CoV-2 infection and   should not be used as the sole basis for patient management decisions.          Fact sheet for Healthcare Providers: Brandyn  Fact sheet for Patients: Brandyn          Methodology: Isothermal Nucleic Acid Amplification              Medications  Current Facility-Administered Medications: Vitamin D (CHOLECALCIFEROL) tablet 1,000 Units, 1,000 Units, Oral, Daily  escitalopram (LEXAPRO) tablet 15 mg, 15 mg, Oral, Daily  nicotine polacrilex (NICORETTE) gum 2 mg, 2 mg, Oral, Q1H PRN  QUEtiapine (SEROQUEL) tablet 400 mg, 400 mg, Oral, Nightly  acetaminophen (TYLENOL) tablet 650 mg, 650 mg, Oral, Q4H PRN  aluminum & magnesium hydroxide-simethicone (MAALOX) 200-200-20 MG/5ML suspension 30 mL, 30 mL, Oral, Q6H PRN  hydrOXYzine (ATARAX) tablet 50 mg, 50 mg, Oral, TID PRN  ibuprofen (ADVIL;MOTRIN) tablet 400 mg, 400 mg, Oral, Q6H PRN  traZODone (DESYREL) tablet 50 mg, 50 mg, Oral, Nightly PRN  polyethylene glycol (GLYCOLAX) packet 17 g, 17 g, Oral, Daily PRN  haloperidol (HALDOL) tablet 5 mg, 5 mg, Oral, Q4H PRN **AND** LORazepam (ATIVAN) tablet 2 mg, 2 mg, Oral, Q4H PRN  haloperidol lactate (HALDOL) injection 5 mg, 5 mg, IntraMUSCular, Q4H PRN **AND** LORazepam (ATIVAN) injection 2 mg, 2 mg, IntraMUSCular, Q4H PRN **AND** diphenhydrAMINE (BENADRYL) injection 50 mg, 50 mg, IntraMUSCular, Q4H PRN    ASSESSMENT  Schizoaffective disorder, depressive type (HCC)   Rule out major neurocognitive impairment     PLAN  Patient s symptoms show modest improvement  Continue to observe on the unit on current medications for patient to stabilize after likely exacerbation from substance use  Will need to pursue disposition to either [facility] behavioral care versus inpatient substance use rehab pending hearing back from [facility] recovery  Attempt to develop insight  Psycho-education conducted. Supportive Therapy conducted. Probable discharge is per attending MD  Follow-up daily while in the inpatient unit      Electronically signed by ALEX Scott on 11/6/2021    **This report has been created using voice recognition software. It may contain minor errors which are inherent in voice recognition technology. **      I independently saw and evaluated the patient. I reviewed the nurse practitioners documentation above. Any additional comments or changes to the nurse practitioners documentation are stated below otherwise agree with assessment. Plan will be as follows:  Patient continues to deny any substance use despite positive cocaine on admission. Seems surprised and we discussed that [facility] may not take him back. May formally assess patient with slums examination tomorrow to further work-up possible major neurocognitive impairment. It is clear patient requires guardianship given history but unclear whether his outpatient act team may be able to facilitate this further patient should be pursued for guardianship while in the inpatient unit. High likelihood of repeating previous behaviors.   Still looking for clarification of timeline and what exactly happened when patient was sent to inpatient recovery last time  PLAN  Patient s symptoms   are improving  Observation on current medication for now  Attempt to develop insight  Psycho-education conducted. Supportive Therapy conducted.   Probable discharge is undetermined at this time  Follow-up daily while on inpatient unit

## 2021-11-06 NOTE — PLAN OF CARE
Problem: Altered Mood, Depressive Behavior:  Goal: Able to verbalize acceptance of life and situations over which he or she has no control  Description: Able to verbalize acceptance of life and situations over which he or she has no control  11/5/2021 2015 by Lenora Estes RN  Outcome: Ongoing  Note: Patient talked about not knowing where he will live. Pt believes we should be able to help him find a place to go. Writer reminded pt he had been here for weeks and we placed him at [facility] but he chose to leave and do more crack. Pt was unwilling to talk more after this reminder of his own actions was made to him. Problem: Altered Mood, Depressive Behavior:  Goal: Able to verbalize and/or display a decrease in depressive symptoms  Description: Able to verbalize and/or display a decrease in depressive symptoms  11/5/2021 2015 by Lenora Estes RN  Outcome: Ongoing  Note: Patient reports mild depression. Pt denies any thoughts to harm self. Pt encouraged to attend groups to learn coping skills. Pt stated he has been to enough groups while he has been here. Problem: Tobacco Use:  Goal: Inpatient tobacco use cessation counseling participation  Description: Inpatient tobacco use cessation counseling participation  11/5/2021 2015 by Lenora Estes RN  Outcome: Ongoing  Note: Pt reports no desire to quit smoking at this time.

## 2021-11-06 NOTE — PLAN OF CARE
Problem: Altered Mood, Depressive Behavior:  Goal: Able to verbalize acceptance of life and situations over which he or she has no control  Description: Able to verbalize acceptance of life and situations over which he or she has no control  Outcome: Ongoing  Note: Patient is able to verbalize reality based conversations     Problem: Altered Mood, Depressive Behavior:  Goal: Able to verbalize and/or display a decrease in depressive symptoms  Description: Able to verbalize and/or display a decrease in depressive symptoms  Outcome: Ongoing  Note: Patient continues to endorse depression and slight auditory hallucinations. Patient denies suicidal and homicidal ideations at this time.

## 2021-11-07 PROCEDURE — 6370000000 HC RX 637 (ALT 250 FOR IP): Performed by: PSYCHIATRY & NEUROLOGY

## 2021-11-07 PROCEDURE — 99232 SBSQ HOSP IP/OBS MODERATE 35: CPT | Performed by: PSYCHIATRY & NEUROLOGY

## 2021-11-07 PROCEDURE — 1240000000 HC EMOTIONAL WELLNESS R&B

## 2021-11-07 PROCEDURE — APPSS30 APP SPLIT SHARED TIME 16-30 MINUTES: Performed by: NURSE PRACTITIONER

## 2021-11-07 RX ADMIN — Medication 1000 UNITS: at 09:32

## 2021-11-07 RX ADMIN — TRAZODONE HYDROCHLORIDE 50 MG: 50 TABLET ORAL at 20:37

## 2021-11-07 RX ADMIN — QUETIAPINE FUMARATE 400 MG: 200 TABLET ORAL at 20:37

## 2021-11-07 RX ADMIN — ESCITALOPRAM OXALATE 15 MG: 10 TABLET ORAL at 09:32

## 2021-11-07 NOTE — PLAN OF CARE
Problem: Altered Mood, Depressive Behavior:  Goal: Able to verbalize acceptance of life and situations over which he or she has no control  Description: Able to verbalize acceptance of life and situations over which he or she has no control  Outcome: Ongoing   Patient has been cooperative upon approach, has allowed all assessments; Patient has expressed reality-based thinking with peers and staff; Patient reports a decrease in frequency and intensity of hallucinations. Patient has been able to verbalize acceptance of life and situations over which she has no control when speaking with staff. Problem: Altered Mood, Depressive Behavior:  Goal: Able to verbalize and/or display a decrease in depressive symptoms  Description: Able to verbalize and/or display a decrease in depressive symptoms  Outcome: Ongoing  Patient has been cooperative upon approach, has allowed all assessments; Patient has expressed reality-based thinking with peers and staff; Patient reports a decrease in depressive symptoms  Problem: Tobacco Use:  Goal: Inpatient tobacco use cessation counseling participation  Description: Inpatient tobacco use cessation counseling participation  Outcome: Ongoing   Patient is enrolled in inpatient tobacco use cessation counseling but refuses participation at this time.

## 2021-11-07 NOTE — PROGRESS NOTES
11/03/2021 0.09  0.00 - 0.44 k/uL Final    Basophils Absolute 11/03/2021 <0.03  0.00 - 0.20 k/uL Final    Absolute Immature Granulocyte 11/03/2021 <0.03  0.00 - 0.30 k/uL Final    WBC Morphology 11/03/2021 NOT REPORTED   Final    RBC Morphology 11/03/2021 NOT REPORTED   Final    Platelet Estimate 72/77/9117 NOT REPORTED   Final    Glucose 11/03/2021 132* 70 - 99 mg/dL Final    BUN 11/03/2021 11  8 - 23 mg/dL Final    CREATININE 11/03/2021 1.06  0.70 - 1.20 mg/dL Final    Bun/Cre Ratio 11/03/2021 NOT REPORTED  9 - 20 Final    Calcium 11/03/2021 8.8  8.6 - 10.4 mg/dL Final    Sodium 11/03/2021 142  135 - 144 mmol/L Final    Potassium 11/03/2021 4.1  3.7 - 5.3 mmol/L Final    Chloride 11/03/2021 108* 98 - 107 mmol/L Final    CO2 11/03/2021 27  20 - 31 mmol/L Final    Anion Gap 11/03/2021 7* 9 - 17 mmol/L Final    Alkaline Phosphatase 11/03/2021 89  40 - 129 U/L Final    ALT 11/03/2021 12  5 - 41 U/L Final    AST 11/03/2021 16  <40 U/L Final    Total Bilirubin 11/03/2021 0.32  0.3 - 1.2 mg/dL Final    Total Protein 11/03/2021 6.0* 6.4 - 8.3 g/dL Final    Albumin 11/03/2021 3.6  3.5 - 5.2 g/dL Final    Albumin/Globulin Ratio 11/03/2021 1.5  1.0 - 2.5 Final    GFR Non- 11/03/2021 >60  >60 mL/min Final    GFR  11/03/2021 >60  >60 mL/min Final    GFR Comment 11/03/2021        Final    Comment: Average GFR for 61-76 years old:   80 mL/min/1.73sq m  Chronic Kidney Disease:   <60 mL/min/1.73sq m  Kidney failure:   <15 mL/min/1.73sq m              eGFR calculated using average adult body mass.  Additional eGFR calculator available at:        [email or web address]            GFR Staging 11/03/2021 NOT REPORTED   Final    Amphetamine Screen, Ur 11/03/2021 NEGATIVE  NEGATIVE Final    Comment:       (Positive cutoff 1000 ng/mL)                  Barbiturate Screen, Ur 11/03/2021 NEGATIVE  NEGATIVE Final    Comment:       (Positive cutoff 200 ng/mL)                  Benzodiazepine Screen, Urine 11/03/2021 NEGATIVE  NEGATIVE Final    Comment:       (Positive cutoff 200 ng/mL)                  Cocaine Metabolite, Urine 11/03/2021 POSITIVE* NEGATIVE Final    Comment:       (Positive cutoff 300 ng/mL)                  Methadone Screen, Urine 11/03/2021 NEGATIVE  NEGATIVE Final    Comment:       (Positive cutoff 300 ng/mL)                  Opiates, Urine 11/03/2021 NEGATIVE  NEGATIVE Final    Comment:       (Positive cutoff 300 ng/mL)                  Phencyclidine, Urine 11/03/2021 NEGATIVE  NEGATIVE Final    Comment:       (Positive cutoff 25 ng/mL)                  Propoxyphene, Urine 11/03/2021 NOT REPORTED  NEGATIVE Final    Cannabinoid Scrn, Ur 11/03/2021 NEGATIVE  NEGATIVE Final    Comment:       (Positive cutoff 50 ng/mL)                  Oxycodone Screen, Ur 11/03/2021 NEGATIVE  NEGATIVE Final    Comment:       (Positive cutoff 100 ng/mL)                  Methamphetamine, Urine 11/03/2021 NOT REPORTED  NEGATIVE Final    Tricyclic Antidepressants, Urine 11/03/2021 NOT REPORTED  NEGATIVE Final    MDMA, Urine 11/03/2021 NOT REPORTED  NEGATIVE Final    Buprenorphine Urine 11/03/2021 NOT REPORTED  NEGATIVE Final    Test Information 11/03/2021 Assay provides medical screening only. The absence of expected drug(s) and/or metabolite(s) may indicate diluted or adulterated urine, limitations of testing or timing of collection. Final    Comment: Testing for legal purposes should be confirmed by another method. To request confirmation   of test result, please call the lab within 7 days of sample submission.  Acetaminophen Level 11/03/2021 <5* 10 - 30 ug/mL Final    Ethanol 11/03/2021 <10  <10 mg/dL Final    Ethanol percent 11/03/2021 <0.010  <4.979 % Final    Salicylate Lvl 09/72/4043 <1* 3 - 10 mg/dL Final    Toxic Tricyclic Sc,Blood 89/79/0977 NEGATIVE  NEGATIVE Final    Specimen Description 11/03/2021 . NASOPHARYNGEAL SWAB   Final  SARS-CoV-2, Rapid 11/03/2021 Not Detected  Not Detected Final    Comment:       Rapid NAAT:  The specimen is NEGATIVE for SARS-CoV-2, the novel coronavirus associated with   COVID-19. The ID NOW COVID-19 assay is designed to detect the virus that causes COVID-19 in patients   with signs and symptoms of infection who are suspected of COVID-19. An individual without symptoms of COVID-19 and who is not shedding SARS-CoV-2 virus would   expect to have a negative (not detected) result in this assay. Negative results should be treated as presumptive and, if inconsistent with clinical signs   and symptoms or necessary for patient management,  should be tested with an alternative molecular assay. Negative results do not preclude   SARS-CoV-2 infection and   should not be used as the sole basis for patient management decisions.          Fact sheet for Healthcare Providers: [employer]  Fact sheet for Patients: [employer]si          Methodology: Isothermal Nucleic Acid Amplification              Medications  Current Facility-Administered Medications: Vitamin D (CHOLECALCIFEROL) tablet 1,000 Units, 1,000 Units, Oral, Daily  escitalopram (LEXAPRO) tablet 15 mg, 15 mg, Oral, Daily  nicotine polacrilex (NICORETTE) gum 2 mg, 2 mg, Oral, Q1H PRN  QUEtiapine (SEROQUEL) tablet 400 mg, 400 mg, Oral, Nightly  acetaminophen (TYLENOL) tablet 650 mg, 650 mg, Oral, Q4H PRN  aluminum & magnesium hydroxide-simethicone (MAALOX) 200-200-20 MG/5ML suspension 30 mL, 30 mL, Oral, Q6H PRN  hydrOXYzine (ATARAX) tablet 50 mg, 50 mg, Oral, TID PRN  ibuprofen (ADVIL;MOTRIN) tablet 400 mg, 400 mg, Oral, Q6H PRN  traZODone (DESYREL) tablet 50 mg, 50 mg, Oral, Nightly PRN  polyethylene glycol (GLYCOLAX) packet 17 g, 17 g, Oral, Daily PRN  haloperidol (HALDOL) tablet 5 mg, 5 mg, Oral, Q4H PRN **AND** LORazepam (ATIVAN) tablet 2 mg, 2 mg, Oral, Q4H PRN  haloperidol lactate (HALDOL) injection 5 mg, 5 mg, IntraMUSCular, Q4H PRN **AND** LORazepam (ATIVAN) injection 2 mg, 2 mg, IntraMUSCular, Q4H PRN **AND** diphenhydrAMINE (BENADRYL) injection 50 mg, 50 mg, IntraMUSCular, Q4H PRN    ASSESSMENT  Schizoaffective disorder, depressive type (HCC)   Rule out major neurocognitive impairment     PLAN  Patient s symptoms show modest improvement  Continue to observe on the unit on current medications for patient to stabilize after likely exacerbation from substance use  Will need to pursue disposition to either [facility] behavioral care versus inpatient substance use rehab pending hearing back from [facility] recovery  Attempt to develop insight  Psycho-education conducted. Supportive Therapy conducted. Probable discharge is per attending MD  Follow-up daily while in the inpatient unit      Electronically signed by ALEX Albert on 11/6/2021    **This report has been created using voice recognition software. It may contain minor errors which are inherent in voice recognition technology. **    I independently saw and evaluated the patient. I reviewed the nurse practitioners documentation above. Any additional comments or changes to the nurse practitioners documentation are stated below otherwise agree with assessment. Plan will be as follows:  Patient is reporting improvement in his symptoms. Denying side effects to medication and feels safe. Discussed disposition and he is requesting to go back to [facility] inpatient recovery. Message has been left, still awaiting confirmation of whether the patient can return. We will follow up tomorrow and confirm. PLAN  Patient s symptoms   are improving  Continue with current medication for now  Attempt to develop insight  Psycho-education conducted. Supportive Therapy conducted.   Probable discharge is undetermined at this time but may be tomorrow if he can go back to inpatient treatment, if unable the patient should be considered for guardianship with a local  since his sister is not following up  Follow-up daily while on inpatient unit

## 2021-11-07 NOTE — GROUP NOTE
Group Therapy Note    Date: 11/7/2021    Group Start Time: 1400  Group End Time: 1500  Group Topic: Psychoeducation    JESUS Martinez, CTRS    Pt did not attend psychoeducation group d/t resting in room despite staff invitation to attend. 1:1 talk time offered as alternative.         Signature:  Westley Martinez, [address]

## 2021-11-07 NOTE — GROUP NOTE
Group Therapy Note    Date: 11/7/2021    Group Start Time: 1000  Group End Time: 8160  Group Topic: Psychoeducation    JESUS [facility] FRANKLIN Crisostomo LSW        Group Therapy Note    patient refused to attend psychoeducational group at 10am after encouragement from staff.              Signature:  FRANKLIN Prather LSW

## 2021-11-07 NOTE — PLAN OF CARE
Problem: Altered Mood, Depressive Behavior:  Goal: Able to verbalize acceptance of life and situations over which he or she has no control  Description: Able to verbalize acceptance of life and situations over which he or she has no control  11/6/2021 2020 by Christoph Schafer RN  Outcome: Ongoing  Note: Patient denies suicidal or homicidal ideations at this time. Patient presents social with peers in the dayroom. Will continue to monitor and provide q15 min safety checks.       Problem: Altered Mood, Depressive Behavior:  Goal: Able to verbalize and/or display a decrease in depressive symptoms  Description: Able to verbalize and/or display a decrease in depressive symptoms  11/6/2021 2020 by Christoph Schafer RN  Outcome: Ongoing     Problem: Tobacco Use:  Goal: Inpatient tobacco use cessation counseling participation  Description: Inpatient tobacco use cessation counseling participation  11/6/2021 2020 by Christoph Schafer RN  Outcome: Ongoing

## 2021-11-08 PROCEDURE — 6370000000 HC RX 637 (ALT 250 FOR IP): Performed by: PSYCHIATRY & NEUROLOGY

## 2021-11-08 PROCEDURE — 1240000000 HC EMOTIONAL WELLNESS R&B

## 2021-11-08 PROCEDURE — 99232 SBSQ HOSP IP/OBS MODERATE 35: CPT | Performed by: PSYCHIATRY & NEUROLOGY

## 2021-11-08 RX ADMIN — ESCITALOPRAM OXALATE 15 MG: 10 TABLET ORAL at 12:21

## 2021-11-08 RX ADMIN — Medication 1000 UNITS: at 12:21

## 2021-11-08 RX ADMIN — TRAZODONE HYDROCHLORIDE 50 MG: 50 TABLET ORAL at 21:07

## 2021-11-08 RX ADMIN — QUETIAPINE FUMARATE 400 MG: 200 TABLET ORAL at 21:07

## 2021-11-08 ASSESSMENT — ENCOUNTER SYMPTOMS
CONSTIPATION: 0
SORE THROAT: 0
VOMITING: 0
SHORTNESS OF BREATH: 0
ABDOMINAL PAIN: 0
NAUSEA: 0
DIARRHEA: 0

## 2021-11-08 ASSESSMENT — PAIN DESCRIPTION - LOCATION: LOCATION: BACK

## 2021-11-08 ASSESSMENT — PAIN SCALES - GENERAL: PAINLEVEL_OUTOF10: 0

## 2021-11-08 NOTE — PLAN OF CARE
Problem: Altered Mood, Depressive Behavior:  Goal: Able to verbalize acceptance of life and situations over which he or she has no control  Description: Able to verbalize acceptance of life and situations over which he or she has no control  Outcome: Ongoing     Problem: Altered Mood, Depressive Behavior:  Goal: Able to verbalize and/or display a decrease in depressive symptoms  Description: Able to verbalize and/or display a decrease in depressive symptoms  Outcome: Ongoing  Note: Patient presents isolative to room the majority of the shift. Patient Denies and suicidal or homicidal ideations at this time. Will continue to monitor and provide q15 min safety checks.       Problem: Tobacco Use:  Goal: Inpatient tobacco use cessation counseling participation  Description: Inpatient tobacco use cessation counseling participation  Outcome: Ongoing

## 2021-11-08 NOTE — GROUP NOTE
Group Therapy Note    Date: 11/8/2021    Group Start Time: 0900  Group End Time: 5297  Group Topic: Community Meeting    [facility]    Patient refused to attend community meeting and goal setting group at 0900 after encouragement from staff. 1:1 talk time offered by staff as alternative to group session.

## 2021-11-08 NOTE — PLAN OF CARE
Problem: Altered Mood, Depressive Behavior:  Goal: Able to verbalize acceptance of life and situations over which he or she has no control  Description: Able to verbalize acceptance of life and situations over which he or she has no control  11/8/2021 1255 by Emma Barry RN  Outcome: Ongoing  Patient is seclusive to room and bed, out for meals only, took all medications as prescribed and affect is bright upon approach. Patient displays thought blocking and is unable to refuses to answer direct in depth questions   11/8/2021 0039 by Lenka Collins RN  Outcome: Ongoing  Goal: Able to verbalize and/or display a decrease in depressive symptoms  Description: Able to verbalize and/or display a decrease in depressive symptoms  11/8/2021 1255 by Emma Barry RN  Outcome: Ongoing  Patient denies depression as well as thoughts to harm self or others. 11/8/2021 0039 by Lenka Collins RN  Outcome: Ongoing  Note: Patient presents isolative to room the majority of the shift. Patient Denies and suicidal or homicidal ideations at this time. Will continue to monitor and provide q15 min safety checks. Problem: Tobacco Use:  Goal: Inpatient tobacco use cessation counseling participation  Description: Inpatient tobacco use cessation counseling participation  11/8/2021 1255 by Emma Barry RN  Outcome: Ongoing  Patient refused smoking cessation counseling.   11/8/2021 0039 by Lenka Collins RN  Outcome: Ongoing

## 2021-11-08 NOTE — CARE COORDINATION
YOGESH spoke with Claudia from [facility], pt unable to return to recovery program this date. YOGESH then spoke with [facility] De team Rosalio Adan, advised pt cannot return to [facility], Rosalio Adan states they have no suggestions \"at this time we have nothing, we've been wracking our brains but we can't help, we may get back to you at some point but we don't know. \" Katrin Durham asked SW to call him when SW \"figures out\" how to help patient \"so I can update my team.\"

## 2021-11-08 NOTE — GROUP NOTE
Group Therapy Note    Date: 11/8/2021    Group Start Time: 1100  Group End Time: 1200  Group Topic: Recreational    [address], CTRS        Group Therapy Note    Attendees: 8/22      patient refused to attend recreation group at [phone] after encouragement from staff.   1:1 talk time provided as alternative to group session

## 2021-11-08 NOTE — GROUP NOTE
Group Therapy Note    Date: 11/8/2021    Group Start Time: 1320  Group End Time: 4980  Group Topic: Cognitive Skills    [facility] DAVID De La Rosa, CTRS        Group Therapy Note    Attendees: 8/20    patient refused to attend de-stressing  group at [phone] after encouragement from staff. 1:1 talk time provided as alternative to group session.

## 2021-11-09 PROCEDURE — 6370000000 HC RX 637 (ALT 250 FOR IP): Performed by: PSYCHIATRY & NEUROLOGY

## 2021-11-09 PROCEDURE — 99232 SBSQ HOSP IP/OBS MODERATE 35: CPT | Performed by: PSYCHIATRY & NEUROLOGY

## 2021-11-09 PROCEDURE — 1240000000 HC EMOTIONAL WELLNESS R&B

## 2021-11-09 RX ADMIN — TRAZODONE HYDROCHLORIDE 50 MG: 50 TABLET ORAL at 20:39

## 2021-11-09 RX ADMIN — QUETIAPINE FUMARATE 400 MG: 200 TABLET ORAL at 20:38

## 2021-11-09 RX ADMIN — Medication 1000 UNITS: at 08:46

## 2021-11-09 RX ADMIN — ESCITALOPRAM OXALATE 15 MG: 10 TABLET ORAL at 08:46

## 2021-11-09 NOTE — GROUP NOTE
Group Therapy Note    Date: 11/9/2021    Group Start Time: 1000  Group End Time: 8016  Group Topic: Psychotherapy    JESUS [facility]    FRANKLIN Salgado LSW        Group Therapy Note    Attendees: 7/20         Patient's Goal: Facilitator utilized worksheet for coping skills Depression, discussing activities and how to practice those activities to break the cycle of depression. Notes:  Pt left group early    Status After Intervention:  Improved    Participation Level:  Active Listener    Participation Quality: Appropriate      Speech:  normal      Thought Process/Content: Logical      Affective Functioning: Congruent      Mood: euthymic      Level of consciousness:  Alert and Attentive      Response to Learning: Able to verbalize current knowledge/experience      Endings: None Reported    Modes of Intervention: Education, Support and Problem-solving      Discipline Responsible: /Counselor      Signature:  FRANKLIN Salgado LSW

## 2021-11-09 NOTE — CARE COORDINATION
YOGESH spoke with Katherine from [address] this date, asked if she could get information from patient's payee about how many resources patient has to potentially provide hotel resources for patient. Iban  states she will \"try\" but that she couldn't guarantee \"anyting. \" SW asked to speak with ACT supervisor to discuss other potential support serviceds for patient to provide patient with supportive discharge plan. YOGESH spoke with supervisor Karen Mclaughlin and asked to conference on potential d/c plans, Karen Mclaughlin states pt has \"been in an out of group homes for a long time, he does not want to get sober from drugs ever, I think you want me to have solution that I don't have. \" SW to continue to advocate for safe discharge plan for patient.

## 2021-11-09 NOTE — PROGRESS NOTES
Daily Progress Note  John Jung MD  11/8/2021  CHIEF COMPLAINT: Depression with suicidal ideation and command auditory hallucinations    Reviewed patient's current plan of care and vital signs with nursing staff. Sleep:  8 hours last night  Attending groups: No:     SUBJECTIVE:    Patient is denying side effects to medication. At present time he states he feels ready to go back to recovery. Subsequent to our interview I am writing a note from social work indicating that patient is not welcome back to the recovery. His act team \"does not know what to do\". His sister with whom we have provided guardianship paperwork at last admission has not filed for guardianship. Asked social work team to reach out to the sister to clarify her intentions. This is a very difficult situation as patient has clearly failed lower levels of care. Likely needs guardianship and nursing home level of care or group home. Would likely require a locked unit secondary to his tendency to wander.   Patient reports his voices are \"better\" he feels like he could return to [facility] recovery at the time we discussed but he is unaware that he is not able to go back secondary to his wandering out of the program.    Mental Status Exam  Level of consciousness:  Within normal limits  Appearance: Hospital attire, seated in chair, with good grooming and hygiene   Behavior/Motor: No abnormalities noted  Attitude toward examiner:  Cooperative, attentive, good eye contact  Speech:  spontaneous, normal rate, normal volume and well articulated  Mood: \"Better\"  Affect: Still somewhat flat  Thought processes: Tangential, redirectable with effort  Thought content:  denies homicidal ideation  Suicidal Ideation: Denies suicidal ideation  Delusions:  no evidence of delusions  Perceptual Disturbance: Endorses auditory hallucinations but manageable for patient at present time   cognition: Oriented to self and general circumstances  Memory: Impaired  Insight & Judgement: Limited  Medication side effects:  denies       Data   height is 6' 2\" (1.88 m) and weight is 190 lb (86.2 kg). His oral temperature is 98 °F (36.7 °C). His blood pressure is 100/64 and his pulse is 85. His respiration is 14. Labs:   No visits with results within 2 Day(s) from this visit.    Latest known visit with results is:   Admission on 11/03/2021, Discharged on 11/03/2021   Component Date Value Ref Range Status    WBC 11/03/2021 4.1  3.5 - 11.3 k/uL Final    RBC 11/03/2021 4.19* 4.21 - 5.77 m/uL Final    Hemoglobin 11/03/2021 11.9* 13.0 - 17.0 g/dL Final    Hematocrit 11/03/2021 38.9* 40.7 - 50.3 % Final    MCV 11/03/2021 92.8  82.6 - 102.9 fL Final    MCH 11/03/2021 28.4  25.2 - 33.5 pg Final    MCHC 11/03/2021 30.6  28.4 - 34.8 g/dL Final    RDW 11/03/2021 14.1  11.8 - 14.4 % Final    Platelets 75/61/8923 250  138 - 453 k/uL Final    MPV 11/03/2021 9.8  8.1 - 13.5 fL Final    NRBC Automated 11/03/2021 0.0  0.0 per 100 WBC Final    Differential Type 11/03/2021 NOT REPORTED   Final    Seg Neutrophils 11/03/2021 42  36 - 65 % Final    Lymphocytes 11/03/2021 47* 24 - 43 % Final    Monocytes 11/03/2021 8  3 - 12 % Final    Eosinophils % 11/03/2021 2  1 - 4 % Final    Basophils 11/03/2021 1  0 - 2 % Final    Immature Granulocytes 11/03/2021 0  0 % Final    Segs Absolute 11/03/2021 1.74  1.50 - 8.10 k/uL Final    Absolute Lymph # 11/03/2021 1.94  1.10 - 3.70 k/uL Final    Absolute Mono # 11/03/2021 0.32  0.10 - 1.20 k/uL Final    Absolute Eos # 11/03/2021 0.09  0.00 - 0.44 k/uL Final    Basophils Absolute 11/03/2021 <0.03  0.00 - 0.20 k/uL Final    Absolute Immature Granulocyte 11/03/2021 <0.03  0.00 - 0.30 k/uL Final    WBC Morphology 11/03/2021 NOT REPORTED   Final    RBC Morphology 11/03/2021 NOT REPORTED   Final    Platelet Estimate 95/23/7546 NOT REPORTED   Final    Glucose 11/03/2021 132* 70 - 99 mg/dL Final    BUN 11/03/2021 11  8 - 23 mg/dL Final    CREATININE 11/03/2021 1.06  0.70 - 1.20 mg/dL Final    Bun/Cre Ratio 11/03/2021 NOT REPORTED  9 - 20 Final    Calcium 11/03/2021 8.8  8.6 - 10.4 mg/dL Final    Sodium 11/03/2021 142  135 - 144 mmol/L Final    Potassium 11/03/2021 4.1  3.7 - 5.3 mmol/L Final    Chloride 11/03/2021 108* 98 - 107 mmol/L Final    CO2 11/03/2021 27  20 - 31 mmol/L Final    Anion Gap 11/03/2021 7* 9 - 17 mmol/L Final    Alkaline Phosphatase 11/03/2021 89  40 - 129 U/L Final    ALT 11/03/2021 12  5 - 41 U/L Final    AST 11/03/2021 16  <40 U/L Final    Total Bilirubin 11/03/2021 0.32  0.3 - 1.2 mg/dL Final    Total Protein 11/03/2021 6.0* 6.4 - 8.3 g/dL Final    Albumin 11/03/2021 3.6  3.5 - 5.2 g/dL Final    Albumin/Globulin Ratio 11/03/2021 1.5  1.0 - 2.5 Final    GFR Non- 11/03/2021 >60  >60 mL/min Final    GFR  11/03/2021 >60  >60 mL/min Final    GFR Comment 11/03/2021        Final    Comment: Average GFR for 61-76 years old:   80 mL/min/1.73sq m  Chronic Kidney Disease:   <60 mL/min/1.73sq m  Kidney failure:   <15 mL/min/1.73sq m              eGFR calculated using average adult body mass.  Additional eGFR calculator available at:        [employer]            GFR Staging 11/03/2021 NOT REPORTED   Final    Amphetamine Screen, Ur 11/03/2021 NEGATIVE  NEGATIVE Final    Comment:       (Positive cutoff 1000 ng/mL)                  Barbiturate Screen, Ur 11/03/2021 NEGATIVE  NEGATIVE Final    Comment:       (Positive cutoff 200 ng/mL)                  Benzodiazepine Screen, Urine 11/03/2021 NEGATIVE  NEGATIVE Final    Comment:       (Positive cutoff 200 ng/mL)                  Cocaine Metabolite, Urine 11/03/2021 POSITIVE* NEGATIVE Final    Comment:       (Positive cutoff 300 ng/mL)                  Methadone Screen, Urine 11/03/2021 NEGATIVE  NEGATIVE Final    Comment:       (Positive cutoff 300 ng/mL)                  Opiates, Urine 11/03/2021 NEGATIVE NEGATIVE Final    Comment:       (Positive cutoff 300 ng/mL)                  Phencyclidine, Urine 11/03/2021 NEGATIVE  NEGATIVE Final    Comment:       (Positive cutoff 25 ng/mL)                  Propoxyphene, Urine 11/03/2021 NOT REPORTED  NEGATIVE Final    Cannabinoid Scrn, Ur 11/03/2021 NEGATIVE  NEGATIVE Final    Comment:       (Positive cutoff 50 ng/mL)                  Oxycodone Screen, Ur 11/03/2021 NEGATIVE  NEGATIVE Final    Comment:       (Positive cutoff 100 ng/mL)                  Methamphetamine, Urine 11/03/2021 NOT REPORTED  NEGATIVE Final    Tricyclic Antidepressants, Urine 11/03/2021 NOT REPORTED  NEGATIVE Final    MDMA, Urine 11/03/2021 NOT REPORTED  NEGATIVE Final    Buprenorphine Urine 11/03/2021 NOT REPORTED  NEGATIVE Final    Test Information 11/03/2021 Assay provides medical screening only. The absence of expected drug(s) and/or metabolite(s) may indicate diluted or adulterated urine, limitations of testing or timing of collection. Final    Comment: Testing for legal purposes should be confirmed by another method. To request confirmation   of test result, please call the lab within 7 days of sample submission.  Acetaminophen Level 11/03/2021 <5* 10 - 30 ug/mL Final    Ethanol 11/03/2021 <10  <10 mg/dL Final    Ethanol percent 11/03/2021 <0.010  <1.657 % Final    Salicylate Lvl 62/24/2239 <1* 3 - 10 mg/dL Final    Toxic Tricyclic Sc,Blood 54/70/3041 NEGATIVE  NEGATIVE Final    Specimen Description 11/03/2021 . NASOPHARYNGEAL SWAB   Final    SARS-CoV-2, Rapid 11/03/2021 Not Detected  Not Detected Final    Comment:       Rapid NAAT:  The specimen is NEGATIVE for SARS-CoV-2, the novel coronavirus associated with   COVID-19. The ID NOW COVID-19 assay is designed to detect the virus that causes COVID-19 in patients   with signs and symptoms of infection who are suspected of COVID-19.   An individual without symptoms of COVID-19 and who is not shedding SARS-CoV-2 virus would   expect to have a negative (not detected) result in this assay. Negative results should be treated as presumptive and, if inconsistent with clinical signs   and symptoms or necessary for patient management,  should be tested with an alternative molecular assay. Negative results do not preclude   SARS-CoV-2 infection and   should not be used as the sole basis for patient management decisions. Fact sheet for Healthcare Providers: Iesha[employer]nav  Fact sheet for Patients: Iesha.nav          Methodology: Isothermal Nucleic Acid Amplification              Medications  Current Facility-Administered Medications: Vitamin D (CHOLECALCIFEROL) tablet 1,000 Units, 1,000 Units, Oral, Daily  escitalopram (LEXAPRO) tablet 15 mg, 15 mg, Oral, Daily  nicotine polacrilex (NICORETTE) gum 2 mg, 2 mg, Oral, Q1H PRN  QUEtiapine (SEROQUEL) tablet 400 mg, 400 mg, Oral, Nightly  acetaminophen (TYLENOL) tablet 650 mg, 650 mg, Oral, Q4H PRN  aluminum & magnesium hydroxide-simethicone (MAALOX) 200-200-20 MG/5ML suspension 30 mL, 30 mL, Oral, Q6H PRN  hydrOXYzine (ATARAX) tablet 50 mg, 50 mg, Oral, TID PRN  ibuprofen (ADVIL;MOTRIN) tablet 400 mg, 400 mg, Oral, Q6H PRN  traZODone (DESYREL) tablet 50 mg, 50 mg, Oral, Nightly PRN  polyethylene glycol (GLYCOLAX) packet 17 g, 17 g, Oral, Daily PRN  haloperidol (HALDOL) tablet 5 mg, 5 mg, Oral, Q4H PRN **AND** LORazepam (ATIVAN) tablet 2 mg, 2 mg, Oral, Q4H PRN  haloperidol lactate (HALDOL) injection 5 mg, 5 mg, IntraMUSCular, Q4H PRN **AND** LORazepam (ATIVAN) injection 2 mg, 2 mg, IntraMUSCular, Q4H PRN **AND** diphenhydrAMINE (BENADRYL) injection 50 mg, 50 mg, IntraMUSCular, Q4H PRN    ASSESSMENT  Schizoaffective disorder, depressive type (HCC)   Likely major neurocognitive impairment     PLAN  Patient s symptoms   are improving  Despite his improvement the patient is at high risk of repeating his cycle.   He needs a guardian

## 2021-11-09 NOTE — PLAN OF CARE
Problem: Altered Mood, Depressive Behavior:  Goal: Able to verbalize acceptance of life and situations over which he or she has no control  Description: Able to verbalize acceptance of life and situations over which he or she has no control  11/8/2021 2335 by Rocky Weiss RN  Outcome: Ongoing  Note: Patient is able to verbalize acceptance of life and situations at this time. PT is still hopeless/helpless.    11/8/2021 1255 by Robert Blanco RN  Outcome: Ongoing  Goal: Able to verbalize and/or display a decrease in depressive symptoms  Description: Able to verbalize and/or display a decrease in depressive symptoms  11/8/2021 2335 by Rocky Weiss RN  Outcome: Ongoing  11/8/2021 1255 by Robert Blanco RN  Outcome: Ongoing     Problem: Tobacco Use:  Goal: Inpatient tobacco use cessation counseling participation  Description: Inpatient tobacco use cessation counseling participation  11/8/2021 2335 by Rocky Weiss RN  Outcome: Ongoing  11/8/2021 1255 by Robert Blanco RN  Outcome: Ongoing

## 2021-11-09 NOTE — GROUP NOTE
Group Therapy Note    Date: 11/9/2021    Group Start Time: 1330  Group End Time: 1440  Group Topic: Cognitive Skills    [facility] CARMEN Antonio        Group Therapy Note    Attendees: 9/20         Patient's Goal:  Learning the benefits of journaling, self expression    Status After Intervention:  Improved    Participation Level:  Active Listener and Interactive    Participation Quality: Appropriate, Attentive, Sharing and Supportive      Speech:  normal      Thought Process/Content: Logical      Affective Functioning: Congruent      Mood: euphoric      Level of consciousness:  Alert, Oriented x4 and Attentive      Response to Learning: Able to verbalize current knowledge/experience, Able to verbalize/acknowledge new learning and Able to retain information     Modes of Intervention: Education, Support, Socialization and Exploration      Discipline Responsible: Psychoeducational Specialist      Signature:  Matthias Solis

## 2021-11-09 NOTE — CARE COORDINATION
YOGESH spoke with pt sister Marilyn Argueta, who confirmed that she has sent the necessary paperwork to [facility] with goal of obtaining guardianship, and ultimately assisting with ECF placement. Yogesh updated Marilyn Argueta on potential short term plan to assist in linking pt to hotel, YOGESH intern called several and discovered [facility] [address] [facility] can accept patient without photo ID if sister is willing to secure room, Marilyn Argueta states she is willing. YOGESH also informed Marilyn Argueta that there will need to be facilitation of payment to hotel [facility]. Mar[facility]n [facility] and voice her preferred outcome for patient and ask them to assist in getting pt hotel to promote pt being furnished a safe d/c plan.

## 2021-11-10 PROCEDURE — 6370000000 HC RX 637 (ALT 250 FOR IP): Performed by: PSYCHIATRY & NEUROLOGY

## 2021-11-10 PROCEDURE — 1240000000 HC EMOTIONAL WELLNESS R&B

## 2021-11-10 PROCEDURE — 99232 SBSQ HOSP IP/OBS MODERATE 35: CPT | Performed by: PSYCHIATRY & NEUROLOGY

## 2021-11-10 RX ADMIN — QUETIAPINE FUMARATE 400 MG: 200 TABLET ORAL at 21:08

## 2021-11-10 RX ADMIN — Medication 1000 UNITS: at 08:46

## 2021-11-10 RX ADMIN — ESCITALOPRAM OXALATE 15 MG: 10 TABLET ORAL at 08:46

## 2021-11-10 RX ADMIN — TRAZODONE HYDROCHLORIDE 50 MG: 50 TABLET ORAL at 21:08

## 2021-11-10 NOTE — GROUP NOTE
Group Therapy Note    Date: 11/10/2021    Group Start Time: 1000  Group End Time: 1474  Group Topic: Group Therapy    FRANKLIN Saleh, CHANEL        Group Therapy Note    Attendees: 8/22         Pt declined to attend psychotherapy at 1000 am despite encouragement. Pt offered 1:1 and refused.    Signature:  FRANKLIN Quesada, CHANEL

## 2021-11-10 NOTE — PROGRESS NOTES
Daily Progress Note  Leandro Moon MD  11/9/2021  CHIEF COMPLAINT: Psychosis    Reviewed patient's current plan of care and vital signs with nursing staff. Sleep:  several hours last night  Attending groups: No:     SUBJECTIVE:    Patient is denying side effects to medication. Seems more engaged in his discharge planning. Tells this Vidhya Kelsey that he secured disposition to [facility] recovery. Social work indicating that has declined the patient. Appreciate work from social work team with regards to securing alternative discharge plan. It is a difficult situation as patient is likely to bounce back however we have secured potential hotel room. We will follow up tomorrow. Patient is denying suicidal or homicidal ideation intent or plan    Mental Status Exam  Level of consciousness:  Within normal limits  Appearance: Hospital attire, seated in chair, with good grooming and hygiene   Behavior/Motor: No abnormalities noted  Attitude toward examiner:  Cooperative, attentive, good eye contact  Speech:  spontaneous, normal rate, normal volume and well articulated  Mood: \"Better\"  Affect: Fair  Thought processes:  linear, goal directed and coherent  Thought content:  denies homicidal ideation  Suicidal Ideation: Denies suicidal ideation  Delusions:  no evidence of delusions  Perceptual Disturbance:  denies any perceptual disturbance  Cognition:  Oriented to self in general circumstance   memory: Limited  Insight & Judgement: Limited  Medication side effects:  denies       Data   height is 6' 2\" (1.88 m) and weight is 190 lb (86.2 kg). His oral temperature is 97.8 °F (36.6 °C). His blood pressure is 117/73 and his pulse is 75. His respiration is 14. Labs:   No visits with results within 2 Day(s) from this visit.    Latest known visit with results is:   Admission on 11/03/2021, Discharged on 11/03/2021   Component Date Value Ref Range Status    WBC 11/03/2021 4.1  3.5 - 11.3 k/uL Final    RBC 11/03/2021 4.19* 4.21 - 5.77 m/uL Final    Hemoglobin 11/03/2021 11.9* 13.0 - 17.0 g/dL Final    Hematocrit 11/03/2021 38.9* 40.7 - 50.3 % Final    MCV 11/03/2021 92.8  82.6 - 102.9 fL Final    MCH 11/03/2021 28.4  25.2 - 33.5 pg Final    MCHC 11/03/2021 30.6  28.4 - 34.8 g/dL Final    RDW 11/03/2021 14.1  11.8 - 14.4 % Final    Platelets 23/62/3900 250  138 - 453 k/uL Final    MPV 11/03/2021 9.8  8.1 - 13.5 fL Final    NRBC Automated 11/03/2021 0.0  0.0 per 100 WBC Final    Differential Type 11/03/2021 NOT REPORTED   Final    Seg Neutrophils 11/03/2021 42  36 - 65 % Final    Lymphocytes 11/03/2021 47* 24 - 43 % Final    Monocytes 11/03/2021 8  3 - 12 % Final    Eosinophils % 11/03/2021 2  1 - 4 % Final    Basophils 11/03/2021 1  0 - 2 % Final    Immature Granulocytes 11/03/2021 0  0 % Final    Segs Absolute 11/03/2021 1.74  1.50 - 8.10 k/uL Final    Absolute Lymph # 11/03/2021 1.94  1.10 - 3.70 k/uL Final    Absolute Mono # 11/03/2021 0.32  0.10 - 1.20 k/uL Final    Absolute Eos # 11/03/2021 0.09  0.00 - 0.44 k/uL Final    Basophils Absolute 11/03/2021 <0.03  0.00 - 0.20 k/uL Final    Absolute Immature Granulocyte 11/03/2021 <0.03  0.00 - 0.30 k/uL Final    WBC Morphology 11/03/2021 NOT REPORTED   Final    RBC Morphology 11/03/2021 NOT REPORTED   Final    Platelet Estimate 56/50/9870 NOT REPORTED   Final    Glucose 11/03/2021 132* 70 - 99 mg/dL Final    BUN 11/03/2021 11  8 - 23 mg/dL Final    CREATININE 11/03/2021 1.06  0.70 - 1.20 mg/dL Final    Bun/Cre Ratio 11/03/2021 NOT REPORTED  9 - 20 Final    Calcium 11/03/2021 8.8  8.6 - 10.4 mg/dL Final    Sodium 11/03/2021 142  135 - 144 mmol/L Final    Potassium 11/03/2021 4.1  3.7 - 5.3 mmol/L Final    Chloride 11/03/2021 108* 98 - 107 mmol/L Final    CO2 11/03/2021 27  20 - 31 mmol/L Final    Anion Gap 11/03/2021 7* 9 - 17 mmol/L Final    Alkaline Phosphatase 11/03/2021 89  40 - 129 U/L Final    ALT 11/03/2021 12  5 - 41 U/L Final    AST 11/03/2021 16  <40 11/03/2021 NOT REPORTED  NEGATIVE Final    Tricyclic Antidepressants, Urine 11/03/2021 NOT REPORTED  NEGATIVE Final    MDMA, Urine 11/03/2021 NOT REPORTED  NEGATIVE Final    Buprenorphine Urine 11/03/2021 NOT REPORTED  NEGATIVE Final    Test Information 11/03/2021 Assay provides medical screening only. The absence of expected drug(s) and/or metabolite(s) may indicate diluted or adulterated urine, limitations of testing or timing of collection. Final    Comment: Testing for legal purposes should be confirmed by another method. To request confirmation   of test result, please call the lab within 7 days of sample submission.  Acetaminophen Level 11/03/2021 <5* 10 - 30 ug/mL Final    Ethanol 11/03/2021 <10  <10 mg/dL Final    Ethanol percent 11/03/2021 <0.010  <5.127 % Final    Salicylate Lvl 60/87/2123 <1* 3 - 10 mg/dL Final    Toxic Tricyclic Sc,Blood 08/38/4708 NEGATIVE  NEGATIVE Final    Specimen Description 11/03/2021 . NASOPHARYNGEAL SWAB   Final    SARS-CoV-2, Rapid 11/03/2021 Not Detected  Not Detected Final    Comment:       Rapid NAAT:  The specimen is NEGATIVE for SARS-CoV-2, the novel coronavirus associated with   COVID-19. The ID NOW COVID-19 assay is designed to detect the virus that causes COVID-19 in patients   with signs and symptoms of infection who are suspected of COVID-19. An individual without symptoms of COVID-19 and who is not shedding SARS-CoV-2 virus would   expect to have a negative (not detected) result in this assay. Negative results should be treated as presumptive and, if inconsistent with clinical signs   and symptoms or necessary for patient management,  should be tested with an alternative molecular assay. Negative results do not preclude   SARS-CoV-2 infection and   should not be used as the sole basis for patient management decisions.          Fact sheet for Healthcare Providers: Dionte[employer]fi  Fact sheet for Patients: Brandyn          Methodology: Isothermal Nucleic Acid Amplification              Medications  Current Facility-Administered Medications: Vitamin D (CHOLECALCIFEROL) tablet 1,000 Units, 1,000 Units, Oral, Daily  escitalopram (LEXAPRO) tablet 15 mg, 15 mg, Oral, Daily  nicotine polacrilex (NICORETTE) gum 2 mg, 2 mg, Oral, Q1H PRN  QUEtiapine (SEROQUEL) tablet 400 mg, 400 mg, Oral, Nightly  acetaminophen (TYLENOL) tablet 650 mg, 650 mg, Oral, Q4H PRN  aluminum & magnesium hydroxide-simethicone (MAALOX) 200-200-20 MG/5ML suspension 30 mL, 30 mL, Oral, Q6H PRN  hydrOXYzine (ATARAX) tablet 50 mg, 50 mg, Oral, TID PRN  ibuprofen (ADVIL;MOTRIN) tablet 400 mg, 400 mg, Oral, Q6H PRN  traZODone (DESYREL) tablet 50 mg, 50 mg, Oral, Nightly PRN  polyethylene glycol (GLYCOLAX) packet 17 g, 17 g, Oral, Daily PRN  haloperidol (HALDOL) tablet 5 mg, 5 mg, Oral, Q4H PRN **AND** LORazepam (ATIVAN) tablet 2 mg, 2 mg, Oral, Q4H PRN  haloperidol lactate (HALDOL) injection 5 mg, 5 mg, IntraMUSCular, Q4H PRN **AND** LORazepam (ATIVAN) injection 2 mg, 2 mg, IntraMUSCular, Q4H PRN **AND** diphenhydrAMINE (BENADRYL) injection 50 mg, 50 mg, IntraMUSCular, Q4H PRN    ASSESSMENT  Schizoaffective disorder, depressive type (HCC)  Likely major neurocognitive impairment  PLAN  Patient s symptoms   are improving  Continue with current medication for now  Attempt to develop insight  Psycho-education conducted. Supportive Therapy conducted. Probable discharge is pending results of insurance review  Follow-up daily while in the inpatient unit      Electronically signed by Jasmyn Farrar MD on 11/9/21 at 8:15 PM EST    **This report has been created using voice recognition software. It may contain minor errors which are inherent in voice recognition technology. **

## 2021-11-10 NOTE — GROUP NOTE
Group Therapy Note    Date: 11/10/2021    Group Start Time: 1330  Group End Time: 1430  Group Topic: Cognitive Skills    [address], CARMEN        Group Therapy Note    Attendees: 8/20      patient refused to attend self esteem boosters group at [phone] after encouragement from staff. 1:1 talk time provided as alternative to group session.

## 2021-11-10 NOTE — GROUP NOTE
Group Therapy Note    Date: 11/10/2021    Group Start Time: 1100  Group End Time: 9814  Group Topic: Recreational    [address], [address]        Group Therapy Note    Attendees: 12/22    patient refused to attend communication skills group at [phone] after encouragement from staff. 1:1 talk time provided as alternative to group session.

## 2021-11-10 NOTE — GROUP NOTE
Group Therapy Note    Date: 11/9/2021    Group Start Time: 2005  Group End Time: 2035  Group Topic: Wrap-Up    JESUS Wills        Group Therapy Note    Attendees: 7/14             Status After Intervention:  Improved    Participation Level:  Active Listener    Participation Quality: Appropriate      Speech:  normal      Thought Process/Content: Logical      Affective Functioning: Congruent      Mood: elevated      Level of consciousness:  Alert      Response to Learning: Able to verbalize current knowledge/experience      Endings: None Reported    Modes of Intervention: Support and Socialization      Discipline Responsible: Behavorial Health Tech      Signature:  Nils Du [facility] Matilde

## 2021-11-10 NOTE — PLAN OF CARE
Problem: Altered Mood, Depressive Behavior:  Goal: Able to verbalize acceptance of life and situations over which he or she has no control  Description: Able to verbalize acceptance of life and situations over which he or she has no control  11/10/2021 1011 by Fabi Doan LPN  Outcome: Ongoing    Goal: Able to verbalize and/or display a decrease in depressive symptoms  Description: Able to verbalize and/or display a decrease in depressive symptoms  11/10/2021 1011 by Fabi Doan LPN  Outcome: Ongoing  Patient denies all at this time and is looking forward to discharge soon. Patient denies suicidal ideation or thoughts of self-harm. Compliant with medications and cooperative with care. Problem: Tobacco Use:  Goal: Inpatient tobacco use cessation counseling participation  Description: Inpatient tobacco use cessation counseling participation  Outcome: Ongoing   Patient declines education on smoking cessation.

## 2021-11-10 NOTE — CARE COORDINATION
Call from [place] Kaye with [employer] [facility], inquiring if staff have gotten a response from the ACT team. Reiterated the disposition plan to Marina Kaye and the need for community assistance from the FACT team. Marina Kaye states they will attempt to reach the payee again tomorrow via email or in person and get back with social work.

## 2021-11-10 NOTE — CARE COORDINATION
Spoke with [place] with [facility], explained current discharge plan again and the need for community assistance in getting funds from [facility] to secure motel. [facility] [place] states an email has been sent to [facility], she will check with rest of team to see if they have received a response and call writer back.

## 2021-11-10 NOTE — PLAN OF CARE
Problem: Altered Mood, Depressive Behavior:  Goal: Able to verbalize acceptance of life and situations over which he or she has no control  Description: Able to verbalize acceptance of life and situations over which he or she has no control  11/9/2021 2330 by Elaine Pickett LPN  Outcome: Ongoing     Patient stated \" I feel better and will be leaving soon\". Problem: Altered Mood, Depressive Behavior:  Goal: Able to verbalize and/or display a decrease in depressive symptoms  Description: Able to verbalize and/or display a decrease in depressive symptoms  11/9/2021 2330 by Elaine Pickett LPN  Outcome: Ongoing    Patient denies being depressed at this time.

## 2021-11-11 PROCEDURE — 6370000000 HC RX 637 (ALT 250 FOR IP): Performed by: PSYCHIATRY & NEUROLOGY

## 2021-11-11 PROCEDURE — 1240000000 HC EMOTIONAL WELLNESS R&B

## 2021-11-11 PROCEDURE — 99232 SBSQ HOSP IP/OBS MODERATE 35: CPT | Performed by: PSYCHIATRY & NEUROLOGY

## 2021-11-11 RX ORDER — ESCITALOPRAM OXALATE 5 MG/1
15 TABLET ORAL DAILY
Qty: 90 TABLET | Refills: 0 | Status: ON HOLD | OUTPATIENT
Start: 2021-11-11 | End: 2021-12-23 | Stop reason: HOSPADM

## 2021-11-11 RX ORDER — CHOLECALCIFEROL (VITAMIN D3) 25 MCG
1000 TABLET ORAL DAILY
Qty: 30 TABLET | Refills: 0 | Status: ON HOLD | OUTPATIENT
Start: 2021-11-11 | End: 2021-12-23 | Stop reason: SDUPTHER

## 2021-11-11 RX ORDER — TRAZODONE HYDROCHLORIDE 50 MG/1
50 TABLET ORAL NIGHTLY PRN
Qty: 30 TABLET | Refills: 0 | Status: ON HOLD | OUTPATIENT
Start: 2021-11-11 | End: 2021-12-23 | Stop reason: HOSPADM

## 2021-11-11 RX ORDER — QUETIAPINE FUMARATE 400 MG/1
400 TABLET, FILM COATED ORAL NIGHTLY
Qty: 30 TABLET | Refills: 0 | Status: ON HOLD | OUTPATIENT
Start: 2021-11-11 | End: 2021-12-23 | Stop reason: HOSPADM

## 2021-11-11 RX ORDER — HYDROXYZINE 50 MG/1
50 TABLET, FILM COATED ORAL 3 TIMES DAILY PRN
Qty: 30 TABLET | Refills: 0 | Status: SHIPPED | OUTPATIENT
Start: 2021-11-11 | End: 2021-11-21

## 2021-11-11 RX ADMIN — Medication 1000 UNITS: at 08:38

## 2021-11-11 RX ADMIN — TRAZODONE HYDROCHLORIDE 50 MG: 50 TABLET ORAL at 22:37

## 2021-11-11 RX ADMIN — ESCITALOPRAM OXALATE 15 MG: 10 TABLET ORAL at 08:38

## 2021-11-11 RX ADMIN — QUETIAPINE FUMARATE 400 MG: 200 TABLET ORAL at 22:37

## 2021-11-11 ASSESSMENT — PAIN SCALES - GENERAL: PAINLEVEL_OUTOF10: 0

## 2021-11-11 NOTE — GROUP NOTE
Group Therapy Note    Date: 11/11/2021    Group Start Time: 1100  Group End Time: 9401  Group Topic: Psychoeducation    JESUS Mora      Patient declined to attend social skills group at 1100 despite encouragement from staff. 1:1 talk time offered by staff as alternative to group session.         Signature:  Syd Renner

## 2021-11-11 NOTE — PLAN OF CARE
Problem: Altered Mood, Depressive Behavior:  Goal: Able to verbalize acceptance of life and situations over which he or she has no control  Description: Able to verbalize acceptance of life and situations over which he or she has no control  11/10/2021 2209 by Laci Jackson LPN  Outcome: Ongoing     Patient was able to verbalize that he is not in a depressive state. He is worried about when he will be discharged. Problem: Altered Mood, Depressive Behavior:  Goal: Able to verbalize and/or display a decrease in depressive symptoms  Description: Able to verbalize and/or display a decrease in depressive symptoms  11/10/2021 2209 by Laci Jackson LPN  Outcome: Ongoing    Patient mood was pleasant and he denied being depressed. I will continue to monitor him for any changes.

## 2021-11-11 NOTE — CARE COORDINATION
SW received call from [facility] [place] team Tracey Cormier and Jerry Radford regarding placement of pt. The FACT team would like to place the pt at [facility] and have concerns about pt going to a motel. The FACT team reports being unable to contact [facility] due to them being closed for [place] Day.

## 2021-11-11 NOTE — PLAN OF CARE
Problem: Altered Mood, Depressive Behavior:  Goal: Able to verbalize acceptance of life and situations over which he or she has no control  Description: Able to verbalize acceptance of life and situations over which he or she has no control  11/11/2021 1018 by CARMEN Smith  Outcome: Ongoing  Note: Able to verbalize acceptance of life and situations over which he or she has no control. Problem: Altered Mood, Depressive Behavior:  Goal: Able to verbalize and/or display a decrease in depressive symptoms  Description: Able to verbalize and/or display a decrease in depressive symptoms  11/11/2021 1018 by CARMEN Smith  Outcome: Ongoing  Note: Patient denies suicidal and homicidal ideation at this time. Patient endorses auditory hallucinations at this time but states they have improved and are absent at this time. Patient denies thoughts of self-harm. Compliant with medications and cooperative with care.

## 2021-11-11 NOTE — PROGRESS NOTES
Daily Progress Note  Andra Sánchez MD  11/10/2021  CHIEF COMPLAINT: Psychosis    Reviewed patient's current plan of care and vital signs with nursing staff. Sleep:  several hours last night  Attending groups: No:     SUBJECTIVE:    Patient is denying side effects to medication. Continues to be more engaged. Denying suicidal or homicidal ideation intent or plan. Denying side effects to medication. Feels safe with potential disposition plan to hotel if we can secure money from payee. Right now coordinating and connecting with those who control this individual's money seems to be a limiting factor. Minimal auditory hallucinations. Patient states very well controlled and he can manage the level of pterygoid. Mental Status Exam  Level of consciousness:  Within normal limits  Appearance: Hospital attire, seated in chair, with good grooming and hygiene   Behavior/Motor: No abnormalities noted  Attitude toward examiner:  Cooperative, attentive, good eye contact  Speech:  spontaneous, normal rate, normal volume and well articulated  Mood: \"Better\"  Affect: Fair  Thought processes:  linear, goal directed and coherent  Thought content:  denies homicidal ideation  Suicidal Ideation: Denies suicidal ideation  Delusions:  no evidence of delusions  Perceptual Disturbance: Endorses minimal voices which are not distressing  Cognition:  Oriented to self in general circumstance   memory: Limited  Insight & Judgement: Limited  Medication side effects:  denies       Data   height is 6' 2\" (1.88 m) and weight is 190 lb (86.2 kg). His oral temperature is 97.4 °F (36.3 °C). His blood pressure is 106/68 and his pulse is 87. His respiration is 13 and oxygen saturation is 100%. Labs:   No visits with results within 2 Day(s) from this visit.    Latest known visit with results is:   Admission on 11/03/2021, Discharged on 11/03/2021   Component Date Value Ref Range Status    WBC 11/03/2021 4.1  3.5 - 11.3 k/uL Final    RBC 11/03/2021 4.19* 4.21 - 5.77 m/uL Final    Hemoglobin 11/03/2021 11.9* 13.0 - 17.0 g/dL Final    Hematocrit 11/03/2021 38.9* 40.7 - 50.3 % Final    MCV 11/03/2021 92.8  82.6 - 102.9 fL Final    MCH 11/03/2021 28.4  25.2 - 33.5 pg Final    MCHC 11/03/2021 30.6  28.4 - 34.8 g/dL Final    RDW 11/03/2021 14.1  11.8 - 14.4 % Final    Platelets 33/44/1477 250  138 - 453 k/uL Final    MPV 11/03/2021 9.8  8.1 - 13.5 fL Final    NRBC Automated 11/03/2021 0.0  0.0 per 100 WBC Final    Differential Type 11/03/2021 NOT REPORTED   Final    Seg Neutrophils 11/03/2021 42  36 - 65 % Final    Lymphocytes 11/03/2021 47* 24 - 43 % Final    Monocytes 11/03/2021 8  3 - 12 % Final    Eosinophils % 11/03/2021 2  1 - 4 % Final    Basophils 11/03/2021 1  0 - 2 % Final    Immature Granulocytes 11/03/2021 0  0 % Final    Segs Absolute 11/03/2021 1.74  1.50 - 8.10 k/uL Final    Absolute Lymph # 11/03/2021 1.94  1.10 - 3.70 k/uL Final    Absolute Mono # 11/03/2021 0.32  0.10 - 1.20 k/uL Final    Absolute Eos # 11/03/2021 0.09  0.00 - 0.44 k/uL Final    Basophils Absolute 11/03/2021 <0.03  0.00 - 0.20 k/uL Final    Absolute Immature Granulocyte 11/03/2021 <0.03  0.00 - 0.30 k/uL Final    WBC Morphology 11/03/2021 NOT REPORTED   Final    RBC Morphology 11/03/2021 NOT REPORTED   Final    Platelet Estimate 45/78/3109 NOT REPORTED   Final    Glucose 11/03/2021 132* 70 - 99 mg/dL Final    BUN 11/03/2021 11  8 - 23 mg/dL Final    CREATININE 11/03/2021 1.06  0.70 - 1.20 mg/dL Final    Bun/Cre Ratio 11/03/2021 NOT REPORTED  9 - 20 Final    Calcium 11/03/2021 8.8  8.6 - 10.4 mg/dL Final    Sodium 11/03/2021 142  135 - 144 mmol/L Final    Potassium 11/03/2021 4.1  3.7 - 5.3 mmol/L Final    Chloride 11/03/2021 108* 98 - 107 mmol/L Final    CO2 11/03/2021 27  20 - 31 mmol/L Final    Anion Gap 11/03/2021 7* 9 - 17 mmol/L Final    Alkaline Phosphatase 11/03/2021 89  40 - 129 U/L Final    ALT 11/03/2021 12  5 - 41 U/L Final  AST 11/03/2021 16  <40 U/L Final    Total Bilirubin 11/03/2021 0.32  0.3 - 1.2 mg/dL Final    Total Protein 11/03/2021 6.0* 6.4 - 8.3 g/dL Final    Albumin 11/03/2021 3.6  3.5 - 5.2 g/dL Final    Albumin/Globulin Ratio 11/03/2021 1.5  1.0 - 2.5 Final    GFR Non- 11/03/2021 >60  >60 mL/min Final    GFR  11/03/2021 >60  >60 mL/min Final    GFR Comment 11/03/2021        Final    Comment: Average GFR for 61-76 years old:   80 mL/min/1.73sq m  Chronic Kidney Disease:   <60 mL/min/1.73sq m  Kidney failure:   <15 mL/min/1.73sq m              eGFR calculated using average adult body mass.  Additional eGFR calculator available at:        PT [employer]            GFR Staging 11/03/2021 NOT REPORTED   Final    Amphetamine Screen, Ur 11/03/2021 NEGATIVE  NEGATIVE Final    Comment:       (Positive cutoff 1000 ng/mL)                  Barbiturate Screen, Ur 11/03/2021 NEGATIVE  NEGATIVE Final    Comment:       (Positive cutoff 200 ng/mL)                  Benzodiazepine Screen, Urine 11/03/2021 NEGATIVE  NEGATIVE Final    Comment:       (Positive cutoff 200 ng/mL)                  Cocaine Metabolite, Urine 11/03/2021 POSITIVE* NEGATIVE Final    Comment:       (Positive cutoff 300 ng/mL)                  Methadone Screen, Urine 11/03/2021 NEGATIVE  NEGATIVE Final    Comment:       (Positive cutoff 300 ng/mL)                  Opiates, Urine 11/03/2021 NEGATIVE  NEGATIVE Final    Comment:       (Positive cutoff 300 ng/mL)                  Phencyclidine, Urine 11/03/2021 NEGATIVE  NEGATIVE Final    Comment:       (Positive cutoff 25 ng/mL)                  Propoxyphene, Urine 11/03/2021 NOT REPORTED  NEGATIVE Final    Cannabinoid Scrn, Ur 11/03/2021 NEGATIVE  NEGATIVE Final    Comment:       (Positive cutoff 50 ng/mL)                  Oxycodone Screen, Ur 11/03/2021 NEGATIVE  NEGATIVE Final    Comment:       (Positive cutoff 100 ng/mL)  Methamphetamine, Urine 11/03/2021 NOT REPORTED  NEGATIVE Final    Tricyclic Antidepressants, Urine 11/03/2021 NOT REPORTED  NEGATIVE Final    MDMA, Urine 11/03/2021 NOT REPORTED  NEGATIVE Final    Buprenorphine Urine 11/03/2021 NOT REPORTED  NEGATIVE Final    Test Information 11/03/2021 Assay provides medical screening only. The absence of expected drug(s) and/or metabolite(s) may indicate diluted or adulterated urine, limitations of testing or timing of collection. Final    Comment: Testing for legal purposes should be confirmed by another method. To request confirmation   of test result, please call the lab within 7 days of sample submission.  Acetaminophen Level 11/03/2021 <5* 10 - 30 ug/mL Final    Ethanol 11/03/2021 <10  <10 mg/dL Final    Ethanol percent 11/03/2021 <0.010  <1.103 % Final    Salicylate Lvl 71/89/7264 <1* 3 - 10 mg/dL Final    Toxic Tricyclic Sc,Blood 90/24/8968 NEGATIVE  NEGATIVE Final    Specimen Description 11/03/2021 . NASOPHARYNGEAL SWAB   Final    SARS-CoV-2, Rapid 11/03/2021 Not Detected  Not Detected Final    Comment:       Rapid NAAT:  The specimen is NEGATIVE for SARS-CoV-2, the novel coronavirus associated with   COVID-19. The ID NOW COVID-19 assay is designed to detect the virus that causes COVID-19 in patients   with signs and symptoms of infection who are suspected of COVID-19. An individual without symptoms of COVID-19 and who is not shedding SARS-CoV-2 virus would   expect to have a negative (not detected) result in this assay. Negative results should be treated as presumptive and, if inconsistent with clinical signs   and symptoms or necessary for patient management,  should be tested with an alternative molecular assay. Negative results do not preclude   SARS-CoV-2 infection and   should not be used as the sole basis for patient management decisions.          Fact sheet for Healthcare Providers: [employer]  Fact sheet

## 2021-11-11 NOTE — PLAN OF CARE
[facility] Interdisciplinary Treatment Plan Note     Review Date & Time: 11/11/2021 1307    Admission Type:   Admission Type: Voluntary    Reason for admission:  Reason for Admission: Suicidal ideations without plan, hearing voices telling him to \"get out of the house\" and to [employer] myself\"Pt reports suicidal ideations without plan,    Estimated Length of Stay Update:  Est 3-7 days, to be determined by physician  Estimated Discharge Date Update: to be determined by physician    PATIENT STRENGTHS:  Patient Strengths:Strengths: Social Skills, Positive Support  Patient Strengths and Limitations:Limitations: Inappropriate/potentially harmful leisure interests, Difficulty problem solving/relies on others to help solve problems, Perceives need for assistance with self-care  Addictive Behavior:Addictive Behavior  In the past 3 months, have you felt or has someone told you that you have a problem with:  : None  Do you have a history of Chemical Use?: No  Do you have a history of Alcohol Use?: No  Do you have a history of Street Drug Abuse?: Yes  Histroy of Prescripton Drug Abuse?: No  Medical Problems:   Past Medical History:   Diagnosis Date    Bipolar disorder ([facility] [address]     Depression     GERD (gastroesophageal reflux disease)     Hallucinations     Headache(784.0)     Hepatitis     Schizophrenia, schizo-affective ([facility] [address]     Substance abuse ([facility] [address]     Tobacco abuse     Type II or unspecified type diabetes mellitus without mention of complication, not stated as uncontrolled     Urinary incontinence        Risk:  Fall RiskTotal: 75  Dusty Scale Dusty Scale Score: 22  BVC    Change in scores  NA .  Changes to plan of Care   NA     Status EXAM:   Status and Exam  Normal: No  Facial Expression: Avoids Gaze  Affect: Appropriate, Blunt  Level of Consciousness: Alert  Mood:Normal: No  Mood: Depressed  Motor Activity:Normal: No  Motor Activity: Decreased  Interview Behavior: Cooperative  Preception: [place] to GOALS:     Patient stated short term goals including \"taking medications, find somewhere to go following discharge\"     Patient stated long term goals including \"stay sober/drug free, stay on medications, get along with sister who will be guardian\"     PLAN/TREATMENT RECOMMENDATIONS UPDATE:   11 Jason Duran, GOAL SETTING    SHORT-TERM GOALS UPDATE:  Time frame for Short-Term Goals: 1-2 WEEKS     LONG-TERM GOALS UPDATE:  Time frame for Long-Term Goals: 6 MONTHS  Members Present in Team Meeting: See Signature Sheet    Maxwell Majano

## 2021-11-11 NOTE — GROUP NOTE
Group Therapy Note    Date: 11/11/2021    Group Start Time: 1330  Group End Time: 1430  Group Topic: Psychoeducation    JESUS BHNATHALIE Mora    Patient declined to attend self reflection group at 1330 despite encouragement from staff. 1:1 talk time offered by staff as alternative to group session.         Signature:  William Griffin

## 2021-11-11 NOTE — PROGRESS NOTES
Behavioral Services                                              Medicare Re-Certification    I certify that the inpatient psychiatric hospital services furnished since the previous certification/re-certification were, and continue to be, medically necessary for;    [x] (1) Treatment which could reasonably be expected to improve the patient's condition,    [x] (2) Or for diagnostic study. Estimated length of stay/service 1 to 2 days    Plan for post-hospital care hot with act team follow-up    This patient continues to need, on a daily basis, active treatment furnished directly by or requiring the supervision of inpatient psychiatric personnel.     Electronically signed by Fay Wills MD on 11/11/2021 at 1:41 PM

## 2021-11-11 NOTE — GROUP NOTE
Group Therapy Note    Date: 11/11/2021    Group Start Time: 1000  Group End Time: 7601  Group Topic: Psychotherapy    FRANKLIN Glover LSW        Group Therapy Note    Attendees: 11/20       Patient refused to attend psychotherapy group at 10:00 am after encouragement from staff.   1:1 talk time provided as alternative to group session        Signature:  FRANKLIN Dorsey LSW

## 2021-11-12 VITALS
TEMPERATURE: 98.9 F | RESPIRATION RATE: 14 BRPM | OXYGEN SATURATION: 100 % | BODY MASS INDEX: 24.38 KG/M2 | HEART RATE: 60 BPM | DIASTOLIC BLOOD PRESSURE: 60 MMHG | SYSTOLIC BLOOD PRESSURE: 101 MMHG | HEIGHT: 74 IN | WEIGHT: 190 LBS

## 2021-11-12 PROCEDURE — 6370000000 HC RX 637 (ALT 250 FOR IP): Performed by: PSYCHIATRY & NEUROLOGY

## 2021-11-12 PROCEDURE — 99239 HOSP IP/OBS DSCHRG MGMT >30: CPT | Performed by: PSYCHIATRY & NEUROLOGY

## 2021-11-12 RX ADMIN — ESCITALOPRAM OXALATE 15 MG: 10 TABLET ORAL at 08:45

## 2021-11-12 RX ADMIN — Medication 1000 UNITS: at 08:45

## 2021-11-12 NOTE — PROGRESS NOTES
Daily Progress Note  Ariana Renner MD  11/11/2021  CHIEF COMPLAINT: Psychosis    Reviewed patient's current plan of care and vital signs with nursing staff. Sleep:  several hours last night  Attending groups: No:     SUBJECTIVE:    Patient is denying side effects to medication. Continues to be more engaged. Denying suicidal or homicidal ideation intent or plan. Denying side effects to medication. Feels safe with potential disposition plan to hotel if we can secure money from payee. Further development of discharge plan today with social workers. Patient expresses understanding. Minimal auditory hallucinations. Patient states very well controlled and he can manage the level of hallucinations without concern  Mental Status Exam  Level of consciousness:  Within normal limits  Appearance: Hospital attire, seated in chair, with good grooming and hygiene   Behavior/Motor: No abnormalities noted  Attitude toward examiner:  Cooperative, attentive, good eye contact  Speech:  spontaneous, normal rate, normal volume and well articulated  Mood: \"Good\"  Affect: Fair  Thought processes:  linear, goal directed and coherent  Thought content:  denies homicidal ideation  Suicidal Ideation: Denies suicidal ideation  Delusions:  no evidence of delusions  Perceptual Disturbance: Endorses minimal voices which are not distressing  Cognition:  Oriented to self in general circumstance   memory: Limited  Insight & Judgement: Limited  Medication side effects:  denies       Data   height is 6' 2\" (1.88 m) and weight is 190 lb (86.2 kg). His temporal temperature is 97.3 °F (36.3 °C). His blood pressure is 102/55 (abnormal) and his pulse is 62. His respiration is 14 and oxygen saturation is 100%. Labs:   No visits with results within 2 Day(s) from this visit.    Latest known visit with results is:   Admission on 11/03/2021, Discharged on 11/03/2021   Component Date Value Ref Range Status    WBC 11/03/2021 4.1  3.5 - 11.3 k/uL Final  RBC 11/03/2021 4.19* 4.21 - 5.77 m/uL Final    Hemoglobin 11/03/2021 11.9* 13.0 - 17.0 g/dL Final    Hematocrit 11/03/2021 38.9* 40.7 - 50.3 % Final    MCV 11/03/2021 92.8  82.6 - 102.9 fL Final    MCH 11/03/2021 28.4  25.2 - 33.5 pg Final    MCHC 11/03/2021 30.6  28.4 - 34.8 g/dL Final    RDW 11/03/2021 14.1  11.8 - 14.4 % Final    Platelets 18/19/7436 250  138 - 453 k/uL Final    MPV 11/03/2021 9.8  8.1 - 13.5 fL Final    NRBC Automated 11/03/2021 0.0  0.0 per 100 WBC Final    Differential Type 11/03/2021 NOT REPORTED   Final    Seg Neutrophils 11/03/2021 42  36 - 65 % Final    Lymphocytes 11/03/2021 47* 24 - 43 % Final    Monocytes 11/03/2021 8  3 - 12 % Final    Eosinophils % 11/03/2021 2  1 - 4 % Final    Basophils 11/03/2021 1  0 - 2 % Final    Immature Granulocytes 11/03/2021 0  0 % Final    Segs Absolute 11/03/2021 1.74  1.50 - 8.10 k/uL Final    Absolute Lymph # 11/03/2021 1.94  1.10 - 3.70 k/uL Final    Absolute Mono # 11/03/2021 0.32  0.10 - 1.20 k/uL Final    Absolute Eos # 11/03/2021 0.09  0.00 - 0.44 k/uL Final    Basophils Absolute 11/03/2021 <0.03  0.00 - 0.20 k/uL Final    Absolute Immature Granulocyte 11/03/2021 <0.03  0.00 - 0.30 k/uL Final    WBC Morphology 11/03/2021 NOT REPORTED   Final    RBC Morphology 11/03/2021 NOT REPORTED   Final    Platelet Estimate 85/14/0349 NOT REPORTED   Final    Glucose 11/03/2021 132* 70 - 99 mg/dL Final    BUN 11/03/2021 11  8 - 23 mg/dL Final    CREATININE 11/03/2021 1.06  0.70 - 1.20 mg/dL Final    Bun/Cre Ratio 11/03/2021 NOT REPORTED  9 - 20 Final    Calcium 11/03/2021 8.8  8.6 - 10.4 mg/dL Final    Sodium 11/03/2021 142  135 - 144 mmol/L Final    Potassium 11/03/2021 4.1  3.7 - 5.3 mmol/L Final    Chloride 11/03/2021 108* 98 - 107 mmol/L Final    CO2 11/03/2021 27  20 - 31 mmol/L Final    Anion Gap 11/03/2021 7* 9 - 17 mmol/L Final    Alkaline Phosphatase 11/03/2021 89  40 - 129 U/L Final    ALT 11/03/2021 12  5 - 41 U/L Final    AST 11/03/2021 16  <40 U/L Final    Total Bilirubin 11/03/2021 0.32  0.3 - 1.2 mg/dL Final    Total Protein 11/03/2021 6.0* 6.4 - 8.3 g/dL Final    Albumin 11/03/2021 3.6  3.5 - 5.2 g/dL Final    Albumin/Globulin Ratio 11/03/2021 1.5  1.0 - 2.5 Final    GFR Non- 11/03/2021 >60  >60 mL/min Final    GFR  11/03/2021 >60  >60 mL/min Final    GFR Comment 11/03/2021        Final    Comment: Average GFR for 61-76 years old:   80 mL/min/1.73sq m  Chronic Kidney Disease:   <60 mL/min/1.73sq m  Kidney failure:   <15 mL/min/1.73sq m              eGFR calculated using average adult body mass.  Additional eGFR calculator available at:        [employer]            GFR Staging 11/03/2021 NOT REPORTED   Final    Amphetamine Screen, Ur 11/03/2021 NEGATIVE  NEGATIVE Final    Comment:       (Positive cutoff 1000 ng/mL)                  Barbiturate Screen, Ur 11/03/2021 NEGATIVE  NEGATIVE Final    Comment:       (Positive cutoff 200 ng/mL)                  Benzodiazepine Screen, Urine 11/03/2021 NEGATIVE  NEGATIVE Final    Comment:       (Positive cutoff 200 ng/mL)                  Cocaine Metabolite, Urine 11/03/2021 POSITIVE* NEGATIVE Final    Comment:       (Positive cutoff 300 ng/mL)                  Methadone Screen, Urine 11/03/2021 NEGATIVE  NEGATIVE Final    Comment:       (Positive cutoff 300 ng/mL)                  Opiates, Urine 11/03/2021 NEGATIVE  NEGATIVE Final    Comment:       (Positive cutoff 300 ng/mL)                  Phencyclidine, Urine 11/03/2021 NEGATIVE  NEGATIVE Final    Comment:       (Positive cutoff 25 ng/mL)                  Propoxyphene, Urine 11/03/2021 NOT REPORTED  NEGATIVE Final    Cannabinoid Scrn, Ur 11/03/2021 NEGATIVE  NEGATIVE Final    Comment:       (Positive cutoff 50 ng/mL)                  Oxycodone Screen, Ur 11/03/2021 NEGATIVE  NEGATIVE Final    Comment:       (Positive cutoff 100 ng/mL)  Methamphetamine, Urine 11/03/2021 NOT REPORTED  NEGATIVE Final    Tricyclic Antidepressants, Urine 11/03/2021 NOT REPORTED  NEGATIVE Final    MDMA, Urine 11/03/2021 NOT REPORTED  NEGATIVE Final    Buprenorphine Urine 11/03/2021 NOT REPORTED  NEGATIVE Final    Test Information 11/03/2021 Assay provides medical screening only. The absence of expected drug(s) and/or metabolite(s) may indicate diluted or adulterated urine, limitations of testing or timing of collection. Final    Comment: Testing for legal purposes should be confirmed by another method. To request confirmation   of test result, please call the lab within 7 days of sample submission.  Acetaminophen Level 11/03/2021 <5* 10 - 30 ug/mL Final    Ethanol 11/03/2021 <10  <10 mg/dL Final    Ethanol percent 11/03/2021 <0.010  <5.985 % Final    Salicylate Lvl 89/52/5403 <1* 3 - 10 mg/dL Final    Toxic Tricyclic Sc,Blood 02/04/6647 NEGATIVE  NEGATIVE Final    Specimen Description 11/03/2021 . NASOPHARYNGEAL SWAB   Final    SARS-CoV-2, Rapid 11/03/2021 Not Detected  Not Detected Final    Comment:       Rapid NAAT:  The specimen is NEGATIVE for SARS-CoV-2, the novel coronavirus associated with   COVID-19. The ID NOW COVID-19 assay is designed to detect the virus that causes COVID-19 in patients   with signs and symptoms of infection who are suspected of COVID-19. An individual without symptoms of COVID-19 and who is not shedding SARS-CoV-2 virus would   expect to have a negative (not detected) result in this assay. Negative results should be treated as presumptive and, if inconsistent with clinical signs   and symptoms or necessary for patient management,  should be tested with an alternative molecular assay. Negative results do not preclude   SARS-CoV-2 infection and   should not be used as the sole basis for patient management decisions.          Fact sheet for Healthcare Providers: Brandyn  Fact sheet for Patients: Iesha[employer]nav          Methodology: Isothermal Nucleic Acid Amplification              Medications  Current Facility-Administered Medications: Vitamin D (CHOLECALCIFEROL) tablet 1,000 Units, 1,000 Units, Oral, Daily  escitalopram (LEXAPRO) tablet 15 mg, 15 mg, Oral, Daily  nicotine polacrilex (NICORETTE) gum 2 mg, 2 mg, Oral, Q1H PRN  QUEtiapine (SEROQUEL) tablet 400 mg, 400 mg, Oral, Nightly  acetaminophen (TYLENOL) tablet 650 mg, 650 mg, Oral, Q4H PRN  aluminum & magnesium hydroxide-simethicone (MAALOX) 200-200-20 MG/5ML suspension 30 mL, 30 mL, Oral, Q6H PRN  hydrOXYzine (ATARAX) tablet 50 mg, 50 mg, Oral, TID PRN  ibuprofen (ADVIL;MOTRIN) tablet 400 mg, 400 mg, Oral, Q6H PRN  traZODone (DESYREL) tablet 50 mg, 50 mg, Oral, Nightly PRN  polyethylene glycol (GLYCOLAX) packet 17 g, 17 g, Oral, Daily PRN  haloperidol (HALDOL) tablet 5 mg, 5 mg, Oral, Q4H PRN **AND** LORazepam (ATIVAN) tablet 2 mg, 2 mg, Oral, Q4H PRN  haloperidol lactate (HALDOL) injection 5 mg, 5 mg, IntraMUSCular, Q4H PRN **AND** LORazepam (ATIVAN) injection 2 mg, 2 mg, IntraMUSCular, Q4H PRN **AND** diphenhydrAMINE (BENADRYL) injection 50 mg, 50 mg, IntraMUSCular, Q4H PRN    ASSESSMENT  Schizoaffective disorder, depressive type (HCC)  Likely major neurocognitive impairment  PLAN  Patient s symptoms   are improving  Continue with current medication for now  Attempt to develop insight  Psycho-education conducted. Supportive Therapy conducted. Probable discharge is tomorrow  follow-up daily while in the inpatient unit  Electronically signed by Katie Medina MD on 11/11/2021 at 7:26 PM      **This report has been created using voice recognition software. It may contain minor errors which are inherent in voice recognition technology. **

## 2021-11-12 NOTE — BH NOTE
Patient refused to attend 0900 Community meeting group. 1:1 alternative offered and patient refused.

## 2021-11-12 NOTE — CARE COORDINATION
YOGESH placed call to [facility] team at [phone] to inform them that the pt will need to be taken to [facility] to pick-up personal belongings and medications.  FACT will share with Eulalia Lara the staff member that is picking up pt from hospital.

## 2021-11-12 NOTE — CARE COORDINATION
SW received call from the [facility] team informing staff that pt will be picked-up by Ty Christine. Who will take pt to [facility] to get check, then take pt to cash the checks, get a receipt and then take pt to the [facility].

## 2021-11-12 NOTE — PLAN OF CARE
Problem: Altered Mood, Depressive Behavior:  Goal: Able to verbalize acceptance of life and situations over which he or she has no control  Description: Able to verbalize acceptance of life and situations over which he or she has no control  11/11/2021 2055 by Kenna Fernández  Outcome: Ongoing  11/11/2021 1305 by Narda Campos  Outcome: Ongoing  11/11/2021 1018 by CARMEN Muniz  Outcome: Ongoing  Note: Able to verbalize acceptance of life and situations over which he or she has no control. Patient is being discharged tomorrow and he doesn't like that he is going to a hotel but his  is working on a place for him to stay. Problem: Altered Mood, Depressive Behavior:  Goal: Able to verbalize and/or display a decrease in depressive symptoms  Description: Able to verbalize and/or display a decrease in depressive symptoms  11/11/2021 2055 by Collette Lange D Fair  Outcome: Ongoing  11/11/2021 1305 by Narda Campos  Outcome: Ongoing  11/11/2021 1018 by CARMEN Muniz  Outcome: Ongoing  Note: Patient denies suicidal and homicidal ideation at this time. Patient endorses auditory hallucinations at this time but states they have improved and are absent at this time. Patient denies thoughts of self-harm. Compliant with medications and cooperative with care. Patient rates his depression 6/10. He is having some auditory hallucinations but they are less. He said that his voices are angry because he isnt using crack and he came in for treatment. He plans to start coing back to NA meetings.

## 2021-11-12 NOTE — BH NOTE
Patient given tobacco quitline number 99245213478 at this time, refusing to call at this time, states \" I just dont want to quit now\"- patient given information as to the dangers of long term tobacco use. Continue to reinforce the importance of tobacco cessation.

## 2021-11-12 NOTE — BH NOTE
[facility]  Discharge Note  Patient discharge home with . Pt discharged with followings belongings:   Dentures: None  Vision - Corrective Lenses: None  Hearing Aid: None  Jewelry: None  Body Piercings Removed: N/A  Clothing: Footwear, Jacket / coat, Pants, Shirt, Socks, Undergarments (Comment)  Were All Patient Medications Collected?: Not Applicable  Other Valuables: None   Valuables sent home with patient or returned to patient. Patient education on aftercare instructions: given to patient  Information faxed to [facility] by nurse  at 10:51 AM .Patient verbalize understanding of AVS:  yes.     Status EXAM upon discharge:  Status and Exam  Normal: Yes  Facial Expression: Brightened  Affect: Appropriate  Level of Consciousness: Alert  Mood:Normal: Yes  Mood: Elated  Motor Activity:Normal: Yes  Motor Activity: Decreased  Interview Behavior: Cooperative  Preception: [place] to Time, [place] to Place, [place] to Situation, [place] to Person  Attention:Normal: Yes  Attention: Distractible  Thought Processes: Circumstantial  Thought Content:Normal: Yes  Thought Content: Poverty of Content  Hallucinations: None  Delusions: No  Memory:Normal: No  Memory: Poor Recent  Insight and Judgment: Yes  Insight and Judgment: Poor Judgment  Present Suicidal Ideation: No  Present Homicidal Ideation: No      Metabolic Screening:    Lab Results   Component Value Date    LABA1C 5.0 10/13/2021       Lab Results   Component Value Date    CHOL 150 10/13/2021    CHOL 167 08/11/2020    CHOL 179 02/13/2017    CHOL 127 05/09/2015    CHOL 168 08/20/2014    CHOL 158 12/31/2013    CHOL 178 08/15/2013    CHOL 166 09/17/2012    CHOL 210 (H) 02/03/2012     Lab Results   Component Value Date    TRIG 41 10/13/2021    TRIG 43 08/11/2020    TRIG 67 02/13/2017    TRIG 37 05/09/2015    TRIG 72 08/20/2014    TRIG 52 12/31/2013    TRIG 70 08/15/2013    TRIG 117 09/17/2012    TRIG 94 02/03/2012     Lab Results   Component Value Date    HDL 62 10/13/2021    HDL 74 08/11/2020    HDL 73 02/13/2017    HDL 57 05/09/2015    HDL 50 08/20/2014    HDL 43 12/31/2013    HDL 42 08/15/2013    HDL 38 (L) 09/17/2012    HDL 55 02/03/2012     No components found for: LDLCAL  No results found for: Yefri Samuel LPN

## 2021-11-12 NOTE — DISCHARGE INSTR - DIET

## 2021-11-12 NOTE — DISCHARGE INSTR - OTHER ORDERS
Please take your medications as prescribed and go to your follow up appointments. Avoid alcohol and street drugs.

## 2021-11-12 NOTE — DISCHARGE SUMMARY
Provider Discharge Summary     Patient ID:  Zak Luna  [id number]  89 y.o.  1960    Admit date: 11/3/2021    Discharge date and time: 11/12/2021  2:38 PM     Admitting Physician: Calvin Barillas MD     Discharge Physician: Benedict Leon MD    Admission Diagnoses: Schizoaffective disorder [facility]) [F25.9]    Discharge Diagnoses:      Schizoaffective disorder, depressive type [facility])     Patient Active Problem List   Diagnosis Code    Hematuria R31.9    Suicidal ideations R45.851    Cocaine abuse ([address] F14.10    Schizoaffective disorder, bipolar type ([address] F25.0    Schizoaffective disorder, depressive type ([address] F25.1    Perianal abscess K61.0    Carla-rectal abscess K61.1    Schizophrenia ([address] F20.9    Schizoaffective disorder ([address] F25.9    Major depression with psychotic features ([address] F32.3    Acute psychosis ([address] F23    Depression with suicidal ideation F32. A, R45.851    Pneumonia due to infectious organism J18.9    Smoker F17.200    Ventricular ectopy I49.3    Low serum cortisol level (HCC) E27.40    Sepsis due to Klebsiella pneumoniae with no resultant organ failure (HCC) A41.4    Elevated BP without diagnosis of hypertension R03.0    Lower urinary tract symptoms (LUTS) R39.9    Dyspepsia R10.13    Skin rash R21    Hypernatremia E87.0        Admission Condition: poor    Discharged Condition: stable    Indication for Admission: threat to self    History of Present Illnes (present tense wording is of findings from admission exam and are not necessarily indicative of current findings):   Zak Luna is a 57 y.o. male who has a past medical history of schizophrenia, bipolar disorder, depression, substance use disorder, GERD, hepatitis, diabetes and chronic headaches. Presented to the emergency department with suicidal ideation. .      Per ED records, \"  Marianela Guzman a 57 y. o. male who presents with command auditory hallucinations telling the patient to hurt himself, not telling him to hurt others.  Cannot describe exactly what the voices are saying. Arsenio Lentz denies any pain, denies any chest pain, shortness breath, abdominal pain, lightheadedness or dizziness.  Patient states that he took some medications yesterday that were provided by the \"people at the home\".  When asking where he lives he states that he lives at home, he states that the living situation is okay. [facility] states that he was sent here by the people at the home because he is having these auditory hallucinations return to kill himself. Arsenio Lentz states he does not know when he last took his psychiatric medications, but does state that he is receiving his meds from the people of the home.     At diagnostic assessment Mr. Dexter Dc and agrees to interview in his room. He is guarded, suspicious, and evasive throughout the interview and was unwilling to engage in meaningful conversation regarding his history of present illness, but did provide vague answers including \"the voices keep coming back \". He reports that he here because \"the voices telling me to kill myself\". He reports that the voices have been going on for \"a long time\" and are \"always there\". He reports a current volume of 8 on a 0-10 scale with 10 being the loudest.  He endorses suicidal ideation and reports that he has a plan and intent to shoot himself with a gun but denies having access. He reports that he has been feeling depressed and endorses symptoms of depression including persistently low mood for two weeks or more, anhedonia, poor sleep, low energy, impaired concentration, psychomotor slowing, and feelings of hopelessness and worthlessness. He reports that his main stressor trying to manage his mental health. He reports visual hallucinations as well that come and go, but when asked to describe these he states \"I don't know what they are\". Patient also endorses homicidal ideation, but states \"I am too tired to explain\".   He is able to contract for safety on this unit and denies that he intends to harm himself or others while hospitalized but is unable to confirm that he would have maintained his own safety in the community.    Patient denies paranoia, panic attacks, recent and lifetime symptoms of maryellen, OCD tendencies, and symptoms of PTSD. He reports that he has been using crack cocaine previously, UDS positive for cocaine on admission and he denies use while living in residential housing and is uncertain if he has maintained medication adherence as prescribed.       Hospital Course:   Upon admission, Arti Wynne was provided a safe secure environment, introduced to unit milieu. Patient participated in groups and individual therapies. Meds were adjusted as noted below. After few days of hospital care, patient began to feel improvement. Depression lifted, thoughts to harm self ceased. Sleep improved, appetite was good. On morning rounds 11/12/2021, Arti Wynne  endorses feeling ready for discharge. Patient denies suicidal or homicidal ideations, denies hallucinations or delusions. Denies SE's from meds. It was decided that maximum benefit from hospital care had been achieved and patient can be discharged. Consults:   Internal medicine for medical management    Significant Diagnostic Studies: Routine labs and diagnostics    Treatments: Psychotropic medications, therapy with group, milieu, and 1:1 with nurses, social workers, PARodriguezC/CNP, and Attending physician. Discharge Medications:  Discharge Medication List as of 11/12/2021  9:56 AM      START taking these medications    Details   hydrOXYzine (ATARAX) 50 MG tablet Take 1 tablet by mouth 3 times daily as needed for Anxiety, Disp-30 tablet, R-0Normal         CONTINUE these medications which have CHANGED    Details   Cholecalciferol (VITAMIN D3) 25 MCG TABS Take 1 tablet by mouth daily, Disp-30 tablet, R-0Labeling may look different. 25 mcg=1000 Units. Please double check dosages. Normal escitalopram (LEXAPRO) 5 MG tablet Take 3 tablets by mouth daily, Disp-90 tablet, R-0Normal      QUEtiapine (SEROQUEL) 400 MG tablet Take 1 tablet by mouth nightly, Disp-30 tablet, R-0Normal      traZODone (DESYREL) 50 MG tablet Take 1 tablet by mouth nightly as needed for Sleep, Disp-30 tablet, R-0Normal         STOP taking these medications       paliperidone (INVEGA) 9 MG extended release tablet Comments:   Reason for Stopping:         docusate sodium (COLACE) 100 MG capsule Comments:   Reason for Stopping:         nicotine polacrilex (NICORETTE) 2 MG gum Comments:   Reason for Stopping:         polyethylene glycol (GLYCOLAX) 17 g packet Comments:   Reason for Stopping:                Core Measures statement:   Not applicable    Discharge Exam:  Level of consciousness:  Within normal limits  Appearance: Street clothes, seated, with good grooming  Behavior/Motor: No abnormalities noted  Attitude toward examiner:  Cooperative, attentive, good eye contact  Speech:  spontaneous, normal rate, normal volume and well articulated  Mood:  euthymic  Affect:  Full range  Thought processes:  linear, goal directed and coherent  Thought content:  denies homicidal ideation  Suicidal Ideation:  denies suicidal ideation  Delusions:  no evidence of delusions  Perceptual Disturbance:  denies any perceptual disturbance  Cognition:  Intact  Memory: Mild impairment  Insight & Judgement: Limited  Medication side effects: denies     Disposition: home/shelter    Patient Instructions: Activity: activity as tolerated  1. Patient instructed to take medications regularly and follow up with outpatient appointments. Follow-up as scheduled with outpatient [facility] [place]      Signed:    Electronically signed by Karli Naik MD on 11/12/21 at 2:38 PM EST    Time Spent on discharge is more than 30 minutes in the examination, evaluation, counseling and review of medications and discharge plan.

## 2021-11-12 NOTE — CARE COORDINATION
YOGESH placed call to [address] in regards to pts belongings and medications. Claudia reports that medications and belongings are there and pt can pick them up today.

## 2021-11-17 ENCOUNTER — HOSPITAL ENCOUNTER (EMERGENCY)
Age: 61
Discharge: HOME | End: 2021-11-17
Attending: EMERGENCY MEDICINE
Payer: MEDICAID

## 2021-11-17 VITALS
HEART RATE: 88 BPM | RESPIRATION RATE: 18 BRPM | OXYGEN SATURATION: 99 % | TEMPERATURE: 97.2 F | DIASTOLIC BLOOD PRESSURE: 64 MMHG | SYSTOLIC BLOOD PRESSURE: 105 MMHG

## 2021-11-17 DIAGNOSIS — R45.851 SUICIDAL IDEATION: ICD-10-CM

## 2021-11-17 DIAGNOSIS — R44.3 HALLUCINATIONS: ICD-10-CM

## 2021-11-17 DIAGNOSIS — R44.0 AUDITORY HALLUCINATIONS: Primary | ICD-10-CM

## 2021-11-17 LAB
ALBUMIN SERPL-MCNC: 4.2 G/DL (ref 3.5–5.2)
ALBUMIN/GLOBULIN RATIO: 1.6 (ref 1–2.5)
ALP BLD-CCNC: 123 U/L (ref 40–129)
ALT SERPL-CCNC: 12 U/L (ref 5–41)
ANION GAP SERPL CALCULATED.3IONS-SCNC: 11 MMOL/L (ref 9–17)
AST SERPL-CCNC: 29 U/L
BILIRUB SERPL-MCNC: 0.23 MG/DL (ref 0.3–1.2)
BUN BLDV-MCNC: 11 MG/DL (ref 8–23)
BUN/CREAT BLD: ABNORMAL (ref 9–20)
CALCIUM SERPL-MCNC: 9.1 MG/DL (ref 8.6–10.4)
CHLORIDE BLD-SCNC: 104 MMOL/L (ref 98–107)
CO2: 24 MMOL/L (ref 20–31)
CREAT SERPL-MCNC: 1.02 MG/DL (ref 0.7–1.2)
ETHANOL PERCENT: <0.01 %
ETHANOL: <10 MG/DL
GFR AFRICAN AMERICAN: >60 ML/MIN
GFR NON-AFRICAN AMERICAN: >60 ML/MIN
GFR SERPL CREATININE-BSD FRML MDRD: ABNORMAL ML/MIN/{1.73_M2}
GFR SERPL CREATININE-BSD FRML MDRD: ABNORMAL ML/MIN/{1.73_M2}
GLUCOSE BLD-MCNC: 86 MG/DL (ref 70–99)
HCT VFR BLD CALC: 37 % (ref 40.7–50.3)
HEMOGLOBIN: 11.8 G/DL (ref 13–17)
MCH RBC QN AUTO: 28.8 PG (ref 25.2–33.5)
MCHC RBC AUTO-ENTMCNC: 31.9 G/DL (ref 28.4–34.8)
MCV RBC AUTO: 90.2 FL (ref 82.6–102.9)
NRBC AUTOMATED: 0 PER 100 WBC
PDW BLD-RTO: 13.4 % (ref 11.8–14.4)
PLATELET # BLD: 297 K/UL (ref 138–453)
PMV BLD AUTO: 9.7 FL (ref 8.1–13.5)
POTASSIUM SERPL-SCNC: 4.7 MMOL/L (ref 3.7–5.3)
RBC # BLD: 4.1 M/UL (ref 4.21–5.77)
SARS-COV-2, RAPID: NOT DETECTED
SODIUM BLD-SCNC: 139 MMOL/L (ref 135–144)
SPECIMEN DESCRIPTION: NORMAL
TOTAL PROTEIN: 6.8 G/DL (ref 6.4–8.3)
WBC # BLD: 5.6 K/UL (ref 3.5–11.3)

## 2021-11-17 PROCEDURE — 87635 SARS-COV-2 COVID-19 AMP PRB: CPT

## 2021-11-17 PROCEDURE — 85027 COMPLETE CBC AUTOMATED: CPT

## 2021-11-17 PROCEDURE — 80053 COMPREHEN METABOLIC PANEL: CPT

## 2021-11-17 PROCEDURE — 99284 EMERGENCY DEPT VISIT MOD MDM: CPT

## 2021-11-17 PROCEDURE — G0480 DRUG TEST DEF 1-7 CLASSES: HCPCS

## 2021-11-17 ASSESSMENT — ENCOUNTER SYMPTOMS
SHORTNESS OF BREATH: 0
BACK PAIN: 0
VOMITING: 0
NAUSEA: 0
ABDOMINAL PAIN: 0
COUGH: 0

## 2021-11-17 NOTE — ED PROVIDER NOTES
[facility] ED  Emergency Department Encounter  EmergencyMedicine Resident     Pt Gena Baum  MRN: [id number]  [place] 1960  Date of evaluation: 11/17/21  PCP:  No primary care provider on file. CHIEF COMPLAINT       Chief Complaint   Patient presents with    Suicidal     hearing voices       HISTORY OF PRESENT ILLNESS  (Location/Symptom, Timing/Onset, Context/Setting, Quality, Duration, Modifying Factors, Severity.)      Brent Juan is a 57 y.o. male who presents with auditory loose Nations and suicidal ideation. Patient states he is hearing voices telling him to take a gun and shoot himself. Patient denies any medical complaints at this time, patient does have history of schizophrenia, depression and history of suicidal ideation. Patient denies any ingestion    PAST MEDICAL / SURGICAL / SOCIAL / FAMILY HISTORY      has a past medical history of Bipolar disorder ([address] Depression, GERD (gastroesophageal reflux disease), Hallucinations, Headache(784.0), Hepatitis, Schizophrenia, schizo-affective ([address] Substance abuse ([facility] Tobacco abuse, Type II or unspecified type diabetes mellitus without mention of complication, not stated as uncontrolled, and Urinary incontinence. has a past surgical history that includes Dental surgery and Abscess Drainage (N/A, 02/11/2018).     Social History     Socioeconomic History    Marital status: Single     Spouse name: Not on file    Number of children: 0    Years of education: 8    Highest education level: Not on file   Occupational History     Employer: N/A   Tobacco Use    Smoking status: Current Every Day Smoker     Packs/day: 1.00     Years: 47.00     Pack years: 47.00     Types: Cigarettes    Smokeless tobacco: Never Used    Tobacco comment: Patient accepting of nicotine patch   Substance and Sexual Activity    Alcohol use: Yes     Comment: reports drinking occasionally    Drug use: Yes     Frequency: 7.0 times per week Types: Cocaine     Comment: drug abuse includes crack cocaine,     Sexual activity: Not on file   Other Topics Concern    Not on file   Social History Narrative    Not on file     Social Determinants of Health     Financial Resource Strain:     Difficulty of Paying Living Expenses: Not on file   Food Insecurity:     Worried About Running Out of Food in the Last Year: Not on file    Tyree of Food in the Last Year: Not on file   Transportation Needs:     Lack of Transportation (Medical): Not on file    Lack of Transportation (Non-Medical): Not on file   Physical Activity:     Days of Exercise per Week: Not on file    Minutes of Exercise per Session: Not on file   Stress:     Feeling of Stress : Not on file   Social Connections:     Frequency of Communication with Friends and Family: Not on file    Frequency of Social Gatherings with Friends and Family: Not on file    Attends [employer] Services: Not on file    Active Member of [address] [place] [address] or Organizations: Not on file    Attends Club or Organization Meetings: Not on file    Marital Status: Not on file   Intimate Partner Violence:     Fear of Current or Ex-Partner: Not on file    Emotionally Abused: Not on file    Physically Abused: Not on file    Sexually Abused: Not on file   Housing Stability:     Unable to Pay for Housing in the Last Year: Not on file    Number of [place] in the Last Year: Not on file    Unstable Housing in the Last Year: Not on file       Family History   Problem Relation Age of Onset    Diabetes Mother     Heart Disease Mother        Allergies:  Navane [thiothixene]    Home Medications:  Prior to Admission medications    Medication Sig Start Date End Date Taking?  Authorizing Provider   Cholecalciferol (VITAMIN D3) 25 MCG TABS Take 1 tablet by mouth daily 11/11/21   Nadira Hernandez MD   escitalopram (LEXAPRO) 5 MG tablet Take 3 tablets by mouth daily 11/11/21   Nadira Hernandez MD   hydrOXYzine (ATARAX) 50 MG tablet Take 1 tablet by mouth 3 times daily as needed for Anxiety 11/11/21 11/21/21  Shavon Castaneda MD   QUEtiapine (SEROQUEL) 400 MG tablet Take 1 tablet by mouth nightly 11/11/21   Shavon Castaneda MD   traZODone (DESYREL) 50 MG tablet Take 1 tablet by mouth nightly as needed for Sleep 11/11/21   Shavon Castaneda MD       REVIEW OF SYSTEMS    (2-9 systems for level 4, 10 or more for level 5)      Review of Systems   Constitutional: Negative for activity change, chills and fever. Eyes: Negative for visual disturbance. Respiratory: Negative for cough and shortness of breath. Cardiovascular: Negative for chest pain. Gastrointestinal: Negative for abdominal pain, nausea and vomiting. Genitourinary: Negative for dysuria. Musculoskeletal: Negative for back pain and gait problem. Neurological: Negative for headaches. Psychiatric/Behavioral: Positive for hallucinations and suicidal ideas. Auditory hallucinations       PHYSICAL EXAM   (up to 7 for level 4, 8 or more for level 5)      INITIAL VITALS:   /64   Pulse 88   Temp 97.2 °F (36.2 °C) (Oral)   Resp 18   SpO2 99%     Physical Exam  Vitals reviewed. Constitutional:       General: He is not in acute distress. Appearance: Normal appearance. He is not ill-appearing, toxic-appearing or diaphoretic. Comments: Patient is cooperative   HENT:      Head: Normocephalic and atraumatic. Cardiovascular:      Rate and Rhythm: Normal rate. Pulses: Normal pulses. Pulmonary:      Effort: Pulmonary effort is normal.      Breath sounds: Normal breath sounds. Abdominal:      General: Abdomen is flat. There is no distension. Palpations: Abdomen is soft. Tenderness: There is no abdominal tenderness. There is no guarding or rebound. Neurological:      General: No focal deficit present. Mental Status: He is alert and oriented to person, place, and time. Motor: No weakness.       Gait: Gait normal.   Psychiatric: Comments: Depressed mood         DIFFERENTIAL  DIAGNOSIS     PLAN (LABS / IMAGING / EKG):  Orders Placed This Encounter   Procedures    COVID-19, Rapid    CBC    COMPREHENSIVE METABOLIC PANEL    ETHANOL    Urine Drug Screen       MEDICATIONS ORDERED:  No orders of the defined types were placed in this encounter. DDX: Suicidal ideation, auditory loose Nations    DIAGNOSTIC RESULTS / EMERGENCY DEPARTMENT COURSE / MDM   :  No results found for this visit on 11/17/21. RADIOLOGY:  None    EKG  None    All EKG's are interpreted by the Emergency Department Physician who either signs or Co-signs this chart in the absence of a cardiologist.    EMERGENCY DEPARTMENT COURSE/IMPRESSION: 41-year-old male present emergency department with auditory hallucinations telling patient to kill himself with a gun. Patient has no medical complaints. Patient does have history of psychiatric disorder. Social work will be consulted, placement for inpatient psychiatric rehabilitation. Necessary labs were drawn. Patient was signed out to oncoming attending awaiting acceptance by [facility]. PROCEDURES:  None    CONSULTS:  None    CRITICAL CARE:  None    FINAL IMPRESSION      1. Auditory hallucinations    2. Suicidal ideation          DISPOSITION / PLAN     DISPOSITION Decision To Transfer 11/17/2021 06:22:47 AM      PATIENT REFERRED TO:  No follow-up provider specified.     DISCHARGE MEDICATIONS:  New Prescriptions    No medications on file       Ana Luisa Martinez DO  Emergency Medicine Resident    (Please note that portions of thisnote were completed with a voice recognition program.  Efforts were made to edit the dictations but occasionally words are mis-transcribed.)     Ana Luisa Martinez DO  Resident  11/17/21 2252

## 2021-11-17 NOTE — ED PROVIDER NOTES
Care of Nimesh Winslow was assumed from previous attending and is being seen for Suicidal (hearing voices)  . The patient's initial evaluation and plan have been discussed with the prior provider who initially evaluated the patient. Handoff taken on the following patient from prior Attending Physician:    Janie Batista    I was available and discussed any additional care issues that arose and coordinated the management plans with the resident(s) caring for the patient during my duty period. Any areas of disagreement with residents documentation of care or procedures are noted on the chart. I was personally present for the key portions of any/all procedures during my duty period. I have documented in the chart those procedures where I was not present during the key portions. EMERGENCY DEPARTMENT COURSE / MEDICAL DECISION MAKING:       MEDICATIONS GIVEN:  No orders of the defined types were placed in this encounter. LABS / RADIOLOGY:     Labs Reviewed   CBC - Abnormal; Notable for the following components:       Result Value    RBC 4.10 (*)     Hemoglobin 11.8 (*)     Hematocrit 37.0 (*)     All other components within normal limits   COMPREHENSIVE METABOLIC PANEL - Abnormal; Notable for the following components: Total Bilirubin 0.23 (*)     All other components within normal limits   COVID-19, RAPID   ETHANOL   URINE DRUG SCREEN       CT ABDOMEN PELVIS WO CONTRAST Additional Contrast? Oral    Result Date: 10/24/2021  EXAMINATION: CT OF THE ABDOMEN AND PELVIS WITHOUT CONTRAST 10/24/2021 9:43 am TECHNIQUE: CT of the abdomen and pelvis was performed without the administration of intravenous contrast. Multiplanar reformatted images are provided for review. Dose modulation, iterative reconstruction, and/or weight based adjustment of the mA/kV was utilized to reduce the radiation dose to as low as reasonably achievable.  COMPARISON: 10/01/2020 HISTORY: ORDERING SYSTEM PROVIDED HISTORY: abdominal Pain TECHNOLOGIST PROVIDED HISTORY: abdominal Pain Reason for Exam: left side abd pain for a while per patient Acuity: Chronic Type of Exam: Ongoing FINDINGS: Lower Chest: Minor subsegmental atelectasis posterior right lung base. Organs: The liver, spleen, pancreas, adrenal glands and gallbladder show no significant abnormalities. Several bilateral renal cysts are again demonstrated measuring up to 4.5 cm. Largest on the right. GI/Bowel: Oral contrast has been given. No contrast in stomach at time of imaging. Stomach grossly normal.  Multiple small bowel loops filled with contrast show no focal lesions and there is no evidence for bowel obstruction. The appendix is normal.  Moderate amount of stool in the colon. Contrast is just entering the colon at time of imaging. Pelvis: Pelvic organs unremarkable. Peritoneum/Retroperitoneum: No free fluid. No lymphadenopathy. Atherosclerotic disease. Ureters show no calculi. Bones/Soft Tissues: Bilateral small fat containing inguinal hernia. No acute bony abnormality. 1. No acute infective or inflammatory process. 2. No bowel obstruction. 3. Bilateral renal cysts up to 4.5 cm again noted. RECENT VITALS:     Temp: 97.2 °F (36.2 °C),  Pulse: 88, Resp: 18, BP: 105/64, SpO2: 99 %    This patient is a 57 y.o. Male with auditory hallucinations, has [facility], recent inpatient stay, discussed with social work, and plan for discharge, to [facility], patient is comfortable with this plan.     OUTSTANDING TASKS / RECOMMENDATIONS:    1. discharge      Liz Stevens DO, DO  Attending Emergency Physician  [facility] ED        [facility], [address] [place] [address]  11/17/21 9956
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
## 2021-11-17 NOTE — ED PROVIDER NOTES
[facility] [address] ED  Emergency Department  Faculty Attestation       I performed a history and physical examination of the patient and discussed management with the resident. I reviewed the residents note and agree with the documented findings including all diagnostic interpretations and plan of care. Any areas of disagreement are noted on the chart. I was personally present for the key portions of any procedures. I have documented in the chart those procedures where I was not present during the key portions. I have reviewed the emergency nurses triage note. I agree with the chief complaint, past medical history, past surgical history, allergies, medications, social and family history as documented unless otherwise noted below. Documentation of the HPI, Physical Exam and Medical Decision Making performed by scribnav is based on my personal performance of the HPI, PE and MDM. For Physician Assistant/ Nurse Practitioner cases/documentation I have personally evaluated this patient and have completed at least one if not all key elements of the E/M (history, physical exam, and MDM). Additional findings are as noted. Pertinent Comments     Primary Care Physician: No primary care provider on file. History: This is a 57 y.o. male who presents to the Emergency Department with suicidal ideations no specific plan but states he is actively trying to find one. .  Patient denies any homicidal ideations. Patient reports hallucinations. Denies alcohol use today. .  Denies any illicit drug use . No medical complaints at this time including no cough, shortness of breath or fever. Patient is  voluntary for psychiatric admission at this time.      Physical:    ED Triage Vitals [11/17/21 0514]   BP Temp Temp Source Pulse Resp SpO2 Height Weight   105/64 97.2 °F (36.2 °C) Oral 88 18 99 % -- --       Constitutional:  Normal appearance in no acute distress, not ill-appearing  HENT: Normocephalic, atraumatic  Eyes: EOM intact with no conjunctival injection. Neck: Supple with full ROM  Cardiovascular: Regular rate and rhythm with no rubs, murmurs, or gallops  Respiratory: Talking in full sentences, clear to auscultation bilaterally with no rales, rhonchi, or wheezes   Abdomen: soft, nontender with no guarding   Musculoskeletal: No obvious injury and normal ROM  Skin: No significant rash or jaundice in exposed areas. Neuro: Alert, no focal deficits   Psych: Behavior: uninterested, Speech: quiet, Thought content: suicidal, Affect: flat. MDM/Plan:   Suicidal ideations with no specific plan .   No medical complaints  Medically cleared at this time  Labs per protocol for [facility] consult to facilitate possible admission     Critical Care Time: None     Kathy Espinosa MD  Attending Emergency Physician      Kathy Espinosa MD  11/17/21 5422

## 2021-11-22 ENCOUNTER — HOSPITAL ENCOUNTER (EMERGENCY)
Age: 61
Discharge: HOME | End: 2021-11-22
Attending: STUDENT IN AN ORGANIZED HEALTH CARE EDUCATION/TRAINING PROGRAM
Payer: MEDICAID

## 2021-11-22 VITALS
HEART RATE: 91 BPM | HEIGHT: 74 IN | TEMPERATURE: 97.4 F | WEIGHT: 180 LBS | DIASTOLIC BLOOD PRESSURE: 64 MMHG | RESPIRATION RATE: 16 BRPM | OXYGEN SATURATION: 96 % | BODY MASS INDEX: 23.1 KG/M2 | SYSTOLIC BLOOD PRESSURE: 123 MMHG

## 2021-11-22 DIAGNOSIS — R45.851 SUICIDAL IDEATION: Primary | ICD-10-CM

## 2021-11-22 PROCEDURE — 99284 EMERGENCY DEPT VISIT MOD MDM: CPT

## 2021-11-22 PROCEDURE — 6370000000 HC RX 637 (ALT 250 FOR IP): Performed by: STUDENT IN AN ORGANIZED HEALTH CARE EDUCATION/TRAINING PROGRAM

## 2021-11-22 RX ORDER — QUETIAPINE FUMARATE 200 MG/1
400 TABLET, FILM COATED ORAL ONCE
Status: COMPLETED | OUTPATIENT
Start: 2021-11-22 | End: 2021-11-22

## 2021-11-22 RX ADMIN — QUETIAPINE FUMARATE 400 MG: 200 TABLET ORAL at 05:21

## 2021-11-22 ASSESSMENT — ENCOUNTER SYMPTOMS
COUGH: 0
SORE THROAT: 0
ABDOMINAL PAIN: 0
DIARRHEA: 0
FACIAL SWELLING: 0
RHINORRHEA: 0
BACK PAIN: 0
SHORTNESS OF BREATH: 0
EYE REDNESS: 0
VOMITING: 0
NAUSEA: 0
COLOR CHANGE: 0
EYE PAIN: 0

## 2021-11-22 ASSESSMENT — PAIN DESCRIPTION - DESCRIPTORS: DESCRIPTORS: ACHING

## 2021-11-22 ASSESSMENT — PAIN SCALES - GENERAL: PAINLEVEL_OUTOF10: 7

## 2021-11-22 ASSESSMENT — PAIN DESCRIPTION - LOCATION: LOCATION: ABDOMEN

## 2021-11-22 ASSESSMENT — PAIN DESCRIPTION - FREQUENCY: FREQUENCY: CONTINUOUS

## 2021-11-22 NOTE — ED PROVIDER NOTES
EMERGENCY DEPARTMENT ENCOUNTER    Pt Name: Dudley Amador  MRN: [id number]  [place] 1960  Date of evaluation: 11/22/21  CHIEF COMPLAINT       Chief Complaint   Patient presents with    Suicidal     HISTORY OF PRESENT ILLNESS   HPI  57year old male history bipolar, schizoaffective, GERD presents for evaluation of suicidal ideation. Patient reports he has thoughts of stepping into traffic. The patient has frequent ED visits for similar. No physical complaints. Patient follows with [facility] act team. Last admitted to the [facility] at the beginning of November. REVIEW OF SYSTEMS     Review of Systems   Constitutional: Negative for chills and fatigue. HENT: Negative for facial swelling, nosebleeds, rhinorrhea and sore throat. Eyes: Negative for pain and redness. Respiratory: Negative for cough and shortness of breath. Cardiovascular: Negative for chest pain and leg swelling. Gastrointestinal: Negative for abdominal pain, diarrhea, nausea and vomiting. Genitourinary: Negative for flank pain and hematuria. Musculoskeletal: Negative for arthralgias and back pain. Skin: Negative for color change and rash. Neurological: Negative for dizziness, tremors, facial asymmetry, speech difficulty, weakness and numbness. Psychiatric/Behavioral: Positive for suicidal ideas. Negative for decreased concentration and hallucinations.      PASTMEDICAL HISTORY     Past Medical History:   Diagnosis Date    Bipolar disorder ([facility]     Depression     GERD (gastroesophageal reflux disease)     Hallucinations     Headache(784.0)     Hepatitis     Schizophrenia, schizo-affective (HCC)     Substance abuse (HCC)     Tobacco abuse     Type II or unspecified type diabetes mellitus without mention of complication, not stated as uncontrolled     Urinary incontinence      Past Problem List  Patient Active Problem List   Diagnosis Code    Hematuria R31.9    Suicidal ideations R45.851    Cocaine abuse ([facility] F14.10    crack cocaine abuse and last used 3 weeks ago     PHYSICAL EXAM     INITIAL VITALS: /64   Pulse 91   Temp 97.4 °F (36.3 °C) (Oral)   Resp 16   Ht 6' 2\" (1.88 m)   Wt 180 lb (81.6 kg)   SpO2 96%   BMI 23.11 kg/m²    Physical Exam  Constitutional:       Appearance: Normal appearance. HENT:      Head: Normocephalic and atraumatic. Nose: Nose normal.   Eyes:      Extraocular Movements: Extraocular movements intact. Pupils: Pupils are equal, round, and reactive to light. Cardiovascular:      Rate and Rhythm: Normal rate and regular rhythm. Pulmonary:      Effort: Pulmonary effort is normal. No respiratory distress. Breath sounds: Normal breath sounds. Abdominal:      General: Abdomen is flat. There is no distension. Palpations: Abdomen is soft. There is no mass. Musculoskeletal:         General: No swelling. Normal range of motion. Cervical back: Normal range of motion. No rigidity. Skin:     General: Skin is warm and dry. Neurological:      General: No focal deficit present. Mental Status: He is alert. Mental status is at baseline. Cranial Nerves: No cranial nerve deficit. Psychiatric:      Comments: SI. Flat affect         MEDICAL DECISION MAKIN-year-old male presents for evaluation with some vague suicidal complaints. Review of medical records shows the patient has frequent ER visits for similar symptoms. He occasionally is admitted to the [facility]. He has gone through group homes. He is currently living in a hotel. His history somewhat consistent but he is requesting medication. SW discussed with on-call psychiatrist Dr. Art Fowler he is very familiar with patient and does not think patient would benefit from inpatient psychiatric hospitalization tend to agree with this given the typical nature of his presentation I do not think he is an imminent threat to harm himself or anybody else. Will discharge with outpatient follow up .           CRITICAL CARE: PROCEDURES:    Procedures    DIAGNOSTIC RESULTS   EKG:All EKG's are interpreted by the Emergency Department Physician who either signs or Co-signs this chart in the absence of a cardiologist.        RADIOLOGY:All plain film, CT, MRI, and formal ultrasound images (except ED bedside ultrasound) are read by the radiologist, see reports below, unless otherwisenoted in MDM or here. No orders to display     LABS: All lab results were reviewed by myself, and all abnormals are listed below. Labs Reviewed - No data to display    EMERGENCY DEPARTMENTCOURSE:         Vitals:    Vitals:    11/22/21 0334   BP: 123/64   Pulse: 91   Resp: 16   Temp: 97.4 °F (36.3 °C)   TempSrc: Oral   SpO2: 96%   Weight: 180 lb (81.6 kg)   Height: 6' 2\" (1.88 m)       The patient was given the following medications while in the emergency department:  Orders Placed This Encounter   Medications    QUEtiapine (SEROQUEL) tablet 400 mg     CONSULTS:  None    FINAL IMPRESSION      1. Suicidal ideation          DISPOSITION/PLAN   DISPOSITION Decision To Discharge 11/22/2021 05:35:10 AM      PATIENT REFERRED TO:  [facility] SPECIALIZED SURGERY  [facility] [address]  [address] [facility] [address]  [phone]  Call   As needed    DISCHARGE MEDICATIONS:  New Prescriptions    No medications on file     The care is provided during an unprecedented national emergency due to the novel coronavirus, COVID 19.   Bree Seals MD  Attending Emergency Physician                    Bree Seals MD  11/22/21 9682

## 2021-11-22 NOTE — ED NOTES
YOGESH arranged pt transportation through [facility] and [employer] to take pt back home. YOGESH contacted the [facility] ACT team. YOGESH informed staff pt was seen in the ED and is stating pt is in need of pt's medications.

## 2021-11-22 NOTE — ED NOTES
Gerald Aguiar is a 57year old male who presents to the ED via [facility]. Pt contacted 911 stating pt is having suicidal thoughts. [employer] then transported pt to the hospital.     Pt is suicidal with a plan to walk out into traffic. Pt identifies not having pt's medications for the past week as the trigger to pt's SI. Pt reports if pt were to have a dose of pt's medication while at the hospital pt would safe returning home. Pt denies HI. Pt denies hallucinations/delusions. Pt was recently admitted to the [facility] 11/3/2021-11/12/2021. Pt reports pt's medications were not filled when pt was discharged from the [facility] on 11/12/2021. Pt has been admitted to the [facility] multiple times over the past couple months. Pt follows up with the [facility] ACT team.     SW consulted with Saul Aguilar from psychiatry. Pt declined for an admission at this time. Pt has been admitted to the [facility] over multiple times over the past few months. Pt abuses cocaine and does not take pt's medications after being discharged. ED Dr informed. ED Dr will give pt a dose of Seroquel before pt is discharged. Pt agreeable with this plan and reports feeling safe being discharged.
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
## 2021-11-24 ENCOUNTER — HOSPITAL ENCOUNTER (EMERGENCY)
Age: 61
Discharge: HOME | End: 2021-11-24
Attending: EMERGENCY MEDICINE
Payer: MEDICAID

## 2021-11-24 VITALS
TEMPERATURE: 98.1 F | HEIGHT: 74 IN | SYSTOLIC BLOOD PRESSURE: 125 MMHG | HEART RATE: 76 BPM | OXYGEN SATURATION: 97 % | DIASTOLIC BLOOD PRESSURE: 76 MMHG | WEIGHT: 180 LBS | BODY MASS INDEX: 23.1 KG/M2 | RESPIRATION RATE: 18 BRPM

## 2021-11-24 DIAGNOSIS — R45.851 SUICIDAL IDEATION: Primary | ICD-10-CM

## 2021-11-24 LAB
ABSOLUTE EOS #: 0.05 K/UL (ref 0–0.44)
ABSOLUTE IMMATURE GRANULOCYTE: 0.01 K/UL (ref 0–0.3)
ABSOLUTE LYMPH #: 1.69 K/UL (ref 1.1–3.7)
ABSOLUTE MONO #: 0.34 K/UL (ref 0.1–1.2)
ACETAMINOPHEN LEVEL: <10 UG/ML (ref 10–30)
ALBUMIN SERPL-MCNC: 3.9 G/DL (ref 3.5–5.2)
ALBUMIN/GLOBULIN RATIO: ABNORMAL (ref 1–2.5)
ALP BLD-CCNC: 139 U/L (ref 40–129)
ALT SERPL-CCNC: 10 U/L (ref 5–41)
AMPHETAMINE SCREEN URINE: NEGATIVE
ANION GAP SERPL CALCULATED.3IONS-SCNC: 10 MMOL/L (ref 9–17)
AST SERPL-CCNC: 17 U/L
BARBITURATE SCREEN URINE: NEGATIVE
BASOPHILS # BLD: 1 % (ref 0–2)
BASOPHILS ABSOLUTE: 0.03 K/UL (ref 0–0.2)
BENZODIAZEPINE SCREEN, URINE: NEGATIVE
BILIRUB SERPL-MCNC: 0.4 MG/DL (ref 0.3–1.2)
BILIRUBIN URINE: NEGATIVE
BUN BLDV-MCNC: 8 MG/DL (ref 8–23)
BUN/CREAT BLD: 9 (ref 9–20)
BUPRENORPHINE URINE: ABNORMAL
CALCIUM SERPL-MCNC: 8.8 MG/DL (ref 8.6–10.4)
CANNABINOID SCREEN URINE: NEGATIVE
CHLORIDE BLD-SCNC: 102 MMOL/L (ref 98–107)
CO2: 25 MMOL/L (ref 20–31)
COCAINE METABOLITE, URINE: POSITIVE
COLOR: YELLOW
COMMENT UA: NORMAL
CREAT SERPL-MCNC: 0.88 MG/DL (ref 0.7–1.2)
DIFFERENTIAL TYPE: ABNORMAL
EOSINOPHILS RELATIVE PERCENT: 1 % (ref 1–4)
ETHANOL PERCENT: 0.04 %
ETHANOL: 35 MG/DL
GFR AFRICAN AMERICAN: >60 ML/MIN
GFR NON-AFRICAN AMERICAN: >60 ML/MIN
GFR SERPL CREATININE-BSD FRML MDRD: ABNORMAL ML/MIN/{1.73_M2}
GFR SERPL CREATININE-BSD FRML MDRD: ABNORMAL ML/MIN/{1.73_M2}
GLUCOSE BLD-MCNC: 89 MG/DL (ref 70–99)
GLUCOSE URINE: NEGATIVE
HCT VFR BLD CALC: 37.7 % (ref 40.7–50.3)
HEMOGLOBIN: 11.8 G/DL (ref 13–17)
IMMATURE GRANULOCYTES: 0 %
KETONES, URINE: NEGATIVE
LEUKOCYTE ESTERASE, URINE: NEGATIVE
LYMPHOCYTES # BLD: 35 % (ref 24–43)
MCH RBC QN AUTO: 28.1 PG (ref 25.2–33.5)
MCHC RBC AUTO-ENTMCNC: 31.3 G/DL (ref 28.4–34.8)
MCV RBC AUTO: 89.8 FL (ref 82.6–102.9)
MDMA URINE: ABNORMAL
METHADONE SCREEN, URINE: NEGATIVE
METHAMPHETAMINE, URINE: ABNORMAL
MONOCYTES # BLD: 7 % (ref 3–12)
NITRITE, URINE: NEGATIVE
NRBC AUTOMATED: 0 PER 100 WBC
OPIATES, URINE: NEGATIVE
OXYCODONE SCREEN URINE: NEGATIVE
PDW BLD-RTO: 13.6 % (ref 11.8–14.4)
PH UA: 6 (ref 5–8)
PHENCYCLIDINE, URINE: NEGATIVE
PLATELET # BLD: 290 K/UL (ref 138–453)
PLATELET ESTIMATE: ABNORMAL
PMV BLD AUTO: 9.1 FL (ref 8.1–13.5)
POTASSIUM SERPL-SCNC: 3.9 MMOL/L (ref 3.7–5.3)
PROPOXYPHENE, URINE: ABNORMAL
PROTEIN UA: NEGATIVE
RBC # BLD: 4.2 M/UL (ref 4.21–5.77)
RBC # BLD: ABNORMAL 10*6/UL
SALICYLATE LEVEL: <1 MG/DL (ref 3–10)
SARS-COV-2, RAPID: NOT DETECTED
SEG NEUTROPHILS: 56 % (ref 36–65)
SEGMENTED NEUTROPHILS ABSOLUTE COUNT: 2.72 K/UL (ref 1.5–8.1)
SODIUM BLD-SCNC: 137 MMOL/L (ref 135–144)
SPECIFIC GRAVITY UA: 1.01 (ref 1–1.03)
SPECIMEN DESCRIPTION: NORMAL
TEST INFORMATION: ABNORMAL
TOTAL PROTEIN: 6.4 G/DL (ref 6.4–8.3)
TOXIC TRICYCLIC SC,BLOOD: NEGATIVE
TRICYCLIC ANTIDEPRESSANTS, UR: ABNORMAL
TURBIDITY: CLEAR
URINE HGB: NEGATIVE
UROBILINOGEN, URINE: NORMAL
WBC # BLD: 4.8 K/UL (ref 3.5–11.3)
WBC # BLD: ABNORMAL 10*3/UL

## 2021-11-24 PROCEDURE — 80053 COMPREHEN METABOLIC PANEL: CPT

## 2021-11-24 PROCEDURE — 80143 DRUG ASSAY ACETAMINOPHEN: CPT

## 2021-11-24 PROCEDURE — 80307 DRUG TEST PRSMV CHEM ANLYZR: CPT

## 2021-11-24 PROCEDURE — 80179 DRUG ASSAY SALICYLATE: CPT

## 2021-11-24 PROCEDURE — 87635 SARS-COV-2 COVID-19 AMP PRB: CPT

## 2021-11-24 PROCEDURE — G0480 DRUG TEST DEF 1-7 CLASSES: HCPCS

## 2021-11-24 PROCEDURE — 85025 COMPLETE CBC W/AUTO DIFF WBC: CPT

## 2021-11-24 PROCEDURE — 81003 URINALYSIS AUTO W/O SCOPE: CPT

## 2021-11-24 PROCEDURE — 99285 EMERGENCY DEPT VISIT HI MDM: CPT

## 2021-11-24 NOTE — ED NOTES
Bed: 27  Expected date: 11/24/21  Expected time: 1:46 PM  Means of arrival: [address]  Comments:  [facility] [address] [facility].  [facility] [place]  11/24/21 7927

## 2021-11-24 NOTE — ED PROVIDER NOTES
[address] ED  EMERGENCY DEPARTMENT ENCOUNTER   ATTENDING ATTESTATION     Pt Name: Alyssa Mello  MRN: [id number]  [place] 1960  Date of evaluation: 11/24/21       Alyssa Mello is a 57 y.o. male who presents with Altered Mental Status and Suicidal      MDM:     58-year-old male presents with complaints of suicidal ideation. Patient is a plan to overdose on pills. Plan is transfer to psychiatry. Vitals:   Vitals:    11/24/21 1356 11/24/21 1358   BP:  125/76   Pulse: 76    Resp: 18    Temp: 98.1 °F (36.7 °C)    TempSrc: Oral    SpO2: 97%    Weight: 180 lb (81.6 kg)    Height: 6' 2\" (1.88 m)          I personally evaluated and examined the patient in conjunction with the Midlevel provider and agree with the assessment, treatment plan, and disposition of the patient as recorded by the midlevel. I performed a history and physical examination of the patient and discussed management with the midlevel. I reviewed the midlevels note and agree with the documented findings and plan of care. Any areas of disagreement are noted on the chart. I was personally present for the key portions of any procedures. I have documented in the chart those procedures where I was not present during the key portions. I have personally reviewed all images and agree with the midlevel's interpretation. I have reviewed the emergency nurses triage note. I agree with the chief complaint, past medical history, past surgical history, allergies, medications, social and family history as documented unless otherwise noted.     Clay Daniels MD  Attending Emergency  Physician                  Mala Mckeon MD  11/24/21 [id number]

## 2021-11-24 NOTE — ED PROVIDER NOTES
[address] [place] [address] ED  [facility]      Pt Name: Tori Stanley  MRN: [id number]  [place] 1960  Date ofevaluation: 11/24/2021  Provider: Sim Salcedo Dr       Chief Complaint   Patient presents with    Altered Mental Status    Suicidal         HISTORY OF PRESENT ILLNESS  (Location/Symptom, Timing/Onset, Context/Setting, Quality, Duration, Modifying Factors, Severity.)   Tori Stanley is a 57 y.o. male who presents to the emergency department with suicidal ideation. Patient is alert and oriented x3. No altered mental status. Patient has a longstanding psychiatric history including suicidal ideation and attempt in the past according to the patient. Patient states he has not taken his psychiatric medications within the last 3 to 4 days and has felt suicidal ideation over the last 3 to 4 days but does not have any plan. Patient denies any chest pain shortness of breath. No fevers or chills. Nausea vomiting. He is hungry. No other complaints. Nursing Notes were reviewed.     ALLERGIES     Navane [thiothixene]    CURRENT MEDICATIONS       Previous Medications    CHOLECALCIFEROL (VITAMIN D3) 25 MCG TABS    Take 1 tablet by mouth daily    ESCITALOPRAM (LEXAPRO) 5 MG TABLET    Take 3 tablets by mouth daily    QUETIAPINE (SEROQUEL) 400 MG TABLET    Take 1 tablet by mouth nightly    TRAZODONE (DESYREL) 50 MG TABLET    Take 1 tablet by mouth nightly as needed for Sleep       PAST MEDICAL HISTORY         Diagnosis Date    [facility] disorder ([facility]     Depression     GERD (gastroesophageal reflux disease)     Hallucinations     Headache(784.0)     Hepatitis     Schizophrenia, schizo-affective (HCC)     Substance abuse ([facility]     Tobacco abuse     Type II or unspecified type diabetes mellitus without mention of complication, not stated as uncontrolled     Urinary incontinence        SURGICAL HISTORY           Procedure Laterality Date    ABSCESS DRAINAGE N/A 2018    Carla anal abcess    DENTAL SURGERY      all teeth pulled         FAMILY HISTORY           Problem Relation Age of Onset    Diabetes Mother     Heart Disease Mother      Family Status   Relation Name Status    Mother  Alive    Father      Brother          SOCIAL HISTORY      reports that he has been smoking cigarettes. He has a 47.00 pack-year smoking history. He has never used smokeless tobacco. He reports current alcohol use. He reports current drug use. Frequency: 7.00 times per week. Drug: Cocaine. REVIEW OFSYSTEMS    (2-9 systems for level 4, 10 or more for level 5)   Review of Systems    Except as noted above the remainder of the review of systems was reviewed and negative. PHYSICAL EXAM    (up to 7 for level 4, 8 or more for level 5)     ED Triage Vitals   BP Temp Temp Source Pulse Resp SpO2 Height Weight   21 1358 21 1356 21 1356 21 1356 21 1356 21 1356 21 1356 21 1356   125/76 98.1 °F (36.7 °C) Oral 76 18 97 % 6' 2\" (1.88 m) 180 lb (81.6 kg)      Physical Exam  Constitutional:       Appearance: He is well-developed. HENT:      Head: Normocephalic and atraumatic. Cardiovascular:      Rate and Rhythm: Normal rate and regular rhythm. Pulmonary:      Effort: Pulmonary effort is normal.      Breath sounds: Normal breath sounds. Abdominal:      Palpations: Abdomen is soft. Musculoskeletal:         General: Normal range of motion. Cervical back: Normal range of motion and neck supple. Skin:     General: Skin is warm. Neurological:      Mental Status: He is alert and oriented to person, place, and time.    Psychiatric:         Behavior: Behavior normal.                 DIAGNOSTIC RESULTS     EKG: All EKG's are interpreted by the Emergency Department Physician who either signs or Co-signs this chart in the absence of a cardiologist.        RADIOLOGY:   Non-plain film images such as CT, Ultrasound and MRI are read by the radiologist. Plain radiographic images arevisualized and preliminarily interpreted by the emergency physician with the below findings:        Interpretation per the Radiologist below, if available at thetime of this note:          ED BEDSIDE ULTRASOUND:   Performed by ED Physician - none    LABS:  Labs Reviewed   TOX SCR, BLD, ED - Abnormal; Notable for the following components:       Result Value    Acetaminophen Level <10 (*)     Ethanol 35 (*)     Ethanol percent 6.011 (*)     Salicylate Lvl <1 (*)     All other components within normal limits   CBC WITH AUTO DIFFERENTIAL - Abnormal; Notable for the following components:    RBC 4.20 (*)     Hemoglobin 11.8 (*)     Hematocrit 37.7 (*)     All other components within normal limits   COMPREHENSIVE METABOLIC PANEL - Abnormal; Notable for the following components:    Alkaline Phosphatase 139 (*)     All other components within normal limits   URINE DRUG SCREEN - Abnormal; Notable for the following components:    Cocaine Metabolite, Urine POSITIVE (*)     All other components within normal limits   COVID-19, RAPID   URINALYSIS       All other labs were within normal range or not returned as of this dictation. EMERGENCY DEPARTMENT COURSE and DIFFERENTIAL DIAGNOSIS/MDM:   Vitals:    Vitals:    11/24/21 1356 11/24/21 1358   BP:  125/76   Pulse: 76    Resp: 18    Temp: 98.1 °F (36.7 °C)    TempSrc: Oral    SpO2: 97%    Weight: 180 lb (81.6 kg)    Height: 6' 2\" (1.88 m)      3:55 PM EST  Now says he would take pills to kill himself as his plan.     5:08 PM EST  Patient is medically clear     5:29 PM EST  Patient discussed with [facility] psychiatrist.  patient well known to him and their psych services. Story is consistent with past medical history and he has frequent visits and consults. Psychiatrist reports they are consulted 1-2 times per week on the patient. In addition the patient rarely follows up. Patient now denies plan to harm himself. [facility]    CONSULTS:  None    PROCEDURES:  Procedures        FINAL IMPRESSION      1. Suicidal ideation          DISPOSITION/PLAN   DISPOSITION Decision To Discharge 11/24/2021 05:29:21 PM      PATIENTREFERRED TO:   No follow-up provider specified.     DISCHARGE MEDICATIONS:     New Prescriptions    No medications on file           (Please note that portions of this note were completed with a voice recognition program.  Efforts were made to edit thedictations but occasionally words are mis-transcribed.)    OLU Mohamud PA-C  11/24/21 3046

## 2021-12-06 ENCOUNTER — HOSPITAL ENCOUNTER (EMERGENCY)
Age: 61
Discharge: HOME | End: 2021-12-06
Attending: EMERGENCY MEDICINE
Payer: MEDICAID

## 2021-12-06 ENCOUNTER — HOSPITAL ENCOUNTER (EMERGENCY)
Age: 61
Discharge: PSYCHIATRIC OR SUBSTANCE USE FACILITY | End: 2021-12-07
Attending: EMERGENCY MEDICINE
Payer: MEDICAID

## 2021-12-06 VITALS
HEART RATE: 87 BPM | SYSTOLIC BLOOD PRESSURE: 103 MMHG | RESPIRATION RATE: 16 BRPM | TEMPERATURE: 98.1 F | DIASTOLIC BLOOD PRESSURE: 74 MMHG | OXYGEN SATURATION: 99 %

## 2021-12-06 DIAGNOSIS — R45.851 SUICIDAL IDEATION: Primary | ICD-10-CM

## 2021-12-06 DIAGNOSIS — R45.851 SUICIDAL THOUGHTS: Primary | ICD-10-CM

## 2021-12-06 PROCEDURE — 87635 SARS-COV-2 COVID-19 AMP PRB: CPT

## 2021-12-06 PROCEDURE — 85025 COMPLETE CBC W/AUTO DIFF WBC: CPT

## 2021-12-06 PROCEDURE — G0480 DRUG TEST DEF 1-7 CLASSES: HCPCS

## 2021-12-06 PROCEDURE — 99284 EMERGENCY DEPT VISIT MOD MDM: CPT

## 2021-12-06 PROCEDURE — 99285 EMERGENCY DEPT VISIT HI MDM: CPT

## 2021-12-06 PROCEDURE — 80053 COMPREHEN METABOLIC PANEL: CPT

## 2021-12-06 ASSESSMENT — ENCOUNTER SYMPTOMS
SORE THROAT: 0
ABDOMINAL PAIN: 0
CONSTIPATION: 0
DIARRHEA: 0
NAUSEA: 0
VOMITING: 0
SHORTNESS OF BREATH: 0

## 2021-12-06 NOTE — ED PROVIDER NOTES
[facility]     Emergency Department     Faculty Attestation    I performed a history and physical examination of the patient and discussed management with the resident. I reviewed the residents note and agree with the documented findings and plan of care. Any areas of disagreement are noted on the chart. I was personally present for the key portions of any procedures. I have documented in the chart those procedures where I was not present during the key portions. I have reviewed the emergency nurses triage note. I agree with the chief complaint, past medical history, past surgical history, allergies, medications, social and family history as documented unless otherwise noted below. For Physician Assistant/ Nurse Practitioner cases/documentation I have personally evaluated this patient and have completed at least one if not all key elements of the E/M (history, physical exam, and MDM). Additional findings are as noted. I have personally seen and evaluated the patient. I find the patient's history and physical exam are consistent with the NP/PA documentation. I agree with the care provided, treatment rendered, disposition and follow-up plan. Critical Care     Dang Gonsales M.D.   Attending Emergency  Physician              Hernandez Byrnes MD  12/06/21 7471

## 2021-12-06 NOTE — ED PROVIDER NOTES
[address]  ED  Emergency Department Encounter  Emergency Medicine Resident     Pt Oriana Koroma  MRN: [id number]  [place] 1960  Date of evaluation: 12/6/21  PCP:  No primary care provider on file. CHIEF COMPLAINT       Chief Complaint   Patient presents with    Suicidal     no plan       HISTORY OF PRESENT ILLNESS  (Location/Symptom, Timing/Onset, Context/Setting, Quality, Duration, Modifying Factors, Severity.)      Oscar Holly is a 57 y.o. male who presents with suicidal thoughts without plan. Patient is homeless, is not able to state tell where he has previously been staying as he has not been paying a bill. Notes that it is cold outside he has thoughts of hurting himself however does not have a plan and has not taken any action to hurt himself. Also has thoughts of hurting nonspecific people with a firearm however has no access to firearms. Denies any recent drug use, reportedly compliant on all medications. Reportedly following up as planned from recent inpatient psychiatric admission. No other concerns. PAST MEDICAL / SURGICAL / SOCIAL / FAMILY HISTORY      has a past medical history of Bipolar disorder ([address] Depression, GERD (gastroesophageal reflux disease), Hallucinations, Headache(784.0), Hepatitis, Schizophrenia, schizo-affective ([address] Substance abuse ([address] Tobacco abuse, Type II or unspecified type diabetes mellitus without mention of complication, not stated as uncontrolled, and Urinary incontinence. has a past surgical history that includes Dental surgery and Abscess Drainage (N/A, 02/11/2018).     Social History     Socioeconomic History    Marital status: Single     Spouse name: Not on file    Number of children: 0    Years of education: 8    Highest education level: Not on file   Occupational History     Employer: N/A   Tobacco Use    Smoking status: Current Every Day Smoker     Packs/day: 1.00     Years: 47.00     Pack years: 47.00 Types: Cigarettes    Smokeless tobacco: Never Used    Tobacco comment: Patient accepting of nicotine patch   Substance and Sexual Activity    Alcohol use: Yes     Comment: drinks beer three times a week    Drug use: Yes     Frequency: 7.0 times per week     Types: Cocaine     Comment: hx crack cocaine abuse and last used 3 weeks ago    Sexual activity: Not on file   Other Topics Concern    Not on file   Social History Narrative    Not on file     Social Determinants of Health     Financial Resource Strain:     Difficulty of Paying Living Expenses: Not on file   Food Insecurity:     Worried About Running Out of Food in the Last Year: Not on file    Tyree of Food in the Last Year: Not on file   Transportation Needs:     Lack of Transportation (Medical): Not on file    Lack of Transportation (Non-Medical):  Not on file   Physical Activity:     Days of Exercise per Week: Not on file    Minutes of Exercise per Session: Not on file   Stress:     Feeling of Stress : Not on file   Social Connections:     Frequency of Communication with Friends and Family: Not on file    Frequency of Social Gatherings with Friends and Family: Not on file    Attends Hinduism Services: Not on file    Active Member of [address] [place] [address] [employer] or Organizations: Not on file    Attends Club or Organization Meetings: Not on file    Marital Status: Not on file   Intimate Partner Violence:     Fear of Current or Ex-Partner: Not on file    Emotionally Abused: Not on file    Physically Abused: Not on file    Sexually Abused: Not on file   Housing Stability:     Unable to Pay for Housing in the Last Year: Not on file    Number of [place] in the Last Year: Not on file    Unstable Housing in the Last Year: Not on file       Family History   Problem Relation Age of Onset    Diabetes Mother     Heart Disease Mother        Allergies:  Navane [thiothixene]    Home Medications:  Prior to Admission medications    Medication Sig Start Date End Date Taking? Authorizing Provider   Cholecalciferol (VITAMIN D3) 25 MCG TABS Take 1 tablet by mouth daily 11/11/21   Brent Borrero MD   escitalopram (LEXAPRO) 5 MG tablet Take 3 tablets by mouth daily 11/11/21   Brent Borrero MD   QUEtiapine (SEROQUEL) 400 MG tablet Take 1 tablet by mouth nightly 11/11/21   Brent Borrero MD   traZODone (DESYREL) 50 MG tablet Take 1 tablet by mouth nightly as needed for Sleep 11/11/21   Brent Borrero MD       REVIEW OF SYSTEMS    (2-9 systems for level 4, 10 or more for level 5)      Review of Systems   Constitutional: Negative for fever. HENT: Negative for sore throat. Eyes: Negative for visual disturbance. Respiratory: Negative for shortness of breath. Cardiovascular: Negative for chest pain. Gastrointestinal: Negative for abdominal pain, constipation, diarrhea, nausea and vomiting. Genitourinary: Negative for decreased urine volume. Musculoskeletal: Negative for arthralgias and myalgias. Skin: Negative for wound. Neurological: Negative for weakness, light-headedness and headaches. Psychiatric/Behavioral: Positive for suicidal ideas. Negative for confusion. PHYSICAL EXAM   (up to 7 for level 4, 8 or more for level 5)      INITIAL VITALS:   /74   Pulse 87   Temp 98.1 °F (36.7 °C) (Oral)   Resp 16   SpO2 99%     Physical Exam  Vitals and nursing note reviewed. Constitutional:       Appearance: Normal appearance. He is well-developed. HENT:      Head: Normocephalic and atraumatic. Right Ear: External ear normal.      Left Ear: External ear normal.      Nose: Nose normal.   Eyes:      General:         Right eye: No discharge. Left eye: No discharge. Neck:      Trachea: Trachea normal. No tracheal deviation. Cardiovascular:      Rate and Rhythm: Normal rate and regular rhythm. Pulmonary:      Effort: Pulmonary effort is normal. No respiratory distress. Breath sounds: Normal breath sounds.    Abdominal: return precautions, and follow-up. Patient understood all educated materials with all questions answered to their satisfaction. PROCEDURES:  None    CONSULTS:  None    CRITICAL CARE:  None    FINAL IMPRESSION      1.  Suicidal thoughts          DISPOSITION / PLAN     DISPOSITION Decision To Discharge 12/06/2021 07:15:00 PM      PATIENT REFERRED TO:  Jackson NOLAN  [address] [address]  [phone]  Schedule an appointment as soon as possible for a visit   To establish care, Regarding this visit    [facility]  [address] [place] [address]  [phone]  Go to   If symptoms worsen      DISCHARGE MEDICATIONS:  Discharge Medication List as of 12/6/2021  7:16 PM          Edelmira Jama MD  Emergency Medicine Resident    (Please note that portions of this note were completed with a voice recognition program.  Efforts were made to edit the dictations but occasionally words are mis-transcribed.)        Edelmira Jama MD  Resident  12/07/21 9638

## 2021-12-06 NOTE — ED NOTES
Resident stated patient does not meet inpatient tx, is agreeable to discharge to [facility] or [facility]. Writer met with patient who stated he ran out of money due to unnamed cause & lost his hotel room Friday 12/3/21. Patient stated [facility] is working on housing for him & he has an upcoming appointment of unknown date. Writer offered transport to [facility], patient was agreeable & stated he feels he will be safe in transport. Writer to voucher patient once discharged. Writer spoke with Beryle Grays who stated ED must create safety plan for patient before they will accept him. Writer to discuss use of community resources with patient.       TAVO Mcdonald  12/06/21 1889

## 2021-12-07 ENCOUNTER — HOSPITAL ENCOUNTER (INPATIENT)
Age: 61
LOS: 16 days | Discharge: HOME | DRG: 750 | End: 2021-12-23
Attending: PSYCHIATRY & NEUROLOGY | Admitting: PSYCHIATRY & NEUROLOGY
Payer: MEDICAID

## 2021-12-07 VITALS
RESPIRATION RATE: 18 BRPM | WEIGHT: 180 LBS | HEART RATE: 75 BPM | SYSTOLIC BLOOD PRESSURE: 141 MMHG | DIASTOLIC BLOOD PRESSURE: 81 MMHG | OXYGEN SATURATION: 97 % | BODY MASS INDEX: 23.11 KG/M2 | TEMPERATURE: 97.6 F

## 2021-12-07 LAB
ABSOLUTE EOS #: 0.09 K/UL (ref 0–0.44)
ABSOLUTE IMMATURE GRANULOCYTE: <0.03 K/UL (ref 0–0.3)
ABSOLUTE LYMPH #: 2.25 K/UL (ref 1.1–3.7)
ABSOLUTE MONO #: 0.52 K/UL (ref 0.1–1.2)
ALBUMIN SERPL-MCNC: 4 G/DL (ref 3.5–5.2)
ALBUMIN/GLOBULIN RATIO: 1.5 (ref 1–2.5)
ALP BLD-CCNC: 125 U/L (ref 40–129)
ALT SERPL-CCNC: 9 U/L (ref 5–41)
AMPHETAMINE SCREEN URINE: NEGATIVE
ANION GAP SERPL CALCULATED.3IONS-SCNC: 10 MMOL/L (ref 9–17)
AST SERPL-CCNC: 16 U/L
BARBITURATE SCREEN URINE: NEGATIVE
BASOPHILS # BLD: 1 % (ref 0–2)
BASOPHILS ABSOLUTE: 0.03 K/UL (ref 0–0.2)
BENZODIAZEPINE SCREEN, URINE: NEGATIVE
BILIRUB SERPL-MCNC: 0.2 MG/DL (ref 0.3–1.2)
BUN BLDV-MCNC: 15 MG/DL (ref 8–23)
BUN/CREAT BLD: ABNORMAL (ref 9–20)
BUPRENORPHINE URINE: ABNORMAL
CALCIUM SERPL-MCNC: 9 MG/DL (ref 8.6–10.4)
CANNABINOID SCREEN URINE: NEGATIVE
CHLORIDE BLD-SCNC: 106 MMOL/L (ref 98–107)
CO2: 26 MMOL/L (ref 20–31)
COCAINE METABOLITE, URINE: POSITIVE
CREAT SERPL-MCNC: 1.08 MG/DL (ref 0.7–1.2)
DIFFERENTIAL TYPE: ABNORMAL
EOSINOPHILS RELATIVE PERCENT: 2 % (ref 1–4)
ETHANOL PERCENT: <0.01 %
ETHANOL: <10 MG/DL
GFR AFRICAN AMERICAN: >60 ML/MIN
GFR NON-AFRICAN AMERICAN: >60 ML/MIN
GFR SERPL CREATININE-BSD FRML MDRD: ABNORMAL ML/MIN/{1.73_M2}
GFR SERPL CREATININE-BSD FRML MDRD: ABNORMAL ML/MIN/{1.73_M2}
GLUCOSE BLD-MCNC: 74 MG/DL (ref 70–99)
HCT VFR BLD CALC: 41.1 % (ref 40.7–50.3)
HEMOGLOBIN: 12.8 G/DL (ref 13–17)
IMMATURE GRANULOCYTES: 0 %
LYMPHOCYTES # BLD: 46 % (ref 24–43)
MCH RBC QN AUTO: 27.6 PG (ref 25.2–33.5)
MCHC RBC AUTO-ENTMCNC: 31.1 G/DL (ref 28.4–34.8)
MCV RBC AUTO: 88.8 FL (ref 82.6–102.9)
MDMA URINE: ABNORMAL
METHADONE SCREEN, URINE: NEGATIVE
METHAMPHETAMINE, URINE: ABNORMAL
MONOCYTES # BLD: 11 % (ref 3–12)
NRBC AUTOMATED: 0 PER 100 WBC
OPIATES, URINE: NEGATIVE
OXYCODONE SCREEN URINE: NEGATIVE
PDW BLD-RTO: 14 % (ref 11.8–14.4)
PHENCYCLIDINE, URINE: NEGATIVE
PLATELET # BLD: 358 K/UL (ref 138–453)
PLATELET ESTIMATE: ABNORMAL
PMV BLD AUTO: 8.9 FL (ref 8.1–13.5)
POTASSIUM SERPL-SCNC: 4.1 MMOL/L (ref 3.7–5.3)
PROPOXYPHENE, URINE: ABNORMAL
RBC # BLD: 4.63 M/UL (ref 4.21–5.77)
RBC # BLD: ABNORMAL 10*6/UL
SARS-COV-2, RAPID: NOT DETECTED
SEG NEUTROPHILS: 40 % (ref 36–65)
SEGMENTED NEUTROPHILS ABSOLUTE COUNT: 1.92 K/UL (ref 1.5–8.1)
SODIUM BLD-SCNC: 142 MMOL/L (ref 135–144)
SPECIMEN DESCRIPTION: NORMAL
TEST INFORMATION: ABNORMAL
TOTAL PROTEIN: 6.6 G/DL (ref 6.4–8.3)
TRICYCLIC ANTIDEPRESSANTS, UR: ABNORMAL
WBC # BLD: 4.8 K/UL (ref 3.5–11.3)
WBC # BLD: ABNORMAL 10*3/UL

## 2021-12-07 PROCEDURE — 6370000000 HC RX 637 (ALT 250 FOR IP): Performed by: PSYCHIATRY & NEUROLOGY

## 2021-12-07 PROCEDURE — APPSS60 APP SPLIT SHARED TIME 46-60 MINUTES

## 2021-12-07 PROCEDURE — 6370000000 HC RX 637 (ALT 250 FOR IP)

## 2021-12-07 PROCEDURE — 1240000000 HC EMOTIONAL WELLNESS R&B

## 2021-12-07 PROCEDURE — 80307 DRUG TEST PRSMV CHEM ANLYZR: CPT

## 2021-12-07 RX ORDER — QUETIAPINE FUMARATE 200 MG/1
200 TABLET, FILM COATED ORAL NIGHTLY
Status: DISCONTINUED | OUTPATIENT
Start: 2021-12-07 | End: 2021-12-09

## 2021-12-07 RX ORDER — QUETIAPINE FUMARATE 200 MG/1
400 TABLET, FILM COATED ORAL NIGHTLY
Status: DISCONTINUED | OUTPATIENT
Start: 2021-12-07 | End: 2021-12-07

## 2021-12-07 RX ORDER — HYDROXYZINE 50 MG/1
50 TABLET, FILM COATED ORAL 3 TIMES DAILY PRN
Status: DISCONTINUED | OUTPATIENT
Start: 2021-12-07 | End: 2021-12-23 | Stop reason: HOSPADM

## 2021-12-07 RX ORDER — TRAZODONE HYDROCHLORIDE 50 MG/1
50 TABLET ORAL NIGHTLY PRN
Status: DISCONTINUED | OUTPATIENT
Start: 2021-12-07 | End: 2021-12-12

## 2021-12-07 RX ORDER — ACETAMINOPHEN 325 MG/1
650 TABLET ORAL EVERY 4 HOURS PRN
Status: DISCONTINUED | OUTPATIENT
Start: 2021-12-07 | End: 2021-12-23 | Stop reason: HOSPADM

## 2021-12-07 RX ORDER — ESCITALOPRAM OXALATE 10 MG/1
5 TABLET ORAL DAILY
Status: DISCONTINUED | OUTPATIENT
Start: 2021-12-07 | End: 2021-12-09

## 2021-12-07 RX ORDER — VITAMIN B COMPLEX
1000 TABLET ORAL DAILY
Status: DISCONTINUED | OUTPATIENT
Start: 2021-12-07 | End: 2021-12-23 | Stop reason: HOSPADM

## 2021-12-07 RX ORDER — ESCITALOPRAM OXALATE 10 MG/1
15 TABLET ORAL DAILY
Status: DISCONTINUED | OUTPATIENT
Start: 2021-12-07 | End: 2021-12-07

## 2021-12-07 RX ORDER — IBUPROFEN 400 MG/1
400 TABLET ORAL EVERY 6 HOURS PRN
Status: DISCONTINUED | OUTPATIENT
Start: 2021-12-07 | End: 2021-12-23 | Stop reason: HOSPADM

## 2021-12-07 RX ORDER — MAGNESIUM HYDROXIDE/ALUMINUM HYDROXICE/SIMETHICONE 120; 1200; 1200 MG/30ML; MG/30ML; MG/30ML
30 SUSPENSION ORAL EVERY 6 HOURS PRN
Status: DISCONTINUED | OUTPATIENT
Start: 2021-12-07 | End: 2021-12-23 | Stop reason: HOSPADM

## 2021-12-07 RX ORDER — POLYETHYLENE GLYCOL 3350 17 G/17G
17 POWDER, FOR SOLUTION ORAL DAILY PRN
Status: DISCONTINUED | OUTPATIENT
Start: 2021-12-07 | End: 2021-12-23 | Stop reason: HOSPADM

## 2021-12-07 RX ADMIN — Medication 1000 UNITS: at 15:14

## 2021-12-07 RX ADMIN — QUETIAPINE FUMARATE 200 MG: 200 TABLET ORAL at 22:20

## 2021-12-07 RX ADMIN — HYDROXYZINE HYDROCHLORIDE 50 MG: 50 TABLET, FILM COATED ORAL at 22:20

## 2021-12-07 RX ADMIN — ESCITALOPRAM OXALATE 5 MG: 10 TABLET ORAL at 15:14

## 2021-12-07 ASSESSMENT — SLEEP AND FATIGUE QUESTIONNAIRES
AVERAGE NUMBER OF SLEEP HOURS: 4
DO YOU HAVE DIFFICULTY SLEEPING: YES
RESTFUL SLEEP: NO
SLEEP PATTERN: DIFFICULTY FALLING ASLEEP;DISTURBED/INTERRUPTED SLEEP;INSOMNIA;RESTLESSNESS
DIFFICULTY ARISING: NO
DIFFICULTY STAYING ASLEEP: YES
DO YOU USE A SLEEP AID: NO
DIFFICULTY FALLING ASLEEP: NO

## 2021-12-07 ASSESSMENT — PATIENT HEALTH QUESTIONNAIRE - PHQ9: SUM OF ALL RESPONSES TO PHQ QUESTIONS 1-9: 16

## 2021-12-07 ASSESSMENT — ENCOUNTER SYMPTOMS
NAUSEA: 0
VOMITING: 0
SHORTNESS OF BREATH: 0
COLOR CHANGE: 0
ABDOMINAL PAIN: 0

## 2021-12-07 ASSESSMENT — LIFESTYLE VARIABLES: HISTORY_ALCOHOL_USE: NO

## 2021-12-07 ASSESSMENT — PAIN SCALES - GENERAL: PAINLEVEL_OUTOF10: 0

## 2021-12-07 NOTE — ED NOTES
[facility] so far unable to find transportation, writer canceled trip. Writer contacted several local EMS providers, no crews available. Writer contacted [employer], waiting for ETA.       TAVO Chaudhary  12/07/21 [id number]

## 2021-12-07 NOTE — BH NOTE
[facility]  Admission Note     Admission Type:   Admission Type: Voluntary    Reason for admission:  Reason for Admission: SI to get hit by car or shoot self, can longer longer stay at [facility] and took self to [facility]. HYUN's    PATIENT STRENGTHS:  Strengths: Social Skills, Positive Support    Patient Strengths and Limitations:  Limitations: Perceives need for assistance with self-care, General negative or hopeless attitude about future/recovery, Inappropriate/potentially harmful leisure interests, Hopeless about future, Lacks leisure interests, Difficulty problem solving/relies on others to help solve problems    Addictive Behavior:   Addictive Behavior  In the past 3 months, have you felt or has someone told you that you have a problem with:  : None  Do you have a history of Chemical Use?: No  Do you have a history of Alcohol Use?: No  Do you have a history of Street Drug Abuse?: Yes (\"crack\")  Histroy of Prescripton Drug Abuse?: No    Medical Problems:   Past Medical History:   Diagnosis Date    B[facility] disorder ([facility]     Depression     GERD (gastroesophageal reflux disease)     Hallucinations     Headache(784.0)     Hepatitis     Schizophrenia, schizo-affective ([facility]     Substance abuse ([facility]     Tobacco abuse     Type II or unspecified type diabetes mellitus without mention of complication, not stated as uncontrolled     Urinary incontinence        Status EXAM:  Status and Exam  Normal: No  Facial Expression: Flat, Worried  Affect: Blunt  Level of Consciousness: Alert  Mood:Normal: No  Mood: Depressed, Anxious, Helpless, Sad  Motor Activity:Normal: No  Motor Activity: Decreased  Interview Behavior: Cooperative  Preception: [place] to Person, [place] to Time, [place] to Place, [place] to Situation  Attention:Normal: No  Attention: Distractible  Thought Processes: Circumstantial  Thought Content:Normal: No  Thought Content: Preoccupations  Hallucinations: None  Delusions: No  Memory:Normal: No  Memory: Poor Recent, Poor Remote  Insight and Judgment: No  Insight and Judgment: Poor Judgment, Poor Insight  Present Suicidal Ideation: Yes (CONTRACTS)  Present Homicidal Ideation: No    Tobacco Screening:  Practical Counseling, on admission, corby X, if applicable and completed (first 3 are required if patient doesn't refuse):            ( )  Recognizing danger situations (included triggers and roadblocks)                    ( )  Coping skills (new ways to manage stress, exercise, relaxation techniques, changing routine, distraction)                                                           ( )  Basic information about quitting (benefits of quitting, techniques in how to quit, available resources  ( ) Referral for counseling faxed to [facility]                                           (X) Patient refused counseling  ( ) Patient has not smoked in the last 30 days    Metabolic Screening:    Lab Results   Component Value Date    LABA1C 5.0 10/13/2021       Lab Results   Component Value Date    CHOL 150 10/13/2021    CHOL 167 08/11/2020    CHOL 179 02/13/2017    CHOL 127 05/09/2015    CHOL 168 08/20/2014    CHOL 158 12/31/2013    CHOL 178 08/15/2013    CHOL 166 09/17/2012    CHOL 210 (H) 02/03/2012     Lab Results   Component Value Date    TRIG 41 10/13/2021    TRIG 43 08/11/2020    TRIG 67 02/13/2017    TRIG 37 05/09/2015    TRIG 72 08/20/2014    TRIG 52 12/31/2013    TRIG 70 08/15/2013    TRIG 117 09/17/2012    TRIG 94 02/03/2012     Lab Results   Component Value Date    HDL 62 10/13/2021    HDL 74 08/11/2020    HDL 73 02/13/2017    HDL 57 05/09/2015    HDL 50 08/20/2014    HDL 43 12/31/2013    HDL 42 08/15/2013    HDL 38 (L) 09/17/2012    HDL 55 02/03/2012     No components found for: LDLCAL  No results found for: LABVLDL      Body mass index is 25.68 kg/m².     BP Readings from Last 2 Encounters:   12/07/21 117/61   12/07/21 (!) 141/81           Pt admitted with followings belongings: Patient's home medications were verified. Patient oriented to surroundings and program expectations and copy of patient rights given. Received admission packet:  Yes. Consents reviewed, signed all. Refused none. Patient verbalize understanding: yes. Patient education on precautions: yes. Patient went to [facility]. [facility] after finding out he couldn't return to [facility] and being homeless. Recent crack use on 12/5/21 per patient. Admits to suicidal ideations upon admission and increased anxiety/depression. Contracts for safety on unit. Wants placement upon discharge. Cooperative, signed all admission paperwork. Routines started. Nurse practitioner notified of admission.                    Lorelei Gallardo RN

## 2021-12-07 NOTE — BH NOTE
Patient given tobacco quitline number [phone] at this time. With nurse observation patient called number for information and follow up. Continue to reinforce the dangers of long term tobacco use and why tobacco cessation is important to patient.

## 2021-12-07 NOTE — PLAN OF CARE
[facility]  Initial Interdisciplinary Treatment Plan NO      Original treatment plan Date & Time: 12/7/2021 1256    Admission Type:  Admission Type: Voluntary    Reason for admission:   Reason for Admission: SI to get hit by car or shoot self, can longer longer stay at [facility] and took self to [address] [place] [address]. V's    Estimated Length of Stay:  5-7days  Estimated Discharge Date: to be determined by physician    PATIENT STRENGTHS:  Patient Strengths:Strengths: Social Skills, Positive Support  Patient Strengths and Limitations:Limitations: Perceives need for assistance with self-care, General negative or hopeless attitude about future/recovery, Inappropriate/potentially harmful leisure interests, Hopeless about future, Lacks leisure interests, Difficulty problem solving/relies on others to help solve problems  Addictive Behavior: Addictive Behavior  In the past 3 months, have you felt or has someone told you that you have a problem with:  : None  Do you have a history of Chemical Use?: No  Do you have a history of Alcohol Use?: No  Do you have a history of Street Drug Abuse?: Yes (\"crack\")  Histroy of Prescripton Drug Abuse?: No  Medical Problems:  Past Medical History:   Diagnosis Date    Bipolar disorder ([facility] [address]     Depression     GERD (gastroesophageal reflux disease)     Hallucinations     Headache(784.0)     Hepatitis     Schizophrenia, schizo-affective ([facility] [address]     Substance abuse ([facility] [address]     Tobacco abuse     Type II or unspecified type diabetes mellitus without mention of complication, not stated as uncontrolled     Urinary incontinence      Status EXAM:Status and Exam  Normal: No  Facial Expression: Flat, Worried  Affect: Blunt  Level of Consciousness: Alert  Mood:Normal: No  Mood: Depressed, Anxious, Helpless, Sad  Motor Activity:Normal: No  Motor Activity: Decreased  Interview Behavior: Cooperative  Preception: [place] to Person, [place] to Time, [place] to Place, [place] to Situation  Attention:Normal: No  Attention: Distractible  Thought Processes: Circumstantial  Thought Content:Normal: No  Thought Content: Preoccupations  Hallucinations: None  Delusions: No  Memory:Normal: No  Memory: Poor Recent, Poor Remote  Insight and Judgment: No  Insight and Judgment: Poor Judgment, Poor Insight  Present Suicidal Ideation: Yes (CONTRACTS)  Present Homicidal Ideation: No    EDUCATION:   Learner Progress Toward Treatment Goals: reviewed group plans and strategies for care    Method:group therapy, medication compliance, individualized assessments and care planning    Outcome: needs reinforcement    PATIENT GOALS: to be discussed with patient within 72 hours    PLAN/TREATMENT RECOMMENDATIONS:     continue group therapy , medications compliance, goal setting, individualized assessments and care, continue to monitor pt on unit      SHORT-TERM GOALS:   Time frame for Short-Term Goals: 5-7 days    LONG-TERM GOALS:  Time frame for Long-Term Goals: 6 months  Members Present in Team Meeting: See Signature Sheet    Chen Bravo

## 2021-12-07 NOTE — ED PROVIDER NOTES
[facility]     Emergency Department     Faculty Attestation    I performed a history and physical examination of the patient and discussed management with the resident. I reviewed the residents note and agree with the documented findings and plan of care. Any areas of disagreement are noted on the chart. I was personally present for the key portions of any procedures. I have documented in the chart those procedures where I was not present during the key portions. I have reviewed the emergency nurses triage note. I agree with the chief complaint, past medical history, past surgical history, allergies, medications, social and family history as documented unless otherwise noted below. For Physician Assistant/ Nurse Practitioner cases/documentation I have personally evaluated this patient and have completed at least one if not all key elements of the E/M (history, physical exam, and MDM). Additional findings are as noted. I have personally seen and evaluated the patient. I find the patient's history and physical exam are consistent with the NP/PA documentation. I agree with the care provided, treatment rendered, disposition and follow-up plan.  to Marbin Galarza , this is a recurerent c presentation for this patient who has multiple pschosocail issues and concerns       Critical Care     Orlando Riggs M.D.   Attending Emergency  Physician              Garett Alva MD  12/06/21 6837

## 2021-12-07 NOTE — ED NOTES
The following labs labeled with pt sticker and tubed to lab:     [] Blue     [x] Lavender   [] on ice  [x] Green/yellow  [] Green/black [] on ice  [] Yellow  [] Red  [] Pink      [x] COVID-19 swab    [] Rapid  [] PCR  [] Peds Viral Panel     [] Urine Sample  [] Pelvic Cultures  [] Blood Cultures            Cristina Recio RN  12/07/21 0001

## 2021-12-07 NOTE — ED PROVIDER NOTES
[facility]  ED  Emergency Department Encounter  Emergency Medicine Resident     Pt Name: Evy Joyner  MRN: [id number]  Sarah 1960  Date of evaluation: 12/7/21  PCP:  No primary care provider on file. CHIEF COMPLAINT       Chief Complaint   Patient presents with    Suicidal       HISTORY Dina  (Location/Symptom, Timing/Onset, Context/Setting, Quality, Duration, Modifying Letta Little.)      Evy Joyner is a 57 y.o. male with history of bipolar disorder, depression, schizophrenia who presents with suicidal ideation. Patient states he has had suicidal ideation for the last 2 days with a plan to cut himself however he has not acted on this plan. Currently patient denies drugs or alcohol today. No other medical complaints denies fevers, chills, chest pain, shortness breath, abdominal pain, nausea, vomiting. PAST MEDICAL / SURGICAL / SOCIAL / FAMILY HISTORY      has a past medical history of Bipolar disorder ([address] Depression, GERD (gastroesophageal reflux disease), Hallucinations, Headache(784.0), Hepatitis, Schizophrenia, schizo-affective ([address] Substance abuse ([facility] [address] Tobacco abuse, Type II or unspecified type diabetes mellitus without mention of complication, not stated as uncontrolled, and Urinary incontinence. has a past surgical history that includes Dental surgery and Abscess Drainage (N/A, 02/11/2018).     Social History     Socioeconomic History    Marital status: Single     Spouse name: Not on file    Number of children: 0    Years of education: 8    Highest education level: Not on file   Occupational History     Employer: N/A   Tobacco Use    Smoking status: Current Every Day Smoker     Packs/day: 1.00     Years: 47.00     Pack years: 47.00     Types: Cigarettes    Smokeless tobacco: Never Used    Tobacco comment: Patient accepting of nicotine patch   Substance and Sexual Activity    Alcohol use: Yes     Comment: drinks beer three times a week    Drug use: Yes     Frequency: 7.0 times per week     Types: Cocaine     Comment: hx crack cocaine abuse and last used 3 weeks ago    Sexual activity: Not on file   Other Topics Concern    Not on file   Social History Narrative    Not on file     Social Determinants of Health     Financial Resource Strain:     Difficulty of Paying Living Expenses: Not on file   Food Insecurity:     Worried About Running Out of Food in the Last Year: Not on file    Tyree of Food in the Last Year: Not on file   Transportation Needs:     Lack of Transportation (Medical): Not on file    Lack of Transportation (Non-Medical): Not on file   Physical Activity:     Days of Exercise per Week: Not on file    Minutes of Exercise per Session: Not on file   Stress:     Feeling of Stress : Not on file   Social Connections:     Frequency of Communication with Friends and Family: Not on file    Frequency of Social Gatherings with Friends and Family: Not on file    Attends Alevism Services: Not on file    Active Member of [address] [place] [address] [employer] or Organizations: Not on file    Attends Club or Organization Meetings: Not on file    Marital Status: Not on file   Intimate Partner Violence:     Fear of Current or Ex-Partner: Not on file    Emotionally Abused: Not on file    Physically Abused: Not on file    Sexually Abused: Not on file   Housing Stability:     Unable to Pay for Housing in the Last Year: Not on file    Number of [place] in the Last Year: Not on file    Unstable Housing in the Last Year: Not on file       Family History   Problem Relation Age of Onset    Diabetes Mother     Heart Disease Mother        Allergies:  Navane [thiothixene]    Home Medications:  Prior to Admission medications    Medication Sig Start Date End Date Taking?  Authorizing Provider   Cholecalciferol (VITAMIN D3) 25 MCG TABS Take 1 tablet by mouth daily 11/11/21   Jose Ross MD   escitalopram (LEXAPRO) 5 MG tablet Take 3 tablets by mouth daily 11/11/21   Sadi Ly MD   QUEtiapine (SEROQUEL) 400 MG tablet Take 1 tablet by mouth nightly 11/11/21   Sadi Ly MD   traZODone (DESYREL) 50 MG tablet Take 1 tablet by mouth nightly as needed for Sleep 11/11/21   Sadi Ly MD       REVIEW OF SYSTEMS    (2-9 systems for level 4, 10 or more for level 5)      Review of Systems   Constitutional: Negative for fever. Respiratory: Negative for shortness of breath. Cardiovascular: Negative for chest pain. Gastrointestinal: Negative for abdominal pain, nausea and vomiting. Musculoskeletal: Negative for myalgias. Skin: Negative for color change and rash. Neurological: Negative for weakness, numbness and headaches. Psychiatric/Behavioral: Positive for suicidal ideas. PHYSICAL EXAM   (up to 7 for level 4, 8 or more for level 5)     INITIAL VITALS:    weight is 180 lb (81.6 kg). His oral temperature is 98.3 °F (36.8 °C). His blood pressure is 121/79 and his pulse is 85. His respiration is 18 and oxygen saturation is 99%. Physical Exam  Constitutional:       General: He is not in acute distress. Appearance: Normal appearance. He is not ill-appearing or toxic-appearing. HENT:      Nose: Nose normal. No rhinorrhea. Mouth/Throat:      Mouth: Mucous membranes are moist.      Pharynx: Oropharynx is clear. No oropharyngeal exudate. Cardiovascular:      Rate and Rhythm: Normal rate and regular rhythm. Pulses: Normal pulses. Heart sounds: No murmur heard. Pulmonary:      Effort: Pulmonary effort is normal. No respiratory distress. Breath sounds: Normal breath sounds. No wheezing. Abdominal:      General: Abdomen is flat. There is no distension. Palpations: Abdomen is soft. Tenderness: There is no abdominal tenderness. There is no guarding. Musculoskeletal:         General: No tenderness. Right lower leg: No edema. Left lower leg: No edema.    Skin:     General: Skin is warm and dry.      Capillary Refill: Capillary refill takes less than 2 seconds. Findings: No rash. Neurological:      General: No focal deficit present. Mental Status: He is alert and oriented to person, place, and time. Gait: Gait normal.         DIFFERENTIAL  DIAGNOSIS     PLAN (LABS / IMAGING / EKG):  Orders Placed This Encounter   Procedures    COVID-19, Rapid    CBC WITH AUTO DIFFERENTIAL    COMPREHENSIVE METABOLIC PANEL    ETHANOL    Urine Drug Screen    Inpatient consult to Social Work       MEDICATIONS ORDERED:  No orders of the defined types were placed in this encounter. DDX: Suicidal ideation, depression    Initial MDM/Plan: 57 y.o. male who presents with complaints of suicidal ideation with plan to stab himself however does not act on this plan. Seen and examined, no acute distress. Vital signs are normal.  Physical exam benign. Patient is agreeable to laboratory work and voluntary psychiatric hospitalization and treatment. DIAGNOSTIC RESULTS / EMERGENCY DEPARTMENT COURSE / MDM     LABS:  Labs Reviewed   CBC WITH AUTO DIFFERENTIAL - Abnormal; Notable for the following components:       Result Value    Hemoglobin 12.8 (*)     Lymphocytes 46 (*)     All other components within normal limits   COMPREHENSIVE METABOLIC PANEL - Abnormal; Notable for the following components: Total Bilirubin 0.20 (*)     All other components within normal limits   URINE DRUG SCREEN - Abnormal; Notable for the following components:    Cocaine Metabolite, Urine POSITIVE (*)     All other components within normal limits   COVID-19, RAPID   ETHANOL         RADIOLOGY:  No results found. EMERGENCY DEPARTMENT COURSE:     Laboratory work returned and is unremarkable. At this point patient is medically cleared for transportation for psychiatric hospitalization and treatment.     PROCEDURES:  None    CONSULTS:  IP CONSULT TO SOCIAL WORK    CRITICAL CARE:  Please see attending note    FINAL IMPRESSION 1. Suicidal ideation          DISPOSITION / PLAN     DISPOSITION Decision To Transfer 12/07/2021 01:41:17 AM        PATIENTREFERRED TO:  No follow-up provider specified.     DISCHARGE MEDICATIONS:  New Prescriptions    No medications on file       Cecilia Curiel DO  EmergencyMedicine Resident    (Please note that portions of this note were completed with a voice recognition program.  Efforts were made to edit the dictations but occasionally words are mis-transcribed.)        Cecilia Curiel DO  Resident  12/07/21 9810

## 2021-12-07 NOTE — CARE COORDINATION
[facility] Biopsychosocial Assessment    Current Level of Psychosocial Functioning     Independent xx  Dependent    Minimal Assist     Comments:    Psychosocial High Risk Factors (check all that apply)    Unable to obtain meds   Chronic illness/pain    Substance abuse   Lack of Family Support   Financial stress   Isolation   Inadequate Community Resources  Suicide attempt(s)  Not taking medications   Victim of crime   Developmental Delay  Unable to manage personal needs    Age 72 or older   Homeless xx  No transportation   Readmission within 30 days xx  Unemployment xx  Traumatic Event    Comments:   Psychiatric Advanced Directives: n/a    Family to Involve in Treatment: pt sister is supportive     Sexual Orientation:  n/a    Patient Strengths: pt has newly established guardianship (sister), pt has payee (soc security benefits)    Patient Barriers: pt has history of multiple psychiatric hospitalizations, is currently homeless       Opiate Education Provided:  Pt denies opiate abuse, has history of cocaine abuse       CMHC/mental health history: Pt is linked with [facility]. Plan of Care   medication management, group/individual therapies, family meetings, psycho -education, treatment team meetings to assist with stabilization    Initial Discharge Plan:  Pt guardian/sister Cade Whelan wants ECF placement. Clinical Summary:  Savannah Correa is a 57year old single male who has been admitted to [address] [place] [address] with report of increase in depression, auditory hallucinations. SW met with patient this date date, who states he has been living in a hotel on/off, has been pursuing housing options with [facility]. SW informed pt sister Yonas Renee has been awarded legal guardianship of patient, SW verified with PAUL VALLEJO [facility] court, filed probate letter in pt chart. Cade Whelan offered verbal consent to admit/treat at hospital including injectable medications.  Cade Whelan states her desired d/c plan is for pt to admit to secured unit at nursing facility to promote pt safety and stability. YOGESH completed PASRR this date, submitted via [employer].

## 2021-12-07 NOTE — ED NOTES
[] Sosa    [] [facility]    [x]  [facility] ASSESSMENT      Y  N     [x] [] In the past two weeks have you had thoughts of hurting yourself in any way? [x] [] In the past two weeks have you had thoughts that you would be better off dead? [] [x] Have you made a suicide attempt in the past two months? [x] [] Do you have a plan for hurting yourself or suicide? [] [x] Presence of hallucinations/voices related to hurting himself or herself or someone else. SUICIDE/SECURITY WATCH PRECAUTION CHECKLIST     Orders    [x]  Suicide/Security Watch Precautions initiated as checked below:   12/6/21 7:12 PM EST BH31/BH31A    [x] Notified physician:  Finn Call MD  12/6/21 7:12 PM EST    [x] Orders obtained as appropriate:     [x] 1:1 Observer     [] Psych Consult     [] Psych Consult    Name:  Date:  Time:    [x] 1:1 Observer, Notified by:  Carlos Baig RN    Contact Nurse Supervisor    [x] Remove all personal clothes from room and place in snap/paper gown/pants. Slipper only    [x] Remove all personal belongings from room and secured away from patient. Documentation    [x] Initiate Suicide/Security Watch Precaution Flow Sheet    [x] Initiate individualized Care Plan/Problem    [x] Document why precautions initiated on flow sheet (Initiate Nursing Care Plan/Problem)    [x] 1:1 Observer in place; instructions provided. Suicide precautions require observer be within arms length. [x] Nurse-Observer Communication Hand-off initiated by RN, reviewed with Observer. Subsequently used as Hand Off between Observers. [x] Initiate every 15 minute observations per observer as delegated by the RN.     [x] Initiate RN assessment and documentation    Environmental Scan  Search Criteria and Process: OPTIONAL, see Search Policy    [] Reason for search:    [x] Nursing in presence of second person to search patient    [x] Patient notified of reason for body assessment and belongings search:     Persons present during search:   Results of search and disposition:       Searchers Name: [facility]    These items or items similar should be removed from the room:   [x] Chairs   [x] Telephone   [x] Trash cans and liners   [x] Plastic utensils (order Patient Safety tray)   [x] Empty or remove Sharps containers   [x] All personal clothing/belongings removed   [x] All unnecessary lead wires, electrical cords, draw cords, etc.   [x] Flowers and plants   [x] Double check for lighters, matches, razors, any glass items etc that can be used as weapons. Person completing Checklist: Frances Hanna RN       GENERAL INFORMATION     Y  N     [x] [] Has the patient been informed that they are on a watch and what that means? [x] [] Can the patient get out of Bed without nursing assistance? [x] [] Can the patient use the restroom without nursing assistance? [x] [] Can the patient walk the halls to [place] their legs? \"   [x] [] Does the patient have metal utensils? [x] [] Have the patient's belongings been placed out of control of the patient? [x] [] Have the patient and his/her belongings been checked for contraband? [] [x] Is the patient under any visitor restrictions? If Yes, explain:   [] [x] Is the patient under an alias? [facility] Name:   Authorized visitors (no more than two are to be on the list)   Name/Relationship:   Name/Relationship:    Name of Staff member that you  Received this information from?: [facility]    General Description:    Katrazyna Cohen BH31/BH31A male 57 y.o. Admission weight:      Race: []  [x] Black  []   []   [] [place] [] Other  Facial Hair:  [] Yes  [] No  If yes, please describe: Identifying Marks (i.e. Visible tattoos, scars, etc... ):     NURSING CARE PLAN    Nursing Diagnosis: Risk of Self Directed Harm  [] Actual  [x] Potential  Date Started: 12/6/21      Etiological Factors: (related to)  [x] Expressed or implied suicidal ideation/behavior  [] Depression  [] Suicide attempt      [] Low self-esteem  [] Hallucinations      [] Feeling of Hopelessness  [] Substance abuse or withdrawal    [] Dysfunctional family  [] Major traumatic event, eg., divorce, etc   [] Excessive stress/anxiety    12/6/21    Expected Outcomes    Patient will:   [x] Patient will remain safe for the duration of their stay   [x] Patient's environment will be safe, eg. Free of potential suicide weapons   [] Verbalize Recovery from suicidal episode and improvement in self-worth   [x] Discuss feeling that precipitated suicide attempt/thoughts/behavior   [] Will describe available resources for crisis prevention and management   [] Will verbalize positive coping skills     Nursing Intervention   [x] Assessment and Observations hourly   [x] Suicide Precautions implemented with patient, should be 1:1 observation   [x] Document observation [id number] and RN assessment hourly   [] Consult physician for:    [] Psychiatric consult    [] Pharmacological therapy    [] Other:    [x] Patient search completed by security   [x] Initiated appropriate safety protocols by removing from the patient's environment anything that could be used to inflict self injury, eg. Order safe tray, snap gown, etc   [x] Maintain open, warm, caring, non-judgmental attitude/manner towards patient   [] Discuss advantages and disadvantages of existing coping methods/skills   [x] Assist and educate patient with identifying present strengths and coping skills   [x] Keep patient informed regarding plan of care and provide clear concise explanations. Provide the patient/family education information as well as telephone numbers and other information about crisis centers, hot lines, and counselors.     Discharge Planning:   [] Referral  [] Groups [] Health agencies  [] Other:          Nitin Guzman RN  12/06/21 4399

## 2021-12-07 NOTE — ED NOTES
..The patient has been accepted to the [facility] under the care of Dr MUNIZ

## 2021-12-07 NOTE — ED PROVIDER NOTES
[facility] ED  Emergency Department  Emergency Medicine Resident Sign-out     Care of Oscar Holly was assumed from Dr. Jovi Sparks and is being seen for Suicidal  .  The patient's initial evaluation and plan have been discussed with the prior provider who initially evaluated the patient. EMERGENCY DEPARTMENT COURSE / MEDICAL DECISION MAKING:       MEDICATIONS GIVEN:  No orders of the defined types were placed in this encounter. LABS / RADIOLOGY:     Labs Reviewed   CBC WITH AUTO DIFFERENTIAL - Abnormal; Notable for the following components:       Result Value    Hemoglobin 12.8 (*)     Lymphocytes 46 (*)     All other components within normal limits   COMPREHENSIVE METABOLIC PANEL - Abnormal; Notable for the following components: Total Bilirubin 0.20 (*)     All other components within normal limits   URINE DRUG SCREEN - Abnormal; Notable for the following components:    Cocaine Metabolite, Urine POSITIVE (*)     All other components within normal limits   COVID-19, RAPID   ETHANOL       No results found. RECENT VITALS:     Temp: 97.6 °F (36.4 °C),  Pulse: 75, Resp: 18, BP: (!) 141/81, SpO2: 97 %    This patient is a 57 y.o. Male with past medical history schizophrenia, depression who presents with suicidal ideation. Patient had plan to stab himself but did not act. Patient does have significant psychiatric history and reports he has been compliant with his medications. No medical complaints. [facility] laboratory work is obtained, patient is accepted at [facility] awaiting transportation. Patient did agree to undergo voluntary psychiatric hospitalization and treatment. OUTSTANDING TASKS / RECOMMENDATIONS:    1. Transportation     FINAL IMPRESSION:     1. Suicidal ideation        DISPOSITION:         DISPOSITION:  []  Discharge   [x]  Transfer -  [facility]   []  Admission -     []  Against Medical Advice   []  [facility]   FOLLOW-UP: No follow-up provider specified.    DISCHARGE MEDICATIONS: Discharge Medication List as of 12/7/2021 10:54 AM              Krysta Eid DO  Emergency Medicine Resident  [address] [facility]        Krysat Eid, [facility]  Resident  12/07/21 [address],   Resident  12/08/21 4527

## 2021-12-07 NOTE — ED PROVIDER NOTES
[address] HANDOFF       Handoff taken on the following patient from prior Attending Physician:  Pt Name: Ronan Mccarty  PCP:  No primary care provider on file. Attestation  I was available and discussed any additional care issues that arose and coordinated the management plans with the resident(s) caring for the patient during my duty period. Any areas of disagreement with resident's documentation of care or procedures are noted on the chart. I was personally present for the key portions of any/all procedures during my duty period. I have documented in the chart those procedures where I was not present during the key portions. CHIEF COMPLAINT       Chief Complaint   Patient presents with    Suicidal         CURRENT MEDICATIONS     Previous Medications  Previous Medications    CHOLECALCIFEROL (VITAMIN D3) 25 MCG TABS    Take 1 tablet by mouth daily    ESCITALOPRAM (LEXAPRO) 5 MG TABLET    Take 3 tablets by mouth daily    QUETIAPINE (SEROQUEL) 400 MG TABLET    Take 1 tablet by mouth nightly    TRAZODONE (DESYREL) 50 MG TABLET    Take 1 tablet by mouth nightly as needed for Sleep       Encounter Medications  No orders of the defined types were placed in this encounter. ALLERGIES     is allergic to navane [thiothixene]. RECENT VITALS:   Temp: 98.4 °F (36.9 °C),  Pulse: 73, Resp: 18, BP: 114/70    RADIOLOGY:   No orders to display       LABS:  Labs Reviewed   CBC WITH AUTO DIFFERENTIAL - Abnormal; Notable for the following components:       Result Value    Hemoglobin 12.8 (*)     Lymphocytes 46 (*)     All other components within normal limits   COMPREHENSIVE METABOLIC PANEL - Abnormal; Notable for the following components:     Total Bilirubin 0.20 (*)     All other components within normal limits   URINE DRUG SCREEN - Abnormal; Notable for the following components:    Cocaine Metabolite, Urine POSITIVE (*)     All other components within normal limits   COVID-19, RAPID   ETHANOL           PLAN/ TASKS OUTSTANDING           (Please note that portions of this note were completed with a voice recognition program.  Efforts were made to edit the dictations but occasionally words are mis-transcribed.)    Jose Manuel Izquierdo MD,, MD, F.A.C.E.P.   Attending Emergency Physician       Jose Manuel Izquierdo MD  12/07/21 1957

## 2021-12-07 NOTE — GROUP NOTE
Group Therapy Note    Date: 12/7/2021    Group Start Time: 1330  Group End Time: 9037  Group Topic: Psychoeducation    JESUS B[facility] JUAN Mora      Patient declined to attend social skills group at 1330 despite encouragement from staff. 1:1 talk time offered by staff as alternative to group session.         Signature:  Brit Geller

## 2021-12-07 NOTE — ED NOTES
[facility] [employer] notified of PT discharge. PT given and briefed over Discharge papers  PT had no further questions about stay or about Prescriptions/follow up Appointment. PT was able to ambulate to the Waiting Room without assistance or issues.        Kenton Grey RN  12/06/21 [id number]

## 2021-12-07 NOTE — ED NOTES
[] [place]    [] [address]    [x]  [facility]      Y  N     [x] [] In the past two weeks have you had thoughts of hurting yourself in any way? [x] [] In the past two weeks have you had thoughts that you would be better off dead? [] [x] Have you made a suicide attempt in the past two months? [x] [] Do you have a plan for hurting yourself or suicide? [x] [] Presence of hallucinations/voices related to hurting himself or herself or someone else. SUICIDE/SECURITY WATCH PRECAUTION CHECKLIST     Orders    [x]  Suicide/Security Watch Precautions initiated as checked below:   12/6/21 10:50 PM EST BH31/BH31A    [x] Notified physician:  Christiano Marroquin MD  12/6/21 10:50 PM EST    [x] Orders obtained as appropriate:     [] 1:1 Observer     [] Psych Consult     [] Psych Consult    Name:  Date:  Time:    [x] 1:1 Observer, Notified by:  Slime Landers RN    Contact Nurse Supervisor    [x] Remove all personal clothes from room and place in snap/paper gown/pants. Slipper only    [x] Remove all personal belongings from room and secured away from patient. Documentation    [x] Initiate Suicide/Security Watch Precaution Flow Sheet    [x] Initiate individualized Care Plan/Problem    [x] Document why precautions initiated on flow sheet (Initiate Nursing Care Plan/Problem)    [x] 1:1 Observer in place; instructions provided. Suicide precautions require observer be within arms length. [x] Nurse-Observer Communication Hand-off initiated by RN, reviewed with Observer. Subsequently used as Hand Off between Observers. [x] Initiate every 15 minute observations per observer as delegated by the RN.     [x] Initiate RN assessment and documentation    Environmental Scan  Search Criteria and Process: OPTIONAL, see Search Policy    [] Reason for search:    [] Nursing in presence of second person to search patient    [] Patient notified of reason for body assessment and belongings search:     Persons present during search:   Results of search and disposition:       Searchers Name: Security     These items or items similar should be removed from the room:   [] Chairs   [] Telephone   [] Trash cans and liners   [] Plastic utensils (order Patient Safety tray)   [] Empty or remove Sharps containers   [] All personal clothing/belongings removed   [] All unnecessary lead wires, electrical cords, draw cords, etc.   [] Flowers and plants   [] Double check for lighters, matches, razors, any glass items etc that can be used as weapons. Person completing Checklist: Alexis Hargrove RN       GENERAL INFORMATION     Y  N     [x] [] Has the patient been informed that they are on a watch and what that means? [x] [] Can the patient get out of Bed without nursing assistance? [x] [] Can the patient use the restroom without nursing assistance? [x] [] Can the patient walk the halls to [place] their legs? \"   [] [x] Does the patient have metal utensils? [x] [] Have the patient's belongings been placed out of control of the patient? [x] [] Have the patient and his/her belongings been checked for contraband? [x] [] Is the patient under any visitor restrictions? If Yes, explain: SI   [] [x] Is the patient under an alias? [facility] Name:   Authorized visitors (no more than two are to be on the list)   Name/Relationship:   Name/Relationship:    Name of Staff member that you  Received this information from?:Narrator    General Description:    Maite Davidson BH31/BH31A male 57 y.o. Admission weight: 180 lb (81.6 kg)    Race: []  [x] Black  []   []   [] [place] [] Other  Facial Hair:  [x] Yes  [] No  If yes, please describe: Identifying Marks (i.e. Visible tattoos, scars, etc... ):     NURSING CARE PLAN    Nursing Diagnosis: Risk of Self Directed Harm  [] Actual  [x] Potential  Date Started: 12/06/2021  Etiological Factors: (related to)  [] Expressed or implied suicidal ideation/behavior  [x] Depression  [] Suicide attempt      [] Low self-esteem  [] Hallucinations      [x] Feeling of Hopelessness  [] Substance abuse or withdrawal    [] Dysfunctional family  [] Major traumatic event, eg., divorce, etc   [] Excessive stress/anxiety    12/6/21    Expected Outcomes    Patient will:   [] Patient will remain safe for the duration of their stay   [] Patient's environment will be safe, eg. Free of potential suicide weapons   [] Verbalize Recovery from suicidal episode and improvement in self-worth   [] Discuss feeling that precipitated suicide attempt/thoughts/behavior   [] Will describe available resources for crisis prevention and management   [] Will verbalize positive coping skills     Nursing Intervention   [x] Assessment and Observations hourly   [x] Suicide Precautions implemented with patient, should be 1:1 observation   [x] Document observation [id number] and RN assessment hourly   [] Consult physician for:    [] Psychiatric consult    [] Pharmacological therapy    [] Other:    [x] Patient search completed by security   [x] Initiated appropriate safety protocols by removing from the patient's environment anything that could be used to inflict self injury, eg. Order safe tray, snap gown, etc   [x] Maintain open, warm, caring, non-judgmental attitude/manner towards patient   [] Discuss advantages and disadvantages of existing coping methods/skills   [x] Assist and educate patient with identifying present strengths and coping skills   [x] Keep patient informed regarding plan of care and provide clear concise explanations. Provide the patient/family education information as well as telephone numbers and other information about crisis centers, hot lines, and counselors.     Discharge Planning:   [] Referral  [] Groups [] Health agencies  [] Other:            Cyndi Lockett RN  12/06/21 [id number]

## 2021-12-07 NOTE — GROUP NOTE
Group Therapy Note    Date: 12/7/2021    Group Start Time: 1100  Group End Time: 8977  Group Topic: Psychoeducation    [facility]    Patient unable to attend due to admission processing during group session time.       Signature:  Betty Mccray

## 2021-12-07 NOTE — ED NOTES
The patient is a 57year old male that has a history of Schizoaffective Disorder. The patient came to the ED today due to feeling suicidal. Patient states that he has no suicidal plan. Patient reports that he is homeless and its cold and cannot do this anymore. Patient denied to this SW any other mental health complaints. Patient was seen in this ER earlier today with the same complaint and was safety planned and sent to [address] [place] [address] for evaluation and community resources. When the patient found out that he will not be staying at the [facility] over night, the patient came back to the ED. Patient initially said he is homeless and cold. Then reports his suicidal thoughts.  The patient is homeless with little support, besides being on the [facility] [place] [facility] team.

## 2021-12-07 NOTE — ED PROVIDER NOTES
Faculty Sign-Out Attestation  Handoff taken on the following patient from prior Attending Physician: Irasema Ackerman    I was available and discussed any additional care issues that arose and coordinated the management plans with the resident(s) caring for the patient during my duty period. Any areas of disagreement with residents documentation of care or procedures are noted on the chart. I was personally present for the key portions of any/all procedures during my duty period. I have documented in the chart those procedures where I was not present during the key portions.     Suicidal > going to [address],   Attending Physician       Lourena Najjar, DO  12/07/21 Carin 38, DO  12/07/21 0210

## 2021-12-07 NOTE — ED NOTES
Narrator attempted to collect a urine specimen at this time, pt states that he is unable to void.      Cristina Recio RN  12/07/21 0001
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
## 2021-12-08 PROCEDURE — 99223 1ST HOSP IP/OBS HIGH 75: CPT | Performed by: PSYCHIATRY & NEUROLOGY

## 2021-12-08 PROCEDURE — 6370000000 HC RX 637 (ALT 250 FOR IP): Performed by: PSYCHIATRY & NEUROLOGY

## 2021-12-08 PROCEDURE — 6370000000 HC RX 637 (ALT 250 FOR IP)

## 2021-12-08 PROCEDURE — 1240000000 HC EMOTIONAL WELLNESS R&B

## 2021-12-08 RX ADMIN — HYDROXYZINE HYDROCHLORIDE 50 MG: 50 TABLET, FILM COATED ORAL at 21:35

## 2021-12-08 RX ADMIN — QUETIAPINE FUMARATE 200 MG: 200 TABLET ORAL at 21:35

## 2021-12-08 RX ADMIN — ESCITALOPRAM OXALATE 5 MG: 10 TABLET ORAL at 08:55

## 2021-12-08 RX ADMIN — Medication 1000 UNITS: at 08:55

## 2021-12-08 ASSESSMENT — PAIN SCALES - GENERAL: PAINLEVEL_OUTOF10: 0

## 2021-12-08 NOTE — PLAN OF CARE
[facility]  Day 3 Interdisciplinary Treatment Plan NOTE    Review Date & Time: 12/8/2021 1307    Admission Type:   Admission Type: Voluntary    Reason for admission:  Reason for Admission: SI to get hit by car or shoot self, can longer longer stay at [facility] and took self to [facility]. V's  Estimated Length of Stay:  5-7 days  Estimated Discharge Date Update: to be determined by physician    PATIENT STRENGTHS:  Patient Strengths:Strengths: Social Skills, Positive Support  Patient Strengths and Limitations:Limitations: Tendency to isolate self, Difficulty problem solving/relies on others to help solve problems, Unrealistic self-view, Difficult relationships / poor social skills, Multiple barriers to leisure interests, Lacks leisure interests  Addictive Behavior:Addictive Behavior  In the past 3 months, have you felt or has someone told you that you have a problem with:  : None  Do you have a history of Chemical Use?: No  Do you have a history of Alcohol Use?: No  Do you have a history of Street Drug Abuse?: Yes (\"crack\")  Histroy of Prescripton Drug Abuse?: No  Medical Problems:  Past Medical History:   Diagnosis Date    Bipolar disorder ([facility]     Depression     GERD (gastroesophageal reflux disease)     Hallucinations     Headache(784.0)     Hepatitis     Schizophrenia, schizo-affective ([facility]     Substance abuse ([facility]     Tobacco abuse     Type II or unspecified type diabetes mellitus without mention of complication, not stated as uncontrolled     Urinary incontinence        Risk:  Fall RiskTotal: 75  Dusty Scale Dusty Scale Score: 21  BVC    Change in scores:  No Changes to plan of Care:  No    Status EXAM:   Status and Exam  Normal: No  Facial Expression: Flat  Affect: Blunt  Level of Consciousness: Alert  Mood:Normal: No  Mood: Anxious, Depressed, Helpless, Sad  Motor Activity:Normal: No  Motor Activity: Decreased  Interview Behavior: Cooperative  Preception: [place] to Person, [place] to Time, [place] to Place, [place] to Situation  Attention:Normal: No  Attention: Unable to Concentrate  Thought Processes: Circumstantial  Thought Content:Normal: No  Thought Content: Poverty of Content  Hallucinations: None  Delusions: No  Memory:Normal: No  Memory: Poor Recent, Poor Remote  Insight and Judgment: No  Insight and Judgment: Poor Judgment, Poor Insight, Unmotivated  Present Suicidal Ideation: Yes (contracts for safety, no plan)  Present Homicidal Ideation: No    Daily Assessment Last Entry:   Daily Sleep (WDL): Within Defined Limits         Patient Currently in Pain: Denies  Daily Nutrition (WDL): Within Defined Limits  Level of Assistance: Independent/Self    Patient Monitoring:  Frequency of Checks: 4 times per hour, close    Psychiatric Symptoms:   Depression Symptoms  Depression Symptoms: Feelings of helplessness, Feelings of hopelessess, Impaired concentration, Isolative, Loss of interest  Anxiety Symptoms  Anxiety Symptoms: Generalized  Teetee Symptoms  Teetee Symptoms: No problems reported or observed. Psychosis Symptoms  Delusion Type: No problems reported or observed. Suicide Risk CSSR-S:  1) Within the past month, have you wished you were dead or wished you could go to sleep and not wake up? : Yes  2) Have you actually had any thoughts of killing yourself? : Yes  3) Have you been thinking about how you might kill yourself? : Yes (\"hit by car or shoot self\")  5) Have you started to work out or worked out the details of how to kill yourself?  Do you intend to carry out this plan? : No  6) Have you ever done anything, started to do anything, or prepared to do anything to end your life?: No  Change in Result: Change in Plan of care:      EDUCATION:   Learner Progress Toward Treatment Goals: Reviewed results and recommendations of this team, Reviewed group plan and strategies, Reviewed signs, symptoms and risk of self harm and violent behavior, Reviewed goals and plan of care    Method:  small group, individual

## 2021-12-08 NOTE — GROUP NOTE
Group Therapy Note    Date: 12/8/2021    Group Start Time: 1000  Group End Time: 1050  Group Topic: Psychotherapy    FRANKLIN Bill LSW        Group Therapy Note    Attendees: 9/19       Patient refused to attend psychotherapy group at 10:00 am after encouragement from staff.   1:1 talk time provided as alternative to group session      Discipline Responsible: /Counselor      Signature:  FRANKLIN Butterfield LSW

## 2021-12-08 NOTE — GROUP NOTE
Group Therapy Note    Date: 12/8/2021    Group Start Time: 5598  Group End Time: 0921  Group Topic: Psychoeducation    JESUS Rodrigues CTRS    Patient refused to attend coping skills group at 1335 after encouragement from staff. 1:1 talk time offered by staff as alternative to group session.

## 2021-12-08 NOTE — PLAN OF CARE
Problem: Altered Mood, Depressive Behavior:  Goal: Ability to disclose and discuss suicidal ideas will improve  Description: Ability to disclose and discuss suicidal ideas will improve  12/8/2021 1104 by Joel Vickers RN  Outcome: Ongoing  Note: Patient verbalizes suicidal ideation, no plan, contracts for safety on the unit. Isolative to room, impaired concentration, not interactive, evasive during interview.

## 2021-12-08 NOTE — PLAN OF CARE
Problem: Altered Mood, Depressive Behavior:  Goal: Able to verbalize acceptance of life and situations over which he or she has no control  Description: Able to verbalize acceptance of life and situations over which he or she has no control  12/8/2021 0122 by Mer Chakraborty RN  Outcome: Ongoing  Note: Patient did not want to discuss this at this time. Problem: Altered Mood, Depressive Behavior:  Goal: Ability to disclose and discuss suicidal ideas will improve  Description: Ability to disclose and discuss suicidal ideas will improve  12/8/2021 0122 by Mer Chakraborty RN  Outcome: Ongoing  Note: Patient denies any thoughts to suicidal ideations and no signs of self harm were noted. Patient encouraged to inform staff if he starts to develop any thoughts to harm self. Patient voiced understanding. Problem: Altered Mood, Depressive Behavior:  Goal: Absence of self-harm  Description: Absence of self-harm  12/8/2021 0122 by Mer Chakraborty RN  Outcome: Ongoing  Note: Patient denies any thoughts to harm self and no signs of self harm were noted. Patient encouraged to inform staff if he starts to develop any thoughts to harm self. Patient voiced understanding. Problem: Tobacco Use:  Goal: Inpatient tobacco use cessation counseling participation  Description: Inpatient tobacco use cessation counseling participation  12/8/2021 0122 by Mer Chakraborty RN  Outcome: Ongoing  Note: Pt reports no desire to quit smoking at this time.

## 2021-12-08 NOTE — PROGRESS NOTES
Behavioral Services  Medicare Certification Upon Admission    I certify that this patient's inpatient psychiatric hospital admission is medically necessary for:    [x] (1) Treatment which could reasonably be expected to improve this patient's condition,       [x] (2) Or for diagnostic study;     AND     [x](2) The inpatient psychiatric services are provided while the individual is under the care of a physician and are included in the individualized plan of care.     Estimated length of stay/service 4-7 days    Plan for post-hospital ECF with outpatient f/u    Electronically signed by Alessandro James MD on 12/8/2021 at 9:50 AM

## 2021-12-09 PROCEDURE — 97161 PT EVAL LOW COMPLEX 20 MIN: CPT

## 2021-12-09 PROCEDURE — 99232 SBSQ HOSP IP/OBS MODERATE 35: CPT | Performed by: PSYCHIATRY & NEUROLOGY

## 2021-12-09 PROCEDURE — APPSS30 APP SPLIT SHARED TIME 16-30 MINUTES: Performed by: NURSE PRACTITIONER

## 2021-12-09 PROCEDURE — 1240000000 HC EMOTIONAL WELLNESS R&B

## 2021-12-09 PROCEDURE — 6370000000 HC RX 637 (ALT 250 FOR IP): Performed by: PSYCHIATRY & NEUROLOGY

## 2021-12-09 PROCEDURE — 6370000000 HC RX 637 (ALT 250 FOR IP)

## 2021-12-09 PROCEDURE — 97166 OT EVAL MOD COMPLEX 45 MIN: CPT

## 2021-12-09 RX ORDER — ESCITALOPRAM OXALATE 10 MG/1
10 TABLET ORAL DAILY
Status: DISCONTINUED | OUTPATIENT
Start: 2021-12-10 | End: 2021-12-10

## 2021-12-09 RX ORDER — QUETIAPINE FUMARATE 50 MG/1
50 TABLET, FILM COATED ORAL DAILY
Status: DISCONTINUED | OUTPATIENT
Start: 2021-12-09 | End: 2021-12-15

## 2021-12-09 RX ADMIN — QUETIAPINE FUMARATE 250 MG: 200 TABLET ORAL at 20:53

## 2021-12-09 RX ADMIN — ESCITALOPRAM OXALATE 5 MG: 10 TABLET ORAL at 08:34

## 2021-12-09 RX ADMIN — HYDROXYZINE HYDROCHLORIDE 50 MG: 50 TABLET, FILM COATED ORAL at 20:55

## 2021-12-09 RX ADMIN — Medication 1000 UNITS: at 08:34

## 2021-12-09 ASSESSMENT — PAIN SCALES - GENERAL
PAINLEVEL_OUTOF10: 7
PAINLEVEL_OUTOF10: 7

## 2021-12-09 ASSESSMENT — PAIN DESCRIPTION - ORIENTATION: ORIENTATION: RIGHT;LEFT;LOWER

## 2021-12-09 ASSESSMENT — PAIN DESCRIPTION - FREQUENCY: FREQUENCY: INTERMITTENT

## 2021-12-09 ASSESSMENT — PAIN DESCRIPTION - LOCATION
LOCATION: BACK;FOOT
LOCATION: BACK;FOOT

## 2021-12-09 ASSESSMENT — PAIN DESCRIPTION - PAIN TYPE
TYPE: CHRONIC PAIN
TYPE: CHRONIC PAIN

## 2021-12-09 NOTE — PLAN OF CARE
Problem: Altered Mood, Depressive Behavior:  Goal: Able to verbalize acceptance of life and situations over which he or she has no control  Description: Able to verbalize acceptance of life and situations over which he or she has no control  12/9/2021 0948 by Cookie Caruso RN  Outcome: Ongoing     Problem: Altered Mood, Depressive Behavior:  Goal: Ability to disclose and discuss suicidal ideas will improve  Description: Ability to disclose and discuss suicidal ideas will improve  12/9/2021 0948 by Cookie Caruso RN  Outcome: Ongoing     Patient admits to thoughts of self harm but no active plan but denies thoughts to harm others. Patient admits to auditory hallucinations of voices to kill himself. Patient unable to verbalize acceptance over situations which he has no control. Patient isolative to room. Will continue to monitor and offer support as needed.

## 2021-12-09 NOTE — GROUP NOTE
Group Therapy Note    Date: 12/9/2021    Group Start Time: 1430  Group End Time: 7102  Group Topic: Healthy Living/Wellness    [facility]    Theone Anger Elizabeth        Group Therapy Note    Attendees: 5/18       Patient's Goal:  Stay focused and participate    Notes:  What Would You Do? Status After Intervention:  Unchanged    Participation Level: Active Listener and Interactive    Participation Quality: Appropriate and Attentive      Speech:  normal      Thought Process/Content: Logical      Affective Functioning: Congruent      Mood: stable      Level of consciousness:  Alert and Attentive      Response to Learning: Able to verbalize current knowledge/experience and Progressing to goal      Endings: None Reported    Modes of Intervention: Support, Socialization and Activity      Discipline Responsible: Behavorial Health Tech      Signature:   Tita Aburto

## 2021-12-09 NOTE — GROUP NOTE
Group Therapy Note    Date: 12/9/2021    Group Start Time: 1000  Group End Time: 5209  Group Topic: Psychotherapy    [address], LSW    patient refused to attend psychotherapy group at [facility] after encouragement from staff.   1:1 talk time provided as alternative to group session

## 2021-12-09 NOTE — CARE COORDINATION
SW contacted by [employer] at [facility], Sw completed phone interview to support PASRR request. DEAN Kong also spoke with patient regarding his PASRR request with goal of ECF placement.

## 2021-12-09 NOTE — GROUP NOTE
Group Therapy Note    Date: 12/9/2021    Group Start Time: 1100  Group End Time: 1954  Group Topic: Psychoeducation    JESUS Conner, GADIELS    Patient refused to attend creative expression group at 1100 after encouragement from staff. 1:1 talk time offered by staff as alternative to group session.

## 2021-12-09 NOTE — PROGRESS NOTES
[address] [facility]    Occupational Therapy Evaluation  Date: 21  Patient Name: Tatum Nelson       Room: 4343/3448-56  MRN: [id number]  Account: [de-identified]   : 1960  (63 y.o.) Gender: male     Discharge Recommendations:  Further Occupational Therapy is recommended upon facility discharge. Referring Practitioner: Austin Chaparro MD  Diagnosis: Depression with suicidal ideation       Treatment Diagnosis: Impaired self-care status. Past Medical History:  has a past medical history of Bipolar disorder ([address] Depression, GERD (gastroesophageal reflux disease), Hallucinations, Headache(784.0), Hepatitis, Schizophrenia, schizo-affective ([facility] Substance abuse ([facility] Tobacco abuse, Type II or unspecified type diabetes mellitus without mention of complication, not stated as uncontrolled, and Urinary incontinence. Past Surgical History:   has a past surgical history that includes Dental surgery and Abscess Drainage (N/A, 2018). Restrictions  Restrictions/Precautions: Fall Risk (reports 2 falls )  Required Braces or Orthoses?: No     Vitals  Temp: 97.5 °F (36.4 °C)  Pulse: 79  Resp: 14  BP: 117/60  Height: 6' 2\" (188 cm)  Weight: 200 lb (90.7 kg)  BMI (Calculated): 25.7  Oxygen Therapy  SpO2: 100 %  Pulse Oximeter Device Mode: Intermittent  Pulse Oximeter Device Location: Left, Finger  O2 Device: None (Room air)  Skin Assessment: Clean, dry, & intact  Skin Protection for O2 Device: No  Blood Gas  Performed?: No  Level of Consciousness: Alert (0)    Subjective  Subjective: \"It's been a while. [place] I walk everywhere I got to go\" patient reports he has fallen once or twice recently.   Overall Orientation Status: Impaired  Orientation Level: Oriented to person, Oriented to place, Disoriented to time, Disoriented to situation  Vision  Vision: Impaired  Vision Exceptions: Wears glasses for reading  Hearing  Hearing: Within functional limits  Social/Functional History  Type of Home: Homeless (living in hotel - unsure of name, states off [facility])  Home Layout: One level  Home Access: Level entry  Bathroom Shower/Tub: Walk-in shower  Home Equipment:  (no DME)  ADL Assistance: Independent  Homemaking Assistance: Independent  Homemaking Responsibilities: Yes  Ambulation Assistance: Independent  Transfer Assistance: Independent  Active : No  Mode of Transportation: Walk  Additional Comments: Patient reports he is currently homeless. Patient was staying in a one level hotel, however unsure of location or name of hotel. Believes it is off [address]. Patient states he does not have any clothes to do in laundry and that he walks to gas station to get hamburgers to eat. Pain Assessment  Pain Assessment: 0-10  Pain Level: 7  Pain Type: Chronic pain  Pain Location: Back, Foot (lower back, bilateral feet)  Pain Descriptors:  (\"just feels like pain\")  Pain Frequency: Intermittent (with walking)    Objective  Vision - Basic Assessment  Prior Vision: Wears glasses only for reading   Cognition  Overall Cognitive Status: Impaired  Arousal/Alertness: Delayed responses to stimuli  Following Directions: Follows one step commands  Attention Span: Attends with cues to redirect  Memory: Decreased short term memory, Decreased recall of recent events  Following Commands:  Follows multistep commands with repetition  Safety Judgement: Decreased awareness of need for assistance  Awareness of Errors: Assistance required to identify errors made  Insights: Decreased awareness of deficits  Sequencing and Organization: Assistance required to identify errors made, Assistance required to generate solutions   Perception  Overall Perceptual Status: WFL  Sensation  Overall Sensation Status: WFL (denies)   ADL  Feeding: Supervision  Grooming: Supervision  UE Bathing: Stand by assistance  LE Bathing: Stand by assistance  UE Dressing: Stand by assistance  LE Dressing: Stand by assistance  Toileting: Supervision  Additional Comments: SUP/SBA for all self-care for patient safety due to suicidal ideation as well as impaired cognition. Patient's appearance is disheleved and it appears he has not engaged in self-care tasks of bathing and dressing recently. OT POC to address routine and habituation to promote increased self-care participation. UE Function           LUE Strength  Gross LUE Strength: WFL  L Hand General: 5/5  LUE Strength Comment: Grossly 5/5     LUE Tone: Normotonic     LUE AROM (degrees)  LUE AROM : WFL     Left Hand AROM (degrees)  Left Hand AROM: WFL  RUE Strength  Gross RUE Strength: WFL  R Hand General: 5/5  RUE Strength Comment: Grossly 5/5      RUE Tone: Normotonic     RUE AROM (degrees)  RUE AROM : WFL     Right Hand AROM (degrees)  Right Hand AROM: WFL    Fine Motor Skills  Coordination  Movements Are Fluid And Coordinated: Yes                           Mobility  Supine to Sit: Independent  Sit to Supine: Independent       Balance  Sitting Balance: Independent  Standing Balance: Independent     Functional Mobility  Functional - Mobility Device: No device  Activity: To/from bathroom, Other (in common area)  Assist Level: Independent  Functional Mobility Comments: no loss of balance noted  Bed mobility  Rolling to Left: Independent  Rolling to Right: Independent  Supine to Sit: Independent  Sit to Supine: Independent  Scooting: Independent     Transfers  Sit to stand: Independent  Stand to sit: Independent  Functional Activity Tolerance  Functional Activity Tolerance: Tolerates 10 - 20 min exercise with multiple rests     Assessment  Assessment  Performance deficits / Impairments: Decreased functional mobility , Decreased ADL status, Decreased strength, Decreased safe awareness, Decreased cognition, Decreased endurance, Decreased balance, Decreased high-level IADLs  Treatment Diagnosis: Impaired self-care status.   Prognosis: Good  Decision Making: Medium Complexity  REQUIRES OT FOLLOW UP: Yes  Discharge Recommendations: Patient would benefit from continued therapy after discharge  Activity Tolerance: Patient Tolerated treatment well         Functional Outcome Measures  AM-PAC Daily Activity Inpatient   How much help for putting on and taking off regular lower body clothing?: A Little  How much help for Bathing?: A Little  How much help for Toileting?: A Little  How much help for putting on and taking off regular upper body clothing?: A Little  How much help for taking care of personal grooming?: A Little  How much help for eating meals?: A Little  AM-[facility] Inpatient Daily Activity Raw Score: 18  AM-PAC Inpatient ADL T-Scale Score : 38.66  ADL Inpatient CMS 0-100% Score: 46.65  ADL Inpatient CMS G-Code Modifier : CK       Goals  Patient Goals   Patient goals : To feel better  Short term goals  Time Frame for Short term goals: By discharge  Short term goal 1: Patient will actively participate in 15+ minutes of self-care to the best of patient's ability with Minimal verbal cues for initiation/attention. Short term goal 2: Patient will participate in self-care routine 5/7 days to increase participation in self-care. Short term goal 3: Patient will engage in showering routine with Supervision and Fair safety. Short term goal 4: Patient will actively participate in 15-25 minutes of therapeutic exercise/functional activities to promote increased participation in self-care. Plan  Safety Devices  Safety Devices in place: Yes  Type of devices:  All fall risk precautions in place, Left in bed, Nurse notified     Plan  Times per week: 1-2  Times per day: Daily  Current Treatment Recommendations: Self-Care / ADL, Home Management Training, Strengthening, Balance Training, Functional Mobility Training, Endurance Training, Safety Education & Training, Patient/Caregiver Education & Training, Equipment Evaluation, Education, & procurement          OT Individual Minutes  Time In: 5600  Time Out: 1050  Minutes: 12    Electronically signed by Keyur Dejesus OT on 12/9/21 at 3:06 PM EST

## 2021-12-09 NOTE — PLAN OF CARE
Problem: Altered Mood, Depressive Behavior:  Goal: Able to verbalize acceptance of life and situations over which he or she has no control  Description: Able to verbalize acceptance of life and situations over which he or she has no control  12/8/2021 2215 by Vivienne Sylvester LPN  Outcome: Ongoing  Note: Patient is able to verbalize his needs and feelings and is pleasant during the evening. Problem: Altered Mood, Depressive Behavior:  Goal: Ability to disclose and discuss suicidal ideas will improve  Description: Ability to disclose and discuss suicidal ideas will improve  12/8/2021 2215 by Vivienne Sylvester LPN  Outcome: Ongoing  Note: Patient has fleeting suicidal ideation, no plan and contracts for safety. Patient has some auditory hallucinations of voices telling him to harm himself.       Problem: Altered Mood, Depressive Behavior:  Goal: Absence of self-harm  Description: Absence of self-harm  12/8/2021 2215 by Vivienne Sylvester LPN  Outcome: Ongoing     Problem: Tobacco Use:  Goal: Inpatient tobacco use cessation counseling participation  Description: Inpatient tobacco use cessation counseling participation  12/8/2021 2215 by Vivienne Sylvester LPN  Outcome: Ongoing

## 2021-12-09 NOTE — PROGRESS NOTES
History  Social/Functional History  Type of Home: Homeless (living in hotel - unsure of name, states off [place] [address])  Home Layout: One level  Home Access: Level entry  Bathroom Shower/Tub: Walk-in shower  Home Equipment:  (no DME)  ADL Assistance: Independent  Homemaking Assistance: Independent  Homemaking Responsibilities: Yes  Ambulation Assistance: Independent  Transfer Assistance: Independent  Active : No  Mode of Transportation: Walk  Additional Comments: Patient reports he is currently homeless. Patient was staying in a one level hotel, however unsure of location or name of hotel. Believes it is off [address]. Patient states he does not have any clothes to do in laundry and that he walks to gas station to get hamburgers to eat. Objective       AROM RLE (degrees)  RLE AROM: WNL  AROM LLE (degrees)  LLE AROM : WNL  Strength RLE  Strength RLE: WNL  Strength LLE  Strength LLE: WNL        Bed mobility  Supine to Sit: Independent  Sit to Supine: Independent  Scooting: Independent  Transfers  Sit to Stand: Independent  Stand to sit:  Independent  Ambulation  Ambulation?: Yes  Ambulation 1  Surface: level tile  Device: No Device  Assistance: Supervision  Gait Deviations: None  Distance: 200ft  Stairs/Curb  Stairs?: No     Balance  Sitting - Static: Good  Sitting - Dynamic: Good  Standing - Static: Good  Standing - Dynamic: Good        Plan   Plan  Plan Comment: no in-pt PT needed at this time       Therapy Time   Individual Concurrent Group Co-treatment   Time In 1323         Time Out 1335         Minutes 238 Michelle Horvath PT

## 2021-12-09 NOTE — PROGRESS NOTES
Daily Progress Note  12/9/2021    Patient Name: Oneil Green    CHIEF COMPLAINT: Depression with suicidal ideation         Emergency Medications: None     Scheduled psychiatric medications: Adherent with scheduled Lexapro and Seroquel    SUBJECTIVE:   Patient was seen for follow-up assessment in his room today. He was resting on approach but agreeable to conversation with writer. Patient presented as very depressed and states that he has not yet seen any improvement in his mood. He is endorsing ongoing symptoms of depression and anxiety and is feeling very hopeless and helpless. He is also experiencing auditory hallucinations of voices telling him to harm himself and to \"get out of the hospital\". He denies any improvement in the intensity or frequency of his hallucinations. He continues to endorse suicidal ideation and reports having thoughts of \"just not wanting to be alive\". He is also having difficulty falling asleep and staying asleep. Patient was encouraged to attend groups as he may find the interaction beneficial to his mood. He did agree to attend 1 group this afternoon. His interactions with peers and staff were appropriate. He was also encouraged to shower and attend to hygiene needs and ADLs. Patient agrees to contract for safety on the unit. However at this time he is unable to contract for safety in the community.     Appetite:  [x] Normal/Adequate/Unchanged  [] Increased  [] Decreased      Sleep:       [] Normal/Adequate/Unchanged  [] Fair  [x] Poor      Group Attendance on Unit:   [] Yes  [x] Selectively    [] No    Medication Side Effects: Denies         Mental Status Exam  Level of consciousness: Alert and awake   Appearance: Appropriate attire for setting, resting in bed, with fair  grooming and hygiene   Behavior/Motor: Approachable, no psychomotor abnormalities   Attitude toward examiner: Cooperative, attentive, good eye contact   Speech: spontaneous, slow and dysarthric   Mood: 12/06/2021 0.09  0.00 - 0.44 k/uL Final    Basophils Absolute 12/06/2021 0.03  0.00 - 0.20 k/uL Final    Absolute Immature Granulocyte 12/06/2021 <0.03  0.00 - 0.30 k/uL Final    WBC Morphology 12/06/2021 NOT REPORTED   Final    RBC Morphology 12/06/2021 NOT REPORTED   Final    Platelet Estimate 05/38/0189 NOT REPORTED   Final    Glucose 12/06/2021 74  70 - 99 mg/dL Final    BUN 12/06/2021 15  8 - 23 mg/dL Final    CREATININE 12/06/2021 1.08  0.70 - 1.20 mg/dL Final    Bun/Cre Ratio 12/06/2021 NOT REPORTED  9 - 20 Final    Calcium 12/06/2021 9.0  8.6 - 10.4 mg/dL Final    Sodium 12/06/2021 142  135 - 144 mmol/L Final    Potassium 12/06/2021 4.1  3.7 - 5.3 mmol/L Final    Chloride 12/06/2021 106  98 - 107 mmol/L Final    CO2 12/06/2021 26  20 - 31 mmol/L Final    Anion Gap 12/06/2021 10  9 - 17 mmol/L Final    Alkaline Phosphatase 12/06/2021 125  40 - 129 U/L Final    ALT 12/06/2021 9  5 - 41 U/L Final    AST 12/06/2021 16  <40 U/L Final    Total Bilirubin 12/06/2021 0.20* 0.3 - 1.2 mg/dL Final    Total Protein 12/06/2021 6.6  6.4 - 8.3 g/dL Final    Albumin 12/06/2021 4.0  3.5 - 5.2 g/dL Final    Albumin/Globulin Ratio 12/06/2021 1.5  1.0 - 2.5 Final    GFR Non- 12/06/2021 >60  >60 mL/min Final    GFR  12/06/2021 >60  >60 mL/min Final    GFR Comment 12/06/2021        Final    Comment: Average GFR for 61-76 years old:   80 mL/min/1.73sq m  Chronic Kidney Disease:   <60 mL/min/1.73sq m  Kidney failure:   <15 mL/min/1.73sq m              eGFR calculated using average adult body mass.  Additional eGFR calculator available at:        IDOM[employer]            GFR Staging 12/06/2021 NOT REPORTED   Final    Ethanol 12/06/2021 <10  <10 mg/dL Final    Ethanol percent 12/06/2021 <0.010  <0.010 % Final    Amphetamine Screen, Ur 12/07/2021 NEGATIVE  NEGATIVE Final    Comment:       (Positive cutoff 1000 ng/mL)                  Barbiturate Screen, Ur 12/07/2021 NEGATIVE  NEGATIVE Final    Comment:       (Positive cutoff 200 ng/mL)                  Benzodiazepine Screen, Urine 12/07/2021 NEGATIVE  NEGATIVE Final    Comment:       (Positive cutoff 200 ng/mL)                  Cocaine Metabolite, Urine 12/07/2021 POSITIVE* NEGATIVE Final    Comment:       (Positive cutoff 300 ng/mL)                  Methadone Screen, Urine 12/07/2021 NEGATIVE  NEGATIVE Final    Comment:       (Positive cutoff 300 ng/mL)                  Opiates, Urine 12/07/2021 NEGATIVE  NEGATIVE Final    Comment:       (Positive cutoff 300 ng/mL)                  Phencyclidine, Urine 12/07/2021 NEGATIVE  NEGATIVE Final    Comment:       (Positive cutoff 25 ng/mL)                  Propoxyphene, Urine 12/07/2021 NOT REPORTED  NEGATIVE Final    Cannabinoid Scrn, Ur 12/07/2021 NEGATIVE  NEGATIVE Final    Comment:       (Positive cutoff 50 ng/mL)                  Oxycodone Screen, Ur 12/07/2021 NEGATIVE  NEGATIVE Final    Comment:       (Positive cutoff 100 ng/mL)                  Methamphetamine, Urine 12/07/2021 NOT REPORTED  NEGATIVE Final    Tricyclic Antidepressants, Urine 12/07/2021 NOT REPORTED  NEGATIVE Final    MDMA, Urine 12/07/2021 NOT REPORTED  NEGATIVE Final    Buprenorphine Urine 12/07/2021 NOT REPORTED  NEGATIVE Final    Test Information 12/07/2021 Assay provides medical screening only. The absence of expected drug(s) and/or metabolite(s) may indicate diluted or adulterated urine, limitations of testing or timing of collection. Final    Comment: Testing for legal purposes should be confirmed by another method. To request confirmation   of test result, please call the lab within 7 days of sample submission.  Specimen Description 12/06/2021 . NASOPHARYNGEAL SWAB   Final    SARS-CoV-2, Rapid 12/06/2021 Not Detected  Not Detected Final    Comment:       Rapid NAAT:  The specimen is NEGATIVE for SARS-CoV-2, the novel coronavirus associated with COVID-19. The ID NOW COVID-19 assay is designed to detect the virus that causes COVID-19 in patients   with signs and symptoms of infection who are suspected of COVID-19. An individual without symptoms of COVID-19 and who is not shedding SARS-CoV-2 virus would   expect to have a negative (not detected) result in this assay. Negative results should be treated as presumptive and, if inconsistent with clinical signs   and symptoms or necessary for patient management,  should be tested with an alternative molecular assay. Negative results do not preclude   SARS-CoV-2 infection and   should not be used as the sole basis for patient management decisions. Fact sheet for Healthcare Providers: BuildHer.es  Fact sheet for Patients: BuildHer.es          Methodology: Isothermal Nucleic Acid Amplification           Reviewed patient's current plan of care and vital signs with nursing staff.     Labs reviewed: [x] Yes  Last EKG in EMR reviewed: [x] Yes    Medications  Current Facility-Administered Medications: acetaminophen (TYLENOL) tablet 650 mg, 650 mg, Oral, Q4H PRN  aluminum & magnesium hydroxide-simethicone (MAALOX) 200-200-20 MG/5ML suspension 30 mL, 30 mL, Oral, Q6H PRN  hydrOXYzine (ATARAX) tablet 50 mg, 50 mg, Oral, TID PRN  ibuprofen (ADVIL;MOTRIN) tablet 400 mg, 400 mg, Oral, Q6H PRN  traZODone (DESYREL) tablet 50 mg, 50 mg, Oral, Nightly PRN  polyethylene glycol (GLYCOLAX) packet 17 g, 17 g, Oral, Daily PRN  nicotine polacrilex (NICORETTE) gum 2 mg, 2 mg, Oral, PRN  Vitamin D (CHOLECALCIFEROL) tablet 1,000 Units, 1,000 Units, Oral, Daily  escitalopram (LEXAPRO) tablet 5 mg, 5 mg, Oral, Daily  QUEtiapine (SEROQUEL) tablet 200 mg, 200 mg, Oral, Nightly    ASSESSMENT  Schizoaffective disorder, depressive type ([facility]         PLAN  Patient symptoms are: Modestly Improving  Continue current medication regimen  Monitor need and frequency of PRN medications  Encourage participation in groups and milieu  Attempt to develop insight  Psycho-education conducted  Supportive Therapy conducted  Probable discharge: To be determined by attending physician  Follow-up daily while inpatient    Patient continues to be monitored in the inpatient psychiatric facility at [facility] for safety and stabilization. Patient continues to need, on a daily basis, active treatment furnished directly by or requiring the supervision of inpatient psychiatric personnel. Electronically signed by MARY Lema CNP on 12/9/2021 at 5:40 PM    **This report has been created using voice recognition software. It may contain minor errors which are inherent in voice recognition technology. **    I independently saw and evaluated the patient. I reviewed the nurse practitioners documentation above. Any additional comments or changes to the nurse practitioners documentation are stated below otherwise agree with assessment. Plan will be as follows:  Patient still experiencing distressing auditory hallucinations which are command in nature telling him to harm himself. He still expressing intense suicidal ideation and not able to contract for safety off the unit. Discussed further titration of Seroquel and Lexapro and patient is in agreement  PLAN  Patient s symptoms   show no change  Increase Lexapro to 10 mg daily starting tomorrow  Increase at bedtime Seroquel to 250 mg and add 50 mg daytime dose  Attempt to develop insight  Psycho-education conducted. Supportive Therapy conducted.   Probable discharge is undetermined at this time  Follow-up daily while on inpatient unit

## 2021-12-10 PROCEDURE — 1240000000 HC EMOTIONAL WELLNESS R&B

## 2021-12-10 PROCEDURE — 6370000000 HC RX 637 (ALT 250 FOR IP): Performed by: PSYCHIATRY & NEUROLOGY

## 2021-12-10 PROCEDURE — APPSS30 APP SPLIT SHARED TIME 16-30 MINUTES

## 2021-12-10 PROCEDURE — 99232 SBSQ HOSP IP/OBS MODERATE 35: CPT | Performed by: PSYCHIATRY & NEUROLOGY

## 2021-12-10 PROCEDURE — 6370000000 HC RX 637 (ALT 250 FOR IP)

## 2021-12-10 RX ORDER — ESCITALOPRAM OXALATE 10 MG/1
15 TABLET ORAL DAILY
Status: DISCONTINUED | OUTPATIENT
Start: 2021-12-11 | End: 2021-12-11

## 2021-12-10 RX ADMIN — HYDROXYZINE HYDROCHLORIDE 50 MG: 50 TABLET, FILM COATED ORAL at 08:50

## 2021-12-10 RX ADMIN — QUETIAPINE FUMARATE 50 MG: 50 TABLET ORAL at 08:50

## 2021-12-10 RX ADMIN — ESCITALOPRAM OXALATE 10 MG: 10 TABLET ORAL at 08:50

## 2021-12-10 RX ADMIN — Medication 1000 UNITS: at 08:49

## 2021-12-10 RX ADMIN — HYDROXYZINE HYDROCHLORIDE 50 MG: 50 TABLET, FILM COATED ORAL at 21:03

## 2021-12-10 RX ADMIN — QUETIAPINE FUMARATE 250 MG: 200 TABLET ORAL at 21:04

## 2021-12-10 RX ADMIN — TRAZODONE HYDROCHLORIDE 50 MG: 50 TABLET ORAL at 21:03

## 2021-12-10 NOTE — GROUP NOTE
Group Therapy Note    Date: 12/10/2021    Group Start Time: 1330  Group End Time: 3662  Group Topic: Cognitive Skills    JESUS Corona, GADIELS    Pt did not attend 1330 cognitive skills group d/t resting in room despite staff invitation to attend. 1:1 talk time offered as alternative to group session, pt declined.             Signature:  Shree Nye

## 2021-12-10 NOTE — PROGRESS NOTES
Daily Progress Note  12/10/2021    Patient Name: Yomi Mast    CHIEF COMPLAINT: Depression with suicidal ideation         SUBJECTIVE:      Patient is seen today for a follow up assessment. Patient is compliant with scheduled Lexapro and Seroquel. Patient has not required emergency medications today. Inga Watts is agreeable to interview in his room today where he continues to remain isolative. He endorses significant depression and anxiety today. He is tearful during our discussion. When asked about sleep patient states \"I do not know. \"  Upon further evaluation patient states that he has a hard time staying asleep throughout the night. He states that he continues to feel unrested and tired. He reports his appetite remains adequate. Patient endorses active suicidal ideation with a plan to jump out the window. He denies homicidal ideation. He is able to contract for safety on this unit with this writer and states that he will tell nursing staff if he starts to feel unsafe. Patient reports significant distressing auditory hallucinations. He states the voices are \"tearing my head up. \"  Inga Watts reports \"they tell me to get up and want me to jump out the window. \"  He rates the voices as an 8 (0-10 scale 0 being most quiet and 10 being the loudest). He endorses paranoia. He denies medication side effects or medical concerns at this time. Patient encouraged to shower today as he does not appear to be attending to hygiene needs. Writer encouraged patient to attend groups on the unit. At this time, the patient is not appropriate for a lower level of care. There is risk of decompensation and patient warrants further hospitalization for safety and stabilization.     Appetite:  [x] Normal/Adequate/Unchanged  [] Increased  [] Decreased      Sleep:       [] Normal/Adequate/Unchanged  [] Fair  [x] Poor      Group Attendance on Unit:   [] Yes  [] Selectively    [x] No    Medication Side Effects: Patient denies any medication side effects at the time of assessment. Mental Status Exam  Level of consciousness: Alert and awake. Appearance: Appropriate attire for setting, resting in bed, with poor grooming and hygiene. Behavior/Motor: Approachable, psychomotor slowing  Attitude toward examiner: Cooperative, attentive, good eye contact. Speech: Normal rate, quiet volume, monotone  Mood:  Patient reports \"not too good\". Affect: Depressed, tearful  Thought processes: Linear and coherent. Thought content: Denies homicidal ideation. Suicidal Ideation: Active suicidal ideations, with a  current plan or intent, contracts for safety on the unit. Delusions: No evidence of delusions. Endorses paranoia. Perceptual Disturbance: Patient does not appear to be responding to internal stimuli. Endorses auditory hallucinations. Denies visual hallucinations. Cognition: Oriented to self, location, time, and situation. Memory: Intact. Insight & Judgement: Poor. Data   height is 6' 2\" (1.88 m) and weight is 200 lb (90.7 kg). His oral temperature is 97.7 °F (36.5 °C). His blood pressure is 101/54 (abnormal) and his pulse is 66. His respiration is 14 and oxygen saturation is 100%. Labs:   No visits with results within 2 Day(s) from this visit.    Latest known visit with results is:   Admission on 12/06/2021, Discharged on 12/07/2021   Component Date Value Ref Range Status    WBC 12/06/2021 4.8  3.5 - 11.3 k/uL Final    RBC 12/06/2021 4.63  4.21 - 5.77 m/uL Final    Hemoglobin 12/06/2021 12.8* 13.0 - 17.0 g/dL Final    Hematocrit 12/06/2021 41.1  40.7 - 50.3 % Final    MCV 12/06/2021 88.8  82.6 - 102.9 fL Final    MCH 12/06/2021 27.6  25.2 - 33.5 pg Final    MCHC 12/06/2021 31.1  28.4 - 34.8 g/dL Final    RDW 12/06/2021 14.0  11.8 - 14.4 % Final    Platelets 29/80/5021 358  138 - 453 k/uL Final    MPV 12/06/2021 8.9  8.1 - 13.5 fL Final    NRBC Automated 12/06/2021 0.0  0.0 per 100 WBC Final    Differential Type 12/06/2021 NOT REPORTED   Final    Seg Neutrophils 12/06/2021 40  36 - 65 % Final    Lymphocytes 12/06/2021 46* 24 - 43 % Final    Monocytes 12/06/2021 11  3 - 12 % Final    Eosinophils % 12/06/2021 2  1 - 4 % Final    Basophils 12/06/2021 1  0 - 2 % Final    Immature Granulocytes 12/06/2021 0  0 % Final    Segs Absolute 12/06/2021 1.92  1.50 - 8.10 k/uL Final    Absolute Lymph # 12/06/2021 2.25  1.10 - 3.70 k/uL Final    Absolute Mono # 12/06/2021 0.52  0.10 - 1.20 k/uL Final    Absolute Eos # 12/06/2021 0.09  0.00 - 0.44 k/uL Final    Basophils Absolute 12/06/2021 0.03  0.00 - 0.20 k/uL Final    Absolute Immature Granulocyte 12/06/2021 <0.03  0.00 - 0.30 k/uL Final    WBC Morphology 12/06/2021 NOT REPORTED   Final    RBC Morphology 12/06/2021 NOT REPORTED   Final    Platelet Estimate 24/71/1351 NOT REPORTED   Final    Glucose 12/06/2021 74  70 - 99 mg/dL Final    BUN 12/06/2021 15  8 - 23 mg/dL Final    CREATININE 12/06/2021 1.08  0.70 - 1.20 mg/dL Final    Bun/Cre Ratio 12/06/2021 NOT REPORTED  9 - 20 Final    Calcium 12/06/2021 9.0  8.6 - 10.4 mg/dL Final    Sodium 12/06/2021 142  135 - 144 mmol/L Final    Potassium 12/06/2021 4.1  3.7 - 5.3 mmol/L Final    Chloride 12/06/2021 106  98 - 107 mmol/L Final    CO2 12/06/2021 26  20 - 31 mmol/L Final    Anion Gap 12/06/2021 10  9 - 17 mmol/L Final    Alkaline Phosphatase 12/06/2021 125  40 - 129 U/L Final    ALT 12/06/2021 9  5 - 41 U/L Final    AST 12/06/2021 16  <40 U/L Final    Total Bilirubin 12/06/2021 0.20* 0.3 - 1.2 mg/dL Final    Total Protein 12/06/2021 6.6  6.4 - 8.3 g/dL Final    Albumin 12/06/2021 4.0  3.5 - 5.2 g/dL Final    Albumin/Globulin Ratio 12/06/2021 1.5  1.0 - 2.5 Final    GFR Non- 12/06/2021 >60  >60 mL/min Final    GFR  12/06/2021 >60  >60 mL/min Final    GFR Comment 12/06/2021        Final    Comment: Average GFR for 61-76 years old:   80 mL/min/1.73sq m  Chronic Kidney Disease:   <60 mL/min/1.73sq m  Kidney failure:   <15 mL/min/1.73sq m              eGFR calculated using average adult body mass. Additional eGFR calculator available at:        [email or web address]            GFR Staging 12/06/2021 NOT REPORTED   Final    Ethanol 12/06/2021 <10  <10 mg/dL Final    Ethanol percent 12/06/2021 <0.010  <0.010 % Final    Amphetamine Screen, Ur 12/07/2021 NEGATIVE  NEGATIVE Final    Comment:       (Positive cutoff 1000 ng/mL)                  Barbiturate Screen, Ur 12/07/2021 NEGATIVE  NEGATIVE Final    Comment:       (Positive cutoff 200 ng/mL)                  Benzodiazepine Screen, Urine 12/07/2021 NEGATIVE  NEGATIVE Final    Comment:       (Positive cutoff 200 ng/mL)                  Cocaine Metabolite, Urine 12/07/2021 POSITIVE* NEGATIVE Final    Comment:       (Positive cutoff 300 ng/mL)                  Methadone Screen, Urine 12/07/2021 NEGATIVE  NEGATIVE Final    Comment:       (Positive cutoff 300 ng/mL)                  Opiates, Urine 12/07/2021 NEGATIVE  NEGATIVE Final    Comment:       (Positive cutoff 300 ng/mL)                  Phencyclidine, Urine 12/07/2021 NEGATIVE  NEGATIVE Final    Comment:       (Positive cutoff 25 ng/mL)                  Propoxyphene, Urine 12/07/2021 NOT REPORTED  NEGATIVE Final    Cannabinoid Scrn, Ur 12/07/2021 NEGATIVE  NEGATIVE Final    Comment:       (Positive cutoff 50 ng/mL)                  Oxycodone Screen, Ur 12/07/2021 NEGATIVE  NEGATIVE Final    Comment:       (Positive cutoff 100 ng/mL)                  Methamphetamine, Urine 12/07/2021 NOT REPORTED  NEGATIVE Final    Tricyclic Antidepressants, Urine 12/07/2021 NOT REPORTED  NEGATIVE Final    MDMA, Urine 12/07/2021 NOT REPORTED  NEGATIVE Final    Buprenorphine Urine 12/07/2021 NOT REPORTED  NEGATIVE Final    Test Information 12/07/2021 Assay provides medical screening only.   The absence of expected drug(s) and/or metabolite(s) may indicate diluted or adulterated urine, limitations of testing or timing of collection. Final    Comment: Testing for legal purposes should be confirmed by another method. To request confirmation   of test result, please call the lab within 7 days of sample submission.  Specimen Description 12/06/2021 . NASOPHARYNGEAL SWAB   Final    SARS-CoV-2, Rapid 12/06/2021 Not Detected  Not Detected Final    Comment:       Rapid NAAT:  The specimen is NEGATIVE for SARS-CoV-2, the novel coronavirus associated with   COVID-19. The ID NOW COVID-19 assay is designed to detect the virus that causes COVID-19 in patients   with signs and symptoms of infection who are suspected of COVID-19. An individual without symptoms of COVID-19 and who is not shedding SARS-CoV-2 virus would   expect to have a negative (not detected) result in this assay. Negative results should be treated as presumptive and, if inconsistent with clinical signs   and symptoms or necessary for patient management,  should be tested with an alternative molecular assay. Negative results do not preclude   SARS-CoV-2 infection and   should not be used as the sole basis for patient management decisions. Fact sheet for Healthcare Providers: Brandyn  Fact sheet for Patients: Brandyn          Methodology: Isothermal Nucleic Acid Amplification           Reviewed patient's current plan of care and vital signs with nursing staff.     Labs reviewed: [x] Yes  Last EKG in EMR reviewed: [x] Yes  QTc: 410    Medications  Current Facility-Administered Medications: escitalopram (LEXAPRO) tablet 10 mg, 10 mg, Oral, Daily  QUEtiapine (SEROQUEL) tablet 250 mg, 250 mg, Oral, Nightly  QUEtiapine (SEROQUEL) tablet 50 mg, 50 mg, Oral, Daily  acetaminophen (TYLENOL) tablet 650 mg, 650 mg, Oral, Q4H PRN  aluminum & magnesium hydroxide-simethicone (MAALOX) 200-200-20 MG/5ML suspension 30 mL, 30 mL, Oral, Q6H PRN  hydrOXYzine (ATARAX) tablet 50 mg, 50 mg, Oral, TID PRN  ibuprofen (ADVIL;MOTRIN) tablet 400 mg, 400 mg, Oral, Q6H PRN  traZODone (DESYREL) tablet 50 mg, 50 mg, Oral, Nightly PRN  polyethylene glycol (GLYCOLAX) packet 17 g, 17 g, Oral, Daily PRN  nicotine polacrilex (NICORETTE) gum 2 mg, 2 mg, Oral, PRN  Vitamin D (CHOLECALCIFEROL) tablet 1,000 Units, 1,000 Units, Oral, Daily    ASSESSMENT  Schizoaffective disorder, depressive type ([facility] [address]         PLAN  Patient symptoms are: Remains Unstable. Continue current medication regimen. Titrate Lexapro to 15 mg  Monitor need and frequency of PRN medications. Encourage participation in groups and milieu. Attempt to develop insight. Psycho-education conducted. Supportive Therapy conducted. Probable discharge is to be determined by MD.   Follow-up daily while inpatient. Patient continues to be monitored in the inpatient psychiatric facility at [facility] for safety and stabilization. Patient continues to need, on a daily basis, active treatment furnished directly by or requiring the supervision of inpatient psychiatric personnel. Electronically signed by MARY Urbina CNP on 12/10/2021 at 2:24 PM    **This report has been created using voice recognition software. It may contain minor errors which are inherent in voice recognition technology. **    I independently saw and evaluated the patient. I reviewed the nurse practitioners documentation above. Any additional comments or changes to the nurse practitioners documentation are stated below otherwise agree with assessment. Plan will be as follows:  Patient still suicidal.  Denying side effects to medication. Agreeable to increase in Lexapro  PLAN  Patient s symptoms   show no change  Increase Lexapro to 15 mg daily  Attempt to develop insight  Psycho-education conducted. Supportive Therapy conducted.   Probable discharge is next week  Follow-up daily while on inpatient unit

## 2021-12-10 NOTE — PLAN OF CARE
Problem: Altered Mood, Depressive Behavior:  Goal: Ability to disclose and discuss suicidal ideas will improve  Description: Ability to disclose and discuss suicidal ideas will improve  Outcome: Ongoing  Note: Patient reports fleeting suicidal thoughts with no plan and auditory hallucinations telling him to kill himself. Patient contracts for safety on the unit and agrees to seek out staff if feeling unsafe or overwhelmed at any time. Patient reports depression related to living situation and states he doesn't have enough money to live. Patient isolative to room and comes out for needs only. Reports poor sleep and \"ok\" appetite. Patient is compliant with meds and behavior is controlled. Safe environment maintained. Q15 minute checks for safety continued per unit policy. Will continue to monitor for safety and provide support and reassurance as needed. Problem: Pain:  Goal: Pain level will decrease  Description: Pain level will decrease  Outcome: Ongoing  Note: No pain reported this shift. Problem: Musculor/Skeletal Functional Status  Goal: Absence of falls  Outcome: Ongoing  Note: Patient remains free of falls and verbalizes understanding of individual fall risks. Patient wearing non skid footwear and encouraged to seek out staff for any assistance needed.

## 2021-12-10 NOTE — PLAN OF CARE
Problem: Altered Mood, Depressive Behavior:  Goal: Able to verbalize acceptance of life and situations over which he or she has no control  Description: Able to verbalize acceptance of life and situations over which he or she has no control  12/9/2021 2057 by Ramírez Geller LPN  Outcome: Ongoing  Note: Patient is in a pleasant mood, isolative to room most of the evening, complains of feeling depressed and anxious. Patient admits to having feelings of wanting to harm himself, no plan, contracts for safety. Patient admits to having some auditory (command) hallucinations. Problem: Altered Mood, Depressive Behavior:  Goal: Ability to disclose and discuss suicidal ideas will improve  Description: Ability to disclose and discuss suicidal ideas will improve  12/9/2021 2057 by Ramírez Geller LPN  Outcome: Ongoing     Problem: Pain:  Goal: Pain level will decrease  Description: Pain level will decrease  Outcome: Ongoing  Note: Patient denies pain.

## 2021-12-10 NOTE — GROUP NOTE
Group Therapy Note    Date: 12/10/2021    Group Start Time: 1100  Group End Time: 1130  Group Topic: Relaxation    ST[facility] DAVID Garcia, CTRS    Pt did not attend 1100 relaxation group d/t resting in room despite staff invitation to attend. 1:1 talk time offered as alternative to group session, pt declined.           Signature:  Zee Li

## 2021-12-11 PROCEDURE — 6370000000 HC RX 637 (ALT 250 FOR IP): Performed by: PSYCHIATRY & NEUROLOGY

## 2021-12-11 PROCEDURE — 6370000000 HC RX 637 (ALT 250 FOR IP)

## 2021-12-11 PROCEDURE — 99232 SBSQ HOSP IP/OBS MODERATE 35: CPT | Performed by: PSYCHIATRY & NEUROLOGY

## 2021-12-11 PROCEDURE — 1240000000 HC EMOTIONAL WELLNESS R&B

## 2021-12-11 RX ORDER — ESCITALOPRAM OXALATE 20 MG/1
20 TABLET ORAL DAILY
Status: DISCONTINUED | OUTPATIENT
Start: 2021-12-12 | End: 2021-12-23 | Stop reason: HOSPADM

## 2021-12-11 RX ADMIN — QUETIAPINE FUMARATE 250 MG: 200 TABLET ORAL at 20:46

## 2021-12-11 RX ADMIN — QUETIAPINE FUMARATE 50 MG: 50 TABLET ORAL at 08:45

## 2021-12-11 RX ADMIN — HYDROXYZINE HYDROCHLORIDE 50 MG: 50 TABLET, FILM COATED ORAL at 18:26

## 2021-12-11 RX ADMIN — Medication 1000 UNITS: at 08:45

## 2021-12-11 RX ADMIN — ACETAMINOPHEN 650 MG: 325 TABLET, FILM COATED ORAL at 18:26

## 2021-12-11 RX ADMIN — TRAZODONE HYDROCHLORIDE 50 MG: 50 TABLET ORAL at 20:47

## 2021-12-11 RX ADMIN — ESCITALOPRAM OXALATE 15 MG: 10 TABLET ORAL at 08:45

## 2021-12-11 ASSESSMENT — PAIN SCALES - GENERAL: PAINLEVEL_OUTOF10: 4

## 2021-12-11 NOTE — PROGRESS NOTES
Daily Progress Note  12/11/2021    Patient Name: Merna Murillo    CHIEF COMPLAINT: Depression with suicidal ideation         SUBJECTIVE:      Patient is seen today for a follow up assessment. Patient is compliant with scheduled medication. Patient has not required emergency medications today. Pro Bae is agreeable to interview in his room where nursing staff reports he has been isolative for most of day. Patient endorses feelings of depression rating it a 10 out of 10 with 10 being the worst and anxiety rating it a 7 out of 10 with 10 being the worse. When asked about sleep patient states he has had\"no sleep. \"  Upon further evaluation patient states that he has not been able to fall asleep. He reports his appetite has decreased and states he was only able to eat half his meals today. Patient endorses active suicidal ideation with no plan. He denies homicidal ideation. He is able to contract for safety on this unit with this writer and agrees to tell nursing staff if he starts to feel unsafe. Patient reports auditory hallucinations rating it an 8 out of 10 with 10 being unbearable. He states the voices are \"crazy\" but would not further elaborate. He endorses paranoia. He denies medication side effects or medical concerns at this time. Writer discussed the risk versus benefits of increasing his Lexapro. Patient states he does feel the Lexapro is helpful and is agreeable to continue titration to 20 mg daily starting tomorrow. Writer encouraged patient to attend groups on the unit. At this time, the patient is not appropriate for a lower level of care. There is risk of decompensation and patient warrants further hospitalization for safety and stabilization.     Appetite:  [] Normal/Adequate/Unchanged  [] Increased  [x] Decreased      Sleep:       [] Normal/Adequate/Unchanged  [] Fair  [x] Poor      Group Attendance on Unit:   [] Yes  [] Selectively    [x] No    Medication Side Effects: Patient denies any medication side effects at the time of assessment. Mental Status Exam  Level of consciousness: Alert and awake. Appearance: Appropriate attire for setting, resting in bed, with poor grooming and hygiene. Behavior/Motor: Approachable, tearful at times, psychomotor slowing  Attitude toward examiner: Cooperative, attentive, good eye contact. Speech: Normal rate, quiet volume, monotone  Mood:  Patient reports \"don't feel good\". Present depressed  Affect: flat  Thought processes: Linear and coherent. Thought content: Denies homicidal ideation. Suicidal Ideation: Active suicidal ideations, without current plan or intent, contracts for safety on the unit. Delusions: No evidence of delusions. Endorses paranoia. Perceptual Disturbance: Patient does not appear to be responding to internal stimuli. Endorses auditory hallucinations. Denies visual hallucinations. Cognition: Oriented to self, location, time, and situation. Memory: Intact. Insight & Judgement: Poor. Data   height is 6' 2\" (1.88 m) and weight is 200 lb (90.7 kg). His oral temperature is 98.3 °F (36.8 °C). His blood pressure is 98/60 and his pulse is 62. His respiration is 14 and oxygen saturation is 100%. Labs:   No visits with results within 2 Day(s) from this visit.    Latest known visit with results is:   Admission on 12/06/2021, Discharged on 12/07/2021   Component Date Value Ref Range Status    WBC 12/06/2021 4.8  3.5 - 11.3 k/uL Final    RBC 12/06/2021 4.63  4.21 - 5.77 m/uL Final    Hemoglobin 12/06/2021 12.8* 13.0 - 17.0 g/dL Final    Hematocrit 12/06/2021 41.1  40.7 - 50.3 % Final    MCV 12/06/2021 88.8  82.6 - 102.9 fL Final    MCH 12/06/2021 27.6  25.2 - 33.5 pg Final    MCHC 12/06/2021 31.1  28.4 - 34.8 g/dL Final    RDW 12/06/2021 14.0  11.8 - 14.4 % Final    Platelets 80/21/8422 358  138 - 453 k/uL Final    MPV 12/06/2021 8.9  8.1 - 13.5 fL Final    NRBC Automated 12/06/2021 0.0  0.0 per 100 WBC Final  Differential Type 12/06/2021 NOT REPORTED   Final    Seg Neutrophils 12/06/2021 40  36 - 65 % Final    Lymphocytes 12/06/2021 46* 24 - 43 % Final    Monocytes 12/06/2021 11  3 - 12 % Final    Eosinophils % 12/06/2021 2  1 - 4 % Final    Basophils 12/06/2021 1  0 - 2 % Final    Immature Granulocytes 12/06/2021 0  0 % Final    Segs Absolute 12/06/2021 1.92  1.50 - 8.10 k/uL Final    Absolute Lymph # 12/06/2021 2.25  1.10 - 3.70 k/uL Final    Absolute Mono # 12/06/2021 0.52  0.10 - 1.20 k/uL Final    Absolute Eos # 12/06/2021 0.09  0.00 - 0.44 k/uL Final    Basophils Absolute 12/06/2021 0.03  0.00 - 0.20 k/uL Final    Absolute Immature Granulocyte 12/06/2021 <0.03  0.00 - 0.30 k/uL Final    WBC Morphology 12/06/2021 NOT REPORTED   Final    RBC Morphology 12/06/2021 NOT REPORTED   Final    Platelet Estimate 57/04/8156 NOT REPORTED   Final    Glucose 12/06/2021 74  70 - 99 mg/dL Final    BUN 12/06/2021 15  8 - 23 mg/dL Final    CREATININE 12/06/2021 1.08  0.70 - 1.20 mg/dL Final    Bun/Cre Ratio 12/06/2021 NOT REPORTED  9 - 20 Final    Calcium 12/06/2021 9.0  8.6 - 10.4 mg/dL Final    Sodium 12/06/2021 142  135 - 144 mmol/L Final    Potassium 12/06/2021 4.1  3.7 - 5.3 mmol/L Final    Chloride 12/06/2021 106  98 - 107 mmol/L Final    CO2 12/06/2021 26  20 - 31 mmol/L Final    Anion Gap 12/06/2021 10  9 - 17 mmol/L Final    Alkaline Phosphatase 12/06/2021 125  40 - 129 U/L Final    ALT 12/06/2021 9  5 - 41 U/L Final    AST 12/06/2021 16  <40 U/L Final    Total Bilirubin 12/06/2021 0.20* 0.3 - 1.2 mg/dL Final    Total Protein 12/06/2021 6.6  6.4 - 8.3 g/dL Final    Albumin 12/06/2021 4.0  3.5 - 5.2 g/dL Final    Albumin/Globulin Ratio 12/06/2021 1.5  1.0 - 2.5 Final    GFR Non- 12/06/2021 >60  >60 mL/min Final    GFR  12/06/2021 >60  >60 mL/min Final    GFR Comment 12/06/2021        Final    Comment: Average GFR for 61-76 years old:   80 mL/min/1.73sq m  Chronic Kidney Disease:   <60 mL/min/1.73sq m  Kidney failure:   <15 mL/min/1.73sq m              eGFR calculated using average adult body mass. Additional eGFR calculator available at:        [employer]            GFR Staging 12/06/2021 NOT REPORTED   Final    Ethanol 12/06/2021 <10  <10 mg/dL Final    Ethanol percent 12/06/2021 <0.010  <0.010 % Final    Amphetamine Screen, Ur 12/07/2021 NEGATIVE  NEGATIVE Final    Comment:       (Positive cutoff 1000 ng/mL)                  Barbiturate Screen, Ur 12/07/2021 NEGATIVE  NEGATIVE Final    Comment:       (Positive cutoff 200 ng/mL)                  Benzodiazepine Screen, Urine 12/07/2021 NEGATIVE  NEGATIVE Final    Comment:       (Positive cutoff 200 ng/mL)                  Cocaine Metabolite, Urine 12/07/2021 POSITIVE* NEGATIVE Final    Comment:       (Positive cutoff 300 ng/mL)                  Methadone Screen, Urine 12/07/2021 NEGATIVE  NEGATIVE Final    Comment:       (Positive cutoff 300 ng/mL)                  Opiates, Urine 12/07/2021 NEGATIVE  NEGATIVE Final    Comment:       (Positive cutoff 300 ng/mL)                  Phencyclidine, Urine 12/07/2021 NEGATIVE  NEGATIVE Final    Comment:       (Positive cutoff 25 ng/mL)                  Propoxyphene, Urine 12/07/2021 NOT REPORTED  NEGATIVE Final    Cannabinoid Scrn, Ur 12/07/2021 NEGATIVE  NEGATIVE Final    Comment:       (Positive cutoff 50 ng/mL)                  Oxycodone Screen, Ur 12/07/2021 NEGATIVE  NEGATIVE Final    Comment:       (Positive cutoff 100 ng/mL)                  Methamphetamine, Urine 12/07/2021 NOT REPORTED  NEGATIVE Final    Tricyclic Antidepressants, Urine 12/07/2021 NOT REPORTED  NEGATIVE Final    MDMA, Urine 12/07/2021 NOT REPORTED  NEGATIVE Final    Buprenorphine Urine 12/07/2021 NOT REPORTED  NEGATIVE Final    Test Information 12/07/2021 Assay provides medical screening only.   The absence of expected drug(s) and/or metabolite(s) may indicate diluted or adulterated urine, limitations of testing or timing of collection. Final    Comment: Testing for legal purposes should be confirmed by another method. To request confirmation   of test result, please call the lab within 7 days of sample submission.  Specimen Description 12/06/2021 . NASOPHARYNGEAL SWAB   Final    SARS-CoV-2, Rapid 12/06/2021 Not Detected  Not Detected Final    Comment:       Rapid NAAT:  The specimen is NEGATIVE for SARS-CoV-2, the novel coronavirus associated with   COVID-19. The ID NOW COVID-19 assay is designed to detect the virus that causes COVID-19 in patients   with signs and symptoms of infection who are suspected of COVID-19. An individual without symptoms of COVID-19 and who is not shedding SARS-CoV-2 virus would   expect to have a negative (not detected) result in this assay. Negative results should be treated as presumptive and, if inconsistent with clinical signs   and symptoms or necessary for patient management,  should be tested with an alternative molecular assay. Negative results do not preclude   SARS-CoV-2 infection and   should not be used as the sole basis for patient management decisions. Fact sheet for Healthcare Providers: [employer]  Fact sheet for Patients: Iesha[employer]nav          Methodology: Isothermal Nucleic Acid Amplification           Reviewed patient's current plan of care and vital signs with nursing staff.     Labs reviewed: [x] Yes  Last EKG in EMR reviewed: [x] Yes  QTc: 410    Medications  Current Facility-Administered Medications: [START ON 12/12/2021] escitalopram (LEXAPRO) tablet 20 mg, 20 mg, Oral, Daily  QUEtiapine (SEROQUEL) tablet 250 mg, 250 mg, Oral, Nightly  QUEtiapine (SEROQUEL) tablet 50 mg, 50 mg, Oral, Daily  acetaminophen (TYLENOL) tablet 650 mg, 650 mg, Oral, Q4H PRN  aluminum & magnesium hydroxide-simethicone (MAALOX) 820-814-66 MG/5ML suspension 30 mL, 30 mL, Oral, Q6H PRN  hydrOXYzine (ATARAX) tablet 50 mg, 50 mg, Oral, TID PRN  ibuprofen (ADVIL;MOTRIN) tablet 400 mg, 400 mg, Oral, Q6H PRN  traZODone (DESYREL) tablet 50 mg, 50 mg, Oral, Nightly PRN  polyethylene glycol (GLYCOLAX) packet 17 g, 17 g, Oral, Daily PRN  nicotine polacrilex (NICORETTE) gum 2 mg, 2 mg, Oral, PRN  Vitamin D (CHOLECALCIFEROL) tablet 1,000 Units, 1,000 Units, Oral, Daily    ASSESSMENT  Schizoaffective disorder, [facility] ([facility]         PLAN  Patient symptoms are: Remains Unstable. Continue current medication regimen. Titrate Lexapro to 20 mg starting tomorrow. Monitor need and frequency of PRN medications. Encourage participation in groups and milieu. Attempt to develop insight. Psycho-education conducted. Supportive Therapy conducted. Probable discharge is to be determined by MD.   Follow-up daily while inpatient. Patient continues to be monitored in the inpatient psychiatric facility at [facility] for safety and stabilization. Patient continues to need, on a daily basis, active treatment furnished directly by or requiring the supervision of inpatient psychiatric personnel.     Electronically signed by MARY Latham CNP on 12/11/2021 at 5:15 PM

## 2021-12-11 NOTE — PLAN OF CARE
Problem: Altered Mood, Depressive Behavior:  Goal: Able to verbalize acceptance of life and situations over which he or she has no control  Description: Able to verbalize acceptance of life and situations over which he or she has no control  12/11/2021 1049 by Eveleen Jeans, RN  Outcome: Ongoing     Problem: Altered Mood, Depressive Behavior:  Goal: Ability to disclose and discuss suicidal ideas will improve  Description: Ability to disclose and discuss suicidal ideas will improve  12/11/2021 1049 by Eveleen Jeans, RN  Outcome: Ongoing  Note: Patient reports suicidal ideations at this time without a specific plan, denies hallucinations. Patient reports auditory hallucinations to harm self. Patient compliant with scheduled medications.

## 2021-12-11 NOTE — GROUP NOTE
Group Therapy Note    Date: 12/10/2021    Group Start Time: 2030  Group End Time: 2100  Group Topic: Wrap-Up    [facility] DAVID Fernández        Group Therapy Note    Attendees: 7/16 patients for icebreaker/goals group     Preoccupied in his thoughts and answered several questions appropriately    Signature:  Romana Show

## 2021-12-11 NOTE — GROUP NOTE
Group Therapy Note    Date: 12/11/2021    Group Start Time: 3093  Group End Time: 6713  Group Topic: Relaxation    JESUS Loyola, CTRS        Group Therapy Note    Attendees: 10/17       Pt did not participate in Relaxation Group at [phone] when encouraged by RT due to resting in room. Pt was offered talk time as an alternative to group but declined.          Discipline Responsible: Psychoeducational Specialist        Signature:  Hiro Lawrence

## 2021-12-11 NOTE — PLAN OF CARE
Problem: Altered Mood, Depressive Behavior:  Goal: Able to verbalize acceptance of life and situations over which he or she has no control  Description: Able to verbalize acceptance of life and situations over which he or she has no control  Outcome: Ongoing     Problem: Altered Mood, Depressive Behavior:  Goal: Ability to disclose and discuss suicidal ideas will improve  Description: Ability to disclose and discuss suicidal ideas will improve  12/10/2021 2220 by Chante Holliday RN  Outcome: Ongoing  Note: Patient is flat and isolative to room. He reports depression, anxiety and auditory hallucinations telling him to harm himself. Patient contracts for safety while on unit. He reports having trouble sleeping and requested as needed Atarax and trazodone along with night medication. Staff maintains Q15 minute safety checks. Problem: Pain:  Goal: Pain level will decrease  Description: Pain level will decrease  12/10/2021 2220 by Chante Holliday RN  Outcome: Ongoing  Note: Patient denies pain on assessment, staff will reassess as needed.

## 2021-12-12 PROCEDURE — 6370000000 HC RX 637 (ALT 250 FOR IP): Performed by: PSYCHIATRY & NEUROLOGY

## 2021-12-12 PROCEDURE — 6370000000 HC RX 637 (ALT 250 FOR IP): Performed by: NURSE PRACTITIONER

## 2021-12-12 PROCEDURE — 6370000000 HC RX 637 (ALT 250 FOR IP)

## 2021-12-12 PROCEDURE — 99232 SBSQ HOSP IP/OBS MODERATE 35: CPT | Performed by: NURSE PRACTITIONER

## 2021-12-12 PROCEDURE — 1240000000 HC EMOTIONAL WELLNESS R&B

## 2021-12-12 RX ORDER — TRAZODONE HYDROCHLORIDE 100 MG/1
100 TABLET ORAL NIGHTLY PRN
Status: DISCONTINUED | OUTPATIENT
Start: 2021-12-12 | End: 2021-12-23 | Stop reason: HOSPADM

## 2021-12-12 RX ADMIN — ESCITALOPRAM OXALATE 20 MG: 20 TABLET ORAL at 08:51

## 2021-12-12 RX ADMIN — TRAZODONE HYDROCHLORIDE 100 MG: 100 TABLET ORAL at 20:28

## 2021-12-12 RX ADMIN — QUETIAPINE FUMARATE 50 MG: 50 TABLET ORAL at 08:51

## 2021-12-12 RX ADMIN — HYDROXYZINE HYDROCHLORIDE 50 MG: 50 TABLET, FILM COATED ORAL at 20:28

## 2021-12-12 RX ADMIN — Medication 1000 UNITS: at 08:51

## 2021-12-12 RX ADMIN — QUETIAPINE FUMARATE 250 MG: 200 TABLET ORAL at 20:27

## 2021-12-12 NOTE — PLAN OF CARE
Problem: Altered Mood, Depressive Behavior:  Goal: Able to verbalize acceptance of life and situations over which he or she has no control  Description: Able to verbalize acceptance of life and situations over which he or she has no control  12/11/2021 2122 by Cherie Kirby RN  Outcome: Ongoing  Note: Pt verbalizes acceptance of life and situations he cannot control. Patient is helpless and hopeless. 12/11/2021 1049 by Octavio Gamez RN  Outcome: Ongoing  Goal: Ability to disclose and discuss suicidal ideas will improve  Description: Ability to disclose and discuss suicidal ideas will improve  12/11/2021 2122 by Cherie Kirby RN  Outcome: Ongoing  12/11/2021 1049 by Octavio Gamez RN  Outcome: Ongoing  Note: Patient reports suicidal ideations at this time without a specific plan, denies hallucinations. Patient reports auditory hallucinations to harm self. Patient compliant with scheduled medications. Problem: Pain:  Goal: Pain level will decrease  Description: Pain level will decrease  Outcome: Ongoing  Note: PT denies pain at this time.    Goal: Control of acute pain  Description: Control of acute pain  Outcome: Ongoing  Goal: Control of chronic pain  Description: Control of chronic pain  Outcome: Ongoing     Problem: Musculor/Skeletal Functional Status  Goal: Highest potential functional level  Outcome: Ongoing  Goal: Absence of falls  Outcome: Ongoing

## 2021-12-12 NOTE — PLAN OF CARE
Problem: Altered Mood, Depressive Behavior:  Goal: Able to verbalize acceptance of life and situations over which he or she has no control  Description: Able to verbalize acceptance of life and situations over which he or she has no control  Outcome: Ongoing   Patient is isolative to room and only out for needs only. He has not verbalized acceptance over life situations he can not control. Problem: Altered Mood, Depressive Behavior:  Goal: Ability to disclose and discuss suicidal ideas will improve  Description: Ability to disclose and discuss suicidal ideas will improve  12/12/2021 1202 by Nini Alan  Outcome: Ongoing   Patient denies suicidal ideation at this time. He has agreed to seek out staff should the thoughts arise. Patient is fifteen minute safety check. Problem: Pain:  Goal: Pain level will decrease  Description: Pain level will decrease  Outcome: Ongoing   Patient has not complained of pain so far today.

## 2021-12-12 NOTE — PROGRESS NOTES
Daily Progress Note  12/12/2021    Patient Name: Altaf Tay    CHIEF COMPLAINT: Depression with suicidal ideation         SUBJECTIVE:      Patient is seen today for a follow up assessment. Patient was seen in the privacy of his room. Patient is compliant with scheduled medication. Nursing staff reports he has been isolative for most of day. Patient endorses feelings of depression rating it a 10 out of 10 with 10 being the worst. Reports that he is not happy about being placed in a ECF, he feels that he is going to lose his freedom, patient is requesting to talk to a  to see if they can find another solution. When asked about sleep patient states he has had \"no sleep. \" Patient reports he is unable to sleep because his \"mind is racing and I cant stop thinking about my housing situation. \"  He reports that he slept \"maybe 1 hour\" last night. He reports poor appetite. Staff reports that he did not get up today for breakfast.  Patient is not attending groups, when asked why he does not go patient just shrugged his shoulders. Patient endorses active suicidal ideation with no plan, contracts for safety on the unit and is encourage to talk to staff if feels become overwhelming. He denies homicidal ideation. Patient reports auditory hallucinations rating it an 8 out of 10 with 10 being unbearable. He states the voices are Kerline Leeroy" denies that the voices are commanding. He denies medication side effects and states that he thinks the medications are working. Writer encouraged patient to attend groups on the unit. At this time, the patient is not appropriate for a lower level of care. There is risk of decompensation and patient warrants further hospitalization for safety and stabilization.     Appetite:  [] Normal/Adequate/Unchanged  [] Increased  [x] Decreased      Sleep:       [] Normal/Adequate/Unchanged  [] Fair  [x] Poor      Group Attendance on Unit:   [] Yes  [] Selectively    [x] No    Medication Side Effects: Patient denies any medication side effects at the time of assessment. Mental Status Exam  Level of consciousness: Alert and awake. Appearance: Appropriate attire for setting, resting in bed, with poor grooming and hygiene. Behavior/Motor: Approachable, psychomotor slowing  Attitude toward examiner: Cooperative, attentive, good eye contact. Speech: Normal rate, quiet volume  Mood:   depressed  Affect: flat, withdrawn  Thought processes: Linear and coherent. Thought content: Denies homicidal ideation. Suicidal Ideation: Active suicidal ideations, without current plan or intent, contracts for safety on the unit. Delusions: No evidence of delusions. or paranoia   Perceptual Disturbance: Patient does not appear to be responding to internal stimuli. Endorses auditory hallucinations. Denies visual hallucinations. Cognition: Oriented to self, location, time, and situation. Memory: Intact. Insight & Judgement: Poor. Data   height is 6' 2\" (1.88 m) and weight is 200 lb (90.7 kg). His temperature is 97.7 °F (36.5 °C). His blood pressure is 116/67 and his pulse is 88. His respiration is 14 and oxygen saturation is 100%. Labs:   No visits with results within 2 Day(s) from this visit.    Latest known visit with results is:   Admission on 12/06/2021, Discharged on 12/07/2021   Component Date Value Ref Range Status    WBC 12/06/2021 4.8  3.5 - 11.3 k/uL Final    RBC 12/06/2021 4.63  4.21 - 5.77 m/uL Final    Hemoglobin 12/06/2021 12.8* 13.0 - 17.0 g/dL Final    Hematocrit 12/06/2021 41.1  40.7 - 50.3 % Final    MCV 12/06/2021 88.8  82.6 - 102.9 fL Final    MCH 12/06/2021 27.6  25.2 - 33.5 pg Final    MCHC 12/06/2021 31.1  28.4 - 34.8 g/dL Final    RDW 12/06/2021 14.0  11.8 - 14.4 % Final    Platelets 68/07/4877 358  138 - 453 k/uL Final    MPV 12/06/2021 8.9  8.1 - 13.5 fL Final    NRBC Automated 12/06/2021 0.0  0.0 per 100 WBC Final    Differential Type 12/06/2021 NOT REPORTED   Final    Seg Neutrophils 12/06/2021 40  36 - 65 % Final    Lymphocytes 12/06/2021 46* 24 - 43 % Final    Monocytes 12/06/2021 11  3 - 12 % Final    Eosinophils % 12/06/2021 2  1 - 4 % Final    Basophils 12/06/2021 1  0 - 2 % Final    Immature Granulocytes 12/06/2021 0  0 % Final    Segs Absolute 12/06/2021 1.92  1.50 - 8.10 k/uL Final    Absolute Lymph # 12/06/2021 2.25  1.10 - 3.70 k/uL Final    Absolute Mono # 12/06/2021 0.52  0.10 - 1.20 k/uL Final    Absolute Eos # 12/06/2021 0.09  0.00 - 0.44 k/uL Final    Basophils Absolute 12/06/2021 0.03  0.00 - 0.20 k/uL Final    Absolute Immature Granulocyte 12/06/2021 <0.03  0.00 - 0.30 k/uL Final    WBC Morphology 12/06/2021 NOT REPORTED   Final    RBC Morphology 12/06/2021 NOT REPORTED   Final    Platelet Estimate 98/82/0038 NOT REPORTED   Final    Glucose 12/06/2021 74  70 - 99 mg/dL Final    BUN 12/06/2021 15  8 - 23 mg/dL Final    CREATININE 12/06/2021 1.08  0.70 - 1.20 mg/dL Final    Bun/Cre Ratio 12/06/2021 NOT REPORTED  9 - 20 Final    Calcium 12/06/2021 9.0  8.6 - 10.4 mg/dL Final    Sodium 12/06/2021 142  135 - 144 mmol/L Final    Potassium 12/06/2021 4.1  3.7 - 5.3 mmol/L Final    Chloride 12/06/2021 106  98 - 107 mmol/L Final    CO2 12/06/2021 26  20 - 31 mmol/L Final    Anion Gap 12/06/2021 10  9 - 17 mmol/L Final    Alkaline Phosphatase 12/06/2021 125  40 - 129 U/L Final    ALT 12/06/2021 9  5 - 41 U/L Final    AST 12/06/2021 16  <40 U/L Final    Total Bilirubin 12/06/2021 0.20* 0.3 - 1.2 mg/dL Final    Total Protein 12/06/2021 6.6  6.4 - 8.3 g/dL Final    Albumin 12/06/2021 4.0  3.5 - 5.2 g/dL Final    Albumin/Globulin Ratio 12/06/2021 1.5  1.0 - 2.5 Final    GFR Non- 12/06/2021 >60  >60 mL/min Final    GFR  12/06/2021 >60  >60 mL/min Final    GFR Comment 12/06/2021        Final    Comment: Average GFR for 61-76 years old:   80 mL/min/1.73sq m  Chronic Kidney Disease:   <60 mL/min/1.73sq m  Kidney failure:   <15 mL/min/1.73sq m              eGFR calculated using average adult body mass. Additional eGFR calculator available at:        [employer]            GFR Staging 12/06/2021 NOT REPORTED   Final    Ethanol 12/06/2021 <10  <10 mg/dL Final    Ethanol percent 12/06/2021 <0.010  <0.010 % Final    Amphetamine Screen, Ur 12/07/2021 NEGATIVE  NEGATIVE Final    Comment:       (Positive cutoff 1000 ng/mL)                  Barbiturate Screen, Ur 12/07/2021 NEGATIVE  NEGATIVE Final    Comment:       (Positive cutoff 200 ng/mL)                  Benzodiazepine Screen, Urine 12/07/2021 NEGATIVE  NEGATIVE Final    Comment:       (Positive cutoff 200 ng/mL)                  Cocaine Metabolite, Urine 12/07/2021 POSITIVE* NEGATIVE Final    Comment:       (Positive cutoff 300 ng/mL)                  Methadone Screen, Urine 12/07/2021 NEGATIVE  NEGATIVE Final    Comment:       (Positive cutoff 300 ng/mL)                  Opiates, Urine 12/07/2021 NEGATIVE  NEGATIVE Final    Comment:       (Positive cutoff 300 ng/mL)                  Phencyclidine, Urine 12/07/2021 NEGATIVE  NEGATIVE Final    Comment:       (Positive cutoff 25 ng/mL)                  Propoxyphene, Urine 12/07/2021 NOT REPORTED  NEGATIVE Final    Cannabinoid Scrn, Ur 12/07/2021 NEGATIVE  NEGATIVE Final    Comment:       (Positive cutoff 50 ng/mL)                  Oxycodone Screen, Ur 12/07/2021 NEGATIVE  NEGATIVE Final    Comment:       (Positive cutoff 100 ng/mL)                  Methamphetamine, Urine 12/07/2021 NOT REPORTED  NEGATIVE Final    Tricyclic Antidepressants, Urine 12/07/2021 NOT REPORTED  NEGATIVE Final    MDMA, Urine 12/07/2021 NOT REPORTED  NEGATIVE Final    Buprenorphine Urine 12/07/2021 NOT REPORTED  NEGATIVE Final    Test Information 12/07/2021 Assay provides medical screening only.   The absence of expected drug(s) and/or metabolite(s) may indicate diluted or adulterated urine, limitations of testing or timing of collection. Final    Comment: Testing for legal purposes should be confirmed by another method. To request confirmation   of test result, please call the lab within 7 days of sample submission.  Specimen Description 12/06/2021 . NASOPHARYNGEAL SWAB   Final    SARS-CoV-2, Rapid 12/06/2021 Not Detected  Not Detected Final    Comment:       Rapid NAAT:  The specimen is NEGATIVE for SARS-CoV-2, the novel coronavirus associated with   COVID-19. The ID NOW COVID-19 assay is designed to detect the virus that causes COVID-19 in patients   with signs and symptoms of infection who are suspected of COVID-19. An individual without symptoms of COVID-19 and who is not shedding SARS-CoV-2 virus would   expect to have a negative (not detected) result in this assay. Negative results should be treated as presumptive and, if inconsistent with clinical signs   and symptoms or necessary for patient management,  should be tested with an alternative molecular assay. Negative results do not preclude   SARS-CoV-2 infection and   should not be used as the sole basis for patient management decisions. Fact sheet for Healthcare Providers: Brandyn  Fact sheet for Patients: Brandyn          Methodology: Isothermal Nucleic Acid Amplification           Reviewed patient's current plan of care and vital signs with nursing staff.     Labs reviewed: [x] Yes  Last EKG in EMR reviewed: [x] Yes  QTc: 410    Medications  Current Facility-Administered Medications: escitalopram (LEXAPRO) tablet 20 mg, 20 mg, Oral, Daily  QUEtiapine (SEROQUEL) tablet 250 mg, 250 mg, Oral, Nightly  QUEtiapine (SEROQUEL) tablet 50 mg, 50 mg, Oral, Daily  acetaminophen (TYLENOL) tablet 650 mg, 650 mg, Oral, Q4H PRN  aluminum & magnesium hydroxide-simethicone (MAALOX) 200-200-20 MG/5ML suspension 30 mL, 30 mL, Oral, Q6H PRN  hydrOXYzine (ATARAX) tablet 50 mg, 50 mg, Oral, TID PRN  ibuprofen (ADVIL;MOTRIN) tablet 400 mg, 400 mg, Oral, Q6H PRN  traZODone (DESYREL) tablet 50 mg, 50 mg, Oral, Nightly PRN  polyethylene glycol (GLYCOLAX) packet 17 g, 17 g, Oral, Daily PRN  nicotine polacrilex (NICORETTE) gum 2 mg, 2 mg, Oral, PRN  Vitamin D (CHOLECALCIFEROL) tablet 1,000 Units, 1,000 Units, Oral, Daily    ASSESSMENT  Schizoaffective disorder, depressive type ([facility] [address]         PLAN  Patient symptoms are: Remains Unstable. Continue current scheduled medication regimen, observe on recently titrated Lexapro  Increase trazodone 100 mg nightly as needed, monitor for improvement in sleep  Consider further increase in quetiapine at bedtime  Monitor need and frequency of PRN medications. Encourage participation in groups and milieu. Attempt to develop insight. Psycho-education conducted. Supportive Therapy conducted. Probable discharge is to be determined by MD.   Follow-up daily while inpatient. Patient continues to be monitored in the inpatient psychiatric facility at [facility] for safety and stabilization. Patient continues to need, on a daily basis, active treatment furnished directly by or requiring the supervision of inpatient psychiatric personnel.     Electronically signed by MARY Liang CNP on 12/12/2021 at 2:40 PM

## 2021-12-12 NOTE — GROUP NOTE
Group Therapy Note    Date: 12/12/2021    Group Start Time: 1400  Group End Time: 1450  Group Topic: Cognitive Skills    [facility] D Beauford Bumpers, CTRS        Group Therapy Note    Attendees: 7/18         Pt did not participate in Cognitive Skills Group at 1400 when encouraged by RT due to resting in room. Pt was offered talk time as an alternative to group but declined. Pt is focused on talking to .         Discipline Responsible: Psychoeducational Specialist        Signature:  Carmen Land

## 2021-12-13 PROCEDURE — 99232 SBSQ HOSP IP/OBS MODERATE 35: CPT | Performed by: PSYCHIATRY & NEUROLOGY

## 2021-12-13 PROCEDURE — 6370000000 HC RX 637 (ALT 250 FOR IP): Performed by: NURSE PRACTITIONER

## 2021-12-13 PROCEDURE — 6370000000 HC RX 637 (ALT 250 FOR IP): Performed by: PSYCHIATRY & NEUROLOGY

## 2021-12-13 PROCEDURE — 6370000000 HC RX 637 (ALT 250 FOR IP)

## 2021-12-13 PROCEDURE — APPSS30 APP SPLIT SHARED TIME 16-30 MINUTES

## 2021-12-13 PROCEDURE — 1240000000 HC EMOTIONAL WELLNESS R&B

## 2021-12-13 RX ORDER — QUETIAPINE FUMARATE 300 MG/1
300 TABLET, FILM COATED ORAL NIGHTLY
Status: DISCONTINUED | OUTPATIENT
Start: 2021-12-13 | End: 2021-12-15

## 2021-12-13 RX ADMIN — Medication 1000 UNITS: at 08:33

## 2021-12-13 RX ADMIN — QUETIAPINE FUMARATE 50 MG: 50 TABLET ORAL at 08:33

## 2021-12-13 RX ADMIN — TRAZODONE HYDROCHLORIDE 100 MG: 100 TABLET ORAL at 20:44

## 2021-12-13 RX ADMIN — ESCITALOPRAM OXALATE 20 MG: 20 TABLET ORAL at 08:33

## 2021-12-13 RX ADMIN — QUETIAPINE FUMARATE 300 MG: 300 TABLET, FILM COATED ORAL at 20:43

## 2021-12-13 RX ADMIN — HYDROXYZINE HYDROCHLORIDE 50 MG: 50 TABLET, FILM COATED ORAL at 20:43

## 2021-12-13 NOTE — PLAN OF CARE
Problem: Altered Mood, Depressive Behavior:  Goal: Able to verbalize acceptance of life and situations over which he or she has no control  Description: Able to verbalize acceptance of life and situations over which he or she has no control  12/12/2021 2055 by Toyin Hemphill RN  Outcome: Ongoing     Problem: Altered Mood, Depressive Behavior:  Goal: Ability to disclose and discuss suicidal ideas will improve  Description: Ability to disclose and discuss suicidal ideas will improve  12/12/2021 2055 by Toyin Hemphill RN  Note: Patient is isolative to room, out for needs only. He is medication compliant. Patient reports continues depression and anxiety, auditory hallucinations telling him to self harm, but contracts for safety. Staff maintains Q 15 minute safety checks. Problem: Pain:  Goal: Pain level will decrease  Description: Pain level will decrease  12/12/2021 2055 by Toyin Hemphill RN  Note: Patient denies pain on assessment, staff will reassess as needed.

## 2021-12-13 NOTE — GROUP NOTE
Group Therapy Note    Date: 12/13/2021    Group Start Time: 1430  Group End Time: 7787  Group Topic: Cognitive Skills    [facility] Fabio Gottron, CTRS    Pt did not attend 1430 cognitive skills group d/t resting in room despite staff invitation to attend. 1:1 talk time offered as alternative to group session, pt declined.                 Signature:  Claudell Bran

## 2021-12-13 NOTE — CARE COORDINATION
[place] [facility] website reflects patient has been denied PASRR for nursing home admission. YOGESH called and informed legal guardian of above, discussed group home and independent housing arrangements, SW emailed determination documents including appeal instructions to [email or web address] per [employer] request.

## 2021-12-13 NOTE — PLAN OF CARE
Problem: Altered Mood, Depressive Behavior:  Goal: Able to verbalize acceptance of life and situations over which he or she has no control  Description: Able to verbalize acceptance of life and situations over which he or she has no control  Outcome: Ongoing  Note: Patient denies suicidal/homicidal ideations but reports feeling depressed and anxious due to life challenges and stressors. Patient is isolative with a flat, sad and worried affect and poor hygiene. Hygiene care encouraged but ineffective. 1:1 talk time provided and programming encouraged. Problem: Altered Mood, Depressive Behavior:  Goal: Ability to disclose and discuss suicidal ideas will improve  Description: Ability to disclose and discuss suicidal ideas will improve  Outcome: Ongoing     Problem: Pain:  Goal: Pain level will decrease  Description: Pain level will decrease  Outcome: Ongoing  Note: Patient denies pain. Problem: Pain:  Goal: Control of acute pain  Description: Control of acute pain  Outcome: Ongoing     Problem: Musculor/Skeletal Functional Status  Goal: Highest potential functional level  Outcome: Ongoing     Problem: Musculor/Skeletal Functional Status  Goal: Absence of falls  Outcome: Ongoing  Note: Patient safety has been maintained.

## 2021-12-13 NOTE — GROUP NOTE
HS Goal Group   Date: December 12, 2021     Patient did not participate in HS goal group. 1:1 talk time was offered as an alternative to group. Will continue to encourage patient to participate in unit programming.      Signature: VERONICA King

## 2021-12-13 NOTE — GROUP NOTE
Group Therapy Note    Date: 12/13/2021    Group Start Time: 1100  Group End Time: 1130  Group Topic: Cognitive Skills    [facility] JACQUELINE Wagner, CTRS    Pt did not attend 1100 cognitive skills group d/t resting in room despite staff invitation to attend. 1:1 talk time offered as alternative to group session, pt declined.              Signature:  Breann Hunt

## 2021-12-13 NOTE — PROGRESS NOTES
[facility]   OCCUPATIONAL THERAPY MISSED TREATMENT NOTE   INPATIENT   Date: 21  Patient Name: Damion Langford       Room: 8620/9410-24  MRN: [id number]   Account #: [de-identified]    : 1960  (57 y.o.)  Gender: male   Referring Practitioner: Anneleise Huber MD  Diagnosis: Depression with suicidal ideation             REASON FOR MISSED TREATMENT:  Patient declines OT session including offers of shower, therapeutic exercise, and therapeutic activities. Patient states his head and stomach hurts. LPN notified. Will re-attempt as able.  [address], OT

## 2021-12-13 NOTE — PROGRESS NOTES
Daily Progress Note  12/13/2021    Patient Name: Lloyd Zamora    CHIEF COMPLAINT: Depressed with suicidal ideation         SUBJECTIVE:      Patient is seen today for a follow up assessment. Patient is compliant with scheduled medications. Patient has not received emergency medications in the past 24 hours. Patient was observed spending time in the milieu watching TV and agreed to interview in the privacy of his room. Patient states that his mood has been \"up-and-down\". He reports his appetite has been good. Patient states his sleep has been bad and has been difficult for him to get the sleep at night. He reports that he is not well rested today. He continues to endorse significant depression and anxiety today. When asked about suicidal ideations, patient stated that he was actively suicidal and reported that he has a plan when he gets out of the hospital to go to a friend's house and get a \"gun or knife\" to harm himself because a nursing home rejected him. Patient is able to contract for safety on this unit with this writer but would feel unsafe on the milieu. Patient denies any homicidal ideations at this time. Patient reports command auditory hallucinations to \"kill himself and leave the hospital, they don't care about you\" He states the voices Ariana Mendoza as loud as how you are talking to me right now. \"   Patient also endorses paranoia stating \"something bad could happen\" but he knows he is safe in the hospital. Patient denies medication side effects or medical concerns at this time. Writer encouraged patient to attend groups on the unit and patient agreed to make it his goal to attend one group today. At this time, the patient is not appropriate for a lower level of care. There is risk of decompensation and patient warrants further hospitalization for safety and stabilization.     Appetite:  [x] Normal/Adequate/Unchanged  [] Increased  [] Decreased      Sleep:       [] Normal/Adequate/Unchanged  [] Fair  [x] Poor      Group Attendance on Unit:   [] Yes  [] Selectively    [x] No    Medication Side Effects:  Patient denies any medication side effects at the time of assessment. Mental Status Exam  Level of consciousness: Alert and awake. Appearance: Appropriate attire for setting, seated on bed, with poor grooming and hygiene. Behavior/Motor: Approachable, some psychomotor slowing  Attitude toward examiner: Cooperative, attentive, fair eye contact. Speech: Normal rate, quiet volume, normal tone. Mood:  Patient reports \"Okay\". Affect: Blunted, flat  Thought processes: Linear and coherent. Thought content: Denies homicidal ideation. Suicidal Ideation: Active suicidal ideations, with a  current plan or intent, contracts for safety on the unit. Delusions: No evidence of delusions. Endorses paranoia. Perceptual Disturbance: Patient does not appear to be responding to internal stimuli. Endorses auditory hallucinations. Denies visual hallucinations. Cognition: Oriented to self, location, time, and situation. Memory: Intact. Insight & Judgement: Poor. Data   height is 6' 2\" (1.88 m) and weight is 200 lb (90.7 kg). His tympanic temperature is 97.4 °F (36.3 °C). His blood pressure is 103/68 and his pulse is 92. His respiration is 14 and oxygen saturation is 100%. Labs:   No visits with results within 2 Day(s) from this visit.    Latest known visit with results is:   Admission on 12/06/2021, Discharged on 12/07/2021   Component Date Value Ref Range Status    WBC 12/06/2021 4.8  3.5 - 11.3 k/uL Final    RBC 12/06/2021 4.63  4.21 - 5.77 m/uL Final    Hemoglobin 12/06/2021 12.8* 13.0 - 17.0 g/dL Final    Hematocrit 12/06/2021 41.1  40.7 - 50.3 % Final    MCV 12/06/2021 88.8  82.6 - 102.9 fL Final    MCH 12/06/2021 27.6  25.2 - 33.5 pg Final    MCHC 12/06/2021 31.1  28.4 - 34.8 g/dL Final    RDW 12/06/2021 14.0  11.8 - 14.4 % Final    Platelets 20/56/1974 358  138 - 453 k/uL Final    MPV 12/06/2021 8.9  8.1 - 13.5 fL Final    NRBC Automated 12/06/2021 0.0  0.0 per 100 WBC Final    Differential Type 12/06/2021 NOT REPORTED   Final    Seg Neutrophils 12/06/2021 40  36 - 65 % Final    Lymphocytes 12/06/2021 46* 24 - 43 % Final    Monocytes 12/06/2021 11  3 - 12 % Final    Eosinophils % 12/06/2021 2  1 - 4 % Final    Basophils 12/06/2021 1  0 - 2 % Final    Immature Granulocytes 12/06/2021 0  0 % Final    Segs Absolute 12/06/2021 1.92  1.50 - 8.10 k/uL Final    Absolute Lymph # 12/06/2021 2.25  1.10 - 3.70 k/uL Final    Absolute Mono # 12/06/2021 0.52  0.10 - 1.20 k/uL Final    Absolute Eos # 12/06/2021 0.09  0.00 - 0.44 k/uL Final    Basophils Absolute 12/06/2021 0.03  0.00 - 0.20 k/uL Final    Absolute Immature Granulocyte 12/06/2021 <0.03  0.00 - 0.30 k/uL Final    WBC Morphology 12/06/2021 NOT REPORTED   Final    RBC Morphology 12/06/2021 NOT REPORTED   Final    Platelet Estimate 05/76/4818 NOT REPORTED   Final    Glucose 12/06/2021 74  70 - 99 mg/dL Final    BUN 12/06/2021 15  8 - 23 mg/dL Final    CREATININE 12/06/2021 1.08  0.70 - 1.20 mg/dL Final    Bun/Cre Ratio 12/06/2021 NOT REPORTED  9 - 20 Final    Calcium 12/06/2021 9.0  8.6 - 10.4 mg/dL Final    Sodium 12/06/2021 142  135 - 144 mmol/L Final    Potassium 12/06/2021 4.1  3.7 - 5.3 mmol/L Final    Chloride 12/06/2021 106  98 - 107 mmol/L Final    CO2 12/06/2021 26  20 - 31 mmol/L Final    Anion Gap 12/06/2021 10  9 - 17 mmol/L Final    Alkaline Phosphatase 12/06/2021 125  40 - 129 U/L Final    ALT 12/06/2021 9  5 - 41 U/L Final    AST 12/06/2021 16  <40 U/L Final    Total Bilirubin 12/06/2021 0.20* 0.3 - 1.2 mg/dL Final    Total Protein 12/06/2021 6.6  6.4 - 8.3 g/dL Final    Albumin 12/06/2021 4.0  3.5 - 5.2 g/dL Final    Albumin/Globulin Ratio 12/06/2021 1.5  1.0 - 2.5 Final    GFR Non- 12/06/2021 >60  >60 mL/min Final    GFR  12/06/2021 >60  >60 mL/min Final    GFR Comment 12/06/2021        Final    Comment: Average GFR for 61-76 years old:   80 mL/min/1.73sq m  Chronic Kidney Disease:   <60 mL/min/1.73sq m  Kidney failure:   <15 mL/min/1.73sq m              eGFR calculated using average adult body mass.  Additional eGFR calculator available at:        [employer]            GFR Staging 12/06/2021 NOT REPORTED   Final    Ethanol 12/06/2021 <10  <10 mg/dL Final    Ethanol percent 12/06/2021 <0.010  <0.010 % Final    Amphetamine Screen, Ur 12/07/2021 NEGATIVE  NEGATIVE Final    Comment:       (Positive cutoff 1000 ng/mL)                  Barbiturate Screen, Ur 12/07/2021 NEGATIVE  NEGATIVE Final    Comment:       (Positive cutoff 200 ng/mL)                  Benzodiazepine Screen, Urine 12/07/2021 NEGATIVE  NEGATIVE Final    Comment:       (Positive cutoff 200 ng/mL)                  Cocaine Metabolite, Urine 12/07/2021 POSITIVE* NEGATIVE Final    Comment:       (Positive cutoff 300 ng/mL)                  Methadone Screen, Urine 12/07/2021 NEGATIVE  NEGATIVE Final    Comment:       (Positive cutoff 300 ng/mL)                  Opiates, Urine 12/07/2021 NEGATIVE  NEGATIVE Final    Comment:       (Positive cutoff 300 ng/mL)                  Phencyclidine, Urine 12/07/2021 NEGATIVE  NEGATIVE Final    Comment:       (Positive cutoff 25 ng/mL)                  Propoxyphene, Urine 12/07/2021 NOT REPORTED  NEGATIVE Final    Cannabinoid Scrn, Ur 12/07/2021 NEGATIVE  NEGATIVE Final    Comment:       (Positive cutoff 50 ng/mL)                  Oxycodone Screen, Ur 12/07/2021 NEGATIVE  NEGATIVE Final    Comment:       (Positive cutoff 100 ng/mL)                  Methamphetamine, Urine 12/07/2021 NOT REPORTED  NEGATIVE Final    Tricyclic Antidepressants, Urine 12/07/2021 NOT REPORTED  NEGATIVE Final    MDMA, Urine 12/07/2021 NOT REPORTED  NEGATIVE Final    Buprenorphine Urine 12/07/2021 NOT REPORTED  NEGATIVE Final    Test Information 12/07/2021 Assay provides medical screening only. The absence of expected drug(s) and/or metabolite(s) may indicate diluted or adulterated urine, limitations of testing or timing of collection. Final    Comment: Testing for legal purposes should be confirmed by another method. To request confirmation   of test result, please call the lab within 7 days of sample submission.  Specimen Description 12/06/2021 . NASOPHARYNGEAL SWAB   Final    SARS-CoV-2, Rapid 12/06/2021 Not Detected  Not Detected Final    Comment:       Rapid NAAT:  The specimen is NEGATIVE for SARS-CoV-2, the novel coronavirus associated with   COVID-19. The ID NOW COVID-19 assay is designed to detect the virus that causes COVID-19 in patients   with signs and symptoms of infection who are suspected of COVID-19. An individual without symptoms of COVID-19 and who is not shedding SARS-CoV-2 virus would   expect to have a negative (not detected) result in this assay. Negative results should be treated as presumptive and, if inconsistent with clinical signs   and symptoms or necessary for patient management,  should be tested with an alternative molecular assay. Negative results do not preclude   SARS-CoV-2 infection and   should not be used as the sole basis for patient management decisions. Fact sheet for Healthcare Providers: Iesha.es  Fact sheet for Patients: Iesha.es          Methodology: Isothermal Nucleic Acid Amplification           Reviewed patient's current plan of care and vital signs with nursing staff.     Labs reviewed: [x] Yes  Last EKG in EMR reviewed: [x] Yes  QTc from 11/1/2021 was 410    Medications  Current Facility-Administered Medications: traZODone (DESYREL) tablet 100 mg, 100 mg, Oral, Nightly PRN  escitalopram (LEXAPRO) tablet 20 mg, 20 mg, Oral, Daily  QUEtiapine (SEROQUEL) tablet 250 mg, 250 mg, Oral, Nightly  QUEtiapine (SEROQUEL) tablet 50 mg, 50 mg, Oral, Daily  acetaminophen (TYLENOL) tablet 650 mg, 650 mg, Oral, Q4H PRN  aluminum & magnesium hydroxide-simethicone (MAALOX) 200-200-20 MG/5ML suspension 30 mL, 30 mL, Oral, Q6H PRN  hydrOXYzine (ATARAX) tablet 50 mg, 50 mg, Oral, TID PRN  ibuprofen (ADVIL;MOTRIN) tablet 400 mg, 400 mg, Oral, Q6H PRN  polyethylene glycol (GLYCOLAX) packet 17 g, 17 g, Oral, Daily PRN  nicotine polacrilex (NICORETTE) gum 2 mg, 2 mg, Oral, PRN  Vitamin D (CHOLECALCIFEROL) tablet 1,000 Units, 1,000 Units, Oral, Daily    ASSESSMENT  Schizoaffective disorder, depressive type ([facility] [address]         PLAN  Patient symptoms are: Remains Unstable. Continue current medication regimen. Titrate Seroquel to 300 mg nightly  Monitor need and frequency of PRN medications. Encourage participation in groups and milieu. Attempt to develop insight. Psycho-education conducted. Supportive Therapy conducted. Probable discharge is to be determined by MD.   Follow-up daily while inpatient. Patient continues to be monitored in the inpatient psychiatric facility at [facility] for safety and stabilization. Patient continues to need, on a daily basis, active treatment furnished directly by or requiring the supervision of inpatient psychiatric personnel. Electronically signed by MARY Pelaez CNP on 12/13/2021 at 4:22 PM    **This report has been created using voice recognition software. It may contain minor errors which are inherent in voice recognition technology. **    I independently saw and evaluated the patient. I reviewed the nurse practitioners documentation above. Any additional comments or changes to the nurse practitioners documentation are stated below otherwise agree with assessment. Plan will be as follows:  Just unbelievably baffling that the [facility] [place] SR was denied this patient. He is previously been approved and other hospitalizations but we had no guardian in place.   At present time we have a guardian in place and now the PAS SR is denied. Truly amending situation is this patient is not able to meet his own basic needs. He still very depressed and suicidal.  Will increase Seroquel as above  PLAN  Patient s symptoms   show no change  Increase Seroquel to 300 mg at bedtime  Attempt to develop insight  Psycho-education conducted. Supportive Therapy conducted.   Probable discharge is undetermined at this time  Follow-up daily while on inpatient unit

## 2021-12-13 NOTE — GROUP NOTE
Group Therapy Note    Date: 12/13/2021    Group Start Time: 1000  Group End Time: 8352  Group Topic: Psychotherapy    [facility] Johansen        Group Therapy Note         Patient refused to attend psychotherapy group after encouragement from staff. 1:1 talk time offered but refused. Signature:   Janneth Johansen

## 2021-12-14 PROCEDURE — 99232 SBSQ HOSP IP/OBS MODERATE 35: CPT | Performed by: PSYCHIATRY & NEUROLOGY

## 2021-12-14 PROCEDURE — 6370000000 HC RX 637 (ALT 250 FOR IP): Performed by: PSYCHIATRY & NEUROLOGY

## 2021-12-14 PROCEDURE — 6370000000 HC RX 637 (ALT 250 FOR IP)

## 2021-12-14 PROCEDURE — 6370000000 HC RX 637 (ALT 250 FOR IP): Performed by: NURSE PRACTITIONER

## 2021-12-14 PROCEDURE — APPSS30 APP SPLIT SHARED TIME 16-30 MINUTES

## 2021-12-14 PROCEDURE — 1240000000 HC EMOTIONAL WELLNESS R&B

## 2021-12-14 RX ADMIN — QUETIAPINE FUMARATE 300 MG: 300 TABLET, FILM COATED ORAL at 20:44

## 2021-12-14 RX ADMIN — TRAZODONE HYDROCHLORIDE 100 MG: 100 TABLET ORAL at 20:44

## 2021-12-14 RX ADMIN — ESCITALOPRAM OXALATE 20 MG: 20 TABLET ORAL at 11:50

## 2021-12-14 RX ADMIN — Medication 1000 UNITS: at 11:50

## 2021-12-14 RX ADMIN — QUETIAPINE FUMARATE 50 MG: 50 TABLET ORAL at 11:50

## 2021-12-14 RX ADMIN — HYDROXYZINE HYDROCHLORIDE 50 MG: 50 TABLET, FILM COATED ORAL at 20:44

## 2021-12-14 NOTE — GROUP NOTE
Group Therapy Note    Date: 12/14/2021    Group Start Time: 1330  Group End Time: 1383  Group Topic: recreation therapy group    [address] [facility], [facility]        Group Therapy Note    Attendees: 6/11         Patient's Goal:  To improve coping skills / improve concentratation    Notes:   Pt was pleasant and participated well     Status After Intervention:  Improved    Participation Level:  Active Listener and Interactive    Participation Quality: Appropriate with prompts      Speech: mumbles      Thought Process/Conten slow to process       Affective Functioning flat but brightens      Mood: aloof      Level of consciousness:  Alert      Response to Learning: Able to verbalize current knowledge/experience and Progressing to goal      Endings: None Reported    Modes of Intervention: Support, Socialization and Exploration      Discipline Responsible: Psychoeducational Specialist      Signature:  Claudell Bran

## 2021-12-14 NOTE — CARE COORDINATION
SW contacted pt sister this date to discuss pt care/plan, sister/LG states she is driving and is unable to talk, will call back in am to discuss.

## 2021-12-14 NOTE — PLAN OF CARE
Problem: Altered Mood, Depressive Behavior:  Goal: Able to verbalize acceptance of life and situations over which he or she has no control  Description: Able to verbalize acceptance of life and situations over which he or she has no control  12/14/2021 1400 by Yajaira Barros LPN  Outcome: Ongoing   Zebedee Area is seen in his room affect is flat, and he reports fleeting suicidal thoughts, agrees to be safe on the unit. Reports audio hallucinations. Took medications as ordered. Comes out only for meals, and needs. Cooperative with staff, isolative to self. Reports poor sleep last night and a fair appetite.  15 minute safety checks continue

## 2021-12-14 NOTE — PLAN OF CARE
Problem: Altered Mood, Depressive Behavior:  Goal: Able to verbalize acceptance of life and situations over which he or she has no control  Description: Able to verbalize acceptance of life and situations over which he or she has no control  12/13/2021 2128 by Marten Nissen, RN  Outcome: Ongoing  Patient verbalized to writer that he accepts the fact that he hears voices and states that keeping up with his medication on a daily basis and helping to decrease the voices. Goal: Ability to disclose and discuss suicidal ideas will improve  Description: Ability to disclose and discuss suicidal ideas will improve  12/13/2021 2128 by Marten Nissen, RN  Outcome: Ongoing  Patient stated he was experiencing fleeting suicidal ideations. Patient denied having a plan and contracted for safety with staff. Patient stated he has command hallucinations that frequently tell him to harm himself but the patient always tells them to \"go to hell\". Problem: Pain:  Goal: Pain level will decrease  Description: Pain level will decrease  12/13/2021 2128 by Marten Nissen, RN  Outcome: Ongoing  Patient complained the voices were causing his head to hurt and the birthday cake he had as a snack upset his stomach. Patient denied wanting to take pain medication. Goal: Control of acute pain  Description: Control of acute pain  12/13/2021 2128 by Marten Nissen, RN  Outcome: Ongoing  Patient complained the voices were causing his head to hurt and the birthday cake he had as a snack upset his stomach. Patient denied wanting to take pain medication. Goal: Control of chronic pain  Description: Control of chronic pain  12/13/2021 2128 by Marten Nissen, RN  Outcome: Ongoing  Patient complained the voices were causing his head to hurt and the birthday cake he had as a snack upset his stomach. Patient denied wanting to take pain medication.       Problem: Musculor/Skeletal Functional Status  Goal: Highest potential functional level  12/13/2021 2128 by Nela Case RN  Outcome: Ongoing  Patient able to function at highest potential level. Goal: Absence of falls  12/13/2021 2128 by Nela Case RN  Outcome: Ongoing  Patient absent of falls. Will continue to monitor.

## 2021-12-14 NOTE — PLAN OF CARE
Cooperative  Preception: [place] to Person, [place] to Time, [place] to Place, [place] to Situation  Attention:Normal: No  Attention: Distractible  Thought Processes: Circumstantial  Thought Content:Normal: No  Thought Content: Preoccupations  Hallucinations: Auditory (Comment) (command, telling him to hurt self)  Delusions: No  Memory:Normal: No  Memory: Poor Recent, Poor Remote  Insight and Judgment: No  Insight and Judgment: Poor Insight  Present Suicidal Ideation: Yes (fleeting, contracts, no plan)  Present Homicidal Ideation: No    Daily Assessment Last Entry:   Daily Sleep (WDL): Within Defined Limits         Patient Currently in Pain: Yes  Daily Nutrition (WDL): Within Defined Limits  Appetite Change: Normal for patient  Barriers to Nutrition: None  Level of Assistance: Independent/Self    Patient Monitoring:  Frequency of Checks: 4 times per hour, close    Psychiatric Symptoms:   Depression Symptoms  Depression Symptoms: Impaired concentration, Feelings of helplessness, Isolative, Loss of interest  Anxiety Symptoms  Anxiety Symptoms: Generalized  [place] Symptoms  [place] Symptoms: No problems reported or observed. Psychosis Symptoms  Delusion Type: No problems reported or observed. Suicide Risk CSSR-S:  1) Within the past month, have you wished you were dead or wished you could go to sleep and not wake up? : Yes  2) Have you actually had any thoughts of killing yourself? : Yes  3) Have you been thinking about how you might kill yourself? : Yes (\"hit by car or shoot self\")  5) Have you started to work out or worked out the details of how to kill yourself?  Do you intend to carry out this plan? : No  6) Have you ever done anything, started to do anything, or prepared to do anything to end your life?: No  Change in Result no  Change in Plan of care no     EDUCATION:   Learner Progress Toward Treatment Goals: Reviewed results and recommendations of this team    Method: Group    Outcome: Needs reinforcement and No

## 2021-12-14 NOTE — PROGRESS NOTES
Daily Progress Note  12/14/2021    Patient Name: Eugenie West    CHIEF COMPLAINT: Depressed with suicidal ideation         SUBJECTIVE:      Patient is seen today for a follow up assessment. Patient is compliant with scheduled medications. Patient has not received emergency medications in the past 24 hours. Upon interview patient was cooperative and pleasant. Patient affect appears to be reactive and shows improvement from yesterday. Patient states that his mood is good, appetite is good and he slept well last night. Patient reports feeling rested in the morning. When asked about current suicidal ideations patient stated \"a little bit\" however he states they have improved since yesterday. Patient states today that he does not have a plan of how he wants to harm himself. Patient stated that if this changes he will talk with staff. Patient states that he has no thoughts of wanting to harm others at this time. Patient does endorse auditory hallucinations at this time stating that they are \"nasty\". Patient reports them telling him to \"get his nasty ass out of bed and get out of the hospital\". Patient reports that he does feel like the voices have lessened today though and he reports an improved sense of control over the voices at this time. Patient states that he currently feels \"a little\" paranoid however states he feels safe in the hospital.  During discussion of medications patient states that he has not been experiencing any side effects and endorses the medications have been helpful. Patient attended group of the first time yesterday after making a goal with staff to attend a group. However he reports he did not remember attending the group. After today's interview patient left his room to attend another group. There is risk of decompensation and patient warrants further hospitalization for safety and stabilization.     Appetite:  [x] Normal/Adequate/Unchanged  [] Increased  [] Decreased Sleep:       [x] Normal/Adequate/Unchanged  [] Fair  [] Poor      Group Attendance on Unit:   [] Yes  [x] Selectively    [] No    Medication Side Effects:  Patient denies any medication side effects at the time of assessment. Mental Status Exam  Level of consciousness: Alert and awake. Appearance: Appropriate attire for setting, seated on bed, with poor grooming and hygiene, disheveled, encouraged patient to shower. Behavior/Motor: Approachable, some psychomotor slowing   Attitude toward examiner: Cooperative, attentive, good eye contact. Speech: Normal rate, quiet volume, normal tone. Mood:  Patient reports \"good\". Affect: Reactive, depressed but improving  Thought processes: Linear and coherent. Thought content: Denies homicidal ideation. Suicidal Ideation: Fleeting suicidal ideations, without current plan or intent, contracts for safety on the unit. Delusions: No evidence of delusions. Reports improvement in paranoia. Perceptual Disturbance: Patient does not appear to be responding to internal stimuli. Reports improvement in auditory hallucinations. Denies visual hallucinations. Cognition: Oriented to self, location, time, and situation. Memory: Intact. Insight & Judgement: Poor. Data   height is 6' 2\" (1.88 m) and weight is 200 lb (90.7 kg). His temporal temperature is 97.1 °F (36.2 °C). His blood pressure is 95/64 and his pulse is 93. His respiration is 14 and oxygen saturation is 100%. Labs:   No visits with results within 2 Day(s) from this visit.    Latest known visit with results is:   Admission on 12/06/2021, Discharged on 12/07/2021   Component Date Value Ref Range Status    WBC 12/06/2021 4.8  3.5 - 11.3 k/uL Final    RBC 12/06/2021 4.63  4.21 - 5.77 m/uL Final    Hemoglobin 12/06/2021 12.8* 13.0 - 17.0 g/dL Final    Hematocrit 12/06/2021 41.1  40.7 - 50.3 % Final    MCV 12/06/2021 88.8  82.6 - 102.9 fL Final    MCH 12/06/2021 27.6  25.2 - 33.5 pg Final    MCHC 12/06/2021 31.1  28.4 - 34.8 g/dL Final    RDW 12/06/2021 14.0  11.8 - 14.4 % Final    Platelets 69/76/8998 358  138 - 453 k/uL Final    MPV 12/06/2021 8.9  8.1 - 13.5 fL Final    NRBC Automated 12/06/2021 0.0  0.0 per 100 WBC Final    Differential Type 12/06/2021 NOT REPORTED   Final    Seg Neutrophils 12/06/2021 40  36 - 65 % Final    Lymphocytes 12/06/2021 46* 24 - 43 % Final    Monocytes 12/06/2021 11  3 - 12 % Final    Eosinophils % 12/06/2021 2  1 - 4 % Final    Basophils 12/06/2021 1  0 - 2 % Final    Immature Granulocytes 12/06/2021 0  0 % Final    Segs Absolute 12/06/2021 1.92  1.50 - 8.10 k/uL Final    Absolute Lymph # 12/06/2021 2.25  1.10 - 3.70 k/uL Final    Absolute Mono # 12/06/2021 0.52  0.10 - 1.20 k/uL Final    Absolute Eos # 12/06/2021 0.09  0.00 - 0.44 k/uL Final    Basophils Absolute 12/06/2021 0.03  0.00 - 0.20 k/uL Final    Absolute Immature Granulocyte 12/06/2021 <0.03  0.00 - 0.30 k/uL Final    WBC Morphology 12/06/2021 NOT REPORTED   Final    RBC Morphology 12/06/2021 NOT REPORTED   Final    Platelet Estimate 08/68/5186 NOT REPORTED   Final    Glucose 12/06/2021 74  70 - 99 mg/dL Final    BUN 12/06/2021 15  8 - 23 mg/dL Final    CREATININE 12/06/2021 1.08  0.70 - 1.20 mg/dL Final    Bun/Cre Ratio 12/06/2021 NOT REPORTED  9 - 20 Final    Calcium 12/06/2021 9.0  8.6 - 10.4 mg/dL Final    Sodium 12/06/2021 142  135 - 144 mmol/L Final    Potassium 12/06/2021 4.1  3.7 - 5.3 mmol/L Final    Chloride 12/06/2021 106  98 - 107 mmol/L Final    CO2 12/06/2021 26  20 - 31 mmol/L Final    Anion Gap 12/06/2021 10  9 - 17 mmol/L Final    Alkaline Phosphatase 12/06/2021 125  40 - 129 U/L Final    ALT 12/06/2021 9  5 - 41 U/L Final    AST 12/06/2021 16  <40 U/L Final    Total Bilirubin 12/06/2021 0.20* 0.3 - 1.2 mg/dL Final    Total Protein 12/06/2021 6.6  6.4 - 8.3 g/dL Final    Albumin 12/06/2021 4.0  3.5 - 5.2 g/dL Final    Albumin/Globulin Ratio 12/06/2021 1.5  1.0 - 2.5 Final    GFR Non- 12/06/2021 >60  >60 mL/min Final    GFR  12/06/2021 >60  >60 mL/min Final    GFR Comment 12/06/2021        Final    Comment: Average GFR for 61-76 years old:   80 mL/min/1.73sq m  Chronic Kidney Disease:   <60 mL/min/1.73sq m  Kidney failure:   <15 mL/min/1.73sq m              eGFR calculated using average adult body mass.  Additional eGFR calculator available at:        [employer]            GFR Staging 12/06/2021 NOT REPORTED   Final    Ethanol 12/06/2021 <10  <10 mg/dL Final    Ethanol percent 12/06/2021 <0.010  <0.010 % Final    Amphetamine Screen, Ur 12/07/2021 NEGATIVE  NEGATIVE Final    Comment:       (Positive cutoff 1000 ng/mL)                  Barbiturate Screen, Ur 12/07/2021 NEGATIVE  NEGATIVE Final    Comment:       (Positive cutoff 200 ng/mL)                  Benzodiazepine Screen, Urine 12/07/2021 NEGATIVE  NEGATIVE Final    Comment:       (Positive cutoff 200 ng/mL)                  Cocaine Metabolite, Urine 12/07/2021 POSITIVE* NEGATIVE Final    Comment:       (Positive cutoff 300 ng/mL)                  Methadone Screen, Urine 12/07/2021 NEGATIVE  NEGATIVE Final    Comment:       (Positive cutoff 300 ng/mL)                  Opiates, Urine 12/07/2021 NEGATIVE  NEGATIVE Final    Comment:       (Positive cutoff 300 ng/mL)                  Phencyclidine, Urine 12/07/2021 NEGATIVE  NEGATIVE Final    Comment:       (Positive cutoff 25 ng/mL)                  Propoxyphene, Urine 12/07/2021 NOT REPORTED  NEGATIVE Final    Cannabinoid Scrn, Ur 12/07/2021 NEGATIVE  NEGATIVE Final    Comment:       (Positive cutoff 50 ng/mL)                  Oxycodone Screen, Ur 12/07/2021 NEGATIVE  NEGATIVE Final    Comment:       (Positive cutoff 100 ng/mL)                  Methamphetamine, Urine 12/07/2021 NOT REPORTED  NEGATIVE Final    Tricyclic Antidepressants, Urine 12/07/2021 NOT REPORTED  NEGATIVE Final    MDMA, Urine 12/07/2021 NOT REPORTED  NEGATIVE Final    Buprenorphine Urine 12/07/2021 NOT REPORTED  NEGATIVE Final    Test Information 12/07/2021 Assay provides medical screening only. The absence of expected drug(s) and/or metabolite(s) may indicate diluted or adulterated urine, limitations of testing or timing of collection. Final    Comment: Testing for legal purposes should be confirmed by another method. To request confirmation   of test result, please call the lab within 7 days of sample submission.  Specimen Description 12/06/2021 . NASOPHARYNGEAL SWAB   Final    SARS-CoV-2, Rapid 12/06/2021 Not Detected  Not Detected Final    Comment:       Rapid NAAT:  The specimen is NEGATIVE for SARS-CoV-2, the novel coronavirus associated with   COVID-19. The ID NOW COVID-19 assay is designed to detect the virus that causes COVID-19 in patients   with signs and symptoms of infection who are suspected of COVID-19. An individual without symptoms of COVID-19 and who is not shedding SARS-CoV-2 virus would   expect to have a negative (not detected) result in this assay. Negative results should be treated as presumptive and, if inconsistent with clinical signs   and symptoms or necessary for patient management,  should be tested with an alternative molecular assay. Negative results do not preclude   SARS-CoV-2 infection and   should not be used as the sole basis for patient management decisions. Fact sheet for Healthcare Providers: Brandyn  Fact sheet for Patients: Brandyn          Methodology: Isothermal Nucleic Acid Amplification           Reviewed patient's current plan of care and vital signs with nursing staff.     Labs reviewed: [x] Yes  Last EKG in EMR reviewed: [x] Yes  QTc from 11/1/2021 was 410    Medications  Current Facility-Administered Medications: QUEtiapine (SEROQUEL) tablet 300 mg, 300 mg, Oral, Nightly  traZODone (DESYREL) tablet 100 mg, 100 mg, Oral, Nightly PRN  escitalopram (LEXAPRO) tablet 20 mg, 20 mg, Oral, Daily  QUEtiapine (SEROQUEL) tablet 50 mg, 50 mg, Oral, Daily  acetaminophen (TYLENOL) tablet 650 mg, 650 mg, Oral, Q4H PRN  aluminum & magnesium hydroxide-simethicone (MAALOX) 200-200-20 MG/5ML suspension 30 mL, 30 mL, Oral, Q6H PRN  hydrOXYzine (ATARAX) tablet 50 mg, 50 mg, Oral, TID PRN  ibuprofen (ADVIL;MOTRIN) tablet 400 mg, 400 mg, Oral, Q6H PRN  polyethylene glycol (GLYCOLAX) packet 17 g, 17 g, Oral, Daily PRN  nicotine polacrilex (NICORETTE) gum 2 mg, 2 mg, Oral, PRN  Vitamin D (CHOLECALCIFEROL) tablet 1,000 Units, 1,000 Units, Oral, Daily    ASSESSMENT  Schizoaffective disorder, [facility] ([facility]         PLAN  Patient symptoms are: Modestly Improving. Continue current medication regimen. Monitor need and frequency of PRN medications. Encourage participation in groups and milieu. Attempt to develop insight. Psycho-education conducted. Supportive Therapy conducted. Probable discharge is to be determined by MD.   Follow-up daily while inpatient. Patient continues to be monitored in the inpatient psychiatric facility at [facility] for safety and stabilization. Patient continues to need, on a daily basis, active treatment furnished directly by or requiring the supervision of inpatient psychiatric personnel. Electronically signed by MARY Raymond CNP on 12/14/2021 at 3:26 PM    **This report has been created using voice recognition software. It may contain minor errors which are inherent in voice recognition technology. **    I independently saw and evaluated the patient. I reviewed the nurse practitioners documentation above. Any additional comments or changes to the nurse practitioners documentation are stated below otherwise agree with assessment.   Plan will be as follows:  Patient does not begin to demonstrate further improvement on his Seroquel, we may need to consider switching to a higher potency dopamine blocker such as olanzapine or Risperdal.  Will observe for 1 more night at present time. Still experiencing distressing auditory hallucinations and suicidal ideation. PLAN  Patient s symptoms   show no change  Observe 1 more night on increased Seroquel, may consider change of antipsychotic pending observation  Attempt to develop insight  Psycho-education conducted. Supportive Therapy conducted.   Probable discharge is undetermined at this time  Follow-up daily while on inpatient unit

## 2021-12-14 NOTE — CARE COORDINATION
SW team placed call to the [facility] ACT team speaking with Dilshad Basilio and his supervisor Majo regarding assistance with discharge planning. The [facility] team was informed of pt being denied a PASRR and alternate housing would be needed. [facility] to group homes and boarding homes and contact SW team if they find a placement.

## 2021-12-15 PROCEDURE — 6370000000 HC RX 637 (ALT 250 FOR IP): Performed by: PSYCHIATRY & NEUROLOGY

## 2021-12-15 PROCEDURE — 6370000000 HC RX 637 (ALT 250 FOR IP)

## 2021-12-15 PROCEDURE — 99232 SBSQ HOSP IP/OBS MODERATE 35: CPT | Performed by: PSYCHIATRY & NEUROLOGY

## 2021-12-15 PROCEDURE — 6370000000 HC RX 637 (ALT 250 FOR IP): Performed by: NURSE PRACTITIONER

## 2021-12-15 PROCEDURE — 1240000000 HC EMOTIONAL WELLNESS R&B

## 2021-12-15 RX ORDER — OLANZAPINE 5 MG/1
5 TABLET ORAL NIGHTLY
Status: DISCONTINUED | OUTPATIENT
Start: 2021-12-15 | End: 2021-12-16

## 2021-12-15 RX ADMIN — TRAZODONE HYDROCHLORIDE 100 MG: 100 TABLET ORAL at 21:44

## 2021-12-15 RX ADMIN — Medication 1000 UNITS: at 08:40

## 2021-12-15 RX ADMIN — HYDROXYZINE HYDROCHLORIDE 50 MG: 50 TABLET, FILM COATED ORAL at 14:00

## 2021-12-15 RX ADMIN — ESCITALOPRAM OXALATE 20 MG: 20 TABLET ORAL at 08:40

## 2021-12-15 RX ADMIN — QUETIAPINE FUMARATE 50 MG: 50 TABLET ORAL at 08:40

## 2021-12-15 RX ADMIN — OLANZAPINE 5 MG: 5 TABLET, FILM COATED ORAL at 21:46

## 2021-12-15 RX ADMIN — QUETIAPINE FUMARATE 300 MG: 300 TABLET, FILM COATED ORAL at 21:43

## 2021-12-15 NOTE — GROUP NOTE
Group Therapy Note    Date: 12/15/2021    Group Start Time: 1000  Group End Time: 5655  Group Topic: Psychotherapy    [address], LSW    patient refused to attend psychotherapy group at [facility] after encouragement from staff.   1:1 talk time provided as alternative to group session

## 2021-12-15 NOTE — PROGRESS NOTES
Behavioral Services                                              Medicare Re-Certification    I certify that the inpatient psychiatric hospital services furnished since the previous certification/re-certification were, and continue to be, medically necessary for;    [x] (1) Treatment which could reasonably be expected to improve the patient's condition,    [x] (2) Or for diagnostic study. Estimated length of stay/service 4 to 7 days    Plan for post-hospital care group home versus ECF    This patient continues to need, on a daily basis, active treatment furnished directly by or requiring the supervision of inpatient psychiatric personnel.     Electronically signed by Ashlee Islas MD on 12/14/2021 at 8:55 PM

## 2021-12-15 NOTE — GROUP NOTE
Group Therapy Note    Date: 12/15/2021    Group Start Time: 1100  Group End Time: 1150  Group Topic: Cognitive Skills    JESUS Regalado, CTRS    Pt did not attend cognitive skills group at 1100 d/t resting in room despite staff invitation to attend. 1:1 talk time offered as alternative to group session, pt declined.        Signature:  Adalid Regalado, [address] Outpatient Medications Marked as Taking for the 12/24/19 encounter (Refill) with Yogi Guillen,    Medication Sig Dispense Refill   • tiZANidine (ZANAFLEX) 4 MG tablet Take two tablets by mouth  tablet 3   • diazePAM (VALIUM) 2 MG tablet Take 1 tablet by mouth 3 times daily. 90 tablet 1        Ok to leave detailed Message: Yes  Informed caller of refill policy- 24-48 hours: Yes  No call back needed unless nurse has questions.     Pharmacy: [id number] - [place] [address] [place] AT [facility] [address]  [phone]

## 2021-12-15 NOTE — GROUP NOTE
Group Therapy Note    Date: 12/15/2021    Group Start Time: 1330  Group End Time: 8541  Group Topic: Psychoeducation    JESUS Pelaez, GADIELS        Group Therapy Note    Attendees: 7         Patient's Goal:  To increase awareness of coping skills and to explore new coping skills. To increase interpersonal interactions through conversation with peers. Notes: Patient attended group and actively participated in the coping skill activity. Patient required redirection by the writer to stay on task. Patient was pleasant and appropriate.     Status After Intervention:  Improved    Participation Level: Interactive    Participation Quality: Appropriate and Sharing      Speech:  normal      Thought Process/Content: Logical      Affective Functioning: Congruent      Mood: euthymic      Level of consciousness:  Alert      Response to Learning: Able to verbalize current knowledge/experience, Able to verbalize/acknowledge new learning and Progressing to goal      Endings: None Reported    Modes of Intervention: Socialization, Exploration, Problem-solving and Reality-testing      Discipline Responsible: Psychoeducational Specialist      Signature:  Sara Roberto

## 2021-12-15 NOTE — PLAN OF CARE
Problem: Altered Mood, Depressive Behavior:  Goal: Able to verbalize acceptance of life and situations over which he or she has no control  Description: Able to verbalize acceptance of life and situations over which he or she has no control  Outcome: Ongoing  Note: Patient reports fleeting suicidal ideation with no plan but contracts. Patient has a flat and sad affect. He isolates to room most of shift and is evasive. Patient has poor hygiene skills and is encouraged to address issues. Programming benefits reiterated. Problem: Altered Mood, Depressive Behavior:  Goal: Ability to disclose and discuss suicidal ideas will improve  Description: Ability to disclose and discuss suicidal ideas will improve  Outcome: Ongoing     Problem: Tobacco Use:  Intervention: Tobacco-use cessation counseling  Note: Patient declines any tobacco cessation literature at this time. Problem: Pain:  Goal: Pain level will decrease  Description: Pain level will decrease  Outcome: Ongoing  Note: Patient exhibits no discomfort and verbalizes no pain.      Problem: Pain:  Goal: Control of acute pain  Description: Control of acute pain  Outcome: Ongoing     Problem: Pain:  Goal: Control of chronic pain  Description: Control of chronic pain  Outcome: Ongoing     Problem: Musculor/Skeletal Functional Status  Goal: Highest potential functional level  Outcome: Ongoing

## 2021-12-15 NOTE — FLOWSHEET NOTE
*Patient participated in [address] , patient also sang a song for the Group and did very well       12/15/21 1526   Encounter Summary   Services provided to: Patient   Referral/Consult From: [facility]   (12/15/21)   Complexity of Encounter Moderate   Length of Encounter 30 minutes   Spiritual Assessment Completed Yes   Spiritual/[employer]   Type Spiritual support   Assessment Calm; Approachable   Intervention Active listening; Prayer   Outcome Receptive;  Expressed gratitude; Engaged in conversation

## 2021-12-15 NOTE — PLAN OF CARE
Problem: Altered Mood, Depressive Behavior:  Goal: Able to verbalize acceptance of life and situations over which he or she has no control  Description: Able to verbalize acceptance of life and situations over which he or she has no control  12/15/2021 0138 by Ignacio Becker RN  Outcome: Ongoing  Patient verbalized that he has no control over his voices. Patient understands that he needs to take his medication as prescribed and communicate concerns with his doctor if the voices progress. Goal: Ability to disclose and discuss suicidal ideas will improve  Description: Ability to disclose and discuss suicidal ideas will improve  12/15/2021 0138 by Ignacio Becker RN  Outcome: Ongoing  Patient stated he was having fleeting suicidal ideations. Patient denied having a plan and contracted for safety with staff. Patient monitored every 15 minutes and as needed for safety checks. Problem: Pain:  Goal: Pain level will decrease  Description: Pain level will decrease  12/15/2021 0138 by Ignacio Becker RN  Outcome: Ongoing  Patient denied pain. Goal: Control of acute pain  Description: Control of acute pain  12/15/2021 0138 by Ignacio Becker RN  Outcome: Ongoing  Patient denied pain. Goal: Control of chronic pain  Description: Control of chronic pain  12/15/2021 0138 by Ignacio Becker RN  Outcome: Ongoing  Patient denied pain. Problem: Musculor/Skeletal Functional Status  Goal: Highest potential functional level  12/15/2021 0138 by Ignacio Becker RN  Outcome: Ongoing  Patient able to function at highest potential level. Goal: Absence of falls  12/15/2021 0138 by Ignacio Becker RN  Outcome: Ongoing  Patient absent of falls. Patient observed every 15 minutes and as needed for safety and environmental checks.

## 2021-12-16 PROCEDURE — 6370000000 HC RX 637 (ALT 250 FOR IP): Performed by: NURSE PRACTITIONER

## 2021-12-16 PROCEDURE — 6370000000 HC RX 637 (ALT 250 FOR IP): Performed by: PSYCHIATRY & NEUROLOGY

## 2021-12-16 PROCEDURE — 6370000000 HC RX 637 (ALT 250 FOR IP)

## 2021-12-16 PROCEDURE — 99232 SBSQ HOSP IP/OBS MODERATE 35: CPT | Performed by: PSYCHIATRY & NEUROLOGY

## 2021-12-16 PROCEDURE — 97535 SELF CARE MNGMENT TRAINING: CPT

## 2021-12-16 PROCEDURE — 1240000000 HC EMOTIONAL WELLNESS R&B

## 2021-12-16 RX ORDER — OLANZAPINE 10 MG/1
10 TABLET ORAL NIGHTLY
Status: DISCONTINUED | OUTPATIENT
Start: 2021-12-16 | End: 2021-12-23 | Stop reason: HOSPADM

## 2021-12-16 RX ADMIN — ESCITALOPRAM OXALATE 20 MG: 20 TABLET ORAL at 09:16

## 2021-12-16 RX ADMIN — Medication 1000 UNITS: at 09:16

## 2021-12-16 RX ADMIN — OLANZAPINE 10 MG: 10 TABLET, FILM COATED ORAL at 21:08

## 2021-12-16 RX ADMIN — HYDROXYZINE HYDROCHLORIDE 50 MG: 50 TABLET, FILM COATED ORAL at 21:08

## 2021-12-16 RX ADMIN — TRAZODONE HYDROCHLORIDE 100 MG: 100 TABLET ORAL at 21:08

## 2021-12-16 NOTE — GROUP NOTE
Group Therapy Note    Date: 12/15/2021    Group Start Time: 2008  Group End Time: 2038  Group Topic: Wrap-Up    JESUS Wills        Group Therapy Note    Attendees: 7/17               Status After Intervention:  Improved    Participation Level:  Active Listener    Participation Quality: Appropriate      Speech:  normal      Thought Process/Content: Logical      Affective Functioning: Congruent      Mood: elevated      Level of consciousness:  Alert      Response to Learning: Able to verbalize current knowledge/experience      Endings: None Reported    Modes of Intervention: Support and Socialization      Discipline Responsible: Behavorial Health Tech      Signature:  Nils Heaton

## 2021-12-16 NOTE — CARE COORDINATION
YOGESH called several group homes this date; YOGESH spoke with Mr Usha Perera from [facility] who states he has availability and will come to interview patient on Monday to determine acceptance. SW offered support and assist, advocacy.

## 2021-12-16 NOTE — PROGRESS NOTES
[facility]: [facility]OP [facility]   INPATIENT OCCUPATIONAL THERAPY  PROGRESS NOTE  Date: 2021  Patient Name: Katarzyna Heading      Room: 4065/9526-71  MRN: [id number]    : 1960  (63 y.o.) Gender: male     Discharge Recommendations:  Further Occupational Therapy is recommended upon facility discharge. Referring Practitioner: Jacki Pineda MD  Diagnosis: Depression with suicidal ideation  General  Chart Reviewed: Yes, Orders, Progress Notes, History and Physical  Patient assessed for rehabilitation services?: Yes  Family / Caregiver Present: No  Referring Practitioner: Jacki Pineda MD  Diagnosis: Depression with suicidal ideation    Restrictions  Restrictions/Precautions: Fall Risk  Required Braces or Orthoses?: No      Subjective  Subjective: \"Next time I will shower. \" pt states when asked \"When are you coming to see me next. \" pt asks at end of session   Patient Currently in Pain: Denies  Overall Orientation Status: Impaired  Orientation Level: Oriented to person; Oriented to place; Disoriented to time; Disoriented to situation  Patient Observation  Observations: Alert and oriented and cooperative with            Objective  Cognition  Overall Cognitive Status: Impaired  Arousal/Alertness: Delayed responses to stimuli  Following Directions: Follows two step commands  Attention Span: Attends with cues to redirect  Memory: Decreased short term memory; Decreased recall of recent events  Following Commands: Follows multistep commands with repetition  Awareness of Errors: Assistance required to identify errors made  Insights: Decreased awareness of deficits     Balance  Sitting Balance: Independent  Standing Balance: Independent     Functional Mobility  Functional - Mobility Device: No device  Activity: To/from bathroom; Other  Assist Level:  Independent  Functional Mobility Comments: no loss of balance noted   ADL  Feeding: Supervision  Grooming: Supervision  UE Bathing: Stand by assistance  PERNELL Bathing: Stand by assistance  UE Dressing: Stand by assistance  LE Dressing: Stand by assistance  Toileting: Supervision  Additional Comments: ADLs based off of clinical observation and reasoning unless otherwise noted. SUP/SBA for all selfcare tasks, requires vc to initiate tasks     Transfers  Sit to stand: Independent  Stand to sit: Independent                             Assessment  Performance deficits / Impairments: Decreased functional mobility ; Decreased ADL status; Decreased strength; Decreased safe awareness; Decreased cognition; Decreased endurance; Decreased balance; Decreased high-level IADLs  Prognosis: Good  Discharge Recommendations: Patient would benefit from continued therapy after discharge  Activity Tolerance: Patient Tolerated treatment well  Safety Devices in place: Yes  Type of devices: All fall risk precautions in place; Left in bed; Nurse notified                 Plan  Safety Devices  Safety Devices in place: Yes  Type of devices: All fall risk precautions in place, Left in bed, Nurse notified  Plan  Times per week: 1-2  Times per day: Daily  Current Treatment Recommendations: Self-Care / ADL, Home Management Training, Strengthening, Balance Training, Functional Mobility Training, Endurance Training, Safety Education & Training, Patient/Caregiver Education & Training, Equipment Evaluation, Education, & procurement      Goals  Short term goals  Time Frame for Short term goals: By discharge  Short term goal 1: Patient will actively participate in 15+ minutes of self-care to the best of patient's ability with Minimal verbal cues for initiation/attention. Short term goal 2: Patient will participate in self-care routine 5/7 days to increase participation in self-care. Short term goal 3: Patient will engage in showering routine with Supervision and Fair safety.   Short term goal 4: Patient will actively participate in 15-25 minutes of therapeutic exercise/functional activities to promote increased participation in self-care.     OT Individual Minutes  Time In: 9426  Time Out: 6685  Minutes: 18      Electronically signed by RENY Ulrich on 12/16/21 at 3:47 PM EST

## 2021-12-16 NOTE — PROGRESS NOTES
Daily Progress Note  Ariana Renner MD  12/15/2021  CHIEF COMPLAINT: Depression with suicidal ideation    Reviewed patient's current plan of care and vital signs with nursing staff. Sleep:  several hours last night  Attending groups: No: Isolating    SUBJECTIVE:    Patient still reporting distressing auditory hallucinations. Still suicidal.  Poor sleep. Withdrawn. Low energy and low motivation. Discussed discontinuing Seroquel and changing to olanzapine and patient is in agreement. Mental Status Exam  Level of consciousness:  Within normal limits  Appearance: Hospital attire, seated in chair, with good grooming and hygiene   Behavior/Motor: Psychomotor retardation attitude toward examiner:  Cooperative, attentive, good eye contact  Speech: Slow speech, monotone in nature mood: Depressed  Affect: Congruent  Thought processes:  linear, goal directed and coherent  Thought content:  denies homicidal ideation  Suicidal Ideation: Endorses suicidal ideation  Delusions:  no evidence of delusions  Perceptual Disturbance: Active auditory hallucinations which are command in nature to harm himself   cognition:  Oriented to self, location, time, and situation  Memory: age appropriate  Insight & Judgement: Limited  Medication side effects:  denies       Data   height is 6' 2\" (1.88 m) and weight is 200 lb (90.7 kg). His oral temperature is 97.9 °F (36.6 °C). His blood pressure is 96/66 and his pulse is 75. His respiration is 14 and oxygen saturation is 100%. Labs:   No visits with results within 2 Day(s) from this visit.    Latest known visit with results is:   Admission on 12/06/2021, Discharged on 12/07/2021   Component Date Value Ref Range Status    WBC 12/06/2021 4.8  3.5 - 11.3 k/uL Final    RBC 12/06/2021 4.63  4.21 - 5.77 m/uL Final    Hemoglobin 12/06/2021 12.8* 13.0 - 17.0 g/dL Final    Hematocrit 12/06/2021 41.1  40.7 - 50.3 % Final    MCV 12/06/2021 88.8  82.6 - 102.9 fL Final    MCH 12/06/2021 27.6 25.2 - 33.5 pg Final    MCHC 12/06/2021 31.1  28.4 - 34.8 g/dL Final    RDW 12/06/2021 14.0  11.8 - 14.4 % Final    Platelets 47/34/5795 358  138 - 453 k/uL Final    MPV 12/06/2021 8.9  8.1 - 13.5 fL Final    NRBC Automated 12/06/2021 0.0  0.0 per 100 WBC Final    Differential Type 12/06/2021 NOT REPORTED   Final    Seg Neutrophils 12/06/2021 40  36 - 65 % Final    Lymphocytes 12/06/2021 46* 24 - 43 % Final    Monocytes 12/06/2021 11  3 - 12 % Final    Eosinophils % 12/06/2021 2  1 - 4 % Final    Basophils 12/06/2021 1  0 - 2 % Final    Immature Granulocytes 12/06/2021 0  0 % Final    Segs Absolute 12/06/2021 1.92  1.50 - 8.10 k/uL Final    Absolute Lymph # 12/06/2021 2.25  1.10 - 3.70 k/uL Final    Absolute Mono # 12/06/2021 0.52  0.10 - 1.20 k/uL Final    Absolute Eos # 12/06/2021 0.09  0.00 - 0.44 k/uL Final    Basophils Absolute 12/06/2021 0.03  0.00 - 0.20 k/uL Final    Absolute Immature Granulocyte 12/06/2021 <0.03  0.00 - 0.30 k/uL Final    WBC Morphology 12/06/2021 NOT REPORTED   Final    RBC Morphology 12/06/2021 NOT REPORTED   Final    Platelet Estimate 01/00/2377 NOT REPORTED   Final    Glucose 12/06/2021 74  70 - 99 mg/dL Final    BUN 12/06/2021 15  8 - 23 mg/dL Final    CREATININE 12/06/2021 1.08  0.70 - 1.20 mg/dL Final    Bun/Cre Ratio 12/06/2021 NOT REPORTED  9 - 20 Final    Calcium 12/06/2021 9.0  8.6 - 10.4 mg/dL Final    Sodium 12/06/2021 142  135 - 144 mmol/L Final    Potassium 12/06/2021 4.1  3.7 - 5.3 mmol/L Final    Chloride 12/06/2021 106  98 - 107 mmol/L Final    CO2 12/06/2021 26  20 - 31 mmol/L Final    Anion Gap 12/06/2021 10  9 - 17 mmol/L Final    Alkaline Phosphatase 12/06/2021 125  40 - 129 U/L Final    ALT 12/06/2021 9  5 - 41 U/L Final    AST 12/06/2021 16  <40 U/L Final    Total Bilirubin 12/06/2021 0.20* 0.3 - 1.2 mg/dL Final    Total Protein 12/06/2021 6.6  6.4 - 8.3 g/dL Final    Albumin 12/06/2021 4.0  3.5 - 5.2 g/dL Final    Albumin/Globulin Ratio 12/06/2021 1.5  1.0 - 2.5 Final    GFR Non- 12/06/2021 >60  >60 mL/min Final    GFR  12/06/2021 >60  >60 mL/min Final    GFR Comment 12/06/2021        Final    Comment: Average GFR for 61-76 years old:   80 mL/min/1.73sq m  Chronic Kidney Disease:   <60 mL/min/1.73sq m  Kidney failure:   <15 mL/min/1.73sq m              eGFR calculated using average adult body mass.  Additional eGFR calculator available at:        [employer]            GFR Staging 12/06/2021 NOT REPORTED   Final    Ethanol 12/06/2021 <10  <10 mg/dL Final    Ethanol percent 12/06/2021 <0.010  <0.010 % Final    Amphetamine Screen, Ur 12/07/2021 NEGATIVE  NEGATIVE Final    Comment:       (Positive cutoff 1000 ng/mL)                  Barbiturate Screen, Ur 12/07/2021 NEGATIVE  NEGATIVE Final    Comment:       (Positive cutoff 200 ng/mL)                  Benzodiazepine Screen, Urine 12/07/2021 NEGATIVE  NEGATIVE Final    Comment:       (Positive cutoff 200 ng/mL)                  Cocaine Metabolite, Urine 12/07/2021 POSITIVE* NEGATIVE Final    Comment:       (Positive cutoff 300 ng/mL)                  Methadone Screen, Urine 12/07/2021 NEGATIVE  NEGATIVE Final    Comment:       (Positive cutoff 300 ng/mL)                  Opiates, Urine 12/07/2021 NEGATIVE  NEGATIVE Final    Comment:       (Positive cutoff 300 ng/mL)                  Phencyclidine, Urine 12/07/2021 NEGATIVE  NEGATIVE Final    Comment:       (Positive cutoff 25 ng/mL)                  Propoxyphene, Urine 12/07/2021 NOT REPORTED  NEGATIVE Final    Cannabinoid Scrn, Ur 12/07/2021 NEGATIVE  NEGATIVE Final    Comment:       (Positive cutoff 50 ng/mL)                  Oxycodone Screen, Ur 12/07/2021 NEGATIVE  NEGATIVE Final    Comment:       (Positive cutoff 100 ng/mL)                  Methamphetamine, Urine 12/07/2021 NOT REPORTED  NEGATIVE Final    Tricyclic Antidepressants, Urine 12/07/2021 NOT REPORTED  NEGATIVE Final    MDMA, Urine 12/07/2021 NOT REPORTED  NEGATIVE Final    Buprenorphine Urine 12/07/2021 NOT REPORTED  NEGATIVE Final    Test Information 12/07/2021 Assay provides medical screening only. The absence of expected drug(s) and/or metabolite(s) may indicate diluted or adulterated urine, limitations of testing or timing of collection. Final    Comment: Testing for legal purposes should be confirmed by another method. To request confirmation   of test result, please call the lab within 7 days of sample submission.  Specimen Description 12/06/2021 . NASOPHARYNGEAL SWAB   Final    SARS-CoV-2, Rapid 12/06/2021 Not Detected  Not Detected Final    Comment:       Rapid NAAT:  The specimen is NEGATIVE for SARS-CoV-2, the novel coronavirus associated with   COVID-19. The ID NOW COVID-19 assay is designed to detect the virus that causes COVID-19 in patients   with signs and symptoms of infection who are suspected of COVID-19. An individual without symptoms of COVID-19 and who is not shedding SARS-CoV-2 virus would   expect to have a negative (not detected) result in this assay. Negative results should be treated as presumptive and, if inconsistent with clinical signs   and symptoms or necessary for patient management,  should be tested with an alternative molecular assay. Negative results do not preclude   SARS-CoV-2 infection and   should not be used as the sole basis for patient management decisions.          Fact sheet for Healthcare Providers: Brandyn  Fact sheet for Patients: Brandyn          Methodology: Isothermal Nucleic Acid Amplification              Medications  Current Facility-Administered Medications: OLANZapine (ZYPREXA) tablet 5 mg, 5 mg, Oral, Nightly  traZODone (DESYREL) tablet 100 mg, 100 mg, Oral, Nightly PRN  escitalopram (LEXAPRO) tablet 20 mg, 20 mg, Oral, Daily  acetaminophen (TYLENOL) tablet 650 mg, 650 mg, Oral, Q4H PRN  aluminum & magnesium hydroxide-simethicone (MAALOX) 200-200-20 MG/5ML suspension 30 mL, 30 mL, Oral, Q6H PRN  hydrOXYzine (ATARAX) tablet 50 mg, 50 mg, Oral, TID PRN  ibuprofen (ADVIL;MOTRIN) tablet 400 mg, 400 mg, Oral, Q6H PRN  polyethylene glycol (GLYCOLAX) packet 17 g, 17 g, Oral, Daily PRN  nicotine polacrilex (NICORETTE) gum 2 mg, 2 mg, Oral, PRN  Vitamin D (CHOLECALCIFEROL) tablet 1,000 Units, 1,000 Units, Oral, Daily    ASSESSMENT  Schizoaffective disorder, depressive type ([facility]     PLAN  Patient s symptoms   show no change  Discontinue Seroquel  Add olanzapine at bedtime  Attempt to develop insight  Psycho-education conducted. Supportive Therapy conducted. Probable discharge is undetermined at this time  Follow-up daily while in the inpatient unit      Electronically signed by Katie Medina MD on 12/15/21 at 9:17 PM EST    **This report has been created using voice recognition software. It may contain minor errors which are inherent in voice recognition technology. **

## 2021-12-16 NOTE — PLAN OF CARE
Problem: Altered Mood, Depressive Behavior:  Goal: Ability to disclose and discuss suicidal ideas will improve  Description: Ability to disclose and discuss suicidal ideas will improve  12/16/2021 0118 by Nils Wills  Outcome: Ongoing  Note: Patient states that there are some thoughts of suicidal ideation. Patient does contract for safety states that he feels like he needs more time here to get better. Patient states that he hears voices telling him to harm self. Patient safety maintained Q15     Problem: Musculor/Skeletal Functional Status  Goal: Absence of falls  12/16/2021 0118 by Mahlon Shone  Outcome: Ongoing  Note: Patient is free of falls at this time. Patient gait is steady. Patient is wearing non skid footwear and agrees to seek out staff for assistance as needed.

## 2021-12-16 NOTE — PLAN OF CARE
Problem: Altered Mood, Depressive Behavior:  Goal: Able to verbalize acceptance of life and situations over which he or she has no control  Description: Able to verbalize acceptance of life and situations over which he or she has no control  Outcome: Ongoing   Patient is working on verbalizing acceptance of life and situations which he has no control. Problem: Musculor/Skeletal Functional Status  Goal: Absence of falls  Outcome: Ongoing   Patient is absence of falls due to every 15min rounds.

## 2021-12-16 NOTE — GROUP NOTE
Group Therapy Note    Date: 12/16/2021    Group Start Time: 1100  Group End Time: 1150  Group Topic: Cognitive Skills    JESUS Leon, CTRS    Pt did not attend cognitive skills group at 1100 d/t resting in room despite staff invitation to attend. 1:1 talk time offered as alternative to group session, pt declined.         Signature:  Marivel Leon, [address]

## 2021-12-16 NOTE — GROUP NOTE
Group Therapy Note    Date: 12/16/2021    Group Start Time: 1330  Group End Time: 2477  Group Topic: Psychoeducation    [facility] JUAN Skinner, CTRS    Pt did not attend coping skills group at 1330 d/t resting in room despite staff invitation to attend. 1:1 talk time offered as alternative to group session, pt declined.        Signature:  Noam Skinner, [address]

## 2021-12-16 NOTE — GROUP NOTE
Group Therapy Note    Date: 12/16/2021    Group Start Time: 1010  Group End Time: 1050  Group Topic: Psychotherapy    [address], LSW    patient refused to attend psychotherapy group at [facility] after encouragement from staff.   1:1 talk time provided as alternative to group session

## 2021-12-17 PROCEDURE — 6370000000 HC RX 637 (ALT 250 FOR IP): Performed by: PSYCHIATRY & NEUROLOGY

## 2021-12-17 PROCEDURE — 6370000000 HC RX 637 (ALT 250 FOR IP): Performed by: NURSE PRACTITIONER

## 2021-12-17 PROCEDURE — 1240000000 HC EMOTIONAL WELLNESS R&B

## 2021-12-17 PROCEDURE — 6370000000 HC RX 637 (ALT 250 FOR IP)

## 2021-12-17 PROCEDURE — 99232 SBSQ HOSP IP/OBS MODERATE 35: CPT | Performed by: PSYCHIATRY & NEUROLOGY

## 2021-12-17 RX ADMIN — ESCITALOPRAM OXALATE 20 MG: 20 TABLET ORAL at 08:44

## 2021-12-17 RX ADMIN — TRAZODONE HYDROCHLORIDE 100 MG: 100 TABLET ORAL at 20:56

## 2021-12-17 RX ADMIN — Medication 1000 UNITS: at 08:44

## 2021-12-17 RX ADMIN — OLANZAPINE 10 MG: 10 TABLET, FILM COATED ORAL at 20:56

## 2021-12-17 RX ADMIN — HYDROXYZINE HYDROCHLORIDE 50 MG: 50 TABLET, FILM COATED ORAL at 20:56

## 2021-12-17 ASSESSMENT — PAIN DESCRIPTION - LOCATION: LOCATION: HEAD

## 2021-12-17 ASSESSMENT — PAIN DESCRIPTION - PAIN TYPE: TYPE: ACUTE PAIN

## 2021-12-17 ASSESSMENT — PAIN SCALES - GENERAL: PAINLEVEL_OUTOF10: 6

## 2021-12-17 NOTE — PLAN OF CARE
Problem: Altered Mood, Depressive Behavior:  Goal: Able to verbalize acceptance of life and situations over which he or she has no control  Description: Able to verbalize acceptance of life and situations over which he or she has no control  12/16/2021 2015 by Matheus Henry LPN  Outcome: Ongoing  Note: Patient reports auditory hallucinations that are negative in nature, but he states they are improving. He admits to mild depression and anxiety. [id number] safety checks continue     Problem: Altered Mood, Depressive Behavior:  Goal: Ability to disclose and discuss suicidal ideas will improve  Description: Ability to disclose and discuss suicidal ideas will improve  12/16/2021 2015 by Matheus Henry LPN  Outcome: Ongoing  Note: Patient reports fleeting suicidal thoughts with no plan, he contracts for safety on the unit and agrees to seek out staff should thoughts worsen. He has been out in the dayroom and calm this evening.  Q15min safety checks continue

## 2021-12-17 NOTE — GROUP NOTE
Group Therapy Note    Date: 12/17/2021    Group Start Time: 1013  Group End Time: 1050  Group Topic: Psychotherapy    [address] [facility], MSW, LSW        Group Therapy Note    Attendees: 4/21       Patient's Goal:  Increase interpersonal relationship skills utilizing talk therapy while discussing feelings. Status After Intervention:  Improved    Participation Level:  Active Listener and Interactive    Participation Quality: Appropriate, Attentive and Sharing      Speech:  normal      Thought Process/Content: Logical      Affective Functioning: Congruent      Mood: euthymic      Level of consciousness:  Alert and Attentive      Response to Learning: Able to verbalize current knowledge/experience and Able to verbalize/acknowledge new learning      Endings: None Reported    Modes of Intervention: Support and Socialization      Discipline Responsible: /Counselor      Signature:  Juan Carlos Parker MSW, LSW

## 2021-12-17 NOTE — PLAN OF CARE
Problem: Altered Mood, Depressive Behavior:  Goal: Able to verbalize acceptance of life and situations over which he or she has no control  Description: Able to verbalize acceptance of life and situations over which he or she has no control  Outcome: Ongoing  Goal: Ability to disclose and discuss suicidal ideas will improve  Description: Ability to disclose and discuss suicidal ideas will improve  Outcome: Ongoing   Patient endorses fleeting suicidal ideation. Denies plan and contracts for safety. Q15 minute safety checks in place and will continue. Problem: Pain:  Goal: Pain level will decrease  Description: Pain level will decrease  Outcome: Ongoing  Goal: Control of acute pain  Description: Control of acute pain  Outcome: Ongoing  Patient complained of a headache 6/10 this morning. Declined PRN pain relief medications. Patient agrees to notify staff if headache does not improve. Goal: Control of chronic pain  Description: Control of chronic pain  Outcome: Ongoing     Problem: Musculor/Skeletal Functional Status  Goal: Highest potential functional level  Outcome: Ongoing  Goal: Absence of falls  Outcome: Ongoing   Patient remains free from falls and injury. Patient is wearing non-slip socks while walking on unit.

## 2021-12-17 NOTE — PROGRESS NOTES
Latest known visit with results is:   Admission on 12/06/2021, Discharged on 12/07/2021   Component Date Value Ref Range Status    WBC 12/06/2021 4.8  3.5 - 11.3 k/uL Final    RBC 12/06/2021 4.63  4.21 - 5.77 m/uL Final    Hemoglobin 12/06/2021 12.8* 13.0 - 17.0 g/dL Final    Hematocrit 12/06/2021 41.1  40.7 - 50.3 % Final    MCV 12/06/2021 88.8  82.6 - 102.9 fL Final    MCH 12/06/2021 27.6  25.2 - 33.5 pg Final    MCHC 12/06/2021 31.1  28.4 - 34.8 g/dL Final    RDW 12/06/2021 14.0  11.8 - 14.4 % Final    Platelets 02/51/8340 358  138 - 453 k/uL Final    MPV 12/06/2021 8.9  8.1 - 13.5 fL Final    NRBC Automated 12/06/2021 0.0  0.0 per 100 WBC Final    Differential Type 12/06/2021 NOT REPORTED   Final    Seg Neutrophils 12/06/2021 40  36 - 65 % Final    Lymphocytes 12/06/2021 46* 24 - 43 % Final    Monocytes 12/06/2021 11  3 - 12 % Final    Eosinophils % 12/06/2021 2  1 - 4 % Final    Basophils 12/06/2021 1  0 - 2 % Final    Immature Granulocytes 12/06/2021 0  0 % Final    Segs Absolute 12/06/2021 1.92  1.50 - 8.10 k/uL Final    Absolute Lymph # 12/06/2021 2.25  1.10 - 3.70 k/uL Final    Absolute Mono # 12/06/2021 0.52  0.10 - 1.20 k/uL Final    Absolute Eos # 12/06/2021 0.09  0.00 - 0.44 k/uL Final    Basophils Absolute 12/06/2021 0.03  0.00 - 0.20 k/uL Final    Absolute Immature Granulocyte 12/06/2021 <0.03  0.00 - 0.30 k/uL Final    WBC Morphology 12/06/2021 NOT REPORTED   Final    RBC Morphology 12/06/2021 NOT REPORTED   Final    Platelet Estimate 34/88/3778 NOT REPORTED   Final    Glucose 12/06/2021 74  70 - 99 mg/dL Final    BUN 12/06/2021 15  8 - 23 mg/dL Final    CREATININE 12/06/2021 1.08  0.70 - 1.20 mg/dL Final    Bun/Cre Ratio 12/06/2021 NOT REPORTED  9 - 20 Final    Calcium 12/06/2021 9.0  8.6 - 10.4 mg/dL Final    Sodium 12/06/2021 142  135 - 144 mmol/L Final    Potassium 12/06/2021 4.1  3.7 - 5.3 mmol/L Final    Chloride 12/06/2021 106  98 - 107 mmol/L Final    CO2 12/06/2021 26  20 - 31 mmol/L Final    Anion Gap 12/06/2021 10  9 - 17 mmol/L Final    Alkaline Phosphatase 12/06/2021 125  40 - 129 U/L Final    ALT 12/06/2021 9  5 - 41 U/L Final    AST 12/06/2021 16  <40 U/L Final    Total Bilirubin 12/06/2021 0.20* 0.3 - 1.2 mg/dL Final    Total Protein 12/06/2021 6.6  6.4 - 8.3 g/dL Final    Albumin 12/06/2021 4.0  3.5 - 5.2 g/dL Final    Albumin/Globulin Ratio 12/06/2021 1.5  1.0 - 2.5 Final    GFR Non- 12/06/2021 >60  >60 mL/min Final    GFR  12/06/2021 >60  >60 mL/min Final    GFR Comment 12/06/2021        Final    Comment: Average GFR for 61-76 years old:   80 mL/min/1.73sq m  Chronic Kidney Disease:   <60 mL/min/1.73sq m  Kidney failure:   <15 mL/min/1.73sq m              eGFR calculated using average adult body mass.  Additional eGFR calculator available at:        [employer]            GFR Staging 12/06/2021 NOT REPORTED   Final    Ethanol 12/06/2021 <10  <10 mg/dL Final    Ethanol percent 12/06/2021 <0.010  <0.010 % Final    Amphetamine Screen, Ur 12/07/2021 NEGATIVE  NEGATIVE Final    Comment:       (Positive cutoff 1000 ng/mL)                  Barbiturate Screen, Ur 12/07/2021 NEGATIVE  NEGATIVE Final    Comment:       (Positive cutoff 200 ng/mL)                  Benzodiazepine Screen, Urine 12/07/2021 NEGATIVE  NEGATIVE Final    Comment:       (Positive cutoff 200 ng/mL)                  Cocaine Metabolite, Urine 12/07/2021 POSITIVE* NEGATIVE Final    Comment:       (Positive cutoff 300 ng/mL)                  Methadone Screen, Urine 12/07/2021 NEGATIVE  NEGATIVE Final    Comment:       (Positive cutoff 300 ng/mL)                  Opiates, Urine 12/07/2021 NEGATIVE  NEGATIVE Final    Comment:       (Positive cutoff 300 ng/mL)                  Phencyclidine, Urine 12/07/2021 NEGATIVE  NEGATIVE Final    Comment:       (Positive cutoff 25 ng/mL)                  Propoxyphene, Urine 12/07/2021 NOT REPORTED  NEGATIVE Final    Cannabinoid Scrn, Ur 12/07/2021 NEGATIVE  NEGATIVE Final    Comment:       (Positive cutoff 50 ng/mL)                  Oxycodone Screen, Ur 12/07/2021 NEGATIVE  NEGATIVE Final    Comment:       (Positive cutoff 100 ng/mL)                  Methamphetamine, Urine 12/07/2021 NOT REPORTED  NEGATIVE Final    Tricyclic Antidepressants, Urine 12/07/2021 NOT REPORTED  NEGATIVE Final    MDMA, Urine 12/07/2021 NOT REPORTED  NEGATIVE Final    Buprenorphine Urine 12/07/2021 NOT REPORTED  NEGATIVE Final    Test Information 12/07/2021 Assay provides medical screening only. The absence of expected drug(s) and/or metabolite(s) may indicate diluted or adulterated urine, limitations of testing or timing of collection. Final    Comment: Testing for legal purposes should be confirmed by another method. To request confirmation   of test result, please call the lab within 7 days of sample submission.  Specimen Description 12/06/2021 . NASOPHARYNGEAL SWAB   Final    SARS-CoV-2, Rapid 12/06/2021 Not Detected  Not Detected Final    Comment:       Rapid NAAT:  The specimen is NEGATIVE for SARS-CoV-2, the novel coronavirus associated with   COVID-19. The ID NOW COVID-19 assay is designed to detect the virus that causes COVID-19 in patients   with signs and symptoms of infection who are suspected of COVID-19. An individual without symptoms of COVID-19 and who is not shedding SARS-CoV-2 virus would   expect to have a negative (not detected) result in this assay. Negative results should be treated as presumptive and, if inconsistent with clinical signs   and symptoms or necessary for patient management,  should be tested with an alternative molecular assay. Negative results do not preclude   SARS-CoV-2 infection and   should not be used as the sole basis for patient management decisions.          Fact sheet for Healthcare Providers: Iesha[employer]nav  Fact sheet for Patients: Brandyn          Methodology: Isothermal Nucleic Acid Amplification              Medications  Current Facility-Administered Medications: OLANZapine (ZYPREXA) tablet 10 mg, 10 mg, Oral, Nightly  traZODone (DESYREL) tablet 100 mg, 100 mg, Oral, Nightly PRN  escitalopram (LEXAPRO) tablet 20 mg, 20 mg, Oral, Daily  acetaminophen (TYLENOL) tablet 650 mg, 650 mg, Oral, Q4H PRN  aluminum & magnesium hydroxide-simethicone (MAALOX) 200-200-20 MG/5ML suspension 30 mL, 30 mL, Oral, Q6H PRN  hydrOXYzine (ATARAX) tablet 50 mg, 50 mg, Oral, TID PRN  ibuprofen (ADVIL;MOTRIN) tablet 400 mg, 400 mg, Oral, Q6H PRN  polyethylene glycol (GLYCOLAX) packet 17 g, 17 g, Oral, Daily PRN  nicotine polacrilex (NICORETTE) gum 2 mg, 2 mg, Oral, PRN  Vitamin D (CHOLECALCIFEROL) tablet 1,000 Units, 1,000 Units, Oral, Daily    ASSESSMENT  Schizoaffective disorder, depressive type ([facility]     PLAN  Patient s symptoms   show no change  Increase at bedtime Seroquel to 10 mg  Attempt to develop insight  Psycho-education conducted. Supportive Therapy conducted. Probable discharge is undetermined at this time  Follow-up daily while in the inpatient unit      Electronically signed by Gaby Chakraborty MD on 12/16/21 at 8:19 PM EST      **This report has been created using voice recognition software. It may contain minor errors which are inherent in voice recognition technology. **

## 2021-12-17 NOTE — GROUP NOTE
Group Therapy Note    Date: 12/17/2021    Group Start Time: 1100  Group End Time: 9722  Group Topic: Psychoeducation    JESUS Banerjee, CTRS    Patient refused to attend music trivia group at 1100 after encouragement from staff. 1:1 talk time offered by staff as alternative to group session.

## 2021-12-17 NOTE — GROUP NOTE
Group Therapy Note    Date: 12/17/2021    Group Start Time: 0682  Group End Time: 1430  Group Topic: Psychoeducation    [address] [facility]    Patient refused to attend leisure skills group at [phone] after encouragement from staff. 1:1 talk time offered by staff as alternative to group session.

## 2021-12-17 NOTE — PROGRESS NOTES
Daily Progress Note  Ny Bundy MD  12/17/2021  CHIEF COMPLAINT: Depression with suicidal ideation    Reviewed patient's current plan of care and vital signs with nursing staff. Sleep:  several hours last night  Attending groups: No: Isolating    SUBJECTIVE:    Patient states that he did not have any side effects to the increased olanzapine last night. Continues to report improvement in sleep. Less intense hallucinations and feeling like they are better controlled. Still not participating much in groups but is eating well. Reports less intense suicidal thoughts    Mental Status Exam  Level of consciousness:  Within normal limits  Appearance: Hospital attire, seated in chair, with good grooming and hygiene   Behavior/Motor: Psychomotor retardation attitude toward examiner:  Cooperative, attentive, good eye contact  Speech: Slow speech, monotone in nature mood: Depressed  Affect: Congruent  Thought processes: Latency in his thoughts but predominantly goal-directed responses  Thought content:  denies homicidal ideation  Suicidal Ideation: Endorses active suicidal ideation  Delusions:  no evidence of delusions  Perceptual Disturbance: Active auditory hallucinations which are command in nature to harm himself, does report decreased intensity and frequency from yesterday  cognition:  Oriented to self, location, time, and situation  Memory: Limited  Insight & Judgement: Limited  Medication side effects:  denies       Data   height is 6' 2\" (1.88 m) and weight is 200 lb (90.7 kg). His temporal temperature is 97.7 °F (36.5 °C). His blood pressure is 89/62 and his pulse is 72. His respiration is 12 and oxygen saturation is 100%. Labs:   No visits with results within 2 Day(s) from this visit.    Latest known visit with results is:   Admission on 12/06/2021, Discharged on 12/07/2021   Component Date Value Ref Range Status    WBC 12/06/2021 4.8  3.5 - 11.3 k/uL Final    RBC 12/06/2021 4.63  4.21 - 5.77 m/uL Final    Hemoglobin 12/06/2021 12.8* 13.0 - 17.0 g/dL Final    Hematocrit 12/06/2021 41.1  40.7 - 50.3 % Final    MCV 12/06/2021 88.8  82.6 - 102.9 fL Final    MCH 12/06/2021 27.6  25.2 - 33.5 pg Final    MCHC 12/06/2021 31.1  28.4 - 34.8 g/dL Final    RDW 12/06/2021 14.0  11.8 - 14.4 % Final    Platelets 90/69/2432 358  138 - 453 k/uL Final    MPV 12/06/2021 8.9  8.1 - 13.5 fL Final    NRBC Automated 12/06/2021 0.0  0.0 per 100 WBC Final    Differential Type 12/06/2021 NOT REPORTED   Final    Seg Neutrophils 12/06/2021 40  36 - 65 % Final    Lymphocytes 12/06/2021 46* 24 - 43 % Final    Monocytes 12/06/2021 11  3 - 12 % Final    Eosinophils % 12/06/2021 2  1 - 4 % Final    Basophils 12/06/2021 1  0 - 2 % Final    Immature Granulocytes 12/06/2021 0  0 % Final    Segs Absolute 12/06/2021 1.92  1.50 - 8.10 k/uL Final    Absolute Lymph # 12/06/2021 2.25  1.10 - 3.70 k/uL Final    Absolute Mono # 12/06/2021 0.52  0.10 - 1.20 k/uL Final    Absolute Eos # 12/06/2021 0.09  0.00 - 0.44 k/uL Final    Basophils Absolute 12/06/2021 0.03  0.00 - 0.20 k/uL Final    Absolute Immature Granulocyte 12/06/2021 <0.03  0.00 - 0.30 k/uL Final    WBC Morphology 12/06/2021 NOT REPORTED   Final    RBC Morphology 12/06/2021 NOT REPORTED   Final    Platelet Estimate 86/55/4326 NOT REPORTED   Final    Glucose 12/06/2021 74  70 - 99 mg/dL Final    BUN 12/06/2021 15  8 - 23 mg/dL Final    CREATININE 12/06/2021 1.08  0.70 - 1.20 mg/dL Final    Bun/Cre Ratio 12/06/2021 NOT REPORTED  9 - 20 Final    Calcium 12/06/2021 9.0  8.6 - 10.4 mg/dL Final    Sodium 12/06/2021 142  135 - 144 mmol/L Final    Potassium 12/06/2021 4.1  3.7 - 5.3 mmol/L Final    Chloride 12/06/2021 106  98 - 107 mmol/L Final    CO2 12/06/2021 26  20 - 31 mmol/L Final    Anion Gap 12/06/2021 10  9 - 17 mmol/L Final    Alkaline Phosphatase 12/06/2021 125  40 - 129 U/L Final    ALT 12/06/2021 9  5 - 41 U/L Final    AST 12/06/2021 16  <40 U/L Final    Comment:       (Positive cutoff 100 ng/mL)                  Methamphetamine, Urine 12/07/2021 NOT REPORTED  NEGATIVE Final    Tricyclic Antidepressants, Urine 12/07/2021 NOT REPORTED  NEGATIVE Final    MDMA, Urine 12/07/2021 NOT REPORTED  NEGATIVE Final    Buprenorphine Urine 12/07/2021 NOT REPORTED  NEGATIVE Final    Test Information 12/07/2021 Assay provides medical screening only. The absence of expected drug(s) and/or metabolite(s) may indicate diluted or adulterated urine, limitations of testing or timing of collection. Final    Comment: Testing for legal purposes should be confirmed by another method. To request confirmation   of test result, please call the lab within 7 days of sample submission.  Specimen Description 12/06/2021 . NASOPHARYNGEAL SWAB   Final    SARS-CoV-2, Rapid 12/06/2021 Not Detected  Not Detected Final    Comment:       Rapid NAAT:  The specimen is NEGATIVE for SARS-CoV-2, the novel coronavirus associated with   COVID-19. The ID NOW COVID-19 assay is designed to detect the virus that causes COVID-19 in patients   with signs and symptoms of infection who are suspected of COVID-19. An individual without symptoms of COVID-19 and who is not shedding SARS-CoV-2 virus would   expect to have a negative (not detected) result in this assay. Negative results should be treated as presumptive and, if inconsistent with clinical signs   and symptoms or necessary for patient management,  should be tested with an alternative molecular assay. Negative results do not preclude   SARS-CoV-2 infection and   should not be used as the sole basis for patient management decisions.          Fact sheet for Healthcare Providers: Brandyn  Fact sheet for Patients: Brandyn          Methodology: Isothermal Nucleic Acid Amplification              Medications  Current Facility-Administered Medications: OLANZapine (ZYPREXA) tablet 10 mg, 10 mg, Oral, Nightly  traZODone (DESYREL) tablet 100 mg, 100 mg, Oral, Nightly PRN  escitalopram (LEXAPRO) tablet 20 mg, 20 mg, Oral, Daily  acetaminophen (TYLENOL) tablet 650 mg, 650 mg, Oral, Q4H PRN  aluminum & magnesium hydroxide-simethicone (MAALOX) 200-200-20 MG/5ML suspension 30 mL, 30 mL, Oral, Q6H PRN  hydrOXYzine (ATARAX) tablet 50 mg, 50 mg, Oral, TID PRN  ibuprofen (ADVIL;MOTRIN) tablet 400 mg, 400 mg, Oral, Q6H PRN  polyethylene glycol (GLYCOLAX) packet 17 g, 17 g, Oral, Daily PRN  nicotine polacrilex (NICORETTE) gum 2 mg, 2 mg, Oral, PRN  Vitamin D (CHOLECALCIFEROL) tablet 1,000 Units, 1,000 Units, Oral, Daily    ASSESSMENT  Schizoaffective disorder, depressive type [facility])     PLAN  Patient s symptoms   show some improvement  Continue with current dose of olanzapine  Attempt to develop insight  Psycho-education conducted. Supportive Therapy conducted. Probable discharge is undetermined at this time  Follow-up daily while in the inpatient unit    Electronically signed by Nuris Metz MD on 12/17/21 at 5:57 PM EST        **This report has been created using voice recognition software. It may contain minor errors which are inherent in voice recognition technology. **

## 2021-12-18 PROCEDURE — 6370000000 HC RX 637 (ALT 250 FOR IP): Performed by: PSYCHIATRY & NEUROLOGY

## 2021-12-18 PROCEDURE — 99232 SBSQ HOSP IP/OBS MODERATE 35: CPT | Performed by: PSYCHIATRY & NEUROLOGY

## 2021-12-18 PROCEDURE — 1240000000 HC EMOTIONAL WELLNESS R&B

## 2021-12-18 PROCEDURE — 90833 PSYTX W PT W E/M 30 MIN: CPT | Performed by: PSYCHIATRY & NEUROLOGY

## 2021-12-18 PROCEDURE — 6370000000 HC RX 637 (ALT 250 FOR IP): Performed by: NURSE PRACTITIONER

## 2021-12-18 PROCEDURE — 6370000000 HC RX 637 (ALT 250 FOR IP)

## 2021-12-18 RX ADMIN — HYDROXYZINE HYDROCHLORIDE 50 MG: 50 TABLET, FILM COATED ORAL at 21:48

## 2021-12-18 RX ADMIN — Medication 1000 UNITS: at 08:56

## 2021-12-18 RX ADMIN — ESCITALOPRAM OXALATE 20 MG: 20 TABLET ORAL at 08:56

## 2021-12-18 RX ADMIN — TRAZODONE HYDROCHLORIDE 100 MG: 100 TABLET ORAL at 21:48

## 2021-12-18 RX ADMIN — OLANZAPINE 10 MG: 10 TABLET, FILM COATED ORAL at 21:48

## 2021-12-18 NOTE — BH NOTE
Date: 12/18/2021     Group Start Time: 1100  Group End Time:  36  Group Topic: Wellness Group     [facility]       Group Therapy Note     Attendees: 3/16        Patient refused to attend Wellness Group at 11:00 am after encouragement from staff.   1:1 talk time provided as alternative to group session

## 2021-12-18 NOTE — GROUP NOTE
Group Therapy Note    Date: 12/18/2021    Group Start Time: 1030  Group End Time: 1100  Group Topic: Psychoeducation    JESUS MARTINEZ    FRANKLIN Milligan, LSW        Group Therapy Note    Attendees: 3         Patient's Goal:  Pt will demonstrate increased interpersonal interaction. Notes:  Group Topic was Anger Issues.      Status After Intervention:  Improved    Participation Level: Interactive    Participation Quality: Appropriate      Speech:  normal      Thought Process/Content: Logical      Affective Functioning: Congruent      Mood: dysphoric      Level of consciousness:  Alert      Response to Learning: Progressing to goal      Endings: None Reported    Modes of Intervention: Education and Support      Discipline Responsible: /Counselor      Signature:  FRANKLIN Milligan, CHANEL

## 2021-12-18 NOTE — PLAN OF CARE
Patient has been out in the milieu and social with peers. Patient attended group this evening. Patient states he has been eating and sleeping okay. Patient was compliant with scheduled medications. Patient denies any suicidal thoughts at this time. Patient agrees to come talk with staff if having any thoughts to harm himself this shift. 15 min rounds continued for patient safety.    Problem: Altered Mood, Depressive Behavior:  Goal: Able to verbalize acceptance of life and situations over which he or she has no control  Description: Able to verbalize acceptance of life and situations over which he or she has no control  12/17/2021 2313 by Bob Alvarado RN  Outcome: Ongoing  12/17/2021 1444 by Jimena Rodríguez LPN  Outcome: Ongoing  Goal: Ability to disclose and discuss suicidal ideas will improve  Description: Ability to disclose and discuss suicidal ideas will improve  12/17/2021 2313 by Bob Alvarado RN  Outcome: Ongoing  12/17/2021 1444 by Jimena Rodríguez LPN  Outcome: Ongoing     Problem: Pain:  Goal: Pain level will decrease  Description: Pain level will decrease  12/17/2021 2313 by oBb Alvarado RN  Outcome: Ongoing  12/17/2021 1444 by Jimena Rodríguez LPN  Outcome: Ongoing  Goal: Control of acute pain  Description: Control of acute pain  12/17/2021 2313 by Bob Alvarado RN  Outcome: Ongoing  12/17/2021 1444 by Jimena Rodríguez LPN  Outcome: Ongoing  Goal: Control of chronic pain  Description: Control of chronic pain  12/17/2021 2313 by Bob Alvarado RN  Outcome: Ongoing  12/17/2021 1444 by Jimena Rodríguez LPN  Outcome: Ongoing     Problem: Musculor/Skeletal Functional Status  Goal: Highest potential functional level  12/17/2021 2313 by Bob Alvarado RN  Outcome: Ongoing  12/17/2021 1444 by Jimena Rodríguez LPN  Outcome: Ongoing  Goal: Absence of falls  12/17/2021 2313 by Bob Alvarado RN  Outcome: Ongoing  12/17/2021 1444 by Jimena Rodríguez LPN  Outcome: Ongoing

## 2021-12-18 NOTE — PLAN OF CARE
Problem: Altered Mood, Depressive Behavior:  Goal: Able to verbalize acceptance of life and situations over which he or she has no control  Description: Able to verbalize acceptance of life and situations over which he or she has no control  12/18/2021 0851 by Govind Child  Outcome: Ongoing     Problem: Altered Mood, Depressive Behavior:  Goal: Ability to disclose and discuss suicidal ideas will improve  Description: Ability to disclose and discuss suicidal ideas will improve  12/18/2021 0851 by Arianna Bella  Outcome: Ongoing   Reports fleeting suicidal ideation but contracts safety reports of random auditory hallucinations isolative aloof out for needs mostly.

## 2021-12-18 NOTE — GROUP NOTE
Group Therapy Note    Date: 12/17/2021    Group Start Time: 2015  Group End Time: 2100  Group Topic: Wrap-Up    [facility]        Group Therapy Note    Attendees: 10/20 patients for triggers/coping skills education group     good participation    Signature:  Sadie Rincon

## 2021-12-19 PROCEDURE — 99232 SBSQ HOSP IP/OBS MODERATE 35: CPT | Performed by: PSYCHIATRY & NEUROLOGY

## 2021-12-19 PROCEDURE — 1240000000 HC EMOTIONAL WELLNESS R&B

## 2021-12-19 PROCEDURE — 6370000000 HC RX 637 (ALT 250 FOR IP)

## 2021-12-19 PROCEDURE — APPSS30 APP SPLIT SHARED TIME 16-30 MINUTES: Performed by: NURSE PRACTITIONER

## 2021-12-19 PROCEDURE — 6370000000 HC RX 637 (ALT 250 FOR IP): Performed by: NURSE PRACTITIONER

## 2021-12-19 PROCEDURE — 6370000000 HC RX 637 (ALT 250 FOR IP): Performed by: PSYCHIATRY & NEUROLOGY

## 2021-12-19 RX ADMIN — ESCITALOPRAM OXALATE 20 MG: 20 TABLET ORAL at 08:42

## 2021-12-19 RX ADMIN — TRAZODONE HYDROCHLORIDE 100 MG: 100 TABLET ORAL at 23:41

## 2021-12-19 RX ADMIN — OLANZAPINE 10 MG: 10 TABLET, FILM COATED ORAL at 23:41

## 2021-12-19 RX ADMIN — Medication 1000 UNITS: at 08:42

## 2021-12-19 RX ADMIN — HYDROXYZINE HYDROCHLORIDE 50 MG: 50 TABLET, FILM COATED ORAL at 23:41

## 2021-12-19 NOTE — PLAN OF CARE
Problem: Altered Mood, Depressive Behavior:  Goal: Able to verbalize acceptance of life and situations over which he or she has no control  Description: Able to verbalize acceptance of life and situations over which he or she has no control  12/18/2021 2038 by Andrew May RN  Outcome: Ongoing     Problem: Altered Mood, Depressive Behavior:  Goal: Ability to disclose and discuss suicidal ideas will improve  Description: Ability to disclose and discuss suicidal ideas will improve  12/18/2021 2038 by Andrew May RN  Outcome: Ongoing  Note:  Pt denies suicidal ideations at this time. Pt agreed to seek staff at anytime he/she felt like any urges to harm self would arise. Safety checks maintained [id number]. Problem: Pain:  Goal: Pain level will decrease  Description: Pain level will decrease  Outcome: Ongoing  Note: Patient reports no new pain, or increase in chronic pain. Will continue to monitor and provide as needed pain medication. Problem: Pain:  Goal: Control of acute pain  Description: Control of acute pain  Outcome: Ongoing     Problem: Pain:  Goal: Control of chronic pain  Description: Control of chronic pain  Outcome: Ongoing     Problem: Musculor/Skeletal Functional Status  Goal: Highest potential functional level  Outcome: Ongoing     Problem: Musculor/Skeletal Functional Status  Goal: Absence of falls  Outcome: Ongoing  Note: Patient has remained free from falls this shift. Will continue to monitor and provide q15 min safety checks.

## 2021-12-19 NOTE — PROGRESS NOTES
Daily Progress Note  Guille Mccabe MD  12/18/2021  CHIEF COMPLAINT: Depression with suicidal ideation    Reviewed patient's current plan of care and vital signs with nursing staff. Sleep:  8 hours last night  Attending groups: Yes    SUBJECTIVE:    Patient reports substantial shift in his mood today. He reports the voices are under control. He reports improvement in his mood. He smiles and says he is trying to attend groups. Reports good appetite. At present time he denies suicidal or homicidal ideation intent or plan. Mental Status Exam  Level of consciousness:  Within normal limits  Appearance: Hospital attire, seated in chair, with good grooming and hygiene   Behavior/Motor: No abnormalities noted  Attitude toward examiner:  Cooperative, attentive, good eye contact  Speech:  spontaneous, normal rate, normal volume and well articulated  Mood: \"Better\"  Affect: Fair  Thought processes:  linear, goal directed and coherent  Thought content:  denies homicidal ideation  Suicidal Ideation: Denies suicidal ideation  Delusions:  no evidence of delusions  Perceptual Disturbance:  denies any perceptual disturbance  Cognition:  Oriented to self, location, time, and situation  Memory: age appropriate  Insight & Judgement: improving  Medication side effects:  denies       Data   height is 6' 2\" (1.88 m) and weight is 200 lb (90.7 kg). His oral temperature is 97.3 °F (36.3 °C). His blood pressure is 99/55 (abnormal) and his pulse is 61. His respiration is 14 and oxygen saturation is 100%. Labs:   No visits with results within 2 Day(s) from this visit.    Latest known visit with results is:   Admission on 12/06/2021, Discharged on 12/07/2021   Component Date Value Ref Range Status    WBC 12/06/2021 4.8  3.5 - 11.3 k/uL Final    RBC 12/06/2021 4.63  4.21 - 5.77 m/uL Final    Hemoglobin 12/06/2021 12.8* 13.0 - 17.0 g/dL Final    Hematocrit 12/06/2021 41.1  40.7 - 50.3 % Final    MCV 12/06/2021 88.8  82.6 - 102.9 fL Final    MCH 12/06/2021 27.6  25.2 - 33.5 pg Final    MCHC 12/06/2021 31.1  28.4 - 34.8 g/dL Final    RDW 12/06/2021 14.0  11.8 - 14.4 % Final    Platelets 02/59/5972 358  138 - 453 k/uL Final    MPV 12/06/2021 8.9  8.1 - 13.5 fL Final    NRBC Automated 12/06/2021 0.0  0.0 per 100 WBC Final    Differential Type 12/06/2021 NOT REPORTED   Final    Seg Neutrophils 12/06/2021 40  36 - 65 % Final    Lymphocytes 12/06/2021 46* 24 - 43 % Final    Monocytes 12/06/2021 11  3 - 12 % Final    Eosinophils % 12/06/2021 2  1 - 4 % Final    Basophils 12/06/2021 1  0 - 2 % Final    Immature Granulocytes 12/06/2021 0  0 % Final    Segs Absolute 12/06/2021 1.92  1.50 - 8.10 k/uL Final    Absolute Lymph # 12/06/2021 2.25  1.10 - 3.70 k/uL Final    Absolute Mono # 12/06/2021 0.52  0.10 - 1.20 k/uL Final    Absolute Eos # 12/06/2021 0.09  0.00 - 0.44 k/uL Final    Basophils Absolute 12/06/2021 0.03  0.00 - 0.20 k/uL Final    Absolute Immature Granulocyte 12/06/2021 <0.03  0.00 - 0.30 k/uL Final    WBC Morphology 12/06/2021 NOT REPORTED   Final    RBC Morphology 12/06/2021 NOT REPORTED   Final    Platelet Estimate 35/71/8474 NOT REPORTED   Final    Glucose 12/06/2021 74  70 - 99 mg/dL Final    BUN 12/06/2021 15  8 - 23 mg/dL Final    CREATININE 12/06/2021 1.08  0.70 - 1.20 mg/dL Final    Bun/Cre Ratio 12/06/2021 NOT REPORTED  9 - 20 Final    Calcium 12/06/2021 9.0  8.6 - 10.4 mg/dL Final    Sodium 12/06/2021 142  135 - 144 mmol/L Final    Potassium 12/06/2021 4.1  3.7 - 5.3 mmol/L Final    Chloride 12/06/2021 106  98 - 107 mmol/L Final    CO2 12/06/2021 26  20 - 31 mmol/L Final    Anion Gap 12/06/2021 10  9 - 17 mmol/L Final    Alkaline Phosphatase 12/06/2021 125  40 - 129 U/L Final    ALT 12/06/2021 9  5 - 41 U/L Final    AST 12/06/2021 16  <40 U/L Final    Total Bilirubin 12/06/2021 0.20* 0.3 - 1.2 mg/dL Final    Total Protein 12/06/2021 6.6  6.4 - 8.3 g/dL Final    Albumin 12/06/2021 4.0  3.5 - 5.2 g/dL Final    Albumin/Globulin Ratio 12/06/2021 1.5  1.0 - 2.5 Final    GFR Non- 12/06/2021 >60  >60 mL/min Final    GFR  12/06/2021 >60  >60 mL/min Final    GFR Comment 12/06/2021        Final    Comment: Average GFR for 61-76 years old:   80 mL/min/1.73sq m  Chronic Kidney Disease:   <60 mL/min/1.73sq m  Kidney failure:   <15 mL/min/1.73sq m              eGFR calculated using average adult body mass.  Additional eGFR calculator available at:        [employer]            GFR Staging 12/06/2021 NOT REPORTED   Final    Ethanol 12/06/2021 <10  <10 mg/dL Final    Ethanol percent 12/06/2021 <0.010  <0.010 % Final    Amphetamine Screen, Ur 12/07/2021 NEGATIVE  NEGATIVE Final    Comment:       (Positive cutoff 1000 ng/mL)                  Barbiturate Screen, Ur 12/07/2021 NEGATIVE  NEGATIVE Final    Comment:       (Positive cutoff 200 ng/mL)                  Benzodiazepine Screen, Urine 12/07/2021 NEGATIVE  NEGATIVE Final    Comment:       (Positive cutoff 200 ng/mL)                  Cocaine Metabolite, Urine 12/07/2021 POSITIVE* NEGATIVE Final    Comment:       (Positive cutoff 300 ng/mL)                  Methadone Screen, Urine 12/07/2021 NEGATIVE  NEGATIVE Final    Comment:       (Positive cutoff 300 ng/mL)                  Opiates, Urine 12/07/2021 NEGATIVE  NEGATIVE Final    Comment:       (Positive cutoff 300 ng/mL)                  Phencyclidine, Urine 12/07/2021 NEGATIVE  NEGATIVE Final    Comment:       (Positive cutoff 25 ng/mL)                  Propoxyphene, Urine 12/07/2021 NOT REPORTED  NEGATIVE Final    Cannabinoid Scrn, Ur 12/07/2021 NEGATIVE  NEGATIVE Final    Comment:       (Positive cutoff 50 ng/mL)                  Oxycodone Screen, Ur 12/07/2021 NEGATIVE  NEGATIVE Final    Comment:       (Positive cutoff 100 ng/mL)                  Methamphetamine, Urine 12/07/2021 NOT REPORTED  NEGATIVE Final    Tricyclic Antidepressants, Urine 12/07/2021 NOT REPORTED  NEGATIVE Final    MDMA, Urine 12/07/2021 NOT REPORTED  NEGATIVE Final    Buprenorphine Urine 12/07/2021 NOT REPORTED  NEGATIVE Final    Test Information 12/07/2021 Assay provides medical screening only. The absence of expected drug(s) and/or metabolite(s) may indicate diluted or adulterated urine, limitations of testing or timing of collection. Final    Comment: Testing for legal purposes should be confirmed by another method. To request confirmation   of test result, please call the lab within 7 days of sample submission.  Specimen Description 12/06/2021 . NASOPHARYNGEAL SWAB   Final    SARS-CoV-2, Rapid 12/06/2021 Not Detected  Not Detected Final    Comment:       Rapid NAAT:  The specimen is NEGATIVE for SARS-CoV-2, the novel coronavirus associated with   COVID-19. The ID NOW COVID-19 assay is designed to detect the virus that causes COVID-19 in patients   with signs and symptoms of infection who are suspected of COVID-19. An individual without symptoms of COVID-19 and who is not shedding SARS-CoV-2 virus would   expect to have a negative (not detected) result in this assay. Negative results should be treated as presumptive and, if inconsistent with clinical signs   and symptoms or necessary for patient management,  should be tested with an alternative molecular assay. Negative results do not preclude   SARS-CoV-2 infection and   should not be used as the sole basis for patient management decisions.          Fact sheet for Healthcare Providers: Brandny  Fact sheet for Patients: [employer]          Methodology: Isothermal Nucleic Acid Amplification              Medications  Current Facility-Administered Medications: OLANZapine (ZYPREXA) tablet 10 mg, 10 mg, Oral, Nightly  traZODone (DESYREL) tablet 100 mg, 100 mg, Oral, Nightly PRN  escitalopram (LEXAPRO) tablet 20 mg, 20 mg, Oral,

## 2021-12-19 NOTE — GROUP NOTE
Group Therapy Note    Date: 12/19/2021    Group Start Time: 0900  Group End Time: 0920  Group Topic: Psychoeducation    [facility]    Myke Horvath LPN        Group Therapy Note    Attendees: 5/18         Patient's Goal:  Short term goals. Status After Intervention:  Improved    Participation Level:  Active Listener and Interactive    Participation Quality: Attentive and Sharing      Speech:  normal      Thought Process/Content: Logical            Level of consciousness:  Alert      Response to Learning: Able to verbalize current knowledge/experience and Progressing to goal      Endings: None Reported    Modes of Intervention: Education, Socialization and Problem-solving      Discipline Responsible: Licensed Practical Nurse      Signature:  Myke Horvath LPN

## 2021-12-19 NOTE — PLAN OF CARE
Problem: Altered Mood, Depressive Behavior:  Goal: Ability to disclose and discuss suicidal ideas will improve  Description: Ability to disclose and discuss suicidal ideas will improve  12/19/2021 1004 by Mark Blanco LPN  Outcome: Ongoing  Note: Patient denies any current suicidal thoughts and agrees to seek out staff if thoughts arise. Patient denies depression and anxiety. Patient continues to have auditory hallucinations. Patient stays in his room for most of the day and is out for meals. Patient is compliant with medications and staff. Problem: Pain:  Goal: Pain level will decrease  Description: Pain level will decrease  12/19/2021 1004 by Mark Blanco LPN  Outcome: Ongoing  Note: Patient has not complained of pain at this time. Will continue to monitor. Problem: Musculor/Skeletal Functional Status  Goal: Absence of falls  12/19/2021 1004 by Mark Blanco LPN  Outcome: Ongoing  Note: Patient has been free from falls. Will continue to monitor.

## 2021-12-19 NOTE — GROUP NOTE
Group Therapy Note    Date: 12/19/2021    Group Start Time: 1000  Group End Time: 1050  Group Topic: Psychoeducation    [facility]    FRANKLIN West LSW        Group Therapy Note    patient refused to attend psychoeducational group at 10am after encouragement from staff.          Signature:  FRANKLIN West LSW

## 2021-12-19 NOTE — PROGRESS NOTES
Daily Progress Note  Rina HeathMARY, CNP  12/19/2021       CHIEF COMPLAINT: Depression with suicidal ideation    Reviewed patient's current plan of care and vital signs with nursing staff. Sleep: Several hours last night  Attending groups: Yes    SUBJECTIVE:    Patient was seen for follow-up assessment in his room today. He was resting but awake and pleasant and cooperative. Patient is reporting that auditory hallucinations continue to improve in intensity and frequency and states \" it is really calming down\". He reports that his mood \"is up and down\". He is rating symptoms of depression today is 6/10 and anxiety 0/10, 10 being the worst.  He does feel that overall he is feeling better. He is denying suicidal and homicidal ideation. He mentions having an interview tomorrow for perspective housing and he is hopeful that he does well during the interview. He describes sleep is adequate and was able to get several hours overnight. He reports that he has been much of the day resting in bed but he has been getting up for meals and needs. He has also been attending groups with regularity. His interactions with peers and staff been appropriate. Patient agrees to contract for safety on the unit.     Mental Status Exam  Level of consciousness:  Within normal limits  Appearance: Hospital attire, resting in bed, with good grooming and hygiene   Behavior/Motor: No abnormalities noted  Attitude toward examiner: Cooperative, attentive, good eye contact  Speech:  spontaneous, normal rate, normal volume and well articulated  Mood: Depressed, improving  Affect: Mood congruent  Thought processes:  linear, goal directed and coherent  Thought content:  denies homicidal ideation  Suicidal Ideation: denies suicidal ideation  Delusions:  no evidence of delusions  Perceptual Disturbance: Endorses improving auditory hallucinations  Cognition:  Oriented to self, location, time, and situation  Memory: age appropriate  Insight & Judgement: improving  Medication side effects: denies     Data   height is 6' 2\" (1.88 m) and weight is 200 lb (90.7 kg). His oral temperature is 97.9 °F (36.6 °C). His blood pressure is 94/58 (abnormal) and his pulse is 65. His respiration is 14 and oxygen saturation is 100%. Labs:   No visits with results within 2 Day(s) from this visit.    Latest known visit with results is:   Admission on 12/06/2021, Discharged on 12/07/2021   Component Date Value Ref Range Status    WBC 12/06/2021 4.8  3.5 - 11.3 k/uL Final    RBC 12/06/2021 4.63  4.21 - 5.77 m/uL Final    Hemoglobin 12/06/2021 12.8* 13.0 - 17.0 g/dL Final    Hematocrit 12/06/2021 41.1  40.7 - 50.3 % Final    MCV 12/06/2021 88.8  82.6 - 102.9 fL Final    MCH 12/06/2021 27.6  25.2 - 33.5 pg Final    MCHC 12/06/2021 31.1  28.4 - 34.8 g/dL Final    RDW 12/06/2021 14.0  11.8 - 14.4 % Final    Platelets 81/01/1160 358  138 - 453 k/uL Final    MPV 12/06/2021 8.9  8.1 - 13.5 fL Final    NRBC Automated 12/06/2021 0.0  0.0 per 100 WBC Final    Differential Type 12/06/2021 NOT REPORTED   Final    Seg Neutrophils 12/06/2021 40  36 - 65 % Final    Lymphocytes 12/06/2021 46* 24 - 43 % Final    Monocytes 12/06/2021 11  3 - 12 % Final    Eosinophils % 12/06/2021 2  1 - 4 % Final    Basophils 12/06/2021 1  0 - 2 % Final    Immature Granulocytes 12/06/2021 0  0 % Final    Segs Absolute 12/06/2021 1.92  1.50 - 8.10 k/uL Final    Absolute Lymph # 12/06/2021 2.25  1.10 - 3.70 k/uL Final    Absolute Mono # 12/06/2021 0.52  0.10 - 1.20 k/uL Final    Absolute Eos # 12/06/2021 0.09  0.00 - 0.44 k/uL Final    Basophils Absolute 12/06/2021 0.03  0.00 - 0.20 k/uL Final    Absolute Immature Granulocyte 12/06/2021 <0.03  0.00 - 0.30 k/uL Final    WBC Morphology 12/06/2021 NOT REPORTED   Final    RBC Morphology 12/06/2021 NOT REPORTED   Final    Platelet Estimate 62/32/6774 NOT REPORTED   Final    Glucose 12/06/2021 74  70 - 99 mg/dL Final    BUN 12/06/2021 15 8 - 23 mg/dL Final    CREATININE 12/06/2021 1.08  0.70 - 1.20 mg/dL Final    Bun/Cre Ratio 12/06/2021 NOT REPORTED  9 - 20 Final    Calcium 12/06/2021 9.0  8.6 - 10.4 mg/dL Final    Sodium 12/06/2021 142  135 - 144 mmol/L Final    Potassium 12/06/2021 4.1  3.7 - 5.3 mmol/L Final    Chloride 12/06/2021 106  98 - 107 mmol/L Final    CO2 12/06/2021 26  20 - 31 mmol/L Final    Anion Gap 12/06/2021 10  9 - 17 mmol/L Final    Alkaline Phosphatase 12/06/2021 125  40 - 129 U/L Final    ALT 12/06/2021 9  5 - 41 U/L Final    AST 12/06/2021 16  <40 U/L Final    Total Bilirubin 12/06/2021 0.20* 0.3 - 1.2 mg/dL Final    Total Protein 12/06/2021 6.6  6.4 - 8.3 g/dL Final    Albumin 12/06/2021 4.0  3.5 - 5.2 g/dL Final    Albumin/Globulin Ratio 12/06/2021 1.5  1.0 - 2.5 Final    GFR Non- 12/06/2021 >60  >60 mL/min Final    GFR  12/06/2021 >60  >60 mL/min Final    GFR Comment 12/06/2021        Final    Comment: Average GFR for 61-76 years old:   80 mL/min/1.73sq m  Chronic Kidney Disease:   <60 mL/min/1.73sq m  Kidney failure:   <15 mL/min/1.73sq m              eGFR calculated using average adult body mass.  Additional eGFR calculator available at:        [employer]            GFR Staging 12/06/2021 NOT REPORTED   Final    Ethanol 12/06/2021 <10  <10 mg/dL Final    Ethanol percent 12/06/2021 <0.010  <0.010 % Final    Amphetamine Screen, Ur 12/07/2021 NEGATIVE  NEGATIVE Final    Comment:       (Positive cutoff 1000 ng/mL)                  Barbiturate Screen, Ur 12/07/2021 NEGATIVE  NEGATIVE Final    Comment:       (Positive cutoff 200 ng/mL)                  Benzodiazepine Screen, Urine 12/07/2021 NEGATIVE  NEGATIVE Final    Comment:       (Positive cutoff 200 ng/mL)                  Cocaine Metabolite, Urine 12/07/2021 POSITIVE* NEGATIVE Final    Comment:       (Positive cutoff 300 ng/mL)                  Methadone Screen, Urine 12/07/2021 NEGATIVE  NEGATIVE Final    Comment:       (Positive cutoff 300 ng/mL)                  Opiates, Urine 12/07/2021 NEGATIVE  NEGATIVE Final    Comment:       (Positive cutoff 300 ng/mL)                  Phencyclidine, Urine 12/07/2021 NEGATIVE  NEGATIVE Final    Comment:       (Positive cutoff 25 ng/mL)                  Propoxyphene, Urine 12/07/2021 NOT REPORTED  NEGATIVE Final    Cannabinoid Scrn, Ur 12/07/2021 NEGATIVE  NEGATIVE Final    Comment:       (Positive cutoff 50 ng/mL)                  Oxycodone Screen, Ur 12/07/2021 NEGATIVE  NEGATIVE Final    Comment:       (Positive cutoff 100 ng/mL)                  Methamphetamine, Urine 12/07/2021 NOT REPORTED  NEGATIVE Final    Tricyclic Antidepressants, Urine 12/07/2021 NOT REPORTED  NEGATIVE Final    MDMA, Urine 12/07/2021 NOT REPORTED  NEGATIVE Final    Buprenorphine Urine 12/07/2021 NOT REPORTED  NEGATIVE Final    Test Information 12/07/2021 Assay provides medical screening only. The absence of expected drug(s) and/or metabolite(s) may indicate diluted or adulterated urine, limitations of testing or timing of collection. Final    Comment: Testing for legal purposes should be confirmed by another method. To request confirmation   of test result, please call the lab within 7 days of sample submission.  Specimen Description 12/06/2021 . NASOPHARYNGEAL SWAB   Final    SARS-CoV-2, Rapid 12/06/2021 Not Detected  Not Detected Final    Comment:       Rapid NAAT:  The specimen is NEGATIVE for SARS-CoV-2, the novel coronavirus associated with   COVID-19. The ID NOW COVID-19 assay is designed to detect the virus that causes COVID-19 in patients   with signs and symptoms of infection who are suspected of COVID-19. An individual without symptoms of COVID-19 and who is not shedding SARS-CoV-2 virus would   expect to have a negative (not detected) result in this assay.   Negative results should be treated as presumptive and, if inconsistent with practitioners documentation are stated below otherwise agree with assessment. Plan will be as follows:  Patient continues to be more forward-looking. Reporting improvement in his mood. Denying auditory or visual hallucinations at present time. Hopeful about placement to group home  PLAN  Patient s symptoms   are improving  Continue with current medication for now  Attempt to develop insight  Psycho-education conducted. Supportive Therapy conducted.   Probable discharge is 1 to 2 days  Follow-up daily while on inpatient unit

## 2021-12-20 PROCEDURE — 1240000000 HC EMOTIONAL WELLNESS R&B

## 2021-12-20 PROCEDURE — 90833 PSYTX W PT W E/M 30 MIN: CPT | Performed by: PSYCHIATRY & NEUROLOGY

## 2021-12-20 PROCEDURE — 6370000000 HC RX 637 (ALT 250 FOR IP): Performed by: PSYCHIATRY & NEUROLOGY

## 2021-12-20 PROCEDURE — 6370000000 HC RX 637 (ALT 250 FOR IP)

## 2021-12-20 PROCEDURE — 99232 SBSQ HOSP IP/OBS MODERATE 35: CPT | Performed by: PSYCHIATRY & NEUROLOGY

## 2021-12-20 PROCEDURE — 6370000000 HC RX 637 (ALT 250 FOR IP): Performed by: NURSE PRACTITIONER

## 2021-12-20 RX ADMIN — ESCITALOPRAM OXALATE 20 MG: 20 TABLET ORAL at 09:54

## 2021-12-20 RX ADMIN — Medication 1000 UNITS: at 09:54

## 2021-12-20 RX ADMIN — ACETAMINOPHEN 650 MG: 325 TABLET, FILM COATED ORAL at 17:13

## 2021-12-20 RX ADMIN — HYDROXYZINE HYDROCHLORIDE 50 MG: 50 TABLET, FILM COATED ORAL at 17:13

## 2021-12-20 RX ADMIN — OLANZAPINE 10 MG: 10 TABLET, FILM COATED ORAL at 21:01

## 2021-12-20 RX ADMIN — HYDROXYZINE HYDROCHLORIDE 50 MG: 50 TABLET, FILM COATED ORAL at 21:01

## 2021-12-20 RX ADMIN — TRAZODONE HYDROCHLORIDE 100 MG: 100 TABLET ORAL at 21:00

## 2021-12-20 ASSESSMENT — PAIN SCALES - GENERAL
PAINLEVEL_OUTOF10: 6
PAINLEVEL_OUTOF10: 4

## 2021-12-20 NOTE — GROUP NOTE
Group Therapy Note    Date: 12/20/2021    Group Start Time: 3665  Group End Time: 3519  Group Topic: Psychoeducation    JESUS Randhawa, GADIELS    Group Therapy Note    Attendees: 5    Patient's Goal:  Pt will identify healthy coping skills    Notes:  Patient attended group and participated. Status After Intervention:  Improved    Participation Level:  Active Listener and Interactive    Participation Quality: Appropriate, Attentive and Sharing      Speech:  normal      Thought Process/Content: Logical      Affective Functioning: Constricted/Restricted      Mood: euthymic      Level of consciousness:  Alert, Oriented x4 and Attentive      Response to Learning: Able to verbalize current knowledge/experience, Able to verbalize/acknowledge new learning, Able to retain information, Able to change behavior and Progressing to goal      Endings: None Reported    Modes of Intervention: Education, Support, Socialization and Exploration      Discipline Responsible: Psychoeducational Specialist      Signature:  Skyler Rodrigues, [address]

## 2021-12-20 NOTE — GROUP NOTE
Group Therapy Note    Date: 12/20/2021    Group Start Time: 1100  Group End Time: 5089  Group Topic: Recreational    STCZ DAVID Randhawa, CTRS    Group Therapy Note    Attendees: 7    Patient's Goal:  Pt will demonstrate improved interpersonal skills    Notes:  Pt attended group and participated. Patient has brighter affect and is interactive with peers. Patient is often slow to respond but provided reality based and appropriate responses. Status After Intervention:  Improved    Participation Level:  Active Listener and Interactive    Participation Quality: Appropriate, Attentive, Sharing and Supportive      Speech:  normal      Thought Process/Content: Logical      Affective Functioning: Constricted/Restricted      Mood: euthymic      Level of consciousness:  Alert, Oriented x4 and Attentive      Response to Learning: Able to verbalize current knowledge/experience, Able to verbalize/acknowledge new learning, Able to retain information, Able to change behavior and Progressing to goal      Endings: None Reported    Modes of Intervention: Education, Support, Socialization and Exploration      Discipline Responsible: Psychoeducational Specialist      Signature:  Deandra Gonzalez, [address]

## 2021-12-20 NOTE — GROUP NOTE
Group Therapy Note    Date: 12/20/2021    Group Start Time: 1000  Group End Time: 3621  Group Topic: Psychotherapy    [address], LSW    [facility] from staff[facility]   1:1 talk time provided as alternative to group session

## 2021-12-20 NOTE — PLAN OF CARE
Problem: Altered Mood, Depressive Behavior:  Goal: Able to verbalize acceptance of life and situations over which he or she has no control  Description: Able to verbalize acceptance of life and situations over which he or she has no control  Outcome: Ongoing     Problem: Altered Mood, Depressive Behavior:  Goal: Ability to disclose and discuss suicidal ideas will improve  Description: Ability to disclose and discuss suicidal ideas will improve  Outcome: Ongoing  Note:  Pt denies suicidal ideations at this time. Pt agreed to seek staff at anytime he/she felt like any urges to harm self would arise. Safety checks maintained [id number]. Problem: Pain:  Goal: Pain level will decrease  Description: Pain level will decrease  Outcome: Ongoing  Note: Patient reports no new pain, or increase in chronic pain. Will continue to monitor and provide as needed pain medication. Problem: Pain:  Goal: Control of acute pain  Description: Control of acute pain  Outcome: Ongoing     Problem: Pain:  Goal: Control of chronic pain  Description: Control of chronic pain  Outcome: Ongoing     Problem: Musculor/Skeletal Functional Status  Goal: Highest potential functional level  Outcome: Ongoing     Problem: Musculor/Skeletal Functional Status  Goal: Absence of falls  Outcome: Ongoing  Note: Patient has remained free from falls this shift. Will continue to monitor and provide q15 min safety checks.

## 2021-12-20 NOTE — PLAN OF CARE
Problem: Altered Mood, Depressive Behavior:  Goal: Able to verbalize acceptance of life and situations over which he or she has no control  Description: Able to verbalize acceptance of life and situations over which he or she has no control  Outcome: Ongoing  Goal: Ability to disclose and discuss suicidal ideas will improve  Description: Ability to disclose and discuss suicidal ideas will improve  Outcome: Ongoing   Patient is brightened and has been out and more social today. Patient endorses anxiety due to meeting that was scheduled today. Problem: Pain:  Goal: Pain level will decrease  Description: Pain level will decrease  Outcome: Ongoing  Goal: Control of acute pain  Description: Control of acute pain  Outcome: Ongoing  Goal: Control of chronic pain  Description: Control of chronic pain  Outcome: Ongoing   Patient has had no complaints of pain this shift. Problem: Musculor/Skeletal Functional Status  Goal: Highest potential functional level  Outcome: Ongoing  Goal: Absence of falls  Outcome: Ongoing   Patient remains free from falls and injury. Patient continues to wear non-slip socks while walking throughout unit.

## 2021-12-20 NOTE — CARE COORDINATION
Kanwal called and spoke with Mrs. [facility] from [facility] this date, [phone], states she would communicate with Mr. [facility] as he previously stated he would be at hospital today to interview pt as potential group home resident.

## 2021-12-21 PROCEDURE — 99231 SBSQ HOSP IP/OBS SF/LOW 25: CPT | Performed by: PSYCHIATRY & NEUROLOGY

## 2021-12-21 PROCEDURE — 6370000000 HC RX 637 (ALT 250 FOR IP): Performed by: PSYCHIATRY & NEUROLOGY

## 2021-12-21 PROCEDURE — 1240000000 HC EMOTIONAL WELLNESS R&B

## 2021-12-21 PROCEDURE — 6370000000 HC RX 637 (ALT 250 FOR IP)

## 2021-12-21 PROCEDURE — 6370000000 HC RX 637 (ALT 250 FOR IP): Performed by: NURSE PRACTITIONER

## 2021-12-21 PROCEDURE — 90833 PSYTX W PT W E/M 30 MIN: CPT | Performed by: PSYCHIATRY & NEUROLOGY

## 2021-12-21 RX ADMIN — TRAZODONE HYDROCHLORIDE 100 MG: 100 TABLET ORAL at 22:19

## 2021-12-21 RX ADMIN — ESCITALOPRAM OXALATE 20 MG: 20 TABLET ORAL at 10:08

## 2021-12-21 RX ADMIN — OLANZAPINE 10 MG: 10 TABLET, FILM COATED ORAL at 22:19

## 2021-12-21 RX ADMIN — HYDROXYZINE HYDROCHLORIDE 50 MG: 50 TABLET, FILM COATED ORAL at 22:19

## 2021-12-21 RX ADMIN — Medication 1000 UNITS: at 10:08

## 2021-12-21 NOTE — CARE COORDINATION
SW received call from Darnell Sirnav at [facility] [phone], regarding possible placement. Evelynn Sires asked to speak with pts Legal Guardian, contact information was provided and would like to interview pt on 12/22/2021 around noon.

## 2021-12-21 NOTE — GROUP NOTE
Addended by: JORDI WILLIAM on: 6/7/2017 10:04 AM     Modules accepted: Orders     Group Therapy Note    Date: 12/20/2021    Group Start Time: 2015  Group End Time: 2050  Group Topic: Wrap-Up    STC DAVID Fernández        Group Therapy Note    Attendees: 6/13 for holiday social game group     Patient had good and appropriate participation with encouragement and frequent explanations      Signature:  [facility]

## 2021-12-21 NOTE — PLAN OF CARE
Problem: Altered Mood, Depressive Behavior:  Goal: Able to verbalize acceptance of life and situations over which he or she has no control  Description: Able to verbalize acceptance of life and situations over which he or she has no control  12/21/2021 1142 by Katherin Blake LPN  Outcome: Ongoing, Patient affect flat ,voiced being disappointment related permanent placement       Problem: Altered Mood, Depressive Behavior:  Goal: Ability to disclose and discuss suicidal ideas will improve  Description: Ability to disclose and discuss suicidal ideas will improve  12/21/2021 1142 by Katherin Blake LPN  Outcome: Ongoing,patient denied all thoughts of self harm. Problem: Pain:  Goal: Pain level will decrease  Description: Pain level will decrease  Outcome: Ongoing, Patient denied pain at this time. 15 MIN safety checks.

## 2021-12-21 NOTE — GROUP NOTE
Group Therapy Note    Date: 12/21/2021    Group Start Time: 1100  Group End Time: 1140  Group Topic: Recreational    STCZ DAVID C    Malaika Randhawa, GADIELS    Group Therapy Note    Attendees: 4    Patient's Goal:  Patient will demonstrate improved interpersonal skills    Notes:  Patient attended group and participated. Status After Intervention:  Improved    Participation Level:  Active Listener    Participation Quality: Appropriate and Attentive      Speech:  normal      Thought Process/Content: Logical      Affective Functioning: Constricted/Restricted      Mood: euthymic      Level of consciousness:  Alert, Oriented x4 and Attentive      Response to Learning: Able to verbalize current knowledge/experience, Able to verbalize/acknowledge new learning, Able to retain information, Able to change behavior and Progressing to goal      Endings: None Reported    Modes of Intervention: Education, Support, Socialization and Exploration      Discipline Responsible: Psychoeducational Specialist      Signature:  Chacha Naranjo, [address]

## 2021-12-21 NOTE — PROGRESS NOTES
Daily Progress Note  Tayo Valentin MD  12/20/2021  CHIEF COMPLAINT: Depression with suicidal ideation    Reviewed patient's current plan of care and vital signs with nursing staff. Sleep:  several hours last night  Attending groups: Yes    SUBJECTIVE:    Patient got up, got dressed, was ready for his interview. He was somewhat disappointed and frustrated that his interview did not occur. He still hopeful about going to the group home. States he wants to try and do well. He was has organized his life seen him and trying to present himself well per the interview he expressed frustration and seem to have some paranoia about whether this was really going to happen. Encouraged him to continue to be prepared for an interview for group home and that we would follow-up. Staff reports he is been compliant with medication. Mental Status Exam  Level of consciousness:  Within normal limits  Appearance: Hospital attire, seated in chair, with good grooming and hygiene   Behavior/Motor: No abnormalities noted  Attitude toward examiner:  Cooperative, attentive, good eye contact  Speech:  spontaneous, normal rate, normal volume and well articulated  Mood: \"Frustrated\"  Affect: Congruent  Thought processes:  linear, goal directed and coherent  Thought content:  denies homicidal ideation  Suicidal Ideation: Endorses  suicidal ideation, states it is from frustration, but able to contract for safety  Delusions:  no evidence of delusions  Perceptual Disturbance:  denies any perceptual disturbance  Cognition:  Oriented to self, location, time, and situation  Memory: age appropriate  Insight & Judgement: improving  Medication side effects:  denies       Data   height is 6' 2\" (1.88 m) and weight is 200 lb (90.7 kg). His oral temperature is 98.3 °F (36.8 °C). His blood pressure is 112/66 and his pulse is 84. His respiration is 14 and oxygen saturation is 100%. Labs:   No visits with results within 2 Day(s) from this visit. Latest known visit with results is:   Admission on 12/06/2021, Discharged on 12/07/2021   Component Date Value Ref Range Status    WBC 12/06/2021 4.8  3.5 - 11.3 k/uL Final    RBC 12/06/2021 4.63  4.21 - 5.77 m/uL Final    Hemoglobin 12/06/2021 12.8* 13.0 - 17.0 g/dL Final    Hematocrit 12/06/2021 41.1  40.7 - 50.3 % Final    MCV 12/06/2021 88.8  82.6 - 102.9 fL Final    MCH 12/06/2021 27.6  25.2 - 33.5 pg Final    MCHC 12/06/2021 31.1  28.4 - 34.8 g/dL Final    RDW 12/06/2021 14.0  11.8 - 14.4 % Final    Platelets 11/85/4337 358  138 - 453 k/uL Final    MPV 12/06/2021 8.9  8.1 - 13.5 fL Final    NRBC Automated 12/06/2021 0.0  0.0 per 100 WBC Final    Differential Type 12/06/2021 NOT REPORTED   Final    Seg Neutrophils 12/06/2021 40  36 - 65 % Final    Lymphocytes 12/06/2021 46* 24 - 43 % Final    Monocytes 12/06/2021 11  3 - 12 % Final    Eosinophils % 12/06/2021 2  1 - 4 % Final    Basophils 12/06/2021 1  0 - 2 % Final    Immature Granulocytes 12/06/2021 0  0 % Final    Segs Absolute 12/06/2021 1.92  1.50 - 8.10 k/uL Final    Absolute Lymph # 12/06/2021 2.25  1.10 - 3.70 k/uL Final    Absolute Mono # 12/06/2021 0.52  0.10 - 1.20 k/uL Final    Absolute Eos # 12/06/2021 0.09  0.00 - 0.44 k/uL Final    Basophils Absolute 12/06/2021 0.03  0.00 - 0.20 k/uL Final    Absolute Immature Granulocyte 12/06/2021 <0.03  0.00 - 0.30 k/uL Final    WBC Morphology 12/06/2021 NOT REPORTED   Final    RBC Morphology 12/06/2021 NOT REPORTED   Final    Platelet Estimate 82/63/6137 NOT REPORTED   Final    Glucose 12/06/2021 74  70 - 99 mg/dL Final    BUN 12/06/2021 15  8 - 23 mg/dL Final    CREATININE 12/06/2021 1.08  0.70 - 1.20 mg/dL Final    Bun/Cre Ratio 12/06/2021 NOT REPORTED  9 - 20 Final    Calcium 12/06/2021 9.0  8.6 - 10.4 mg/dL Final    Sodium 12/06/2021 142  135 - 144 mmol/L Final    Potassium 12/06/2021 4.1  3.7 - 5.3 mmol/L Final    Chloride 12/06/2021 106  98 - 107 mmol/L Final    CO2 12/06/2021 26  20 - 31 mmol/L Final    Anion Gap 12/06/2021 10  9 - 17 mmol/L Final    Alkaline Phosphatase 12/06/2021 125  40 - 129 U/L Final    ALT 12/06/2021 9  5 - 41 U/L Final    AST 12/06/2021 16  <40 U/L Final    Total Bilirubin 12/06/2021 0.20* 0.3 - 1.2 mg/dL Final    Total Protein 12/06/2021 6.6  6.4 - 8.3 g/dL Final    Albumin 12/06/2021 4.0  3.5 - 5.2 g/dL Final    Albumin/Globulin Ratio 12/06/2021 1.5  1.0 - 2.5 Final    GFR Non- 12/06/2021 >60  >60 mL/min Final    GFR  12/06/2021 >60  >60 mL/min Final    GFR Comment 12/06/2021        Final    Comment: Average GFR for 61-76 years old:   80 mL/min/1.73sq m  Chronic Kidney Disease:   <60 mL/min/1.73sq m  Kidney failure:   <15 mL/min/1.73sq m              eGFR calculated using average adult body mass.  Additional eGFR calculator available at:        Sova.br            GFR Staging 12/06/2021 NOT REPORTED   Final    Ethanol 12/06/2021 <10  <10 mg/dL Final    Ethanol percent 12/06/2021 <0.010  <0.010 % Final    Amphetamine Screen, Ur 12/07/2021 NEGATIVE  NEGATIVE Final    Comment:       (Positive cutoff 1000 ng/mL)                  Barbiturate Screen, Ur 12/07/2021 NEGATIVE  NEGATIVE Final    Comment:       (Positive cutoff 200 ng/mL)                  Benzodiazepine Screen, Urine 12/07/2021 NEGATIVE  NEGATIVE Final    Comment:       (Positive cutoff 200 ng/mL)                  Cocaine Metabolite, Urine 12/07/2021 POSITIVE* NEGATIVE Final    Comment:       (Positive cutoff 300 ng/mL)                  Methadone Screen, Urine 12/07/2021 NEGATIVE  NEGATIVE Final    Comment:       (Positive cutoff 300 ng/mL)                  Opiates, Urine 12/07/2021 NEGATIVE  NEGATIVE Final    Comment:       (Positive cutoff 300 ng/mL)                  Phencyclidine, Urine 12/07/2021 NEGATIVE  NEGATIVE Final    Comment:       (Positive cutoff 25 ng/mL)                  Propoxyphene, Urine 12/07/2021 NOT REPORTED  NEGATIVE Final    Cannabinoid Scrn, Ur 12/07/2021 NEGATIVE  NEGATIVE Final    Comment:       (Positive cutoff 50 ng/mL)                  Oxycodone Screen, Ur 12/07/2021 NEGATIVE  NEGATIVE Final    Comment:       (Positive cutoff 100 ng/mL)                  Methamphetamine, Urine 12/07/2021 NOT REPORTED  NEGATIVE Final    Tricyclic Antidepressants, Urine 12/07/2021 NOT REPORTED  NEGATIVE Final    MDMA, Urine 12/07/2021 NOT REPORTED  NEGATIVE Final    Buprenorphine Urine 12/07/2021 NOT REPORTED  NEGATIVE Final    Test Information 12/07/2021 Assay provides medical screening only. The absence of expected drug(s) and/or metabolite(s) may indicate diluted or adulterated urine, limitations of testing or timing of collection. Final    Comment: Testing for legal purposes should be confirmed by another method. To request confirmation   of test result, please call the lab within 7 days of sample submission.  Specimen Description 12/06/2021 . NASOPHARYNGEAL SWAB   Final    SARS-CoV-2, Rapid 12/06/2021 Not Detected  Not Detected Final    Comment:       Rapid NAAT:  The specimen is NEGATIVE for SARS-CoV-2, the novel coronavirus associated with   COVID-19. The ID NOW COVID-19 assay is designed to detect the virus that causes COVID-19 in patients   with signs and symptoms of infection who are suspected of COVID-19. An individual without symptoms of COVID-19 and who is not shedding SARS-CoV-2 virus would   expect to have a negative (not detected) result in this assay. Negative results should be treated as presumptive and, if inconsistent with clinical signs   and symptoms or necessary for patient management,  should be tested with an alternative molecular assay. Negative results do not preclude   SARS-CoV-2 infection and   should not be used as the sole basis for patient management decisions.          Fact sheet for Healthcare Providers: Iesha.nav  Fact sheet for Patients: SeekCultures.si          Methodology: Isothermal Nucleic Acid Amplification              Medications  Current Facility-Administered Medications: OLANZapine (ZYPREXA) tablet 10 mg, 10 mg, Oral, Nightly  traZODone (DESYREL) tablet 100 mg, 100 mg, Oral, Nightly PRN  escitalopram (LEXAPRO) tablet 20 mg, 20 mg, Oral, Daily  acetaminophen (TYLENOL) tablet 650 mg, 650 mg, Oral, Q4H PRN  aluminum & magnesium hydroxide-simethicone (MAALOX) 200-200-20 MG/5ML suspension 30 mL, 30 mL, Oral, Q6H PRN  hydrOXYzine (ATARAX) tablet 50 mg, 50 mg, Oral, TID PRN  ibuprofen (ADVIL;MOTRIN) tablet 400 mg, 400 mg, Oral, Q6H PRN  polyethylene glycol (GLYCOLAX) packet 17 g, 17 g, Oral, Daily PRN  nicotine polacrilex (NICORETTE) gum 2 mg, 2 mg, Oral, PRN  Vitamin D (CHOLECALCIFEROL) tablet 1,000 Units, 1,000 Units, Oral, Daily    ASSESSMENT  Schizoaffective disorder, depressive type ([facility] [address]     PLAN  Patient s symptoms   modestly worse with disappointment  Continue with current medication  Attempt to develop insight  Psycho-education conducted. Supportive Therapy conducted. Probable discharge is pending placement  Follow-up daily while inpatient unit    More than 16 mins of the 30-minute session was spent doing Supportive psychotherapy. Electronically signed by Nuris Metz MD on 12/20/21 at 9:13 PM EST    **This report has been created using voice recognition software. It may contain minor errors which are inherent in voice recognition technology. **

## 2021-12-21 NOTE — CARE COORDINATION
YOGESH placed call to [address] [place] [address] from [facility] [place] [address] group home  [phone] to see if she had an available bed. SW left name and number asking for a return call.      [phone] for possible [facility], the owner reports there are no available beds.

## 2021-12-21 NOTE — GROUP NOTE
Group Therapy Note    Date: 12/21/2021    Group Start Time: 1000  Group End Time: 1030  Group Topic: Psychotherapy    [facility] C    FRANKLIN Rust LSW        Group Therapy Note    Attendees: 2/14       Patient refused to attend psychotherapy group at 10:00 am after encouragement from staff.   1:1 talk time provided as alternative to group session    Discipline Responsible: /Counselor      Signature:  FRANKLIN Rust LSW

## 2021-12-21 NOTE — PLAN OF CARE
[facility] Interdisciplinary Treatment Plan Note     Review Date & Time: 12/21/21 1447    Admission Type:   Admission Type: Voluntary    Reason for admission:  Reason for Admission: SI to get hit by car or shoot self, can longer longer stay at [facility] and took self to [facility]. V's    Estimated Length of Stay:  8-14 days  Estimated Discharge Date Update:  to be determined by physician    PATIENT STRENGTHS:  Patient Strengths:Strengths: Social Skills,Positive Support  Patient Strengths and Limitations:Limitations: Tendency to isolate self,Difficulty problem solving/relies on others to help solve problems,Unrealistic self-view,Difficult relationships / poor social skills,Multiple barriers to leisure interests,Lacks leisure interests  Addictive Behavior:Addictive Behavior  In the past 3 months, have you felt or has someone told you that you have a problem with:  : None  Do you have a history of Chemical Use?: No  Do you have a history of Alcohol Use?: No  Do you have a history of Street Drug Abuse?: Yes (\"crack\")  Histroy of Prescripton Drug Abuse?: No  Medical Problems:   Past Medical History:   Diagnosis Date    Bipolar disorder ([facility] [address]     Depression     GERD (gastroesophageal reflux disease)     Hallucinations     Headache(784.0)     Hepatitis     Schizophrenia, schizo-affective ([facility] [address]     Substance abuse ([facility] [address]     Tobacco abuse     Type II or unspecified type diabetes mellitus without mention of complication, not stated as uncontrolled     Urinary incontinence        Risk:  Fall RiskTotal: 67  Dusty Scale Dusty Scale Score: 22  BVC      Change in scores:  No. Changes to plan of Care:  No    Status EXAM:   Status and Exam  Normal: No  Facial Expression: Flat  Affect: Appropriate  Level of Consciousness: Alert  Mood:Normal: No  Mood: Anxious  Motor Activity:Normal: No  Motor Activity: Decreased  Interview Behavior: Cooperative  Preception: Franklin to Person,[place]lin to Time  Attention:Normal: No  Attention: Distractible  Thought Processes: Circumstantial  Thought Content:Normal: No  Thought Content: Preoccupations  Hallucinations: Auditory (Comment)  Delusions: No  Delusions: Persecution  Memory:Normal: No  Memory: Poor Recent,Poor Remote  Insight and Judgment: No  Insight and Judgment: Poor Judgment  Present Suicidal Ideation: No  Present Homicidal Ideation: No    Daily Assessment Last Entry:   Daily Sleep (WDL): Within Defined Limits         Patient Currently in Pain: Denies  Daily Nutrition (WDL): Within Defined Limits  Appetite Change: Normal for patient  Barriers to Nutrition: None  Level of Assistance: Independent/Self    Patient Monitoring:  Frequency of Checks: 4 times per hour, close    Psychiatric Symptoms:   Depression Symptoms  Depression Symptoms: Loss of interest,Isolative  Anxiety Symptoms  Anxiety Symptoms: Generalized  [place] Symptoms  [place] Symptoms: No problems reported or observed. Psychosis Symptoms  Delusion Type: No problems reported or observed. Suicide Risk CSSR-S:  1) Within the past month, have you wished you were dead or wished you could go to sleep and not wake up? : Yes  2) Have you actually had any thoughts of killing yourself? : Yes  3) Have you been thinking about how you might kill yourself? : Yes (\"hit by car or shoot self\")  5) Have you started to work out or worked out the details of how to kill yourself? Do you intend to carry out this plan? : No  6) Have you ever done anything, started to do anything, or prepared to do anything to end your life?: No  Change in result:  Change in plan of care:    EDUCATION:   Learner Progress Toward Treatment Goals: Reviewed results and recommendations of this team, reviewed group plan and strategies, reviewed signs, symptoms and risk of self harm and violent behavior, reviewed goals and plan of care. Method:  individual education, small group, verbal education.     Outcome:  Pt verbalized understanding, but needs reinforcement to obtain goals. PATIENT GOALS:  Short term:  Pt unable to participate  Long term:  Pt unable to participate    PLAN/TREATMENT RECOMMENDATIONS UPDATE:   Continue with group therapies, education of coping skills   Continue to monitor patient on unit. Medications provided/medication compliance by patient. Continue for plans to obtain long term goals after discharge.     SHORT-TERM GOALS UPDATE:  Time frame for Short-Term Goals:  8-14days     LONG-TERM GOALS UPDATE:  Time frame for Long-Term Goals:  6 months    Members Present in Team Meeting:   See signature sheet  Jerod Garcia

## 2021-12-21 NOTE — PLAN OF CARE
Problem: Altered Mood, Depressive Behavior:  Goal: Able to verbalize acceptance of life and situations over which he or she has no control  Description: Able to verbalize acceptance of life and situations over which he or she has no control    Goal: Ability to disclose and discuss suicidal ideas will improve  Description: Ability to disclose and discuss suicidal ideas will improve        Pt denies suicidal ideations at this time. Pt agreed to seek staff at anytime he/she felt like any urges to harm self would arise. Safety checks maintained [id number]. Pt remains free from self harm this shift. Pt denies wanting to cause harm to self or others at this time. Pt encouraged to seek nursing staff at anytime if he/she felt at danger to themselves or others. Pt states understanding. Safety checks maintained [id number]    Patient is complaining of anxiety at this time. Stating that they feel restless and are having trouble sleeping and calming down in order to rest this evening. Medication was given as prescribed for increased anxiety see MAR. Pt denies SI/HI. Reports audio hallucinations pt states that he is able to ignore the voices but they are very loud at times. Pt takes medications to help control. Pt out but remains aloof most of the evening.

## 2021-12-21 NOTE — CARE COORDINATION
-[address] [facility]- left message  -[facility]- Female only [address]- Left message  -[facility]- No answer  -[facility] only   -[address]- Referred to another group home. [phone], Called the number and left message with name and number asking for return call.    -[facility]- No answer    -[address] [place] [address]

## 2021-12-22 PROCEDURE — 6370000000 HC RX 637 (ALT 250 FOR IP): Performed by: PSYCHIATRY & NEUROLOGY

## 2021-12-22 PROCEDURE — 6370000000 HC RX 637 (ALT 250 FOR IP): Performed by: NURSE PRACTITIONER

## 2021-12-22 PROCEDURE — 1240000000 HC EMOTIONAL WELLNESS R&B

## 2021-12-22 PROCEDURE — 6370000000 HC RX 637 (ALT 250 FOR IP)

## 2021-12-22 PROCEDURE — 99231 SBSQ HOSP IP/OBS SF/LOW 25: CPT | Performed by: PSYCHIATRY & NEUROLOGY

## 2021-12-22 PROCEDURE — APPSS30 APP SPLIT SHARED TIME 16-30 MINUTES: Performed by: PSYCHIATRY & NEUROLOGY

## 2021-12-22 RX ADMIN — Medication 1000 UNITS: at 08:15

## 2021-12-22 RX ADMIN — ESCITALOPRAM OXALATE 20 MG: 20 TABLET ORAL at 08:15

## 2021-12-22 RX ADMIN — HYDROXYZINE HYDROCHLORIDE 50 MG: 50 TABLET, FILM COATED ORAL at 21:14

## 2021-12-22 RX ADMIN — OLANZAPINE 10 MG: 10 TABLET, FILM COATED ORAL at 21:14

## 2021-12-22 RX ADMIN — TRAZODONE HYDROCHLORIDE 100 MG: 100 TABLET ORAL at 21:14

## 2021-12-22 ASSESSMENT — PAIN SCALES - GENERAL: PAINLEVEL_OUTOF10: 0

## 2021-12-22 NOTE — CARE COORDINATION
SW made followup appt for pt this date with [facility], 12/30/2021 at 1215pm with Cami Michel NP; also updated pt address at [facility] to reflect pt new group home placement.

## 2021-12-22 NOTE — PLAN OF CARE
Problem: Altered Mood, Depressive Behavior:  Goal: Able to verbalize acceptance of life and situations over which he or she has no control  Description: Able to verbalize acceptance of life and situations over which he or she has no control  Outcome: Ongoing, Patient brightened excited with  finding housing, hopeful of discharging soon. Problem: Altered Mood, Depressive Behavior:  Goal: Ability to disclose and discuss suicidal ideas will improve  Description: Ability to disclose and discuss suicidal ideas will improve  Outcome: Ongoing, Patient denied suicidal and homicidal ideations.

## 2021-12-22 NOTE — PROGRESS NOTES
Daily Progress Note  Andra Sánchez MD  12/21/2021  CHIEF COMPLAINT: Depression with suicidal ideation    Reviewed patient's current plan of care and vital signs with nursing staff. Sleep:  several hours last night  Attending groups: Yes    SUBJECTIVE:    Patient in brighter spirits this morning. Still forward-looking and constructive about possible placement in group homes. Denying side effects to medication. Denying auditory or visual hallucinations. Denying safety concerns on the unit. Expresses gratitude for our efforts to get him into group home. Mental Status Exam  Level of consciousness:  Within normal limits  Appearance: Hospital attire, seated in chair, with good grooming and hygiene   Behavior/Motor: No abnormalities noted  Attitude toward examiner:  Cooperative, attentive, good eye contact  Speech:  spontaneous, normal rate, normal volume and well articulated  Mood: \"Good\"  Affect: Congruent  Thought processes:  linear, goal directed and coherent  Thought content:  denies homicidal ideation  Suicidal Ideation: Denies suicidal ideation  Delusions:  no evidence of delusions  Perceptual Disturbance:  denies any perceptual disturbance  Cognition:  Oriented to self, location, time, and situation  Memory: age appropriate  Insight & Judgement: improving  Medication side effects:  denies       Data   height is 6' 2\" (1.88 m) and weight is 200 lb (90.7 kg). His oral temperature is 98.3 °F (36.8 °C). His blood pressure is 106/61 and his pulse is 85. His respiration is 14 and oxygen saturation is 100%. Labs:   No visits with results within 2 Day(s) from this visit.    Latest known visit with results is:   Admission on 12/06/2021, Discharged on 12/07/2021   Component Date Value Ref Range Status    WBC 12/06/2021 4.8  3.5 - 11.3 k/uL Final    RBC 12/06/2021 4.63  4.21 - 5.77 m/uL Final    Hemoglobin 12/06/2021 12.8* 13.0 - 17.0 g/dL Final    Hematocrit 12/06/2021 41.1  40.7 - 50.3 % Final    MCV 12/06/2021 88.8  82.6 - 102.9 fL Final    MCH 12/06/2021 27.6  25.2 - 33.5 pg Final    MCHC 12/06/2021 31.1  28.4 - 34.8 g/dL Final    RDW 12/06/2021 14.0  11.8 - 14.4 % Final    Platelets 08/34/8376 358  138 - 453 k/uL Final    MPV 12/06/2021 8.9  8.1 - 13.5 fL Final    NRBC Automated 12/06/2021 0.0  0.0 per 100 WBC Final    Differential Type 12/06/2021 NOT REPORTED   Final    Seg Neutrophils 12/06/2021 40  36 - 65 % Final    Lymphocytes 12/06/2021 46* 24 - 43 % Final    Monocytes 12/06/2021 11  3 - 12 % Final    Eosinophils % 12/06/2021 2  1 - 4 % Final    Basophils 12/06/2021 1  0 - 2 % Final    Immature Granulocytes 12/06/2021 0  0 % Final    Segs Absolute 12/06/2021 1.92  1.50 - 8.10 k/uL Final    Absolute Lymph # 12/06/2021 2.25  1.10 - 3.70 k/uL Final    Absolute Mono # 12/06/2021 0.52  0.10 - 1.20 k/uL Final    Absolute Eos # 12/06/2021 0.09  0.00 - 0.44 k/uL Final    Basophils Absolute 12/06/2021 0.03  0.00 - 0.20 k/uL Final    Absolute Immature Granulocyte 12/06/2021 <0.03  0.00 - 0.30 k/uL Final    WBC Morphology 12/06/2021 NOT REPORTED   Final    RBC Morphology 12/06/2021 NOT REPORTED   Final    Platelet Estimate 28/95/7006 NOT REPORTED   Final    Glucose 12/06/2021 74  70 - 99 mg/dL Final    BUN 12/06/2021 15  8 - 23 mg/dL Final    CREATININE 12/06/2021 1.08  0.70 - 1.20 mg/dL Final    Bun/Cre Ratio 12/06/2021 NOT REPORTED  9 - 20 Final    Calcium 12/06/2021 9.0  8.6 - 10.4 mg/dL Final    Sodium 12/06/2021 142  135 - 144 mmol/L Final    Potassium 12/06/2021 4.1  3.7 - 5.3 mmol/L Final    Chloride 12/06/2021 106  98 - 107 mmol/L Final    CO2 12/06/2021 26  20 - 31 mmol/L Final    Anion Gap 12/06/2021 10  9 - 17 mmol/L Final    Alkaline Phosphatase 12/06/2021 125  40 - 129 U/L Final    ALT 12/06/2021 9  5 - 41 U/L Final    AST 12/06/2021 16  <40 U/L Final    Total Bilirubin 12/06/2021 0.20* 0.3 - 1.2 mg/dL Final    Total Protein 12/06/2021 6.6  6.4 - 8.3 g/dL Final    Albumin 12/06/2021 4.0  3.5 - 5.2 g/dL Final    Albumin/Globulin Ratio 12/06/2021 1.5  1.0 - 2.5 Final    GFR Non- 12/06/2021 >60  >60 mL/min Final    GFR  12/06/2021 >60  >60 mL/min Final    GFR Comment 12/06/2021        Final    Comment: Average GFR for 61-76 years old:   80 mL/min/1.73sq m  Chronic Kidney Disease:   <60 mL/min/1.73sq m  Kidney failure:   <15 mL/min/1.73sq m              eGFR calculated using average adult body mass.  Additional eGFR calculator available at:        Keypr.br            GFR Staging 12/06/2021 NOT REPORTED   Final    Ethanol 12/06/2021 <10  <10 mg/dL Final    Ethanol percent 12/06/2021 <0.010  <0.010 % Final    Amphetamine Screen, Ur 12/07/2021 NEGATIVE  NEGATIVE Final    Comment:       (Positive cutoff 1000 ng/mL)                  Barbiturate Screen, Ur 12/07/2021 NEGATIVE  NEGATIVE Final    Comment:       (Positive cutoff 200 ng/mL)                  Benzodiazepine Screen, Urine 12/07/2021 NEGATIVE  NEGATIVE Final    Comment:       (Positive cutoff 200 ng/mL)                  Cocaine Metabolite, Urine 12/07/2021 POSITIVE* NEGATIVE Final    Comment:       (Positive cutoff 300 ng/mL)                  Methadone Screen, Urine 12/07/2021 NEGATIVE  NEGATIVE Final    Comment:       (Positive cutoff 300 ng/mL)                  Opiates, Urine 12/07/2021 NEGATIVE  NEGATIVE Final    Comment:       (Positive cutoff 300 ng/mL)                  Phencyclidine, Urine 12/07/2021 NEGATIVE  NEGATIVE Final    Comment:       (Positive cutoff 25 ng/mL)                  Propoxyphene, Urine 12/07/2021 NOT REPORTED  NEGATIVE Final    Cannabinoid Scrn, Ur 12/07/2021 NEGATIVE  NEGATIVE Final    Comment:       (Positive cutoff 50 ng/mL)                  Oxycodone Screen, Ur 12/07/2021 NEGATIVE  NEGATIVE Final    Comment:       (Positive cutoff 100 ng/mL)                  Methamphetamine, Urine 12/07/2021 NOT REPORTED NEGATIVE Final    Tricyclic Antidepressants, Urine 12/07/2021 NOT REPORTED  NEGATIVE Final    MDMA, Urine 12/07/2021 NOT REPORTED  NEGATIVE Final    Buprenorphine Urine 12/07/2021 NOT REPORTED  NEGATIVE Final    Test Information 12/07/2021 Assay provides medical screening only. The absence of expected drug(s) and/or metabolite(s) may indicate diluted or adulterated urine, limitations of testing or timing of collection. Final    Comment: Testing for legal purposes should be confirmed by another method. To request confirmation   of test result, please call the lab within 7 days of sample submission.  Specimen Description 12/06/2021 . NASOPHARYNGEAL SWAB   Final    SARS-CoV-2, Rapid 12/06/2021 Not Detected  Not Detected Final    Comment:       Rapid NAAT:  The specimen is NEGATIVE for SARS-CoV-2, the novel coronavirus associated with   COVID-19. The ID NOW COVID-19 assay is designed to detect the virus that causes COVID-19 in patients   with signs and symptoms of infection who are suspected of COVID-19. An individual without symptoms of COVID-19 and who is not shedding SARS-CoV-2 virus would   expect to have a negative (not detected) result in this assay. Negative results should be treated as presumptive and, if inconsistent with clinical signs   and symptoms or necessary for patient management,  should be tested with an alternative molecular assay. Negative results do not preclude   SARS-CoV-2 infection and   should not be used as the sole basis for patient management decisions.          Fact sheet for Healthcare Providers: Iesha.nav  Fact sheet for Patients: Iesha.nav          Methodology: Isothermal Nucleic Acid Amplification              Medications  Current Facility-Administered Medications: OLANZapine (ZYPREXA) tablet 10 mg, 10 mg, Oral, Nightly  traZODone (DESYREL) tablet 100 mg, 100 mg, Oral, Nightly PRN  escitalopram (Geryl Fast) tablet 20 mg, 20 mg, Oral, Daily  acetaminophen (TYLENOL) tablet 650 mg, 650 mg, Oral, Q4H PRN  aluminum & magnesium hydroxide-simethicone (MAALOX) 200-200-20 MG/5ML suspension 30 mL, 30 mL, Oral, Q6H PRN  hydrOXYzine (ATARAX) tablet 50 mg, 50 mg, Oral, TID PRN  ibuprofen (ADVIL;MOTRIN) tablet 400 mg, 400 mg, Oral, Q6H PRN  polyethylene glycol (GLYCOLAX) packet 17 g, 17 g, Oral, Daily PRN  nicotine polacrilex (NICORETTE) gum 2 mg, 2 mg, Oral, PRN  Vitamin D (CHOLECALCIFEROL) tablet 1,000 Units, 1,000 Units, Oral, Daily    ASSESSMENT  Schizoaffective disorder, depressive type [facility])     PLAN  Patient s symptoms   improved  Continue with current medication  Attempt to develop insight  Psycho-education conducted. Supportive Therapy conducted. Probable discharge is pending placement  Follow-up daily while inpatient unit    More than 16 mins of the 30-minute session was spent doing Supportive psychotherapy. Electronically signed by Fay Wills MD on 12/20/21 at 9:13 PM EST    **This report has been created using voice recognition software. It may contain minor errors which are inherent in voice recognition technology. **

## 2021-12-22 NOTE — PLAN OF CARE
Problem: Altered Mood, Depressive Behavior:  Goal: Able to verbalize acceptance of life and situations over which he or she has no control  Description: Able to verbalize acceptance of life and situations over which he or she has no control    Goal: Ability to disclose and discuss suicidal ideas will improve  Description: Ability to disclose and discuss suicidal ideas will improve        Pt denies suicidal ideations at this time. Pt agreed to seek staff at anytime he felt like any urges to harm self would arise. Safety checks maintained [id number]. Patient is complaining of anxiety at this time. Stating that they feel restless and are having trouble sleeping and calming down in order to rest this evening. Medication was given as prescribed for increased anxiety see MAR. Pt remains free from self harm this shift. Pt denies wanting to cause harm to self or others at this time. Pt encouraged to seek nursing staff at anytime if he felt at danger to themselves or others. Pt states understanding.  Safety checks maintained [id number]

## 2021-12-22 NOTE — CARE COORDINATION
[facility] Berry [phone] this date. Pt and Ms. Drake Agudelo discussed group home availability, rules, protocols of group home. Pt accepted to admit to the group home, private room. Ms. Drake Agudelo states she has been in contact and discussed this with pt sister/LG Irma. Pt accepted for admission to the group home, [address] LASHA Le[address] on Thursday 12/23/2021 at 2pm, via Face to Face Live. SW to assist with Initial Health Assessment, scheduling followup with [facility] and advising [facility] that SW team has found new placement for patient. Ms. Drake Agudelo requested medications be sent to [id number] [facility] in [place] as they deliver to the group home and do \"bubble packs. \" YOGESH updated MD via [employer].

## 2021-12-22 NOTE — GROUP NOTE
Group Therapy Note    Date: 12/22/2021    Group Start Time: 1000  Group End Time: 0120  Group Topic: Psychotherapy    [address], LSW    patient refused to attend psychotherapy group at [facility] after encouragement from staff.   1:1 talk time provided as alternative to group session

## 2021-12-22 NOTE — PROGRESS NOTES
Daily Progress Note  12/22/2021    Patient Name: Oneil Green    CHIEF COMPLAINT: Depression with suicidal ideation         SUBJECTIVE:      Patient is seen as follow-up at bedside. Mr. Oneil Green reports his mood is \"really great \"as it relates to an interview he had today regarding a new possibility of a group home. He shows great pride in displaying a picture of the potential residence on a business card. He is gracious and polite thinking this team for the support. He reports improved suicidal ideation and contracts for safety on this unit. He denies side effects or questions regarding his current medication regimen. Patient understands that there is a possibility that if his symptoms remain stabilized and he is accepted at this group home he may transition back to the community tomorrow and he agrees that he will be able to manage this next step. Appetite:  [x] Normal/Adequate/Unchanged  [] Increased  [] Decreased      Sleep:       [x] Normal/Adequate/Unchanged  [] Fair  [] Poor      Group Attendance on Unit:   [x] Yes  [] Selectively    [] No    Medication Side Effects:Patient denies any medication side effects at the time of assessment. Mental Status Exam  Level of consciousness: Alert and awake. Appearance: Appropriate attire for setting, seated on bed, with fair  grooming and hygiene. Behavior/Motor: Approachable, no psychomotor abnormalities. Attitude toward examiner: Cooperative, attentive, good eye contact. Speech: Normal rate, normal volume, normal tone. Mood:  Patient reports \" good\". Affect: Mood congruent  Thought processes: Linear, goal directed and coherent. Thought content: Denies homicidal ideation. Suicidal Ideation: Reports improvement in suicidal ideations, without current plan or intent, contracts for safety on the unit. Delusions: No evidence of delusions. Denies paranoia.   Perceptual Disturbance: Patient does not appear to be responding to internal stimuli. Denies auditory hallucinations. Denies visual hallucinations. Cognition: Oriented to self, location, time, and situation. Memory: Age appropriate. Insight & Judgement: Showing improvement nelson    Data   height is 6' 2\" (1.88 m) and weight is 200 lb (90.7 kg). His temporal temperature is 98 °F (36.7 °C). His blood pressure is 105/67 and his pulse is 68. His respiration is 14 and oxygen saturation is 100%. Labs:   No visits with results within 2 Day(s) from this visit.    Latest known visit with results is:   Admission on 12/06/2021, Discharged on 12/07/2021   Component Date Value Ref Range Status    WBC 12/06/2021 4.8  3.5 - 11.3 k/uL Final    RBC 12/06/2021 4.63  4.21 - 5.77 m/uL Final    Hemoglobin 12/06/2021 12.8* 13.0 - 17.0 g/dL Final    Hematocrit 12/06/2021 41.1  40.7 - 50.3 % Final    MCV 12/06/2021 88.8  82.6 - 102.9 fL Final    MCH 12/06/2021 27.6  25.2 - 33.5 pg Final    MCHC 12/06/2021 31.1  28.4 - 34.8 g/dL Final    RDW 12/06/2021 14.0  11.8 - 14.4 % Final    Platelets 20/48/7198 358  138 - 453 k/uL Final    MPV 12/06/2021 8.9  8.1 - 13.5 fL Final    NRBC Automated 12/06/2021 0.0  0.0 per 100 WBC Final    Differential Type 12/06/2021 NOT REPORTED   Final    Seg Neutrophils 12/06/2021 40  36 - 65 % Final    Lymphocytes 12/06/2021 46* 24 - 43 % Final    Monocytes 12/06/2021 11  3 - 12 % Final    Eosinophils % 12/06/2021 2  1 - 4 % Final    Basophils 12/06/2021 1  0 - 2 % Final    Immature Granulocytes 12/06/2021 0  0 % Final    Segs Absolute 12/06/2021 1.92  1.50 - 8.10 k/uL Final    Absolute Lymph # 12/06/2021 2.25  1.10 - 3.70 k/uL Final    Absolute Mono # 12/06/2021 0.52  0.10 - 1.20 k/uL Final    Absolute Eos # 12/06/2021 0.09  0.00 - 0.44 k/uL Final    Basophils Absolute 12/06/2021 0.03  0.00 - 0.20 k/uL Final    Absolute Immature Granulocyte 12/06/2021 <0.03  0.00 - 0.30 k/uL Final    WBC Morphology 12/06/2021 NOT REPORTED   Final    RBC Morphology 12/06/2021 NOT REPORTED   Final    Platelet Estimate 29/58/0777 NOT REPORTED   Final    Glucose 12/06/2021 74  70 - 99 mg/dL Final    BUN 12/06/2021 15  8 - 23 mg/dL Final    CREATININE 12/06/2021 1.08  0.70 - 1.20 mg/dL Final    Bun/Cre Ratio 12/06/2021 NOT REPORTED  9 - 20 Final    Calcium 12/06/2021 9.0  8.6 - 10.4 mg/dL Final    Sodium 12/06/2021 142  135 - 144 mmol/L Final    Potassium 12/06/2021 4.1  3.7 - 5.3 mmol/L Final    Chloride 12/06/2021 106  98 - 107 mmol/L Final    CO2 12/06/2021 26  20 - 31 mmol/L Final    Anion Gap 12/06/2021 10  9 - 17 mmol/L Final    Alkaline Phosphatase 12/06/2021 125  40 - 129 U/L Final    ALT 12/06/2021 9  5 - 41 U/L Final    AST 12/06/2021 16  <40 U/L Final    Total Bilirubin 12/06/2021 0.20* 0.3 - 1.2 mg/dL Final    Total Protein 12/06/2021 6.6  6.4 - 8.3 g/dL Final    Albumin 12/06/2021 4.0  3.5 - 5.2 g/dL Final    Albumin/Globulin Ratio 12/06/2021 1.5  1.0 - 2.5 Final    GFR Non- 12/06/2021 >60  >60 mL/min Final    GFR  12/06/2021 >60  >60 mL/min Final    GFR Comment 12/06/2021        Final    Comment: Average GFR for 61-76 years old:   80 mL/min/1.73sq m  Chronic Kidney Disease:   <60 mL/min/1.73sq m  Kidney failure:   <15 mL/min/1.73sq m              eGFR calculated using average adult body mass.  Additional eGFR calculator available at:        e-contratos.br            GFR Staging 12/06/2021 NOT REPORTED   Final    Ethanol 12/06/2021 <10  <10 mg/dL Final    Ethanol percent 12/06/2021 <0.010  <0.010 % Final    Amphetamine Screen, Ur 12/07/2021 NEGATIVE  NEGATIVE Final    Comment:       (Positive cutoff 1000 ng/mL)                  Barbiturate Screen, Ur 12/07/2021 NEGATIVE  NEGATIVE Final    Comment:       (Positive cutoff 200 ng/mL)                  Benzodiazepine Screen, Urine 12/07/2021 NEGATIVE  NEGATIVE Final    Comment:       (Positive cutoff 200 ng/mL)                  Cocaine Metabolite, Urine 12/07/2021 POSITIVE* NEGATIVE Final    Comment:       (Positive cutoff 300 ng/mL)                  Methadone Screen, Urine 12/07/2021 NEGATIVE  NEGATIVE Final    Comment:       (Positive cutoff 300 ng/mL)                  Opiates, Urine 12/07/2021 NEGATIVE  NEGATIVE Final    Comment:       (Positive cutoff 300 ng/mL)                  Phencyclidine, Urine 12/07/2021 NEGATIVE  NEGATIVE Final    Comment:       (Positive cutoff 25 ng/mL)                  Propoxyphene, Urine 12/07/2021 NOT REPORTED  NEGATIVE Final    Cannabinoid Scrn, Ur 12/07/2021 NEGATIVE  NEGATIVE Final    Comment:       (Positive cutoff 50 ng/mL)                  Oxycodone Screen, Ur 12/07/2021 NEGATIVE  NEGATIVE Final    Comment:       (Positive cutoff 100 ng/mL)                  Methamphetamine, Urine 12/07/2021 NOT REPORTED  NEGATIVE Final    Tricyclic Antidepressants, Urine 12/07/2021 NOT REPORTED  NEGATIVE Final    MDMA, Urine 12/07/2021 NOT REPORTED  NEGATIVE Final    Buprenorphine Urine 12/07/2021 NOT REPORTED  NEGATIVE Final    Test Information 12/07/2021 Assay provides medical screening only. The absence of expected drug(s) and/or metabolite(s) may indicate diluted or adulterated urine, limitations of testing or timing of collection. Final    Comment: Testing for legal purposes should be confirmed by another method. To request confirmation   of test result, please call the lab within 7 days of sample submission.  Specimen Description 12/06/2021 . NASOPHARYNGEAL SWAB   Final    SARS-CoV-2, Rapid 12/06/2021 Not Detected  Not Detected Final    Comment:       Rapid NAAT:  The specimen is NEGATIVE for SARS-CoV-2, the novel coronavirus associated with   COVID-19. The ID NOW COVID-19 assay is designed to detect the virus that causes COVID-19 in patients   with signs and symptoms of infection who are suspected of COVID-19.   An individual without symptoms of COVID-19 and who is not shedding SARS-CoV-2 virus would   expect to have a negative (not detected) result in this assay. Negative results should be treated as presumptive and, if inconsistent with clinical signs   and symptoms or necessary for patient management,  should be tested with an alternative molecular assay. Negative results do not preclude   SARS-CoV-2 infection and   should not be used as the sole basis for patient management decisions. Fact sheet for Healthcare Providers: Iesha.nav  Fact sheet for Patients: Iesha.nav          Methodology: Isothermal Nucleic Acid Amplification           Reviewed patient's current plan of care and vital signs with nursing staff. Labs reviewed: [x] Yes  Last EKG in EMR reviewed: [x] Yes  QTc: 410    Medications  Current Facility-Administered Medications: OLANZapine (ZYPREXA) tablet 10 mg, 10 mg, Oral, Nightly  traZODone (DESYREL) tablet 100 mg, 100 mg, Oral, Nightly PRN  escitalopram (LEXAPRO) tablet 20 mg, 20 mg, Oral, Daily  acetaminophen (TYLENOL) tablet 650 mg, 650 mg, Oral, Q4H PRN  aluminum & magnesium hydroxide-simethicone (MAALOX) 200-200-20 MG/5ML suspension 30 mL, 30 mL, Oral, Q6H PRN  hydrOXYzine (ATARAX) tablet 50 mg, 50 mg, Oral, TID PRN  ibuprofen (ADVIL;MOTRIN) tablet 400 mg, 400 mg, Oral, Q6H PRN  polyethylene glycol (GLYCOLAX) packet 17 g, 17 g, Oral, Daily PRN  nicotine polacrilex (NICORETTE) gum 2 mg, 2 mg, Oral, PRN  Vitamin D (CHOLECALCIFEROL) tablet 1,000 Units, 1,000 Units, Oral, Daily    ASSESSMENT  Schizoaffective disorder, depressive type ([facility] [address]         PLAN  Patient symptoms are: Well Controlled. Continue current medication regimen. Monitor need and frequency of PRN medications. Encourage participation in groups and milieu. Attempt to develop insight. Psycho-education conducted. Supportive Therapy conducted.   Probable discharge per attending physician and potentially tomorrow if placement is established and symptoms remain stabilized. Follow-up daily while inpatient. Patient continues to be monitored in the inpatient psychiatric facility at [facility] for safety and stabilization. Patient continues to need, on a daily basis, active treatment furnished directly by or requiring the supervision of inpatient psychiatric personnel. Electronically signed by MARY Desouza CNP on 12/22/2021 at 5:12 PM    **This report has been created using voice recognition software. It may contain minor errors which are inherent in voice recognition technology. **    I independently saw and evaluated the patient. I reviewed the nurse practitioners documentation above. Any additional comments or changes to the nurse practitioners documentation are stated below otherwise agree with assessment. Plan will be as follows:  Patient stabilized. We now have accepting group home for tomorrow. We will move forward to discharge tomorrow  PLAN  Patient s symptoms   are improving  Continue with current medication  Attempt to develop insight  Psycho-education conducted. Supportive Therapy conducted.   Probable discharge is tomorrow  Follow-up daily while on inpatient unit

## 2021-12-22 NOTE — GROUP NOTE
Group Therapy Note    Date: 12/22/2021    Group Start Time: 1100  Group End Time: 1150  Group Topic: Psychoeducation    JESUS Andrea, CTRS    Patient refused to attend creative expression group at 1100 after encouragement from staff. 1:1 talk time offered by staff as alternative to group session.

## 2021-12-22 NOTE — GROUP NOTE
Group Therapy Note    Date: 12/22/2021    Group Start Time: 1330  Group End Time: 5603  Group Topic: Psychoeducation    JESUS Randhawa, GADIELS    Group Therapy Note    Attendees: 7    Patient's Goal:  Patient will identify healthy coping skills     Notes:  Patient attended group and participated    Status After Intervention:  Improved    Participation Level:  Active Listener and Interactive    Participation Quality: Appropriate, Attentive, Sharing and Supportive      Speech:  normal      Thought Process/Content: Logical  Linear      Affective Functioning: Constricted/Restricted      Mood: euthymic      Level of consciousness:  Alert, Oriented x4 and Attentive      Response to Learning: Able to verbalize current knowledge/experience, Able to verbalize/acknowledge new learning, Able to retain information and Progressing to goal      Endings: None Reported    Modes of Intervention: Education, Support, Socialization and Exploration      Discipline Responsible: Psychoeducational Specialist      Signature:  Francis Joseph, [address]

## 2021-12-22 NOTE — CARE COORDINATION
SW spoke with Geraldo Lezama this date. SW updated [facility] about patient new placement at group home with goal of their team offering pt community support and link to services while in community, [facility] states [place], I'll let everyone know. \"

## 2021-12-23 VITALS
HEIGHT: 74 IN | RESPIRATION RATE: 14 BRPM | OXYGEN SATURATION: 100 % | WEIGHT: 200 LBS | TEMPERATURE: 98.1 F | SYSTOLIC BLOOD PRESSURE: 124 MMHG | HEART RATE: 57 BPM | BODY MASS INDEX: 25.67 KG/M2 | DIASTOLIC BLOOD PRESSURE: 65 MMHG

## 2021-12-23 PROCEDURE — 6370000000 HC RX 637 (ALT 250 FOR IP): Performed by: PSYCHIATRY & NEUROLOGY

## 2021-12-23 PROCEDURE — 99239 HOSP IP/OBS DSCHRG MGMT >30: CPT | Performed by: PSYCHIATRY & NEUROLOGY

## 2021-12-23 PROCEDURE — 6370000000 HC RX 637 (ALT 250 FOR IP)

## 2021-12-23 RX ORDER — CHOLECALCIFEROL (VITAMIN D3) 25 MCG
1000 TABLET ORAL DAILY
Qty: 30 TABLET | Refills: 0 | Status: ON HOLD | OUTPATIENT
Start: 2021-12-23 | End: 2022-04-06

## 2021-12-23 RX ORDER — OLANZAPINE 10 MG/1
10 TABLET ORAL NIGHTLY
Qty: 30 TABLET | Refills: 0 | Status: ON HOLD | OUTPATIENT
Start: 2021-12-23 | End: 2022-04-06 | Stop reason: DRUGHIGH

## 2021-12-23 RX ORDER — ESCITALOPRAM OXALATE 20 MG/1
20 TABLET ORAL DAILY
Qty: 30 TABLET | Refills: 0 | Status: ON HOLD | OUTPATIENT
Start: 2021-12-24 | End: 2022-04-14 | Stop reason: SDUPTHER

## 2021-12-23 RX ORDER — HYDROXYZINE HYDROCHLORIDE 25 MG/1
25 TABLET, FILM COATED ORAL 3 TIMES DAILY PRN
Qty: 30 TABLET | Refills: 0 | Status: SHIPPED | OUTPATIENT
Start: 2021-12-23 | End: 2022-01-22

## 2021-12-23 RX ORDER — TRAZODONE HYDROCHLORIDE 100 MG/1
100 TABLET ORAL NIGHTLY PRN
Qty: 30 TABLET | Refills: 0 | Status: ON HOLD | OUTPATIENT
Start: 2021-12-23 | End: 2022-04-14 | Stop reason: HOSPADM

## 2021-12-23 RX ADMIN — Medication 1000 UNITS: at 07:54

## 2021-12-23 RX ADMIN — ESCITALOPRAM OXALATE 20 MG: 20 TABLET ORAL at 07:54

## 2021-12-23 NOTE — CARE COORDINATION
[facility]  [facility] Finders [phone], left message informing patient is on his way to the group home, meds sent to [facility]. Call to legal guardian Ekta Sage [phone] to inform patient is on his way to the group home, left message on voicemail.
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
## 2021-12-23 NOTE — PLAN OF CARE
Problem: Altered Mood, Depressive Behavior:  Goal: Able to verbalize acceptance of life and situations over which he or she has no control  Description: Able to verbalize acceptance of life and situations over which he or she has no control  12/22/2021 2001 by Sera Hernandez LPN  Outcome: Ongoing  Note: Patient denies suicidal thoughts and hallucinations. He reports he is being discharged to a group home tomorrow and he feels ready to go. He has been out the milieu, social and calm. Q15min safety checks continue     Problem: Altered Mood, Depressive Behavior:  Goal: Ability to disclose and discuss suicidal ideas will improve  Description: Ability to disclose and discuss suicidal ideas will improve  12/22/2021 2001 by Sera Hernandez LPN  Outcome: Ongoing  Note: Patient denies suicidal ideations at this time. Patient agrees to seek out staff if they begin having suicidal ideations or need to talk.  Q15min safety checks continue

## 2021-12-23 NOTE — BH NOTE
[facility]  Discharge Note    Pt discharged with followings belongings:   Dental Appliances: None  Vision - Corrective Lenses: None  Hearing Aid: None  Jewelry: None  Body Piercings Removed: N/A  Clothing: Socks,Footwear,Jacket / Ranelle Lacho (Comment) (scarf, gloves, and hat.)  Were All Patient Medications Collected?: Yes  Other Valuables: Money (Comment) ($0.67)   Valuables sent home withpatient or returned to patient. Patient education on aftercare instructions: yes  Information faxed to [facility] by staff  at 10:33 AM .Patient verbalize understanding of AVS:  yes.     Status EXAM upon discharge:  Status and Exam  Normal: Yes  Facial Expression: Brightened  Affect: Appropriate  Level of Consciousness: Alert  Mood:Normal: Yes  Mood: Anxious  Motor Activity:Normal: Yes  Motor Activity: Decreased  Interview Behavior: Cooperative  Preception: [place] to Person,[place] to Time,[place] to Place,[place] to Situation  Attention:Normal: Yes  Attention: Distractible  Thought Processes: Circumstantial  Thought Content:Normal: Yes  Thought Content: Preoccupations  Hallucinations: None  Delusions: No  Delusions: Persecution  Memory:Normal: Yes  Memory: Poor Remote,Poor Recent  Insight and Judgment: No  Insight and Judgment: Poor Judgment  Present Suicidal Ideation: No  Present Homicidal Ideation: No      Metabolic Screening:    Lab Results   Component Value Date    LABA1C 5.0 10/13/2021       Lab Results   Component Value Date    CHOL 150 10/13/2021    CHOL 167 08/11/2020    CHOL 179 02/13/2017    CHOL 127 05/09/2015    CHOL 168 08/20/2014    CHOL 158 12/31/2013    CHOL 178 08/15/2013    CHOL 166 09/17/2012    CHOL 210 (H) 02/03/2012     Lab Results   Component Value Date    TRIG 41 10/13/2021    TRIG 43 08/11/2020    TRIG 67 02/13/2017    TRIG 37 05/09/2015    TRIG 72 08/20/2014    TRIG 52 12/31/2013    TRIG 70 08/15/2013    TRIG 117 09/17/2012    TRIG 94 02/03/2012     Lab Results   Component Value Date HDL 62 10/13/2021    HDL 74 08/11/2020    HDL 73 02/13/2017    HDL 57 05/09/2015    HDL 50 08/20/2014    HDL 43 12/31/2013    HDL 42 08/15/2013    HDL 38 (L) 09/17/2012    HDL 55 02/03/2012     No components found for: LDLCAL  No results found for: LABVLDL  All belongings were sent with patient. Patient was sent to group home via [facility].   Josette Lees LPN

## 2021-12-23 NOTE — BH NOTE
Patient given tobacco quitline number 01649956351 at this time, refusing to call at this time, states \" I just dont want to quit now\"- patient given information as to the dangers of long term tobacco use. Continue to reinforce the importance of tobacco cessation.

## 2021-12-23 NOTE — GROUP NOTE
Group Therapy Note    Date: 12/22/2021    Group Start Time: 2030  Group End Time: 2106  Group Topic: [facility]oration[facility] Group    JESUS Kay        Group Therapy Note    Attendees: 8         Patient's Goal:  I'm going to a new group home, I met with the owner today    Notes:  I guess she has 4 other men living there. I hope I like it. Status After Intervention:  Improved    Participation Level:  Active Listener and Interactive    Participation Quality: Appropriate, Attentive and Sharing      Speech:  normal      Thought Process/Content: Logical      Affective Functioning: Congruent      Mood: WNL      Level of consciousness:  Alert, Oriented x4 and Attentive      Response to Learning: Capable of insight      Endings: None Reported    Modes of Intervention: Problem-solving      Discipline Responsible: "[employer]      Signature:  Joey Eng

## 2021-12-25 ENCOUNTER — HOSPITAL ENCOUNTER (EMERGENCY)
Age: 61
Discharge: PSYCHIATRIC OR SUBSTANCE USE FACILITY | End: 2021-12-25
Attending: EMERGENCY MEDICINE
Payer: MEDICAID

## 2021-12-25 VITALS
BODY MASS INDEX: 21.94 KG/M2 | SYSTOLIC BLOOD PRESSURE: 118 MMHG | WEIGHT: 171 LBS | DIASTOLIC BLOOD PRESSURE: 69 MMHG | RESPIRATION RATE: 16 BRPM | OXYGEN SATURATION: 100 % | TEMPERATURE: 96.9 F | HEART RATE: 119 BPM | HEIGHT: 74 IN

## 2021-12-25 DIAGNOSIS — R45.851 SUICIDAL IDEATION: Primary | ICD-10-CM

## 2021-12-25 LAB
ABSOLUTE EOS #: <0.03 K/UL (ref 0–0.44)
ABSOLUTE IMMATURE GRANULOCYTE: <0.03 K/UL (ref 0–0.3)
ABSOLUTE LYMPH #: 2.46 K/UL (ref 1.1–3.7)
ABSOLUTE MONO #: 0.73 K/UL (ref 0.1–1.2)
ALBUMIN SERPL-MCNC: 4.3 G/DL (ref 3.5–5.2)
ALBUMIN/GLOBULIN RATIO: 1.7 (ref 1–2.5)
ALP BLD-CCNC: 114 U/L (ref 40–129)
ALT SERPL-CCNC: 18 U/L (ref 5–41)
AMPHETAMINE SCREEN URINE: NEGATIVE
ANION GAP SERPL CALCULATED.3IONS-SCNC: 13 MMOL/L (ref 9–17)
AST SERPL-CCNC: 23 U/L
BARBITURATE SCREEN URINE: NEGATIVE
BASOPHILS # BLD: 0 % (ref 0–2)
BASOPHILS ABSOLUTE: 0.03 K/UL (ref 0–0.2)
BENZODIAZEPINE SCREEN, URINE: NEGATIVE
BILIRUB SERPL-MCNC: 0.42 MG/DL (ref 0.3–1.2)
BUN BLDV-MCNC: 18 MG/DL (ref 8–23)
BUN/CREAT BLD: ABNORMAL (ref 9–20)
BUPRENORPHINE URINE: ABNORMAL
CALCIUM SERPL-MCNC: 9.3 MG/DL (ref 8.6–10.4)
CANNABINOID SCREEN URINE: NEGATIVE
CHLORIDE BLD-SCNC: 104 MMOL/L (ref 98–107)
CO2: 23 MMOL/L (ref 20–31)
COCAINE METABOLITE, URINE: POSITIVE
CREAT SERPL-MCNC: 1.14 MG/DL (ref 0.7–1.2)
DIFFERENTIAL TYPE: ABNORMAL
EOSINOPHILS RELATIVE PERCENT: 0 % (ref 1–4)
ETHANOL PERCENT: <0.01 %
ETHANOL: <10 MG/DL
GFR AFRICAN AMERICAN: >60 ML/MIN
GFR NON-AFRICAN AMERICAN: >60 ML/MIN
GFR SERPL CREATININE-BSD FRML MDRD: ABNORMAL ML/MIN/{1.73_M2}
GFR SERPL CREATININE-BSD FRML MDRD: ABNORMAL ML/MIN/{1.73_M2}
GLUCOSE BLD-MCNC: 105 MG/DL (ref 70–99)
HCT VFR BLD CALC: 35.2 % (ref 40.7–50.3)
HEMOGLOBIN: 11.4 G/DL (ref 13–17)
IMMATURE GRANULOCYTES: 0 %
LYMPHOCYTES # BLD: 27 % (ref 24–43)
MCH RBC QN AUTO: 29 PG (ref 25.2–33.5)
MCHC RBC AUTO-ENTMCNC: 32.4 G/DL (ref 28.4–34.8)
MCV RBC AUTO: 89.6 FL (ref 82.6–102.9)
MDMA URINE: ABNORMAL
METHADONE SCREEN, URINE: NEGATIVE
METHAMPHETAMINE, URINE: ABNORMAL
MONOCYTES # BLD: 8 % (ref 3–12)
NRBC AUTOMATED: 0 PER 100 WBC
OPIATES, URINE: NEGATIVE
OXYCODONE SCREEN URINE: NEGATIVE
PDW BLD-RTO: 13.5 % (ref 11.8–14.4)
PHENCYCLIDINE, URINE: NEGATIVE
PLATELET # BLD: 261 K/UL (ref 138–453)
PLATELET ESTIMATE: ABNORMAL
PMV BLD AUTO: 9.7 FL (ref 8.1–13.5)
POTASSIUM SERPL-SCNC: 4.6 MMOL/L (ref 3.7–5.3)
PROPOXYPHENE, URINE: ABNORMAL
RBC # BLD: 3.93 M/UL (ref 4.21–5.77)
RBC # BLD: ABNORMAL 10*6/UL
SARS-COV-2, RAPID: NOT DETECTED
SEG NEUTROPHILS: 65 % (ref 36–65)
SEGMENTED NEUTROPHILS ABSOLUTE COUNT: 5.81 K/UL (ref 1.5–8.1)
SODIUM BLD-SCNC: 140 MMOL/L (ref 135–144)
SPECIMEN DESCRIPTION: NORMAL
TEST INFORMATION: ABNORMAL
TOTAL PROTEIN: 6.8 G/DL (ref 6.4–8.3)
TRICYCLIC ANTIDEPRESSANTS, UR: ABNORMAL
WBC # BLD: 9.1 K/UL (ref 3.5–11.3)
WBC # BLD: ABNORMAL 10*3/UL

## 2021-12-25 PROCEDURE — 80053 COMPREHEN METABOLIC PANEL: CPT

## 2021-12-25 PROCEDURE — G0480 DRUG TEST DEF 1-7 CLASSES: HCPCS

## 2021-12-25 PROCEDURE — 87635 SARS-COV-2 COVID-19 AMP PRB: CPT

## 2021-12-25 PROCEDURE — 99285 EMERGENCY DEPT VISIT HI MDM: CPT

## 2021-12-25 PROCEDURE — 80307 DRUG TEST PRSMV CHEM ANLYZR: CPT

## 2021-12-25 PROCEDURE — 85025 COMPLETE CBC W/AUTO DIFF WBC: CPT

## 2021-12-25 ASSESSMENT — ENCOUNTER SYMPTOMS
COUGH: 0
ABDOMINAL PAIN: 0
CONSTIPATION: 0
BACK PAIN: 0
DIARRHEA: 0
VOMITING: 0
NAUSEA: 0
RHINORRHEA: 0
SHORTNESS OF BREATH: 0

## 2021-12-25 ASSESSMENT — PAIN DESCRIPTION - DESCRIPTORS: DESCRIPTORS: ACHING;OTHER (COMMENT)

## 2021-12-25 ASSESSMENT — PAIN DESCRIPTION - LOCATION: LOCATION: ABDOMEN

## 2021-12-25 ASSESSMENT — PAIN SCALES - GENERAL: PAINLEVEL_OUTOF10: 10

## 2021-12-25 NOTE — ED PROVIDER NOTES
[facility] HANDOFF       Handoff taken on the following patient from prior Attending Physician:  Pt Name: Pratik Xavier  PCP:  No primary care provider on file. Attestation  I was available and discussed any additional care issues that arose and coordinated the management plans with the resident(s) caring for the patient during my duty period. Any areas of disagreement with resident's documentation of care or procedures are noted on the chart. I was personally present for the key portions of any/all procedures during my duty period. I have documented in the chart those procedures where I was not present during the key portions. CHIEF COMPLAINT       Chief Complaint   Patient presents with    Suicidal     Pt states he wants to stab himself          CURRENT MEDICATIONS     Previous Medications  Previous Medications    CHOLECALCIFEROL (VITAMIN D3) 25 MCG TABS    Take 1 tablet by mouth daily    ESCITALOPRAM (LEXAPRO) 20 MG TABLET    Take 1 tablet by mouth daily    HYDROXYZINE (ATARAX) 25 MG TABLET    Take 1 tablet by mouth 3 times daily as needed for Anxiety    OLANZAPINE (ZYPREXA) 10 MG TABLET    Take 1 tablet by mouth nightly    TRAZODONE (DESYREL) 100 MG TABLET    Take 1 tablet by mouth nightly as needed for Sleep       Encounter Medications  No orders of the defined types were placed in this encounter. ALLERGIES     is allergic to navane [thiothixene].       RECENT VITALS:   Temp: 96.9 °F (36.1 °C),  Pulse: 119, Resp: 16, BP: 118/69    RADIOLOGY:   No orders to display       LABS:  Labs Reviewed   CBC WITH AUTO DIFFERENTIAL - Abnormal; Notable for the following components:       Result Value    RBC 3.93 (*)     Hemoglobin 11.4 (*)     Hematocrit 35.2 (*)     Eosinophils % 0 (*)     All other components within normal limits   COMPREHENSIVE METABOLIC PANEL W/ REFLEX TO MG FOR LOW K - Abnormal; Notable for the following components:    Glucose 105 (*)     All other components within normal limits   URINE DRUG SCREEN - Abnormal; Notable for the following components:    Cocaine Metabolite, Urine POSITIVE (*)     All other components within normal limits   COVID-19, RAPID   ETHANOL     Suicidal.  No acute medical condition. PLAN/ TASKS OUTSTANDING     Reassess, disposition, anticipate transfer to [facility]. (Please note that portions of this note were completed with a voice recognition program.  Efforts were made to edit the dictations but occasionally words are mis-transcribed. )    Veena Berger MD,, MD, F.A.C.E.P.   Attending Emergency Physician        Veena Berger MD  12/25/21 3752

## 2021-12-25 NOTE — ED NOTES
Writer received voicemail from  [address] who stated they were able to contact patient's guardian & received verbal permission for admission. Writer secured transport via [facility], ETA 12:00pm. Writer relayed ETA to [place]. Writer attempted to inform patient of destination/ETA, patient did not wake to verbal or physical stimulation.       TAVO Dee  12/25/21 1126

## 2021-12-25 NOTE — ED PROVIDER NOTES
[address] ED  Emergency Department  Emergency Medicine Resident Sign-out     Care of [place] was assumed from Dr. Debra Rojas and is being seen for Suicidal (Pt states he wants to stab himself )   . The patient's initial evaluation and plan have been discussed with the prior provider who initially evaluated the patient. EMERGENCY DEPARTMENT COURSE / MEDICAL DECISION MAKING:       MEDICATIONS GIVEN:  No orders of the defined types were placed in this encounter. LABS / RADIOLOGY:     Labs Reviewed   CBC WITH AUTO DIFFERENTIAL - Abnormal; Notable for the following components:       Result Value    RBC 3.93 (*)     Hemoglobin 11.4 (*)     Hematocrit 35.2 (*)     Eosinophils % 0 (*)     All other components within normal limits   COMPREHENSIVE METABOLIC PANEL W/ REFLEX TO MG FOR LOW K - Abnormal; Notable for the following components:    Glucose 105 (*)     All other components within normal limits   URINE DRUG SCREEN - Abnormal; Notable for the following components:    Cocaine Metabolite, Urine POSITIVE (*)     All other components within normal limits   COVID-19, RAPID   ETHANOL       No orders to display       RECENT VITALS:     Temp: 96.9 °F (36.1 °C),  Pulse: 119, Resp: 16, BP: 118/69, SpO2: 100 %    This patient is a 57 y.o. Male with history of schizoaffective disorder, cocaine abuse, schizophrenia related depression with psychotic features who comes in with concerns for acute cocaine toxicity as well as concerns for suicidal ideation with a plan to stab himself. Patient accepted to [facility]    OUTSTANDING TASKS / RECOMMENDATIONS:      1. Await Bed placement     FINAL IMPRESSION:     1. Suicidal ideation        DISPOSITION:       DISPOSITION:  []  Discharge   [x]  Transfer - [facility]   []  Admission -     []  Against Medical Advice   []  Eloped   FOLLOW-UP: No follow-up provider specified.    DISCHARGE MEDICATIONS: New Prescriptions    No medications on file          Juan Wahl MD [place]  Emergency Medicine Resident  [address] Nithya Gonzalez MD  Resident  12/25/21 2499

## 2021-12-25 NOTE — ED NOTES
Patient resting on cot respirations even and unlabored, denies needs at this time, will continue to monitor     [address], RN  12/25/21 3029

## 2021-12-25 NOTE — ED PROVIDER NOTES
[facility]     Emergency Department     Faculty Attestation    I performed a history and physical examination of the patient and discussed management with the resident. I have reviewed and agree with the residents findings including all diagnostic interpretations, and treatment plans as written. Any areas of disagreement are noted on the chart. I was personally present for the key portions of any procedures. I have documented in the chart those procedures where I was not present during the key portions. I have reviewed the emergency nurses triage note. I agree with the chief complaint, past medical history, past surgical history, allergies, medications, social and family history as documented unless otherwise noted below. Documentation of the HPI, Physical Exam and Medical Decision Making performed by scribnav is based on my personal performance of the HPI, PE and MDM. For Physician Assistant/ Nurse Practitioner cases/documentation I have personally evaluated this patient and have completed at least one if not all key elements of the E/M (history, physical exam, and MDM). Additional findings are as noted. 58 yo M c/o suicidal ideation wishing to stab self, no injury today, no fever no vomit, no homicidal ideation,   No vomit, no cp, no fever,   pe vss hr normalized, flat affect, neck supple, no cervical tenderness, chest non tender, abdomen non tender, no distension, no rigidity, no guarding,     Await psych placement, care turned over to day shift,     EKG Interpretation    Interpreted by me      CRITICAL CARE: There was a high probability of clinically significant/life threatening deterioration in this patient's condition which required my urgent intervention. Total critical care time was 5 minutes. This excludes any time for separately reportable procedures.        [address], DO  12/25/21 0411       Juan Ramon Salamanca, DO  12/25/21 7827

## 2021-12-25 NOTE — ED NOTES
SW called [facility] to initiate admission into [facility], but they were unable to reach on call psychiatrist. [facility] will call back once able to reach Doctor so case can be presented.       Gilford Kemp, LSW  12/25/21 3003

## 2021-12-25 NOTE — ED NOTES
SW spoke to [place] [id number] and on call psychiatrist who feels patient does not benefit from inpatient care and was just released. Doctor feels it would be encouraging negative behaviors. [facility] will continue to look for placement elsewhere.       CHANEL Boss  12/25/21 6335

## 2021-12-25 NOTE — ED NOTES
[] Sosa    [] [address]    [x]  [facility] ASSESSMENT      Y  N     [x] [] In the past two weeks have you had thoughts of hurting yourself in any way? [x] [] In the past two weeks have you had thoughts that you would be better off dead? [] [x] Have you made a suicide attempt in the past two months? [] [x] Do you have a plan for hurting yourself or suicide? [x] [] Presence of hallucinations/voices related to hurting himself or herself or someone else. SUICIDE/SECURITY WATCH PRECAUTION CHECKLIST     Orders    [x]  Suicide/Security Watch Precautions initiated as checked below:   12/25/21 9:24 AM EST BH31/BH31A    [x] Notified physician:  Veena Berger MD  12/25/21 9:24 AM EST    [x] Orders obtained as appropriate:     [x] 1:1 Observer     [x] Psych Consult     [] Psych Consult    Name:  Date:  Time:    [x] 1:1 Observer, Notified by:  Jorge Velasquez RN    Contact Nurse Supervisor    [x] Remove all personal clothes from room and place in snap/paper gown/pants. Slipper only    [x] Remove all personal belongings from room and secured away from patient. Documentation    [x] Initiate Suicide/Security Watch Precaution Flow Sheet    [x] Initiate individualized Care Plan/Problem    [x] Document why precautions initiated on flow sheet (Initiate Nursing Care Plan/Problem)    [x] 1:1 Observer in place; instructions provided. Suicide precautions require observer be within arms length. [x] Nurse-Observer Communication Hand-off initiated by RN, reviewed with Observer. Subsequently used as Hand Off between Observers. [x] Initiate every 15 minute observations per observer as delegated by the RN.     [x] Initiate RN assessment and documentation    Environmental Scan  Search Criteria and Process: OPTIONAL, see Search Policy    [] Reason for search:    [] Nursing in presence of second person to search patient    [] Patient notified of reason for body assessment and belongings search:     Persons present during search:   Results of search and disposition:       Searchers Name: security    These items or items similar should be removed from the room:   [] Chairs   [] Telephone   [] Trash cans and liners   [] Plastic utensils (order Patient Safety tray)   [] Empty or remove Sharps containers   [] All personal clothing/belongings removed   [] All unnecessary lead wires, electrical cords, draw cords, etc.   [] Flowers and plants   [] Double check for lighters, matches, razors, any glass items etc that can be used as weapons. Person completing Checklist: Kody Hdez RN       GENERAL INFORMATION     Y  N     [x] [] Has the patient been informed that they are on a watch and what that means? [x] [] Can the patient get out of Bed without nursing assistance? [x] [] Can the patient use the restroom without nursing assistance? [] [x] Can the patient walk the halls to [place] their legs? \"   [] [x] Does the patient have metal utensils? [x] [] Have the patient's belongings been placed out of control of the patient? [x] [] Have the patient and his/her belongings been checked for contraband? [x] [] Is the patient under any visitor restrictions? If Yes, explain:   [] [x] Is the patient under an alias? [place] [id number] Name:   Authorized visitors (no more than two are to be on the list)   Name/Relationship:   Name/Relationship:    Name of Staff member that you  Received this information from?:     General Description:    Pratik Xavier BH31/BH31A male 57 y.o. Admission weight: 171 lb (77.6 kg) Height: 6' 2\" (188 cm)  Race: []  [x] Black  []   []   [] [place] [] Other  Facial Hair:  [] Yes  [] No  If yes, please describe: Identifying Marks (i.e. Visible tattoos, scars, etc... ):     NURSING CARE PLAN    Nursing Diagnosis: Risk of Self Directed Harm  [] Actual  [x] Potential  Date Started: 12/25/21      Etiological Factors: (related to)  [x] Expressed or implied suicidal ideation/behavior  [x] Depression  [] Suicide attempt      [x] Low self-esteem  [x] Hallucinations      [x] Feeling of Hopelessness  [x] Substance abuse or withdrawal    [x] Dysfunctional family  [] Major traumatic event, eg., divorce, etc   [] Excessive stress/anxiety    12/25/21    Expected Outcomes    Patient will:   [x] Patient will remain safe for the duration of their stay   [x] Patient's environment will be safe, eg. Free of potential suicide weapons   [] Verbalize Recovery from suicidal episode and improvement in self-worth   [x] Discuss feeling that precipitated suicide attempt/thoughts/behavior   [] Will describe available resources for crisis prevention and management   [] Will verbalize positive coping skills     Nursing Intervention   [x] Assessment and Observations hourly   [x] Suicide Precautions implemented with patient, should be 1:1 observation   [x] Document observation [id number] and RN assessment hourly   [] Consult physician for:    [] Psychiatric consult    [] Pharmacological therapy    [] Other:    [x] Patient search completed by security   [x] Initiated appropriate safety protocols by removing from the patient's environment anything that could be used to inflict self injury, eg. Order safe tray, snap gown, etc   [x] Maintain open, warm, caring, non-judgmental attitude/manner towards patient   [] Discuss advantages and disadvantages of existing coping methods/skills   [x] Assist and educate patient with identifying present strengths and coping skills   [x] Keep patient informed regarding plan of care and provide clear concise explanations. Provide the patient/family education information as well as telephone numbers and other information about crisis centers, hot lines, and counselors.     Discharge Planning:   [] Referral  [] Groups [] Health agencies  [] Other:          Ruth Luciano RN  12/25/21 7441

## 2021-12-25 NOTE — ED NOTES
Patient continues to wait patiently for bed placement in [facility], waiting for breakfast tray     [address] [place] [address], RN  12/25/21 3019

## 2021-12-25 NOTE — ED NOTES
Patient report from [facility], pt resting on cot respirations even and unlabored, waiting for bed placement at psych facility. Patient denies needs at this time.       [address] [place] [address], RN  12/25/21 6768

## 2021-12-25 NOTE — ED NOTES
Provisional Diagnosis:  Suicidal ideation with plan       Psychosocial and Contextual Factors:     Lives in group home, but unsure where    C-SSRS Summary:    Patient:X  Family:  Agency:X    Present Suicidal Behavior:    Verbal: Yes    Attempt: No    Past Suicidal Behavior:    Verbal: Yes    Attempt: Yes  Self-Injurious/Self-Mutilation:  Denied    Protective Factors: Willing to go inpatient, lives in group home, has insurance      Risk Factors:  Reports using crack cocaine      Clinical Summary:    Patient is a 57year old male reporting to the ED with suicidal ideation and plan to kill himself with a gun or knife. Patient stated he has a friend (unsure of name) who has a gun and does have access to one. Patient further stated \"I'm not playing around. I want to kill myself. \" Per Doctor report pt said he found a knife in an ally and he tried to stab himself in the stomach, but no evidence of injury was observed on pt's stomach. Patient has long hx of mental health concerns and suicidal ideation with diagnosis of schizophrenia, major depressive disorder with psychotic features, depression, and cocaine abuse. Patient reports his last use of crack cocaine was yesterday. Suicidal ideation appears to be patient's baseline, but he is currently non-compliant with medications and is unsure why. Patient reports auditory hallucinations telling him to kill himself. Pt denies visual hallucinations and homicidal ideation. Patient is currently linked with [address] [place] [address] who reports patient was seen on 12/22/21 and provided his medications. Pt was cooperative with interview process, but was unsure about a lot of things when questioned. Patient reports he lives at a group home and [address] [place] [address] worker felt pt should be given his medications by the . Patient reports his sister Oriana Fontenot as legal guardian. Guardian reports pt moved into group home a couple days ago and was unsure if he was receiving his medications.  Talia Jones received callback from [address] Dr giving permission for pt to be transferred to an inpatient facility to address pt's mental health needs as long as in the [place] and RN was present to witness verbal approval. [address] would like to be updated on where patient is placed. [phone]    Level of Care Disposition: SW will contact [facility] once labs are resulted and medical clearance is in the chart for possible inpatient admission into [facility].            Laila Dior, [facility]  12/25/21 [address] [place] [address], [facility]  12/25/21 [address], [facility]  12/25/21 0961

## 2021-12-25 NOTE — ED NOTES
Pt presents to ED with c/o suicidal ideations. Pt reports \"I want to kill myself miss nurse lady\". Pt states \"I want to shoot myself with a gun or stab myself with a knife\". Pt states he has not been taking his psych meds and that he cannot recall the names of the meds or the last time he took his meds. Pt admits to crack cocaine use tonight. Pt also states he hears voices that are telling him to hurt himself. Pt is calm and cooperative at this time. Suicide precautions in place. 1:1 monitoring also in place.       Ave Marks RN  12/25/21 6602

## 2021-12-25 NOTE — ED PROVIDER NOTES
[facility] [address]  ED  Emergency Department Encounter  Emergency Medicine Resident     Pt Name: Nicolasa Lam  MRN: [id number]  Shingflu 1960  Date of evaluation: 12/25/21  PCP:  No primary care provider on file. CHIEF COMPLAINT       Chief Complaint   Patient presents with    Suicidal     Pt states he wants to stab himself        HISTORY OFPRESENT ILLNESS  (Location/Symptom, Timing/Onset, Context/Setting, Quality, Duration, Modifying Factors,Severity.)      Nicolasa Lam is a 57 y.o. male who presents with no ideation. Patient states he is very sad and depressed about multiple family members dying over the past year and the fact that he is unable to spend [place] with his sisters in [facility]. He became so sad overnight that he went looking for a knife to stab himself with. He tells me he found a  knife in an alley and tried to stab himself but did not actually do so. If you were not here right now, he would plan to harm himself this evening by report. He denies any homicidal patient or hallucinations. No chest pain or shortness breath, fevers, chills, abdominal pain, nausea, vomiting, other complaints this time. PAST MEDICAL / SURGICAL / SOCIAL / FAMILY HISTORY      has a past medical history of Bipolar disorder ([address] Depression, GERD (gastroesophageal reflux disease), Hallucinations, Headache(784.0), Hepatitis, Schizophrenia, schizo-affective ([address] Substance abuse ([address] Tobacco abuse, Type II or unspecified type diabetes mellitus without mention of complication, not stated as uncontrolled, and Urinary incontinence. has a past surgical history that includes Dental surgery and Abscess Drainage (N/A, 02/11/2018).      Social History     Socioeconomic History    Marital status: Single     Spouse name: Not on file    Number of children: 0    Years of education: 8    Highest education level: Not on file   Occupational History     Employer: N/A   Tobacco Use    Smoking status: Current Every Day Smoker     Packs/day: 1.00     Years: 47.00     Pack years: 47.00     Types: Cigarettes    Smokeless tobacco: Never Used    Tobacco comment: Patient accepting of nicotine patch   Substance and Sexual Activity    Alcohol use: Yes     Comment: drinks beer three times a week    Drug use: Yes     Frequency: 7.0 times per week     Types: Cocaine     Comment: hx crack cocaine abuse and last used 3 weeks ago    Sexual activity: Not on file   Other Topics Concern    Not on file   Social History Narrative    Not on file     Social Determinants of Health     Financial Resource Strain:     Difficulty of Paying Living Expenses: Not on file   Food Insecurity:     Worried About Running Out of Food in the Last Year: Not on file    Tyree of Food in the Last Year: Not on file   Transportation Needs:     Lack of Transportation (Medical): Not on file    Lack of Transportation (Non-Medical):  Not on file   Physical Activity:     Days of Exercise per Week: Not on file    Minutes of Exercise per Session: Not on file   Stress:     Feeling of Stress : Not on file   Social Connections:     Frequency of Communication with Friends and Family: Not on file    Frequency of Social Gatherings with Friends and Family: Not on file    Attends [employer] Services: Not on file    Active Member of [address] [place] [address] [employer] or Organizations: Not on file    Attends Club or Organization Meetings: Not on file    Marital Status: Not on file   Intimate Partner Violence:     Fear of Current or Ex-Partner: Not on file    Emotionally Abused: Not on file    Physically Abused: Not on file    Sexually Abused: Not on file   Housing Stability:     Unable to Pay for Housing in the Last Year: Not on file    Number of [place] in the Last Year: Not on file    Unstable Housing in the Last Year: Not on file       Family History   Problem Relation Age of Onset    Diabetes Mother     Heart Disease Mother         Allergies:  Navane [thiothixene]    Home Medications:  Prior to Admission medications    Medication Sig Start Date End Date Taking? Authorizing Provider   Cholecalciferol (VITAMIN D3) 25 MCG TABS Take 1 tablet by mouth daily 12/23/21   Jeovanny Arana MD   escitalopram (LEXAPRO) 20 MG tablet Take 1 tablet by mouth daily 12/24/21   Jeovanny Arana MD   hydrOXYzine (ATARAX) 25 MG tablet Take 1 tablet by mouth 3 times daily as needed for Anxiety 12/23/21 1/22/22  Jeovanny Arana MD   OLANZapine (ZYPREXA) 10 MG tablet Take 1 tablet by mouth nightly 12/23/21   Jeovanny Arana MD   traZODone (DESYREL) 100 MG tablet Take 1 tablet by mouth nightly as needed for Sleep 12/23/21   Jeovanny Arana MD       REVIEW OFSYSTEMS    (2-9 systems for level 4, 10 or more for level 5)      Review of Systems   Constitutional: Negative for chills and fever. HENT: Negative for congestion and rhinorrhea. Eyes: Negative for visual disturbance. Respiratory: Negative for cough and shortness of breath. Cardiovascular: Negative for chest pain. Gastrointestinal: Negative for abdominal pain, constipation, diarrhea, nausea and vomiting. Genitourinary: Negative for dysuria and frequency. Musculoskeletal: Negative for back pain and neck pain. Skin: Negative for rash. Neurological: Negative for weakness, numbness and headaches. Psychiatric/Behavioral: Positive for suicidal ideas. Negative for hallucinations and self-injury. The patient is not nervous/anxious. PHYSICAL EXAM   (up to 7 for level 4, 8 or more forlevel 5)      INITIAL VITALS:   ED Triage Vitals [12/25/21 0325]   BP Temp Temp src Pulse Resp SpO2 Height Weight   118/69 96.9 °F (36.1 °C) -- 119 16 100 % 6' 2\" (1.88 m) 171 lb (77.6 kg)       Physical Exam  Constitutional:       General: He is not in acute distress. Appearance: Normal appearance. He is not ill-appearing, toxic-appearing or diaphoretic. HENT:      Head: Normocephalic and atraumatic.       Mouth/Throat: Mouth: Mucous membranes are moist.      Pharynx: Oropharynx is clear. Eyes:      Extraocular Movements: Extraocular movements intact. Cardiovascular:      Rate and Rhythm: Normal rate and regular rhythm. Heart sounds: Normal heart sounds. No murmur heard. Pulmonary:      Effort: Pulmonary effort is normal. No respiratory distress. Breath sounds: Normal breath sounds. No wheezing or rhonchi. Abdominal:      Palpations: Abdomen is soft. Tenderness: There is no abdominal tenderness. Musculoskeletal:         General: Normal range of motion. Cervical back: Normal range of motion and neck supple. Comments: Some chronic pain in the right knee. Normal range of motion, no deformity. Skin:     General: Skin is warm and dry. Neurological:      General: No focal deficit present. Mental Status: He is alert and oriented to person, place, and time. DIFFERENTIAL  DIAGNOSIS     PLAN (LABS / IMAGING / EKG):  Orders Placed This Encounter   Procedures    COVID-19, Rapid    CBC Auto Differential    Comprehensive Metabolic Panel w/ Reflex to MG    ETHANOL    Urine Drug Screen       MEDICATIONS ORDERED:  No orders of the defined types were placed in this encounter. Initial MDM/Plan/ED COURSE:    57 y.o. male who presents with suicidal ideation and plan to harm himself by stabbing himself in the stomach with a knife. On exam, patient is in no acute distress at this time. Vitals are stable. Heart and lung exam unremarkable during my evaluation. Initial vitals show pulse of 119. Abdomen is soft and nontender. He is what appears to be of mild to chronic lower extremity swelling, some chronic pain in the right knee is also noted with normal range of motion no obvious signs of injury or deformity. Patient has plans to harm himself, will undergo BHI work-up. Social work to speak with patient and help coordinate further care and/or need for transport.     ED Course as of 12/25/21 4650   Sat Dec 25, 2021   0602 Cocaine Metabolite, Urine(!): POSITIVE [JS]   0602 CMP and CBC unremarkable. [JS]   0602 EtOH undetectable. Covid negative. [JS]   0602 Patient medically cleared for transport [JS]      ED Course User Index  [JS] Amy Thomas DO      Patient signed out to Dr. Sharan Guillermo pending disposition decision. DIAGNOSTIC RESULTS / EMERGENCYDEPARTMENT COURSE / MDM     LABS:  Labs Reviewed   CBC WITH AUTO DIFFERENTIAL - Abnormal; Notable for the following components:       Result Value    RBC 3.93 (*)     Hemoglobin 11.4 (*)     Hematocrit 35.2 (*)     Eosinophils % 0 (*)     All other components within normal limits   COMPREHENSIVE METABOLIC PANEL W/ REFLEX TO MG FOR LOW K - Abnormal; Notable for the following components:    Glucose 105 (*)     All other components within normal limits   URINE DRUG SCREEN - Abnormal; Notable for the following components:    Cocaine Metabolite, Urine POSITIVE (*)     All other components within normal limits   COVID-19, RAPID   ETHANOL           No results found. EKG      All EKG's are interpreted by the Emergency Department Physicianwho either signs or Co-signs this chart in the absence of a cardiologist.      PROCEDURES:  None    CONSULTS:  None    CRITICAL CARE:  Please see attending note    FINAL IMPRESSION      1. Suicidal ideation          DISPOSITION / PLAN     DISPOSITION        PATIENT REFERRED TO:  No follow-up provider specified.     DISCHARGE MEDICATIONS:  New Prescriptions    No medications on file       Amy Thomas DO  Emergency Medicine Resident    (Please note that portions of this note were completed with a voice recognition program.Efforts were made to edit the dictations but occasionally words are mis-transcribed.)       Amy Thomas DO  Resident  12/25/21 0035

## 2021-12-25 NOTE — ED NOTES
Writer received call from [facility] psyc unit who stated Dr Reddy May accepted patient. Willie Atkinson stated they have 2 beds so they're working on placing patient, will contact writer with bed information to arrange transport. Willie Atkinson stated they'll also contact patient's guardian to obtain verbal permission for admission. Per Ramin Notice, writer contacted [facility] to inform them of patient's [facility] admission. Writer informed [address] patient's accepted to [address], can close the case.       Claudene Haws, LISW  12/25/21 1049

## 2021-12-25 NOTE — ED NOTES
Pt resting comfortably on stretcher, no acute distress. Pt has no needs at this time. 1:1 monitoring in place.       Duane Kitchens, RN  12/25/21 5335

## 2021-12-25 NOTE — ED NOTES
Writer received call from [address] [place] [address] psyc unit ([phone]) who stated she received patient's packet & will review with psychiatrist after speaking with patient's ED RN. Mary Brooks stated she needs proof of patient's guardianship & will ask their  to obtain. Mary Brooks stated if their 's unable to do so, patient will need to be pink slipped.       TAVO Sharp  12/25/21 3718

## 2022-01-18 ENCOUNTER — HOSPITAL ENCOUNTER (EMERGENCY)
Age: 62
Discharge: HOME | End: 2022-01-18
Attending: EMERGENCY MEDICINE
Payer: MEDICAID

## 2022-01-18 VITALS
TEMPERATURE: 97.2 F | RESPIRATION RATE: 17 BRPM | SYSTOLIC BLOOD PRESSURE: 141 MMHG | DIASTOLIC BLOOD PRESSURE: 86 MMHG | OXYGEN SATURATION: 98 % | HEART RATE: 90 BPM

## 2022-01-18 DIAGNOSIS — F32.A DEPRESSION, UNSPECIFIED DEPRESSION TYPE: ICD-10-CM

## 2022-01-18 DIAGNOSIS — R10.11 RIGHT UPPER QUADRANT ABDOMINAL PAIN: Primary | ICD-10-CM

## 2022-01-18 LAB
ABSOLUTE EOS #: <0.03 K/UL (ref 0–0.44)
ABSOLUTE IMMATURE GRANULOCYTE: <0.03 K/UL (ref 0–0.3)
ABSOLUTE LYMPH #: 2.29 K/UL (ref 1.1–3.7)
ABSOLUTE MONO #: 0.5 K/UL (ref 0.1–1.2)
ACETAMINOPHEN LEVEL: <5 UG/ML (ref 10–30)
ALBUMIN SERPL-MCNC: 4.1 G/DL (ref 3.5–5.2)
ALBUMIN/GLOBULIN RATIO: 1.8 (ref 1–2.5)
ALP BLD-CCNC: 115 U/L (ref 40–129)
ALT SERPL-CCNC: 18 U/L (ref 5–41)
AMPHETAMINE SCREEN URINE: NEGATIVE
ANION GAP SERPL CALCULATED.3IONS-SCNC: 12 MMOL/L (ref 9–17)
AST SERPL-CCNC: 19 U/L
BARBITURATE SCREEN URINE: NEGATIVE
BASOPHILS # BLD: 1 % (ref 0–2)
BASOPHILS ABSOLUTE: 0.03 K/UL (ref 0–0.2)
BENZODIAZEPINE SCREEN, URINE: NEGATIVE
BILIRUB SERPL-MCNC: 0.17 MG/DL (ref 0.3–1.2)
BILIRUBIN URINE: NEGATIVE
BUN BLDV-MCNC: 14 MG/DL (ref 8–23)
BUN/CREAT BLD: ABNORMAL (ref 9–20)
BUPRENORPHINE URINE: ABNORMAL
CALCIUM SERPL-MCNC: 9 MG/DL (ref 8.6–10.4)
CANNABINOID SCREEN URINE: NEGATIVE
CHLORIDE BLD-SCNC: 110 MMOL/L (ref 98–107)
CO2: 23 MMOL/L (ref 20–31)
COCAINE METABOLITE, URINE: POSITIVE
COLOR: YELLOW
COMMENT UA: NORMAL
CREAT SERPL-MCNC: 0.91 MG/DL (ref 0.7–1.2)
DIFFERENTIAL TYPE: ABNORMAL
EOSINOPHILS RELATIVE PERCENT: 0 % (ref 1–4)
ETHANOL PERCENT: <0.01 %
ETHANOL: <10 MG/DL
GFR AFRICAN AMERICAN: >60 ML/MIN
GFR NON-AFRICAN AMERICAN: >60 ML/MIN
GFR SERPL CREATININE-BSD FRML MDRD: ABNORMAL ML/MIN/{1.73_M2}
GFR SERPL CREATININE-BSD FRML MDRD: ABNORMAL ML/MIN/{1.73_M2}
GLUCOSE BLD-MCNC: 120 MG/DL (ref 70–99)
GLUCOSE URINE: NEGATIVE
HCT VFR BLD CALC: 34.6 % (ref 40.7–50.3)
HEMOGLOBIN: 11 G/DL (ref 13–17)
IMMATURE GRANULOCYTES: 0 %
KETONES, URINE: NEGATIVE
LEUKOCYTE ESTERASE, URINE: NEGATIVE
LIPASE: 33 U/L (ref 13–60)
LYMPHOCYTES # BLD: 36 % (ref 24–43)
MCH RBC QN AUTO: 28.4 PG (ref 25.2–33.5)
MCHC RBC AUTO-ENTMCNC: 31.8 G/DL (ref 28.4–34.8)
MCV RBC AUTO: 89.4 FL (ref 82.6–102.9)
MDMA URINE: ABNORMAL
METHADONE SCREEN, URINE: NEGATIVE
METHAMPHETAMINE, URINE: ABNORMAL
MONOCYTES # BLD: 8 % (ref 3–12)
NITRITE, URINE: NEGATIVE
NRBC AUTOMATED: 0 PER 100 WBC
OPIATES, URINE: NEGATIVE
OXYCODONE SCREEN URINE: NEGATIVE
PDW BLD-RTO: 13.8 % (ref 11.8–14.4)
PH UA: 6 (ref 5–8)
PHENCYCLIDINE, URINE: NEGATIVE
PLATELET # BLD: 303 K/UL (ref 138–453)
PLATELET ESTIMATE: ABNORMAL
PMV BLD AUTO: 9.5 FL (ref 8.1–13.5)
POTASSIUM SERPL-SCNC: 3.8 MMOL/L (ref 3.7–5.3)
PROPOXYPHENE, URINE: ABNORMAL
PROTEIN UA: NEGATIVE
RBC # BLD: 3.87 M/UL (ref 4.21–5.77)
RBC # BLD: ABNORMAL 10*6/UL
SALICYLATE LEVEL: <1 MG/DL (ref 3–10)
SARS-COV-2, RAPID: NOT DETECTED
SEG NEUTROPHILS: 55 % (ref 36–65)
SEGMENTED NEUTROPHILS ABSOLUTE COUNT: 3.44 K/UL (ref 1.5–8.1)
SODIUM BLD-SCNC: 145 MMOL/L (ref 135–144)
SPECIFIC GRAVITY UA: 1.02 (ref 1–1.03)
SPECIMEN DESCRIPTION: NORMAL
TEST INFORMATION: ABNORMAL
TOTAL PROTEIN: 6.4 G/DL (ref 6.4–8.3)
TOXIC TRICYCLIC SC,BLOOD: NEGATIVE
TRICYCLIC ANTIDEPRESSANTS, UR: ABNORMAL
TURBIDITY: CLEAR
URINE HGB: NEGATIVE
UROBILINOGEN, URINE: NORMAL
WBC # BLD: 6.3 K/UL (ref 3.5–11.3)
WBC # BLD: ABNORMAL 10*3/UL

## 2022-01-18 PROCEDURE — 87635 SARS-COV-2 COVID-19 AMP PRB: CPT

## 2022-01-18 PROCEDURE — 80143 DRUG ASSAY ACETAMINOPHEN: CPT

## 2022-01-18 PROCEDURE — 6370000000 HC RX 637 (ALT 250 FOR IP): Performed by: STUDENT IN AN ORGANIZED HEALTH CARE EDUCATION/TRAINING PROGRAM

## 2022-01-18 PROCEDURE — 81003 URINALYSIS AUTO W/O SCOPE: CPT

## 2022-01-18 PROCEDURE — 96374 THER/PROPH/DIAG INJ IV PUSH: CPT

## 2022-01-18 PROCEDURE — 80053 COMPREHEN METABOLIC PANEL: CPT

## 2022-01-18 PROCEDURE — 80179 DRUG ASSAY SALICYLATE: CPT

## 2022-01-18 PROCEDURE — 2580000003 HC RX 258: Performed by: STUDENT IN AN ORGANIZED HEALTH CARE EDUCATION/TRAINING PROGRAM

## 2022-01-18 PROCEDURE — 83690 ASSAY OF LIPASE: CPT

## 2022-01-18 PROCEDURE — G0480 DRUG TEST DEF 1-7 CLASSES: HCPCS

## 2022-01-18 PROCEDURE — 80307 DRUG TEST PRSMV CHEM ANLYZR: CPT

## 2022-01-18 PROCEDURE — 99285 EMERGENCY DEPT VISIT HI MDM: CPT

## 2022-01-18 PROCEDURE — 2500000003 HC RX 250 WO HCPCS: Performed by: STUDENT IN AN ORGANIZED HEALTH CARE EDUCATION/TRAINING PROGRAM

## 2022-01-18 PROCEDURE — 85025 COMPLETE CBC W/AUTO DIFF WBC: CPT

## 2022-01-18 RX ORDER — MAGNESIUM HYDROXIDE/ALUMINUM HYDROXICE/SIMETHICONE 120; 1200; 1200 MG/30ML; MG/30ML; MG/30ML
30 SUSPENSION ORAL ONCE
Status: COMPLETED | OUTPATIENT
Start: 2022-01-18 | End: 2022-01-18

## 2022-01-18 RX ORDER — 0.9 % SODIUM CHLORIDE 0.9 %
1000 INTRAVENOUS SOLUTION INTRAVENOUS ONCE
Status: COMPLETED | OUTPATIENT
Start: 2022-01-18 | End: 2022-01-18

## 2022-01-18 RX ADMIN — ALUMINUM HYDROXIDE, MAGNESIUM HYDROXIDE, AND SIMETHICONE 30 ML: 200; 200; 20 SUSPENSION ORAL at 05:51

## 2022-01-18 RX ADMIN — SODIUM CHLORIDE 1000 ML: 9 INJECTION, SOLUTION INTRAVENOUS at 05:50

## 2022-01-18 RX ADMIN — FAMOTIDINE 20 MG: 10 INJECTION, SOLUTION INTRAVENOUS at 05:51

## 2022-01-18 ASSESSMENT — ENCOUNTER SYMPTOMS
BACK PAIN: 0
NAUSEA: 0
PHOTOPHOBIA: 0
CONSTIPATION: 0
COUGH: 1
SHORTNESS OF BREATH: 0
DIARRHEA: 0
ABDOMINAL PAIN: 1
VOMITING: 0

## 2022-01-18 NOTE — ED TRIAGE NOTES
Pt arrived to the ED with c/o abdominal pain and feeling suicidal. Pt states he plans on walking out in front of traffic.

## 2022-01-18 NOTE — ED NOTES
The following labs were labeled with patient stickers & tubed to lab;    []Lavender   []On Ice  []Blue  []Green/ Yellow  []Green/ Black []On Ice  []Pink  []Red  []Yellow    [x]COVID-19 Swab [x]Rapid  []COVID-19 Swab []PCR    [x]Urine Sample  []Pelvic Cultures    []Blood Cultures       Lee Jackson RN  01/18/22 4785

## 2022-01-18 NOTE — ED NOTES
..Patient in need of transportation home. SW contacted [facility]. ETA unknown.  #[phone]

## 2022-01-18 NOTE — ED NOTES
Patient not stating after eating and warming up that he is no longer feeling suicidal. Patient is able to contract for safety. SW gave patient information on the [facility] number. Patient states that he is will come back to the ED if his symptoms become worse.

## 2022-01-18 NOTE — ED NOTES
belongings search:     Persons present during search:   Results of search and disposition:       Searchers Name: [employer]    These items or items similar should be removed from the room:   [] Chairs   [x] Telephone   [x] Trash cans and liners   [x] Plastic utensils (order Patient Safety tray)   [x] Empty or remove Sharps containers   [x] All personal clothing/belongings removed   [x] All unnecessary lead wires, electrical cords, draw cords, etc.   [] Flowers and plants   [x] Double check for lighters, matches, razors, any glass items etc that can be used as weapons. Person completing Checklist: Merlyn Matson RN       GENERAL INFORMATION     Y  N     [x] [] Has the patient been informed that they are on a watch and what that means? [x] [] Can the patient get out of Bed without nursing assistance? [x] [] Can the patient use the restroom without nursing assistance? [] [x] Can the patient walk the halls to [place] their legs? \"   [] [x] Does the patient have metal utensils? [x] [] Have the patient's belongings been placed out of control of the patient? [x] [] Have the patient and his/her belongings been checked for contraband? [x] [] Is the patient under any visitor restrictions? If Yes, explain:Suicidal thoughts   [] [] Is the patient under an alias? [facility] Name:   Authorized visitors (no more than two are to be on the list)   Name/Relationship:   Name/Relationship:    Name of Staff member that you  Received this information from?: Merlyn Matson RN    General Description:    Filomena Salter 84/E56B male 57 y.o. Admission weight:      Race: []  [x] Black  []   []   [] [place] [] Other  Facial Hair:  [x] Yes  [] No  If yes, please describe: Identifying Marks (i.e. Visible tattoos, scars, etc... ):     NURSING CARE PLAN    Nursing Diagnosis: Risk of Self Directed Harm  [] Actual  [x] Potential  Date Started: 1/18/22      Etiological Factors: (related to)  [x] Expressed or implied suicidal ideation/behavior  [x] Depression  [] Suicide attempt      [] Low self-esteem  [x] Hallucinations      [x] Feeling of Hopelessness  [x] Substance abuse or withdrawal    [x] Dysfunctional family  [] Major traumatic event, eg., divorce, etc   [x] Excessive stress/anxiety    1/18/22    Expected Outcomes    Patient will:   [x] Patient will remain safe for the duration of their stay   [x] Patient's environment will be safe, eg. Free of potential suicide weapons   [] Verbalize Recovery from suicidal episode and improvement in self-worth   [x] Discuss feeling that precipitated suicide attempt/thoughts/behavior   [] Will describe available resources for crisis prevention and management   [] Will verbalize positive coping skills     Nursing Intervention   [x] Assessment and Observations hourly   [x] Suicide Precautions implemented with patient, should be 1:1 observation   [x] Document observation [id number] and RN assessment hourly   [] Consult physician for:    [] Psychiatric consult    [] Pharmacological therapy    [] Other:    [x] Patient search completed by security   [x] Initiated appropriate safety protocols by removing from the patient's environment anything that could be used to inflict self injury, eg. Order safe tray, snap gown, etc   [x] Maintain open, warm, caring, non-judgmental attitude/manner towards patient   [] Discuss advantages and disadvantages of existing coping methods/skills   [x] Assist and educate patient with identifying present strengths and coping skills   [x] Keep patient informed regarding plan of care and provide clear concise explanations. Provide the patient/family education information as well as telephone numbers and other information about crisis centers, hot lines, and counselors.     Discharge Planning:   [] Referral  [] Groups [] Health agencies  [] Other:          Lexie Domingo RN  01/18/22 5267

## 2022-01-18 NOTE — ED NOTES
The patient is a 57year old male that has a history of Schizoaffective Disorder. The patient came to the ED today due having abdominal pain and feeling suicidal. Patient initially denied any plan or intention then states that he has a plan and reports, \"I may go run into traffic. The patient is also complaining of auditory hallucinations. Patient denied any other mental health symptoms at this time. Patient states that he is homeless and has no where else to go and that it is to cold for him. Patient reports non compliance with his psychiatric medications and follow up appointments. Patient appears to be at his baseline. Patient has history of similar complaints. Patient was last at the [facility] from 12.07.2021-12.23.2021 and was admitted to [facility] on 12.25.2021. SW will consult psychiatry for disposition upon medical clearance.

## 2022-01-18 NOTE — ED NOTES
Box lunch provided to pt. No other needs at this time. NAD noted. Sitter remains in place.      Jose Oneill RN  01/18/22 0165

## 2022-01-18 NOTE — ED PROVIDER NOTES
[facility]     Emergency Department     Faculty Attestation    I performed a history and physical examination of the patient and discussed management with the resident. I have reviewed and agree with the residents findings including all diagnostic interpretations, and treatment plans as written. Any areas of disagreement are noted on the chart. I was personally present for the key portions of any procedures. I have documented in the chart those procedures where I was not present during the key portions. I have reviewed the emergency nurses triage note. I agree with the chief complaint, past medical history, past surgical history, allergies, medications, social and family history as documented unless otherwise noted below. Documentation of the HPI, Physical Exam and Medical Decision Making performed by scribnav is based on my personal performance of the HPI, PE and MDM. For Physician Assistant/ Nurse Practitioner cases/documentation I have personally evaluated this patient and have completed at least one if not all key elements of the E/M (history, physical exam, and MDM). Additional findings are as noted. 56 yo M c/o abdominal pain x 2 days, no nausea, no cp, no anorexia, pt c/o suicidal ideation,   pe vss, no acute psychosis, no pressured speech,   Abdomen mild diffuse tenderness without rebound, no mass, no rigidity, no distension,   Steady even gait,     -social work saw pt & cleared pt for discharge,   -wbc 6.3, lipase 33, pt vigorously eating box lunch, I feel pt stable for out pt tx, no finding of acute abdomen at this time,     EKG Interpretation    Interpreted by me      CRITICAL CARE: There was a high probability of clinically significant/life threatening deterioration in this patient's condition which required my urgent intervention. Total critical care time was 5 minutes. This excludes any time for separately reportable procedures. Jana 24, DO  01/18/22 [address], DO  01/18/22 5701

## 2022-01-18 NOTE — ED PROVIDER NOTES
[facility] ED  Emergency Department Encounter  EmergencyMedicine Resident     Pt Darek White  MRN: [id number]  [place] 1960  Date of evaluation: 1/18/22  PCP:  No primary care provider on file. CHIEF COMPLAINT       Chief Complaint   Patient presents with    Suicidal    Abdominal Pain       HISTORY OF PRESENT ILLNESS  (Location/Symptom, Timing/Onset, Context/Setting, Quality, Duration, Modifying Factors, Severity.)    This patient was evaluated in the Emergency Department for symptoms described in the history of present illness. He/she was evaluated in the context of the global COVID-19 pandemic, which necessitated consideration that the patient might be at risk for infection with the SARS-CoV-2 virus that causes COVID-19. Institutional protocols and algorithms that pertain to the evaluation of patients at risk for COVID-19 are in a state of rapid change based on information released by regulatory bodies including the CDC and federal and state organizations. These policies and algorithms were followed during the patient's care in the ED. Germania Gutiérrez is a 57 y.o. male history of bipolar disorder, depression, substance abuse, diabetes, GERD presenting emergency department today with complaints of abdominal pain and suicidal ideations. Abdominal pain is localized to the right side of his abdomen has been ongoing for the past 2 days. Aching, moderate in severity, nonradiating, no modifying factors noted. Patient does note that he is hungry. Denies nausea, vomiting, diarrhea, constipation, blood in stool, melena. No previous abdominal surgeries. Patient also notes that he is a little suicidal with a plan to walk in front of traffic. Notes that he is taking all of his psychiatric medications as prescribed. He said that his family members will come visit him which is worsening his depression.     PAST MEDICAL / SURGICAL / SOCIAL / FAMILY HISTORY      has a past medical history of Bipolar disorder ([facility] [address] Depression, GERD (gastroesophageal reflux disease), Hallucinations, Headache(784.0), Hepatitis, Schizophrenia, schizo-affective ([facility] [address] Substance abuse ([facility] Tobacco abuse, Type II or unspecified type diabetes mellitus without mention of complication, not stated as uncontrolled, and Urinary incontinence. has a past surgical history that includes Dental surgery and Abscess Drainage (N/A, 02/11/2018). Social History     Socioeconomic History    Marital status: Single     Spouse name: Not on file    Number of children: 0    Years of education: 8    Highest education level: Not on file   Occupational History     Employer: N/A   Tobacco Use    Smoking status: Current Every Day Smoker     Packs/day: 1.00     Years: 47.00     Pack years: 47.00     Types: Cigarettes    Smokeless tobacco: Never Used    Tobacco comment: Patient accepting of nicotine patch   Substance and Sexual Activity    Alcohol use: Yes     Comment: drinks beer three times a week    Drug use: Yes     Frequency: 7.0 times per week     Types: Cocaine     Comment: hx crack cocaine abuse and last used 3 weeks ago    Sexual activity: Not on file   Other Topics Concern    Not on file   Social History Narrative    Not on file     Social Determinants of Health     Financial Resource Strain:     Difficulty of Paying Living Expenses: Not on file   Food Insecurity:     Worried About Running Out of Food in the Last Year: Not on file    Tyree of Food in the Last Year: Not on file   Transportation Needs:     Lack of Transportation (Medical): Not on file    Lack of Transportation (Non-Medical):  Not on file   Physical Activity:     Days of Exercise per Week: Not on file    Minutes of Exercise per Session: Not on file   Stress:     Feeling of Stress : Not on file   Social Connections:     Frequency of Communication with Friends and Family: Not on file    Frequency of Social Gatherings with Friends and Family: Not on file    Attends [employer] Services: Not on file    Active Member of Clubs or Organizations: Not on file    Attends Club or Organization Meetings: Not on file    Marital Status: Not on file   Intimate Partner Violence:     Fear of Current or Ex-Partner: Not on file    Emotionally Abused: Not on file    Physically Abused: Not on file    Sexually Abused: Not on file   Housing Stability:     Unable to Pay for Housing in the Last Year: Not on file    Number of [place] in the Last Year: Not on file    Unstable Housing in the Last Year: Not on file       Family History   Problem Relation Age of Onset    Diabetes Mother     Heart Disease Mother        Allergies:  Navane [thiothixene]    Home Medications:  Prior to Admission medications    Medication Sig Start Date End Date Taking? Authorizing Provider   Cholecalciferol (VITAMIN D3) 25 MCG TABS Take 1 tablet by mouth daily 12/23/21   Chapo Painting MD   escitalopram (LEXAPRO) 20 MG tablet Take 1 tablet by mouth daily 12/24/21   Chapo Painting MD   hydrOXYzine (ATARAX) 25 MG tablet Take 1 tablet by mouth 3 times daily as needed for Anxiety 12/23/21 1/22/22  Chapo Painting MD   OLANZapine (ZYPREXA) 10 MG tablet Take 1 tablet by mouth nightly 12/23/21   Chapo Painting MD   traZODone (DESYREL) 100 MG tablet Take 1 tablet by mouth nightly as needed for Sleep 12/23/21   Chapo Painting MD       REVIEW OF SYSTEMS    (2-9 systems for level 4, 10 or more for level 5)      Review of Systems   Constitutional: Negative for chills and fever. HENT: Negative for congestion. Eyes: Negative for photophobia. Respiratory: Positive for cough. Negative for shortness of breath. Cardiovascular: Negative for chest pain. Gastrointestinal: Positive for abdominal pain. Negative for constipation, diarrhea, nausea and vomiting. Genitourinary: Negative for dysuria and hematuria. Musculoskeletal: Negative for back pain and myalgias.    Skin: Negative for wound. Neurological: Positive for dizziness and headaches. Negative for numbness. Hematological: Does not bruise/bleed easily. PHYSICAL EXAM   (up to 7 for level 4, 8 or more for level 5)      INITIAL VITALS:   BP (!) 141/86   Pulse 90   Temp 97.2 °F (36.2 °C) (Oral)   Resp 17   SpO2 98%     Physical Exam  Vitals reviewed. Constitutional:       General: He is not in acute distress. Appearance: He is not toxic-appearing. HENT:      Head: Normocephalic and atraumatic. Mouth/Throat:      Mouth: Mucous membranes are dry. Eyes:      General: No scleral icterus. Conjunctiva/sclera: Conjunctivae normal.   Neck:      Trachea: No tracheal deviation. Cardiovascular:      Rate and Rhythm: Normal rate and regular rhythm. Heart sounds: Normal heart sounds. Pulmonary:      Effort: Pulmonary effort is normal. No respiratory distress. Breath sounds: Normal breath sounds. No stridor. No wheezing, rhonchi or rales. Abdominal:      General: Bowel sounds are normal. There is no distension. Palpations: Abdomen is soft. Tenderness: There is abdominal tenderness (RUQ, mild ). There is no guarding or rebound. Musculoskeletal:         General: No swelling or deformity. Cervical back: Normal range of motion. Right lower leg: No edema. Left lower leg: No edema. Skin:     General: Skin is warm and dry. Neurological:      Mental Status: He is alert and oriented to person, place, and time.          DIFFERENTIAL  DIAGNOSIS     PLAN (LABS / IMAGING / EKG):  Orders Placed This Encounter   Procedures    COVID-19, Rapid    CBC Auto Differential    Comprehensive Metabolic Panel w/ Reflex to MG    Lipase    TOX SCR, BLD, ED    Urinalysis Reflex to Culture    Urine Drug Screen       MEDICATIONS ORDERED:  Orders Placed This Encounter   Medications    0.9 % sodium chloride bolus    famotidine (PEPCID) injection 20 mg    aluminum & magnesium hydroxide-simethicone (MAALOX) 931-945-87 MG/5ML suspension 30 mL         DIAGNOSTIC RESULTS / EMERGENCY DEPARTMENT COURSE / MDM     Results for orders placed or performed during the hospital encounter of 01/18/22   CBC Auto Differential   Result Value Ref Range    WBC 6.3 3.5 - 11.3 k/uL    RBC 3.87 (L) 4.21 - 5.77 m/uL    Hemoglobin 11.0 (L) 13.0 - 17.0 g/dL    Hematocrit 34.6 (L) 40.7 - 50.3 %    MCV 89.4 82.6 - 102.9 fL    MCH 28.4 25.2 - 33.5 pg    MCHC 31.8 28.4 - 34.8 g/dL    RDW 13.8 11.8 - 14.4 %    Platelets 771 539 - 791 k/uL    MPV 9.5 8.1 - 13.5 fL    NRBC Automated 0.0 0.0 per 100 WBC    Differential Type NOT REPORTED     Seg Neutrophils 55 36 - 65 %    Lymphocytes 36 24 - 43 %    Monocytes 8 3 - 12 %    Eosinophils % 0 (L) 1 - 4 %    Basophils 1 0 - 2 %    Immature Granulocytes 0 0 %    Segs Absolute 3.44 1.50 - 8.10 k/uL    Absolute Lymph # 2.29 1.10 - 3.70 k/uL    Absolute Mono # 0.50 0.10 - 1.20 k/uL    Absolute Eos # <0.03 0.00 - 0.44 k/uL    Basophils Absolute 0.03 0.00 - 0.20 k/uL    Absolute Immature Granulocyte <0.03 0.00 - 0.30 k/uL    WBC Morphology NOT REPORTED     RBC Morphology NOT REPORTED     Platelet Estimate NOT REPORTED    Comprehensive Metabolic Panel w/ Reflex to MG   Result Value Ref Range    Glucose 120 (H) 70 - 99 mg/dL    BUN 14 8 - 23 mg/dL    CREATININE 0.91 0.70 - 1.20 mg/dL    Bun/Cre Ratio NOT REPORTED 9 - 20    Calcium 9.0 8.6 - 10.4 mg/dL    Sodium 145 (H) 135 - 144 mmol/L    Potassium 3.8 3.7 - 5.3 mmol/L    Chloride 110 (H) 98 - 107 mmol/L    CO2 23 20 - 31 mmol/L    Anion Gap 12 9 - 17 mmol/L    Alkaline Phosphatase 115 40 - 129 U/L    ALT 18 5 - 41 U/L    AST 19 <40 U/L    Total Bilirubin 0.17 (L) 0.3 - 1.2 mg/dL    Total Protein 6.4 6.4 - 8.3 g/dL    Albumin 4.1 3.5 - 5.2 g/dL    Albumin/Globulin Ratio 1.8 1.0 - 2.5    GFR Non-African American >60 >60 mL/min    GFR African American >60 >60 mL/min    GFR Comment          GFR Staging NOT REPORTED    Lipase   Result Value Ref Range    Lipase 33 13 - 60 U/L   TOX SCR, BLD, ED   Result Value Ref Range    Acetaminophen Level <5 (L) 10 - 30 ug/mL    Ethanol <10 <10 mg/dL    Ethanol percent <8.989 <6.677 %    Salicylate Lvl <1 (L) 3 - 10 mg/dL    Toxic Tricyclic Sc,Blood NEGATIVE NEGATIVE   Urinalysis Reflex to Culture    Specimen: Urine, clean catch   Result Value Ref Range    Color, UA Yellow Yellow    Turbidity UA Clear Clear    Glucose, Ur NEGATIVE NEGATIVE    Bilirubin Urine NEGATIVE NEGATIVE    Ketones, Urine NEGATIVE NEGATIVE    Specific Gravity, UA 1.019 1.005 - 1.030    Urine Hgb NEGATIVE NEGATIVE    pH, UA 6.0 5.0 - 8.0    Protein, UA NEGATIVE NEGATIVE    Urobilinogen, Urine Normal Normal    Nitrite, Urine NEGATIVE NEGATIVE    Leukocyte Esterase, Urine NEGATIVE NEGATIVE    Urinalysis Comments       Microscopic exam not performed based on chemical results unless requested in original order. RADIOLOGY:  No orders to display        IMPRESSION/MDM/EMERGENCY DEPARTMENT COURSE:  Patient came to emergency department, HPI and physical exam were conducted. All nursing notes were reviewed. 57-year-old male with past medical history of psychiatric disorder present emergency department with abdominal pain for 2 days as well as depression and suicidal ideation with plan to run out in front of traffic. No previous abdominal surgeries. Vitals within normal limits with exception of mild hypertension. Patient sitting in bed comfortably no acute distress. Has mild right upper quadrant tenderness on exam, sclera nonicteric, no jaundice, no rebound or guarding. Dry oral mucosa. Remainder of physical exam unremarkable. Patient denies any homicidal ideations. No drug or alcohol use. Differential includes suicidal ideations, depression, cholecystitis, pancreatitis, dehydration, gastritis. Plan for abdominal labs, drug screen, ED tox screen, fluids, GI cocktail. Will hold off on CAT scan at this time.   Low suspicion for appendicitis. Patient is hungry requesting food. P.o. challenge if negative work-up and determined that CAT scan is not indicated    ED Course as of 01/18/22 0636   Tue Jan 18, 2022   0613 WBC: 6.3 [ZT]   0614 Hemoglobin Quant(!): 11.0 [ZT]   0617 Ethanol: <83 [ZT]   9005 Salicylate Lvl: PENDING [ZT]   3109 Toxic Tricyclic Sc,Blood: NEGATIVE [ZT]   0617 Acetaminophen Level: PENDING [ZT]   0617 Lipase: 33 [ZT]   0617 WBC: 6.3 [ZT]   0617 Hemoglobin Quant(!): 11.0 [ZT]   0617 Glucose(!): 120 [ZT]   0617 Creatinine: 0.91 [ZT]   0617 Sodium(!): 145 [ZT]   0617 Potassium: 3.8 [ZT]   0617 Chloride(!): 110 [ZT]   0617 CO2: 23 [ZT]   0617 Anion Gap: 12 [ZT]   0617 Alk Phos: 115 [ZT]   0617 ALT: 18 [ZT]   0617 AST: 19 [ZT]      ED Course User Index  [ZT] Eddie Jacobss, DO       Labs grossly unremarkable. Upon reevaluation patient is tolerated entire sandwich. States that he is his abdominal pain is completely resolved at this time. He has no other complaints or concerns. States that he wants to go home. Low suspicion for acute intra-abdominal process. He notes that he is no longer suicidal and that these thoughts just come and go. He has no thoughts of hurting himself or anybody else. Overall do not feel the patient is at risk to self at this time. When asked what he will do if these thoughts return he states that he will call the number that he supposed to call or return immediately to emergency department. At this time feel the patient can safely be discharged home.  also evaluated patient and also visit patient can safely be discharged home at this time as he is telling him that he is not suicidal at this time. FINAL IMPRESSION      1. Right upper quadrant abdominal pain    2.  Depression, unspecified depression type          DISPOSITION / PLAN     DISPOSITION        PATIENT REFERRED TO:  [facility]  [address]  [phone]    As needed, If symptoms worsen    [facility] AT [address] [address] [phone]  Schedule an appointment as soon as possible for a visit         DISCHARGE MEDICATIONS:  New Prescriptions    No medications on file       Devorah Rivera DO  Emergency Medicine Resident    (Please note that portions of thisnote were completed with a voice recognition program.  Efforts were made to edit the dictations but occasionally words are mis-transcribed.)       Devorah Rivera DO  Resident  01/18/22 9437

## 2022-01-18 NOTE — ED NOTES
Pt ambulatory to [facility]. Pt is a/o x4. Pt c/o R lower abdominal pain and suicidal thoughts. Pt denies CP, SOB any other pain. Pt states that he starting having suicidal thoughts yesterday when his sisters told him they did not know if they could come visit him. Pt states he is hearing voice telling him to step out in traffic   Pt denies HI. Pt states that he takes his medications daily as prescribed. IV established and labs drawn. Pt changed out into paper scrubs and personal items/clothes secured by [employer]. Pt is NAD. Sitter at bedside.                    Angelina Hannah RN  01/18/22 9100

## 2022-02-05 ENCOUNTER — HOSPITAL ENCOUNTER (EMERGENCY)
Age: 62
Discharge: HOME | End: 2022-02-05
Attending: EMERGENCY MEDICINE
Payer: MEDICAID

## 2022-02-05 VITALS
SYSTOLIC BLOOD PRESSURE: 113 MMHG | HEART RATE: 72 BPM | OXYGEN SATURATION: 97 % | DIASTOLIC BLOOD PRESSURE: 69 MMHG | RESPIRATION RATE: 16 BRPM

## 2022-02-05 DIAGNOSIS — R44.0 AUDITORY HALLUCINATIONS: ICD-10-CM

## 2022-02-05 DIAGNOSIS — R45.851 SUICIDAL IDEATION: Primary | ICD-10-CM

## 2022-02-05 PROCEDURE — 99284 EMERGENCY DEPT VISIT MOD MDM: CPT

## 2022-02-05 ASSESSMENT — ENCOUNTER SYMPTOMS
NAUSEA: 0
DIARRHEA: 0
SHORTNESS OF BREATH: 0
BACK PAIN: 0
RHINORRHEA: 0
CONSTIPATION: 0
VOMITING: 0
ABDOMINAL PAIN: 0
COUGH: 0

## 2022-02-05 NOTE — ED NOTES
Patient presents with the chief complaint of SI & command auditory hallucinations, both of which are baseline for patient. Patient states he's not sure if he's suicidal or not but he's extremely bothered by the voices which continually tell him to kill himself. Patient states he just wants the voices to stop. Patient states he has been on medication which helped quiet the voices but he cannot remember the name. Patient states he's on [facility]'s ACT team & they meet him in his group home. Patient states he thinks they're coming out next week. Patient states he likes his group home & he currently gets along well with the other residents. Patient states he's medication compliant, last dose was yesterday. Patient states the group home staff give him his medications. Patient confirms crack cocaine use, most recently last night. Patient states his payee gives him $50/week but he did not get paid yesterday so he panhandled for crack cocaine money. Patient denies regular crack cocaine use. Patient denies ETOH use. Patient states he's not sure what the [facility] can do for him as they historically have not made the voices better. Patient states he's unsure what he'll do if discharged. Patient was offered a meal & time to rest & warm up (he walked to the ED) & possible discharge after. Patient states he's still concerned about the voices. Writer to discuss with Resident.       TAVO Huitron  02/05/22 6482

## 2022-02-05 NOTE — ED NOTES
Writer at bedside.  Pt was given meal tray by 1 to 1 observer and pt noted finishing meal. Sitter at bedside          [facility]  02/05/22 9329

## 2022-02-05 NOTE — ED NOTES
Writer at bedside to discuss discharge instruction. Pt verbalizes understanding.  [facility] PD presents for property release     Harrel Bosworth, RN  02/05/22 6072

## 2022-02-05 NOTE — ED PROVIDER NOTES
[facility]  ED  Emergency Department Encounter  Emergency Medicine Resident     Pt Name: Hanna Hunter  MRN: [id number]  [place] 1960  Date of evaluation: 2/5/22  PCP:  No primary care provider on file. CHIEF COMPLAINT       Chief Complaint   Patient presents with    Suicidal       HISTORY [place]  (Location/Symptom, Timing/Onset, Context/Setting, Quality, Duration, Modifying Factors,Severity.)      Hanna Hunter is a 57 y.o. male who presents with suicidal ideation and auditory hallucinations. Patient states that voices in his head are telling him to kill himself. He tells me he does not have a specific plan to do so, but may step out into traffic. He denies any systemic complaints at this time. He denied drug use to me but told the  that he used crack yesterday. No alcohol use. Daily tobacco use. Past medical history notable for bipolar disorder, schizophrenia, type 2 diabetes. According to social work, patient is here frequently sometimes recants suicidal ideations. PAST MEDICAL / SURGICAL / SOCIAL / FAMILY HISTORY      has a past medical history of Bipolar disorder ([address] Depression, GERD (gastroesophageal reflux disease), Hallucinations, Headache(784.0), Hepatitis, Schizophrenia, schizo-affective ([address] Substance abuse ([address] Tobacco abuse, Type II or unspecified type diabetes mellitus without mention of complication, not stated as uncontrolled, and Urinary incontinence. has a past surgical history that includes Dental surgery and Abscess Drainage (N/A, 02/11/2018).      Social History     Socioeconomic History    Marital status: Single     Spouse name: Not on file    Number of children: 0    Years of education: 8    Highest education level: Not on file   Occupational History     Employer: N/A   Tobacco Use    Smoking status: Current Every Day Smoker     Packs/day: 1.00     Years: 47.00     Pack years: 47.00     Types: Cigarettes    Authorizing Provider   Cholecalciferol (VITAMIN D3) 25 MCG TABS Take 1 tablet by mouth daily 12/23/21   Willi Arellano MD   escitalopram (LEXAPRO) 20 MG tablet Take 1 tablet by mouth daily 12/24/21   Willi Arellano MD   OLANZapine (ZYPREXA) 10 MG tablet Take 1 tablet by mouth nightly 12/23/21   Willi Arellano MD   traZODone (DESYREL) 100 MG tablet Take 1 tablet by mouth nightly as needed for Sleep 12/23/21   Willi Arellano MD       REVIEW OFSYSTEMS    (2-9 systems for level 4, 10 or more for level 5)      Review of Systems   Constitutional: Negative for chills and fever. HENT: Negative for congestion and rhinorrhea. Eyes: Negative for visual disturbance. Respiratory: Negative for cough and shortness of breath. Cardiovascular: Negative for chest pain. Gastrointestinal: Negative for abdominal pain, constipation, diarrhea, nausea and vomiting. Genitourinary: Negative for dysuria and frequency. Musculoskeletal: Negative for back pain and neck pain. Skin: Negative for rash. Neurological: Negative for weakness, numbness and headaches. Psychiatric/Behavioral: Positive for hallucinations and suicidal ideas. Negative for agitation, confusion, dysphoric mood and self-injury. PHYSICAL EXAM   (up to 7 for level 4, 8 or more forlevel 5)      INITIAL VITALS:   ED Triage Vitals   BP Temp Temp src Pulse Resp SpO2 Height Weight   -- -- -- -- -- -- -- --     Vitals:    02/05/22 0947 02/05/22 1136 02/05/22 1331 02/05/22 1432   BP: 115/70  113/69    Pulse: 71 78 72    Resp: 16 16 18 16   SpO2: 99% 98% 97%        Physical Exam  Constitutional:       General: He is not in acute distress. Appearance: Normal appearance. He is not ill-appearing, toxic-appearing or diaphoretic. HENT:      Head: Normocephalic and atraumatic. Mouth/Throat:      Mouth: Mucous membranes are moist.      Pharynx: Oropharynx is clear. Eyes:      Extraocular Movements: Extraocular movements intact.    Cardiovascular: Rate and Rhythm: Normal rate and regular rhythm. Heart sounds: Normal heart sounds. No murmur heard. Pulmonary:      Effort: Pulmonary effort is normal. No respiratory distress. Breath sounds: Normal breath sounds. No wheezing or rhonchi. Abdominal:      Palpations: Abdomen is soft. Tenderness: There is no abdominal tenderness. Musculoskeletal:         General: Normal range of motion. Cervical back: Normal range of motion and neck supple. Skin:     General: Skin is warm and dry. Neurological:      General: No focal deficit present. Mental Status: He is alert and oriented to person, place, and time. Psychiatric:      Comments: Endorsing suicidal ideation and auditory hallucinations. DIFFERENTIAL  DIAGNOSIS     PLAN (LABS / IMAGING / EKG):  No orders of the defined types were placed in this encounter. MEDICATIONS ORDERED:  No orders of the defined types were placed in this encounter. Initial MDM/Plan/ED COURSE:    57 y.o. male who presents with suicidal ideation and auditory hallucinations. On exam, patient is in no acute distress and vitals are stable. Heart and lung exam unremarkable and he does not have any abdominal tenderness. He otherwise appears well. Social work evaluate the patient and states there is a possibility that patient will be admitted to inpatient psychiatry due to his vague complaint. He also told the  that inpatients psych eval rarely does anything for him and that he just wants the voices to stop. He is not suicidal for her, but is more concerned about the voices. Will work on possible housing situations. Patient is also already set up with home visits for his mental health. Reevaluated the patient. He is feeling a little bit better. Still complaining of some voices. Social work has discussed the situation multiple times with him.   He has all resources available to him including medications given to him daily and mental health experts checking on him multiple times per week. We discussed the importance of stopping crack use in order to help with the hallucinations. Patient agreed to discharge at this time.      :     DIAGNOSTIC RESULTS / EMERGENCYDEPARTMENT COURSE / MDM     LABS:  Labs Reviewed - No data to display        No results found. EKG      All EKG's are interpreted by the Emergency Department Physicianwho either signs or Co-signs this chart in the absence of a cardiologist.      PROCEDURES:  None    CONSULTS:  None    CRITICAL CARE:  Please see attending note    FINAL IMPRESSION      1. Suicidal ideation    2.  Auditory hallucinations          DISPOSITION / PLAN     DISPOSITION Decision To Discharge 02/05/2022 01:56:34 PM      PATIENT REFERRED TO:  [facility] ED  [address] [place] [address]  [phone]    If symptoms worsen      DISCHARGE MEDICATIONS:  Discharge Medication List as of 2/5/2022  2:14 PM          Bouchra Robison DO  Emergency Medicine Resident    (Please note that portions of this note were completed with a voice recognition program.Efforts were made to edit the dictations but occasionally words are mis-transcribed.)       Bouchra Robison DO  Resident  02/05/22 7254

## 2022-02-05 NOTE — ED PROVIDER NOTES
with community mental health resources and he does have his medications at home. At time of discharge he had no thoughts of harming himself or others, he was linear in his thinking and was able to dress himself and ambulate out of the department.     (Please note that portions of this note were completed with a voice recognition program.  Efforts were made to edit the dictations but occasionally words are mis-transcribed.)      Gary Howard MD,, MD  Attending Emergency Physician         Gary Howard MD  02/10/22 4394

## 2022-02-05 NOTE — ED NOTES
Writer at bedside. Pt denies any needs at this time. Will continue to update pt on poc.       Yusef Jaimes RN  02/05/22 8050

## 2022-02-05 NOTE — ED NOTES
Pt. Presents to the ER for SI. Pt states that he has been suicidal since last night. Pt states that he has been hearing voices that are telling him to harm himself. PT states that he has a plan to jump out in front of traffic. Pt change out completed with [facility] ANY. Pt was scanned with wand and clothes were checked in per protocol. Pt vitals were completed and were WNL. Pt. Now resting in bed, eyes open, NAD. Bed in the lowest position, wheels locked, side rails up x2. Sitter is at the bedside for this patient.       Lalo Killian RN  02/05/22 8593

## 2022-02-05 NOTE — ED NOTES
[] [facility]    [x] [facility]    [x]  [facility]       SUICIDE RISK ASSESSMENT      Y  N     [x] [] In the past two weeks have you had thoughts of hurting yourself in any way? [x] [] In the past two weeks have you had thoughts that you would be better off dead? [] [x] Have you made a suicide attempt in the past two months? [x] [] Do you have a plan for hurting yourself or suicide? [x] [] Presence of hallucinations/voices related to hurting himself or herself or someone else. SUICIDE/SECURITY WATCH PRECAUTION CHECKLIST     Orders    [x]  Suicide/Security Watch Precautions initiated as checked below:   2/5/22 8:18 AM EST BH31/BH31A    [x] Notified physician:  Ariel Hoff MD  2/5/22 8:18 AM EST    [x] Orders obtained as appropriate:     [x] 1:1 Observer     [] Psych Consult     [] Psych Consult    Name:  Date:  Time:    [x] 1:1 Observer, Notified by:  Jamal Farmer RN    Contact Nurse Supervisor    [x] Remove all personal clothes from room and place in snap/paper gown/pants. Slipper only    [x] Remove all personal belongings from room and secured away from patient. Documentation    [x] Initiate Suicide/Security Watch Precaution Flow Sheet    [x] Initiate individualized Care Plan/Problem    [x] Document why precautions initiated on flow sheet (Initiate Nursing Care Plan/Problem)    [x] 1:1 Observer in place; instructions provided. Suicide precautions require observer be within arms length. [x] Nurse-Observer Communication Hand-off initiated by RN, reviewed with Observer. Subsequently used as Hand Off between Observers. [x] Initiate every 15 minute observations per observer as delegated by the RN.     [x] Initiate RN assessment and documentation    Environmental Scan  Search Criteria and Process: OPTIONAL, see Search Policy    [x] Reason for search:Suicidal    [x] Nursing in presence of second person to search patient    [x] Patient notified of reason for body assessment and belongings search:     Persons present during search: Dusty RASMUSSEN and [facility] ANY   Results of search and disposition:       Searchers Name: [facility] ANY    These items or items similar should be removed from the room:   [] Chairs   [x] Telephone   [x] Trash cans and liners   [x] Plastic utensils (order Patient Safety tray)   [x] Empty or remove Sharps containers   [x] All personal clothing/belongings removed   [x] All unnecessary lead wires, electrical cords, draw cords, etc.   [x] Flowers and plants   [x] Double check for lighters, matches, razors, any glass items etc that can be used as weapons. Person completing Checklist: Ritesh Jacques RN       GENERAL INFORMATION     Y  N     [x] [] Has the patient been informed that they are on a watch and what that means? [x] [] Can the patient get out of Bed without nursing assistance? [x] [] Can the patient use the restroom without nursing assistance? [x] [] Can the patient walk the halls to [place] their legs? \"   [] [x] Does the patient have metal utensils? [x] [] Have the patient's belongings been placed out of control of the patient? [x] [] Have the patient and his/her belongings been checked for contraband? [x] [] Is the patient under any visitor restrictions? If Yes, explain:SI   [] [x] Is the patient under an alias? [facility] Name:   Authorized visitors (no more than two are to be on the list)   Name/Relationship:   Name/Relationship:    Name of Staff member that you  Received this information from?: [facility] ANY    General Description:    Juan Manuel Dust BH31/BH31A male 57 y.o. Admission weight:      Race: []  [x] Black  []   []   [] [place] [] Other  Facial Hair:  [] Yes  [] No  If yes, please describe: Identifying Marks (i.e. Visible tattoos, scars, etc... ):     NURSING CARE PLAN    Nursing Diagnosis: Risk of Self Directed Harm  [] Actual  [x] Potential  Date Started: 2/5/22      Etiological Factors: (related to)  [x] Expressed or implied suicidal ideation/behavior  [] Depression  [] Suicide attempt      [] Low self-esteem  [x] Hallucinations      [] Feeling of Hopelessness  [] Substance abuse or withdrawal    [] Dysfunctional family  [] Major traumatic event, eg., divorce, etc   [] Excessive stress/anxiety    2/5/22    Expected Outcomes    Patient will:   [x] Patient will remain safe for the duration of their stay   [x] Patient's environment will be safe, eg. Free of potential suicide weapons   [] Verbalize Recovery from suicidal episode and improvement in self-worth   [x] Discuss feeling that precipitated suicide attempt/thoughts/behavior   [] Will describe available resources for crisis prevention and management   [] Will verbalize positive coping skills     Nursing Intervention   [x] Assessment and Observations hourly   [x] Suicide Precautions implemented with patient, should be 1:1 observation   [x] Document observation [id number] and RN assessment hourly   [] Consult physician for:    [] Psychiatric consult    [] Pharmacological therapy    [] Other:    [x] Patient search completed by security   [x] Initiated appropriate safety protocols by removing from the patient's environment anything that could be used to inflict self injury, eg. Order safe tray, snap gown, etc   [x] Maintain open, warm, caring, non-judgmental attitude/manner towards patient   [] Discuss advantages and disadvantages of existing coping methods/skills   [x] Assist and educate patient with identifying present strengths and coping skills   [x] Keep patient informed regarding plan of care and provide clear concise explanations. Provide the patient/family education information as well as telephone numbers and other information about crisis centers, hot lines, and counselors.     Discharge Planning:   [] Referral  [] Groups [] Health agencies  [x] Other:            Shelbie Meckel, RN  02/05/22 0970

## 2022-02-05 NOTE — ED NOTES
Writer at bedside. PT verbalized that he is comfortable and trying to take a nap. NAD noted at this time.  Sitter at [facility], [address] [place] [address]  02/05/22 0100

## 2022-02-05 NOTE — ED NOTES
Writer at bedside. Pt vitals taken and pt asking for a lunch tray. NAD noted at this time.  Sitter at bedside as well     Tiffany Pineda RN  02/05/22 4186

## 2022-02-05 NOTE — PROGRESS NOTES
I was assigned to this patient in error. I did not evaluate or treat this patient during this emergency department encounter.     Saul Tony DO, PGY-3  Emergency Medicine Resident  2/5/2022     8:26 AM

## 2022-02-05 NOTE — ED NOTES
Writer met with patient at bedside to re-assess. Patient is alert and oriented and presents at his baseline functioning. He reports to an increase in auditory hallucinations, \"they're really loud. \"  Patient previously stated he used crack last night. Writer and patient discussed how crack use increases severity of mental health symptoms. Reviewed treatment options that are available to him through services at [facility] to assist with decreasing substance use, he is already an active ACT team client. Writer did speak with [facility] ACT team staff, they plan to follow up with patient on Monday. Patient states that he has safe housing, lives in a group home, and that he feels he will be able to keep himself safe when he returns home. Patients medications are locked up at the group home and administered to him by group home staff. Social work to remain available as needed.       Maria Luisa Brown, [address]  02/05/22 3001

## 2022-02-19 ENCOUNTER — HOSPITAL ENCOUNTER (EMERGENCY)
Age: 62
Discharge: HOME | End: 2022-02-19
Attending: EMERGENCY MEDICINE
Payer: MEDICAID

## 2022-02-19 VITALS
RESPIRATION RATE: 16 BRPM | DIASTOLIC BLOOD PRESSURE: 72 MMHG | OXYGEN SATURATION: 97 % | TEMPERATURE: 97.3 F | HEART RATE: 82 BPM | HEIGHT: 74 IN | SYSTOLIC BLOOD PRESSURE: 115 MMHG | BODY MASS INDEX: 23.1 KG/M2 | WEIGHT: 180 LBS

## 2022-02-19 DIAGNOSIS — R45.851 SUICIDAL IDEATION: Primary | ICD-10-CM

## 2022-02-19 LAB
ABSOLUTE EOS #: 0.06 K/UL (ref 0–0.44)
ABSOLUTE IMMATURE GRANULOCYTE: 0 K/UL (ref 0–0.3)
ABSOLUTE LYMPH #: 2.11 K/UL (ref 1.1–3.7)
ABSOLUTE MONO #: 0.45 K/UL (ref 0.1–1.2)
ANION GAP SERPL CALCULATED.3IONS-SCNC: 15 MMOL/L (ref 9–17)
BASOPHILS # BLD: 0 % (ref 0–2)
BASOPHILS ABSOLUTE: 0 K/UL (ref 0–0.2)
BUN BLDV-MCNC: 12 MG/DL (ref 8–23)
BUN/CREAT BLD: ABNORMAL (ref 9–20)
CALCIUM SERPL-MCNC: 8.8 MG/DL (ref 8.6–10.4)
CHLORIDE BLD-SCNC: 105 MMOL/L (ref 98–107)
CO2: 18 MMOL/L (ref 20–31)
CREAT SERPL-MCNC: 0.84 MG/DL (ref 0.7–1.2)
DIFFERENTIAL TYPE: NORMAL
EOSINOPHILS RELATIVE PERCENT: 1 % (ref 1–4)
ETHANOL PERCENT: 0.04 %
ETHANOL: 39 MG/DL
GFR AFRICAN AMERICAN: >60 ML/MIN
GFR NON-AFRICAN AMERICAN: >60 ML/MIN
GFR SERPL CREATININE-BSD FRML MDRD: ABNORMAL ML/MIN/{1.73_M2}
GFR SERPL CREATININE-BSD FRML MDRD: ABNORMAL ML/MIN/{1.73_M2}
GLUCOSE BLD-MCNC: 168 MG/DL (ref 70–99)
HCT VFR BLD CALC: 42.4 % (ref 40.7–50.3)
HEMOGLOBIN: 13.1 G/DL (ref 13–17)
IMMATURE GRANULOCYTES: 0 %
LYMPHOCYTES # BLD: 33 % (ref 24–43)
MCH RBC QN AUTO: 27.9 PG (ref 25.2–33.5)
MCHC RBC AUTO-ENTMCNC: 30.9 G/DL (ref 28.4–34.8)
MCV RBC AUTO: 90.2 FL (ref 82.6–102.9)
MONOCYTES # BLD: 7 % (ref 3–12)
MORPHOLOGY: NORMAL
NRBC AUTOMATED: 0 PER 100 WBC
PDW BLD-RTO: 13.7 % (ref 11.8–14.4)
PLATELET # BLD: NORMAL K/UL (ref 138–453)
PLATELET ESTIMATE: NORMAL
PLATELET, FLUORESCENCE: NORMAL K/UL (ref 138–453)
PMV BLD AUTO: NORMAL FL (ref 8.1–13.5)
POTASSIUM SERPL-SCNC: 3.1 MMOL/L (ref 3.7–5.3)
RBC # BLD: 4.7 M/UL (ref 4.21–5.77)
RBC # BLD: NORMAL 10*6/UL
SARS-COV-2, RAPID: NOT DETECTED
SEG NEUTROPHILS: 59 % (ref 36–65)
SEGMENTED NEUTROPHILS ABSOLUTE COUNT: 3.78 K/UL (ref 1.5–8.1)
SODIUM BLD-SCNC: 138 MMOL/L (ref 135–144)
SPECIMEN DESCRIPTION: NORMAL
WBC # BLD: 6.4 K/UL (ref 3.5–11.3)
WBC # BLD: NORMAL 10*3/UL

## 2022-02-19 PROCEDURE — 85025 COMPLETE CBC W/AUTO DIFF WBC: CPT

## 2022-02-19 PROCEDURE — 99285 EMERGENCY DEPT VISIT HI MDM: CPT

## 2022-02-19 PROCEDURE — 87635 SARS-COV-2 COVID-19 AMP PRB: CPT

## 2022-02-19 PROCEDURE — 85055 RETICULATED PLATELET ASSAY: CPT

## 2022-02-19 PROCEDURE — 80048 BASIC METABOLIC PNL TOTAL CA: CPT

## 2022-02-19 PROCEDURE — G0480 DRUG TEST DEF 1-7 CLASSES: HCPCS

## 2022-02-19 ASSESSMENT — ENCOUNTER SYMPTOMS
SORE THROAT: 0
SHORTNESS OF BREATH: 0
VOMITING: 0
BACK PAIN: 0
COUGH: 0
ABDOMINAL PAIN: 0

## 2022-02-19 NOTE — ED NOTES
Pt A&O x 4, does not appear in acute distress, RR even and unlabored, resting comfortably on stretcher with eyes closed and safeguard at bedside. Suicide precautions in place, pt states he is still experiencing auditory hallucinations which are instructing him to kill himself. Pt denies an active plan at this time. Safety lunch tray given. Writer will continue to monitor pt closely.        Julio Cesar [place] LPN  86/64/43 5235

## 2022-02-19 NOTE — ED NOTES
Writer met with patient at bedside regarding concerns with auditory hallucinations and suicidal ideations. Patient reports that the increase in symptoms happened last night after smoking crack, denies new onset stressors or triggers. This patient is well known to this writer and ED. He is at his baseline functioning. Writer continued to provide education with patient regarding the effects substance use has on mental health symptoms. Patient states that he \"can't help\" using crack. Writer provided patient with information regarding substance use treatment available to patient at [facility]. He is in agreement that he does not feel suicidal when he does not use crack. Patient reports that he still likes living at his group home and is getting along with his roommates. He is linked with the [facility] ACT team.  Writer contacted the ACT team staff and spoke with Dwayne Gaines. Dwayne Gaines reports that patient has been doing well and engaging appropriately with treatment. Dwayne Gaines confirmed pattern of patient using crack and then expressing an increase in symptoms. Dwayne Gaines reports that he will follow up with patient on Monday at 1615. Writer advocated that ACT team place patient into dual treatment, Dwayne Gaines reports he will staff this with team on Monday regarding the referral.      Nilam Thomas met with patient regarding safety plan for discharge. Patient reports that he feels safe at home and will be able to keep himself safe. He identifies that watching television helps decrease voices and improve mood. Patient is in agreement to meet with his ACT staff at scheduled time for Monday. He states he will return to the ED, call ACT staff or 911 if symptoms return or worsen.        TAVO Ulrich  02/19/22 1208

## 2022-02-19 NOTE — ED NOTES
Reviewed safety care plan with patient and reinforced physician instructions. Pt verbalized an understanding of his safety plan and agreed with this POC. Security called to unlock pt belongings and return them. Pt is A&O x 4, calm and states he is no longer experiencing suicidal hallucinations at this time.        Matheus Tafoya, LPN  87/55/50 7637

## 2022-02-19 NOTE — ED PROVIDER NOTES
[facility]  ED  eMERGENCY dEPARTMENT eNCOUnter   Attending Attestation     Pt Name: Sabrina Najera  MRN: [id number]  [place] 1960  Date of evaluation: 2/19/22       Sabrina Najera is a 57 y.o. male who presents with Suicidal and Hallucinations      History: Patient presents with command hallucinations to harm himself. Patient has history of this after using drugs. Patient did use crack cocaine yesterday. Patient said he is missing his parents and has no plan to harm himself at this time but has had some hallucinations. Patient been taking his medications and has follow-up with his psychiatric care facility. Exam: Heart rate and rhythm are regular. Lungs are clear to auscultation bilaterally. Abdomen is soft, nontender. Patient awake and alert and acting appropriately. Plan for social work eval. Likely discharge with close followup to Mental health facility where he is a patient. I performed a history and physical examination of the patient and discussed management with the resident. I reviewed the residents note and agree with the documented findings and plan of care. Any areas of disagreement are noted on the chart. I was personally present for the key portions of any procedures. I have documented in the chart those procedures where I was not present during the key portions. I have personally reviewed all images and agree with the resident's interpretation. I have reviewed the emergency nurses triage note. I agree with the chief complaint, past medical history, past surgical history, allergies, medications, social and family history as documented unless otherwise noted below. Documentation of the HPI, Physical Exam and Medical Decision Making performed by medical students or scribes is based on my personal performance of the HPI, PE and MDM.  For Phys Assistant/ Nurse Practitioner cases/documentation I have had a face to face evaluation of this patient and have completed at least one if not all key elements of the E/M (history, physical exam, and MDM). Additional findings are as noted. For APC cases I have personally evaluated and examined the patient in conjunction with the APC and agree with the treatment plan and disposition of the patient as recorded by the APC.     Kaylee Damian MD  Attending Emergency  Physician       Mal Law MD  02/19/22 1381

## 2022-02-19 NOTE — ED PROVIDER NOTES
[facility]  ED  Emergency Department Encounter  EmergencyMedicine Resident     Pt Deaan Sosa  MRN: [id number]  [place] 1960  Date of evaluation: 2/19/22  PCP:  No primary care provider on file. This patient was evaluated in the Emergency Department for symptoms described in the history of present illness. The patient was evaluated in the context of the global COVID-19 pandemic, which necessitated consideration that the patient might be at risk for infection with the SARS-CoV-2 virus that causes COVID-19. Institutional protocols and algorithms that pertain to the evaluation of patients at risk for COVID-19 are in a state of rapid change based on information released by regulatory bodies including the CDC and federal and state organizations. These policies and algorithms were followed during the patient's care in the ED. CHIEF COMPLAINT       Chief Complaint   Patient presents with    Suicidal    Hallucinations     HISTORY OF PRESENT ILLNESS  (Location/Symptom, Timing/Onset, Context/Setting, Quality, Duration, Modifying Factors, Severity.)      Valdez Pérez is a 57 y.o. male who presents with suicidal ideation, patient states that his voices in his head is telling him to kill himself, however he does not have a concrete plan. Patient denies any homicidal ideation, however states that he is having auditory hallucinations. Patient states that he has been taking his psychiatric medications as prescribed, however missed a dose yesterday. Denies alcohol or drug use at this time. Does have a history of bipolar disorder, hallucinations and depression. Denying any physical symptoms including chest pain, shortness of breath, abdominal pain, nausea or vomiting.     PAST MEDICAL / SURGICAL / SOCIAL / FAMILY HISTORY      has a past medical history of Bipolar disorder ([facility] Depression, GERD (gastroesophageal reflux disease), Hallucinations, Headache(784.0), Hepatitis, Schizophrenia, schizo-affective ([facility] Sub[facility] abuse ([facility] Tobacco abuse, Type II or unspecified type diabetes mellitus without mention of complication, not stated as uncontrolled, and Urinary incontinence. has a past surgical history that includes Dental surgery and Abscess Drainage (N/A, 02/11/2018). Social History     Socioeconomic History    Marital status: Single     Spouse name: Not on file    Number of children: 0    Years of education: 8    Highest education level: Not on file   Occupational History     Employer: N/A   Tobacco Use    Smoking status: Current Every Day Smoker     Packs/day: 1.00     Years: 47.00     Pack years: 47.00     Types: Cigarettes    Smokeless tobacco: Never Used    Tobacco comment: Patient accepting of nicotine patch   Substance and Sexual Activity    Alcohol use: Yes     Comment: drinks beer three times a week    Drug use: Yes     Frequency: 7.0 times per week     Types: Cocaine     Comment: hx crack cocaine abuse and last used 3 weeks ago    Sexual activity: Not on file   Other Topics Concern    Not on file   Social History Narrative    Not on file     Social Determinants of Health     Financial Resource Strain:     Difficulty of Paying Living Expenses: Not on file   Food Insecurity:     Worried About Running Out of Food in the Last Year: Not on file    Tyree of Food in the Last Year: Not on file   Transportation Needs:     Lack of Transportation (Medical): Not on file    Lack of Transportation (Non-Medical):  Not on file   Physical Activity:     Days of Exercise per Week: Not on file    Minutes of Exercise per Session: Not on file   Stress:     Feeling of Stress : Not on file   Social Connections:     Frequency of Communication with Friends and Family: Not on file    Frequency of Social Gatherings with Friends and Family: Not on file    Attends Jew Services: Not on file    Active Member of Clubs or Organizations: Not on file    Attends Club or Organization Meetings: Not on file    Marital Status: Not on file   Intimate Partner Violence:     Fear of Current or Ex-Partner: Not on file    Emotionally Abused: Not on file    Physically Abused: Not on file    Sexually Abused: Not on file   Housing Stability:     Unable to Pay for Housing in the Last Year: Not on file    Number of [place] in the Last Year: Not on file    Unstable Housing in the Last Year: Not on file       Family History   Problem Relation Age of Onset    Diabetes Mother     Heart Disease Mother        Allergies:  Navane [thiothixene]    Home Medications:  Prior to Admission medications    Medication Sig Start Date End Date Taking? Authorizing Provider   Cholecalciferol (VITAMIN D3) 25 MCG TABS Take 1 tablet by mouth daily 12/23/21   Ritesh Camargo MD   escitalopram (LEXAPRO) 20 MG tablet Take 1 tablet by mouth daily 12/24/21   Ritesh Camargo MD   OLANZapine (ZYPREXA) 10 MG tablet Take 1 tablet by mouth nightly 12/23/21   Ritesh Camargo MD   traZODone (DESYREL) 100 MG tablet Take 1 tablet by mouth nightly as needed for Sleep 12/23/21   Ritesh Camargo MD       REVIEW OF SYSTEMS    (2-9 systems for level 4, 10 or more for level 5)      Review of Systems   Constitutional: Negative for chills and fever. HENT: Negative for sore throat. Eyes: Negative for visual disturbance. Respiratory: Negative for cough and shortness of breath. Cardiovascular: Negative for chest pain. Gastrointestinal: Negative for abdominal pain and vomiting. Endocrine: Negative for polyuria. Genitourinary: Negative for dysuria and hematuria. Musculoskeletal: Negative for back pain. Neurological: Negative for light-headedness and headaches. Psychiatric/Behavioral: Negative for confusion.        PHYSICAL EXAM   (up to 7 for level 4, 8 or more for level 5)      INITIAL VITALS:   /72   Pulse 82   Temp 97.3 °F (36.3 °C) (Oral)   Resp 16   Ht 6' 2\" (1.88 m)   Wt 180 lb (81.6 kg) SpO2 97%   BMI 23.11 kg/m²     Physical Exam  Constitutional:       Appearance: Normal appearance. HENT:      Head: Normocephalic. Nose: Nose normal.      Mouth/Throat:      Mouth: Mucous membranes are moist.      Pharynx: Oropharynx is clear. Eyes:      Extraocular Movements: Extraocular movements intact. Pupils: Pupils are equal, round, and reactive to light. Cardiovascular:      Rate and Rhythm: Normal rate and regular rhythm. Pulses: Normal pulses. Heart sounds: Normal heart sounds. Pulmonary:      Effort: Pulmonary effort is normal.      Breath sounds: Normal breath sounds. Abdominal:      Palpations: Abdomen is soft. Tenderness: There is no abdominal tenderness. There is no right CVA tenderness or left CVA tenderness. Musculoskeletal:      Cervical back: Neck supple. No tenderness. Right lower leg: No edema. Left lower leg: No edema. Skin:     General: Skin is warm. Capillary Refill: Capillary refill takes less than 2 seconds. Neurological:      General: No focal deficit present. Mental Status: He is alert and oriented to person, place, and time. Mental status is at baseline. Psychiatric:         Mood and Affect: Mood normal.       DIFFERENTIAL  DIAGNOSIS     PLAN (LABS / IMAGING / EKG):  Orders Placed This Encounter   Procedures    COVID-19, Rapid    CBC with Auto Differential    Basic Metabolic Panel    Ethanol    Urine Drug Screen    Immature Platelet Fraction       MEDICATIONS ORDERED:  No orders of the defined types were placed in this encounter. DDX: Suicidal ideation, homicidal ideation, auditory or visual hallucinations, acute psychosis, alcohol intoxication    DIAGNOSTIC RESULTS / Jason Hurt / ALETA   LAB RESULTS:  Results for orders placed or performed during the hospital encounter of 02/19/22   COVID-19, Rapid    Specimen: Nasopharyngeal Swab   Result Value Ref Range    Specimen Description . NASOPHARYNGEAL SWAB SARS-CoV-2, Rapid Not Detected Not Detected   CBC with Auto Differential   Result Value Ref Range    WBC 6.4 3.5 - 11.3 k/uL    RBC 4.70 4.21 - 5.77 m/uL    Hemoglobin 13.1 13.0 - 17.0 g/dL    Hematocrit 42.4 40.7 - 50.3 %    MCV 90.2 82.6 - 102.9 fL    MCH 27.9 25.2 - 33.5 pg    MCHC 30.9 28.4 - 34.8 g/dL    RDW 13.7 11.8 - 14.4 %    Platelets See Reflexed IPF Result 138 - 453 k/uL    MPV NOT REPORTED 8.1 - 13.5 fL    NRBC Automated 0.0 0.0 per 100 WBC    Differential Type NOT REPORTED     WBC Morphology NOT REPORTED     RBC Morphology NOT REPORTED     Platelet Estimate NOT REPORTED     Seg Neutrophils 59 36 - 65 %    Lymphocytes 33 24 - 43 %    Monocytes 7 3 - 12 %    Eosinophils % 1 1 - 4 %    Basophils 0 0 - 2 %    Immature Granulocytes 0 0 %    Segs Absolute 3.78 1.50 - 8.10 k/uL    Absolute Lymph # 2.11 1.10 - 3.70 k/uL    Absolute Mono # 0.45 0.10 - 1.20 k/uL    Absolute Eos # 0.06 0.00 - 0.44 k/uL    Basophils Absolute 0.00 0.00 - 0.20 k/uL    Absolute Immature Granulocyte 0.00 0.00 - 0.30 k/uL    Morphology Normal    Basic Metabolic Panel   Result Value Ref Range    Glucose 168 (H) 70 - 99 mg/dL    BUN 12 8 - 23 mg/dL    CREATININE 0.84 0.70 - 1.20 mg/dL    Bun/Cre Ratio NOT REPORTED 9 - 20    Calcium 8.8 8.6 - 10.4 mg/dL    Sodium 138 135 - 144 mmol/L    Potassium 3.1 (L) 3.7 - 5.3 mmol/L    Chloride 105 98 - 107 mmol/L    CO2 18 (L) 20 - 31 mmol/L    Anion Gap 15 9 - 17 mmol/L    GFR Non-African American >60 >60 mL/min    GFR African American >60 >60 mL/min    GFR Comment          GFR Staging NOT REPORTED    Ethanol   Result Value Ref Range    Ethanol 39 (H) <10 mg/dL    Ethanol percent 0.039 (H) <0.010 %   Immature Platelet Fraction   Result Value Ref Range    Platelet, Fluorescence Platelet clumps present, count appears adequate.  138 - 453 k/uL       IMPRESSION: 54-year-old gentleman presents to the emergency department with acute suicidal ideations and command hallucinations telling him to kill himself. Denies homicidal ideation, denies alcohol or drug use. Vital signs stable. Physical exam grossly unremarkable. I labs obtained. Social work consulted. After social work evaluation, both I and social work believe that patient does have a cyclic habit of taking cocaine/crack and using alcohol and been having command hallucinations. Recommendation would be to follow-up as an outpatient. Patient agreeable this time. Discussed with patient with regards to follow-up with psychiatrist, primary care doctor and first return precautions. Patient verbalized agreement understanding. Stable for discharge. EMERGENCY DEPARTMENT COURSE:        PROCEDURES:  None    CONSULTS:  None    FINAL IMPRESSION      1. Suicidal ideation          DISPOSITION / PLAN     DISPOSITION        PATIENT REFERRED TO:  [address] [address] [phone]  Schedule an appointment as soon as possible for a visit   For follow up    [facility] ED  [address]  Go to   As needed      DISCHARGE MEDICATIONS:  New Prescriptions    No medications on file       Kristen Tam MD  Emergency Medicine Resident    (Please note that portions of thisnote were completed with a voice recognition program.  Efforts were made to edit the dictations but occasionally words are mis-transcribed. )  I violence against that     Kristen Tam MD  Resident  02/19/22 [id number]

## 2022-02-19 NOTE — ED NOTES
Pt A&O x 4, does not appear in acute distress, RR even and unlabored, resting comfortably on stretcher with eyes closed and safeguard at bedside. Suicide precautions in place, pt states he is still experiencing auditory hallucinations which are instructing him to kill himself. Pt denies an active plan at this time and denies any homicidal intentions. Dr. Dustin Payne at bedside to assess pt. Writer will continue to monitor pt closely.          Heriberto Stone, LEO  11/14/72 4614

## 2022-02-19 NOTE — ED NOTES
Pt presents to the ED with auditory hallucinations and suicidal ideation. Pt states he is actively thinking of killing himself and states the \"voices are telling him to kill himself\". Soni Levi and Dr. Sally Montiels at bedside. Pt denies an active plan at this time. Pt will intermittently yell obscenities at his hallucinations but otherwise is compliant with care. Pt is A&O x 4 but experiencing auditory hallucinations, pt changed into safety scrubs and belongings accounted for and  placed in secure holding by police, vitals obtained, and suicide precautions initiated.  Initial assessment performed by physician, James Arnulfo will carry out initial orders/tasks and reassess pt.       Mykel Ruiz, LPN  57/50/36 6915

## 2022-03-05 ENCOUNTER — HOSPITAL ENCOUNTER (EMERGENCY)
Age: 62
Discharge: HOME | End: 2022-03-05
Attending: STUDENT IN AN ORGANIZED HEALTH CARE EDUCATION/TRAINING PROGRAM
Payer: MEDICAID

## 2022-03-05 VITALS
TEMPERATURE: 97.9 F | OXYGEN SATURATION: 99 % | RESPIRATION RATE: 18 BRPM | SYSTOLIC BLOOD PRESSURE: 118 MMHG | BODY MASS INDEX: 21.83 KG/M2 | WEIGHT: 170 LBS | HEART RATE: 97 BPM | DIASTOLIC BLOOD PRESSURE: 65 MMHG

## 2022-03-05 DIAGNOSIS — R44.0 AUDITORY HALLUCINATIONS: Primary | ICD-10-CM

## 2022-03-05 PROCEDURE — 99283 EMERGENCY DEPT VISIT LOW MDM: CPT

## 2022-03-05 ASSESSMENT — ENCOUNTER SYMPTOMS
FACIAL SWELLING: 0
DIARRHEA: 0
COUGH: 0
EYE REDNESS: 0
VOMITING: 0
ABDOMINAL PAIN: 0
COLOR CHANGE: 0
BACK PAIN: 0
RHINORRHEA: 0
SHORTNESS OF BREATH: 0
SORE THROAT: 0
NAUSEA: 0
EYE PAIN: 0

## 2022-03-05 NOTE — ED NOTES
Provider to send patient home he does not meet criteria and guardian and ACF provider are in agreement. Patient to be cabbed back to group home.

## 2022-03-05 NOTE — ED PROVIDER NOTES
EMERGENCY DEPARTMENT ENCOUNTER    Pt Name: Van Robison  MRN: [id number]  [place] 1960  Date of evaluation: 3/5/22  CHIEF COMPLAINT       Chief Complaint   Patient presents with    Mental Health Problem     HISTORY OF PRESENT ILLNESS   HPI  70-year-old male history of bipolar, chronic schizophrenia, crack cocaine use presents for evaluation of auditory hallucinations. Patient reports he has been hearing voices which tell him to hurt himself. He is not having any active suicidal ideation. He has no plan. He has no homicidal ideations. No other physical symptoms. He called [facility] from his group home today to get a ride to the ER to be evaluated. He has a history of frequent ER visits with similar symptoms. He gets monthly injections. His next injection is scheduled for 3 days from now. He is part of the [facility] act team and they will come out to the group home to give him injections. REVIEW OF SYSTEMS     Review of Systems   Constitutional: Negative for chills and fatigue. HENT: Negative for facial swelling, nosebleeds, rhinorrhea and sore throat. Eyes: Negative for pain and redness. Respiratory: Negative for cough and shortness of breath. Cardiovascular: Negative for chest pain and leg swelling. Gastrointestinal: Negative for abdominal pain, diarrhea, nausea and vomiting. Genitourinary: Negative for flank pain and hematuria. Musculoskeletal: Negative for arthralgias and back pain. Skin: Negative for color change and rash. Neurological: Negative for dizziness, tremors, facial asymmetry, speech difficulty, weakness and numbness. Psychiatric/Behavioral: Positive for hallucinations. Negative for self-injury and suicidal ideas.      PASTMEDICAL HISTORY     Past Medical History:   Diagnosis Date    Bipolar disorder ([facility]     Depression     GERD (gastroesophageal reflux disease)     Hallucinations     Headache(784.0)     Hepatitis     Schizophrenia, schizo-affective ([facility]     Substance abuse ([facility]     Tobacco abuse     Type II or unspecified type diabetes mellitus without mention of complication, not stated as uncontrolled     Urinary incontinence      Past Problem List  Patient Active Problem List   Diagnosis Code    Hematuria R31.9    Suicidal ideations R45.851    Cocaine abuse ([facility] F14.10    Schizoaffective disorder, bipolar type ([facility] F25.0    Schizoaffective disorder, depressive type ([facility] F25.1    Perianal abscess K61.0    Carla-rectal abscess K61.1    Schizophrenia ([facility] F20.9    Schizoaffective disorder ([facility] F25.9    Major depression with psychotic features ([facility] F32.3    Acute psychosis ([facility] F23    Depression with suicidal ideation F32. A, R45.851    Pneumonia due to infectious organism J18.9    Smoker F17.200    Ventricular ectopy I49.3    Low serum cortisol level (HCC) E27.40    Sepsis due to Klebsiella pneumoniae with no resultant organ failure (HCC) A41.4    Elevated BP without diagnosis of hypertension R03.0    Lower urinary tract symptoms (LUTS) R39.9    Dyspepsia R10.13    Skin rash R21    Hypernatremia E87.0     SURGICAL HISTORY       Past Surgical History:   Procedure Laterality Date    ABSCESS DRAINAGE N/A 02/11/2018    Carla anal abcess    DENTAL SURGERY      all teeth pulled     CURRENT MEDICATIONS       Discharge Medication List as of 3/5/2022  2:37 PM      CONTINUE these medications which have NOT CHANGED    Details   Cholecalciferol (VITAMIN D3) 25 MCG TABS Take 1 tablet by mouth daily, Disp-30 tablet, R-0Labeling may look different. 25 mcg=1000 Units. Please double check dosages. Normal      escitalopram (LEXAPRO) 20 MG tablet Take 1 tablet by mouth daily, Disp-30 tablet, R-0Normal      OLANZapine (ZYPREXA) 10 MG tablet Take 1 tablet by mouth nightly, Disp-30 tablet, R-0Normal      traZODone (DESYREL) 100 MG tablet Take 1 tablet by mouth nightly as needed for Sleep, Disp-30 tablet, R-0Normal           ALLERGIES     is allergic to navane [thiothixene]. FAMILY HISTORY     He indicated that his mother is alive. He indicated that his father is . He indicated that his brother is . SOCIAL HISTORY       Social History     Tobacco Use    Smoking status: Current Every Day Smoker     Packs/day: 1.00     Years: 47.00     Pack years: 47.00     Types: Cigarettes    Smokeless tobacco: Never Used    Tobacco comment: Patient accepting of nicotine patch   Substance Use Topics    Alcohol use: Yes     Comment: drinks beer three times a week    Drug use: Yes     Frequency: 7.0 times per week     Types: Cocaine     Comment: hx crack cocaine abuse and last used 3 weeks ago     PHYSICAL EXAM     INITIAL VITALS: /65   Pulse 97   Temp 97.9 °F (36.6 °C)   Resp 18   Wt 170 lb (77.1 kg)   SpO2 99%   BMI 21.83 kg/m²    Physical Exam  Constitutional:       Appearance: Normal appearance. HENT:      Head: Normocephalic and atraumatic. Nose: Nose normal.   Eyes:      Extraocular Movements: Extraocular movements intact. Pupils: Pupils are equal, round, and reactive to light. Cardiovascular:      Rate and Rhythm: Normal rate and regular rhythm. Pulmonary:      Effort: Pulmonary effort is normal. No respiratory distress. Breath sounds: Normal breath sounds. Abdominal:      General: Abdomen is flat. There is no distension. Palpations: Abdomen is soft. There is no mass. Musculoskeletal:         General: No swelling. Normal range of motion. Cervical back: Normal range of motion. No rigidity. Skin:     General: Skin is warm and dry. Neurological:      General: No focal deficit present. Mental Status: He is alert. Mental status is at baseline. Cranial Nerves: No cranial nerve deficit.    Psychiatric:      Comments: Interactive, mildly paranoid, no active SI or HI         MEDICAL DECISION MAKIN-year-old male history of chronic schizophrenia presents for evaluation of what sounds like chronic hallucinations. Patient has no active plan to hurt himself or anybody else. This is a very typical ER presentation for this patient. Does not meet inpatient criteria for psychiatric admission. Will discuss with group home and discharge for outpatient psychiatric follow-up. CRITICAL CARE:       PROCEDURES:    Procedures    DIAGNOSTIC RESULTS   EKG:All EKG's are interpreted by the Emergency Department Physician who either signs or Co-signs this chart in the absence of a cardiologist.        RADIOLOGY:All plain film, CT, MRI, and formal ultrasound images (except ED bedside ultrasound) are read by the radiologist, see reports below, unless otherwisenoted in MDM or here. No orders to display     LABS: All lab results were reviewed by myself, and all abnormals are listed below. Labs Reviewed - No data to display    EMERGENCY DEPARTMENTCOURSE:         Vitals:    Vitals:    03/05/22 1253   BP: 118/65   Pulse: 97   Resp: 18   Temp: 97.9 °F (36.6 °C)   SpO2: 99%   Weight: 170 lb (77.1 kg)       The patient was given the following medications while in the emergency department:  No orders of the defined types were placed in this encounter. CONSULTS:  None    FINAL IMPRESSION      1. Auditory hallucinations          DISPOSITION/PLAN   DISPOSITION Decision To Discharge 03/05/2022 02:02:59 PM      PATIENT REFERRED TO:  [facility] SPECIALIZED SURGERY  [facility]  [address] [facility] [address]  [phone]  Call  As needed    [address] [facility]  [phone]  On 3/9/2022  @ 10 am for injection of Invega with ACT team and Nurse    DISCHARGE MEDICATIONS:  Discharge Medication List as of 3/5/2022  2:37 PM        The care is provided during an unprecedented national emergency due to the novel coronavirus, COVID 19.   MD Yelena Redman MD  03/05/22 7939
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
## 2022-03-05 NOTE — ED NOTES
Mode of arrival (squad #, walk in, police, etc) : [employer]           Chief complaint(s): Suicidal        Arrival Note (brief scenario, treatment PTA, etc). : hearing AH to harm self         C= \"Have you ever felt that you should Cut down on your drinking? \"  no  A= \"Have people Annoyed you by criticizing your drinking? \"  no  G= \"Have you ever felt bad or Guilty about your drinking? \"  no  E= \"Have you ever had a drink as an Eye-opener first thing in the morning to steady your nerves or to help a hangover? \"  no     Deferred []       Reason for deferring: N/A     *If yes to two or more: probable alcohol abuse. *

## 2022-03-06 NOTE — PLAN OF CARE
Problem: Risk of Harm  Goal: LTG-Absence of harm  Outcome: Ongoing  Pt. Acknowledges he is hearing voices telling him to harm himself. Pt. Denies any plan to harm himself but admits to thoughts. Pt. Out for breakfast and is back in bed resting now. Pt. Remains safe on the unit. Q 15 minute checks for safety maintained. Goal: STG-Absence of Self Harm  Outcome: Ongoing  Pt. Free of self harm but admits to thoughts to harm himself and that he is hearing voices telling him to harm self. Pt. Denies any plans. Pt. Remains safe on the unit. Q 15 minute checks for safety maintained. Problem: Altered Mood, Psychotic Behavior  Goal: STG-Able to demonstrate trust by eating, participating in treatment and following staffs directions  Outcome: Ongoing  Pt. Did get up and ate breakfast. Relates he is tired and depressed and wants to sleep. Pt. Remains safe on the unit. Q 15 minute checks for safety maintained. Goal: LTG-Able to verbalize decrease in frequency and intensity of hallucinations  Outcome: Ongoing  Pt. Admits to hearing voices and says they tell him to hurt himself. Pt. Remains safe on the unit. Q 15 minute checks for safety maintained. Warm

## 2022-03-15 ENCOUNTER — HOSPITAL ENCOUNTER (EMERGENCY)
Age: 62
Discharge: HOME | End: 2022-03-15
Attending: EMERGENCY MEDICINE
Payer: MEDICAID

## 2022-03-15 VITALS
RESPIRATION RATE: 19 BRPM | SYSTOLIC BLOOD PRESSURE: 129 MMHG | HEIGHT: 74 IN | BODY MASS INDEX: 21.82 KG/M2 | HEART RATE: 88 BPM | WEIGHT: 170 LBS | TEMPERATURE: 97.8 F | OXYGEN SATURATION: 95 % | DIASTOLIC BLOOD PRESSURE: 78 MMHG

## 2022-03-15 DIAGNOSIS — R45.851 SUICIDAL IDEATION: Primary | ICD-10-CM

## 2022-03-15 DIAGNOSIS — R44.3 HALLUCINATIONS: ICD-10-CM

## 2022-03-15 LAB
ABSOLUTE EOS #: 0.1 K/UL (ref 0–0.4)
ABSOLUTE LYMPH #: 1.8 K/UL (ref 1–4.8)
ABSOLUTE MONO #: 0.5 K/UL (ref 0.1–1.3)
ACETAMINOPHEN LEVEL: <5 UG/ML (ref 10–30)
ALBUMIN SERPL-MCNC: 3.8 G/DL (ref 3.5–5.2)
ALP BLD-CCNC: 121 U/L (ref 40–129)
ALT SERPL-CCNC: 9 U/L (ref 5–41)
ANION GAP SERPL CALCULATED.3IONS-SCNC: 11 MMOL/L (ref 9–17)
AST SERPL-CCNC: 14 U/L
BASOPHILS # BLD: 1 % (ref 0–2)
BASOPHILS ABSOLUTE: 0 K/UL (ref 0–0.2)
BILIRUB SERPL-MCNC: 0.38 MG/DL (ref 0.3–1.2)
BUN BLDV-MCNC: 14 MG/DL (ref 8–23)
CALCIUM SERPL-MCNC: 8.5 MG/DL (ref 8.6–10.4)
CHLORIDE BLD-SCNC: 107 MMOL/L (ref 98–107)
CO2: 20 MMOL/L (ref 20–31)
CREAT SERPL-MCNC: 0.83 MG/DL (ref 0.7–1.2)
EOSINOPHILS RELATIVE PERCENT: 2 % (ref 0–4)
ETHANOL PERCENT: <0.01 %
ETHANOL: <10 MG/DL
GFR AFRICAN AMERICAN: >60 ML/MIN
GFR NON-AFRICAN AMERICAN: >60 ML/MIN
GFR SERPL CREATININE-BSD FRML MDRD: ABNORMAL ML/MIN/{1.73_M2}
GLUCOSE BLD-MCNC: 153 MG/DL (ref 70–99)
HCT VFR BLD CALC: 36.3 % (ref 41–53)
HEMOGLOBIN: 11.5 G/DL (ref 13.5–17.5)
LYMPHOCYTES # BLD: 37 % (ref 24–44)
MCH RBC QN AUTO: 27.9 PG (ref 26–34)
MCHC RBC AUTO-ENTMCNC: 31.7 G/DL (ref 31–37)
MCV RBC AUTO: 87.7 FL (ref 80–100)
MONOCYTES # BLD: 10 % (ref 1–7)
PDW BLD-RTO: 14.7 % (ref 11.5–14.9)
PLATELET # BLD: 293 K/UL (ref 150–450)
PMV BLD AUTO: 6.7 FL (ref 6–12)
POTASSIUM SERPL-SCNC: 3.6 MMOL/L (ref 3.7–5.3)
RBC # BLD: 4.13 M/UL (ref 4.5–5.9)
SALICYLATE LEVEL: <1 MG/DL (ref 3–10)
SARS-COV-2, RAPID: NOT DETECTED
SEG NEUTROPHILS: 50 % (ref 36–66)
SEGMENTED NEUTROPHILS ABSOLUTE COUNT: 2.5 K/UL (ref 1.3–9.1)
SODIUM BLD-SCNC: 138 MMOL/L (ref 135–144)
SPECIMEN DESCRIPTION: NORMAL
TOTAL PROTEIN: 6.1 G/DL (ref 6.4–8.3)
WBC # BLD: 4.8 K/UL (ref 3.5–11)

## 2022-03-15 PROCEDURE — 80179 DRUG ASSAY SALICYLATE: CPT

## 2022-03-15 PROCEDURE — 87635 SARS-COV-2 COVID-19 AMP PRB: CPT

## 2022-03-15 PROCEDURE — 85025 COMPLETE CBC W/AUTO DIFF WBC: CPT

## 2022-03-15 PROCEDURE — 36415 COLL VENOUS BLD VENIPUNCTURE: CPT

## 2022-03-15 PROCEDURE — 99283 EMERGENCY DEPT VISIT LOW MDM: CPT

## 2022-03-15 PROCEDURE — G0480 DRUG TEST DEF 1-7 CLASSES: HCPCS

## 2022-03-15 PROCEDURE — 80143 DRUG ASSAY ACETAMINOPHEN: CPT

## 2022-03-15 PROCEDURE — 80053 COMPREHEN METABOLIC PANEL: CPT

## 2022-03-15 NOTE — ED NOTES
Provisional Diagnosis:   History of Schizoaffective Disorder    Psychosocial and Contextual Factors:   Patient brought in by ED by Police after patient was having suicidal ideation. C-SSRS Summary:      Patient: X  Family: Patient has guardian  Agency: X Patient on the [facility] ACT team.    Substance Abuse: Patient has a history of crack cocaine use. Present Suicidal Behavior:   Patient reports suicidal ideation with thoughts of cutting himself with a knife. Patient reports suicidal command hallucinations as well. Verbal: X    Attempt:    Past Suicidal Behavior: Patient has a history of psychiatric hospitalizations for suicidal ideation. Verbal:    Attempt:      Self-Injurious/Self-Mutilation: Patient. Violence Current or Past: Patient has a history of verbal aggression. Protective Factors:    Patient lives in group home, has a guardian, has income, and is linked with [employer] ACT team.      Risk Factors:    Patient has a history of crack-cocaine use. Clinical Summary:    Evan Agrawal is a 57 y.o. male who presents to the ED by [place] police after patient states he was at home and he heard his voice telling him to grab a knife from downstairs and kill himself with it. Patient states he is also having suicidal thoughts himself. Patient states he is suicdial \"because all of my family is dead, and I have no one to hang out with. \" Patient reports poor sleep and poor appetite. Patient has poor insight    Patient reports crack-cocaine use 2 days ago. Patient received his Invega injection on 3/9/22. Patient has a history of cocaine use. Patient has a history of multiple psychiatric hospitalizations as well as ED visits. Patient lives in a group home, has a legal guardian, and is on the [facility] ACT team.     Patient last psychiatrically hospitalized on 12/7/21.     Level of Care Disposition:    Writer consulted with Dr. Chasity Hall who recommends patient be discharged as patient would not benefit from outpatient treatment as he is at baseline.  provided patient with brief patient prevention education interventions including follow-up outpatient treatment resources & recommendations, and lethal means counseling. Writer and patient discussed safety planning consisting of resources patient can use during or before a mental health crisis. The following service(s) is/are recommended for behavioral health follow-up:  Vika Jernigan [phone] any time for support. Medications: Take Medications as prescribed. Let your physician know if you have any concerns. Recovery Help line for assistance with linkage to mental health and substance use services. Call 211. Return to Emergency Department if you have any further medical concerns. Patient given National suicide prevention line at 2-897.647.8352.   Patient is to follow-up with providers at [facility] team.

## 2022-03-15 NOTE — ED PROVIDER NOTES
[address]  EMERGENCY DEPARTMENT ENCOUNTER      Pt Name: Abhinav Pulido  MRN: [id number]  [place] 1960  Date of evaluation: 3/15/22      CHIEF COMPLAINT       Chief Complaint   Patient presents with    Suicidal     pt was home , voices told him to get a knife from downstairs and kill himself. pt roommate -group home called 911. pt escorted by [employer]. HISTORY OF PRESENT ILLNESS   HPI 57 y.o. male presents with c/o suicidal thoughts and hallucinations. The patient was brought to the emergency department by police. Police state that the patient's roommate called them to his house as the patient was expressing suicidal thoughts. Apparently the patient has been having auditory hallucinations and thinking of killing himself. He states that \"I just can't take it any more doc\". Reports that he wants to stab himself but he denies making any attempt to harm himself. . Patient denies any drug or alcohol use. The patient does have chronic mental illness. He has frequent emergency department encounters with complaints of suicidal thoughts and auditory hallucinations. The symptoms have been going on for several years. REVIEW OF SYSTEMS     Review of Systems   Constitutional: Negative for fever. HENT: Negative for congestion. Eyes: Negative for visual disturbance. Respiratory: Negative for cough. Cardiovascular: Negative for chest pain. Gastrointestinal: Negative for abdominal distention. Genitourinary: Negative for difficulty urinating. Musculoskeletal: Negative for arthralgias. Skin: Negative for rash. Psychiatric/Behavioral: Positive for decreased concentration, dysphoric mood, hallucinations, sleep disturbance and suicidal ideas.          PAST MEDICAL HISTORY     Past Medical History:   Diagnosis Date    Bipolar disorder ([facility]     Depression     GERD (gastroesophageal reflux disease)     Hallucinations     Headache(784.0)     Hepatitis     Schizophrenia, schizo-affective ([facility]     Substance abuse ([facility]     Tobacco abuse     Type II or unspecified type diabetes mellitus without mention of complication, not stated as uncontrolled     Urinary incontinence        SURGICAL HISTORY       Past Surgical History:   Procedure Laterality Date    ABSCESS DRAINAGE N/A 2018    Carla anal abcess    DENTAL SURGERY      all teeth pulled       CURRENT MEDICATIONS       Discharge Medication List as of 3/15/2022  2:07 PM      CONTINUE these medications which have NOT CHANGED    Details   escitalopram (LEXAPRO) 20 MG tablet Take 1 tablet by mouth daily, Disp-30 tablet, R-0Normal      OLANZapine (ZYPREXA) 10 MG tablet Take 1 tablet by mouth nightly, Disp-30 tablet, R-0Normal      traZODone (DESYREL) 100 MG tablet Take 1 tablet by mouth nightly as needed for Sleep, Disp-30 tablet, R-0Normal      Cholecalciferol (VITAMIN D3) 25 MCG TABS Take 1 tablet by mouth daily, Disp-30 tablet, R-0Labeling may look different. 25 mcg=1000 Units. Please double check dosages. Normal             ALLERGIES     is allergic to navane [thiothixene]. FAMILY HISTORY     He indicated that his mother is alive. He indicated that his father is . He indicated that his brother is . SOCIAL HISTORY      reports that he has been smoking cigarettes. He has a 47.00 pack-year smoking history. He has never used smokeless tobacco. He reports current alcohol use. He reports current drug use. Frequency: 7.00 times per week. Drug: Cocaine.     PHYSICAL EXAM     INITIAL VITALS: /78   Pulse 88   Temp 97.8 °F (36.6 °C)   Resp 19   Ht 6' 2\" (1.88 m)   Wt 170 lb (77.1 kg)   SpO2 95%   BMI 21.83 kg/m²   Gen: NAD  Head: Normocephalic, atraumatic  Eye: Pupils equal round reactive to light, no conjunctivitis  Heart: Regular rate and rhythm no murmurs  Lungs: Clear to auscultation bilaterally, no respiratory distress  Chest wall: No crepitus, no tenderness palpation  Abdomen: Soft, nontender, nondistended, with no peritoneal signs  Skin: No diaphoresis. no lacerations. Neurologic: Patient is alert and oriented x3, motor and sensation is intact in all 4 extremities, speech is fluent  Extremities: Full range of motion, no cyanosis, no edema, no signs of trauma, no tenderness to palpation    MEDICAL DECISION MAKING:     MDM  57 y.o. male with depression, suicidal thoughts, The patient does not appear intoxicated. The patient is showing no signs of any acute active toxidrome. The patient denies any overdose attempt or  medical complaints at this time. We'll screen for any acetaminophen or salicylate ingestion, we'll check baseline renal function, liver function, a drug screen, cbc and reassess. Hospital Course:    discussed case with psychiatry service. They report that the patient is at baseline and does not require [facility] time. Recommended close follow up with mental health provider, return to ED if symptoms worsen, and warning precautions provided. DIAGNOSTIC RESULTS     LABS: All lab results were reviewed by myself, and all abnormals are listed below.   Labs Reviewed   CBC WITH AUTO DIFFERENTIAL - Abnormal; Notable for the following components:       Result Value    RBC 4.13 (*)     Hemoglobin 11.5 (*)     Hematocrit 36.3 (*)     Monocytes 10 (*)     All other components within normal limits   COMPREHENSIVE METABOLIC PANEL - Abnormal; Notable for the following components:    Glucose 153 (*)     Calcium 8.5 (*)     Potassium 3.6 (*)     Total Protein 6.1 (*)     All other components within normal limits   ACETAMINOPHEN LEVEL - Abnormal; Notable for the following components:    Acetaminophen Level <5 (*)     All other components within normal limits   SALICYLATE LEVEL - Abnormal; Notable for the following components:    Salicylate Lvl <1 (*)     All other components within normal limits   COVID-19, RAPID   ETHANOL   URINE DRUG SCREEN       EMERGENCY DEPARTMENT COURSE: Vitals:    Vitals:    03/15/22 1007 03/15/22 1013   BP:  129/78   Pulse:  88   Resp:  19   Temp:  97.8 °F (36.6 °C)   SpO2:  95%   Weight: 170 lb (77.1 kg)    Height: 6' 2\" (1.88 m)        The patient was given the following medications while in the emergency department:  No orders of the defined types were placed in this encounter. -------------------------  CRITICAL CARE:   CONSULTS: None  PROCEDURES: Procedures     FINAL IMPRESSION      1. Suicidal ideation    2. Hallucinations          DISPOSITION/PLAN   DISPOSITION Decision To Discharge 03/15/2022 01:47:13 PM      PAT[facility]:  [facility]   [address] [place] [id number]  [phone]      Follow up with [facility] Team      DISCHARGE MEDICATIONS:  Discharge Medication List as of 3/15/2022  2:07 PM            Mukesh Willoughby MD  Attending Emergency Physician                      Mukesh Willoughby MD  03/16/22 8475

## 2022-03-16 ASSESSMENT — ENCOUNTER SYMPTOMS
COUGH: 0
ABDOMINAL DISTENTION: 0

## 2022-04-06 ENCOUNTER — HOSPITAL ENCOUNTER (EMERGENCY)
Age: 62
Discharge: HOSPICE | End: 2022-04-06
Attending: EMERGENCY MEDICINE
Payer: MEDICAID

## 2022-04-06 ENCOUNTER — HOSPITAL ENCOUNTER (INPATIENT)
Age: 62
LOS: 9 days | Discharge: HOME | DRG: 750 | End: 2022-04-15
Attending: PSYCHIATRY & NEUROLOGY | Admitting: PSYCHIATRY & NEUROLOGY
Payer: MEDICAID

## 2022-04-06 VITALS
DIASTOLIC BLOOD PRESSURE: 88 MMHG | HEART RATE: 74 BPM | SYSTOLIC BLOOD PRESSURE: 124 MMHG | OXYGEN SATURATION: 97 % | TEMPERATURE: 97.2 F | RESPIRATION RATE: 16 BRPM

## 2022-04-06 DIAGNOSIS — R44.0 AUDITORY HALLUCINATION: ICD-10-CM

## 2022-04-06 DIAGNOSIS — R45.851 SUICIDAL IDEATION: ICD-10-CM

## 2022-04-06 DIAGNOSIS — T14.91XA SUICIDE ATTEMPT (HCC): Primary | ICD-10-CM

## 2022-04-06 PROBLEM — Z86.39 HX OF DIABETES MELLITUS: Status: ACTIVE | Noted: 2022-04-06

## 2022-04-06 PROBLEM — G25.1 DRUG-INDUCED TREMOR: Status: ACTIVE | Noted: 2022-04-06

## 2022-04-06 LAB
ABSOLUTE EOS #: 0.07 K/UL (ref 0–0.44)
ABSOLUTE IMMATURE GRANULOCYTE: <0.03 K/UL (ref 0–0.3)
ABSOLUTE LYMPH #: 2.46 K/UL (ref 1.1–3.7)
ABSOLUTE MONO #: 0.46 K/UL (ref 0.1–1.2)
ACETAMINOPHEN LEVEL: <5 UG/ML (ref 10–30)
ALBUMIN SERPL-MCNC: 4.4 G/DL (ref 3.5–5.2)
ALBUMIN/GLOBULIN RATIO: 1.6 (ref 1–2.5)
ALP BLD-CCNC: 147 U/L (ref 40–129)
ALT SERPL-CCNC: 10 U/L (ref 5–41)
AMPHETAMINE SCREEN URINE: NEGATIVE
ANION GAP SERPL CALCULATED.3IONS-SCNC: 13 MMOL/L (ref 9–17)
AST SERPL-CCNC: 19 U/L
BARBITURATE SCREEN URINE: NEGATIVE
BASOPHILS # BLD: 1 % (ref 0–2)
BASOPHILS ABSOLUTE: 0.03 K/UL (ref 0–0.2)
BENZODIAZEPINE SCREEN, URINE: NEGATIVE
BILIRUB SERPL-MCNC: 0.44 MG/DL (ref 0.3–1.2)
BUN BLDV-MCNC: 13 MG/DL (ref 8–23)
CALCIUM SERPL-MCNC: 8.9 MG/DL (ref 8.6–10.4)
CANNABINOID SCREEN URINE: NEGATIVE
CHLORIDE BLD-SCNC: 104 MMOL/L (ref 98–107)
CO2: 21 MMOL/L (ref 20–31)
COCAINE METABOLITE, URINE: POSITIVE
CREAT SERPL-MCNC: 1.02 MG/DL (ref 0.7–1.2)
EOSINOPHILS RELATIVE PERCENT: 1 % (ref 1–4)
ETHANOL PERCENT: 0.01 %
ETHANOL: 14 MG/DL
GFR AFRICAN AMERICAN: >60 ML/MIN
GFR NON-AFRICAN AMERICAN: >60 ML/MIN
GFR SERPL CREATININE-BSD FRML MDRD: ABNORMAL ML/MIN/{1.73_M2}
GLUCOSE BLD-MCNC: 88 MG/DL (ref 70–99)
HCT VFR BLD CALC: 36.3 % (ref 40.7–50.3)
HEMOGLOBIN: 12 G/DL (ref 13–17)
IMMATURE GRANULOCYTES: 0 %
LYMPHOCYTES # BLD: 40 % (ref 24–43)
MCH RBC QN AUTO: 29.3 PG (ref 25.2–33.5)
MCHC RBC AUTO-ENTMCNC: 33.1 G/DL (ref 28.4–34.8)
MCV RBC AUTO: 88.8 FL (ref 82.6–102.9)
METHADONE SCREEN, URINE: NEGATIVE
MONOCYTES # BLD: 8 % (ref 3–12)
NRBC AUTOMATED: 0 PER 100 WBC
OPIATES, URINE: NEGATIVE
OXYCODONE SCREEN URINE: NEGATIVE
PDW BLD-RTO: 14.1 % (ref 11.8–14.4)
PHENCYCLIDINE, URINE: NEGATIVE
PLATELET # BLD: 266 K/UL (ref 138–453)
PMV BLD AUTO: 9.5 FL (ref 8.1–13.5)
POTASSIUM SERPL-SCNC: 3.9 MMOL/L (ref 3.7–5.3)
RBC # BLD: 4.09 M/UL (ref 4.21–5.77)
SALICYLATE LEVEL: <1 MG/DL (ref 3–10)
SARS-COV-2, RAPID: NOT DETECTED
SEG NEUTROPHILS: 50 % (ref 36–65)
SEGMENTED NEUTROPHILS ABSOLUTE COUNT: 3.12 K/UL (ref 1.5–8.1)
SODIUM BLD-SCNC: 138 MMOL/L (ref 135–144)
SPECIMEN DESCRIPTION: NORMAL
TEST INFORMATION: ABNORMAL
TOTAL PROTEIN: 7.2 G/DL (ref 6.4–8.3)
TOXIC TRICYCLIC SC,BLOOD: NEGATIVE
WBC # BLD: 6.2 K/UL (ref 3.5–11.3)

## 2022-04-06 PROCEDURE — 80143 DRUG ASSAY ACETAMINOPHEN: CPT

## 2022-04-06 PROCEDURE — 87635 SARS-COV-2 COVID-19 AMP PRB: CPT

## 2022-04-06 PROCEDURE — 6370000000 HC RX 637 (ALT 250 FOR IP): Performed by: PSYCHIATRY & NEUROLOGY

## 2022-04-06 PROCEDURE — 80307 DRUG TEST PRSMV CHEM ANLYZR: CPT

## 2022-04-06 PROCEDURE — APPSS180 APP SPLIT SHARED TIME > 60 MINUTES

## 2022-04-06 PROCEDURE — G0480 DRUG TEST DEF 1-7 CLASSES: HCPCS

## 2022-04-06 PROCEDURE — 99285 EMERGENCY DEPT VISIT HI MDM: CPT

## 2022-04-06 PROCEDURE — 80179 DRUG ASSAY SALICYLATE: CPT

## 2022-04-06 PROCEDURE — 99222 1ST HOSP IP/OBS MODERATE 55: CPT | Performed by: PSYCHIATRY & NEUROLOGY

## 2022-04-06 PROCEDURE — 80053 COMPREHEN METABOLIC PANEL: CPT

## 2022-04-06 PROCEDURE — 1240000000 HC EMOTIONAL WELLNESS R&B

## 2022-04-06 PROCEDURE — 85025 COMPLETE CBC W/AUTO DIFF WBC: CPT

## 2022-04-06 PROCEDURE — 99223 1ST HOSP IP/OBS HIGH 75: CPT | Performed by: INTERNAL MEDICINE

## 2022-04-06 RX ORDER — LORAZEPAM 1 MG/1
2 TABLET ORAL EVERY 4 HOURS PRN
Status: DISCONTINUED | OUTPATIENT
Start: 2022-04-06 | End: 2022-04-15 | Stop reason: HOSPADM

## 2022-04-06 RX ORDER — HYDROXYZINE 50 MG/1
50 TABLET, FILM COATED ORAL 3 TIMES DAILY PRN
Status: DISCONTINUED | OUTPATIENT
Start: 2022-04-06 | End: 2022-04-15 | Stop reason: HOSPADM

## 2022-04-06 RX ORDER — OLANZAPINE 20 MG/1
20 TABLET ORAL NIGHTLY
Status: ON HOLD | COMMUNITY
End: 2022-04-14 | Stop reason: HOSPADM

## 2022-04-06 RX ORDER — TRAZODONE HYDROCHLORIDE 50 MG/1
50 TABLET ORAL NIGHTLY PRN
Status: DISCONTINUED | OUTPATIENT
Start: 2022-04-06 | End: 2022-04-06

## 2022-04-06 RX ORDER — VITAMIN B COMPLEX
1000 TABLET ORAL DAILY
Status: DISCONTINUED | OUTPATIENT
Start: 2022-04-06 | End: 2022-04-15 | Stop reason: HOSPADM

## 2022-04-06 RX ORDER — HALOPERIDOL 5 MG/ML
5 INJECTION INTRAMUSCULAR EVERY 4 HOURS PRN
Status: DISCONTINUED | OUTPATIENT
Start: 2022-04-06 | End: 2022-04-15 | Stop reason: HOSPADM

## 2022-04-06 RX ORDER — DIPHENHYDRAMINE HYDROCHLORIDE 50 MG/ML
50 INJECTION INTRAMUSCULAR; INTRAVENOUS EVERY 4 HOURS PRN
Status: DISCONTINUED | OUTPATIENT
Start: 2022-04-06 | End: 2022-04-15 | Stop reason: HOSPADM

## 2022-04-06 RX ORDER — PALIPERIDONE 6 MG/1
6 TABLET, EXTENDED RELEASE ORAL NIGHTLY
Status: DISCONTINUED | OUTPATIENT
Start: 2022-04-06 | End: 2022-04-11

## 2022-04-06 RX ORDER — MAGNESIUM HYDROXIDE/ALUMINUM HYDROXICE/SIMETHICONE 120; 1200; 1200 MG/30ML; MG/30ML; MG/30ML
30 SUSPENSION ORAL EVERY 6 HOURS PRN
Status: DISCONTINUED | OUTPATIENT
Start: 2022-04-06 | End: 2022-04-15 | Stop reason: HOSPADM

## 2022-04-06 RX ORDER — POLYETHYLENE GLYCOL 3350 17 G/17G
17 POWDER, FOR SOLUTION ORAL DAILY PRN
Status: DISCONTINUED | OUTPATIENT
Start: 2022-04-06 | End: 2022-04-15 | Stop reason: HOSPADM

## 2022-04-06 RX ORDER — PALIPERIDONE 6 MG/1
6 TABLET, EXTENDED RELEASE ORAL NIGHTLY
Status: ON HOLD | COMMUNITY
End: 2022-04-14 | Stop reason: HOSPADM

## 2022-04-06 RX ORDER — ESCITALOPRAM OXALATE 20 MG/1
20 TABLET ORAL DAILY
Status: DISCONTINUED | OUTPATIENT
Start: 2022-04-06 | End: 2022-04-15 | Stop reason: HOSPADM

## 2022-04-06 RX ORDER — HYDROXYZINE HYDROCHLORIDE 25 MG/1
25 TABLET, FILM COATED ORAL 3 TIMES DAILY PRN
Status: ON HOLD | COMMUNITY
End: 2022-04-14 | Stop reason: SDUPTHER

## 2022-04-06 RX ORDER — ACETAMINOPHEN 325 MG/1
650 TABLET ORAL EVERY 4 HOURS PRN
Status: DISCONTINUED | OUTPATIENT
Start: 2022-04-06 | End: 2022-04-15 | Stop reason: HOSPADM

## 2022-04-06 RX ORDER — TRAZODONE HYDROCHLORIDE 100 MG/1
100 TABLET ORAL NIGHTLY PRN
Status: DISCONTINUED | OUTPATIENT
Start: 2022-04-06 | End: 2022-04-08

## 2022-04-06 RX ORDER — LORAZEPAM 2 MG/ML
2 INJECTION INTRAMUSCULAR EVERY 4 HOURS PRN
Status: DISCONTINUED | OUTPATIENT
Start: 2022-04-06 | End: 2022-04-15 | Stop reason: HOSPADM

## 2022-04-06 RX ORDER — IBUPROFEN 400 MG/1
400 TABLET ORAL EVERY 6 HOURS PRN
Status: DISCONTINUED | OUTPATIENT
Start: 2022-04-06 | End: 2022-04-15 | Stop reason: HOSPADM

## 2022-04-06 RX ORDER — HALOPERIDOL 5 MG
5 TABLET ORAL EVERY 4 HOURS PRN
Status: DISCONTINUED | OUTPATIENT
Start: 2022-04-06 | End: 2022-04-15 | Stop reason: HOSPADM

## 2022-04-06 RX ADMIN — ESCITALOPRAM OXALATE 20 MG: 20 TABLET ORAL at 22:31

## 2022-04-06 RX ADMIN — PALIPERIDONE 6 MG: 6 TABLET, EXTENDED RELEASE ORAL at 22:31

## 2022-04-06 ASSESSMENT — ENCOUNTER SYMPTOMS
NAUSEA: 0
RHINORRHEA: 0
DIARRHEA: 0
VOMITING: 0
EYE REDNESS: 0
SHORTNESS OF BREATH: 0
ABDOMINAL PAIN: 0
COUGH: 0

## 2022-04-06 ASSESSMENT — SLEEP AND FATIGUE QUESTIONNAIRES
SLEEP PATTERN: DIFFICULTY FALLING ASLEEP;DISTURBED/INTERRUPTED SLEEP
AVERAGE NUMBER OF SLEEP HOURS: 4
DIFFICULTY STAYING ASLEEP: YES
DIFFICULTY FALLING ASLEEP: YES
DIFFICULTY ARISING: YES
DO YOU USE A SLEEP AID: YES
DO YOU HAVE DIFFICULTY SLEEPING: YES
RESTFUL SLEEP: NO

## 2022-04-06 ASSESSMENT — PAIN SCALES - GENERAL: PAINLEVEL_OUTOF10: 0

## 2022-04-06 ASSESSMENT — PATIENT HEALTH QUESTIONNAIRE - PHQ9: SUM OF ALL RESPONSES TO PHQ QUESTIONS 1-9: 9

## 2022-04-06 NOTE — ED NOTES
Provisional Diagnosis:  Depression with suicidal ideation       Psychosocial and Contextual Factors:     Lives in group home, Pt has guardian    C-SSRS Summary:    Patient:X  Family:  Agency:    Present Suicidal Behavior:    Verbal:Yes    Attempt: Yes    Past Suicidal Behavior:    Verbal: Yes    Attempt: Yes    Self-Injurious/Self-Mutilation: Not discussed      Protective Factors:  Has health insurance, willing to go inpatient      Risk Factors:  Substance abuse, hx of suicidal ideation with attempts per pt      Clinical Summary:  Pt is a 57year old male who presents to the ED with suicidal ideation and reported attempt. Pt states he lives in a group home with five other men, but left the group home yesterday and has been using (drugs) crack cocaine all day in an attempt to take his own life. Suicidal ideation appears to be patient's baseline however, at today's visit pt reports he attempted to take his own life by stepping in front of incoming traffic in addition to doing a large amount of drugs. Pt reports cars swerved and honked their horn at him. Pt reports an increase in suicidal ideation beyond his norm as well as an increase in auditory hallucinations. Pt states the voices say things such as, \" Keep trying to kill yourself Laura Corporal don't like you, I want you to die, Deborah Gene been a mess your whole life Adolfo\". Pt reports he's had a bad life. He shared his father  when he was a little boy and was raised by his mother. He says he was never good at school and this bothered him. Pt does endorse homicidal ideation saying there are many people he wants to kill, but denied having a particular person or plan. Pt has been to ED twice in March and twice in February with last admission being 2021. Pt reports his sister is his guardian. Pt has past diagnosis is schizoaffective disorder, bipolar disorder, depression, and schizophrenia.  Per medical chartng he is linked with [facility] Act Team.  and RN reached out to guardian twice, but there was no answer and no message could be left. Pt was pink slipped by Doctor. Level of Care Disposition:  SW discussed case with Doctor and will contact [facility] Access to initiate inpatient behavioral health treatment once pt is medically cleared and labs have resulted.           Megan Tiwari, [facility]  04/06/22 [address] [facility], [facility]  04/06/22 5367

## 2022-04-06 NOTE — ED NOTES
Second attempt to contact [facility], legal love, with no success. YOGESH and Dr. Mahi Corona notified.       Armaan Tello, LEO  42/86/63 8822

## 2022-04-06 NOTE — ED NOTES
The following labs labeled with pt sticker and tubed to lab:     [] Blue     [] Lavender   [] on ice  [] Green/yellow  [] Green/black [] on ice  [] Yellow  [] Red  [] Pink      [] COVID-19 swab    [] Rapid  [] PCR  [] Flu swab  [] Peds Viral Panel     [x] Urine Sample  [] Pelvic Cultures  [] Blood Cultures          Edu Carey LPN  76/58/77 9004

## 2022-04-06 NOTE — ED NOTES
Nurse and [employer]PD present when t placed in paper gown and personal belongings logged.  Pt remains cooperative      Haylee Hay, FELICEN  19/50/62 9857

## 2022-04-06 NOTE — BH NOTE
Group Therapy Note     Date: 4/6/2022     Group Start Time: 0900   Group End Time:  0920  Group Topic: Community meeting/Goal setting Group     [facility]      Patient refused to attend Goal setting Group [id number] after encouragement from staff. 1:1 talk time offered by staff as alternative to group session.

## 2022-04-06 NOTE — ED NOTES
Nurse, SW, and registration attempted sister who is POA for consent. Unable to reach.       Florinda Aguirre LPN  72/75/71 5571

## 2022-04-06 NOTE — ED PROVIDER NOTES
[address] [facility]     Emergency Department     Faculty Attestation    I performed a history and physical examination of the patient and discussed management with the resident. I have reviewed and agree with the residents findings including all diagnostic interpretations, and treatment plans as written. Any areas of disagreement are noted on the chart. I was personally present for the key portions of any procedures. I have documented in the chart those procedures where I was not present during the key portions. I have reviewed the emergency nurses triage note. I agree with the chief complaint, past medical history, past surgical history, allergies, medications, social and family history as documented unless otherwise noted below. Documentation of the HPI, Physical Exam and Medical Decision Making performed by scribnav is based on my personal performance of the HPI, PE and MDM. For Physician Assistant/ Nurse Practitioner cases/documentation I have personally evaluated this patient and have completed at least one if not all key elements of the E/M (history, physical exam, and MDM). Additional findings are as noted. 58 yo M suicidal ideation, \"feels like dying\", no plan, no injury, no vomit, no fever, no cp  pe gcs 15 flat affect, no pressured speech, no agitation, pt cooperative & pleasant,   No pronator drift, steady even slow gait,     -pt pink slipped, working on psych placement    EKG Interpretation    Interpreted by me      CRITICAL CARE: There was a high probability of clinically significant/life threatening deterioration in this patient's condition which required my urgent intervention. Total critical care time was 5 minutes. This excludes any time for separately reportable procedures.        Jana 24, DO  04/06/22 [address], DO  04/06/22 9040

## 2022-04-06 NOTE — PROGRESS NOTES
Behavioral Services  Medicare Certification Upon Admission    I certify that this patient's inpatient psychiatric hospital admission is medically necessary for:    [x] (1) Treatment which could reasonably be expected to improve this patient's condition,       [x] (2) Or for diagnostic study;     AND     [x](2) The inpatient psychiatric services are provided while the individual is under the care of a physician and are included in the individualized plan of care.     Estimated length of stay/service 4 to 7 days    Plan for post-hospital care Home with outpatient community mental health follow-up    Electronically signed by Carlos A Zafar MD on 4/6/2022 at 5:18 PM

## 2022-04-06 NOTE — ED TRIAGE NOTES
Pt arrived to Ed with c/o SI. Pt states he has been depressed about losing mother, father, and 3 brothers. Pt expresses that he wishes to walk into traffic and states he attempted once and cars began honking horns. Pt A&O x 4, does not appear in acute distress, RR even and unlabored, resting comfortably on stretcher with eyes open and call light in reach. Pt placed in a paper gown, vitals and EKG obtained. Initial assessment performed by physicianJamaal Pi will carry out initial orders/tasks and reassess pt.

## 2022-04-06 NOTE — CARE COORDINATION
BHI Biopsychosocial Assessment    Current Level of Psychosocial Functioning     Independent   Dependent  X  Minimal Assist     Comments:    Psychosocial High Risk Factors (check all that apply)    Unable to obtain meds   Chronic illness/pain    Substance abuse X  Lack of Family Support   Financial stress   Isolation   Inadequate Community Resources   Suicide attempt(s) X  Not taking medications   Victim of crime   Developmental Delay  Unable to manage personal needs    Age 72 or older   Homeless  No transportation   Readmission within 30 days  Unemployment X  Traumatic Event    Comments:   Psychiatric Advanced Directives: Pt denies    Family to Involve in Treatment: Suze Toribio (sister) is LG    Sexual Orientation: N/A      Patient Strengths: Pt is linked with [employer], pt lives at [facility]. Patient Barriers: Pt has hx of cocaine abuse, pt has hx of frequent psych admissions (most recent 12/7/2021-12/23/2021). Opiate Education Provided: Pt has hx of cocaine abuse    [facility]    Plan of Care   medication management, group/individual therapies, family meetings, psycho -education, treatment team meetings to assist with stabilization    Initial Discharge Plan: Pt to return to [facility]      Clinical Summary: Pt is a [de-identified] year old male who presented in the [facility] ED with Suicidal ideations and reports of auditory hallucinations. Pt has a hx of frequent psych hospitalizations, most recent being 12/7/2021-12/23/202. Pt lives at [facility] group home (address: [address] [place] [address]) and is linked with [facility] ACT team. Sister Chilo Chilel is his legal guardian (phone number: [phone]).  spoke with Suze Toribio and received verbal consent for treatment. Pt's participation in [employer] is minimal and stated he did not feel well throughout  assessment.  Pt identified that he does not like his group home because \"they don't feed me all the time\". Leilani Bautista identified that Vicente Yeboah has reported this to her  as well during phone conversation with Adriana Sanchez has a hx of cocaine use and chart reflects a positive drug screen. SW will continue to provide support and assist with discharge.

## 2022-04-06 NOTE — ED NOTES
Reviewed patient case with on call psychiatrist who finds that patient meets criteria for inpatient psychiatric admission. Pt to be admitted to the [facility] under the care of Dr. Kristen Pinto with a diagnosis of  Depression with suicidal ideation.  Pt admitted involuntary and assigned room # 135   ETA for [address]  04/06/22 [address]  04/06/22 6835

## 2022-04-06 NOTE — ED NOTES
The following labs labeled with pt sticker and tubed to lab:     [] Blue     [x] Lavender   [] on ice  [x] Green/yellow  [] Green/black [] on ice  [] Yellow  [] Red  [] Pink      [x] COVID-19 swab    [x] Rapid  [] PCR  [] Flu swab  [] Peds Viral Panel     [] Urine Sample  [] Pelvic Cultures  [] Blood Cultures           Isael Rubio LPN  26/88/11 9007

## 2022-04-06 NOTE — H&P
Department of Psychiatry  Attending Physician Psychiatric Assessment     Reason for Admission to Psychiatric Unit:  Threat to self requiring 24 hour professional observation  Failure of outpatient psychiatry treatment so that the beneficiary requires 24 hour professional observation and care  Concerns about patient's safety in the community    CHIEF COMPLAINT: Suicidal ideation    History obtained from: Patient, electronic medical record          HISTORY OF PRESENT ILLNESS:    Nikita Colvni is a 57 y.o. male who has a past medical history of schizoaffective disorder, bipolar, depression, polysubstance abuse, GERD, hepatitis, diabetes and chronic headaches. Patient presented to the ED \"with suicidal ideation and reported attempt. Pt states he lives in a group home with five other men, but left the group home yesterday and has been using (drugs) crack cocaine all day in an attempt to take his own life. Suicidal ideation appears to be patient's baseline however, at today's visit pt reports he attempted to take his own life by stepping in front of incoming traffic in addition to doing a large amount of drugs. Pt reports cars swerved and honked their horn at him. Pt reports an increase in suicidal ideation beyond his norm as well as an increase in auditory hallucinations. Pt states the voices say things such as, \" Keep trying to kill yourself Anne Marie Dominguez don't like you, I want you to die, Crystal Main been a mess your whole life Adolfo\". Pt reports he's had a bad life. He shared his father  when he was a little boy and was raised by his mother. He says he was never good at school and this bothered him. Pt does endorse homicidal ideation saying there are many people he wants to kill, but denied having a particular person or plan. Pt has been to ED twice in March and twice in February with last admission being 2021. Pt reports his sister is his guardian.  Pt has past diagnosis is schizoaffective disorder, bipolar disorder, depression, and schizophrenia. Per medical chartng he is linked with [facility] Team.  and RN reached out to guardian twice, but there was no answer and no message could be left. Pt was pink slipped by Doctor. \"    Patient has had multiple previous inpatient psychiatric hospitalizations. Most recently [facility] on 12/7/2021 to 12/23/2021. At that time patient was discharged on Zyprexa and Lexapro. Patient also has a medication trial history of Seroquel, Wellbutrin, Cogentin, Invega and Effexor. Per chart patient is linked with [facility] team.  Patient is unable to report during interview if he has been compliant with scheduled medications in the community. Patient was dismissive of staff and refused to answer many questions during interview. Patient required redirection multiple times to participate in interview. When discussing reasons for admission patient reports he is here because he tried to kill himself by walking into traffic and using drugs. Patient states he has nothing to live for and he is not happy at his group home. Patient endorses depression has gotten worse in the past 3 weeks and currently rates as a 10 out of 10 on a 1-10 scale (1 being low and 10 being high). Patient states he struggles with a decrease in sleep, interest, energy, concentration and appetite. Patient reports struggling with feelings of guilt and worthlessness. Patient states in the past he has had suicide attempts with a gun. Patient reports he no longer has access to a gun. Patient reports current plans are walking into traffic and using crack cocaine until he dies. At this time, the patient is not able to contract for safety outside the hospital and is not appropriate for a lower level of care. There is risk of decompensation and patient warrants further hospitalization for safety and stabilization.     When discussing symptoms of psychosis patient endorses command auditory hallucinations telling him to harm himself and reports voices are generally messing with him. Patient does endorse visual hallucinations however is unable to clarify the disturbance at this time. Patient refused to answer questions about symptoms of teetee or history of. Per chart history of bipolar symptoms. Patient endorses history of trauma however refused to elaborate on PTSD symptoms. Patient does endorse anxiety however refused to elaborate on symptoms and denies history of panic attacks. When discussing alcohol consumption patient reports he sometimes drinks. When discussing illicit drug use patient endorses crack cocaine use and denies all other drug use. UDS was positive for cocaine upon admission. History of head trauma: [x] Yes [] No    History of seizures: [] Yes [x] No    History of violence or aggression: [x] Yes [] No         PSYCHIATRIC HISTORY:  [x] Yes [] No    [facility]  Endorses previous lifetime suicide attempts  Multiple previous psychiatric hospital admissions. Most recently [facility] on 12/7/21    Home Medication Compliance: [] Yes [x] Unknown, patient refused to answer staff.     Past psychiatric medications includes: Zyprexa, Lexapro, Seroquel, Wellbutrin, Cogentin, Invega, Effexor    Adverse reactions from psychotropic medications: [] Yes [x] No         Lifetime Psychiatric Review of Systems         Depression: Endorses     Anxiety: Endorses     Panic Attacks: Denies     Teetee or Hypomania: History of per chart     Phobias: Denies     Obsessions and Compulsions: Denies     Body or Vocal Tics: Denies     Visual Hallucinations: Endorses     Auditory Hallucinations: Endorses     Delusions/Paranoia: Endorses     PTSD: Endorses    Past Medical History:        Diagnosis Date    [facility]     Depression     GERD (gastroesophageal reflux disease)     Hallucinations     Headache(784.0)     Hepatitis     Schizophrenia, schizo-affective ([facility]     Substance abuse ([facility]     Tobacco abuse     Type II or unspecified type diabetes mellitus without mention of complication, not stated as uncontrolled     Urinary incontinence        Past Surgical History:        Procedure Laterality Date    ABSCESS DRAINAGE N/A 02/11/2018    Carla anal abcess    DENTAL SURGERY      all teeth pulled       Allergies:  Navane [thiothixene]         Social History:     Born in: [place]  Family: Patient reports that he was raised by his mother and had no relationship with his father. He reports that he has 4 sisters whom he is close with. Highest Level of Education: Ninth grade  Occupation: Unemployed receives SSDI  Marital Status: Never   Children: No children  Residence:  Living in group home  Stressors: Mental health, substance abuse  Patient Assets/Supportive Factors: Patient is seeking additional support            DRUG USE HISTORY  Social History     Tobacco Use   Smoking Status Current Every Day Smoker    Packs/day: 1.00    Years: 47.00    Pack years: 47.00    Types: Cigarettes   Smokeless Tobacco Never Used   Tobacco Comment    Patient accepting of nicotine patch     Social History     Substance and Sexual Activity   Alcohol Use Yes    Comment: drinks beer three times a week     Social History     Substance and Sexual Activity   Drug Use Yes    Frequency: 7.0 times per week    Types: Cocaine    Comment: hx crack cocaine abuse and last used 3 weeks ago       When discussing alcohol consumption patient reports he sometimes drinks. When discussing illicit drug use patient endorses crack cocaine and denies all other drug use. UDS was positive for cocaine upon admission. LEGAL HISTORY:   HISTORY OF INCARCERATION: [x] Yes [] No    Family History:       Problem Relation Age of Onset    Diabetes Mother     Heart Disease Mother        Psychiatric Family History  Patient endorses psychiatric family history.      Suicides in family: [] Yes [x] No    Substance use in family: [x] Yes [] No         PHYSICAL EXAM:  Vitals:  BP (!) 132/93   Pulse 75   Temp 98.3 °F (36.8 °C) (Temporal)   Resp 14   Ht 6' 2\" (1.88 m)   Wt 170 lb (77.1 kg)   SpO2 97%   BMI 21.83 kg/m²   Pain Level: Denies    LABS:  Labs reviewed: [x] Yes  Last EKG in EMR reviewed: [x] Yes          Review of Systems   Constitutional: Negative for chills and weight loss. HENT: Negative for ear pain and nosebleeds. Eyes: Negative for blurred vision and photophobia. Respiratory: Negative for cough, shortness of breath and wheezing. Cardiovascular: Negative for chest pain and palpitations. Gastrointestinal: Negative for abdominal pain, diarrhea and vomiting. Genitourinary: Negative for dysuria and urgency. Musculoskeletal: Negative for falls and joint pain. Skin: Negative for itching and rash. Neurological: Negative for tremors, seizures and weakness. Endo/Heme/Allergies: Does not bruise/bleed easily. Physical Exam:   Constitutional:  Appears well-developed and well-nourished, no acute distress. HENT:   Head: Normocephalic and atraumatic. Eyes: Conjunctivae are normal. Right eye exhibits no discharge. Left eye exhibits no discharge. No scleral icterus. Neck: Normal range of motion. Neck supple. Pulmonary/Chest:  No respiratory distress or accessory muscle use, no wheezing. Cardiac: Regular rate and rhythm. Abdominal: Soft. Non-tender. Exhibits no distension. Musculoskeletal: Normal range of motion. Exhibits no edema. Neurological: cranial nerves II-XII grossly in tact, normal gait and station. Skin: Skin is warm and dry. Patient is not diaphoretic. No erythema.       Mental Status Examination:    Level of consciousness:  Lethargic   Appearance:  unkept, resting in bed, poor grooming   Behavior/Motor:  Semi-approachable, psychomotor slowing, dismissive  Attitude toward examiner:   Semicooperative, poor attention, poor eye contact  Speech: Slow rate, low volume, and tone. Mood: \"Down\"  Affect:  Depressed, flat  Thought processes: coherent and slow. Thought content: Active suicidal ideations, without current intent to harm self on unit. Unable to contract for safety off unit. Denies homicidal ideations               Endorses auditory and visual hallucinations              Denies delusions              Denies paranoia  Cognition:  Oriented to self, location, time, situation  Concentration: Reduced  Memory: Intact  Insight &Judgment: Poor           DSM-5 Diagnosis    Principal Problem: Schizoaffective disorder, depressive type [facility]    Cocaine use disorder      Psychosocial and Contextual factors:  Financial X  Occupational   Relationship   Legal   Living situation X  Educational     Past Medical History:   Diagnosis Date    [facility] disorder ([facility]     Depression     GERD (gastroesophageal reflux disease)     Hallucinations     Headache(784.0)     Hepatitis     Schizophrenia, sch[facility]-affective [facility]     Substance abuse ([facility]     Tobacco abuse     Type II or unspecified type diabetes mellitus without mention of complication, not stated as uncontrolled     Urinary incontinence         TREATMENT CONSIDERATIONS    Continue inpatient psychiatric treatment. Home medications reviewed. Medications as determined by attending physician:  MD advise: Consider restarting home medication  Monitor need and frequency of PRN medications. Attempt to develop insight. Follow-up daily while inpatient. Reviewed risks and benefits as well as potential side effects with patient.     CONSULT:  [x] Yes [] No  Internal medicine for medical management/medical H&P      Risk Management: close watch per standard protocol      Psychotherapy: participation in milieu and group and individual sessions with Attending Physician,  and Physician Assistant/CNP      Estimated length of stay:  2-14 days      GENERAL PATIENT/FAMILY EDUCATION  Patient will understand basic signs and symptoms, patient will understand benefits/risks and potential side effects from proposed medications, and patient will understand their role in recovery. Family is not active in patient's care. Patient assets that may be helpful during treatment include: Intent to participate and engage in treatment, sufficient fund of knowledge and intellect to understand and utilize treatments. Goals:    1) Remission of suicidal ideation. 2) Stabilization of symptoms prior to discharge. 3) Establish efficacy and tolerability of medications. Behavioral Services  Medicare Certification     Admission Day 1  I certify that this patient's inpatient psychiatric hospital admission is medically necessary for:    x (1) treatment which could reasonably be expected to improve this patient's condition, or    x (2) diagnostic study or its equivalent. Time Spent: 60 minutes    Marcelo Amaya is a 57 y.o. male being evaluated face to face    --[address] [place] [address],[address], APRN - CNP on 4/6/2022 at 2:41 PM    An electronic signature was used to authenticate this note. I independently saw and evaluated the patient. I reviewed the nurse practitioners documentation above. Any additional comments or changes to the nurse practitioners documentation are stated below otherwise agree with assessment. Plan will be as follows:  Patient well-known to this service. Worsening depression, substance use, felt guilty and wanted to end his life. Running into traffic to get hit by cars. Off medications. Willing to resume home medications. 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 States he can make $60 in a couple hours. Will provide safe environment and resume home meds.   Electronically signed by Bright Kimble MD on 4/6/2022 at 5:20 PM

## 2022-04-06 NOTE — ED NOTES
Pt continues to rest on cot with eyes closed. Respirations appear even and non labored. Safety maintained throughout room. Need met at this time. [facility] transport arrived to transfer pt to [facility]. [facility] returned belongs to staff.       Jannell Cogan, LPN  [id number]

## 2022-04-06 NOTE — PROGRESS NOTES
Pharmacy Medication History Note      List of current medications patient is taking is complete. Source of information: [facility] [employer], [place]    Changes made to medication list:  Medications removed (include reason, ex. therapy complete or physician discontinued, noncompliance):  Vitamin D3 (list clean up)    Medications added/doses adjusted: Added Hydroxyzine 25 mg three times daily as needed  Added Invega sustenna 234 mg every 4 weeks - last given 3/10/22  Adjusted Olanzapine to 20 mg nightly  Added Invega 6 mg nightly    Other notes (ex. Recent course of antibiotics, Coumadin dosing):  Patient's next Invega sustenna 234 mg injection is due 4/8/22 per PALOMO Lawler RN. Please let me know if you have any questions about this encounter. Thank you!     Electronically signed by Rita Cohen, [address] [place] [address] on 4/6/2022 at 11:35 AM

## 2022-04-06 NOTE — PROGRESS NOTES
Patient has not filled at [facility] or [employer] this year. OARRS report reviewed.  Left message for Swathi Jacome RN with [facility] to confirm medication list.

## 2022-04-06 NOTE — PLAN OF CARE
[facility]  Initial Interdisciplinary Treatment Plan NO      Original treatment plan Date & Time: 4/6/2022 0901    Admission Type:  Admission Type:  Involuntary    Reason for admission:   Reason for Admission: Suicidal ideation with a plan to walk into the road    Estimated Length of Stay:  5-7days  Estimated Discharge Date: to be determined by physician    PATIENT STRENGTHS:  Patient Strengths:Strengths: Medication Compliance  Patient Strengths and Limitations:Limitations: Inappropriate/potentially harmful leisure interests,Multiple barriers to leisure interests  Addictive Behavior: Addictive Behavior  In the past 3 months, have you felt or has someone told you that you have a problem with:  : None  Do you have a history of Chemical Use?:  ([facility] patient refused to answer)  Do you have a history of Alcohol Use?:  ([facility] patient did not answer)  Do you have a history of Street Drug Abuse?:  ([facility] patient did not answer)  Histroy of Prescripton Drug Abuse?:  ([facility] patient did not answer)  Medical Problems:  Past Medical History:   Diagnosis Date    Bipolar disorder ([facility]     Depression     GERD (gastroesophageal reflux disease)     Hallucinations     Headache(784.0)     Hepatitis     Schizophrenia, schizo-affective ([facility]     Sub[facility] ([facility]     Tobacco abuse     Type II or unspecified type diabetes mellitus without mention of complication, not stated as uncontrolled     Urinary incontinence      Status EXAM:Status and Exam  Normal: No  Facial Expression: Flat  Affect: Blunt  Level of Consciousness: Confused  Mood:Normal: No  Mood: Anxious,Depressed  Motor Activity:Normal: No  Motor Activity: Decreased  Interview Behavior: Cooperative  Preception: [place] to Person  Attention:Normal: No  Attention: Distractible  Thought Processes: Blocking  Thought Content:Normal: No  Thought Content: Poverty of Content  Delusions: No  Memory:Normal: No  Memory: Poor Recent,Poor Remote  Insight and Judgment: No  Insight and Judgment: Poor Judgment,Poor Insight  Present Suicidal Ideation: No  Present Homicidal Ideation: No    EDUCATION:   Learner Progress Toward Treatment Goals: reviewed group plans and strategies for care    Method:group therapy, medication compliance, individualized assessments and care planning    Outcome: needs reinforcement    PATIENT GOALS: to be discussed with patient within 72 hours    PLAN/TREATMENT RECOMMENDATIONS:     continue group therapy , medications compliance, goal setting, individualized assessments and care, continue to monitor pt on unit      SHORT-TERM GOALS:   Time frame for Short-Term Goals: 5-7 days    LONG-TERM GOALS:  Time frame for Long-Term Goals: 6 months  Members Present in Team Meeting: See Signature Sheet    Keri Mccarty

## 2022-04-06 NOTE — BH NOTE
Patient given tobacco quitline number 50010809824 at this time, refusing to call at this time, states \" I just dont want to quit now\"- patient given information as to the dangers of long term tobacco use. Continue to reinforce the importance of tobacco cessation.

## 2022-04-06 NOTE — ED PROVIDER NOTES
[facility]  ED  Emergency Department Encounter  Emergency Medicine Resident     Pt Name: García Bradley  MRN: [id number]  Shingflu 1960  Date of evaluation: 4/6/22  PCP:  No primary care provider on file. This patient was evaluated in the Emergency Department for symptoms described in the history of present illness. The patient was evaluated in the context of the global COVID-19 pandemic, which necessitated consideration that the patient might be at risk for infection with the SARS-CoV-2 virus that causes COVID-19. Institutional protocols and algorithms that pertain to the evaluation of patients at risk for COVID-19 are in a state of rapid change based on information released by regulatory bodies including the CDC and federal and state organizations. These policies and algorithms were followed during the patient's care in the ED. CHIEF COMPLAINT       Chief Complaint   Patient presents with    Suicidal     HISTORY Dina  (Location/Symptom, Timing/Onset, Context/Setting, Quality, Duration, Modifying Ok Come.)      García Bradley is a 57 y.o. male who presents with suicidal thought, and plan after he began ruminating on thoughts of his family members who had passed away. Patient states that he did in fact try to walk out into traffic today and cars were blowing their horns at him. He states that he continues to have voices giving him commands to hurt himself. When asked about homicidal ideation he states \" there is a lot of people,\" but denies any specific person he wishes to harm. He denies any chest pain, shortness of breath or symptoms aside from pain in bilateral feet at this time. No fevers. Patient is well-known to the emergency department over the last several years for similar complaint. He has been in the emergency department twice in March and twice in February for suicidal ideations.   His most recent psychiatric admission was December 25, 2021.    Medications: Escitalopram, olanzapine, trazodone    PAST MEDICAL / SURGICAL / SOCIAL / FAMILY HISTORY      has a past medical history of Bipolar disorder ([facility] [address] Depression, GERD (gastroesophageal reflux disease), Hallucinations, Headache(784.0), Hepatitis, Schizophrenia, schizo-affective ([facility] [address] Substance abuse ([facility] [address] Tobacco abuse, Type II or unspecified type diabetes mellitus without mention of complication, not stated as uncontrolled, and Urinary incontinence. has a past surgical history that includes Dental surgery and Abscess Drainage (N/A, 02/11/2018). Social History     Socioeconomic History    Marital status: Single     Spouse name: Not on file    Number of children: 0    Years of education: 8    Highest education level: Not on file   Occupational History     Employer: N/A   Tobacco Use    Smoking status: Current Every Day Smoker     Packs/day: 1.00     Years: 47.00     Pack years: 47.00     Types: Cigarettes    Smokeless tobacco: Never Used    Tobacco comment: Patient accepting of nicotine patch   Substance and Sexual Activity    Alcohol use: Yes     Comment: drinks beer three times a week    Drug use: Yes     Frequency: 7.0 times per week     Types: Cocaine     Comment: hx crack cocaine abuse and last used 3 weeks ago    Sexual activity: Not on file   Other Topics Concern    Not on file   Social History Narrative    Not on file     Social Determinants of Health     Financial Resource Strain:     Difficulty of Paying Living Expenses: Not on file   Food Insecurity:     Worried About Running Out of Food in the Last Year: Not on file    Tyree of Food in the Last Year: Not on file   Transportation Needs:     Lack of Transportation (Medical): Not on file    Lack of Transportation (Non-Medical):  Not on file   Physical Activity:     Days of Exercise per Week: Not on file    Minutes of Exercise per Session: Not on file   Stress:     Feeling of Stress : Not on file   Social Connections:     Frequency of Communication with Friends and Family: Not on file    Frequency of Social Gatherings with Friends and Family: Not on file    Attends Taoism Services: Not on file    Active Member of Clubs or Organizations: Not on file    Attends Club or Organization Meetings: Not on file    Marital Status: Not on file   Intimate Partner Violence:     Fear of Current or Ex-Partner: Not on file    Emotionally Abused: Not on file    Physically Abused: Not on file    Sexually Abused: Not on file   Housing Stability:     Unable to Pay for Housing in the Last Year: Not on file    Number of [place] in the Last Year: Not on file    Unstable Housing in the Last Year: Not on file       Family History   Problem Relation Age of Onset    Diabetes Mother     Heart Disease Mother        Allergies:  Navane [thiothixene]    Home Medications:  Prior to Admission medications    Medication Sig Start Date End Date Taking? Authorizing Provider   Cholecalciferol (VITAMIN D3) 25 MCG TABS Take 1 tablet by mouth daily 12/23/21   Abhay Alvarez MD   escitalopram (LEXAPRO) 20 MG tablet Take 1 tablet by mouth daily 12/24/21   Abhay Alvarez MD   OLANZapine (ZYPREXA) 10 MG tablet Take 1 tablet by mouth nightly 12/23/21   Abhay Alvarez MD   traZODone (DESYREL) 100 MG tablet Take 1 tablet by mouth nightly as needed for Sleep 12/23/21   Abhay Alvarez MD       REVIEW OF SYSTEMS    (2-9 systems for level 4, 10 or more for level 5)      Review of Systems   Constitutional: Negative for activity change, appetite change and fever. HENT: Negative for congestion and rhinorrhea. Eyes: Negative for redness. Respiratory: Negative for cough and shortness of breath. Cardiovascular: Negative for chest pain. Gastrointestinal: Negative for abdominal pain, diarrhea, nausea and vomiting. Genitourinary: Negative for dysuria and hematuria. Musculoskeletal: Positive for myalgias.  Negative for gait problem and joint swelling. Bilateral foot pain   Skin: Negative for wound. Neurological: Negative for headaches. PHYSICAL EXAM   (up to 7 for level 4, 8 or more for level 5)     INITIAL VITALS:    oral temperature is 97 °F (36.1 °C). His blood pressure is 132/75 and his pulse is 88. His respiration is 16 and oxygen saturation is 98%. Physical Exam  Constitutional:       General: He is not in acute distress. Appearance: Normal appearance. HENT:      Head: Normocephalic and atraumatic. Nose: Nose normal. No congestion. Mouth/Throat:      Mouth: Mucous membranes are moist.   Eyes:      Extraocular Movements: Extraocular movements intact. Pupils: Pupils are equal, round, and reactive to light. Cardiovascular:      Rate and Rhythm: Normal rate and regular rhythm. Pulses: Normal pulses. Heart sounds: Normal heart sounds. Pulmonary:      Effort: Pulmonary effort is normal. No respiratory distress. Breath sounds: Normal breath sounds. Abdominal:      General: There is no distension. Palpations: Abdomen is soft. Tenderness: There is no abdominal tenderness. Musculoskeletal:         General: Normal range of motion. Cervical back: Normal range of motion. Right lower leg: No edema. Left lower leg: No edema. Comments: Patient with dry skin noted to bilateral feet. No open sores or wounds noted. Patient without tenderness with palpation of bilateral feet, tib-fib   Skin:     General: Skin is warm. Capillary Refill: Capillary refill takes less than 2 seconds. Findings: No lesion or rash. Neurological:      Mental Status: He is alert and oriented to person, place, and time. Cranial Nerves: No cranial nerve deficit. Motor: No weakness. Psychiatric:         Thought Content: Thought content includes suicidal ideation.        DIFFERENTIAL  DIAGNOSIS     PLAN (LABS / IMAGING / EKG):  Orders Placed This Encounter   Procedures  COVID-19, Rapid    CBC with Auto Differential    Urine Drug Screen    Comprehensive Metabolic Panel    TOX SCR, BLD, ED     MEDICATIONS ORDERED:  No orders of the defined types were placed in this encounter. DDX: Suicidal ideation, hallucinations, command auditory hallucinations    Initial MDM/Plan: 57 y.o. male who presents with suicidal ideation and walking into traffic today with hopes of being hit by a car. Will obtain BHI work-up. Patient without any complaints at this time. Admission for psychiatric evaluation. DIAGNOSTIC RESULTS / EMERGENCY DEPARTMENT COURSE / MDM     LABS:  Labs Reviewed   CBC WITH AUTO DIFFERENTIAL - Abnormal; Notable for the following components:       Result Value    RBC 4.09 (*)     Hemoglobin 12.0 (*)     Hematocrit 36.3 (*)     All other components within normal limits   COMPREHENSIVE METABOLIC PANEL - Abnormal; Notable for the following components:    Alkaline Phosphatase 147 (*)     All other components within normal limits   TOX SCR, BLD, ED - Abnormal; Notable for the following components:    Acetaminophen Level <5 (*)     Ethanol 14 (*)     Ethanol percent 7.752 (*)     Salicylate Lvl <1 (*)     All other components within normal limits   COVID-19, RAPID   URINE DRUG SCREEN     RADIOLOGY:  No results found. EMERGENCY DEPARTMENT COURSE:  ED Course as of 04/06/22 0228   Wed Apr 06, 2022   0213 Ethanol percent(!): 0.014  Slightly elevated ethanol level. [CP]   0213 Hemoglobin Quant(!): 12.0  Slight anemia. [CP]      ED Course User Index  [CP] Leigh Morejon MD     Patient is a 65-year-old male with history of schizoaffective disorder, bipolar type, suicidal ideation presenting to the emergency department for evaluation of suicidal ideation and attempt to commit suicide today by walking into traffic. Patient only complains of bilateral foot pain at this time however is without any open sores or wounds.   Bilateral distal pulses are palpable in lower extremities. Patient did have a slightly elevated ethanol level however CBC and CMP are unremarkable. Patient is medically clear at this time for transfer to psychiatric facility. Attempted to contact guardian multiple times while patient in the emergency department. Unable to contact and patient was pink slipped given suicide attempt by walking into traffic today as well as patient disclosing to  that he has taken pills in excess and in suicide attempt over the past week. At this time patient is unable to care for oneself and keep him safe and is not suitable for outpatient safety agreement/contract. PROCEDURES:  None    CONSULTS:  None    CRITICAL CARE:  Please see attending note    FINAL IMPRESSION      1. Suicide attempt ([facility] [address]    2. Suicidal ideation    3. Auditory hallucination        DISPOSITION / PLAN     DISPOSITION      Admit/transfer to [facility]    PATIENTREFERRED TO:  No follow-up provider specified.     DISCHARGE MEDICATIONS:  New Prescriptions    No medications on file       Mamie Banks MD  Emergency Medicine Resident    (Please note that portions of this note were completed with a voice recognition program.  Efforts were made to edit the dictations but occasionally words are mis-transcribed.)       Aguilar Luciano MD  Resident  04/06/22 Briseida Madera MD  Resident  04/06/22 [id number]

## 2022-04-06 NOTE — BH NOTE
Patient given tobacco quitline number 24880629392 at this time, refusing to call at this time, states \" I just dont want to quit now\"- patient given information as to the dangers of long term tobacco use. Continue to reinforce the importance of tobacco cessation.

## 2022-04-06 NOTE — H&P
History and Physical Service  [facility] Internal Medicine    HISTORY AND PHYSICAL EXAMINATION            Date:   4/6/2022  Patient name:  Trev Canas  MRN:   [id number]  Account:  [de-identified]  YOB: 1960  PCP:    No primary care provider on file. Code Status:    Full Code    Chief Complaint:   Suicidal ideation    History Obtained From:     Patient ,medical record ,nursing staff    History of Present Illnes       The patient is a 57 y.o. Non- / non  male who presents   Patient admitted to [facility] from ER  Patient is suicidal  Has a history of schizoaffective disorder    Patient is not on any diabetic medications  Has been having auditory hallucination  Patient not answering questions although awake  Is withdrawn  Restless    Has coarse tremors    Trev Canas is a 57 y.o. male who presents with suicidal thought, and plan after he began ruminating on thoughts of his family members who had passed away. Patient states that he did in fact try to walk out into traffic today and cars were blowing their horns at him. He states that he continues to have voices giving him commands to hurt himself. When asked about homicidal ideation he states \" there is a lot of people,\" but denies any specific person he wishes to harm. He denies any chest pain, shortness of breath or symptoms aside from pain in bilateral feet at this time. No fevers. Patient is well-known to the emergency department over the last several years for similar complaint. He has been in the emergency department twice in March and twice in February for suicidal ideations. His most recent psychiatric admission was December 25, 2021.      Medications: Escitalopram, olanzapine, trazodone    tox screen positive cocaine           Past Medical History:   Diagnosis Date    Bipolar disorder ([address]     Depression     GERD (gastroesophageal reflux disease)     Hallucinations     Headache(784.0)     Hepatitis     Schizophrenia, schizo-affective ([facility] [address]     Substance abuse ([facility]     Tobacco abuse     Type II or unspecified type diabetes mellitus without mention of complication, not stated as uncontrolled     Urinary incontinence         Past Surgical History:     Past Surgical History:   Procedure Laterality Date    ABSCESS DRAINAGE N/A 02/11/2018    Carla anal abcess    DENTAL SURGERY      all teeth pulled        Medications Prior to Admission:     Prior to Admission medications    Medication Sig Start Date End Date Taking? Authorizing Provider   Cholecalciferol (VITAMIN D3) 25 MCG TABS Take 1 tablet by mouth daily 12/23/21   Júnior Culp MD   escitalopram (LEXAPRO) 20 MG tablet Take 1 tablet by mouth daily 12/24/21   Júnior Culp MD   OLANZapine (ZYPREXA) 10 MG tablet Take 1 tablet by mouth nightly 12/23/21   Júnior Culp MD   traZODone (DESYREL) 100 MG tablet Take 1 tablet by mouth nightly as needed for Sleep 12/23/21   Júnior Culp MD        Allergies:     Navane [thiothixene]    Social History:     Tobacco:    reports that he has been smoking cigarettes. He has a 47.00 pack-year smoking history. He has never used smokeless tobacco.  Alcohol:      reports current alcohol use. Drug Use:  reports current drug use. Frequency: 7.00 times per week. Drug: Cocaine.     Family History:     Family History   Problem Relation Age of Onset    Diabetes Mother     Heart Disease Mother        Review of Systems:     Pilar Level ,  ROS     SEE HPI          Dermatological ROS:      Negative for rash,                                            .  ==============                                                       Physical Exam:         Physical Exam   Vitals:    Vitals:    04/06/22 0457 04/06/22 0500 04/06/22 0505   BP: (!) 134/90  (!) 132/93   Pulse: 75     Resp: 14     Temp: 98.3 °F (36.8 °C)     TempSrc: Temporal     SpO2: 97%     Weight:  170 lb (77.1 kg)    Height:  6' 2\" (1.88 m)                     Body mass index is 21.83 kg/m². General Appearance:   Awake ,                Skin:                             No rash or erythema  HEENT ;                           Head                        Symmetrical , within normal limits                                           Eye                           Conjunctiva normal ,, sclera non-icteric . Ear                           External ear ok . Neck:                            No mass , no thyroid enlargement                                           Pulmonary/Chest:        Clear to auscultation bilaterally . No wheezes, rales or rhonchi . No abnormality on percussion                                                        Cardiovascular:            Normal rate, regular rhythm,                                          No murmur or  Gallop . Abdomen:                       Soft, non-tender                                           Normal bowels sounds,                                             Extremities:                    No  Edema .                                            Neurological ;                 Has coarde tremors , tardive dyskinesia ,    Musculo-skeletal ;                  No  gait abnormality                  No significant joint abnormality,                   Psych:   see Psych notect ,                                                                                           Investigations:      Laboratory Testing:  Recent Results (from the past 24 hour(s))   CBC with Auto Differential    Collection Time: 04/06/22  1:30 AM   Result Value Ref Range    WBC 6.2 3.5 - 11.3 k/uL    RBC 4.09 (L) 4.21 - 5.77 m/uL    Hemoglobin 12.0 (L) 13.0 - 17.0 g/dL    Hematocrit 36.3 (L) 40.7 - 50.3 %    MCV 88.8 82.6 - 102.9 fL    MCH 29.3 25.2 - 33.5 pg    MCHC 33.1 28.4 - 34.8 g/dL    RDW 14.1 11.8 - 14.4 % Platelets 646 489 - 692 k/uL    MPV 9.5 8.1 - 13.5 fL    NRBC Automated 0.0 0.0 per 100 WBC    Seg Neutrophils 50 36 - 65 %    Lymphocytes 40 24 - 43 %    Monocytes 8 3 - 12 %    Eosinophils % 1 1 - 4 %    Basophils 1 0 - 2 %    Immature Granulocytes 0 0 %    Segs Absolute 3.12 1.50 - 8.10 k/uL    Absolute Lymph # 2.46 1.10 - 3.70 k/uL    Absolute Mono # 0.46 0.10 - 1.20 k/uL    Absolute Eos # 0.07 0.00 - 0.44 k/uL    Basophils Absolute 0.03 0.00 - 0.20 k/uL    Absolute Immature Granulocyte <0.03 0.00 - 0.30 k/uL   COVID-19, Rapid    Collection Time: 04/06/22  1:30 AM    Specimen: Nasopharyngeal Swab   Result Value Ref Range    Specimen Description . NASOPHARYNGEAL SWAB     SARS-CoV-2, Rapid Not Detected Not Detected   Comprehensive Metabolic Panel    Collection Time: 04/06/22  1:30 AM   Result Value Ref Range    Glucose 88 70 - 99 mg/dL    BUN 13 8 - 23 mg/dL    CREATININE 1.02 0.70 - 1.20 mg/dL    Calcium 8.9 8.6 - 10.4 mg/dL    Sodium 138 135 - 144 mmol/L    Potassium 3.9 3.7 - 5.3 mmol/L    Chloride 104 98 - 107 mmol/L    CO2 21 20 - 31 mmol/L    Anion Gap 13 9 - 17 mmol/L    Alkaline Phosphatase 147 (H) 40 - 129 U/L    ALT 10 5 - 41 U/L    AST 19 <40 U/L    Total Bilirubin 0.44 0.3 - 1.2 mg/dL    Total Protein 7.2 6.4 - 8.3 g/dL    Albumin 4.4 3.5 - 5.2 g/dL    Albumin/Globulin Ratio 1.6 1.0 - 2.5    GFR Non-African American >60 >60 mL/min    GFR African American >60 >60 mL/min    GFR Comment         TOX SCR, BLD, ED    Collection Time: 04/06/22  1:30 AM   Result Value Ref Range    Acetaminophen Level <5 (L) 10 - 30 ug/mL    Ethanol 14 (H) <10 mg/dL    Ethanol percent 0.014 (H) <4.240 %    Salicylate Lvl <1 (L) 3 - 10 mg/dL    Toxic Tricyclic Sc,Blood NEGATIVE NEGATIVE   Urine Drug Screen    Collection Time: 04/06/22  2:40 AM   Result Value Ref Range    Amphetamine Screen, Ur NEGATIVE NEGATIVE    Barbiturate Screen, Ur NEGATIVE NEGATIVE    Benzodiazepine Screen, Urine NEGATIVE NEGATIVE    Cocaine Metabolite, Urine POSITIVE (A) NEGATIVE    Methadone Screen, Urine NEGATIVE NEGATIVE    Opiates, Urine NEGATIVE NEGATIVE    Phencyclidine, Urine NEGATIVE NEGATIVE    Cannabinoid Scrn, Ur NEGATIVE NEGATIVE    Oxycodone Screen, Ur NEGATIVE NEGATIVE    Test Information       Assay provides medical screening only. The absence of expected drug(s) and/or metabolite(s) may indicate diluted or adulterated urine, limitations of testing or timing of collection. Recent Labs     04/06/22  0130   HGB 12.0*   HCT 36.3*   WBC 6.2   MCV 88.8      K 3.9      CO2 21   BUN 13   CREATININE 1.02   GLUCOSE 88   AST 19   ALT 10   LABALBU 4.4       Hematology:  Recent Labs     04/06/22  0130   WBC 6.2   RBC 4.09*   HGB 12.0*   HCT 36.3*   MCV 88.8   MCH 29.3   MCHC 33.1   RDW 14.1      MPV 9.5     Chemistry:  Recent Labs     04/06/22  0130      K 3.9      CO2 21   GLUCOSE 88   BUN 13   CREATININE 1.02   ANIONGAP 13   LABGLOM >60   GFRAA >60   CALCIUM 8.9     Recent Labs     04/06/22  0130   PROT 7.2   LABALBU 4.4   AST 19   ALT 10   ALKPHOS 147*   BILITOT 0.44       Imaging/Diagnostics:       No results found.        Current Facility-Administered Medications   Medication Dose Route Frequency Provider Last Rate Last Admin    acetaminophen (TYLENOL) tablet 650 mg  650 mg Oral Q4H PRN Yang Stafford MD        aluminum & magnesium hydroxide-simethicone (MAALOX) 200-200-20 MG/5ML suspension 30 mL  30 mL Oral Q6H PRN Yang Stafford MD        hydrOXYzine (ATARAX) tablet 50 mg  50 mg Oral TID PRN Yang Stafford MD        ibuprofen (ADVIL;MOTRIN) tablet 400 mg  400 mg Oral Q6H PRN Yang Stafford MD        nicotine polacrilex (NICORETTE) gum 2 mg  2 mg Oral PRN Yang Stafford MD        polyethylene glycol (GLYCOLAX) packet 17 g  17 g Oral Daily PRN Yang Stafford MD        traZODone (DESYREL) tablet 50 mg  50 mg Oral Nightly PRN Yang Stafford MD        haloperidol lactate (HALDOL) injection 5 mg  5 mg IntraMUSCular Q4H PRN Sharla Lara MD        And    LORazepam (ATIVAN) injection 2 mg  2 mg IntraMUSCular Q4H PRN Sharla Lara MD        And    diphenhydrAMINE (BENADRYL) injection 50 mg  50 mg IntraMUSCular Q4H PRN Sharla Laar MD        haloperidol (HALDOL) tablet 5 mg  5 mg Oral Q4H PRN Sharla Lara MD        And    LORazepam (ATIVAN) tablet 2 mg  2 mg Oral Q4H PRN Sharla Lara MD        Vitamin D (CHOLECALCIFEROL) tablet 1,000 Units  1,000 Units Oral Daily Jackelyn Green MD         Impressions :      1. Active Problems:    Cocaine abuse ([facility] [address]    Suicidal ideations    Depression with suicidal ideation    Smoker    Drug-induced tremor    Hx of diabetes mellitus  Resolved Problems:    * No resolved hospital problems. *        2.  has a past medical history of Bipolar disorder ([facility] [address] Depression, GERD (gastroesophageal reflux disease), Hallucinations, Headache(784.0), Hepatitis, Schizophrenia, schizo-affective ([facility] [address] [facility] ([facility] Tobacco abuse, Type II or unspecified type diabetes mellitus without mention of complication, not stated as uncontrolled, and Urinary incontinence. Plans:     1     Medications: Allergies:     Allergies   Allergen Reactions    Navane [Thiothixene]        Current Meds:   Scheduled Meds:    Vitamin D  1,000 Units Oral Daily     Continuous Infusions:   PRN Meds: acetaminophen, aluminum & magnesium hydroxide-simethicone, hydrOXYzine, ibuprofen, nicotine polacrilex, polyethylene glycol, traZODone, haloperidol lactate **AND** LORazepam **AND** diphenhydrAMINE, haloperidol **AND** LORazepam       Patient admitted to [facility] with decompensation of underlying schizoaffective disorder  With suicidal ideation  Cocaine positive    Patient's vitals are stable  Lab data reviewed  No significant abnormality other than cocaine positive urine tox    Patient has tremors probably tardive dyskinesia secondary to to intermittent psychotic medication    Patient not cooperative at this time question    Patient was on vitamin D supplement at home will resume             Amy Aquino MD  4/6/2022  11:22 AM

## 2022-04-06 NOTE — BH NOTE
[facility]  Admission Note     Admission Type:   Admission Type:  Involuntary    Reason for admission:  Reason for Admission: Suicidal ideation with a plan to walk into the road    PATIENT STRENGTHS:  Strengths: Medication Compliance    Patient Strengths and Limitations:  Limitations: Inappropriate/potentially harmful leisure interests,Multiple barriers to leisure interests    Addictive Behavior:   Addictive Behavior  In the past 3 months, have you felt or has someone told you that you have a problem with:  : None  Do you have a history of Chemical Use?:  (PHOEBE patient refused to answer)  Do you have a history of Alcohol Use?:  (PHOEBE patient did not answer)  Do you have a history of Street Drug Abuse?:  (PHOEBE patient did not answer)  Histroy of Prescripton Drug Abuse?:  ([facility] patient did not answer)    Medical Problems:   Past Medical History:   Diagnosis Date    [facility] [address]     Depression     GERD (gastroesophageal reflux disease)     Hallucinations     Headache(784.0)     Hepatitis     Schizophrenia, schizo-affective ([facility] [address]     [facility]     Tobacco abuse     Type II or unspecified type diabetes mellitus without mention of complication, not stated as uncontrolled     Urinary incontinence        Status EXAM:  Status and Exam  Normal: No  Facial Expression: Flat  Affect: Blunt  Level of Consciousness: Confused  Mood:Normal: No  Mood: Anxious,Depressed  Motor Activity:Normal: No  Motor Activity: Decreased  Interview Behavior: Cooperative  Preception: [place] to Person  Attention:Normal: No  Attention: Distractible  Thought Processes: Blocking  Thought Content:Normal: No  Thought Content: Poverty of Content  Delusions: No  Memory:Normal: No  Memory: Poor Recent,Poor Remote  Insight and Judgment: No  Insight and Judgment: Poor Judgment,Poor Insight  Present Suicidal Ideation: No  Present Homicidal Ideation: No    Tobacco Screening:  Practical Counseling, on admission, corby X, if applicable and completed (first 3 are required if patient doesn't refuse):            ( )  Recognizing danger situations (included triggers and roadblocks)                    ( )  Coping skills (new ways to manage stress, exercise, relaxation techniques, changing routine, distraction)                                                           ( )  Basic information about quitting (benefits of quitting, techniques in how to quit, available resources  ( ) Referral for counseling faxed to [facility]                                           ( ) Patient refused counseling  ( ) Patient has not smoked in the last 30 days    Metabolic Screening:    Lab Results   Component Value Date    LABA1C 5.0 10/13/2021       Lab Results   Component Value Date    CHOL 150 10/13/2021    CHOL 167 08/11/2020    CHOL 179 02/13/2017    CHOL 127 05/09/2015    CHOL 168 08/20/2014    CHOL 158 12/31/2013    CHOL 178 08/15/2013    CHOL 166 09/17/2012    CHOL 210 (H) 02/03/2012     Lab Results   Component Value Date    TRIG 41 10/13/2021    TRIG 43 08/11/2020    TRIG 67 02/13/2017    TRIG 37 05/09/2015    TRIG 72 08/20/2014    TRIG 52 12/31/2013    TRIG 70 08/15/2013    TRIG 117 09/17/2012    TRIG 94 02/03/2012     Lab Results   Component Value Date    HDL 62 10/13/2021    HDL 74 08/11/2020    HDL 73 02/13/2017    HDL 57 05/09/2015    HDL 50 08/20/2014    HDL 43 12/31/2013    HDL 42 08/15/2013    HDL 38 (L) 09/17/2012    HDL 55 02/03/2012     No components found for: LDLCAL  No results found for: LABVLDL      Body mass index is 21.83 kg/m². BP Readings from Last 2 Encounters:   04/06/22 (!) 132/93   04/06/22 124/88           Pt admitted with followings belongings:  Dental Appliances: None  Vision - Corrective Lenses: None  Hearing Aid: None  Jewelry: None  Clothing:  [place] List / coat,Pants,Shirt,Undergarments (Comment),Socks  Were All Patient Medications Collected?: No  Other Valuables: Money (Comment) (4.14) Patient's home medications need verified. Patient oriented to surroundings and program expectations and copy of patient rights given. Received admission packet:  Yes. Consents reviewed, signed No. Refused had guardian. Patient verbalize understanding:  Yes. Patient education on precautions: Yes    Patient did not have cell phone in possession upon admission.                     Steve Haney RN

## 2022-04-06 NOTE — PLAN OF CARE
Problem: Altered Mood, Depressive Behavior:  Goal: Ability to disclose and discuss suicidal ideas will improve  Description: Ability to disclose and discuss suicidal ideas will improve  4/6/2022 1106 by Farhana Luis RN  Outcome: Ongoing  Patient reports fleeting suicide at this time. Patient did not describe in detail their suicidal ideations but reports thinking about these thoughts throughout the day. Patient is pleasant and able to participate in care planning. Patient does contract for safety while on the unit. If thoughts of suicidal arise, patient will contact a staff member immediately. Q15 minute checks in place for patient safety. Patient remains safe at this time. Problem: Altered Mood, Depressive Behavior:  Goal: Absence of self-harm  Description: Absence of self-harm  4/6/2022 1106 by Farhana Luis RN  Outcome: Ongoing  Patient has remained free from self-harm during admission. Patient has had thoughts of harming self before but denied self-harm ideations today. If these types of thoughts arise, patient will notify a staff member. Patient currently feels safe. Q15 Minute checks in place for patient safety. Problem: Tobacco Use:  Goal: Inpatient tobacco use cessation counseling participation  Description: Inpatient tobacco use cessation counseling participation  4/6/2022 1106 by Farhana Luis RN  Outcome: Ongoing  Patient is not interested in tobacco cessation at this time. Problem: Suicide risk  Goal: Provide patient with safe environment  Description: Provide patient with safe environment  4/6/2022 1106 by Farhana Luis RN  Outcome: Ongoing  Patient currently feels safe. Patient has remained safe since admission. Doors are locked at all times and q15 minute checks are in place for patient safety.

## 2022-04-06 NOTE — ED NOTES
Report given to Eben Garcia RN. Pt continues to rest on cot with eyes closed. Respirations appear even and non labored. Safety maintained throughout room. Need met at this time.       Jacky Cates LPN  16/38/51 [id number]

## 2022-04-06 NOTE — ED NOTES
SW notified  Beatriz Gregory as well as ACT Team of pt's admission. Guardian was not able to be contacted after multiple attempts.  said she would let guardian know.       CHANEL Escoto  04/06/22 0579 - Enterovirus /  Rhinovirus /  PNA suspected gram negative organism  - IV rocephin  - IV hydration  - ID consulted - Enterovirus /  Rhinovirus /  PNA suspected gram negative organism  - IV Rocephin  - IV hydration  - ID consulted - Enterovirus /  Rhinovirus /  PNA suspected gram negative organism  - IV zosyn /  vancomycin  - IV hydration  - ID consulted

## 2022-04-06 NOTE — ED NOTES
Pt resting on cot with eyes closed. Respirations appear even and nonlabored. Pt remains cooperative. Needs met at this time.       Myron Reyna LPN  10/79/02 [id number]

## 2022-04-06 NOTE — GROUP NOTE
Group Therapy Note    Date: 4/6/2022    Group Start Time: 1100  Group End Time: 7033  Group Topic: Psychoeducation    [facility] Leopold Hedges, CTRS    Patient refused to attend creative expression group at 1100 after encouragement from staff. 1:1 talk time offered by staff as alternative to group session.

## 2022-04-06 NOTE — ED NOTES
[] Sosa    [] [facility]    [x]  [facility] ASSESSMENT      Y  N     [x] [] In the past two weeks have you had thoughts of hurting yourself in any way? [x] [] In the past two weeks have you had thoughts that you would be better off dead? [x] [] Have you made a suicide attempt in the past two months? Pt states he walked into traffic and cars \" beeped\" at him   [x] [] Do you have a plan for hurting yourself or suicide? [] [x] Presence of hallucinations/voices related to hurting himself or herself or someone else. SUICIDE/SECURITY WATCH PRECAUTION CHECKLIST     Orders    [x]  Suicide/Security Watch Precautions initiated as checked below:   4/6/22 1:28 AM EDT 26/H26A    [x] Notified physician:  Anh Campo DO  4/6/22 1:28 AM EDT    [x] Orders obtained as appropriate:     [x] 1:1 Observer     [x] Psych Consult     [] Psych Consult    Name:  Date:  Time:    [x] 1:1 Observer, Notified by:  Verona Russell LPN    Contact Nurse Supervisor    [x] Remove all personal clothes from room and place in snap/paper gown/pants. Slipper only    [x] Remove all personal belongings from room and secured away from patient. Documentation    [x] Initiate Suicide/Security Watch Precaution Flow Sheet    [x] Initiate individualized Care Plan/Problem    [x] Document why precautions initiated on flow sheet (Initiate Nursing Care Plan/Problem)    [x] 1:1 Observer in place; instructions provided. Suicide precautions require observer be within arms length. [x] Nurse-Observer Communication Hand-off initiated by RN, reviewed with Observer. Subsequently used as Hand Off between Observers. [x] Initiate every 15 minute observations per observer as delegated by the RN.     [x] Initiate RN assessment and documentation    Environmental Scan  Search Criteria and Process: OPTIONAL, see Search Policy    [] Reason for search:    [x] Nursing in presence of second person to search patient    [x] Patient notified of reason for body assessment and belongings search:     Persons present during search: MPD   Results of search and disposition:       Searchers Name: Ami Corrigan LPN and SAVI    These items or items similar should be removed from the room:   [x] Chairs   [x] Telephone   [x] Trash cans and liners   [x] Plastic utensils (order Patient Safety tray)   [x] Empty or remove Sharps containers   [x] All personal clothing/belongings removed   [x] All unnecessary lead wires, electrical cords, draw cords, etc.   [x] Flowers and plants   [x] Double check for lighters, matches, razors, any glass items etc that can be used as weapons. Person completing Checklist: Jacky Cates LPN       GENERAL INFORMATION     Y  N     [x] [] Has the patient been informed that they are on a watch and what that means? [x] [] Can the patient get out of Bed without nursing assistance? [x] [] Can the patient use the restroom without nursing assistance? [] [x] Can the patient walk the halls to [place] their legs? \"   [] [x] Does the patient have metal utensils? [x] [] Have the patient's belongings been placed out of control of the patient? [x] [] Have the patient and his/her belongings been checked for contraband? [] [x] Is the patient under any visitor restrictions? If Yes, explain:   [] [] Is the patient under an alias? [facility] Name:   Authorized visitors (no more than two are to be on the list)   Name/Relationship:   Name/Relationship:    Name of Staff member that you  Received this information from?:    General Description:    Sebastian Parker 29/H28A male 57 y.o. Admission weight:      Race: []  [x] Black  []   []   [] [place] [] Other  Facial Hair:  [] Yes  [] No  If yes, please describe: Identifying Marks (i.e. Visible tattoos, scars, etc... ):     NURSING CARE PLAN    Nursing Diagnosis: Risk of Self Directed Harm  [] Actual  [x] Potential  Date Started: 4/6/22 Etiological Factors: (related to)  [x] Expressed or implied suicidal ideation/behavior  [x] Depression  [x] Suicide attempt      [x] Low self-esteem  [] Hallucinations      [x] Feeling of Hopelessness  [] Substance abuse or withdrawal    [] Dysfunctional family  [x] Major traumatic event, eg., divorce, etc   [] Excessive stress/anxiety    4/6/22    Expected Outcomes    Patient will:   [x] Patient will remain safe for the duration of their stay   [x] Patient's environment will be safe, eg. Free of potential suicide weapons   [] Verbalize Recovery from suicidal episode and improvement in self-worth   [x] Discuss feeling that precipitated suicide attempt/thoughts/behavior   [] Will describe available resources for crisis prevention and management   [x] Will verbalize positive coping skills     Nursing Intervention   [x] Assessment and Observations hourly   [x] Suicide Precautions implemented with patient, should be 1:1 observation   [x] Document observation [id number] and RN assessment hourly   [] Consult physician for:    [x] Psychiatric consult    [] Pharmacological therapy    [] Other:    [x] Patient search completed by security   [x] Initiated appropriate safety protocols by removing from the patient's environment anything that could be used to inflict self injury, eg. Order safe tray, snap gown, etc   [x] Maintain open, warm, caring, non-judgmental attitude/manner towards patient   [] Discuss advantages and disadvantages of existing coping methods/skills   [x] Assist and educate patient with identifying present strengths and coping skills   [x] Keep patient informed regarding plan of care and provide clear concise explanations. Provide the patient/family education information as well as telephone numbers and other information about crisis centers, hot lines, and counselors.     Discharge Planning:   [] Referral  [] Groups [] Health agencies  [] Other:          Angy Dodson LPN  62/48/26 [id number]

## 2022-04-07 LAB
ESTIMATED AVERAGE GLUCOSE: 105 MG/DL
HBA1C MFR BLD: 5.3 % (ref 4–6)

## 2022-04-07 PROCEDURE — 36415 COLL VENOUS BLD VENIPUNCTURE: CPT

## 2022-04-07 PROCEDURE — 1240000000 HC EMOTIONAL WELLNESS R&B

## 2022-04-07 PROCEDURE — APPSS30 APP SPLIT SHARED TIME 16-30 MINUTES: Performed by: PSYCHIATRY & NEUROLOGY

## 2022-04-07 PROCEDURE — 99232 SBSQ HOSP IP/OBS MODERATE 35: CPT | Performed by: PSYCHIATRY & NEUROLOGY

## 2022-04-07 PROCEDURE — 6370000000 HC RX 637 (ALT 250 FOR IP): Performed by: INTERNAL MEDICINE

## 2022-04-07 PROCEDURE — 6370000000 HC RX 637 (ALT 250 FOR IP): Performed by: PSYCHIATRY & NEUROLOGY

## 2022-04-07 PROCEDURE — 90833 PSYTX W PT W E/M 30 MIN: CPT | Performed by: PSYCHIATRY & NEUROLOGY

## 2022-04-07 PROCEDURE — 83036 HEMOGLOBIN GLYCOSYLATED A1C: CPT

## 2022-04-07 RX ADMIN — HYDROXYZINE HYDROCHLORIDE 50 MG: 50 TABLET, FILM COATED ORAL at 20:51

## 2022-04-07 RX ADMIN — PALIPERIDONE 6 MG: 6 TABLET, EXTENDED RELEASE ORAL at 20:51

## 2022-04-07 RX ADMIN — ESCITALOPRAM OXALATE 20 MG: 20 TABLET ORAL at 08:58

## 2022-04-07 RX ADMIN — Medication 1000 UNITS: at 08:58

## 2022-04-07 RX ADMIN — TRAZODONE HYDROCHLORIDE 100 MG: 100 TABLET ORAL at 20:51

## 2022-04-07 NOTE — GROUP NOTE
Group Therapy Note    Date: 4/7/2022    Group Start Time: 1100  Group End Time: 1150  Group Topic: Cognitive Skills    JESUS Ervin Courser, CTRS        Group Therapy Note    Attendees: 8/19         Pt did not participate in Cognitive Skills Group at 1100am when encouraged by RT due to resting in room. Pt was offered talk time as an alternative to group but declined.        Discipline Responsible: Psychoeducational Specialist      Signature:  Swathi York

## 2022-04-07 NOTE — GROUP NOTE
Group Therapy Note    Date: 4/7/2022    Group Start Time: 1430  Group End Time: 1520  Group Topic: Cognitive Skills    JESUS Medrano, CTRS        Group Therapy Note    Attendees: 5/19         Pt did not participate in Cognitive Skills Group at 1430 when encouraged by RT due to resting in room. Pt was offered talk time as an alternative to group but declined.        Discipline Responsible: Psychoeducational Specialist      Signature:  Jad Aguirre

## 2022-04-07 NOTE — PLAN OF CARE
[facility]  Day 3 Interdisciplinary Treatment Plan NOTE    Review Date & Time: 4/7/2022             1300    Admission Type:   Admission Type:  Involuntary    Reason for admission:  Reason for Admission: Suicidal ideation with a plan to walk into the road  Estimated Length of Stay: 5-7 days  Estimated Discharge Date Update: to be determined by physician    PATIENT STRENGTHS:  Patient Strengths Strengths: Medication Compliance  Patient Strengths and Limitations:Limitations: Difficult relationships / poor social skills,Difficulty problem solving/relies on others to help solve problems,Inappropriate/potentially harmful leisure interests,Tendency to isolate self,Lacks leisure interests,Apathetic / unmotivated  Addictive Behavior:Addictive Behavior  In the past 3 months, have you felt or has someone told you that you have a problem with:  : None  Do you have a history of Chemical Use?:  ([facility] patient refused to answer)  Do you have a history of Alcohol Use?:  ([facility] patient did not answer)  Do you have a history of Street Drug Abuse?:  (PHOEBE patient did not answer)  Histroy of Prescripton Drug Abuse?:  ([facility] patient did not answer)  Medical Problems:  Past Medical History:   Diagnosis Date    Bipolar disorder ([address]     Depression     GERD (gastroesophageal reflux disease)     Hallucinations     Headache(784.0)     Hepatitis     Schizophrenia, schizo-affective ([facility] [address]     Substance abuse ([facility] [address]     Tobacco abuse     Type II or unspecified type diabetes mellitus without mention of complication, not stated as uncontrolled     Urinary incontinence        Risk:  Fall RiskTotal: 67  Dusty Scale Dusty Scale Score: 21  BVC    Change in scores no Changes to plan of Care no    Status EXAM:   Status and Exam  Normal: No  Facial Expression: Flat  Affect: Blunt  Level of Consciousness: Alert  Mood:Normal: No  Mood: Depressed,Anxious  Motor Activity:Normal: No  Motor Activity: Decreased  Interview Behavior: Cooperative  Preception: [place] to [employer] to Time,[place] to Place,[place] to Situation  Attention:Normal: No  Attention: Distractible  Thought Processes: Circumstantial  Thought Content:Normal: No  Thought Content: Poverty of Content  Hallucinations: Auditory (Comment)  Delusions: No  Memory:Normal: No  Memory: Poor Recent,Poor Remote  Insight and Judgment: No  Insight and Judgment: Poor Judgment,Poor Insight  Present Suicidal Ideation: Yes (no plan contracts for safety.)  Present Homicidal Ideation: No    Daily Assessment Last Entry:             Patient Currently in Pain: Denies       Patient Monitoring:  Frequency of Checks: 4 times per hour, close    Psychiatric Symptoms:   Depression Symptoms  Depression Symptoms: Loss of interest,Isolative  Anxiety Symptoms  Anxiety Symptoms: Generalized  [place] Symptoms  [place] Symptoms: No problems reported or observed. Psychosis Symptoms  Delusion Type: No problems reported or observed. Suicide Risk CSSR-S:  1) Within the past month, have you wished you were dead or wished you could go to sleep and not wake up? : Yes  2) Have you actually had any thoughts of killing yourself? : Yes  3) Have you been thinking about how you might kill yourself? : Yes  5) Have you started to work out or worked out the details of how to kill yourself?  Do you intend to carry out this plan? : No  6) Have you ever done anything, started to do anything, or prepared to do anything to end your life?: No  Change in Result                 no                        Change in Plan of care                     no      EDUCATION:   EDUCATION:   Learner Progress Toward Treatment Goals: Reviewed results and recommendations of this team, Reviewed group plan and strategies, Reviewed signs, symptoms and risk of self harm and violent behavior, Reviewed goals and plan of care    Method:small group, individual verbal education    Outcome:verbalized by patient, but needs reinforcement to obtain goals    PATIENT GOALS:  Short term: Pt refused to meet with team,pt did not develop a short term goal.   Long term:Pt did not develop a long term goal.    PLAN/TREATMENT RECOMMENDATIONS UPDATE: continue with group therapies, increased socialization, continue planning for after discharge goals, continue with medication compliance    SHORT-TERM GOALS UPDATE:   Time frame for Short-Term Goals: 5-7 days    LONG-TERM GOALS UPDATE:   Time frame for Long-Term Goals: 6 months  Members Present in Team Meeting: See Signature Sheet    Laurie Chi

## 2022-04-07 NOTE — CARE COORDINATION
SW engaged with pt regarding discharge planning. Pt expressed feelings of not liking the group home but does not want to be placed in a locked nursing facility. Pt shared panhandling as a way to obtain money. Pt shared enjoying being able to come and go but just doesn't like \"certain\" things about the group home but could not give any examples.

## 2022-04-07 NOTE — GROUP NOTE
Group Therapy Note    Date: 4/7/2022    Group Start Time: 1000  Group End Time: 8001  Group Topic: Group Therapy    [facility]    TAVO Chavez        Group Therapy Note  Pt declined to attend psychotherapy at 1000 am despite encouragement. Pt offered 1:1 and refused.       Attendees: 6/19               Signature:  TAVO Chavez

## 2022-04-07 NOTE — PROGRESS NOTES
Daily Progress Note  4/7/2022    Patient Name: Carolyn Rich    CHIEF COMPLAINT: Suicidal ideation         SUBJECTIVE:      Patient seen for follow-up assessment. Nursing staff report the patient has maintained medication adherence and has been without acute behavioral changes in the last 24 hours. Mr. Aureliano Vega is more isolative to his room later in the day and endorses some improved sleep and denies side effects related to current medication regimen. Mr. Angel Mitchell endorses improving suicidal ideation and contracts for safety on the unit. He continues to endorse auditory hallucinations that are currently less distressing and offers \"better than yesterday I think\". He denies having questions or concerns regarding his current plan of care at this time. Writer encouraged patient to attend groups on the unit. At this time, the patient is not appropriate for a lower level of care. There is risk of decompensation and patient warrants further hospitalization for safety and stabilization. Appetite:  [x] Normal/Adequate/Unchanged  [] Increased  [] Decreased      Sleep:       [] Normal/Adequate/Unchanged  [x] Fair  [] Poor      Group Attendance on Unit:   [] Yes  [] Selectively    [x] No    Medication Side Effects: Patient denies any medication side effects at the time of assessment. Mental Status Exam  Level of consciousness: Resting, responds to verbal stimuli  Appearance: Appropriate attire for setting, laying in bed , with poor grooming and hygiene, room somewhat disheveled  Behavior/Motor: Approachable, calm, psychomotor slowing  Attitude toward examiner: Cooperative, somewhat attentive minimal eye contact today. Speech: Delayed responses, rate, low volume, normal tone, fair articulation. Mood: \" Okay\" presents depressed  Affect: Blunted  Thought processes: Mostly linear today, goal-directed, slowed  Thought content: Denies homicidal ideation.   Suicidal Ideation: Fleeting suicidal ideations, without current plan or intent, contracts for safety on the unit. Delusions: Endorses improving paranoia  Perceptual Disturbance: Endorses improving auditory hallucinations  Cognition: Oriented to self, location, time, and situation. Memory: Intact. Insight & Judgement: Poor. Data   height is 6' 2\" (1.88 m) and weight is 170 lb (77.1 kg). His temporal temperature is 97.8 °F (36.6 °C). His blood pressure is 122/69 and his pulse is 70. His respiration is 14 and oxygen saturation is 97%. Labs:   Admission on 04/06/2022   Component Date Value Ref Range Status    Hemoglobin A1C 04/07/2022 5.3  4.0 - 6.0 % Final    Estimated Avg Glucose 04/07/2022 105  mg/dL Final    Comment: The ADA and AACC recommend providing the estimated average glucose result to permit better   patient understanding of their HBA1c result.      Admission on 04/06/2022, Discharged on 04/06/2022   Component Date Value Ref Range Status    WBC 04/06/2022 6.2  3.5 - 11.3 k/uL Final    RBC 04/06/2022 4.09* 4.21 - 5.77 m/uL Final    Hemoglobin 04/06/2022 12.0* 13.0 - 17.0 g/dL Final    Hematocrit 04/06/2022 36.3* 40.7 - 50.3 % Final    MCV 04/06/2022 88.8  82.6 - 102.9 fL Final    MCH 04/06/2022 29.3  25.2 - 33.5 pg Final    MCHC 04/06/2022 33.1  28.4 - 34.8 g/dL Final    RDW 04/06/2022 14.1  11.8 - 14.4 % Final    Platelets [birth year]1972 266  138 - 453 k/uL Final    MPV 04/06/2022 9.5  8.1 - 13.5 fL Final    NRBC Automated 04/06/2022 0.0  0.0 per 100 WBC Final    Seg Neutrophils 04/06/2022 50  36 - 65 % Final    Lymphocytes 04/06/2022 40  24 - 43 % Final    Monocytes 04/06/2022 8  3 - 12 % Final    Eosinophils % 04/06/2022 1  1 - 4 % Final    Basophils 04/06/2022 1  0 - 2 % Final    Immature Granulocytes 04/06/2022 0  0 % Final    Segs Absolute 04/06/2022 3.12  1.50 - 8.10 k/uL Final    Absolute Lymph # 04/06/2022 2.46  1.10 - 3.70 k/uL Final    Absolute Mono # 04/06/2022 0.46  0.10 - 1.20 k/uL Final    Absolute Eos # 04/06/2022 0.07 0.00 - 0.44 k/uL Final    Basophils Absolute 04/06/2022 0.03  0.00 - 0.20 k/uL Final    Absolute Immature Granulocyte 04/06/2022 <0.03  0.00 - 0.30 k/uL Final    Specimen Description 04/06/2022 . NASOPHARYNGEAL SWAB   Final    SARS-CoV-2, Rapid 04/06/2022 Not Detected  Not Detected Final    Comment:       Rapid NAAT:  The specimen is NEGATIVE for SARS-CoV-2, the novel coronavirus associated with   COVID-19. The ID NOW COVID-19 assay is designed to detect the virus that causes COVID-19 in patients   with signs and symptoms of infection who are suspected of COVID-19. An individual without symptoms of COVID-19 and who is not shedding SARS-CoV-2 virus would   expect to have a negative (not detected) result in this assay. Negative results should be treated as presumptive and, if inconsistent with clinical signs   and symptoms or necessary for patient management,  should be tested with an alternative molecular assay. Negative results do not preclude   SARS-CoV-2 infection and   should not be used as the sole basis for patient management decisions.          Fact sheet for Healthcare Providers: SeekCultures.si  Fact sheet for Patients: SeekCultures.si          Methodology: Isothermal Nucleic Acid Amplification      Amphetamine Screen, Ur 04/06/2022 NEGATIVE  NEGATIVE Final    Comment:       (Positive cutoff 1000 ng/mL)                  Barbiturate Screen, Ur 04/06/2022 NEGATIVE  NEGATIVE Final    Comment:       (Positive cutoff 200 ng/mL)                  Benzodiazepine Screen, Urine 04/06/2022 NEGATIVE  NEGATIVE Final    Comment:       (Positive cutoff 200 ng/mL)                  Cocaine Metabolite, Urine 04/06/2022 POSITIVE* NEGATIVE Final    Comment:       (Positive cutoff 300 ng/mL)                  Methadone Screen, Urine 04/06/2022 NEGATIVE  NEGATIVE Final    Comment:       (Positive cutoff 300 ng/mL)                  Opiates, Urine 04/06/2022 NEGATIVE  NEGATIVE Final    Comment:       (Positive cutoff 300 ng/mL)                  Phencyclidine, Urine 04/06/2022 NEGATIVE  NEGATIVE Final    Comment:       (Positive cutoff 25 ng/mL)                  Cannabinoid Scrn, Ur 04/06/2022 NEGATIVE  NEGATIVE Final    Comment:       (Positive cutoff 50 ng/mL)                  Oxycodone Screen, Ur 04/06/2022 NEGATIVE  NEGATIVE Final    Comment:       (Positive cutoff 100 ng/mL)                  Test Information 04/06/2022 Assay provides medical screening only. The absence of expected drug(s) and/or metabolite(s) may indicate diluted or adulterated urine, limitations of testing or timing of collection. Final    Comment: Testing for legal purposes should be confirmed by another method. To request confirmation   of test result, please call the lab within 7 days of sample submission.       Glucose 04/06/2022 88  70 - 99 mg/dL Final    BUN 04/06/2022 13  8 - 23 mg/dL Final    CREATININE 04/06/2022 1.02  0.70 - 1.20 mg/dL Final    Calcium 04/06/2022 8.9  8.6 - 10.4 mg/dL Final    Sodium 04/06/2022 138  135 - 144 mmol/L Final    Potassium 04/06/2022 3.9  3.7 - 5.3 mmol/L Final    Chloride 04/06/2022 104  98 - 107 mmol/L Final    CO2 04/06/2022 21  20 - 31 mmol/L Final    Anion Gap 04/06/2022 13  9 - 17 mmol/L Final    Alkaline Phosphatase 04/06/2022 147* 40 - 129 U/L Final    ALT 04/06/2022 10  5 - 41 U/L Final    AST 04/06/2022 19  <40 U/L Final    Total Bilirubin 04/06/2022 0.44  0.3 - 1.2 mg/dL Final    Total Protein 04/06/2022 7.2  6.4 - 8.3 g/dL Final    Albumin 04/06/2022 4.4  3.5 - 5.2 g/dL Final    Albumin/Globulin Ratio 04/06/2022 1.6  1.0 - 2.5 Final    GFR Non- 04/06/2022 >60  >60 mL/min Final    GFR  04/06/2022 >60  >60 mL/min Final    GFR Comment 04/06/2022        Final    Comment: Average GFR for 61-76 years old:   80 mL/min/1.73sq m  Chronic Kidney Disease:   <60 mL/min/1.73sq m  Kidney failure:   <15 mL/min/1.73sq m              eGFR calculated using average adult body mass. Additional eGFR calculator available at:        Decohunt.br            Acetaminophen Level 04/06/2022 <5* 10 - 30 ug/mL Final    Ethanol 04/06/2022 14* <10 mg/dL Final    Ethanol percent 04/06/2022 0.014* <0.952 % Final    Salicylate Lvl 60/04/2305 <1* 3 - 10 mg/dL Final    Toxic Tricyclic Sc,Blood 00/90/2225 NEGATIVE  NEGATIVE Final         Reviewed patient's current plan of care and vital signs with nursing staff.     Labs reviewed: [x] Yes  Last EKG in EMR reviewed: [x] Yes  QTc: 410    Medications  Current Facility-Administered Medications: acetaminophen (TYLENOL) tablet 650 mg, 650 mg, Oral, Q4H PRN  aluminum & magnesium hydroxide-simethicone (MAALOX) 200-200-20 MG/5ML suspension 30 mL, 30 mL, Oral, Q6H PRN  hydrOXYzine (ATARAX) tablet 50 mg, 50 mg, Oral, TID PRN  ibuprofen (ADVIL;MOTRIN) tablet 400 mg, 400 mg, Oral, Q6H PRN  nicotine polacrilex (NICORETTE) gum 2 mg, 2 mg, Oral, PRN  polyethylene glycol (GLYCOLAX) packet 17 g, 17 g, Oral, Daily PRN  haloperidol lactate (HALDOL) injection 5 mg, 5 mg, IntraMUSCular, Q4H PRN **AND** LORazepam (ATIVAN) injection 2 mg, 2 mg, IntraMUSCular, Q4H PRN **AND** diphenhydrAMINE (BENADRYL) injection 50 mg, 50 mg, IntraMUSCular, Q4H PRN  haloperidol (HALDOL) tablet 5 mg, 5 mg, Oral, Q4H PRN **AND** LORazepam (ATIVAN) tablet 2 mg, 2 mg, Oral, Q4H PRN  Vitamin D (CHOLECALCIFEROL) tablet 1,000 Units, 1,000 Units, Oral, Daily  escitalopram (LEXAPRO) tablet 20 mg, 20 mg, Oral, Daily  paliperidone (INVEGA) extended release tablet 6 mg, 6 mg, Oral, Nightly  traZODone (DESYREL) tablet 100 mg, 100 mg, Oral, Nightly PRN    ASSESSMENT  Schizoaffective disorder, depressive type ([facility] [address]         HANDOFF  Patient symptoms remain unstable  Medications as determined by attending physician  Team to contact guardian regarding possible nursing home placement  Encourage participation in groups and milieu. Probable discharge is to be determined by MD    Electronically signed by MARY Taylor CNP on 4/7/2022 at 5:18 PM    **This report has been created using voice recognition software. It may contain minor errors which are inherent in voice recognition technology. **    I independently saw and evaluated the patient. I reviewed the nurse practitioners documentation above. Any additional comments or changes to the nurse practitioners documentation are stated below otherwise agree with assessment. Plan will be as follows:  Spent 30 minutes with the patient, of that greater than 16 minutes was spent in supportive psychotherapy. Patient expressing amounts amount of guilt for having relapsed. Discussed with treatment team possibility of nursing home placement. Asked social work to reach out to guardian. PLAN  Patient s symptoms   show minimal change  Observation on current medications for now  Attempt to develop insight  Psycho-education conducted. Supportive Therapy conducted.   Probable discharge is next week  Follow-up daily while on inpatient unit

## 2022-04-07 NOTE — PLAN OF CARE
Problem: Altered Mood, Depressive Behavior:  Goal: Ability to disclose and discuss suicidal ideas will improve  Description: Ability to disclose and discuss suicidal ideas will improve  4/6/2022 2310 by Angeli Chua RN  Outcome: Ongoing  Note: Pt reports depression and anxiety rates at 8/1-10 at this time. Pt reports hearing voices commanding in nature telling him to kill him self. Pt states he is not going to listen to the voices. Pt denies ay other hallucinations, denies any homicidal ideation. Pt reports fleeting suicidal thoughts but feels safe while on unit. Pt agreed to seek staff at anytime he feels unable to control the urge to harm self. Spontaneous checks and every 15 minutes rounding done for pt safety and per unit policy. Problem: Altered Mood, Depressive Behavior:  Goal: Absence of self-harm  Description: Absence of self-harm  4/6/2022 2310 by Angeli Chua RN  Outcome: Ongoing  Note: Pt remains free of any self-harm. Safe environment maintained. Will continue to monitor for safety and provide support and reassurance as needed. Problem: Tobacco Use:  Goal: Inpatient tobacco use cessation counseling participation  Description: Inpatient tobacco use cessation counseling participation  4/6/2022 2310 by Angeli Chua RN  Outcome: Ongoing  Note: Patient given information on the dangers of long term tobacco use. Will continue to reinforce the dangers of tobacco use.

## 2022-04-08 PROCEDURE — 99232 SBSQ HOSP IP/OBS MODERATE 35: CPT | Performed by: PSYCHIATRY & NEUROLOGY

## 2022-04-08 PROCEDURE — 6370000000 HC RX 637 (ALT 250 FOR IP): Performed by: INTERNAL MEDICINE

## 2022-04-08 PROCEDURE — 1240000000 HC EMOTIONAL WELLNESS R&B

## 2022-04-08 PROCEDURE — 6370000000 HC RX 637 (ALT 250 FOR IP): Performed by: PSYCHIATRY & NEUROLOGY

## 2022-04-08 PROCEDURE — APPSS30 APP SPLIT SHARED TIME 16-30 MINUTES: Performed by: PSYCHIATRY & NEUROLOGY

## 2022-04-08 RX ORDER — MIRTAZAPINE 15 MG/1
7.5 TABLET, FILM COATED ORAL NIGHTLY
Status: DISCONTINUED | OUTPATIENT
Start: 2022-04-08 | End: 2022-04-15 | Stop reason: HOSPADM

## 2022-04-08 RX ORDER — TRAZODONE HYDROCHLORIDE 100 MG/1
200 TABLET ORAL NIGHTLY PRN
Status: DISCONTINUED | OUTPATIENT
Start: 2022-04-08 | End: 2022-04-09

## 2022-04-08 RX ADMIN — TRAZODONE HYDROCHLORIDE 200 MG: 100 TABLET ORAL at 20:59

## 2022-04-08 RX ADMIN — ESCITALOPRAM OXALATE 20 MG: 20 TABLET ORAL at 08:18

## 2022-04-08 RX ADMIN — MIRTAZAPINE 7.5 MG: 15 TABLET, FILM COATED ORAL at 20:59

## 2022-04-08 RX ADMIN — PALIPERIDONE 6 MG: 6 TABLET, EXTENDED RELEASE ORAL at 20:59

## 2022-04-08 RX ADMIN — Medication 1000 UNITS: at 08:18

## 2022-04-08 NOTE — GROUP NOTE
Group Therapy Note    Date: 4/8/2022    Group Start Time: 1430  Group End Time: 3466  Group Topic: Cognitive Skills    [facility]    GADIEL TownsendS        Group Therapy Note    Attendees: 11/15         Patient's Goal:  To increase social interaction and to practice self expression, expressing feelings and exploring positive relaxation and stress management skills ,activities, environment, boundaries, sensory calming, and communication skills. Notes: Pt particpated fully in group. Pt was able to practice self expression, expressing feelings and exploring positive relaxation and stress management skills and communication skills through creative expression- pt enjoyed positive support and feedback from peers when singing but was somewhat superficial when sharing r/t topic. Pt shared individually and also engaged in group discussion with peers and RT at brief intervals. Pt was supportive of peers, accepting of support from peers and able to relate to some of peers' ideas and experiences. Status After Intervention:  Improved     Participation Level:  Active Listener, sharing, appropriate     Participation Quality: Appropriate, Sharing, Attentive, supportive        Speech:  Normal, superficial answers at times        Thought Process/Content: Logical, needs prompts to explore topic in detail     Affective Functioning: Congruent     Mood: Euthymic     Level of consciousness:  Alert, and Attentive        Response to Learning:  Able to express current knowledge, able to verbalize/acknowledge new learning, able to verbalize insight, and Progressing to goal        Endings: None Reported     Modes of Intervention: Education, Support, Socialization, Exploration, Clarifying and Problem-solving        Discipline Responsible: Psychoeducational Specialist        Signature:  GADIEL BorgesS

## 2022-04-08 NOTE — CARE COORDINATION
Call to [facility], patient's sister and legal guardian ([phone]). She confirms she would like to pursue locked behavioral unit for patient to assist him in care and prevent him from walking away. PASRR completed and submitted via [employer].

## 2022-04-08 NOTE — GROUP NOTE
Group Therapy Note    Date: 4/8/2022    Group Start Time: 1000  Group End Time: 1030  Group Topic: Group Therapy    JESUS MARTINEZ    TAVO Chavez        Group Therapy Note    Attendees: 7/19         Patient's Goal:  Increase interpersonal relationship skills    Notes:  Patient was an active participant in group discussion     Status After Intervention:  Unchanged    Participation Level:  Active Listener and Interactive    Participation Quality: Appropriate, Attentive, Sharing and Supportive      Speech:  normal      Thought Process/Content: Logical      Affective Functioning: Congruent      Mood: anxious and depressed      Level of consciousness:  Alert, Oriented x4 and Attentive      Response to Learning: Able to verbalize current knowledge/experience      Endings: None Reported    Modes of Intervention: Support, Socialization, Exploration and Clarifying      Discipline Responsible: /Counselor      Signature:  TAVO Chavez

## 2022-04-08 NOTE — CARE COORDINATION
Call from [facility] to complete level 2 assessment with patient, additional information was provided. Patient declined to speak with the , she will call back later today to see if patient is willing to talk.

## 2022-04-08 NOTE — GROUP NOTE
Group Therapy Note    Date: 4/8/2022    Group Start Time: 1100  Group End Time: 9013  Group Topic: Cognitive Skills    JESUS Ervin Courser, CTRS        Group Therapy Note    Attendees: 5/14         Pt did not participate in Cognitive Skills Group at 1100am when encouraged by RT due to pt resting in room Pt was offered talk time as an alternative to group but declined.        Discipline Responsible: Psychoeducational Specialist      Signature:  Swathi York

## 2022-04-08 NOTE — PLAN OF CARE
Problem: Altered Mood, Depressive Behavior:  Goal: Ability to disclose and discuss suicidal ideas will improve  Description: Ability to disclose and discuss suicidal ideas will improve  4/7/2022 2305 by Keshia De Luna RN  Outcome: Ongoing  Note: Pt reports depression and anxiety rates at 8/1-10 at this time. Pt reports hearing voices commanding in nature telling him to kill him self. Pt states he is not going to listen to the voices. Pt denies ay other hallucinations, denies any homicidal ideation. Pt reports fleeting suicidal thoughts but feels safe while on unit. Pt agreed to seek staff at anytime he feels unable to control the urge to harm self. Spontaneous checks and every 15 minutes rounding done for pt safety and per unit policy. Problem: Altered Mood, Depressive Behavior:  Goal: Absence of self-harm  Description: Absence of self-harm  4/7/2022 2305 by Keshia De Luna RN  Outcome: Ongoing  Note: Pt remains free of any self-harm. Safe environment maintained. Will continue to monitor for safety and provide support and reassurance as needed. Problem: Tobacco Use:  Goal: Inpatient tobacco use cessation counseling participation  Description: Inpatient tobacco use cessation counseling participation  4/7/2022 2305 by Keshia De Luna RN  Outcome: Ongoing  Note: Patient given information on the dangers of long term tobacco use. Will continue to reinforce the dangers of tobacco use.

## 2022-04-08 NOTE — PROGRESS NOTES
Daily Progress Note  4/8/2022    Patient Name: Ciara Talavera    CHIEF COMPLAINT: Suicidal ideation         SUBJECTIVE:    Nursing staff report the patient has maintained medication adherence and has not required emergency medications in the last 24 hours. Patient is agreeable to assessment at bedside where he states \"that crack stuff . .. I know it is bad . ... But I just like it \". Discussed his support in the community and he confirms that Derrill Breeding has been beneficial.  He is aware that his long-acting injectable is due today. He denies side effects from his current medication regimen andendorses improving suicidal ideation and contracts for safety on the unit. He reports auditory hallucinations that are less intense. He denies having pain or discomfort currently but reports some muscle cramps in his lower extremity during the night, requiring him to get up and stretch. He confirms that he will try increasing his banana(potassium) oral intake. Writer encouraged patient to attend groups on the unit. At this time, the patient is not appropriate for a lower level of care. There is risk of decompensation and patient warrants further hospitalization for safety and stabilization. Appetite:  [x] Normal/Adequate/Unchanged  [] Increased  [] Decreased      Sleep:       [] Normal/Adequate/Unchanged  [x] Fair  [] Poor      Group Attendance on Unit:   [] Yes  [] Selectively    [x] No    Medication Side Effects: Patient denies any medication side effects at the time of assessment. Mental Status Exam  Level of consciousness: Resting, responds to verbal stimuli  Appearance: Appropriate attire for setting, laying in bed , with poor grooming and hygiene, room somewhat disheveled  Behavior/Motor: Approachable, calm, psychomotor slowing  Attitude toward examiner: Cooperative,attentive with improved eye contact today. Speech: Delayed responses, rate, low volume, normal tone, fair articulation.   Mood: \" Okay\" presents depressed  Affect: Blunted  Thought processes: Mostly linear today, goal-directed, slowed  Thought content: Denies homicidal ideation. Suicidal Ideation: Fleeting suicidal ideations, without current plan or intent, contracts for safety on the unit. Delusions: Endorses improving paranoia  Perceptual Disturbance: Endorses improving auditory hallucinations  Cognition: Oriented to self, location, time, and situation. Memory: Intact. Insight & Judgement: Poor. Data   height is 6' 2\" (1.88 m) and weight is 170 lb (77.1 kg). His oral temperature is 97.9 °F (36.6 °C). His blood pressure is 100/67 and his pulse is 74. His respiration is 14 and oxygen saturation is 97%. Labs:   Admission on 04/06/2022   Component Date Value Ref Range Status    Hemoglobin A1C 04/07/2022 5.3  4.0 - 6.0 % Final    Estimated Avg Glucose 04/07/2022 105  mg/dL Final    Comment: The ADA and AACC recommend providing the estimated average glucose result to permit better   patient understanding of their HBA1c result. Reviewed patient's current plan of care and vital signs with nursing staff.     Labs reviewed: [x] Yes  Last EKG in EMR reviewed: [x] Yes  QTc: 410    Medications  Current Facility-Administered Medications: acetaminophen (TYLENOL) tablet 650 mg, 650 mg, Oral, Q4H PRN  aluminum & magnesium hydroxide-simethicone (MAALOX) 200-200-20 MG/5ML suspension 30 mL, 30 mL, Oral, Q6H PRN  hydrOXYzine (ATARAX) tablet 50 mg, 50 mg, Oral, TID PRN  ibuprofen (ADVIL;MOTRIN) tablet 400 mg, 400 mg, Oral, Q6H PRN  nicotine polacrilex (NICORETTE) gum 2 mg, 2 mg, Oral, PRN  polyethylene glycol (GLYCOLAX) packet 17 g, 17 g, Oral, Daily PRN  haloperidol lactate (HALDOL) injection 5 mg, 5 mg, IntraMUSCular, Q4H PRN **AND** LORazepam (ATIVAN) injection 2 mg, 2 mg, IntraMUSCular, Q4H PRN **AND** diphenhydrAMINE (BENADRYL) injection 50 mg, 50 mg, IntraMUSCular, Q4H PRN  haloperidol (HALDOL) tablet 5 mg, 5 mg, Oral, Q4H PRN **AND** LORazepam (ATIVAN) tablet 2 mg, 2 mg, Oral, Q4H PRN  Vitamin D (CHOLECALCIFEROL) tablet 1,000 Units, 1,000 Units, Oral, Daily  escitalopram (LEXAPRO) tablet 20 mg, 20 mg, Oral, Daily  paliperidone (INVEGA) extended release tablet 6 mg, 6 mg, Oral, Nightly  traZODone (DESYREL) tablet 100 mg, 100 mg, Oral, Nightly PRN    ASSESSMENT  Schizoaffective disorder, depressive type ([facility])         HANDOFF  Patient symptoms remain unstable  Medications as determined by attending physician  Team to contact guardian regarding possible nursing home placement  Encourage participation in groups and milieu. Probable discharge is to be determined by MD    Electronically signed by MARY Dick CNP on 4/8/2022 at 5:59 PM    **This report has been created using voice recognition software. It may contain minor errors which are inherent in voice recognition technology. **    I independently saw and evaluated the patient. I reviewed the nurse practitioners documentation above. Any additional comments or changes to the nurse practitioners documentation are stated below otherwise agree with assessment. Plan will be as follows:  Patient reported some improvement in his mood but very poor sleep. Poor appetite. Discussed increasing trazodone as well as adding Remeron and patient is in agreement  PLAN  Patient s symptoms   show minimal change  Increase trazodone to 200 mg by mouth at bedtime  Add Remeron 7.5 mg at bedtime  Attempt to develop insight  Psycho-education conducted. Supportive Therapy conducted.   Probable discharge is next week  Follow-up daily while on inpatient unit

## 2022-04-09 PROCEDURE — 6370000000 HC RX 637 (ALT 250 FOR IP): Performed by: PSYCHIATRY & NEUROLOGY

## 2022-04-09 PROCEDURE — 1240000000 HC EMOTIONAL WELLNESS R&B

## 2022-04-09 PROCEDURE — APPSS30 APP SPLIT SHARED TIME 16-30 MINUTES: Performed by: PSYCHIATRY & NEUROLOGY

## 2022-04-09 PROCEDURE — 99232 SBSQ HOSP IP/OBS MODERATE 35: CPT | Performed by: PSYCHIATRY & NEUROLOGY

## 2022-04-09 PROCEDURE — 6370000000 HC RX 637 (ALT 250 FOR IP): Performed by: INTERNAL MEDICINE

## 2022-04-09 RX ORDER — VALPROIC ACID 250 MG/1
500 CAPSULE, LIQUID FILLED ORAL NIGHTLY
Status: DISCONTINUED | OUTPATIENT
Start: 2022-04-09 | End: 2022-04-13

## 2022-04-09 RX ORDER — VALPROIC ACID 250 MG/1
250 CAPSULE, LIQUID FILLED ORAL 2 TIMES DAILY
Status: DISCONTINUED | OUTPATIENT
Start: 2022-04-09 | End: 2022-04-13

## 2022-04-09 RX ADMIN — MIRTAZAPINE 7.5 MG: 15 TABLET, FILM COATED ORAL at 21:11

## 2022-04-09 RX ADMIN — Medication 1000 UNITS: at 09:52

## 2022-04-09 RX ADMIN — VALPROIC ACID 250 MG: 250 CAPSULE, LIQUID FILLED ORAL at 15:00

## 2022-04-09 RX ADMIN — PALIPERIDONE 6 MG: 6 TABLET, EXTENDED RELEASE ORAL at 21:11

## 2022-04-09 RX ADMIN — VALPROIC ACID 500 MG: 250 CAPSULE, LIQUID FILLED ORAL at 21:11

## 2022-04-09 RX ADMIN — ESCITALOPRAM OXALATE 20 MG: 20 TABLET ORAL at 09:52

## 2022-04-09 RX ADMIN — HYDROXYZINE HYDROCHLORIDE 50 MG: 50 TABLET, FILM COATED ORAL at 02:47

## 2022-04-09 RX ADMIN — HYDROXYZINE HYDROCHLORIDE 50 MG: 50 TABLET, FILM COATED ORAL at 21:11

## 2022-04-09 NOTE — PLAN OF CARE
Patient has been out in the milieu social with peers. Patient admits to auditory hallucinations. Patient was compliant with scheduled medications. Patient denies any suicidal thoughts at this time. Patient agrees to come talk with staff if having any thoughts to harm himself this shift. 15 min rounds continued for patient safety.    Problem: Altered Mood, Depressive Behavior:  Goal: Ability to disclose and discuss suicidal ideas will improve  Description: Ability to disclose and discuss suicidal ideas will improve  Outcome: Ongoing  Goal: Absence of self-harm  Description: Absence of self-harm  Outcome: Ongoing     Problem: Tobacco Use:  Goal: Inpatient tobacco use cessation counseling participation  Description: Inpatient tobacco use cessation counseling participation  Outcome: Ongoing     Problem: Suicide risk  Goal: Provide patient with safe environment  Description: Provide patient with safe environment  Outcome: Ongoing

## 2022-04-09 NOTE — GROUP NOTE
Group Therapy Note    Date: 4/9/2022    Group Start Time: 1000  Group End Time: 9769  Group Topic: Psychoeducation    [address] [place] [address] [place] GregorioW, LSW        Group Therapy Note    Attendees: 8/18       Patient's Goal:  Pt will demonstrate increased interpersonal skills. Notes:  Group topic was strength discussion questions. Status After Intervention:  Improved    Participation Level:  Active Listener    Participation Quality: Appropriate and Attentive      Speech:  normal      Thought Process/Content: Logical      Affective Functioning: Congruent      Mood: elevated      Level of consciousness:  Attentive      Response to Learning: Able to verbalize current knowledge/experience      Endings: None Reported    Modes of Intervention: Education      Discipline Responsible: /Counselor      Signature:  FRANKLIN Mendez, CHANEL

## 2022-04-09 NOTE — PROGRESS NOTES
Daily Progress Note  4/9/2022    Patient Name: Adele Roche    CHIEF COMPLAINT: Suicidal ideation         SUBJECTIVE:    Patient seen for follow-up assessment.  Nursing staff report the patient has maintained medication adherence and has been without acute behavioral changes in the last 24 hours.  Mr. Gisele Alex is more visible and social on the unit today and endorses some improved sleep related to medication changes. He denies side effects related to current medication regimen.  Mr. Gisele Alex endorses improving suicidal ideation and contracts for safety on the unit. Alannah Ly continues to endorse auditory hallucinations that are currently less distressing and offers \"better than before\". Team continue to encourage him to shower has yet to show an active role in maintaining his own activities of daily living without required verbal prompting. He denies having questions or concerns regarding his current plan of care at this time. Appetite:  [x] Normal/Adequate/Unchanged  [] Increased  [] Decreased      Sleep:       [] Normal/Adequate/Unchanged  [x] Fair  [] Poor      Group Attendance on Unit:   [] Yes  [] Selectively    [x] No    Medication Side Effects: Patient denies any medication side effects at the time of assessment. Mental Status Exam  Level of consciousness: Awake and alert  Appearance: Appropriate attire for setting, sitting in chair, with poor grooming and hygiene, room somewhat disheveled  Behavior/Motor: Approachable, calm, psychomotor slowing  Attitude toward examiner: Cooperative,attentive with fair eye contact  Speech: Delayed responses, rate, low volume, normal tone, fair articulation. Mood: \" Okay\" presents depressed  Affect: Blunted  Thought processes: Mostly linear today, goal-directed, slowed  Thought content: Denies homicidal ideation. Suicidal Ideation: Fleeting suicidal ideations, without current plan or intent, contracts for safety on the unit.    Delusions: Endorses improving paranoia  Perceptual Disturbance: Endorses improving auditory hallucinations  Cognition: Oriented to self, location, time, and situation. Memory: Intact. Insight & Judgement: Poor. Data   height is 6' 2\" (1.88 m) and weight is 170 lb (77.1 kg). His oral temperature is 97.1 °F (36.2 °C). His blood pressure is 106/50 (abnormal) and his pulse is 69. His respiration is 14 and oxygen saturation is 97%. Labs:   Admission on 04/06/2022   Component Date Value Ref Range Status    Hemoglobin A1C 04/07/2022 5.3  4.0 - 6.0 % Final    Estimated Avg Glucose 04/07/2022 105  mg/dL Final    Comment: The ADA and AACC recommend providing the estimated average glucose result to permit better   patient understanding of their HBA1c result. Reviewed patient's current plan of care and vital signs with nursing staff.     Labs reviewed: [x] Yes  Last EKG in EMR reviewed: [x] Yes  QTc: 410    Medications  Current Facility-Administered Medications: valproic acid (DEPAKENE) capsule 250 mg, 250 mg, Oral, BID  valproic acid (DEPAKENE) capsule 500 mg, 500 mg, Oral, Nightly  mirtazapine (REMERON) tablet 7.5 mg, 7.5 mg, Oral, Nightly  acetaminophen (TYLENOL) tablet 650 mg, 650 mg, Oral, Q4H PRN  aluminum & magnesium hydroxide-simethicone (MAALOX) 200-200-20 MG/5ML suspension 30 mL, 30 mL, Oral, Q6H PRN  hydrOXYzine (ATARAX) tablet 50 mg, 50 mg, Oral, TID PRN  ibuprofen (ADVIL;MOTRIN) tablet 400 mg, 400 mg, Oral, Q6H PRN  nicotine polacrilex (NICORETTE) gum 2 mg, 2 mg, Oral, PRN  polyethylene glycol (GLYCOLAX) packet 17 g, 17 g, Oral, Daily PRN  haloperidol lactate (HALDOL) injection 5 mg, 5 mg, IntraMUSCular, Q4H PRN **AND** LORazepam (ATIVAN) injection 2 mg, 2 mg, IntraMUSCular, Q4H PRN **AND** diphenhydrAMINE (BENADRYL) injection 50 mg, 50 mg, IntraMUSCular, Q4H PRN  haloperidol (HALDOL) tablet 5 mg, 5 mg, Oral, Q4H PRN **AND** LORazepam (ATIVAN) tablet 2 mg, 2 mg, Oral, Q4H PRN  Vitamin D (CHOLECALCIFEROL) tablet 1,000 Units, 1,000 Units, Oral, Daily  escitalopram (LEXAPRO) tablet 20 mg, 20 mg, Oral, Daily  paliperidone (INVEGA) extended release tablet 6 mg, 6 mg, Oral, Nightly    ASSESSMENT  Schizoaffective disorder, depressive type ([facility] [address]         HANDOFF  Patient symptoms showing modest improvement as he is less isolative  Medications as determined by attending physician  Team to contact guardian regarding possible nursing home placement  Encourage participation in groups and milieu. Probable discharge is to be determined by MD and currently undetermined    Electronically signed by MARY Tong CNP on 4/9/2022 at 2:54 PM    **This report has been created using voice recognition software. It may contain minor errors which are inherent in voice recognition technology. **      I independently saw and evaluated the patient. I reviewed the nurse practitioners documentation above. Any additional comments or changes to the nurse practitioners documentation are stated below otherwise agree with assessment. Plan will be as follows:  Patient still reporting difficulty sleeping. Reports trazodone was not helpful. Discussed discontinuing trazodone and adding Depakote for stabilization patient is in agreement  PLAN  Patient s symptoms   show minimal change  Discontinue trazodone  Add Depakote 250 mg by mouth daily at 8 AM and 2 PM and 500 mg by mouth at bedtime  Attempt to develop insight  Psycho-education conducted. Supportive Therapy conducted.   Probable discharge is undetermined at this time  Follow-up daily while on inpatient unit

## 2022-04-09 NOTE — PLAN OF CARE
Problem: Altered Mood, Depressive Behavior:  Goal: Ability to disclose and discuss suicidal ideas will improve  Description: Ability to disclose and discuss suicidal ideas will improve  Outcome: Ongoing  Patient currently denies suicidal ideation. Goal: Absence of self-harm  Description: Absence of self-harm  Outcome: Ongoing  Patient is free from injury and self-harm. Problem: Tobacco Use:  Goal: Inpatient tobacco use cessation counseling participation  Description: Inpatient tobacco use cessation counseling participation  Outcome: Ongoing     Problem: Suicide risk  Goal: Provide patient with safe environment  Description: Provide patient with safe environment  Outcome: Ongoing  Patient remains safe on unit. Q15 minute safety checks in place and will continue.

## 2022-04-10 LAB — GLUCOSE BLD-MCNC: 97 MG/DL (ref 75–110)

## 2022-04-10 PROCEDURE — 6370000000 HC RX 637 (ALT 250 FOR IP): Performed by: PSYCHIATRY & NEUROLOGY

## 2022-04-10 PROCEDURE — 99232 SBSQ HOSP IP/OBS MODERATE 35: CPT | Performed by: PSYCHIATRY & NEUROLOGY

## 2022-04-10 PROCEDURE — 82947 ASSAY GLUCOSE BLOOD QUANT: CPT

## 2022-04-10 PROCEDURE — 1240000000 HC EMOTIONAL WELLNESS R&B

## 2022-04-10 PROCEDURE — 6370000000 HC RX 637 (ALT 250 FOR IP): Performed by: INTERNAL MEDICINE

## 2022-04-10 RX ADMIN — Medication 1000 UNITS: at 13:17

## 2022-04-10 RX ADMIN — VALPROIC ACID 250 MG: 250 CAPSULE, LIQUID FILLED ORAL at 13:17

## 2022-04-10 RX ADMIN — ESCITALOPRAM OXALATE 20 MG: 20 TABLET ORAL at 13:17

## 2022-04-10 NOTE — GROUP NOTE
Group Therapy Note    Date: 4/10/2022    Group Start Time: 1030  Group End Time: 1130  Group Topic: Psychoeducation    [address], LSW    patient refused to attend psychoeducation group at [phone] after encouragement from staff.   1:1 talk time provided as alternative to group session

## 2022-04-10 NOTE — GROUP NOTE
Group Therapy Note    Date: 4/10/2022    Group Start Time: 1400  Group End Time: 5682  Group Topic: Psychoeducation    JESUS Alvarado, CTRS    Patient refused to attend music for coping group at 1400 after encouragement from staff. 1:1 talk time offered by staff as alternative to group session.

## 2022-04-10 NOTE — PLAN OF CARE
Patient has been out in the milieu and social with peers. Patient was compliant with scheduled medications. Patient states he has been eating and sleeping okay. Patient denies any suicidal thoughts at this time. Patient agrees to come talk with staff if having any thoughts to harm himself this shift. 15 min rounds continued for patient safety.    Problem: Altered Mood, Depressive Behavior:  Goal: Ability to disclose and discuss suicidal ideas will improve  Description: Ability to disclose and discuss suicidal ideas will improve  4/10/2022 0303 by Alise Coley RN  Outcome: Ongoing  4/9/2022 1645 by Deana Neely LPN  Outcome: Ongoing  Goal: Absence of self-harm  Description: Absence of self-harm  4/10/2022 0303 by Alise Coley RN  Outcome: Ongoing  4/9/2022 1645 by Deana Neely LPN  Outcome: Ongoing     Problem: Tobacco Use:  Goal: Inpatient tobacco use cessation counseling participation  Description: Inpatient tobacco use cessation counseling participation  4/10/2022 0303 by Alise Coley RN  Outcome: Ongoing  4/9/2022 1645 by Deana Neely LPN  Outcome: Ongoing     Problem: Suicide risk  Goal: Provide patient with safe environment  Description: Provide patient with safe environment  4/10/2022 0303 by Alise Coley RN  Outcome: Ongoing  4/9/2022 1645 by Deana Neely LPN  Outcome: Ongoing

## 2022-04-10 NOTE — PLAN OF CARE
Problem: Altered Mood, Depressive Behavior:  Goal: Ability to disclose and discuss suicidal ideas will improve  Description: Ability to disclose and discuss suicidal ideas will improve  4/10/2022 1323 by Lexx Kidd LPN  Outcome: Ongoing    Goal: Absence of self-harm  Description: Absence of self-harm  4/10/2022 1323 by Lexx Kidd LPN  Outcome: Ongoing  Patient remains free from injury and self-harm. Problem: Tobacco Use:  Goal: Inpatient tobacco use cessation counseling participation  Description: Inpatient tobacco use cessation counseling participation  4/10/2022 1323 by Lexx Kidd LPN  Outcome: Ongoing    Problem: Suicide risk  Goal: Provide patient with safe environment  Description: Provide patient with safe environment  4/10/2022 1323 by Lexx Kidd LPN  Outcome: Ongoing  Patient provided with safe environment and remains safe on unit. Q15 minute safety checks in place and will continue.

## 2022-04-11 PROCEDURE — 1240000000 HC EMOTIONAL WELLNESS R&B

## 2022-04-11 PROCEDURE — 99232 SBSQ HOSP IP/OBS MODERATE 35: CPT | Performed by: PSYCHIATRY & NEUROLOGY

## 2022-04-11 PROCEDURE — 90833 PSYTX W PT W E/M 30 MIN: CPT | Performed by: PSYCHIATRY & NEUROLOGY

## 2022-04-11 PROCEDURE — 6370000000 HC RX 637 (ALT 250 FOR IP): Performed by: INTERNAL MEDICINE

## 2022-04-11 PROCEDURE — 6370000000 HC RX 637 (ALT 250 FOR IP): Performed by: PSYCHIATRY & NEUROLOGY

## 2022-04-11 PROCEDURE — APPSS30 APP SPLIT SHARED TIME 16-30 MINUTES

## 2022-04-11 RX ORDER — OLANZAPINE 10 MG/1
10 TABLET ORAL NIGHTLY
Status: DISCONTINUED | OUTPATIENT
Start: 2022-04-11 | End: 2022-04-13

## 2022-04-11 RX ORDER — QUETIAPINE FUMARATE 100 MG/1
100 TABLET, FILM COATED ORAL NIGHTLY
Status: DISCONTINUED | OUTPATIENT
Start: 2022-04-11 | End: 2022-04-11

## 2022-04-11 RX ADMIN — OLANZAPINE 10 MG: 10 TABLET, FILM COATED ORAL at 21:35

## 2022-04-11 RX ADMIN — VALPROIC ACID 250 MG: 250 CAPSULE, LIQUID FILLED ORAL at 14:49

## 2022-04-11 RX ADMIN — Medication 1000 UNITS: at 08:34

## 2022-04-11 RX ADMIN — VALPROIC ACID 500 MG: 250 CAPSULE, LIQUID FILLED ORAL at 21:22

## 2022-04-11 RX ADMIN — MIRTAZAPINE 7.5 MG: 15 TABLET, FILM COATED ORAL at 21:22

## 2022-04-11 RX ADMIN — ESCITALOPRAM OXALATE 20 MG: 20 TABLET ORAL at 08:34

## 2022-04-11 RX ADMIN — VALPROIC ACID 250 MG: 250 CAPSULE, LIQUID FILLED ORAL at 08:34

## 2022-04-11 NOTE — PLAN OF CARE
Patient has been seclusive in bed this evening resting. Patient states he is just really tired. Patient refused evening medications this evening. Patient denies any suicidal thoughts at this time. Patient agrees to come talk with staff if having any thoughts to harm himself this shift. 15 min rounds continued for patient safety.    Problem: Altered Mood, Depressive Behavior:  Goal: Ability to disclose and discuss suicidal ideas will improve  Description: Ability to disclose and discuss suicidal ideas will improve  4/11/2022 0024 by Leticia Boyer RN  Outcome: Ongoing  4/10/2022 1323 by Shan Burrell LPN  Outcome: Ongoing  Goal: Absence of self-harm  Description: Absence of self-harm  4/11/2022 0024 by Leticia Boyer RN  Outcome: Ongoing  4/10/2022 1323 by Shan Burrell LPN  Outcome: Ongoing     Problem: Tobacco Use:  Goal: Inpatient tobacco use cessation counseling participation  Description: Inpatient tobacco use cessation counseling participation  4/11/2022 0024 by Leticia Boyer RN  Outcome: Ongoing  4/10/2022 1323 by Shan Burrell LPN  Outcome: Ongoing     Problem: Suicide risk  Goal: Provide patient with safe environment  Description: Provide patient with safe environment  4/11/2022 0024 by Leticia Boyer RN  Outcome: Ongoing  4/10/2022 1323 by Shan Burrell LPN  Outcome: Ongoing

## 2022-04-11 NOTE — BH NOTE
Writer attempted talk time with the patient. Patient is very sleepy and hard to wake. Writer checked patient's blood sugar and vital signs. Patient states he is feeling okay just very sleepy. Patient refused to take his evening meds stating he just wants to sleep.

## 2022-04-11 NOTE — PROGRESS NOTES
Daily Progress Note  4/11/2022    Patient Name: Cinda Tian    CHIEF COMPLAINT: Suicidal ideation         SUBJECTIVE:      Patient is seen today for a follow up assessment. Patient has been compliant with scheduled medications at this time and has not required emergency medications in the past 24 hours. Nursing staff reports last night patient was very difficult to arouse and refused medications related to being tired. Patient was compliant with scheduled medications today and states that he plans to remain compliant. Patient denies medication side effects reports that they have been somewhat helpful. Patient does continue to endorse depression and anxiety as high and reports that he is actively feeling suicidal.  Patient denies plan or intention to harm self on the unit however is unable to contract for safety outside of the hospital.  Patient states that he is experiencing command auditory hallucinations of himself and feels that he is not strong enough to resist that at times. Patient denies visual hallucinations and paranoia. Writer encouraged patient to attend groups on the unit. At this time, the patient is not appropriate for a lower level of care. There is risk of decompensation and patient warrants further hospitalization for safety and stabilization. Appetite:  [x] Adequate/Unchanged  [] Increased  [] Decreased      Sleep:       [] Adequate/Unchanged  [] Fair  [x] Poor      Group Attendance on Unit:   [] Yes   [] Selectively    [x] No    Medication Side Effects: Denies         Mental Status Exam  Level of consciousness: Lethargic  Appearance: Disheveled, resting in bed, with poor grooming and hygiene. Behavior/Motor: Approachable, compliant with interview  Attitude toward examiner: Cooperative, attentive, fair eye contact. Speech: slow, monotone and slurred   Mood:  Patient reports \" not too good\". Affect: Flat, depressed  Thought processes: Linear, coherent and slow.    Thought content: Denies homicidal ideation. Suicidal Ideation: Active suicidal ideations, without current intent to harm self on unit. Unable to contract for safety off unit. Delusions: No evidence of delusions. Denies paranoia. Perceptual Disturbance: Patient does not appear to be responding to internal stimuli. Endorses auditory hallucinations. Denies visual hallucinations. Cognition: Oriented to self, location, time, and situation. Memory: Intact. Insight & Judgement: Poor. Data   height is 6' 2\" (1.88 m) and weight is 170 lb (77.1 kg). His oral temperature is 97.1 °F (36.2 °C). His blood pressure is 122/76 and his pulse is 84. His respiration is 14 and oxygen saturation is 97%. Labs:   Admission on 04/06/2022   Component Date Value Ref Range Status    Hemoglobin A1C 04/07/2022 5.3  4.0 - 6.0 % Final    Estimated Avg Glucose 04/07/2022 105  mg/dL Final    Comment: The ADA and AACC recommend providing the estimated average glucose result to permit better   patient understanding of their HBA1c result.  POC Glucose 04/10/2022 97  75 - 110 mg/dL Final         Reviewed patient's current plan of care and vital signs with nursing staff.     Labs reviewed: [x] Yes    Medications  Current Facility-Administered Medications: valproic acid (DEPAKENE) capsule 250 mg, 250 mg, Oral, BID  valproic acid (DEPAKENE) capsule 500 mg, 500 mg, Oral, Nightly  mirtazapine (REMERON) tablet 7.5 mg, 7.5 mg, Oral, Nightly  acetaminophen (TYLENOL) tablet 650 mg, 650 mg, Oral, Q4H PRN  aluminum & magnesium hydroxide-simethicone (MAALOX) 200-200-20 MG/5ML suspension 30 mL, 30 mL, Oral, Q6H PRN  hydrOXYzine (ATARAX) tablet 50 mg, 50 mg, Oral, TID PRN  ibuprofen (ADVIL;MOTRIN) tablet 400 mg, 400 mg, Oral, Q6H PRN  nicotine polacrilex (NICORETTE) gum 2 mg, 2 mg, Oral, PRN  polyethylene glycol (GLYCOLAX) packet 17 g, 17 g, Oral, Daily PRN  haloperidol lactate (HALDOL) injection 5 mg, 5 mg, IntraMUSCular, Q4H PRN **AND** LORazepam (ATIVAN) injection 2 mg, 2 mg, IntraMUSCular, Q4H PRN **AND** diphenhydrAMINE (BENADRYL) injection 50 mg, 50 mg, IntraMUSCular, Q4H PRN  haloperidol (HALDOL) tablet 5 mg, 5 mg, Oral, Q4H PRN **AND** LORazepam (ATIVAN) tablet 2 mg, 2 mg, Oral, Q4H PRN  Vitamin D (CHOLECALCIFEROL) tablet 1,000 Units, 1,000 Units, Oral, Daily  escitalopram (LEXAPRO) tablet 20 mg, 20 mg, Oral, Daily  paliperidone (INVEGA) extended release tablet 6 mg, 6 mg, Oral, Nightly    ASSESSMENT  Schizoaffective disorder, depressive type ([facility] [address]         HANDOFF  Patient symptoms are:  Remain unstable  Medications as determined by attending physician  Encourage participation in groups and milieu. Probable discharge is to be determined by MD    Electronically signed by MARY Vega CNP on 4/11/2022 at 2:20 PM    **This report has been created using voice recognition software. It may contain minor errors which are inherent in voice recognition technology. **    I independently saw and evaluated the patient. I reviewed the nurse practitioners documentation above. Any additional comments or changes to the nurse practitioners documentation are stated below otherwise agree with assessment. Plan will be as follows:  Spent 30 minutes with the patient, of that greater than 16 minutes was spent in supportive psychotherapy. Patient reporting not doing much better with his voices. Discussed discontinuing his Invega and transitioning to olanzapine which she had been on in the past at one point. Patient is in agreement. Not able to contract for safety at present time outside of the hospital  PLAN  Patient s symptoms   show no change  Discontinue Invega  Olanzapine 10 mg by mouth at bedtime  Attempt to develop insight  Psycho-education conducted. Supportive Therapy conducted.   Probable discharge is undetermined at this time  Follow-up daily while on inpatient unit

## 2022-04-11 NOTE — PROGRESS NOTES
Daily Progress Note  Mario Alberto Wood MD  4/10/2022  CHIEF COMPLAINT: Depression with suicidal ideation    Reviewed patient's current plan of care and vital signs with nursing staff. Sleep:  several hours last night  Attending groups: No: Isolating    SUBJECTIVE:    Patient lying in bed. First night that he slept, he reports being very tired, feels like he is finally able to catch up on sleep. Still reporting depression. Not able to contract for safety at present time. Is reporting some improvement in his symptoms, reports voices are somewhat improved. We are still working on [facility] nursing home placement. At present time we have establish guardianship for the patient over the past 1 year, no more controlled environment would be helpful as he has stable housing but remains able to walk out of [facility] [place] and use crack. More controlled nursing home environment in a locked unit would likely be a better outcome for the patient and safety. Mental Status Exam  Level of consciousness:  Within normal limits  Appearance: Hospital attire, lying in bed, poor grooming behavior/Motor: Some psychomotor retardation  Attitude toward examiner: Intermittent eye contact   speech: Low volume, slow rate, is well articulated  Mood: Depressed  Affect: Congruent, withdrawn and flat  Thought processes: Linear to brief close ended questions, Little spontaneity of thought  Thought content:  denies homicidal ideation  Suicidal Ideation: Endorses suicidal ideation  Delusions:  no evidence of delusions  Perceptual Disturbance: Improvement in auditory hallucinations reported   Cognition: Oriented to self and general circumstance   memory: Limited  Insight & Judgement: Limited  Medication side effects:  denies       Data   height is 6' 2\" (1.88 m) and weight is 170 lb (77.1 kg). His oral temperature is 98.3 °F (36.8 °C). His blood pressure is 107/63 and his pulse is 86. His respiration is 14 and oxygen saturation is 97%. Labs:   Admission on 04/06/2022   Component Date Value Ref Range Status    Hemoglobin A1C 04/07/2022 5.3  4.0 - 6.0 % Final    Estimated Avg Glucose 04/07/2022 105  mg/dL Final    Comment: The ADA and AACC recommend providing the estimated average glucose result to permit better   patient understanding of their HBA1c result. Medications  Current Facility-Administered Medications: valproic acid (DEPAKENE) capsule 250 mg, 250 mg, Oral, BID  valproic acid (DEPAKENE) capsule 500 mg, 500 mg, Oral, Nightly  mirtazapine (REMERON) tablet 7.5 mg, 7.5 mg, Oral, Nightly  acetaminophen (TYLENOL) tablet 650 mg, 650 mg, Oral, Q4H PRN  aluminum & magnesium hydroxide-simethicone (MAALOX) 200-200-20 MG/5ML suspension 30 mL, 30 mL, Oral, Q6H PRN  hydrOXYzine (ATARAX) tablet 50 mg, 50 mg, Oral, TID PRN  ibuprofen (ADVIL;MOTRIN) tablet 400 mg, 400 mg, Oral, Q6H PRN  nicotine polacrilex (NICORETTE) gum 2 mg, 2 mg, Oral, PRN  polyethylene glycol (GLYCOLAX) packet 17 g, 17 g, Oral, Daily PRN  haloperidol lactate (HALDOL) injection 5 mg, 5 mg, IntraMUSCular, Q4H PRN **AND** LORazepam (ATIVAN) injection 2 mg, 2 mg, IntraMUSCular, Q4H PRN **AND** diphenhydrAMINE (BENADRYL) injection 50 mg, 50 mg, IntraMUSCular, Q4H PRN  haloperidol (HALDOL) tablet 5 mg, 5 mg, Oral, Q4H PRN **AND** LORazepam (ATIVAN) tablet 2 mg, 2 mg, Oral, Q4H PRN  Vitamin D (CHOLECALCIFEROL) tablet 1,000 Units, 1,000 Units, Oral, Daily  escitalopram (LEXAPRO) tablet 20 mg, 20 mg, Oral, Daily  paliperidone (INVEGA) extended release tablet 6 mg, 6 mg, Oral, Nightly    ASSESSMENT  Schizoaffective disorder, depressive type (HCC)     PLAN  Patient s symptoms   show some improvement  Observe on recently added Depakote  Attempt to develop insight  Psycho-education conducted. Supportive Therapy conducted.   Probable discharge is 2 to 3 days pending placement to ECF  Follow-up daily while in the inpatient unit        Electronically signed by Codie Huber MD on 4/10/22 at 8:25 PM EDT    **This report has been created using voice recognition software. It may contain minor errors which are inherent in voice recognition technology. **

## 2022-04-11 NOTE — GROUP NOTE
Group Therapy Note    Date: 4/11/2022    Group Start Time: 1000  Group End Time: 1100  Group Topic: Psychotherapy    [address] [facility], [place], [place]        Group Therapy Note    Attendees: 10/22       Patient refused to attend psychotherapy group at 10:00 am after encouragement from staff. 1:1 talk time provided as alternative to group session.     Discipline Responsible: /Counselor      Signature:  FRANKLIN Bryant, CHANEL

## 2022-04-11 NOTE — GROUP NOTE
Group Therapy Note    Date: 4/11/2022    Group Start Time: 1100  Group End Time: 5105  Group Topic: Group Documentation    [facility]        Group Therapy Note    Attendees: 12/21         Patient's Goal: Attend and participate in wellness group  Notes: Discussion cards - life events    Status After Intervention:  Improved    Participation Level:  Active Listener and Interactive    Participation Quality: Appropriate, Attentive, Sharing and Supportive      Speech:  normal      Thought Process/Content: Logical      Affective Functioning: Congruent      Mood: anxious      Level of consciousness:  Alert, Oriented x4 and Attentive      Response to Learning: Able to verbalize current knowledge/experience, Able to verbalize/acknowledge new learning, Able to retain information and Capable of insight      Endings: None Reported    Modes of Intervention: Education, Support, Socialization, Exploration and Problem-solving      Discipline Responsible: [facility] [address] [email or web address]      Signature:  Carlos Chahal

## 2022-04-11 NOTE — PLAN OF CARE
Problem: Altered Mood, Depressive Behavior:  Goal: Ability to disclose and discuss suicidal ideas will improve  Description: Ability to disclose and discuss suicidal ideas will improve  4/11/2022 1217 by Long Villalpando LPN  Outcome: Ongoing  4/11/2022 0024 by Arnulfo Juan RN  Outcome: Ongoing   Patient denies suicidal Ideations and agrees to approach staff if having thoughts to harm self. Q 15 min safety checks continue at this time.   Problem: Altered Mood, Depressive Behavior:  Goal: Absence of self-harm  Description: Absence of self-harm  4/11/2022 1217 by Long Villalpando LPN  Outcome: Ongoing  4/11/2022 0024 by Arnulfo Juan RN  Outcome: Ongoing     Problem: Tobacco Use:  Goal: Inpatient tobacco use cessation counseling participation  Description: Inpatient tobacco use cessation counseling participation  4/11/2022 1217 by Long Villalpando LPN  Outcome: Ongoing  4/11/2022 0024 by Arnulfo Juan RN  Outcome: Ongoing     Problem: Suicide risk  Goal: Provide patient with safe environment  Description: Provide patient with safe environment  4/11/2022 1217 by Long Villalpando LPN  Outcome: Ongoing  4/11/2022 0024 by Arnulfo Juan RN  Outcome: Ongoing

## 2022-04-12 PROCEDURE — 6370000000 HC RX 637 (ALT 250 FOR IP): Performed by: INTERNAL MEDICINE

## 2022-04-12 PROCEDURE — 6370000000 HC RX 637 (ALT 250 FOR IP): Performed by: PSYCHIATRY & NEUROLOGY

## 2022-04-12 PROCEDURE — APPSS30 APP SPLIT SHARED TIME 16-30 MINUTES

## 2022-04-12 PROCEDURE — 99232 SBSQ HOSP IP/OBS MODERATE 35: CPT | Performed by: PSYCHIATRY & NEUROLOGY

## 2022-04-12 PROCEDURE — 1240000000 HC EMOTIONAL WELLNESS R&B

## 2022-04-12 RX ADMIN — Medication 1000 UNITS: at 08:24

## 2022-04-12 RX ADMIN — MIRTAZAPINE 7.5 MG: 15 TABLET, FILM COATED ORAL at 21:49

## 2022-04-12 RX ADMIN — HYDROXYZINE HYDROCHLORIDE 50 MG: 50 TABLET, FILM COATED ORAL at 21:49

## 2022-04-12 RX ADMIN — ESCITALOPRAM OXALATE 20 MG: 20 TABLET ORAL at 08:24

## 2022-04-12 RX ADMIN — VALPROIC ACID 500 MG: 250 CAPSULE, LIQUID FILLED ORAL at 21:49

## 2022-04-12 RX ADMIN — VALPROIC ACID 250 MG: 250 CAPSULE, LIQUID FILLED ORAL at 08:24

## 2022-04-12 RX ADMIN — VALPROIC ACID 250 MG: 250 CAPSULE, LIQUID FILLED ORAL at 15:16

## 2022-04-12 RX ADMIN — OLANZAPINE 10 MG: 10 TABLET, FILM COATED ORAL at 21:49

## 2022-04-12 ASSESSMENT — PAIN DESCRIPTION - LOCATION: LOCATION: LEG

## 2022-04-12 ASSESSMENT — PAIN SCALES - GENERAL: PAINLEVEL_OUTOF10: 8

## 2022-04-12 ASSESSMENT — PAIN DESCRIPTION - ORIENTATION: ORIENTATION: RIGHT;LEFT

## 2022-04-12 NOTE — PROGRESS NOTES
Daily Progress Note  4/12/2022    Patient Name: Sherrill Plata    CHIEF COMPLAINT: Suicidal ideation         SUBJECTIVE:      Patient is seen today for a follow up assessment. Patient has been compliant with scheduled medications at this time and has not required emergency medications in the past 24 hours. When approached for interview patient continues to endorse significant depression and anxiety. Patient states he is currently feeling suicidal however does not have a plan or intention to harm himself on unit. Patient is unable to contract for safety outside of the hospital at this time. Patient denies homicidal ideations. When discussing auditory hallucinations patient endorses. When asked to describe them patient reports Ardon Gills are the voices I here every day\". When asked what they sound like patient is unable to describe them reporting \"they are just voices\". Patient does state that they tell him to harm himself at times. Patient denies visual hallucinations. Patient denies paranoia. Patient states he is not experiencing any medication side effects at this time however no medications have been helpful with reducing the voices so far. Appetite:  [] Adequate/Unchanged  [x] fair[] Decreased      Sleep:       [] Adequate/Unchanged  [x] Fair  [] Poor      Group Attendance on Unit:   [] Yes   [x] Selectively    [] No    Medication Side Effects: Denies         Mental Status Exam  Level of consciousness: Alert and awake  Appearance: Disheveled, resting in bed, with poor grooming and hygiene. Behavior/Motor: Approachable, compliant with interview  Attitude toward examiner: Cooperative, attentive, fair eye contact. Speech: slow, monotone  Mood:  Patient reports \" so-so\". Affect: Flat, depressed  Thought processes: Linear, coherent and slow. Thought content: Denies homicidal ideation. Suicidal Ideation: Active suicidal ideations, without current intent to harm self on unit.  Unable to contract for safety off unit. Delusions: No evidence of delusions. Denies paranoia. Perceptual Disturbance: Patient does not appear to be responding to internal stimuli. Endorses auditory hallucinations. Denies visual hallucinations. Cognition: Oriented to self, location, time, and situation. Memory: Intact. Insight & Judgement: Poor. Data   height is 6' 2\" (1.88 m) and weight is 170 lb (77.1 kg). His temporal temperature is 98.2 °F (36.8 °C). His blood pressure is 114/63 and his pulse is 65. His respiration is 14 and oxygen saturation is 97%. Labs:   Admission on 04/06/2022   Component Date Value Ref Range Status    Hemoglobin A1C 04/07/2022 5.3  4.0 - 6.0 % Final    Estimated Avg Glucose 04/07/2022 105  mg/dL Final    Comment: The ADA and AACC recommend providing the estimated average glucose result to permit better   patient understanding of their HBA1c result.  POC Glucose 04/10/2022 97  75 - 110 mg/dL Final         Reviewed patient's current plan of care and vital signs with nursing staff.     Labs reviewed: [x] Yes    Medications  Current Facility-Administered Medications: OLANZapine (ZYPREXA) tablet 10 mg, 10 mg, Oral, Nightly  valproic acid (DEPAKENE) capsule 250 mg, 250 mg, Oral, BID  valproic acid (DEPAKENE) capsule 500 mg, 500 mg, Oral, Nightly  mirtazapine (REMERON) tablet 7.5 mg, 7.5 mg, Oral, Nightly  acetaminophen (TYLENOL) tablet 650 mg, 650 mg, Oral, Q4H PRN  aluminum & magnesium hydroxide-simethicone (MAALOX) 200-200-20 MG/5ML suspension 30 mL, 30 mL, Oral, Q6H PRN  hydrOXYzine (ATARAX) tablet 50 mg, 50 mg, Oral, TID PRN  ibuprofen (ADVIL;MOTRIN) tablet 400 mg, 400 mg, Oral, Q6H PRN  nicotine polacrilex (NICORETTE) gum 2 mg, 2 mg, Oral, PRN  polyethylene glycol (GLYCOLAX) packet 17 g, 17 g, Oral, Daily PRN  haloperidol lactate (HALDOL) injection 5 mg, 5 mg, IntraMUSCular, Q4H PRN **AND** LORazepam (ATIVAN) injection 2 mg, 2 mg, IntraMUSCular, Q4H PRN **AND** diphenhydrAMINE (BENADRYL) injection 50 mg, 50 mg, IntraMUSCular, Q4H PRN  haloperidol (HALDOL) tablet 5 mg, 5 mg, Oral, Q4H PRN **AND** LORazepam (ATIVAN) tablet 2 mg, 2 mg, Oral, Q4H PRN  Vitamin D (CHOLECALCIFEROL) tablet 1,000 Units, 1,000 Units, Oral, Daily  escitalopram (LEXAPRO) tablet 20 mg, 20 mg, Oral, Daily    ASSESSMENT  Schizoaffective disorder, depressive type ([facility] [address]         HANDOFF  Patient symptoms are:  Remain unstable  Medications as determined by attending physician  Encourage participation in groups and milieu. Probable discharge is to be determined by MD    Electronically signed by MARY Restrepo CNP on 4/12/2022 at 3:24 PM    **This report has been created using voice recognition software. It may contain minor errors which are inherent in voice recognition technology. **    I independently saw and evaluated the patient. I reviewed the nurse practitioners documentation above. Any additional comments or changes to the nurse practitioners documentation are stated below otherwise agree with assessment. Plan will be as follows:  Patient PASSR approved. Social workers will move forward with referrals for placement to preferably locked nursing home unit so patient can wander out into the street. Denying side effects to olanzapine. Will observe for further improvement  PLAN  Patient s symptoms   show minimal change  May consider further increasing olanzapine pending observation  Attempt to develop insight  Psycho-education conducted. Supportive Therapy conducted.   Probable discharge is undetermined at this time  Follow-up daily while on inpatient unit

## 2022-04-12 NOTE — PLAN OF CARE
Problem: Tobacco Use:  Goal: Inpatient tobacco use cessation counseling participation  Description: Inpatient tobacco use cessation counseling participation  4/11/2022 2248 by Charity French LPN  Outcome: Ongoing   Patient has not verbalized a need for the cessation gum at this time. Problem: Suicide risk  Goal: Provide patient with safe environment  Description: Provide patient with safe environment  4/11/2022 2248 by Charity French LPN  Outcome: Ongoing   Patient participating in group activities, sharing personal feelings to large group, comfort level achieved.

## 2022-04-12 NOTE — PLAN OF CARE
Problem: Altered Mood, Depressive Behavior:  Goal: Ability to disclose and discuss suicidal ideas will improve  Description: Ability to disclose and discuss suicidal ideas will improve  Outcome: Ongoing   Patient denies Suicidal Ideations and agrees to approach staff if having thoughts to harm self. Q 15 min safety checks continue at this time. Patient admits to anxiety and depression. Patient has been taking his medications and attending select groups. Problem: Suicide risk  Goal: Provide patient with safe environment  Description: Provide patient with safe environment  4/12/2022 1118 by Diony Prather LPN  Outcome: Ongoing  4/11/2022 2248 by Frank Crum LPN  Outcome: Ongoing   Patient continues to be in a safe in environment.

## 2022-04-12 NOTE — GROUP NOTE
Group Therapy Note    Date: 4/12/2022    Group Start Time: 1400  Group End Time: 8096  Group Topic: Psychoeducation    JESUS Angel, CTRS    Patient refused to attend mental health advocacy and problem solving group at 1400 after encouragement from staff. 1:1 talk time offered by staff as alternative to group session.

## 2022-04-12 NOTE — GROUP NOTE
Group Therapy Note    Date: 4/12/2022    Group Start Time: 1000  Group End Time: 1100  Group Topic: Psychotherapy    [address] [facility], MSW, LOREW        Group Therapy Note    Attendees: 15/24           Patient's Goal:  Recognizing symptoms of Anxiety, the types of Anxiety and treatments.         Status After Intervention:  Improved    Participation Level: Interactive    Participation Quality: Appropriate      Speech:  normal      Thought Process/Content: Logical      Affective Functioning: Congruent      Mood: euthymic      Level of consciousness:  Alert      Response to Learning: Able to verbalize current knowledge/experience and Able to verbalize/acknowledge new learning      Endings: None Reported    Modes of Intervention: Education, Support and Socialization      Discipline Responsible: /Counselor      Signature:  FRANKLIN Bryant, CHANEL

## 2022-04-12 NOTE — GROUP NOTE
Group Therapy Note    Date: 4/12/2022    Group Start Time: 0900  Group End Time: 1036  Group Topic: Community Meeting    JESUS Mercer        Group Therapy Note    Attendees: 10/23           Patient's Goal:  Talk with  about placement    Notes:  controlled    Status After Intervention:  Unchanged    Participation Level: Active Listener and Interactive    Participation Quality: Appropriate and Attentive      Speech:  pressured      Thought Process/Content: Logical      Affective Functioning: Congruent      Mood: controlled      Level of consciousness:  Alert and Attentive      Response to Learning: Progressing to goal      Endings: None Reported    Modes of Intervention: Education, Support and Socialization      Discipline Responsible: Behavorial Health Tech      Signature:   Levon Mercer

## 2022-04-13 PROCEDURE — APPSS30 APP SPLIT SHARED TIME 16-30 MINUTES

## 2022-04-13 PROCEDURE — 1240000000 HC EMOTIONAL WELLNESS R&B

## 2022-04-13 PROCEDURE — 99232 SBSQ HOSP IP/OBS MODERATE 35: CPT | Performed by: PSYCHIATRY & NEUROLOGY

## 2022-04-13 PROCEDURE — 6370000000 HC RX 637 (ALT 250 FOR IP): Performed by: INTERNAL MEDICINE

## 2022-04-13 PROCEDURE — 6370000000 HC RX 637 (ALT 250 FOR IP): Performed by: PSYCHIATRY & NEUROLOGY

## 2022-04-13 RX ORDER — OLANZAPINE 15 MG/1
15 TABLET ORAL NIGHTLY
Status: DISCONTINUED | OUTPATIENT
Start: 2022-04-13 | End: 2022-04-15 | Stop reason: HOSPADM

## 2022-04-13 RX ADMIN — VALPROIC ACID 250 MG: 250 CAPSULE, LIQUID FILLED ORAL at 13:55

## 2022-04-13 RX ADMIN — ESCITALOPRAM OXALATE 20 MG: 20 TABLET ORAL at 07:47

## 2022-04-13 RX ADMIN — Medication 1000 UNITS: at 07:47

## 2022-04-13 RX ADMIN — HYDROXYZINE HYDROCHLORIDE 50 MG: 50 TABLET, FILM COATED ORAL at 21:01

## 2022-04-13 RX ADMIN — OLANZAPINE 15 MG: 15 TABLET, FILM COATED ORAL at 21:01

## 2022-04-13 RX ADMIN — VALPROIC ACID 250 MG: 250 CAPSULE, LIQUID FILLED ORAL at 07:47

## 2022-04-13 RX ADMIN — MIRTAZAPINE 7.5 MG: 15 TABLET, FILM COATED ORAL at 21:01

## 2022-04-13 NOTE — CARE COORDINATION
SW received call from admissions staff [facility], she asked for clinicals to be faxed to [phone]. Ned Blanco asked if the liaison can do an onsite today and said someone would be up to the hospital to meet with pt today.

## 2022-04-13 NOTE — GROUP NOTE
Group Therapy Note    Date: 4/13/2022    Group Start Time: 9802  Group End Time: 2253  Group Topic: Psychoeducation    [facility]    Patient refused to attend stress management and creative expression group at 1340 after encouragement from staff. 1:1 talk time offered by staff as alternative to group session.

## 2022-04-13 NOTE — CARE COORDINATION
SW received call from Ce Peoples at [address] who shared being unable to accept pt due to pts attempts of suicide.

## 2022-04-13 NOTE — GROUP NOTE
Group Therapy Note    Date: 4/13/2022    Group Start Time: 1100  Group End Time: 4059  Group Topic: Recreational    [facility]    Patient refused to attend leisure skills group at 1100 after encouragement from staff. 1:1 talk time offered by staff as alternative to group session.

## 2022-04-13 NOTE — PLAN OF CARE
Problem: Altered Mood, Depressive Behavior:  Goal: Ability to disclose and discuss suicidal ideas will improve  Description: Ability to disclose and discuss suicidal ideas will improve  4/13/2022 1008 by Shamar Swift LPN  Outcome: Ongoing  Note: Patient denies any current suicidal thoughts and agrees to seek out staff if thoughts arise. Patient endorses depression and anxiety. Patient has been in his room most of the morning and has been out for meals. Patient continues to have auditory hallucinations. Patient is cooperative with staff and compliant with medications. Problem: Altered Mood, Depressive Behavior:  Goal: Absence of self-harm  Description: Absence of self-harm  4/13/2022 1008 by Shamar Swift LPN  Outcome: Ongoing  Note: Patient remains free from self harm. Will continue to monitor.

## 2022-04-13 NOTE — CARE COORDINATION
SW received call from Debra at [facility], she reports everything \"looks good\" in the clinicals but would like to com do an onsite with pt on 4/14/2022, Debra shared they should be here around 9 am to meet the pt.

## 2022-04-13 NOTE — CARE COORDINATION
SW placed call to [facility] for possible placement of pt.  SW left name and call back number asking for return call

## 2022-04-13 NOTE — CARE COORDINATION
YOGESH placed call to [facility] and spoke with Bill Cox regarding possible placement. Bill Cox reports there are male beds available and asked for the pts clinicals to be faxed to  [phone]. Bill Cox did ask to do an onsite on 4/14/2022 and will let YOGESH know after reviewing the clinicals. 1:58 pm- YOGESH faxed clinicals to Bill Cox at [phone].

## 2022-04-13 NOTE — CARE COORDINATION
YOGESH placed call to [facility] for possible placement of pt. Spoke with Aime Cosby who shared having an available male bed and asked for clinicals to be sent to [phone].    12:21- YOGESH faxed clinicals to Aime Cosby.

## 2022-04-13 NOTE — GROUP NOTE
Group Therapy Note    Date: 4/13/2022    Group Start Time: 1006  Group End Time: 1053  Group Topic: Psychotherapy    [address] [facility], CHANEL REID        Group Therapy Note    Attendees: 7/20       Patient refused to attend psychotherapy group at 10:00 am after encouragement from staff. 1:1 talk time provided as alternative to group session.     Discipline Responsible: /Counselor      Signature:  FRANKLIN Rebollar LSW

## 2022-04-13 NOTE — PROGRESS NOTES
Daily Progress Note  4/13/2022    Patient Name: Nikita Colvin    CHIEF COMPLAINT: Suicidal ideation         SUBJECTIVE:      Patient is seen today for a follow up assessment. Patient has been compliant with scheduled medications at this time and has not required emergency medications in the past 24 hours. When approached for interview patient reports improvement in depression and anxiety. Patient states suicidal ideations are improving today however is unable to contract for safety outside the hospital at this time. Patient denies homicidal ideations. Patient reports auditory hallucinations continue telling him to harm himself and have not gotten any better. Patient denies visual hallucinations and denies paranoia. Patient states he is not experiencing any side effects and reports medications have been somewhat helpful with mood and sleep. Appetite:  [] Adequate/Unchanged  [x] fair[] Decreased      Sleep:       [] Adequate/Unchanged  [x] Fair  [] Poor      Group Attendance on Unit:   [] Yes   [x] Selectively    [] No    Medication Side Effects: Denies         Mental Status Exam  Level of consciousness: Lethargic  Appearance: Disheveled, resting in bed, with poor grooming and hygiene. Behavior/Motor: Approachable, compliant with interview  Attitude toward examiner: Cooperative, attentive, fair eye contact. Speech: slow, monotone, mumbled  Mood:  Patient reports \" so-so\". Affect: Flat, depressed  Thought processes: Linear, coherent and slow. Thought content: Denies homicidal ideation. Suicidal Ideation: Reports improvement in suicidal ideations, without current intent to harm self on unit. Unable to contract for safety off unit. Delusions: No evidence of delusions. Denies paranoia. Perceptual Disturbance: Patient does not appear to be responding to internal stimuli. Endorses auditory hallucinations. Denies visual hallucinations.    Cognition: Oriented to self, location, time, and situation. Memory: Intact. Insight & Judgement: Poor. Data   height is 6' 2\" (1.88 m) and weight is 170 lb (77.1 kg). His oral temperature is 97.5 °F (36.4 °C). His blood pressure is 113/68 and his pulse is 88. His respiration is 14 and oxygen saturation is 97%. Labs:   Admission on 04/06/2022   Component Date Value Ref Range Status    Hemoglobin A1C 04/07/2022 5.3  4.0 - 6.0 % Final    Estimated Avg Glucose 04/07/2022 105  mg/dL Final    Comment: The ADA and AACC recommend providing the estimated average glucose result to permit better   patient understanding of their HBA1c result.  POC Glucose 04/10/2022 97  75 - 110 mg/dL Final         Reviewed patient's current plan of care and vital signs with nursing staff.     Labs reviewed: [x] Yes    Medications  Current Facility-Administered Medications: OLANZapine (ZYPREXA) tablet 10 mg, 10 mg, Oral, Nightly  valproic acid (DEPAKENE) capsule 250 mg, 250 mg, Oral, BID  valproic acid (DEPAKENE) capsule 500 mg, 500 mg, Oral, Nightly  mirtazapine (REMERON) tablet 7.5 mg, 7.5 mg, Oral, Nightly  acetaminophen (TYLENOL) tablet 650 mg, 650 mg, Oral, Q4H PRN  aluminum & magnesium hydroxide-simethicone (MAALOX) 200-200-20 MG/5ML suspension 30 mL, 30 mL, Oral, Q6H PRN  hydrOXYzine (ATARAX) tablet 50 mg, 50 mg, Oral, TID PRN  ibuprofen (ADVIL;MOTRIN) tablet 400 mg, 400 mg, Oral, Q6H PRN  nicotine polacrilex (NICORETTE) gum 2 mg, 2 mg, Oral, PRN  polyethylene glycol (GLYCOLAX) packet 17 g, 17 g, Oral, Daily PRN  haloperidol lactate (HALDOL) injection 5 mg, 5 mg, IntraMUSCular, Q4H PRN **AND** LORazepam (ATIVAN) injection 2 mg, 2 mg, IntraMUSCular, Q4H PRN **AND** diphenhydrAMINE (BENADRYL) injection 50 mg, 50 mg, IntraMUSCular, Q4H PRN  haloperidol (HALDOL) tablet 5 mg, 5 mg, Oral, Q4H PRN **AND** LORazepam (ATIVAN) tablet 2 mg, 2 mg, Oral, Q4H PRN  Vitamin D (CHOLECALCIFEROL) tablet 1,000 Units, 1,000 Units, Oral, Daily  escitalopram (LEXAPRO) tablet 20 mg, 20 mg, Oral, Daily    ASSESSMENT  Schizoaffective disorder, depressive type ([facility] [address]         HANDOFF  Patient symptoms are:  Remain unstable  Medications as determined by attending physician  Encourage participation in groups and milieu. Probable discharge is to be determined by MD    Electronically signed by MARY Crews CNP on 4/13/2022 at 2:34 PM    **This report has been created using voice recognition software. It may contain minor errors which are inherent in voice recognition technology. **    I independently saw and evaluated the patient. I reviewed the nurse practitioners documentation above. Any additional comments or changes to the nurse practitioners documentation are stated below otherwise agree with assessment. Plan will be as follows:  Patient seems somewhat lethargic and having a hard time maintaining meaningful conversation. He reports continued hallucinations. We discussed whether he feels overmedicated and he indicates he does. Discussed discontinuing Depakote and trying to optimize olanzapine and patient is in agreement not able to contract for safety outside of the hospital, still experiencing command auditory hallucinations. Social work staff working on placement to [facility]  Patient s symptoms   show no change  Discontinue Depakote  Increase olanzapine to 15 mg by mouth at bedtime  Attempt to develop insight  Psycho-education conducted. Supportive Therapy conducted.   Probable discharge is undetermined at this time  Follow-up daily while on inpatient unit

## 2022-04-13 NOTE — CARE COORDINATION
YOGESH sent clinicals to [facility] Kimberley at [email or web address] :Kimberley reports there are open male beds at this time, they will review the paperwork and call YOGESH back with determination.

## 2022-04-13 NOTE — PLAN OF CARE
Problem: Altered Mood, Depressive Behavior:  Goal: Ability to disclose and discuss suicidal ideas will improve  Description: Ability to disclose and discuss suicidal ideas will improve  4/12/2022 2052 by Carson Calles LPN  Outcome: Ongoing  Note: Patient denies suicidal ideations at this time. Patient agrees to seek out staff if they begin having suicidal ideations or need to talk. Q15min safety checks continue      Problem: Altered Mood, Depressive Behavior:  Goal: Absence of self-harm  Description: Absence of self-harm  4/12/2022 2052 by Carson Calles LPN  Outcome: Ongoing  Note: Patient denies suicidal thoughts and hallucinations. He reports moderate depression and anxiety. He has been out in the milieu, playing dice and social with peers.  Q15min safety checks continue

## 2022-04-13 NOTE — CARE COORDINATION
SW received message from [facility] reporting they are unable to accept this pt with no explanation as to why they would not accept.

## 2022-04-13 NOTE — PROGRESS NOTES
Pharmacy Med Education Group Note    Date: 4/13/22  Start Time: 0968  End Time: 1600    Number Participants in Group:  8    Goal:  Patient will demonstrate an understanding of the medications intended purpose and possible adverse effects  Topic: [place] for Pharmacy Med Ed Group    Discipline Responsible:     OT  AT  [facility].  RT     X Other       Participation Level:     None  Minimal      X Active Listener    X Interactive    Monopolizing         Participation Quality:    X Appropriate  Inappropriate     X       Attentive        Intrusive          Sharing        Resistant          Supportive        Lethargic       Affective:     X Congruent  Incongruent  Blunted  Flat    Constricted  Anxious  Elated  Angry    Labile  Depressed  Other         Cognitive:    X Alert  Oriented PPTP     Concentration   X G  F  P   Attention Span   X G  F  P   Short-Term Memory   X G  F  P   Long-Term Memory  G  F  P   ProblemSolving/  Decision Making  G  F  P   Ability to Process  Information   X G  F  P      Contributing Factors             Delusional             Hallucinating             Flight of Ideas             Other:       Modes of Intervention:    X Education   X Support  Exploration    Clarifying  Problem Solving  Confrontation    Socialization  Limit Setting  Reality Testing    Activity  Movement  Media    Other:            Response to Learning:    X Able to verbalize current knowledge/experience    Able to verbalize/acknowledge new learning    Able to retain information    Capable of insight    Able to change behavior    Progressing to goal    Other:        Comments: Carol Ann Dumont RPH,PharmD,  4/13/2022, 4:15 PM

## 2022-04-13 NOTE — CARE COORDINATION
11:08 am- YOGESH placed call to [facility] [phone] seeking placement for a male bed. YOGESH was informed that a male bed was available but admission staff would need to call back. 11:19 am YOGESH placed call to [facility] for possible placement. YOGESH left message on admissions voicemail.

## 2022-04-14 PROCEDURE — APPSS30 APP SPLIT SHARED TIME 16-30 MINUTES

## 2022-04-14 PROCEDURE — 6370000000 HC RX 637 (ALT 250 FOR IP): Performed by: PSYCHIATRY & NEUROLOGY

## 2022-04-14 PROCEDURE — 90833 PSYTX W PT W E/M 30 MIN: CPT | Performed by: PSYCHIATRY & NEUROLOGY

## 2022-04-14 PROCEDURE — 99232 SBSQ HOSP IP/OBS MODERATE 35: CPT | Performed by: PSYCHIATRY & NEUROLOGY

## 2022-04-14 PROCEDURE — 1240000000 HC EMOTIONAL WELLNESS R&B

## 2022-04-14 PROCEDURE — 6370000000 HC RX 637 (ALT 250 FOR IP): Performed by: INTERNAL MEDICINE

## 2022-04-14 RX ORDER — MIRTAZAPINE 7.5 MG/1
7.5 TABLET, FILM COATED ORAL NIGHTLY
Qty: 30 TABLET | Refills: 3 | Status: SHIPPED | OUTPATIENT
Start: 2022-04-14

## 2022-04-14 RX ORDER — HYDROXYZINE HYDROCHLORIDE 25 MG/1
25 TABLET, FILM COATED ORAL 3 TIMES DAILY PRN
Qty: 60 TABLET | Refills: 0 | Status: SHIPPED | OUTPATIENT
Start: 2022-04-14

## 2022-04-14 RX ORDER — OLANZAPINE 15 MG/1
15 TABLET ORAL NIGHTLY
Qty: 30 TABLET | Refills: 0 | Status: SHIPPED | OUTPATIENT
Start: 2022-04-14

## 2022-04-14 RX ORDER — ESCITALOPRAM OXALATE 20 MG/1
20 TABLET ORAL DAILY
Qty: 30 TABLET | Refills: 0 | Status: SHIPPED | OUTPATIENT
Start: 2022-04-14

## 2022-04-14 RX ORDER — CHOLECALCIFEROL (VITAMIN D3) 25 MCG
1000 TABLET ORAL DAILY
Qty: 30 TABLET | Refills: 0 | Status: SHIPPED | OUTPATIENT
Start: 2022-04-14

## 2022-04-14 RX ADMIN — HYDROXYZINE HYDROCHLORIDE 50 MG: 50 TABLET, FILM COATED ORAL at 21:52

## 2022-04-14 RX ADMIN — ESCITALOPRAM OXALATE 20 MG: 20 TABLET ORAL at 08:23

## 2022-04-14 RX ADMIN — OLANZAPINE 15 MG: 15 TABLET, FILM COATED ORAL at 21:52

## 2022-04-14 RX ADMIN — MIRTAZAPINE 7.5 MG: 15 TABLET, FILM COATED ORAL at 21:52

## 2022-04-14 RX ADMIN — Medication 1000 UNITS: at 08:23

## 2022-04-14 NOTE — GROUP NOTE
Group Therapy Note    Date: 4/14/2022    Group Start Time: 0900  Group End Time: 0930  Group Topic: Community Meeting    JESUS HERNANDEZ JUAN James LPN        Group Therapy Note    Attendees: 9/19         Patient's Goal:  To move into the right direction    Notes:      Status After Intervention:  Improved    Participation Level:  Active Listener    Participation Quality: Appropriate      Speech:  normal      Thought Process/Content: Logical      Affective Functioning: Congruent      Mood: calm      Level of consciousness:  Alert, Oriented x4 and Attentive      Response to Learning: Progressing to goal      Endings: None Reported    Modes of Intervention: Socialization      Discipline Responsible: Licensed Practical Nurse      Signature:  Omaira James LPN

## 2022-04-14 NOTE — PROGRESS NOTES
Daily Progress Note  4/14/2022    Patient Name: Lili Meraz    CHIEF COMPLAINT: Suicidal ideation         SUBJECTIVE:      Patient is seen today for a follow up assessment. Patient has been compliant with scheduled medications at this time and has not required emergency medications in the past 24 hours. When approached for interview patient reports his mood is improved today. Patient states that suicidal ideations have improved. When discussing auditory hallucinations patient states that they are just telling him to get out of the hospital right now and not telling him to kill himself. Patient states that this is most likely due to them playing tricks and waiting till he gets out of the hospital to return and become worse. Patient was also encouraged to recognize the possibility that medications are becoming effective in helping to reduce the severity of voices. Patient denies visual hallucinations at this time. Patient denies medication side effects. Appetite:  [x] Adequate/Unchanged  [] fair[] Decreased      Sleep:       [] Adequate/Unchanged  [x] Fair  [] Poor      Group Attendance on Unit:   [] Yes   [x] Selectively    [] No    Medication Side Effects: Denies         Mental Status Exam  Level of consciousness: Awake and alert  Appearance: Appropriate attire, seated in chair, with fair  grooming and hygiene. Behavior/Motor: Approachable, compliant with interview, more bright  Attitude toward examiner: Cooperative, attentive, fair eye contact. Speech: Well articulated, normal volume normal tone  Mood:  Patient reports \" better\". Affect: More bright  Thought processes: Linear and coherent. Thought content: Denies homicidal ideation. Suicidal Ideation: Reports improvement in suicidal ideations, without current intent to harm self on unit. Unable to contract for safety off unit. Delusions: No evidence of delusions. Endorses paranoia.   Perceptual Disturbance: Patient does not appear to be responding to internal stimuli. Reports improvement in auditory hallucinations. Denies visual hallucinations. Cognition: Oriented to self, location, time, and situation. Memory: Intact. Insight & Judgement: Poor. Data   height is 6' 2\" (1.88 m) and weight is 170 lb (77.1 kg). His oral temperature is 98 °F (36.7 °C). His blood pressure is 114/62 and his pulse is 60. His respiration is 14 and oxygen saturation is 97%. Labs:   Admission on 04/06/2022   Component Date Value Ref Range Status    Hemoglobin A1C 04/07/2022 5.3  4.0 - 6.0 % Final    Estimated Avg Glucose 04/07/2022 105  mg/dL Final    Comment: The ADA and AACC recommend providing the estimated average glucose result to permit better   patient understanding of their HBA1c result.  POC Glucose 04/10/2022 97  75 - 110 mg/dL Final         Reviewed patient's current plan of care and vital signs with nursing staff.     Labs reviewed: [x] Yes    Medications  Current Facility-Administered Medications: OLANZapine (ZYPREXA) tablet 15 mg, 15 mg, Oral, Nightly  mirtazapine (REMERON) tablet 7.5 mg, 7.5 mg, Oral, Nightly  acetaminophen (TYLENOL) tablet 650 mg, 650 mg, Oral, Q4H PRN  aluminum & magnesium hydroxide-simethicone (MAALOX) 200-200-20 MG/5ML suspension 30 mL, 30 mL, Oral, Q6H PRN  hydrOXYzine (ATARAX) tablet 50 mg, 50 mg, Oral, TID PRN  ibuprofen (ADVIL;MOTRIN) tablet 400 mg, 400 mg, Oral, Q6H PRN  nicotine polacrilex (NICORETTE) gum 2 mg, 2 mg, Oral, PRN  polyethylene glycol (GLYCOLAX) packet 17 g, 17 g, Oral, Daily PRN  haloperidol lactate (HALDOL) injection 5 mg, 5 mg, IntraMUSCular, Q4H PRN **AND** LORazepam (ATIVAN) injection 2 mg, 2 mg, IntraMUSCular, Q4H PRN **AND** diphenhydrAMINE (BENADRYL) injection 50 mg, 50 mg, IntraMUSCular, Q4H PRN  haloperidol (HALDOL) tablet 5 mg, 5 mg, Oral, Q4H PRN **AND** LORazepam (ATIVAN) tablet 2 mg, 2 mg, Oral, Q4H PRN  Vitamin D (CHOLECALCIFEROL) tablet 1,000 Units, 1,000 Units, Oral, Daily  escitalopram (LEXAPRO) tablet 20 mg, 20 mg, Oral, Daily    ASSESSMENT  Schizoaffective disorder, depressive type ([facility] [address]         HANDOFF  Patient symptoms are:  Showing modest improvement  Medications as determined by attending physician  Encourage participation in groups and milieu. Probable discharge is to be determined by MD    Electronically signed by MARY Osborne CNP on 4/14/2022 at 4:03 PM    **This report has been created using voice recognition software. It may contain minor errors which are inherent in voice recognition technology. **    I independently saw and evaluated the patient. I reviewed the nurse practitioners documentation above. Any additional comments or changes to the nurse practitioners documentation are stated below otherwise agree with assessment. Plan will be as follows:  Spent 30 minutes with the patient, of that greater than 16 minutes spent in supportive psychotherapy. We did discuss his transitioning to ECF. Patient is struggling somewhat with this but he is able to contract for safety to go to an ECF. He understands a more controlled environment may be helpful. It is a less controlled environment of the group home he expresses concerns that he will \"do it again\" referring to his crack use and also reports feeling tremendously guilty and wanting to end his life when he thinks about those things. He is hopeful that the new environment will be less likely to trigger this use. Again he is able to contract for safety to go to this level of care. We have an accepting facility and will move forward with discharge tomorrow  PLAN  Patient s symptoms   are improving  Continue with current medication for now  Attempt to develop insight  Psycho-education conducted. Supportive Therapy conducted.   Probable discharge is tomorrow  Follow-up daily while on inpatient unit

## 2022-04-14 NOTE — PLAN OF CARE
Problem: Altered Mood, Depressive Behavior:  Goal: Ability to disclose and discuss suicidal ideas will improve  Description: Ability to disclose and discuss suicidal ideas will improve  Outcome: Ongoing  Note:  Pt denies suicidal ideations at this time. Pt agreed to seek staff at anytime he/she felt like any urges to harm self would arise. Safety checks maintained [id number]. Problem: Altered Mood, Depressive Behavior:  Goal: Absence of self-harm  Description: Absence of self-harm  Outcome: Ongoing  Note: Patient is free of self harm at this time. Patient agrees to seek out staff if thoughts to harm self arise. Staff will provide support and reassurance as needed. Safety checks maintained every 15 minutes.       Problem: Tobacco Use:  Goal: Inpatient tobacco use cessation counseling participation  Description: Inpatient tobacco use cessation counseling participation  Outcome: Ongoing     Problem: Suicide risk  Goal: Provide patient with safe environment  Description: Provide patient with safe environment  Outcome: Ongoing

## 2022-04-14 NOTE — CARE COORDINATION
YOGESH received voice mail from [facility] at [address] [facility] this date, states pt is accepted for admission to their facility, [facility] states she has submitted for precert from insurance however states SW can set up transport for tomorrow, with preference being the am.     YOGESH called [facility], set up transport via ambulette 10a, faxed transport papers. YOGESH updated unit staff. YOGESH updated MD. Jaja Stafford updated pt and [facility] [place] [facility] via conference call. YOGESH provided Florinda Tejada with faciity details including admission coordinator contact as she will consult with admissions Rolly on paperwork/consents. YOGESH called [facility] [phone] group home  to advise pt is admitting to nursing home per legal guardian.

## 2022-04-14 NOTE — GROUP NOTE
Group Therapy Note    Date: 4/14/2022    Group Start Time: 1100  Group End Time: 1130  Group Topic: Psychoeducation    [facility]    [facility], CTRS    Pt did not attend 1100 psycheducation skills group d/t resting in room despite staff invitation to attend. 1:1 talk time offered as alternative to group session, pt declined.             Signature:  Yamilka Zafar

## 2022-04-14 NOTE — FLOWSHEET NOTE
*Patient participated in [facility]        04/14/22 [address] [place] [address] Avenue provided to: Patient   Referral/Consult From: Rounding   Continue Visiting   (4/14/22)   Complexity of Encounter Moderate   Length of Encounter 30 minutes   Spiritual Assessment Completed Yes   Spiritual/Caodaism   Type Spiritual support   Assessment Calm; Approachable   Intervention Active listening;Prayer   Outcome Receptive;Engaged in conversation;Expressed gratitude

## 2022-04-14 NOTE — PLAN OF CARE
Problem: Altered Mood, Depressive Behavior:  Goal: Ability to disclose and discuss suicidal ideas will improve  Description: Ability to disclose and discuss suicidal ideas will improve  Outcome: Ongoing  Note: Patient denies having thoughts to harm self. Patient is encouraged to seek help from staff if such thoughts arises. Problem: Altered Mood, Depressive Behavior:  Goal: Absence of self-harm  Description: Absence of self-harm  Outcome: Ongoing  Note: No self harm noted this shift. Patient agrees to seek staff out if negative thoughts arise. Will continue to monitor Q15 minute and intermittently. Problem: Suicide risk  Goal: Provide patient with safe environment  Description: Provide patient with safe environment  Outcome: Ongoing  Note: Patient is provided a safe environment.

## 2022-04-15 VITALS
BODY MASS INDEX: 21.82 KG/M2 | TEMPERATURE: 98.2 F | HEIGHT: 74 IN | DIASTOLIC BLOOD PRESSURE: 61 MMHG | SYSTOLIC BLOOD PRESSURE: 113 MMHG | OXYGEN SATURATION: 97 % | RESPIRATION RATE: 14 BRPM | HEART RATE: 74 BPM | WEIGHT: 170 LBS

## 2022-04-15 PROCEDURE — 6370000000 HC RX 637 (ALT 250 FOR IP): Performed by: PSYCHIATRY & NEUROLOGY

## 2022-04-15 PROCEDURE — 6370000000 HC RX 637 (ALT 250 FOR IP): Performed by: INTERNAL MEDICINE

## 2022-04-15 RX ADMIN — Medication 1000 UNITS: at 09:41

## 2022-04-15 RX ADMIN — ESCITALOPRAM OXALATE 20 MG: 20 TABLET ORAL at 09:41

## 2022-04-15 NOTE — PROGRESS NOTES
Behavioral Services                                              Medicare Re-Certification    I certify that the inpatient psychiatric hospital services furnished since the previous certification/re-certification were, and continue to be, medically necessary for;    [x] (1) Treatment which could reasonably be expected to improve the patient's condition,    [x] (2) Or for diagnostic study. Estimated length of stay/service 1 to 2 days    Plan for post-hospital care ECF    This patient continues to need, on a daily basis, active treatment furnished directly by or requiring the supervision of inpatient psychiatric personnel.     Electronically signed by Juan Braga MD on 4/14/2022 at 8:24 PM

## 2022-04-15 NOTE — PLAN OF CARE
Problem: Altered Mood, Depressive Behavior:  Goal: Ability to disclose and discuss suicidal ideas will improve  Description: Ability to disclose and discuss suicidal ideas will improve  4/15/2022 0229 by Adina Cool RN  Outcome: Ongoing  Note:  Pt denies suicidal ideations at this time. Pt agreed to seek staff at anytime he/she felt like any urges to harm self would arise. Safety checks maintained [id number]. Problem: Altered Mood, Depressive Behavior:  Goal: Absence of self-harm  Description: Absence of self-harm  4/15/2022 0229 by Adina Cool RN  Outcome: Ongoing  Note: Patient is free of self harm at this time. Patient agrees to seek out staff if thoughts to harm self arise. Staff will provide support and reassurance as needed. Safety checks maintained every 15 minutes. Problem: Tobacco Use:  Goal: Inpatient tobacco use cessation counseling participation  Description: Inpatient tobacco use cessation counseling participation  4/15/2022 0229 by Adina Cool RN  Outcome: Ongoing     Problem: Suicide risk  Goal: Provide patient with safe environment  Description: Provide patient with safe environment  4/15/2022 0229 by Adina Cool RN  Outcome: Ongoing     Problem: Pain:  Goal: Pain level will decrease  Description: Pain level will decrease  4/15/2022 0229 by Adina Cool RN  Outcome: Ongoing  Note: Patient reports no new pain, or increase in chronic pain. Will continue to monitor and provide as needed pain medication. Problem: Pain:  Goal: Pain level will decrease  Description: Pain level will decrease  4/15/2022 0140 by Adina Cool RN  Outcome: Ongoing  Note: Patient reports no new pain, or increase in chronic pain. Will continue to monitor and provide as needed pain medication.        Problem: Pain:  Goal: Control of acute pain  Description: Control of acute pain  4/15/2022 0140 by Adina Cool RN  Outcome: Ongoing     Problem: Pain:  Goal: Control of chronic pain  Description: Control of chronic pain  4/15/2022 0229 by Klarissa Aguirre, RN  Outcome: Ongoing

## 2022-04-15 NOTE — DISCHARGE SUMMARY
Provider Discharge Summary     Patient ID:  Sherlyn Brown  [id number]  26 y.o.  1960    Admit date: 4/6/2022    Discharge date and time: 4/15/2022  1:24 PM     Admitting Physician: Roque Tian MD     Discharge Physician: Ismael Lemons MD    Admission Diagnoses: Depression with suicidal ideation [F32. A, R45.851]  Schizoaffective disorder, depressive type ([address] [F25.1]    Discharge Diagnoses:      Schizoaffective disorder, depressive type ([address]     Patient Active Problem List   Diagnosis Code    Hematuria R31.9    Suicidal ideations R45.851    Cocaine abuse ([address] F14.10    Schizoaffective disorder, bipolar type ([address] F25.0    Schizoaffective disorder, depressive type ([address] F25.1    Perianal abscess K61.0    Carla-rectal abscess K61.1    Schizophrenia ([address] F20.9    Schizoaffective disorder ([address] F25.9    Major depression with psychotic features ([address] F32.3    Acute psychosis ([address] F23    Depression with suicidal ideation F32. A, R45.851    Pneumonia due to infectious organism J18.9    Smoker F17.200    Ventricular ectopy I49.3    Low serum cortisol level (HCC) E27.40    Sepsis due to Klebsiella pneumoniae with no resultant organ failure (HCC) A41.4    Elevated BP without diagnosis of hypertension R03.0    Lower urinary tract symptoms (LUTS) R39.9    Dyspepsia R10.13    Skin rash R21    Hypernatremia E87.0    Drug-induced tremor G25.1    Hx of diabetes mellitus Z86.39        Admission Condition: poor    Discharged Condition: stable    Indication for Admission: threat to self    History of Present Illnes (present tense wording is of findings from admission exam and are not necessarily indicative of current findings):   Sherlyn Brown is a 57 y.o. male who has a past medical history of schizoaffective disorder, bipolar, depression, polysubstance abuse, GERD, hepatitis, diabetes and chronic headaches. Patient presented to the ED \"with suicidal ideation and reported attempt.  Pt states he lives in a group home with five other men, but left the group home yesterday and has been using (drugs) crack cocaine all day in an attempt to take his own life. Suicidal ideation appears to be patient's baseline however, at today's visit pt reports he attempted to take his own life by stepping in front of incoming traffic in addition to doing a large amount of drugs. Pt reports cars swerved and honked their horn at him. Pt reports an increase in suicidal ideation beyond his norm as well as an increase in auditory hallucinations. Pt states the voices say things such as, \" Keep trying to kill yourself Celedonio Gosselin don't like you, I want you to die, Mak Lapidus been a mess your whole life Adolfo\". Pt reports he's had a bad life. He shared his father  when he was a little boy and was raised by his mother. He says he was never good at school and this bothered him. Pt does endorse homicidal ideation saying there are many people he wants to kill, but denied having a particular person or plan. Pt has been to ED twice in March and twice in February with last admission being 2021.  Pt reports his sister is his guardian. Pt has past diagnosis is schizoaffective disorder, bipolar disorder, depression, and schizophrenia. Per medical chartng he is linked with [facility].  and RN reached out to guardian twice, but there was no answer and no message could be left. Pt was pink slipped by Doctor. \"     Patient has had multiple previous inpatient psychiatric hospitalizations. Most recently [facility] on 2021 to 2021. At that time patient was discharged on Zyprexa and Lexapro. Patient also has a medication trial history of Seroquel, Wellbutrin, Cogentin, Invega and Effexor. [facility]  Patient is unable to report during interview if he has been compliant with scheduled medications in the community.   Patient was dismissive of staff and refused to answer many questions during interview. Patient required redirection multiple times to participate in interview.     When discussing reasons for admission patient reports he is here because he tried to kill himself by walking into traffic and using drugs. Patient states he has nothing to live for and he is not happy at his group home. Patient endorses depression has gotten worse in the past 3 weeks and currently rates as a 10 out of 10 on a 1-10 scale (1 being low and 10 being high). Patient states he struggles with a decrease in sleep, interest, energy, concentration and appetite. Patient reports struggling with feelings of guilt and worthlessness. Patient states in the past he has had suicide attempts with a gun. Patient reports he no longer has access to a gun. Patient reports current plans are walking into traffic and using crack cocaine until he dies. At this time, the patient is not able to contract for safety outside the hospital and is not appropriate for a lower level of care. There is risk of decompensation and patient warrants further hospitalization for safety and stabilization.     When discussing symptoms of psychosis patient endorses command auditory hallucinations telling him to harm himself and reports voices are generally messing with him. Patient does endorse visual hallucinations however is unable to clarify the disturbance at this time. Patient refused to answer questions about symptoms of maryellen or history of. Per chart history of bipolar symptoms. Patient endorses history of trauma however refused to elaborate on PTSD symptoms. Patient does endorse anxiety however refused to elaborate on symptoms and denies history of panic attacks.     When discussing alcohol consumption patient reports he sometimes drinks. When discussing illicit drug use patient endorses crack cocaine use and denies all other drug use.   UDS was positive for cocaine upon admission.       Hospital Course:   Upon admission, Brittny Suazo Lyla Mathias was provided a safe secure environment, introduced to unit milieu. Patient participated in groups and individual therapies. Meds were adjusted as noted below. After few days of hospital care, patient began to feel improvement. Depression lifted, thoughts to harm self ceased. Sleep improved, appetite was good. On morning rounds 4/15/2022, Higinio Flynn  endorses feeling ready for discharge. Patient denies suicidal or homicidal ideations, denies hallucinations or delusions. Denies SE's from meds. It was decided that maximum benefit from hospital care had been achieved and patient can be discharged. Consults:   Internal medicine for medical management    Significant Diagnostic Studies: Routine labs and diagnostics    Treatments: Psychotropic medications, therapy with group, milieu, and 1:1 with nurses, social workers, PALINO/CNP, and Attending physician. Discharge Medications:  Discharge Medication List as of 4/15/2022  9:21 AM      START taking these medications    Details   mirtazapine (REMERON) 7.5 MG tablet Take 1 tablet by mouth nightly, Disp-30 tablet, R-3Normal         CONTINUE these medications which have CHANGED    Details   Cholecalciferol (VITAMIN D3) 25 MCG TABS Take 1 tablet by mouth daily, Disp-30 tablet, R-0Labeling may look different. 25 mcg=1000 Units. Please double check dosages. Normal      escitalopram (LEXAPRO) 20 MG tablet Take 1 tablet by mouth daily, Disp-30 tablet, R-0Normal      hydrOXYzine (ATARAX) 25 MG tablet Take 1 tablet by mouth 3 times daily as needed for Itching or Anxiety, Disp-60 tablet, R-0Normal      OLANZapine (ZYPREXA) 15 MG tablet Take 1 tablet by mouth nightly, Disp-30 tablet, R-0Normal         STOP taking these medications       paliperidone palmitate ER (INVEGA SUSTENNA) 234 MG/1.5ML [place] IM injection Comments:   Reason for Stopping:         paliperidone (INVEGA) 6 MG extended release tablet Comments:   Reason for Stopping:         traZODone (DESYREL) 100 MG tablet Comments:   Reason for Stopping:                Core Measures statement:   Not applicable    Discharge Exam:  Level of consciousness:  Within normal limits  Appearance: Street clothes, seated, with good grooming  Behavior/Motor: No abnormalities noted  Attitude toward examiner:  Cooperative, attentive, good eye contact  Speech:  spontaneous, normal rate, normal volume and well articulated  Mood:  euthymic  Affect:  Full range  Thought processes:  linear, goal directed and coherent  Thought content:  denies homicidal ideation  Suicidal Ideation:  denies suicidal ideation  Delusions:  no evidence of delusions  Perceptual Disturbance:  denies any perceptual disturbance  Cognition:  Intact  Memory: age appropriate  Insight & Judgement: fair  Medication side effects: denies     Disposition: home    Patient Instructions: Activity: activity as tolerated  1. Patient instructed to take medications regularly and follow up with outpatient appointments. Follow-up as scheduled with outpatient [facility]      Signed:    Electronically signed by Yanet Meeks MD on 4/15/22 at 1:24 PM EDT    Time Spent on discharge is more than 30 minutes in the examination, evaluation, counseling and review of medications and discharge plan.

## 2022-04-15 NOTE — PLAN OF CARE
Problem: Altered Mood, Depressive Behavior:  Goal: Ability to disclose and discuss suicidal ideas will improve  Description: Ability to disclose and discuss suicidal ideas will improve  4/15/2022 0140 by Malinda Shipman RN  Outcome: Ongoing  Note:  Pt denies suicidal ideations at this time. Pt agreed to seek staff at anytime he/she felt like any urges to harm self would arise. Safety checks maintained [id number]. Problem: Altered Mood, Depressive Behavior:  Goal: Absence of self-harm  Description: Absence of self-harm  4/15/2022 0140 by Malinda Shipman RN  Outcome: Ongoing  Note: Patient is free of self harm at this time. Patient agrees to seek out staff if thoughts to harm self arise. Staff will provide support and reassurance as needed. Safety checks maintained every 15 minutes. Problem: Tobacco Use:  Goal: Inpatient tobacco use cessation counseling participation  Description: Inpatient tobacco use cessation counseling participation  Outcome: Ongoing     Problem: Suicide risk  Goal: Provide patient with safe environment  Description: Provide patient with safe environment  4/15/2022 0140 by Malinda Shipman RN  Outcome: Ongoing     Problem: Pain:  Goal: Pain level will decrease  Description: Pain level will decrease  Outcome: Ongoing  Note: Patient reports no new pain, or increase in chronic pain. Will continue to monitor and provide as needed pain medication.        Problem: Pain:  Goal: Control of acute pain  Description: Control of acute pain  Outcome: Ongoing     Problem: Pain:  Goal: Control of chronic pain  Description: Control of chronic pain  Outcome: Ongoing

## 2022-04-15 NOTE — BH NOTE
Patient given tobacco quitline number 68884882394 at this time, refusing to call at this time, states \" I just dont want to quit now\"- patient given information as to the dangers of long term tobacco use. Continue to reinforce the importance of tobacco cessation.

## 2022-04-15 NOTE — BH NOTE
[facility]  Discharge Note    Patient discharged to [facility] by [facility]. Pt discharged with followings belongings:   Dental Appliances: None  Vision - Corrective Lenses: None  Hearing Aid: None  Jewelry: None  Clothing: Helio Serve / coat,Pants,Shirt,Undergarments (Comment),Socks  Were All Patient Medications Collected?: No  Other Valuables: Money (Comment) (4.14)   Valuables sent home with security items or returned to patient. Patient education on aftercare instructions: yes  Information faxed to [facility] by staff  at 10:35 AM .Patient verbalize understanding of AVS:  yes.     Status EXAM upon discharge:  Status and Exam  Normal: No  Facial Expression: Flat  Affect: Appropriate  Level of Consciousness: Alert  Mood:Normal: No  Mood: Depressed  Motor Activity:Normal: Yes  Motor Activity: Decreased  Interview Behavior: Cooperative  Preception: [place] to Person,[place] to Time,[place] to Place,[place] to Situation  Attention:Normal: Yes  Attention: Distractible  Thought Processes: Circumstantial  Thought Content:Normal: No  Thought Content: Preoccupations  Hallucinations: None  Delusions: No  Memory:Normal: Yes  Memory: Poor Recent  Insight and Judgment: No  Insight and Judgment: Poor Judgment,Poor Insight  Present Suicidal Ideation: No  Present Homicidal Ideation: No      Metabolic Screening:    Lab Results   Component Value Date    LABA1C 5.3 04/07/2022       Lab Results   Component Value Date    CHOL 150 10/13/2021    CHOL 167 08/11/2020    CHOL 179 02/13/2017    CHOL 127 05/09/2015    CHOL 168 08/20/2014    CHOL 158 12/31/2013    CHOL 178 08/15/2013    CHOL 166 09/17/2012    CHOL 210 (H) 02/03/2012     Lab Results   Component Value Date    TRIG 41 10/13/2021    TRIG 43 08/11/2020    TRIG 67 02/13/2017    TRIG 37 05/09/2015    TRIG 72 08/20/2014    TRIG 52 12/31/2013    TRIG 70 08/15/2013    TRIG 117 09/17/2012    TRIG 94 02/03/2012     Lab Results Component Value Date    HDL 62 10/13/2021    HDL 74 08/11/2020    HDL 73 02/13/2017    HDL 57 05/09/2015    HDL 50 08/20/2014    HDL 43 12/31/2013    HDL 42 08/15/2013    HDL 38 (L) 09/17/2012    HDL 55 02/03/2012     No components found for: LDLCAL  No results found for: Syd Murray LPN

## 2022-10-27 NOTE — PLAN OF CARE
Problem: Altered Mood, Psychotic Behavior  Goal: STG-Able to demonstrate trust by eating, participating in treatment and following staffs directions  Outcome: Ongoing  Pt did not participate in Cognitive Skills Therapeutic Activity at 1330 despite staff encouragement. No

## 2023-04-12 NOTE — PROGRESS NOTES
CLINICAL PHARMACY NOTE: MEDS TO BEDS    Total # of Prescriptions Filled: 1   The following medications were delivered to the patient:  · Paliperidone ER 3mg    Additional Documentation:  Delivered Medication to Nurses Station     Refill Too Soon + Profiled  - Trazodone: 8/1  - Lexapro: 8/1    PA approved on [place] HD#5, POD#2    72y admitted for ascites workup s/p gyn consult for incidental finding within uterus in setting of intermittent postmenopausal vaginal bleeding now POD#1 s/p EUA, hysteroscopy D&C. Pt went through one pad overnight. Tolerating PO, passing flatus, ambulating to bathroom, voiding. No gyn complaints. Likely additional therapeutic paracentesis today or tomorrow.     OBJECTIVE:   Vital Signs Last 24 Hrs  T(C): 36.8 (11 Apr 2023 23:45), Max: 36.8 (11 Apr 2023 23:45)  T(F): 98.2 (11 Apr 2023 23:45), Max: 98.2 (11 Apr 2023 23:45)  HR: 90 (11 Apr 2023 23:45) (86 - 92)  BP: 120/45 (11 Apr 2023 23:45) (120/45 - 145/60)  RR: 18 (11 Apr 2023 15:48) (18 - 18)  SpO2: 97% (11 Apr 2023 23:45) (97% - 98%)    Parameters below as of 11 Apr 2023 23:45  Patient On (Oxygen Delivery Method): nasal cannula    Physical Exam:  Constitutional: NAD  Resp: breathing comfortably with NC in place  VE: pt declined vaginal assessment, reports using one pad overnight  Extremities: + lower extremity edema bilaterally, Asad's sign negative.    LABS:                        10.3   24.94 )-----------( 667      ( 11 Apr 2023 08:56 )             33.0     04-11    134<L>  |  101  |  12  ----------------------------<  205<H>  3.4<L>   |  26  |  0.60    Ca    8.8      11 Apr 2023 08:56    TPro  6.5  /  Alb  2.1<L>  /  TBili  0.7  /  DBili  0.4<H>  /  AST  16  /  ALT  12  /  AlkPhos  115  04-11      RADIOLOGY & ADDITIONAL TESTS:    < from: US Pelvis Complete (US Pelvis Complete .) (04.08.23 @ 10:29) >  INTERPRETATION:  CLINICAL INFORMATION: Postmenopausal patient with   ascites.  Rule out pelvic malignancy.    LMP: Postmenopausal.    COMPARISON: None available.    TECHNIQUE:  Transabdominal pelvic sonogram only.    FINDINGS:  Uterus: 12.5 cm x 8.3 cm x 10.4 cm. The endometrium is incompletely   assessed on this examination.  The endometrium is either severely   severely thickened to 6 cm or is distended with complex material to 6 cm.    There is a 2.5 x 1.6 x 1.7 cm shadowing echogenic focus with shadowing   along the periphery of endometrium.      Right ovary: Not visualized.  Left ovary: Not visualized.    Fluid: Large volume pelvic ascites    IMPRESSION:  Limited pelvic ultrasound.    The endometrium is incompletely assessed on this examination.  The   endometrium is either severely severely thickened to 6 cm or is distended   with complex material to 6 cm.  There is a 2.5 x1.6 x 1.7 cm shadowing   echogenic focus with shadowing along the periphery of the endometrium,   which may represent a calcified intramural versus submucosal fibroid,   versus an endometrial lesion.    The ovaries are not visualized.    Large volumepelvic ascites.    Pelvic MRI may be obtained for further evaluation.    < end of copied text >    < from: CT Abdomen and Pelvis w/ IV Cont (04.07.23 @ 17:11) >  INTERPRETATION:  CLINICAL INFORMATION: Vomiting and abdominal pain with   shortness of breath. Recent travel. Decreased appetite. Swollen lower   extremities. Abdominal distention on physical exam    COMPARISON: None.    CONTRAST/COMPLICATIONS:  IV Contrast: Omnipaque 350  90 cc administered   10 cc discarded  Oral Contrast: NONE  Complications: None reported at time of study completion    PROCEDURE:  CT of the Abdomen and Pelvis was performed.  Sagittal and coronal reformats were performed.    FINDINGS:  LOWER CHEST: Within normal limits.    LIVER: Within normal limits.  BILE DUCTS: Normal caliber.  GALLBLADDER: Contracted  SPLEEN: Within normal limits.  PANCREAS: Atrophic  ADRENALS: Within normal limits.  KIDNEYS/URETERS: Within normal limits.    BLADDER: Mosley catheter.  REPRODUCTIVE ORGANS: Fibroid uterus    BOWEL: No bowel obstruction. Appendix is normal. Diverticulosis without   diverticulitis. Nonspecific thickened loops of jejunum in the left upper   quadrant  PERITONEUM: A very large amount of abdominal and pelvic ascites causing   centralization of bowel loops  VESSELS: Atherosclerotic changes.    RETROPERITONEUM/LYMPH NODES: No lymphadenopathy.  ABDOMINAL WALL: Within normal limits.  BONES: Degenerative changes. DISH is noted in the thoracic spine    IMPRESSION:  Very large amount of abdominal and pelvic ascites  Uterine fibroids    < end of copied text >

## 2023-09-21 NOTE — PLAN OF CARE
Problem: Risk of Harm  Goal: STG-Absence of Self Harm  Outcome: Ongoing  Patient is isolating to room and self out for short periods at door. Patient denies SI but remains firm on wanting to hurt some jennifer but not expressing whom. Patient is free from self harm this shift. Patient did come out for evening group. Unable to assess due to medical condition

## 2024-01-07 NOTE — PLAN OF CARE
Pt is alert and oriented x 4, affect flat and mood is stable. Pt denies auditory or visual hallucinations today; \"Not for two days\". Pt denies thoughts to harm himself or others. Provide support and rerassurance, encourage unit programming and monitor q 15 mins for safety. 97.1

## 2024-04-04 ENCOUNTER — HOSPITAL ENCOUNTER (INPATIENT)
Age: 64
LOS: 5 days | Discharge: HOME | DRG: 750 | End: 2024-04-09
Attending: EMERGENCY MEDICINE | Admitting: PSYCHIATRY & NEUROLOGY
Payer: MEDICAID

## 2024-04-04 DIAGNOSIS — F32.A DEPRESSION WITH SUICIDAL IDEATION: Primary | ICD-10-CM

## 2024-04-04 DIAGNOSIS — R45.851 DEPRESSION WITH SUICIDAL IDEATION: Primary | ICD-10-CM

## 2024-04-04 DIAGNOSIS — R44.3 HALLUCINATIONS: ICD-10-CM

## 2024-04-04 PROBLEM — F33.3 SEVERE RECURRENT MAJOR DEPRESSION WITH PSYCHOTIC FEATURES (HCC): Status: ACTIVE | Noted: 2024-04-04

## 2024-04-04 LAB
ALBUMIN SERPL-MCNC: 3.9 G/DL (ref 3.5–5.2)
ALP SERPL-CCNC: 93 U/L (ref 40–129)
ALT SERPL-CCNC: 17 U/L (ref 5–41)
AMPHET UR QL SCN: NEGATIVE
ANION GAP SERPL CALCULATED.3IONS-SCNC: 13 MMOL/L (ref 9–17)
AST SERPL-CCNC: 19 U/L
ATYPICAL LYMPHOCYTE ABSOLUTE COUNT: 0.06 K/UL
ATYPICAL LYMPHOCYTES: 1 %
BARBITURATES UR QL SCN: NEGATIVE
BASOPHILS # BLD: 0 K/UL (ref 0–0.2)
BASOPHILS NFR BLD: 0 % (ref 0–2)
BENZODIAZ UR QL: NEGATIVE
BILIRUB SERPL-MCNC: 0.3 MG/DL (ref 0.3–1.2)
BUN SERPL-MCNC: 12 MG/DL (ref 8–23)
CALCIUM SERPL-MCNC: 9 MG/DL (ref 8.6–10.4)
CANNABINOIDS UR QL SCN: NEGATIVE
CHLORIDE SERPL-SCNC: 105 MMOL/L (ref 98–107)
CO2 SERPL-SCNC: 25 MMOL/L (ref 20–31)
COCAINE UR QL SCN: NEGATIVE
CREAT SERPL-MCNC: 1.1 MG/DL (ref 0.7–1.2)
EOSINOPHIL # BLD: 0.19 K/UL (ref 0–0.4)
EOSINOPHILS RELATIVE PERCENT: 3 % (ref 0–4)
ERYTHROCYTE [DISTWIDTH] IN BLOOD BY AUTOMATED COUNT: 16 % (ref 11.5–14.9)
ETHANOL PERCENT: <0.01 %
ETHANOLAMINE SERPL-MCNC: <10 MG/DL
FENTANYL UR QL: NEGATIVE
GFR SERPL CREATININE-BSD FRML MDRD: 75 ML/MIN/1.73M2
GLUCOSE SERPL-MCNC: 162 MG/DL (ref 70–99)
HCT VFR BLD AUTO: 39.8 % (ref 41–53)
HGB BLD-MCNC: 12.4 G/DL (ref 13.5–17.5)
LYMPHOCYTES NFR BLD: 3.41 K/UL (ref 1–4.8)
LYMPHOCYTES RELATIVE PERCENT: 54 % (ref 24–44)
MCH RBC QN AUTO: 27 PG (ref 26–34)
MCHC RBC AUTO-ENTMCNC: 31.3 G/DL (ref 31–37)
MCV RBC AUTO: 86.2 FL (ref 80–100)
METHADONE UR QL: NEGATIVE
MONOCYTES NFR BLD: 0.69 K/UL (ref 0.1–1.3)
MONOCYTES NFR BLD: 11 % (ref 1–7)
MORPHOLOGY: ABNORMAL
MORPHOLOGY: ABNORMAL
NEUTROPHILS NFR BLD: 31 % (ref 36–66)
NEUTS SEG NFR BLD: 1.95 K/UL (ref 1.3–9.1)
OPIATES UR QL SCN: NEGATIVE
OXYCODONE UR QL SCN: NEGATIVE
PCP UR QL SCN: NEGATIVE
PLATELET # BLD AUTO: 293 K/UL (ref 150–450)
PMV BLD AUTO: 7.7 FL (ref 6–12)
POTASSIUM SERPL-SCNC: 4.1 MMOL/L (ref 3.7–5.3)
PROT SERPL-MCNC: 7.1 G/DL (ref 6.4–8.3)
RBC # BLD AUTO: 4.61 M/UL (ref 4.5–5.9)
SODIUM SERPL-SCNC: 143 MMOL/L (ref 135–144)
TEST INFORMATION: NORMAL
WBC OTHER # BLD: 6.3 K/UL (ref 3.5–11)

## 2024-04-04 PROCEDURE — 80307 DRUG TEST PRSMV CHEM ANLYZR: CPT

## 2024-04-04 PROCEDURE — 36415 COLL VENOUS BLD VENIPUNCTURE: CPT

## 2024-04-04 PROCEDURE — 80053 COMPREHEN METABOLIC PANEL: CPT

## 2024-04-04 PROCEDURE — 6370000000 HC RX 637 (ALT 250 FOR IP): Performed by: EMERGENCY MEDICINE

## 2024-04-04 PROCEDURE — 99285 EMERGENCY DEPT VISIT HI MDM: CPT

## 2024-04-04 PROCEDURE — G0480 DRUG TEST DEF 1-7 CLASSES: HCPCS

## 2024-04-04 PROCEDURE — 85025 COMPLETE CBC W/AUTO DIFF WBC: CPT

## 2024-04-04 PROCEDURE — 1240000000 HC EMOTIONAL WELLNESS R&B

## 2024-04-04 RX ORDER — TRAZODONE HYDROCHLORIDE 50 MG/1
50 TABLET ORAL NIGHTLY PRN
Status: DISCONTINUED | OUTPATIENT
Start: 2024-04-04 | End: 2024-04-10 | Stop reason: HOSPADM

## 2024-04-04 RX ORDER — OLANZAPINE 10 MG/1
10 TABLET, ORALLY DISINTEGRATING ORAL ONCE
Status: COMPLETED | OUTPATIENT
Start: 2024-04-04 | End: 2024-04-04

## 2024-04-04 RX ORDER — LORAZEPAM 2 MG/ML
2 INJECTION INTRAMUSCULAR EVERY 6 HOURS PRN
Status: DISCONTINUED | OUTPATIENT
Start: 2024-04-04 | End: 2024-04-10 | Stop reason: HOSPADM

## 2024-04-04 RX ORDER — HALOPERIDOL 5 MG/1
5 TABLET ORAL EVERY 6 HOURS PRN
Status: DISCONTINUED | OUTPATIENT
Start: 2024-04-04 | End: 2024-04-10 | Stop reason: HOSPADM

## 2024-04-04 RX ORDER — POLYETHYLENE GLYCOL 3350 17 G/17G
17 POWDER, FOR SOLUTION ORAL DAILY PRN
Status: DISCONTINUED | OUTPATIENT
Start: 2024-04-04 | End: 2024-04-10 | Stop reason: HOSPADM

## 2024-04-04 RX ORDER — IBUPROFEN 400 MG/1
400 TABLET ORAL EVERY 6 HOURS PRN
Status: DISCONTINUED | OUTPATIENT
Start: 2024-04-04 | End: 2024-04-10 | Stop reason: HOSPADM

## 2024-04-04 RX ORDER — MAGNESIUM HYDROXIDE/ALUMINUM HYDROXICE/SIMETHICONE 120; 1200; 1200 MG/30ML; MG/30ML; MG/30ML
30 SUSPENSION ORAL EVERY 6 HOURS PRN
Status: DISCONTINUED | OUTPATIENT
Start: 2024-04-04 | End: 2024-04-10 | Stop reason: HOSPADM

## 2024-04-04 RX ORDER — ACETAMINOPHEN 325 MG/1
650 TABLET ORAL EVERY 6 HOURS PRN
Status: DISCONTINUED | OUTPATIENT
Start: 2024-04-04 | End: 2024-04-10 | Stop reason: HOSPADM

## 2024-04-04 RX ORDER — DIPHENHYDRAMINE HYDROCHLORIDE 50 MG/ML
50 INJECTION INTRAMUSCULAR; INTRAVENOUS EVERY 6 HOURS PRN
Status: DISCONTINUED | OUTPATIENT
Start: 2024-04-04 | End: 2024-04-10 | Stop reason: HOSPADM

## 2024-04-04 RX ORDER — HYDROXYZINE 50 MG/1
50 TABLET, FILM COATED ORAL 3 TIMES DAILY PRN
Status: DISCONTINUED | OUTPATIENT
Start: 2024-04-04 | End: 2024-04-10 | Stop reason: HOSPADM

## 2024-04-04 RX ORDER — LORAZEPAM 1 MG/1
2 TABLET ORAL EVERY 6 HOURS PRN
Status: DISCONTINUED | OUTPATIENT
Start: 2024-04-04 | End: 2024-04-10 | Stop reason: HOSPADM

## 2024-04-04 RX ORDER — HALOPERIDOL 5 MG/ML
5 INJECTION INTRAMUSCULAR EVERY 6 HOURS PRN
Status: DISCONTINUED | OUTPATIENT
Start: 2024-04-04 | End: 2024-04-10 | Stop reason: HOSPADM

## 2024-04-04 RX ADMIN — OLANZAPINE 10 MG: 10 TABLET, ORALLY DISINTEGRATING ORAL at 18:41

## 2024-04-04 ASSESSMENT — SLEEP AND FATIGUE QUESTIONNAIRES
SLEEP PATTERN: INSOMNIA
AVERAGE NUMBER OF SLEEP HOURS: 2
DO YOU USE A SLEEP AID: NO
DO YOU HAVE DIFFICULTY SLEEPING: YES

## 2024-04-04 ASSESSMENT — LIFESTYLE VARIABLES
HOW MANY STANDARD DRINKS CONTAINING ALCOHOL DO YOU HAVE ON A TYPICAL DAY: PATIENT DOES NOT DRINK
HOW OFTEN DO YOU HAVE A DRINK CONTAINING ALCOHOL: NEVER
HOW OFTEN DO YOU HAVE A DRINK CONTAINING ALCOHOL: NEVER
HOW MANY STANDARD DRINKS CONTAINING ALCOHOL DO YOU HAVE ON A TYPICAL DAY: PATIENT DOES NOT DRINK

## 2024-04-04 ASSESSMENT — PAIN - FUNCTIONAL ASSESSMENT: PAIN_FUNCTIONAL_ASSESSMENT: NONE - DENIES PAIN

## 2024-04-04 NOTE — ED NOTES
Provisional Diagnosis:     Patient presented to ED via squad for a mental health evaluation.  Patient identifying SI with auditory hallucinations.    Psychosocial and Contextual Factors:     Patient has intellectual disability.    C-SSRS Summary:    Patient identifying current SI with thoughts to stab self in stomach or walk into traffic.    Patient: X  Family:   Agency: X ([facility])    Substance Abuse  Patient denies    Present Suicidal Behavior:    Patient identifying current SI with thoughts to stab self in stomach or walk into traffic.    Verbal: X    Attempt:    Past Suicidal Behavior:   Patient has history of SI, but does not have access to means     Verbal: X    Attempt:    Self-Injurious/Self-Mutilation:  Patient denies    Violence Current or Past   None documented or identified.    Trauma Identified:    None reported to this writer    Protective Factors:    Patient has housing.  Patient has guardian.  Patient has insurance.  Patient has income.  Patient takes medications as prescribed.  Patient lives in nursing facility with 24/7 staffing.    Risk Factors:    Patient has intellectual disability.    Clinical Summary:    Patient is a 63 year old  male who presented to ED for a mental health evaluation.  Patient placed on application for emergency admission stating \"Adolfo made a comment earlier today that he wanted to stab himself in stomach with a knife.  He stated that the voices were telling him to do so.  I just had an emergency tele appointment w/ Adolfo and he stated \"I want to walk out into traffic and get hit\".  I asked is he had a plan to actually do this and he replied \"yes\".  Adolfo represents a risk of harm to self as manifested by threats.  He would benefit from treatment in a hospital.\"    This writer spoke with guardian who agreed with an inpatient admission.  Per guardian, there have been some medication changes which may be triggering these thoughts.  Patient has

## 2024-04-04 NOTE — ED PROVIDER NOTES
EMERGENCY DEPARTMENT ENCOUNTER    Pt Name: Adolfo Presley  MRN: [id number]  Birthdate 1960  Date of evaluation: 4/4/24  CHIEF COMPLAINT       Chief Complaint   Patient presents with    Mental Health Problem     HISTORY OF PRESENT ILLNESS   HPI  Hearing voices telling to walk out of the traffic himself.  Medications are controlling them.  Denies drug or alcohol use.  Voices are getting worse.  He does not feel safe at home.  Feels like he might act on these voices.  Denies HI.  Has not been admitted recently      REVIEW OF SYSTEMS     Review of Systems   All other systems reviewed and are negative.    PASTMEDICAL HISTORY     Past Medical History:   Diagnosis Date    Bipolar disorder ([facility])     Depression     GERD (gastroesophageal reflux disease)     Hallucinations     Headache(784.0)     Hepatitis     Schizophrenia, schizo-affective ([facility])     Substance abuse ([facility])     Tobacco abuse     Type II or unspecified type diabetes mellitus without mention of complication, not stated as uncontrolled     Urinary incontinence      Past Problem List  Patient Active Problem List   Diagnosis Code    Hematuria R31.9    Suicidal ideations R45.851    Cocaine abuse ([facility]) F14.10    Schizoaffective disorder, bipolar type ([facility]) F25.0    Schizoaffective disorder, depressive type ([facility]) F25.1    Perianal abscess K61.0    Carla-rectal abscess K61.1    Schizophrenia ([facility]) F20.9    Schizoaffective disorder ([facility]) F25.9    Major depression with psychotic features ([facility]) F32.3    Acute psychosis ([facility]) F23    Depression with suicidal ideation F32.A, R45.851    Pneumonia due to infectious organism J18.9    Smoker F17.200    Ventricular ectopy I49.3    Low serum cortisol level R79.89    Sepsis due to Klebsiella pneumoniae with no resultant organ failure ([facility]) A41.4    Elevated BP without diagnosis of hypertension R03.0    Lower urinary tract symptoms (LUTS) R39.9    Dyspepsia R10.13    Skin rash R21    Hypernatremia E87.0    Drug-induced tremor G25.1

## 2024-04-05 LAB
GLUCOSE BLD-MCNC: 121 MG/DL (ref 75–110)
GLUCOSE BLD-MCNC: 207 MG/DL (ref 75–110)
GLUCOSE BLD-MCNC: 211 MG/DL (ref 75–110)
GLUCOSE BLD-MCNC: 252 MG/DL (ref 75–110)

## 2024-04-05 PROCEDURE — APPSS60 APP SPLIT SHARED TIME 46-60 MINUTES

## 2024-04-05 PROCEDURE — 99222 1ST HOSP IP/OBS MODERATE 55: CPT | Performed by: INTERNAL MEDICINE

## 2024-04-05 PROCEDURE — 99223 1ST HOSP IP/OBS HIGH 75: CPT | Performed by: PSYCHIATRY & NEUROLOGY

## 2024-04-05 PROCEDURE — 1240000000 HC EMOTIONAL WELLNESS R&B

## 2024-04-05 PROCEDURE — 6370000000 HC RX 637 (ALT 250 FOR IP): Performed by: INTERNAL MEDICINE

## 2024-04-05 PROCEDURE — 82947 ASSAY GLUCOSE BLOOD QUANT: CPT

## 2024-04-05 RX ORDER — QUETIAPINE FUMARATE 50 MG/1
50 TABLET, EXTENDED RELEASE ORAL 2 TIMES DAILY
Status: ON HOLD | COMMUNITY
Start: 2024-04-05 | End: 2024-04-09 | Stop reason: HOSPADM

## 2024-04-05 RX ORDER — BUPROPION HYDROCHLORIDE 150 MG/1
150 TABLET, EXTENDED RELEASE ORAL DAILY
Status: ON HOLD | COMMUNITY
Start: 2024-04-05 | End: 2024-04-09 | Stop reason: HOSPADM

## 2024-04-05 RX ORDER — ATORVASTATIN CALCIUM 20 MG/1
20 TABLET, FILM COATED ORAL NIGHTLY
Status: DISCONTINUED | OUTPATIENT
Start: 2024-04-05 | End: 2024-04-10 | Stop reason: HOSPADM

## 2024-04-05 RX ORDER — OLANZAPINE 20 MG/1
20 TABLET ORAL NIGHTLY
COMMUNITY

## 2024-04-05 RX ORDER — HYDROXYZINE HYDROCHLORIDE 25 MG/1
25 TABLET, FILM COATED ORAL 2 TIMES DAILY
COMMUNITY

## 2024-04-05 RX ORDER — ESCITALOPRAM OXALATE 20 MG/1
20 TABLET ORAL DAILY
Status: DISCONTINUED | OUTPATIENT
Start: 2024-04-06 | End: 2024-04-10 | Stop reason: HOSPADM

## 2024-04-05 RX ORDER — GABAPENTIN 100 MG/1
100 CAPSULE ORAL 2 TIMES DAILY
COMMUNITY
Start: 2024-04-05

## 2024-04-05 RX ORDER — DIVALPROEX SODIUM 500 MG/1
750 TABLET, EXTENDED RELEASE ORAL NIGHTLY
COMMUNITY
Start: 2024-04-05

## 2024-04-05 RX ORDER — DEXTROSE MONOHYDRATE 100 MG/ML
INJECTION, SOLUTION INTRAVENOUS CONTINUOUS PRN
Status: DISCONTINUED | OUTPATIENT
Start: 2024-04-05 | End: 2024-04-10 | Stop reason: HOSPADM

## 2024-04-05 RX ORDER — VITAMIN B COMPLEX
1000 TABLET ORAL DAILY
Status: DISCONTINUED | OUTPATIENT
Start: 2024-04-05 | End: 2024-04-10 | Stop reason: HOSPADM

## 2024-04-05 RX ORDER — OLANZAPINE 10 MG/1
20 TABLET ORAL NIGHTLY
Status: DISCONTINUED | OUTPATIENT
Start: 2024-04-06 | End: 2024-04-10 | Stop reason: HOSPADM

## 2024-04-05 RX ORDER — LORAZEPAM 0.5 MG/1
0.5 TABLET ORAL 2 TIMES DAILY
Status: ON HOLD | COMMUNITY
End: 2024-04-09 | Stop reason: HOSPADM

## 2024-04-05 RX ORDER — DIVALPROEX SODIUM 250 MG/1
625 TABLET, EXTENDED RELEASE ORAL DAILY
COMMUNITY

## 2024-04-05 RX ORDER — CHOLECALCIFEROL (VITAMIN D3) 125 MCG
10 CAPSULE ORAL NIGHTLY
Status: ON HOLD | COMMUNITY
Start: 2024-04-05 | End: 2024-04-09 | Stop reason: HOSPADM

## 2024-04-05 RX ORDER — GABAPENTIN 100 MG/1
100 CAPSULE ORAL 2 TIMES DAILY
Status: DISCONTINUED | OUTPATIENT
Start: 2024-04-06 | End: 2024-04-10 | Stop reason: HOSPADM

## 2024-04-05 RX ADMIN — Medication 1000 UNITS: at 18:30

## 2024-04-05 RX ADMIN — ATORVASTATIN CALCIUM 20 MG: 20 TABLET, FILM COATED ORAL at 21:44

## 2024-04-05 RX ADMIN — METFORMIN HYDROCHLORIDE 1000 MG: 1000 TABLET, FILM COATED ORAL at 18:30

## 2024-04-05 ASSESSMENT — LIFESTYLE VARIABLES
HOW OFTEN DO YOU HAVE A DRINK CONTAINING ALCOHOL: NEVER
HOW MANY STANDARD DRINKS CONTAINING ALCOHOL DO YOU HAVE ON A TYPICAL DAY: PATIENT DOES NOT DRINK

## 2024-04-05 NOTE — CARE COORDINATION
Social work placed call to Gavi at [facility] to check on patients status of returning to [facility]. Gavi confirm he can return.

## 2024-04-05 NOTE — PLAN OF CARE
Problem: Chronic Conditions and Co-morbidities  Goal: Patient's chronic conditions and co-morbidity symptoms are monitored and maintained or improved  Outcome: Progressing  Note: Pt denies having homicidal ideations, but reports having suicidal thoughts. Pt denies having a plan. Pt reported having hallucinations and stated the voices telling him to kill himself. Pt is isolative to room most of shift. Pt is pleasant and cooperative with staff. Pt is encouraged to care for his ADLs. Pt reports having fair sleep and adequate diet.      Problem: Safety - Adult  Goal: Free from fall injury  Outcome: Progressing  Note: Pt remains free from falls.

## 2024-04-05 NOTE — BH NOTE
[facility]  Initial Interdisciplinary Treatment Plan NO      Original treatment plan Date & Time: 4/5/2024   1245    Admission Type:  Admission Type: Voluntary    Reason for admission:   Reason for Admission: Patient was admitted tto the [facility] due to suicideal ideations with audio commands to harm  self. 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 Patient also explained he is graves and dead people walking. Patient states he is suffering from lack a sleep and on average3 gets 2-3 hours nightly. Patient expressed depression was a 5/10 and his anxiety was a 10/10 at this time.    Estimated Length of Stay:  5-7days  Estimated Discharge Date: to be determined by physician    PATIENT STRENGTHS:  Patient Strengths:   Patient Strengths and Limitations:Limitations: Difficulty problem solving/relies on others to help solve problems, Apathetic / unmotivated  Addictive Behavior: Addictive Behavior  In the Past 3 Months, Have You Felt or Has Someone Told You That You Have a Problem With  : None  Medical Problems:  Past Medical History:   Diagnosis Date    Bipolar disorder (HCC)     Depression     GERD (gastroesophageal reflux disease)     Hallucinations     Headache(784.0)     Hepatitis     Schizophrenia, schizo-affective (HCC)     Substance abuse ([facility])     Tobacco abuse     Type II or unspecified type diabetes mellitus without mention of complication, not stated as uncontrolled     Urinary incontinence      Status EXAM:Mental Status and Behavioral Exam  Normal: No  Level of Assistance: Independent/Self  Facial Expression: Sad  Affect: Congruent  Level of Consciousness: Alert  Frequency of Checks: 4 times per hour, close  Mood:Normal: No  Mood: Anxious, Helpless, Depressed  Motor Activity:Normal: Yes  Eye Contact: Good  Observed Behavior: Friendly, Cooperative  Sexual Misconduct History: Current - no  Preception: [place] to person, [place] to time,

## 2024-04-05 NOTE — PROGRESS NOTES
Pharmacy Medication History Note      List of current medications patient is taking is complete.    Source of information: [facility]; Epic; PDMP    Changes made to medication list:  Medications removed (include reason, ex. therapy complete or physician discontinued, noncompliance):  Hydroxyzine PRN (dose adjustment - patient receiving scheduled); Mirtazapine (discontinued by another clinician); Olanzapine 15 mg (dose adjustment)    Medications flagged for provider review:  none    Medications added/doses adjusted:  Adjusted Melatonin to 10 mg nightly  Added Lorazepam 0.5 mg twice daily  Adjusted Hydroxyzine 25 mg to twice daily  Adjusted Olanzapine to 20 mg nightly    Other notes (ex. Recent course of antibiotics, Coumadin dosing):  Spoke with nurse at [facility] who reports they use two Divalproex  mg plus a Divalproex  mg tablet for his morning dose of 625 mg daily.      Current Home Medication List at Time of Admission:  Prior to Admission medications    Medication Sig   divalproex (DEPAKOTE ER) 250 MG extended release tablet Take 625 mg by mouth daily   divalproex (DEPAKOTE ER) 500 MG extended release tablet Take 750 mg by mouth at bedtime Indications: Auditory Hallucination   melatonin 5 MG TABS tablet Take 2 tablets by mouth at bedtime Indications: Trouble Sleeping   buPROPion (WELLBUTRIN SR) 150 MG extended release tablet Take 1 tablet by mouth Daily Indications: Major Depressive Disorder   gabapentin (NEURONTIN) 100 MG capsule Take 1 capsule by mouth in the morning and 1 capsule in the evening. Indications: Diabetes with Nerve Disease. Give 100 mg by mouth two times a day for .   metFORMIN (GLUCOPHAGE) 1000 MG tablet Take 1 tablet by mouth 2 times daily (with meals) Indications: Diabetes   QUEtiapine (SEROQUEL XR) 50 MG extended release tablet Take 1 tablet by mouth in the morning and 1 tablet in the evening. Indications: Schizoaffective Disorder. Give 1 tablet by mouth two times  a day for .   OLANZapine (ZYPREXA) 20 MG tablet Take 1 tablet by mouth nightly   LORazepam (ATIVAN) 0.5 MG tablet Take 1 tablet by mouth in the morning and at bedtime. Max Daily Amount: 1 mg   hydrOXYzine HCl (ATARAX) 25 MG tablet Take 1 tablet by mouth in the morning and at bedtime   Cholecalciferol (VITAMIN D3) 25 MCG TABS Take 1 tablet by mouth daily   escitalopram (LEXAPRO) 20 MG tablet Take 1 tablet by mouth daily         Please let me know if you have any questions about this encounter. Thank you!    Electronically signed by Shan Weaver RPH on 4/5/2024 at 10:48 AM

## 2024-04-05 NOTE — PROGRESS NOTES
Behavioral Services  Medicare Certification Upon Admission    I certify that this patient's inpatient psychiatric hospital admission is medically necessary for:    [x] (1) Treatment which could reasonably be expected to improve this patient's condition,       [x] (2) Or for diagnostic study;     AND     [x](2) The inpatient psychiatric services are provided while the individual is under the care of a physician and are included in the individualized plan of care.    Estimated length of stay/service 2-9 days    Plan for post-hospital care -outpatient care    Electronically signed by VIJAYA VALENZUELA MD on 4/5/2024 at 9:33 AM

## 2024-04-05 NOTE — GROUP NOTE
Group Therapy Note    Date: 4/5/2024    Group Start Time: 1000  Group End Time: 1030  Group Topic: Psychotherapy     Dilcia Berger MSW        Group Therapy Note    Attendees: 2/12     Patient was offered group therapy today but declined to participate despite encouragement from staff.  1:1 was offered.    Discipline Responsible: /Counselor      Signature:  FRANKLIN Stephens

## 2024-04-05 NOTE — BH NOTE
.   Patient given tobacco quit line number [id number] at this time, refusing to call at this time, states \" I just don't want to quit now\"- patient given information as to the dangers of long term tobacco use. Continue to reinforce the importance of tobacco cessation.

## 2024-04-05 NOTE — H&P
Department of Psychiatry  Attending Physician Psychiatric Assessment     Reason for Admission to Psychiatric Unit:  Concerns about patient's safety in the community    CHIEF COMPLAINT: Hallucinations and suicidal ideation    History obtained from: Patient, electronic medical record          HISTORY OF PRESENT ILLNESS:    Adolfo Presley is a 63 y.o. male who has a past medical history of schizoaffective disorder, bipolar, depression, polysubstance abuse, GERD, hepatitis, diabetes and chronic headaches. Patient presented to the ED via application for manage emergency admission due to hallucinations and suicidal ideation.  Per ED social work documentation, \"Patient placed on application for emergency admission stating \"Adolfo made a comment earlier today that he wanted to stab himself in stomach with a knife.  He stated that the voices were telling him to do so.  I just had an emergency tele appointment w/ Adolfo and he stated \"I want to walk out into traffic and get hit\".  I asked is he had a plan to actually do this and he replied \"yes\".  Adolfo represents a risk of harm to self as manifested by threats.  He would benefit from treatment in a hospital.\" This writer spoke with guardian who agreed with an inpatient admission.  Per guardian, there have been some medication changes which may be triggering these thoughts.  Patient has always experienced auditory hallucinations per guardian.\"    Patient was agreeable to intake assessment at bedside.  He was observed resting in bed, free of distress or discomfort.  On approach patient is alert and oriented x 4 however does acknowledge having some issues with memory.  He presents as flat,, down and states reason for hospitalization being due to \"worsening voices telling me to commit suicide with a knife\".  Patient states to have struggled with auditory hallucinations for a long time however states to have worsened \"last night\" which he states to have caused worsening in

## 2024-04-05 NOTE — CARE COORDINATION
Psychosocial Assessment    Current Level of Psychosocial Functioning     Independent   Dependent  X  Minimal Assist     Comments:      Psychosocial High Risk Factors (check all that apply)    Unable to obtain meds   Chronic illness/pain    Substance abuse   Lack of Family Support   Financial stress   Isolation   Inadequate Community Resources  Suicide attempt(s)  Not taking medications   Victim of crime   Developmental Delay  Unable to manage personal needs    Age 65 or older   Homeless  No transportation   Readmission within 30 days  Unemployment  Traumatic Event    Family/Supports identified: Patient has supportive sister who is Legal Guardian, and supportive staff at [facility].    Patient Strengths: Can return to [facility], and SSI     Patient Barriers: Poor coping skills for mental health symptoms    CMHC/MH history: Services are provided at [facility].    Plan of Care:  medication management, group/individual therapies, family meetings, psycho -education, treatment team meetings to assist with stabilization    Initial Discharge Plan:  Confirmed return to [facility].     Clinical Summary:  Patient is a 63 year old male admitted to the [facility] for safety from [facility]. Patient  can return to [facility] at Discharge. Patient reports suicidal ideation, but feels safe on the unit. Patient denies homicidal ideations. Patient reports auditory hallucinations. Patient denies substance use since being at [facility]. Patient reports past legal issues. Patient denies any physical, emotional, verbal, or sexual abuse.

## 2024-04-05 NOTE — H&P
[facility] Medicine  Hector Crowley MD; Jr Weeks MD, Robson Anderson MD, Estephania Hsu MD, Kendall Napoles MD; Jerome Garland MD    [facility] Internal Medicine   IN-PATIENT SERVICE   [facility]     HISTORY AND PHYSICAL EXAMINATION            Date:   4/5/2024  Patient name:  Adolfo Presley  Date of admission:  4/4/2024  5:48 PM  MRN:   [id number]  Account:  [id number]  YOB: 1960  PCP:    No primary care provider on file.  Room:   [address] [place] [address]  Code Status:    Full Code      Chief Complaint:     DM2  HLD    History Obtained From:     Patient/EMR/bedside RN     History of Present Illness:     Diabetes   Duration more than 7 years  Modifying factors on Glucophage and other med  Severity uncontrolled sever  Associated signs and symtoms neuropathy/ckd/ CAD.   aggravated with sugar diet and better with low sugar diet     HLD  Onset more than 5 years ago  Severity is mild, not getting worse  Not associated with pancreatitis  Tolerating statin well no muscle pain      Past Medical History:     Past Medical History:   Diagnosis Date    Bipolar disorder (HCC)     Depression     GERD (gastroesophageal reflux disease)     Hallucinations     Headache(784.0)     Hepatitis     Schizophrenia, schizo-affective (HCC)     Substance abuse (HCC)     Tobacco abuse     Type II or unspecified type diabetes mellitus without mention of complication, not stated as uncontrolled     Urinary incontinence         Past Surgical History:     Past Surgical History:   Procedure Laterality Date    ABCESS DRAINAGE N/A 02/11/2018    Carla anal abcess    DENTAL SURGERY      all teeth pulled        Medications Prior to Admission:     Prior to Admission medications    Medication Sig Start Date End Date Taking? Authorizing Provider   divalproex (DEPAKOTE ER) 250 MG extended release tablet Take 625 mg by mouth daily   Yes Provider, MD Mina   divalproex (DEPAKOTE

## 2024-04-05 NOTE — GROUP NOTE
Group Therapy Note    Date: 4/5/2024    Group Start Time: 1330  Group End Time: 1415  Group Topic: Relaxation    [facility]        Group Therapy Note    Attendees: 4/14    Relaxation Group Note        Date: April 5, 2024    Start Time: 1:30pm  End Time: 2:15pm      Number of Participants in Group & Unit Census:  4/14    Topic:  interpersonal skills, leisure awareness, creative expression    Goal of Group: To improve interpersonal skills and leisure awareness through collaborating with peers and demonstrating creative expression.      Comments:     Patient did not participate in Relaxation group, despite staff encouragement and explanation of benefits.  Patient remain seclusive to self.  Q15 minute safety checks maintained for patient safety and will continue to encourage patient to attend unit programming.        Signature:  CARMEN Delgado

## 2024-04-05 NOTE — BH NOTE
[facility]  Admission Note     Admission Type:   Voluntary     Reason for admission:  Reason for Admission: Patient was admitted tto the [facility] due to suicideal ideations with audio commands to harm  self. 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 Patient also explained he is graves and dead people walking. Patient states he is suffering from lack a sleep and on average3 gets 2-3 hours nightly. Patient expressed depression was a 5/10 and his anxiety was a 10/10 at this time.      Addictive Behavior:   Addictive Behavior  In the Past 3 Months, Have You Felt or Has Someone Told You That You Have a Problem With  : None    Medical Problems:   Past Medical History:   Diagnosis Date    Bipolar disorder ([facility])     Depression     GERD (gastroesophageal reflux disease)     Hallucinations     Headache(784.0)     Hepatitis     Schizophrenia, schizo-affective ([facility])     Substance abuse ([facility])     Tobacco abuse     Type II or unspecified type diabetes mellitus without mention of complication, not stated as uncontrolled     Urinary incontinence        Status EXAM:  Mental Status and Behavioral Exam  Normal: No  Level of Assistance: Independent/Self  Facial Expression: Sad  Affect: Congruent  Level of Consciousness: Alert  Frequency of Checks: 4 times per hour, close  Mood:Normal: No  Mood: Anxious, Helpless, Depressed  Motor Activity:Normal: Yes  Eye Contact: Good  Observed Behavior: Friendly, Cooperative  Sexual Misconduct History: Current - no  Preception: [place] to person, [place] to time, [place] to place, [place] to situation  Attention:Normal: Yes  Thought Processes: Tangential  Thought Content:Normal: No  Thought Content: Poverty of content  Depression Symptoms: Feelings of hopelessess, Feelings of helplessness  Anxiety Symptoms: Generalized  [place] Symptoms: No problems reported or observed.  Hallucinations: Visual (comment), Auditory  (comment)  Delusions: No  Memory:Normal: No  Memory: Poor recent  Insight and Judgment: No  Insight and Judgment: Poor judgment, Poor insight    Tobacco Screening:  Practical Counseling, on admission, corby X, if applicable and completed (first 3 are required if patient doesn't refuse):            ( ) Recognizing danger situations (included triggers and roadblocks)                    ( ) Coping skills (new ways to manage stress,relaxation techniques, changing routine, distraction)                                                           ( x) Basic information about quitting (benefits of quitting, techniques in how to quit, available resources  ( ) [facility]                                                                                                                   ( ) Patient refused counseling  ( ) Patient has not smoked in the last 30 days    Metabolic Screening:    Lab Results   Component Value Date    LABA1C 7.4 (H) 02/23/2024       Lab Results   Component Value Date    CHOL 150 10/13/2021    CHOL 167 08/11/2020    CHOL 179 02/13/2017    CHOL 127 05/09/2015    CHOL 168 08/20/2014    CHOL 158 12/31/2013    CHOL 178 08/15/2013    CHOL 166 09/17/2012    CHOL 210 (H) 02/03/2012     Lab Results   Component Value Date    TRIG 41 10/13/2021    TRIG 43 08/11/2020    TRIG 67 02/13/2017    TRIG 37 05/09/2015    TRIG 72 08/20/2014    TRIG 52 12/31/2013    TRIG 70 08/15/2013    TRIG 117 09/17/2012    TRIG 94 02/03/2012     Lab Results   Component Value Date    HDL 62 10/13/2021    HDL 74 08/11/2020    HDL 73 02/13/2017    HDL 57 05/09/2015    HDL 50 08/20/2014    HDL 43 12/31/2013    HDL 42 08/15/2013    HDL 38 (L) 09/17/2012    HDL 55 02/03/2012     No components found for: \"LDLCAL\"  No components found for: \"LABVLDL\"      Body mass index is 32.1 kg/m².    BP Readings from Last 2 Encounters:   04/04/24 124/73   04/14/22 113/61         Pt admitted with followings

## 2024-04-05 NOTE — GROUP NOTE
Group Therapy Note    Date: 4/5/2024    Group Start Time: 1100  Group End Time: 1135  Group Topic: Cognitive Skills    [facility]        Group Therapy Note    Attendees: 5/14    Cognitive Skills Group Note        Date: April 5, 2024     Start Time: 11am  End Time: 11:35am      Number of Participants in Group & Unit Census:  5/14    Topic:  interpersonal skills, decision-making, concentration     Goal of Group: To improve interpersonal skills and decision-making through collaborating with peers and concentrating on a presented task.       Comments:     Patient did not participate in Cognitive Skills group, despite staff encouragement and explanation of benefits.  Patient remain seclusive to self.  Q15 minute safety checks maintained for patient safety and will continue to encourage patient to attend unit programming.        Signature:  CARMEN Delgado

## 2024-04-06 PROCEDURE — 1240000000 HC EMOTIONAL WELLNESS R&B

## 2024-04-06 PROCEDURE — 99232 SBSQ HOSP IP/OBS MODERATE 35: CPT | Performed by: PSYCHIATRY & NEUROLOGY

## 2024-04-06 PROCEDURE — 6370000000 HC RX 637 (ALT 250 FOR IP): Performed by: PSYCHIATRY & NEUROLOGY

## 2024-04-06 PROCEDURE — 6370000000 HC RX 637 (ALT 250 FOR IP): Performed by: INTERNAL MEDICINE

## 2024-04-06 PROCEDURE — 99232 SBSQ HOSP IP/OBS MODERATE 35: CPT | Performed by: INTERNAL MEDICINE

## 2024-04-06 PROCEDURE — APPSS30 APP SPLIT SHARED TIME 16-30 MINUTES: Performed by: NURSE PRACTITIONER

## 2024-04-06 PROCEDURE — 6370000000 HC RX 637 (ALT 250 FOR IP): Performed by: NURSE PRACTITIONER

## 2024-04-06 RX ORDER — DIVALPROEX SODIUM 500 MG/1
500 TABLET, EXTENDED RELEASE ORAL DAILY
Status: DISCONTINUED | OUTPATIENT
Start: 2024-04-07 | End: 2024-04-10 | Stop reason: HOSPADM

## 2024-04-06 RX ORDER — LOPERAMIDE HYDROCHLORIDE 2 MG/1
2 CAPSULE ORAL 4 TIMES DAILY PRN
Status: DISCONTINUED | OUTPATIENT
Start: 2024-04-06 | End: 2024-04-10 | Stop reason: HOSPADM

## 2024-04-06 RX ADMIN — Medication 1000 UNITS: at 08:53

## 2024-04-06 RX ADMIN — OLANZAPINE 20 MG: 10 TABLET, FILM COATED ORAL at 22:09

## 2024-04-06 RX ADMIN — ESCITALOPRAM OXALATE 20 MG: 20 TABLET ORAL at 08:53

## 2024-04-06 RX ADMIN — TRAZODONE HYDROCHLORIDE 50 MG: 50 TABLET ORAL at 22:08

## 2024-04-06 RX ADMIN — LOPERAMIDE HYDROCHLORIDE 2 MG: 2 CAPSULE ORAL at 22:08

## 2024-04-06 RX ADMIN — GABAPENTIN 100 MG: 100 CAPSULE ORAL at 08:53

## 2024-04-06 RX ADMIN — METFORMIN HYDROCHLORIDE 1000 MG: 1000 TABLET, FILM COATED ORAL at 17:16

## 2024-04-06 RX ADMIN — METFORMIN HYDROCHLORIDE 1000 MG: 1000 TABLET, FILM COATED ORAL at 08:52

## 2024-04-06 RX ADMIN — HYDROXYZINE HYDROCHLORIDE 50 MG: 50 TABLET, FILM COATED ORAL at 23:01

## 2024-04-06 RX ADMIN — DIVALPROEX SODIUM 750 MG: 500 TABLET, EXTENDED RELEASE ORAL at 22:09

## 2024-04-06 RX ADMIN — GABAPENTIN 100 MG: 100 CAPSULE ORAL at 17:16

## 2024-04-06 RX ADMIN — ATORVASTATIN CALCIUM 20 MG: 20 TABLET, FILM COATED ORAL at 22:09

## 2024-04-06 RX ADMIN — DIVALPROEX SODIUM 750 MG: 500 TABLET, EXTENDED RELEASE ORAL at 08:53

## 2024-04-06 NOTE — PROGRESS NOTES
Daily Progress Note  4/6/2024    Patient Name: Adolfo Presley    CHIEF COMPLAINT: Suicidal ideation with hallucinations         SUBJECTIVE:      Patient seen face-to-face for follow-up assessment.  He is minimally cooperative with discussion.  Does not make eye contact with writer.  Selectively mute with answers.  He has been isolative and withdrawn.  Staff report that he primarily stays in his room.  He has been compliant with his scheduled psychotropic medication.  Currently taking Zyprexa 20 mg nightly, Neurontin 100 mg twice daily, Lexapro 20 mg daily and Depakote twice daily.  Patient did not express any side effects to his treatment.  Not able to contract for safety in the community.  Would not respond to questions regarding any continued perceptual disturbances.  Grooming and self-care are poor.  He has not required any emergency medications.  Reviewed labs and vitals, vitals are improving.  Has not been on the unit and requires continued inpatient hospitalization for safety.    Appetite:  [x] Adequate/Unchanged  [] Increased  [] Decreased      Sleep:       [x] Adequate/Unchanged  [] Fair  [] Poor      Group Attendance on Unit:   [] Yes   [] Selectively    [x] No    Compliant with scheduled medications: [x] Yes  [] No    Received emergency medications in past 24 hrs: [] Yes   [x] No    Medication Side Effects: Denies         Mental Status Exam  Level of consciousness: Alert and awake   Appearance: Sitting on bed, blankets covering the face and body  Behavior/Motor: Not interactive  Attitude toward examiner: Not cooperative, no eye contact  Speech: Selectively mute  Mood: Patient provides no response  Affect: Flat  Thought processes: Patient provides no response to questions.  Thought content: Patient provides no response regarding homicidal ideation  Suicidal Ideation: Patient provides no response regarding suicidal ideations, unable to contract for safety off unit  Delusions: Difficult to determine if                Opiates, Urine 04/04/2024 NEGATIVE  NEGATIVE Final    Comment:       (Positive cutoff 300 ng/mL)                  Phencyclidine, Urine 04/04/2024 NEGATIVE  NEGATIVE Final    Comment:       (Positive cutoff 25 ng/mL)                  Cannabinoid Scrn, Ur 04/04/2024 NEGATIVE  NEGATIVE Final    Comment:       (Positive cutoff 50 ng/mL)                  Oxycodone Screen, Ur 04/04/2024 NEGATIVE  NEGATIVE Final    Comment:       (Positive cutoff 100 ng/mL)                  Fentanyl, Ur 04/04/2024 NEGATIVE  NEGATIVE Final    Comment:       (Positive cutoff  5 ng/ml)            Test Information 04/04/2024 Assay provides medical screening only.  The absence of expected drug(s) and/or metabolite(s) may indicate diluted or adulterated urine, limitations of testing or timing of collection.   Final    Comment: Testing for legal purposes should be confirmed by another method.  To request confirmation   of test result, please call the lab within 7 days of sample submission.      POC Glucose 04/05/2024 121 (H)  75 - 110 mg/dL Final    POC Glucose 04/05/2024 211 (H)  75 - 110 mg/dL Final    POC Glucose 04/05/2024 252 (H)  75 - 110 mg/dL Final    POC Glucose 04/05/2024 207 (H)  75 - 110 mg/dL Final         Reviewed patient's current plan of care and vital signs with nursing staff.    Labs reviewed: [x] Yes    Medications  Current Facility-Administered Medications: [START ON 4/7/2024] divalproex (DEPAKOTE ER) extended release tablet 500 mg, 500 mg, Oral, Daily  divalproex (DEPAKOTE ER) extended release tablet 750 mg, 750 mg, Oral, Nightly  glucose chewable tablet 16 g, 4 tablet, Oral, PRN  dextrose bolus 10% 125 mL, 125 mL, IntraVENous, PRN **OR** dextrose bolus 10% 250 mL, 250 mL, IntraVENous, PRN  glucagon injection 1 mg, 1 mg, IntraMUSCular, PRN  dextrose 10 % infusion, , IntraVENous, Continuous PRN  metFORMIN (GLUCOPHAGE) tablet 1,000 mg, 1,000 mg, Oral, BID WC  atorvastatin (LIPITOR) tablet 20 mg, 20 mg, Oral,

## 2024-04-06 NOTE — GROUP NOTE
Group Therapy Note    Date: 4/5/2024    Group Start Time: 2000  Group End Time: 2100  Group Topic: Wrap-Up    Amanda Rouse        Group Therapy Note    Attendees: 9 out of 14         Participation Level: Active Listener    Participation Quality: Appropriate and Supportive      Speech:  normal      Level of consciousness:  Alert      Modes of Intervention: Socialization and Media      Discipline Responsible: Behavorial [employer]      Signature:  AMANDA HOUGH

## 2024-04-06 NOTE — PLAN OF CARE
Problem: Anxiety  Goal: Will report anxiety at manageable levels  Description: INTERVENTIONS:  1. Administer medication as ordered  2. Teach and rehearse alternative coping skills  3. Provide emotional support with 1:1 interaction with staff  Outcome: Progressing     Patient stated he is feeling better than he did yesterday. He also stated he got to speak with his older sister today, and stated she is the one that has been taking care of him since his parents passed away. Patient stated he is having auditory hallucinations and they are telling him to harm himself but he stated he knows not to act on it. Patient stated that the voices told him once to harm his family, but he stated he didn't act on it. Patient remains free from self harm this shift. Patient denies wanting to cause harm to self or others at this time. Patient encouraged to seek nursing staff at anytime if he felt at danger to himself or others. Patient states understanding. Safety checks maintained [id number].

## 2024-04-06 NOTE — PLAN OF CARE
Problem: Chronic Conditions and Co-morbidities  Goal: Patient's chronic conditions and co-morbidity symptoms are monitored and maintained or improved  Outcome: Progressing     Problem: Risk for Elopement  Goal: Patient will not exit the unit/facility without proper excort  Outcome: Progressing     Problem: Safety - Adult  Goal: Free from fall injury  Outcome: Progressing     Problem: Anxiety  Goal: Will report anxiety at manageable levels  Description: INTERVENTIONS:  1. Administer medication as ordered  2. Teach and rehearse alternative coping skills  3. Provide emotional support with 1:1 interaction with staff  Outcome: Progressing     Problem: Self Harm/Suicidality  Goal: Will have no self-injury during hospital stay  Description: INTERVENTIONS:  1.  Ensure constant observer at bedside with Q15M safety checks  2.  Maintain a safe environment  3.  Secure patient belongings  4.  Ensure family/visitors adhere to safety recommendations  5.  Ensure safety tray has been added to patient's diet order  6.  Every shift and PRN: Re-assess suicidal risk via Frequent Screener    Outcome: Progressing     Patient remains safe on unit, free from injury or self-harm.  Patient endorses suicidal ideations, but contracts for safety on unit.  15 minute safety checks in place and will continue.  Patient also endorses anxiety, and continued auditory hallucinations.  Patient is brightened, pleasant, and cooperative.  Patient is eating all meals, fluids encouraged, and has been compliant with medications. Patient has had no elopement attempts, and remains in behavioral control.

## 2024-04-06 NOTE — PROGRESS NOTES
7.00 times per week. Drug: Cocaine.    Family History:     Family History   Problem Relation Age of Onset    Diabetes Mother     Heart Disease Mother        Review of Systems:     Positive and Negative as described in HPI.    CONSTITUTIONAL:  negative for fevers, chills, sweats, fatigue, weight loss  HEENT:  negative for vision, hearing changes, runny nose, throat pain  RESPIRATORY:  negative for shortness of breath, cough, congestion, wheezing.  CARDIOVASCULAR:  negative for chest pain, palpitations.  GASTROINTESTINAL:  negative for nausea, vomiting, diarrhea, constipation, change in bowel habits, abdominal pain   GENITOURINARY:  negative for difficulty of urination, burning with urination, frequency   INTEGUMENT:  negative for rash, skin lesions, easy bruising   HEMATOLOGIC/LYMPHATIC:  negative for swelling/edema   ALLERGIC/IMMUNOLOGIC:  negative for urticaria , itching  ENDOCRINE:  negative increase in drinking, increase in urination, hot or cold intolerance  MUSCULOSKELETAL:  negative joint pains, muscle aches, swelling of joints  NEUROLOGICAL:  negative for headaches, dizziness, lightheadedness, numbness, pain, tingling extremities      Physical Exam:     /78   Pulse 87   Temp 97.9 °F (36.6 °C) (Temporal)   Resp 14   Ht 1.88 m (6' 2\")   Wt 113.4 kg (250 lb)   SpO2 96%   BMI 32.10 kg/m²   Temp (24hrs), Av.9 °F (36.6 °C), Min:97.9 °F (36.6 °C), Max:97.9 °F (36.6 °C)    Recent Labs     24  0826 24  1157 24  1652 24  2021   POCGLU 121* 211* 252* 207*       No intake or output data in the 24 hours ending 24 1718    General Appearance:  alert, well appearing, and in no acute distress  Mental status: oriented to person, place, and time   Head:  normocephalic, atraumatic.  Neck: supple, no carotid bruits, thyroid not palpable  Lungs: Bilateral equal air entry, clear to ausculation, no wheezing, rales or rhonchi, normal effort  Cardiovascular: normal rate, regular rhythm,  no murmur, gallop, rub.  Abdomen: Soft, nontender, nondistended, normal bowel sounds, no hepatomegaly or splenomegaly  Neurologic: There are no new focal motor or sensory deficits, moving all extremities spontaneously   Skin: No gross lesions, rashes, bruising or bleeding on exposed skin area  Extremities:  peripheral pulses palpable, no pedal edema or calf pain with palpation    Investigations:      Laboratory Testing:  Recent Results (from the past 24 hour(s))   POC Glucose Fingerstick    Collection Time: 04/05/24  8:21 PM   Result Value Ref Range    POC Glucose 207 (H) 75 - 110 mg/dL       Imaging/Diagonstics:  No results found.    Assessment :      Hospital Problems             Last Modified POA    * (Principal) Schizoaffective disorder, depressive type (HCC) (Chronic) 4/5/2024 Yes     Plan:     Admitted to inpatient psych  63-year-old -American gentleman class I obese BMI 32.1 history of diabetes mellitus recent A1c 2 months ago was 7 point  Start metformin 1000 twice a  Hyperlipidemia start Lipitor 20 mg daily recheck liver function in 6  DVT prophylaxis,  Pt mobile   Full code status       Consultations:   IP CONSULT TO INTERNAL MEDICINE      Hector Crowley MD  4/6/2024  5:18 PM    Copy sent to Dr. Colby primary care provider on file.    Please note that this chart was generated using voice recognition Dragon dictation software.  Although every effort was made to ensure the accuracy of this automated transcription, some errors in transcription may have occurred.

## 2024-04-06 NOTE — GROUP NOTE
Group Therapy Note    Date: 4/6/2024    Group Start Time: 0905  Group End Time: 0915  Group Topic: Group Documentation    Josselin Chand, RN        Group Therapy Note    Attendees: 3/16  Community Meeting Group Note        Date: April 6, 2024 Start Time:  9:05am   End Time:  9:15am      Number of Participants in Group & Unit Census:  3/16    Topic: Goals group    Goal of Group:To establish attainable daily goal(s)      Comments:     Patient did not participate in Community Meeting group, despite staff encouragement and explanation of benefits.  Patient remain seclusive to self.  Q15 minute safety checks maintained for patient safety and will continue to encourage patient to attend unit programming.

## 2024-04-07 PROCEDURE — 6370000000 HC RX 637 (ALT 250 FOR IP): Performed by: INTERNAL MEDICINE

## 2024-04-07 PROCEDURE — 1240000000 HC EMOTIONAL WELLNESS R&B

## 2024-04-07 PROCEDURE — 99232 SBSQ HOSP IP/OBS MODERATE 35: CPT | Performed by: PSYCHIATRY & NEUROLOGY

## 2024-04-07 PROCEDURE — 99232 SBSQ HOSP IP/OBS MODERATE 35: CPT | Performed by: INTERNAL MEDICINE

## 2024-04-07 PROCEDURE — 6370000000 HC RX 637 (ALT 250 FOR IP): Performed by: PSYCHIATRY & NEUROLOGY

## 2024-04-07 PROCEDURE — APPSS30 APP SPLIT SHARED TIME 16-30 MINUTES: Performed by: NURSE PRACTITIONER

## 2024-04-07 PROCEDURE — 6370000000 HC RX 637 (ALT 250 FOR IP): Performed by: NURSE PRACTITIONER

## 2024-04-07 RX ADMIN — TRAZODONE HYDROCHLORIDE 50 MG: 50 TABLET ORAL at 21:04

## 2024-04-07 RX ADMIN — DIVALPROEX SODIUM 750 MG: 500 TABLET, EXTENDED RELEASE ORAL at 21:04

## 2024-04-07 RX ADMIN — ATORVASTATIN CALCIUM 20 MG: 20 TABLET, FILM COATED ORAL at 21:04

## 2024-04-07 RX ADMIN — GABAPENTIN 100 MG: 100 CAPSULE ORAL at 18:30

## 2024-04-07 RX ADMIN — GABAPENTIN 100 MG: 100 CAPSULE ORAL at 09:23

## 2024-04-07 RX ADMIN — DIVALPROEX SODIUM 500 MG: 500 TABLET, EXTENDED RELEASE ORAL at 09:23

## 2024-04-07 RX ADMIN — METFORMIN HYDROCHLORIDE 1000 MG: 1000 TABLET, FILM COATED ORAL at 18:37

## 2024-04-07 RX ADMIN — OLANZAPINE 20 MG: 10 TABLET, FILM COATED ORAL at 21:04

## 2024-04-07 RX ADMIN — Medication 1000 UNITS: at 09:23

## 2024-04-07 RX ADMIN — ESCITALOPRAM OXALATE 20 MG: 20 TABLET ORAL at 09:23

## 2024-04-07 RX ADMIN — METFORMIN HYDROCHLORIDE 1000 MG: 1000 TABLET, FILM COATED ORAL at 09:23

## 2024-04-07 NOTE — PLAN OF CARE
[facility]  Day 3 Interdisciplinary Treatment Plan NOTE    Review Date & Time: 4/7/24 1320    Admission Type:   Admission Type: Voluntary    Reason for admission:  Reason for Admission: Patient was admitted tto the [facility] due to suicideal ideations with audio commands to harm  self. 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 Patient also explained he is graves and dead people walking. Patient states he is suffering from lack a sleep and on average3 gets 2-3 hours nightly. Patient expressed depression was a 5/10 and his anxiety was a 10/10 at this time.  Estimated Length of Stay:  5-7 days  Estimated Discharge Date Update:   to be determined by physician    PATIENT STRENGTHS:  Patient Strengths:   Patient Strengths and Limitations:Limitations: Difficulty problem solving/relies on others to help solve problems, Apathetic / unmotivated  Addictive Behavior:Addictive Behavior  In the Past 3 Months, Have You Felt or Has Someone Told You That You Have a Problem With  : None  Medical Problems:  Past Medical History:   Diagnosis Date    Bipolar disorder (HCC)     Depression     GERD (gastroesophageal reflux disease)     Hallucinations     Headache(784.0)     Hepatitis     Schizophrenia, schizo-affective (HCC)     Substance abuse (HCC)     Tobacco abuse     Type II or unspecified type diabetes mellitus without mention of complication, not stated as uncontrolled     Urinary incontinence        Risk:  Fall Risk   Dusty Scale Dusty Scale Score: 22  BVC    Change in scores:  No Changes to plan of Care:  No    Status EXAM:   Mental Status and Behavioral Exam  Normal: No  Level of Assistance: Independent/Self  Facial Expression: Flat  Affect: Appropriate  Level of Consciousness: Alert  Frequency of Checks: 4 times per hour, close  Mood:Normal: No  Mood: Anxious  Motor Activity:Normal: Yes  Eye Contact: Good  Observed Behavior: Cooperative,

## 2024-04-07 NOTE — PLAN OF CARE
Problem: Chronic Conditions and Co-morbidities  Goal: Patient's chronic conditions and co-morbidity symptoms are monitored and maintained or improved  Outcome: Progressing     Patient is compliant with medications.    Problem: Risk for Elopement  Goal: Patient will not exit the unit/facility without proper excort  Outcome: Progressing     Patient denies thoughts or plans to elope from unit. He does not exhibit behavior indicating a desire to elope.      Problem: Safety - Adult  Goal: Free from fall injury  Outcome: Progressing    Patient demonstrates appropriate safety judgment and has remained free from falls/injuries.     Problem: Anxiety  Goal: Will report anxiety at manageable levels  Description: INTERVENTIONS:  1. Administer medication as ordered  2. Teach and rehearse alternative coping skills  3. Provide emotional support with 1:1 interaction with staff  4/7/2024 1040 by Jeanine Murrell, RN  Outcome: Progressing    Patient endorses feelings of anxiety, but reports improvement of symptoms over the last few days.     Problem: Self Harm/Suicidality  Goal: Will have no self-injury during hospital stay  Description: INTERVENTIONS:  1.  Ensure constant observer at bedside with Q15M safety checks  2.  Maintain a safe environment  3.  Secure patient belongings  4.  Ensure family/visitors adhere to safety recommendations  5.  Ensure safety tray has been added to patient's diet order  6.  Every shift and PRN: Re-assess suicidal risk via Frequent Screener    4/7/2024 1040 by Jeanine Murrell, RN  Outcome: Progressing     Patient is calm, controlled upon approach, cooperative with treatment. Patient is social and pleasant within the milieu. He denies thoughts of self-harm/suicide, but is agreeable to seek out staff should such thoughts arise.  Q15min checks maintained for safety.

## 2024-04-07 NOTE — PROGRESS NOTES
comments or changes to the   documentation are stated below otherwise agree with assessment.      QTc Calculation (Bazett)   Date Value Ref Range Status   11/01/2021 410 ms Final       The patient continues to report suicidal ideation though he reports a slight improvement in his mood.  The patient has been taking his medications and reports no side effects.  No changes have been made to his medications today.     PLAN  Medications as noted above  Attempt to develop insight  Psycho-education conducted.  Supportive Therapy conducted.  Follow-up daily while on inpatient unit    Electronically signed by VIJAYA VALENZUELA MD on 4/7/24 at 7:51 PM EDT

## 2024-04-07 NOTE — PLAN OF CARE
Problem: Anxiety  Goal: Will report anxiety at manageable levels  Description: INTERVENTIONS:  1. Administer medication as ordered  2. Teach and rehearse alternative coping skills  3. Provide emotional support with 1:1 interaction with staff  Outcome: Progressing  Patient did not understand what anxiety means but is describing actions of anxiety. He described that the auditory command hallucinations were sending someone physically to harm him. His anxiety was escalating due to him feeling the hallucinations poking at his genitals to get him to act on his anger. Patient expressed that he did not want to do any harm to himself or anyone else. Patient was provided Atarax given seemed to aid with his anxiety allowing him to sleep.  Problem: Self Harm/Suicidality  Goal: Will have no self-injury during hospital stay  Description: INTERVENTIONS:  1.  Ensure constant observer at bedside with Q15M safety checks  2.  Maintain a safe environment  3.  Secure patient belongings  4.  Ensure family/visitors adhere to safety recommendations  5.  Ensure safety tray has been added to patient's diet order  6.  Every shift and PRN: Re-assess suicidal risk via Frequent Screener    Outcome: Progressing  Patient actively dealing with command auditory hallucinations. He stated that he has been trying to ignore them and standing up to them. He expresses that he does not want to harm himself or anyone else. Patient is in a safe environment, non-slip socks worn, bed locked, with Q 15 minute safety checks. Patient appropriately expresses needs and concerns.

## 2024-04-07 NOTE — PROGRESS NOTES
[facility] Internal Medicine  Hector Crowley MD; Jr Weeks MD, Robson Anderson MD, Estephania Hsu MD, Kendall Napoles MD; Jerome Garland MD    [facility] Internal Medicine   IN-PATIENT SERVICE   [facility]     HISTORY AND PHYSICAL EXAMINATION            Date:   4/7/2024  Patient name:  Adolfo Presley  Date of admission:  4/4/2024  5:48 PM  MRN:   [id number]  Account:  [id number]  YOB: 1960  PCP:    No primary care provider on file.  Room:   [address] [place] [address]  Code Status:    Full Code      Chief Complaint:     DM2  HLD    History Obtained From:     Patient/EMR/bedside RN     History of Present Illness:     Diabetes   Duration more than 7 years  Modifying factors on Glucophage and other med  Severity uncontrolled sever  Associated signs and symtoms neuropathy/ckd/ CAD.   aggravated with sugar diet and better with low sugar diet     HLD  Onset more than 5 years ago  Severity is mild, not getting worse  Not associated with pancreatitis  Tolerating statin well no muscle pain      Past Medical History:     Past Medical History:   Diagnosis Date    Bipolar disorder (HCC)     Depression     GERD (gastroesophageal reflux disease)     Hallucinations     Headache(784.0)     Hepatitis     Schizophrenia, schizo-affective (HCC)     Substance abuse (HCC)     Tobacco abuse     Type II or unspecified type diabetes mellitus without mention of complication, not stated as uncontrolled     Urinary incontinence         Past Surgical History:     Past Surgical History:   Procedure Laterality Date    ABCESS DRAINAGE N/A 02/11/2018    Carla anal abcess    DENTAL SURGERY      all teeth pulled        Medications Prior to Admission:     Prior to Admission medications    Medication Sig Start Date End Date Taking? Authorizing Provider   divalproex (DEPAKOTE ER) 250 MG extended release tablet Take 625 mg by mouth daily   Yes Provider, MD Mina   divalproex (DEPAKOTE

## 2024-04-07 NOTE — GROUP NOTE
Group Therapy Note    Date: 4/7/2024    Group Start Time: 1000  Group End Time: 1010  Group Topic: Psychotherapy    [facility] Dilcia Berger MSW        Group Therapy Note    Attendees: 0/16       Patient's Goal:  Discuss journaling as a coping skill and practice using prompts    Patient was offered group therapy today but declined to participate despite encouragement from staff.  1:1 was offered.      Discipline Responsible: /Counselor      Signature:  FRANKLIN Stephens

## 2024-04-07 NOTE — GROUP NOTE
Group Therapy Note    Date: 4/7/2024    Group Start Time: 0900  Group End Time: 0915  Group Topic: Group Documentation    Josselin Chand, RN        Group Therapy Note    Attendees: 5/16    Community Meeting Group Note        Date: April 7, 2024 Start Time: 9am  End Time:  9:15am      Number of Participants in Group & Unit Census:  5/16    Topic: Goals group    Goal of Group:To establish attainable daily goal(s)      Comments:     Patient did not participate in Community Meeting group, despite staff encouragement and explanation of benefits.  Patient remain seclusive to self.  Q15 minute safety checks maintained for patient safety and will continue to encourage patient to attend unit programming.

## 2024-04-08 PROCEDURE — APPSS30 APP SPLIT SHARED TIME 16-30 MINUTES

## 2024-04-08 PROCEDURE — 90833 PSYTX W PT W E/M 30 MIN: CPT | Performed by: PSYCHIATRY & NEUROLOGY

## 2024-04-08 PROCEDURE — 6370000000 HC RX 637 (ALT 250 FOR IP): Performed by: INTERNAL MEDICINE

## 2024-04-08 PROCEDURE — 1240000000 HC EMOTIONAL WELLNESS R&B

## 2024-04-08 PROCEDURE — 6370000000 HC RX 637 (ALT 250 FOR IP): Performed by: NURSE PRACTITIONER

## 2024-04-08 PROCEDURE — 6370000000 HC RX 637 (ALT 250 FOR IP): Performed by: PSYCHIATRY & NEUROLOGY

## 2024-04-08 PROCEDURE — 99232 SBSQ HOSP IP/OBS MODERATE 35: CPT | Performed by: INTERNAL MEDICINE

## 2024-04-08 PROCEDURE — 99232 SBSQ HOSP IP/OBS MODERATE 35: CPT | Performed by: PSYCHIATRY & NEUROLOGY

## 2024-04-08 RX ADMIN — GABAPENTIN 100 MG: 100 CAPSULE ORAL at 08:58

## 2024-04-08 RX ADMIN — GABAPENTIN 100 MG: 100 CAPSULE ORAL at 17:30

## 2024-04-08 RX ADMIN — DIVALPROEX SODIUM 500 MG: 500 TABLET, EXTENDED RELEASE ORAL at 08:58

## 2024-04-08 RX ADMIN — METFORMIN HYDROCHLORIDE 1000 MG: 1000 TABLET, FILM COATED ORAL at 08:58

## 2024-04-08 RX ADMIN — TRAZODONE HYDROCHLORIDE 50 MG: 50 TABLET ORAL at 21:46

## 2024-04-08 RX ADMIN — Medication 1000 UNITS: at 09:59

## 2024-04-08 RX ADMIN — HYDROXYZINE HYDROCHLORIDE 50 MG: 50 TABLET, FILM COATED ORAL at 21:45

## 2024-04-08 RX ADMIN — DIVALPROEX SODIUM 750 MG: 500 TABLET, EXTENDED RELEASE ORAL at 21:45

## 2024-04-08 RX ADMIN — ESCITALOPRAM OXALATE 20 MG: 20 TABLET ORAL at 08:58

## 2024-04-08 RX ADMIN — METFORMIN HYDROCHLORIDE 1000 MG: 1000 TABLET, FILM COATED ORAL at 17:30

## 2024-04-08 RX ADMIN — ATORVASTATIN CALCIUM 20 MG: 20 TABLET, FILM COATED ORAL at 21:45

## 2024-04-08 RX ADMIN — OLANZAPINE 20 MG: 10 TABLET, FILM COATED ORAL at 21:46

## 2024-04-08 NOTE — PROGRESS NOTES
Daily Progress Note  4/8/2024    Patient Name: Adolfo Presley    CHIEF COMPLAINT: Suicidal ideation with hallucinations         SUBJECTIVE:      Patient seen face-to-face for follow-up assessment.  Prior to approach patient was seen to be engaging in group program on the unit.  Nursing staff reports the patient has been out more, engaging in group, remaining behavior control and has not required emergency medication.  Patient has been compliant with scheduled medication and states to be finding beneficial.  He voices having command auditory hallucinations telling him to harm himself however states able to \"tolerate\" and denies being distressing.  Patient reports going to group and talking to others has helped him cope and ease his hallucinations.  Patient denies any issues with visual hallucinations.  He endorses improvement in paranoia.  Patient reports feeling \"happy\" and states suicidal ideation to be improving.  Patient believes that he is going to be discharged tomorrow or Wednesday and states looking forward to return to [place].    Appetite:  [x] Adequate/Unchanged  [] Increased  [] Decreased      Sleep:       [] Adequate/Unchanged  [] Fair  [x] Poor      Group Attendance on Unit:   [] Yes   [] Selectively    [x] No    Compliant with scheduled medications: [x] Yes  [] No    Received emergency medications in past 24 hrs: [] Yes   [x] No    Medication Side Effects: Denies         Mental Status Exam  Level of consciousness: Alert and awake   Appearance: Appropriate, resting in bed  Behavior/Motor: No psychomotor abnormalities  Attitude toward examiner: More cooperative today, avoidant eye contact  Speech: Slow, low volume, low productivity  Mood: Patient reports \"happy\"  Affect: Congruent, brightened  Thought processes: Linear and slow  Thought content: Patient denies homicidal ideation  Suicidal Ideation: Patient endorsing improvement in suicidal ideations  Delusions: Patient denies perceptual

## 2024-04-08 NOTE — PLAN OF CARE
Problem: Anxiety  Goal: Will report anxiety at manageable levels  Description: INTERVENTIONS:  1. Administer medication as ordered  2. Teach and rehearse alternative coping skills  3. Provide emotional support with 1:1 interaction with staff  4/7/2024 2247 by Cris King RN  Outcome: Progressing  When asked patient unaware of what anxiety is. Education with examples specific to him provided. Anxiety related to auditory command hallucinations.     Problem: Self Harm/Suicidality  Goal: Will have no self-injury during hospital stay  Description: INTERVENTIONS:  1.  Ensure constant observer at bedside with Q15M safety checks  2.  Maintain a safe environment  3.  Secure patient belongings  4.  Ensure family/visitors adhere to safety recommendations  5.  Ensure safety tray has been added to patient's diet order  6.  Every shift and PRN: Re-assess suicidal risk via Frequent Screener    4/7/2024 2247 by Cris King RN  Outcome: Progressing  Patient expressing that auditory command hallucinations to kill himself are present. Patient denying desire to act on commands rather stating that the hallucinations ..\"Can't make or break\" him. RN and patient discussed his strengths and coping skills. Patient currently ignoring voices. Patient is safe in hazard free environment with Q 15 minute rounding maintained. Patient is medicinally compliant and behaviorally controlled.

## 2024-04-08 NOTE — PROGRESS NOTES
[facility] Internal Medicine  Hector Crowley MD; Jr Weeks MD, Robson Anderson MD, Estephania Hsu MD, Kendall Napoles MD; Jerome Garland MD    [facility] Internal Medicine   IN-PATIENT SERVICE   [facility]     HISTORY AND PHYSICAL EXAMINATION            Date:   4/8/2024  Patient name:  Adolfo Presley  Date of admission:  4/4/2024  5:48 PM  MRN:   [id number]  Account:  [id number]  YOB: 1960  PCP:    No primary care provider on file.  Room:   [address] [place] [address]  Code Status:    Full Code      Chief Complaint:     DM2  HLD    History Obtained From:     Patient/EMR/bedside RN     History of Present Illness:     Diabetes   Duration more than 7 years  Modifying factors on Glucophage and other med  Severity uncontrolled sever  Associated signs and symtoms neuropathy/ckd/ CAD.   aggravated with sugar diet and better with low sugar diet     HLD  Onset more than 5 years ago  Severity is mild, not getting worse  Not associated with pancreatitis  Tolerating statin well no muscle pain      Past Medical History:     Past Medical History:   Diagnosis Date    Bipolar disorder (HCC)     Depression     GERD (gastroesophageal reflux disease)     Hallucinations     Headache(784.0)     Hepatitis     Schizophrenia, schizo-affective (HCC)     Substance abuse (HCC)     Tobacco abuse     Type II or unspecified type diabetes mellitus without mention of complication, not stated as uncontrolled     Urinary incontinence         Past Surgical History:     Past Surgical History:   Procedure Laterality Date    ABCESS DRAINAGE N/A 02/11/2018    Carla anal abcess    DENTAL SURGERY      all teeth pulled        Medications Prior to Admission:     Prior to Admission medications    Medication Sig Start Date End Date Taking? Authorizing Provider   divalproex (DEPAKOTE ER) 250 MG extended release tablet Take 625 mg by mouth daily   Yes Provider, MD Mina   divalproex (DEPAKOTE

## 2024-04-08 NOTE — PLAN OF CARE
Problem: Anxiety  Goal: Will report anxiety at manageable levels  Description: INTERVENTIONS:  1. Administer medication as ordered  2. Teach and rehearse alternative coping skills  3. Provide emotional support with 1:1 interaction with staff  4/7/2024 2033 by Gala Vanegas  Outcome: Progressing     Patient stated he is feeling good, and he got to talk to his sister on the phone. Patient stated the voices he hear that usual tell him to harm himself or others are lower, and that he hopes they stay low. Patient was watching television in the dayroom. Patient appeared to be in a brightened mood. Patient remains free from self harm this shift. Patient denies wanting to cause harm to self or others at this time. Patient encouraged to seek nursing staff at anytime if he felt at danger to himself or others. Pt states understanding. Safety checks maintained [id number]

## 2024-04-08 NOTE — PLAN OF CARE
Problem: Safety - Adult  Goal: Free from fall injury  Outcome: Progressing     Problem: Anxiety  Goal: Will report anxiety at manageable levels  Description: INTERVENTIONS:  1. Administer medication as ordered  2. Teach and rehearse alternative coping skills  3. Provide emotional support with 1:1 interaction with staff  4/8/2024 0956 by Hiwot Ramsey RN  Outcome: Progressing   Patient denies having anxiety.

## 2024-04-08 NOTE — GROUP NOTE
Group Therapy Note    Date: 4/8/2024    Group Start Time: 1100  Group End Time: 1145  Group Topic: Cognitive Skills    Mercedes Terrell CTRS        Group Therapy Note    Attendees: 6/16       Patient's Goal:  To improve interpersonal skills and memory recall through collaborating with peers and concentrating on a presented task.     Notes:  Patient attended and participated in the last 35 minutes of group. Patient was able to collaborate with peers and concentrate on a presented task. Patient was pleasant and cooperative.     Status After Intervention:  Improved    Participation Level: Active Listener and Interactive    Participation Quality: Appropriate, Attentive and Sharing      Speech:  normal      Thought Process/Content: Logical and Linear. Thought blocking at times.       Affective Functioning: Blunted      Mood: Dysphoric, brightened       Level of consciousness:  Alert and Attentive      Response to Learning: Able to verbalize current knowledge/experience, Able to retain information, and Progressing to goal      Endings: None Reported    Modes of Intervention: Socialization, Exploration, and Activity      Discipline Responsible: Psychoeducational Specialist      Signature:  CARMEN Delgado

## 2024-04-08 NOTE — GROUP NOTE
Group Therapy Note    Date: 4/8/2024    Group Start Time: 1330  Group End Time: 1415  Group Topic: Psychoeducation    [facility] Mercedes Ro CTRS        Group Therapy Note    Attendees: 5/15       Patient's Goal:  To improve interpersonal skills and coping skills awareness through collaborating with peers and demonstrating self-expression.    Notes:  Patient attended and participated in the last 25 minutes of group. Patient was able to collaborate with peers and demonstrate self-expression. Patient was able to identify coping skills. Patient was pleasant and cooperative.     Status After Intervention:  Improved    Participation Level: Active Listener and Interactive    Participation Quality: Appropriate, Attentive, Sharing and Supportive      Speech:  normal      Thought Process/Content: Logical and Linear. Thought blocking at times.         Affective Functioning: Blunted        Mood: Dysphoric, brightened       Level of consciousness:  Alert and Attentive      Response to Learning: Able to verbalize current knowledge/experience, Able to retain information, Capable of insight, and Progressing to goal      Endings: None Reported    Modes of Intervention: Support, Socialization, and Exploration      Discipline Responsible: Psychoeducational Specialist      Signature:  CARMEN Delgado

## 2024-04-08 NOTE — GROUP NOTE
Group Therapy Note    Date: 4/7/2024    Group Start Time: 2000  Group End Time: 2100  Group Topic: Wrap-Up    Amanda Rouse        Group Therapy Note    Attendees: 10 out of 16 attended wrap up group       Participation Level: Active Listener and Interactive    Participation Quality: Appropriate      Speech:  normal        Level of consciousness:  Alert        Modes of Intervention: Support and Media      Discipline Responsible: BehavSquadMail Tech      Signature:  AMANDA HOUGH

## 2024-04-09 VITALS
DIASTOLIC BLOOD PRESSURE: 73 MMHG | BODY MASS INDEX: 32.08 KG/M2 | WEIGHT: 250 LBS | HEIGHT: 74 IN | SYSTOLIC BLOOD PRESSURE: 123 MMHG | RESPIRATION RATE: 14 BRPM | TEMPERATURE: 97.7 F | OXYGEN SATURATION: 98 % | HEART RATE: 95 BPM

## 2024-04-09 PROCEDURE — 97161 PT EVAL LOW COMPLEX 20 MIN: CPT

## 2024-04-09 PROCEDURE — 6370000000 HC RX 637 (ALT 250 FOR IP): Performed by: PSYCHIATRY & NEUROLOGY

## 2024-04-09 PROCEDURE — 97165 OT EVAL LOW COMPLEX 30 MIN: CPT

## 2024-04-09 PROCEDURE — 6370000000 HC RX 637 (ALT 250 FOR IP): Performed by: NURSE PRACTITIONER

## 2024-04-09 PROCEDURE — 99231 SBSQ HOSP IP/OBS SF/LOW 25: CPT | Performed by: INTERNAL MEDICINE

## 2024-04-09 PROCEDURE — 99239 HOSP IP/OBS DSCHRG MGMT >30: CPT | Performed by: PSYCHIATRY & NEUROLOGY

## 2024-04-09 PROCEDURE — 6370000000 HC RX 637 (ALT 250 FOR IP): Performed by: INTERNAL MEDICINE

## 2024-04-09 RX ORDER — TRAZODONE HYDROCHLORIDE 50 MG/1
50 TABLET ORAL NIGHTLY PRN
Qty: 30 TABLET | Refills: 0 | Status: SHIPPED | OUTPATIENT
Start: 2024-04-09

## 2024-04-09 RX ORDER — ATORVASTATIN CALCIUM 20 MG/1
20 TABLET, FILM COATED ORAL NIGHTLY
Qty: 30 TABLET | Refills: 0 | Status: SHIPPED | OUTPATIENT
Start: 2024-04-09

## 2024-04-09 RX ADMIN — Medication 1000 UNITS: at 08:53

## 2024-04-09 RX ADMIN — ESCITALOPRAM OXALATE 20 MG: 20 TABLET ORAL at 08:53

## 2024-04-09 RX ADMIN — DIVALPROEX SODIUM 750 MG: 500 TABLET, EXTENDED RELEASE ORAL at 20:25

## 2024-04-09 RX ADMIN — METFORMIN HYDROCHLORIDE 1000 MG: 1000 TABLET, FILM COATED ORAL at 08:53

## 2024-04-09 RX ADMIN — DIVALPROEX SODIUM 500 MG: 500 TABLET, EXTENDED RELEASE ORAL at 08:53

## 2024-04-09 RX ADMIN — TRAZODONE HYDROCHLORIDE 50 MG: 50 TABLET ORAL at 20:26

## 2024-04-09 RX ADMIN — OLANZAPINE 20 MG: 10 TABLET, FILM COATED ORAL at 20:25

## 2024-04-09 RX ADMIN — GABAPENTIN 100 MG: 100 CAPSULE ORAL at 08:53

## 2024-04-09 RX ADMIN — GABAPENTIN 100 MG: 100 CAPSULE ORAL at 17:30

## 2024-04-09 RX ADMIN — ALUMINUM HYDROXIDE, MAGNESIUM HYDROXIDE, AND SIMETHICONE 30 ML: 1200; 120; 1200 SUSPENSION ORAL at 17:07

## 2024-04-09 RX ADMIN — HYDROXYZINE HYDROCHLORIDE 50 MG: 50 TABLET, FILM COATED ORAL at 20:25

## 2024-04-09 RX ADMIN — ATORVASTATIN CALCIUM 20 MG: 20 TABLET, FILM COATED ORAL at 20:25

## 2024-04-09 RX ADMIN — METFORMIN HYDROCHLORIDE 1000 MG: 1000 TABLET, FILM COATED ORAL at 17:06

## 2024-04-09 NOTE — TRANSITION OF CARE
Behavioral Health Transition Record to Provider    Patient Name: Adolfo Presley  YOB: 1960   Medical Record Number: [id number]  Date of Admission: 4/4/2024  5:48 PM   Date of Discharge: 4/9/2024    Attending Provider: Enoc Diaz MD   Discharging Provider: Emily  To contact this individual call and ask the  to page.  If unavailable, ask to be transferred to Behavioral Health Provider on call.  A Behavioral Health Provider will be available on call 24/7 and during holidays.    Primary Care Provider: No primary care provider on file.    Allergies   Allergen Reactions    Navane [Thiothixene (Tiotixene)]        Reason for Admission: due to \"worsening voices telling me to commit suicide with a knife\". Patient states to have struggled with auditory hallucinations for a long time however states to have worsened \"last night\" which he states to have caused worsening in depression and anxiety.     Admission Diagnosis: Hallucinations [R44.3]  Severe recurrent major depression with psychotic features (HCC) [F33.3]  Depression with suicidal ideation [F32.A, R45.851]    * No surgery found *    Results for orders placed or performed during the hospital encounter of 04/04/24   CBC with Auto Differential   Result Value Ref Range    WBC 6.3 3.5 - 11.0 k/uL    RBC 4.61 4.5 - 5.9 m/uL    Hemoglobin 12.4 (L) 13.5 - 17.5 g/dL    Hematocrit 39.8 (L) 41 - 53 %    MCV 86.2 80 - 100 fL    MCH 27.0 26 - 34 pg    MCHC 31.3 31 - 37 g/dL    RDW 16.0 (H) 11.5 - 14.9 %    Platelets 293 150 - 450 k/uL    MPV 7.7 6.0 - 12.0 fL    Neutrophils % 31 (L) 36 - 66 %    Lymphocytes % 54 (H) 24 - 44 %    Atypical Lymphocytes 1 %    Monocytes % 11 (H) 1 - 7 %    Eosinophils % 3 0 - 4 %    Basophils % 0 0 - 2 %    Neutrophils Absolute 1.95 1.3 - 9.1 k/uL    Lymphocytes Absolute 3.41 1.0 - 4.8 k/uL    Atypical Lymphocytes Absolute 0.06 k/uL    Monocytes Absolute 0.69 0.1 - 1.3 k/uL    Eosinophils Absolute 0.19 0.0 - 0.4 k/uL     Nutritional supplement           * This list has 2 medication(s) that are the same as other medications prescribed for you. Read the directions carefully, and ask your doctor or other care provider to review them with you.                STOP taking these medications      buPROPion 150 MG extended release tablet  Commonly known as: WELLBUTRIN SR     LORazepam 0.5 MG tablet  Commonly known as: ATIVAN     melatonin 5 MG Tabs tablet     QUEtiapine 50 MG extended release tablet  Commonly known as: SEROQUEL XR               Where to Get Your Medications        These medications were sent to [facility] [address] [address] - P [phone] - F [phone] [address] [address] , [facility] [address]      Phone: [phone]   atorvastatin 20 MG tablet  traZODone 50 MG tablet         Unresulted Labs (24h ago, onward)      None            To obtain results of studies pending at discharge, please contact     Follow-up Information       Follow up With Specialties Details Why Contact Info    [facility] Follow up returning to facility today, you will receive all services within in the building [address] [place] [address]  [phone]             Advanced Directive:   Does the patient have an appointed surrogate decision maker? No  Does the patient have a Medical Advance Directive? No  Does the patient have a Psychiatric Advance Directive? No  If the patient does not have a surrogate or Medical Advance Directive AND Psychiatric Advance Directive, the patient was offered information on these advance directives Yes and Patient declined to complete    Patient Instructions: Please continue all medications until otherwise directed by physician.      Tobacco Cessation Discharge Plan:   Is the patient a tobacco user  and needs referral for tobacco cessation? Yes  Patient referred to the following for tobacco cessation with an appointment? Patient

## 2024-04-09 NOTE — DISCHARGE INSTRUCTIONS
Information:  Medications:   Medication summary provided   I understand that I should take only the medications on my list.     -why and when I need to take each medicine.     -which side effects to watch for.     -that I should carry my medication information at all times in case of     Emergency situations.    I will take all of my medicines to follow up appointments.     -check with my physician or pharmacist before taking any new    Medication, over the counter product or drink alcohol.    -Ask about food, drug or dietary supplement interactions.    -discard old lists and update records with medication providers.    Notify Physician:  Notify physician if you notice:   Always call 911 if you feel your life is in danger  In case of an emergency call 911 immediately!  If 911 is not available call your local emergency medical system for help    Behavioral Health Follow Up:  Original Referral Source: [facility]  Discharge Diagnosis: Hallucinations [R44.3]  Severe recurrent major depression with psychotic features (HCC) [F33.3]  Depression with suicidal ideation [F32.A, R45.851]  Recommendations for Level of Care: Community mental health follow-up  Patient status at discharge: Alert and oriented x4, calm and cooperative  My hospital  was: [facility]  Aftercare plan faxed: Yes   -faxed by: RN   -date: 4/9/2024   -time: 15:00  Prescriptions: Sent to [facility]    Smoking: Quit Smoking.   Call the [employer]'s smoking quitline at 9-299-58O-QUIT  Know the signs of a heart attack   If you have any of the following symptoms call 911 immediately, do not wait more    Than five minutes.    1. Pressure, fullness and/ or squeezing in the center of the chest spreading to    The jaw, neck or shoulder.    2. Chest discomfort with light headedness, fainting, sweating, nausea or    Shortness of breath.   3. Upper abdominal pressure or discomfort.   4. Lower chest pain, back pain, unusual fatigue, shortness of breath, nausea   Or

## 2024-04-09 NOTE — PROGRESS NOTES
[facility]   Occupational Therapy Evaluation  Date: 24  Patient Name: Adolfo Presley       Room: 0205/0205-01  MRN: [id number]  Account: [id number]   : 1960  (63 y.o.) Gender: male     Discharge Recommendations:  The patient's needs are being met with no further Occupational Therapy recommended at discharge.          Referring Practitioner: Jeannette Sanders APRN - CNP  Diagnosis: Hallucinations           Past Medical History:  has a past medical history of Bipolar disorder (HCC), Depression, GERD (gastroesophageal reflux disease), Hallucinations, Headache(784.0), Hepatitis, Schizophrenia, schizo-affective (HCC), Substance abuse ([facility]), Tobacco abuse, Type II or unspecified type diabetes mellitus without mention of complication, not stated as uncontrolled, and Urinary incontinence.    Past Surgical History:   has a past surgical history that includes Dental surgery and Abscess Drainage (N/A, 2018).    Restrictions  Restrictions/Precautions  Restrictions/Precautions: General Precautions, Fall Risk  Required Braces or Orthoses?: No  Implants present? :  (pt denies)  Position Activity Restriction  Other position/activity restrictions: OT PT eval and treat      Vitals  Vitals  O2 Device: None (Room air)     Subjective  Subjective: \"you guys are BS'ing me\" Pt agreeable to engage in OT/PT eval  Comments: Ok per nursing for OT/PT eval  Subjective  Pain: Pt denies pain      Social/Functional History  Social/Functional History  Type of Home: Facility ([facility])  Home Layout: One level  Home Access: Level entry  Home Equipment:  (no DME)  Has the patient had two or more falls in the past year or any fall with injury in the past year?: No  ADL Assistance: Independent  Homemaking Assistance:  (facility provides)  Homemaking Responsibilities: No  Ambulation Assistance: Independent  Transfer Assistance: Independent  Active : No  Patient's  Info:  Coordinated: No  Coordination and Movement Description: Decreased speed, Decreased accuracy, Right UE, Left UE              Bed Mobility  Bed mobility  Rolling to Right: Modified independent  Supine to Sit: Modified independent  Sit to Supine: Modified independent  Scooting: Independent  Bed Mobility Comments: Bed mobility completed with HOB slightly elevated.    Balance  Balance  Sitting Balance: Independent  Standing Balance: Independent       Transfers  Transfers  Sit to stand: Independent  Stand to sit: Independent    Functional Mobility  Functional - Mobility Device: No device  Activity: Other (in-room and common area)  Assist Level: Independent  Functional Mobility Comments: No device used. Pt demonstrates forward flexed posture, no LOB noted. Pt has been ambulating on unit independently.    Assessment  Assessment  Assessment: Pt demonstrated IND functional mobility on unit without device. Patient appears to be at baseline function for self-care tasks with no concerns or deficits. OT being discontinued with no skilled OT needs at this time. Please re-order if there is a change in status.  Decision Making: Low Complexity  No Skilled OT: No OT goals identified, Safe to return home, At baseline function, Independent with functional mobility, Independent with ADL's       Safety Devices  Type of Devices: Left in bed, Nurse notified    Patient Education  Patient Education  Education Given To: Patient  Education Provided: Role of Therapy, Plan of Care  Education Method: Verbal  Barriers to Learning: Cognition  Education Outcome: Verbalized understanding    Functional Outcome Measures  AM-PAC Daily Activity - Inpatient   How much help is needed for putting on and taking off regular lower body clothing?: None  How much help is needed for bathing (which includes washing, rinsing, drying)?: None  How much help is needed for toileting (which includes using toilet, bedpan, or urinal)?: None  How much help is needed for

## 2024-04-09 NOTE — PROGRESS NOTES
Physical Therapy  Facility/Department: [facility]  Physical Therapy Initial Assessment    Name: Adolfo Presley  : 1960  MRN: [id number]  Date of Service: 2024    Discharge Recommendations:  Home with assist PRN   PT Equipment Recommendations  Equipment Needed: No      Patient Diagnosis(es): The primary encounter diagnosis was Depression with suicidal ideation. A diagnosis of Hallucinations was also pertinent to this visit.  Past Medical History:  has a past medical history of Bipolar disorder (HCC), Depression, GERD (gastroesophageal reflux disease), Hallucinations, Headache(784.0), Hepatitis, Schizophrenia, schizo-affective (HCC), Substance abuse (HCC), Tobacco abuse, Type II or unspecified type diabetes mellitus without mention of complication, not stated as uncontrolled, and Urinary incontinence.  Past Surgical History:  has a past surgical history that includes Dental surgery and Abscess Drainage (N/A, 2018).    Assessment   Assessment: Pt appears at baseline without change in mobility. Pt is amb wtihout device. No skilled PT services needed at this time.  Decision Making: Low Complexity  Exam: ROM, MMT, bed mobility, transfers, amb  Clinical Presentation: Pt alert, cooperative, pleasant  Requires PT Follow-Up: No  Activity Tolerance  Activity Tolerance: Patient tolerated treatment well     Plan   Physical Therapy Plan  General Plan: Discharge with evaluation only  Safety Devices  Type of Devices: Left in bed, Nurse notified  Restraints  Restraints Initially in Place: No     Restrictions  Restrictions/Precautions  Required Braces or Orthoses?: No  Implants present? :  (pt denies)  Position Activity Restriction  Other position/activity restrictions: OT PT eval and treat     Subjective   Pain: Pt denies pain  General  Chart Reviewed: Yes  Patient assessed for rehabilitation services?: Yes  Family / Caregiver Present: No  Referring Practitioner: Jeannette Sanders APRN - [place]  Referral Date :

## 2024-04-09 NOTE — BH NOTE
Group Note     Comments:      Patient did not participate in Community Meeting/goals group, despite staff encouragement and explanation of benefits.  Patient remain seclusive to self.  Q15 minute safety checks maintained for patient safety and will continue to encourage patient to attend unit programming.

## 2024-04-09 NOTE — PROGRESS NOTES
[facility] Internal Medicine  Hector Crowley MD; Jr Weeks MD, Robson Anderson MD, Estephania Hsu MD, Kendall Napoles MD; Jerome Garland MD    [facility] Internal Medicine   IN-PATIENT SERVICE   [facility]     HISTORY AND PHYSICAL EXAMINATION            Date:   4/9/2024  Patient name:  Adolfo Presley  Date of admission:  4/4/2024  5:48 PM  MRN:   [id number]  Account:  [id number]  YOB: 1960  PCP:    No primary care provider on file.  Room:   [address] [place] [address]  Code Status:    Full Code      Chief Complaint:     DM2  HLD    History Obtained From:     Patient/EMR/bedside RN     History of Present Illness:     Diabetes   Duration more than 7 years  Modifying factors on Glucophage and other med  Severity uncontrolled sever  Associated signs and symtoms neuropathy/ckd/ CAD.   aggravated with sugar diet and better with low sugar diet     HLD  Onset more than 5 years ago  Severity is mild, not getting worse  Not associated with pancreatitis  Tolerating statin well no muscle pain      Past Medical History:     Past Medical History:   Diagnosis Date    Bipolar disorder (HCC)     Depression     GERD (gastroesophageal reflux disease)     Hallucinations     Headache(784.0)     Hepatitis     Schizophrenia, schizo-affective (HCC)     Substance abuse (HCC)     Tobacco abuse     Type II or unspecified type diabetes mellitus without mention of complication, not stated as uncontrolled     Urinary incontinence         Past Surgical History:     Past Surgical History:   Procedure Laterality Date    ABCESS DRAINAGE N/A 02/11/2018    Carla anal abcess    DENTAL SURGERY      all teeth pulled        Medications Prior to Admission:     Prior to Admission medications    Medication Sig Start Date End Date Taking? Authorizing Provider   atorvastatin (LIPITOR) 20 MG tablet Take 1 tablet by mouth nightly 4/9/24  Yes Enoc Diaz MD   traZODone (DESYREL) 50 MG tablet Take

## 2024-04-09 NOTE — GROUP NOTE
Group Therapy Note    Date: 4/9/2024    Group Start Time: 1045  Group End Time: 1105  Group Topic: Healthy Living/Wellness    Dory Cohen CTRS    Health/Wellness Group Note        Date: April 9, 2024 Start Time:  1045 am   End Time:  1105 am       Number of Participants in Group & Unit Census:  3/15    Topic: health/ wellness/ stretch group     Goal of Group: pt will demonstrate improved physical health/ well being      Comments:     Patient did not participate in Health/Wellness group, despite staff encouragement and explanation of benefits.  Patient remain seclusive to self.  Q15 minute safety checks maintained for patient safety and will continue to encourage patient to attend unit programming.            Signature:  CARMEN BLANCA

## 2024-04-09 NOTE — GROUP NOTE
Group Therapy Note    Date: 4/9/2024    Group Start Time: 1000  Group End Time: 1035  Group Topic: Psychotherapy    [facility] Dilcia Berger MSW        Group Therapy Note    Attendees: 7/15       Patient's Goal:   Identify personal goals and utilize brainstorming to find steps to achieve goals     Patient was offered group therapy today but declined to participate despite encouragement from staff.  1:1 was offered.    Discipline Responsible: /Counselor      Signature:  FRANKLIN Stephens

## 2024-04-09 NOTE — CARE COORDINATION
Writer spoke with Mr. Presley's legal guardian/sister Irma to advise of his return to the nursing facility today.     Writer spoke with Gavi from [facility], confirmed return to facility today via ambulette.     Writer completed transportation forms, faxed to [facility] with goal of requesting transport to nursing facility via ambulette, confirmed 6pm transport. Unit staff updated.

## 2024-04-09 NOTE — GROUP NOTE
Group Therapy Note    Date: 4/9/2024    Group Start Time: 1330  Group End Time: 1410  Group Topic: cognitive skills group    [facility]    Dory Zacarias CTRS        Group Therapy Note    Attendees: 4/13       Patient's Goal:  pt will demonstrate improved coping skills and improved cognitive skills     Notes:   pt was pleasant and participated well    Status After Intervention:  Improved    Participation Level: Active Listener and Interactive    Participation Quality: Appropriate, Sharing, and Supportive      Speech:  mumbles      Thought Process/Content:slow to process      Affective Functioning: Congruent      Mood: euthymic      Level of consciousness:  Alert      Response to Learning: Progressing to goal      Endings: None Reported    Modes of Intervention: Education, Support, and Activity      Discipline Responsible: Psychoeducational Specialist      Signature:  CARMEN BLANCA

## 2024-04-09 NOTE — PLAN OF CARE
Problem: Chronic Conditions and Co-morbidities  Goal: Patient's chronic conditions and co-morbidity symptoms are monitored and maintained or improved  Outcome: Progressing     Problem: Risk for Elopement  Goal: Patient will not exit the unit/facility without proper excort  Outcome: Progressing     Problem: Safety - Adult  Goal: Free from fall injury  4/8/2024 2204 by Jo Ann Whitt RN  Outcome: Progressing  Note: Patient remains free from falls      Problem: Anxiety  Goal: Will report anxiety at manageable levels  Description: INTERVENTIONS:  1. Administer medication as ordered  2. Teach and rehearse alternative coping skills  3. Provide emotional support with 1:1 interaction with staff  4/8/2024 2204 by Jo Ann Whitt RN  Outcome: Progressing  Note: Patient reported slight anxiety during assessment. He appeared to be brightened and social in the dayroom with peers. He reported auditory hallucinations of negative voices. He ate snack and showered.      Problem: Self Harm/Suicidality  Goal: Will have no self-injury during hospital stay  Description: INTERVENTIONS:  1.  Ensure constant observer at bedside with Q15M safety checks  2.  Maintain a safe environment  3.  Secure patient belongings  4.  Ensure family/visitors adhere to safety recommendations  5.  Ensure safety tray has been added to patient's diet order  6.  Every shift and PRN: Re-assess suicidal risk via Frequent Screener    Outcome: Progressing  Flowsheets (Taken 4/8/2024 2204)  Will have no self-injury during hospital stay: Maintain a safe environment  Note: Patient denied thoughts of hurting himself or others. Patient denied suicidal and depressive thoughts

## 2024-04-10 NOTE — DISCHARGE SUMMARY
Provider Discharge Summary     Patient ID:  Adolfo Presley  [id number]  63 y.o.  1960    Admit date: 4/4/2024    Discharge date and time: 4/10/2024  5:49 PM     Admitting Physician: Enoc Diaz MD     Discharge Physician: Enoc Diaz MD    Admission Diagnoses: Hallucinations [R44.3]  Severe recurrent major depression with psychotic features (HCC) [F33.3]  Depression with suicidal ideation [F32.A, R45.851]    Discharge Diagnoses:      Schizoaffective disorder, depressive type ([facility])     Patient Active Problem List   Diagnosis Code    Hematuria R31.9    Suicidal ideations R45.851    Cocaine abuse ([facility]) F14.10    Schizoaffective disorder, bipolar type ([facility]) F25.0    Schizoaffective disorder, depressive type ([facility]) F25.1    Perianal abscess K61.0    Carla-rectal abscess K61.1    Schizophrenia ([facility]) F20.9    Schizoaffective disorder ([facility]) F25.9    Major depression with psychotic features (HCC) F32.3    Acute psychosis ([facility]) F23    Depression with suicidal ideation F32.A, R45.851    Pneumonia due to infectious organism J18.9    Smoker F17.200    Ventricular ectopy I49.3    Low serum cortisol level R79.89    Sepsis due to Klebsiella pneumoniae with no resultant organ failure ([facility]) A41.4    Elevated BP without diagnosis of hypertension R03.0    Lower urinary tract symptoms (LUTS) R39.9    Dyspepsia R10.13    Skin rash R21    Hypernatremia E87.0    Drug-induced tremor G25.1    Hx of diabetes mellitus Z86.39    Severe recurrent major depression with psychotic features (HCC) F33.3        Admission Condition: poor    Discharged Condition: stable    Indication for Admission: threat to self    History of Present Illnes (present tense wording is of findings from admission exam and are not necessarily indicative of current findings):   Adolfo Presley is a 63 y.o. male who has a past medical history of schizoaffective disorder, bipolar, depression, polysubstance abuse, GERD, hepatitis, diabetes and chronic headaches. Patient

## 2024-04-10 NOTE — BH NOTE
[facility]  Discharge Note    Pt discharged with followings belongings:   Dental Appliances: None  Vision - Corrective Lenses: None  Hearing Aid: None  Jewelry: None  Body Piercings Removed: N/A  Clothing: Jacket/Coat, Pants, Shirt, Hat  Other Valuables: Money ($2.00)   Valuables sent home with patient. Patient educated on aftercare instructions: yes  Information faxed to [facility] by Nurse at 10:09 PM .Patient verbalize understanding of AVS:  yes.    Status EXAM upon discharge:  Mental Status and Behavioral Exam  Normal: No  Level of Assistance: Independent/Self  Facial Expression: Brightened  Affect: Appropriate  Level of Consciousness: Alert  Frequency of Checks: 4 times per hour, close  Mood:Normal: No  Mood: Anxious  Motor Activity:Normal: Yes  Eye Contact: Good  Observed Behavior: Cooperative, Friendly  Sexual Misconduct History: Current - no  Preception: [place] to person, [place] to time, [place] to place, [place] to situation  Attention:Normal: Yes  Thought Processes: Unremarkable  Thought Content:Normal: No  Thought Content: Preoccupations  Depression Symptoms: No problems reported or observed.  Anxiety Symptoms: Generalized  [place] Symptoms: No problems reported or observed.  Hallucinations: Auditory (comment), Command (comment)  Delusions: No  Memory:Normal: No  Memory: Poor recent  Insight and Judgment: No  Insight and Judgment: Poor judgment, Poor insight    Tobacco Screening:  Practical Counseling, on admission, corby X, if applicable and completed (first 3 are required if patient doesn't refuse):            ( ) Recognizing danger situations (included triggers and roadblocks)                    ( ) Coping skills (new ways to manage stress,relaxation techniques, changing routine, distraction)                                                           ( ) Basic information about quitting (benefits of quitting, techniques in how to quit, available resources  ( ) Referral for counseling faxed

## 2024-04-10 NOTE — BH NOTE
Patient given tobacco quitline number 60389524206 at this time, refusing to call at this time. patient given information as to the dangers of long term tobacco use. Continue to reinforce the importance of tobacco cessation.

## 2024-04-10 NOTE — DISCHARGE INSTR - DIET

## 2024-07-29 NOTE — BH NOTE
Patient refused blood sugar check. Patient has been encouraged each meal today to make sure blood sugar is under control. Patient still refused at this time. No

## 2024-08-14 NOTE — PLAN OF CARE
Problem: Risk of Harm  Goal: LTG-Absence of harm  Denies self harm   Goal: STG-Absence of Self Harm  Current no self harm     Problem: Altered Mood, Psychotic Behavior  Goal: STG-Able to demonstrate trust by eating, participating in treatment and following staffs directions  Pt.  Encouraged to come out and eat   Goal: LTG-Able to verbalize decrease in frequency and intensity of hallucinations  Denies hallucinations         Problem: Nutrition  Goal: Optimal nutrition therapy  Ongoing encouraged Imaging Studies/Medications

## 2024-10-21 ENCOUNTER — HOSPITAL ENCOUNTER (INPATIENT)
Age: 64
LOS: 7 days | Discharge: HOME | DRG: 761 | End: 2024-10-28
Attending: EMERGENCY MEDICINE | Admitting: PSYCHIATRY & NEUROLOGY
Payer: MEDICAID

## 2024-10-21 DIAGNOSIS — R45.851 SUICIDAL IDEATION: Primary | ICD-10-CM

## 2024-10-21 LAB
AMPHET UR QL SCN: NEGATIVE
ANION GAP SERPL CALCULATED.3IONS-SCNC: 11 MMOL/L (ref 9–16)
APAP SERPL-MCNC: <5 UG/ML (ref 10–30)
BARBITURATES UR QL SCN: NEGATIVE
BASOPHILS # BLD: 0 K/UL (ref 0–0.2)
BASOPHILS NFR BLD: 1 % (ref 0–2)
BENZODIAZ UR QL: NEGATIVE
BUN SERPL-MCNC: 15 MG/DL (ref 8–23)
CALCIUM SERPL-MCNC: 8.6 MG/DL (ref 8.6–10.4)
CANNABINOIDS UR QL SCN: NEGATIVE
CHLORIDE SERPL-SCNC: 109 MMOL/L (ref 98–107)
CO2 SERPL-SCNC: 24 MMOL/L (ref 20–31)
COCAINE UR QL SCN: NEGATIVE
CREAT SERPL-MCNC: 0.9 MG/DL (ref 0.7–1.2)
EOSINOPHIL # BLD: 0.2 K/UL (ref 0–0.4)
EOSINOPHILS RELATIVE PERCENT: 3 % (ref 0–4)
ERYTHROCYTE [DISTWIDTH] IN BLOOD BY AUTOMATED COUNT: 15.8 % (ref 11.5–14.9)
ETHANOL PERCENT: 0 %
ETHANOLAMINE SERPL-MCNC: <10 MG/DL (ref 0–0.08)
FENTANYL UR QL: NEGATIVE
GFR, ESTIMATED: >90 ML/MIN/1.73M2
GLUCOSE BLD-MCNC: 185 MG/DL (ref 75–110)
GLUCOSE SERPL-MCNC: 214 MG/DL (ref 74–99)
HCT VFR BLD AUTO: 38.1 % (ref 41–53)
HGB BLD-MCNC: 12.2 G/DL (ref 13.5–17.5)
LYMPHOCYTES NFR BLD: 2.8 K/UL (ref 1–4.8)
LYMPHOCYTES RELATIVE PERCENT: 44 % (ref 24–44)
MCH RBC QN AUTO: 28.6 PG (ref 26–34)
MCHC RBC AUTO-ENTMCNC: 32.1 G/DL (ref 31–37)
MCV RBC AUTO: 89.2 FL (ref 80–100)
METHADONE UR QL: NEGATIVE
MONOCYTES NFR BLD: 0.4 K/UL (ref 0.1–1.3)
MONOCYTES NFR BLD: 6 % (ref 1–7)
NEUTROPHILS NFR BLD: 46 % (ref 36–66)
NEUTS SEG NFR BLD: 3 K/UL (ref 1.3–9.1)
OPIATES UR QL SCN: NEGATIVE
OXYCODONE UR QL SCN: NEGATIVE
PCP UR QL SCN: NEGATIVE
PLATELET # BLD AUTO: 263 K/UL (ref 150–450)
PMV BLD AUTO: 8.4 FL (ref 6–12)
POTASSIUM SERPL-SCNC: 4 MMOL/L (ref 3.7–5.3)
RBC # BLD AUTO: 4.27 M/UL (ref 4.5–5.9)
SALICYLATES SERPL-MCNC: <1 MG/DL (ref 0–10)
SODIUM SERPL-SCNC: 144 MMOL/L (ref 136–145)
TEST INFORMATION: NORMAL
WBC OTHER # BLD: 6.3 K/UL (ref 3.5–11)

## 2024-10-21 PROCEDURE — 6370000000 HC RX 637 (ALT 250 FOR IP): Performed by: EMERGENCY MEDICINE

## 2024-10-21 PROCEDURE — 99285 EMERGENCY DEPT VISIT HI MDM: CPT

## 2024-10-21 PROCEDURE — 36415 COLL VENOUS BLD VENIPUNCTURE: CPT

## 2024-10-21 PROCEDURE — 82947 ASSAY GLUCOSE BLOOD QUANT: CPT

## 2024-10-21 PROCEDURE — 85025 COMPLETE CBC W/AUTO DIFF WBC: CPT

## 2024-10-21 PROCEDURE — 80307 DRUG TEST PRSMV CHEM ANLYZR: CPT

## 2024-10-21 PROCEDURE — 83036 HEMOGLOBIN GLYCOSYLATED A1C: CPT

## 2024-10-21 PROCEDURE — 80048 BASIC METABOLIC PNL TOTAL CA: CPT

## 2024-10-21 PROCEDURE — G0480 DRUG TEST DEF 1-7 CLASSES: HCPCS

## 2024-10-21 PROCEDURE — 1240000000 HC EMOTIONAL WELLNESS R&B

## 2024-10-21 PROCEDURE — 80179 DRUG ASSAY SALICYLATE: CPT

## 2024-10-21 PROCEDURE — 80143 DRUG ASSAY ACETAMINOPHEN: CPT

## 2024-10-21 RX ORDER — DIPHENHYDRAMINE HYDROCHLORIDE 50 MG/ML
50 INJECTION INTRAMUSCULAR; INTRAVENOUS EVERY 6 HOURS PRN
Status: DISCONTINUED | OUTPATIENT
Start: 2024-10-21 | End: 2024-10-28 | Stop reason: HOSPADM

## 2024-10-21 RX ORDER — ACETAMINOPHEN 325 MG/1
650 TABLET ORAL EVERY 6 HOURS PRN
Status: DISCONTINUED | OUTPATIENT
Start: 2024-10-21 | End: 2024-10-28 | Stop reason: HOSPADM

## 2024-10-21 RX ORDER — MAGNESIUM HYDROXIDE/ALUMINUM HYDROXICE/SIMETHICONE 120; 1200; 1200 MG/30ML; MG/30ML; MG/30ML
30 SUSPENSION ORAL EVERY 6 HOURS PRN
Status: DISCONTINUED | OUTPATIENT
Start: 2024-10-21 | End: 2024-10-28 | Stop reason: HOSPADM

## 2024-10-21 RX ORDER — OLANZAPINE 10 MG/1
5 TABLET, ORALLY DISINTEGRATING ORAL NIGHTLY
Status: DISCONTINUED | OUTPATIENT
Start: 2024-10-21 | End: 2024-10-22

## 2024-10-21 RX ORDER — POLYETHYLENE GLYCOL 3350 17 G
2 POWDER IN PACKET (EA) ORAL
Status: DISCONTINUED | OUTPATIENT
Start: 2024-10-21 | End: 2024-10-28 | Stop reason: HOSPADM

## 2024-10-21 RX ORDER — HALOPERIDOL 5 MG/ML
5 INJECTION INTRAMUSCULAR EVERY 6 HOURS PRN
Status: DISCONTINUED | OUTPATIENT
Start: 2024-10-21 | End: 2024-10-28 | Stop reason: HOSPADM

## 2024-10-21 RX ORDER — OLANZAPINE 15 MG/1
15 TABLET ORAL EVERY MORNING
COMMUNITY

## 2024-10-21 RX ORDER — HALOPERIDOL 5 MG/1
5 TABLET ORAL EVERY 6 HOURS PRN
Status: DISCONTINUED | OUTPATIENT
Start: 2024-10-21 | End: 2024-10-28 | Stop reason: HOSPADM

## 2024-10-21 RX ORDER — POTASSIUM CHLORIDE 1500 MG/1
20 TABLET, EXTENDED RELEASE ORAL 2 TIMES DAILY
COMMUNITY

## 2024-10-21 RX ORDER — TRAZODONE HYDROCHLORIDE 50 MG/1
50 TABLET, FILM COATED ORAL NIGHTLY PRN
Status: DISCONTINUED | OUTPATIENT
Start: 2024-10-21 | End: 2024-10-28 | Stop reason: HOSPADM

## 2024-10-21 RX ORDER — LORAZEPAM 1 MG/1
1 TABLET ORAL ONCE
Status: COMPLETED | OUTPATIENT
Start: 2024-10-21 | End: 2024-10-21

## 2024-10-21 RX ORDER — POLYETHYLENE GLYCOL 3350 17 G/17G
17 POWDER, FOR SOLUTION ORAL DAILY PRN
Status: DISCONTINUED | OUTPATIENT
Start: 2024-10-21 | End: 2024-10-28 | Stop reason: HOSPADM

## 2024-10-21 RX ORDER — IBUPROFEN 400 MG/1
400 TABLET, FILM COATED ORAL EVERY 6 HOURS PRN
Status: DISCONTINUED | OUTPATIENT
Start: 2024-10-21 | End: 2024-10-28 | Stop reason: HOSPADM

## 2024-10-21 RX ORDER — HYDROXYZINE HYDROCHLORIDE 50 MG/1
50 TABLET, FILM COATED ORAL 3 TIMES DAILY PRN
Status: DISCONTINUED | OUTPATIENT
Start: 2024-10-21 | End: 2024-10-28 | Stop reason: HOSPADM

## 2024-10-21 RX ADMIN — LORAZEPAM 1 MG: 1 TABLET ORAL at 20:14

## 2024-10-21 RX ADMIN — OLANZAPINE 5 MG: 10 TABLET, ORALLY DISINTEGRATING ORAL at 20:14

## 2024-10-21 ASSESSMENT — SLEEP AND FATIGUE QUESTIONNAIRES
SLEEP PATTERN: DIFFICULTY FALLING ASLEEP;DISTURBED/INTERRUPTED SLEEP;RESTLESSNESS
DO YOU USE A SLEEP AID: NO
DO YOU HAVE DIFFICULTY SLEEPING: YES
AVERAGE NUMBER OF SLEEP HOURS: 2

## 2024-10-21 NOTE — ED NOTES
Clinical Summary:    Adolfo Presley is a 64 y.o. male who presents at the ED via squad on a pink slip by [facility] due to suicidal ideations.     Per pink slip,   \"Adolfo Lynn is a 64-year-old  male residing at [facility] in [place], [place]. He has a past psychiatric history of MDD, schizoaffective disorder, unspecified and bipolar. Adolfo is exhibiting severe agitation and escalating behaviors. He is restless, pacing, and verbally expressing self-injurious intent. He has voices clear suicidal ideation by stating his desire to hit his head into the mirror and/or strike the nursing station with his head. Despite staff attempts to verbally de-escalate, the patient remains unresponsive to redirection and continues to express these intentions a heightened stated of distress. Immediate psychiatric intervention is required, including placement on an involuntary hold, as the patients continued behavior presents a significant threat to his well-being.\"     Patient reports suicidal ideations with no specific plan. Patient stated that he has been banging his head on the nurses window recently to stop the voices. Patient reports command hallucinations of voices telling him to kill himself.     Patient is taking his medication as prescribed. Patient reports that the psychiatrist added a new medication last week. Patient unsure what it is.     Patient denies visual hallucinations. Patient first reported homiidal ideations towards a family member but then would not give writer information. Patient then stated he does not want to actually kill his family member.     Patient denies being admitted to [facility] in the past. Per [facility], patient was admitted to [facility] 04/04/2024-04/10/2024.    Patient is calm and cooperative.     Level of Care Disposition:    Report given to maritza ZUÑIGA

## 2024-10-21 NOTE — ED NOTES
Writer spoke to patients legal guardian who gave consent for treatment and admission if applicable.

## 2024-10-22 LAB
EST. AVERAGE GLUCOSE BLD GHB EST-MCNC: 151 MG/DL
GLUCOSE BLD-MCNC: 174 MG/DL (ref 75–110)
GLUCOSE BLD-MCNC: 184 MG/DL (ref 75–110)
HBA1C MFR BLD: 6.9 % (ref 4–6)

## 2024-10-22 PROCEDURE — 6370000000 HC RX 637 (ALT 250 FOR IP): Performed by: PSYCHIATRY & NEUROLOGY

## 2024-10-22 PROCEDURE — 82947 ASSAY GLUCOSE BLOOD QUANT: CPT

## 2024-10-22 PROCEDURE — 99222 1ST HOSP IP/OBS MODERATE 55: CPT | Performed by: PSYCHIATRY & NEUROLOGY

## 2024-10-22 PROCEDURE — 6370000000 HC RX 637 (ALT 250 FOR IP): Performed by: NURSE PRACTITIONER

## 2024-10-22 PROCEDURE — 6370000000 HC RX 637 (ALT 250 FOR IP): Performed by: INTERNAL MEDICINE

## 2024-10-22 PROCEDURE — 1240000000 HC EMOTIONAL WELLNESS R&B

## 2024-10-22 PROCEDURE — APPSS60 APP SPLIT SHARED TIME 46-60 MINUTES: Performed by: NURSE PRACTITIONER

## 2024-10-22 PROCEDURE — 99222 1ST HOSP IP/OBS MODERATE 55: CPT | Performed by: INTERNAL MEDICINE

## 2024-10-22 RX ORDER — INSULIN LISPRO 100 [IU]/ML
0-8 INJECTION, SOLUTION INTRAVENOUS; SUBCUTANEOUS
Status: DISCONTINUED | OUTPATIENT
Start: 2024-10-22 | End: 2024-10-28 | Stop reason: HOSPADM

## 2024-10-22 RX ORDER — OLANZAPINE 10 MG/1
20 TABLET ORAL NIGHTLY
Status: DISCONTINUED | OUTPATIENT
Start: 2024-10-22 | End: 2024-10-28 | Stop reason: HOSPADM

## 2024-10-22 RX ORDER — ATORVASTATIN CALCIUM 20 MG/1
20 TABLET, FILM COATED ORAL NIGHTLY
Status: DISCONTINUED | OUTPATIENT
Start: 2024-10-22 | End: 2024-10-28 | Stop reason: HOSPADM

## 2024-10-22 RX ORDER — ESCITALOPRAM OXALATE 20 MG/1
20 TABLET ORAL DAILY
Status: DISCONTINUED | OUTPATIENT
Start: 2024-10-22 | End: 2024-10-28 | Stop reason: HOSPADM

## 2024-10-22 RX ORDER — DIVALPROEX SODIUM 500 MG/1
500 TABLET, FILM COATED, EXTENDED RELEASE ORAL NIGHTLY
Status: DISCONTINUED | OUTPATIENT
Start: 2024-10-22 | End: 2024-10-28 | Stop reason: HOSPADM

## 2024-10-22 RX ORDER — OLANZAPINE 15 MG/1
15 TABLET ORAL EVERY MORNING
Status: DISCONTINUED | OUTPATIENT
Start: 2024-10-23 | End: 2024-10-28 | Stop reason: HOSPADM

## 2024-10-22 RX ORDER — DEXTROSE MONOHYDRATE 100 MG/ML
INJECTION, SOLUTION INTRAVENOUS CONTINUOUS PRN
Status: DISCONTINUED | OUTPATIENT
Start: 2024-10-22 | End: 2024-10-28 | Stop reason: HOSPADM

## 2024-10-22 RX ORDER — VITAMIN B COMPLEX
1000 TABLET ORAL DAILY
Status: DISCONTINUED | OUTPATIENT
Start: 2024-10-22 | End: 2024-10-28 | Stop reason: HOSPADM

## 2024-10-22 RX ORDER — POTASSIUM CHLORIDE 1500 MG/1
20 TABLET, EXTENDED RELEASE ORAL 2 TIMES DAILY
Status: DISCONTINUED | OUTPATIENT
Start: 2024-10-22 | End: 2024-10-28 | Stop reason: HOSPADM

## 2024-10-22 RX ORDER — GABAPENTIN 100 MG/1
100 CAPSULE ORAL 2 TIMES DAILY
Status: DISCONTINUED | OUTPATIENT
Start: 2024-10-22 | End: 2024-10-28 | Stop reason: HOSPADM

## 2024-10-22 RX ADMIN — ESCITALOPRAM OXALATE 20 MG: 20 TABLET ORAL at 17:11

## 2024-10-22 RX ADMIN — Medication 1000 UNITS: at 17:11

## 2024-10-22 RX ADMIN — HALOPERIDOL 5 MG: 5 TABLET ORAL at 16:13

## 2024-10-22 RX ADMIN — INSULIN LISPRO 2 UNITS: 100 INJECTION, SOLUTION INTRAVENOUS; SUBCUTANEOUS at 20:37

## 2024-10-22 RX ADMIN — DIVALPROEX SODIUM 500 MG: 500 TABLET, EXTENDED RELEASE ORAL at 20:37

## 2024-10-22 RX ADMIN — ATORVASTATIN CALCIUM 20 MG: 20 TABLET, FILM COATED ORAL at 20:37

## 2024-10-22 RX ADMIN — POTASSIUM CHLORIDE 20 MEQ: 1500 TABLET, EXTENDED RELEASE ORAL at 20:37

## 2024-10-22 RX ADMIN — GABAPENTIN 100 MG: 100 CAPSULE ORAL at 17:53

## 2024-10-22 RX ADMIN — OLANZAPINE 20 MG: 10 TABLET, FILM COATED ORAL at 20:37

## 2024-10-22 RX ADMIN — METFORMIN HYDROCHLORIDE 1000 MG: 1000 TABLET ORAL at 17:11

## 2024-10-22 RX ADMIN — TRAZODONE HYDROCHLORIDE 50 MG: 50 TABLET ORAL at 20:37

## 2024-10-22 RX ADMIN — POTASSIUM CHLORIDE 20 MEQ: 1500 TABLET, EXTENDED RELEASE ORAL at 13:28

## 2024-10-22 ASSESSMENT — PATIENT HEALTH QUESTIONNAIRE - PHQ9
SUM OF ALL RESPONSES TO PHQ QUESTIONS 1-9: 2
SUM OF ALL RESPONSES TO PHQ QUESTIONS 1-9: 2
SUM OF ALL RESPONSES TO PHQ9 QUESTIONS 1 & 2: 2
SUM OF ALL RESPONSES TO PHQ QUESTIONS 1-9: 2
SUM OF ALL RESPONSES TO PHQ QUESTIONS 1-9: 2
1. LITTLE INTEREST OR PLEASURE IN DOING THINGS: SEVERAL DAYS
2. FEELING DOWN, DEPRESSED OR HOPELESS: SEVERAL DAYS

## 2024-10-22 ASSESSMENT — LIFESTYLE VARIABLES
HOW MANY STANDARD DRINKS CONTAINING ALCOHOL DO YOU HAVE ON A TYPICAL DAY: PATIENT DOES NOT DRINK
HOW OFTEN DO YOU HAVE A DRINK CONTAINING ALCOHOL: NEVER

## 2024-10-22 NOTE — BH NOTE
Patient given tobacco quitline number 85996768499 ,patient given information as to the dangers of long term tobacco use. Continue to reinforce the importance of tobacco cessation.

## 2024-10-22 NOTE — ED NOTES
YOGESH consulted with  from psychiatry. Pt accepted for an inpatient admission to the [facility] for safety and stabilization.     Pt's legal guardian updated on pt's plan of care.       YOGESH contacted [facility] and spoke with pt's RN Cara. Cara confirmed pt is able to return to [facility] when discharged from the [facility].

## 2024-10-22 NOTE — BH NOTE
[facility] [place] [facility]  Admission Note     Admission Type:   Involuntary - Not Signed in Upon Admission     Reason for admission:  Reason for Admission: \"Adolfo Lynn is a 64-year-old  male residing at [facility] in [place], [place]. He has a past psychiatric history of MDD, schizoaffective disorder, unspecified and bipolar. Adlofo is exhibiting severe agitation and escalating behaviors. He is restless, pacing, and verbally expressing self-injurious intent. He has voices clear suicidal ideation by stating his desire to hit his head into the mirror and/or strike the nursing station with his head. Despite staff attempts to verbally de-escalate, the patient remains unresponsive to redirection and continues to express these intentions a heightened stated of distress. Immediate psychiatric intervention is required, including placement on an involuntary hold, as the patients continued behavior presents a significant threat to his well-being.\" Patient reports suicidal ideations with no specific plan. Patient stated that he has been banging his head on the nurses window recently to stop the voices. Patient reports command hallucinations of voices telling him to kill himself      Addictive Behavior:   Addictive Behavior  In the Past 3 Months, Have You Felt or Has Someone Told You That You Have a Problem With  : None    Medical Problems:   Past Medical History:   Diagnosis Date    Bipolar disorder (HCC)     Depression     GERD (gastroesophageal reflux disease)     Hallucinations     Headache(784.0)     Hepatitis     Schizophrenia, schizo-affective (HCC)     Substance abuse (HCC)     Tobacco abuse     Type II or unspecified type diabetes mellitus without mention of complication, not stated as uncontrolled     Urinary incontinence        Status EXAM:  Mental Status and Behavioral Exam  Normal: No  Level of Assistance: Partial assist  Facial Expression: Flat,

## 2024-10-22 NOTE — BH NOTE
On call provider notified of best practice advisory suggesting to place patient on suicide precautions. Provider to discontinue the order as patient does not meet criteria for suicide precautions at this time. Continue to observe patient on every 15 minute checks.    25

## 2024-10-22 NOTE — GROUP NOTE
Group Therapy Note    Date: 10/22/2024    Group Start Time: 1100  Group End Time: 1155  Group Topic: Cognitive Skills    Zunilda Ruiz CTRS        Group Therapy Note    Attendees: 2/16     Patient's Goal:  To increase socialization, reminisce about past positive experiences and relationships,   and current positive attributes, and potential for positive experiences, through music and discussion.           Notes: Pt was pleasant and cooperative and participated in group for 20 mins. Pt was able to reminisce briefly about past positive experiences and family relationships, pt identified nascimento, and specific song when offered list and sang lyrics throughout his chosen song.  Pt shared some brief memories of family, and stage of life r/t that song and listened to peers choices also. Pt left group early, appeared to have limited concentration.         Status After Intervention:  Improved briefly     Participation Level: Active Listener,  sharing , cooperative     Participation Quality:  Attentive , sharing , cooperative     Speech:  Slower adonay, pt sang quietly word for word to his chosen song.     Thought Process/Content: Slower to respond, limited concentration     Affective Functioning: Blunted, brightened     Mood: Euthymic      Level of consciousness:  Alert, and Attentive for 20 mins      Response to Learning:  Able to verbalize some current knowledge, briefly attentive to new   learning,  and Progressing to goal      Endings: None Reported     Modes of Intervention: Education, Support, Socialization, Exploration, Clarifying and Problem-solving      Discipline Responsible: Psychoeducational Specialist      Signature:  CARMEN NINA

## 2024-10-22 NOTE — CARE COORDINATION
B[facility] Biopsychosocial Assessment    Current Level of Psychosocial Functioning     Independent   Dependent  xx  Minimal Assist     Comments:    Psychosocial High Risk Factors (check all that apply)    Unable to obtain meds   Chronic illness/pain  xx  Substance abuse   Lack of Family Support   Financial stress   Isolation   Inadequate Community Resources  Suicide attempt(s)  Not taking medications   Victim of crime   Developmental Delay  Unable to manage personal needs    Age 65 or older   Homeless  No transportation   Readmission within 30 days  Unemployment  Traumatic Event    Comments:   Psychiatric Advanced Directives: none reported     Family to Involve in Treatment: sister Irma is supportive     Sexual Orientation:  n/a    Patient Strengths: stable housing at [facility], supportive guardian     Patient Barriers: history of substance use, unstable symptoms at [facility]       Opiate Education Provided:  denies       CMHC/mental health history: he receives all services within the facility     Plan of Care   medication management, group/individual therapies, family meetings, psycho -education, treatment team meetings to assist with stabilization    Initial Discharge Plan:  return to group home       Clinical Summary:  Adolfo is a 64 year old single male who has been admitted to [facility] with report of a change in behavior at nursing home where he currently resides; Adolfo reported auditory hallucinations. His sister Irma is his legal guardian, writer spoke with Irma who confirms she wishes Adolfo to return to [facility] at discharge. Writer confirmed with Gavi at [facility] [phone] that Adolfo's bed is being held and he will be returning there at discharge, Gavi states updates should be faxed to [phone]. Writer to continue to offer support for safe and appropriate discharge plan.

## 2024-10-22 NOTE — ED NOTES
Pt stating the voices are telling pt to hit pt's head on plexiglass. Pt is redirectable at this time. Pt stating pt I willing to take PRN medications.     ED Dr chavez.

## 2024-10-22 NOTE — GROUP NOTE
Group Therapy Note    Date: 10/22/2024    Group Start Time: 1430  Group End Time: 1540  Group Topic: Cognitive Skills    Zunilda Ruiz CTRS        Group Therapy Note    Attendees: 4/15     Topic: To increase socialization, practice decision making skills, and concentration       Comments:   Patient did not participate in Cognitive Skills Group, at 1430, despite staff encouragement   and explanation of benefits. Pt was seclusive to self and room.     Q15 minute safety checks maintained for patient safety and will continue to encourage   patient to attend unit programming.       Discipline Responsible: Psychoeducational Specialist   Signature: CARMEN NINA

## 2024-10-22 NOTE — GROUP NOTE
Group Therapy Note    Date: 10/22/2024    Group Start Time: 1000  Group End Time: 1020  Group Topic: Psychotherapy    [facility]    Dilcia Morales MSW        Group Therapy Note    Attendees: 1/16       Patient's Goal:  Utilize 1:1 talk time with Social Work to speak about goals while inpatient    Notes:     Status After Intervention:  Improved    Participation Level: Active Listener and Interactive    Participation Quality: Appropriate, Attentive, and Sharing      Speech:  normal      Thought Process/Content: Logical  Linear      Affective Functioning: Congruent      Mood: euthymic      Level of consciousness:  Alert and Oriented x4      Response to Learning: Able to verbalize current knowledge/experience and Able to verbalize/acknowledge new learning      Endings: None Reported    Modes of Intervention: Education and Support      Discipline Responsible: /Counselor      Signature:  FRANKLIN Stephens

## 2024-10-22 NOTE — BH NOTE
Emergency Medication Follow-Up Note:    PRN medication of Haldol, 5mg, oral was effective as evidence by patient regain behavioral control and resting peacefully in room, patient reports feeling \"better\". Patient denies medication side effects. Will continue to monitor and provide support as needed.

## 2024-10-22 NOTE — H&P
[facility] Internal Medicine  Hector Crowley MD; Jr Weeks MD, Robson Anderson MD, Estephania Hsu MD, Kendall Napoles MD; Jerome Garland MD    [facility] Internal Medicine   IN-PATIENT SERVICE   [facility]     HISTORY AND PHYSICAL EXAMINATION            Date:   10/22/2024  Patient name:  Adolfo Presley  Date of admission:  10/21/2024  7:00 PM  MRN:   [id number]  Account:  [id number]  YOB: 1960  PCP:    No primary care provider on file.  Room:   [address] [place] [address]  Code Status:    Full Code      Chief Complaint:     Suicidal /Ac Psychosis    History Obtained From:     Patient/EMR/bedside RN     History of Present Illness:   Patient is 64-year-old male with past medical history of schizoaffective disorder bipolar depression polysubstance abuse hepatitis type 2 diabetes mellitus is been admitted at [facility] for further management of suicidal and homicidal ideation,  Currently denies any chest pain shortness of breath    Patient was drowsy but arousable, would answer question and go back to sleep, discussed with patient bedside nurse patient did come out intermittently  Will monitor closely drowsy arousable, did not  Past Medical History:     Past Medical History:   Diagnosis Date    Bipolar disorder (HCC)     Depression     GERD (gastroesophageal reflux disease)     Hallucinations     Headache(784.0)     Hepatitis     Schizophrenia, schizo-affective (HCC)     Substance abuse (HCC)     Tobacco abuse     Type II or unspecified type diabetes mellitus without mention of complication, not stated as uncontrolled     Urinary incontinence         Past Surgical History:     Past Surgical History:   Procedure Laterality Date    ABSCESS DRAINAGE N/A 02/11/2018    Carla anal abcess    DENTAL SURGERY      all teeth pulled        Medications Prior to Admission:     Prior to Admission medications    Medication Sig Start Date End Date Taking? Authorizing Provider 
psychiatric family history.     Suicides in family: [] Yes [x] No    Substance use in family: [x] Yes [] No         PHYSICAL EXAM:  Vitals:  /74   Pulse 72   Temp 98 °F (36.7 °C) (Oral)   Resp 16   Ht 1.88 m (6' 2\")   SpO2 97%   BMI 32.10 kg/m²   Pain Level: Denies    LABS:  Labs reviewed: [x] Yes  Metabolic Screening:  [x] Yes [] No   A1c: 7.4  Lipid panel reviewed  Last EKG in EMR reviewed: [x] Yes   Reviewed from 11/1/2021:          Review of Systems   Constitutional: Negative for chills and weight loss.   HENT: Negative for ear pain and nosebleeds.    Eyes: Negative for blurred vision and photophobia.   Respiratory: Negative for cough, shortness of breath and wheezing.    Cardiovascular: Negative for chest pain and palpitations.   Gastrointestinal: Negative for abdominal pain, diarrhea and vomiting.   Genitourinary: Negative for dysuria and urgency.   Musculoskeletal: Negative for falls and joint pain.   Skin: Negative for itching and rash.   Neurological: Negative for tremors, seizures and weakness.   Endo/Heme/Allergies: Does not bruise/bleed easily.      Mental Status Examination:    Level of consciousness: Awake and alert  Appearance:  Appropriate attire, resting in bed, fair grooming, dentulousBehavior/Motor: Approachable, engages with interviewer, psychomotor slowing   Attitude toward examiner:  Cooperative, attentive, good eye contact  Speech: Slow rate, decrease volume, and monotone  Mood: patient reports \"Depressed\"  Affect: mood congruent  Thought processes: Coherent, linear, slow  Thought content: Active suicidal ideations, with a  current plan or intent, contracts for safety on the unit. Reports recent attempt.               Denies homicidal ideations               Endorses command auditory hallucinations, denies visual hallucinations              Denies delusions              Denies paranoia  Cognition:  Oriented to self, location, time, situation  Concentration: Reduced  Memory:

## 2024-10-22 NOTE — ED PROVIDER NOTES
Frequency: 7.00 times per week. Drug: Cocaine.  PHYSICAL EXAM     INITIAL VITALS: /65   Pulse 96   Temp 98.2 °F (36.8 °C) (Oral)   Resp 16   SpO2 96%    Physical Exam  Vitals and nursing note reviewed.   Constitutional:       Appearance: He is well-developed.   HENT:      Head: Normocephalic and atraumatic.      Right Ear: External ear normal.      Left Ear: External ear normal.   Eyes:      Conjunctiva/sclera: Conjunctivae normal.      Pupils: Pupils are equal, round, and reactive to light.   Neck:      Vascular: No JVD.      Trachea: No tracheal deviation.   Cardiovascular:      Rate and Rhythm: Normal rate and regular rhythm.      Heart sounds: Normal heart sounds.   Pulmonary:      Effort: Pulmonary effort is normal. No respiratory distress.      Breath sounds: Normal breath sounds. No stridor.   Abdominal:      General: Bowel sounds are normal. There is no distension.      Palpations: Abdomen is soft.      Tenderness: There is no abdominal tenderness.   Musculoskeletal:         General: No tenderness or deformity. Normal range of motion.      Cervical back: Normal range of motion and neck supple.   Skin:     General: Skin is warm and dry.   Neurological:      Mental Status: He is alert and oriented to person, place, and time.      Cranial Nerves: No cranial nerve deficit.      Coordination: Coordination normal.         MEDICAL DECISION MAKING:   Patient presenting on pink slip. Vitals reassuring. Calm and cooperative on arrival but requesting oral meds due to disturbing aud hallucinations. Labs reviewed and med cleared. Discussed with ED psych social worker Merna. Admit to [facility].          Procedures    DIAGNOSTIC RESULTS   EKG: All EKG's are interpreted by the Emergency Department Physician who either signs or Co-signs this chart inthe absence of a cardiologist.      RADIOLOGY:All plain film, CT, MRI, and formal ultrasound images (except ED bedside ultrasound) are read by the radiologist, see reports

## 2024-10-22 NOTE — BH NOTE
Emergency PRN Medication Administration Note:      Patient is Agitated as evidence by pacing in room, shaking, and repeating \"I'm just a fuck up I want to die\". Patient is able to contract for safety.  Staff attempted to find and relieve the distress by Talking to patient, Refocusing on new activity, and Offering suggestions Patient is currently accepting of PRN medications. Medication Administered as prescribed: PO Haldol 5mg. Patient Tolerated medication administration.   Will continue to monitor, offer support, and reassess.

## 2024-10-23 LAB
GLUCOSE BLD-MCNC: 130 MG/DL (ref 75–110)
GLUCOSE BLD-MCNC: 156 MG/DL (ref 75–110)
GLUCOSE BLD-MCNC: 157 MG/DL (ref 75–110)
GLUCOSE BLD-MCNC: 224 MG/DL (ref 75–110)

## 2024-10-23 PROCEDURE — 99232 SBSQ HOSP IP/OBS MODERATE 35: CPT | Performed by: INTERNAL MEDICINE

## 2024-10-23 PROCEDURE — 82947 ASSAY GLUCOSE BLOOD QUANT: CPT

## 2024-10-23 PROCEDURE — 1240000000 HC EMOTIONAL WELLNESS R&B

## 2024-10-23 PROCEDURE — 6370000000 HC RX 637 (ALT 250 FOR IP): Performed by: NURSE PRACTITIONER

## 2024-10-23 PROCEDURE — 6370000000 HC RX 637 (ALT 250 FOR IP): Performed by: PSYCHIATRY & NEUROLOGY

## 2024-10-23 PROCEDURE — APPSS30 APP SPLIT SHARED TIME 16-30 MINUTES: Performed by: NURSE PRACTITIONER

## 2024-10-23 PROCEDURE — 6370000000 HC RX 637 (ALT 250 FOR IP): Performed by: INTERNAL MEDICINE

## 2024-10-23 PROCEDURE — 99232 SBSQ HOSP IP/OBS MODERATE 35: CPT | Performed by: PSYCHIATRY & NEUROLOGY

## 2024-10-23 RX ADMIN — OLANZAPINE 15 MG: 15 TABLET, FILM COATED ORAL at 08:18

## 2024-10-23 RX ADMIN — OLANZAPINE 20 MG: 10 TABLET, FILM COATED ORAL at 21:26

## 2024-10-23 RX ADMIN — POTASSIUM CHLORIDE 20 MEQ: 1500 TABLET, EXTENDED RELEASE ORAL at 08:18

## 2024-10-23 RX ADMIN — METFORMIN HYDROCHLORIDE 1000 MG: 1000 TABLET ORAL at 08:18

## 2024-10-23 RX ADMIN — DIVALPROEX SODIUM 750 MG: 500 TABLET, EXTENDED RELEASE ORAL at 08:17

## 2024-10-23 RX ADMIN — ESCITALOPRAM OXALATE 20 MG: 20 TABLET ORAL at 08:18

## 2024-10-23 RX ADMIN — METFORMIN HYDROCHLORIDE 1000 MG: 1000 TABLET ORAL at 17:13

## 2024-10-23 RX ADMIN — GABAPENTIN 100 MG: 100 CAPSULE ORAL at 17:13

## 2024-10-23 RX ADMIN — TRAZODONE HYDROCHLORIDE 50 MG: 50 TABLET ORAL at 21:27

## 2024-10-23 RX ADMIN — POTASSIUM CHLORIDE 20 MEQ: 1500 TABLET, EXTENDED RELEASE ORAL at 21:27

## 2024-10-23 RX ADMIN — INSULIN LISPRO 2 UNITS: 100 INJECTION, SOLUTION INTRAVENOUS; SUBCUTANEOUS at 11:58

## 2024-10-23 RX ADMIN — ATORVASTATIN CALCIUM 20 MG: 20 TABLET, FILM COATED ORAL at 21:27

## 2024-10-23 RX ADMIN — DIVALPROEX SODIUM 500 MG: 500 TABLET, EXTENDED RELEASE ORAL at 21:27

## 2024-10-23 RX ADMIN — Medication 1000 UNITS: at 08:18

## 2024-10-23 RX ADMIN — GABAPENTIN 100 MG: 100 CAPSULE ORAL at 08:18

## 2024-10-23 ASSESSMENT — PAIN SCALES - GENERAL: PAINLEVEL_OUTOF10: 0

## 2024-10-23 NOTE — GROUP NOTE
Group Therapy Note    Date: 10/23/2024    Group Start Time: 1000  Group End Time: 1030  Group Topic: Psychotherapy     Dilcia Berger MSW        Group Therapy Note    Attendees: 1/14     Patient was offered group therapy today but declined to participate despite encouragement from staff.  1:1 was offered.    Discipline Responsible: /Counselor      Signature:  FRANKLIN Stephens

## 2024-10-23 NOTE — GROUP NOTE
Group Therapy Note    Date: 10/23/2024    Group Start Time: 1100  Group End Time: 1135  Group Topic: Cognitive Skills    Dory Cohen CTRS    Cognitive Skills Group Note        Date: October 23, 2024 Start Time: 11am  End Time:  1135 am       Number of Participants in Group & Unit Census:  4/14    Topic: cognitive skills     Goal of Group: pt will demonstrate improved coping skills and improved interpersonal relationships       Comments:     Patient did not participate in Cognitive Skills group, despite staff encouragement and explanation of benefits.  Patient remain seclusive to self.  Q15 minute safety checks maintained for patient safety and will continue to encourage patient to attend unit programming.              Signature:  CARMEN BLANCA

## 2024-10-24 LAB
GLUCOSE BLD-MCNC: 121 MG/DL (ref 75–110)
GLUCOSE BLD-MCNC: 132 MG/DL (ref 75–110)
GLUCOSE BLD-MCNC: 205 MG/DL (ref 75–110)
IRON SATN MFR SERPL: 21 % (ref 20–55)
IRON SERPL-MCNC: 65 UG/DL (ref 61–157)
TIBC SERPL-MCNC: 315 UG/DL (ref 250–450)
UNSATURATED IRON BINDING CAPACITY: 250 UG/DL (ref 112–347)

## 2024-10-24 PROCEDURE — APPSS30 APP SPLIT SHARED TIME 16-30 MINUTES: Performed by: NURSE PRACTITIONER

## 2024-10-24 PROCEDURE — 6370000000 HC RX 637 (ALT 250 FOR IP): Performed by: NURSE PRACTITIONER

## 2024-10-24 PROCEDURE — 83550 IRON BINDING TEST: CPT

## 2024-10-24 PROCEDURE — 6370000000 HC RX 637 (ALT 250 FOR IP): Performed by: PSYCHIATRY & NEUROLOGY

## 2024-10-24 PROCEDURE — 6370000000 HC RX 637 (ALT 250 FOR IP): Performed by: INTERNAL MEDICINE

## 2024-10-24 PROCEDURE — 82947 ASSAY GLUCOSE BLOOD QUANT: CPT

## 2024-10-24 PROCEDURE — 99232 SBSQ HOSP IP/OBS MODERATE 35: CPT | Performed by: PSYCHIATRY & NEUROLOGY

## 2024-10-24 PROCEDURE — 36415 COLL VENOUS BLD VENIPUNCTURE: CPT

## 2024-10-24 PROCEDURE — 1240000000 HC EMOTIONAL WELLNESS R&B

## 2024-10-24 PROCEDURE — 90833 PSYTX W PT W E/M 30 MIN: CPT | Performed by: PSYCHIATRY & NEUROLOGY

## 2024-10-24 PROCEDURE — 83540 ASSAY OF IRON: CPT

## 2024-10-24 RX ADMIN — POTASSIUM CHLORIDE 20 MEQ: 1500 TABLET, EXTENDED RELEASE ORAL at 09:16

## 2024-10-24 RX ADMIN — DIVALPROEX SODIUM 500 MG: 500 TABLET, EXTENDED RELEASE ORAL at 20:49

## 2024-10-24 RX ADMIN — INSULIN LISPRO 2 UNITS: 100 INJECTION, SOLUTION INTRAVENOUS; SUBCUTANEOUS at 20:57

## 2024-10-24 RX ADMIN — DIVALPROEX SODIUM 750 MG: 500 TABLET, EXTENDED RELEASE ORAL at 09:16

## 2024-10-24 RX ADMIN — Medication 1000 UNITS: at 09:16

## 2024-10-24 RX ADMIN — METFORMIN HYDROCHLORIDE 1000 MG: 1000 TABLET ORAL at 17:47

## 2024-10-24 RX ADMIN — HYDROXYZINE HYDROCHLORIDE 50 MG: 50 TABLET, FILM COATED ORAL at 20:49

## 2024-10-24 RX ADMIN — ESCITALOPRAM OXALATE 20 MG: 20 TABLET ORAL at 09:16

## 2024-10-24 RX ADMIN — GABAPENTIN 100 MG: 100 CAPSULE ORAL at 09:16

## 2024-10-24 RX ADMIN — METFORMIN HYDROCHLORIDE 1000 MG: 1000 TABLET ORAL at 09:16

## 2024-10-24 RX ADMIN — POTASSIUM CHLORIDE 20 MEQ: 1500 TABLET, EXTENDED RELEASE ORAL at 20:50

## 2024-10-24 RX ADMIN — GABAPENTIN 100 MG: 100 CAPSULE ORAL at 17:47

## 2024-10-24 RX ADMIN — TRAZODONE HYDROCHLORIDE 50 MG: 50 TABLET ORAL at 20:49

## 2024-10-24 RX ADMIN — OLANZAPINE 20 MG: 10 TABLET, FILM COATED ORAL at 20:49

## 2024-10-24 RX ADMIN — ATORVASTATIN CALCIUM 20 MG: 20 TABLET, FILM COATED ORAL at 21:24

## 2024-10-24 RX ADMIN — OLANZAPINE 15 MG: 15 TABLET, FILM COATED ORAL at 09:16

## 2024-10-24 NOTE — GROUP NOTE
Group Therapy Note    Date: 10/24/2024    Group Start Time: 1000  Group End Time: 1025  Group Topic: Psychotherapy    [facility] MS[facility]        Group Therapy Note    Attendees: 2/14       Patient's Goal:  Identify how we react to certain emotions (anger, sadness, anxiety, etc.) and then identify coping skills associated with those emotions    Notes:     Status After Intervention:  Improved    Participation Level: Active Listener and Interactive    Participation Quality: Appropriate, Attentive, and Sharing      Speech:  normal      Thought Process/Content: Logical  Linear      Affective Functioning: Congruent      Mood: euthymic      Level of consciousness:  Alert and Oriented x4      Response to Learning: Able to verbalize current knowledge/experience and Able to verbalize/acknowledge new learning      Endings: None Reported    Modes of Intervention: Education and Support      Discipline Responsible: /Counselor      Signature:  FRANKLIN Stephens

## 2024-10-24 NOTE — BH NOTE
[facility]  Day 3 Interdisciplinary Treatment Plan NOTE    Review Date & Time: 10/24/2024 1245    Admission Type:   Admission Type: Involuntary    Reason for admission:  Reason for Admission: \"Adolfo Lynn is a 64-year-old  male residing at [facility] in [place], [place]. He has a past psychiatric history of MDD, schizoaffective disorder, unspecified and bipolar. Adolfo is exhibiting severe agitation and escalating behaviors. He is restless, pacing, and verbally expressing self-injurious intent. He has voices clear suicidal ideation by stating his desire to hit his head into the mirror and/or strike the nursing station with his head. Despite staff attempts to verbally de-escalate, the patient remains unresponsive to redirection and continues to express these intentions a heightened stated of distress. Immediate psychiatric intervention is required, including placement on an involuntary hold, as the patients continued behavior presents a significant threat to his well-being.\" Patient reports suicidal ideations with no specific plan. Patient stated that he has been banging his head on the nurses window recently to stop the voices. Patient reports command hallucinations of voices telling him to kill himself  Estimated Length of Stay: 5-7 days  Estimated Discharge Date Update: to be determined by physician    PATIENT STRENGTHS:  Patient Strengths    Patient Strengths and Limitations:Limitations: Difficulty problem solving/relies on others to help solve problems  Addictive Behavior:Addictive Behavior  In the Past 3 Months, Have You Felt or Has Someone Told You That You Have a Problem With  : None  Medical Problems:  Past Medical History:   Diagnosis Date    Bipolar disorder (HCC)     Depression     GERD (gastroesophageal reflux disease)     Hallucinations     Headache(784.0)     Hepatitis     Schizophrenia, schizo-affective (HCC)     Substance abuse (HCC)

## 2024-10-24 NOTE — GROUP NOTE
Group Therapy Note    Date: 10/24/2024    Group Start Time: 1430  Group End Time: 1500  Group Topic: Cognitive Skills    Dory Cohen CTRS    Cognitive Skills Group Note        Date: October 24, 2024 Start Time: 2:30pm  End Time: 3pm      Number of Participants in Group & Unit Census:  3/14    Topic: recreation group     Goal of Group: pt will demonstrate improved leisure awareness and improved coping skills       Comments:     Patient did not participate in Recreation group, despite staff encouragement and explanation of benefits.  Patient remain seclusive to self.  Q15 minute safety checks maintained for patient safety and will continue to encourage patient to attend unit programming.              Signature:  CARMEN BLANCA

## 2024-10-24 NOTE — GROUP NOTE
Group Therapy Note    Date: 10/24/2024    Group Start Time: 1100  Group End Time: 1130  Group Topic: Cognitive Skills    Dory Cohen CTRS    Cognitive skills Group Note        Date: October 24, 2024 Start Time: 11am  End Time: 11:30am      Number of Participants in Group & Unit Census:  1/14    Topic: cognitive skills group     Goal of Group: pt will demonstrate improved coping skills and improved interpersonal relationships      Comments:     Patient did not participate in Cognitive Skills group, despite staff encouragement and explanation of benefits.  Patient remain seclusive to self.  Q15 minute safety checks maintained for patient safety and will continue to encourage patient to attend unit programming.         Signature:  CARMEN BLANCA

## 2024-10-25 LAB
GLUCOSE BLD-MCNC: 151 MG/DL (ref 75–110)
GLUCOSE BLD-MCNC: 160 MG/DL (ref 75–110)
GLUCOSE BLD-MCNC: 177 MG/DL (ref 75–110)
GLUCOSE BLD-MCNC: 83 MG/DL (ref 75–110)

## 2024-10-25 PROCEDURE — 99232 SBSQ HOSP IP/OBS MODERATE 35: CPT | Performed by: PSYCHIATRY & NEUROLOGY

## 2024-10-25 PROCEDURE — 82947 ASSAY GLUCOSE BLOOD QUANT: CPT

## 2024-10-25 PROCEDURE — APPSS30 APP SPLIT SHARED TIME 16-30 MINUTES: Performed by: NURSE PRACTITIONER

## 2024-10-25 PROCEDURE — 6370000000 HC RX 637 (ALT 250 FOR IP): Performed by: PSYCHIATRY & NEUROLOGY

## 2024-10-25 PROCEDURE — 1240000000 HC EMOTIONAL WELLNESS R&B

## 2024-10-25 PROCEDURE — 6370000000 HC RX 637 (ALT 250 FOR IP): Performed by: NURSE PRACTITIONER

## 2024-10-25 RX ADMIN — OLANZAPINE 15 MG: 15 TABLET, FILM COATED ORAL at 08:30

## 2024-10-25 RX ADMIN — POTASSIUM CHLORIDE 20 MEQ: 1500 TABLET, EXTENDED RELEASE ORAL at 08:30

## 2024-10-25 RX ADMIN — GABAPENTIN 100 MG: 100 CAPSULE ORAL at 08:30

## 2024-10-25 RX ADMIN — Medication 1000 UNITS: at 08:30

## 2024-10-25 RX ADMIN — OLANZAPINE 20 MG: 10 TABLET, FILM COATED ORAL at 21:20

## 2024-10-25 RX ADMIN — METFORMIN HYDROCHLORIDE 1000 MG: 1000 TABLET ORAL at 08:30

## 2024-10-25 RX ADMIN — ESCITALOPRAM OXALATE 20 MG: 20 TABLET ORAL at 08:30

## 2024-10-25 RX ADMIN — METFORMIN HYDROCHLORIDE 1000 MG: 1000 TABLET ORAL at 17:30

## 2024-10-25 RX ADMIN — TRAZODONE HYDROCHLORIDE 50 MG: 50 TABLET ORAL at 21:20

## 2024-10-25 RX ADMIN — HYDROXYZINE HYDROCHLORIDE 50 MG: 50 TABLET, FILM COATED ORAL at 23:55

## 2024-10-25 RX ADMIN — POTASSIUM CHLORIDE 20 MEQ: 1500 TABLET, EXTENDED RELEASE ORAL at 21:20

## 2024-10-25 RX ADMIN — ATORVASTATIN CALCIUM 20 MG: 20 TABLET, FILM COATED ORAL at 21:20

## 2024-10-25 RX ADMIN — DIVALPROEX SODIUM 750 MG: 500 TABLET, EXTENDED RELEASE ORAL at 08:30

## 2024-10-25 RX ADMIN — GABAPENTIN 100 MG: 100 CAPSULE ORAL at 17:30

## 2024-10-25 RX ADMIN — DIVALPROEX SODIUM 500 MG: 500 TABLET, EXTENDED RELEASE ORAL at 21:20

## 2024-10-25 NOTE — GROUP NOTE
Group Therapy Note    Date: 10/25/2024    Group Start Time: 1330  Group End Time: 1415  Group Topic: Cognitive Skills    Mercedes Terrell CTRS        Group Therapy Note    Attendees: 2/10    Cognitive Skills Group Note        Date: October 25, 2024 Start Time: 1:30pm  End Time: 2:15pm      Number of Participants in Group & Unit Census:  2/10    Topic: interpersonal skills, decision-making, concentration    Goal of Group: To improve interpersonal skills and decision-making through collaborating with peers and concentrating on a presented task.      Comments:     Patient did not participate in Cognitive Skills group, despite staff encouragement and explanation of benefits.  Patient remain seclusive to self.  Q15 minute safety checks maintained for patient safety and will continue to encourage patient to attend unit programming.        Signature:  CARMEN Delgado

## 2024-10-25 NOTE — GROUP NOTE
Group Therapy Note    Date: 10/25/2024    Group Start Time: 1000  Group End Time: 1020  Group Topic: Psychotherapy     Dilcia Berger MSW        Group Therapy Note    Attendees: 3/12     Patient was offered group therapy today but declined to participate despite encouragement from staff.  1:1 was offered.    Discipline Responsible: /Counselor      Signature:  FRANKLIN Stephens

## 2024-10-25 NOTE — GROUP NOTE
Group Therapy Note    Date: 10/25/2024    Group Start Time: 1100  Group End Time: 1135  Group Topic: Psychoeducation    Mercedes Terrell CTRS        Group Therapy Note    Attendees: 3/12    Psych-Ed/Relapse Prevention Group Note        Date: October 25, 2024 Start Time: 11am  End Time: 11:35am      Number of Participants in Group & Unit Census:  3/12    Topic:  interpersonal skills, gratitude, self-expression     Goal of Group: To improve interpersonal skills and gratitude awareness through collaborating with peers and demonstrating self-expression.      Comments:     Patient did not participate in Psych-Ed/Relapse Prevention group, despite staff encouragement and explanation of benefits.  Patient remain seclusive to self.  Q15 minute safety checks maintained for patient safety and will continue to encourage patient to attend unit programming.        Signature:  CARMEN Delgado

## 2024-10-25 NOTE — CARE COORDINATION
Writer spoke with Gavi from [phone] to advise of Adolfo's potential discharge and return to nursing home on Monday 10/28/2024. Gavi confirmed Adolfo can return. Gaiv requested clinical update sent to [phone].     Writer left voice mail for Irma, Adolfo's sister and legal guardian updating her on the above.

## 2024-10-26 LAB
CHP ED QC CHECK: NORMAL
CHP ED QC CHECK: NORMAL
GLUCOSE BLD-MCNC: 102 MG/DL (ref 75–110)
GLUCOSE BLD-MCNC: 110 MG/DL
GLUCOSE BLD-MCNC: 155 MG/DL (ref 75–110)
GLUCOSE BLD-MCNC: 172 MG/DL (ref 75–110)
GLUCOSE BLD-MCNC: NORMAL MG/DL

## 2024-10-26 PROCEDURE — 6370000000 HC RX 637 (ALT 250 FOR IP): Performed by: INTERNAL MEDICINE

## 2024-10-26 PROCEDURE — 6370000000 HC RX 637 (ALT 250 FOR IP): Performed by: PSYCHIATRY & NEUROLOGY

## 2024-10-26 PROCEDURE — 82947 ASSAY GLUCOSE BLOOD QUANT: CPT

## 2024-10-26 PROCEDURE — 99232 SBSQ HOSP IP/OBS MODERATE 35: CPT | Performed by: PSYCHIATRY & NEUROLOGY

## 2024-10-26 PROCEDURE — 6370000000 HC RX 637 (ALT 250 FOR IP): Performed by: NURSE PRACTITIONER

## 2024-10-26 PROCEDURE — 1240000000 HC EMOTIONAL WELLNESS R&B

## 2024-10-26 PROCEDURE — APPSS30 APP SPLIT SHARED TIME 16-30 MINUTES: Performed by: NURSE PRACTITIONER

## 2024-10-26 RX ADMIN — METFORMIN HYDROCHLORIDE 1000 MG: 1000 TABLET ORAL at 17:37

## 2024-10-26 RX ADMIN — GABAPENTIN 100 MG: 100 CAPSULE ORAL at 17:37

## 2024-10-26 RX ADMIN — GABAPENTIN 100 MG: 100 CAPSULE ORAL at 09:38

## 2024-10-26 RX ADMIN — INSULIN LISPRO 1 UNITS: 100 INJECTION, SOLUTION INTRAVENOUS; SUBCUTANEOUS at 11:30

## 2024-10-26 RX ADMIN — ESCITALOPRAM OXALATE 20 MG: 20 TABLET ORAL at 09:25

## 2024-10-26 RX ADMIN — METFORMIN HYDROCHLORIDE 1000 MG: 1000 TABLET ORAL at 09:23

## 2024-10-26 RX ADMIN — DIVALPROEX SODIUM 750 MG: 500 TABLET, EXTENDED RELEASE ORAL at 09:23

## 2024-10-26 RX ADMIN — DIVALPROEX SODIUM 500 MG: 500 TABLET, EXTENDED RELEASE ORAL at 20:41

## 2024-10-26 RX ADMIN — ATORVASTATIN CALCIUM 20 MG: 20 TABLET, FILM COATED ORAL at 20:42

## 2024-10-26 RX ADMIN — OLANZAPINE 15 MG: 15 TABLET, FILM COATED ORAL at 09:25

## 2024-10-26 RX ADMIN — POTASSIUM CHLORIDE 20 MEQ: 1500 TABLET, EXTENDED RELEASE ORAL at 20:42

## 2024-10-26 RX ADMIN — OLANZAPINE 20 MG: 10 TABLET, FILM COATED ORAL at 20:42

## 2024-10-26 RX ADMIN — Medication 1000 UNITS: at 09:25

## 2024-10-26 RX ADMIN — POTASSIUM CHLORIDE 20 MEQ: 1500 TABLET, EXTENDED RELEASE ORAL at 09:25

## 2024-10-26 NOTE — BH NOTE
Patient was up to the team station several times throughout the night unable to sleep. Patient stated he has not slept in two days. Trazodone provided at bedtime and Atarax provided at 0000. Both were ineffective. Patient is requesting a higher dose or alternative.

## 2024-10-26 NOTE — GROUP NOTE
Group Therapy Note    Date: 10/26/2024    Group Start Time: 1400  Group End Time: 1450  Group Topic: Cognitive Skills    Mercedes Terrell CTRS        Group Therapy Note    Attendees: 4/13    Cognitive Skills Group Note        Date: October 26, 2024 Start Time: 2pm  End Time: 2:50pm      Number of Participants in Group & Unit Census:  4/13    Topic: interpersonal skills, decision-making, concentration     Goal of Group: To improve interpersonal skills and decision-making through collaborating with peers and concentrating on a presented task.      Comments:     Patient did not participate in Cognitive Skills group, despite staff encouragement and explanation of benefits.  Patient remain seclusive to self.  Q15 minute safety checks maintained for patient safety and will continue to encourage patient to attend unit programming.        Signature:  CARMEN Delgado

## 2024-10-27 LAB
GLUCOSE BLD-MCNC: 102 MG/DL (ref 75–110)
GLUCOSE BLD-MCNC: 143 MG/DL (ref 75–110)
GLUCOSE BLD-MCNC: 73 MG/DL (ref 75–110)
GLUCOSE BLD-MCNC: 99 MG/DL (ref 75–110)

## 2024-10-27 PROCEDURE — 6370000000 HC RX 637 (ALT 250 FOR IP): Performed by: NURSE PRACTITIONER

## 2024-10-27 PROCEDURE — 82947 ASSAY GLUCOSE BLOOD QUANT: CPT

## 2024-10-27 PROCEDURE — APPSS30 APP SPLIT SHARED TIME 16-30 MINUTES: Performed by: NURSE PRACTITIONER

## 2024-10-27 PROCEDURE — 90833 PSYTX W PT W E/M 30 MIN: CPT | Performed by: PSYCHIATRY & NEUROLOGY

## 2024-10-27 PROCEDURE — 1240000000 HC EMOTIONAL WELLNESS R&B

## 2024-10-27 PROCEDURE — 6370000000 HC RX 637 (ALT 250 FOR IP): Performed by: PSYCHIATRY & NEUROLOGY

## 2024-10-27 PROCEDURE — 99232 SBSQ HOSP IP/OBS MODERATE 35: CPT | Performed by: PSYCHIATRY & NEUROLOGY

## 2024-10-27 RX ADMIN — GABAPENTIN 100 MG: 100 CAPSULE ORAL at 09:10

## 2024-10-27 RX ADMIN — OLANZAPINE 20 MG: 10 TABLET, FILM COATED ORAL at 20:30

## 2024-10-27 RX ADMIN — TRAZODONE HYDROCHLORIDE 50 MG: 50 TABLET ORAL at 20:30

## 2024-10-27 RX ADMIN — METFORMIN HYDROCHLORIDE 1000 MG: 1000 TABLET ORAL at 17:44

## 2024-10-27 RX ADMIN — DIVALPROEX SODIUM 750 MG: 500 TABLET, EXTENDED RELEASE ORAL at 09:07

## 2024-10-27 RX ADMIN — ATORVASTATIN CALCIUM 20 MG: 20 TABLET, FILM COATED ORAL at 20:29

## 2024-10-27 RX ADMIN — Medication 1000 UNITS: at 09:08

## 2024-10-27 RX ADMIN — POTASSIUM CHLORIDE 20 MEQ: 1500 TABLET, EXTENDED RELEASE ORAL at 09:08

## 2024-10-27 RX ADMIN — GABAPENTIN 100 MG: 100 CAPSULE ORAL at 17:43

## 2024-10-27 RX ADMIN — METFORMIN HYDROCHLORIDE 1000 MG: 1000 TABLET ORAL at 09:08

## 2024-10-27 RX ADMIN — POTASSIUM CHLORIDE 20 MEQ: 1500 TABLET, EXTENDED RELEASE ORAL at 20:30

## 2024-10-27 RX ADMIN — ESCITALOPRAM OXALATE 20 MG: 20 TABLET ORAL at 09:08

## 2024-10-27 RX ADMIN — DIVALPROEX SODIUM 500 MG: 500 TABLET, EXTENDED RELEASE ORAL at 20:30

## 2024-10-27 RX ADMIN — OLANZAPINE 15 MG: 15 TABLET, FILM COATED ORAL at 09:08

## 2024-10-27 NOTE — BH NOTE
Room safety checks complete. Patient participated without issue. Patient room free of contraband and items not belonging in patient room.

## 2024-10-27 NOTE — GROUP NOTE
Group Therapy Note    Date: 10/27/2024    Group Start Time: 1400  Group End Time: 1445  Group Topic: Psychoeducation    Mercedes Terrell CTRS        Group Therapy Note    Attendees: 8/15       Patient's Goal:  To improve interpersonal skills and coping skills awareness through collaborating with peers and demonstrating self-expression.     Notes:  Patient attended and participated in the last 5 minutes of group. Patient was able to collaborate with peers and demonstrate self-expression while in group. Patient was pleasant and cooperative.     Status After Intervention:  Unchanged    Participation Level: Active Listener and Interactive    Participation Quality: Appropriate, Attentive and Sharing      Speech:  normal      Thought Process/Content: Logical and Linear      Affective Functioning: Blunted, brightened       Mood: euthymic      Level of consciousness:  Alert and Attentive      Response to Learning: Able to verbalize current knowledge/experience, Able to retain information, Capable of insight, and Progressing to goal      Endings: None Reported    Modes of Intervention: Education, Support, Socialization, and Exploration      Discipline Responsible: Psychoeducational Specialist      Signature:  CARMEN Delgado

## 2024-10-27 NOTE — PROGRESS NOTES
Behavioral Services  Medicare Certification Upon Admission    I certify that this patient's inpatient psychiatric hospital admission is medically necessary for:    [x] (1) Treatment which could reasonably be expected to improve this patient's condition,       [x] (2) Or for diagnostic study;     AND     [x](2) The inpatient psychiatric services are provided while the individual is under the care of a physician and are included in the individualized plan of care.    Estimated length of stay/service 4 to 7 days    Plan for post-hospital care home with outpatient community mental health follow-up    Electronically signed by TOSHIA ESCAMILLA MD on 10/22/2024 at 2:23 PM      
  Daily Progress Note  10/23/2024    Patient Name: Adolfo Presley    CHIEF COMPLAINT: Suicidal ideation with command auditory hallucinations         SUBJECTIVE:      Patient seen face-to-face for follow-up assessment.  He is friendly and cooperative with discussion.  Reports continues to have distressing command auditory hallucinations that are telling him to end his life.  He states that the current volume is 5 out of 10 and that he has noticed that the voices have been quieter than usual.  He however does not yet feel safe from self and is still expressing belief that he would act on the suicidal commands if he were not in the hospital.  He did require oral as needed medications yesterday as he was in emotional distress, pacing the room and expressing desire to die.  He did relax after receiving Haldol 5 mg orally.  Patient notes that he slept well overnight.  Denies any problems with appetite.  He denies homicidal ideation today.  He has been compliant with his scheduled psychotropic medications Lexapro 20 mg, Depakote 750 daily and 500 mg nightly, and olanzapine 15 mg daily and 20 mg nightly.  He does not express any side effects to his current regimen.  Patient has been somewhat isolative on the unit.  He does go to the day area, but is aloof and to himself.  Did encourage patient to be more active in group programming.    Appetite:  [x] Adequate/Unchanged  [] Increased  [] Decreased      Sleep:       [] Adequate/Unchanged  [x] Fair  [] Poor      Group Attendance on Unit:   [] Yes   [] Selectively    [x] No    Compliant with scheduled medications: [x] Yes  [] No    Received emergency medications in past 24 hrs: [x] Yes   [] No    Medication Side Effects: Denies         Mental Status Exam  Level of consciousness: Alert and awake   Appearance: Appropriate attire for setting, seated on bed, with fair  grooming and hygiene   Behavior/Motor: Approachable, engages with interviewer, no psychomotor abnormalities 
  Daily Progress Note  10/24/2024    Patient Name: Adolfo Presley    CHIEF COMPLAINT: Suicidal ideation with command auditory hallucinations         SUBJECTIVE:      Patient seen face-to-face for follow-up assessment.  He is drowsy and has more difficulty engaging in open-ended discussion today.  He states mood still remains low, but he has noticed decrease in intensity of his suicidal ideation.  Auditory hallucinations remain about the same.  He states that they can be distressing and stopped him from sleeping overnight, and he believes this is contributing to his daytime fatigue.  He has been compliant with his scheduled psychotropic medication.  Reviewed for any side effects and he denies all.  Did encourage patient to be less isolative and start attending group programming.  Staff report that he has been able to maintain behavioral control.  He has not required any emergency medications.    Appetite:  [x] Adequate/Unchanged  [] Increased  [] Decreased      Sleep:       [] Adequate/Unchanged  [x] Fair  [] Poor      Group Attendance on Unit:   [] Yes   [] Selectively    [x] No    Compliant with scheduled medications: [x] Yes  [] No    Received emergency medications in past 24 hrs: [x] Yes   [] No    Medication Side Effects: Denies         Mental Status Exam  Level of consciousness: Alert and awake   Appearance: Appropriate attire for setting, resting in bed, with fair  grooming and hygiene   Behavior/Motor: Approachable, engages with interviewer, fatigued  Attitude toward examiner: Cooperative, attentive, good eye contact  Speech: spontaneous, normal rate, normal volume, and well articulated   Mood: Patient reports \"down\"  Affect: Sad  Thought processes: linear and goal directed   Thought content:  Denies homicidal ideation  Suicidal Ideation: Fleeting suicidal ideations, contracts for safety on the unit.   Delusions: No evidence of delusions.   Perceptual Disturbance: Patient endorses command auditory 
  Daily Progress Note  10/26/2024    Patient Name: Adolfo Presley    CHIEF COMPLAINT: Suicidal ideation with command auditory hallucinations         SUBJECTIVE:    Kemar was seen for follow-up assessment today.  He has been compliant with scheduled medications and behaviorally in control.  He has not required any emergency medications for agitation in the past 24 hours.  Nursing staff report patient continues to present as withdrawn and depressed and frequently isolates to his room.  Patient was resting in bed on approach but awake.  He was more engaged in conversation today.  He was pleasant and cooperative.  He reported that his mood is \"pretty good today\".  He did express poor sleep overnight and requested a titration of as needed trazodone.  Patient also stated that auditory hallucinations have decreased in intensity and frequency today.  He describes suicidal ideation as fleeting and that he did have thoughts of wanting to end his life today but that he feels that it is slowly improving.  Patient expressed feeling a little less hopeless and helpless.  Appetite has been adequate.  Patient was encouraged to spend more time in the day area and he did report that he had been out watching football earlier in the day.  Patient has not yet able to contract for safety in the community but he was hopeful that with continued improvement he will feel ready for discharge in the next couple of days.    Appetite:  [x] Adequate/Unchanged  [] Increased  [] Decreased      Sleep:       [] Adequate/Unchanged  [] Fair  [x] Poor      Group Attendance on Unit:   [] Yes   [] Selectively    [x] No    Compliant with scheduled medications: [x] Yes  [] No    Received emergency medications in past 24 hrs: [] Yes   [x] No    Medication Side Effects: Denies         Mental Status Exam  Level of consciousness: Awake and alert  Appearance: Appropriate attire for setting, resting in bed, with fair  grooming and hygiene   Behavior/Motor: 
Patient sleeping did not want to talk vitals reviewed blood sugar stable continue tomorrow  
Pharmacy Medication History Note      List of current medications patient is taking is complete.    Source of information: [facility] med list    Changes made to medication list:  Medications removed (include reason, ex. therapy complete or physician discontinued, noncompliance):  None    Medications flagged for provider review:  Hydroxyzine- not taking  Trazodone- not taking    Medications added/doses adjusted:  Adjust: Divalproex  AM and 500 PM  Adjust: Olanzapine 15 mg AM and 20 mg at bedtime  Potassium chloride ER 20 MEq daily    Other notes (ex. Recent course of antibiotics, Coumadin dosing):  [place] cannot be completed due to wrong zipcode      Current Home Medication List at Time of Admission:  Prior to Admission medications    Medication Sig   potassium chloride (KLOR-CON M) 20 MEQ extended release tablet Take 1 tablet by mouth 2 times daily   OLANZapine (ZYPREXA) 15 MG tablet Take 1 tablet by mouth every morning   atorvastatin (LIPITOR) 20 MG tablet Take 1 tablet by mouth nightly   divalproex (DEPAKOTE ER) 250 MG extended release tablet Take 3 tablets by mouth every morning   divalproex (DEPAKOTE ER) 500 MG extended release tablet Take 1 tablet by mouth at bedtime Indications: Auditory Hallucination   gabapentin (NEURONTIN) 100 MG capsule Take 1 capsule by mouth in the morning and 1 capsule in the evening. Indications: Diabetes with Nerve Disease.    metFORMIN (GLUCOPHAGE) 1000 MG tablet Take 1 tablet by mouth 2 times daily (with meals) Indications: Diabetes   OLANZapine (ZYPREXA) 20 MG tablet Take 1 tablet by mouth nightly   Cholecalciferol (VITAMIN D3) 25 MCG TABS Take 1 tablet by mouth daily   escitalopram (LEXAPRO) 20 MG tablet Take 1 tablet by mouth daily         Please let me know if you have any questions about this encounter. Thank you!    Electronically signed by Jordyn Zuleta RPH on 10/21/2024 at 7:47 PM    
RT ASSESSMENT TREATMENT GOALS    [x]Pt Goal: Pt will identify 1-2 positive coping skills by time of discharge.    []Pt Goal: Pt will identify 1-2 positive aspects of self by time of discharge.    [x]Pt Goal: Pt will remain on task/topic for 15-30 minutes during group by time of discharge.    []Pt Goal: Pt will identify 1-2 aspects of relapse prevention plan by time of discharge.    []Pt Goal: Pt will join in conversation with peers 1-2 times per group by time of discharge.    []Pt Goal: Pt will identify 1-2 new leisure interests by time of discharge.    []Pt Goal: Pt will not voice any delusional content by time of discharge.    
 6.0 - 12.0 fL Final    Neutrophils % 10/21/2024 46  36 - 66 % Final    Lymphocytes % 10/21/2024 44  24 - 44 % Final    Monocytes % 10/21/2024 6  1 - 7 % Final    Eosinophils % 10/21/2024 3  0 - 4 % Final    Basophils % 10/21/2024 1  0 - 2 % Final    Neutrophils Absolute 10/21/2024 3.00  1.3 - 9.1 k/uL Final    Lymphocytes Absolute 10/21/2024 2.80  1.0 - 4.8 k/uL Final    Monocytes Absolute 10/21/2024 0.40  0.1 - 1.3 k/uL Final    Eosinophils Absolute 10/21/2024 0.20  0.0 - 0.4 k/uL Final    Basophils Absolute 10/21/2024 0.00  0.0 - 0.2 k/uL Final    Sodium 10/21/2024 144  136 - 145 mmol/L Final    Potassium 10/21/2024 4.0  3.7 - 5.3 mmol/L Final    Chloride 10/21/2024 109 (H)  98 - 107 mmol/L Final    CO2 10/21/2024 24  20 - 31 mmol/L Final    Anion Gap 10/21/2024 11  9 - 16 mmol/L Final    Glucose 10/21/2024 214 (H)  74 - 99 mg/dL Final    BUN 10/21/2024 15  8 - 23 mg/dL Final    Creatinine 10/21/2024 0.9  0.7 - 1.2 mg/dL Final    Est, Glom Filt Rate 10/21/2024 >90  >60 mL/min/1.73m2 Final    Comment:       These results are not intended for use in patients <18 years of age.        eGFR results are calculated without a race factor using the 2021 CKD-EPI equation.  Careful clinical correlation is recommended, particularly when comparing to results   calculated using previous equations.  The CKD-EPI equation is less accurate in patients with extremes of muscle mass, extra-renal   metabolism of creatine, excessive creatine ingestion, or following therapy that affects   renal tubular secretion.      Calcium 10/21/2024 8.6  8.6 - 10.4 mg/dL Final    Ethanol Lvl 10/21/2024 <10  <10 mg/dL Final    Ethanol percent 10/21/2024 0.001  <0.010 % Final    Acetaminophen Level 10/21/2024 <5 (L)  10 - 30 ug/mL Final    Salicylate Lvl 10/21/2024 <1.0  0.0 - 10.0 mg/dL Final    POC Glucose 10/21/2024 185 (H)  75 - 110 mg/dL Final    Hemoglobin A1C 10/21/2024 6.9 (H)  4.0 - 6.0 % Final    Estimated Avg Glucose 10/21/2024 151  mg/dL 
for vision, hearing changes, runny nose, throat pain  RESPIRATORY:  negative for shortness of breath, cough, congestion, wheezing.  CARDIOVASCULAR:  negative for chest pain, palpitations.  GASTROINTESTINAL:  negative for nausea, vomiting, diarrhea, constipation, change in bowel habits, abdominal pain   GENITOURINARY:  negative for difficulty of urination, burning with urination, frequency   INTEGUMENT:  negative for rash, skin lesions, easy bruising   HEMATOLOGIC/LYMPHATIC:  negative for swelling/edema   ALLERGIC/IMMUNOLOGIC:  negative for urticaria , itching  ENDOCRINE:  negative increase in drinking, increase in urination, hot or cold intolerance  MUSCULOSKELETAL:  negative joint pains, muscle aches, swelling of joints  NEUROLOGICAL:  negative for headaches, dizziness, lightheadedness, numbness, pain, tingling extremities      Physical Exam:     /80   Pulse 73   Temp 98 °F (36.7 °C) (Oral)   Resp 16   Ht 1.88 m (6' 2\")   SpO2 97%   BMI 32.10 kg/m²   Temp (24hrs), Av.6 °F (36.4 °C), Min:97.1 °F (36.2 °C), Max:98 °F (36.7 °C)    Recent Labs     10/22/24  1645 10/22/24  1932 10/23/24  0719 10/23/24  1140   POCGLU 174* 184* 130* 224*     No intake or output data in the 24 hours ending 10/23/24 1502    General Appearance: Drowsy but arousable,  would answer questions and go back to sleep, will monitor close mental status: oriented to person, place, and time   Head:  normocephalic, atraumatic.  Neck: supple, no carotid bruits, thyroid not palpable  Lungs: Bilateral equal air entry, clear to ausculation, no wheezing, rales or rhonchi, normal effort  Cardiovascular: normal rate, regular rhythm, no murmur, gallop, rub.  Abdomen: Soft, nontender, nondistended, normal bowel sounds, no hepatomegaly or splenomegaly  Neurologic: CN II-XII intact , DTR normal, no new focal motor or sensory deficits, moving all extremities spontaneously.   Skin: No gross lesions, rashes, bruising or bleeding on exposed skin 
Patient continues to need, on a daily basis, active treatment furnished directly by or requiring the supervision of inpatient psychiatric personnel.    Electronically signed by MARY Meehan CNP on 10/27/2024 at 3:41 PM    **This report has been created using voice recognition software. It may contain minor errors which are inherent in voice recognition technology.**     I independently saw and evaluated the patient.  I reviewed the nurse practitioners documentation above.  Principle diagnosis we are treating for is Schizoaffective disorder, depressive type (HCC).  Any additional comments or changes to the nurse practitioners documentation are stated below otherwise agree with assessment.  Plan will be as follows:  Spent 17 minutes with the patient in supportive psychotherapy.  Patient denying side effects to medication.  Reporting improvement in mood.  Denying suicidal or homicidal ideation intent or plan.  Denying psychotic symptoms.  Forward-looking and constructive.  Discussed if continued stability or improvement through tomorrow would consider discharge and patient is in agreement    PLAN  Patient s symptoms   are improving  Continue with current medication for now  Attempt to develop insight  Psycho-education conducted.  Supportive Therapy conducted.  Probable discharge is tomorrow  Follow-up daily while on inpatient unit

## 2024-10-28 VITALS
DIASTOLIC BLOOD PRESSURE: 72 MMHG | HEIGHT: 74 IN | SYSTOLIC BLOOD PRESSURE: 144 MMHG | BODY MASS INDEX: 32.08 KG/M2 | TEMPERATURE: 98 F | HEART RATE: 66 BPM | OXYGEN SATURATION: 96 % | RESPIRATION RATE: 14 BRPM | WEIGHT: 250 LBS

## 2024-10-28 LAB — GLUCOSE BLD-MCNC: 97 MG/DL (ref 75–110)

## 2024-10-28 PROCEDURE — 6370000000 HC RX 637 (ALT 250 FOR IP): Performed by: PSYCHIATRY & NEUROLOGY

## 2024-10-28 PROCEDURE — 82947 ASSAY GLUCOSE BLOOD QUANT: CPT

## 2024-10-28 PROCEDURE — 6370000000 HC RX 637 (ALT 250 FOR IP): Performed by: NURSE PRACTITIONER

## 2024-10-28 PROCEDURE — 99239 HOSP IP/OBS DSCHRG MGMT >30: CPT | Performed by: PSYCHIATRY & NEUROLOGY

## 2024-10-28 RX ORDER — INSULIN LISPRO 100 [IU]/ML
0-8 INJECTION, SOLUTION INTRAVENOUS; SUBCUTANEOUS
Qty: 10 ML | Refills: 0 | Status: SHIPPED | OUTPATIENT
Start: 2024-10-28

## 2024-10-28 RX ORDER — TRAZODONE HYDROCHLORIDE 50 MG/1
50 TABLET, FILM COATED ORAL NIGHTLY PRN
Qty: 30 TABLET | Refills: 0 | Status: SHIPPED | OUTPATIENT
Start: 2024-10-28

## 2024-10-28 RX ADMIN — GABAPENTIN 100 MG: 100 CAPSULE ORAL at 09:10

## 2024-10-28 RX ADMIN — POTASSIUM CHLORIDE 20 MEQ: 1500 TABLET, EXTENDED RELEASE ORAL at 09:10

## 2024-10-28 RX ADMIN — OLANZAPINE 15 MG: 15 TABLET, FILM COATED ORAL at 09:10

## 2024-10-28 RX ADMIN — ESCITALOPRAM OXALATE 20 MG: 20 TABLET ORAL at 09:10

## 2024-10-28 RX ADMIN — METFORMIN HYDROCHLORIDE 1000 MG: 1000 TABLET ORAL at 09:10

## 2024-10-28 RX ADMIN — Medication 1000 UNITS: at 09:10

## 2024-10-28 RX ADMIN — DIVALPROEX SODIUM 750 MG: 500 TABLET, EXTENDED RELEASE ORAL at 09:10

## 2024-10-28 NOTE — DISCHARGE INSTRUCTIONS
Information:  Medications:   Medication summary provided   I understand that I should take only the medications on my list.     -why and when I need to take each medicine.     -which side effects to watch for.     -that I should carry my medication information at all times in case of     Emergency situations.    I will take all of my medicines to follow up appointments.     -check with my physician or pharmacist before taking any new    Medication, over the counter product or drink alcohol.    -Ask about food, drug or dietary supplement interactions.    -discard old lists and update records with medication providers.    Notify Physician:  Notify physician if you notice:   Always call 911 if you feel your life is in danger  In case of an emergency call 911 immediately!  If 911 is not available call your local emergency medical system for help    Behavioral Health Follow Up:  Original Referral Source: [facility]  Discharge Diagnosis: Schizoaffective disorder, depressive type (HCC) [F25.1]  Recommendations for Level of Care: return to [facility]  Patient status at discharge: stable, voices readiness for discharge  My hospital  was: [facility]  Aftercare plan faxed: YES   -faxed by: staff   -date: 10/28/2024   -time: 1500  Prescriptions: see AVS    Smoking: Quit Smoking.   Call the [employer]'s smoking quitline at 9-709-68Q-QUIT  Know the signs of a heart attack   If you have any of the following symptoms call 911 immediately, do not wait more    Than five minutes.    1. Pressure, fullness and/ or squeezing in the center of the chest spreading to    The jaw, neck or shoulder.    2. Chest discomfort with light headedness, fainting, sweating, nausea or    Shortness of breath.   3. Upper abdominal pressure or discomfort.   4. Lower chest pain, back pain, unusual fatigue, shortness of breath, nausea   Or dizziness.     General Information:   Questions regarding your bill: Call HELP program ([phone])

## 2024-10-28 NOTE — BH NOTE
Behavioral Health [place]  Discharge Note    Pt discharged with followings belongings:   Dental Appliances: None  Vision - Corrective Lenses: None  Hearing Aid: None  Jewelry: None (none)  Body Piercings Removed: N/A  Clothing: Footwear, Hat, Pants, Shirt, Socks  Other Valuables: Other (Comment) (none)   Valuables sent home with patient at time of discharge. Patient educated on aftercare instructions: YES  Information faxed to [facility] by staff  at 1:32 PM .Patient verbalize understanding of AVS:  YES.    Status EXAM upon discharge:  Mental Status and Behavioral Exam  Normal: No  Level of Assistance: Independent/Self  Facial Expression: Brightened  Affect: Appropriate  Level of Consciousness: Alert  Frequency of Checks: 4 times per hour, close  Mood:Normal: No  Mood: Anxious  Motor Activity:Normal: No  Motor Activity: Unusual posture/gait  Eye Contact: Fair  Observed Behavior: Cooperative, Friendly, Preoccupied  Sexual Misconduct History: Current - no  Preception: Orwell to person, Orwell to time, Orwell to place, Orwell to situation  Attention:Normal: No  Attention: Distractible  Thought Processes: Circumstantial  Thought Content:Normal: No  Thought Content: Preoccupations  Depression Symptoms: Feelings of helplessness, Feelings of hopelessess, Isolative  Anxiety Symptoms: Generalized  [place] Symptoms: No problems reported or observed.  Hallucinations: Auditory (comment)  Delusions: No  Delusions: Controlled  Memory:Normal: No  Memory: Poor recent  Insight and Judgment: No  Insight and Judgment: Poor insight, Poor judgment    Tobacco Screening:  Practical Counseling, on admission, corby X, if applicable and completed (first 3 are required if patient doesn't refuse):            ( ) Recognizing danger situations (included triggers and roadblocks)                    ( ) Coping skills (new ways to manage stress,relaxation techniques, changing routine, distraction)

## 2024-10-28 NOTE — BH NOTE
Patient given tobacco quitline number 90435481557 at this time, refusing to call at this time, states \"I just dont want to quit now\"- patient given information as to the dangers of long term tobacco use. Continue to reinforce the importance of tobacco cessation.

## 2024-10-28 NOTE — GROUP NOTE
Group Therapy Note    Date: 10/28/2024    Group Start Time: 1000  Group End Time: 1035  Group Topic: Psychotherapy     Dilcia Berger MSW        Group Therapy Note    Attendees: 3/14     Patient was offered group therapy today but declined to participate despite encouragement from staff.  1:1 was offered.    Discipline Responsible: /Counselor      Signature:  FRANKLIN Stephens

## 2024-10-28 NOTE — DISCHARGE SUMMARY
groups and individual therapies. Meds were adjusted as noted below.  After few days of hospital care, patient began to feel improvement.  Depression lifted, thoughts to harm self ceased.  Sleep improved, appetite was good. On morning rounds 10/28/2024, Adolfo Presley  endorses feeling ready for discharge. Patient denies suicidal or homicidal ideations, denies hallucinations or delusions. Denies SE's from meds.  It was decided that maximum benefit from hospital care had been achieved and patient can be discharged.     Consults:   Internal medicine for medical management    Significant Diagnostic Studies: Routine labs and diagnostics    Treatments: Psychotropic medications, therapy with group, milieu, and 1:1 with nurses, social workers, PALINO/CNP, and Attending physician.      Discharge Medications:  Discharge Medication List as of 10/28/2024 11:26 AM        START taking these medications    Details   insulin lispro (HUMALOG,ADMELOG) 100 UNIT/ML SOLN injection vial Inject 0-8 Units into the skin 4 times daily (before meals and nightly), Disp-10 mL, R-0Normal           CONTINUE these medications which have CHANGED    Details   traZODone (DESYREL) 50 MG tablet Take 1 tablet by mouth nightly as needed for Sleep, Disp-30 tablet, R-0Normal           CONTINUE these medications which have NOT CHANGED    Details   potassium chloride (KLOR-CON M) 20 MEQ extended release tablet Take 1 tablet by mouth 2 times dailyHistorical Med      !! OLANZapine (ZYPREXA) 15 MG tablet Take 1 tablet by mouth every morningHistorical Med      atorvastatin (LIPITOR) 20 MG tablet Take 1 tablet by mouth nightly, Disp-30 tablet, R-0Normal      !! divalproex (DEPAKOTE ER) 250 MG extended release tablet Take 3 tablets by mouth every morningHistorical Med      !! divalproex (DEPAKOTE ER) 500 MG extended release tablet Take 1 tablet by mouth at bedtime Indications: Auditory HallucinationHistorical Med      gabapentin (NEURONTIN) 100 MG capsule Take 1

## 2024-10-28 NOTE — GROUP NOTE
Group Therapy Note    Date: 10/28/2024    Group Start Time: 1100  Group End Time: 1140  Group Topic: Psychoeducation    [facility]        Group Therapy Note    Attendees: 4/15       Patient's Goal:  To improve interpersonal skills and coping skills awareness through collaborating with peers and demonstrating self-expression.    Notes:  Patient attended and participated in group. Patient was able to collaborate with peers and demonstrate self-expression. Patient was pleasant and cooperative.     Status After Intervention:  Improved    Participation Level: Active Listener and Interactive. Distracted at times.     Participation Quality: Appropriate, Attentive, Sharing, and Supportive      Speech:  normal      Thought Process/Content: Logical and Linear. Distracted at times.       Affective Functioning: Congruent      Mood: euthymic      Level of consciousness:  Alert and Attentive      Response to Learning: Able to verbalize current knowledge/experience, Able to retain information, Capable of insight, and Progressing to goal      Endings: None Reported    Modes of Intervention: Education, Support, Socialization, and Activity      Discipline Responsible: Psychoeducational Specialist      Signature:  CARMEN Delgado

## 2024-10-28 NOTE — TRANSITION OF CARE
Behavioral Health Transition Record    Patient Name: Adolfo Presley  YOB: 1960   Medical Record Number: [id number]  Date of Admission: 10/21/2024  7:00 PM   Date of Discharge: 10/28/2024    Attending Provider: Enoc Diaz MD   Discharging Provider: Enoc Diaz MD  To contact this individual call [phone] and ask the  to page.  If unavailable, ask to be transferred to Behavioral Health Provider on call.  A Behavioral Health Provider will be available on call 24/7 and during holidays.    Primary Care Provider: No primary care provider on file.    Allergies   Allergen Reactions    Navane [Thiothixene (Tiotixene)]        Reason for Admission: Reason for Admission to Psychiatric Unit:  Concerns about patient's safety in the community     CHIEF COMPLAINT: Hallucinations and suicidal ideation    Admission Diagnosis: Schizoaffective disorder, depressive type (HCC) [F25.1]    * No surgery found *    Results for orders placed or performed during the hospital encounter of 10/21/24   Urine Drug Screen   Result Value Ref Range    Amphetamine Screen, Ur NEGATIVE NEGATIVE    Barbiturate Screen, Ur NEGATIVE NEGATIVE    Benzodiazepine Screen, Urine NEGATIVE NEGATIVE    Cocaine Metabolite, Urine NEGATIVE NEGATIVE    Methadone Screen, Urine NEGATIVE NEGATIVE    Opiates, Urine NEGATIVE NEGATIVE    Phencyclidine, Urine NEGATIVE NEGATIVE    Cannabinoid Scrn, Ur NEGATIVE NEGATIVE    Oxycodone Screen, Ur NEGATIVE NEGATIVE    Fentanyl, Ur NEGATIVE NEGATIVE    Test Information       This method is a screening test to detect only these drug classes as part of a medical workup. Confirmatory testing by another method should be ordered if clinically indicated.   CBC with Auto Differential   Result Value Ref Range    WBC 6.3 3.5 - 11.0 k/uL    RBC 4.27 (L) 4.5 - 5.9 m/uL    Hemoglobin 12.2 (L) 13.5 - 17.5 g/dL    Hematocrit 38.1 (L) 41 - 53 %    MCV 89.2 80 - 100 fL    MCH 28.6 26 - 34 pg    MCHC 32.1 31 - 37

## 2024-10-28 NOTE — CARE COORDINATION
1pm transport to [facility] confirmed with Ingrid at [facility]. Updates sent to Gavi at [facility].     Transport packet given to unit staff.

## 2025-01-03 NOTE — BH NOTE
Patient given tobacco quitline number 17210862091 at this time, refusing to call at this time, states \" I just dont want to quit now\"- patient given information as to the dangers of long term tobacco use. Continue to reinforce the importance of tobacco cessation. Resident

## 2025-06-02 ENCOUNTER — HOSPITAL ENCOUNTER (INPATIENT)
Age: 65
LOS: 4 days | Discharge: HOME | End: 2025-06-06
Attending: EMERGENCY MEDICINE | Admitting: PSYCHIATRY & NEUROLOGY
Payer: MEDICAID

## 2025-06-02 DIAGNOSIS — R45.851 DEPRESSION WITH SUICIDAL IDEATION: Primary | ICD-10-CM

## 2025-06-02 DIAGNOSIS — F32.A DEPRESSION WITH SUICIDAL IDEATION: Primary | ICD-10-CM

## 2025-06-02 LAB
ALBUMIN SERPL-MCNC: 3.8 G/DL (ref 3.5–5.2)
ALP SERPL-CCNC: 98 U/L (ref 40–129)
ALT SERPL-CCNC: 11 U/L (ref 10–50)
ANION GAP SERPL CALCULATED.3IONS-SCNC: 11 MMOL/L (ref 9–16)
AST SERPL-CCNC: 18 U/L (ref 10–50)
BASOPHILS # BLD: 0.03 K/UL (ref 0–0.2)
BASOPHILS NFR BLD: 0 % (ref 0–2)
BILIRUB SERPL-MCNC: 0.2 MG/DL (ref 0–1.2)
BUN SERPL-MCNC: 15 MG/DL (ref 8–23)
CALCIUM SERPL-MCNC: 9.2 MG/DL (ref 8.6–10.4)
CHLORIDE SERPL-SCNC: 110 MMOL/L (ref 98–107)
CO2 SERPL-SCNC: 26 MMOL/L (ref 20–31)
CREAT SERPL-MCNC: 0.9 MG/DL (ref 0.7–1.2)
EOSINOPHIL # BLD: 0.23 K/UL (ref 0–0.44)
EOSINOPHILS RELATIVE PERCENT: 3 % (ref 0–4)
ERYTHROCYTE [DISTWIDTH] IN BLOOD BY AUTOMATED COUNT: 14.8 % (ref 11.5–14.9)
ETHANOL PERCENT: NORMAL %
ETHANOLAMINE SERPL-MCNC: <10 MG/DL (ref 0–0.08)
GFR, ESTIMATED: >90 ML/MIN/1.73M2
GLUCOSE SERPL-MCNC: 115 MG/DL (ref 74–99)
HCT VFR BLD AUTO: 37.6 % (ref 41–53)
HGB BLD-MCNC: 11.8 G/DL (ref 13.5–17.5)
IMM GRANULOCYTES # BLD AUTO: <0.03 K/UL (ref 0–0.3)
IMM GRANULOCYTES NFR BLD: 0 %
LYMPHOCYTES NFR BLD: 3.27 K/UL (ref 1.1–3.7)
LYMPHOCYTES RELATIVE PERCENT: 48 % (ref 24–44)
MCH RBC QN AUTO: 28.2 PG (ref 26–34)
MCHC RBC AUTO-ENTMCNC: 31.4 G/DL (ref 31–37)
MCV RBC AUTO: 90 FL (ref 80–100)
MONOCYTES NFR BLD: 0.52 K/UL (ref 0.1–1.2)
MONOCYTES NFR BLD: 8 % (ref 3–12)
NEUTROPHILS NFR BLD: 41 % (ref 36–66)
NEUTS SEG NFR BLD: 2.77 K/UL (ref 1.5–8.1)
NRBC BLD-RTO: 0 PER 100 WBC
PLATELET # BLD AUTO: 264 K/UL (ref 150–450)
PMV BLD AUTO: 9.8 FL (ref 8–13.5)
POTASSIUM SERPL-SCNC: 4.1 MMOL/L (ref 3.7–5.3)
PROT SERPL-MCNC: 6.6 G/DL (ref 6.6–8.7)
RBC # BLD AUTO: 4.18 M/UL (ref 4.21–5.77)
SODIUM SERPL-SCNC: 147 MMOL/L (ref 136–145)
WBC OTHER # BLD: 6.8 K/UL (ref 3.5–11)

## 2025-06-02 PROCEDURE — 36415 COLL VENOUS BLD VENIPUNCTURE: CPT

## 2025-06-02 PROCEDURE — 99285 EMERGENCY DEPT VISIT HI MDM: CPT

## 2025-06-02 PROCEDURE — G0480 DRUG TEST DEF 1-7 CLASSES: HCPCS

## 2025-06-02 PROCEDURE — 80053 COMPREHEN METABOLIC PANEL: CPT

## 2025-06-02 PROCEDURE — 1240000000 HC EMOTIONAL WELLNESS R&B

## 2025-06-02 PROCEDURE — 85025 COMPLETE CBC W/AUTO DIFF WBC: CPT

## 2025-06-02 RX ORDER — POLYETHYLENE GLYCOL 3350 17 G/17G
17 POWDER, FOR SOLUTION ORAL DAILY PRN
Status: DISCONTINUED | OUTPATIENT
Start: 2025-06-02 | End: 2025-06-06 | Stop reason: HOSPADM

## 2025-06-02 RX ORDER — MAGNESIUM HYDROXIDE/ALUMINUM HYDROXICE/SIMETHICONE 120; 1200; 1200 MG/30ML; MG/30ML; MG/30ML
30 SUSPENSION ORAL EVERY 6 HOURS PRN
Status: DISCONTINUED | OUTPATIENT
Start: 2025-06-02 | End: 2025-06-06 | Stop reason: HOSPADM

## 2025-06-02 RX ORDER — TRAZODONE HYDROCHLORIDE 50 MG/1
50 TABLET ORAL NIGHTLY PRN
Status: DISCONTINUED | OUTPATIENT
Start: 2025-06-02 | End: 2025-06-06 | Stop reason: HOSPADM

## 2025-06-02 RX ORDER — ACETAMINOPHEN 325 MG/1
650 TABLET ORAL EVERY 6 HOURS PRN
Status: DISCONTINUED | OUTPATIENT
Start: 2025-06-02 | End: 2025-06-06 | Stop reason: HOSPADM

## 2025-06-02 RX ORDER — POLYETHYLENE GLYCOL 3350 17 G
2 POWDER IN PACKET (EA) ORAL
Status: DISCONTINUED | OUTPATIENT
Start: 2025-06-02 | End: 2025-06-06 | Stop reason: HOSPADM

## 2025-06-02 RX ORDER — HYDROXYZINE HYDROCHLORIDE 50 MG/1
50 TABLET, FILM COATED ORAL 3 TIMES DAILY PRN
Status: DISCONTINUED | OUTPATIENT
Start: 2025-06-02 | End: 2025-06-06 | Stop reason: HOSPADM

## 2025-06-02 RX ORDER — IBUPROFEN 400 MG/1
400 TABLET, FILM COATED ORAL EVERY 6 HOURS PRN
Status: DISCONTINUED | OUTPATIENT
Start: 2025-06-02 | End: 2025-06-06 | Stop reason: HOSPADM

## 2025-06-02 ASSESSMENT — SLEEP AND FATIGUE QUESTIONNAIRES
AVERAGE NUMBER OF SLEEP HOURS: 8
DO YOU HAVE DIFFICULTY SLEEPING: NO
DO YOU USE A SLEEP AID: NO

## 2025-06-02 ASSESSMENT — LIFESTYLE VARIABLES
HOW MANY STANDARD DRINKS CONTAINING ALCOHOL DO YOU HAVE ON A TYPICAL DAY: PATIENT DOES NOT DRINK
HOW OFTEN DO YOU HAVE A DRINK CONTAINING ALCOHOL: NEVER
HOW MANY STANDARD DRINKS CONTAINING ALCOHOL DO YOU HAVE ON A TYPICAL DAY: PATIENT DOES NOT DRINK
HOW OFTEN DO YOU HAVE A DRINK CONTAINING ALCOHOL: NEVER

## 2025-06-02 ASSESSMENT — PATIENT HEALTH QUESTIONNAIRE - PHQ9
SUM OF ALL RESPONSES TO PHQ QUESTIONS 1-9: 2
SUM OF ALL RESPONSES TO PHQ QUESTIONS 1-9: 2
1. LITTLE INTEREST OR PLEASURE IN DOING THINGS: SEVERAL DAYS
SUM OF ALL RESPONSES TO PHQ QUESTIONS 1-9: 2
2. FEELING DOWN, DEPRESSED OR HOPELESS: SEVERAL DAYS
SUM OF ALL RESPONSES TO PHQ QUESTIONS 1-9: 2

## 2025-06-02 NOTE — ED NOTES
This writer consulted with Dr Quiros who recommended inpatient hospitalization for safety and stabilization.  Patient placed on application for emergency admission to [facility].

## 2025-06-02 NOTE — ED PROVIDER NOTES
EMERGENCY DEPARTMENT ENCOUNTER    Pt Name: Adolfo Presley  MRN: [id number]  Birthdate 1960  Date of evaluation: 6/2/25  CHIEF COMPLAINT       Chief Complaint   Patient presents with    Mental Health Problem     Pink slip - SI     HISTORY OF PRESENT ILLNESS   HPI  Suicidal thoughts.  He put a garbage bag over his head a couple days ago and staff intervened and removed it.  Sent here on a pink slip from the NP for suicidal thoughts and suicide attempt.  Patient does admit to this.  He is compliant with his meds.  He is got a small bunion on his left lower foot that is chronic and irritates him.  It does not appear to be infected.      PASTMEDICAL HISTORY     Past Medical History:   Diagnosis Date    Bipolar disorder ([facility])     Depression     GERD (gastroesophageal reflux disease)     Hallucinations     Headache(784.0)     Hepatitis     Schizophrenia, schizo-affective ([facility])     Substance abuse ([facility])     Tobacco abuse     Type II or unspecified type diabetes mellitus without mention of complication, not stated as uncontrolled     Urinary incontinence      Past Problem List  Patient Active Problem List   Diagnosis Code    Hematuria R31.9    Suicidal ideations R45.851    Cocaine abuse ([facility]) F14.10    Schizoaffective disorder, bipolar type ([facility]) F25.0    Schizoaffective disorder, depressive type ([facility]) F25.1    Perianal abscess K61.0    Carla-rectal abscess K61.1    Schizophrenia ([facility]) F20.9    Schizoaffective disorder ([facility]) F25.9    Major depression with psychotic features ([facility]) F32.3    Acute psychosis ([facility]) F23    Depression with suicidal ideation F32.A, R45.851    Pneumonia due to infectious organism J18.9    Smoker F17.200    Ventricular ectopy I49.3    Low serum cortisol level R79.89    Sepsis due to Klebsiella pneumoniae with no resultant organ failure ([facility]) A41.4    Elevated BP without diagnosis of hypertension R03.0    Lower urinary tract symptoms (LUTS) R39.9    Dyspepsia R10.13    Skin rash R21    Hypernatremia  IMPRESSION      1. Depression with suicidal ideation          DISPOSITION/PLAN   DISPOSITION Decision To Admit 06/02/2025 07:37:52 PM   DISPOSITION CONDITION Stable           OUTPATIENT FOLLOW UP THE PATIENT:  No follow-up provider specified.    MD Tanner Stanford Wesley D, MD  06/02/25 [id number]

## 2025-06-02 NOTE — ED NOTES
Provisional Diagnosis:        Psychosocial and Contextual Factors:   Patient presents at the ED via [facility] on a pink slip due to suicidal ideations.     C-SSRS Summary:  X    Patient: X  Family:    Agency: X    Substance Abuse: Patient denies    Present Suicidal Behavior:  X    Verbal: X    Attempt:     Past Suicidal Behavior: X    Verbal:X    Attempt:       Self-Injurious/Self-Mutilation:     Violence Current or Past: None documented or identified.    Trauma Identified:       Protective Factors:    Patient has housing, guardian and income       Risk Factors:           Clinical Summary:    Adolfo Presley is a 65 y.o. male who presents at the ED via [facility] on a pink slip due to suicidal ideations.     Per pink slip,   \"I received a call from nurse Sanjuana at [facility] regarding Devon Presley. Sajnuana states that over the weekend Adolfo had suicidal ideations and attempted to put a bag over his head. The patient is assessed to be at imminent risk of self-harm and is not currently safe to remain in their present environment. Due to concerns about the patient's safety and potential for self-injurious behavior, a higher level of care is recommended such as an inpatient psychiatric stay. The patient exhibits significant risk factors for self-harm, and their current living situation does not provide adequate safety or supervision.\"     Patient reports he is suicide sometimes. Patient stated that he did attempt to suffocate himself with a bag over the weekend. Patient reported that he also tried to steal a knife to kill himself.     Patient reports auditory hallucinations. Patient reports these are baseline. Patient stated they started to increase the last week which is leading to his suicidal ideations. Patient reports the voices tell him thing like \"motherfucker you ain't good\" and \"no one likes you.\"     Patient reports that [facility] gives him all his medication.

## 2025-06-03 LAB
AMPHET UR QL SCN: NEGATIVE
BARBITURATES UR QL SCN: NEGATIVE
BENZODIAZ UR QL: NEGATIVE
CANNABINOIDS UR QL SCN: NEGATIVE
COCAINE UR QL SCN: NEGATIVE
FENTANYL UR QL: NEGATIVE
GLUCOSE BLD-MCNC: 111 MG/DL (ref 75–110)
GLUCOSE BLD-MCNC: 112 MG/DL (ref 75–110)
GLUCOSE BLD-MCNC: 135 MG/DL (ref 75–110)
GLUCOSE BLD-MCNC: 97 MG/DL (ref 75–110)
METHADONE UR QL: NEGATIVE
OPIATES UR QL SCN: NEGATIVE
OXYCODONE UR QL SCN: NEGATIVE
PCP UR QL SCN: NEGATIVE
TEST INFORMATION: NORMAL

## 2025-06-03 PROCEDURE — 1240000000 HC EMOTIONAL WELLNESS R&B

## 2025-06-03 PROCEDURE — 99222 1ST HOSP IP/OBS MODERATE 55: CPT | Performed by: PSYCHIATRY & NEUROLOGY

## 2025-06-03 PROCEDURE — 6370000000 HC RX 637 (ALT 250 FOR IP): Performed by: PSYCHIATRY & NEUROLOGY

## 2025-06-03 PROCEDURE — APPSS60 APP SPLIT SHARED TIME 46-60 MINUTES

## 2025-06-03 PROCEDURE — 80307 DRUG TEST PRSMV CHEM ANLYZR: CPT

## 2025-06-03 PROCEDURE — 82947 ASSAY GLUCOSE BLOOD QUANT: CPT

## 2025-06-03 RX ORDER — OLANZAPINE 15 MG/1
15 TABLET, FILM COATED ORAL EVERY MORNING
Status: DISCONTINUED | OUTPATIENT
Start: 2025-06-03 | End: 2025-06-06 | Stop reason: HOSPADM

## 2025-06-03 RX ORDER — OLANZAPINE 10 MG/1
20 TABLET, FILM COATED ORAL NIGHTLY
Status: DISCONTINUED | OUTPATIENT
Start: 2025-06-03 | End: 2025-06-06 | Stop reason: HOSPADM

## 2025-06-03 RX ORDER — POTASSIUM CHLORIDE 1500 MG/1
20 TABLET, EXTENDED RELEASE ORAL 2 TIMES DAILY
Status: DISCONTINUED | OUTPATIENT
Start: 2025-06-03 | End: 2025-06-06 | Stop reason: HOSPADM

## 2025-06-03 RX ORDER — DEXTROSE MONOHYDRATE 100 MG/ML
INJECTION, SOLUTION INTRAVENOUS CONTINUOUS PRN
Status: DISCONTINUED | OUTPATIENT
Start: 2025-06-03 | End: 2025-06-06 | Stop reason: HOSPADM

## 2025-06-03 RX ORDER — GABAPENTIN 100 MG/1
100 CAPSULE ORAL 2 TIMES DAILY
Status: DISCONTINUED | OUTPATIENT
Start: 2025-06-03 | End: 2025-06-06 | Stop reason: HOSPADM

## 2025-06-03 RX ORDER — DIVALPROEX SODIUM 500 MG/1
500 TABLET, FILM COATED, EXTENDED RELEASE ORAL NIGHTLY
Status: DISCONTINUED | OUTPATIENT
Start: 2025-06-03 | End: 2025-06-06 | Stop reason: HOSPADM

## 2025-06-03 RX ORDER — INSULIN LISPRO 100 [IU]/ML
0-8 INJECTION, SOLUTION INTRAVENOUS; SUBCUTANEOUS
Status: DISCONTINUED | OUTPATIENT
Start: 2025-06-03 | End: 2025-06-06 | Stop reason: HOSPADM

## 2025-06-03 RX ORDER — ESCITALOPRAM OXALATE 20 MG/1
20 TABLET ORAL DAILY
Status: DISCONTINUED | OUTPATIENT
Start: 2025-06-03 | End: 2025-06-06 | Stop reason: HOSPADM

## 2025-06-03 RX ORDER — ATORVASTATIN CALCIUM 20 MG/1
20 TABLET, FILM COATED ORAL NIGHTLY
Status: DISCONTINUED | OUTPATIENT
Start: 2025-06-03 | End: 2025-06-06 | Stop reason: HOSPADM

## 2025-06-03 RX ORDER — VITAMIN B COMPLEX
1000 TABLET ORAL DAILY
Status: DISCONTINUED | OUTPATIENT
Start: 2025-06-03 | End: 2025-06-06 | Stop reason: HOSPADM

## 2025-06-03 RX ADMIN — DIVALPROEX SODIUM 750 MG: 500 TABLET, EXTENDED RELEASE ORAL at 08:51

## 2025-06-03 RX ADMIN — POTASSIUM CHLORIDE 20 MEQ: 1500 TABLET, EXTENDED RELEASE ORAL at 08:51

## 2025-06-03 RX ADMIN — OLANZAPINE 15 MG: 15 TABLET, FILM COATED ORAL at 08:51

## 2025-06-03 RX ADMIN — TRAZODONE HYDROCHLORIDE 50 MG: 50 TABLET ORAL at 21:37

## 2025-06-03 RX ADMIN — HYDROXYZINE HYDROCHLORIDE 50 MG: 50 TABLET, FILM COATED ORAL at 21:37

## 2025-06-03 RX ADMIN — DIVALPROEX SODIUM 500 MG: 500 TABLET, EXTENDED RELEASE ORAL at 21:37

## 2025-06-03 RX ADMIN — Medication 1000 UNITS: at 08:51

## 2025-06-03 RX ADMIN — OLANZAPINE 20 MG: 10 TABLET, FILM COATED ORAL at 21:37

## 2025-06-03 RX ADMIN — ESCITALOPRAM OXALATE 20 MG: 20 TABLET ORAL at 08:50

## 2025-06-03 RX ADMIN — GABAPENTIN 100 MG: 100 CAPSULE ORAL at 08:51

## 2025-06-03 RX ADMIN — ATORVASTATIN CALCIUM 20 MG: 20 TABLET, FILM COATED ORAL at 21:36

## 2025-06-03 RX ADMIN — METFORMIN HYDROCHLORIDE 1000 MG: 1000 TABLET ORAL at 08:59

## 2025-06-03 RX ADMIN — METFORMIN HYDROCHLORIDE 1000 MG: 1000 TABLET ORAL at 17:39

## 2025-06-03 RX ADMIN — POTASSIUM CHLORIDE 20 MEQ: 1500 TABLET, EXTENDED RELEASE ORAL at 21:37

## 2025-06-03 RX ADMIN — GABAPENTIN 100 MG: 100 CAPSULE ORAL at 17:39

## 2025-06-03 ASSESSMENT — LIFESTYLE VARIABLES
HOW OFTEN DO YOU HAVE A DRINK CONTAINING ALCOHOL: NEVER
HOW OFTEN DO YOU HAVE A DRINK CONTAINING ALCOHOL: NEVER
HOW MANY STANDARD DRINKS CONTAINING ALCOHOL DO YOU HAVE ON A TYPICAL DAY: PATIENT DOES NOT DRINK
HOW MANY STANDARD DRINKS CONTAINING ALCOHOL DO YOU HAVE ON A TYPICAL DAY: PATIENT DOES NOT DRINK

## 2025-06-03 NOTE — CARE COORDINATION
BHI Biopsychosocial Assessment    Current Level of Psychosocial Functioning     Independent   Dependent  xxx  Minimal Assist     Comments:    Psychosocial High Risk Factors (check all that apply)    Unable to obtain meds   Chronic illness/pain    Substance abuse   Lack of Family Support   Financial stress   Isolation xx  Inadequate Community Resources  Suicide attempt(s)  Not taking medications   Victim of crime   Developmental Delay  Unable to manage personal needs    Age 65 or older   Homeless  No transportation   Readmission within 30 days  Unemployment  Traumatic Event    Comments:   Psychiatric Advanced Directives: none reported     Family to Involve in Treatment: sister/legal guardian is supportive     Sexual Orientation:  n/a    Patient Strengths: stable housing at nursing facility where he receives all care     Patient Barriers: history of substance abuse, history of previous psychiatric hospitalizations       Opiate Education Provided:  denies       CMHC/mental health history: receives all services within the nursing facility     Plan of Care   medication management, group/individual therapies, family meetings, psycho -education, treatment team meetings to assist with stabilization    Initial Discharge Plan:        Clinical Summary:  Adolfo is a 65 year year old single male who has been admitted to [facility] with report of interrupted suicide attempt days prior to admission by allegedly placing a bag over his head. Adolfo lives at [facility], his sister Irma is his legal guardian. He has history of substance abuse, none since admission to the nursing facility. Ongoing support and encouragement offered.     Writer spoke with sister/legal guardian who offered verbal consent for admission. She states it is her intention for Adolfo to return to [facility]. She states she has been considering a group home placement for Adolfo, however, he has not shown the  long-term stability to convince her to pursue that option. She states she lives in [place] but talks to Adolfo and his staff at the facility often.     Writer spoke with Gavi at [phone], confirmed Adolfo may return to facility and his bed is being held.

## 2025-06-03 NOTE — PLAN OF CARE
Problem: Self Harm/Suicidality  Goal: Will have no self-injury during hospital stay  Description: INTERVENTIONS:1.  Ensure constant observer at bedside with Q15M safety checks2.  Maintain a safe environment3.  Secure patient belongings4.  Ensure family/visitors adhere to safety recommendations5.  Ensure safety tray has been added to patient's diet order6.  Every shift and PRN: Re-assess suicidal risk via Frequent Screener  Outcome: Progressing     Problem: Risk for Elopement  Goal: Patient will not exit the unit/facility without proper excort  Outcome: Progressing     Problem: Safety - Adult  Goal: Free from fall injury  Outcome: Progressing     Patient is open to 1:1 talk with staff. Patient denies the presence of any depression, anxiety, suicidal ideations, homicidal ideations, auditory hallucinations, and/or visual hallucinations. Patient is isolative to his room aside from when he is out for needs and selectively attends group therapy sessions. Patient eats his meals and is compliant with his prescribed medications.

## 2025-06-03 NOTE — GROUP NOTE
Group Therapy Note    Date: 6/3/2025    Group Start Time: 0900  Group End Time: 0905  Group Topic: Group Documentation    [facility]    Berenice Bishop LPN        Group Therapy Note         Patient did not participate in Goals  group, despite staff encouragement and explanation of benefits.  Patient remain seclusive to self.  Q15 minute safety checks maintained for patient safety and will continue to encourage patient to attend unit programming.      Signature:  Berenice Bishop LPN

## 2025-06-03 NOTE — PROGRESS NOTES
Behavioral Services  Medicare Certification Upon Admission    I certify that this patient's inpatient psychiatric hospital admission is medically necessary for:    [x] (1) Treatment which could reasonably be expected to improve this patient's condition,       [x] (2) Or for diagnostic study;     AND     [x](2) The inpatient psychiatric services are provided while the individual is under the care of a physician and are included in the individualized plan of care.    Estimated length of stay/service 4 to 7 days    Plan for post-hospital care home with outpatient community mental health follow-up    Electronically signed by TOSHIA ESCAMILLA MD on 6/3/2025 at 1:22 PM

## 2025-06-03 NOTE — GROUP NOTE
Group Therapy Note    Date: 6/3/2025    Group Start Time: 1000  Group End Time: 1020  Group Topic: Psychotherapy    [facility] B[facility] MSW        Group Therapy Note    Attendees: 1/13       Patient's Goal:  Discuss how you are feeling and what goals are for the day    Notes:     Status After Intervention:  Improved    Participation Level: Active Listener and Interactive    Participation Quality: Appropriate, Attentive, and Sharing      Speech:  normal      Thought Process/Content: Logical  Linear      Affective Functioning: Congruent      Mood: euthymic      Level of consciousness:  Alert and Oriented x4      Response to Learning: Able to verbalize current knowledge/experience and Able to verbalize/acknowledge new learning      Endings: None Reported    Modes of Intervention: Support      Discipline Responsible: /Counselor      Signature:  FRANKLIN Stephens

## 2025-06-03 NOTE — GROUP NOTE
Group Therapy Note    Date: 6/3/2025    Group Start Time: 1330  Group End Time: 1400  Group Topic: Recreational    [facility]    Recreation Group Note        Date: Juli 3, 2025 Start Time: 1:30pm  End Time:  200pm      Number of Participants in Group & Unit Census:  1/12    Topic: recreation group    Goal of Group: pt will demonstrate improved coping skills skills and improved leisure awareness      Comments:     Patient did not participate in Recreation group, despite staff encouragement and explanation of benefits.  Patient remain seclusive to self.  Q15 minute safety checks maintained for patient safety and will continue to encourage patient to attend unit programming.              Signature:  CARMEN BLANCA

## 2025-06-03 NOTE — H&P
Department of Psychiatry  Attending Physician Psychiatric Assessment   Patient: Adolfo Presley  MRN: [id number]  Reason for Admission to Psychiatric Unit:  Threat to self requiring 24 hour professional observation  Command hallucinations directing harm to self or others; risk of the patient taking action  A mental disorder causing major disability in social, interpersonal, occupational, and/or educational functioning that is leading to dangerous or life-threatening functioning, and that can only be addressed in an acute inpatient setting   Concerns about patient's safety in the community  Past Mental Health Diagnosis: a history of  Bipolar Disorder, Schizoaffective Disorder, Prior suicide attempt, and Alcohol and/or Drug Use Disorder  Triggering event or precipitating factor: Relationship Issues  Length of time/duration of triggering event: Days  Legal Status: Involuntary    CHIEF COMPLAINT:  Suicidal ideation and auditory hallucinations     History obtained from: Patient, electronic medical record          HISTORY OF PRESENT ILLNESS:    Adolfo Presley is a 65 y.o. male who has a past medical history of schizoaffective disorder, bipolar disorder, depression, polysubstance abuse, GERD, hepatitis, diabetes, and chronic headaches. Patient presented to the ED via [facility] on a pink slip due to suicidal ideation. Per pink slip, \"I received a call from nurse Wei at [facility] regarding Devon Presley. Sanjuana states that over the weekend Adolfo had suicidal ideations and attempted to put a bag over his head. The patient is assessed to be at imminent risk of self-harm and is not currently safe to remain in their present environment. Due to concerns about the patient's safety and potential for self-injurious behavior, a higher level of care is recommended such as an inpatient psychiatric stay. The patient exhibits significant risk factors for self-harm, and their current living situation does not provide  Dragon dictation software.  Although every effort was made to ensure the accuracy of this automated transcription, some errors in transcription may have occurred.    I independently saw and evaluated the patient.  I reviewed the nurse practitioners documentation above.  Principle diagnosis we are treating for is Schizoaffective disorder, depressive type (HCC). Any additional comments or changes to the nurse practitioners documentation are stated below otherwise agree with assessment.  Plan will be as follows:  Unclear compliance with medications at home.  Will follow-up with the facility to find out about the patient's medication compliance, in the meantime we will resume home medications.  This includes Depakote, Lexapro and olanzapine.  May consider switch to Clazuril as patient does have prominent EPS and potentially developing tardive dyskinesia.  Will discuss with guardian  Electronically signed by TOSHIA ESCAMILLA MD on 6/3/2025 at 1:23 PM

## 2025-06-03 NOTE — BH NOTE
[facility]  Initial Interdisciplinary Treatment Plan Note      Original treatment plan Date & Time: 6/3/2025 1245    Admission Type:  Admission Type: Involuntary    Reason for admission:   Reason for Admission: Patient attempted to put trash bag over head several days ago at SNF. Auditory hallucinations increased from baseline. Pt states voices are saying derogatory things.    Estimated Length of Stay:  5-7days  Estimated Discharge Date: To be determined by physician.    PATIENT STRENGTHS:  Patient Strengths:   Patient Strengths and Limitations:Limitations: Difficulty problem solving/relies on others to help solve problems, Apathetic / unmotivated  Addictive Behavior: Addictive Behavior  In the Past 3 Months, Have You Felt or Has Someone Told You That You Have a Problem With  : None  Medical Problems:  Past Medical History:   Diagnosis Date    Bipolar disorder (HCC)     Depression     GERD (gastroesophageal reflux disease)     Hallucinations     Headache(784.0)     Hepatitis     Schizophrenia, schizo-affective (HCC)     Substance abuse ([facility])     Tobacco abuse     Type II or unspecified type diabetes mellitus without mention of complication, not stated as uncontrolled     Urinary incontinence      Status EXAM:Mental Status and Behavioral Exam  Normal: No  Level of Assistance: Independent/Self  Facial Expression: Flat  Affect: Congruent  Level of Consciousness: Alert  Frequency of Checks: 4 times per hour, close  Mood:Normal: No  Mood: Depressed, Anxious  Motor Activity:Normal: No  Motor Activity: Decreased  Eye Contact: Fair  Observed Behavior: Cooperative, Preoccupied  Sexual Misconduct History: Current - no  Preception: [place] to person, [place] to time, [place] to place, [place] to situation  Attention:Normal: No  Attention: Distractible  Thought Processes: Blocking  Thought Content:Normal: No  Thought Content: Preoccupations  Depression Symptoms: Impaired concentration, Feelings of  hopelessess  Anxiety Symptoms: Generalized  [place] Symptoms: No problems reported or observed.  Hallucinations: None (Pt denies)  Delusions: No  Delusions: Paranoid  Memory:Normal: No  Memory: Poor recent, Poor remote  Insight and Judgment: No  Insight and Judgment: Poor judgment, Poor insight    EDUCATION:   Learner Progress Toward Treatment Goals: Will review group plans and strategies for care.    Method: Group therapy, Medication compliance, Individualized assessments and Care planning.    Outcome: Needs Reinforcement    PATIENT GOALS: To be discussed with patient within 72 hours    PLAN/TREATMENT RECOMMENDATIONS:     Continue group therapy , Medications compliance, Goal setting, Individualized assessments and Care planning, continue to monitor patient on unit.      SHORT-TERM GOALS:   Time frame for Short-Term Goals: 5-7 days    LONG-TERM GOALS:  Time frame for Long-Term Goals: 6 months  Members Present in Team Meeting: See Signature Sheet    Sharla Schultz RN

## 2025-06-03 NOTE — H&P
IN-PATIENT SERVICE  [facility] Internal Medicine    CONSULTATION / HISTORY AND PHYSICAL EXAMINATION            Date:   6/3/2025  Patient name:  Adolfo Presley  Date of admission:  6/2/2025  5:35 PM  MRN:   [id number]  Account:  [id number]  YOB: 1960  PCP:    No primary care provider on file.  Room:   [address] [place] [address]  Code Status:    Full Code    Physician Requesting Consult: Enoc Diaz MD    Reason for Consult:  medical management    Chief Complaint:     Chief Complaint   Patient presents with    Mental Health Problem     Pink slip - SI       History Obtained From:     Patient medical record nursing staff    History of Present Illness:   Patient, has past medical history multiple medical problems which include major depression, bipolar disorder, schizophrenia, polysubstance abuse, type 2 diabetes, admitted since his [facility] hospital with persistent depression, suicidal ideation\  Patient, told me that he was compliant with medication, does not check blood sugar regularly  Patient, has history of multiple hospitalization in the past in [facility] unit  Complaining of pain in undersurface of left foot, for long period of time    Past Medical History:     Past Medical History:   Diagnosis Date    Bipolar disorder (HCC)     Depression     GERD (gastroesophageal reflux disease)     Hallucinations     Headache(784.0)     Hepatitis     Schizophrenia, schizo-affective (HCC)     Substance abuse (HCC)     Tobacco abuse     Type II or unspecified type diabetes mellitus without mention of complication, not stated as uncontrolled     Urinary incontinence         Past Surgical History:     Past Surgical History:   Procedure Laterality Date    ABSCESS DRAINAGE N/A 02/11/2018    Carla anal abcess    DENTAL SURGERY      all teeth pulled        Medications Prior to Admission:     Prior to Admission medications    Medication Sig Start Date End Date Taking? Authorizing Provider   insulin lispro (HUMALOG,ADMELOG)  of Onset    Diabetes Mother     Heart Disease Mother        Review of Systems:     Positive and Negative as described in HPI.    CONSTITUTIONAL:  negative for fevers, chills, sweats, fatigue, weight loss  HEENT:  negative for vision, hearing changes, runny nose, throat pain  RESPIRATORY:  negative for shortness of breath, cough, congestion, wheezing.  CARDIOVASCULAR:  negative for chest pain, palpitations.  GASTROINTESTINAL:  negative for nausea, vomiting, diarrhea, constipation, change in bowel habits, abdominal pain   GENITOURINARY:  negative for difficulty of urination, burning with urination, frequency   INTEGUMENT:  negative for rash, skin lesions, easy bruising   HEMATOLOGIC/LYMPHATIC:  negative for swelling/edema   ALLERGIC/IMMUNOLOGIC:  negative for urticaria , itching  ENDOCRINE:  negative increase in drinking, increase in urination, hot or cold intolerance  MUSCULOSKELETAL: Pain left foot  NEUROLOGICAL:  negative for headaches, dizziness, lightheadedness, numbness, pain, tingling extremities  BEHAVIOR/PSYCH: Depressed    Physical Exam:     /81   Pulse 76   Temp 97.7 °F (36.5 °C) (Temporal)   Resp 14   Ht 1.88 m (6' 2\")   SpO2 97%   BMI 32.10 kg/m²   Temp (24hrs), Av.7 °F (36.5 °C), Min:97.5 °F (36.4 °C), Max:98 °F (36.7 °C)    Recent Labs     25  0752 25  1201   POCGLU 97 112*     No intake or output data in the 24 hours ending 25 1256    General Appearance:  alert, well appearing, and in no acute distress  Mental status: oriented to person, place, and time with normal affect  Head:  normocephalic, atraumatic.  Eye: no icterus, redness, pupils equal and reactive, extraocular eye movements intact, conjunctiva clear  Ear: normal external ear, no discharge, hearing intact  Nose:  no drainage noted  Mouth: mucous membranes moist  Neck: supple, no carotid bruits, thyroid not palpable  Lungs: Bilateral equal air entry, clear to ausculation, no wheezing, rales or rhonchi, normal

## 2025-06-03 NOTE — BH NOTE
Provider notified of best practice advisory suggesting to place patient on suicide precautions. Provider to discontinue the order as patient does not meet criteria for suicide precautions at this time. Continue to observe patient on every 15 minute checks.

## 2025-06-03 NOTE — PROGRESS NOTES
Pharmacy Medication History Note      List of current medications patient is taking is complete.    Source of information: [facility]    Changes made to medication list:  Medications removed (include reason, ex. therapy complete or physician discontinued, noncompliance):  N/A    Medications flagged for provider review:  N/A    Medications added/doses adjusted:  Gabapentin 100 mg BID --> 300 mg BID (adjusted, per [facility] staff)    Other notes (ex. Recent course of antibiotics, Coumadin dosing):  OARRS - unable to run report      Current Home Medication List at Time of Admission:  Prior to Admission medications    Medication Sig Start Date End Date Taking? Authorizing Provider   insulin lispro (HUMALOG,ADMELOG) 100 UNIT/ML SOLN injection vial Inject 0-8 Units into the skin 4 times daily (before meals and nightly) 10/28/24   Enoc Diaz MD   traZODone (DESYREL) 50 MG tablet Take 1 tablet by mouth nightly as needed for Sleep 10/28/24   Enoc Diaz MD   potassium chloride (KLOR-CON M) 20 MEQ extended release tablet Take 1 tablet by mouth 2 times daily    Mina Borja MD   OLANZapine (ZYPREXA) 15 MG tablet Take 1 tablet by mouth every morning    Mina Borja MD   atorvastatin (LIPITOR) 20 MG tablet Take 1 tablet by mouth nightly 4/9/24   Enoc Diaz MD   divalproex (DEPAKOTE ER) 250 MG extended release tablet Take 3 tablets by mouth every morning    Mina Borja MD   divalproex (DEPAKOTE ER) 500 MG extended release tablet Take 1 tablet by mouth at bedtime Indications: Auditory Hallucination 4/5/24   David Kim MD   gabapentin (NEURONTIN) 300 MG capsule Take 1 capsule by mouth in the morning and 1 capsule in the evening. Indications: Diabetes with Nerve Disease. Give 100 mg by mouth two times a day for .    David Kim MD   metFORMIN (GLUCOPHAGE) 1000 MG tablet Take 1 tablet by mouth 2 times daily (with meals) Indications: Diabetes 4/5/24

## 2025-06-03 NOTE — BH NOTE
Patient provided with urine specimen cup and educated on the need for sample. Patient educated on order for EKG, refused at this time.

## 2025-06-03 NOTE — BH NOTE
[facility]  Admission Note     Admission Type:   Admission Type: Involuntary    Reason for admission:  Reason for Admission: Patient attempted to put trash bag over head several days ago at SNF. Auditory hallucinations increased from baseline. Pt states voices are saying derogatory things.      Addictive Behavior:   Addictive Behavior  In the Past 3 Months, Have You Felt or Has Someone Told You That You Have a Problem With  : None    Medical Problems:   Past Medical History:   Diagnosis Date    Bipolar disorder (HCC)     Depression     GERD (gastroesophageal reflux disease)     Hallucinations     Headache(784.0)     Hepatitis     Schizophrenia, schizo-affective (HCC)     Substance abuse ([facility])     Tobacco abuse     Type II or unspecified type diabetes mellitus without mention of complication, not stated as uncontrolled     Urinary incontinence        Status EXAM:  Mental Status and Behavioral Exam  Normal: No  Level of Assistance: Independent/Self  Facial Expression: Flat  Affect: Congruent  Level of Consciousness: Alert  Frequency of Checks: 4 times per hour, close  Mood:Normal: No  Mood: Depressed, Anxious  Motor Activity:Normal: No  Motor Activity: Decreased  Eye Contact: Fair  Observed Behavior: Preoccupied, Cooperative  Sexual Misconduct History: Current - no  Preception: [place] to person, [place] to time, [place] to place, [place] to situation  Attention:Normal: No  Attention: Distractible  Thought Processes: Blocking  Thought Content:Normal: No  Thought Content: Preoccupations  Depression Symptoms: Impaired concentration, Feelings of hopelessess  Anxiety Symptoms: Generalized  [place] Symptoms: No problems reported or observed.  Hallucinations: Auditory (comment)  Delusions: Yes  Delusions: Paranoid  Memory:Normal: No  Memory: Poor recent, Poor remote  Insight and Judgment: No  Insight and Judgment: Poor judgment, Poor insight    Tobacco Screening:  Practical Counseling, on admission, corby X, if

## 2025-06-03 NOTE — H&P
Patient, did not wake up to give any meaningful history  Will try to reevaluate tomorrow  Resume home medications

## 2025-06-03 NOTE — GROUP NOTE
Group Therapy Note    Date: 6/3/2025    Group Start Time: 1100  Group End Time: 1130  Group Topic: Cognitive Skills    Dory Cohen CTRS    Cognitive Skills Group Note        Date: Juli 3, 2025 Start Time: 11am  End Time: 11:30am      Number of Participants in Group & Unit Census:  3/13    Topic: cognitive skills     Goal of Group: pt will demonstrate improved coping skills and improved interpersonal relationships      Comments:     Patient did not participate in Cognitive Skills group, despite staff encouragement and explanation of benefits.  Patient remain seclusive to self.  Q15 minute safety checks maintained for patient safety and will continue to encourage patient to attend unit programming.              Signature:  CARMEN BLANCA

## 2025-06-03 NOTE — PROGRESS NOTES

## 2025-06-04 LAB
CHOLEST SERPL-MCNC: 121 MG/DL (ref 0–199)
CHOLESTEROL/HDL RATIO: 2.9
EST. AVERAGE GLUCOSE BLD GHB EST-MCNC: 143 MG/DL
GLUCOSE BLD-MCNC: 141 MG/DL (ref 75–110)
GLUCOSE BLD-MCNC: 175 MG/DL (ref 75–110)
GLUCOSE BLD-MCNC: 200 MG/DL (ref 75–110)
GLUCOSE BLD-MCNC: 250 MG/DL (ref 75–110)
HBA1C MFR BLD: 6.6 % (ref 4–6)
HDLC SERPL-MCNC: 42 MG/DL
LDLC SERPL CALC-MCNC: 67 MG/DL (ref 0–100)
TRIGL SERPL-MCNC: 62 MG/DL (ref 0–149)

## 2025-06-04 PROCEDURE — 99223 1ST HOSP IP/OBS HIGH 75: CPT | Performed by: INTERNAL MEDICINE

## 2025-06-04 PROCEDURE — 6370000000 HC RX 637 (ALT 250 FOR IP): Performed by: PSYCHIATRY & NEUROLOGY

## 2025-06-04 PROCEDURE — 83036 HEMOGLOBIN GLYCOSYLATED A1C: CPT

## 2025-06-04 PROCEDURE — 6370000000 HC RX 637 (ALT 250 FOR IP): Performed by: NURSE PRACTITIONER

## 2025-06-04 PROCEDURE — 99232 SBSQ HOSP IP/OBS MODERATE 35: CPT | Performed by: PSYCHIATRY & NEUROLOGY

## 2025-06-04 PROCEDURE — 90833 PSYTX W PT W E/M 30 MIN: CPT | Performed by: PSYCHIATRY & NEUROLOGY

## 2025-06-04 PROCEDURE — APPSS30 APP SPLIT SHARED TIME 16-30 MINUTES

## 2025-06-04 PROCEDURE — 80061 LIPID PANEL: CPT

## 2025-06-04 PROCEDURE — 82947 ASSAY GLUCOSE BLOOD QUANT: CPT

## 2025-06-04 PROCEDURE — 6370000000 HC RX 637 (ALT 250 FOR IP): Performed by: INTERNAL MEDICINE

## 2025-06-04 PROCEDURE — 36415 COLL VENOUS BLD VENIPUNCTURE: CPT

## 2025-06-04 PROCEDURE — 1240000000 HC EMOTIONAL WELLNESS R&B

## 2025-06-04 RX ORDER — UREA 40 %
CREAM (GRAM) TOPICAL 2 TIMES DAILY
Status: DISCONTINUED | OUTPATIENT
Start: 2025-06-04 | End: 2025-06-06 | Stop reason: HOSPADM

## 2025-06-04 RX ADMIN — METFORMIN HYDROCHLORIDE 1000 MG: 1000 TABLET ORAL at 08:14

## 2025-06-04 RX ADMIN — TRAZODONE HYDROCHLORIDE 50 MG: 50 TABLET ORAL at 20:58

## 2025-06-04 RX ADMIN — Medication 1000 UNITS: at 08:14

## 2025-06-04 RX ADMIN — METFORMIN HYDROCHLORIDE 1000 MG: 1000 TABLET ORAL at 17:44

## 2025-06-04 RX ADMIN — GABAPENTIN 100 MG: 100 CAPSULE ORAL at 08:32

## 2025-06-04 RX ADMIN — INSULIN LISPRO 2 UNITS: 100 INJECTION, SOLUTION INTRAVENOUS; SUBCUTANEOUS at 11:43

## 2025-06-04 RX ADMIN — UREA: 400 CREAM TOPICAL at 21:15

## 2025-06-04 RX ADMIN — POTASSIUM CHLORIDE 20 MEQ: 1500 TABLET, EXTENDED RELEASE ORAL at 08:16

## 2025-06-04 RX ADMIN — ACETAMINOPHEN 650 MG: 325 TABLET ORAL at 20:57

## 2025-06-04 RX ADMIN — DIVALPROEX SODIUM 500 MG: 500 TABLET, EXTENDED RELEASE ORAL at 20:56

## 2025-06-04 RX ADMIN — OLANZAPINE 15 MG: 15 TABLET, FILM COATED ORAL at 08:15

## 2025-06-04 RX ADMIN — POTASSIUM CHLORIDE 20 MEQ: 1500 TABLET, EXTENDED RELEASE ORAL at 20:58

## 2025-06-04 RX ADMIN — ATORVASTATIN CALCIUM 20 MG: 20 TABLET, FILM COATED ORAL at 20:56

## 2025-06-04 RX ADMIN — DIVALPROEX SODIUM 750 MG: 500 TABLET, EXTENDED RELEASE ORAL at 08:14

## 2025-06-04 RX ADMIN — OLANZAPINE 20 MG: 10 TABLET, FILM COATED ORAL at 20:57

## 2025-06-04 RX ADMIN — INSULIN LISPRO 4 UNITS: 100 INJECTION, SOLUTION INTRAVENOUS; SUBCUTANEOUS at 20:58

## 2025-06-04 RX ADMIN — ESCITALOPRAM OXALATE 20 MG: 20 TABLET ORAL at 08:14

## 2025-06-04 RX ADMIN — GABAPENTIN 100 MG: 100 CAPSULE ORAL at 17:44

## 2025-06-04 RX ADMIN — HYDROXYZINE HYDROCHLORIDE 50 MG: 50 TABLET, FILM COATED ORAL at 20:58

## 2025-06-04 ASSESSMENT — PAIN SCALES - GENERAL: PAINLEVEL_OUTOF10: 6

## 2025-06-04 NOTE — PLAN OF CARE
Problem: Self Harm/Suicidality  Goal: Will have no self-injury during hospital stay  Description: INTERVENTIONS:1.  Ensure constant observer at bedside with Q15M safety checks2.  Maintain a safe environment3.  Secure patient belongings4.  Ensure family/visitors adhere to safety recommendations5.  Ensure safety tray has been added to patient's diet order6.  Every shift and PRN: Re-assess suicidal risk via Frequent Screener  6/3/2025 2151 by Tejas Boyer RN  Outcome: Progressing  Flowsheets  Taken 6/3/2025 2147  Will have no self-injury during hospital stay:   Ensure constant observer at bedside with Q15M safety checks   Maintain a safe environment   Secure patient belongings    Patient will be free from self-harm and injury during stay in hospital. Patient has report no delusions or hallucinations during shift. Patient will have continuous rounding during stay and will continued to be monitored.      Problem: Risk for Elopement  Goal: Patient will not exit the unit/facility without proper excort  6/3/2025 2151 by Tejas Boyer RN  Outcome: Progressing  Flowsheets (Taken 6/3/2025 2147)  Nursing Interventions for Elopement Risk:   Assist with personal care needs such as toileting, eating, dressing, as needed to reduce the risk of wandering   Collaborate with treatment team for drug withdrawal symptoms treatment   Collaborate with treatment team for nicotine replacement   Collaborate with family members/caregivers to mitigate the elopement risk    Patient will not exit the unit/facility without proper escort.  Patient will remain in a 24 hour locked down unit while a patient within the hospital. Patient will be walked off unit at time of discharge by hospital staff. Patient will be continuously monitored every 15 minutes for safety rounding.

## 2025-06-04 NOTE — GROUP NOTE
Group Therapy Note    Date: 6/4/2025    Group Start Time: 0916  Group End Time: 0926  Group Topic: Discharge Planning    Sharla Ricci RN        Group Therapy Note    Attendees: 3/10       Patient's Goal:  discuss and understand discharge process    Notes:      Status After Intervention:  Unchanged    Participation Level: Active Listener and Interactive    Participation Quality: Appropriate      Speech:  normal      Thought Process/Content: Linear      Affective Functioning: Flat      Mood: anxious and depressed      Level of consciousness:  Oriented x4      Response to Learning: Able to verbalize current knowledge/experience and Progressing to goal      Endings: None Reported    Modes of Intervention: Education and Support      Discipline Responsible: Registered Nurse      Signature:  Sharla Schultz RN

## 2025-06-04 NOTE — GROUP NOTE
Group Therapy Note    Date: 6/4/2025    Group Start Time: 1000  Group End Time: 1030  Group Topic: Psychotherapy    [facility]    Dilcia Morales MSW        Group Therapy Note    Attendees: 4/10       Patient's Goal:  Discuss ways to keep your brain and body healthy and how they often work together    Notes:     Status After Intervention:  Improved    Participation Level: Active Listener and Interactive    Participation Quality: Appropriate, Attentive, and Sharing      Speech:  normal      Thought Process/Content: Logical  Linear      Affective Functioning: Congruent      Mood: euthymic      Level of consciousness:  Alert and Oriented x4      Response to Learning: Able to verbalize current knowledge/experience and Able to verbalize/acknowledge new learning      Endings: None Reported    Modes of Intervention: Education, Support, and Exploration      Discipline Responsible: /Counselor      Signature:  FRANKLIN Stephens

## 2025-06-04 NOTE — GROUP NOTE
Group Therapy Note    Date: 6/4/2025    Group Start Time: 1430  Group End Time: 1515  Group Topic: Recreation Group    [facility]    Dory Zacarias CTRS        Group Therapy Note    Attendees 2/9    Patient's Goal:  pt will demonstrate improved coping skills and improved interpersonal relationships    Notes:   pt was pleasant and participated well    Status After Intervention:  Improved    Participation Level: Active Listener and Interactive    Participation Quality: Appropriate, Sharing, and Supportive      Speech:  normal      Thought Process/Content: Logical      Affective Functioning: Congruent      Mood: anxious      Level of consciousness:  Alert      Response to Learning: Able to verbalize current knowledge/experience, Capable of insight, and Progressing to goal      Endings: None Reported    Modes of Intervention: Education, Support, and Activity      Discipline Responsible: Psychoeducational Specialist      Signature:  CARMEN BLANCA

## 2025-06-04 NOTE — PROGRESS NOTES
monitored in the inpatient psychiatric facility at [facility] for safety and stabilization. Patient continues to need, on a daily basis, active treatment furnished directly by or requiring the supervision of inpatient psychiatric personnel.    Electronically signed by MARY Galdamez CNP on 6/4/2025 at 3:05 PM    **This report has been created using voice recognition software. It may contain minor errors which are inherent in voice recognition technology.**     I independently saw and evaluated the patient.  I reviewed the nurse practitioners documentation above. Principle diagnosis we are treating for is Schizoaffective disorder, depressive type (HCC).  Any additional comments or changes to the nurse practitioners documentation are stated below otherwise agree with assessment. Spent 17 minutes with the patient in supportive psychotherapy.  Plan will be as follows:      PLAN  Patient s symptoms   are improving  Continue with current medication for now  Attempt to develop insight  Psycho-education conducted.  Supportive Therapy conducted.  Probable discharge is Friday with continued improvement  Follow-up daily while on inpatient unit    Electronically signed by TOSHIA ESCAMILLA MD on 6/4/2025 at 6:51 PM

## 2025-06-04 NOTE — PLAN OF CARE
Problem: Self Harm/Suicidality  Goal: Will have no self-injury during hospital stay  Description: INTERVENTIONS:1.  Ensure constant observer at bedside with Q15M safety checks2.  Maintain a safe environment3.  Secure patient belongings4.  Ensure family/visitors adhere to safety recommendations5.  Ensure safety tray has been added to patient's diet order6.  Every shift and PRN: Re-assess suicidal risk via Frequent Screener  Outcome: Progressing   NOTE: 1:1 with patient for ten minutes.  Patient encouraged to attend unit programming and interact with peers and staff.  Patient also encouraged to tend to hygiene and ADLs.  Patient encouraged to discuss feelings with staff and feedback and reassurance provided.  Patient denies thoughts of self-harm and is agreeable to seeking staff out should thoughts of self-harm arise.  Safe environment maintained.  Every 15-minute checks for safety continues per unit policy.  Will continue to monitor for safety and provide support and reassurance as needed.  Patient is controlled in behaviors. Patient is compliant with medications.

## 2025-06-04 NOTE — GROUP NOTE
Group Therapy Note    Date: 6/4/2025    Group Start Time: 1100  Group End Time: 1135  Group Topic: Cognitive Skills    Dory Cohen CTRS    Cognitive Skills Group Note        Date: June 4, 2025 Start Time: 11am  End Time:  1135am      Number of Participants in Group & Unit Census:  1/10    Topic: cognitive skills     Goal of Group: pt will demonstrate improved coping skills and improved interpersonal relationships      Comments:     Patient did not participate in Cognitive skills  group, despite staff encouragement and explanation of benefits.  Patient remain seclusive to self.  Q15 minute safety checks maintained for patient safety and will continue to encourage patient to attend unit programming.              Signature:  CARMEN BLANCA

## 2025-06-05 LAB
GLUCOSE BLD-MCNC: 103 MG/DL (ref 75–110)
GLUCOSE BLD-MCNC: 127 MG/DL (ref 75–110)
GLUCOSE BLD-MCNC: 138 MG/DL (ref 75–110)
GLUCOSE BLD-MCNC: 148 MG/DL (ref 75–110)

## 2025-06-05 PROCEDURE — 99232 SBSQ HOSP IP/OBS MODERATE 35: CPT | Performed by: PSYCHIATRY & NEUROLOGY

## 2025-06-05 PROCEDURE — 6370000000 HC RX 637 (ALT 250 FOR IP): Performed by: PSYCHIATRY & NEUROLOGY

## 2025-06-05 PROCEDURE — 6370000000 HC RX 637 (ALT 250 FOR IP): Performed by: NURSE PRACTITIONER

## 2025-06-05 PROCEDURE — 1240000000 HC EMOTIONAL WELLNESS R&B

## 2025-06-05 PROCEDURE — 99232 SBSQ HOSP IP/OBS MODERATE 35: CPT | Performed by: INTERNAL MEDICINE

## 2025-06-05 PROCEDURE — 90833 PSYTX W PT W E/M 30 MIN: CPT | Performed by: PSYCHIATRY & NEUROLOGY

## 2025-06-05 PROCEDURE — APPSS30 APP SPLIT SHARED TIME 16-30 MINUTES

## 2025-06-05 PROCEDURE — 82947 ASSAY GLUCOSE BLOOD QUANT: CPT

## 2025-06-05 RX ADMIN — UREA: 400 CREAM TOPICAL at 22:41

## 2025-06-05 RX ADMIN — GABAPENTIN 100 MG: 100 CAPSULE ORAL at 08:03

## 2025-06-05 RX ADMIN — DIVALPROEX SODIUM 750 MG: 500 TABLET, EXTENDED RELEASE ORAL at 08:03

## 2025-06-05 RX ADMIN — Medication 1000 UNITS: at 08:03

## 2025-06-05 RX ADMIN — HYDROXYZINE HYDROCHLORIDE 50 MG: 50 TABLET, FILM COATED ORAL at 22:00

## 2025-06-05 RX ADMIN — ESCITALOPRAM OXALATE 20 MG: 20 TABLET ORAL at 08:04

## 2025-06-05 RX ADMIN — POTASSIUM CHLORIDE 20 MEQ: 1500 TABLET, EXTENDED RELEASE ORAL at 08:02

## 2025-06-05 RX ADMIN — DIVALPROEX SODIUM 500 MG: 500 TABLET, EXTENDED RELEASE ORAL at 22:00

## 2025-06-05 RX ADMIN — IBUPROFEN 400 MG: 400 TABLET, FILM COATED ORAL at 08:03

## 2025-06-05 RX ADMIN — OLANZAPINE 15 MG: 15 TABLET, FILM COATED ORAL at 08:03

## 2025-06-05 RX ADMIN — Medication 6 MG: at 22:01

## 2025-06-05 RX ADMIN — OLANZAPINE 20 MG: 10 TABLET, FILM COATED ORAL at 22:00

## 2025-06-05 RX ADMIN — METFORMIN HYDROCHLORIDE 1000 MG: 1000 TABLET ORAL at 18:12

## 2025-06-05 RX ADMIN — ATORVASTATIN CALCIUM 20 MG: 20 TABLET, FILM COATED ORAL at 22:00

## 2025-06-05 RX ADMIN — TRAZODONE HYDROCHLORIDE 50 MG: 50 TABLET ORAL at 22:00

## 2025-06-05 RX ADMIN — GABAPENTIN 100 MG: 100 CAPSULE ORAL at 18:12

## 2025-06-05 RX ADMIN — POTASSIUM CHLORIDE 20 MEQ: 1500 TABLET, EXTENDED RELEASE ORAL at 22:00

## 2025-06-05 RX ADMIN — METFORMIN HYDROCHLORIDE 1000 MG: 1000 TABLET ORAL at 08:03

## 2025-06-05 ASSESSMENT — PAIN SCALES - GENERAL
PAINLEVEL_OUTOF10: 6
PAINLEVEL_OUTOF10: 7

## 2025-06-05 ASSESSMENT — PAIN DESCRIPTION - LOCATION: LOCATION: FOOT

## 2025-06-05 NOTE — GROUP NOTE
Group Therapy Note    Date: 6/5/2025    Group Start Time: 1000  Group End Time: 1020  Group Topic: Psychotherapy     Dilcia Berger MSW        Group Therapy Note    Attendees: 0/8     Patient was offered group therapy today but declined to participate despite encouragement from staff.  1:1 was offered.    Discipline Responsible: /Counselor      Signature:  FRANKLIN Stephens

## 2025-06-05 NOTE — GROUP NOTE
Relaxation Group Note        Date: 6/5/25 Start Time: 2pm  End Time: 2:30pm      Number of Participants in Group & Unit Census:  2/8    Topic: Relaxation    Goal of Group:To help individuals achieve a state of calm and reduce the physical and mental effects of stress.       Comments:     Patient did not participate in Relaxation group, despite staff encouragement and explanation of benefits.  Patient remain seclusive to self.  Q15 minute safety checks maintained for patient safety and will continue to encourage patient to attend unit programming.       Signature:  Telma Francis RN

## 2025-06-05 NOTE — PROGRESS NOTES
IN-PATIENT SERVICE  [facility]    CONSULTATION / HISTORY AND PHYSICAL EXAMINATION            Date:   6/5/2025  Patient name:  Adolfo Presley  Date of admission:  6/2/2025  5:35 PM  MRN:   [id number]  Account:  [id number]  YOB: 1960  PCP:    No primary care provider on file.  Room:   [address] [place] [address]  Code Status:    Full Code    Physician Requesting Consult: Enoc Diaz MD    Reason for Consult:  medical management    Chief Complaint:     Chief Complaint   Patient presents with    Mental Health Problem     Pink slip - SI       History Obtained From:     Patient medical record nursing staff    History of Present Illness:   Patient, has past medical history multiple medical problems which include major depression, bipolar disorder, schizophrenia, polysubstance abuse, type 2 diabetes, admitted since his [facility] hospital with persistent depression, suicidal ideation\  Patient, told me that he was compliant with medication, does not check blood sugar regularly  Patient, has history of multiple hospitalization in the past in [facility] unit  Complaining of pain in undersurface of left foot, for long period of time    Past Medical History:     Past Medical History:   Diagnosis Date    Bipolar disorder (HCC)     Depression     GERD (gastroesophageal reflux disease)     Hallucinations     Headache(784.0)     Hepatitis     Schizophrenia, schizo-affective (HCC)     Substance abuse (HCC)     Tobacco abuse     Type II or unspecified type diabetes mellitus without mention of complication, not stated as uncontrolled     Urinary incontinence         Past Surgical History:     Past Surgical History:   Procedure Laterality Date    ABSCESS DRAINAGE N/A 02/11/2018    Carla anal abcess    DENTAL SURGERY      all teeth pulled        Medications Prior to Admission:     Prior to Admission medications    Medication Sig Start Date End Date Taking? Authorizing Provider   insulin lispro (HUMALOG,ADMELOG)  was generated using voice recognition Dragon dictation software.  Although every effort was made to ensure the accuracy of this automated transcription, some errors in transcription may have occurred.

## 2025-06-05 NOTE — PLAN OF CARE
Problem: Self Harm/Suicidality  Goal: Will have no self-injury during hospital stay  Description: INTERVENTIONS:1.  Ensure constant observer at bedside with Q15M safety checks2.  Maintain a safe environment3.  Secure patient belongings4.  Ensure family/visitors adhere to safety recommendations5.  Ensure safety tray has been added to patient's diet order6.  Every shift and PRN: Re-assess suicidal risk via Frequent Screener  6/4/2025 2339 by Tejas Boyer, RN  Outcome: Progressing  Flowsheets  Taken 6/4/2025 2320  Will have no self-injury during hospital stay:   Ensure constant observer at bedside with Q15M safety checks   Maintain a safe environment   Secure patient belongings  Patient will have no self injury during hospital stay. Patient has been educated on effective communication when intrusive thoughts may come. Patient has also been educated on staying compliant with medication administration and to keep all follow up appointments with providers.     Problem: Risk for Elopement  Goal: Patient will not exit the unit/facility without proper excort  6/4/2025 2339 by Tejas Boyer, RN  Outcome: Progressing  Flowsheets (Taken 6/4/2025 2320)  Nursing Interventions for Elopement Risk:   Assist with personal care needs such as toileting, eating, dressing, as needed to reduce the risk of wandering   Collaborate with treatment team for drug withdrawal symptoms treatment   Collaborate with treatment team for nicotine replacement                Collaborate with family members/caregivers to mitigate the elopement risk    Patient will not exit the unit/ facility without proper escort. At time of discharge, patient will be escorted off property and to designated area for . Patient is secure in a lock-down unit and will have continuous 15 minute safety rounds.

## 2025-06-05 NOTE — PLAN OF CARE
Problem: Self Harm/Suicidality  Goal: Will have no self-injury during hospital stay  Description: INTERVENTIONS:1.  Ensure constant observer at bedside with Q15M safety checks2.  Maintain a safe environment3.  Secure patient belongings4.  Ensure family/visitors adhere to safety recommendations5.  Ensure safety tray has been added to patient's diet order6.  Every shift and PRN: Re-assess suicidal risk via Frequent Screener  6/5/2025 1217 by Abdulkadir Goldman LPN  Outcome: Progressing     Problem: Safety - Adult  Goal: Free from fall injury  6/5/2025 1217 by Abdulkadir Goldman LPN  Outcome: Progressing   Patient remains safe on the unit free from falls and self harm.  Patient denies suicidal ideations at this time.

## 2025-06-05 NOTE — PLAN OF CARE
[facility]  Day 3 Interdisciplinary Treatment Plan NOTE    Review Date & Time: 6/5/2025       Admission Type:   Admission Type: Involuntary    Reason for admission:  Reason for Admission: Patient attempted to put trash bag over head several days ago at SNF. Auditory hallucinations increased from baseline. Pt states voices are saying derogatory things.  Estimated Length of Stay:  5-7 days  Estimated Discharge Date Update:  to be determined by physician    PATIENT STRENGTHS:  Patient Strengths:   Patient Strengths and Limitations:Limitations: Difficulty problem solving/relies on others to help solve problems, Apathetic / unmotivated  Addictive Behavior:Addictive Behavior  In the Past 3 Months, Have You Felt or Has Someone Told You That You Have a Problem With  : None  Medical Problems:  Past Medical History:   Diagnosis Date    Bipolar disorder (HCC)     Depression     GERD (gastroesophageal reflux disease)     Hallucinations     Headache(784.0)     Hepatitis     Schizophrenia, schizo-affective (HCC)     Substance abuse (HCC)     Tobacco abuse     Type II or unspecified type diabetes mellitus without mention of complication, not stated as uncontrolled     Urinary incontinence        Risk:  Fall Risk   Dusty Scale Dusty Scale Score: 22  BVC    Change in scores:  No Changes to plan of Care:  No    Status EXAM:   Mental Status and Behavioral Exam  Normal: Yes  Level of Assistance: Independent/Self  Facial Expression: Brightened  Affect: Congruent, Appropriate  Level of Consciousness: Alert  Frequency of Checks: 4 times per hour, close  Mood:Normal: No  Mood: Depressed, Worthless, low self-esteem  Motor Activity:Normal: No  Motor Activity: Decreased  Eye Contact: Good  Observed Behavior: Preoccupied, Friendly, Cooperative  Sexual Misconduct History: Current - no  Preception: [place] to person, [place] to time, [place] to place, [place] to situation  Attention:Normal: No  Attention: Distractible  Thought

## 2025-06-05 NOTE — CARE COORDINATION
Writer spoke Gavi at [facility] to advise of Adolfo's discharge tomorrow 06/06/25, she asked for updated clinicals faxed to [phone], writer faxed updates.     Writer completed/faxed transportation paperwork to [facility]; spoke with Ingrid to setup 1pm transport return to [place].     Timor spoke with IrmaAdolfo rose's sister/guardian to advise on tomorrow's discharge, she had no questions/concerns.     Writer updated unit staff on above.

## 2025-06-05 NOTE — GROUP NOTE
Group Therapy Note    Date: 6/5/2025    Group Start Time: 1100  Group End Time: 1120  Group Topic: Cognitive Skills    Dory Cohen CTRS    Cognitive Skills Group Note        Date: June 5, 2025 Start Time: 11am  End Time:  1120am      Number of Participants in Group & Unit Census:  2/8    Topic: cognitive skills     Goal of Group: pt will demonstrate improved coping skills and improved interpersonal relationships      Comments:     Patient did not participate in Cognitive Skills group, despite staff encouragement and explanation of benefits.  Patient remain seclusive to self.  Q15 minute safety checks maintained for patient safety and will continue to encourage patient to attend unit programming.              Signature:  CARMEN BLANCA

## 2025-06-05 NOTE — PROGRESS NOTES
Daily Progress Note  6/5/2025    Patient Name: Adolfo Presley    CHIEF COMPLAINT:  Suicidal ideation and auditory hallucinations            SUBJECTIVE:      Patient is seen today for a follow up assessment.  Per nursing staff report, patient has been pleasant and cooperative with scheduled medications.  He has not required any emergency medications overnight.  Patient was observed resting in bed upon approach.  He was agreeable to assessment in his room today.  He reports his mood as \"ok\".  He reports improvements in suicidal ideations and depression.  He denies having a plan to harm himself.  He denies homicidal ideations, visual hallucinations, side effects from medications, maryellen, paranoia, or delusional thoughts.  He reports improvements in auditory hallucinations.  He denies that the hallucinations are commanding him to do anything.  He has been to some selective group therapies.  Group therapy continually encouraged by writer.    He is showing signs of improvement.  However, he still requires inpatient hospitalization for safety and stabilization.    Appetite:  [x] Normal/Adequate/Unchanged  [] Increased  [] Decreased      Sleep:       [x] Normal/Adequate/Unchanged  [] Fair  [] Poor      Group Attendance on Unit:   [] Yes  [x] Selectively    [] No    Medication Side Effects:  Patient denies any medication side effects at the time of assessment.         Mental Status Exam  Level of consciousness: Lethargic and awake.   Appearance: Appropriate attire for setting, resting in bed, with fair  grooming and hygiene.   Behavior/Motor: Approachable, no psychomotor abnormalities.   Attitude toward examiner: Cooperative, poor eye contact.  Speech: Normal rate, normal volume, normal tone.  Mood:  Patient reports \"ok\".   Affect: calm   Thought processes: Linear and coherent.   Thought content: Denies homicidal ideation.  Suicidal Ideation: Reports improvement in suicidal ideations  Delusions: No evidence of delusions.  symptoms are: Improving.  Medications are determined per attending physician.   Monitor need and frequency of PRN medications.  Encourage participation in groups and milieu.  Probable discharge is to be determined by MD.     Electronically signed by MARY Mata CNP on 6/5/2025 at 4:17 PM    **This report has been created using voice recognition software. It may contain minor errors which are inherent in voice recognition technology.**     I independently saw and evaluated the patient.  I reviewed the nurse practitioners documentation above.  Principle diagnosis we are treating for is Schizoaffective disorder, depressive type (HCC).  Any additional comments or changes to the nurse practitioners documentation are stated below otherwise agree with assessment.  Plan will be as follows:  Spent 17 minutes with the patient in supportive psychotherapy.  Patient denying side effects to medication.  Reporting improvement in mood.  Denying suicidal or homicidal ideation intent or plan.  Denying psychotic symptoms.  Forward-looking and constructive.  Discussed if continued stability or improvement through tomorrow would consider discharge and patient is in agreement  Documentation is for date of service June 5, 2025  PLAN  Patient s symptoms   are improving  Continue with current medication for now  Attempt to develop insight  Psycho-education conducted.  Supportive Therapy conducted.  Probable discharge is tomorrow  Follow-up daily while on inpatient unit  Electronically signed by TOSHIA ESCAMILLA MD on 6/6/2025 at 6:56 AM

## 2025-06-06 VITALS
HEIGHT: 74 IN | RESPIRATION RATE: 16 BRPM | OXYGEN SATURATION: 95 % | TEMPERATURE: 98.7 F | DIASTOLIC BLOOD PRESSURE: 64 MMHG | SYSTOLIC BLOOD PRESSURE: 127 MMHG | HEART RATE: 77 BPM | BODY MASS INDEX: 32.1 KG/M2

## 2025-06-06 LAB
GLUCOSE BLD-MCNC: 112 MG/DL (ref 75–110)
GLUCOSE BLD-MCNC: 154 MG/DL (ref 75–110)

## 2025-06-06 PROCEDURE — 6370000000 HC RX 637 (ALT 250 FOR IP): Performed by: PSYCHIATRY & NEUROLOGY

## 2025-06-06 PROCEDURE — 82947 ASSAY GLUCOSE BLOOD QUANT: CPT

## 2025-06-06 PROCEDURE — 99239 HOSP IP/OBS DSCHRG MGMT >30: CPT | Performed by: PSYCHIATRY & NEUROLOGY

## 2025-06-06 RX ORDER — GABAPENTIN 100 MG/1
100 CAPSULE ORAL 2 TIMES DAILY
Qty: 60 CAPSULE | Refills: 0 | Status: SHIPPED | OUTPATIENT
Start: 2025-06-06 | End: 2025-07-06

## 2025-06-06 RX ORDER — HYDROXYZINE HYDROCHLORIDE 50 MG/1
50 TABLET, FILM COATED ORAL 3 TIMES DAILY PRN
Qty: 30 TABLET | Refills: 0 | Status: SHIPPED | OUTPATIENT
Start: 2025-06-06 | End: 2025-06-16

## 2025-06-06 RX ORDER — UREA 40 %
CREAM (GRAM) TOPICAL
Qty: 85 G | Refills: 0 | Status: SHIPPED | OUTPATIENT
Start: 2025-06-06

## 2025-06-06 RX ADMIN — POTASSIUM CHLORIDE 20 MEQ: 1500 TABLET, EXTENDED RELEASE ORAL at 08:34

## 2025-06-06 RX ADMIN — Medication 1000 UNITS: at 08:34

## 2025-06-06 RX ADMIN — ESCITALOPRAM OXALATE 20 MG: 20 TABLET ORAL at 08:34

## 2025-06-06 RX ADMIN — OLANZAPINE 15 MG: 15 TABLET, FILM COATED ORAL at 08:34

## 2025-06-06 RX ADMIN — METFORMIN HYDROCHLORIDE 1000 MG: 1000 TABLET ORAL at 08:34

## 2025-06-06 RX ADMIN — DIVALPROEX SODIUM 750 MG: 500 TABLET, EXTENDED RELEASE ORAL at 08:34

## 2025-06-06 RX ADMIN — GABAPENTIN 100 MG: 100 CAPSULE ORAL at 08:34

## 2025-06-06 ASSESSMENT — PAIN SCALES - GENERAL: PAINLEVEL_OUTOF10: 6

## 2025-06-06 NOTE — TRANSITION OF CARE
Behavioral Health Transition Record    Patient Name: Adolfo Presley  YOB: 1960   Medical Record Number: [id number]  Date of Admission: 6/2/2025  5:35 PM   Date of Discharge: 6/6/2025    Attending Provider: Enoc Diaz MD   Discharging Provider: Enoc Diaz MD  To contact this individual call [phone] and ask the  to page.  If unavailable, ask to be transferred to Behavioral Health Provider on call.  A Behavioral Health Provider will be available on call 24/7 and during holidays.    Primary Care Provider: No primary care provider on file.    Allergies   Allergen Reactions    Navane [Thiothixene (Tiotixene)]        Reason for Admission: Reason for Admission to Psychiatric Unit:  Threat to self requiring 24 hour professional observation  Command hallucinations directing harm to self or others; risk of the patient taking action  A mental disorder causing major disability in social, interpersonal, occupational, and/or educational functioning that is leading to dangerous or life-threatening functioning, and that can only be addressed in an acute inpatient setting   Concerns about patient's safety in the community  Past Mental Health Diagnosis: a history of  Bipolar Disorder, Schizoaffective Disorder, Prior suicide attempt, and Alcohol and/or Drug Use Disorder  Triggering event or precipitating factor: Relationship Issues  Length of time/duration of triggering event: Days    Admission Diagnosis: Depression with suicidal ideation [F32.A, R45.851]    * No surgery found *    Results for orders placed or performed during the hospital encounter of 06/02/25   CBC with Auto Differential   Result Value Ref Range    WBC 6.8 3.5 - 11.0 k/uL    RBC 4.18 (L) 4.21 - 5.77 m/uL    Hemoglobin 11.8 (L) 13.5 - 17.5 g/dL    Hematocrit 37.6 (L) 41.0 - 53.0 %    MCV 90.0 80.0 - 100.0 fL    MCH 28.2 26.0 - 34.0 pg    MCHC 31.4 31.0 - 37.0 g/dL    RDW 14.8 11.5 - 14.9 %    Platelets 264 150 - 450 k/uL    MPV  continue all medications until otherwise directed by physician.      Tobacco Cessation Discharge Plan:   Is the patient a tobacco user  and needs referral for tobacco cessation? No  Patient referred to the following for tobacco cessation with an appointment? No  Patient was offered medication to assist with tobacco cessation at discharge? No    Alcohol/Substance Abuse Discharge Plan:   Does the patient have a history of substance/alcohol abuse and requires a referral for treatment? No  Patient referred to the following for substance/alcohol abuse treatment with an appointment? No  Patient was offered medication to assist with substance/alcohol abuse cessation at discharge? No      Patient discharged to: Home; Transition record discussed with patient/caregiver and provided this record in hard copy or electronically

## 2025-06-06 NOTE — DISCHARGE INSTRUCTIONS
Information:  Medications:   Medication summary provided   I understand that I should take only the medications on my list.     -why and when I need to take each medicine.     -which side effects to watch for.     -that I should carry my medication information at all times in case of     Emergency situations.    I will take all of my medicines to follow up appointments.     -check with my physician or pharmacist before taking any new    Medication, over the counter product or drink alcohol.    -Ask about food, drug or dietary supplement interactions.    -discard old lists and update records with medication providers.    Notify Physician:  Notify physician if you notice:   Always call 911 if you feel your life is in danger  In case of an emergency call 911 immediately!  If 911 is not available call your local emergency medical system for help    Behavioral Health Follow Up:  Original Referral Source: [facility]  Discharge Diagnosis: Depression with suicidal ideation [F32.A, R45.851]  Recommendations for Level of Care: maintain all follow ups and take medications as prescribed  Patient status at discharge: stable  My hospital  was: [facility]  Aftercare plan faxed: yes   -faxed by: staff RN   -date: 6/6/2025   -time: 1400  Prescriptions: medications sent to your preferred pharmacy    Smoking: Quit Smoking.   Call the [employer]'s smoking quitline at 8-802-46N-QUIT  Know the signs of a heart attack   If you have any of the following symptoms call 911 immediately, do not wait more    Than five minutes.    1. Pressure, fullness and/ or squeezing in the center of the chest spreading to    The jaw, neck or shoulder.    2. Chest discomfort with light headedness, fainting, sweating, nausea or    Shortness of breath.   3. Upper abdominal pressure or discomfort.   4. Lower chest pain, back pain, unusual fatigue, shortness of breath, nausea   Or dizziness.     General Information:   Questions regarding your bill: Call HELP program  [address] [facility] [address]  Phone: [phone]      Phone: [phone]    [facility]      [facility]  [address] [place] [address]    [address] [facility] [address]  Phone: [phone]      Phone: [phone]    [facility]     [facility] [place] Department of Psychiatry  [address] [facility] [address]   [address] [place], [place] [address]  Phone: [phone]      Phone: [phone]    [facility]       [facility]  [address] [facility] [address]     [address] [facility] [address]  Phone: [phone]      Phone: [phone]    [facility]   [address] [facility] [address]    [address] [facility] [address]  Phone: [phone]      Phone: [phone] Ext: [phone]    [place] [address] [facility] [address] / [address] [facility] [address] / [address] [facility] [address]  Phone: [phone]    [facility] and Mental Health   [facility]  [address] [facility] [address]  [address] [place] [address]  Phone: [phone]      Phone: [phone]    [facility]  [address] [facility] [address]    [address] [facility] [address]  Phone: [phone]      Phone: [phone]

## 2025-06-06 NOTE — PROGRESS NOTES
Behavioral Health [place]  Discharge Note    Pt discharged with followings belongings:   Dental Appliances: None  Vision - Corrective Lenses: None  Hearing Aid: None  Jewelry: None  Body Piercings Removed: N/A  Clothing: Footwear, Pants, Shirt  Other Valuables: Money ($5.00)   Valuables returned to patient. Patient educated on aftercare instructions: yes  Information faxed by staff RN  at 1:20 PM .Patient verbalize understanding of AVS:  yes.    Status EXAM upon discharge:  Mental Status and Behavioral Exam  Normal: Yes  Level of Assistance: Independent/Self  Facial Expression: Brightened  Affect: Congruent, Appropriate  Level of Consciousness: Alert  Frequency of Checks: 4 times per hour, close  Mood:Normal: No  Mood: Depressed, Worthless, low self-esteem  Motor Activity:Normal: No  Motor Activity: Decreased  Eye Contact: Good  Observed Behavior: Preoccupied, Friendly, Cooperative  Sexual Misconduct History: Current - no  Preception: [place] to person, [place] to time, [place] to place, [place] to situation  Attention:Normal: No  Attention: Distractible  Thought Processes: Unremarkable  Thought Content:Normal: No  Thought Content: Preoccupations  Depression Symptoms: Isolative, Feelings of helplessness, Feelings of hopelessess  Anxiety Symptoms: Generalized  [place] Symptoms: No problems reported or observed.  Hallucinations: None  Delusions: No  Delusions: Paranoid  Memory:Normal: No  Memory: Poor recent  Insight and Judgment: No  Insight and Judgment: Poor judgment, Poor insight, Unmotivated    Tobacco Screening:  Practical Counseling, on admission, corby X, if applicable and completed (first 3 are required if patient doesn't refuse):            ( ) Recognizing danger situations (included triggers and roadblocks)                    ( ) Coping skills (new ways to manage stress,relaxation techniques, changing routine, distraction)                                                           ( ) Basic information about  quitting (benefits of quitting, techniques in how to quit, available resources  ( ) [facility]                                                                                                                   ( ) Patient refused counseling  ( ) Patient refused referral  ( ) Patient refused prescription upon discharge  (X) Patient has not smoked in the last 30 days    Metabolic Screening:    Lab Results   Component Value Date    LABA1C 6.6 (H) 06/04/2025       Lab Results   Component Value Date    CHOL 121 06/04/2025    CHOL 150 10/13/2021    CHOL 167 08/11/2020    CHOL 179 02/13/2017    CHOL 127 05/09/2015    CHOL 168 08/20/2014    CHOL 158 12/31/2013    CHOL 178 08/15/2013    CHOL 166 09/17/2012    CHOL 210 (H) 02/03/2012     Lab Results   Component Value Date    TRIG 62 06/04/2025    TRIG 41 10/13/2021    TRIG 43 08/11/2020    TRIG 67 02/13/2017    TRIG 37 05/09/2015    TRIG 72 08/20/2014    TRIG 52 12/31/2013    TRIG 70 08/15/2013    TRIG 117 09/17/2012    TRIG 94 02/03/2012     Lab Results   Component Value Date    HDL 42 06/04/2025    HDL 62 10/13/2021    HDL 74 08/11/2020    HDL 73 02/13/2017    HDL 57 05/09/2015    HDL 50 08/20/2014    HDL 43 12/31/2013    HDL 42 08/15/2013    HDL 38 (L) 09/17/2012    HDL 55 02/03/2012     No components found for: \"LDLCAL\"  No components found for: \"LABVLDL\"    Shonda Tovar RN

## 2025-06-06 NOTE — DISCHARGE INSTR - DIET

## 2025-06-06 NOTE — GROUP NOTE
Group Therapy Note    Date: 6/6/2025    Group Start Time: 1100  Group End Time: 1140  Group Topic: Cognitive Skills    Dory Cohen CTRS      Cognitive Skills Group Note        Date: June 6, 2025 Start Time: 11am  End Time:  1140am      Number of Participants in Group & Unit Census:  3/10    Topic: cognitive skills     Goal of Group:pt will demonstrate improved coping skills and improved interpersonal relationships      Comments:     Patient did not participate in Cognitive Skills group, despite staff encouragement and explanation of benefits.  Patient remain seclusive to self.  Q15 minute safety checks maintained for patient safety and will continue to encourage patient to attend unit programming.              Signature:  CARMEN BLANCA

## 2025-06-06 NOTE — CARE COORDINATION
Discharge Arrangements:  N/A     Guardian notified: yes    Discharge destination/address: [facility] [address] [place] [address]     Transported by:  [facility]    Patient was not accepting of referral.  Follow up appointment is scheduled for You will return to [facility] today and receive all services including MENTAL HEALTH services within that facility.     *SUJIT resources were offered to patient throughout admission and at time of discharge. This list of [facility] SUJIT providers was provided to patient:     [facility] [place] [facility]  [address] [facility] [address]   [address] [facility] [address]  Phone: [phone]     Phone: [phone]    [facility]  [place]   [address] [facility] [address]   [address] [facility] [address]  Phone: [phone]     Phone: [phone]    [facility] [place]  [address] [facility] [address]    [address] [facility] [facility] [address]  Phone: [phone]     Phone: [phone]    [facility]     [facility]   [address] [facility] [address]  [place] [address]      Phone: [phone]  Phone: [phone]        [facility]  [address] [facility] [address]   [address] [place] [address]  Phone: [phone]     Phone:  [phone]    [facility]      [facility]  [address] [facility] [address]   [address] [facility] [address]  Phone: [phone]     Phone: [phone]    [facility]  [address] [facility] [address]   [address] [facility] [facility] [address]  Phone: [phone]     Phone: [phone]    [facility]  [facility]  [address] [facility] [address] [address] [facility] [address]  Phone: [phone]     Phone:  [phone]    [facility]     [facility]  [address] [place] [address]   [address] [facility] [address]  Phone: [phone]     Phone: [phone]    [facility]    [facility] Department of Psychiatry  [address] [facility] [place] [facility] [address]  [address] [place], [place] [address]  Phone: [phone]     Phone: [phone]    [facility]      [facility]  [address] [facility] [address]    [address] [facility] [address]  Phone: [phone]     Phone: [phone]    [facility]   [address] [address]   [address] [facility] [facility] [address]  Phone: [phone]     Phone: [phone] Ext: [phone]    [facility]  [address] [facility] [address] or  [address] [facility] [address] or  [address] [facility] [address]  Phone: [phone]    [facility] and [facility] Health  [facility]  [address] [facility] [address]    [address] [place] [address]  [facility] [address]     Phone: [phone]  Phone: [phone]          [facility]  [address] [facility] [address]   [address] [facility] [address]  Phone: [phone]     Phone: [phone]

## 2025-06-07 NOTE — DISCHARGE SUMMARY
Provider Discharge Summary     Patient ID:  Adolfo Presley  [id number]  65 y.o.  1960    Admit date: 6/2/2025    Discharge date and time: 6/6/2025  8:07 PM     Admitting Physician: Enoc Diaz MD     Discharge Physician: Enoc Diaz MD    Admission Diagnoses: Depression with suicidal ideation [F32.A, R45.851]    Discharge Diagnoses:      Schizoaffective disorder, depressive type ([facility])     Patient Active Problem List   Diagnosis Code    Hematuria R31.9    Suicidal ideations R45.851    Cocaine abuse ([facility]) F14.10    Schizoaffective disorder, bipolar type ([facility]) F25.0    Schizoaffective disorder, depressive type ([facility]) F25.1    Perianal abscess K61.0    Carla-rectal abscess K61.1    Schizophrenia ([facility]) F20.9    Schizoaffective disorder ([facility]) F25.9    Major depression with psychotic features ([facility]) F32.3    Acute psychosis ([facility]) F23    Depression with suicidal ideation F32.A, R45.851    Pneumonia due to infectious organism J18.9    Smoker F17.200    Ventricular ectopy I49.3    Low serum cortisol level R79.89    Sepsis due to Klebsiella pneumoniae with no resultant organ failure ([facility]) A41.4    Elevated BP without diagnosis of hypertension R03.0    Lower urinary tract symptoms (LUTS) R39.9    Dyspepsia R10.13    Skin rash R21    Hypernatremia E87.0    Drug-induced tremor G25.1    Hx of diabetes mellitus Z86.39    Severe recurrent major depression with psychotic features ([facility]) F33.3        Admission Condition: poor    Discharged Condition: stable    Indication for Admission: threat to self    History of Present Illnes (present tense wording is of findings from admission exam and are not necessarily indicative of current findings):   Adolfo Presley is a 65 y.o. male who has a past medical history of schizoaffective disorder, bipolar disorder, depression, polysubstance abuse, GERD, hepatitis, diabetes, and chronic headaches. Patient presented to the ED via [place] on a pink slip due to suicidal ideation. Per pink slip,